# Patient Record
Sex: MALE | Race: WHITE | NOT HISPANIC OR LATINO | Employment: UNEMPLOYED
[De-identification: names, ages, dates, MRNs, and addresses within clinical notes are randomized per-mention and may not be internally consistent; named-entity substitution may affect disease eponyms.]

---

## 2019-11-04 ENCOUNTER — HEALTH MAINTENANCE LETTER (OUTPATIENT)
Age: 39
End: 2019-11-04

## 2020-02-16 ENCOUNTER — HEALTH MAINTENANCE LETTER (OUTPATIENT)
Age: 40
End: 2020-02-16

## 2020-04-23 ENCOUNTER — NURSE TRIAGE (OUTPATIENT)
Dept: NURSING | Facility: CLINIC | Age: 40
End: 2020-04-23

## 2020-04-23 NOTE — TELEPHONE ENCOUNTER
Patient says his [employer] worker did not have any symptoms but his coworker was sick with a fever sore throat.  Patient is concerned he may have it. He says his employer told him he should get tested or get a note so he can go back to work.  FNA advised if his worker was not sick, then it's likely he was not exposed but he can go to [email or web address] for an eval if his employer needs him to do.  Caller verbalizes understanding.      COVID 19 Nurse Triage Plan/Patient Instructions    Please be aware that novel coronavirus (COVID-19) may be circulating in the community. If you develop symptoms such as fever, cough, or SOB or if you have concerns about the presence of another infection including coronavirus (COVID-19), please contact your health care provider or visit [email or web address].     Disposition/Instructions    Patient to have an [facility] Visit with a provider (Preferred option). Follow System Ambulatory Workflow for COVID 19.     To do this follow these instructions:    1. Go to the website [email or web address]  2. Create an account (you will need your insurance information)  3. Start a new visit  4. Choose your diagnosis (e.g. COVID19)  5. Fill out the information about your symptoms  6. A provider will reach out to you by text, phone call or video visit based on your request    Call Back If: Your symptoms worsen before you are able to complete your [facility] Visit with a provider.    Thank you for limiting contact with others, wearing a simple mask to cover your cough, practice good hand hygiene habits and accessing our virtual services where possible to limit the spread of this virus.    For more information about COVID19 and options for caring for yourself at home, please visit the CDC website at https://www.cdc.gov/coronavirus/2019-ncov/about/steps-when-sick.html  For more options for care at [facility], please visit our website at [email or web address]    For more information, please use  the [place] Department of Health COVID-19 Website: https://www.health.[facility].[place].us/diseases/coronavirus/index.html  [place] Department of Health ([facility]) COVID-19 Hotlines (Interpreters available):      Health questions: Phone Number: [phone] or 1-527.718.1897 and Hours: 7 a.m. to 7 p.m.    Schools and  questions: Phone Number: [phone] or [phone] and Hours 7 a.m. to 7 p.m.                      Reason for Disposition    COVID -19, questions about    Additional Information    Negative: SEVERE difficulty breathing (e.g., struggling for each breath, speak in single words, bluish lips)    Negative: Sounds like a life-threatening emergency to the triager    Negative: [1] Adult has symptoms of COVID-19 (fever, cough, or SOB) AND [2] lab test positive    Negative: [1] Adult has symptoms of COVID-19 (fever, cough or SOB) AND [2] major community spread where patient lives AND [3] testing not being done for mild symptoms    Negative: [1] Difficulty breathing (shortness of breath) occurs AND [2] onset > 14 days after COVID-19 EXPOSURE (Close Contact) AND [3] no major community spread    Negative: [1] Dry cough occurs AND [2] onset > 14 days after COVID-19 EXPOSURE AND [3] no major community spread    Negative: [1] Wet cough (i.e., white-yellow, yellow, green, or samaria colored sputum) AND [2] onset > 14 days after COVID-19 EXPOSURE AND [3] no major community spread    Negative: [1] Common cold symptoms AND [2] onset > 14 days after COVID-19 EXPOSURE AND [3] no major community spread    Negative: [1] Difficulty breathing occurs AND [2] within 14 days of COVID-19 EXPOSURE (Close Contact)    Negative: Patient sounds very sick or weak to the triager    Negative: [1] Fever (or feeling feverish) OR cough AND [2] within 14 Days of COVID-19 EXPOSURE (Close Contact)    Negative: [1] Fever (or feeling feverish) OR cough occurs AND [2] within 14 days of travel from another country (international travel)     Negative: [1] Fever (or feeling feverish) OR cough occurs AND [2] within 14 days of travel from a city or area with major community spread    Negative: [1] Fever (or feeling feverish) OR cough occurs AND [2] living in area with major community spread AND [3] testing being done for symptoms    Negative: [1] Mild body aches, chills, diarrhea, headache, runny nose, or sore throat AND [2] within 14 days of COVID-Exposure    Negative: [1] COVID-19 EXPOSURE within last 14 days AND [2] NO cough, fever, or breathing difficulty AND [3] exposed person is a healthcare worker who was NOT using all recommended personal protective equipment (i.e., a respirator-N95 mask, eye protection, gloves, and gown)    Protocols used: CORONAVIRUS (COVID-19) EXPOSURE-A- 3.30.20

## 2020-11-22 ENCOUNTER — HEALTH MAINTENANCE LETTER (OUTPATIENT)
Age: 40
End: 2020-11-22

## 2021-04-04 ENCOUNTER — HEALTH MAINTENANCE LETTER (OUTPATIENT)
Age: 41
End: 2021-04-04

## 2021-05-29 ENCOUNTER — RECORDS - HEALTHEAST (OUTPATIENT)
Dept: ADMINISTRATIVE | Facility: CLINIC | Age: 41
End: 2021-05-29

## 2021-09-18 ENCOUNTER — HEALTH MAINTENANCE LETTER (OUTPATIENT)
Age: 41
End: 2021-09-18

## 2022-04-30 ENCOUNTER — HEALTH MAINTENANCE LETTER (OUTPATIENT)
Age: 42
End: 2022-04-30

## 2022-11-20 ENCOUNTER — HEALTH MAINTENANCE LETTER (OUTPATIENT)
Age: 42
End: 2022-11-20

## 2023-01-01 ENCOUNTER — APPOINTMENT (OUTPATIENT)
Dept: RADIOLOGY | Facility: HOSPITAL | Age: 43
End: 2023-01-01
Attending: EMERGENCY MEDICINE
Payer: COMMERCIAL

## 2023-01-01 ENCOUNTER — HOSPITAL ENCOUNTER (EMERGENCY)
Facility: HOSPITAL | Age: 43
Discharge: HOME | End: 2023-01-01
Attending: EMERGENCY MEDICINE | Admitting: EMERGENCY MEDICINE
Payer: COMMERCIAL

## 2023-01-01 VITALS
HEIGHT: 65 IN | RESPIRATION RATE: 17 BRPM | OXYGEN SATURATION: 97 % | TEMPERATURE: 99.2 F | HEART RATE: 89 BPM | DIASTOLIC BLOOD PRESSURE: 63 MMHG | SYSTOLIC BLOOD PRESSURE: 134 MMHG

## 2023-01-01 DIAGNOSIS — F17.200 SMOKING: ICD-10-CM

## 2023-01-01 DIAGNOSIS — R07.9 CHEST PAIN, UNSPECIFIED TYPE: ICD-10-CM

## 2023-01-01 LAB
ALBUMIN SERPL BCG-MCNC: 4.2 G/DL (ref 3.5–5.2)
ALP SERPL-CCNC: 216 U/L (ref 40–129)
ALT SERPL W P-5'-P-CCNC: 16 U/L (ref 10–50)
ANION GAP SERPL CALCULATED.3IONS-SCNC: 12 MMOL/L (ref 7–15)
AST SERPL W P-5'-P-CCNC: 10 U/L (ref 10–50)
BASOPHILS # BLD AUTO: 0.1 10E3/UL (ref 0–0.2)
BASOPHILS NFR BLD AUTO: 1 %
BILIRUB SERPL-MCNC: 0.6 MG/DL
BUN SERPL-MCNC: 10.4 MG/DL (ref 6–20)
CALCIUM SERPL-MCNC: 9.4 MG/DL (ref 8.6–10)
CHLORIDE SERPL-SCNC: 101 MMOL/L (ref 98–107)
CREAT SERPL-MCNC: 0.75 MG/DL (ref 0.67–1.17)
DEPRECATED HCO3 PLAS-SCNC: 26 MMOL/L (ref 22–29)
EOSINOPHIL # BLD AUTO: 0.3 10E3/UL (ref 0–0.7)
EOSINOPHIL NFR BLD AUTO: 2 %
ERYTHROCYTE [DISTWIDTH] IN BLOOD BY AUTOMATED COUNT: 16 % (ref 10–15)
GFR SERPL CREATININE-BSD FRML MDRD: >90 ML/MIN/1.73M2
GLUCOSE SERPL-MCNC: 109 MG/DL (ref 70–99)
HCT VFR BLD AUTO: 45.9 % (ref 40–53)
HGB BLD-MCNC: 14.2 G/DL (ref 13.3–17.7)
HOLD SPECIMEN: NORMAL
IMM GRANULOCYTES # BLD: 0.1 10E3/UL
IMM GRANULOCYTES NFR BLD: 1 %
LIPASE SERPL-CCNC: 15 U/L (ref 13–60)
LYMPHOCYTES # BLD AUTO: 2.9 10E3/UL (ref 0.8–5.3)
LYMPHOCYTES NFR BLD AUTO: 19 %
MCH RBC QN AUTO: 28.2 PG (ref 26.5–33)
MCHC RBC AUTO-ENTMCNC: 30.9 G/DL (ref 31.5–36.5)
MCV RBC AUTO: 91 FL (ref 78–100)
MONOCYTES # BLD AUTO: 1.3 10E3/UL (ref 0–1.3)
MONOCYTES NFR BLD AUTO: 9 %
NEUTROPHILS # BLD AUTO: 10.6 10E3/UL (ref 1.6–8.3)
NEUTROPHILS NFR BLD AUTO: 68 %
NRBC # BLD AUTO: 0 10E3/UL
NRBC BLD AUTO-RTO: 0 /100
PLATELET # BLD AUTO: 284 10E3/UL (ref 150–450)
POTASSIUM SERPL-SCNC: 4.1 MMOL/L (ref 3.4–5.3)
PROT SERPL-MCNC: 7.4 G/DL (ref 6.4–8.3)
RBC # BLD AUTO: 5.03 10E6/UL (ref 4.4–5.9)
SODIUM SERPL-SCNC: 139 MMOL/L (ref 136–145)
TROPONIN T SERPL HS-MCNC: 19 NG/L
TROPONIN T SERPL HS-MCNC: 20 NG/L
WBC # BLD AUTO: 15.2 10E3/UL (ref 4–11)

## 2023-01-01 PROCEDURE — 93005 ELECTROCARDIOGRAM TRACING: CPT | Performed by: EMERGENCY MEDICINE

## 2023-01-01 PROCEDURE — 99285 EMERGENCY DEPT VISIT HI MDM: CPT | Mod: 25

## 2023-01-01 PROCEDURE — 83690 ASSAY OF LIPASE: CPT | Performed by: EMERGENCY MEDICINE

## 2023-01-01 PROCEDURE — 36415 COLL VENOUS BLD VENIPUNCTURE: CPT | Performed by: EMERGENCY MEDICINE

## 2023-01-01 PROCEDURE — 85025 COMPLETE CBC W/AUTO DIFF WBC: CPT | Performed by: EMERGENCY MEDICINE

## 2023-01-01 PROCEDURE — 71046 X-RAY EXAM CHEST 2 VIEWS: CPT

## 2023-01-01 PROCEDURE — 84484 ASSAY OF TROPONIN QUANT: CPT | Mod: 91 | Performed by: EMERGENCY MEDICINE

## 2023-01-01 PROCEDURE — 80053 COMPREHEN METABOLIC PANEL: CPT | Performed by: EMERGENCY MEDICINE

## 2023-01-01 ASSESSMENT — HEART SCORE
ECG: NORMAL
HISTORY: SLIGHTLY SUSPICIOUS
HEART SCORE: 2
TROPONIN: LESS THAN OR EQUAL TO NORMAL LIMIT
RISK FACTORS: >2 RISK FACTORS OR HX OF ATHEROSCLEROTIC DISEASE
AGE: <45

## 2023-01-01 ASSESSMENT — ACTIVITIES OF DAILY LIVING (ADL)
ADLS_ACUITY_SCORE: 35
ADLS_ACUITY_SCORE: 35

## 2023-01-02 NOTE — ED NOTES
Patient discharged at 2216 to home; cab providing transportation. Patient provided with all discharge paperwork and educational material. All questions answered to patient's satisfaction.

## 2023-01-02 NOTE — DISCHARGE INSTRUCTIONS
Our cardiology team (heart specialists) will call you to help you set up an appointment to talk about your prior NSTEMI 6+ months ago and if they recommend testing of your heart here in the [facility]. Please stop smoking because it increases your risk for a heart attack.

## 2023-01-02 NOTE — ED PROVIDER NOTES
EMERGENCY DEPARTMENT ENCOUNTER      NAME: Warren Jaramillo  AGE: 42 year old male  YOB: 1980  MRN: [id number]  EVALUATION DATE & TIME: 1/1/2023  6:41 PM    PCP: Rick Fernandez    ED PROVIDER: Shanda Soriano M.D.      Chief Complaint   Patient presents with     Chest Pain     7/10         FINAL IMPRESSION:  1. Chest pain, unspecified type    2. Smoking          ED COURSE & MEDICAL DECISION MAKING:    ED Course as of 01/01/23 2136   Sun Jan 01, 2023 1919 Pt with atypical episode ongoing chest discomfort with reasusring EKG without ST abnormalities, HEART score 2 (smoking, obesity, claims prior NSTEMI and unknown family history not on ASA) and given ASA by EMS with sense of palpitations with remote known prior afib without blood thinner use and sense of continued palpitations with HR 90 and regular. Exam reassuring, PERC negative. Patient on monitor, amenable with HEART score 2 to CXR, screening labs and 2nd troponin, smoking cessation counselling >5 min provided by me   2010 Pt with under 6h symptoms with troponin quite low but over 6, at 19, thus will repeat 2h troponin delta testing to exclude ACS. First drawn 1915 thus repeat 2115 ordered for 1h from now. CMP reassuring and lipase WNL with nonspecific elevation of WBC and alk phos without prior alk phos for comparison purposes per review of prior results, LFTs WNL.    2011 CXR with some cardiomegaly and possible developing edema likely relating to comorbidities and lifestyle and habitus but no florid anomaies   2011 Patient updated, amenable to 2nd troponin 2115 2029 Pt reassessed and feels improved, notes he has h/o chest pains that he has attributed to anxiety and previously attributed by others to anxiety, wonders if it was anxiety, amenable to repeat troponin 2115 and DWAYNE Kelley updated, on cardiac monitor 8:30 PM NSR without ST anomalies and HR 81 which is reassuring   2134 Repeat troponin reassuringly similar to first at 20,  which is reassuring, as it went form 19 -> 20 over 2h delta interval which is strongly suggestive of r/o ACS per HS Troponin T protocol. Patient does offer he feels anxiety was likely strong component of his presentation today. He is open to follow up with our rapid access clinic with recent homelessness and atypical reported prior elevated troponin without follow up remotely due to housing issues, now has stable housing in this area. Patient discharged after being provided with extensive anticipatory guidance and given return precautions, importance of PMD follow-up emphasized.        Pertinent Labs & Imaging studies reviewed. (See chart for details)    N95 worn  A face shield was worn also  COVID PPE    Medical Decision Making    History:    Supplemental history from: N/A    External Record(s) reviewed: Documented in HPI, if applicable.    Work Up:    Chart documentation includes differential considered and any EKGs or imaging independently interpreted by provider.    In additional to work up documented, I considered the following work up: See chart documentation, if applicable.    External consultation:    Discussion of management with another provider: See chart documentation, if applicable    Complicating factors:    Care impacted by chronic illness: Diabetes, Hyperlipidemia, Hypertension, Mental Health and Smoking / Nicotine Use    Care affected by social determinants of health: Access to Medical Care, Housing Insecurity, Literacy and Problems Related to Primary Support Group    Disposition considerations: Discharge. No recommendations on prescription strength medication(s). I considered admission, but discharged patient after significant clinical improvement.        At the conclusion of the encounter I discussed the results of all of the tests and the disposition. The questions were answered. The patient or family acknowledged understanding and was agreeable with the care plan.     MEDICATIONS GIVEN IN THE  EMERGENCY:  Medications - No data to display    NEW PRESCRIPTIONS STARTED AT TODAY'S ER VISIT  New Prescriptions    No medications on file          =================================================================    HPI      Warren Jaramillo is a 42 year old male with PMHx of afib-not currently on blood thinners, HTN, DMII, obesity, autism, fetal alcohol syndrome, TBI, who presents to the ED today via EMS with chest pain.    Patient endorses ongoing chest pain which began around 6:15 PM when he was watching TV and drinking beer. He describes the pain as left chest tightness which is non radiating, 7/10. He states that the chest pain has improved. Patient also endorses an ongoing heart fluttering sensation and shortness of breath which has been ongoing all day. Patient was given 4 aspirin by EMS but has not had any nitroglycerine.    Patient endorses tobacco use. He is adopted and is unaware of his familial health history, but states that he had a heart attack 6 months ago and was seen in [place]. He states that his troponin was elevated, but no stents were place. Additionally, patient notes that he has moved several times in the last 6 months and has not been able to establish primary care.    Patient denies cough, fever, abdominal pain, vomiting, diarrhea, and all other relevant symptoms.      REVIEW OF SYSTEMS   All other systems reviewed and are negative except as noted above in HPI.    PAST MEDICAL HISTORY:  History reviewed. No pertinent past medical history.    PAST SURGICAL HISTORY:  History reviewed. No pertinent surgical history.    CURRENT MEDICATIONS:    albuterol 90 MCG/ACT inhaler  cetirizine (ZYRTEC) 10 MG tablet  cholecalciferol (VITAMIN D) 1000 UNIT tablet  CLONAZEPAM PO  co-enzyme Q-10 (COENZYME Q-10) 100 MG CAPS  Cod Liver Oil CAPS  DIPHENHYDRAMINE HCL  docosanol (ABREVA) 10 % CREA  EPINEPHrine (EPIPEN) 0.3 MG/0.3ML injection  IBUPROFEN 800 MG OR TABS  methylphenidate (CONCERTA) 54 MG CR  tablet  MILK OF MAGNESIA OR  naproxen (NAPROSYN) 500 MG tablet  traMADol (ULTRAM) 50 MG tablet  TYLENOL CAPS 500 MG OR        ALLERGIES:  Allergies   Allergen Reactions     Bees Anaphylaxis     Have an Epi pen that carries with     Sulfa Drugs      Headaches and nausea       FAMILY HISTORY:  Family History   Problem Relation Age of Onset     Unknown/Adopted Father      Unknown/Adopted Maternal Grandmother      C.A.D. Maternal Grandfather      Diabetes Maternal Grandfather      Cerebrovascular Disease Maternal Grandfather      Unknown/Adopted Paternal Grandmother      Unknown/Adopted Paternal Grandfather      Unknown/Adopted Brother      Unknown/Adopted Sister        SOCIAL HISTORY:   Social History     Socioeconomic History     Marital status: Single     Spouse name: None     Number of children: None     Years of education: None     Highest education level: None   Tobacco Use     Smoking status: Every Day     Packs/day: 1.00     Types: Cigarettes     Smokeless tobacco: Current     Tobacco comments:     occasional pouch of chewing tobacco   Substance and Sexual Activity     Alcohol use: No     Drug use: No     Sexual activity: Never     Partners: Female   Other Topics Concern      Service No     Blood Transfusions No     Caffeine Concern No     Occupational Exposure No     Hobby Hazards No     Sleep Concern No     Stress Concern Yes     Comment: sometimes     Weight Concern No     Special Diet Yes     Comment: counting carbs     Back Care No     Exercise Yes     Seat Belt Yes     Self-Exams Yes       VITALS:  Patient Vitals for the past 24 hrs:   BP Temp Temp src Pulse Resp SpO2 Height   01/01/23 2100 135/81 -- -- 82 18 96 % --   01/01/23 2045 131/89 -- -- 91 (!) 33 97 % --   01/01/23 2030 127/80 -- -- 86 (!) 32 95 % --   01/01/23 2000 134/81 -- -- 85 22 96 % --   01/01/23 1945 136/80 -- -- 88 16 97 % --   01/01/23 1930 (!) 144/77 -- -- 89 23 97 % --   01/01/23 1900 (!) 141/72 -- -- 89 21 96 % --   01/01/23  "1853 (!) 146/76 99.2  F (37.3  C) Oral 90 20 96 % 1.638 m (5' 4.5\")       PHYSICAL EXAM    GENERAL: Awake, alert.  In no acute distress.   HEENT: Normocephalic, atraumatic.  Pupils equal, round and reactive.  Conjunctiva normal.  EOMI.  NECK: No stridor or apparent deformity.  PULMONARY: Symmetrical breath sounds without distress.  Lungs clear to auscultation bilaterally without wheezes, rhonchi or rales.  CARDIO: Regular rate and rhythm.  No significant murmur, rub or gallop.  Radial pulses strong and symmetrical.  ABDOMINAL: Abdomen soft, non-distended and non-tender to palpation.  No CVAT, no palpable hepatosplenomegaly.  EXTREMITIES: No lower extremity swelling or edema.    NEURO: Alert and oriented to person, place and time.  Cranial nerves grossly intact.  No focal motor deficit.  PSYCH: Normal mood and affect  SKIN: No rashes      LAB:  All pertinent labs reviewed and interpreted.  Results for orders placed or performed during the hospital encounter of 01/01/23   XR Chest 2 Views    Impression    IMPRESSION: No focal consolidation or effusion. Cardiomegaly and mild pulmonary vascular congestion without overt pulmonary edema. No acute osseous findings.   Comprehensive metabolic panel   Result Value Ref Range    Sodium 139 136 - 145 mmol/L    Potassium 4.1 3.4 - 5.3 mmol/L    Chloride 101 98 - 107 mmol/L    Carbon Dioxide (CO2) 26 22 - 29 mmol/L    Anion Gap 12 7 - 15 mmol/L    Urea Nitrogen 10.4 6.0 - 20.0 mg/dL    Creatinine 0.75 0.67 - 1.17 mg/dL    Calcium 9.4 8.6 - 10.0 mg/dL    Glucose 109 (H) 70 - 99 mg/dL    Alkaline Phosphatase 216 (H) 40 - 129 U/L    AST 10 10 - 50 U/L    ALT 16 10 - 50 U/L    Protein Total 7.4 6.4 - 8.3 g/dL    Albumin 4.2 3.5 - 5.2 g/dL    Bilirubin Total 0.6 <=1.2 mg/dL    GFR Estimate >90 >60 mL/min/1.73m2   Result Value Ref Range    Lipase 15 13 - 60 U/L   Result Value Ref Range    Troponin T, High Sensitivity 19 <=22 ng/L   CBC with platelets and differential   Result Value Ref " Range    WBC Count 15.2 (H) 4.0 - 11.0 10e3/uL    RBC Count 5.03 4.40 - 5.90 10e6/uL    Hemoglobin 14.2 13.3 - 17.7 g/dL    Hematocrit 45.9 40.0 - 53.0 %    MCV 91 78 - 100 fL    MCH 28.2 26.5 - 33.0 pg    MCHC 30.9 (L) 31.5 - 36.5 g/dL    RDW 16.0 (H) 10.0 - 15.0 %    Platelet Count 284 150 - 450 10e3/uL    % Neutrophils 68 %    % Lymphocytes 19 %    % Monocytes 9 %    % Eosinophils 2 %    % Basophils 1 %    % Immature Granulocytes 1 %    NRBCs per 100 WBC 0 <1 /100    Absolute Neutrophils 10.6 (H) 1.6 - 8.3 10e3/uL    Absolute Lymphocytes 2.9 0.8 - 5.3 10e3/uL    Absolute Monocytes 1.3 0.0 - 1.3 10e3/uL    Absolute Eosinophils 0.3 0.0 - 0.7 10e3/uL    Absolute Basophils 0.1 0.0 - 0.2 10e3/uL    Absolute Immature Granulocytes 0.1 <=0.4 10e3/uL    Absolute NRBCs 0.0 10e3/uL   Extra Blue Top Tube   Result Value Ref Range    Hold Specimen JIC    Extra Red Top Tube   Result Value Ref Range    Hold Specimen JIC    Extra Green Top (Lithium Heparin) Tube   Result Value Ref Range    Hold Specimen JIC    Extra Purple Top Tube   Result Value Ref Range    Hold Specimen JIC    Result Value Ref Range    Troponin T, High Sensitivity 20 <=22 ng/L       RADIOLOGY:  Reviewed all pertinent imaging. Please see official radiology report.  XR Chest 2 Views   Final Result   IMPRESSION: No focal consolidation or effusion. Cardiomegaly and mild pulmonary vascular congestion without overt pulmonary edema. No acute osseous findings.            EKG:    Reviewed and interpreted as: 1848 NSR without ST anomalies, HR 90, LBBB pattern.    I have independently reviewed and interpreted the EKG(s) documented above.        I, Ross Reddy, am serving as a scribe to document services personally performed by Dr. Shanda Soriano based on my observation and the provider's statements to me. I, Shanda Soriano MD attest that Ross Reddy is acting in a scribe capacity, has observed my performance of the services and has documented them in accordance with my  direction.      Shanda Soriano MD  01/01/23 5769

## 2023-01-02 NOTE — ED TRIAGE NOTES
Patient presents via EMS with substernal chest pain that started about 1 hour ago. Increase pain with deep breath in. Patient does have heart history. EMS administered 324 mg Aspirin.     Triage Assessment     Row Name 01/01/23 3716       Triage Assessment (Adult)    Airway WDL WDL       Respiratory WDL    Respiratory WDL WDL       Skin Circulation/Temperature WDL    Skin Circulation/Temperature WDL WDL       Cardiac WDL    Cardiac WDL chest pain;X;rhythm    Cardiac Rhythm NSR       Chest Pain Assessment    Chest Pain Location midsternal    Character aching    Duration 1 hour    Precipitating Factors other (see comments)  Deep breath in.    Alleviating Factors sitting up    Chest Pain Intervention cardiac monitoring continued       Peripheral/Neurovascular WDL    Peripheral Neurovascular WDL WDL       Cognitive/Neuro/Behavioral WDL    Cognitive/Neuro/Behavioral WDL WDL

## 2023-01-02 NOTE — ED NOTES
Bed: JNED-32  Expected date: 1/1/23  Expected time: 6:35 PM  Means of arrival: Ambulance  Comments:  RUI Baldwin CP

## 2023-01-13 ENCOUNTER — NURSE TRIAGE (OUTPATIENT)
Dept: NURSING | Facility: CLINIC | Age: 43
End: 2023-01-13

## 2023-01-13 NOTE — TELEPHONE ENCOUNTER
"Nurse Triage SBAR    Situation: Filling broke in his tooth    Background: Patient calling.     Assessment: Filling broke and it is very sensitive to hot and cold. He is getting shooting pain into his ear. Upper right side. Just breathing room air hurts. Pain is 10/10. Tried tylenol - has not helped at all. Temp: 95.1 - temporal.     Protocol Recommended Disposition: See Physician within 4 hours    Recommendation: According to the protocol, Patient should be seen within 4 hours. Advised Patient to be seen within 4 hours. Patient was given information on after hour dental offices he could contact. Care advice given. Patient verbalizes understanding and agrees with plan of care. Reviewed concerning symptoms and when to call back.       Amaya Barth RN Nursing Advisor 1/13/2023 6:07 PM     Reason for Disposition    [1] SEVERE pain (e.g., excruciating, unable to do any normal activities) AND [2] not improved 2 hours after pain medicine    Additional Information    Negative: Shock suspected (e.g., cold/pale/clammy skin, too weak to stand, low BP, rapid pulse)    Negative: [1] Similar pain previously AND [2] it was from \"heart attack\"    Negative: [1] Similar pain previously AND [2] it was from \"angina\" AND [3] not relieved by nitroglycerin    Negative: Sounds like a life-threatening emergency to the triager    Negative: Chest pain    Negative: Toothache or mouth pain after tooth extraction    Negative: Canker sore (i.e., aphthous ulcer)    Negative: Tongue is very swollen and tender    Negative: [1] Face is swollen AND [2] fever    Negative: Patient sounds very sick or weak to the triager    Protocols used: TOOTHACHE-A-AH      "

## 2023-01-19 ENCOUNTER — TRANSFERRED RECORDS (OUTPATIENT)
Dept: HEALTH INFORMATION MANAGEMENT | Facility: CLINIC | Age: 43
End: 2023-01-19
Payer: COMMERCIAL

## 2023-01-31 ENCOUNTER — HOSPITAL ENCOUNTER (EMERGENCY)
Facility: HOSPITAL | Age: 43
Discharge: HOME | End: 2023-01-31
Attending: EMERGENCY MEDICINE | Admitting: EMERGENCY MEDICINE
Payer: COMMERCIAL

## 2023-01-31 ENCOUNTER — APPOINTMENT (OUTPATIENT)
Dept: CT IMAGING | Facility: HOSPITAL | Age: 43
End: 2023-01-31
Attending: EMERGENCY MEDICINE
Payer: COMMERCIAL

## 2023-01-31 VITALS
TEMPERATURE: 98.1 F | BODY MASS INDEX: 34.87 KG/M2 | WEIGHT: 206.35 LBS | DIASTOLIC BLOOD PRESSURE: 72 MMHG | SYSTOLIC BLOOD PRESSURE: 136 MMHG | OXYGEN SATURATION: 96 % | RESPIRATION RATE: 20 BRPM | HEART RATE: 97 BPM

## 2023-01-31 DIAGNOSIS — R10.31 RLQ ABDOMINAL PAIN: ICD-10-CM

## 2023-01-31 LAB
ALBUMIN SERPL BCG-MCNC: 4.1 G/DL (ref 3.5–5.2)
ALBUMIN UR-MCNC: 70 MG/DL
ALP SERPL-CCNC: 245 U/L (ref 40–129)
ALT SERPL W P-5'-P-CCNC: 19 U/L (ref 10–50)
ANION GAP SERPL CALCULATED.3IONS-SCNC: 11 MMOL/L (ref 7–15)
APPEARANCE UR: CLEAR
AST SERPL W P-5'-P-CCNC: 15 U/L (ref 10–50)
BASOPHILS # BLD AUTO: 0.1 10E3/UL (ref 0–0.2)
BASOPHILS NFR BLD AUTO: 0 %
BILIRUB SERPL-MCNC: 0.9 MG/DL
BILIRUB UR QL STRIP: NEGATIVE
BUN SERPL-MCNC: 12 MG/DL (ref 6–20)
CALCIUM SERPL-MCNC: 9.7 MG/DL (ref 8.6–10)
CHLORIDE SERPL-SCNC: 97 MMOL/L (ref 98–107)
COLOR UR AUTO: YELLOW
CREAT SERPL-MCNC: 0.64 MG/DL (ref 0.67–1.17)
DEPRECATED HCO3 PLAS-SCNC: 28 MMOL/L (ref 22–29)
EOSINOPHIL # BLD AUTO: 0.3 10E3/UL (ref 0–0.7)
EOSINOPHIL NFR BLD AUTO: 2 %
ERYTHROCYTE [DISTWIDTH] IN BLOOD BY AUTOMATED COUNT: 15.9 % (ref 10–15)
GFR SERPL CREATININE-BSD FRML MDRD: >90 ML/MIN/1.73M2
GLUCOSE SERPL-MCNC: 120 MG/DL (ref 70–99)
GLUCOSE UR STRIP-MCNC: NEGATIVE MG/DL
HCT VFR BLD AUTO: 48.5 % (ref 40–53)
HGB BLD-MCNC: 15 G/DL (ref 13.3–17.7)
HGB UR QL STRIP: NEGATIVE
IMM GRANULOCYTES # BLD: 0.1 10E3/UL
IMM GRANULOCYTES NFR BLD: 1 %
KETONES UR STRIP-MCNC: NEGATIVE MG/DL
LEUKOCYTE ESTERASE UR QL STRIP: NEGATIVE
LIPASE SERPL-CCNC: 16 U/L (ref 13–60)
LYMPHOCYTES # BLD AUTO: 2.4 10E3/UL (ref 0.8–5.3)
LYMPHOCYTES NFR BLD AUTO: 15 %
MCH RBC QN AUTO: 28 PG (ref 26.5–33)
MCHC RBC AUTO-ENTMCNC: 30.9 G/DL (ref 31.5–36.5)
MCV RBC AUTO: 91 FL (ref 78–100)
MONOCYTES # BLD AUTO: 1.4 10E3/UL (ref 0–1.3)
MONOCYTES NFR BLD AUTO: 9 %
MUCOUS THREADS #/AREA URNS LPF: PRESENT /LPF
NEUTROPHILS # BLD AUTO: 11.9 10E3/UL (ref 1.6–8.3)
NEUTROPHILS NFR BLD AUTO: 73 %
NITRATE UR QL: NEGATIVE
NRBC # BLD AUTO: 0 10E3/UL
NRBC BLD AUTO-RTO: 0 /100
PH UR STRIP: 6 [PH] (ref 5–7)
PLATELET # BLD AUTO: 282 10E3/UL (ref 150–450)
POTASSIUM SERPL-SCNC: 4.6 MMOL/L (ref 3.4–5.3)
PROT SERPL-MCNC: 7.9 G/DL (ref 6.4–8.3)
RBC # BLD AUTO: 5.35 10E6/UL (ref 4.4–5.9)
RBC URINE: 1 /HPF
SODIUM SERPL-SCNC: 136 MMOL/L (ref 136–145)
SP GR UR STRIP: 1.02 (ref 1–1.03)
SQUAMOUS EPITHELIAL: <1 /HPF
UROBILINOGEN UR STRIP-MCNC: >12 MG/DL
WBC # BLD AUTO: 16.1 10E3/UL (ref 4–11)
WBC URINE: 2 /HPF

## 2023-01-31 PROCEDURE — 250N000011 HC RX IP 250 OP 636: Performed by: EMERGENCY MEDICINE

## 2023-01-31 PROCEDURE — 258N000003 HC RX IP 258 OP 636: Performed by: EMERGENCY MEDICINE

## 2023-01-31 PROCEDURE — 83690 ASSAY OF LIPASE: CPT | Performed by: EMERGENCY MEDICINE

## 2023-01-31 PROCEDURE — 36415 COLL VENOUS BLD VENIPUNCTURE: CPT | Performed by: EMERGENCY MEDICINE

## 2023-01-31 PROCEDURE — 99285 EMERGENCY DEPT VISIT HI MDM: CPT | Mod: 25

## 2023-01-31 PROCEDURE — 96361 HYDRATE IV INFUSION ADD-ON: CPT

## 2023-01-31 PROCEDURE — 96374 THER/PROPH/DIAG INJ IV PUSH: CPT | Mod: 59

## 2023-01-31 PROCEDURE — 74177 CT ABD & PELVIS W/CONTRAST: CPT | Mod: MG

## 2023-01-31 PROCEDURE — 81001 URINALYSIS AUTO W/SCOPE: CPT | Performed by: EMERGENCY MEDICINE

## 2023-01-31 PROCEDURE — 80053 COMPREHEN METABOLIC PANEL: CPT | Performed by: EMERGENCY MEDICINE

## 2023-01-31 PROCEDURE — 85025 COMPLETE CBC W/AUTO DIFF WBC: CPT | Performed by: EMERGENCY MEDICINE

## 2023-01-31 RX ORDER — KETOROLAC TROMETHAMINE 15 MG/ML
15 INJECTION, SOLUTION INTRAMUSCULAR; INTRAVENOUS ONCE
Status: COMPLETED | OUTPATIENT
Start: 2023-01-31 | End: 2023-01-31

## 2023-01-31 RX ORDER — IOPAMIDOL 755 MG/ML
100 INJECTION, SOLUTION INTRAVASCULAR ONCE
Status: COMPLETED | OUTPATIENT
Start: 2023-01-31 | End: 2023-01-31

## 2023-01-31 RX ORDER — SODIUM CHLORIDE 9 MG/ML
INJECTION, SOLUTION INTRAVENOUS CONTINUOUS
Status: DISCONTINUED | OUTPATIENT
Start: 2023-01-31 | End: 2023-01-31 | Stop reason: HOSPADM

## 2023-01-31 RX ADMIN — SODIUM CHLORIDE 1000 ML: 9 INJECTION, SOLUTION INTRAVENOUS at 16:14

## 2023-01-31 RX ADMIN — IOPAMIDOL 100 ML: 755 INJECTION, SOLUTION INTRAVENOUS at 17:20

## 2023-01-31 RX ADMIN — KETOROLAC TROMETHAMINE 15 MG: 15 INJECTION, SOLUTION INTRAMUSCULAR; INTRAVENOUS at 16:13

## 2023-01-31 ASSESSMENT — ENCOUNTER SYMPTOMS
SHORTNESS OF BREATH: 1
VOMITING: 1
ABDOMINAL PAIN: 1
NAUSEA: 1

## 2023-01-31 ASSESSMENT — ACTIVITIES OF DAILY LIVING (ADL): ADLS_ACUITY_SCORE: 33

## 2023-01-31 NOTE — ED NOTES
ED Provider In Triage Note  [facility]  Encounter Date: Jan 31, 2023    Chief Complaint   Patient presents with     Abdominal Pain       Brief HPI:   Warren Jaramillo is a 42 year old male presenting to the Emergency Department with a chief complaint of right sided abdominal pain radiating into the right flank. Began at 10:30 am last night and worsened today. Tried milk of magnesia without improvement. Had a bowel movement today. Hx of kidney stones. No history of abdominal surgery. No nausea or vomiting. No diarrhea. No chest pain or shortness of breath. No fever.     Brief Physical Exam:  BP (!) 152/83   Pulse 105   Temp 98.1  F (36.7  C) (Oral)   Resp 16   Wt 93.6 kg (206 lb 5.6 oz)   SpO2 96%   BMI 34.87 kg/m    General: Non-toxic appearing  HEENT: Atraumatic  Resp: No respiratory distress  Abdomen: Non-peritoneal, tenderness in the RUQ, RLQ  Neuro: Alert, oriented, answers questions appropriately  Psych: Behavior appropriate    Plan Initiated in Triage:  Orders Placed This Encounter     CT Abdomen Pelvis w Contrast     Comprehensive metabolic panel     Lipase     UA with Microscopic reflex to Culture     CBC with platelets and differential     [place] Draw     FOLLOWED BY Linked Order Group      0.9% sodium chloride BOLUS      sodium chloride 0.9% infusion     ketorolac (TORADOL) injection 15 mg       PIT Dispo:   Return to lobby while awaiting workup and ED bed availability    Tila Cisse MD on 1/31/2023 at 4:18 PM    Patient was evaluated by the Physician in Triage due to a limitation of available rooms in the Emergency Department. A plan of care was discussed based on the information obtained on the initial evaluation and patient was consuled to return back to the Emergency Department lobby after this initial evalutaiton until results were obtained or a room became available in the Emergency Department. Patient was counseled not to leave prior to receiving the  results of their workup.     Tila Cisse MD  [facility] EMERGENCY DEPARTMENT  [facility] [facility] [id number] [phone]     Tila Cisse MD  01/31/23 3314

## 2023-01-31 NOTE — ED TRIAGE NOTES
"Pt c/o right-sided abdominal pain radiating to right flank/right lower back since 2230 last night.  Pt denies nausea, vomiting, and diarrhea.  Pt states his stools have been \"a toothpaste consistency\" lately.  Pt denies taking any pain meds.  Pt still has his gallbladder and appendix.  Pt is afebrile in triage.     Triage Assessment     Row Name 01/31/23 1600       Triage Assessment (Adult)    Airway WDL WDL       Respiratory WDL    Respiratory WDL WDL       Skin Circulation/Temperature WDL    Skin Circulation/Temperature WDL WDL       Cardiac WDL    Cardiac WDL WDL       Peripheral/Neurovascular WDL    Peripheral Neurovascular WDL WDL       Cognitive/Neuro/Behavioral WDL    Cognitive/Neuro/Behavioral WDL WDL              "

## 2023-01-31 NOTE — ED NOTES
Bed: [facility]  Expected date:   Expected time:   Means of arrival: Wheelchair  Comments:

## 2023-01-31 NOTE — ED PROVIDER NOTES
EMERGENCY DEPARTMENT ENCOUNTER     NAME: Warren Jaramillo   AGE: 42 year old male   YOB: 1980   MRN: [id number]   EVALUATION DATE & TIME: 1/31/2023  4:53 PM   PCP: No Ref-Primary, Physician     Chief Complaint   Patient presents with     Abdominal Pain   :    FINAL IMPRESSION       1. RLQ abdominal pain           ED COURSE & MEDICAL DECISION MAKING    4:56 PM Introduced myself to the patient, obtained history of present illness, and performed initial physical exam at this time. PPE: Provider wore gloves, N95 mask and paper mask.   6:16 PM I rechecked and updated the patient and discussed plans of discharge and patient is agreeable      Pertinent Labs & Imaging studies reviewed. (See chart for details)   42 year old male  presents to the Emergency Department for evaluation of diffuse right-sided abdominal pain located in the right upper quadrant, right lower quadrant, right flank.  He denies GI symptoms, urinary symptoms. Initial Vitals Reviewed. Initial exam notable for generally well-appearing male with normal vitals, obese abdomen that appears to have some diffuse tenderness but without rebound or guarding.  Differential is broad and includes Maggie lithiasis or cholecystitis, appendicitis, nephrolithiasis, pyelonephritis, musculoskeletal pain, GERD.  Labs including CBC, CMP, lipase show a leukocytosis of 16, but when I look back at discharge it is pretty typical for him to have similar numbers.  Urinalysis is negative.  I decided to proceed with CT imaging which is negative for any acute pathology.  It appears to show hepatic steatosis and likely progressive to cirrhosis with trace ascites.  This may be what starting to cause the patient's discomfort.  He does not drink alcohol, his LFTs are reasonable, and I suspect that this may be secondary to hepatic steatosis.  I am going to have him follow-up with GI as an outpatient, specifically hep otology, and he is comfortable with this plan and  rule out approach at time of discharge.      Medical Decision Making    History:    Supplemental history from: Documented in chart, if applicable    External Record(s) reviewed: Documented in chart, if applicable. and Outpatient Record: history    Work Up:    Chart documentation includes differential considered and any EKGs or imaging independently interpreted by provider, where specified.    In additional to work up documented, I considered the following work up: Documented in chart, if applicable.    External consultation:    Discussion of management with another provider: Documented in chart, if applicable    Complicating factors:    Care impacted by chronic illness: Mental Health    Care affected by social determinants of health: N/A    Disposition considerations: Discharge. No recommendations on prescription strength medication(s). I considered admission, but ultimately discharged patient with reassuring imaging.           At the conclusion of the encounter I discussed the results of all of the tests and the disposition. The questions were answered. The patient or family acknowledged understanding and was agreeable with the care plan.     0 minutes critical care time, see procedure note below for details if relevant    MEDICATIONS GIVEN IN THE EMERGENCY:   Medications   0.9% sodium chloride BOLUS (1,000 mLs Intravenous New Bag 1/31/23 1614)     Followed by   sodium chloride 0.9% infusion (has no administration in time range)   ketorolac (TORADOL) injection 15 mg (15 mg Intravenous Given 1/31/23 1613)      NEW PRESCRIPTIONS STARTED AT TODAY'S ER VISIT   New Prescriptions    No medications on file     ================================================================   HISTORY OF PRESENT ILLNESS       Patient information was obtained from: Patient   Use of Intrepreter: N/A  Warren Jaramillo is a 42 year old male with history of DM Type 2, ADHD, autism who presents for the evaluation of right-sided abdominal  pain.    The patient endorses right-sided abdominal pain that radiates towards his back and kidney. He also reports nausea and vomiting. Last night around 10:30 PM he was short of breath. He denies any abdominal surgeries.    ================================================================    REVIEW OF SYSTEMS       Review of Systems   Respiratory: Positive for shortness of breath.    Gastrointestinal: Positive for abdominal pain (right-sided radiating towards back), nausea and vomiting.       PAST HISTORY     PAST MEDICAL HISTORY:   No past medical history on file.   PAST SURGICAL HISTORY:   No past surgical history on file.   CURRENT MEDICATIONS:   albuterol 90 MCG/ACT inhaler  cetirizine (ZYRTEC) 10 MG tablet  cholecalciferol (VITAMIN D) 1000 UNIT tablet  CLONAZEPAM PO  co-enzyme Q-10 (COENZYME Q-10) 100 MG CAPS  Cod Liver Oil CAPS  DIPHENHYDRAMINE HCL  docosanol (ABREVA) 10 % CREA  EPINEPHrine (EPIPEN) 0.3 MG/0.3ML injection  IBUPROFEN 800 MG OR TABS  methylphenidate (CONCERTA) 54 MG CR tablet  MILK OF MAGNESIA OR  naproxen (NAPROSYN) 500 MG tablet  traMADol (ULTRAM) 50 MG tablet  TYLENOL CAPS 500 MG OR      ALLERGIES:   Allergies   Allergen Reactions     Bees Anaphylaxis     Have an Epi pen that carries with     Sulfa Drugs      Headaches and nausea      FAMILY HISTORY:   Family History   Problem Relation Age of Onset     Unknown/Adopted Father      Unknown/Adopted Maternal Grandmother      C.A.D. Maternal Grandfather      Diabetes Maternal Grandfather      Cerebrovascular Disease Maternal Grandfather      Unknown/Adopted Paternal Grandmother      Unknown/Adopted Paternal Grandfather      Unknown/Adopted Brother      Unknown/Adopted Sister       SOCIAL HISTORY:   Social History     Socioeconomic History     Marital status:    Tobacco Use     Smoking status: Every Day     Packs/day: 1.00     Types: Cigarettes     Smokeless tobacco: Current     Tobacco comments:     occasional pouch of chewing tobacco    Substance and Sexual Activity     Alcohol use: No     Drug use: No     Sexual activity: Never     Partners: Female   Other Topics Concern      Service No     Blood Transfusions No     Caffeine Concern No     Occupational Exposure No     Hobby Hazards No     Sleep Concern No     Stress Concern Yes     Comment: sometimes     Weight Concern No     Special Diet Yes     Comment: counting carbs     Back Care No     Exercise Yes     Seat Belt Yes     Self-Exams Yes        VITALS  Patient Vitals for the past 24 hrs:   BP Temp Temp src Pulse Resp SpO2 Weight   01/31/23 1559 (!) 152/83 98.1  F (36.7  C) Oral 105 16 96 % 93.6 kg (206 lb 5.6 oz)        ================================================================    PHYSICAL EXAM     VITAL SIGNS: BP (!) 152/83   Pulse 105   Temp 98.1  F (36.7  C) (Oral)   Resp 16   Wt 93.6 kg (206 lb 5.6 oz)   SpO2 96%   BMI 34.87 kg/m     Constitutional:  Awake, no acute distress   HENT:  Atraumatic, oropharynx without exudate or erythema, membranes moist  Lymph:  No adenopathy  Eyes: EOM intact, PERRL, no injection  Neck: Supple  Respiratory:  Clear to auscultation bilaterally, no wheezes or crackles   Cardiovascular:  Regular rate and rhythm, single S1 and S2   GI:  Soft, nondistended, no rebound or guarding Abdomen obese, mild tenderness to RUQ and RLQ,   Musculoskeletal:  Moves all extremities, no lower extremity edema, no deformities    Skin:  Warm, dry  Neurologic:  Alert and oriented x3, no focal deficits noted       ================================================================  LAB       All pertinent labs reviewed and interpreted.   Labs Ordered and Resulted from Time of ED Arrival to Time of ED Departure   COMPREHENSIVE METABOLIC PANEL - Abnormal       Result Value    Sodium 136      Potassium 4.6      Chloride 97 (*)     Carbon Dioxide (CO2) 28      Anion Gap 11      Urea Nitrogen 12.0      Creatinine 0.64 (*)     Calcium 9.7      Glucose 120 (*)     Alkaline  Phosphatase 245 (*)     AST 15      ALT 19      Protein Total 7.9      Albumin 4.1      Bilirubin Total 0.9      GFR Estimate >90     CBC WITH PLATELETS AND DIFFERENTIAL - Abnormal    WBC Count 16.1 (*)     RBC Count 5.35      Hemoglobin 15.0      Hematocrit 48.5      MCV 91      MCH 28.0      MCHC 30.9 (*)     RDW 15.9 (*)     Platelet Count 282      % Neutrophils 73      % Lymphocytes 15      % Monocytes 9      % Eosinophils 2      % Basophils 0      % Immature Granulocytes 1      NRBCs per 100 WBC 0      Absolute Neutrophils 11.9 (*)     Absolute Lymphocytes 2.4      Absolute Monocytes 1.4 (*)     Absolute Eosinophils 0.3      Absolute Basophils 0.1      Absolute Immature Granulocytes 0.1      Absolute NRBCs 0.0     LIPASE - Normal    Lipase 16     ROUTINE UA WITH MICROSCOPIC REFLEX TO CULTURE        ===============================================================  RADIOLOGY       Reviewed all pertinent imaging. Please see official radiology report.   CT Abdomen Pelvis w Contrast   Final Result   IMPRESSION:    1.  Abnormal liver suggestive of steatohepatitis with cirrhosis.   2.  Trace ascites.   3.  Manda hepatis, portacaval, and para-aortic adenopathy. This could be related to liver disease or other etiology.   4.  Enlarging right and left adrenal angiomyolipomas. The right measures 4.6 cm, and is at risk for spontaneous hemorrhage.            ================================================================  EKG         I have independently reviewed and interpreted the EKG(s) documented above.     ================================================================  PROCEDURES         I, Jermaine Branch, am serving as a scribe to document services personally performed by Dr. Pruett based on my observation and the provider's statements to me. I, Elinor Pruett MD attest that Jermaine Branch is acting in a scribe capacity, has observed my performance of the services and has documented them in accordance with my direction.    Elinor Pruett M.D.   Emergency Medicine   [facility] EMERGENCY DEPARTMENT  [facility] [facility] [id number] [phone]  Dept: [phone]      Elinor Pruett MD  01/31/23 9117

## 2023-02-01 ENCOUNTER — OFFICE VISIT (OUTPATIENT)
Dept: CARDIOLOGY | Facility: CLINIC | Age: 43
End: 2023-02-01
Payer: COMMERCIAL

## 2023-02-01 VITALS
DIASTOLIC BLOOD PRESSURE: 54 MMHG | SYSTOLIC BLOOD PRESSURE: 122 MMHG | OXYGEN SATURATION: 97 % | WEIGHT: 294 LBS | BODY MASS INDEX: 49.69 KG/M2 | RESPIRATION RATE: 18 BRPM | HEART RATE: 87 BPM

## 2023-02-01 DIAGNOSIS — R07.9 CHEST PAIN, UNSPECIFIED TYPE: Primary | ICD-10-CM

## 2023-02-01 PROCEDURE — 99204 OFFICE O/P NEW MOD 45 MIN: CPT | Performed by: INTERNAL MEDICINE

## 2023-02-01 RX ORDER — FLUOXETINE 10 MG/1
1 CAPSULE ORAL EVERY MORNING
COMMUNITY
Start: 2022-04-29 | End: 2023-08-02

## 2023-02-01 RX ORDER — OLANZAPINE 5 MG/1
5 TABLET ORAL DAILY
COMMUNITY
End: 2023-08-02 | Stop reason: DRUGHIGH

## 2023-02-01 RX ORDER — HYDROXYZINE PAMOATE 25 MG/1
50 CAPSULE ORAL PRN
COMMUNITY
Start: 2023-01-19 | End: 2023-08-02

## 2023-02-01 RX ORDER — MECLIZINE HYDROCHLORIDE 25 MG/1
25 TABLET ORAL PRN
COMMUNITY
Start: 2022-04-27 | End: 2023-08-02

## 2023-02-01 RX ORDER — ROSUVASTATIN CALCIUM 10 MG/1
10 TABLET, COATED ORAL AT BEDTIME
COMMUNITY
Start: 2022-04-27

## 2023-02-01 RX ORDER — LISINOPRIL 10 MG/1
10 TABLET ORAL DAILY
COMMUNITY
Start: 2022-04-27

## 2023-02-01 RX ORDER — IPRATROPIUM BROMIDE AND ALBUTEROL SULFATE 2.5; .5 MG/3ML; MG/3ML
SOLUTION RESPIRATORY (INHALATION) PRN
COMMUNITY
Start: 2022-04-27 | End: 2023-08-02

## 2023-02-01 RX ORDER — CALCIUM CARBONATE 500(1250)
TABLET,CHEWABLE ORAL
COMMUNITY
End: 2023-08-02

## 2023-02-01 RX ORDER — CYCLOBENZAPRINE HCL 10 MG
TABLET ORAL PRN
COMMUNITY
Start: 2022-04-27 | End: 2023-07-02

## 2023-02-01 RX ORDER — OMEPRAZOLE 40 MG/1
40 CAPSULE, DELAYED RELEASE ORAL DAILY
COMMUNITY
End: 2023-08-02

## 2023-02-01 RX ORDER — GUAIFENESIN 200 MG/10ML
100 LIQUID ORAL PRN
COMMUNITY
Start: 2022-04-27 | End: 2023-08-02

## 2023-02-01 RX ORDER — OXYBUTYNIN CHLORIDE 10 MG/1
10 TABLET, EXTENDED RELEASE ORAL DAILY
COMMUNITY
Start: 2022-04-27 | End: 2024-06-28

## 2023-02-01 RX ORDER — LANOLIN ALCOHOL/MO/W.PET/CERES
3 CREAM (GRAM) TOPICAL AT BEDTIME
COMMUNITY
End: 2024-07-26

## 2023-02-01 RX ORDER — FLUTICASONE FUROATE AND VILANTEROL 200; 25 UG/1; UG/1
1 POWDER RESPIRATORY (INHALATION) DAILY
COMMUNITY
Start: 2022-04-27

## 2023-02-01 NOTE — LETTER
2/1/2023    Physician No Ref-Primary  No address on file    RE: Warren Jaramillo       Dear Colleague,     I had the pleasure of seeing Warren Jaramillo in the [facility].        Thank you, Dr. Soriano, for asking [facility] to evaluate Mr. Warren Jaramillo.      Assessment/Recommendations   Assessment:    Chest pain, chronic, uncertain etiology, some pleuritic features  Incomplete left bundle branch block  Diabetes mellitus  Chronic tobacco use  Autism/group home resident  Morbid obesity    Plan:  I told him that his recent stress test showed no ischemia.  There is a fixed defect which was probably related to his body habitus.  He certainly has multiple cardiovascular risk factors and premature coronary artery disease should be further evaluated.  He cannot exercise on a treadmill.  Because of his body habitus stress echo or nuclear stress test will be fraught by soft tissue attenuation.  Ideally we should do a PET stress but is not available at our institution.  We will repeat CT coronary angiogram.       History of Present Illness    Mr. Warren Jaramillo is a 42 year old male who comes in for initial cardiac evaluation.  He reports has been having left-sided chest pains.  This pains are nonexertional.  He states that pain is quite sharp and he cannot take a breath when it comes on.  There is no associated symptoms.  He has had multiple evaluation of the chest pain before.  Most of the evaluation was done in [facility] where he used to live.  He had normal stress test in 2022 and normal CT coronary angiogram in 2019.  He has had diabetes for about 5 years.  He smokes 2 packs of cigarettes.  He is very inactive.  He has been morbidly obese for a number of years.  Today he sits in the wheelchair.  He states that he gets short of breath when he walks    He recently relocated to the [facility] and he lives in a group home.  His parents live in  [facility].    ECG: Personally reviewed.  Normal sinus rhythm incomplete left bundle branch block    Stress test: September 2022 at [facility]  1.  No stress-induced reversible perfusion defects.   2.  Small focal area of mildly decreased perfusion along the apical lateral wall that is similar in both rest and stress and may reflect normal variant apical thinning versus artifact. Small focal area of infarct not entirely excluded.   3.  Normal  sized left ventricle with a left ventricular ejection fraction of greater than 65%.   4.  No stress-induced wall motion abnormalities.       Echo: August 2022  Normal    CT coronary Angiogram: 2019  Normal coronaries     Physical Examination Review of Systems   /54 (BP Location: Right arm, Patient Position: Sitting, Cuff Size: Adult Large)   Pulse 87   Resp 18   Wt 133.4 kg (294 lb)   SpO2 97%   BMI 49.69 kg/m    Body mass index is 49.69 kg/m .  Wt Readings from Last 3 Encounters:   02/01/23 133.4 kg (294 lb)   01/31/23 93.6 kg (206 lb 5.6 oz)   06/20/12 93.6 kg (206 lb 6.4 oz)     General Appearance:   Alert, cooperative, no distress, appears stated age   Head/ENT: Normocephalic, without obvious abnormality. Membranes moist      EYES:  no scleral icterus, normal conjunctivae   Neck: Supple, symmetrical, trachea midline, no adenopathy, thyroid: not enlarged, symmetric, no carotid bruit or JVD   Chest/Lungs:   Lungs are clear to auscultation, respirations unlabored. No tenderness or deformity    Cardiovascular:   Regular rhythm, S1, S2 normal, no murmur, rub or gallop.   Abdomen:  Soft, non-tender, bowel sounds active all four quadrants,  no masses, no organomegaly   Extremities: no cyanosis or clubbing. No edema   Skin: Skin color, texture, turgor normal, no rashes or lesions.    Psychiatric: Normal affect, calm   Neurologic: Alert and oriented x 3, moving all four extremities.     Enc Vitals  BP: 122/54  Pulse: 87  Resp: 18  SpO2: 97 %  Weight: 133.4 kg (294  lb)                                          Lab Results    Chemistry/lipid CBC Cardiac Enzymes/BNP/TSH/INR   No results for input(s): CHOL, HDL, LDL, TRIG, CHOLHDLRATIO in the last 78924 hours.  No results for input(s): LDL in the last 60933 hours.  Recent Labs   Lab Test 01/31/23  1620      POTASSIUM 4.6   CHLORIDE 97*   CO2 28   *   BUN 12.0   CR 0.64*   GFRESTIMATED >90   WES 9.7     Recent Labs   Lab Test 01/31/23  1620 01/01/23 1915   CR 0.64* 0.75     No results for input(s): A1C in the last 44632 hours.       Recent Labs   Lab Test 01/31/23  1620   WBC 16.1*   HGB 15.0   HCT 48.5   MCV 91        Recent Labs   Lab Test 01/31/23  1620 01/01/23 1915   HGB 15.0 14.2    No results for input(s): TROPONINI in the last 11358 hours.  No results for input(s): BNP, NTBNPI, NTBNP in the last 71498 hours.  No results for input(s): TSH in the last 70257 hours.  No results for input(s): INR in the last 25395 hours.     Medical History  Surgical History Family History Social History   Diabetes mellitus  Autism  No premature CAD, SCD,cardiomyopathy   Social History     Socioeconomic History     Marital status:      Spouse name: Not on file     Number of children: Not on file     Years of education: Not on file     Highest education level: Not on file   Occupational History     Not on file   Tobacco Use     Smoking status: Every Day     Packs/day: 1.00     Types: Cigarettes     Smokeless tobacco: Current     Tobacco comments:     occasional pouch of chewing tobacco   Substance and Sexual Activity     Alcohol use: No     Drug use: No     Sexual activity: Never     Partners: Female   Other Topics Concern      Service No     Blood Transfusions No     Caffeine Concern No     Occupational Exposure No     Hobby Hazards No     Sleep Concern No     Stress Concern Yes     Comment: sometimes     Weight Concern No     Special Diet Yes     Comment: counting carbs     Back Care No     Exercise Yes      Bike Helmet Not Asked     Comment: N/A     Seat Belt Yes     Self-Exams Yes     Parent/sibling w/ CABG, MI or angioplasty before 65F 55M? Not Asked   Social History Narrative     Not on file     Social Determinants of Health     Financial Resource Strain: Not on file   Food Insecurity: Not on file   Transportation Needs: Not on file   Physical Activity: Not on file   Stress: Not on file   Social Connections: Not on file   Intimate Partner Violence: Not on file   Housing Stability: Not on file         Medications  Allergies   Current Outpatient Medications   Medication Sig Dispense Refill     albuterol 90 MCG/ACT inhaler Inhale 2 puffs into the lungs every 4 hours as needed for shortness of breath / dyspnea. 1 Inhaler 3     calcium carbonate 500 mg, elemental, 1250 (500 Ca) MG tablet chewable Take 1,000-2,000 mg by mouth       cyclobenzaprine (FLEXERIL) 10 MG tablet as needed       DIPHENHYDRAMINE HCL 25mg as needed       EPINEPHrine (EPIPEN) 0.3 MG/0.3ML injection Inject 0.3 mLs into the muscle once as needed for anaphylaxis. 1 each 1     FLUoxetine (PROZAC) 10 MG capsule Take 1 capsule by mouth every morning       fluticasone-vilanterol (BREO ELLIPTA) 200-25 MCG/ACT inhaler Inhale 1 puff into the lungs daily       guaiFENesin (ROBITUSSIN) 20 mg/mL liquid Take 100 mg by mouth as needed       hydrOXYzine (VISTARIL) 25 MG capsule Take 50 mg by mouth as needed       IBUPROFEN 800 MG OR TABS TAKE ONE TABLET 3 TIMES DAILY AS NEEDED FOR PAIN 40 0     ipratropium - albuterol 0.5 mg/2.5 mg/3 mL (DUONEB) 0.5-2.5 (3) MG/3ML neb solution as needed       ipratropium-albuterol (COMBIVENT RESPIMAT)  MCG/ACT inhaler Inhale 1 puff into the lungs       lisinopril (ZESTRIL) 10 MG tablet Take 10 mg by mouth daily       meclizine (ANTIVERT) 25 MG tablet Take 25 mg by mouth as needed       melatonin 3 MG tablet Take 3 mg by mouth At Bedtime       metFORMIN (GLUCOPHAGE) 500 MG tablet Take 500 mg by mouth 2 times daily (with meals)        MILK OF MAGNESIA OR AS NEEDED       naproxen (NAPROSYN) 500 MG tablet Take 1 tablet by mouth 2 times daily as needed. with food 180 tablet 2     OLANZapine (ZYPREXA) 5 MG tablet Take 5 mg by mouth daily       omeprazole (PRILOSEC) 40 MG DR capsule Take 40 mg by mouth daily       oxybutynin ER (DITROPAN XL) 10 MG 24 hr tablet Take 10 mg by mouth daily       rosuvastatin (CRESTOR) 10 MG tablet Take 10 mg by mouth daily       TYLENOL CAPS 500 MG OR 1 CAPSULE EVERY 4 HOURS AS NEEDED       cetirizine (ZYRTEC) 10 MG tablet Take 1 tablet by mouth daily. (Patient not taking: Reported on 2/1/2023) 30 tablet 1     cholecalciferol (VITAMIN D) 1000 UNIT tablet Take 1 tablet by mouth daily. (Patient not taking: Reported on 2/1/2023) 100 tablet 3     CLONAZEPAM PO Take 0.5 mg by mouth daily (Patient not taking: Reported on 2/1/2023)       co-enzyme Q-10 (COENZYME Q-10) 100 MG CAPS Take 1 capsule by mouth daily. (Patient not taking: Reported on 2/1/2023) 30 capsule 4     Cod Liver Oil CAPS Take 1 capsule by mouth At Bedtime. (Patient not taking: Reported on 2/1/2023) 110 capsule 3     docosanol (ABREVA) 10 % CREA Apply to affected areas topically 5x a day for up to 10 days (Needs follow-up appointment for this medication) (Patient not taking: Reported on 2/1/2023) 60 g 0     methylphenidate (CONCERTA) 54 MG CR tablet Take 1 tablet by mouth every morning. (Patient not taking: Reported on 2/1/2023) 30 tablet 0     traMADol (ULTRAM) 50 MG tablet Take 1-2 tablets ( mg) by mouth every 6 hours as needed for pain (Patient not taking: Reported on 2/1/2023) 30 tablet 0        Allergies   Allergen Reactions     Apricot Flavor Anaphylaxis     Banana Anaphylaxis     Throat swelling  Throat swelling       Wasp Venom Protein Shortness Of Breath     Other reaction(s): Respiratory Distress  Has an epi pen  Has an epi pen       Bees Anaphylaxis     Have an Epi pen that carries with     Methylphenidate Itching     Other reaction(s):  Nightmares     Prunus      Other reaction(s): *Unknown     Sulfa Drugs      Headaches and nausea     Prunus Persica Rash     Other reaction(s): *Unknown                   Thank you for allowing me to participate in the care of your patient.      Sincerely,     Leonardo Wilkerson MD     [facility]  cc:   Shanda Soriano MD  [address] [place] [address]

## 2023-02-01 NOTE — DISCHARGE INSTRUCTIONS
Fortunately there does not appear to be anything acute like a gallbladder infection, appendicitis, kidney stone, urine infection.  We can see some liver disease, but this is not an acute finding.  I do think you need to see a liver doctor so I recommend you call the number above to schedule this appointment.

## 2023-02-01 NOTE — ED NOTES
Discharge information, follow up care and information was gone over directly with pt verbally and with written information. Its difficult to determine if pt has a bit of a cognitive limitation with this type of information but he was sharing information on the phone. This RN called the cab company provided by [facility], and pt has been sent to wait in the lobby for his cab.

## 2023-02-01 NOTE — PROGRESS NOTES
Thank you, Dr. Soriano, for asking [facility] to evaluate Mr. Warren Jaramillo.      Assessment/Recommendations   Assessment:    Chest pain, chronic, uncertain etiology, some pleuritic features  Incomplete left bundle branch block  Diabetes mellitus  Chronic tobacco use  Autism/group home resident  Morbid obesity    Plan:  I told him that his recent stress test showed no ischemia.  There is a fixed defect which was probably related to his body habitus.  He certainly has multiple cardiovascular risk factors and premature coronary artery disease should be further evaluated.  He cannot exercise on a treadmill.  Because of his body habitus stress echo or nuclear stress test will be fraught by soft tissue attenuation.  Ideally we should do a PET stress but is not available at our institution.  We will repeat CT coronary angiogram.       History of Present Illness    Mr. Warren Jaramillo is a 42 year old male who comes in for initial cardiac evaluation.  He reports has been having left-sided chest pains.  This pains are nonexertional.  He states that pain is quite sharp and he cannot take a breath when it comes on.  There is no associated symptoms.  He has had multiple evaluation of the chest pain before.  Most of the evaluation was done in [facility] where he used to live.  He had normal stress test in 2022 and normal CT coronary angiogram in 2019.  He has had diabetes for about 5 years.  He smokes 2 packs of cigarettes.  He is very inactive.  He has been morbidly obese for a number of years.  Today he sits in the wheelchair.  He states that he gets short of breath when he walks    He recently relocated to the [facility] and he lives in a group home.  His parents live in [facility].    ECG: Personally reviewed.  Normal sinus rhythm incomplete left bundle branch block    Stress test: September 2022 at [facility]  1.  No stress-induced reversible perfusion defects.   2.   Small focal area of mildly decreased perfusion along the apical lateral wall that is similar in both rest and stress and may reflect normal variant apical thinning versus artifact. Small focal area of infarct not entirely excluded.   3.  Normal  sized left ventricle with a left ventricular ejection fraction of greater than 65%.   4.  No stress-induced wall motion abnormalities.       Echo: August 2022  Normal    CT coronary Angiogram: 2019  Normal coronaries     Physical Examination Review of Systems   /54 (BP Location: Right arm, Patient Position: Sitting, Cuff Size: Adult Large)   Pulse 87   Resp 18   Wt 133.4 kg (294 lb)   SpO2 97%   BMI 49.69 kg/m    Body mass index is 49.69 kg/m .  Wt Readings from Last 3 Encounters:   02/01/23 133.4 kg (294 lb)   01/31/23 93.6 kg (206 lb 5.6 oz)   06/20/12 93.6 kg (206 lb 6.4 oz)     General Appearance:   Alert, cooperative, no distress, appears stated age   Head/ENT: Normocephalic, without obvious abnormality. Membranes moist      EYES:  no scleral icterus, normal conjunctivae   Neck: Supple, symmetrical, trachea midline, no adenopathy, thyroid: not enlarged, symmetric, no carotid bruit or JVD   Chest/Lungs:   Lungs are clear to auscultation, respirations unlabored. No tenderness or deformity    Cardiovascular:   Regular rhythm, S1, S2 normal, no murmur, rub or gallop.   Abdomen:  Soft, non-tender, bowel sounds active all four quadrants,  no masses, no organomegaly   Extremities: no cyanosis or clubbing. No edema   Skin: Skin color, texture, turgor normal, no rashes or lesions.    Psychiatric: Normal affect, calm   Neurologic: Alert and oriented x 3, moving all four extremities.     Enc Vitals  BP: 122/54  Pulse: 87  Resp: 18  SpO2: 97 %  Weight: 133.4 kg (294 lb)                                          Lab Results    Chemistry/lipid CBC Cardiac Enzymes/BNP/TSH/INR   No results for input(s): CHOL, HDL, LDL, TRIG, CHOLHDLRATIO in the last 59846 hours.  No results  for input(s): LDL in the last 56368 hours.  Recent Labs   Lab Test 01/31/23  1620      POTASSIUM 4.6   CHLORIDE 97*   CO2 28   *   BUN 12.0   CR 0.64*   GFRESTIMATED >90   WES 9.7     Recent Labs   Lab Test 01/31/23  1620 01/01/23 1915   CR 0.64* 0.75     No results for input(s): A1C in the last 87224 hours.       Recent Labs   Lab Test 01/31/23  1620   WBC 16.1*   HGB 15.0   HCT 48.5   MCV 91        Recent Labs   Lab Test 01/31/23  1620 01/01/23 1915   HGB 15.0 14.2    No results for input(s): TROPONINI in the last 14367 hours.  No results for input(s): BNP, NTBNPI, NTBNP in the last 14000 hours.  No results for input(s): TSH in the last 91999 hours.  No results for input(s): INR in the last 28136 hours.     Medical History  Surgical History Family History Social History   Diabetes mellitus  Autism  No premature CAD, SCD,cardiomyopathy   Social History     Socioeconomic History     Marital status:      Spouse name: Not on file     Number of children: Not on file     Years of education: Not on file     Highest education level: Not on file   Occupational History     Not on file   Tobacco Use     Smoking status: Every Day     Packs/day: 1.00     Types: Cigarettes     Smokeless tobacco: Current     Tobacco comments:     occasional pouch of chewing tobacco   Substance and Sexual Activity     Alcohol use: No     Drug use: No     Sexual activity: Never     Partners: Female   Other Topics Concern      Service No     Blood Transfusions No     Caffeine Concern No     Occupational Exposure No     Hobby Hazards No     Sleep Concern No     Stress Concern Yes     Comment: sometimes     Weight Concern No     Special Diet Yes     Comment: counting carbs     Back Care No     Exercise Yes     Bike Helmet Not Asked     Comment: N/A     Seat Belt Yes     Self-Exams Yes     Parent/sibling w/ CABG, MI or angioplasty before 65F 55M? Not Asked   Social History Narrative     Not on file     Social  Determinants of Health     Financial Resource Strain: Not on file   Food Insecurity: Not on file   Transportation Needs: Not on file   Physical Activity: Not on file   Stress: Not on file   Social Connections: Not on file   Intimate Partner Violence: Not on file   Housing Stability: Not on file         Medications  Allergies   Current Outpatient Medications   Medication Sig Dispense Refill     albuterol 90 MCG/ACT inhaler Inhale 2 puffs into the lungs every 4 hours as needed for shortness of breath / dyspnea. 1 Inhaler 3     calcium carbonate 500 mg, elemental, 1250 (500 Ca) MG tablet chewable Take 1,000-2,000 mg by mouth       cyclobenzaprine (FLEXERIL) 10 MG tablet as needed       DIPHENHYDRAMINE HCL 25mg as needed       EPINEPHrine (EPIPEN) 0.3 MG/0.3ML injection Inject 0.3 mLs into the muscle once as needed for anaphylaxis. 1 each 1     FLUoxetine (PROZAC) 10 MG capsule Take 1 capsule by mouth every morning       fluticasone-vilanterol (BREO ELLIPTA) 200-25 MCG/ACT inhaler Inhale 1 puff into the lungs daily       guaiFENesin (ROBITUSSIN) 20 mg/mL liquid Take 100 mg by mouth as needed       hydrOXYzine (VISTARIL) 25 MG capsule Take 50 mg by mouth as needed       IBUPROFEN 800 MG OR TABS TAKE ONE TABLET 3 TIMES DAILY AS NEEDED FOR PAIN 40 0     ipratropium - albuterol 0.5 mg/2.5 mg/3 mL (DUONEB) 0.5-2.5 (3) MG/3ML neb solution as needed       ipratropium-albuterol (COMBIVENT RESPIMAT)  MCG/ACT inhaler Inhale 1 puff into the lungs       lisinopril (ZESTRIL) 10 MG tablet Take 10 mg by mouth daily       meclizine (ANTIVERT) 25 MG tablet Take 25 mg by mouth as needed       melatonin 3 MG tablet Take 3 mg by mouth At Bedtime       metFORMIN (GLUCOPHAGE) 500 MG tablet Take 500 mg by mouth 2 times daily (with meals)       MILK OF MAGNESIA OR AS NEEDED       naproxen (NAPROSYN) 500 MG tablet Take 1 tablet by mouth 2 times daily as needed. with food 180 tablet 2     OLANZapine (ZYPREXA) 5 MG tablet Take 5 mg by mouth  daily       omeprazole (PRILOSEC) 40 MG DR capsule Take 40 mg by mouth daily       oxybutynin ER (DITROPAN XL) 10 MG 24 hr tablet Take 10 mg by mouth daily       rosuvastatin (CRESTOR) 10 MG tablet Take 10 mg by mouth daily       TYLENOL CAPS 500 MG OR 1 CAPSULE EVERY 4 HOURS AS NEEDED       cetirizine (ZYRTEC) 10 MG tablet Take 1 tablet by mouth daily. (Patient not taking: Reported on 2/1/2023) 30 tablet 1     cholecalciferol (VITAMIN D) 1000 UNIT tablet Take 1 tablet by mouth daily. (Patient not taking: Reported on 2/1/2023) 100 tablet 3     CLONAZEPAM PO Take 0.5 mg by mouth daily (Patient not taking: Reported on 2/1/2023)       co-enzyme Q-10 (COENZYME Q-10) 100 MG CAPS Take 1 capsule by mouth daily. (Patient not taking: Reported on 2/1/2023) 30 capsule 4     Cod Liver Oil CAPS Take 1 capsule by mouth At Bedtime. (Patient not taking: Reported on 2/1/2023) 110 capsule 3     docosanol (ABREVA) 10 % CREA Apply to affected areas topically 5x a day for up to 10 days (Needs follow-up appointment for this medication) (Patient not taking: Reported on 2/1/2023) 60 g 0     methylphenidate (CONCERTA) 54 MG CR tablet Take 1 tablet by mouth every morning. (Patient not taking: Reported on 2/1/2023) 30 tablet 0     traMADol (ULTRAM) 50 MG tablet Take 1-2 tablets ( mg) by mouth every 6 hours as needed for pain (Patient not taking: Reported on 2/1/2023) 30 tablet 0        Allergies   Allergen Reactions     Apricot Flavor Anaphylaxis     Banana Anaphylaxis     Throat swelling  Throat swelling       Wasp Venom Protein Shortness Of Breath     Other reaction(s): Respiratory Distress  Has an epi pen  Has an epi pen       Bees Anaphylaxis     Have an Epi pen that carries with     Methylphenidate Itching     Other reaction(s): Nightmares     Prunus      Other reaction(s): *Unknown     Sulfa Drugs      Headaches and nausea     Prunus Persica Rash     Other reaction(s): *Unknown

## 2023-02-01 NOTE — PATIENT INSTRUCTIONS
Warren Jaramillo,    It was a pleasure to see you today at the [facility].     My recommendations after this visit include:    CT heart    WLorenzo Wilkerson MD, FACC, AYSHA

## 2023-02-04 ENCOUNTER — APPOINTMENT (OUTPATIENT)
Dept: ULTRASOUND IMAGING | Facility: HOSPITAL | Age: 43
End: 2023-02-04
Attending: EMERGENCY MEDICINE
Payer: COMMERCIAL

## 2023-02-04 ENCOUNTER — HOSPITAL ENCOUNTER (EMERGENCY)
Facility: HOSPITAL | Age: 43
Discharge: HOME | End: 2023-02-04
Attending: EMERGENCY MEDICINE | Admitting: EMERGENCY MEDICINE
Payer: COMMERCIAL

## 2023-02-04 VITALS
RESPIRATION RATE: 26 BRPM | SYSTOLIC BLOOD PRESSURE: 139 MMHG | OXYGEN SATURATION: 92 % | HEIGHT: 65 IN | TEMPERATURE: 98.2 F | HEART RATE: 91 BPM | DIASTOLIC BLOOD PRESSURE: 73 MMHG | WEIGHT: 294 LBS | BODY MASS INDEX: 48.98 KG/M2

## 2023-02-04 DIAGNOSIS — K75.81 STEATOHEPATITIS: ICD-10-CM

## 2023-02-04 LAB
ALBUMIN SERPL BCG-MCNC: 4.1 G/DL (ref 3.5–5.2)
ALP SERPL-CCNC: 243 U/L (ref 40–129)
ALT SERPL W P-5'-P-CCNC: 18 U/L (ref 10–50)
ANION GAP SERPL CALCULATED.3IONS-SCNC: 11 MMOL/L (ref 7–15)
APAP SERPL-MCNC: <5 UG/ML (ref 10–30)
AST SERPL W P-5'-P-CCNC: 16 U/L (ref 10–50)
BILIRUB SERPL-MCNC: 0.5 MG/DL
BUN SERPL-MCNC: 11 MG/DL (ref 6–20)
CALCIUM SERPL-MCNC: 10.3 MG/DL (ref 8.6–10)
CHLORIDE SERPL-SCNC: 101 MMOL/L (ref 98–107)
CREAT SERPL-MCNC: 0.67 MG/DL (ref 0.67–1.17)
DEPRECATED HCO3 PLAS-SCNC: 26 MMOL/L (ref 22–29)
ERYTHROCYTE [DISTWIDTH] IN BLOOD BY AUTOMATED COUNT: 15.9 % (ref 10–15)
GFR SERPL CREATININE-BSD FRML MDRD: >90 ML/MIN/1.73M2
GLUCOSE SERPL-MCNC: 139 MG/DL (ref 70–99)
HCT VFR BLD AUTO: 46.9 % (ref 40–53)
HGB BLD-MCNC: 14.8 G/DL (ref 13.3–17.7)
LACTATE SERPL-SCNC: 1.3 MMOL/L (ref 0.7–2)
MCH RBC QN AUTO: 28.4 PG (ref 26.5–33)
MCHC RBC AUTO-ENTMCNC: 31.6 G/DL (ref 31.5–36.5)
MCV RBC AUTO: 90 FL (ref 78–100)
PLATELET # BLD AUTO: 299 10E3/UL (ref 150–450)
POTASSIUM SERPL-SCNC: 3.9 MMOL/L (ref 3.4–5.3)
PROT SERPL-MCNC: 7.8 G/DL (ref 6.4–8.3)
RBC # BLD AUTO: 5.22 10E6/UL (ref 4.4–5.9)
SODIUM SERPL-SCNC: 138 MMOL/L (ref 136–145)
WBC # BLD AUTO: 14.3 10E3/UL (ref 4–11)

## 2023-02-04 PROCEDURE — 76700 US EXAM ABDOM COMPLETE: CPT

## 2023-02-04 PROCEDURE — 83605 ASSAY OF LACTIC ACID: CPT | Performed by: EMERGENCY MEDICINE

## 2023-02-04 PROCEDURE — 36415 COLL VENOUS BLD VENIPUNCTURE: CPT | Performed by: EMERGENCY MEDICINE

## 2023-02-04 PROCEDURE — 250N000011 HC RX IP 250 OP 636: Performed by: EMERGENCY MEDICINE

## 2023-02-04 PROCEDURE — 80143 DRUG ASSAY ACETAMINOPHEN: CPT | Performed by: EMERGENCY MEDICINE

## 2023-02-04 PROCEDURE — 80053 COMPREHEN METABOLIC PANEL: CPT | Performed by: EMERGENCY MEDICINE

## 2023-02-04 PROCEDURE — 85027 COMPLETE CBC AUTOMATED: CPT | Performed by: EMERGENCY MEDICINE

## 2023-02-04 PROCEDURE — 99285 EMERGENCY DEPT VISIT HI MDM: CPT | Mod: 25

## 2023-02-04 PROCEDURE — 96374 THER/PROPH/DIAG INJ IV PUSH: CPT

## 2023-02-04 RX ORDER — TRAMADOL HYDROCHLORIDE 50 MG/1
50 TABLET ORAL EVERY 6 HOURS PRN
Qty: 10 TABLET | Refills: 0 | Status: SHIPPED | OUTPATIENT
Start: 2023-02-04 | End: 2023-02-07

## 2023-02-04 RX ORDER — MORPHINE SULFATE 4 MG/ML
4 INJECTION, SOLUTION INTRAMUSCULAR; INTRAVENOUS ONCE
Status: COMPLETED | OUTPATIENT
Start: 2023-02-04 | End: 2023-02-04

## 2023-02-04 RX ADMIN — MORPHINE SULFATE 4 MG: 4 INJECTION, SOLUTION INTRAMUSCULAR; INTRAVENOUS at 03:50

## 2023-02-04 ASSESSMENT — ACTIVITIES OF DAILY LIVING (ADL)
ADLS_ACUITY_SCORE: 35
ADLS_ACUITY_SCORE: 35

## 2023-02-04 NOTE — ED NOTES
Bed: JNED-18  Expected date: 2/4/23  Expected time: 2:10 AM  Means of arrival: Ambulance  Comments:  Mehran  43 yo M flank pain

## 2023-02-04 NOTE — ED PROVIDER NOTES
"EMERGENCY DEPARTMENT ENCOUNTER      NAME: Warren Jaramillo  AGE: 42 year old male  YOB: 1980  MRN: [id number]  EVALUATION DATE & TIME: 2/4/2023  2:21 AM    PCP: No Ref-Primary, Physician    ED PROVIDER: Javad Hdez M.D.      Chief Complaint   Patient presents with     Flank Pain         FINAL IMPRESSION:  Steatohepatitis      ED COURSE & MEDICAL DECISION MAKING:    Pertinent Labs & Imaging studies reviewed. (See chart for details)  42 year old male presents to the Emergency Department for reevaluation of continued right upper quadrant discomfort.  Symptoms been ongoing for the last 4 days.  Patient was seen for similar symptomatology with extensive laboratory evaluation imaging performed.  Imaging revealed hepatic steatosis with cirrhosis and small amount of ascites.  Incidentally finding of enlarging adrenal adenomas at risk for hemorrhage.  Patient concerned that he may have ruptured one of them now given his continued discomfort.  Reports taking \"Tylenol every few hours\" without improvement.  Patient arrives by ambulance.  On exam is a morbidly obese male in mild distress.  Moderate right upper quadrant tenderness.  No obvious rebound or guarding.  Tenderness noted to light touch.  Patient with continued discomfort after extensive recent evaluation.  Likelihood is continued discomfort with hepatic steatosis.  Will obtain ultrasound to assess for increased abdominal fluid which may suggest hemorrhage.  We will also get ultrasound of the right upper quadrant to assess for biliary disease.  Patient treated symptomatically with intravenous morphine.. Patient appears non toxic with stable vitals signs.       2:25 AM I met with the patient for the initial interview and physical examination. Discussed plan for treatment and workup in the ED.   3:33 AM.  Comprehensive metabolic panel essentially unchanged.  Mild elevation of his alkaline phosphatase 243.  Transaminases normal.  Lactic acid normal " 1.3.  White cell count minimally elevated 14.3.  Hematocrit normal at 46.9.  Tylenol level pending.  4:13 AM I rechecked on the patient and updated them on results.  Ultrasound unremarkable.  Trace ascites.  Liver without change.  Kidneys without obvious abnormality.  Gallbladder with some slight gallbladder wall thickening but no Hernandez sign nor evidence of obstruction.  Patient encouraged regarding weight loss and better food choices given his cirrhotic changes and steatohepatitis.  Will discharge with tramadol for discomfort.  Patient also cautioned about avoiding excessive Tylenol use.    At the conclusion of the encounter I discussed the results of all of the tests and the disposition. The questions were answered and return precautions provided. The patient or family acknowledged understanding and was agreeable with the care plan.       PPE: Provider wore gloves, N95 mask.    Medical Decision Making    History:    Supplemental history from: Documented in chart, if applicable and N/A    External Record(s) reviewed: Documented in chart, if applicable.    Work Up:    Chart documentation includes differential considered and any EKGs or imaging independently interpreted by provider, where specified.    In additional to work up documented, I considered the following work up: Documented in chart, if applicable.    External consultation:    Discussion of management with another provider: NA    Complicating factors:    Care impacted by chronic illness: Hepatic steatosis    Care affected by social determinants of health: none    Disposition considerations: Discharged with tramadol         MEDICATIONS GIVEN IN THE EMERGENCY:  Medications   morphine (PF) injection 4 mg (4 mg Intravenous Given 2/4/23 0350)       NEW PRESCRIPTIONS STARTED AT TODAY'S ER VISIT  Current Discharge Medication List      START taking these medications    Details   !! traMADol (ULTRAM) 50 MG tablet Take 1 tablet (50 mg) by mouth every 6 hours as  needed for severe pain (7-10)  Qty: 10 tablet, Refills: 0       !! - Potential duplicate medications found. Please discuss with provider.             =================================================================    HPI    Patient information was obtained from: patient    Use of Intrepreter: N/A         Warren Jaramillo is a 42 year old male with a pertient medical history of morbid obesity, diabetes mellitus, and fetal alcohol syndrome, who presents to the ED for evaluation of abdominal pain    Per chart review,   Patient has been seen in various EDs nine times since October 2022. He was seen most recently on 1/31/2023 for right lower quadrant abdominal pain. Labs including CBC, CMP, lipase show a leukocytosis of 16 which was similar to prior labs. UA negative. CT abdomen pelvis without acute pathology, but did show hepatic steatosis and likely progressive to cirrhosis with trace ascites. He was given IVF and Toradol in the ED. Discharged in stable condition with plan for GI follow up.     Per patient,   The patient complains of persistent right-sided abdominal pain radiating to his right flank. He notes his pain has gradually worsened since he was last seen in the ED. His pain is worse with eating, deep breaths, and movement. He has been taking tylenol about every 2-4 hours without significant relief. No associated vomiting or changes in bowel/bladder habits. He is concerned about possible ruptured adrenal gland cyst causing his symptoms.      REVIEW OF SYSTEMS   Constitutional:  Denies fever, chills  Respiratory:  Denies productive cough or increased work of breathing  Cardiovascular:  Denies chest pain, palpitations  GI:  Denies nausea, vomiting, or change in bowel or bladder habits. Positive for flank and abdominal pain.   Musculoskeletal:  Denies any new muscle/joint swelling  Skin:  Denies rash   Neurologic:  Denies focal weakness  All systems negative except as marked.     PAST MEDICAL  HISTORY:  History reviewed. No pertinent past medical history.    PAST SURGICAL HISTORY:  History reviewed. No pertinent surgical history.      CURRENT MEDICATIONS:    No current facility-administered medications for this encounter.    Current Outpatient Medications:      albuterol 90 MCG/ACT inhaler, Inhale 2 puffs into the lungs every 4 hours as needed for shortness of breath / dyspnea., Disp: 1 Inhaler, Rfl: 3     calcium carbonate 500 mg, elemental, 1250 (500 Ca) MG tablet chewable, Take 1,000-2,000 mg by mouth, Disp: , Rfl:      cetirizine (ZYRTEC) 10 MG tablet, Take 1 tablet by mouth daily. (Patient not taking: Reported on 2/1/2023), Disp: 30 tablet, Rfl: 1     cholecalciferol (VITAMIN D) 1000 UNIT tablet, Take 1 tablet by mouth daily. (Patient not taking: Reported on 2/1/2023), Disp: 100 tablet, Rfl: 3     CLONAZEPAM PO, Take 0.5 mg by mouth daily (Patient not taking: Reported on 2/1/2023), Disp: , Rfl:      co-enzyme Q-10 (COENZYME Q-10) 100 MG CAPS, Take 1 capsule by mouth daily. (Patient not taking: Reported on 2/1/2023), Disp: 30 capsule, Rfl: 4     Cod Liver Oil CAPS, Take 1 capsule by mouth At Bedtime. (Patient not taking: Reported on 2/1/2023), Disp: 110 capsule, Rfl: 3     cyclobenzaprine (FLEXERIL) 10 MG tablet, as needed, Disp: , Rfl:      DIPHENHYDRAMINE HCL, 25mg as needed, Disp: , Rfl:      docosanol (ABREVA) 10 % CREA, Apply to affected areas topically 5x a day for up to 10 days (Needs follow-up appointment for this medication) (Patient not taking: Reported on 2/1/2023), Disp: 60 g, Rfl: 0     EPINEPHrine (EPIPEN) 0.3 MG/0.3ML injection, Inject 0.3 mLs into the muscle once as needed for anaphylaxis., Disp: 1 each, Rfl: 1     FLUoxetine (PROZAC) 10 MG capsule, Take 1 capsule by mouth every morning, Disp: , Rfl:      fluticasone-vilanterol (BREO ELLIPTA) 200-25 MCG/ACT inhaler, Inhale 1 puff into the lungs daily, Disp: , Rfl:      guaiFENesin (ROBITUSSIN) 20 mg/mL liquid, Take 100 mg by mouth as  needed, Disp: , Rfl:      hydrOXYzine (VISTARIL) 25 MG capsule, Take 50 mg by mouth as needed, Disp: , Rfl:      IBUPROFEN 800 MG OR TABS, TAKE ONE TABLET 3 TIMES DAILY AS NEEDED FOR PAIN, Disp: 40, Rfl: 0     ipratropium - albuterol 0.5 mg/2.5 mg/3 mL (DUONEB) 0.5-2.5 (3) MG/3ML neb solution, as needed, Disp: , Rfl:      ipratropium-albuterol (COMBIVENT RESPIMAT)  MCG/ACT inhaler, Inhale 1 puff into the lungs, Disp: , Rfl:      lisinopril (ZESTRIL) 10 MG tablet, Take 10 mg by mouth daily, Disp: , Rfl:      meclizine (ANTIVERT) 25 MG tablet, Take 25 mg by mouth as needed, Disp: , Rfl:      melatonin 3 MG tablet, Take 3 mg by mouth At Bedtime, Disp: , Rfl:      metFORMIN (GLUCOPHAGE) 500 MG tablet, Take 500 mg by mouth 2 times daily (with meals), Disp: , Rfl:      methylphenidate (CONCERTA) 54 MG CR tablet, Take 1 tablet by mouth every morning. (Patient not taking: Reported on 2/1/2023), Disp: 30 tablet, Rfl: 0     MILK OF MAGNESIA OR, AS NEEDED, Disp: , Rfl:      naproxen (NAPROSYN) 500 MG tablet, Take 1 tablet by mouth 2 times daily as needed. with food, Disp: 180 tablet, Rfl: 2     OLANZapine (ZYPREXA) 5 MG tablet, Take 5 mg by mouth daily, Disp: , Rfl:      omeprazole (PRILOSEC) 40 MG DR capsule, Take 40 mg by mouth daily, Disp: , Rfl:      oxybutynin ER (DITROPAN XL) 10 MG 24 hr tablet, Take 10 mg by mouth daily, Disp: , Rfl:      rosuvastatin (CRESTOR) 10 MG tablet, Take 10 mg by mouth daily, Disp: , Rfl:      traMADol (ULTRAM) 50 MG tablet, Take 1-2 tablets ( mg) by mouth every 6 hours as needed for pain (Patient not taking: Reported on 2/1/2023), Disp: 30 tablet, Rfl: 0     TYLENOL CAPS 500 MG OR, 1 CAPSULE EVERY 4 HOURS AS NEEDED, Disp: , Rfl:     ALLERGIES:  Allergies   Allergen Reactions     Apricot Flavor Anaphylaxis     Banana Anaphylaxis     Throat swelling  Throat swelling       Wasp Venom Protein Shortness Of Breath     Other reaction(s): Respiratory Distress  Has an epi pen  Has an epi  "pen       Bees Anaphylaxis     Have an Epi pen that carries with     Methylphenidate Itching     Other reaction(s): Nightmares     Prunus      Other reaction(s): *Unknown     Sulfa Drugs      Headaches and nausea     Prunus Persica Rash     Other reaction(s): *Unknown       FAMILY HISTORY:  Family History   Problem Relation Age of Onset     Unknown/Adopted Father      Unknown/Adopted Maternal Grandmother      C.A.D. Maternal Grandfather      Diabetes Maternal Grandfather      Cerebrovascular Disease Maternal Grandfather      Unknown/Adopted Paternal Grandmother      Unknown/Adopted Paternal Grandfather      Unknown/Adopted Brother      Unknown/Adopted Sister        SOCIAL HISTORY:   Social History     Socioeconomic History     Marital status:    Tobacco Use     Smoking status: Every Day     Packs/day: 1.00     Types: Cigarettes     Smokeless tobacco: Current     Tobacco comments:     occasional pouch of chewing tobacco   Substance and Sexual Activity     Alcohol use: No     Drug use: No     Sexual activity: Never     Partners: Female   Other Topics Concern      Service No     Blood Transfusions No     Caffeine Concern No     Occupational Exposure No     Hobby Hazards No     Sleep Concern No     Stress Concern Yes     Comment: sometimes     Weight Concern No     Special Diet Yes     Comment: counting carbs     Back Care No     Exercise Yes     Seat Belt Yes     Self-Exams Yes       VITALS:  Patient Vitals for the past 24 hrs:   BP Temp Temp src Pulse Resp SpO2 Height Weight   02/04/23 0245 (!) 143/71 -- -- 91 -- 94 % -- --   02/04/23 0230 (!) 140/69 -- -- 90 -- 96 % -- --   02/04/23 0222 (!) 145/74 98.2  F (36.8  C) Oral 91 26 95 % 1.638 m (5' 4.5\") 133.4 kg (294 lb)        PHYSICAL EXAM    Constitutional:  Awake, alert, obese male in mild distress  HENT:  Normocephalic, Atraumatic. Bilateral external ears normal. Oropharynx moist. Nose normal. Neck- Normal range of motion with no guarding  Eyes:  " PERRL, EOMI with no signs of entrapment, Conjunctiva normal, No discharge.   Respiratory:  Normal breath sounds, No respiratory distress, No wheezing.    Cardiovascular:  Normal heart rate, Normal rhythm, No appreciable rubs or gallops.   GI:  Soft, tenderness to right lower quadrant and right upper quadrant with light touch, No distension, No palpable masses, hypoactive bowel sounds.   Musculoskeletal:  No edema. Good range of motion in all major joints. No tenderness to palpation or major deformities noted.  Integument:  Warm, Dry, No erythema, No rash.   Neurologic:  Alert & oriented, Normal motor function, Normal sensory function, No focal deficits noted.   Psychiatric:  Affect normal, Judgment normal, Mood normal.     LAB:  All pertinent labs reviewed and interpreted.  Results for orders placed or performed during the hospital encounter of 02/04/23   US Abdomen Complete    Impression    IMPRESSION:  1.  Hepatomegaly with cirrhotic liver morphology. Trace ascites.  2.  Relatively decompressed gallbladder with mild wall thickening, possibly due to underlying hepatic dysfunction. No obvious stones. Sonographic Hernandez's sign was negative.       Comprehensive metabolic panel   Result Value Ref Range    Sodium 138 136 - 145 mmol/L    Potassium 3.9 3.4 - 5.3 mmol/L    Chloride 101 98 - 107 mmol/L    Carbon Dioxide (CO2) 26 22 - 29 mmol/L    Anion Gap 11 7 - 15 mmol/L    Urea Nitrogen 11.0 6.0 - 20.0 mg/dL    Creatinine 0.67 0.67 - 1.17 mg/dL    Calcium 10.3 (H) 8.6 - 10.0 mg/dL    Glucose 139 (H) 70 - 99 mg/dL    Alkaline Phosphatase 243 (H) 40 - 129 U/L    AST 16 10 - 50 U/L    ALT 18 10 - 50 U/L    Protein Total 7.8 6.4 - 8.3 g/dL    Albumin 4.1 3.5 - 5.2 g/dL    Bilirubin Total 0.5 <=1.2 mg/dL    GFR Estimate >90 >60 mL/min/1.73m2   Lactic acid whole blood   Result Value Ref Range    Lactic Acid 1.3 0.7 - 2.0 mmol/L   CBC (+ platelets, no diff)   Result Value Ref Range    WBC Count 14.3 (H) 4.0 - 11.0 10e3/uL     RBC Count 5.22 4.40 - 5.90 10e6/uL    Hemoglobin 14.8 13.3 - 17.7 g/dL    Hematocrit 46.9 40.0 - 53.0 %    MCV 90 78 - 100 fL    MCH 28.4 26.5 - 33.0 pg    MCHC 31.6 31.5 - 36.5 g/dL    RDW 15.9 (H) 10.0 - 15.0 %    Platelet Count 299 150 - 450 10e3/uL       RADIOLOGY:  Reviewed all pertinent imaging. Please see official radiology report.  US Abdomen Complete   Final Result   IMPRESSION:   1.  Hepatomegaly with cirrhotic liver morphology. Trace ascites.   2.  Relatively decompressed gallbladder with mild wall thickening, possibly due to underlying hepatic dysfunction. No obvious stones. Sonographic Hernandez's sign was negative.                  I, Kortney Del Angel, am serving as a scribe to document services personally performed by Javad Hdez MD, based on my observation and the provider's statements to me. I, Javad Hdez MD attest that Kortney Del Angel is acting in a scribe capacity, has observed my performance of the services and has documented them in accordance with my direction.    Javad Hdez M.D.  Emergency Medicine  [facility] EMERGENCY DEPARTMENT       Javad Hdez MD  02/04/23 0420

## 2023-02-04 NOTE — ED TRIAGE NOTES
Pt presents to ED via [facility] for evaluation of flank that woke pt up from sleep tonight. Pt was seen for a cyst on his R adrenal gland 12 days ago. Pt reports trouble breathing on exertion. Pt took 500mg Tylenol at 2130 yesterday for pain with no relief. Pain 10/10 currently to R flank. Denies N/V/D. VSS.     Triage Assessment       Row Name 02/04/23 0224       Triage Assessment (Adult)    Airway WDL WDL       Respiratory WDL    Rhythm/Pattern, Respiratory shortness of breath  on exertion    Cough Frequency infrequent    Cough Type nonproductive       Skin Circulation/Temperature WDL    Skin Circulation/Temperature WDL WDL       Cardiac WDL    Cardiac WDL WDL

## 2023-02-16 ENCOUNTER — TRANSFERRED RECORDS (OUTPATIENT)
Dept: HEALTH INFORMATION MANAGEMENT | Facility: CLINIC | Age: 43
End: 2023-02-16

## 2023-03-14 ENCOUNTER — HOSPITAL ENCOUNTER (OUTPATIENT)
Dept: CT IMAGING | Facility: CLINIC | Age: 43
Discharge: HOME | End: 2023-03-14
Attending: INTERNAL MEDICINE | Admitting: INTERNAL MEDICINE
Payer: COMMERCIAL

## 2023-03-14 VITALS — SYSTOLIC BLOOD PRESSURE: 127 MMHG | DIASTOLIC BLOOD PRESSURE: 64 MMHG

## 2023-03-14 DIAGNOSIS — R07.9 CHEST PAIN, UNSPECIFIED TYPE: ICD-10-CM

## 2023-03-14 LAB
BSA FOR ECHO PROCEDURE: 0 M2
CREAT BLD-MCNC: 0.8 MG/DL (ref 0.7–1.3)
GFR SERPL CREATININE-BSD FRML MDRD: >60 ML/MIN/1.73M2

## 2023-03-14 PROCEDURE — 250N000009 HC RX 250: Performed by: INTERNAL MEDICINE

## 2023-03-14 PROCEDURE — 75574 CT ANGIO HRT W/3D IMAGE: CPT | Mod: 26 | Performed by: INTERNAL MEDICINE

## 2023-03-14 PROCEDURE — 250N000013 HC RX MED GY IP 250 OP 250 PS 637: Performed by: INTERNAL MEDICINE

## 2023-03-14 PROCEDURE — 82565 ASSAY OF CREATININE: CPT

## 2023-03-14 PROCEDURE — 250N000011 HC RX IP 250 OP 636: Performed by: INTERNAL MEDICINE

## 2023-03-14 PROCEDURE — 75574 CT ANGIO HRT W/3D IMAGE: CPT

## 2023-03-14 RX ORDER — METOPROLOL TARTRATE 1 MG/ML
5 INJECTION, SOLUTION INTRAVENOUS
Status: DISCONTINUED | OUTPATIENT
Start: 2023-03-14 | End: 2023-03-15 | Stop reason: HOSPADM

## 2023-03-14 RX ORDER — IOPAMIDOL 755 MG/ML
100 INJECTION, SOLUTION INTRAVASCULAR ONCE
Status: COMPLETED | OUTPATIENT
Start: 2023-03-14 | End: 2023-03-14

## 2023-03-14 RX ORDER — NITROGLYCERIN 0.4 MG/1
0.4 TABLET SUBLINGUAL ONCE
Status: COMPLETED | OUTPATIENT
Start: 2023-03-14 | End: 2023-03-14

## 2023-03-14 RX ADMIN — IOPAMIDOL 100 ML: 755 INJECTION, SOLUTION INTRAVENOUS at 11:15

## 2023-03-14 RX ADMIN — METOPROLOL TARTRATE 5 MG: 1 INJECTION, SOLUTION INTRAVENOUS at 11:01

## 2023-03-14 RX ADMIN — NITROGLYCERIN 0.4 MG: 0.4 TABLET SUBLINGUAL at 11:08

## 2023-03-14 RX ADMIN — METOPROLOL TARTRATE 5 MG: 1 INJECTION, SOLUTION INTRAVENOUS at 11:05

## 2023-04-15 ENCOUNTER — HEALTH MAINTENANCE LETTER (OUTPATIENT)
Age: 43
End: 2023-04-15

## 2023-04-17 ENCOUNTER — HOSPITAL ENCOUNTER (EMERGENCY)
Facility: HOSPITAL | Age: 43
Discharge: HOME | End: 2023-04-17
Attending: EMERGENCY MEDICINE | Admitting: EMERGENCY MEDICINE
Payer: COMMERCIAL

## 2023-04-17 VITALS
DIASTOLIC BLOOD PRESSURE: 62 MMHG | WEIGHT: 295.42 LBS | TEMPERATURE: 97.8 F | BODY MASS INDEX: 49.92 KG/M2 | HEART RATE: 93 BPM | SYSTOLIC BLOOD PRESSURE: 133 MMHG | OXYGEN SATURATION: 97 % | RESPIRATION RATE: 28 BRPM

## 2023-04-17 DIAGNOSIS — R11.2 NAUSEA VOMITING AND DIARRHEA: ICD-10-CM

## 2023-04-17 DIAGNOSIS — R19.7 NAUSEA VOMITING AND DIARRHEA: ICD-10-CM

## 2023-04-17 LAB
ALBUMIN SERPL BCG-MCNC: 4 G/DL (ref 3.5–5.2)
ALP SERPL-CCNC: 247 U/L (ref 40–129)
ALT SERPL W P-5'-P-CCNC: 19 U/L (ref 10–50)
ANION GAP SERPL CALCULATED.3IONS-SCNC: 12 MMOL/L (ref 7–15)
AST SERPL W P-5'-P-CCNC: 29 U/L (ref 10–50)
BILIRUB DIRECT SERPL-MCNC: 0.48 MG/DL (ref 0–0.3)
BILIRUB SERPL-MCNC: 1.1 MG/DL
BUN SERPL-MCNC: 12.5 MG/DL (ref 6–20)
CALCIUM SERPL-MCNC: 8.6 MG/DL (ref 8.6–10)
CHLORIDE SERPL-SCNC: 101 MMOL/L (ref 98–107)
CREAT SERPL-MCNC: 0.7 MG/DL (ref 0.67–1.17)
DEPRECATED HCO3 PLAS-SCNC: 24 MMOL/L (ref 22–29)
FLUAV RNA SPEC QL NAA+PROBE: NEGATIVE
FLUBV RNA RESP QL NAA+PROBE: NEGATIVE
GFR SERPL CREATININE-BSD FRML MDRD: >90 ML/MIN/1.73M2
GLUCOSE SERPL-MCNC: 148 MG/DL (ref 70–99)
HOLD SPECIMEN: NORMAL
POTASSIUM SERPL-SCNC: 3.9 MMOL/L (ref 3.4–5.3)
PROT SERPL-MCNC: 6.8 G/DL (ref 6.4–8.3)
RSV RNA SPEC NAA+PROBE: NEGATIVE
SARS-COV-2 RNA RESP QL NAA+PROBE: NEGATIVE
SODIUM SERPL-SCNC: 137 MMOL/L (ref 136–145)

## 2023-04-17 PROCEDURE — 96361 HYDRATE IV INFUSION ADD-ON: CPT

## 2023-04-17 PROCEDURE — 96374 THER/PROPH/DIAG INJ IV PUSH: CPT

## 2023-04-17 PROCEDURE — 80053 COMPREHEN METABOLIC PANEL: CPT

## 2023-04-17 PROCEDURE — 258N000003 HC RX IP 258 OP 636

## 2023-04-17 PROCEDURE — 80048 BASIC METABOLIC PNL TOTAL CA: CPT

## 2023-04-17 PROCEDURE — 87637 SARSCOV2&INF A&B&RSV AMP PRB: CPT

## 2023-04-17 PROCEDURE — C9803 HOPD COVID-19 SPEC COLLECT: HCPCS

## 2023-04-17 PROCEDURE — 82248 BILIRUBIN DIRECT: CPT

## 2023-04-17 PROCEDURE — 250N000011 HC RX IP 250 OP 636

## 2023-04-17 PROCEDURE — 99284 EMERGENCY DEPT VISIT MOD MDM: CPT | Mod: CS,25

## 2023-04-17 PROCEDURE — 36415 COLL VENOUS BLD VENIPUNCTURE: CPT | Performed by: EMERGENCY MEDICINE

## 2023-04-17 RX ORDER — ONDANSETRON 4 MG/1
4 TABLET, ORALLY DISINTEGRATING ORAL EVERY 8 HOURS PRN
Qty: 10 TABLET | Refills: 0 | Status: SHIPPED | OUTPATIENT
Start: 2023-04-17 | End: 2023-04-20

## 2023-04-17 RX ORDER — ONDANSETRON 2 MG/ML
4 INJECTION INTRAMUSCULAR; INTRAVENOUS ONCE
Status: COMPLETED | OUTPATIENT
Start: 2023-04-17 | End: 2023-04-17

## 2023-04-17 RX ADMIN — SODIUM CHLORIDE 1000 ML: 9 INJECTION, SOLUTION INTRAVENOUS at 17:51

## 2023-04-17 RX ADMIN — ONDANSETRON 4 MG: 2 INJECTION INTRAMUSCULAR; INTRAVENOUS at 17:54

## 2023-04-17 ASSESSMENT — ENCOUNTER SYMPTOMS
VOMITING: 1
BACK PAIN: 0
FATIGUE: 1
HEADACHES: 1
NAUSEA: 1
LIGHT-HEADEDNESS: 0
RHINORRHEA: 0
CONFUSION: 0
SORE THROAT: 0
ABDOMINAL PAIN: 0
DIARRHEA: 1
FEVER: 1
DIZZINESS: 0

## 2023-04-17 NOTE — ED PROVIDER NOTES
I, Judith Oliver have reviewed the documentation, personally taken the patient's history, performed an exam and agree with the physical finds, diagnosis and management plan.    HPI:  Warren Jaramillo is a 42 year old male with a pertinent history of constipation, diabetes mellitus type 2, hypertension, atrial fibrillation, and mood disorder who presents to this ED via walk in for evaluation of vomiting and diarrhea.      Patient reports ongoing nausea and vomiting since yesterday morning. States that everything he eats comes back up including his medications, and he's only able to take in 7UP. He had an episode of diarrhea this morning while on his bed. Also reports a headache and fever of 102.1 yesterday, but that has since resolved. Patient feels lethargic and endorses chills. He has not taken his metformin for the past 2 days due to his vomiting.      Patient denies any sore throat, congestion, runny nose, confusion, abdominal pain, urinary symptoms, chest pain, back pain, lightheadedness or dizziness. He has chronic shortness of breath that hasn't changed recently. He's unsure if he had any sick contacts, but reports that he was at the [facility] on 4/13/2023.     Physical Exam: Regular rate, rhythm, no respiratory distress.  Abdomen is obese, but I cannot reproduce any tenderness to palpation    MDM: Viral syndrome, dehydration, electrolyte abnormality, gastroenteritis, acute hepatitis    ED Course/workup: Patient given IV fluids will obtain labs to evaluate further for dehydration or hepatobiliary dysfunction.  Results pending at time of dictation.  Patient already feeling improved at the time of my evaluation.  If labs normal all oral challenge for home      ED Course as of 04/17/23 1844   Mon Apr 17, 2023   1839 I met the patient.       Final Diagnosis:     ICD-10-CM    1. Nausea vomiting and diarrhea  R11.2     R19.7             I personally saw the patient and performed a substantive portion of the visit  including all aspects of the medical decision making.     MD Benito Cash Zabrina N, MD  04/17/23 1891

## 2023-04-17 NOTE — ED PROVIDER NOTES
EMERGENCY DEPARTMENT ENCOUNTER      NAME: Warren Jaramillo  AGE: 42 year old male  YOB: 1980  MRN: [id number]  EVALUATION DATE & TIME: No admission date for patient encounter.    PCP: Aundrea Montoya    ED PROVIDER: Alesha Ceja PA-C      Chief Complaint   Patient presents with     Vomiting     Diarrhea     FINAL IMPRESSION:  1. Nausea vomiting and diarrhea      ED COURSE & MEDICAL DECISION MAKING:    Pertinent Labs & Imaging studies reviewed. (See chart for details)  42 year old male presents to the Emergency Department for evaluation of nausea and vomiting.  Patient has not been able to keep food or water down for 1 day.  Vital signs reviewed and unremarkable.  Afebrile.  On exam abdomen is soft and nontender.  No rash. Moist mucus membranes. Exam is otherwise unremarkable.    Differential diagnosis includes but not limited to COVID, influenza, RSV, electrolyte abnormality, dehydration, GI illness.  Influenza, COVID, RSV negative.  Hepatic and basic unremarkable.  Patient felt much improved after Zofran and fluids. Past his oral challenge. Patient requested to be discharged home.  Was educated on his lab results.  Patient was prescribed Zofran from home.  Patient will follow-up with his primary care doctor to discuss his ED visit.  Patient return to the ED if new symptoms develop or symptoms worsen.  All questions answered.  Patient agrees with plan.    ED COURSE:   5:40 PM I introduced myself to the patient, obtained patient history, performed a physical exam, and discussed plan for ED workup including potential diagnostic laboratory/imaging studies and interventions.   5:55 PM Staffed patient with Dr. Judith Oliver.   7:17 PM Rechecked and updated the patient. We discussed the plan for discharge and the patient is agreeable. Reviewed supportive cares, symptomatic treatment, outpatient follow up, and reasons to return to the Emergency Department. Patient to be discharged  by ED RN.     ED Course as of 04/17/23 1941 Mon Apr 17, 2023 1839 I met the patient.       At the conclusion of the encounter I discussed the results of all of the tests and the disposition. The questions were answered. The patient or family acknowledged understanding and was agreeable with the care plan.     0 minutes of critical care time       Additional Documentation    History:    Supplemental history from: Documented in chart, if applicable    External Record(s) reviewed: Documented in chart, if applicable.    Work Up:    Chart documentation includes differential considered and any EKGs or imaging interpreted by provider.    In additional to work up documented, I considered the following work up: Documented in chart, if applicable.    External consultation:    Discussion of management with another provider: Documented in chart, if applicable    Complicating factors:    Care impacted by chronic illness: N/A    Care affected by social determinants of health: N/A    Disposition considerations: Discharge. I prescribed additional prescription strength medication(s) as charted. See documentation for any additional details.      MEDICATIONS GIVEN IN THE EMERGENCY:  Medications   0.9% sodium chloride BOLUS (1,000 mLs Intravenous $New Bag 4/17/23 1751)   ondansetron (ZOFRAN) injection 4 mg (4 mg Intravenous $Given 4/17/23 1754)       NEW PRESCRIPTIONS STARTED AT TODAY'S ER VISIT  New Prescriptions    ONDANSETRON (ZOFRAN ODT) 4 MG ODT TAB    Take 1 tablet (4 mg) by mouth every 8 hours as needed for nausea       =================================================================    HPI    Patient information was obtained from: Patient     Use of : N/A    Warren Jaramillo is a 42 year old male with a pertinent history of constipation, diabetes mellitus type 2, hypertension, atrial fibrillation, and mood disorder who presents to this ED via walk in for evaluation of vomiting and diarrhea.     Patient  reports ongoing nausea and vomiting since yesterday morning. States that everything he eats comes back up including his medications, and he's only able to take in 7UP. He had an episode of diarrhea this morning while on his bed. Also reports a headache and fever of 102.1 yesterday, but that has since resolved. Patient feels fatigued and endorses chills. He has not taken his metformin for the past 2 days due to his vomiting.     Patient denies any sore throat, congestion, runny nose, confusion, abdominal pain, urinary symptoms, chest pain, back pain, lightheadedness or dizziness. He has chronic shortness of breath that hasn't changed recently. He's unsure if he had any sick contacts, but reports that he was at the [employer] on 4/13/2023.       REVIEW OF SYSTEMS   Review of Systems   Constitutional: Positive for fatigue and fever ( resolved).   HENT: Negative for congestion, rhinorrhea and sore throat.    Cardiovascular: Negative for chest pain.   Gastrointestinal: Positive for diarrhea, nausea and vomiting. Negative for abdominal pain.   Musculoskeletal: Negative for back pain.   Neurological: Positive for headaches ( resolved). Negative for dizziness and light-headedness.   Psychiatric/Behavioral: Negative for confusion.       PAST MEDICAL HISTORY:  History reviewed. No pertinent past medical history.    PAST SURGICAL HISTORY:  History reviewed. No pertinent surgical history.    CURRENT MEDICATIONS:    ondansetron (ZOFRAN ODT) 4 MG ODT tab  albuterol 90 MCG/ACT inhaler  calcium carbonate 500 mg, elemental, 1250 (500 Ca) MG tablet chewable  cetirizine (ZYRTEC) 10 MG tablet  cholecalciferol (VITAMIN D) 1000 UNIT tablet  CLONAZEPAM PO  co-enzyme Q-10 (COENZYME Q-10) 100 MG CAPS  Cod Liver Oil CAPS  cyclobenzaprine (FLEXERIL) 10 MG tablet  DIPHENHYDRAMINE HCL  docosanol (ABREVA) 10 % CREA  EPINEPHrine (EPIPEN) 0.3 MG/0.3ML injection  FLUoxetine (PROZAC) 10 MG capsule  fluticasone-vilanterol (BREO ELLIPTA) 200-25 MCG/ACT  inhaler  guaiFENesin (ROBITUSSIN) 20 mg/mL liquid  hydrOXYzine (VISTARIL) 25 MG capsule  IBUPROFEN 800 MG OR TABS  ipratropium - albuterol 0.5 mg/2.5 mg/3 mL (DUONEB) 0.5-2.5 (3) MG/3ML neb solution  ipratropium-albuterol (COMBIVENT RESPIMAT)  MCG/ACT inhaler  lisinopril (ZESTRIL) 10 MG tablet  meclizine (ANTIVERT) 25 MG tablet  melatonin 3 MG tablet  metFORMIN (GLUCOPHAGE) 500 MG tablet  methylphenidate (CONCERTA) 54 MG CR tablet  MILK OF MAGNESIA OR  naproxen (NAPROSYN) 500 MG tablet  OLANZapine (ZYPREXA) 5 MG tablet  omeprazole (PRILOSEC) 40 MG DR capsule  oxybutynin ER (DITROPAN XL) 10 MG 24 hr tablet  rosuvastatin (CRESTOR) 10 MG tablet  traMADol (ULTRAM) 50 MG tablet  TYLENOL CAPS 500 MG OR        ALLERGIES:  Allergies   Allergen Reactions     Apricot Flavor Anaphylaxis     Banana Anaphylaxis     Throat swelling  Throat swelling       Wasp Venom Protein Shortness Of Breath     Other reaction(s): Respiratory Distress  Has an epi pen  Has an epi pen       Bees Anaphylaxis     Have an Epi pen that carries with     Methylphenidate Itching     Other reaction(s): Nightmares     Prunus      Other reaction(s): *Unknown     Sulfa Drugs      Headaches and nausea     Prunus Persica Rash     Other reaction(s): *Unknown       FAMILY HISTORY:  Family History   Problem Relation Age of Onset     Unknown/Adopted Father      Unknown/Adopted Maternal Grandmother      C.A.D. Maternal Grandfather      Diabetes Maternal Grandfather      Cerebrovascular Disease Maternal Grandfather      Unknown/Adopted Paternal Grandmother      Unknown/Adopted Paternal Grandfather      Unknown/Adopted Brother      Unknown/Adopted Sister        SOCIAL HISTORY:   Social History     Socioeconomic History     Marital status:      Spouse name: None     Number of children: None     Years of education: None     Highest education level: None   Tobacco Use     Smoking status: Every Day     Packs/day: 1.00     Types: Cigarettes     Smokeless  tobacco: Current     Tobacco comments:     occasional pouch of chewing tobacco   Substance and Sexual Activity     Alcohol use: No     Drug use: No     Sexual activity: Never     Partners: Female   Other Topics Concern      Service No     Blood Transfusions No     Caffeine Concern No     Occupational Exposure No     Hobby Hazards No     Sleep Concern No     Stress Concern Yes     Comment: sometimes     Weight Concern No     Special Diet Yes     Comment: counting carbs     Back Care No     Exercise Yes     Seat Belt Yes     Self-Exams Yes       VITALS:  /73   Pulse 88   Temp 97.8  F (36.6  C) (Temporal)   Resp 28   Wt 134 kg (295 lb 6.7 oz)   SpO2 97%   BMI 49.92 kg/m      PHYSICAL EXAM    Physical Exam  Vitals and nursing note reviewed.   Constitutional:       General: He is not in acute distress.     Appearance: Normal appearance. He is not ill-appearing, toxic-appearing or diaphoretic.   HENT:      Head: Atraumatic.      Right Ear: External ear normal.      Left Ear: External ear normal.      Nose: Nose normal.      Mouth/Throat:      Mouth: Mucous membranes are moist.   Eyes:      Conjunctiva/sclera: Conjunctivae normal.      Pupils: Pupils are equal, round, and reactive to light.   Cardiovascular:      Rate and Rhythm: Normal rate and regular rhythm.      Pulses: Normal pulses.      Heart sounds: Normal heart sounds. No murmur heard.     No friction rub. No gallop.   Pulmonary:      Effort: Pulmonary effort is normal.      Breath sounds: Normal breath sounds. No wheezing or rales.   Abdominal:      General: Abdomen is flat. Bowel sounds are normal.      Palpations: Abdomen is soft.      Tenderness: There is no abdominal tenderness. There is no right CVA tenderness, left CVA tenderness, guarding or rebound.   Musculoskeletal:         General: Normal range of motion.      Cervical back: Normal range of motion.   Skin:     General: Skin is dry.      Capillary Refill: Capillary refill takes less  than 2 seconds.      Findings: No rash.   Neurological:      Mental Status: He is alert. Mental status is at baseline.   Psychiatric:         Mood and Affect: Mood normal.         Thought Content: Thought content normal.          LAB:  All pertinent labs reviewed and interpreted.  Labs Ordered and Resulted from Time of ED Arrival to Time of ED Departure   BASIC METABOLIC PANEL - Abnormal       Result Value    Sodium 137      Potassium 3.9      Chloride 101      Carbon Dioxide (CO2) 24      Anion Gap 12      Urea Nitrogen 12.5      Creatinine 0.70      Calcium 8.6      Glucose 148 (*)     GFR Estimate >90     HEPATIC FUNCTION PANEL - Abnormal    Protein Total 6.8      Albumin 4.0      Bilirubin Total 1.1      Alkaline Phosphatase 247 (*)     AST 29      ALT 19      Bilirubin Direct 0.48 (*)    INFLUENZA A/B, RSV, & SARS-COV2 PCR - Normal    Influenza A PCR Negative      Influenza B PCR Negative      RSV PCR Negative      SARS CoV2 PCR Negative        I, Justo Keith, am serving as a scribe to document services personally performed by Alesha Ceja PA-C, based on my observation and the provider's statements to me. I, Alesha Ceja PA-C, attest that Justo Keith is acting in a scribe capacity, has observed my performance of the services and has documented them in accordance with my direction.    Alesha Ceja PA-C  [facility] EMERGENCY DEPARTMENT  [address] [place] [id number]  [phone]     Alesha Ceja PA-C  04/17/23 6450

## 2023-04-17 NOTE — ED TRIAGE NOTES
He has been vomiting for the last 30 hours or so. He last vomited 20 minutes ago. Denies pain at this time. Has chronic SOB he says it is not worse today. Also had diarrhea today.

## 2023-04-18 NOTE — DISCHARGE INSTRUCTIONS
Rest.  Drink lots of fluids.  Take Zofran as needed for your nausea.  Return to the ED if new symptoms develop or symptoms worsen.  Follow-up with your primary care doctor to discuss your ED visit.

## 2023-05-16 ENCOUNTER — TRANSFERRED RECORDS (OUTPATIENT)
Dept: HEALTH INFORMATION MANAGEMENT | Facility: CLINIC | Age: 43
End: 2023-05-16

## 2023-05-16 LAB
ALT SERPL-CCNC: 19 U/L (ref 10–49)
AST SERPL-CCNC: 17 U/L
CREATININE (EXTERNAL): 0.75 MG/DL (ref 0.7–1.3)
GFR ESTIMATED (EXTERNAL): 115.55 ML/MIN/1.73M2
GLUCOSE (EXTERNAL): 111 MG/DL (ref 74–106)
HBA1C MFR BLD: 7.1 % (ref 4.8–5.6)
POTASSIUM (EXTERNAL): 4.4 MMOL/L (ref 3.4–5.1)
TSH SERPL-ACNC: 5.75 UIU/ML (ref 0.55–4.78)

## 2023-06-02 ENCOUNTER — HEALTH MAINTENANCE LETTER (OUTPATIENT)
Age: 43
End: 2023-06-02

## 2023-07-02 ENCOUNTER — APPOINTMENT (OUTPATIENT)
Dept: CT IMAGING | Facility: HOSPITAL | Age: 43
End: 2023-07-02
Attending: EMERGENCY MEDICINE
Payer: MEDICARE

## 2023-07-02 ENCOUNTER — APPOINTMENT (OUTPATIENT)
Dept: RADIOLOGY | Facility: HOSPITAL | Age: 43
End: 2023-07-02
Attending: EMERGENCY MEDICINE
Payer: MEDICARE

## 2023-07-02 ENCOUNTER — HOSPITAL ENCOUNTER (EMERGENCY)
Facility: HOSPITAL | Age: 43
Discharge: HOME | End: 2023-07-02
Attending: EMERGENCY MEDICINE | Admitting: EMERGENCY MEDICINE
Payer: MEDICARE

## 2023-07-02 VITALS
DIASTOLIC BLOOD PRESSURE: 74 MMHG | RESPIRATION RATE: 18 BRPM | HEART RATE: 90 BPM | TEMPERATURE: 98.7 F | OXYGEN SATURATION: 98 % | SYSTOLIC BLOOD PRESSURE: 136 MMHG

## 2023-07-02 DIAGNOSIS — M54.2 CERVICAL PAIN (NECK): ICD-10-CM

## 2023-07-02 PROCEDURE — 72100 X-RAY EXAM L-S SPINE 2/3 VWS: CPT

## 2023-07-02 PROCEDURE — G1010 CDSM STANSON: HCPCS

## 2023-07-02 PROCEDURE — 72072 X-RAY EXAM THORAC SPINE 3VWS: CPT

## 2023-07-02 PROCEDURE — 250N000013 HC RX MED GY IP 250 OP 250 PS 637: Performed by: EMERGENCY MEDICINE

## 2023-07-02 PROCEDURE — 99284 EMERGENCY DEPT VISIT MOD MDM: CPT | Mod: 25

## 2023-07-02 RX ORDER — IBUPROFEN 600 MG/1
600 TABLET, FILM COATED ORAL EVERY 6 HOURS PRN
Qty: 28 TABLET | Refills: 0 | Status: SHIPPED | OUTPATIENT
Start: 2023-07-02 | End: 2023-08-02

## 2023-07-02 RX ORDER — OXYCODONE AND ACETAMINOPHEN 5; 325 MG/1; MG/1
2 TABLET ORAL ONCE
Status: COMPLETED | OUTPATIENT
Start: 2023-07-02 | End: 2023-07-02

## 2023-07-02 RX ORDER — CYCLOBENZAPRINE HCL 10 MG
10 TABLET ORAL 3 TIMES DAILY PRN
Qty: 20 TABLET | Refills: 0 | Status: SHIPPED | OUTPATIENT
Start: 2023-07-02 | End: 2023-07-12

## 2023-07-02 RX ADMIN — OXYCODONE HYDROCHLORIDE AND ACETAMINOPHEN 2 TABLET: 5; 325 TABLET ORAL at 19:28

## 2023-07-02 ASSESSMENT — ENCOUNTER SYMPTOMS
NUMBNESS: 0
NECK PAIN: 1
ABDOMINAL PAIN: 0
FLANK PAIN: 0
BACK PAIN: 1

## 2023-07-03 NOTE — DISCHARGE INSTRUCTIONS
Ice off-and-on to neck or back area if hurting.  Ibuprofen as prescribed.  Flexeril as prescribed.  Do not drive with this.  See your doctor if not better in the next week.

## 2023-07-03 NOTE — ED PROVIDER NOTES
"EMERGENCY DEPARTMENT ENCOUNTER      NAME: Warren Jaramillo  AGE: 42 year old male  YOB: 1980  MRN: [id number]  EVALUATION DATE & TIME: 2023  7:03 PM    PCP: Aundrea Montoya    ED PROVIDER: Fortino Gregorio M.D.      Chief Complaint   Patient presents with     Back Pain         FINAL IMPRESSION:  Acute cervical neck pain and back pain status post sneezing.      ED COURSE & MEDICAL DECISION MAKIN:09 PM I met with the patient to gather history and to perform my initial exam. We discussed plans for the ED course, including diagnostic testing and treatment. PPE worn: cloth mask.  Patient on the bus and sneezed forcefully.  He noted a \"snap, crackle, pop\" in the neck area and since that time notes neck pain, mid back pain, low back pain.  Denies any neurological symptoms.  Arrives per EMS.  8:20 PM I checked on the patient and discussed XR and CT findings.  No acute findings are noted on cervical spine CT or thoracic spine lumbar spine x-rays.  Patient feeling better after medications.  I believe the patient had a transient \"stinger\" after sneezing.  Symptoms resolving.  Patient discharged home on ibuprofen and Flexeril.  Patient in agreement with the plan.      Pertinent Labs & Imaging studies reviewed. (See chart for details)  42 year old male presents to the Emergency Department for evaluation of neck and back pain.    At the conclusion of the encounter I discussed the results of all of the tests and the disposition. The questions were answered. The patient or family acknowledged understanding and was agreeable with the care plan.      Medical Decision Making    History:    Supplemental history from: Documented in chart, if applicable    External Record(s) reviewed: Both inpatient and outpatient computer records were reviewed.    Work Up:    Chart documentation includes differential considered and any EKGs or imaging independently interpreted by provider, where specified.  " "Differential diagnosis includes \"stinger\", cervical, thoracic, lumbar spine injury, pinched nerve, etc.    In additional to work up documented, I considered the following work up: Documented in chart, if applicable.    External consultation:    Discussion of management with another provider: Documented in chart, if applicable    Complicating factors:    Care impacted by chronic illness: Mental Health    Care affected by social determinants of health: Access to medical care    Disposition considerations: Anticipate discharge home on medications.          MEDICATIONS GIVEN IN THE EMERGENCY:  Medications - No data to display    NEW PRESCRIPTIONS STARTED AT TODAY'S ER VISIT  New Prescriptions    No medications on file          =================================================================    HPI    Patient information was obtained from: The patient    Use of : N/A         Warren Jaramillo is a 42 year old male with a pertinent history of ADHD, Fetal alcohol syndrome who presents to this ED via walk-in for evaluation of back pain.    The patient sneezed earlier today and heard a \"snap and crackle\" to his neck with an onset of neck pain shortly afterwards. He notes the neck pain started to gradually extend downwards to his entire back. He denied any numbness to his extremities, flank pain, and abdominal pain.    He does not identify any waxing or waning symptoms otherwise, exacerbating or alleviating features,associated symptoms except as mentioned. He denies any pain related complaints.    REVIEW OF SYSTEMS   Review of Systems   Gastrointestinal: Negative for abdominal pain.   Genitourinary: Negative for flank pain.   Musculoskeletal: Positive for back pain (lower) and neck pain.   Neurological: Negative for numbness.   All other systems reviewed and are negative.       PAST MEDICAL HISTORY:  History reviewed. No pertinent past medical history.    PAST SURGICAL HISTORY:  History reviewed. No pertinent " surgical history.        CURRENT MEDICATIONS:    albuterol 90 MCG/ACT inhaler  calcium carbonate 500 mg, elemental, 1250 (500 Ca) MG tablet chewable  cetirizine (ZYRTEC) 10 MG tablet  cholecalciferol (VITAMIN D) 1000 UNIT tablet  CLONAZEPAM PO  co-enzyme Q-10 (COENZYME Q-10) 100 MG CAPS  Cod Liver Oil CAPS  cyclobenzaprine (FLEXERIL) 10 MG tablet  DIPHENHYDRAMINE HCL  docosanol (ABREVA) 10 % CREA  EPINEPHrine (EPIPEN) 0.3 MG/0.3ML injection  FLUoxetine (PROZAC) 10 MG capsule  fluticasone-vilanterol (BREO ELLIPTA) 200-25 MCG/ACT inhaler  guaiFENesin (ROBITUSSIN) 20 mg/mL liquid  hydrOXYzine (VISTARIL) 25 MG capsule  IBUPROFEN 800 MG OR TABS  ipratropium - albuterol 0.5 mg/2.5 mg/3 mL (DUONEB) 0.5-2.5 (3) MG/3ML neb solution  ipratropium-albuterol (COMBIVENT RESPIMAT)  MCG/ACT inhaler  lisinopril (ZESTRIL) 10 MG tablet  meclizine (ANTIVERT) 25 MG tablet  melatonin 3 MG tablet  metFORMIN (GLUCOPHAGE) 500 MG tablet  methylphenidate (CONCERTA) 54 MG CR tablet  MILK OF MAGNESIA OR  naproxen (NAPROSYN) 500 MG tablet  OLANZapine (ZYPREXA) 5 MG tablet  omeprazole (PRILOSEC) 40 MG DR capsule  oxybutynin ER (DITROPAN XL) 10 MG 24 hr tablet  rosuvastatin (CRESTOR) 10 MG tablet  traMADol (ULTRAM) 50 MG tablet  TYLENOL CAPS 500 MG OR        ALLERGIES:  Allergies   Allergen Reactions     Apricot Flavor Anaphylaxis     Banana Anaphylaxis     Throat swelling  Throat swelling       Wasp Venom Protein Shortness Of Breath     Other reaction(s): Respiratory Distress  Has an epi pen  Has an epi pen       Bees Anaphylaxis     Have an Epi pen that carries with     Methylphenidate Itching     Other reaction(s): Nightmares     Prunus      Other reaction(s): *Unknown     Sulfa Antibiotics      Headaches and nausea     Prunus Persica Rash     Other reaction(s): *Unknown       FAMILY HISTORY:  Family History   Problem Relation Age of Onset     Unknown/Adopted Father      Unknown/Adopted Maternal Grandmother      C.A.D. Maternal  Grandfather      Diabetes Maternal Grandfather      Cerebrovascular Disease Maternal Grandfather      Unknown/Adopted Paternal Grandmother      Unknown/Adopted Paternal Grandfather      Unknown/Adopted Brother      Unknown/Adopted Sister        SOCIAL HISTORY:   Social History     Socioeconomic History     Marital status: Single     Spouse name: None     Number of children: None     Years of education: None     Highest education level: None   Tobacco Use     Smoking status: Every Day     Packs/day: 1.00     Types: Cigarettes     Smokeless tobacco: Current     Tobacco comments:     occasional pouch of chewing tobacco   Substance and Sexual Activity     Alcohol use: No     Drug use: No     Sexual activity: Never     Partners: Female   Other Topics Concern      Service No     Blood Transfusions No     Caffeine Concern No     Occupational Exposure No     Hobby Hazards No     Sleep Concern No     Stress Concern Yes     Comment: sometimes     Weight Concern No     Special Diet Yes     Comment: counting carbs     Back Care No     Exercise Yes     Seat Belt Yes     Self-Exams Yes   Daily smoker.  No drugs or alcohol.    VITALS:  /74   Pulse 90   Temp 98.7  F (37.1  C) (Oral)   Resp 18   SpO2 98%     PHYSICAL EXAM    Vital Signs:  /74   Pulse 90   Temp 98.7  F (37.1  C) (Oral)   Resp 18   SpO2 98%   General:  On entering the room he is in no apparent distress.    Neck:  Neck supple with full range of motion and mild cervical tenderness.  Back:  Back and spine mildly tender in the thoracic and lumbar spine.  No costovertebral angle tenderness.    HEENT:  Oropharynx clear with moist mucous membranes.  HEENT unremarkable.    Pulmonary:  Chest clear to auscultation without rhonchi rales or wheezing.    Cardiovascular:  Cardiac regular rate and rhythm without murmurs rubs or gallops.    Abdomen:  Abdomen soft nontender.  There is no rebound or guarding.    Muskuloskeletal:  he moves all 4 without any  difficulty and has normal neurovascular exams.  Extremities without clubbing, cyanosis, or edema.  Legs and calves are nontender.    Neuro:  he is alert and oriented ×3 and moves all extremities symmetrically.  Strength 5/5 in all 4 extremities.  Sensation tact in all 4 extremities.  Psych:  Normal affect.    Skin:  Unremarkable and warm and dry.       LAB:  All pertinent labs reviewed and interpreted.  Labs Ordered and Resulted from Time of ED Arrival to Time of ED Departure - No data to display    RADIOLOGY:  Reviewed all pertinent imaging. Please see official radiology report.  Thoracic spine XR, 3 views   Final Result      Lumbar spine XR, 2-3 views   Final Result      Cervical spine CT w/o contrast   Final Result   IMPRESSION:   1.  No fracture or posttraumatic subluxation.   2.  No high-grade spinal canal or neural foraminal stenosis.                 EKG:     PROCEDURES:         I, Fernando Park, am serving as a scribe to document services personally performed by Dr. Gregorio based on my observation and the provider's statements to me. I, Fortino Gregorio MD attest that Fernando Park is acting in a scribe capacity, has observed my performance of the services and has documented them in accordance with my direction.    Fortino Gregorio M.D.  Emergency Medicine  [facility] EMERGENCY DEPARTMENT  [facility] [facility] [address] [phone]  Dept: [phone]       Fortino Gregorio MD  07/02/23 2031

## 2023-07-03 NOTE — ED TRIAGE NOTES
"Pt arrived in ED via EMS for evaluation of lower back pain. Pt states that he sneezed on the bus earlier today and heard something \"snap, crackle, pop\" in his back. Pt was able to finish bus ride and walk to parent's [employer]. Pt took mother's tylenol with little relief. Went to parents to try to \"sleep it off\" but pain has gotten worse. Pt ambulatory on scene and in ER. VSS.      Triage Assessment     Row Name 07/02/23 8081       Triage Assessment (Adult)    Airway WDL WDL       Respiratory WDL    Respiratory WDL WDL       Skin Circulation/Temperature WDL    Skin Circulation/Temperature WDL WDL       Cardiac WDL    Cardiac WDL WDL       Peripheral/Neurovascular WDL    Peripheral Neurovascular WDL WDL       Cognitive/Neuro/Behavioral WDL    Cognitive/Neuro/Behavioral WDL WDL              "

## 2023-07-03 NOTE — ED NOTES
Bed: JNED-25  Expected date: 7/2/23  Expected time: 6:48 PM  Means of arrival: Ambulance  Comments:  Mplwd  43 yo M back pain

## 2023-07-03 NOTE — ED NOTES
"Pt also c/o cracked tooth upper left, would like MD \"to look at it\" as it has been causing him pain.  "

## 2023-07-11 DIAGNOSIS — G47.33 OSA (OBSTRUCTIVE SLEEP APNEA): Primary | ICD-10-CM

## 2023-07-20 ENCOUNTER — TRANSFERRED RECORDS (OUTPATIENT)
Dept: HEALTH INFORMATION MANAGEMENT | Facility: CLINIC | Age: 43
End: 2023-07-20

## 2023-07-20 LAB
CREATININE (EXTERNAL): 0.72 MG/DL (ref 0.76–1.27)
GFR ESTIMATED (EXTERNAL): 117 ML/MIN/1.73
GLUCOSE (EXTERNAL): 220 MG/DL (ref 70–99)
POTASSIUM (EXTERNAL): 4.4 MMOL/L (ref 3.5–5.2)

## 2023-07-21 ENCOUNTER — ANESTHESIA EVENT (OUTPATIENT)
Dept: SURGERY | Facility: HOSPITAL | Age: 43
End: 2023-07-21
Payer: MEDICARE

## 2023-07-21 ENCOUNTER — HOSPITAL ENCOUNTER (OUTPATIENT)
Facility: HOSPITAL | Age: 43
Discharge: HOME | End: 2023-07-21
Attending: INTERNAL MEDICINE | Admitting: INTERNAL MEDICINE
Payer: MEDICARE

## 2023-07-21 ENCOUNTER — ANESTHESIA (OUTPATIENT)
Dept: SURGERY | Facility: HOSPITAL | Age: 43
End: 2023-07-21
Payer: MEDICARE

## 2023-07-21 VITALS
OXYGEN SATURATION: 95 % | DIASTOLIC BLOOD PRESSURE: 55 MMHG | TEMPERATURE: 97.4 F | HEART RATE: 82 BPM | SYSTOLIC BLOOD PRESSURE: 126 MMHG | WEIGHT: 287.8 LBS | BODY MASS INDEX: 48.64 KG/M2 | RESPIRATION RATE: 16 BRPM

## 2023-07-21 LAB
GLUCOSE BLDC GLUCOMTR-MCNC: 106 MG/DL (ref 70–99)
UPPER GI ENDOSCOPY: NORMAL

## 2023-07-21 PROCEDURE — 360N000075 HC SURGERY LEVEL 2, PER MIN: Performed by: INTERNAL MEDICINE

## 2023-07-21 PROCEDURE — 258N000003 HC RX IP 258 OP 636: Performed by: ANESTHESIOLOGY

## 2023-07-21 PROCEDURE — 250N000009 HC RX 250: Performed by: NURSE ANESTHETIST, CERTIFIED REGISTERED

## 2023-07-21 PROCEDURE — 272N000001 HC OR GENERAL SUPPLY STERILE: Performed by: INTERNAL MEDICINE

## 2023-07-21 PROCEDURE — 999N000141 HC STATISTIC PRE-PROCEDURE NURSING ASSESSMENT: Performed by: INTERNAL MEDICINE

## 2023-07-21 PROCEDURE — 258N000003 HC RX IP 258 OP 636: Performed by: NURSE ANESTHETIST, CERTIFIED REGISTERED

## 2023-07-21 PROCEDURE — 82962 GLUCOSE BLOOD TEST: CPT

## 2023-07-21 PROCEDURE — 370N000017 HC ANESTHESIA TECHNICAL FEE, PER MIN: Performed by: INTERNAL MEDICINE

## 2023-07-21 PROCEDURE — 250N000011 HC RX IP 250 OP 636: Performed by: NURSE ANESTHETIST, CERTIFIED REGISTERED

## 2023-07-21 PROCEDURE — 710N000012 HC RECOVERY PHASE 2, PER MINUTE: Performed by: INTERNAL MEDICINE

## 2023-07-21 PROCEDURE — 88305 TISSUE EXAM BY PATHOLOGIST: CPT | Mod: TC | Performed by: INTERNAL MEDICINE

## 2023-07-21 RX ORDER — ONDANSETRON 2 MG/ML
4 INJECTION INTRAMUSCULAR; INTRAVENOUS EVERY 30 MIN PRN
Status: DISCONTINUED | OUTPATIENT
Start: 2023-07-21 | End: 2023-07-21 | Stop reason: HOSPADM

## 2023-07-21 RX ORDER — LIDOCAINE 40 MG/G
CREAM TOPICAL
Status: DISCONTINUED | OUTPATIENT
Start: 2023-07-21 | End: 2023-07-21 | Stop reason: HOSPADM

## 2023-07-21 RX ORDER — NALOXONE HYDROCHLORIDE 1 MG/ML
0.4 INJECTION INTRAMUSCULAR; INTRAVENOUS; SUBCUTANEOUS
Status: CANCELLED | OUTPATIENT
Start: 2023-07-21

## 2023-07-21 RX ORDER — LIDOCAINE HYDROCHLORIDE 10 MG/ML
INJECTION, SOLUTION INFILTRATION; PERINEURAL PRN
Status: DISCONTINUED | OUTPATIENT
Start: 2023-07-21 | End: 2023-07-21

## 2023-07-21 RX ORDER — SODIUM CHLORIDE, SODIUM LACTATE, POTASSIUM CHLORIDE, CALCIUM CHLORIDE 600; 310; 30; 20 MG/100ML; MG/100ML; MG/100ML; MG/100ML
INJECTION, SOLUTION INTRAVENOUS CONTINUOUS
Status: DISCONTINUED | OUTPATIENT
Start: 2023-07-21 | End: 2023-07-21 | Stop reason: HOSPADM

## 2023-07-21 RX ORDER — ONDANSETRON 4 MG/1
4 TABLET, ORALLY DISINTEGRATING ORAL EVERY 30 MIN PRN
Status: DISCONTINUED | OUTPATIENT
Start: 2023-07-21 | End: 2023-07-21 | Stop reason: HOSPADM

## 2023-07-21 RX ORDER — ONDANSETRON 2 MG/ML
4 INJECTION INTRAMUSCULAR; INTRAVENOUS EVERY 6 HOURS PRN
Status: CANCELLED | OUTPATIENT
Start: 2023-07-21

## 2023-07-21 RX ORDER — NALOXONE HYDROCHLORIDE 1 MG/ML
0.2 INJECTION INTRAMUSCULAR; INTRAVENOUS; SUBCUTANEOUS
Status: CANCELLED | OUTPATIENT
Start: 2023-07-21

## 2023-07-21 RX ORDER — SODIUM CHLORIDE, SODIUM LACTATE, POTASSIUM CHLORIDE, CALCIUM CHLORIDE 600; 310; 30; 20 MG/100ML; MG/100ML; MG/100ML; MG/100ML
INJECTION, SOLUTION INTRAVENOUS CONTINUOUS PRN
Status: DISCONTINUED | OUTPATIENT
Start: 2023-07-21 | End: 2023-07-21

## 2023-07-21 RX ORDER — ONDANSETRON 4 MG/1
4 TABLET, ORALLY DISINTEGRATING ORAL EVERY 6 HOURS PRN
Status: CANCELLED | OUTPATIENT
Start: 2023-07-21

## 2023-07-21 RX ORDER — PROCHLORPERAZINE MALEATE 10 MG
10 TABLET ORAL EVERY 6 HOURS PRN
Status: CANCELLED | OUTPATIENT
Start: 2023-07-21

## 2023-07-21 RX ORDER — PROPOFOL 10 MG/ML
INJECTION, EMULSION INTRAVENOUS PRN
Status: DISCONTINUED | OUTPATIENT
Start: 2023-07-21 | End: 2023-07-21

## 2023-07-21 RX ORDER — PROPOFOL 10 MG/ML
INJECTION, EMULSION INTRAVENOUS CONTINUOUS PRN
Status: DISCONTINUED | OUTPATIENT
Start: 2023-07-21 | End: 2023-07-21

## 2023-07-21 RX ORDER — OXYCODONE HYDROCHLORIDE 5 MG/1
5 TABLET ORAL
Status: DISCONTINUED | OUTPATIENT
Start: 2023-07-21 | End: 2023-07-21 | Stop reason: HOSPADM

## 2023-07-21 RX ORDER — FLUMAZENIL 0.1 MG/ML
0.2 INJECTION, SOLUTION INTRAVENOUS
Status: CANCELLED | OUTPATIENT
Start: 2023-07-21 | End: 2023-07-22

## 2023-07-21 RX ORDER — OXYCODONE HYDROCHLORIDE 5 MG/1
10 TABLET ORAL
Status: DISCONTINUED | OUTPATIENT
Start: 2023-07-21 | End: 2023-07-21 | Stop reason: HOSPADM

## 2023-07-21 RX ORDER — ONDANSETRON 2 MG/ML
4 INJECTION INTRAMUSCULAR; INTRAVENOUS
Status: DISCONTINUED | OUTPATIENT
Start: 2023-07-21 | End: 2023-07-21 | Stop reason: HOSPADM

## 2023-07-21 RX ADMIN — LIDOCAINE HYDROCHLORIDE 5 ML: 10 INJECTION, SOLUTION INFILTRATION; PERINEURAL at 14:47

## 2023-07-21 RX ADMIN — LIDOCAINE HYDROCHLORIDE 5 ML: 10 INJECTION, SOLUTION INFILTRATION; PERINEURAL at 14:46

## 2023-07-21 RX ADMIN — SODIUM CHLORIDE, POTASSIUM CHLORIDE, SODIUM LACTATE AND CALCIUM CHLORIDE: 600; 310; 30; 20 INJECTION, SOLUTION INTRAVENOUS at 14:43

## 2023-07-21 RX ADMIN — PROPOFOL 150 MCG/KG/MIN: 10 INJECTION, EMULSION INTRAVENOUS at 14:46

## 2023-07-21 RX ADMIN — PROPOFOL 40 MG: 10 INJECTION, EMULSION INTRAVENOUS at 14:47

## 2023-07-21 RX ADMIN — SODIUM CHLORIDE, POTASSIUM CHLORIDE, SODIUM LACTATE AND CALCIUM CHLORIDE: 600; 310; 30; 20 INJECTION, SOLUTION INTRAVENOUS at 14:40

## 2023-07-21 ASSESSMENT — ACTIVITIES OF DAILY LIVING (ADL): ADLS_ACUITY_SCORE: 18

## 2023-07-21 NOTE — ANESTHESIA PREPROCEDURE EVALUATION
Anesthesia Pre-Procedure Evaluation    Patient: Warren Jaramillo   MRN: [id number] : 1980        Procedure : Procedure(s):  ESOPHAGOGASTRODUODENOSCOPY          History reviewed. No pertinent past medical history.   History reviewed. No pertinent surgical history.   Allergies   Allergen Reactions     Apricot Flavor Anaphylaxis     Banana Anaphylaxis     Throat swelling  Throat swelling       Wasp Venom Protein Shortness Of Breath     Other reaction(s): Respiratory Distress  Has an epi pen  Has an epi pen       Bees Anaphylaxis     Have an Epi pen that carries with     Methylphenidate Itching     Other reaction(s): Nightmares     Prunus      Other reaction(s): *Unknown     Sulfa Antibiotics      Headaches and nausea     Prunus Persica Rash     Other reaction(s): *Unknown      Social History     Tobacco Use     Smoking status: Every Day     Packs/day: 1.00     Types: Cigarettes     Smokeless tobacco: Current     Tobacco comments:     occasional pouch of chewing tobacco   Substance Use Topics     Alcohol use: No     Comment: once every 3 months      Wt Readings from Last 1 Encounters:   23 130.5 kg (287 lb 12.8 oz)        Anesthesia Evaluation   Pt has had prior anesthetic.     No history of anesthetic complications       ROS/MED HX  ENT/Pulmonary:       Neurologic:       Cardiovascular:       METS/Exercise Tolerance:     Hematologic:       Musculoskeletal:       GI/Hepatic:     (+) hepatitis liver disease,     Renal/Genitourinary:       Endo:     (+) Obesity,     Psychiatric/Substance Use:       Infectious Disease:       Malignancy:       Other:            Physical Exam    Airway        Mallampati: II    Neck ROM: full     Respiratory Devices and Support         Dental       (+) Minor Abnormalities - some fillings, tiny chips      Cardiovascular   cardiovascular exam normal          Pulmonary   pulmonary exam normal                OUTSIDE LABS:  CBC:   Lab Results   Component Value Date    WBC 14.3  (H) 02/04/2023    WBC 16.1 (H) 01/31/2023    HGB 14.8 02/04/2023    HGB 15.0 01/31/2023    HCT 46.9 02/04/2023    HCT 48.5 01/31/2023     02/04/2023     01/31/2023     BMP:   Lab Results   Component Value Date     04/17/2023     02/04/2023    POTASSIUM 3.9 04/17/2023    POTASSIUM 3.9 02/04/2023    CHLORIDE 101 04/17/2023    CHLORIDE 101 02/04/2023    CO2 24 04/17/2023    CO2 26 02/04/2023    BUN 12.5 04/17/2023    BUN 11.0 02/04/2023    CR 0.70 04/17/2023    CR 0.8 03/14/2023     (H) 04/17/2023     (H) 02/04/2023     COAGS: No results found for: PTT, INR, FIBR  POC: No results found for: BGM, HCG, HCGS  HEPATIC:   Lab Results   Component Value Date    ALBUMIN 4.0 04/17/2023    PROTTOTAL 6.8 04/17/2023    ALT 19 04/17/2023    AST 29 04/17/2023    ALKPHOS 247 (H) 04/17/2023    BILITOTAL 1.1 04/17/2023     OTHER:   Lab Results   Component Value Date    LACT 1.3 02/04/2023    A1C 5.3 06/17/2009    WES 8.6 04/17/2023    LIPASE 16 01/31/2023    TSH 1.35 06/21/2012    SED 2 11/12/2007       Anesthesia Plan    ASA Status:  3      Anesthesia Type: MAC.              Consents    Anesthesia Plan(s) and associated risks, benefits, and realistic alternatives discussed. Questions answered and patient/representative(s) expressed understanding.     - Discussed: Risks, Benefits and Alternatives for the PROCEDURE were discussed     - Discussed with:  Patient      - Extended Intubation/Ventilatory Support Discussed: No.      - Patient is DNR/DNI Status: No    Use of blood products discussed: No .     Postoperative Care            Comments:                Demetria Estrada MD

## 2023-07-21 NOTE — ANESTHESIA POSTPROCEDURE EVALUATION
Patient: Warren Jaramillo    Procedure: Procedure(s):  ESOPHAGOGASTRODUODENOSCOPY WITH GASTRIC AND ESOPHAGEAL BIOPSIES       Anesthesia Type:  MAC    Note:  Disposition: Outpatient   Postop Pain Control: Uneventful            Sign Out: Well controlled pain   PONV: No   Neuro/Psych: Uneventful            Sign Out: Acceptable/Baseline neuro status   Airway/Respiratory: Uneventful            Sign Out: Acceptable/Baseline resp. status   CV/Hemodynamics: Uneventful            Sign Out: Acceptable CV status; No obvious hypovolemia; No obvious fluid overload   Other NRE: NONE   DID A NON-ROUTINE EVENT OCCUR? No           Last vitals:  Vitals Value Taken Time   /55 07/21/23 1530   Temp     Pulse 82 07/21/23 1532   Resp     SpO2 95 % 07/21/23 1532   Vitals shown include unvalidated device data.    Electronically Signed By: Demetria Estrada MD  July 21, 2023  3:33 PM

## 2023-07-21 NOTE — H&P
GASTROENTEROLOGY PRE-PROCEDURAL HISTORY AND PHYSICAL     INDICATION FOR PROCEDURE   Dysphagia, abdominal pain     HISTORY    Reviewed and no change.     PHYSICAL EXAMINATION     Vitals Blood pressure 131/71, pulse 83, temperature 98  F (36.7  C), temperature source Oral, resp. rate 18, weight 130.5 kg (287 lb 12.8 oz), SpO2 97 %.          Physical Exam   General: awake, alert, oriented times three    Cardiovascular: RRR, no edema    Airway: Normal    Chest: lungs are clear to auscultation bilaterally    Abdomen: soft, non-tender        PREVIOUS REACTION TO SEDATION/ANESTHESIA    None     SEDATION PLAN BASED ON ASSESSMENT   Per Anesthesia     ASA CLASSIFICATION   ASA Classification: 3     MALLAMPATI SCORE     Class II      IMPRESSION   Patient deemed adequate candidate for anesthesia.     PLANNED PROCEDURE   EGD    Warren was axo x 4 today. I felt he could give consent himself without guardian.     Filiberto Aragon MD [facility]  I appreciate the opportunity to participate in the care of this patient.   Please feel free to call me with any questions or concerns.

## 2023-07-21 NOTE — ANESTHESIA CARE TRANSFER NOTE
Patient: Warren Jaramillo    Procedure: Procedure(s):  ESOPHAGOGASTRODUODENOSCOPY WITH GASTRIC AND ESOPHAGEAL BIOPSIES       Diagnosis: Cirrhosis (H) [K74.60]  Dysphagia [R13.10]  Diagnosis Additional Information: No value filed.    Anesthesia Type:   MAC     Note:    Oropharynx: oropharynx clear of all foreign objects  Level of Consciousness: awake  Oxygen Supplementation: room air    Independent Airway: airway patency satisfactory and stable  Dentition: dentition unchanged  Vital Signs Stable: post-procedure vital signs reviewed and stable  Report to RN Given: handoff report given  Patient transferred to: Phase II    Handoff Report: Identifed the Patient, Identified the Reponsible Provider, Reviewed the pertinent medical history, Discussed the surgical course, Reviewed Intra-OP anesthesia mangement and issues during anesthesia, Set expectations for post-procedure period and Allowed opportunity for questions and acknowledgement of understanding      Vitals:  Vitals Value Taken Time   BP     Temp     Pulse     Resp     SpO2         Electronically Signed By: ABBIE Sanchez CRNA  July 21, 2023  3:05 PM

## 2023-07-25 LAB
PATH REPORT.COMMENTS IMP SPEC: NORMAL
PATH REPORT.COMMENTS IMP SPEC: NORMAL
PATH REPORT.FINAL DX SPEC: NORMAL
PATH REPORT.GROSS SPEC: NORMAL
PATH REPORT.MICROSCOPIC SPEC OTHER STN: NORMAL
PATH REPORT.RELEVANT HX SPEC: NORMAL
PHOTO IMAGE: NORMAL

## 2023-07-25 PROCEDURE — 88342 IMHCHEM/IMCYTCHM 1ST ANTB: CPT | Mod: 26 | Performed by: PATHOLOGY

## 2023-07-25 PROCEDURE — 88305 TISSUE EXAM BY PATHOLOGIST: CPT | Mod: 26 | Performed by: PATHOLOGY

## 2023-07-26 ENCOUNTER — HOSPITAL ENCOUNTER (OUTPATIENT)
Dept: ULTRASOUND IMAGING | Facility: CLINIC | Age: 43
Discharge: HOME | End: 2023-07-26
Attending: PHYSICIAN ASSISTANT | Admitting: PHYSICIAN ASSISTANT
Payer: MEDICARE

## 2023-07-26 DIAGNOSIS — R10.9 RIGHT SIDED ABDOMINAL PAIN: ICD-10-CM

## 2023-07-26 PROCEDURE — 76705 ECHO EXAM OF ABDOMEN: CPT

## 2023-08-01 ENCOUNTER — APPOINTMENT (OUTPATIENT)
Dept: RADIOLOGY | Facility: HOSPITAL | Age: 43
End: 2023-08-01
Attending: EMERGENCY MEDICINE
Payer: MEDICARE

## 2023-08-01 PROBLEM — I51.7 CARDIOMEGALY: Status: ACTIVE | Noted: 2023-08-01

## 2023-08-01 PROBLEM — R07.9 CHEST PAIN, UNSPECIFIED TYPE: Status: ACTIVE | Noted: 2023-08-01

## 2023-08-01 PROBLEM — R06.02 SHORTNESS OF BREATH: Status: ACTIVE | Noted: 2023-08-01

## 2023-08-01 LAB
ALBUMIN SERPL BCG-MCNC: 4.5 G/DL (ref 3.5–5.2)
ALP SERPL-CCNC: 269 U/L (ref 40–129)
ALT SERPL W P-5'-P-CCNC: 19 U/L (ref 0–70)
ANION GAP SERPL CALCULATED.3IONS-SCNC: 12 MMOL/L (ref 7–15)
AST SERPL W P-5'-P-CCNC: 18 U/L (ref 0–45)
BASOPHILS # BLD AUTO: 0.1 10E3/UL (ref 0–0.2)
BASOPHILS NFR BLD AUTO: 1 %
BILIRUB DIRECT SERPL-MCNC: 0.27 MG/DL (ref 0–0.3)
BILIRUB SERPL-MCNC: 0.6 MG/DL
BUN SERPL-MCNC: 19.7 MG/DL (ref 6–20)
CALCIUM SERPL-MCNC: 10.6 MG/DL (ref 8.6–10)
CHLORIDE SERPL-SCNC: 102 MMOL/L (ref 98–107)
CREAT SERPL-MCNC: 0.74 MG/DL (ref 0.67–1.17)
DEPRECATED HCO3 PLAS-SCNC: 25 MMOL/L (ref 22–29)
EOSINOPHIL # BLD AUTO: 0.4 10E3/UL (ref 0–0.7)
EOSINOPHIL NFR BLD AUTO: 2 %
ERYTHROCYTE [DISTWIDTH] IN BLOOD BY AUTOMATED COUNT: 16.7 % (ref 10–15)
GFR SERPL CREATININE-BSD FRML MDRD: >90 ML/MIN/1.73M2
GLUCOSE SERPL-MCNC: 143 MG/DL (ref 70–99)
HCT VFR BLD AUTO: 44.3 % (ref 40–53)
HGB BLD-MCNC: 14.2 G/DL (ref 13.3–17.7)
HOLD SPECIMEN: NORMAL
IMM GRANULOCYTES # BLD: 0.1 10E3/UL
IMM GRANULOCYTES NFR BLD: 1 %
LIPASE SERPL-CCNC: 19 U/L (ref 13–60)
LYMPHOCYTES # BLD AUTO: 2.7 10E3/UL (ref 0.8–5.3)
LYMPHOCYTES NFR BLD AUTO: 18 %
MAGNESIUM SERPL-MCNC: 1.5 MG/DL (ref 1.7–2.3)
MCH RBC QN AUTO: 28.3 PG (ref 26.5–33)
MCHC RBC AUTO-ENTMCNC: 32.1 G/DL (ref 31.5–36.5)
MCV RBC AUTO: 88 FL (ref 78–100)
MONOCYTES # BLD AUTO: 1.1 10E3/UL (ref 0–1.3)
MONOCYTES NFR BLD AUTO: 8 %
NEUTROPHILS # BLD AUTO: 10.6 10E3/UL (ref 1.6–8.3)
NEUTROPHILS NFR BLD AUTO: 70 %
NRBC # BLD AUTO: 0 10E3/UL
NRBC BLD AUTO-RTO: 0 /100
NT-PROBNP SERPL-MCNC: 562 PG/ML (ref 0–450)
PLATELET # BLD AUTO: 287 10E3/UL (ref 150–450)
POTASSIUM SERPL-SCNC: 4.3 MMOL/L (ref 3.4–5.3)
PROT SERPL-MCNC: 7.9 G/DL (ref 6.4–8.3)
RBC # BLD AUTO: 5.02 10E6/UL (ref 4.4–5.9)
SODIUM SERPL-SCNC: 139 MMOL/L (ref 136–145)
TROPONIN T SERPL HS-MCNC: 25 NG/L
TROPONIN T SERPL HS-MCNC: 26 NG/L
WBC # BLD AUTO: 15 10E3/UL (ref 4–11)

## 2023-08-01 PROCEDURE — 71046 X-RAY EXAM CHEST 2 VIEWS: CPT

## 2023-08-01 PROCEDURE — 36415 COLL VENOUS BLD VENIPUNCTURE: CPT | Performed by: EMERGENCY MEDICINE

## 2023-08-01 PROCEDURE — 85025 COMPLETE CBC W/AUTO DIFF WBC: CPT | Performed by: EMERGENCY MEDICINE

## 2023-08-01 PROCEDURE — 250N000013 HC RX MED GY IP 250 OP 250 PS 637: Performed by: STUDENT IN AN ORGANIZED HEALTH CARE EDUCATION/TRAINING PROGRAM

## 2023-08-01 PROCEDURE — 99285 EMERGENCY DEPT VISIT HI MDM: CPT | Mod: 25

## 2023-08-01 PROCEDURE — 93005 ELECTROCARDIOGRAM TRACING: CPT | Performed by: EMERGENCY MEDICINE

## 2023-08-01 PROCEDURE — 250N000013 HC RX MED GY IP 250 OP 250 PS 637: Performed by: EMERGENCY MEDICINE

## 2023-08-01 PROCEDURE — 84484 ASSAY OF TROPONIN QUANT: CPT | Performed by: EMERGENCY MEDICINE

## 2023-08-01 PROCEDURE — 83036 HEMOGLOBIN GLYCOSYLATED A1C: CPT

## 2023-08-01 PROCEDURE — 80061 LIPID PANEL: CPT

## 2023-08-01 PROCEDURE — 82248 BILIRUBIN DIRECT: CPT | Performed by: EMERGENCY MEDICINE

## 2023-08-01 PROCEDURE — 83735 ASSAY OF MAGNESIUM: CPT | Performed by: EMERGENCY MEDICINE

## 2023-08-01 PROCEDURE — 80053 COMPREHEN METABOLIC PANEL: CPT | Performed by: EMERGENCY MEDICINE

## 2023-08-01 PROCEDURE — 83880 ASSAY OF NATRIURETIC PEPTIDE: CPT | Performed by: EMERGENCY MEDICINE

## 2023-08-01 PROCEDURE — 83690 ASSAY OF LIPASE: CPT | Performed by: EMERGENCY MEDICINE

## 2023-08-01 RX ORDER — ASPIRIN 81 MG/1
324 TABLET, CHEWABLE ORAL ONCE
Status: COMPLETED | OUTPATIENT
Start: 2023-08-01 | End: 2023-08-01

## 2023-08-01 RX ADMIN — ASPIRIN 81 MG CHEWABLE TABLET 324 MG: 81 TABLET CHEWABLE at 18:09

## 2023-08-01 NOTE — ED NOTES
The pt states that he has chest pain left sided and feels like his arms are lethargic. Pt lives at [facility] in [place].

## 2023-08-01 NOTE — ED TRIAGE NOTES
[facility], pt from a Group Home- [facility] [phone]. Pt has multiple complaints; chest pain into left arm.  12 lead RBBB, sats 96% on ra, 95 HR.

## 2023-08-02 ENCOUNTER — APPOINTMENT (OUTPATIENT)
Dept: CARDIOLOGY | Facility: HOSPITAL | Age: 43
End: 2023-08-02
Payer: MEDICARE

## 2023-08-02 ENCOUNTER — HOSPITAL ENCOUNTER (OUTPATIENT)
Facility: HOSPITAL | Age: 43
Setting detail: OBSERVATION
Discharge: HOME | End: 2023-08-03
Attending: EMERGENCY MEDICINE | Admitting: FAMILY MEDICINE
Payer: MEDICARE

## 2023-08-02 ENCOUNTER — APPOINTMENT (OUTPATIENT)
Dept: ULTRASOUND IMAGING | Facility: HOSPITAL | Age: 43
End: 2023-08-02
Payer: MEDICARE

## 2023-08-02 DIAGNOSIS — R07.9 CHEST PAIN, UNSPECIFIED TYPE: ICD-10-CM

## 2023-08-02 DIAGNOSIS — Z72.0 TOBACCO ABUSE: Primary | ICD-10-CM

## 2023-08-02 DIAGNOSIS — I51.7 CARDIOMEGALY: ICD-10-CM

## 2023-08-02 DIAGNOSIS — I50.9 CONGESTIVE HEART FAILURE, UNSPECIFIED HF CHRONICITY, UNSPECIFIED HEART FAILURE TYPE (H): ICD-10-CM

## 2023-08-02 DIAGNOSIS — R06.02 SHORTNESS OF BREATH: ICD-10-CM

## 2023-08-02 LAB
ALBUMIN SERPL BCG-MCNC: 4.3 G/DL (ref 3.5–5.2)
ALP SERPL-CCNC: 269 U/L (ref 40–129)
ALT SERPL W P-5'-P-CCNC: 21 U/L (ref 0–70)
ANION GAP SERPL CALCULATED.3IONS-SCNC: 11 MMOL/L (ref 7–15)
AST SERPL W P-5'-P-CCNC: 17 U/L (ref 0–45)
BILIRUB DIRECT SERPL-MCNC: 0.21 MG/DL (ref 0–0.3)
BILIRUB SERPL-MCNC: 0.5 MG/DL
BUN SERPL-MCNC: 18.2 MG/DL (ref 6–20)
CALCIUM SERPL-MCNC: 10.6 MG/DL (ref 8.6–10)
CHLORIDE SERPL-SCNC: 102 MMOL/L (ref 98–107)
CHOLEST SERPL-MCNC: 116 MG/DL
CREAT SERPL-MCNC: 0.79 MG/DL (ref 0.67–1.17)
DEPRECATED HCO3 PLAS-SCNC: 28 MMOL/L (ref 22–29)
ERYTHROCYTE [DISTWIDTH] IN BLOOD BY AUTOMATED COUNT: 16.7 % (ref 10–15)
GFR SERPL CREATININE-BSD FRML MDRD: >90 ML/MIN/1.73M2
GLUCOSE BLDC GLUCOMTR-MCNC: 101 MG/DL (ref 70–99)
GLUCOSE BLDC GLUCOMTR-MCNC: 114 MG/DL (ref 70–99)
GLUCOSE BLDC GLUCOMTR-MCNC: 120 MG/DL (ref 70–99)
GLUCOSE BLDC GLUCOMTR-MCNC: 155 MG/DL (ref 70–99)
GLUCOSE SERPL-MCNC: 128 MG/DL (ref 70–99)
HBA1C MFR BLD: 7.3 %
HCT VFR BLD AUTO: 44.1 % (ref 40–53)
HDLC SERPL-MCNC: 38 MG/DL
HGB BLD-MCNC: 14.1 G/DL (ref 13.3–17.7)
LDLC SERPL CALC-MCNC: 51 MG/DL
LVEF ECHO: NORMAL
MAGNESIUM SERPL-MCNC: 2.2 MG/DL (ref 1.7–2.3)
MCH RBC QN AUTO: 28.1 PG (ref 26.5–33)
MCHC RBC AUTO-ENTMCNC: 32 G/DL (ref 31.5–36.5)
MCV RBC AUTO: 88 FL (ref 78–100)
NONHDLC SERPL-MCNC: 78 MG/DL
PLATELET # BLD AUTO: 296 10E3/UL (ref 150–450)
POTASSIUM SERPL-SCNC: 4.1 MMOL/L (ref 3.4–5.3)
PROT SERPL-MCNC: 7.8 G/DL (ref 6.4–8.3)
RBC # BLD AUTO: 5.01 10E6/UL (ref 4.4–5.9)
SODIUM SERPL-SCNC: 141 MMOL/L (ref 136–145)
TRIGL SERPL-MCNC: 137 MG/DL
WBC # BLD AUTO: 14 10E3/UL (ref 4–11)

## 2023-08-02 PROCEDURE — 82248 BILIRUBIN DIRECT: CPT

## 2023-08-02 PROCEDURE — 255N000002 HC RX 255 OP 636: Performed by: FAMILY MEDICINE

## 2023-08-02 PROCEDURE — 250N000012 HC RX MED GY IP 250 OP 636 PS 637

## 2023-08-02 PROCEDURE — 96366 THER/PROPH/DIAG IV INF ADDON: CPT

## 2023-08-02 PROCEDURE — 250N000013 HC RX MED GY IP 250 OP 250 PS 637

## 2023-08-02 PROCEDURE — 96375 TX/PRO/DX INJ NEW DRUG ADDON: CPT | Mod: XU

## 2023-08-02 PROCEDURE — 96365 THER/PROPH/DIAG IV INF INIT: CPT | Mod: XU

## 2023-08-02 PROCEDURE — 36415 COLL VENOUS BLD VENIPUNCTURE: CPT | Performed by: FAMILY MEDICINE

## 2023-08-02 PROCEDURE — 80053 COMPREHEN METABOLIC PANEL: CPT

## 2023-08-02 PROCEDURE — 999N000157 HC STATISTIC RCP TIME EA 10 MIN

## 2023-08-02 PROCEDURE — 76705 ECHO EXAM OF ABDOMEN: CPT

## 2023-08-02 PROCEDURE — 250N000011 HC RX IP 250 OP 636: Mod: JZ | Performed by: FAMILY MEDICINE

## 2023-08-02 PROCEDURE — 83735 ASSAY OF MAGNESIUM: CPT | Performed by: FAMILY MEDICINE

## 2023-08-02 PROCEDURE — 250N000011 HC RX IP 250 OP 636: Mod: JZ

## 2023-08-02 PROCEDURE — 36415 COLL VENOUS BLD VENIPUNCTURE: CPT

## 2023-08-02 PROCEDURE — 93306 TTE W/DOPPLER COMPLETE: CPT | Mod: 26 | Performed by: INTERNAL MEDICINE

## 2023-08-02 PROCEDURE — 85027 COMPLETE CBC AUTOMATED: CPT

## 2023-08-02 PROCEDURE — 82962 GLUCOSE BLOOD TEST: CPT

## 2023-08-02 PROCEDURE — 94660 CPAP INITIATION&MGMT: CPT

## 2023-08-02 PROCEDURE — G0378 HOSPITAL OBSERVATION PER HR: HCPCS

## 2023-08-02 PROCEDURE — 99223 1ST HOSP IP/OBS HIGH 75: CPT | Mod: GC

## 2023-08-02 PROCEDURE — 96372 THER/PROPH/DIAG INJ SC/IM: CPT

## 2023-08-02 RX ORDER — NICOTINE 21 MG/24HR
1 PATCH, TRANSDERMAL 24 HOURS TRANSDERMAL DAILY
Status: DISCONTINUED | OUTPATIENT
Start: 2023-08-02 | End: 2023-08-03 | Stop reason: HOSPADM

## 2023-08-02 RX ORDER — ONDANSETRON 4 MG/1
4 TABLET, FILM COATED ORAL EVERY 12 HOURS PRN
COMMUNITY
End: 2024-06-28

## 2023-08-02 RX ORDER — FUROSEMIDE 10 MG/ML
20 INJECTION INTRAMUSCULAR; INTRAVENOUS ONCE
Status: COMPLETED | OUTPATIENT
Start: 2023-08-02 | End: 2023-08-02

## 2023-08-02 RX ORDER — ZINC OXIDE 20 %
OINTMENT (GRAM) TOPICAL PRN
COMMUNITY
End: 2024-07-26

## 2023-08-02 RX ORDER — HYDROCORTISONE 10 MG/G
CREAM TOPICAL 2 TIMES DAILY PRN
COMMUNITY
End: 2024-06-28

## 2023-08-02 RX ORDER — OLANZAPINE 5 MG/1
10 TABLET ORAL AT BEDTIME
Status: DISCONTINUED | OUTPATIENT
Start: 2023-08-02 | End: 2023-08-03 | Stop reason: HOSPADM

## 2023-08-02 RX ORDER — POLYETHYLENE GLYCOL 3350 17 G/17G
1 POWDER, FOR SOLUTION ORAL DAILY PRN
COMMUNITY

## 2023-08-02 RX ORDER — ACETAMINOPHEN 500 MG
500-1000 TABLET ORAL EVERY 6 HOURS PRN
COMMUNITY
End: 2023-11-16

## 2023-08-02 RX ORDER — ALOE VERA
1 GEL (GRAM) TOPICAL
COMMUNITY

## 2023-08-02 RX ORDER — ALBUTEROL SULFATE 0.83 MG/ML
2.5 SOLUTION RESPIRATORY (INHALATION) 3 TIMES DAILY PRN
COMMUNITY

## 2023-08-02 RX ORDER — CLOTRIMAZOLE 1 %
CREAM (GRAM) TOPICAL 2 TIMES DAILY PRN
COMMUNITY

## 2023-08-02 RX ORDER — CYCLOBENZAPRINE HCL 10 MG
10 TABLET ORAL 3 TIMES DAILY PRN
COMMUNITY
End: 2024-07-26

## 2023-08-02 RX ORDER — ESCITALOPRAM OXALATE 10 MG/1
10 TABLET ORAL DAILY
COMMUNITY

## 2023-08-02 RX ORDER — ACETAMINOPHEN 325 MG/1
650 TABLET ORAL EVERY 6 HOURS PRN
Status: DISCONTINUED | OUTPATIENT
Start: 2023-08-02 | End: 2023-08-03 | Stop reason: HOSPADM

## 2023-08-02 RX ORDER — ONDANSETRON 2 MG/ML
4 INJECTION INTRAMUSCULAR; INTRAVENOUS EVERY 6 HOURS PRN
Status: DISCONTINUED | OUTPATIENT
Start: 2023-08-02 | End: 2023-08-03 | Stop reason: HOSPADM

## 2023-08-02 RX ORDER — CALAMINE
LOTION (ML) TOPICAL PRN
COMMUNITY

## 2023-08-02 RX ORDER — MONTELUKAST SODIUM 10 MG/1
10 TABLET ORAL DAILY
COMMUNITY

## 2023-08-02 RX ORDER — IBUPROFEN 100 MG/5ML
400 SUSPENSION, ORAL (FINAL DOSE FORM) ORAL EVERY 4 HOURS PRN
COMMUNITY
End: 2024-06-28

## 2023-08-02 RX ORDER — DEXTROSE MONOHYDRATE 25 G/50ML
25-50 INJECTION, SOLUTION INTRAVENOUS
Status: DISCONTINUED | OUTPATIENT
Start: 2023-08-02 | End: 2023-08-03 | Stop reason: HOSPADM

## 2023-08-02 RX ORDER — OLANZAPINE 10 MG/1
10 TABLET ORAL AT BEDTIME
COMMUNITY

## 2023-08-02 RX ORDER — LORAZEPAM 1 MG/1
0.5 TABLET ORAL DAILY PRN
COMMUNITY

## 2023-08-02 RX ORDER — SPIRONOLACTONE 50 MG/1
25 TABLET, FILM COATED ORAL DAILY
COMMUNITY
End: 2024-06-28

## 2023-08-02 RX ORDER — FUROSEMIDE 20 MG
20 TABLET ORAL DAILY
COMMUNITY
End: 2024-06-28

## 2023-08-02 RX ORDER — LISINOPRIL 5 MG/1
10 TABLET ORAL DAILY
Status: DISCONTINUED | OUTPATIENT
Start: 2023-08-03 | End: 2023-08-03 | Stop reason: HOSPADM

## 2023-08-02 RX ORDER — ONDANSETRON 4 MG/1
4 TABLET, ORALLY DISINTEGRATING ORAL EVERY 6 HOURS PRN
Status: DISCONTINUED | OUTPATIENT
Start: 2023-08-02 | End: 2023-08-03 | Stop reason: HOSPADM

## 2023-08-02 RX ORDER — ACETAMINOPHEN 650 MG/1
650 SUPPOSITORY RECTAL EVERY 6 HOURS PRN
Status: DISCONTINUED | OUTPATIENT
Start: 2023-08-02 | End: 2023-08-03 | Stop reason: HOSPADM

## 2023-08-02 RX ORDER — GINSENG 100 MG
CAPSULE ORAL 3 TIMES DAILY PRN
COMMUNITY

## 2023-08-02 RX ORDER — ENOXAPARIN SODIUM 100 MG/ML
40 INJECTION SUBCUTANEOUS EVERY 12 HOURS
Status: DISCONTINUED | OUTPATIENT
Start: 2023-08-02 | End: 2023-08-03 | Stop reason: HOSPADM

## 2023-08-02 RX ORDER — NICOTINE POLACRILEX 4 MG
15-30 LOZENGE BUCCAL
Status: DISCONTINUED | OUTPATIENT
Start: 2023-08-02 | End: 2023-08-03 | Stop reason: HOSPADM

## 2023-08-02 RX ORDER — SPIRONOLACTONE 25 MG/1
50 TABLET ORAL DAILY
Status: DISCONTINUED | OUTPATIENT
Start: 2023-08-03 | End: 2023-08-03 | Stop reason: HOSPADM

## 2023-08-02 RX ORDER — IBUPROFEN 200 MG
400 TABLET ORAL EVERY 4 HOURS PRN
COMMUNITY
End: 2024-06-28

## 2023-08-02 RX ORDER — MAGNESIUM SULFATE 4 G/50ML
4 INJECTION INTRAVENOUS ONCE
Status: COMPLETED | OUTPATIENT
Start: 2023-08-02 | End: 2023-08-02

## 2023-08-02 RX ADMIN — NICOTINE 1 PATCH: 21 PATCH, EXTENDED RELEASE TRANSDERMAL at 18:59

## 2023-08-02 RX ADMIN — MAGNESIUM SULFATE HEPTAHYDRATE 4 G: 80 INJECTION, SOLUTION INTRAVENOUS at 10:20

## 2023-08-02 RX ADMIN — ENOXAPARIN SODIUM 40 MG: 40 INJECTION SUBCUTANEOUS at 09:10

## 2023-08-02 RX ADMIN — INSULIN ASPART 1 UNITS: 100 INJECTION, SOLUTION INTRAVENOUS; SUBCUTANEOUS at 18:59

## 2023-08-02 RX ADMIN — FUROSEMIDE 20 MG: 10 INJECTION, SOLUTION INTRAMUSCULAR; INTRAVENOUS at 01:47

## 2023-08-02 RX ADMIN — ACETAMINOPHEN 650 MG: 325 TABLET ORAL at 17:56

## 2023-08-02 RX ADMIN — ENOXAPARIN SODIUM 40 MG: 40 INJECTION SUBCUTANEOUS at 19:18

## 2023-08-02 RX ADMIN — OLANZAPINE 10 MG: 5 TABLET, FILM COATED ORAL at 22:10

## 2023-08-02 RX ADMIN — PERFLUTREN 2 ML: 6.52 INJECTION, SUSPENSION INTRAVENOUS at 16:16

## 2023-08-02 ASSESSMENT — ACTIVITIES OF DAILY LIVING (ADL)
ADLS_ACUITY_SCORE: 35
ADLS_ACUITY_SCORE: 39
ADLS_ACUITY_SCORE: 39
ADLS_ACUITY_SCORE: 35
ADLS_ACUITY_SCORE: 39
ADLS_ACUITY_SCORE: 35

## 2023-08-02 NOTE — CONSULTS
Care Management Initial Consult    General Information  Assessment completed with:  ( staff, Paul),    Type of CM/SW Visit: Initial Assessment    Primary Care Provider verified and updated as needed: Yes   Readmission within the last 30 days: no previous admission in last 30 days      Reason for Consult: discharge planning  Advance Care Planning:            Communication Assessment  Patient's communication style: spoken language (English or Bilingual)             Cognitive  Cognitive/Neuro/Behavioral: WDL                      Living Environment:   People in home: facility resident     Current living Arrangements: group home      Able to return to prior arrangements: yes       Family/Social Support:  Care provided by: self ( staff)  Provides care for: no one, unable/limited ability to care for self  Marital Status: Single  Facility resident(s)/Staff          Description of Support System: Supportive, Involved    Support Assessment: Adequate family and caregiver support    Current Resources:   Patient receiving home care services: No     Community Resources: County Worker, County Programs, Group Home  Equipment currently used at home:    Supplies currently used at home: Incontinence Supplies    Employment/Financial:  Employment Status: disabled        Financial Concerns: No concerns identified           Does the patient's insurance plan have a 3 day qualifying hospital stay waiver?  No    Lifestyle & Psychosocial Needs:  Social Determinants of Health     Tobacco Use: High Risk (7/21/2023)    Patient History     Smoking Tobacco Use: Every Day     Smokeless Tobacco Use: Current     Passive Exposure: Not on file   Alcohol Use: Not on file   Financial Resource Strain: Not on file   Food Insecurity: Not on file   Transportation Needs: Not on file   Physical Activity: Not on file   Stress: Not on file   Social Connections: Not on file   Intimate Partner Violence: Not on file   Depression: Not on file   Housing  Stability: Not on file       Functional Status:  Prior to admission patient needed assistance:   Dependent ADLs:: Bathing, Toileting, Grooming  Dependent IADLs:: Cleaning, Cooking, Shopping, Medication Management, Transportation, Money Management       Mental Health Status:  Mental Health Status: No Current Concerns       Chemical Dependency Status:  Chemical Dependency Status: No Current Concerns             Values/Beliefs:  Spiritual, Cultural Beliefs, Buddhism Practices, Values that affect care: no               Additional Information:  CM spoke to patient's  staff, Paul ([phone]) via phone to discuss discharge planning and complete initial assessment.    Patient lives in a Group home. He gets assists with bathing, toileting, grooming, housekeeping, cooking, and transport. He has [employer] mobility for transport if  staff is unable to transport. Patient has [facility] CM, Sandra Barrgaan. His guardian is Jesika Harden.     CM requested guardianship papers.     CM called guardian Jesika via phone to provide LEBRON.     Anticipate discharge back to group home, group home staff to transport.     Important contacts  Guardian - Jesika Harden ([phone])  [facility] - Sandra Barragan ([phone] cell [phone])   staff Paul ([phone])   staff Cherise ([phone])    GABRIELLE EstebanW

## 2023-08-02 NOTE — ED NOTES
Bed: [place] [address]  Expected date:   Expected time:   Means of arrival:   Comments:  [facility] Male in waiting room

## 2023-08-02 NOTE — ED NOTES
Patient was found outside ED entrance in friend's vehicle smoking. Spoke with patient, told him that he is not allowed to be outside smoking when he has been admitted and has IV access. Patient at first apologized and stated that he would come in when he was done smoking. Told patient that he would be discharged if he did not come in. MD notified, Charge nurse notified as well as primary nurse.

## 2023-08-02 NOTE — H&P
[facility]    History and Physical - Hospitalist Service       Date of Admission:  8/2/2023    Assessment & Plan      Warren Jaramillo is a 42 year old male admitted on 8/2/2023. He has a history of FAS, autism, TBI, T2DM, cirrhosis with ascites, mood disorder, AVA, asthma, HTN, paroxysmal Afib, leukocytosis and is admitted for CP and SOB.      CHF, suspected  Chest pain, ACS rule out  H/o paroxysmal Afib, incomplete LBBB  Patient presented via EMS from [facility] where endorses having CP and SOB since 1430 on day of admission. Describes as sub-sternal pressure, non-reproducible, does not change with inspiration or positional change. Radiates down left arm with numbness. Also endorses 3-4 month history of worsening RICKETTS with CP, impacting mobility. Believes his legs have become puffier over past several weeks. Currently smoking 1.5-2 PPD. Claims prior NSTEMI years ago. Vitals normal. Pitting edema noted on exam. Trop 26, repeat 25. BNP only mildly elevated at 562. However, CXR showing cardiomegaly with pulm venous congestion and interstitial infiltrates compatible with CHF. EKG reassuring, in NSR. Per chart, angiogram 3/14/23 unremarkable. Had stress test at [place] 09/2022 negative for stress-induced reversible perfusion defects though may have had small focal area along apical lateral wall. Normal echo in 08/2022. Per cardiology note in February, recommended PET stress given body habitus and deconditioning, which is unavailable here. Given, recent symptoms and findings on imaging and physical exam, reasonable to repeat some workup while on observation here.   - IV lasix 20 mg now   - strict I/Os  - telemetry   - repeat echo ordered  - tobacco cessation    Elevated alkaline phosphatase  H/o cirrhosis with ascites  269 on admission, has been in mid 200s over past seven months at least. Has hepatic steatosis of unknown origin with cirrhosis and ascites requiring paracenteses in past.  "Currently follows with [facility], \"they want me to get a liver biopsy.\"     Leukocytosis  WBC 15 on admission, which appears to be baseline for him. Per chart review, chronic and unexplained etiology. May be related to hepatic disease or chronic idiopathic neutrophilia.     Hypomagnesemia  Mg 1.5 on admission. Replacement protocol ordered.      Chronic conditions  T2DM: A1c 7.3 here. PTA metformin, hold for now. Glucose 143 on admission. Medium sliding scale insulin for now. Would plan to either increase metformin (if room to) +/- SGLT2i.   HTN: PTA lisinopril. Currently normotensive.   HLD: PTA rosuvastatin. Lipids at goal on admission.   Asthma: PTA Breo Ellipta and montelukast with prn albuterol. Satting fine on RA currently, no wheezing.   Incontinence: PTA oxybutynin.   GERD: PTA omeprazole.   Mental health/anxiety: PTA olanzapine and escitalopram, prn hydroxyzine and benzos.   Chronic insomnia: PTA melatonin and trazodone.   AVA: Home BiPAP.    Needs home medications reconciled and ordered!        Diet: Combination Diet Moderate Consistent Carb (60 g CHO per Meal) Diet; Low Saturated Fat Na <2400mg Diet, No Caffeine Diet    DVT Prophylaxis: Enoxaparin (Lovenox) SQ  Khan Catheter: Not present  Fluids: PO  Lines: None     Cardiac Monitoring: ACTIVE order. Indication: Chest pain/ ACS rule out (24 hours)  Code Status: Full Code      Clinically Significant Risk Factors Present on Admission           # Hypercalcemia: Highest Ca = 10.6 mg/dL in last 2 days, will monitor as appropriate  # Hypomagnesemia: Lowest Mg = 1.5 mg/dL in last 2 days, will replace as needed         # Hypertension: Home medication list includes antihypertensive(s)     # DMII: A1C = 7.3 % (Ref range: <5.7 %) within past 6 months    # Severe Obesity: Estimated body mass index is 46.23 kg/m  as calculated from the following:    Height as of this encounter: 1.651 m (5' 5\").    Weight as of this encounter: 126 kg (277 lb 12.8 oz).            Disposition " "Plan      Expected Discharge Date: 08/02/2023                The patient's care was discussed with Dr. Polanco.       Bethel Evangelista MD  Hospitalist Service  [facility]  Securely message with [employer] (more info)  Text page via [employer] Paging/Directory   ______________________________________________________________________    Chief Complaint   Progressive CP and SOB over past several months, acute worsening today.     History is obtained from the patient    History of Present Illness   Warren Jaramillo is a 42 year old male admitted on 8/2/2023. He has a history of FAS, autism, TBI, T2DM, cirrhosis with ascites, mood disorder, AVA, asthma, HTN, paroxysmal Afib, leukocytosis and is admitted for CP and SOB.    Presents from group home via EMS for CP and SOB that started this afternoon around 2:30 pm. Describes as come and go substernal pressure, non-reproducible, does not worsen with inspiration or positional change. Radiates down left arm with numbness. Also feeling lethargic. Affirms orthopnea, dizziness and weakness. No blurry vision, HA or falls. Over past 3-4 months, has had worsening dyspnea on exertion with CP. Can only walk a short distance before \"I'm huffing and puffing.\" Smokes 1.5-2 PPD currently. Occasional marijuana use, otherwise no EtOH or other substance use. Believes he has had previous NSTEMI years ago.       Past Medical History    Past Medical History:   Diagnosis Date    Rojas's disease        Past Surgical History   Past Surgical History:   Procedure Laterality Date    COLONOSCOPY      ESOPHAGOSCOPY, GASTROSCOPY, DUODENOSCOPY (EGD), COMBINED N/A 7/21/2023    Procedure: ESOPHAGOGASTRODUODENOSCOPY WITH GASTRIC AND ESOPHAGEAL BIOPSIES;  Surgeon: Filiberto Aragon MD;  Location: [facility] OR    TOOTH EXTRACTION         Prior to Admission Medications   Prior to Admission Medications   Prescriptions Last Dose Informant Patient Reported? Taking?   CLONAZEPAM PO  Self " Yes No   Sig: Take 0.5 mg by mouth daily   Patient not taking: Reported on 2023   Cod Liver Oil CAPS   No No   Sig: Take 1 capsule by mouth At Bedtime.   Patient not taking: Reported on 2023   DIPHENHYDRAMINE HCL   Yes No   Simg as needed   EPINEPHrine (EPIPEN) 0.3 MG/0.3ML injection   No No   Sig: Inject 0.3 mLs into the muscle once as needed for anaphylaxis.   FLUoxetine (PROZAC) 10 MG capsule   Yes No   Sig: Take 1 capsule by mouth every morning   MILK OF MAGNESIA OR   Yes No   Sig: AS NEEDED   OLANZapine (ZYPREXA) 5 MG tablet   Yes No   Sig: Take 5 mg by mouth daily   TYLENOL CAPS 500 MG OR   Yes No   Si CAPSULE EVERY 4 HOURS AS NEEDED   albuterol 90 MCG/ACT inhaler   No No   Sig: Inhale 2 puffs into the lungs every 4 hours as needed for shortness of breath / dyspnea.   calcium carbonate 500 mg, elemental, 1250 (500 Ca) MG tablet chewable   Yes No   Sig: Take 1,000-2,000 mg by mouth   cetirizine (ZYRTEC) 10 MG tablet   No No   Sig: Take 1 tablet by mouth daily.   Patient not taking: Reported on 2023   cholecalciferol (VITAMIN D) 1000 UNIT tablet   No No   Sig: Take 1 tablet by mouth daily.   Patient not taking: Reported on 2023   co-enzyme Q-10 (COENZYME Q-10) 100 MG CAPS   No No   Sig: Take 1 capsule by mouth daily.   Patient not taking: Reported on 2023   docosanol (ABREVA) 10 % CREA   No No   Sig: Apply to affected areas topically 5x a day for up to 10 days (Needs follow-up appointment for this medication)   Patient not taking: Reported on 2023   fluticasone-vilanterol (BREO ELLIPTA) 200-25 MCG/ACT inhaler   Yes No   Sig: Inhale 1 puff into the lungs daily   guaiFENesin (ROBITUSSIN) 20 mg/mL liquid   Yes No   Sig: Take 100 mg by mouth as needed   hydrOXYzine (VISTARIL) 25 MG capsule   Yes No   Sig: Take 50 mg by mouth as needed   ibuprofen (ADVIL/MOTRIN) 600 MG tablet   No No   Sig: Take 1 tablet (600 mg) by mouth every 6 hours as needed for moderate pain   ipratropium -  albuterol 0.5 mg/2.5 mg/3 mL (DUONEB) 0.5-2.5 (3) MG/3ML neb solution   Yes No   Sig: as needed   ipratropium-albuterol (COMBIVENT RESPIMAT)  MCG/ACT inhaler   Yes No   Sig: Inhale 1 puff into the lungs   lisinopril (ZESTRIL) 10 MG tablet   Yes No   Sig: Take 10 mg by mouth daily   meclizine (ANTIVERT) 25 MG tablet   Yes No   Sig: Take 25 mg by mouth as needed   melatonin 3 MG tablet   Yes No   Sig: Take 3 mg by mouth At Bedtime   metFORMIN (GLUCOPHAGE) 500 MG tablet   Yes No   Sig: Take 500 mg by mouth 2 times daily (with meals)   methylphenidate (CONCERTA) 54 MG CR tablet   No No   Sig: Take 1 tablet by mouth every morning.   Patient not taking: Reported on 2/1/2023   naproxen (NAPROSYN) 500 MG tablet   No No   Sig: Take 1 tablet by mouth 2 times daily as needed. with food   omeprazole (PRILOSEC) 40 MG DR capsule   Yes No   Sig: Take 40 mg by mouth daily   oxybutynin ER (DITROPAN XL) 10 MG 24 hr tablet   Yes No   Sig: Take 10 mg by mouth daily   rosuvastatin (CRESTOR) 10 MG tablet   Yes No   Sig: Take 10 mg by mouth daily   traMADol (ULTRAM) 50 MG tablet   No No   Sig: Take 1-2 tablets ( mg) by mouth every 6 hours as needed for pain   Patient not taking: Reported on 2/1/2023      Facility-Administered Medications: None        Physical Exam   Vital Signs: Temp: 97.8  F (36.6  C) Temp src: Oral BP: 138/63 Pulse: 81   Resp: 15 SpO2: 95 % O2 Device: None (Room air)    Weight: 277 lbs 12.8 oz    General Appearance: sitting in wheelchair, friendly, alert, cooperative, NAD.   HEENT: EOMi. Conjunctiva and sclera normal. MMM. Unable to appreciate JVD (difficult given body habitus).   Respiratory: No increased work of breathing. Diminished breath sounds bilaterally - no wheezing, crackles, rhonchi.   Cardiovascular: RRR, no murmurs appreciated. Strong peripheral pulses, pitting edema in bilateral lower extremities past knee.   GI: Rounded, soft, nontender. No masses, rebound tenderness, guarding.   Skin: Warm,  dry. Stasis dermatitis in bilateral lower extremities. No other rashes, jaundice, bruising.   Musculoskeletal: Moving extremities spontaneously.   Neurologic: CN II-XII intact. No focal deficits.   Psychiatric: A&Ox3. Thought process linear and logical. Mood and affect normal.     Medical Decision Making       Please see A&P for additional details of medical decision making.      Data   ------------------------- PAST 24 HR DATA REVIEWED -----------------------------------------------

## 2023-08-02 NOTE — PROGRESS NOTES
[facility]    Progress Note - Hospitalist Service       Date of Admission:  8/2/2023    Assessment & Plan   Warren Jaramillo is a 42 year old male with a past medical history significant for fetal alcohol syndrome, autism, TBI, T2DM, cirrhosis with ascites of unknown etiology, mood disorder, AVA, asthma, HTN, paroxysmal Afib, and chronic neutrophilic leukocytosis of unknown etiology. He presented to the ED for chest pain and shortness of breath and was admitted on 8/2/2023 for further workup and observation.     CHF, suspected  Chest pain, ACS rule out  H/o paroxysmal Afib, incomplete LBBB  Patient presented via EMS from [facility] where endorses having CP and SOB since 1430 on day of admission. Describes as sub-sternal pressure, non-reproducible, does not change with inspiration or positional change. Radiates down left arm with numbness. Also endorses 3-4 month history of worsening RICKETTS with CP, impacting mobility. Believes his legs have become puffier over past several weeks. Does not appear hypervolemic on exam, though his abdomen is distended, concerning for ascites vs body habitus. Currently smoking 1.5-2 PPD. Claims prior NSTEMI years ago. Vitals normal. Pitting edema noted on admission exam which appear to have improved with diuresis with lasix 20 mg on admission. Trop 26, repeat 25. BNP only mildly elevated at 562. Per chart review  in January 2023. CXR showed cardiomegaly with pulm venous congestion and interstitial infiltrates compatible with CHF. EKG reassuring, in NSR. Per chart, angiogram 3/14/23 unremarkable. Had stress test at [place] 09/2022 negative for stress-induced reversible perfusion defects though may have had small focal area along apical lateral wall. Normal echo in 08/2022. Per cardiology note in February, recommended PET stress given body habitus and deconditioning, which is unavailable here. Given, recent symptoms and findings on imaging and physical  "exam, reasonable to repeat some workup while on observation here.  - Continue monitoring I/Os  - Continue telemetry   - Repeat TTE, pending   - Can consider repeating lasix 20mg IV daily if evidence of HFrEF on echo  - Discuss tobacco cessation w/patient      Elevated alkaline phosphatase  H/o cirrhosis with ascites  269 on admission, has been in mid 200s over past seven months at least. Repeat alk phos is stil 269. Has hepatic steatosis of unknown origin with cirrhosis and ascites requiring paracenteses in past. He does have some brown-orange patches scattered across his body. Patient reports he initially thought this was dirt, but has no improvement with scrubbing. The constellation of mood disorders, skin abnormalities, and hepatic dysfunction, raised some question of Rojas's Disease. Currently follows closely with [facility], states \"they want me to get a liver biopsy to rule out liver cancer.\" No acute indications to work up extensively at this hospitalization. Will defer to [facility].     - Obtain abdominal ultrasound to look for ascites.     Leukocytosis  WBC 15 on admission, which appears to be baseline for him. Per chart review, patient has chronic leukocytosis of unexplained etiology. Possibly related to smoking, hepatic disease, or chronic idiopathic neutrophilia.      Hypomagnesemia  Mg 1.5 on admission.   - Replacement protocol ordered       Chronic conditions  T2DM  A1c 7.3. Glucose 143 on admission.   - Continue to hold metformin  - Medium sliding scale insulin  - As outpatient, Would plan to either increase metformin (if room to) +/- SGLT2i.    HTN  Currently normotensive.  - Continue PTA lisinopril     HLD  Lipids at goal on admission.   - Continue PTA rosuvastatin    Asthma  Satting fine on RA currently, no wheezing.   - Continue PTA Breo Ellipta and montelukast with prn albuterol.     Incontinence  PTA oxybutynin.     GERD  PTA omeprazole.     Mental health/anxiety  PTA olanzapine and escitalopram, prn " "hydroxyzine and benzos.     Chronic insomnia  AVA  - PTA melatonin and trazodone.   - Home BiPAP.    Social Issues  Patient has sociolegal issues including a legal guardian court appointed by [facility] that he has had difficulty maintaining contact with. He provided the information: Jesika Harden at [phone].  - Social work consult     Diet: Combination Diet Moderate Consistent Carb (60 g CHO per Meal) Diet; Low Saturated Fat Na <2400mg Diet, No Caffeine Diet    DVT Prophylaxis: Enoxaparin (Lovenox) SQ  Khan Catheter: Not present  Fluids: None  Lines: None     Cardiac Monitoring: ACTIVE order. Indication: Chest pain/ ACS rule out (24 hours)  Code Status: Full Code      Clinically Significant Risk Factors Present on Admission           # Hypercalcemia: Highest Ca = 10.6 mg/dL in last 2 days, will monitor as appropriate  # Hypomagnesemia: Lowest Mg = 1.5 mg/dL in last 2 days, will replace as needed       # Hypertension: Home medication list includes antihypertensive(s)     # DMII: A1C = 7.3 % (Ref range: <5.7 %) within past 6 months    # Severe Obesity: Estimated body mass index is 46.23 kg/m  as calculated from the following:    Height as of this encounter: 1.651 m (5' 5\").    Weight as of this encounter: 126 kg (277 lb 12.8 oz).            Disposition Plan      Expected Discharge Date: 08/02/2023                The patient's care was discussed with the Attending Physician, Dr. Sabine Myers .    Jamie Bajwa, MS4  [employer]  08/02/23 8:58 AM    I was present with the medical student who participated in the service and in the documentation of this note. I have verified the history and personally performed the physical exam and medical decision making, and have verified the content of the note, which accurately reflects my assessment of the patient and the plan of care.     Chana Harper MD PGY1  [facility] " "  ______________________________________________________________________    Interval History   Patient states that he no longer feels chest pain currently. He does, however, feel that there is a \"tightness\" that he feels when he inhales. He states this is minor compared to his chest pain he had yesterday morning but it is different from baseline. He does state that he feels anxious about being in the hospital and \"not knowing what's gonna happen.\"  He states that he has no abdominal pain. He does feel that his abdomen is tight and distended and feels that it is worse than at baseline.  Patient states that he has a distant history of \"fluid around his heart\" but does not remember requiring any treatment for resolution of this.  Additionally States that he has a legal guardian court appointed by [facility], but has not been able to reach her in almost 2 years and would like to speak with a social work regarding options for getting ahold of this person or being reassigned.       Physical Exam   Vital Signs: Temp: 97.8  F (36.6  C) Temp src: Oral BP: 138/63 Pulse: 77   Resp: 19 SpO2: 94 % O2 Device: None (Room air)    Weight: 277 lbs 12.8 oz    General Appearance: Patient is lying in hospital bed with bipap on resting. He is easily aroused and is able to sit up   Respiratory: Lungs were clear to auscultation bilaterally except for mild bibasilar crackles.  Cardiovascular: Heart sounds are difficult to hear due to body habitus. Normal S1 and S2 faintly audible with normal rate.  GI: Abdomen is tight and distended/tympanitic. Excessive central adiposity. Organs non-palpable given body habitus.  Skin: Patient has orangish/bronze discoloration of his skin present diffusely over his abdomen and face and in patches on his bilateral lower extremities. Evidence of excoriation present over upper abdomen. Feet are dirty          Data     I have personally reviewed the following data over the past 24 hrs:    14.0 (H)  \   " 14.1   / 296     141 102 18.2 /  101 (H)   4.1 28 0.79 \     ALT: 21 AST: 17 AP: 269 (H) TBILI: 0.5   ALB: 4.3 TOT PROTEIN: 7.8 LIPASE: 19     Trop: 25 (H) BNP: 562 (H)     TSH: N/A T4: N/A A1C: 7.3 (H)       Imaging results reviewed over the past 24 hrs:   Recent Results (from the past 24 hour(s))   XR Chest 2 Views    Narrative    EXAM: XR CHEST 2 VIEWS  LOCATION: [facility]  DATE: 8/1/2023    INDICATION: Chest pain.  COMPARISON: 01/19/2023.      Impression    IMPRESSION: Cardiac enlargement with pulmonary venous congestion. Interstitial infiltrates moderately progressed compatible with CHF.

## 2023-08-02 NOTE — ED PROVIDER NOTES
EMERGENCY DEPARTMENT ENCOUNTER      NAME: Warren Jaramillo  AGE: 42 year old male  YOB: 1980  MRN: [id number]  EVALUATION DATE & TIME: No admission date for patient encounter.    PCP: Aundrea Montoya    ED PROVIDER: Jaquelin Monzon M.D.      Chief Complaint   Patient presents with    Chest Pain         FINAL IMPRESSION:  1. Chest pain, unspecified type    2. Shortness of breath    3. Cardiomegaly    4. Congestive heart failure, unspecified HF chronicity, unspecified heart failure type (H)        ED COURSE & MEDICAL DECISION MAKING:    Pertinent Labs & Imaging studies reviewed. (See chart for details)  ED Course as of 08/02/23 0008   Tue Aug 01, 2023   2112 Patient presents with symptoms that sound potentially concerning for cardiac etiology.  When I reviewed his chest x-ray he did have an enlarged heart.  There is some small infiltrate consistent with edema.  I suspect he has no CHF.  I added on a BNP and we will see what that comes back to show.  We will get a repeat troponin as well but I think most likely he needs admission to the hospital for further work-up.  I discussed this with his mom while I was talking to him.  She was in agreement with the plan as well.   2113 I updated the patient's family as well.    2200 BNP came back at 562.  It slightly elevated.  His troponin is stable.  At this point we will work on getting him admitted to the hospital.   2219 I discussed admission with the [facility] resident.  He is in agreement.  We will get the patient admitted to the hospital on a cardiac telemetry observation admission.   2227 I updated the patient and I also discussed with his sister about the findings and plan for admission.  They are in agreement.       Medical Decision Making    History:  Supplemental history from: Sister  External Record(s) reviewed: Reviewed GI procedure note from 7/21/2023/     Work Up:  Emergent/Severe conditions considered and evaluated for: ACS,  heart failure, arrhythmia, pneumothorax  I independently reviewed and interpreted EKG and chest x-ray which showed no pneumothorax.  See full radiology report for all details  In additional to work up documented, I considered the following work up: None  Medications given that require intensive monitoring for toxicity: None    External consultation:  Discussion of management with another provider: [place] [facility] resident for admission.    Complicating factors:  Care impacted by chronic illness: cirrhosis, developmental delay,   Care affected by social determinants of health: None    Disposition considerations: Admission    At the conclusion of the encounter I discussed  the results of all of the tests and the disposition with patient.   All questions were answered.  The patient acknowledged understanding and was involved in the decision making regarding the overall care plan.      MEDICATIONS GIVEN IN THE EMERGENCY:  Medications   aspirin (ASA) chewable tablet 324 mg (324 mg Oral $Given 8/1/23 8904)     =================================================================    hospitals    Triage Note: [facility], pt from a Group [facility] [phone]. Pt has multiple complaints; chest pain into left arm.  12 lead RBBB, sats 96% on ra, 95 HR.     Patient information was obtained from: Patient    Use of : N/A        Warren Jaramillo is a 42 year old male who presents for evaluation of some pain from his left shoulder down to his hand with some numbness and tingling as well.  About 15 minutes after that he developed chest pain in the middle of his chest.  He felt like he could not breathe when he was wearing his CPAP.  Symptoms started around 230 this morning.  He said when he woke up at 830 this morning was feeling little lethargic but then that seemed to improve.  He says the pain has subsided but still notices just a little bit of pressure.  He has a history of a bundle branch block in the past.  He  feels a little bit short of breath.  He feels sweaty but also feels like his skin is cold.  He denies any cough.  He denies any nausea.  He comes from a group home.    PAST MEDICAL HISTORY:  Past Medical History:   Diagnosis Date    Rojas's disease        PAST SURGICAL HISTORY:  Past Surgical History:   Procedure Laterality Date    COLONOSCOPY      ESOPHAGOSCOPY, GASTROSCOPY, DUODENOSCOPY (EGD), COMBINED N/A 7/21/2023    Procedure: ESOPHAGOGASTRODUODENOSCOPY WITH GASTRIC AND ESOPHAGEAL BIOPSIES;  Surgeon: Filiberto Aragon MD;  Location: [facility] OR    TOOTH EXTRACTION         CURRENT MEDICATIONS:    No current facility-administered medications for this encounter.    Current Outpatient Medications:     albuterol 90 MCG/ACT inhaler, Inhale 2 puffs into the lungs every 4 hours as needed for shortness of breath / dyspnea., Disp: 1 Inhaler, Rfl: 3    calcium carbonate 500 mg, elemental, 1250 (500 Ca) MG tablet chewable, Take 1,000-2,000 mg by mouth, Disp: , Rfl:     cetirizine (ZYRTEC) 10 MG tablet, Take 1 tablet by mouth daily. (Patient not taking: Reported on 2/1/2023), Disp: 30 tablet, Rfl: 1    cholecalciferol (VITAMIN D) 1000 UNIT tablet, Take 1 tablet by mouth daily. (Patient not taking: Reported on 2/1/2023), Disp: 100 tablet, Rfl: 3    CLONAZEPAM PO, Take 0.5 mg by mouth daily (Patient not taking: Reported on 2/1/2023), Disp: , Rfl:     co-enzyme Q-10 (COENZYME Q-10) 100 MG CAPS, Take 1 capsule by mouth daily. (Patient not taking: Reported on 2/1/2023), Disp: 30 capsule, Rfl: 4    Cod Liver Oil CAPS, Take 1 capsule by mouth At Bedtime. (Patient not taking: Reported on 2/1/2023), Disp: 110 capsule, Rfl: 3    DIPHENHYDRAMINE HCL, 25mg as needed, Disp: , Rfl:     docosanol (ABREVA) 10 % CREA, Apply to affected areas topically 5x a day for up to 10 days (Needs follow-up appointment for this medication) (Patient not taking: Reported on 2/1/2023), Disp: 60 g, Rfl: 0    EPINEPHrine (EPIPEN) 0.3 MG/0.3ML  injection, Inject 0.3 mLs into the muscle once as needed for anaphylaxis., Disp: 1 each, Rfl: 1    FLUoxetine (PROZAC) 10 MG capsule, Take 1 capsule by mouth every morning, Disp: , Rfl:     fluticasone-vilanterol (BREO ELLIPTA) 200-25 MCG/ACT inhaler, Inhale 1 puff into the lungs daily, Disp: , Rfl:     guaiFENesin (ROBITUSSIN) 20 mg/mL liquid, Take 100 mg by mouth as needed, Disp: , Rfl:     hydrOXYzine (VISTARIL) 25 MG capsule, Take 50 mg by mouth as needed, Disp: , Rfl:     ibuprofen (ADVIL/MOTRIN) 600 MG tablet, Take 1 tablet (600 mg) by mouth every 6 hours as needed for moderate pain, Disp: 28 tablet, Rfl: 0    ipratropium - albuterol 0.5 mg/2.5 mg/3 mL (DUONEB) 0.5-2.5 (3) MG/3ML neb solution, as needed, Disp: , Rfl:     ipratropium-albuterol (COMBIVENT RESPIMAT)  MCG/ACT inhaler, Inhale 1 puff into the lungs, Disp: , Rfl:     lisinopril (ZESTRIL) 10 MG tablet, Take 10 mg by mouth daily, Disp: , Rfl:     meclizine (ANTIVERT) 25 MG tablet, Take 25 mg by mouth as needed, Disp: , Rfl:     melatonin 3 MG tablet, Take 3 mg by mouth At Bedtime, Disp: , Rfl:     metFORMIN (GLUCOPHAGE) 500 MG tablet, Take 500 mg by mouth 2 times daily (with meals), Disp: , Rfl:     methylphenidate (CONCERTA) 54 MG CR tablet, Take 1 tablet by mouth every morning. (Patient not taking: Reported on 2/1/2023), Disp: 30 tablet, Rfl: 0    MILK OF MAGNESIA OR, AS NEEDED, Disp: , Rfl:     naproxen (NAPROSYN) 500 MG tablet, Take 1 tablet by mouth 2 times daily as needed. with food, Disp: 180 tablet, Rfl: 2    OLANZapine (ZYPREXA) 5 MG tablet, Take 5 mg by mouth daily, Disp: , Rfl:     omeprazole (PRILOSEC) 40 MG DR capsule, Take 40 mg by mouth daily, Disp: , Rfl:     oxybutynin ER (DITROPAN XL) 10 MG 24 hr tablet, Take 10 mg by mouth daily, Disp: , Rfl:     rosuvastatin (CRESTOR) 10 MG tablet, Take 10 mg by mouth daily, Disp: , Rfl:     traMADol (ULTRAM) 50 MG tablet, Take 1-2 tablets ( mg) by mouth every 6 hours as needed for pain  "(Patient not taking: Reported on 2/1/2023), Disp: 30 tablet, Rfl: 0    TYLENOL CAPS 500 MG OR, 1 CAPSULE EVERY 4 HOURS AS NEEDED, Disp: , Rfl:     ALLERGIES:  Allergies   Allergen Reactions    Apricot Flavor Anaphylaxis    Banana Anaphylaxis     Throat swelling  Throat swelling      Wasp Venom Protein Shortness Of Breath     Other reaction(s): Respiratory Distress  Has an epi pen  Has an epi pen      Bees Anaphylaxis     Have an Epi pen that carries with    Methylphenidate Itching     Other reaction(s): Nightmares    Prunus      Other reaction(s): *Unknown    Sulfa Antibiotics      Headaches and nausea    Prunus Persica Rash     Other reaction(s): *Unknown       FAMILY HISTORY:  Family History   Problem Relation Age of Onset    Unknown/Adopted Father     Unknown/Adopted Maternal Grandmother     C.A.D. Maternal Grandfather     Diabetes Maternal Grandfather     Cerebrovascular Disease Maternal Grandfather     Unknown/Adopted Paternal Grandmother     Unknown/Adopted Paternal Grandfather     Unknown/Adopted Brother     Unknown/Adopted Sister        SOCIAL HISTORY:   Social History     Socioeconomic History    Marital status: Single   Tobacco Use    Smoking status: Every Day     Packs/day: 1.00     Types: Cigarettes    Smokeless tobacco: Current    Tobacco comments:     occasional pouch of chewing tobacco   Substance and Sexual Activity    Alcohol use: No     Comment: once every 3 months    Drug use: No    Sexual activity: Never     Partners: Female   Other Topics Concern     Service No    Blood Transfusions No    Caffeine Concern No    Occupational Exposure No    Hobby Hazards No    Sleep Concern No    Stress Concern Yes     Comment: sometimes    Weight Concern No    Special Diet Yes     Comment: counting carbs    Back Care No    Exercise Yes    Seat Belt Yes    Self-Exams Yes       PHYSICAL EXAM    VITAL SIGNS: /74   Pulse 87   Temp 97.8  F (36.6  C) (Temporal)   Resp 16   Ht 1.651 m (5' 5\")   Wt 126 " kg (277 lb 12.8 oz)   SpO2 95%   BMI 46.23 kg/m     GENERAL: Awake, alert, answering questions appropriately, appears mildly short of breath  SPEECH:  Easy to understand speech, Normal volume and donte  PULMONARY: No respiratory distress, Lungs clear to auscultation bilaterally  CARDIOVASCULAR: Regular rate and rhythm, Distal pulses present and normal.  ABDOMINAL: Soft, Nondistended, Nontender, No rebound or guarding, No palpable masses  EXTREMITIES: No lower extremity edema.  PSYCH: Normal mood and affect     LAB:  All pertinent labs reviewed and interpreted.  Results for orders placed or performed during the hospital encounter of 08/02/23   XR Chest 2 Views    Impression    IMPRESSION: Cardiac enlargement with pulmonary venous congestion. Interstitial infiltrates moderately progressed compatible with CHF.   Basic metabolic panel   Result Value Ref Range    Sodium 139 136 - 145 mmol/L    Potassium 4.3 3.4 - 5.3 mmol/L    Chloride 102 98 - 107 mmol/L    Carbon Dioxide (CO2) 25 22 - 29 mmol/L    Anion Gap 12 7 - 15 mmol/L    Urea Nitrogen 19.7 6.0 - 20.0 mg/dL    Creatinine 0.74 0.67 - 1.17 mg/dL    Calcium 10.6 (H) 8.6 - 10.0 mg/dL    Glucose 143 (H) 70 - 99 mg/dL    GFR Estimate >90 >60 mL/min/1.73m2   Hepatic function panel   Result Value Ref Range    Protein Total 7.9 6.4 - 8.3 g/dL    Albumin 4.5 3.5 - 5.2 g/dL    Bilirubin Total 0.6 <=1.2 mg/dL    Alkaline Phosphatase 269 (H) 40 - 129 U/L    AST 18 0 - 45 U/L    ALT 19 0 - 70 U/L    Bilirubin Direct 0.27 0.00 - 0.30 mg/dL   Result Value Ref Range    Lipase 19 13 - 60 U/L   Result Value Ref Range    Troponin T, High Sensitivity 26 (H) <=22 ng/L   Result Value Ref Range    Magnesium 1.5 (L) 1.7 - 2.3 mg/dL   CBC with platelets and differential   Result Value Ref Range    WBC Count 15.0 (H) 4.0 - 11.0 10e3/uL    RBC Count 5.02 4.40 - 5.90 10e6/uL    Hemoglobin 14.2 13.3 - 17.7 g/dL    Hematocrit 44.3 40.0 - 53.0 %    MCV 88 78 - 100 fL    MCH 28.3 26.5 - 33.0  pg    MCHC 32.1 31.5 - 36.5 g/dL    RDW 16.7 (H) 10.0 - 15.0 %    Platelet Count 287 150 - 450 10e3/uL    % Neutrophils 70 %    % Lymphocytes 18 %    % Monocytes 8 %    % Eosinophils 2 %    % Basophils 1 %    % Immature Granulocytes 1 %    NRBCs per 100 WBC 0 <1 /100    Absolute Neutrophils 10.6 (H) 1.6 - 8.3 10e3/uL    Absolute Lymphocytes 2.7 0.8 - 5.3 10e3/uL    Absolute Monocytes 1.1 0.0 - 1.3 10e3/uL    Absolute Eosinophils 0.4 0.0 - 0.7 10e3/uL    Absolute Basophils 0.1 0.0 - 0.2 10e3/uL    Absolute Immature Granulocytes 0.1 <=0.4 10e3/uL    Absolute NRBCs 0.0 10e3/uL   Extra Red Top Tube   Result Value Ref Range    Hold Specimen JIC    Extra Green Top (Lithium Heparin) Tube   Result Value Ref Range    Hold Specimen JIC    Extra Purple Top Tube   Result Value Ref Range    Hold Specimen JIC    Troponin T, High Sensitivity (now)   Result Value Ref Range    Troponin T, High Sensitivity 25 (H) <=22 ng/L   Nt probnp inpatient   Result Value Ref Range    N terminal Pro BNP Inpatient 562 (H) 0 - 450 pg/mL       RADIOLOGY:  XR Chest 2 Views   Final Result   IMPRESSION: Cardiac enlargement with pulmonary venous congestion. Interstitial infiltrates moderately progressed compatible with CHF.          EKG:    Date and time: August 1, 2023 at 1747  Rate: 80 bpm  Rhythm: Sinus rhythm  AL interval: 188 ms  QRS interval: 106 ms  QT/QTc: 376/433 ms  ST changes or T wave changes: No acute ST or T wave abnormalities  Change from prior ECG: No significant change from prior  I have independently reviewed and interpreted this EKG.     Jaquelin Monzon M.D.  Emergency Medicine  [facility] EMERGENCY DEPARTMENT  [address] [id number] [phone]  Dept: [phone]     Jaquelin Monzon MD  08/02/23 0008

## 2023-08-02 NOTE — PHARMACY-ADMISSION MEDICATION HISTORY
Pharmacist Admission Medication History    Admission medication history is complete. The information provided in this note is only as accurate as the sources available at the time of the update.    Medication reconciliation/reorder completed by provider prior to medication history? No    Information Source(s): Facility ([facility]/) medication list/MAR via  faxed by Paul [phone]    Pertinent Information: none    Changes made to PTA medication list:  Numerous additions, removals and changes    Medication Affordability:       Allergies reviewed with patient and updates made in EHR: yes    Medication History Completed By: Celia Hendricks RPH 8/2/2023 12:09 PM    Prior to Admission medications    Medication Sig Last Dose Taking? Auth Provider Long Term End Date   acetaminophen (TYLENOL) 32 mg/mL liquid Take 960 mg by mouth every 6 hours as needed for fever or mild pain Unknown at PRN Yes Unknown, Entered By History     acetaminophen (TYLENOL) 500 MG tablet Take 500-1,000 mg by mouth every 6 hours as needed for mild pain Unknown at PRN Yes Unknown, Entered By History     albuterol (PROVENTIL) (2.5 MG/3ML) 0.083% neb solution Take 2.5 mg by nebulization 3 times daily as needed for shortness of breath, wheezing or cough Unknown at PRN Yes Unknown, Entered By History     aloe vera GEL Apply 1 g topically every hour as needed for skin care Per bottle directions Unknown at PRN Yes Unknown, Entered By History     bacitracin 500 UNIT/GM OINT Apply topically 3 times daily as needed for wound care Unknown at PRN Yes Unknown, Entered By History     Calamine external lotion Apply topically as needed for itching Unknown at PRN Yes Unknown, Entered By History     carbamide peroxide (DEBROX) 6.5 % otic solution Place 5 drops into both ears daily as needed for other Unknown at PRN Yes Unknown, Entered By History     clotrimazole (LOTRIMIN) 1 % external cream Apply topically 2 times daily as needed (skin irritation) Unknown at PRN Yes  Unknown, Entered By History     cyclobenzaprine (FLEXERIL) 10 MG tablet Take 10 mg by mouth 3 times daily as needed for muscle spasms Unknown at PRN Yes Unknown, Entered By History No    diclofenac (VOLTAREN) 1 % topical gel Apply 2 g topically daily as needed for moderate pain To joints/back Unknown at PRN Yes Unknown, Entered By History     escitalopram (LEXAPRO) 10 MG tablet Take 10 mg by mouth daily 8/1/2023 at am Yes Unknown, Entered By History Yes    fluticasone-vilanterol (BREO ELLIPTA) 200-25 MCG/ACT inhaler Inhale 1 puff into the lungs daily 8/1/2023 at am Yes Reported, Patient     furosemide (LASIX) 20 MG tablet Take 20 mg by mouth daily 8/1/2023 at am Yes Unknown, Entered By History     hydrocortisone 1 % CREA cream Place rectally 2 times daily as needed for itching Unknown at PRN Yes Unknown, Entered By History     ibuprofen (ADVIL/MOTRIN) 100 MG/5ML suspension Take 400 mg by mouth every 4 hours as needed for fever or moderate pain Unknown at PRN Yes Unknown, Entered By History No    ibuprofen (ADVIL/MOTRIN) 200 MG tablet Take 400 mg by mouth every 4 hours as needed for pain Unknown at PRN Yes Unknown, Entered By History No    lisinopril (ZESTRIL) 10 MG tablet Take 10 mg by mouth daily 8/1/2023 at am Yes Reported, Patient Yes    LORazepam (ATIVAN) 1 MG tablet Take 0.5 mg by mouth daily as needed for anxiety Unknown at PRN Yes Unknown, Entered By History     melatonin 3 MG tablet Take 3 mg by mouth At Bedtime 7/31/2023 at PM Yes Reported, Patient     metFORMIN (GLUCOPHAGE) 1000 MG tablet Take 1,000 mg by mouth 2 times daily (with meals) 8/1/2023 at am Yes Unknown, Entered By History No    montelukast (SINGULAIR) 10 MG tablet Take 10 mg by mouth daily 8/1/2023 at am Yes Unknown, Entered By History Yes    OLANZapine (ZYPREXA) 10 MG tablet Take 10 mg by mouth At Bedtime 7/31/2023 at pm Yes Unknown, Entered By History No    omeprazole (PRILOSEC) 20 MG DR capsule Take 40 mg by mouth daily 8/1/2023 at am Yes  Unknown, Entered By History     ondansetron (ZOFRAN) 4 MG tablet Take 4 mg by mouth every 12 hours as needed for nausea Unknown at PRN Yes Unknown, Entered By History     oxybutynin ER (DITROPAN XL) 10 MG 24 hr tablet Take 10 mg by mouth daily 8/1/2023 at am Yes Reported, Patient     polyethylene glycol (MIRALAX) 17 g packet Take 1 packet by mouth daily as needed for constipation Unknown at PRN Yes Unknown, Entered By History     psyllium (METAMUCIL) 28.3 % packet Take 1 packet by mouth daily 8/1/2023 at am Yes Unknown, Entered By History     sodium phosphate (FLEET ENEMA) 7-19 GM/118ML rectal enema Place 1 enema rectally once as needed for constipation Unknown at PRN Yes Unknown, Entered By History     spironolactone (ALDACTONE) 50 MG tablet Take 50 mg by mouth daily 8/1/2023 at am Yes Unknown, Entered By History Yes    zinc oxide (DESITIN) 20 % external ointment Apply topically as needed for dry skin or irritation To groin/buttocks area Unknown at PRN Yes Unknown, Entered By History     rosuvastatin (CRESTOR) 10 MG tablet Take 10 mg by mouth At Bedtime 7/31/2023 at pm  Reported, Patient Yes

## 2023-08-03 VITALS
HEIGHT: 65 IN | SYSTOLIC BLOOD PRESSURE: 128 MMHG | RESPIRATION RATE: 25 BRPM | DIASTOLIC BLOOD PRESSURE: 65 MMHG | OXYGEN SATURATION: 95 % | TEMPERATURE: 98 F | HEART RATE: 76 BPM | WEIGHT: 277.8 LBS | BODY MASS INDEX: 46.28 KG/M2

## 2023-08-03 LAB
ALBUMIN SERPL BCG-MCNC: 4.2 G/DL (ref 3.5–5.2)
ALP SERPL-CCNC: 256 U/L (ref 40–129)
ALT SERPL W P-5'-P-CCNC: 22 U/L (ref 0–70)
ANION GAP SERPL CALCULATED.3IONS-SCNC: 10 MMOL/L (ref 7–15)
AST SERPL W P-5'-P-CCNC: 21 U/L (ref 0–45)
BILIRUB DIRECT SERPL-MCNC: 0.29 MG/DL (ref 0–0.3)
BILIRUB SERPL-MCNC: 0.7 MG/DL
BUN SERPL-MCNC: 18.2 MG/DL (ref 6–20)
CALCIUM SERPL-MCNC: 9.7 MG/DL (ref 8.6–10)
CHLORIDE SERPL-SCNC: 100 MMOL/L (ref 98–107)
CREAT SERPL-MCNC: 0.76 MG/DL (ref 0.67–1.17)
DEPRECATED HCO3 PLAS-SCNC: 29 MMOL/L (ref 22–29)
ERYTHROCYTE [DISTWIDTH] IN BLOOD BY AUTOMATED COUNT: 16.5 % (ref 10–15)
GFR SERPL CREATININE-BSD FRML MDRD: >90 ML/MIN/1.73M2
GLUCOSE BLDC GLUCOMTR-MCNC: 107 MG/DL (ref 70–99)
GLUCOSE BLDC GLUCOMTR-MCNC: 115 MG/DL (ref 70–99)
GLUCOSE BLDC GLUCOMTR-MCNC: 131 MG/DL (ref 70–99)
GLUCOSE SERPL-MCNC: 122 MG/DL (ref 70–99)
HCT VFR BLD AUTO: 44.2 % (ref 40–53)
HGB BLD-MCNC: 13.9 G/DL (ref 13.3–17.7)
MAGNESIUM SERPL-MCNC: 2 MG/DL (ref 1.7–2.3)
MCH RBC QN AUTO: 28 PG (ref 26.5–33)
MCHC RBC AUTO-ENTMCNC: 31.4 G/DL (ref 31.5–36.5)
MCV RBC AUTO: 89 FL (ref 78–100)
PLATELET # BLD AUTO: 286 10E3/UL (ref 150–450)
POTASSIUM SERPL-SCNC: 4.4 MMOL/L (ref 3.4–5.3)
PROT SERPL-MCNC: 7.5 G/DL (ref 6.4–8.3)
RBC # BLD AUTO: 4.96 10E6/UL (ref 4.4–5.9)
SODIUM SERPL-SCNC: 139 MMOL/L (ref 136–145)
WBC # BLD AUTO: 12.3 10E3/UL (ref 4–11)

## 2023-08-03 PROCEDURE — 36415 COLL VENOUS BLD VENIPUNCTURE: CPT

## 2023-08-03 PROCEDURE — 94660 CPAP INITIATION&MGMT: CPT

## 2023-08-03 PROCEDURE — 82248 BILIRUBIN DIRECT: CPT

## 2023-08-03 PROCEDURE — 82962 GLUCOSE BLOOD TEST: CPT

## 2023-08-03 PROCEDURE — 999N000157 HC STATISTIC RCP TIME EA 10 MIN

## 2023-08-03 PROCEDURE — 250N000013 HC RX MED GY IP 250 OP 250 PS 637

## 2023-08-03 PROCEDURE — 85027 COMPLETE CBC AUTOMATED: CPT

## 2023-08-03 PROCEDURE — G0378 HOSPITAL OBSERVATION PER HR: HCPCS

## 2023-08-03 PROCEDURE — 82310 ASSAY OF CALCIUM: CPT

## 2023-08-03 PROCEDURE — 83735 ASSAY OF MAGNESIUM: CPT

## 2023-08-03 PROCEDURE — 96372 THER/PROPH/DIAG INJ SC/IM: CPT

## 2023-08-03 PROCEDURE — 99238 HOSP IP/OBS DSCHRG MGMT 30/<: CPT | Mod: GC

## 2023-08-03 PROCEDURE — 250N000011 HC RX IP 250 OP 636: Mod: JZ

## 2023-08-03 RX ORDER — NICOTINE 21 MG/24HR
1 PATCH, TRANSDERMAL 24 HOURS TRANSDERMAL DAILY
Qty: 30 PATCH | Refills: 0 | Status: SHIPPED | OUTPATIENT
Start: 2023-08-04 | End: 2023-09-03

## 2023-08-03 RX ADMIN — LISINOPRIL 10 MG: 5 TABLET ORAL at 08:14

## 2023-08-03 RX ADMIN — SPIRONOLACTONE 50 MG: 25 TABLET, FILM COATED ORAL at 08:14

## 2023-08-03 RX ADMIN — ENOXAPARIN SODIUM 40 MG: 40 INJECTION SUBCUTANEOUS at 08:14

## 2023-08-03 RX ADMIN — NICOTINE 1 PATCH: 21 PATCH, EXTENDED RELEASE TRANSDERMAL at 08:15

## 2023-08-03 ASSESSMENT — ACTIVITIES OF DAILY LIVING (ADL)
ADLS_ACUITY_SCORE: 39

## 2023-08-03 NOTE — PLAN OF CARE
Observation goals  PRIOR TO DISCHARGE         Diagnostic tests and consults completed and resulted: MET, TTE and US of the abdomen done.     Returns to baseline functional status: NOT MET    Nurse to notify provider when observation goals have been met and patient is ready for discharge.           Fair

## 2023-08-03 NOTE — DISCHARGE SUMMARY
"[facility]  Hospitalist Discharge Summary      Faculty Supervision of Residents    I have examined this patient on 8/3/2023 and the medical care has been evaluated and discussed with the resident.  The documentation has been reviewed.  I agree with the medical care provided and confirm the findings.       Brenden Polanco MD          Date of Admission:  8/2/2023  Date of Discharge:  8/3/2023  Discharging Provider: Chana Harper MD  Discharge Service: Hospitalist Service    Discharge Diagnoses     Chest pain, ACS rule out  H/o paroxysmal Afib, incomplete LBBB  Elevated alkaline phosphatase  H/o cirrhosis with ascites  Leukocytosis   Hypomagnesemia   T2DM  HTN   HLD   Asthma   Incontinence   GERD   Mental health/anxiety   Chronic insomnia  AVA  Social Issues      Clinically Significant Risk Factors     # DMII: A1C = 7.3 % (Ref range: <5.7 %) within past 6 months  # Severe Obesity: Estimated body mass index is 46.23 kg/m  as calculated from the following:    Height as of this encounter: 1.651 m (5' 5\").    Weight as of this encounter: 126 kg (277 lb 12.8 oz).       Follow-ups Needed After Discharge   - Please schedule a hospital follow-up with your PCP to discuss possibly adding SGLT2 or GLP1 to diabetes regimen.   - Please schedule an appt with your cardiologist to further workup chest pain.     Unresulted Labs Ordered in the Past 30 Days of this Admission       No orders found for last 31 day(s).        These results will be followed up by PCP    Discharge Disposition   Discharged to group home  Condition at discharge: Stable    Hospital Course   Warren Jaramillo is a 42 year old male with a past medical history significant for fetal alcohol syndrome, autism, TBI, T2DM, cirrhosis with ascites of unknown etiology, mood disorder, AVA, asthma, HTN, paroxysmal Afib, and chronic neutrophilic leukocytosis of unknown etiology. He presented to the ED for chest pain and shortness of breath and " "was admitted on 8/2/2023 for further workup and observation.     CHF, suspected  Chest pain, ACS rule out  H/o paroxysmal Afib, incomplete LBBB  Patient presented via EMS from group home w/dyspnea and non-reproducible, sub-sternal pressure that radiates down left arm with numbness.  Pain/SOB awake pt from sleep and spontaneously resolved.  Certainly atypical features.  While here, no recurrence of pain or SOB.  No PND.  CXR showed cardiomegaly and minimally elevated BNP on arrival raising concerns for CHF.  Per chart review and patient, he has seen cardiology before and had extensive workup that was largely unremarkable. Stress test at [place] may have suggested small focal area of perfusion defect along apical lateral wall.  Ok to discharge given atypical pain.  Restart home lasix dose which had been held recently.  Further workup as outpt with cardiology.       Elevated alkaline phosphatase  H/o cirrhosis with ascites  269 on admission, has been in mid 200s over past seven months at least. Trending hepatic panel and stable on discharge. Has hepatic steatosis of unknown origin with cirrhosis and ascites requiring paracenteses in past. Repeat abdominal U/S did not show ascites. The constellation of mood disorders, orange-bronze skin abnormalities, and hepatic dysfunction, raised some question of Rojas's Disease. Currently follows closely with [facility], states \"they want me to get a liver biopsy to rule out liver cancer.\" No acute indications to work up extensively at this hospitalization.   - Will defer to [facility] for further workup  - Continue PTA spiranolactone     Leukocytosis  WBC stable. WBC 15 on admission, which appears to be baseline for him. Per chart review, patient has chronic leukocytosis of unexplained etiology. Possibly related to smoking, hepatic disease, or chronic idiopathic neutrophilia. No si/sx of infection.          Tobacco use disorder  Patient smokes 2ppd. Had difficulty quitting ~8 years ago bc he " had projectile vomiting with patches and lozanges. Discussed tobacco use and how it can contribute to deconditioning, chest pain, and dyspnea. Patient open to trying cessation and wants to try nicotine patches again.   - Nicotine patches            Consultations This Hospital Stay   CARE MANAGEMENT / SOCIAL WORK IP CONSULT    Code Status   Full Code       MD USAMA [facility] EMERGENCY DEPARTMENT  [facility] [facility] [address]  Phone: [phone]  ______________________________________________________________________    Physical Exam   Vital Signs: Temp: 98  F (36.7  C) Temp src: Oral BP: 115/64 Pulse: 74   Resp: 18 SpO2: 95 % O2 Device: BiPAP/CPAP    Weight: 277 lbs 12.8 oz    General Appearance:  Patient is lying in hospital bed with bipap on resting. He is easily aroused and is able to sit up   Respiratory: Lungs were clear to auscultation bilaterally except for mild bibasilar crackles.  Cardiovascular: Heart sounds are difficult to hear due to body habitus. Normal S1 and S2 faintly audible with normal rate.  GI: Abdomen is tight and distended/tympanitic. Excessive central adiposity. Organs non-palpable given body habitus.  Skin: Patient has orangish/bronze discoloration of his skin present diffusely over his abdomen and face and in patches on his bilateral lower extremities. Evidence of excoriation present over upper abdomen. Feet are dirty       Primary Care Physician   Aundrea Hernandez    Discharge Orders      General info for SNF    Length of Stay Estimate: Long Term Care  Condition at Discharge: Stable  Level of care:board and care  Rehabilitation Potential: Good  Admission H&P remains valid and up-to-date: Yes  Recent Chemotherapy: N/A  Use Nursing Home Standing Orders: Yes     Mantoux instructions    Give two-step Mantoux (PPD) Per Facility Policy Yes     Follow Up and recommended labs and tests    Follow up with primary care provider in 1 - 2 weeks.   No follow up labs or test are needed.  Follow up with cardiology to discuss further testing for your heart.     Reason for your hospital stay    You were hospitalized for chest pain and shortness of breath. Imaging of your heart did not show any abnormalities that would suggest heart failure at this time.     Activity - Up ad marija     Diet    Follow this diet upon discharge: Orders Placed This Encounter      Combination Diet Moderate Consistent Carb (60 g CHO per Meal) Diet; Low Saturated Fat Na <2400mg Diet, No Caffeine Diet       Significant Results and Procedures   Most Recent 3 CBC's:  Recent Labs   Lab Test 08/03/23  0737 08/02/23  0648 08/01/23  1802   WBC 12.3* 14.0* 15.0*   HGB 13.9 14.1 14.2   MCV 89 88 88    296 287     Most Recent 3 BMP's:  Recent Labs   Lab Test 08/03/23  0737 08/03/23  0732 08/03/23  0200 08/02/23  0756 08/02/23  0648 08/01/23  1802     --   --   --  141 139   POTASSIUM 4.4  --   --   --  4.1 4.3   CHLORIDE 100  --   --   --  102 102   CO2 29  --   --   --  28 25   BUN 18.2  --   --   --  18.2 19.7   CR 0.76  --   --   --  0.79 0.74   ANIONGAP 10  --   --   --  11 12   WES 9.7  --   --   --  10.6* 10.6*   * 115* 131*   < > 128* 143*    < > = values in this interval not displayed.     Most Recent 2 LFT's:  Recent Labs   Lab Test 08/03/23 0737 08/02/23 0648   AST 21 17   ALT 22 21   ALKPHOS 256* 269*   BILITOTAL 0.7 0.5   ,   Results for orders placed or performed during the hospital encounter of 08/02/23   XR Chest 2 Views    Narrative    EXAM: XR CHEST 2 VIEWS  LOCATION: [facility]  DATE: 8/1/2023    INDICATION: Chest pain.  COMPARISON: 01/19/2023.      Impression    IMPRESSION: Cardiac enlargement with pulmonary venous congestion. Interstitial infiltrates moderately progressed compatible with CHF.   US Abdomen Limited    Narrative    EXAM: US ABDOMEN LIMITED  LOCATION: [facility]  DATE: 8/2/2023    INDICATION: H o  liver cirrhosis, distended abdomen, concerned about ascites. Also has elevated alk phos, concerning for biliary pathology.  COMPARISON: Abdominal ultrasound 2023, 2023  TECHNIQUE: Limited abdominal ultrasound.    FINDINGS:    GALLBLADDER: The gallbladder is nondistended and contains a small amount of sludge. The gallbladder wall is diffusely thickened measuring up to 8 mm. No pericholecystic fluid.    BILE DUCTS: No biliary dilatation. The common duct was not seen due to shadowing from bowel gas, similar to the prior exam.    LIVER: The liver is enlarged measuring up to 23 cm. coarsened echotexture, suggesting underlying fibrosis. Nodular contour. No focal mass. Flow within the portal vein is normal and hepatopedal.    RIGHT KIDNEY: No hydronephrosis.    PANCREAS: The visualized portions are normal.    There is borderline splenomegaly.    No ascites.      Impression    IMPRESSION:  1.  The gallbladder is nondistended, contains a small amount sludge, and is diffusely thick-walled. However, gallbladder wall thickening is most likely attributable to hepatocellular disease as it is grossly unchanged since at least 2023.  2.  Hepatomegaly and hepatic cirrhosis without focal lesion.  3.  No ascites.  4.  Borderline splenomegaly.       Echocardiogram Complete     Value    LVEF  50-55%    Narrative    [id number]  [id number]  [id number]  [id number]^ANKIT^[place] [address]     Name: BOB LO  MRN: [id number]  : 1980  Study Date: 2023 03:59 PM  Age: 42 yrs  Gender: Male  Patient Location: [facility], [place]  Reason For Study: CHF  Ordering Physician: JORGE PHAM  Performed By: ACE     BSA: 2.3 m2  Height: 65 in  Weight: 277 lb  HR: 70  BP: 134/69 mmHg  ______________________________________________________________________________  Procedure  Complete Echo Adult. Definity (NDC #15884-201) given  intravenously.  ______________________________________________________________________________  Interpretation Summary     The visual ejection fraction is 50-55%.  Regional wall motion abnormalities cannot be excluded due to limited  visualization.  Right ventricular function cannot be assessed due to poor image quality.  No obvious valve disease.  Technically difficult, suboptimal study.  ______________________________________________________________________________  Left Ventricle  The left ventricle is normal in size. There is normal left ventricular wall  thickness. The visual ejection fraction is 50-55%. Regional wall motion  abnormalities cannot be excluded due to limited visualization.     Right Ventricle  The right ventricle is not well visualized. Right ventricular function cannot  be assessed due to poor image quality.     Atria  The left atrium is not well visualized. Right atrium not well visualized.     Mitral Valve  The mitral valve is not well visualized. There is no mitral regurgitation  noted.     Tricuspid Valve  The tricuspid valve is not well visualized. No tricuspid regurgitation.     Aortic Valve  The aortic valve is not well visualized. No aortic regurgitation is present.  No aortic stenosis is present.     Pulmonic Valve  The pulmonic valve is not well visualized. There is no pulmonic valvular  regurgitation.     Vessels  The aorta root cannot be assessed. Normal size ascending aorta. The inferior  vena cava cannot be assessed.     Pericardium  There is no pericardial effusion.     ______________________________________________________________________________  MMode/2D Measurements & Calculations  asc Aorta Diam: 2.6 cm  LVOT diam: 2.0 cm  LVOT area: 3.1 cm2     Time Measurements  MM HR: 82.0 BPM     Doppler Measurements & Calculations  MV E max eliza: 111.0 cm/sec  MV A max eliza: 54.4 cm/sec  MV E/A: 2.0  MV dec slope: 952.0 cm/sec2  MV dec time: 0.12 sec  Ao V2 max: 118.0 cm/sec  Ao max PG:  6.0 mmHg  Ao V2 mean: 85.9 cm/sec  Ao mean PG: 3.0 mmHg  Ao V2 VTI: 25.0 cm  RUDI(I,D): 1.9 cm2  RUDI(V,D): 2.1 cm2  LV V1 max P.6 mmHg  LV V1 max: 79.7 cm/sec  LV V1 VTI: 15.0 cm  SV(LVOT): 47.0 ml  SI(LVOT): 20.7 ml/m2  PA acc time: 0.09 sec  AV Nash Ratio (DI): 0.68  RUDI Index (cm2/m2): 0.83  E/E': 11.6  E/E' av.9  Lateral E/e': 11.6  Medial E/e': 18.2     Peak E' Nash: 9.6 cm/sec     ______________________________________________________________________________  Report approved by: Emeka Ocampo 2023 05:03 PM             Discharge Medications   Current Discharge Medication List        CONTINUE these medications which have NOT CHANGED    Details   acetaminophen (TYLENOL) 32 mg/mL liquid Take 960 mg by mouth every 6 hours as needed for fever or mild pain      acetaminophen (TYLENOL) 500 MG tablet Take 500-1,000 mg by mouth every 6 hours as needed for mild pain      albuterol (PROVENTIL) (2.5 MG/3ML) 0.083% neb solution Take 2.5 mg by nebulization 3 times daily as needed for shortness of breath, wheezing or cough      aloe vera GEL Apply 1 g topically every hour as needed for skin care Per bottle directions      bacitracin 500 UNIT/GM OINT Apply topically 3 times daily as needed for wound care      Calamine external lotion Apply topically as needed for itching      carbamide peroxide (DEBROX) 6.5 % otic solution Place 5 drops into both ears daily as needed for other      clotrimazole (LOTRIMIN) 1 % external cream Apply topically 2 times daily as needed (skin irritation)      cyclobenzaprine (FLEXERIL) 10 MG tablet Take 10 mg by mouth 3 times daily as needed for muscle spasms      diclofenac (VOLTAREN) 1 % topical gel Apply 2 g topically daily as needed for moderate pain To joints/back      escitalopram (LEXAPRO) 10 MG tablet Take 10 mg by mouth daily      fluticasone-vilanterol (BREO ELLIPTA) 200-25 MCG/ACT inhaler Inhale 1 puff into the lungs daily      furosemide (LASIX) 20 MG tablet Take 20 mg by  mouth daily      hydrocortisone 1 % CREA cream Place rectally 2 times daily as needed for itching      ibuprofen (ADVIL/MOTRIN) 100 MG/5ML suspension Take 400 mg by mouth every 4 hours as needed for fever or moderate pain      ibuprofen (ADVIL/MOTRIN) 200 MG tablet Take 400 mg by mouth every 4 hours as needed for pain      lisinopril (ZESTRIL) 10 MG tablet Take 10 mg by mouth daily      LORazepam (ATIVAN) 1 MG tablet Take 0.5 mg by mouth daily as needed for anxiety      melatonin 3 MG tablet Take 3 mg by mouth At Bedtime      metFORMIN (GLUCOPHAGE) 1000 MG tablet Take 1,000 mg by mouth 2 times daily (with meals)      montelukast (SINGULAIR) 10 MG tablet Take 10 mg by mouth daily      OLANZapine (ZYPREXA) 10 MG tablet Take 10 mg by mouth At Bedtime      omeprazole (PRILOSEC) 20 MG DR capsule Take 40 mg by mouth daily      ondansetron (ZOFRAN) 4 MG tablet Take 4 mg by mouth every 12 hours as needed for nausea      oxybutynin ER (DITROPAN XL) 10 MG 24 hr tablet Take 10 mg by mouth daily      polyethylene glycol (MIRALAX) 17 g packet Take 1 packet by mouth daily as needed for constipation      psyllium (METAMUCIL) 28.3 % packet Take 1 packet by mouth daily      sodium phosphate (FLEET ENEMA) 7-19 GM/118ML rectal enema Place 1 enema rectally once as needed for constipation      spironolactone (ALDACTONE) 50 MG tablet Take 50 mg by mouth daily      zinc oxide (DESITIN) 20 % external ointment Apply topically as needed for dry skin or irritation To groin/buttocks area      rosuvastatin (CRESTOR) 10 MG tablet Take 10 mg by mouth At Bedtime           Allergies   Allergies   Allergen Reactions    Apricot Flavor Anaphylaxis    Banana Anaphylaxis     Throat swelling  Throat swelling      Wasp Venom Protein Shortness Of Breath     Other reaction(s): Respiratory Distress  Has an epi pen  Has an epi pen      Bees Anaphylaxis     Have an Epi pen that carries with    Methylphenidate Itching     Other reaction(s): Nightmares    Prunus       Other reaction(s): *Unknown    Sulfa Antibiotics      Headaches and nausea    Prunus Persica Rash     Other reaction(s): *Unknown

## 2023-08-03 NOTE — PLAN OF CARE
Problem: Plan of Care - These are the overarching goals to be used throughout the patient stay.    Goal: Absence of Hospital-Acquired Illness or Injury  Intervention: Identify and Manage Fall Risk  Recent Flowsheet Documentation  Taken 8/2/2023 1645 by Anamaria Linares RN  Safety Promotion/Fall Prevention: clutter free environment maintained  Intervention: Prevent Skin Injury  Recent Flowsheet Documentation  Taken 8/2/2023 1645 by Anamaria Linares RN  Body Position: position changed independently  Goal: Optimal Comfort and Wellbeing  Outcome: Progressing  Nicotine patch order obtained, placed this evening. Reiterated to patient he cannot go out to smoke. Tylenol given for HA.

## 2023-08-07 ENCOUNTER — TRANSFERRED RECORDS (OUTPATIENT)
Dept: HEALTH INFORMATION MANAGEMENT | Facility: CLINIC | Age: 43
End: 2023-08-07
Payer: MEDICARE

## 2023-08-11 ENCOUNTER — HOSPITAL ENCOUNTER (EMERGENCY)
Facility: HOSPITAL | Age: 43
Discharge: HOME | End: 2023-08-11
Attending: EMERGENCY MEDICINE | Admitting: EMERGENCY MEDICINE
Payer: MEDICARE

## 2023-08-11 ENCOUNTER — APPOINTMENT (OUTPATIENT)
Dept: RADIOLOGY | Facility: HOSPITAL | Age: 43
End: 2023-08-11
Payer: MEDICARE

## 2023-08-11 VITALS
TEMPERATURE: 98.4 F | BODY MASS INDEX: 47.36 KG/M2 | RESPIRATION RATE: 20 BRPM | WEIGHT: 284.61 LBS | HEART RATE: 80 BPM | OXYGEN SATURATION: 96 % | SYSTOLIC BLOOD PRESSURE: 118 MMHG | DIASTOLIC BLOOD PRESSURE: 65 MMHG

## 2023-08-11 DIAGNOSIS — M25.551 ACUTE RIGHT HIP PAIN: ICD-10-CM

## 2023-08-11 PROCEDURE — 250N000011 HC RX IP 250 OP 636: Mod: JZ

## 2023-08-11 PROCEDURE — 99284 EMERGENCY DEPT VISIT MOD MDM: CPT

## 2023-08-11 PROCEDURE — 73502 X-RAY EXAM HIP UNI 2-3 VIEWS: CPT

## 2023-08-11 PROCEDURE — 96372 THER/PROPH/DIAG INJ SC/IM: CPT

## 2023-08-11 PROCEDURE — 250N000013 HC RX MED GY IP 250 OP 250 PS 637

## 2023-08-11 RX ORDER — OXYCODONE HYDROCHLORIDE 5 MG/1
5 TABLET ORAL ONCE
Status: COMPLETED | OUTPATIENT
Start: 2023-08-11 | End: 2023-08-11

## 2023-08-11 RX ORDER — KETOROLAC TROMETHAMINE 15 MG/ML
15 INJECTION, SOLUTION INTRAMUSCULAR; INTRAVENOUS ONCE
Status: COMPLETED | OUTPATIENT
Start: 2023-08-11 | End: 2023-08-11

## 2023-08-11 RX ORDER — OXYCODONE HYDROCHLORIDE 5 MG/1
5 TABLET ORAL EVERY 6 HOURS PRN
Qty: 6 TABLET | Refills: 0 | Status: SHIPPED | OUTPATIENT
Start: 2023-08-11 | End: 2023-08-14

## 2023-08-11 RX ADMIN — OXYCODONE HYDROCHLORIDE 5 MG: 5 TABLET ORAL at 19:43

## 2023-08-11 RX ADMIN — KETOROLAC TROMETHAMINE 15 MG: 15 INJECTION, SOLUTION INTRAMUSCULAR; INTRAVENOUS at 19:45

## 2023-08-11 ASSESSMENT — ENCOUNTER SYMPTOMS
CHILLS: 0
FREQUENCY: 0
NUMBNESS: 0
SORE THROAT: 0
FEVER: 0
DYSURIA: 0
ROS GI COMMENTS: DENIES BOWEL INCONTINENCE.
WEAKNESS: 1
HEMATURIA: 0
COLOR CHANGE: 0
BACK PAIN: 0
COUGH: 0

## 2023-08-11 ASSESSMENT — ACTIVITIES OF DAILY LIVING (ADL): ADLS_ACUITY_SCORE: 35

## 2023-08-11 NOTE — ED PROVIDER NOTES
EMERGENCY DEPARTMENT ENCOUNTER      NAME: Warren Jaramillo  AGE: 42 year old male  YOB: 1980  MRN: [id number]  EVALUATION DATE & TIME: No admission date for patient encounter.    PCP: Aundrea Montoya    ED PROVIDER: Alisha Paulino PA-C      Chief Complaint   Patient presents with    Hip Pain    Leg Pain         FINAL IMPRESSION:  1. Acute right hip pain          ED COURSE & MEDICAL DECISION MAKIN:18 PM Met with patient for initial interview. Plan for care discussed.  7:23 PM I staffed the patient with my colleague Dr Dhaliwal.   8:25 PM Reevaluated and updated patient. Patient feeling much improved after medication and able to ambulate without assistance or pain in the ED. I discussed the plan for discharge with the patient, and patient is agreeable. We discussed supportive cares at home and reasons for return to the ER including new or worsening symptoms. All questions and concerns addressed. Patient to be discharged by RN.    Pertinent Labs & Imaging studies reviewed. (See chart for details)  42 year old male with a history of DM II (non-insulin dependent), fetal alcohol syndrome, tobacco use presents to the Emergency Department for evaluation of acute onset non-traumatic right hip pain. Upon exam, patient is afebrile, hemodynamically stable, but appears uncomfortable. Tenderness to palpation over greater trochanter and mid-inguinal crease. Neurovascularly intact distally. 5/5 strength. Compartments soft. No fevers/chills or history of septic joint. Differential diagnosis includes but not limited to osteoarthritic flare, fracture, avascular necrosis, bursitis. No midline cervical, thoracic, lumbar, or sacral tenderness to palpation. Emergent MRI is not indicated as no new weakness or evidence of cauda equina syndrome (no saddle anesthesia, bowel/bladder incontinence/retention). No lower extremity swelling or tenderness along deep venous system, no recent red flag for DVTs. No  associated conjunctivitis, dysuria, or STD exposure/concern; therefore low suspicion for reactive arthritis. Based on patient's presenting symptoms, imaging was ordered. XR showed mild degenerative changes, otherwise negative for acute findings.     Patient was treated with Toradol and Oxycodone upon arrival with improvement in symptoms. Patient feeling much improved after medication and able to ambulate without assistance or pain in the ED. No associated fever/chills, ROM intact, and improvement with pain medications; therefore, low suspicion for septic joint. Symptoms and workup most consistent with osteoarthritic flare vs bursitis. Patient was made aware of the above findings. Plan to discharge patient home with prescription oxycodone, strict return precautions, and close follow up with their PCP and/or orthopedics for reevaluation and ongoing management including but not limited to joint injection vs physical therapy, referral placed today. The patient was stable and well appearing upon discharge. The patient was advised to return to the ER if any new or worsening symptoms develop. The patient verbalizes understanding and agrees with the plan.     Medical Decision Making    History:  Supplemental history from: Documented in chart, if applicable  External Record(s) reviewed: Documented in chart, if applicable.    Work Up:  Chart documentation includes differential considered and any EKGs or imaging independently interpreted by provider, where specified.  In additional to work up documented, I considered the following work up: Documented in chart, if applicable.    External consultation:  Discussion of management with another provider: Documented in chart, if applicable    Complicating factors:  Care impacted by chronic illness: Diabetes  Care affected by social determinants of health: N/A    Disposition considerations: Discharge. I prescribed additional prescription strength medication(s) as charted. See  documentation for any additional details.        MEDICATIONS GIVEN IN THE EMERGENCY:  Medications   ketorolac (TORADOL) injection 15 mg (15 mg Intramuscular $Given 8/11/23 1945)   oxyCODONE (ROXICODONE) tablet 5 mg (5 mg Oral $Given 8/11/23 1943)       NEW PRESCRIPTIONS STARTED AT TODAY'S ER VISIT  Discharge Medication List as of 8/11/2023  8:40 PM        START taking these medications    Details   oxyCODONE (ROXICODONE) 5 MG tablet Take 1 tablet (5 mg) by mouth every 6 hours as needed for severe pain, Disp-6 tablet, R-0, Local Print                =================================================================    HPI    Patient information was obtained from: patient    Use of : N/A      Warren Jaramillo is a 42 year old male with a pertinent history of diabetes and tobacco use who presents to this ED via walking for evaluation of leg pain. He noticed pain in his upper right leg near his groin, right upper posterior thigh, and right hip on yesterday after waking. At 0900 today, he began dragging his right foot when walking immediately after waking up. He also continued to have the right leg pain and notes the right leg feels weak. Certain stretches of the right lower leg temporarily improve his pain. Laying down flat on his back also improves his pain. Pain is worse with walking and he notes it feels like his knee is buckling. He took 1000 mg of Tylenol 3 hours ago without much improvement. No associated overlying skin changes, back pain, numbness, tingling, saddle anesthesia, incontinence, URI symptoms, fever, chills, or leg swelling. No recent falls or history of blood clots. Denies any history of similar problems. No recent vaccinations or travel. However, he notes he is usually sedentary. Yesterday, he also had some right testicular numbness for a few minutes that has since resolved. Denies any associated urinary symptoms, penile pain, or any other testicular pain. He takes metformin for his  diabetes, but does not check his blood sugar regularly. He smokes about 2 PPD. No other reported complaints or concerns.     REVIEW OF SYSTEMS   Review of Systems   Constitutional:  Negative for chills and fever.   HENT:  Negative for congestion and sore throat.    Respiratory:  Negative for cough.    Cardiovascular:  Negative for leg swelling.   Gastrointestinal:         Denies bowel incontinence.    Genitourinary:  Positive for testicular pain (right yesterday-now resolved). Negative for dysuria, frequency, hematuria and penile pain.        Denies bladder incontinence   Musculoskeletal:  Negative for back pain.        Positive for right upper leg pain and right hip pain.    Skin:  Negative for color change and rash.   Neurological:  Positive for weakness (right leg). Negative for numbness.   All other systems reviewed and are negative.       PAST MEDICAL HISTORY:  Past Medical History:   Diagnosis Date    Rojas's disease        PAST SURGICAL HISTORY:  Past Surgical History:   Procedure Laterality Date    COLONOSCOPY      ESOPHAGOSCOPY, GASTROSCOPY, DUODENOSCOPY (EGD), COMBINED N/A 7/21/2023    Procedure: ESOPHAGOGASTRODUODENOSCOPY WITH GASTRIC AND ESOPHAGEAL BIOPSIES;  Surgeon: Filiberto Aragon MD;  Location: [facility] [place] OR    TOOTH EXTRACTION             CURRENT MEDICATIONS:    oxyCODONE (ROXICODONE) 5 MG tablet  acetaminophen (TYLENOL) 32 mg/mL liquid  acetaminophen (TYLENOL) 500 MG tablet  albuterol (PROVENTIL) (2.5 MG/3ML) 0.083% neb solution  aloe vera GEL  bacitracin 500 UNIT/GM OINT  Calamine external lotion  carbamide peroxide (DEBROX) 6.5 % otic solution  clotrimazole (LOTRIMIN) 1 % external cream  cyclobenzaprine (FLEXERIL) 10 MG tablet  diclofenac (VOLTAREN) 1 % topical gel  escitalopram (LEXAPRO) 10 MG tablet  fluticasone-vilanterol (BREO ELLIPTA) 200-25 MCG/ACT inhaler  furosemide (LASIX) 20 MG tablet  hydrocortisone 1 % CREA cream  ibuprofen (ADVIL/MOTRIN) 100 MG/5ML suspension  ibuprofen  (ADVIL/MOTRIN) 200 MG tablet  lisinopril (ZESTRIL) 10 MG tablet  LORazepam (ATIVAN) 1 MG tablet  melatonin 3 MG tablet  metFORMIN (GLUCOPHAGE) 1000 MG tablet  montelukast (SINGULAIR) 10 MG tablet  nicotine (NICODERM CQ) 21 MG/24HR 24 hr patch  OLANZapine (ZYPREXA) 10 MG tablet  omeprazole (PRILOSEC) 20 MG DR capsule  ondansetron (ZOFRAN) 4 MG tablet  oxybutynin ER (DITROPAN XL) 10 MG 24 hr tablet  polyethylene glycol (MIRALAX) 17 g packet  psyllium (METAMUCIL) 28.3 % packet  rosuvastatin (CRESTOR) 10 MG tablet  sodium phosphate (FLEET ENEMA) 7-19 GM/118ML rectal enema  spironolactone (ALDACTONE) 50 MG tablet  zinc oxide (DESITIN) 20 % external ointment        ALLERGIES:  Allergies   Allergen Reactions    Apricot Flavor Anaphylaxis    Banana Anaphylaxis     Throat swelling  Throat swelling      Wasp Venom Protein Shortness Of Breath     Other reaction(s): Respiratory Distress  Has an epi pen  Has an epi pen      Bees Anaphylaxis     Have an Epi pen that carries with    Methylphenidate Itching     Other reaction(s): Nightmares    Prunus      Other reaction(s): *Unknown    Sulfa Antibiotics      Headaches and nausea    Prunus Persica Rash     Other reaction(s): *Unknown       FAMILY HISTORY:  Family History   Problem Relation Age of Onset    Unknown/Adopted Father     Unknown/Adopted Maternal Grandmother     C.A.D. Maternal Grandfather     Diabetes Maternal Grandfather     Cerebrovascular Disease Maternal Grandfather     Unknown/Adopted Paternal Grandmother     Unknown/Adopted Paternal Grandfather     Unknown/Adopted Brother     Unknown/Adopted Sister        SOCIAL HISTORY:   Social History     Socioeconomic History    Marital status: Single   Tobacco Use    Smoking status: Every Day     Packs/day: 1.00     Types: Cigarettes    Smokeless tobacco: Current    Tobacco comments:     occasional pouch of chewing tobacco   Substance and Sexual Activity    Alcohol use: No     Comment: once every 3 months    Drug use: No     Sexual activity: Never     Partners: Female   Other Topics Concern     Service No    Blood Transfusions No    Caffeine Concern No    Occupational Exposure No    Hobby Hazards No    Sleep Concern No    Stress Concern Yes     Comment: sometimes    Weight Concern No    Special Diet Yes     Comment: counting carbs    Back Care No    Exercise Yes    Seat Belt Yes    Self-Exams Yes       VITALS:  /65   Pulse 80   Temp 98.4  F (36.9  C) (Temporal)   Resp 20   Wt 129.1 kg (284 lb 9.8 oz)   SpO2 96%   BMI 47.36 kg/m      PHYSICAL EXAM    Constitutional:  Alert, in no acute distress. Cooperative.  EYES: Conjunctivae clear.  HENT:  Atraumatic, normocephalic.  Respiratory:  Respirations even, unlabored, in no acute respiratory distress.  Cardiovascular:  Regular rate, good peripheral perfusion.  GI: Soft, flat, non-distended.  Musculoskeletal: Right lower extremity: Tenderness to palpation over greater trochanter and mid-inguinal crease. No overlying erythema, warmth, purulence, fluctuance, edema, ecchymosis, crepitus, or obvious bony deformity. Hip flexion limited to ~110 degrees secondary to pain, full extension without pain. Neurovascularly intact distally. Cap refill <2 seconds. 2+ posterior tibialis pulse. 5/5 strength. Compartments soft. No tenderness to palpation along deep venous system. Symmetrical calf circumference.  Integument: Warm, Dry.   Neurologic:  Alert & oriented. No focal deficits noted.  Psych: Normal mood and affect.      LAB:  All pertinent labs reviewed and interpreted.  Results for orders placed or performed during the hospital encounter of 08/11/23   XR Pelvis and Hip Right 2 Views    Impression    IMPRESSION: Both hips negative for fracture. No dislocation. Mild degenerative change both hip joints. Pelvis negative for fracture.       RADIOLOGY:  Reviewed all pertinent imaging. Please see official radiology report.  XR Pelvis and Hip Right 2 Views   Final Result   IMPRESSION: Both  hips negative for fracture. No dislocation. Mild degenerative change both hip joints. Pelvis negative for fracture.        I, Kortney Del Angel, am serving as a scribe to document services personally performed by Alisha Paulino PA-C based on my observation and the provider's statements to me. I, Alisha Paulino PA-C, attest that Kortney Del Angel is acting in a scribe capacity, has observed my performance of the services and has documented them in accordance with my direction.    Alisha Paulino PA-C  [facility] EMERGENCY DEPARTMENT  [facility] [facility] [id number]  [phone]        Alisha Paulino PA-C  08/11/23 2545

## 2023-08-11 NOTE — ED TRIAGE NOTES
"He started to have hip pain yesterday. He denies any trauma. He has tried ibuprofen and tylenol but they have not helped. He feels his right leg \"is not working right.\"        "

## 2023-08-12 NOTE — DISCHARGE INSTRUCTIONS
You were seen in the ER for right hip pain. Your XR showed mild age-related changes, but no acute findings as discussed.     Rest, ice, elevate your extremity, and increase activity as tolerates. You may use topical Icy Hot or Lidoderm as needed. You may use ibuprofen for swelling/pain and Tylenol as needed for pain. You may take oxycodone as needed for severe pain - do NOT drive on this medication as it can cause drowsiness. Additionally, please take a stool softener as this medication can cause constipation. This medication can be addicting, please limit your use and discard any remaining tablets.     Tylenol (Acetaminophen) Discharge Instructions:  You may take 2 tablets of regular strength, over-the-counter, Tylenol (acetaminophen) every 4-6 hours as needed for pain.  Take no more than 4000 mg of Tylenol in a 24-hour period.      Avoid taking more than 1 acetaminophen-containing product at a time and be aware that many over-the-counter medications contain a combination of acetaminophen and other products.  If you are taking Tylenol in addition to a combination product please keep track of your daily acetaminophen dose to make sure you do not exceed the recommended 4000 mg.  Taking too much acetaminophen can cause permanent damage to your liver.    Ibuprofen/Naproxen Discharge Instructions:  You may take ibuprofen for pain control.  The maximum dose of (ibuprofen is 3200 mg ) in a 24-hour period.    Take this medication with food to prevent stomach irritation.  With long-term use this medication can irritate the stomach causing pain and lead to development of a stomach ulcer.  If you notice stomach pain or vomiting of coffee-ground colored vomit or blood, please be seen by a healthcare provider.  Attempt to use this medication for the shortest time possible.      Follow-up with your primary care provider and/or orthopedics for reevaluation and possible additional imaging, joint steroid injection, and/or physical  therapy referral.     Return to the emergency department for any new or worsening symptoms including increased pain, redness/warmth/drainage/swelling, fever/chills, new weakness, numbness/tingling, decreased range of motion, cold extremity, or any other concerning symptoms.     Take Care!  - Alisha Paulino PA-C

## 2023-08-12 NOTE — ED PROVIDER NOTES
EMERGENCY DEPARTMENT MIDLEVEL SUPERVISORY NOTE    Date/Time: 8/11/2023 7:23 PM    I am seeing this patient along with Alisha Paulino PA-C.  I have seen and evaluated the patient independently at bedside and agree with the TALIA's history, assessment and plan.  I personally saw the patient and performed a substantive portion of the visit including all aspects of the medical decision making.      BRIEF HPI    Warren Jaramilol is a 42 year old male with a pertinent history of diabetes and tobacco use who presents to this ED via walk-in for evaluation of right upper leg pain near his groin since yesterday. He also has some right hip pain. This morning when waking around 0900, he also started dragging his right foot when walking. He took 1000 mg of Tylenol PTA. Yesterday, he also had some right testicular numbness for a few minutes that has since resolved. Certain leg positions improve his pain. Denies associated back pain, saddle anesthesia, incontinence, leg swelling, skin changes, urinary symptoms, or URI symptoms. No recent vaccinations or travel. No history of blood clots.         BRIEF PHYSICAL EXAM  Vitals: /65   Pulse 80   Temp 98.4  F (36.9  C) (Temporal)   Resp 20   Wt 129.1 kg (284 lb 9.8 oz)   SpO2 96%   BMI 47.36 kg/m    General: Appears in no acute distress, awake, alert, interactive.  HENT: Atraumatic. MMM.   Neck: Full AROM.  Cardiovascular: Regular rate and rhythm.  Respiratory/Chest: Normal work of breathing. Speaking in full sentences.  Musculoskeletal: Normal appearing extremities without obvious deformities or signs of trauma.   Skin: Normal color. No visible rash or diaphoresis.  Neurologic: Alert. Face symmetric, moves all extremities spontaneously. Speech clear.  Ambulatory without assistance.  Psychiatric: Affect appropriate, cooperative.    ED COURSE  7:23 PM I received the patient report from the TALIA, Alisha Paulino PA-C. I agree with their assessment and plan of management, and  I will see the patient.  8:02 PM I met with the patient to introduce myself, gather additional history, perform my initial exam, and discuss the plan.     MEDICAL DECISION MAKING    Pertinent Labs and Imaging reviewed (see chart for details)    Warren Jaramillo is a 42 year old year old who presents for evaluation of nontraumatic right hip pain. Vitals on arrival stable. History and physical exam, as above.     Patient was initially evaluated by PA and had a relatively reassuring physical exam with only mild tenderness over the greater trochanter and inguinal crease.  No midline spine tenderness to suggest sciatica, radiculopathy/nerve impingement.  No neurodeficits or red flag signs/symptoms to suggest central cord process.  No concern exposure to STDs to suggest gonococcal arthritis.  No fever or infectious symptoms to suggest septic arthritis or other inflammatory arthropathy.  An x-ray of the hip and pelvis showed no evidence of acute fracture.  I met with the patient after he received oral analgesics and he reported improvement in symptoms, was able to ambulate with less discomfort.  At this point, etiology of symptoms is unclear but I have low suspicion for occult fracture requiring more advanced imaging.  I do wonder if his discomfort may be some component of osteoarthritis or bursitis.  He was comfortable with plan for discharge and continued conservative management of symptoms with follow-up at [place] [facility] or PCP.    At conclusion of encounter I discussed results of all of tests and recommendation for disposition. All questions were answered. Patient acknowledged understanding and was agreeable with care plan.     Please see midlevel note for additional details. I personally saw the patient and performed a substantive portion of the visit including all aspects of the medical decision making.     FINAL IMPRESSION    Acute right hip pain      I, Kortney Del Angel, am serving as a scribe to document  services personally performed by Tricia Dhaliwal MD, based on my observations and the provider's statements to me.  I, Tricia Dhaliwal MD, attest that Kortney Del Angel is acting in a scribe capacity, has observed my performance of the services and has documented them in accordance with my direction.      Tricia Dhaliwal MD  Emergency Medicine  8/11/2023 7:50 PM       Tricia Dhaliwal MD  08/12/23 0846

## 2023-08-14 ENCOUNTER — PRE VISIT (OUTPATIENT)
Dept: ORTHOPEDICS | Facility: CLINIC | Age: 43
End: 2023-08-14

## 2023-08-14 ENCOUNTER — OFFICE VISIT (OUTPATIENT)
Dept: ORTHOPEDICS | Facility: CLINIC | Age: 43
End: 2023-08-14
Payer: MEDICARE

## 2023-08-14 DIAGNOSIS — M25.551 ACUTE RIGHT HIP PAIN: ICD-10-CM

## 2023-08-14 PROCEDURE — 99204 OFFICE O/P NEW MOD 45 MIN: CPT | Performed by: FAMILY MEDICINE

## 2023-08-14 RX ORDER — DICLOFENAC SODIUM 75 MG/1
75 TABLET, DELAYED RELEASE ORAL 2 TIMES DAILY PRN
Qty: 28 TABLET | Refills: 0 | Status: SHIPPED | OUTPATIENT
Start: 2023-08-14 | End: 2023-11-16

## 2023-08-14 NOTE — PATIENT INSTRUCTIONS
We believe you have had a strain of the groin muscle  Take diclofenac up to 2 times daily as needed for pain.  Do not take consistently for more than 2 weeks  It is okay to use oxycodone for any severe pain that is not alleviated by diclofenac  Follow-up with physical therapy  Follow-up in 6 weeks in clinic.  Okay to cancel if symptoms have resolved

## 2023-08-14 NOTE — TELEPHONE ENCOUNTER
Action 08/14/23  MMB 10:15 AM   Action Taken  XR requested from [facility].    XR resolved in PACS       DIAGNOSIS: Acute right hip pain/Alisha Paulino PA-C/Medicare/Xrays/DS 8/14/23 Ok per Johnny at priority to use this appointment slot    APPOINTMENT DATE: 08/14/23   NOTES STATUS DETAILS   DISCHARGE REPORT from the ER Internal/Care Everywhere 08/11/23 - Alisha Pualino PA-C  - [facility]    [facility]- 08/08/20, 02/09/18:  - Summer Chow, NARINDER   - Zia Cardoso MD    MEDICATION LIST Internal    XRAYS (IMAGES & REPORTS) Internal/PACS XR R Hip+Pelvis:  - Alisha Paulino PA-C [facility]- 02/09/18:  XR R Hip:  - Zia Cardoso MD

## 2023-08-14 NOTE — PROGRESS NOTES
[facility]  SPORTS & ORTHOPEDIC CLINIC VISIT     Aug 14, 2023        ASSESSMENT & PLAN    42-year-old with right groin pain suspected abductor origin etiology, though in the absence of specific trauma it is not clear where this would have originated from    Reviewed imaging and assessment with patient in detail  Given course of diclofenac bid prn  Ok to use C codon for severe breakthrough pain.  Recommend follow-up with physical therapy  May also consider use of cane or crutches to assist with ambulation while still having pain  Follow-up in 6 weeks    Anurag Peña MD  [facility] SPORTS MEDICINE CLIN[facility] [place]    -----  Chief Complaint   Patient presents with    Right Hip - Pain       SUBJECTIVE  Warren Jaramillo is a/an 42 year old male who is seen as an ER referral for evaluation of  right hip pain.     The patient is seen with their group home staff, [facility].  The patient is Right handed    Onset: 3 day(s) ago. Patient describes injury as feeling like his hip is out of the socket. Feels like his leg is weak.   Location of Pain: right anterior hip   Worsened by: lifting the leg, opening the leg,   Better with: Toradol   Treatments tried: toradol   Associated symptoms: no distal numbness or tingling; denies swelling or warmth    Orthopedic/Surgical history: NO  Social History/Occupation: No       REVIEW OF SYSTEMS:  Do you have fever, chills, weight loss? No  Do you have any vision problems? No  Do you have any chest pain or edema? No  Do you have any shortness of breath or wheezing?  No  Do you have stomach problems? No  Do you have any numbness or focal weakness? No  Do you have diabetes? Yes, type 1   Do you have problems with bleeding or clotting? No  Do you have an rashes or other skin lesions? No    OBJECTIVE:  There were no vitals taken for this visit.     Exam:  Patient is alert, in no acute distress, pleasant and conversational.    Gait: Nonantalgic.  Normal heel  toe gait      right Hip:  Supine PROM:  Flexion: Approximately 115 , no tenderness.  External rotation: approximately 60 , no tenderness.  Internal rotation: Approximately 30 , no tenderness  No subjective pain reported with range of motion testing.     Strength Testing:  Hip flexion: 5/5.  With some discomfort  Hip adduction: 5/5.  With some discomfort      Palpation:  negative tenderness to palpation over the greater trochanter.  negative tenderness to palpation over psoas  negative tenderness to palpation over ASIS  negative tenderness to palpation over Iliac crest  Positive tenderness over the proximal abductor and its origin on the pelvis    Special Tests:  negative Isabel's test.   Pain with URBANO's test in groin  negative FADIR's test  negative Scour test  negative Reported pain with resisted hip flexion to opposite shoulder     Neurovascularly intact in bilateral lower extremities      RADIOLOGY:    2 view xrays of right hip performed 8/11/2023 and reviewed independently demonstrating mild DJD.  No acute findings. See EMR for formal radiology report.

## 2023-08-14 NOTE — LETTER
8/14/2023      RE: Warren Jaramillo  [address] [facility] [address]     Dear Colleague,    Thank you for referring your patient, Warren Jaramillo, to the [facility]. Please see a copy of my visit note below.      [facility]  SPORTS & ORTHOPEDIC CLINIC VISIT     Aug 14, 2023        ASSESSMENT & PLAN    42-year-old with right groin pain suspected abductor origin etiology, though in the absence of specific trauma it is not clear where this would have originated from    Reviewed imaging and assessment with patient in detail  Given course of diclofenac bid prn  Ok to use C codon for severe breakthrough pain.  Recommend follow-up with physical therapy  May also consider use of cane or crutches to assist with ambulation while still having pain  Follow-up in 6 weeks    Anurag Peña MD  [facility]    -----  Chief Complaint   Patient presents with    Right Hip - Pain       SUBJECTIVE  Warren Jaramillo is a/an 42 year old male who is seen as an ER referral for evaluation of  right hip pain.     The patient is seen with their group home staff, Andrey.  The patient is Right handed    Onset: 3 day(s) ago. Patient describes injury as feeling like his hip is out of the socket. Feels like his leg is weak.   Location of Pain: right anterior hip   Worsened by: lifting the leg, opening the leg,   Better with: Toradol   Treatments tried: toradol   Associated symptoms: no distal numbness or tingling; denies swelling or warmth    Orthopedic/Surgical history: NO  Social History/Occupation: No       REVIEW OF SYSTEMS:  Do you have fever, chills, weight loss? No  Do you have any vision problems? No  Do you have any chest pain or edema? No  Do you have any shortness of breath or wheezing?  No  Do you have stomach problems? No  Do you have any numbness or focal weakness? No  Do you have diabetes? Yes, type 1    Do you have problems with bleeding or clotting? No  Do you have an rashes or other skin lesions? No    OBJECTIVE:  There were no vitals taken for this visit.     Exam:  Patient is alert, in no acute distress, pleasant and conversational.    Gait: Nonantalgic.  Normal heel toe gait      right Hip:  Supine PROM:  Flexion: Approximately 115 , no tenderness.  External rotation: approximately 60 , no tenderness.  Internal rotation: Approximately 30 , no tenderness  No subjective pain reported with range of motion testing.     Strength Testing:  Hip flexion: 5/5.  With some discomfort  Hip adduction: 5/5.  With some discomfort      Palpation:  negative tenderness to palpation over the greater trochanter.  negative tenderness to palpation over psoas  negative tenderness to palpation over ASIS  negative tenderness to palpation over Iliac crest  Positive tenderness over the proximal abductor and its origin on the pelvis    Special Tests:  negative Isabel's test.   Pain with URBANO's test in groin  negative FADIR's test  negative Scour test  negative Reported pain with resisted hip flexion to opposite shoulder     Neurovascularly intact in bilateral lower extremities      RADIOLOGY:    2 view xrays of right hip performed 8/11/2023 and reviewed independently demonstrating mild DJD.  No acute findings. See EMR for formal radiology report.                Again, thank you for allowing me to participate in the care of your patient.      Sincerely,    Anurag Peña MD

## 2023-08-15 ENCOUNTER — TRANSCRIBE ORDERS (OUTPATIENT)
Dept: OTHER | Age: 43
End: 2023-08-15

## 2023-08-15 DIAGNOSIS — K74.60 HEPATIC CIRRHOSIS, UNSPECIFIED HEPATIC CIRRHOSIS TYPE, UNSPECIFIED WHETHER ASCITES PRESENT (H): ICD-10-CM

## 2023-08-15 DIAGNOSIS — R10.9 RIGHT-SIDED ABDOMINAL PAIN OF UNKNOWN CAUSE: Primary | ICD-10-CM

## 2023-08-15 DIAGNOSIS — R13.10 DYSPHAGIA, UNSPECIFIED TYPE: ICD-10-CM

## 2023-08-15 DIAGNOSIS — K59.09 CHRONIC CONSTIPATION: ICD-10-CM

## 2023-08-18 ENCOUNTER — THERAPY VISIT (OUTPATIENT)
Dept: PHYSICAL THERAPY | Facility: REHABILITATION | Age: 43
End: 2023-08-18
Attending: FAMILY MEDICINE
Payer: MEDICARE

## 2023-08-18 DIAGNOSIS — M25.551 ACUTE RIGHT HIP PAIN: Primary | ICD-10-CM

## 2023-08-18 PROCEDURE — 97161 PT EVAL LOW COMPLEX 20 MIN: CPT | Mod: GP | Performed by: PHYSICAL THERAPIST

## 2023-08-18 PROCEDURE — 97110 THERAPEUTIC EXERCISES: CPT | Mod: GP | Performed by: PHYSICAL THERAPIST

## 2023-08-18 PROCEDURE — 97116 GAIT TRAINING THERAPY: CPT | Mod: GP | Performed by: PHYSICAL THERAPIST

## 2023-08-18 ASSESSMENT — ACTIVITIES OF DAILY LIVING (ADL)
HOS_ADL_SCORE(%): 67.19
LIGHT_TO_MODERATE_WORK: NO DIFFICULTY AT ALL
DEEP SQUATTING: EXTREME DIFFICULTY
WALKING_DOWN_STEEP_HILLS: NO DIFFICULTY AT ALL
WALKING_APPROXIMATELY_10_MINUTES: NO DIFFICULTY AT ALL
SITTING FOR 15 MINUTES: NO DIFFICULTY AT ALL
STEPPING UP AND DOWN CURBS: SLIGHT DIFFICULTY
GETTING INTO AND OUT OF AN AVERAGE CAR: SLIGHT DIFFICULTY
WALKING_INITIALLY: NO DIFFICULTY AT ALL
WALKING_UP_STEEP_HILLS: SLIGHT DIFFICULTY
GETTING_INTO_AND_OUT_OF_A_BATHTUB: EXTREME DIFFICULTY
RECREATIONAL ACTIVITIES: SLIGHT DIFFICULTY
WALKING_15_MINUTES_OR_GREATER: EXTREME DIFFICULTY
GOING DOWN 1 FLIGHT OF STAIRS: SLIGHT DIFFICULTY
GOING UP 1 FLIGHT OF STAIRS: SLIGHT DIFFICULTY
TWISTING/PIVOTING ON INVOLVED LEG: EXTREME DIFFICULTY
STANDING FOR 15 MINUTES: MODERATE DIFFICULTY
HOS_ADL_ITEM_SCORE_TOTAL: 43
HOS_ADL_HIGHEST_POTENTIAL_SCORE: 64
PUTTING ON SOCKS AND SHOES: UNABLE TO DO
ROLLING OVER IN BED: SLIGHT DIFFICULTY

## 2023-08-18 NOTE — PROGRESS NOTES
PHYSICAL THERAPY EVALUATION  Type of Visit: Evaluation    See electronic medical record for Abuse and Falls Screening details.    Subjective       Presenting condition or subjective complaint: Right hip pain in groin muscle  Date of onset: 08/09/23    Relevant medical history:     Dates & types of surgery:      Prior diagnostic imaging/testing results: X-ray     Prior therapy history for the same diagnosis, illness or injury: No      The patient reports that his pain started on 8/9/23, two days before he went to the ED.  Patient was rolling over in bed and it felt like the joint popped out of it's joint.  Then later in the day it felt like the joint was grinding.  It currently feels like his hip joint is out of socket.  He is now getting pain in the outside of the hip.  When sitting on the toilet, his buttock and leg will go numb.  He has history of concrete 300# falling on his R leg - 10 years ago.  He has also torn his R quad muscle after slipping on railroad ties - 7 years ago.  Patient reports he has cancer in the liver and was given 4 months to live.    Living Environment  Social support: With a caregiver/helper   Type of home: House   Stairs to enter the home: Yes 5 Is there a railing: Yes   Ramp: No   Stairs inside the home: Yes 13 Is there a railing: Yes   Help at home: Self Cares (home health aide/personal care attendant, family, etc); Other  Equipment owned: [employer]     Employment: No    Hobbies/Interests:      Patient goals for therapy:      Pain assessment: Pain present     Objective   HIP EVALUATION  PAIN: Pain Level at Rest: 0/10  Pain Level with Use: 10/10  Pain Location: R hip  Pain Quality: Sharp  Pain Frequency: intermittent  Pain is Worst: nighttime  Pain is Exacerbated By: Walking, sitting with legs wide, rolling over in bed  Pain is Relieved By: rest and Oxycodone  Pain Progression: Unchanged  POSTURE: Standing Posture: Rounded shoulders, Forward head, Lordosis increased  GAIT:   Weightbearing  Status: WBAT  Assistive Device(s): None  Gait Deviations: Antalgic  ROM:   (Degrees) Left AROM Left PROM  Right AROM Right PROM   Hip Flexion WNL  Pain at 90 deg    Hip Extension       Hip Abduction       Hip Adduction       Hip Internal Rotation WNL  Pain past neutral    Hip External Rotation WNL  Pain in outer hip    Knee Flexion       Knee Extension       Lumbar Side glide     Lumbar Flexion Mod limited d/t abdomen   Lumbar Extension WNL with pain R side   Lumbar side bending L WNL, R mod limited with pain in R hip   Pain:   End feel:   PELVIC/SI SCREEN:  Pelvic landmarks appear level supine  SPECIAL TESTS:    Left Right   URBANO Positive Negative    FADIR/Labrum/CJ Pain in outer hip Negative    Femoral Nerve     Isabel's     Piriformis     Quadrant Testing     SLR     Slump     Stork with Extension     Rick            PALPATION:  Tenderness lateral hip and in adductor attachment at pelvis    Assessment & Plan   CLINICAL IMPRESSIONS  Medical Diagnosis: M25.551 (ICD-10-CM) - Acute right hip pain    Treatment Diagnosis: Acute hip pain secondary to muscle strain and weakness   Impression/Assessment: Patient is a 42 year old male with complaints of R hip/groin pain that started on 8/9/23 after rolling over in bed.  The following significant findings have been identified: Pain, Decreased ROM/flexibility, Decreased strength, Impaired gait, Impaired muscle performance, and Impaired posture. These impairments interfere with their ability to perform self care tasks, household mobility, and community mobility as compared to previous level of function.     Clinical Decision Making (Complexity):  Clinical Presentation: Stable/Uncomplicated  Clinical Presentation Rationale: based on medical and personal factors listed in PT evaluation  Clinical Decision Making (Complexity): Low complexity    PLAN OF CARE  Treatment Interventions:  Interventions: Manual Therapy, Neuromuscular Re-education, Therapeutic Activity, Therapeutic  Exercise, Self-Care/Home Management    Long Term Goals     PT Goal 1  Goal Identifier: Self-management/HEP  Goal Description: Patient will be independent in self-management of condition and HEP to reach functional goals.  Target Date: 11/10/23  PT Goal 2  Goal Identifier: Sit on toilet  Goal Description: Patient will be able to sit on the toilet with legs out to the side without pain in the hip in order to improve bowel movements.  Target Date: 11/10/23  PT Goal 3  Goal Identifier: Walking  Goal Description: Patient will be able to walk 200 feet without pain in the R hip in order to return to PLOF.  Target Date: 11/10/23  PT Goal 4  Goal Identifier: Sleeping  Goal Description: Patient will be able to sleep throughout the night without waking d/t pain in order to improve QOL.  Target Date: 11/10/23      Frequency of Treatment: 1x/week to every other week  Duration of Treatment: 12 weeks    Recommended Referrals to Other Professionals: None  Education Assessment:   Learner/Method: Patient;Listening;Demonstration    Risks and benefits of evaluation/treatment have been explained.   Patient/Family/caregiver agrees with Plan of Care.     Evaluation Time:     PT Eval, Low Complexity Minutes (45211): 25    Signing Clinician: Cristy Dee, PT, DPT, MHA      [facility]                                                                                   OUTPATIENT PHYSICAL THERAPY      PLAN OF TREATMENT FOR OUTPATIENT REHABILITATION   Patient's Last Name, First Name, Warren Epperson YOB: 1980   Provider's Name   [facility]   Medical Record No.  [id number]     Onset Date: 08/09/23  Start of Care Date: 08/18/23     Medical Diagnosis:  M25.551 (ICD-10-CM) - Acute right hip pain      PT Treatment Diagnosis:  Acute hip pain secondary to muscle strain and weakness Plan of Treatment  Frequency/Duration: 1x/week to every other week/ 12  weeks    Certification date from 08/18/23 to 11/10/23         See note for plan of treatment details and functional goals     Cristy Dee, PT, DPT, MHA                         I CERTIFY THE NEED FOR THESE SERVICES FURNISHED UNDER        THIS PLAN OF TREATMENT AND WHILE UNDER MY CARE     (Physician attestation of this document indicates review and certification of the therapy plan).                Referring Provider:  Anurag Peña      Initial Assessment  See Epic Evaluation- Start of Care Date: 08/18/23

## 2023-08-20 ENCOUNTER — NURSE TRIAGE (OUTPATIENT)
Dept: NURSING | Facility: CLINIC | Age: 43
End: 2023-08-20
Payer: MEDICARE

## 2023-08-20 ENCOUNTER — APPOINTMENT (OUTPATIENT)
Dept: CT IMAGING | Facility: HOSPITAL | Age: 43
End: 2023-08-20
Attending: EMERGENCY MEDICINE
Payer: MEDICARE

## 2023-08-20 ENCOUNTER — HOSPITAL ENCOUNTER (EMERGENCY)
Facility: HOSPITAL | Age: 43
Discharge: HOME | End: 2023-08-20
Attending: EMERGENCY MEDICINE | Admitting: EMERGENCY MEDICINE
Payer: MEDICARE

## 2023-08-20 VITALS
DIASTOLIC BLOOD PRESSURE: 53 MMHG | HEART RATE: 75 BPM | OXYGEN SATURATION: 96 % | TEMPERATURE: 98.2 F | SYSTOLIC BLOOD PRESSURE: 103 MMHG | RESPIRATION RATE: 28 BRPM

## 2023-08-20 DIAGNOSIS — R07.2 RETROSTERNAL CHEST PAIN: ICD-10-CM

## 2023-08-20 LAB
ANION GAP SERPL CALCULATED.3IONS-SCNC: 13 MMOL/L (ref 7–15)
BASOPHILS # BLD AUTO: 0.1 10E3/UL (ref 0–0.2)
BASOPHILS NFR BLD AUTO: 1 %
BUN SERPL-MCNC: 15.5 MG/DL (ref 6–20)
CALCIUM SERPL-MCNC: 9.4 MG/DL (ref 8.6–10)
CHLORIDE SERPL-SCNC: 100 MMOL/L (ref 98–107)
CREAT SERPL-MCNC: 0.75 MG/DL (ref 0.67–1.17)
D DIMER PPP FEU-MCNC: 1.8 UG/ML FEU (ref 0–0.5)
DEPRECATED HCO3 PLAS-SCNC: 23 MMOL/L (ref 22–29)
EOSINOPHIL # BLD AUTO: 0.5 10E3/UL (ref 0–0.7)
EOSINOPHIL NFR BLD AUTO: 3 %
ERYTHROCYTE [DISTWIDTH] IN BLOOD BY AUTOMATED COUNT: 16.5 % (ref 10–15)
GFR SERPL CREATININE-BSD FRML MDRD: >90 ML/MIN/1.73M2
GLUCOSE SERPL-MCNC: 141 MG/DL (ref 70–99)
HCT VFR BLD AUTO: 43.5 % (ref 40–53)
HGB BLD-MCNC: 14.2 G/DL (ref 13.3–17.7)
HOLD SPECIMEN: NORMAL
IMM GRANULOCYTES # BLD: 0.1 10E3/UL
IMM GRANULOCYTES NFR BLD: 1 %
LIPASE SERPL-CCNC: 19 U/L (ref 13–60)
LYMPHOCYTES # BLD AUTO: 3.2 10E3/UL (ref 0.8–5.3)
LYMPHOCYTES NFR BLD AUTO: 20 %
MCH RBC QN AUTO: 28.7 PG (ref 26.5–33)
MCHC RBC AUTO-ENTMCNC: 32.6 G/DL (ref 31.5–36.5)
MCV RBC AUTO: 88 FL (ref 78–100)
MONOCYTES # BLD AUTO: 1.2 10E3/UL (ref 0–1.3)
MONOCYTES NFR BLD AUTO: 8 %
NEUTROPHILS # BLD AUTO: 10.7 10E3/UL (ref 1.6–8.3)
NEUTROPHILS NFR BLD AUTO: 67 %
NRBC # BLD AUTO: 0 10E3/UL
NRBC BLD AUTO-RTO: 0 /100
PLATELET # BLD AUTO: 289 10E3/UL (ref 150–450)
POTASSIUM SERPL-SCNC: 4.3 MMOL/L (ref 3.4–5.3)
RBC # BLD AUTO: 4.95 10E6/UL (ref 4.4–5.9)
SODIUM SERPL-SCNC: 136 MMOL/L (ref 136–145)
TROPONIN T SERPL HS-MCNC: 18 NG/L
TROPONIN T SERPL HS-MCNC: 19 NG/L
WBC # BLD AUTO: 15.7 10E3/UL (ref 4–11)

## 2023-08-20 PROCEDURE — 36415 COLL VENOUS BLD VENIPUNCTURE: CPT | Performed by: STUDENT IN AN ORGANIZED HEALTH CARE EDUCATION/TRAINING PROGRAM

## 2023-08-20 PROCEDURE — G1010 CDSM STANSON: HCPCS

## 2023-08-20 PROCEDURE — 85379 FIBRIN DEGRADATION QUANT: CPT | Performed by: EMERGENCY MEDICINE

## 2023-08-20 PROCEDURE — 93005 ELECTROCARDIOGRAM TRACING: CPT | Performed by: EMERGENCY MEDICINE

## 2023-08-20 PROCEDURE — 250N000011 HC RX IP 250 OP 636: Performed by: EMERGENCY MEDICINE

## 2023-08-20 PROCEDURE — 36415 COLL VENOUS BLD VENIPUNCTURE: CPT | Performed by: EMERGENCY MEDICINE

## 2023-08-20 PROCEDURE — 85014 HEMATOCRIT: CPT | Performed by: EMERGENCY MEDICINE

## 2023-08-20 PROCEDURE — 80048 BASIC METABOLIC PNL TOTAL CA: CPT | Performed by: EMERGENCY MEDICINE

## 2023-08-20 PROCEDURE — 250N000011 HC RX IP 250 OP 636: Mod: JZ | Performed by: EMERGENCY MEDICINE

## 2023-08-20 PROCEDURE — 93005 ELECTROCARDIOGRAM TRACING: CPT | Performed by: STUDENT IN AN ORGANIZED HEALTH CARE EDUCATION/TRAINING PROGRAM

## 2023-08-20 PROCEDURE — 250N000013 HC RX MED GY IP 250 OP 250 PS 637: Performed by: EMERGENCY MEDICINE

## 2023-08-20 PROCEDURE — 83690 ASSAY OF LIPASE: CPT | Performed by: EMERGENCY MEDICINE

## 2023-08-20 PROCEDURE — 96374 THER/PROPH/DIAG INJ IV PUSH: CPT | Mod: 59

## 2023-08-20 PROCEDURE — 84484 ASSAY OF TROPONIN QUANT: CPT | Mod: 91 | Performed by: EMERGENCY MEDICINE

## 2023-08-20 PROCEDURE — 99285 EMERGENCY DEPT VISIT HI MDM: CPT | Mod: 25

## 2023-08-20 RX ORDER — IOPAMIDOL 755 MG/ML
90 INJECTION, SOLUTION INTRAVASCULAR ONCE
Status: COMPLETED | OUTPATIENT
Start: 2023-08-20 | End: 2023-08-20

## 2023-08-20 RX ORDER — ASPIRIN 81 MG/1
324 TABLET, CHEWABLE ORAL ONCE
Status: DISCONTINUED | OUTPATIENT
Start: 2023-08-20 | End: 2023-08-20

## 2023-08-20 RX ORDER — HYDROMORPHONE HYDROCHLORIDE 1 MG/ML
0.5 INJECTION, SOLUTION INTRAMUSCULAR; INTRAVENOUS; SUBCUTANEOUS ONCE
Status: COMPLETED | OUTPATIENT
Start: 2023-08-20 | End: 2023-08-20

## 2023-08-20 RX ORDER — MAGNESIUM HYDROXIDE/ALUMINUM HYDROXICE/SIMETHICONE 120; 1200; 1200 MG/30ML; MG/30ML; MG/30ML
15 SUSPENSION ORAL ONCE
Status: COMPLETED | OUTPATIENT
Start: 2023-08-20 | End: 2023-08-20

## 2023-08-20 RX ADMIN — ALUMINUM HYDROXIDE, MAGNESIUM HYDROXIDE, AND SIMETHICONE 15 ML: 200; 200; 20 SUSPENSION ORAL at 22:20

## 2023-08-20 RX ADMIN — IOPAMIDOL 90 ML: 755 INJECTION, SOLUTION INTRAVENOUS at 22:22

## 2023-08-20 RX ADMIN — HYDROMORPHONE HYDROCHLORIDE 0.5 MG: 1 INJECTION, SOLUTION INTRAMUSCULAR; INTRAVENOUS; SUBCUTANEOUS at 22:21

## 2023-08-20 ASSESSMENT — ACTIVITIES OF DAILY LIVING (ADL)
ADLS_ACUITY_SCORE: 35
ADLS_ACUITY_SCORE: 35

## 2023-08-20 ASSESSMENT — ENCOUNTER SYMPTOMS
ABDOMINAL PAIN: 1
DIARRHEA: 1

## 2023-08-21 LAB
ATRIAL RATE - MUSE: 77 BPM
DIASTOLIC BLOOD PRESSURE - MUSE: NORMAL MMHG
INTERPRETATION ECG - MUSE: NORMAL
P AXIS - MUSE: 82 DEGREES
PR INTERVAL - MUSE: 194 MS
QRS DURATION - MUSE: 104 MS
QT - MUSE: 406 MS
QTC - MUSE: 459 MS
R AXIS - MUSE: 68 DEGREES
SYSTOLIC BLOOD PRESSURE - MUSE: NORMAL MMHG
T AXIS - MUSE: 112 DEGREES
VENTRICULAR RATE- MUSE: 77 BPM

## 2023-08-21 NOTE — TELEPHONE ENCOUNTER
"  Nurse Triage SBAR    Is this a 2nd Level Triage? NO    Situation: Chest pain.    Background: Patient reports having chest pain for a couple of hours. States he has been feeling a stabbing pain over his right \"lung area\" in his \"rib cage\" since yesterday. And he now noticed that the top of his right foot is turning blue. Reports he is diabetic and has Hx of HTN.    Assessment: Rates chest pain 7/10 on the left side of his chest, along with the stabbing pain over his \"right lung area\". Chest pain has lasted for a couple of hours.    Protocol Recommended Disposition:   Call  Now    Recommendation: Recommendation is to call 911 now.  Patient plans to follow advice.         Does the patient meet one of the following criteria for ADS visit consideration? 16+ years old, no PCP (internal or external) but seen at [facility]     TIP  Providers, please consider if this condition is appropriate for management at one of our Acute and Diagnostic Services sites.     If patient is a good candidate, please use dotphrase <dot>triageresponse and select Refer to ADS to document.    Reason for Disposition   [1] Chest pain lasts > 5 minutes AND [2] age > 30 AND [3] one or more cardiac risk factors (e.g., diabetes, high blood pressure, high cholesterol, smoker, or strong family history of heart disease)    Additional Information   Negative: SEVERE difficulty breathing (e.g., struggling for each breath, speaks in single words)   Negative: Difficult to awaken or acting confused (e.g., disoriented, slurred speech)   Negative: Shock suspected (e.g., cold/pale/clammy skin, too weak to stand, low BP, rapid pulse)   Negative: Passed out (i.e., lost consciousness, collapsed and was not responding)   Negative: [1] Chest pain lasts > 5 minutes AND [2] age > 44    Protocols used: Chest Pain-A-AH    "

## 2023-08-21 NOTE — ED PROVIDER NOTES
EMERGENCY DEPARTMENT ENCOUNTER      NAME: Warren Jaramillo  AGE: 42 year old male  YOB: 1980  MRN: [id number]  EVALUATION DATE & TIME: 8/20/2023  8:36 PM    PCP: Aundrea Montoya    ED PROVIDER: Stanislav Louis MD        Chief Complaint   Patient presents with    Chest Pain         FINAL IMPRESSION:  1. Retrosternal chest pain          ED COURSE & MEDICAL DECISION MAKING:    Pertinent Labs & Imaging studies reviewed. (See chart for details)  42 year old male presents to the Emergency Department for evaluation of chest pain.  9:49 PM Introduced myself to the patient, obtained history of present illness, and performed initial physical exam at this time.       Differential includes ACS, pulmonary emboli, pneumonia, aortic dissection, esophageal rupture, pneumothorax, pneumonia, malignancy, pleurisy, shingles, and GERD.  Based on history and examination pulmonary emboli and aortic dissection unlikely.       Plan for D-dimer, troponin, CBC, BMP, lipase    EKG shows no changes from last EKG.  Minimally elevated high-sensitivity troponin.  Given Dilaudid and GI cocktail with improvement in symptoms.    Elevated D-dimer therefore CTA PE ordered.  CTA does not show any evidence of aneurysm or pneumonia or pulmonary emboli    Delta troponin stable.  ACS unlikely    CT abdomen pelvis shows chronic changes.  Plan for discharge home and follow-up rapid access heart clinic.  Patient reports been difficulty getting see cardiology therefore rapid access referral created.  Patient is comfortable to plan for discharge and agrees with plan.             Medical Decision Making    History:  Supplemental history from: Documented in chart, if applicable  External Record(s) reviewed: Documented in chart, if applicable.    Work Up:  Chart documentation includes differential considered and any EKGs or imaging independently interpreted by provider, where specified.  In additional to work up documented, I  "considered the following work up: Documented in chart, if applicable.    External consultation:  Discussion of management with another provider: Documented in chart, if applicable    Complicating factors:  Care impacted by chronic illness: Diabetes, Hypertension, Mental Health, and Other: asthma, fetal alcohol syndrome, chronic insomnia  Care affected by social determinants of health: N/A    Disposition considerations: Discharge. No recommendations on prescription strength medication(s). N/A.          Voice recognition software was used in the creation of this note. Any grammatical or nonsensical errors are due to inherent errors with the software and are not the intention of the writer.         At the conclusion of the encounter I discussed the results of all of the tests and the disposition. The questions were answered. The patient or family acknowledged understanding and was agreeable with the care plan.           MEDICATIONS GIVEN IN THE EMERGENCY:  Medications   HYDROmorphone (PF) (DILAUDID) injection 0.5 mg (0.5 mg Intravenous $Given 8/20/23 2221)   alum & mag hydroxide-simethicone (MAALOX) suspension 15 mL (15 mLs Oral $Given 8/20/23 2220)   iopamidol (ISOVUE-370) solution 90 mL (90 mLs Intravenous $Given 8/20/23 2222)       NEW PRESCRIPTIONS STARTED AT TODAY'S ER VISIT  Discharge Medication List as of 8/20/2023 11:27 PM             =================================================================    HPI    Triage note  \"Malik presents by [facility] from [facility]. Chest pain started when watching youtube, mid sternal/left anterior chest. Reporting LUQ abdominal pain as well. 325mg Aspirin given by EMS. . Hx acid reflux and takes Prilosec.       Triage Assessment       Row Name 08/20/23 2039       Triage Assessment (Adult)    Airway WDL WDL       Respiratory WDL    Respiratory WDL WDL       Skin Circulation/Temperature WDL    Skin Circulation/Temperature WDL WDL       Cardiac WDL    Cardiac WDL " "X;chest pain       Chest Pain Assessment    Chest Pain Location midsternal;anterior chest, left    Chest Pain Radiation abdomen    Character sharp;stabbing    Duration 2 hours    Precipitating Factors at rest  watching youtube                    \"      Patient information was obtained from: the patient    Use of : N/A       Warren Jaramillo is a 42 year old male with a pertinent history of type 2 diabetes, atrial fibrillation, asthma, and PTSD who presents to this ED via EMS for evaluation of chest pain.    Patient presents with centralized 8/10 stabbing chest pain that started 2 hours ago when the patient was watching Youtube videos at his desk. The patient reports a history of left and right bundle branch block. He has no history of heart stents or surgeries. The patient additionally presents with right sided abdominal pain that worsens with breathing. He has had diarrhea for 2 days. The patient takes metformin for his diabetes and reports a 132 as his most recent sugar value. The patient denies urinary abnormalities, fevers, and any other symptoms at this time.      REVIEW OF SYSTEMS   Review of Systems   Cardiovascular:  Positive for chest pain.   Gastrointestinal:  Positive for abdominal pain and diarrhea.        PAST MEDICAL HISTORY:  Past Medical History:   Diagnosis Date    Rojas's disease        PAST SURGICAL HISTORY:  Past Surgical History:   Procedure Laterality Date    COLONOSCOPY      ESOPHAGOSCOPY, GASTROSCOPY, DUODENOSCOPY (EGD), COMBINED N/A 7/21/2023    Procedure: ESOPHAGOGASTRODUODENOSCOPY WITH GASTRIC AND ESOPHAGEAL BIOPSIES;  Surgeon: Filiberto Aragon MD;  Location: [facility] OR    TOOTH EXTRACTION             CURRENT MEDICATIONS:    acetaminophen (TYLENOL) 32 mg/mL liquid  acetaminophen (TYLENOL) 500 MG tablet  albuterol (PROVENTIL) (2.5 MG/3ML) 0.083% neb solution  aloe vera GEL  bacitracin 500 UNIT/GM OINT  Calamine external lotion  carbamide peroxide (DEBROX) 6.5 % otic " solution  clotrimazole (LOTRIMIN) 1 % external cream  cyclobenzaprine (FLEXERIL) 10 MG tablet  diclofenac (VOLTAREN) 1 % topical gel  diclofenac (VOLTAREN) 75 MG EC tablet  escitalopram (LEXAPRO) 10 MG tablet  fluticasone-vilanterol (BREO ELLIPTA) 200-25 MCG/ACT inhaler  furosemide (LASIX) 20 MG tablet  hydrocortisone 1 % CREA cream  ibuprofen (ADVIL/MOTRIN) 100 MG/5ML suspension  ibuprofen (ADVIL/MOTRIN) 200 MG tablet  lisinopril (ZESTRIL) 10 MG tablet  LORazepam (ATIVAN) 1 MG tablet  melatonin 3 MG tablet  metFORMIN (GLUCOPHAGE) 1000 MG tablet  montelukast (SINGULAIR) 10 MG tablet  nicotine (NICODERM CQ) 21 MG/24HR 24 hr patch  OLANZapine (ZYPREXA) 10 MG tablet  omeprazole (PRILOSEC) 20 MG DR capsule  ondansetron (ZOFRAN) 4 MG tablet  oxybutynin ER (DITROPAN XL) 10 MG 24 hr tablet  polyethylene glycol (MIRALAX) 17 g packet  psyllium (METAMUCIL) 28.3 % packet  rosuvastatin (CRESTOR) 10 MG tablet  sodium phosphate (FLEET ENEMA) 7-19 GM/118ML rectal enema  spironolactone (ALDACTONE) 50 MG tablet  zinc oxide (DESITIN) 20 % external ointment        ALLERGIES:  Allergies   Allergen Reactions    Apricot Flavor Anaphylaxis    Banana Anaphylaxis     Throat swelling  Throat swelling      Wasp Venom Protein Shortness Of Breath     Other reaction(s): Respiratory Distress  Has an epi pen  Has an epi pen      Bees Anaphylaxis     Have an Epi pen that carries with    Methylphenidate Itching     Other reaction(s): Nightmares    Prunus      Other reaction(s): *Unknown    Sulfa Antibiotics      Headaches and nausea    Prunus Persica Rash     Other reaction(s): *Unknown       FAMILY HISTORY:  Family History   Problem Relation Age of Onset    Unknown/Adopted Father     Unknown/Adopted Maternal Grandmother     C.A.D. Maternal Grandfather     Diabetes Maternal Grandfather     Cerebrovascular Disease Maternal Grandfather     Unknown/Adopted Paternal Grandmother     Unknown/Adopted Paternal Grandfather     Unknown/Adopted Brother      Unknown/Adopted Sister        SOCIAL HISTORY:   Social History     Socioeconomic History    Marital status: Single   Tobacco Use    Smoking status: Every Day     Packs/day: 1.00     Types: Cigarettes    Smokeless tobacco: Current    Tobacco comments:     occasional pouch of chewing tobacco   Substance and Sexual Activity    Alcohol use: No     Comment: once every 3 months    Drug use: No    Sexual activity: Never     Partners: Female   Other Topics Concern     Service No    Blood Transfusions No    Caffeine Concern No    Occupational Exposure No    Hobby Hazards No    Sleep Concern No    Stress Concern Yes     Comment: sometimes    Weight Concern No    Special Diet Yes     Comment: counting carbs    Back Care No    Exercise Yes    Seat Belt Yes    Self-Exams Yes       VITALS:  /53   Pulse 75   Temp 98.2  F (36.8  C) (Oral)   Resp 28   SpO2 96%     PHYSICAL EXAM      Vitals: /53   Pulse 75   Temp 98.2  F (36.8  C) (Oral)   Resp 28   SpO2 96%   General: Appears in no acute distress, awake, alert, interactive.  Morbidly obese  Eyes: Conjunctivae non-injected. Sclera anicteric.  HENT: Atraumatic.  Neck: Supple.  Respiratory/Chest: Respiration unlabored.  Heart: Rate and rhythm  Abdomen: non distended.  Normal Abdominal tenderness or guarding  Musculoskeletal: Normal extremities. No edema or erythema.  Skin: Normal color. No rash or diaphoresis.  No cyanosis to feet.  Strong peripheral pulses to feet.  Cap refill less than 2 seconds.  His ability 30 feet  Neurologic: Face symmetric, moves all extremities spontaneously. Speech clear.  Psychiatric: Oriented to person, place, and time. Affect appropriate.         LAB:  All pertinent labs reviewed and interpreted.  Results for orders placed or performed during the hospital encounter of 08/20/23   CT Chest Pulmonary Embolism w Contrast    Impression    IMPRESSION:  1.  No pulmonary emboli. No etiology for symptoms.  2.  Prominent hepatomegaly  unchanged.   CT Abdomen Pelvis w Contrast    Impression    IMPRESSION:     1.  Hepatomegaly with heterogeneous fatty infiltration, cirrhotic morphology and trace perihepatic ascites, similar to prior study.    2.  Stable mild periportal and retroperitoneal lymphadenopathy, likely reactive to underlying liver disease.   Basic metabolic panel   Result Value Ref Range    Sodium 136 136 - 145 mmol/L    Potassium 4.3 3.4 - 5.3 mmol/L    Chloride 100 98 - 107 mmol/L    Carbon Dioxide (CO2) 23 22 - 29 mmol/L    Anion Gap 13 7 - 15 mmol/L    Urea Nitrogen 15.5 6.0 - 20.0 mg/dL    Creatinine 0.75 0.67 - 1.17 mg/dL    Calcium 9.4 8.6 - 10.0 mg/dL    Glucose 141 (H) 70 - 99 mg/dL    GFR Estimate >90 >60 mL/min/1.73m2   Result Value Ref Range    Troponin T, High Sensitivity 18 <=22 ng/L   CBC with platelets and differential   Result Value Ref Range    WBC Count 15.7 (H) 4.0 - 11.0 10e3/uL    RBC Count 4.95 4.40 - 5.90 10e6/uL    Hemoglobin 14.2 13.3 - 17.7 g/dL    Hematocrit 43.5 40.0 - 53.0 %    MCV 88 78 - 100 fL    MCH 28.7 26.5 - 33.0 pg    MCHC 32.6 31.5 - 36.5 g/dL    RDW 16.5 (H) 10.0 - 15.0 %    Platelet Count 289 150 - 450 10e3/uL    % Neutrophils 67 %    % Lymphocytes 20 %    % Monocytes 8 %    % Eosinophils 3 %    % Basophils 1 %    % Immature Granulocytes 1 %    NRBCs per 100 WBC 0 <1 /100    Absolute Neutrophils 10.7 (H) 1.6 - 8.3 10e3/uL    Absolute Lymphocytes 3.2 0.8 - 5.3 10e3/uL    Absolute Monocytes 1.2 0.0 - 1.3 10e3/uL    Absolute Eosinophils 0.5 0.0 - 0.7 10e3/uL    Absolute Basophils 0.1 0.0 - 0.2 10e3/uL    Absolute Immature Granulocytes 0.1 <=0.4 10e3/uL    Absolute NRBCs 0.0 10e3/uL   Extra Blue Top Tube   Result Value Ref Range    Hold Specimen JIC    Extra Red Top Tube   Result Value Ref Range    Hold Specimen JIC    Extra Green Top (Lithium Heparin) ON ICE   Result Value Ref Range    Hold Specimen JIC    D dimer quantitative   Result Value Ref Range    D-Dimer Quantitative 1.80 (H) 0.00 - 0.50 ug/mL  FEU   Result Value Ref Range    Troponin T, High Sensitivity 19 <=22 ng/L   Result Value Ref Range    Lipase 19 13 - 60 U/L       RADIOLOGY:  Reviewed all pertinent imaging. Please see official radiology report.  CT Abdomen Pelvis w Contrast   Final Result   IMPRESSION:       1.  Hepatomegaly with heterogeneous fatty infiltration, cirrhotic morphology and trace perihepatic ascites, similar to prior study.      2.  Stable mild periportal and retroperitoneal lymphadenopathy, likely reactive to underlying liver disease.      CT Chest Pulmonary Embolism w Contrast   Final Result   IMPRESSION:   1.  No pulmonary emboli. No etiology for symptoms.   2.  Prominent hepatomegaly unchanged.          EKG:    Performed at: 20:40:53    Impression:   Sinus rhythm  Low voltage QRS  Cannot ule out Anteroseptal infarct (cited on or before 01-Jan-2023)    Rate: 77 bpm  Rhythm: Sinus  Axis: 68  GA Interval: 194 ms  QRS Interval: 104 ms  QTc Interval: 459  Comparison: when compared with ECG of 01-Aug-2023, questionable change in initial forces of Anteroseptal leads.    I have independently reviewed and interpreted the EKG(s) documented above.    PROCEDURES:   None      I, Garry Molina, am serving as a scribe to document services personally performed by Manny Louis MD based on my observation and the provider's statements to me. I, Dr. Manny Louis, attest that Garry Molina is acting in a scribe capacity, has observed my performance of the services and has documented them in accordance with my direction.    Manny Louis MD  Emergency Medicine  [facility] EMERGENCY DEPARTMENT  [address] [place] [id number] [phone]       Manny Louis MD  08/21/23 0019

## 2023-08-21 NOTE — DISCHARGE INSTRUCTIONS
Your EKG and blood tests are reassuring against acute coronary syndrome.  Your CT scan does not show any blood clots or evidence of infection.  I recommend you follow-up with rapid access heart clinic for further heart testing.  They will contact you with the next 2 days help arrange follow-up follow-up with your primary care doctor is well.  Return to the ER for worsening symptoms

## 2023-08-21 NOTE — ED NOTES
Bed: JNED-24  Expected date: 8/20/23  Expected time: 8:31 PM  Means of arrival: Ambulance  Comments:  Mehran 41 yo M CP

## 2023-08-21 NOTE — ED TRIAGE NOTES
Malik presents by [facility] EMS from [facility]. Chest pain started when watching youtube, mid sternal/left anterior chest. Reporting LUQ abdominal pain as well. 325mg Aspirin given by EMS. . Hx acid reflux and takes Prilosec.       Triage Assessment       Row Name 08/20/23 2039       Triage Assessment (Adult)    Airway WDL WDL       Respiratory WDL    Respiratory WDL WDL       Skin Circulation/Temperature WDL    Skin Circulation/Temperature WDL WDL       Cardiac WDL    Cardiac WDL X;chest pain       Chest Pain Assessment    Chest Pain Location midsternal;anterior chest, left    Chest Pain Radiation abdomen    Character sharp;stabbing    Duration 2 hours    Precipitating Factors at rest  watching [employer]

## 2023-08-25 ENCOUNTER — HOSPITAL ENCOUNTER (OUTPATIENT)
Dept: RADIOLOGY | Facility: HOSPITAL | Age: 43
Discharge: HOME | End: 2023-08-25
Attending: PHYSICIAN ASSISTANT
Payer: MEDICARE

## 2023-08-25 ENCOUNTER — HOSPITAL ENCOUNTER (OUTPATIENT)
Dept: SPEECH THERAPY | Facility: HOSPITAL | Age: 43
Discharge: HOME | End: 2023-08-25
Attending: PHYSICIAN ASSISTANT
Payer: MEDICARE

## 2023-08-25 DIAGNOSIS — R13.10 DYSPHAGIA, UNSPECIFIED TYPE: ICD-10-CM

## 2023-08-25 PROCEDURE — 92610 EVALUATE SWALLOWING FUNCTION: CPT | Mod: GN,59

## 2023-08-25 PROCEDURE — 74230 X-RAY XM SWLNG FUNCJ C+: CPT

## 2023-08-25 PROCEDURE — 92611 MOTION FLUOROSCOPY/SWALLOW: CPT | Mod: GN

## 2023-08-25 NOTE — PROGRESS NOTES
SPEECH LANGUAGE PATHOLOGY EVALUATION    See electronic medical record for Abuse and Falls Screening details.    Subjective      Presenting condition or subjective complaint: Dysphagia  Date of onset: 08/16/23    Relevant medical history:  Patient is a 42 year old male who presents with food momentarily pausing in his throat and difficulty breathing and swallowing at night. Patient denied coughing, choking, throat clearing, or change in vocal quality with oral intake. Pt reports no respiratory illnesses or PNAs.  Prior therapy history for the same diagnosis, illness or injury: No      Objective     SWALLOW EVALUTION  Dysphagia history: See above.  Current Diet/Method of Nutritional Intake: thin liquids (level 0), regular diet        CLINICAL SWALLOW EVALUATION  Clinical swallow evaluation completed prior to VFSS via records review, clinical interview and oral motor examination.     Oral Motor Function: generally intact  Dentition: natural dentition, adequate dentition  Secretion management: WFL  Mucosal quality: good  Mandibular function: intact  Oral labial function: WFL  Lingual function: WFL  Velar function: intact      ADDITIONAL EVAL COMPLETED TODAY : yes; rationale: to assess anatomy and physiology of swallowing as well as rule out silent aspiration    VIDEOFLUOROSCOPIC SWALLOW STUDY  Radiologist: Dr. Galeano  Views Taken: left lateral   Physical location of procedure: [facility]  Patient sitting upright on chair/stool     VFSS textures trialed:   VFSS Eval: Thin Liquids  Mode of Presentation: cup, straw, self-fed   Order of Presentation: 1, 2, 3, 4, 5, 12, 13  Preparatory Phase: WF  Oral Phase: premature pharyngeal entry  Bolus Location When Swallow Initiated: pyriforms  Pharyngeal Phase: WFL  Rosenbeck's Penetration Aspiration Scale: 2 - contrast enters airway, remains above the vocal cords, no residue remains (penetration)  Response to Aspiration:  N/A  Strategies and Compensations:  Attempted  smaller bolus size, which was unsuccessful at lessening the depth of penetration  Diagnostic Statement: No aspiration. Consistent moderate transient penetration. No stasis.    VFSS Eval: Purees  Mode of Presentation: spoon, self-fed   Order of Presentation: 6, 7, 8  Preparatory Phase: WFL  Oral Phase: premature pharyngeal entry  Bolus Location When Swallow Initiated: valleculae  Pharyngeal Phase: WFL  Rosenbeck s Penetration Aspiration Scale: 1 - no aspiration, contrast does not enter airway  Response to Aspiration:  N/A  Strategies and Compensations:  N/A  Diagnostic Statement: No aspiration or penetration. No stasis.    VFSS Eval: Solids  Mode of Presentation: self-fed   Order of Presentation: 9, 10, 11  Preparatory Phase: WFL  Oral Phase: premature pharyngeal entry  Bolus Location When Swallow Initiated: valleculae  Pharyngeal Phase:  Pharyngeal constriction paused at the vallecula with bolus pausing, but then continued WNL after    Rosenbeck s Penetration Aspiration Scale: 1 - no aspiration, contrast does not enter airway  Response to Aspiration:  N/A  Strategies and Compensations:  N/A  Diagnostic Statement: No aspiration or penetration. No stasis. 1x bolus paused at vallecula after initial swallow initiation, but then further contraction occurred.     ESOPHAGEAL PHASE OF SWALLOW  no observed or reported concerns related to esophageal function     SWALLOW ASSESSMENT CLINICAL IMPRESSIONS AND RATIONALE  Diet Consistency Recommendations: thin liquids (level 0), regular diet    Recommended Feeding/Eating Techniques: maintain upright sitting position for eating   Medication Administration Recommendations: Per patient preference    Assessment & Plan   CLINICAL IMPRESSIONS   Medical Diagnosis: Right-sided abdominal pain of unknown cause  Chronic constipation  Hepatic cirrhosis, unspecified hepatic cirrhosis type, unspecified whether ascites present (H)  Dysphagia, unspecified type    Treatment Diagnosis: Dysphagia    Impression/Assessment:   Video Swallow Study completed. Patient had no aspiration. Consistent moderate transient penetration with thin liquids. No penetration with puree or regular solids. Oropharyngeal swallow function is WNL. Tongue base retraction is WNL. Pharyngeal constriction, hyolaryngeal elevation and excursion were all WNL. Epiglottic inversion is delayed, but complete. Swallow response is reduced to the vallecula with solids and pyriforms with thin liquids. Mastication is safe and complete. Cricopharyngeal function is WFL. Patient with x1 occurrence of the bolus pausing at the vallecula mid-swallow without full contraction, but then completed with no oral stasis. Patient reports this is the symptom that prompted the VFSS. No functional impact on swallowing observed. Recommend Regular diet and Thin liquids when sitting fully upright for all oral intake.      PLAN OF CARE  Education Assessment:   Learner/Method: Patient;Listening;Pictures/Video;No Barriers to Learning  Education Comments: Patient verbalized understanding for purpose, results, and recommendations of evaluation. Pt asked appropriate questions and all were answered to their satisfaction.    Risks and benefits of evaluation/treatment have been explained.   Patient/Family/caregiver agrees with Plan of Care.     Evaluation Time:    SLP Eval: oral/pharyngeal swallow function, clinical minutes (12324): 15  SLP Eval: VideoFluoroscopic Swallow function Minutes (23637): 10      Signing Clinician: PRANEETH Torres    [facility]                                                                                   OUTPATIENT SPEECH LANGUAGE PATHOLOGY      PLAN OF TREATMENT FOR OUTPATIENT REHABILITATION   Patient's Last Name, First Name, Warren Epperson YOB: 1980   Provider's Name   [facility]   Medical Record No.  [id number]     Onset Date: 08/16/23 Start of  Care Date: 08/25/23     Medical Diagnosis:  Right-sided abdominal pain of unknown cause  Chronic constipation  Hepatic cirrhosis, unspecified hepatic cirrhosis type, unspecified whether ascites present (H)  Dysphagia, unspecified type      SLP Treatment Diagnosis: Dysphagia  Plan of Treatment  Frequency/Duration: Evaluation only  / Evaluation only     Certification date from 08/25/23   To 08/25/23          See note for plan of treatment details and functional goals     Elaine Corbett, SLP                         I CERTIFY THE NEED FOR THESE SERVICES FURNISHED UNDER        THIS PLAN OF TREATMENT AND WHILE UNDER MY CARE .             Physician Signature               Date    X_____________________________________________________                    Referring Provider:  Abbey Ames      Initial Assessment  See Epic Evaluation- 08/25/23

## 2023-08-28 ENCOUNTER — HOSPITAL ENCOUNTER (EMERGENCY)
Facility: HOSPITAL | Age: 43
Discharge: NURSING, REHAB OR LONG-TERM CARE | End: 2023-08-28
Attending: EMERGENCY MEDICINE | Admitting: EMERGENCY MEDICINE
Payer: MEDICARE

## 2023-08-28 VITALS
DIASTOLIC BLOOD PRESSURE: 59 MMHG | OXYGEN SATURATION: 95 % | HEART RATE: 63 BPM | SYSTOLIC BLOOD PRESSURE: 118 MMHG | HEIGHT: 65 IN | TEMPERATURE: 98.5 F | BODY MASS INDEX: 46.15 KG/M2 | WEIGHT: 277 LBS | RESPIRATION RATE: 16 BRPM

## 2023-08-28 DIAGNOSIS — R07.2 SUBSTERNAL CHEST PAIN: ICD-10-CM

## 2023-08-28 LAB
ALBUMIN SERPL BCG-MCNC: 4.3 G/DL (ref 3.5–5.2)
ALP SERPL-CCNC: 257 U/L (ref 40–129)
ALT SERPL W P-5'-P-CCNC: 22 U/L (ref 0–70)
ANION GAP SERPL CALCULATED.3IONS-SCNC: 13 MMOL/L (ref 7–15)
AST SERPL W P-5'-P-CCNC: 23 U/L (ref 0–45)
BASOPHILS # BLD AUTO: 0.1 10E3/UL (ref 0–0.2)
BASOPHILS NFR BLD AUTO: 1 %
BILIRUB DIRECT SERPL-MCNC: 0.27 MG/DL (ref 0–0.3)
BILIRUB SERPL-MCNC: 0.7 MG/DL
BUN SERPL-MCNC: 14.2 MG/DL (ref 6–20)
CALCIUM SERPL-MCNC: 9.7 MG/DL (ref 8.6–10)
CHLORIDE SERPL-SCNC: 99 MMOL/L (ref 98–107)
CREAT SERPL-MCNC: 0.95 MG/DL (ref 0.67–1.17)
DEPRECATED HCO3 PLAS-SCNC: 26 MMOL/L (ref 22–29)
EOSINOPHIL # BLD AUTO: 0.5 10E3/UL (ref 0–0.7)
EOSINOPHIL NFR BLD AUTO: 3 %
ERYTHROCYTE [DISTWIDTH] IN BLOOD BY AUTOMATED COUNT: 16.5 % (ref 10–15)
GFR SERPL CREATININE-BSD FRML MDRD: >90 ML/MIN/1.73M2
GLUCOSE SERPL-MCNC: 130 MG/DL (ref 70–99)
HCT VFR BLD AUTO: 42.2 % (ref 40–53)
HGB BLD-MCNC: 13.7 G/DL (ref 13.3–17.7)
HOLD SPECIMEN: NORMAL
HOLD SPECIMEN: NORMAL
IMM GRANULOCYTES # BLD: 0.1 10E3/UL
IMM GRANULOCYTES NFR BLD: 1 %
LIPASE SERPL-CCNC: 18 U/L (ref 13–60)
LYMPHOCYTES # BLD AUTO: 3.1 10E3/UL (ref 0.8–5.3)
LYMPHOCYTES NFR BLD AUTO: 21 %
MCH RBC QN AUTO: 28.8 PG (ref 26.5–33)
MCHC RBC AUTO-ENTMCNC: 32.5 G/DL (ref 31.5–36.5)
MCV RBC AUTO: 89 FL (ref 78–100)
MONOCYTES # BLD AUTO: 1.1 10E3/UL (ref 0–1.3)
MONOCYTES NFR BLD AUTO: 8 %
NEUTROPHILS # BLD AUTO: 9.6 10E3/UL (ref 1.6–8.3)
NEUTROPHILS NFR BLD AUTO: 66 %
NRBC # BLD AUTO: 0 10E3/UL
NRBC BLD AUTO-RTO: 0 /100
PLATELET # BLD AUTO: 279 10E3/UL (ref 150–450)
POTASSIUM SERPL-SCNC: 4.1 MMOL/L (ref 3.4–5.3)
PROT SERPL-MCNC: 7.6 G/DL (ref 6.4–8.3)
RBC # BLD AUTO: 4.76 10E6/UL (ref 4.4–5.9)
SODIUM SERPL-SCNC: 138 MMOL/L (ref 136–145)
TROPONIN T SERPL HS-MCNC: 19 NG/L
WBC # BLD AUTO: 14.5 10E3/UL (ref 4–11)

## 2023-08-28 PROCEDURE — 36415 COLL VENOUS BLD VENIPUNCTURE: CPT | Performed by: STUDENT IN AN ORGANIZED HEALTH CARE EDUCATION/TRAINING PROGRAM

## 2023-08-28 PROCEDURE — 80053 COMPREHEN METABOLIC PANEL: CPT | Performed by: STUDENT IN AN ORGANIZED HEALTH CARE EDUCATION/TRAINING PROGRAM

## 2023-08-28 PROCEDURE — 84484 ASSAY OF TROPONIN QUANT: CPT | Performed by: EMERGENCY MEDICINE

## 2023-08-28 PROCEDURE — 250N000011 HC RX IP 250 OP 636: Performed by: EMERGENCY MEDICINE

## 2023-08-28 PROCEDURE — 96375 TX/PRO/DX INJ NEW DRUG ADDON: CPT

## 2023-08-28 PROCEDURE — 85025 COMPLETE CBC W/AUTO DIFF WBC: CPT | Performed by: EMERGENCY MEDICINE

## 2023-08-28 PROCEDURE — 93005 ELECTROCARDIOGRAM TRACING: CPT | Performed by: EMERGENCY MEDICINE

## 2023-08-28 PROCEDURE — 84484 ASSAY OF TROPONIN QUANT: CPT | Performed by: STUDENT IN AN ORGANIZED HEALTH CARE EDUCATION/TRAINING PROGRAM

## 2023-08-28 PROCEDURE — 93005 ELECTROCARDIOGRAM TRACING: CPT | Performed by: STUDENT IN AN ORGANIZED HEALTH CARE EDUCATION/TRAINING PROGRAM

## 2023-08-28 PROCEDURE — 83690 ASSAY OF LIPASE: CPT | Performed by: EMERGENCY MEDICINE

## 2023-08-28 PROCEDURE — 85025 COMPLETE CBC W/AUTO DIFF WBC: CPT | Performed by: STUDENT IN AN ORGANIZED HEALTH CARE EDUCATION/TRAINING PROGRAM

## 2023-08-28 PROCEDURE — 99284 EMERGENCY DEPT VISIT MOD MDM: CPT | Mod: 25

## 2023-08-28 PROCEDURE — 96374 THER/PROPH/DIAG INJ IV PUSH: CPT

## 2023-08-28 PROCEDURE — 82248 BILIRUBIN DIRECT: CPT | Performed by: EMERGENCY MEDICINE

## 2023-08-28 PROCEDURE — 80048 BASIC METABOLIC PNL TOTAL CA: CPT | Performed by: EMERGENCY MEDICINE

## 2023-08-28 RX ORDER — KETOROLAC TROMETHAMINE 15 MG/ML
15 INJECTION, SOLUTION INTRAMUSCULAR; INTRAVENOUS ONCE
Status: COMPLETED | OUTPATIENT
Start: 2023-08-28 | End: 2023-08-28

## 2023-08-28 RX ADMIN — KETOROLAC TROMETHAMINE 15 MG: 15 INJECTION INTRAMUSCULAR; INTRAVENOUS at 22:47

## 2023-08-28 RX ADMIN — FAMOTIDINE 20 MG: 10 INJECTION, SOLUTION INTRAVENOUS at 22:45

## 2023-08-28 ASSESSMENT — ENCOUNTER SYMPTOMS
VOMITING: 0
DIZZINESS: 1
NAUSEA: 0
SHORTNESS OF BREATH: 0

## 2023-08-29 NOTE — DISCHARGE INSTRUCTIONS
EKG, cardiac enzymes, and laboratory test all appear reassuring here today in the ED.  Your chest pain is likely related to a musculoskeletal cause.  You can continue using over-the-counter ibuprofen as needed for any further pain.  Follow-up with your primary care provider for reevaluation or return back to ED sooner for any worsening pain or any other new or concerning symptoms.

## 2023-08-29 NOTE — ED PROVIDER NOTES
EMERGENCY DEPARTMENT ENCOUNTER      NAME: Warren Jaramillo  AGE: 42 year old male  YOB: 1980  MRN: [id number]  EVALUATION DATE & TIME: No admission date for patient encounter.    PCP: Aundrea Montoya    ED PROVIDER: Uriel Payan DO      Chief Complaint   Patient presents with    Chest Pain         FINAL IMPRESSION:  1. Substernal chest pain          ED COURSE & MEDICAL DECISION MAKIN-year-old male presented to the ED for evaluation of substernal/epigastric pain that began while at rest earlier today.  The patient denied any other associated symptoms.  Here in the ED the patient was hemodynamic stable upon arrival.  He did not appear to be in any obvious distress or discomfort.  On exam the patient's pain was reproducible to palpation over the lower sternum and epigastric region.  He did not have evidence of an acute abdomen, however.    Following his initial evaluation the patient was given dose of IV Toradol and famotidine.    The patient's EKG revealed normal sinus rhythm without any new or concerning ST or T wave changes.     The patient's white blood cell count was elevated at 14.5.  Reviewing his previous laboratory results the last few weeks, the patient always appears to have an elevated white blood cell count of unknown etiology.  The remainder the CBC was unremarkable.  BMP, hepatic panel, lipase, and troponin were all reassuring.    Chest x-ray was ordered but the patient declined this.    The patient was reevaluated and informed of the reassuring lab and EKG results.  The patient stated that he was feeling better after receiving the IV Toradol and famotidine.  Because his chest pain was reproducible to palpation over the chest wall the patient was informed that the etiology is musculoskeletal.  The patient was instructed to use over-the-counter ibuprofen as needed for any further pain.  The patient was instructed to follow-up with his primary care provider for  reevaluation or to return back to ED sooner for any worsening pain or any other new or concerning symptoms.    Pertinent Labs & Imaging studies reviewed. (See chart for details)  10:24 PM I met with the patient to gather history and to perform my initial exam. We discussed plans for the ED course, including diagnostic testing and treatment.       At the conclusion of the encounter I discussed the results of all of the tests and the disposition. The questions were answered. The patient or family acknowledged understanding and was agreeable with the care plan.     Medical Decision Making    History:  Supplemental history from: Documented in chart, if applicable  External Record(s) reviewed: Documented in chart, if applicable.    Work Up:  Chart documentation includes differential considered and any EKGs or imaging independently interpreted by provider, where specified.  In additional to work up documented, I considered the following work up: Documented in chart, if applicable.    External consultation:  Discussion of management with another provider: Documented in chart, if applicable    Complicating factors:  Care impacted by chronic illness: Diabetes, Hypertension, Mental Health, and Other: chronic insomnia, chronic constipation  Care affected by social determinants of health: N/A    Disposition considerations: Discharge. No recommendations on prescription strength medication(s). See documentation for any additional details.      PPE worn: n95 mask, goggles    MEDICATIONS GIVEN IN THE EMERGENCY:  Medications   ketorolac (TORADOL) injection 15 mg (15 mg Intravenous $Given 8/28/23 2247)   famotidine (PEPCID) injection 20 mg (20 mg Intravenous $Given 8/28/23 2245)       NEW PRESCRIPTIONS STARTED AT TODAY'S ER VISIT  New Prescriptions    No medications on file          =================================================================    HPI    Patient information was obtained from: the patient    Use of : N/A  "        Warren Jaramillo is a 42 year old male with a pertinent history of autism, fetal alcohol syndrome, type 2 diabetes, mood disorder, chest pain, and atrial fibrillation who presents to this ED via EMS for evaluation of chest pain.    Patient presents with centralized sharp chest pain that started at around 1800. He states that he felt dizzy and has been \"foggy.\" His chest pain began when he was sitting at home watching YouTube. He measured his blood pressure to be 144/75 which prompted him to come to the ED.  He denies shortness of breath, nausea, vomiting, and any other symptoms at this time.    REVIEW OF SYSTEMS   Review of Systems   Respiratory:  Negative for shortness of breath.    Cardiovascular:  Positive for chest pain.   Gastrointestinal:  Negative for nausea and vomiting.   Neurological:  Positive for dizziness.   All other systems reviewed and are negative.       PAST MEDICAL HISTORY:  Past Medical History:   Diagnosis Date    Rojas's disease        PAST SURGICAL HISTORY:  Past Surgical History:   Procedure Laterality Date    COLONOSCOPY      ESOPHAGOSCOPY, GASTROSCOPY, DUODENOSCOPY (EGD), COMBINED N/A 7/21/2023    Procedure: ESOPHAGOGASTRODUODENOSCOPY WITH GASTRIC AND ESOPHAGEAL BIOPSIES;  Surgeon: Filiberto Aragon MD;  Location: [facility] OR    TOOTH EXTRACTION             CURRENT MEDICATIONS:    acetaminophen (TYLENOL) 32 mg/mL liquid  acetaminophen (TYLENOL) 500 MG tablet  albuterol (PROVENTIL) (2.5 MG/3ML) 0.083% neb solution  aloe vera GEL  bacitracin 500 UNIT/GM OINT  Calamine external lotion  carbamide peroxide (DEBROX) 6.5 % otic solution  clotrimazole (LOTRIMIN) 1 % external cream  cyclobenzaprine (FLEXERIL) 10 MG tablet  diclofenac (VOLTAREN) 1 % topical gel  diclofenac (VOLTAREN) 75 MG EC tablet  escitalopram (LEXAPRO) 10 MG tablet  fluticasone-vilanterol (BREO ELLIPTA) 200-25 MCG/ACT inhaler  furosemide (LASIX) 20 MG tablet  hydrocortisone 1 % CREA cream  ibuprofen " (ADVIL/MOTRIN) 100 MG/5ML suspension  ibuprofen (ADVIL/MOTRIN) 200 MG tablet  lisinopril (ZESTRIL) 10 MG tablet  LORazepam (ATIVAN) 1 MG tablet  melatonin 3 MG tablet  metFORMIN (GLUCOPHAGE) 1000 MG tablet  montelukast (SINGULAIR) 10 MG tablet  nicotine (NICODERM CQ) 21 MG/24HR 24 hr patch  OLANZapine (ZYPREXA) 10 MG tablet  omeprazole (PRILOSEC) 20 MG DR capsule  ondansetron (ZOFRAN) 4 MG tablet  oxybutynin ER (DITROPAN XL) 10 MG 24 hr tablet  polyethylene glycol (MIRALAX) 17 g packet  psyllium (METAMUCIL) 28.3 % packet  rosuvastatin (CRESTOR) 10 MG tablet  sodium phosphate (FLEET ENEMA) 7-19 GM/118ML rectal enema  spironolactone (ALDACTONE) 50 MG tablet  zinc oxide (DESITIN) 20 % external ointment        ALLERGIES:  Allergies   Allergen Reactions    Apricot Flavor Anaphylaxis    Banana Anaphylaxis     Throat swelling  Throat swelling      Wasp Venom Protein Shortness Of Breath     Other reaction(s): Respiratory Distress  Has an epi pen  Has an epi pen      Bees Anaphylaxis     Have an Epi pen that carries with    Methylphenidate Itching     Other reaction(s): Nightmares    Prunus      Other reaction(s): *Unknown    Sulfa Antibiotics      Headaches and nausea    Prunus Persica Rash     Other reaction(s): *Unknown       FAMILY HISTORY:  Family History   Problem Relation Age of Onset    Unknown/Adopted Father     Unknown/Adopted Maternal Grandmother     C.A.D. Maternal Grandfather     Diabetes Maternal Grandfather     Cerebrovascular Disease Maternal Grandfather     Unknown/Adopted Paternal Grandmother     Unknown/Adopted Paternal Grandfather     Unknown/Adopted Brother     Unknown/Adopted Sister        SOCIAL HISTORY:   Social History     Socioeconomic History    Marital status: Single   Tobacco Use    Smoking status: Every Day     Packs/day: 1.00     Types: Cigarettes    Smokeless tobacco: Current    Tobacco comments:     occasional pouch of chewing tobacco   Substance and Sexual Activity    Alcohol use: No      "Comment: once every 3 months    Drug use: No    Sexual activity: Never     Partners: Female   Other Topics Concern     Service No    Blood Transfusions No    Caffeine Concern No    Occupational Exposure No    Hobby Hazards No    Sleep Concern No    Stress Concern Yes     Comment: sometimes    Weight Concern No    Special Diet Yes     Comment: counting carbs    Back Care No    Exercise Yes    Seat Belt Yes    Self-Exams Yes       VITALS:  /53   Pulse 84   Temp 98.5  F (36.9  C) (Oral)   Resp 22   Ht 1.651 m (5' 5\")   Wt 125.6 kg (277 lb)   SpO2 96%   BMI 46.10 kg/m      PHYSICAL EXAM    General presentation: Alert, Vital signs reviewed. NAD  HENT: ENT inspection is normal. Oropharynx is moist and clear.   Eye: Pupils are equal and reactive to light. EOMI  Neck: The neck is supple, with full ROM, with no evidence of meningismus.  Pulmonary: Currently in no acute respiratory distress. Normal, non labored respirations, the lung sounds are normal with good equal air movement. Clear to auscultation bilaterally.   Circulatory: Regular rate and rhythm. Peripheral pulses are strong and equal. No murmurs, rubs, or gallops.   Abdominal: The abdomen is soft. Nontender. No rigidity, guarding, or rebound. Bowel sounds normal. Tenderness to palpation to lower sternum and epigastric region.  Neurologic: Alert, oriented to person, place, and time. No motor deficit. No sensory deficit. Cranial nerves II through XII are intact.  Musculoskeletal: No extremity tenderness. Full range of motion in all extremities. No extremity edema.   Skin: Skin color is normal. No rash. Warm. Dry to touch.     LAB:  All pertinent labs reviewed and interpreted.  Results for orders placed or performed during the hospital encounter of 08/28/23   Basic metabolic panel   Result Value Ref Range    Sodium 138 136 - 145 mmol/L    Potassium 4.1 3.4 - 5.3 mmol/L    Chloride 99 98 - 107 mmol/L    Carbon Dioxide (CO2) 26 22 - 29 mmol/L    Anion " Gap 13 7 - 15 mmol/L    Urea Nitrogen 14.2 6.0 - 20.0 mg/dL    Creatinine 0.95 0.67 - 1.17 mg/dL    Calcium 9.7 8.6 - 10.0 mg/dL    Glucose 130 (H) 70 - 99 mg/dL    GFR Estimate >90 >60 mL/min/1.73m2   Result Value Ref Range    Troponin T, High Sensitivity 19 <=22 ng/L   Extra Blue Top Tube   Result Value Ref Range    Hold Specimen JIC    Extra Red Top Tube   Result Value Ref Range    Hold Specimen JIC    CBC with platelets and differential   Result Value Ref Range    WBC Count 14.5 (H) 4.0 - 11.0 10e3/uL    RBC Count 4.76 4.40 - 5.90 10e6/uL    Hemoglobin 13.7 13.3 - 17.7 g/dL    Hematocrit 42.2 40.0 - 53.0 %    MCV 89 78 - 100 fL    MCH 28.8 26.5 - 33.0 pg    MCHC 32.5 31.5 - 36.5 g/dL    RDW 16.5 (H) 10.0 - 15.0 %    Platelet Count 279 150 - 450 10e3/uL    % Neutrophils 66 %    % Lymphocytes 21 %    % Monocytes 8 %    % Eosinophils 3 %    % Basophils 1 %    % Immature Granulocytes 1 %    NRBCs per 100 WBC 0 <1 /100    Absolute Neutrophils 9.6 (H) 1.6 - 8.3 10e3/uL    Absolute Lymphocytes 3.1 0.8 - 5.3 10e3/uL    Absolute Monocytes 1.1 0.0 - 1.3 10e3/uL    Absolute Eosinophils 0.5 0.0 - 0.7 10e3/uL    Absolute Basophils 0.1 0.0 - 0.2 10e3/uL    Absolute Immature Granulocytes 0.1 <=0.4 10e3/uL    Absolute NRBCs 0.0 10e3/uL   Hepatic function panel   Result Value Ref Range    Protein Total 7.6 6.4 - 8.3 g/dL    Albumin 4.3 3.5 - 5.2 g/dL    Bilirubin Total 0.7 <=1.2 mg/dL    Alkaline Phosphatase 257 (H) 40 - 129 U/L    AST 23 0 - 45 U/L    ALT 22 0 - 70 U/L    Bilirubin Direct 0.27 0.00 - 0.30 mg/dL   Result Value Ref Range    Lipase 18 13 - 60 U/L       RADIOLOGY:  Reviewed all pertinent imaging. Please see official radiology report.  Chest XR,  PA & LAT    (Results Pending)       EKG:    Normal sinus rhythm.  Rate of 68.  Normal QRS.  Normal QT.  No ST or T wave changes.  Compared to the EKG on 8/20/2023 no significant changes are noted.    I have independently reviewed and interpreted the EKG(s) documented  above.        I, Grary Molina , am serving as a scribe to document services personally performed by Uriel Payan DO based on my observation and the provider's statements to me. I, Uriel Payan, attest that Garry Molina is acting in a scribe capacity, has observed my performance of the services and has documented them in accordance with my direction.    Uriel Payan DO  Emergency Medicine  [facility] EMERGENCY DEPARTMENT  [address] [place] [id number] [phone]       Uriel Payan DO  08/28/23 9808

## 2023-08-29 NOTE — ED TRIAGE NOTES
"Pt arrives with [facility] from his group home for evaluation of chest pain that started around 1800. Pt states that he has felt \"off all day\" really tired. Chest pain is mid sternal and does not radiate, 7/10. Denies any SOB or nausea. EMS gave 1 nitroglycerin with initial improvement and a full ASA. . EKG en route NSR. VSS.      Triage Assessment       Row Name 08/28/23 2018       Triage Assessment (Adult)    Airway WDL WDL       Respiratory WDL    Respiratory WDL WDL       Skin Circulation/Temperature WDL    Skin Circulation/Temperature WDL WDL       Cardiac WDL    Cardiac WDL X;chest pain       Chest Pain Assessment    Chest Pain Location midsternal    Character sharp;stabbing                    "

## 2023-08-30 ENCOUNTER — THERAPY VISIT (OUTPATIENT)
Dept: PHYSICAL THERAPY | Facility: REHABILITATION | Age: 43
End: 2023-08-30
Payer: MEDICARE

## 2023-08-30 DIAGNOSIS — M25.551 ACUTE RIGHT HIP PAIN: Primary | ICD-10-CM

## 2023-08-30 PROCEDURE — 97110 THERAPEUTIC EXERCISES: CPT | Mod: GP | Performed by: PHYSICAL THERAPIST

## 2023-09-01 LAB
ATRIAL RATE - MUSE: 68 BPM
DIASTOLIC BLOOD PRESSURE - MUSE: NORMAL MMHG
INTERPRETATION ECG - MUSE: NORMAL
P AXIS - MUSE: 67 DEGREES
PR INTERVAL - MUSE: 186 MS
QRS DURATION - MUSE: 108 MS
QT - MUSE: 404 MS
QTC - MUSE: 429 MS
R AXIS - MUSE: 91 DEGREES
SYSTOLIC BLOOD PRESSURE - MUSE: NORMAL MMHG
T AXIS - MUSE: 81 DEGREES
VENTRICULAR RATE- MUSE: 68 BPM

## 2023-09-04 ENCOUNTER — HOSPITAL ENCOUNTER (EMERGENCY)
Facility: HOSPITAL | Age: 43
Discharge: HOME | End: 2023-09-05
Attending: EMERGENCY MEDICINE | Admitting: EMERGENCY MEDICINE
Payer: MEDICARE
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
## 2023-09-04 PROCEDURE — 99285 EMERGENCY DEPT VISIT HI MDM: CPT | Mod: 25

## 2023-09-04 RX ORDER — ACETAMINOPHEN 325 MG/1
650 TABLET ORAL ONCE
Status: COMPLETED | OUTPATIENT
Start: 2023-09-04 | End: 2023-09-05

## 2023-09-05 ENCOUNTER — APPOINTMENT (OUTPATIENT)
Dept: RADIOLOGY | Facility: HOSPITAL | Age: 43
End: 2023-09-05
Payer: MEDICARE

## 2023-09-05 ENCOUNTER — APPOINTMENT (OUTPATIENT)
Dept: CT IMAGING | Facility: HOSPITAL | Age: 43
End: 2023-09-05
Payer: MEDICARE

## 2023-09-05 VITALS
BODY MASS INDEX: 46.28 KG/M2 | DIASTOLIC BLOOD PRESSURE: 60 MMHG | RESPIRATION RATE: 16 BRPM | WEIGHT: 277.8 LBS | SYSTOLIC BLOOD PRESSURE: 122 MMHG | HEIGHT: 65 IN | OXYGEN SATURATION: 96 % | TEMPERATURE: 97 F | HEART RATE: 79 BPM

## 2023-09-05 PROCEDURE — G1010 CDSM STANSON: HCPCS

## 2023-09-05 PROCEDURE — 250N000013 HC RX MED GY IP 250 OP 250 PS 637

## 2023-09-05 PROCEDURE — 73030 X-RAY EXAM OF SHOULDER: CPT | Mod: RT

## 2023-09-05 RX ADMIN — ACETAMINOPHEN 650 MG: 325 TABLET ORAL at 00:11

## 2023-09-05 ASSESSMENT — ACTIVITIES OF DAILY LIVING (ADL): ADLS_ACUITY_SCORE: 37

## 2023-09-05 NOTE — ED PROVIDER NOTES
EMERGENCY DEPARTMENT ENCOUNTER      NAME: Warren Jaramillo  AGE: 42 year old male  YOB: 1980  MRN: [id number]  EVALUATION DATE & TIME: No admission date for patient encounter.    PCP: Aundrea Montoya    ED PROVIDER: Alesha Ceja PA-C      Chief Complaint   Patient presents with    Fall     FINAL IMPRESSION:  No diagnosis found.    ED COURSE & MEDICAL DECISION MAKING:    Pertinent Labs & Imaging studies reviewed. (See chart for details)  42 year old male presents to the Emergency Department for evaluation of fall. Earlier tonight while getting into bed patient accidentally slipped on water and fell onto his bed.  Patient reports that he hit his head on his mattress.  He did not hit his head on the floor.  No blood thinners.  No loss of consciousness.  Denies having any dizziness, chest pain, lightheadedness prior to the fall.  Patient complains of head, neck, shoulders and lower back pain.  He was able to walk after the fall.  He has not taken anything for his pain. Vital signs reviewed and unremarkable.  Afebrile.  On exam, scalp is nontender to palpation.  Cervical, thoracic, lumbar, sacral paraspinal muscles and spinous process are nontender to palpation.  All 4 extremities are nontender to palpation.  Normal range of motion, sensation and strength of all 4 extremities.  No overlying erythema, edema, ecchymosis.  No lacerations or abrasions.  No warmth.  Pulses are 2+.  GCS is 15.  Cranial nerves II through XII intact.    CT of the head, CT of the lumbar spine and CT of the cervical spine are pending.  X-ray of bilateral shoulder are pending.  Patient placed in a c-collar.  Patient was given Tylenol for pain.  Patient will be signed out to Dr. Pruett pending imaging and disposition.      ED COURSE:   11:13 PM I introduced myself to the patient, obtained patient history, performed a physical exam, and discussed plan for ED workup including potential diagnostic  laboratory/imaging studies and interventions.   12:14 AM  I signed out to Dr. Pruett.     At the conclusion of the encounter I discussed the results of all of the tests and the disposition. The questions were answered. The patient or family acknowledged understanding and was agreeable with the care plan.     0 minutes of critical care time       Additional Documentation    History:  Supplemental history from: Documented in chart, if applicable  External Record(s) reviewed: Documented in chart, if applicable.    Work Up:  Chart documentation includes differential considered and any EKGs or imaging interpreted by provider.  In additional to work up documented, I considered the following work up: Documented in chart, if applicable.    External consultation:  Discussion of management with another provider: Documented in chart, if applicable    Complicating factors:  Care impacted by chronic illness: N/A  Care affected by social determinants of health: N/A    Disposition considerations:  Signed out      MEDICATIONS GIVEN IN THE EMERGENCY:  Medications   acetaminophen (TYLENOL) tablet 650 mg (650 mg Oral $Given 9/5/23 0011)       NEW PRESCRIPTIONS STARTED AT TODAY'S ER VISIT  New Prescriptions    No medications on file       =================================================================    HPI    Patient information was obtained from: Patient and EMS    Use of : N/A     Warren Jaramillo is a 42 year old male with a pertinent history of autism, type 2 diabetes, dysphagia, HTN who presents to this ED ambulance for evaluation after a fall    The patient is coming from [facility] home. The patient had mechanical fell while trying to get into bed while in the dark. He notes that he was backing into bed when he slipped on a puddle of water. He hit his back against the bed. His neck hit the mattress, shoulder blades hit the box spring and his buttock hit the floor. They hit their head on the mattress, and did not  lose consciousness. The patient endorses pain to their head, neck, shoulders, and back. He was able to walk with assistance after the fall. He has not taken anything for their pain without improvement. He is not anticoagulated but takes aspirin prn. He did not take any aspirin today.     Denies chest pain, dizziness, lightheadedness, and any other complaints at this time.    REVIEW OF SYSTEMS   See HPI    PAST MEDICAL HISTORY:  Past Medical History:   Diagnosis Date    Rojas's disease        PAST SURGICAL HISTORY:  Past Surgical History:   Procedure Laterality Date    COLONOSCOPY      ESOPHAGOSCOPY, GASTROSCOPY, DUODENOSCOPY (EGD), COMBINED N/A 7/21/2023    Procedure: ESOPHAGOGASTRODUODENOSCOPY WITH GASTRIC AND ESOPHAGEAL BIOPSIES;  Surgeon: Filiberto Aragon MD;  Location: [facility] OR    TOOTH EXTRACTION             CURRENT MEDICATIONS:    acetaminophen (TYLENOL) 32 mg/mL liquid  acetaminophen (TYLENOL) 500 MG tablet  albuterol (PROVENTIL) (2.5 MG/3ML) 0.083% neb solution  aloe vera GEL  bacitracin 500 UNIT/GM OINT  Calamine external lotion  carbamide peroxide (DEBROX) 6.5 % otic solution  clotrimazole (LOTRIMIN) 1 % external cream  cyclobenzaprine (FLEXERIL) 10 MG tablet  diclofenac (VOLTAREN) 1 % topical gel  diclofenac (VOLTAREN) 75 MG EC tablet  escitalopram (LEXAPRO) 10 MG tablet  fluticasone-vilanterol (BREO ELLIPTA) 200-25 MCG/ACT inhaler  furosemide (LASIX) 20 MG tablet  hydrocortisone 1 % CREA cream  ibuprofen (ADVIL/MOTRIN) 100 MG/5ML suspension  ibuprofen (ADVIL/MOTRIN) 200 MG tablet  lisinopril (ZESTRIL) 10 MG tablet  LORazepam (ATIVAN) 1 MG tablet  melatonin 3 MG tablet  metFORMIN (GLUCOPHAGE) 1000 MG tablet  montelukast (SINGULAIR) 10 MG tablet  OLANZapine (ZYPREXA) 10 MG tablet  omeprazole (PRILOSEC) 20 MG DR capsule  ondansetron (ZOFRAN) 4 MG tablet  oxybutynin ER (DITROPAN XL) 10 MG 24 hr tablet  polyethylene glycol (MIRALAX) 17 g packet  psyllium (METAMUCIL) 28.3 % packet  rosuvastatin  "(CRESTOR) 10 MG tablet  sodium phosphate (FLEET ENEMA) 7-19 GM/118ML rectal enema  spironolactone (ALDACTONE) 50 MG tablet  zinc oxide (DESITIN) 20 % external ointment        ALLERGIES:  Allergies   Allergen Reactions    Apricot Flavor Anaphylaxis    Banana Anaphylaxis     Throat swelling  Throat swelling      Wasp Venom Protein Shortness Of Breath     Other reaction(s): Respiratory Distress  Has an epi pen  Has an epi pen      Bees Anaphylaxis     Have an Epi pen that carries with    Methylphenidate Itching     Other reaction(s): Nightmares    Prunus      Other reaction(s): *Unknown    Sulfa Antibiotics      Headaches and nausea    Prunus Persica Rash     Other reaction(s): *Unknown       FAMILY HISTORY:  Family History   Problem Relation Age of Onset    Unknown/Adopted Father     Unknown/Adopted Maternal Grandmother     C.A.D. Maternal Grandfather     Diabetes Maternal Grandfather     Cerebrovascular Disease Maternal Grandfather     Unknown/Adopted Paternal Grandmother     Unknown/Adopted Paternal Grandfather     Unknown/Adopted Brother     Unknown/Adopted Sister        SOCIAL HISTORY:   Social History     Socioeconomic History    Marital status: Single   Tobacco Use    Smoking status: Every Day     Packs/day: 1.00     Types: Cigarettes    Smokeless tobacco: Current    Tobacco comments:     occasional pouch of chewing tobacco   Substance and Sexual Activity    Alcohol use: No     Comment: once every 3 months    Drug use: No    Sexual activity: Never     Partners: Female   Other Topics Concern     Service No    Blood Transfusions No    Caffeine Concern No    Occupational Exposure No    Hobby Hazards No    Sleep Concern No    Stress Concern Yes     Comment: sometimes    Weight Concern No    Special Diet Yes     Comment: counting carbs    Back Care No    Exercise Yes    Seat Belt Yes    Self-Exams Yes       VITALS:  /72   Pulse 85   Temp 97  F (36.1  C)   Resp 16   Ht 1.651 m (5' 5\")   Wt 126 kg " (277 lb 12.8 oz)   SpO2 96%   BMI 46.23 kg/m      PHYSICAL EXAM    Physical Exam  Vitals and nursing note reviewed.   Constitutional:       Appearance: Normal appearance.   HENT:      Head: Atraumatic.      Comments: Scalp non-tender     Right Ear: External ear normal.      Left Ear: External ear normal.      Nose: Nose normal.      Mouth/Throat:      Mouth: Mucous membranes are moist.   Eyes:      Conjunctiva/sclera: Conjunctivae normal.      Pupils: Pupils are equal, round, and reactive to light.   Cardiovascular:      Rate and Rhythm: Normal rate and regular rhythm.      Pulses: Normal pulses.      Heart sounds: Normal heart sounds. No murmur heard.     No friction rub. No gallop.   Pulmonary:      Effort: Pulmonary effort is normal.      Breath sounds: Normal breath sounds. No wheezing or rales.   Abdominal:      Tenderness: There is no abdominal tenderness. There is no guarding or rebound.   Musculoskeletal:         General: Normal range of motion.      Cervical back: Normal range of motion.      Right lower leg: No edema.      Left lower leg: No edema.      Comments: Scalp is nontender to palpation.  Cervical, thoracic, lumbar, sacral paraspinal muscles and spinous process are nontender to palpation.  Normal range of motion, sensation and strength of all 4 extremities.  Pulses are 2+ bilaterally.  No overlying erythema, edema, ecchymosis.  No lacerations or abrasions.  No warmth.   Skin:     General: Skin is dry.   Neurological:      Mental Status: He is alert. Mental status is at baseline.      GCS: GCS eye subscore is 4. GCS verbal subscore is 5. GCS motor subscore is 6.      Cranial Nerves: Cranial nerves 2-12 are intact.      Sensory: Sensation is intact.      Motor: Motor function is intact.      Coordination: Coordination is intact.   Psychiatric:         Mood and Affect: Mood normal.         Thought Content: Thought content normal.       RADIOLOGY:  Reviewed all pertinent imaging. Please see official  radiology report.  Head CT w/o contrast    (Results Pending)   Cervical spine CT w/o contrast    (Results Pending)   XR Shoulder Right G/E 3 Views    (Results Pending)   XR Shoulder Left G/E 3 Views    (Results Pending)   CT Lumbar Spine w/o Contrast    (Results Pending)       I, Paulo Howe, am serving as a scribe to document services personally performed by Alesha Ceja PA-C, based on my observation and the provider's statements to me. I, Alesha Ceja PA-C, attest that Paulo Howe is acting in a scribe capacity, has observed my performance of the services and has documented them in accordance with my direction.    Alesha Ceja PA-C  [facility] EMERGENCY DEPARTMENT  [address] [place] [id number]  [phone]     Alesha Ceja PA-C  09/05/23 0049

## 2023-09-05 NOTE — ED TRIAGE NOTES
Pt comes form group home where he had a mechanical fall d/t water on the floor of his bedroom. Pt hit his back on the bed, head went back and then forward - complaining of neck pain and back pain. Pt ambulated to ambulance with EMS without assistance. Denies LOC, not on thinners.       C-collar placed in triage d/t neck pain

## 2023-09-05 NOTE — DISCHARGE INSTRUCTIONS
Fortunately, the work-up looking for any injuries from the fall is negative.  Ice, ibuprofen or Tylenol at the facility are safe for any of Christopher's pain.

## 2023-09-05 NOTE — ED NOTES
Pt discharged from ED to home ambulatory via med cab.  Patient verbalized understanding of discharge instructions and recommended follow up care as noted on discharge instructions.  Written discharge instructions given, denies any further questions. Pt agreeable to discharge plan.

## 2023-09-06 ENCOUNTER — OFFICE VISIT (OUTPATIENT)
Dept: CARDIOLOGY | Facility: CLINIC | Age: 43
End: 2023-09-06
Attending: EMERGENCY MEDICINE
Payer: MEDICARE

## 2023-09-06 VITALS
RESPIRATION RATE: 16 BRPM | DIASTOLIC BLOOD PRESSURE: 60 MMHG | HEIGHT: 65 IN | HEART RATE: 78 BPM | OXYGEN SATURATION: 93 % | SYSTOLIC BLOOD PRESSURE: 128 MMHG | BODY MASS INDEX: 46.98 KG/M2 | WEIGHT: 282 LBS

## 2023-09-06 DIAGNOSIS — R07.2 RETROSTERNAL CHEST PAIN: ICD-10-CM

## 2023-09-06 PROCEDURE — 99214 OFFICE O/P EST MOD 30 MIN: CPT | Performed by: INTERNAL MEDICINE

## 2023-09-06 RX ORDER — TRAZODONE HYDROCHLORIDE 50 MG/1
100 TABLET, FILM COATED ORAL AT BEDTIME
COMMUNITY
Start: 2023-06-02

## 2023-09-06 NOTE — LETTER
"9/6/2023    Aundrea Hernandez MD  Formerly [facility] [address]  [facility] [address]    RE: Warren Jaramillo       Dear Colleague,     I had the pleasure of seeing Warren Jaramillo in the [facility].    HEART CARE ENCOUNTER CONSULTATON NOTE      [facility]  [phone]      Assessment/Recommendations   Assessment:  1.  Chest pain: Atypical chest discomfort with recent unremarkable work-up including a CT coronary angiogram and echocardiogram.  Initial work-up in the ED was unremarkable.  Chest pain described as a chest wall tenderness.  2.  Autism  3.  Hypertension  4.  Diabetes mellitus type 2    Plan:  1.  Smoking cessation counseling  2.  As needed NSAIDs for relief of pain  No further cardiac work-up recommended at this time.       History of Present Illness/Subjective    HPI: Warren Jaramillo is a 43 year old male with history of autism, fetal alcohol syndrome, hypertension, diabetes mellitus type 2, mood disorder who I am seeing today for initial consultation for chest discomfort.  He was in the ED on 8/20/2023 with complaints of chest pain, dizziness and foggy sensation.  He describes his pain as a sharp discomfort that is not necessarily with exertion.  The pain is pleuritic in nature at times.  He also has tenderness over the chest wall at the site where he has the pain.  Palpation reproduces the discomfort.  He underwent a CT coronary angiogram on 3/14/2023 due to recurrent complaints of chest pain and this showed normal coronary anatomy with no evidence of stenosis.  Echocardiogram on 8/2/2023 showed no significant structural heart disease.       Physical Examination  Review of Systems   Vitals: /60 (BP Location: Left arm, Patient Position: Sitting, Cuff Size: Adult Large)   Pulse 78   Resp 16   Ht 1.651 m (5' 5\")   Wt 127.9 kg (282 lb)   SpO2 93%   BMI 46.93 kg/m    BMI= Body mass index is 46.93 kg/m .  Wt Readings " from Last 3 Encounters:   09/17/23 126 kg (277 lb 12.8 oz)   09/06/23 127.9 kg (282 lb)   09/04/23 126 kg (277 lb 12.8 oz)       General Appearance:   no distress, normal body habitus   ENT/Mouth: membranes moist, no oral lesions or bleeding gums.      EYES:  no scleral icterus, normal conjunctivae   Neck: no carotid bruits or thyromegaly   Chest/Lungs:   lungs are clear to auscultation   Cardiovascular:   Regular. Normal first and second heart sounds with no murmur no edema bilaterally    Abdomen:  bowel sounds are present   Extremities: no cyanosis or clubbing   Skin: no xanthelasma, warm.    Neurologic: normal  bilateral, no tremors     Psychiatric: alert and oriented x3, calm        Please refer above for cardiac ROS details.        Medical History  Surgical History Family History Social History   Past Medical History:   Diagnosis Date    Rojas's disease      Past Surgical History:   Procedure Laterality Date    COLONOSCOPY      ESOPHAGOSCOPY, GASTROSCOPY, DUODENOSCOPY (EGD), COMBINED N/A 7/21/2023    Procedure: ESOPHAGOGASTRODUODENOSCOPY WITH GASTRIC AND ESOPHAGEAL BIOPSIES;  Surgeon: Filiberto Aragon MD;  Location: [facility] OR    TOOTH EXTRACTION       Family History   Problem Relation Age of Onset    Unknown/Adopted Father     Unknown/Adopted Maternal Grandmother     C.A.D. Maternal Grandfather     Diabetes Maternal Grandfather     Cerebrovascular Disease Maternal Grandfather     Unknown/Adopted Paternal Grandmother     Unknown/Adopted Paternal Grandfather     Unknown/Adopted Brother     Unknown/Adopted Sister         Social History     Socioeconomic History    Marital status: Single     Spouse name: Not on file    Number of children: Not on file    Years of education: Not on file    Highest education level: Not on file   Occupational History    Not on file   Tobacco Use    Smoking status: Every Day     Packs/day: 1.00     Types: Cigarettes    Smokeless tobacco: Never   Substance and Sexual  Activity    Alcohol use: No     Comment: once every 3 months    Drug use: No    Sexual activity: Never     Partners: Female   Other Topics Concern     Service No    Blood Transfusions No    Caffeine Concern No    Occupational Exposure No    Hobby Hazards No    Sleep Concern No    Stress Concern Yes     Comment: sometimes    Weight Concern No    Special Diet Yes     Comment: counting carbs    Back Care No    Exercise Yes    Bike Helmet Not Asked     Comment: N/A    Seat Belt Yes    Self-Exams Yes    Parent/sibling w/ CABG, MI or angioplasty before 65F 55M? Not Asked   Social History Narrative    Not on file     Social Determinants of Health     Financial Resource Strain: Not on file   Food Insecurity: Not on file   Transportation Needs: Not on file   Physical Activity: Not on file   Stress: Not on file   Social Connections: Not on file   Intimate Partner Violence: Not on file   Housing Stability: Not on file           Medications  Allergies   Current Outpatient Medications   Medication Sig Dispense Refill    acetaminophen (TYLENOL) 500 MG tablet Take 500-1,000 mg by mouth every 6 hours as needed for mild pain      albuterol (PROVENTIL) (2.5 MG/3ML) 0.083% neb solution Take 2.5 mg by nebulization 3 times daily as needed for shortness of breath, wheezing or cough      aloe vera GEL Apply 1 g topically every hour as needed for skin care Per bottle directions      bacitracin 500 UNIT/GM OINT Apply topically 3 times daily as needed for wound care      Calamine external lotion Apply topically as needed for itching      carbamide peroxide (DEBROX) 6.5 % otic solution Place 5 drops into both ears daily as needed for other      clotrimazole (LOTRIMIN) 1 % external cream Apply topically 2 times daily as needed (skin irritation)      cyclobenzaprine (FLEXERIL) 10 MG tablet Take 10 mg by mouth 3 times daily as needed for muscle spasms      diclofenac (VOLTAREN) 1 % topical gel Apply 2 g topically daily as needed for  moderate pain To joints/back      diclofenac (VOLTAREN) 75 MG EC tablet Take 1 tablet (75 mg) by mouth 2 times daily as needed for moderate pain 28 tablet 0    fluticasone-vilanterol (BREO ELLIPTA) 200-25 MCG/ACT inhaler Inhale 1 puff into the lungs daily      furosemide (LASIX) 20 MG tablet Take 20 mg by mouth daily      hydrocortisone 1 % CREA cream Place rectally 2 times daily as needed for itching      ibuprofen (ADVIL/MOTRIN) 200 MG tablet Take 400 mg by mouth every 4 hours as needed for pain      lisinopril (ZESTRIL) 10 MG tablet Take 10 mg by mouth daily      LORazepam (ATIVAN) 1 MG tablet Take 0.5 mg by mouth daily as needed for anxiety      melatonin 3 MG tablet Take 3 mg by mouth At Bedtime      metFORMIN (GLUCOPHAGE) 1000 MG tablet Take 1,000 mg by mouth 2 times daily (with meals)      montelukast (SINGULAIR) 10 MG tablet Take 10 mg by mouth daily      OLANZapine (ZYPREXA) 10 MG tablet Take 10 mg by mouth At Bedtime      omeprazole (PRILOSEC) 20 MG DR capsule Take 40 mg by mouth daily      ondansetron (ZOFRAN) 4 MG tablet Take 4 mg by mouth every 12 hours as needed for nausea      oxybutynin ER (DITROPAN XL) 10 MG 24 hr tablet Take 10 mg by mouth daily      polyethylene glycol (MIRALAX) 17 g packet Take 1 packet by mouth daily as needed for constipation      psyllium (METAMUCIL) 28.3 % packet Take 1 packet by mouth daily      rosuvastatin (CRESTOR) 10 MG tablet Take 10 mg by mouth At Bedtime      sodium phosphate (FLEET ENEMA) 7-19 GM/118ML rectal enema Place 1 enema rectally once as needed for constipation      spironolactone (ALDACTONE) 50 MG tablet Take 50 mg by mouth daily      traZODone (DESYREL) 50 MG tablet Take 50 mg by mouth At Bedtime      zinc oxide (DESITIN) 20 % external ointment Apply topically as needed for dry skin or irritation To groin/buttocks area      acetaminophen (TYLENOL) 32 mg/mL liquid Take 960 mg by mouth every 6 hours as needed for fever or mild pain (Patient not taking:  Reported on 9/6/2023)      acetaminophen (TYLENOL) 500 MG tablet Take 2 tablets (1,000 mg) by mouth 3 times daily 30 tablet 0    amoxicillin-clavulanate (AUGMENTIN) 875-125 MG tablet Take 1 tablet by mouth 2 times daily for 7 days 14 tablet 0    escitalopram (LEXAPRO) 10 MG tablet Take 10 mg by mouth daily (Patient not taking: Reported on 9/6/2023)      ibuprofen (ADVIL/MOTRIN) 100 MG/5ML suspension Take 400 mg by mouth every 4 hours as needed for fever or moderate pain (Patient not taking: Reported on 9/6/2023)      ibuprofen (ADVIL/MOTRIN) 600 MG tablet Take 1 tablet (600 mg) by mouth every 6 hours as needed for moderate pain 30 tablet 0       Allergies   Allergen Reactions    Apricot Flavor Anaphylaxis    Banana Anaphylaxis     Throat swelling  Throat swelling      Wasp Venom Protein Shortness Of Breath     Other reaction(s): Respiratory Distress  Has an epi pen  Has an epi pen      Bees Anaphylaxis     Have an Epi pen that carries with    Methylphenidate Itching     Other reaction(s): Nightmares    Prunus      Other reaction(s): *Unknown    Sulfa Antibiotics      Headaches and nausea    Prunus Persica Rash     Other reaction(s): *Unknown          Lab Results    Chemistry/lipid CBC Cardiac Enzymes/BNP/TSH/INR   Recent Labs   Lab Test 08/01/23 2117   CHOL 116   HDL 38*   LDL 51   TRIG 137     Recent Labs   Lab Test 08/01/23 2117   LDL 51     Recent Labs   Lab Test 08/28/23 2024      POTASSIUM 4.1   CHLORIDE 99   CO2 26   *   BUN 14.2   CR 0.95   GFRESTIMATED >90   WES 9.7     Recent Labs   Lab Test 08/28/23 2024 08/20/23 2043 08/03/23  0737   CR 0.95 0.75 0.76     Recent Labs   Lab Test 08/01/23  1802   A1C 7.3*          Recent Labs   Lab Test 08/28/23 2024   WBC 14.5*   HGB 13.7   HCT 42.2   MCV 89        Recent Labs   Lab Test 08/28/23 2024 08/20/23 2043 08/03/23  0737   HGB 13.7 14.2 13.9    No results for input(s): TROPONINI in the last 40260 hours.  Recent Labs   Lab Test  08/01/23  1802   NTBNPI 562*     No results for input(s): TSH in the last 03318 hours.  No results for input(s): INR in the last 73359 hours.     Jessy Stewart MD            Thank you for allowing me to participate in the care of your patient.      Sincerely,     Jessy Stewart MD     [facility]  cc:   Manny Louis MD  [address] [place] [address]

## 2023-09-06 NOTE — PATIENT INSTRUCTIONS
. Abelfabiola CHILEL Clint,     It was a pleasure to see you in the office today. My recommendations for you include:   1. Try ibuprofen for pain  2. Follow up with primary  3. Quit smoking     Please do not hesitate to call the [facility] clinic with any questions or concerns at [phone].    Sincerely,     Jessy Stewart MD

## 2023-09-17 ENCOUNTER — HOSPITAL ENCOUNTER (EMERGENCY)
Facility: HOSPITAL | Age: 43
Discharge: HOME | End: 2023-09-17
Attending: EMERGENCY MEDICINE | Admitting: EMERGENCY MEDICINE
Payer: MEDICARE

## 2023-09-17 ENCOUNTER — NURSE TRIAGE (OUTPATIENT)
Dept: NURSING | Facility: CLINIC | Age: 43
End: 2023-09-17
Payer: MEDICARE

## 2023-09-17 VITALS
BODY MASS INDEX: 46.28 KG/M2 | OXYGEN SATURATION: 96 % | SYSTOLIC BLOOD PRESSURE: 132 MMHG | RESPIRATION RATE: 17 BRPM | DIASTOLIC BLOOD PRESSURE: 89 MMHG | WEIGHT: 277.8 LBS | HEIGHT: 65 IN | TEMPERATURE: 98.2 F | HEART RATE: 84 BPM

## 2023-09-17 DIAGNOSIS — K08.89 PAIN, DENTAL: ICD-10-CM

## 2023-09-17 PROCEDURE — 99283 EMERGENCY DEPT VISIT LOW MDM: CPT | Mod: 25

## 2023-09-17 PROCEDURE — 250N000009 HC RX 250: Performed by: EMERGENCY MEDICINE

## 2023-09-17 PROCEDURE — 64400 NJX AA&/STRD TRIGEMINAL NRV: CPT

## 2023-09-17 PROCEDURE — 250N000013 HC RX MED GY IP 250 OP 250 PS 637: Performed by: EMERGENCY MEDICINE

## 2023-09-17 RX ORDER — BUPIVACAINE HYDROCHLORIDE AND EPINEPHRINE 5; 5 MG/ML; UG/ML
1.8 INJECTION, SOLUTION PERINEURAL ONCE
Status: COMPLETED | OUTPATIENT
Start: 2023-09-17 | End: 2023-09-17

## 2023-09-17 RX ORDER — ACETAMINOPHEN 500 MG
1000 TABLET ORAL 3 TIMES DAILY
Qty: 30 TABLET | Refills: 0 | Status: SHIPPED | OUTPATIENT
Start: 2023-09-17

## 2023-09-17 RX ORDER — BUPIVACAINE HYDROCHLORIDE AND EPINEPHRINE 5; 5 MG/ML; UG/ML
1.8 INJECTION, SOLUTION PERINEURAL ONCE
Status: DISCONTINUED | OUTPATIENT
Start: 2023-09-17 | End: 2023-09-17 | Stop reason: HOSPADM

## 2023-09-17 RX ORDER — IBUPROFEN 600 MG/1
600 TABLET, FILM COATED ORAL EVERY 6 HOURS PRN
Qty: 30 TABLET | Refills: 0 | Status: SHIPPED | OUTPATIENT
Start: 2023-09-17

## 2023-09-17 RX ADMIN — AMOXICILLIN AND CLAVULANATE POTASSIUM 1 TABLET: 875; 125 TABLET, FILM COATED ORAL at 16:26

## 2023-09-17 RX ADMIN — BUPIVACAINE HYDROCHLORIDE AND EPINEPHRINE BITARTRATE 1.8 ML: 5; .005 INJECTION, SOLUTION SUBCUTANEOUS at 16:28

## 2023-09-17 NOTE — ED TRIAGE NOTES
Arrived via EMS from home due to right upper tooth pain  radiating to jaw and forehead.  Took Tylenol  at 900 AM today with no relief.  Last seen a Dentist 2 mos ago with a hx of cracked tooth on the same tooth.  Ate cereal last  Friday and started this Dental pain

## 2023-09-17 NOTE — ED NOTES
Bed: JNFT11  Expected date: 9/17/23  Expected time: 3:35 PM  Means of arrival: Ambulance  Comments:  41M dental pain

## 2023-09-17 NOTE — DISCHARGE INSTRUCTIONS
I recommend taking 600 mg of ibuprofen 3 times a day.  You can also take 1000 mg of Tylenol 3 times a day.  It is okay to take these at the same time.  After 2 days, please take the medication only as needed.    Please take your next dose of Augmentin tomorrow morning.  Make sure that you reach out to your dentist as well, for repair of that tooth.

## 2023-09-17 NOTE — ED PROVIDER NOTES
EMERGENCY DEPARTMENT ENCOUNTER      NAME: Warren Jaramillo  AGE: 42 year old male  YOB: 1980  MRN: [id number]  EVALUATION DATE & TIME: 9/17/2023  3:44 PM    PCP: Aundrea Montoya    ED PROVIDER: Galileo Moreno M.D.      Chief Complaint   Patient presents with    Dental Pain         FINAL IMPRESSION:  1. Pain, dental          ED COURSE & MEDICAL DECISION MAKING:    Pertinent Labs & Imaging studies reviewed below.  All EKGs below represent my independent interpretation.   ED Course as of 09/17/23 1710   Sun Sep 17, 2023   1617 Patient is a 42-year-old gentleman who presents with 3 days of increasing pain in his right upper canine.  History of dental work on this tooth in the past.  It is worse with heat, cold, and pain that radiates across entire right side of his face.  On exam there is no obvious periapical abscess or fluctuance.  There is some gingival decay around the base of the tooth.  It is somewhat loose and tender, and I suspect there may be a deeper dental abscess.  Plan to anesthetize with Marcaine, start Augmentin.  He will reach out to his dental clinic tomorrow during weekday hours       Additional ED Course Timestamps:  4:07 PM I met with the patient for the initial interview and physical examination. Discussed plan for treatment and workup in the ED. PPE: Provider wore gloves, and paper mask.    4:38 PM Performed dental block. I discussed the plan for discharge with the patient, and patient is agreeable. We discussed supportive cares at home and reasons for return to the ER including new or worsening symptoms - all questions and concerns addressed. Patient to be discharged by RN.     Medical Decision Making    History:  Supplemental history from: Documented in chart, if applicable  External Record(s) reviewed: Documented in chart, if applicable.    Work Up:  Chart documentation includes differential considered and any EKGs or imaging independently interpreted by  provider, where specified.  In additional to work up documented, I considered the following work up: Documented in chart, if applicable.    External consultation:  Discussion of management with another provider: Documented in chart, if applicable    Complicating factors:  Care impacted by chronic illness: N/A  Care affected by social determinants of health: Access to Medical Care    Disposition considerations: Discharge. I prescribed additional prescription strength medication(s) as charted. See documentation for any additional details.      At the conclusion of the encounter I discussed the results of all of the tests and the disposition. The questions were answered. The patient or family acknowledged understanding and was agreeable with the care plan.       MEDICATIONS GIVEN IN THE EMERGENCY:  Medications   BUPivacaine 0.5 % EPINEPHrine 1:200,000 0.5% -1:[id number] dental injection SOLN 1.8 mL (has no administration in time range)   BUPivacaine 0.5 % EPINEPHrine 1:200,000 0.5% -1:704058 dental injection SOLN 1.8 mL (1.8 mLs Intradermal $Given by Other 9/17/23 1625)   amoxicillin-clavulanate (AUGMENTIN) 875-125 MG per tablet 1 tablet (1 tablet Oral $Given 9/17/23 1626)         NEW PRESCRIPTIONS STARTED AT TODAY'S ER VISIT  Discharge Medication List as of 9/17/2023  4:49 PM        START taking these medications    Details   !! acetaminophen (TYLENOL) 500 MG tablet Take 2 tablets (1,000 mg) by mouth 3 times daily, Disp-30 tablet, R-0, E-Prescribe      amoxicillin-clavulanate (AUGMENTIN) 875-125 MG tablet Take 1 tablet by mouth 2 times daily for 7 days, Disp-14 tablet, R-0, E-Prescribe      !! ibuprofen (ADVIL/MOTRIN) 600 MG tablet Take 1 tablet (600 mg) by mouth every 6 hours as needed for moderate pain, Disp-30 tablet, R-0, E-Prescribe       !! - Potential duplicate medications found. Please discuss with provider.             =================================================================    HPI    Patient information  "was obtained from: the patient     Use of : N/A         Warren Jaramillo is a 42 year old male with a pertinent history of tooth extraction and fetal alcohol syndrome who presents to this ED for evaluation of dental pain.     The patient reports the onset of upper right tooth pain two days ago (9/15). This pain now radiates into his right cheek and up to his forehead. He states that he is able to wiggle the tooth. He has had fillings in this tooth before, and will follow up with his dentist tomorrow, but needed something more for pain today. The patient denies allergies to medication, and any other symptoms or complaints at this time.       VITALS:  /89   Pulse 84   Temp 98.2  F (36.8  C) (Oral)   Resp 17   Ht 1.651 m (5' 5\")   Wt 126 kg (277 lb 12.8 oz)   SpO2 96%   BMI 46.23 kg/m      PHYSICAL EXAM    Constitutional: Well developed, well nourished. Uncomfortable appearing.  HENT: Normocephalic, atraumatic, mucous membranes moist, nose normal. Tenderness to palpation of tooth #5. No fluctuant pocket in gingiva. Neck- Supple, gross ROM intact.   Eyes: Pupils mid-range, conjunctiva without injection, no discharge.   Respiratory: no resp distress  Cardiovascular: Normal heart rate, regular rhythm, no murmurs.   Musculoskeletal: Moving all 4 extremities intentionally and without pain. No obvious deformity.  Skin: Warm, dry, no rash.  Neurologic: Alert & oriented x 3, cranial nerves grossly intact.  Psychiatric: Affect normal, cooperative.    Physical Exam  HENT:      Mouth/Throat:             PROCEDURES:     PROCEDURE: Dental Nerve Block   INDICATIONS: Dental pain   PROCEDURE PROVIDER: Dr Sukumar Moreno   SITE: Tooth #5     MEDICATION: 1.8 mL of 0.25% Bupivacaine with epinephrine   NOTE: The needle was inserted superior to tooth #5, and 1.8 mL of Marcaine with epinephrine was injected with near complete resolution of patient's pain   COMPLICATIONS: Patient tolerated procedure well, without " complication          I, Tricia Carney am serving as a scribe to document services personally performed by Dr. Galileo Moreno based on my observation and the provider's statements to me. I, Galileo Moreno MD attest that Tricia Carney is acting in a scribe capacity, has observed my performance of the services and has documented them in accordance with my direction.    Galileo Moreno M.D.  Emergency Medicine  [facility] EMERGENCY DEPARTMENT  [address] [place] [id number]  [phone]  Dept: [phone]      Galileo Moreno MD  09/17/23 1001

## 2023-09-18 NOTE — PROGRESS NOTES
"  HEART CARE ENCOUNTER CONSULTATON NOTE      [facility]  [phone]      Assessment/Recommendations   Assessment:  1.  Chest pain: Atypical chest discomfort with recent unremarkable work-up including a CT coronary angiogram and echocardiogram.  Initial work-up in the ED was unremarkable.  Chest pain described as a chest wall tenderness.  2.  Autism  3.  Hypertension  4.  Diabetes mellitus type 2    Plan:  1.  Smoking cessation counseling  2.  As needed NSAIDs for relief of pain  No further cardiac work-up recommended at this time.       History of Present Illness/Subjective    HPI: Warren Jaramillo is a 43 year old male with history of autism, fetal alcohol syndrome, hypertension, diabetes mellitus type 2, mood disorder who I am seeing today for initial consultation for chest discomfort.  He was in the ED on 8/20/2023 with complaints of chest pain, dizziness and foggy sensation.  He describes his pain as a sharp discomfort that is not necessarily with exertion.  The pain is pleuritic in nature at times.  He also has tenderness over the chest wall at the site where he has the pain.  Palpation reproduces the discomfort.  He underwent a CT coronary angiogram on 3/14/2023 due to recurrent complaints of chest pain and this showed normal coronary anatomy with no evidence of stenosis.  Echocardiogram on 8/2/2023 showed no significant structural heart disease.       Physical Examination  Review of Systems   Vitals: /60 (BP Location: Left arm, Patient Position: Sitting, Cuff Size: Adult Large)   Pulse 78   Resp 16   Ht 1.651 m (5' 5\")   Wt 127.9 kg (282 lb)   SpO2 93%   BMI 46.93 kg/m    BMI= Body mass index is 46.93 kg/m .  Wt Readings from Last 3 Encounters:   09/17/23 126 kg (277 lb 12.8 oz)   09/06/23 127.9 kg (282 lb)   09/04/23 126 kg (277 lb 12.8 oz)       General Appearance:   no distress, normal body habitus   ENT/Mouth: membranes moist, no oral lesions or bleeding gums.    "   EYES:  no scleral icterus, normal conjunctivae   Neck: no carotid bruits or thyromegaly   Chest/Lungs:   lungs are clear to auscultation   Cardiovascular:   Regular. Normal first and second heart sounds with no murmur no edema bilaterally    Abdomen:  bowel sounds are present   Extremities: no cyanosis or clubbing   Skin: no xanthelasma, warm.    Neurologic: normal  bilateral, no tremors     Psychiatric: alert and oriented x3, calm        Please refer above for cardiac ROS details.        Medical History  Surgical History Family History Social History   Past Medical History:   Diagnosis Date    Rojas's disease      Past Surgical History:   Procedure Laterality Date    COLONOSCOPY      ESOPHAGOSCOPY, GASTROSCOPY, DUODENOSCOPY (EGD), COMBINED N/A 7/21/2023    Procedure: ESOPHAGOGASTRODUODENOSCOPY WITH GASTRIC AND ESOPHAGEAL BIOPSIES;  Surgeon: Filiberto Aragon MD;  [place] [facility] [place]    TOOTH EXTRACTION       Family History   Problem Relation Age of Onset    Unknown/Adopted Father     Unknown/Adopted Maternal Grandmother     C.A.D. Maternal Grandfather     Diabetes Maternal Grandfather     Cerebrovascular Disease Maternal Grandfather     Unknown/Adopted Paternal Grandmother     Unknown/Adopted Paternal Grandfather     Unknown/Adopted Brother     Unknown/Adopted Sister         Social History     Socioeconomic History    Marital status: Single     Spouse name: Not on file    Number of children: Not on file    Years of education: Not on file    Highest education level: Not on file   Occupational History    Not on file   Tobacco Use    Smoking status: Every Day     Packs/day: 1.00     Types: Cigarettes    Smokeless tobacco: Never   Substance and Sexual Activity    Alcohol use: No     Comment: once every 3 months    Drug use: No    Sexual activity: Never     Partners: Female   Other Topics Concern     Service No    Blood Transfusions No    Caffeine Concern No    Occupational Exposure No    Hobby  Hazards No    Sleep Concern No    Stress Concern Yes     Comment: sometimes    Weight Concern No    Special Diet Yes     Comment: counting carbs    Back Care No    Exercise Yes    Bike Helmet Not Asked     Comment: N/A    Seat Belt Yes    Self-Exams Yes    Parent/sibling w/ CABG, MI or angioplasty before 65F 55M? Not Asked   Social History Narrative    Not on file     Social Determinants of Health     Financial Resource Strain: Not on file   Food Insecurity: Not on file   Transportation Needs: Not on file   Physical Activity: Not on file   Stress: Not on file   Social Connections: Not on file   Intimate Partner Violence: Not on file   Housing Stability: Not on file           Medications  Allergies   Current Outpatient Medications   Medication Sig Dispense Refill    acetaminophen (TYLENOL) 500 MG tablet Take 500-1,000 mg by mouth every 6 hours as needed for mild pain      albuterol (PROVENTIL) (2.5 MG/3ML) 0.083% neb solution Take 2.5 mg by nebulization 3 times daily as needed for shortness of breath, wheezing or cough      aloe vera GEL Apply 1 g topically every hour as needed for skin care Per bottle directions      bacitracin 500 UNIT/GM OINT Apply topically 3 times daily as needed for wound care      Calamine external lotion Apply topically as needed for itching      carbamide peroxide (DEBROX) 6.5 % otic solution Place 5 drops into both ears daily as needed for other      clotrimazole (LOTRIMIN) 1 % external cream Apply topically 2 times daily as needed (skin irritation)      cyclobenzaprine (FLEXERIL) 10 MG tablet Take 10 mg by mouth 3 times daily as needed for muscle spasms      diclofenac (VOLTAREN) 1 % topical gel Apply 2 g topically daily as needed for moderate pain To joints/back      diclofenac (VOLTAREN) 75 MG EC tablet Take 1 tablet (75 mg) by mouth 2 times daily as needed for moderate pain 28 tablet 0    fluticasone-vilanterol (BREO ELLIPTA) 200-25 MCG/ACT inhaler Inhale 1 puff into the lungs daily       furosemide (LASIX) 20 MG tablet Take 20 mg by mouth daily      hydrocortisone 1 % CREA cream Place rectally 2 times daily as needed for itching      ibuprofen (ADVIL/MOTRIN) 200 MG tablet Take 400 mg by mouth every 4 hours as needed for pain      lisinopril (ZESTRIL) 10 MG tablet Take 10 mg by mouth daily      LORazepam (ATIVAN) 1 MG tablet Take 0.5 mg by mouth daily as needed for anxiety      melatonin 3 MG tablet Take 3 mg by mouth At Bedtime      metFORMIN (GLUCOPHAGE) 1000 MG tablet Take 1,000 mg by mouth 2 times daily (with meals)      montelukast (SINGULAIR) 10 MG tablet Take 10 mg by mouth daily      OLANZapine (ZYPREXA) 10 MG tablet Take 10 mg by mouth At Bedtime      omeprazole (PRILOSEC) 20 MG DR capsule Take 40 mg by mouth daily      ondansetron (ZOFRAN) 4 MG tablet Take 4 mg by mouth every 12 hours as needed for nausea      oxybutynin ER (DITROPAN XL) 10 MG 24 hr tablet Take 10 mg by mouth daily      polyethylene glycol (MIRALAX) 17 g packet Take 1 packet by mouth daily as needed for constipation      psyllium (METAMUCIL) 28.3 % packet Take 1 packet by mouth daily      rosuvastatin (CRESTOR) 10 MG tablet Take 10 mg by mouth At Bedtime      sodium phosphate (FLEET ENEMA) 7-19 GM/118ML rectal enema Place 1 enema rectally once as needed for constipation      spironolactone (ALDACTONE) 50 MG tablet Take 50 mg by mouth daily      traZODone (DESYREL) 50 MG tablet Take 50 mg by mouth At Bedtime      zinc oxide (DESITIN) 20 % external ointment Apply topically as needed for dry skin or irritation To groin/buttocks area      acetaminophen (TYLENOL) 32 mg/mL liquid Take 960 mg by mouth every 6 hours as needed for fever or mild pain (Patient not taking: Reported on 9/6/2023)      acetaminophen (TYLENOL) 500 MG tablet Take 2 tablets (1,000 mg) by mouth 3 times daily 30 tablet 0    amoxicillin-clavulanate (AUGMENTIN) 875-125 MG tablet Take 1 tablet by mouth 2 times daily for 7 days 14 tablet 0    escitalopram  (LEXAPRO) 10 MG tablet Take 10 mg by mouth daily (Patient not taking: Reported on 9/6/2023)      ibuprofen (ADVIL/MOTRIN) 100 MG/5ML suspension Take 400 mg by mouth every 4 hours as needed for fever or moderate pain (Patient not taking: Reported on 9/6/2023)      ibuprofen (ADVIL/MOTRIN) 600 MG tablet Take 1 tablet (600 mg) by mouth every 6 hours as needed for moderate pain 30 tablet 0       Allergies   Allergen Reactions    Apricot Flavor Anaphylaxis    Banana Anaphylaxis     Throat swelling  Throat swelling      Wasp Venom Protein Shortness Of Breath     Other reaction(s): Respiratory Distress  Has an epi pen  Has an epi pen      Bees Anaphylaxis     Have an Epi pen that carries with    Methylphenidate Itching     Other reaction(s): Nightmares    Prunus      Other reaction(s): *Unknown    Sulfa Antibiotics      Headaches and nausea    Prunus Persica Rash     Other reaction(s): *Unknown          Lab Results    Chemistry/lipid CBC Cardiac Enzymes/BNP/TSH/INR   Recent Labs   Lab Test 08/01/23 2117   CHOL 116   HDL 38*   LDL 51   TRIG 137     Recent Labs   Lab Test 08/01/23 2117   LDL 51     Recent Labs   Lab Test 08/28/23 2024      POTASSIUM 4.1   CHLORIDE 99   CO2 26   *   BUN 14.2   CR 0.95   GFRESTIMATED >90   WES 9.7     Recent Labs   Lab Test 08/28/23 2024 08/20/23 2043 08/03/23  0737   CR 0.95 0.75 0.76     Recent Labs   Lab Test 08/01/23  1802   A1C 7.3*          Recent Labs   Lab Test 08/28/23 2024   WBC 14.5*   HGB 13.7   HCT 42.2   MCV 89        Recent Labs   Lab Test 08/28/23 2024 08/20/23 2043 08/03/23  0737   HGB 13.7 14.2 13.9    No results for input(s): TROPONINI in the last 53484 hours.  Recent Labs   Lab Test 08/01/23  1802   NTBNPI 562*     No results for input(s): TSH in the last 59022 hours.  No results for input(s): INR in the last 02493 hours.     Jessy Stewart MD

## 2023-09-18 NOTE — ED NOTES
Addendum : @ 7263  2 doses of Bupivacaine was given to Dr. Moreno but only used 1 dose and the other dose was wasted by Dr. Moreno, ED Provider

## 2023-10-11 ENCOUNTER — NURSE TRIAGE (OUTPATIENT)
Dept: NURSING | Facility: CLINIC | Age: 43
End: 2023-10-11
Payer: MEDICARE

## 2023-10-12 NOTE — TELEPHONE ENCOUNTER
"Patient called and asked \" what is our policy for patients that feel like they are gfoing to commit suicide\"?  Patient states he is contemplating suicide.  He wants to know if he is seen at the hospital if they will [place] [facility]    I explained any of our hospitals can see him and help him.  I stated unsure if [facility] [place] but the ED will find him a safe place.      Patient was talking well, no confusion, not sounding violent.    I asked patient if he is safe and willing to go in and he said yes.      Care advise: advised going to ED to be seen.  Patient states he will go now.    Evangelina Rosales RN   10/11/23 7:06 PM  [employer] Nurse Advisor  Reason for Disposition   Patient sounds very sick or weak to the triager    Additional Information   Negative: Patient attempted to physically hurt or kill someone   Negative: Patient acting violently now (e.g., hurting others or destroying property)   Negative: Patient threatening serious harm to others (e.g., hurt or kill someone)   Negative: Patient threatening to start house or any structure on fire now   Negative: [1] Patient is very confused (disoriented, slurred speech) AND [2] no other adult (e.g., caregiver, friend, family member) available   Negative: [1] Difficult to awaken or acting very confused (disoriented, slurred speech) AND [2] new-onset   Negative: Sounds like a life-threatening emergency to the triager   Negative: Suicide thoughts or threats is main concern   Negative: Very strange or confused behavior   Negative: Family (or caregiver, roommate) does not feel safe at home now   Negative: [1] Patient showing increased anger AND [2] has injured others in the past    Protocols used: Homicidal Threats or [id number]    "

## 2023-11-09 ENCOUNTER — APPOINTMENT (OUTPATIENT)
Dept: RADIOLOGY | Facility: HOSPITAL | Age: 43
End: 2023-11-09
Attending: EMERGENCY MEDICINE
Payer: MEDICARE

## 2023-11-09 ENCOUNTER — HOSPITAL ENCOUNTER (EMERGENCY)
Facility: HOSPITAL | Age: 43
Discharge: HOME | End: 2023-11-09
Attending: EMERGENCY MEDICINE | Admitting: EMERGENCY MEDICINE
Payer: MEDICARE

## 2023-11-09 VITALS
OXYGEN SATURATION: 93 % | SYSTOLIC BLOOD PRESSURE: 139 MMHG | TEMPERATURE: 98.2 F | WEIGHT: 278 LBS | BODY MASS INDEX: 46.32 KG/M2 | RESPIRATION RATE: 24 BRPM | HEIGHT: 65 IN | DIASTOLIC BLOOD PRESSURE: 64 MMHG | HEART RATE: 94 BPM

## 2023-11-09 DIAGNOSIS — J06.9 VIRAL URI: ICD-10-CM

## 2023-11-09 DIAGNOSIS — I50.9 ACUTE ON CHRONIC CONGESTIVE HEART FAILURE, UNSPECIFIED HEART FAILURE TYPE (H): ICD-10-CM

## 2023-11-09 DIAGNOSIS — R05.1 ACUTE COUGH: ICD-10-CM

## 2023-11-09 LAB
ANION GAP SERPL CALCULATED.3IONS-SCNC: 14 MMOL/L (ref 7–15)
BUN SERPL-MCNC: 14.4 MG/DL (ref 6–20)
CALCIUM SERPL-MCNC: 9.6 MG/DL (ref 8.6–10)
CHLORIDE SERPL-SCNC: 99 MMOL/L (ref 98–107)
CREAT SERPL-MCNC: 0.78 MG/DL (ref 0.67–1.17)
DEPRECATED HCO3 PLAS-SCNC: 26 MMOL/L (ref 22–29)
EGFRCR SERPLBLD CKD-EPI 2021: >90 ML/MIN/1.73M2
ERYTHROCYTE [DISTWIDTH] IN BLOOD BY AUTOMATED COUNT: 15.3 % (ref 10–15)
FLUAV RNA SPEC QL NAA+PROBE: NEGATIVE
FLUBV RNA RESP QL NAA+PROBE: NEGATIVE
GLUCOSE SERPL-MCNC: 162 MG/DL (ref 70–99)
HCT VFR BLD AUTO: 44.7 % (ref 40–53)
HGB BLD-MCNC: 14.2 G/DL (ref 13.3–17.7)
HOLD SPECIMEN: NORMAL
MCH RBC QN AUTO: 29.1 PG (ref 26.5–33)
MCHC RBC AUTO-ENTMCNC: 31.8 G/DL (ref 31.5–36.5)
MCV RBC AUTO: 92 FL (ref 78–100)
NT-PROBNP SERPL-MCNC: 641 PG/ML (ref 0–450)
PLATELET # BLD AUTO: 305 10E3/UL (ref 150–450)
POTASSIUM SERPL-SCNC: 3.9 MMOL/L (ref 3.4–5.3)
PROCALCITONIN SERPL IA-MCNC: 0.11 NG/ML
RBC # BLD AUTO: 4.88 10E6/UL (ref 4.4–5.9)
RSV RNA SPEC NAA+PROBE: NEGATIVE
SARS-COV-2 RNA RESP QL NAA+PROBE: NEGATIVE
SODIUM SERPL-SCNC: 139 MMOL/L (ref 135–145)
TROPONIN T SERPL HS-MCNC: 19 NG/L
WBC # BLD AUTO: 16.5 10E3/UL (ref 4–11)

## 2023-11-09 PROCEDURE — 84145 PROCALCITONIN (PCT): CPT | Performed by: EMERGENCY MEDICINE

## 2023-11-09 PROCEDURE — 85014 HEMATOCRIT: CPT | Performed by: EMERGENCY MEDICINE

## 2023-11-09 PROCEDURE — 96374 THER/PROPH/DIAG INJ IV PUSH: CPT

## 2023-11-09 PROCEDURE — 94640 AIRWAY INHALATION TREATMENT: CPT

## 2023-11-09 PROCEDURE — 84484 ASSAY OF TROPONIN QUANT: CPT | Performed by: EMERGENCY MEDICINE

## 2023-11-09 PROCEDURE — 99285 EMERGENCY DEPT VISIT HI MDM: CPT | Mod: 25

## 2023-11-09 PROCEDURE — 71046 X-RAY EXAM CHEST 2 VIEWS: CPT

## 2023-11-09 PROCEDURE — 87637 SARSCOV2&INF A&B&RSV AMP PRB: CPT | Performed by: EMERGENCY MEDICINE

## 2023-11-09 PROCEDURE — 36415 COLL VENOUS BLD VENIPUNCTURE: CPT | Performed by: EMERGENCY MEDICINE

## 2023-11-09 PROCEDURE — 250N000011 HC RX IP 250 OP 636: Mod: JZ | Performed by: EMERGENCY MEDICINE

## 2023-11-09 PROCEDURE — 80048 BASIC METABOLIC PNL TOTAL CA: CPT | Performed by: EMERGENCY MEDICINE

## 2023-11-09 PROCEDURE — 83880 ASSAY OF NATRIURETIC PEPTIDE: CPT | Performed by: EMERGENCY MEDICINE

## 2023-11-09 PROCEDURE — 250N000009 HC RX 250: Performed by: EMERGENCY MEDICINE

## 2023-11-09 PROCEDURE — 93005 ELECTROCARDIOGRAM TRACING: CPT | Performed by: EMERGENCY MEDICINE

## 2023-11-09 RX ORDER — FUROSEMIDE 10 MG/ML
20 INJECTION INTRAMUSCULAR; INTRAVENOUS ONCE
Status: COMPLETED | OUTPATIENT
Start: 2023-11-09 | End: 2023-11-09

## 2023-11-09 RX ORDER — IPRATROPIUM BROMIDE AND ALBUTEROL SULFATE 2.5; .5 MG/3ML; MG/3ML
3 SOLUTION RESPIRATORY (INHALATION) ONCE
Status: COMPLETED | OUTPATIENT
Start: 2023-11-09 | End: 2023-11-09

## 2023-11-09 RX ADMIN — FUROSEMIDE 20 MG: 10 INJECTION, SOLUTION INTRAMUSCULAR; INTRAVENOUS at 06:00

## 2023-11-09 RX ADMIN — IPRATROPIUM BROMIDE AND ALBUTEROL SULFATE 3 ML: .5; 3 SOLUTION RESPIRATORY (INHALATION) at 05:18

## 2023-11-09 ASSESSMENT — ACTIVITIES OF DAILY LIVING (ADL): ADLS_ACUITY_SCORE: 35

## 2023-11-09 NOTE — ED NOTES
Bed: JNED-21  Expected date: 11/9/23  Expected time: 3:53 AM  Means of arrival: Ambulance  Comments:  Mehran  42 yo M cough/SOB

## 2023-11-09 NOTE — ED TRIAGE NOTES
Pt brought in via medics from group home. Pt c/o SOB, productive cough, and sore throat. Pt had covid exposure this past Monday.

## 2023-11-09 NOTE — ED PROVIDER NOTES
EMERGENCY DEPARTMENT ENCOUNTER      NAME: Warren Jaramillo  YOB: 1980  MRN: [id number]    FINAL IMPRESSION  1. Viral URI    2. Acute cough    3. Acute on chronic congestive heart failure, unspecified heart failure type (H)        MEDICAL DECISION MAKING   Pertinent Labs & Imaging studies reviewed. (See chart for details)    Warren Jaramillo is a 43 year old male who presents for evaluation of cough, congestion, chest discomfort, sore throat.  Symptoms began last night.  Patient reports that he was exposed to COVID 2 days ago and is concerned he might have an infection.  He also has a history of CHF and is on diuretics which he has been taking as prescribed.  He also has a history of asthma/COPD and reports that he tried a nebulizer prior to coming in but did not have much improvement in symptoms.    I considered a broad differential including but not limited to ACS/ischemia, unstable angina, CHF, pericarditis, myocarditis, pericardial effusion, PE, viral illness, pneumonia, pleurisy, COPD/asthma exacerbation, GERD, muscular strain/sprain, anxiety, anemia. No hypoxia, pleuritic pain, tachypnea, or other 2/2 to suggest PE.  He did have faint expiratory wheeze and tight lung sounds concerning for COPD/asthma but no obvious crackles to suggest florid pulmonary edema. Orders placed for labs, viral swabs, EKG, and CXR.  We have agreed on a trial of a DuoNeb.    Radiation for EKG showed no evidence of acute ischemic changes.  High sensitivity troponin below age adjusted cutoff. ACS less likely in the setting of ECG without acute ischemic changes and I do not feel delta troponin necessary given timing of symptoms.  BMP reassuring. No evidence of RIVERA, acidosis, or significant electrolyte derangement.  BNP elevated at 641 but would expect higher florid CHF exacerbation.  Procalcitonin minimally elevated at 0.11, unlikely bacterial infection.  CBC with mild leukocytosis and WBC of 16.5, concerning  for infectious/inflammatory process.  COVID and influenza swabs all negative.    I independently reviewed chest x-ray which showed mild pulmonary venous congestion concerning for CHF.  With this, did give a dose of IV Lasix.  I rechecked the patient and reviewed results.  He reported feeling improved after DuoNeb and Lasix.  He has remained hemodynamically stable here and not requiring any oxygen.  I did offer admission but patient declined and states that he would rather go home and follow-up with his primary care provider.  I encouraged him to continue taking all medications as prescribed and to call the clinic in the morning.  He felt comfortable doing so and did not require any prescriptions.    Strict return precautions and follow up recommendations were discussed and all questions were answered. Patient endorsed understanding and was in agreement with plan.    I independently reviewed x-ray (as noted above), formal radiologist read pending.      Medical Decision Making    History:  Supplemental history from: Documented in chart, if applicable  External Record(s) reviewed: Documented in chart, if applicable.    Work Up:  Chart documentation includes differential considered and any EKGs or imaging independently interpreted by provider, where specified.  In additional to work up documented, I considered the following work up: Documented in chart, if applicable.    External consultation:  Discussion of management with another provider: Documented in chart, if applicable    Complicating factors:  Care impacted by chronic illness: N/A  Care affected by social determinants of health: Access to Medical Care    Disposition considerations: Discharge. I discussed a prescription for furosemide, albuterol, but deferred after shared decision making discussion.. I recommended admission, but the patient declined.          ED COURSE  5:01 AM I met with the patient, obtained history, performed an initial exam, and discussed  "options and plan for diagnostics and treatment here in the ED.   6:35 AM I rechecked the patient      MEDICATIONS GIVEN IN THE ED  Medications   ipratropium - albuterol 0.5 mg/2.5 mg/3 mL (DUONEB) neb solution 3 mL (3 mLs Nebulization $Given 11/9/23 0518)   furosemide (LASIX) injection 20 mg (20 mg Intravenous $Given 11/9/23 0600)       NEW PRESCRIPTIONS STARTED AT TODAY'S VISIT  Discharge Medication List as of 11/9/2023  6:24 AM             =================================================================    Chief Complaint   Patient presents with    Cough    Shortness of Breath    Pharyngitis     Recent exposure to Covid         HPI:    Patient information was obtained from: The patient    Use of : N/A     Warren Jaramillo is a 43 year old male who presents for cough, shortness of breath, and pharyngitis.  Patient reports onset of symptoms yesterday evening, describing a cough, sore throat, congestion.  He reports that he heard some wheezing and felt discomfort in his right lower lung and wonders if he might have an infection.  He notes that he was recently exposed to COVID and is concerned that he might have contracted this virus.  He tried using his nebulizer prior to arrival but did not have much improvement in symptoms.  He reports that he has been taking his diuretic and all medications as prescribed.  Patient has not had any associated fever, abdominal pain, nausea, vomiting, leg pain or leg swelling.          RELEVANT HISTORY, MEDICATIONS, & ALLERGIES   Past medical history, surgical history, family history, medications, and allergies reviewed and pertinent noted in HPI.    REVIEW OF SYSTEMS:  A complete review of systems was performed with pertinent positives and negatives noted in the HPI. All other systems negative.     PHYSICAL EXAM:    Vitals: /64   Pulse 94   Temp 98.2  F (36.8  C) (Oral)   Resp 24   Ht 1.651 m (5' 5\")   Wt 126.1 kg (278 lb)   SpO2 93%   BMI 46.26 kg/m   "   General: Alert and interactive, comfortable appearing.  HENT: Atraumatic. Full AROM of neck. Conjunctiva clear.   Cardiovascular: Regular rate and rhythm.   Chest/Pulmonary: Normal work of breathing. Speaking in complete sentences.  Faint expiratory wheeze upper lung fields.  No audible crackles.. No chest wall tenderness or deformities.  Abdomen: Soft, protuberant. Nontender without guarding or rebound.  Extremities: Normal AROM of all major joints.  Skin: Warm and dry. Normal skin color.   Neuro: Speech clear. CNs grossly intact. Moves all extremities spontaneously.   Psych: Normal affect/mood, cooperative, memory appropriate.    LAB  Labs Ordered and Resulted from Time of ED Arrival to Time of ED Departure   PROCALCITONIN - Abnormal       Result Value    Procalcitonin 0.11 (*)    NT PROBNP INPATIENT - Abnormal    N terminal Pro BNP Inpatient 641 (*)    BASIC METABOLIC PANEL - Abnormal    Sodium 139      Potassium 3.9      Chloride 99      Carbon Dioxide (CO2) 26      Anion Gap 14      Urea Nitrogen 14.4      Creatinine 0.78      GFR Estimate >90      Calcium 9.6      Glucose 162 (*)    CBC WITH PLATELETS - Abnormal    WBC Count 16.5 (*)     RBC Count 4.88      Hemoglobin 14.2      Hematocrit 44.7      MCV 92      MCH 29.1      MCHC 31.8      RDW 15.3 (*)     Platelet Count 305     INFLUENZA A/B, RSV, & SARS-COV2 PCR - Normal    Influenza A PCR Negative      Influenza B PCR Negative      RSV PCR Negative      SARS CoV2 PCR Negative     TROPONIN T, HIGH SENSITIVITY - Normal    Troponin T, High Sensitivity 19         RADIOLOGY  XR Chest 2 Views   Final Result   IMPRESSION: Heart is Mildly enlarged there is central pulmonary venous congestion, mild perihilar interstitial edema is noted, correlate for volume overload/CHF          EKG  Performed at: 0517  Impression: Si to nus rhythm.  Rightward axis.  No acute ischemic changes.  No significant change from previous.  Rate: 93  Rhythm: Sinus  QRS Interval: 118  QTc  Interval: 477  Comparison: 8/28/23    All laboratory and imaging results and EKG's were personally reviewed and interpreted by myself prior to disposition decision.         I, Fernando Park, am serving as a scribe to document services personally performed by Dr. Tricia Dhaliwal based on my observation and the provider's statements to me. I, Tricia Dhaliwal MD attest that Fernando Park is acting in a scribe capacity, has observed my performance of the services and has documented them in accordance with my direction.    Tricia Dhaliwal M.D.  Emergency Medicine  [facility] EMERGENCY DEPARTMENT  [facility] [facility] [id number] [phone]  Dept: [phone]     Tricia Dhaliwal MD  11/09/23 0803

## 2023-11-09 NOTE — DISCHARGE INSTRUCTIONS
You were seen in the emergency department for a cough and chest pain.  Your chest x-ray showed that you might have some fluid in your lungs.  It does not look like you have pneumonia and your COVID swab was negative.    Please continue to take all of your medications as prescribed and follow-up closely with your primary doctor to see about possibly increasing your diuretic.    For Pain and/or Fever:  - You can take 600mg of Ibuprofen (Motrin, Advil) by mouth with food every 6-8 hours (no more than 3200mg in 24hrs).    - You may also take 650-1000mg of Acetaminophen (Tylenol) along with the Ibuprofen.  Please do not use more than 3000 mg in a 24 hour period. Tylenol is an effective drug when taken at the prescribed dosages but can cause bodily injury including liver damage if taken too often or at too high of dose.  - You can take one or the other every 3 hours while awake (such that each is taken every 6 hours). For example, if you take Tylenol when you get home then you would take ibuprofen 3 hours later followed by another dose of Tylenol 3 hours after that. Write down the times you are taking both medications to ensure appropriate time in between doses.     Please return to the Emergency Department immediately if you experience chest pain, difficulty breathing, and/or if your symptoms get worse, do not improve, or with any new concerns. Otherwise, please follow up with your primary physician within the next 2-3 days for recheck and ongoing management.    Below is some information that you might find informative and useful.     Thank you for choosing [facility]. It was a pleasure taking care of you today!  -Dr. Tricia Dhaliwal

## 2023-11-09 NOTE — ED NOTES
[facility] called to notify pt is being discharged and will be sent back via taxi ride.  Taxi ride was called for pt, who will be waiting out in the lobby. Registration at the  made aware that pt is waiting for taxi ride.

## 2023-11-12 LAB
ATRIAL RATE - MUSE: 93 BPM
DIASTOLIC BLOOD PRESSURE - MUSE: NORMAL MMHG
INTERPRETATION ECG - MUSE: NORMAL
P AXIS - MUSE: 69 DEGREES
PR INTERVAL - MUSE: 192 MS
QRS DURATION - MUSE: 118 MS
QT - MUSE: 384 MS
QTC - MUSE: 477 MS
R AXIS - MUSE: 102 DEGREES
SYSTOLIC BLOOD PRESSURE - MUSE: NORMAL MMHG
T AXIS - MUSE: -54 DEGREES
VENTRICULAR RATE- MUSE: 93 BPM

## 2023-11-16 ENCOUNTER — OFFICE VISIT (OUTPATIENT)
Dept: CARDIOLOGY | Facility: CLINIC | Age: 43
End: 2023-11-16
Payer: MEDICARE

## 2023-11-16 VITALS
SYSTOLIC BLOOD PRESSURE: 126 MMHG | DIASTOLIC BLOOD PRESSURE: 50 MMHG | BODY MASS INDEX: 47.93 KG/M2 | HEIGHT: 65 IN | HEART RATE: 104 BPM | RESPIRATION RATE: 16 BRPM | WEIGHT: 287.7 LBS

## 2023-11-16 DIAGNOSIS — I50.813 ACUTE ON CHRONIC RIGHT-SIDED CONGESTIVE HEART FAILURE (H): Primary | ICD-10-CM

## 2023-11-16 PROCEDURE — 99214 OFFICE O/P EST MOD 30 MIN: CPT | Performed by: INTERNAL MEDICINE

## 2023-11-16 NOTE — PROGRESS NOTES
"    Cardiology Progress Note     Assessment:  Chronic chest pain with normal coronaries  Morbid obesity  Sleep apnea on CPAP  Elevated BNP likely due to RV strain from sleep apnea obesity etc.,  rule out nonischemic dilated cardiomyopathy  Incomplete left bundle branch block  Diabetes mellitus      Plan:  Echo was essentially nondiagnostic due to poor quality of images.  We will perform cardiac MR to get a better sense about LV and RV function as well as valvular abnormalities.  Should continue current medications    Reevaluation of effectiveness of sleep apnea treatment is recommended    Further recommendation when the cardiac MRI results available      Subjective:   This is 43 year old male who comes in today for follow-up visit.  In the past because of atypical chronic chest pain.  He underwent CT coronary angiogram which showed normal coronaries.  Recently he has been having more problems with shortness of breath.  He ended up in the emergency room and he had mildly elevated BNP.  He was also treated for upper respiratory infection the same time.  He continues to suffer with shortness of breath.  He continues to have atypical nonexertional chest pain.  He  has sleep apnea and he uses CPAP mask at night.  He is scheduled to undergo reevaluation with sleep specialist near future.    Review of Systems:   Negative other than history of present illness    Objective:   /50 (BP Location: Left arm, Patient Position: Sitting, Cuff Size: Adult Large)   Pulse 104   Resp 16   Ht 1.651 m (5' 5\")   Wt 130.5 kg (287 lb 11.2 oz)   BMI 47.88 kg/m    Physical Exam:  GENERAL: no distress  NECK: No JVD  LUNGS: Clear to auscultation.  CARDIAC: regular rhythm, S1 & S2 normal.  No heaves, thrills, gallops or murmurs.  ABDOMEN: flat, negative hepatosplenomegaly, soft and non-tender.  EXTREMITIES: No evidence of cyanosis, clubbing or edema.    Current Outpatient Medications   Medication Sig Dispense Refill    acetaminophen " (TYLENOL) 32 mg/mL liquid Take 960 mg by mouth every 6 hours as needed for fever or mild pain      acetaminophen (TYLENOL) 500 MG tablet Take 2 tablets (1,000 mg) by mouth 3 times daily 30 tablet 0    albuterol (PROVENTIL) (2.5 MG/3ML) 0.083% neb solution Take 2.5 mg by nebulization 3 times daily as needed for shortness of breath, wheezing or cough      aloe vera GEL Apply 1 g topically every hour as needed for skin care Per bottle directions      bacitracin 500 UNIT/GM OINT Apply topically 3 times daily as needed for wound care      BETA BLOCKER NOT PRESCRIBED (INTENTIONAL) Please choose reason not prescribed from choices below.      Calamine external lotion Apply topically as needed for itching      carbamide peroxide (DEBROX) 6.5 % otic solution Place 5 drops into both ears daily as needed for other      clotrimazole (LOTRIMIN) 1 % external cream Apply topically 2 times daily as needed (skin irritation)      cyclobenzaprine (FLEXERIL) 10 MG tablet Take 10 mg by mouth 3 times daily as needed for muscle spasms      dextromethorphan-guaiFENesin (MUCINEX DM)  MG per 12 hr tablet Take 1 tablet by mouth every 12 hours      diclofenac (VOLTAREN) 1 % topical gel Apply 2 g topically daily as needed for moderate pain To joints/back      diclofenac (VOLTAREN) 75 MG EC tablet Take 1 tablet (75 mg) by mouth 2 times daily as needed for moderate pain 28 tablet 0    escitalopram (LEXAPRO) 10 MG tablet Take 10 mg by mouth daily      fluticasone-vilanterol (BREO ELLIPTA) 200-25 MCG/ACT inhaler Inhale 1 puff into the lungs daily      furosemide (LASIX) 20 MG tablet Take 20 mg by mouth daily      hydrocortisone 1 % CREA cream Place rectally 2 times daily as needed for itching      ibuprofen (ADVIL/MOTRIN) 100 MG/5ML suspension Take 400 mg by mouth every 4 hours as needed for fever or moderate pain      ibuprofen (ADVIL/MOTRIN) 200 MG tablet Take 400 mg by mouth every 4 hours as needed for pain      ibuprofen (ADVIL/MOTRIN) 600  MG tablet Take 1 tablet (600 mg) by mouth every 6 hours as needed for moderate pain 30 tablet 0    lisinopril (ZESTRIL) 10 MG tablet Take 10 mg by mouth daily      LORazepam (ATIVAN) 1 MG tablet Take 0.5 mg by mouth daily as needed for anxiety      melatonin 3 MG tablet Take 3 mg by mouth At Bedtime      metFORMIN (GLUCOPHAGE) 1000 MG tablet Take 1,000 mg by mouth 2 times daily (with meals)      montelukast (SINGULAIR) 10 MG tablet Take 10 mg by mouth daily      OLANZapine (ZYPREXA) 10 MG tablet Take 10 mg by mouth At Bedtime      omeprazole (PRILOSEC) 20 MG DR capsule Take 40 mg by mouth daily      ondansetron (ZOFRAN) 4 MG tablet Take 4 mg by mouth every 12 hours as needed for nausea      oxybutynin ER (DITROPAN XL) 10 MG 24 hr tablet Take 10 mg by mouth daily      polyethylene glycol (MIRALAX) 17 g packet Take 1 packet by mouth daily as needed for constipation      psyllium (METAMUCIL) 28.3 % packet Take 1 packet by mouth daily      rosuvastatin (CRESTOR) 10 MG tablet Take 10 mg by mouth At Bedtime      sodium phosphate (FLEET ENEMA) 7-19 GM/118ML rectal enema Place 1 enema rectally once as needed for constipation      spironolactone (ALDACTONE) 50 MG tablet Take 50 mg by mouth daily      traZODone (DESYREL) 50 MG tablet Take 50 mg by mouth At Bedtime      zinc oxide (DESITIN) 20 % external ointment Apply topically as needed for dry skin or irritation To groin/buttocks area         Cardiographics:    ECG: Personally reviewed.  Normal sinus rhythm incomplete left bundle branch block     Stress test: September 2022 at [place]  1.  No stress-induced reversible perfusion defects.   2.  Small focal area of mildly decreased perfusion along the apical lateral wall that is similar in both rest and stress and may reflect normal variant apical thinning versus artifact. Small focal area of infarct not entirely excluded.   3.  Normal  sized left ventricle with a left ventricular ejection fraction of greater than 65%.   4.   "No stress-induced wall motion abnormalities.        Echo: August 2022  Normal    August 2023   The visual ejection fraction is 50-55%.  Regional wall motion abnormalities cannot be excluded due to limited  visualization.  Right ventricular function cannot be assessed due to poor image quality.  No obvious valve disease.  Technically difficult, suboptimal study.    CT coronary Angiogram: 2019  Normal coronaries    March 2023  Normal coronaries     Lab Results    Chemistry/lipid CBC Cardiac Enzymes/BNP/TSH/INR   Recent Labs   Lab Test 08/01/23 2117   CHOL 116   HDL 38*   LDL 51   TRIG 137     Recent Labs   Lab Test 08/01/23 2117   LDL 51     Recent Labs   Lab Test 11/09/23 0415      POTASSIUM 3.9   CHLORIDE 99   CO2 26   *   BUN 14.4   CR 0.78   GFRESTIMATED >90   WES 9.6     Recent Labs   Lab Test 11/09/23 0415 08/28/23 2024 08/20/23 2043   CR 0.78 0.95 0.75     Recent Labs   Lab Test 08/01/23  1802   A1C 7.3*          Recent Labs   Lab Test 11/09/23 0415   WBC 16.5*   HGB 14.2   HCT 44.7   MCV 92        Recent Labs   Lab Test 11/09/23 0415 08/28/23 2024 08/20/23 2043   HGB 14.2 13.7 14.2    No results for input(s): \"TROPONINI\" in the last 13484 hours.  Recent Labs   Lab Test 11/09/23 0415 08/01/23  1802   NTBNPI 641* 562*     No results for input(s): \"TSH\" in the last 77198 hours.  No results for input(s): \"INR\" in the last 71426 hours.                  "

## 2023-11-16 NOTE — LETTER
"11/16/2023    AP BISHOP PA-C  [facility] [address]  [facility] [address]    RE: Warren S Clint       Dear Colleague,     I had the pleasure of seeing Warren CHILEL Clint in the [facility].      Cardiology Progress Note     Assessment:  Chronic chest pain with normal coronaries  Morbid obesity  Sleep apnea on CPAP  Elevated BNP likely due to RV strain from sleep apnea obesity etc.,  rule out nonischemic dilated cardiomyopathy  Incomplete left bundle branch block  Diabetes mellitus      Plan:  Echo was essentially nondiagnostic due to poor quality of images.  We will perform cardiac MR to get a better sense about LV and RV function as well as valvular abnormalities.  Should continue current medications    Reevaluation of effectiveness of sleep apnea treatment is recommended    Further recommendation when the cardiac MRI results available      Subjective:   This is 43 year old male who comes in today for follow-up visit.  In the past because of atypical chronic chest pain.  He underwent CT coronary angiogram which showed normal coronaries.  Recently he has been having more problems with shortness of breath.  He ended up in the emergency room and he had mildly elevated BNP.  He was also treated for upper respiratory infection the same time.  He continues to suffer with shortness of breath.  He continues to have atypical nonexertional chest pain.  He  has sleep apnea and he uses CPAP mask at night.  He is scheduled to undergo reevaluation with sleep specialist near future.    Review of Systems:   Negative other than history of present illness    Objective:   /50 (BP Location: Left arm, Patient Position: Sitting, Cuff Size: Adult Large)   Pulse 104   Resp 16   Ht 1.651 m (5' 5\")   Wt 130.5 kg (287 lb 11.2 oz)   BMI 47.88 kg/m    Physical Exam:  GENERAL: no distress  NECK: No JVD  LUNGS: Clear to auscultation.  CARDIAC: regular rhythm, S1 & S2 normal.  No " heaves, thrills, gallops or murmurs.  ABDOMEN: flat, negative hepatosplenomegaly, soft and non-tender.  EXTREMITIES: No evidence of cyanosis, clubbing or edema.    Current Outpatient Medications   Medication Sig Dispense Refill    acetaminophen (TYLENOL) 32 mg/mL liquid Take 960 mg by mouth every 6 hours as needed for fever or mild pain      acetaminophen (TYLENOL) 500 MG tablet Take 2 tablets (1,000 mg) by mouth 3 times daily 30 tablet 0    albuterol (PROVENTIL) (2.5 MG/3ML) 0.083% neb solution Take 2.5 mg by nebulization 3 times daily as needed for shortness of breath, wheezing or cough      aloe vera GEL Apply 1 g topically every hour as needed for skin care Per bottle directions      bacitracin 500 UNIT/GM OINT Apply topically 3 times daily as needed for wound care      BETA BLOCKER NOT PRESCRIBED (INTENTIONAL) Please choose reason not prescribed from choices below.      Calamine external lotion Apply topically as needed for itching      carbamide peroxide (DEBROX) 6.5 % otic solution Place 5 drops into both ears daily as needed for other      clotrimazole (LOTRIMIN) 1 % external cream Apply topically 2 times daily as needed (skin irritation)      cyclobenzaprine (FLEXERIL) 10 MG tablet Take 10 mg by mouth 3 times daily as needed for muscle spasms      dextromethorphan-guaiFENesin (MUCINEX DM)  MG per 12 hr tablet Take 1 tablet by mouth every 12 hours      diclofenac (VOLTAREN) 1 % topical gel Apply 2 g topically daily as needed for moderate pain To joints/back      diclofenac (VOLTAREN) 75 MG EC tablet Take 1 tablet (75 mg) by mouth 2 times daily as needed for moderate pain 28 tablet 0    escitalopram (LEXAPRO) 10 MG tablet Take 10 mg by mouth daily      fluticasone-vilanterol (BREO ELLIPTA) 200-25 MCG/ACT inhaler Inhale 1 puff into the lungs daily      furosemide (LASIX) 20 MG tablet Take 20 mg by mouth daily      hydrocortisone 1 % CREA cream Place rectally 2 times daily as needed for itching       ibuprofen (ADVIL/MOTRIN) 100 MG/5ML suspension Take 400 mg by mouth every 4 hours as needed for fever or moderate pain      ibuprofen (ADVIL/MOTRIN) 200 MG tablet Take 400 mg by mouth every 4 hours as needed for pain      ibuprofen (ADVIL/MOTRIN) 600 MG tablet Take 1 tablet (600 mg) by mouth every 6 hours as needed for moderate pain 30 tablet 0    lisinopril (ZESTRIL) 10 MG tablet Take 10 mg by mouth daily      LORazepam (ATIVAN) 1 MG tablet Take 0.5 mg by mouth daily as needed for anxiety      melatonin 3 MG tablet Take 3 mg by mouth At Bedtime      metFORMIN (GLUCOPHAGE) 1000 MG tablet Take 1,000 mg by mouth 2 times daily (with meals)      montelukast (SINGULAIR) 10 MG tablet Take 10 mg by mouth daily      OLANZapine (ZYPREXA) 10 MG tablet Take 10 mg by mouth At Bedtime      omeprazole (PRILOSEC) 20 MG DR capsule Take 40 mg by mouth daily      ondansetron (ZOFRAN) 4 MG tablet Take 4 mg by mouth every 12 hours as needed for nausea      oxybutynin ER (DITROPAN XL) 10 MG 24 hr tablet Take 10 mg by mouth daily      polyethylene glycol (MIRALAX) 17 g packet Take 1 packet by mouth daily as needed for constipation      psyllium (METAMUCIL) 28.3 % packet Take 1 packet by mouth daily      rosuvastatin (CRESTOR) 10 MG tablet Take 10 mg by mouth At Bedtime      sodium phosphate (FLEET ENEMA) 7-19 GM/118ML rectal enema Place 1 enema rectally once as needed for constipation      spironolactone (ALDACTONE) 50 MG tablet Take 50 mg by mouth daily      traZODone (DESYREL) 50 MG tablet Take 50 mg by mouth At Bedtime      zinc oxide (DESITIN) 20 % external ointment Apply topically as needed for dry skin or irritation To groin/buttocks area         Cardiographics:    ECG: Personally reviewed.  Normal sinus rhythm incomplete left bundle branch block     Stress test: September 2022 at [place]  1.  No stress-induced reversible perfusion defects.   2.  Small focal area of mildly decreased perfusion along the apical lateral wall that is  "similar in both rest and stress and may reflect normal variant apical thinning versus artifact. Small focal area of infarct not entirely excluded.   3.  Normal  sized left ventricle with a left ventricular ejection fraction of greater than 65%.   4.  No stress-induced wall motion abnormalities.        Echo: August 2022  Normal    August 2023   The visual ejection fraction is 50-55%.  Regional wall motion abnormalities cannot be excluded due to limited  visualization.  Right ventricular function cannot be assessed due to poor image quality.  No obvious valve disease.  Technically difficult, suboptimal study.    CT coronary Angiogram: 2019  Normal coronaries    March 2023  Normal coronaries     Lab Results    Chemistry/lipid CBC Cardiac Enzymes/BNP/TSH/INR   Recent Labs   Lab Test 08/01/23 2117   CHOL 116   HDL 38*   LDL 51   TRIG 137     Recent Labs   Lab Test 08/01/23 2117   LDL 51     Recent Labs   Lab Test 11/09/23 0415      POTASSIUM 3.9   CHLORIDE 99   CO2 26   *   BUN 14.4   CR 0.78   GFRESTIMATED >90   WES 9.6     Recent Labs   Lab Test 11/09/23 0415 08/28/23 2024 08/20/23 2043   CR 0.78 0.95 0.75     Recent Labs   Lab Test 08/01/23  1802   A1C 7.3*          Recent Labs   Lab Test 11/09/23 0415   WBC 16.5*   HGB 14.2   HCT 44.7   MCV 92        Recent Labs   Lab Test 11/09/23 0415 08/28/23 2024 08/20/23 2043   HGB 14.2 13.7 14.2    No results for input(s): \"TROPONINI\" in the last 18553 hours.  Recent Labs   Lab Test 11/09/23 0415 08/01/23  1802   NTBNPI 641* 562*     No results for input(s): \"TSH\" in the last 30370 hours.  No results for input(s): \"INR\" in the last 06580 hours.                      Thank you for allowing me to participate in the care of your patient.      Sincerely,     Leonardo Wilkerson MD     [facility]  cc:   No referring provider defined for this encounter.      "

## 2023-11-16 NOTE — PATIENT INSTRUCTIONS
Warren Jaramillo,    It was a pleasure to see you today at the [facility].     My recommendations after this visit include:    MR to better assess the right and left side of the heart pumping function  Make sure that you follow-up with sleep apnea specialist  Continue current medications    Your cardiac CT coronary angiogram showed no blockages.  Your risk of heart attack is very low    JUANITO Wilkerson MD, FACC, [facility]

## 2023-11-22 ENCOUNTER — OFFICE VISIT (OUTPATIENT)
Dept: SLEEP MEDICINE | Facility: CLINIC | Age: 43
End: 2023-11-22
Payer: MEDICARE

## 2023-11-22 VITALS
OXYGEN SATURATION: 95 % | SYSTOLIC BLOOD PRESSURE: 121 MMHG | DIASTOLIC BLOOD PRESSURE: 49 MMHG | WEIGHT: 285.6 LBS | HEIGHT: 65 IN | BODY MASS INDEX: 47.58 KG/M2 | HEART RATE: 85 BPM

## 2023-11-22 DIAGNOSIS — G47.33 OSA (OBSTRUCTIVE SLEEP APNEA): Primary | ICD-10-CM

## 2023-11-22 PROBLEM — D17.9 ANGIOMYOLIPOMA: Status: ACTIVE | Noted: 2023-11-22

## 2023-11-22 PROBLEM — R79.89 ELEVATED D-DIMER: Status: ACTIVE | Noted: 2019-12-09

## 2023-11-22 PROBLEM — R31.9 HEMATURIA: Status: ACTIVE | Noted: 2023-04-25

## 2023-11-22 PROBLEM — D17.79 MYELOLIPOMA OF ADRENAL GLAND: Status: ACTIVE | Noted: 2019-03-04

## 2023-11-22 PROBLEM — R32 INCONTINENCE: Status: ACTIVE | Noted: 2023-05-17

## 2023-11-22 PROBLEM — M43.28: Status: ACTIVE | Noted: 2019-03-04

## 2023-11-22 PROBLEM — E11.9 DM2 (DIABETES MELLITUS, TYPE 2) (H): Status: ACTIVE | Noted: 2023-02-21

## 2023-11-22 PROBLEM — R30.0 DYSURIA: Status: ACTIVE | Noted: 2023-05-08

## 2023-11-22 PROBLEM — R26.81 GAIT INSTABILITY: Status: ACTIVE | Noted: 2023-06-21

## 2023-11-22 PROBLEM — Z91.81 H/O FALL: Status: ACTIVE | Noted: 2023-02-21

## 2023-11-22 PROBLEM — G47.19 EXCESSIVE DAYTIME SLEEPINESS: Status: ACTIVE | Noted: 2020-07-13

## 2023-11-22 PROBLEM — E11.9 DM2 (DIABETES MELLITUS, TYPE 2) (H): Status: ACTIVE | Noted: 2020-04-28

## 2023-11-22 PROBLEM — I48.0 PAROXYSMAL ATRIAL FIBRILLATION (H): Status: ACTIVE | Noted: 2022-04-27

## 2023-11-22 PROBLEM — R13.10 DYSPHAGIA: Status: ACTIVE | Noted: 2023-11-22

## 2023-11-22 PROBLEM — R18.8 ASCITES: Status: ACTIVE | Noted: 2023-08-07

## 2023-11-22 PROBLEM — I50.9 CONGESTIVE HEART FAILURE (H): Status: ACTIVE | Noted: 2023-11-14

## 2023-11-22 PROBLEM — F51.04 CHRONIC INSOMNIA: Status: ACTIVE | Noted: 2020-07-20

## 2023-11-22 PROBLEM — F39 EPISODIC MOOD DISORDER (H): Status: ACTIVE | Noted: 2023-06-20

## 2023-11-22 PROBLEM — R73.9 HYPERGLYCEMIA: Status: ACTIVE | Noted: 2023-04-19

## 2023-11-22 PROCEDURE — 99204 OFFICE O/P NEW MOD 45 MIN: CPT | Performed by: NURSE PRACTITIONER

## 2023-11-22 RX ORDER — ZOLPIDEM TARTRATE 10 MG/1
TABLET ORAL
Qty: 1 TABLET | Refills: 0 | Status: SHIPPED | OUTPATIENT
Start: 2023-11-22 | End: 2023-12-04

## 2023-11-22 RX ORDER — OXYCODONE HYDROCHLORIDE 5 MG/1
TABLET ORAL
COMMUNITY
Start: 2023-08-15 | End: 2024-06-28

## 2023-11-22 RX ORDER — BLOOD-GLUCOSE METER
EACH MISCELLANEOUS
COMMUNITY
Start: 2023-10-06

## 2023-11-22 RX ORDER — LANCETS
EACH MISCELLANEOUS
COMMUNITY
Start: 2023-10-06

## 2023-11-22 RX ORDER — BLOOD SUGAR DIAGNOSTIC
STRIP MISCELLANEOUS
COMMUNITY
Start: 2023-10-06

## 2023-11-22 ASSESSMENT — SLEEP AND FATIGUE QUESTIONNAIRES
HOW LIKELY ARE YOU TO NOD OFF OR FALL ASLEEP WHILE WATCHING TV: SLIGHT CHANCE OF DOZING
HOW LIKELY ARE YOU TO NOD OFF OR FALL ASLEEP WHILE SITTING QUIETLY AFTER LUNCH WITHOUT ALCOHOL: WOULD NEVER DOZE
HOW LIKELY ARE YOU TO NOD OFF OR FALL ASLEEP IN A CAR, WHILE STOPPED FOR A FEW MINUTES IN TRAFFIC: WOULD NEVER DOZE
HOW LIKELY ARE YOU TO NOD OFF OR FALL ASLEEP WHILE SITTING AND TALKING TO SOMEONE: WOULD NEVER DOZE
HOW LIKELY ARE YOU TO NOD OFF OR FALL ASLEEP WHILE SITTING INACTIVE IN A PUBLIC PLACE: SLIGHT CHANCE OF DOZING
HOW LIKELY ARE YOU TO NOD OFF OR FALL ASLEEP WHEN YOU ARE A PASSENGER IN A CAR FOR AN HOUR WITHOUT A BREAK: SLIGHT CHANCE OF DOZING
HOW LIKELY ARE YOU TO NOD OFF OR FALL ASLEEP WHILE SITTING AND READING: SLIGHT CHANCE OF DOZING
HOW LIKELY ARE YOU TO NOD OFF OR FALL ASLEEP WHILE LYING DOWN TO REST IN THE AFTERNOON WHEN CIRCUMSTANCES PERMIT: HIGH CHANCE OF DOZING

## 2023-11-22 NOTE — PROGRESS NOTES
Obstructive Sleep Apnea - PAP Follow-Up Visit:    Chief Complaint   Patient presents with    Sleep Apnea     Warren has past medical history notable for severe obstructive sleep apnea, hypertension, paroxysmal atrial fibrillation, acute on chronic right-sided congestive heart failure, steatohepatitis, cardiomegaly, diabetes mellitus type 2, PTSD, ADHD, fetal alcohol syndrome, autistic disorder, Charcot-Estrella-Tooth syndrome, obesity.  He also has a past history of being undomiciled.    He comes today in the company of his caregiver.  Both patient and caregiver have pungent odor of marijuana apparent.    Warren Jaramillo comes in today for follow-up of their severe sleep apnea, managed with BPAP.     Do you use a CPAP Machine at home: Yes  Overall, on a scale of 0-10 how would you rate your CPAP (0 poor, 10 great): 0  Is your mask comfortable: Yes  If not, why:    Is you mask leaking: No  If yes, where do you feel it:    How many night per week does the mask leak (0-7):    Do you notice snoring with mask on: No  Do you notice gasping arousals with mask on: No  Are you having significant oral or nasal dryness: Yes  Is the pressure setting comfortable: No  In not, why: not enough pressure  What type of mask do you use: Nasal Pillow  What is your typical bedtime: 1030pm  How long does it take you to go to sleep on PAP therapy: 15minutes  What time do you typically get out of bed for the day: 630am  How many hours on average per night are you using PAP therapy: 9hours  How many hours are you sleeping per night: 7  Do you feel well rested in the morning: No    EPWORTH SLEEPINESS SCALE         11/22/2023     9:31 AM    [place] Sleepiness Scale ( NIGEL Mcarthur  [id number]<br>ESS - [place]/English - Final version - 21 Nov 07 - [facility].)   Sitting and reading Slight chance of dozing   Watching TV Slight chance of dozing   Sitting, inactive in a public place (e.g. a theatre or a meeting) Slight chance of  dozing   As a passenger in a car for an hour without a break Slight chance of dozing   Lying down to rest in the afternoon when circumstances permit High chance of dozing   Sitting and talking to someone Would never doze   Sitting quietly after a lunch without alcohol Would never doze   In a car, while stopped for a few minutes in traffic Would never doze   [place] Score (MC) 7   [place] Score (Sleep) 7       INSOMNIA SEVERITY INDEX (MILA)          11/21/2023     1:19 PM   Insomnia Severity Index (MILA)   Difficulty falling asleep 3   Difficulty staying asleep 3   Problems waking up too early 4   How SATISFIED/DISSATISFIED are you with your CURRENT sleep pattern? 4   How NOTICEABLE to others do you think your sleep problem is in terms of impairing the quality of your life? 4   How WORRIED/DISTRESSED are you about your current sleep problem? 4   To what extent do you consider your sleep problem to INTERFERE with your daily functioning (e.g. daytime fatigue, mood, ability to function at work/daily chores, concentration, memory, mood, etc.) CURRENTLY? 4   MILA Total Score 26       Guidelines for Scoring/Interpretation:  Total score categories:  0-7 = No clinically significant insomnia   8-14 = Subthreshold insomnia   15-21 = Clinical insomnia (moderate severity)  22-28 = Clinical insomnia (severe)  Used via courtesy of www.Cloutth.va.gov with permission from Jean-Paul Cunningham PhD., [facility]      Respironics  Auto-PAP IPAP:   25 cm H2O  EPAP: 16 cm H2O  Pressure support 5 cm H2O  30 day usage data: 10/23/2023-11/21/2023  100% of days with > 4 hours of use.  0/30 days with no use.   Average use 11 hours, 30 minutes per day.   Average leak 6.2 LPM.  Average % of night in large leak 0.1%.    CPAP 90% pressure   90% IPAP 22.3 cm H2O  90% EPAP: 17.3 cm H2O  AHI 1 events per hour.    SLEEP STUDIES   Flowsheet Row Sleep Study Visit [facility] 10/22/[facility]   Date of Order 10/18/18   Date of sleep  study 10/22/18   Type of sleep study Split   1A AHI 56   1B AHI 42   REM AHI 78.6   David Saturation 77   PLMI 7.7   PLMAI 1.7   PAP recommendation Auto BiPAP min E 16   Mask recommendation P10 small   Diagnoses Obstructive Sleep Apnea   [place] Sleepiness Scale 14     Warren and CARLO reviewed his initial sleep study revealing an AHI of 56, and a REM AHI of 78.6 events/hour.  This was placed in context of his medical diagnoses in terms that he could understand.  We also discussed his current compliance downloaded information revealing 100% compliance.  Patient was given accolades for his diligence.  Reviewed symptoms of untreated sleep apnea in the context of his medical diagnoses.    Reviewed by team:  Tobacco  Allergies  Meds  Problems           Reviewed by provider:   Allergies  Meds  Problems             Problem List:  Patient Active Problem List    Diagnosis Date Noted    Angiomyolipoma 11/22/2023     Priority: Medium    Dysphagia 11/22/2023     Priority: Medium    Acute on chronic right-sided congestive heart failure (H) 11/16/2023     Priority: Medium    Congestive heart failure (H) 11/14/2023     Priority: Medium    Acute right hip pain 08/18/2023     Priority: Medium    Ascites 08/07/2023     Priority: Medium    Cardiomegaly 08/01/2023     Priority: Medium    Shortness of breath 08/01/2023     Priority: Medium    Chest pain, unspecified type 08/01/2023     Priority: Medium    Gait instability 06/21/2023     Priority: Medium    Episodic mood disorder (H24) 06/20/2023     Priority: Medium    Incontinence 05/17/2023     Priority: Medium    Dysuria 05/08/2023     Priority: Medium    Hematuria 04/25/2023     Priority: Medium    Hyperglycemia 04/19/2023     Priority: Medium    DM2 (diabetes mellitus, type 2) (H) 02/21/2023     Priority: Medium    H/O fall 02/21/2023     Priority: Medium    Steatohepatitis 02/04/2023     Priority: Medium    Paroxysmal atrial fibrillation (H) 04/27/2022     Priority:  "Medium    Chronic insomnia 07/20/2020     Priority: Medium    Excessive daytime sleepiness 07/13/2020     Priority: Medium    DM2 (diabetes mellitus, type 2) (H) 04/28/2020     Priority: Medium    Elevated d-dimer 12/09/2019     Priority: Medium    Ankylosis, sacroiliac joint 03/04/2019     Priority: Medium    Myelolipoma of adrenal gland 03/04/2019     Priority: Medium     Formatting of this note might be different from the original. Per [facility] lumbar CT scan, 3-2019      AVA treated with BiPAP 01/14/2019     Priority: Medium     Formatting of this note might be different from the original. PSG 10/18 AHI 56 BiPAP min E16 PS 4      HTN (hypertension) 07/30/2012     Priority: Medium    Adjustment disorder with disturbance of conduct 06/01/2012     Priority: Medium    Active autistic disorder 01/23/2012     Priority: Medium    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     Priority: Medium    Seasonal allergic rhinitis 08/22/2011     Priority: Medium    PTSD (post-traumatic stress disorder) 11/16/2010     Priority: Medium    Fetal alcohol syndromes 11/16/2010     Priority: Medium     Problem list name updated by automated process. Provider to review and confirm      CARDIOVASCULAR SCREENING; LDL GOAL LESS THAN 160 10/31/2010     Priority: Medium    \"high flow priapism\"  01/02/2009     Priority: Medium     See scanned urology consult in [facility].  Not sure what urology plans to do about it.  Malik seems easily stimulated, healthy (maybe too much?) sex drive.  Some issues with obsessive thoughts re: women, pictures, images, etc.    Having f/u cystoscopy.        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  Sphincterotomy on 11/3/08 really helped sx.        Rectal bleeding 09/22/2008     Priority: Medium     Longstanding.  Colonoscopy with biopsies normal in [place], also CT scan abd normal in [place], see scanned [facility] surgery consult.  Ct scan abd normal.  Anal spasm with fissure is working " "diagnosis.  Better after sphincterotomy      Elevated WBCs 2008     Priority: Medium     Multiple CBCs in past with mild elevation in WBC, no obvious reason.  Heme consult and basic workup including smear and other labs were normal.  08 CBC with normal WBC.        Other acne 10/17/2007     Priority: Medium    Attention deficit hyperactivity disorder (ADHD) 2007     Priority: Medium     Ean SLorenzo Walsh is psychiatrist.  Looks like he works independently in [place].  [phone]  Problem list name updated by automated process. 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  Staff at [facility] and his care team have asked that diagnoses and other medical details not be discussed in front of the patient.  He apparently will obsess about diagnoses and side effects if mentioned.      Staff asks that we evaluate him, then discuss medical issues and recommendations in [place] [address]     Name: BOB LO  MRN: [id number]  : 1980  Study Date: 2023 03:59 PM  Age: 42 yrs  Gender: Male  Patient Location: [facility]  Reason For Study: CHF  Ordering Physician: JORGE PHAM  Performed By: ACE     BSA: 2.3 m2  Height: 65 in  Weight: 277 lb  HR: 70  BP: 134/69 mmHg  ______________________________________________________________________________  Procedure  Complete Echo Adult. Definity (NDC #44830-470) given intravenously.  ______________________________________________________________________________  Interpretation Summary     The visual ejection fraction is 50-55%.  Regional wall motion abnormalities cannot be excluded due to limited  visualization.  Right ventricular function cannot be assessed due to poor image quality.  No obvious valve disease.  Technically difficult, suboptimal study.    /49   Pulse 85   Ht 1.651 m (5' 5\")   Wt " 129.5 kg (285 lb 9.6 oz)   SpO2 95%   BMI 47.53 kg/m      Impression/Plan:  1.  Severe Obstructive Sleep Apnea With Sleep Associated Hypoxemia   Patient uses his BiPAP diligently and recognizes the benefits that it brings to his health.  There are times when he feels that he does not have enough pressure, but overall he finds it very comfortable.  Given that residual AHI is 1.0, and original AHI at sleep study was 56.0 events/hour level therapy is effective.  Minimal leak mask.  Discussed patient's sleep hygiene practices and identified areas for improvement.   Recommend patient optimize his sleep hygiene practices as well as his sleep schedule to minimize any further sleep disturbances.   Patient does not drive a car.   Weight management to BMI of 30.0 if possible   Discussed with patient the effects of cigarette smoking as well as marijuana smoking on his health as well as sleep apnea      Warren Jaramillo will follow up in about 6 month(s).     50 minutes spent with patient, all of which were spent face-to-face counseling, consulting, coordinating plan of care.      ABBIE Marks CNP    CC:  Tricia Rogers,

## 2023-11-22 NOTE — NURSING NOTE
"Chief Complaint   Patient presents with    Sleep Apnea       Initial /49   Pulse 85   Ht 1.651 m (5' 5\")   Wt 129.5 kg (285 lb 9.6 oz)   SpO2 95%   BMI 47.53 kg/m   Estimated body mass index is 47.53 kg/m  as calculated from the following:    Height as of this encounter: 1.651 m (5' 5\").    Weight as of this encounter: 129.5 kg (285 lb 9.6 oz).    Medication Reconciliation: complete    Neck circumference:     DME:      Olga Moreno MA  "

## 2023-11-22 NOTE — PATIENT INSTRUCTIONS
Drive Safe... Drive Alive     Sleep health profoundly affects your health, mood, and your safety. 33% of the population (one in three of us) is not getting enough sleep and many have a sleep disorder. Not getting enough sleep or having an untreated / undertreated sleep condition may make us sleepy without even knowing it. In fact, our driving could be dramatically impaired due to our sleep health. As your provider, here are some things I would like you to know about driving:     Here are some warning signs for impairment and dangerous drowsy driving:              -Having been awake more than 16 hours               -Looking tired               -Eyelid drooping              -Head nodding (it could be too late at this point)              -Driving for more than 30 minutes     Some things you could do to make the driving safer if you are experiencing some drowsiness:              -Stop driving and rest              -Call for transportation              -Make sure your sleep disorder is adequately treated     Some things that have been shown NOT to work when experiencing drowsiness while driving:              -Turning on the radio              -Opening windows              -Eating any  distracting  /  entertaining  foods (e.g., sunflower seeds, candy, or any other)              -Talking on the phone      Your decision may not only impact your life, but also the life of others. Please, remember to drive safe for yourself and all of us.   Your Body mass index is 47.53 kg/m .  Weight management is a personal decision.  If you are interested in exploring weight loss strategies, the following discussion covers the approaches that may be successful. Body mass index (BMI) is one way to tell whether you are at a healthy weight, overweight, or obese. It measures your weight in relation to your height.  A BMI of 18.5 to 24.9 is in the healthy range. A person with a BMI of 25 to 29.9 is considered overweight, and someone with a BMI  of 30 or greater is considered obese. More than two-thirds of American adults are considered overweight or obese.  Being overweight or obese increases the risk for further weight gain. Excess weight may lead to heart disease and diabetes.  Creating and following plans for healthy eating and physical activity may help you improve your health.  Weight control is part of healthy lifestyle and includes exercise, emotional health, and healthy eating habits. Careful eating habits lifelong are the mainstay of weight control. Though there are significant health benefits from weight loss, long-term weight loss with diet alone may be very difficult to achieve- studies show long-term success with dietary management in less than 10% of people. Attaining a healthy weight may be especially difficult to achieve in those with severe obesity. In some cases, medications, devices and surgical management might be considered.  What can you do?  If you are overweight or obese and are interested in methods for weight loss, you should discuss this with your provider.   Consider reducing daily calorie intake by 500 calories.   Keep a food journal.   Avoiding skipping meals, consider cutting portions instead.    Diet combined with exercise helps maintain muscle while optimizing fat loss. Strength training is particularly important for building and maintaining muscle mass. Exercise helps reduce stress, increase energy, and improves fitness. Increasing exercise without diet control, however, may not burn enough calories to loose weight.     Start walking three days a week 10-20 minutes at a time  Work towards walking thirty minutes five days a week   Eventually, increase the speed of your walking for 1-2 minutes at time    In addition, we recommend that you review healthy lifestyles and methods for weight loss available through the National Institutes of Health patient information sites:  http://win.niddk.nih.gov/publications/index.htm    And  look into health and wellness programs that may be available through your health insurance provider, employer, local community center, or doming[employer] club.          Malik will have a sleep study to titrate better Bipap pressures for him. I have ordered his study for him as well as one Ambien sleeping pill for him to take with him the night of the sleep study. Please do not let  him wear his BiPap for the three nights prior to the study.

## 2023-11-25 ENCOUNTER — HEALTH MAINTENANCE LETTER (OUTPATIENT)
Age: 43
End: 2023-11-25

## 2023-11-27 ENCOUNTER — HOSPITAL ENCOUNTER (EMERGENCY)
Facility: HOSPITAL | Age: 43
Discharge: HOME | End: 2023-11-27
Attending: EMERGENCY MEDICINE | Admitting: EMERGENCY MEDICINE
Payer: MEDICARE

## 2023-11-27 ENCOUNTER — APPOINTMENT (OUTPATIENT)
Dept: CT IMAGING | Facility: HOSPITAL | Age: 43
End: 2023-11-27
Attending: EMERGENCY MEDICINE
Payer: MEDICARE

## 2023-11-27 VITALS
TEMPERATURE: 97.1 F | BODY MASS INDEX: 47.58 KG/M2 | SYSTOLIC BLOOD PRESSURE: 137 MMHG | RESPIRATION RATE: 18 BRPM | OXYGEN SATURATION: 97 % | DIASTOLIC BLOOD PRESSURE: 60 MMHG | HEIGHT: 65 IN | WEIGHT: 285.6 LBS | HEART RATE: 82 BPM

## 2023-11-27 DIAGNOSIS — R07.89 CHEST WALL PAIN: ICD-10-CM

## 2023-11-27 DIAGNOSIS — J40 BRONCHITIS: ICD-10-CM

## 2023-11-27 LAB
ANION GAP SERPL CALCULATED.3IONS-SCNC: 7 MMOL/L (ref 7–15)
BASOPHILS # BLD AUTO: 0.1 10E3/UL (ref 0–0.2)
BASOPHILS NFR BLD AUTO: 1 %
BUN SERPL-MCNC: 12.2 MG/DL (ref 6–20)
CALCIUM SERPL-MCNC: 9.6 MG/DL (ref 8.6–10)
CHLORIDE SERPL-SCNC: 100 MMOL/L (ref 98–107)
CREAT SERPL-MCNC: 0.67 MG/DL (ref 0.67–1.17)
DEPRECATED HCO3 PLAS-SCNC: 30 MMOL/L (ref 22–29)
EGFRCR SERPLBLD CKD-EPI 2021: >90 ML/MIN/1.73M2
EOSINOPHIL # BLD AUTO: 0.3 10E3/UL (ref 0–0.7)
EOSINOPHIL NFR BLD AUTO: 2 %
ERYTHROCYTE [DISTWIDTH] IN BLOOD BY AUTOMATED COUNT: 15 % (ref 10–15)
FLUAV RNA SPEC QL NAA+PROBE: NEGATIVE
FLUBV RNA RESP QL NAA+PROBE: NEGATIVE
GLUCOSE SERPL-MCNC: 111 MG/DL (ref 70–99)
HCT VFR BLD AUTO: 43.4 % (ref 40–53)
HGB BLD-MCNC: 13.5 G/DL (ref 13.3–17.7)
HOLD SPECIMEN: NORMAL
IMM GRANULOCYTES # BLD: 0.1 10E3/UL
IMM GRANULOCYTES NFR BLD: 0 %
LYMPHOCYTES # BLD AUTO: 2.7 10E3/UL (ref 0.8–5.3)
LYMPHOCYTES NFR BLD AUTO: 19 %
MCH RBC QN AUTO: 28.8 PG (ref 26.5–33)
MCHC RBC AUTO-ENTMCNC: 31.1 G/DL (ref 31.5–36.5)
MCV RBC AUTO: 93 FL (ref 78–100)
MONOCYTES # BLD AUTO: 1.2 10E3/UL (ref 0–1.3)
MONOCYTES NFR BLD AUTO: 9 %
NEUTROPHILS # BLD AUTO: 9.5 10E3/UL (ref 1.6–8.3)
NEUTROPHILS NFR BLD AUTO: 69 %
NRBC # BLD AUTO: 0 10E3/UL
NRBC BLD AUTO-RTO: 0 /100
NT-PROBNP SERPL-MCNC: 495 PG/ML (ref 0–450)
PLATELET # BLD AUTO: 321 10E3/UL (ref 150–450)
POTASSIUM SERPL-SCNC: 4.1 MMOL/L (ref 3.4–5.3)
RBC # BLD AUTO: 4.68 10E6/UL (ref 4.4–5.9)
RSV RNA SPEC NAA+PROBE: NEGATIVE
SARS-COV-2 RNA RESP QL NAA+PROBE: NEGATIVE
SODIUM SERPL-SCNC: 137 MMOL/L (ref 135–145)
TROPONIN T SERPL HS-MCNC: 23 NG/L
TROPONIN T SERPL HS-MCNC: 23 NG/L
WBC # BLD AUTO: 13.8 10E3/UL (ref 4–11)

## 2023-11-27 PROCEDURE — 36415 COLL VENOUS BLD VENIPUNCTURE: CPT | Performed by: EMERGENCY MEDICINE

## 2023-11-27 PROCEDURE — 83880 ASSAY OF NATRIURETIC PEPTIDE: CPT | Performed by: EMERGENCY MEDICINE

## 2023-11-27 PROCEDURE — 87637 SARSCOV2&INF A&B&RSV AMP PRB: CPT | Performed by: EMERGENCY MEDICINE

## 2023-11-27 PROCEDURE — 84484 ASSAY OF TROPONIN QUANT: CPT | Mod: 91 | Performed by: EMERGENCY MEDICINE

## 2023-11-27 PROCEDURE — G1010 CDSM STANSON: HCPCS

## 2023-11-27 PROCEDURE — 85025 COMPLETE CBC W/AUTO DIFF WBC: CPT | Performed by: EMERGENCY MEDICINE

## 2023-11-27 PROCEDURE — 250N000012 HC RX MED GY IP 250 OP 636 PS 637: Performed by: EMERGENCY MEDICINE

## 2023-11-27 PROCEDURE — 93005 ELECTROCARDIOGRAM TRACING: CPT | Performed by: EMERGENCY MEDICINE

## 2023-11-27 PROCEDURE — 82310 ASSAY OF CALCIUM: CPT | Performed by: EMERGENCY MEDICINE

## 2023-11-27 PROCEDURE — 250N000011 HC RX IP 250 OP 636: Mod: JZ | Performed by: EMERGENCY MEDICINE

## 2023-11-27 PROCEDURE — 93005 ELECTROCARDIOGRAM TRACING: CPT | Performed by: STUDENT IN AN ORGANIZED HEALTH CARE EDUCATION/TRAINING PROGRAM

## 2023-11-27 PROCEDURE — 99285 EMERGENCY DEPT VISIT HI MDM: CPT | Mod: 25

## 2023-11-27 RX ORDER — PREDNISONE 20 MG/1
TABLET ORAL
Qty: 8 TABLET | Refills: 0 | Status: SHIPPED | OUTPATIENT
Start: 2023-11-27 | End: 2024-07-26

## 2023-11-27 RX ORDER — IOPAMIDOL 755 MG/ML
90 INJECTION, SOLUTION INTRAVASCULAR ONCE
Status: COMPLETED | OUTPATIENT
Start: 2023-11-27 | End: 2023-11-27

## 2023-11-27 RX ORDER — PREDNISONE 20 MG/1
40 TABLET ORAL ONCE
Status: COMPLETED | OUTPATIENT
Start: 2023-11-27 | End: 2023-11-27

## 2023-11-27 RX ADMIN — IOPAMIDOL 90 ML: 755 INJECTION, SOLUTION INTRAVENOUS at 12:59

## 2023-11-27 RX ADMIN — PREDNISONE 40 MG: 20 TABLET ORAL at 12:18

## 2023-11-27 ASSESSMENT — ACTIVITIES OF DAILY LIVING (ADL): ADLS_ACUITY_SCORE: 35

## 2023-11-27 NOTE — ED TRIAGE NOTES
"Patient reports that he has had chest pain for 4 day, pain is constant, worse with deep breath or movements.  Pt is being monitored for \"viral respiratory infection \" for 14 days.  Pt smokes 1ppd.        "

## 2023-12-06 LAB
ATRIAL RATE - MUSE: 86 BPM
DIASTOLIC BLOOD PRESSURE - MUSE: NORMAL MMHG
INTERPRETATION ECG - MUSE: NORMAL
P AXIS - MUSE: 82 DEGREES
PR INTERVAL - MUSE: 190 MS
QRS DURATION - MUSE: 124 MS
QT - MUSE: 384 MS
QTC - MUSE: 459 MS
R AXIS - MUSE: 82 DEGREES
SYSTOLIC BLOOD PRESSURE - MUSE: NORMAL MMHG
T AXIS - MUSE: 241 DEGREES
VENTRICULAR RATE- MUSE: 86 BPM

## 2024-01-18 ENCOUNTER — HOSPITAL ENCOUNTER (EMERGENCY)
Facility: HOSPITAL | Age: 44
Discharge: HOME | End: 2024-01-18
Attending: EMERGENCY MEDICINE | Admitting: EMERGENCY MEDICINE
Payer: MEDICARE

## 2024-01-18 ENCOUNTER — APPOINTMENT (OUTPATIENT)
Dept: CT IMAGING | Facility: HOSPITAL | Age: 44
End: 2024-01-18
Attending: EMERGENCY MEDICINE
Payer: MEDICARE

## 2024-01-18 VITALS
WEIGHT: 286 LBS | SYSTOLIC BLOOD PRESSURE: 146 MMHG | BODY MASS INDEX: 47.65 KG/M2 | OXYGEN SATURATION: 97 % | HEART RATE: 90 BPM | HEIGHT: 65 IN | DIASTOLIC BLOOD PRESSURE: 86 MMHG | RESPIRATION RATE: 12 BRPM | TEMPERATURE: 98.3 F

## 2024-01-18 DIAGNOSIS — R10.13 EPIGASTRIC PAIN: ICD-10-CM

## 2024-01-18 LAB
ALBUMIN SERPL BCG-MCNC: 3.9 G/DL (ref 3.5–5.2)
ALP SERPL-CCNC: 271 U/L (ref 40–150)
ALT SERPL W P-5'-P-CCNC: 22 U/L (ref 0–70)
ANION GAP SERPL CALCULATED.3IONS-SCNC: 10 MMOL/L (ref 7–15)
AST SERPL W P-5'-P-CCNC: ABNORMAL U/L
BASOPHILS # BLD AUTO: 0.1 10E3/UL (ref 0–0.2)
BASOPHILS NFR BLD AUTO: 1 %
BILIRUB SERPL-MCNC: 0.7 MG/DL
BUN SERPL-MCNC: 12.9 MG/DL (ref 6–20)
CALCIUM SERPL-MCNC: 9.2 MG/DL (ref 8.6–10)
CHLORIDE SERPL-SCNC: 103 MMOL/L (ref 98–107)
CREAT SERPL-MCNC: 0.8 MG/DL (ref 0.67–1.17)
DEPRECATED HCO3 PLAS-SCNC: 27 MMOL/L (ref 22–29)
EGFRCR SERPLBLD CKD-EPI 2021: >90 ML/MIN/1.73M2
EOSINOPHIL # BLD AUTO: 0.4 10E3/UL (ref 0–0.7)
EOSINOPHIL NFR BLD AUTO: 3 %
ERYTHROCYTE [DISTWIDTH] IN BLOOD BY AUTOMATED COUNT: 15.6 % (ref 10–15)
GLUCOSE SERPL-MCNC: 139 MG/DL (ref 70–99)
HCT VFR BLD AUTO: 42.7 % (ref 40–53)
HGB BLD-MCNC: 13.2 G/DL (ref 13.3–17.7)
IMM GRANULOCYTES # BLD: 0.1 10E3/UL
IMM GRANULOCYTES NFR BLD: 1 %
LIPASE SERPL-CCNC: 19 U/L (ref 13–60)
LYMPHOCYTES # BLD AUTO: 3 10E3/UL (ref 0.8–5.3)
LYMPHOCYTES NFR BLD AUTO: 20 %
MCH RBC QN AUTO: 28 PG (ref 26.5–33)
MCHC RBC AUTO-ENTMCNC: 30.9 G/DL (ref 31.5–36.5)
MCV RBC AUTO: 91 FL (ref 78–100)
MONOCYTES # BLD AUTO: 1.1 10E3/UL (ref 0–1.3)
MONOCYTES NFR BLD AUTO: 7 %
NEUTROPHILS # BLD AUTO: 10.4 10E3/UL (ref 1.6–8.3)
NEUTROPHILS NFR BLD AUTO: 68 %
NRBC # BLD AUTO: 0 10E3/UL
NRBC BLD AUTO-RTO: 0 /100
PLATELET # BLD AUTO: 281 10E3/UL (ref 150–450)
POTASSIUM SERPL-SCNC: 4.5 MMOL/L (ref 3.4–5.3)
PROT SERPL-MCNC: 7.4 G/DL (ref 6.4–8.3)
RBC # BLD AUTO: 4.71 10E6/UL (ref 4.4–5.9)
SODIUM SERPL-SCNC: 140 MMOL/L (ref 135–145)
TROPONIN T SERPL HS-MCNC: 21 NG/L
WBC # BLD AUTO: 15 10E3/UL (ref 4–11)

## 2024-01-18 PROCEDURE — 84484 ASSAY OF TROPONIN QUANT: CPT | Performed by: EMERGENCY MEDICINE

## 2024-01-18 PROCEDURE — 96374 THER/PROPH/DIAG INJ IV PUSH: CPT | Mod: 59

## 2024-01-18 PROCEDURE — 93005 ELECTROCARDIOGRAM TRACING: CPT | Performed by: EMERGENCY MEDICINE

## 2024-01-18 PROCEDURE — 83690 ASSAY OF LIPASE: CPT | Performed by: EMERGENCY MEDICINE

## 2024-01-18 PROCEDURE — 82247 BILIRUBIN TOTAL: CPT | Performed by: EMERGENCY MEDICINE

## 2024-01-18 PROCEDURE — 74177 CT ABD & PELVIS W/CONTRAST: CPT | Mod: MG

## 2024-01-18 PROCEDURE — 84155 ASSAY OF PROTEIN SERUM: CPT | Performed by: EMERGENCY MEDICINE

## 2024-01-18 PROCEDURE — 250N000011 HC RX IP 250 OP 636: Performed by: EMERGENCY MEDICINE

## 2024-01-18 PROCEDURE — 250N000013 HC RX MED GY IP 250 OP 250 PS 637: Performed by: EMERGENCY MEDICINE

## 2024-01-18 PROCEDURE — 36415 COLL VENOUS BLD VENIPUNCTURE: CPT | Performed by: EMERGENCY MEDICINE

## 2024-01-18 PROCEDURE — 99285 EMERGENCY DEPT VISIT HI MDM: CPT | Mod: 25

## 2024-01-18 PROCEDURE — G1010 CDSM STANSON: HCPCS

## 2024-01-18 PROCEDURE — 85004 AUTOMATED DIFF WBC COUNT: CPT | Performed by: EMERGENCY MEDICINE

## 2024-01-18 RX ORDER — FAMOTIDINE 20 MG/1
20 TABLET, FILM COATED ORAL 2 TIMES DAILY
Qty: 60 TABLET | Refills: 0 | Status: SHIPPED | OUTPATIENT
Start: 2024-01-18 | End: 2024-01-18

## 2024-01-18 RX ORDER — MAGNESIUM HYDROXIDE/ALUMINUM HYDROXICE/SIMETHICONE 120; 1200; 1200 MG/30ML; MG/30ML; MG/30ML
15 SUSPENSION ORAL ONCE
Status: COMPLETED | OUTPATIENT
Start: 2024-01-18 | End: 2024-01-18

## 2024-01-18 RX ORDER — SUCRALFATE ORAL 1 G/10ML
1 SUSPENSION ORAL 4 TIMES DAILY
Qty: 414 ML | Refills: 0 | Status: SHIPPED | OUTPATIENT
Start: 2024-01-18 | End: 2024-01-18

## 2024-01-18 RX ORDER — FAMOTIDINE 20 MG/1
20 TABLET, FILM COATED ORAL 2 TIMES DAILY
Qty: 60 TABLET | Refills: 0 | Status: SHIPPED | OUTPATIENT
Start: 2024-01-18

## 2024-01-18 RX ORDER — SUCRALFATE ORAL 1 G/10ML
1 SUSPENSION ORAL 4 TIMES DAILY
Qty: 414 ML | Refills: 0 | Status: SHIPPED | OUTPATIENT
Start: 2024-01-18

## 2024-01-18 RX ORDER — IOPAMIDOL 755 MG/ML
90 INJECTION, SOLUTION INTRAVASCULAR ONCE
Status: COMPLETED | OUTPATIENT
Start: 2024-01-18 | End: 2024-01-18

## 2024-01-18 RX ADMIN — FAMOTIDINE 20 MG: 10 INJECTION, SOLUTION INTRAVENOUS at 19:14

## 2024-01-18 RX ADMIN — IOPAMIDOL 90 ML: 755 INJECTION, SOLUTION INTRAVENOUS at 20:30

## 2024-01-18 RX ADMIN — ALUMINUM HYDROXIDE, MAGNESIUM HYDROXIDE, AND SIMETHICONE 15 ML: 200; 200; 20 SUSPENSION ORAL at 19:14

## 2024-01-18 ASSESSMENT — ACTIVITIES OF DAILY LIVING (ADL)
ADLS_ACUITY_SCORE: 35
ADLS_ACUITY_SCORE: 35

## 2024-01-19 NOTE — ED PROVIDER NOTES
EMERGENCY DEPARTMENT ENCOUNTER     NAME: Warren Jaramillo   AGE: 43 year old male   YOB: 1980   MRN: [id number]   EVALUATION DATE & TIME: 1/18/2024  6:56 PM   PCP: Tricia Rogers     Chief Complaint   Patient presents with    Abdominal Pain   :    FINAL IMPRESSION       1. Epigastric pain           ED COURSE & MEDICAL DECISION MAKING      6:58 PM I met with the patient, obtained history, performed physical exam, and discussed ED plan.  9:10 PM I rechecked on the patient and updated them on lab results and the plan.    Pertinent Labs & Imaging studies reviewed. (See chart for details)   43 year old male  presents to the Emergency Department for evaluation of epigastric pain. Initial Vitals Reviewed. Initial exam notable for generally well-appearing male with morbid obesity, complaining of some epigastric tenderness.  He did drink alcohol and ate pizza prior to this happening and has a history of GERD on omeprazole.  I suspect that this is probably gastritis and discussed this with him.  Because he does have a history of autism in a group home and is somewhat unreliable we did get a CT scan to rule out intra or abdominal pathology and this is otherwise negative.  Labs are without signs of leukocytosis, pancreatitis.  EKG and troponin are negative and I do not suspect ACS.  I discussed the results with patient and he feels very reassured.  We will discharge back to group home and I will give him prescription for acute management of gastritis with Pepcid and Carafate.           At the conclusion of the encounter I discussed the results of all of the tests and the disposition. The questions were answered. The patient or family acknowledged understanding and was agreeable with the care plan.     0 minutes critical care time, see procedure note below for details if relevant    Medical Decision Making    History:  Supplemental history from: Documented in chart, EMS  External Record(s) reviewed:  Outpatient Record: 1/8/24 [place]  ED    Work Up:  Chart documentation includes differential considered and any EKGs or imaging independently interpreted by provider, where specified.  In additional to work up documented, I considered the following work up: Documented in chart, if applicable.    External consultation:  Discussion of management with another provider: Documented in chart, if applicable    Complicating factors:  Care impacted by chronic illness: Hypertension  Care affected by social determinants of health: Access to care    Disposition considerations: Discharge. I prescribed additional prescription strength medication(s) as charted. I considered admission, but ultimately discharged patient with reassuring workup.        MEDICATIONS GIVEN IN THE EMERGENCY:   Medications   famotidine (PEPCID) injection 20 mg (20 mg Intravenous $Given 1/18/24 1914)   alum & mag hydroxide-simethicone (MAALOX) suspension 15 mL (15 mLs Oral $Given 1/18/24 1914)   iopamidol (ISOVUE-370) solution 90 mL (90 mLs Intravenous $Given 1/18/24 2030)      NEW PRESCRIPTIONS STARTED AT TODAY'S ER VISIT   New Prescriptions    FAMOTIDINE (PEPCID) 20 MG TABLET    Take 1 tablet (20 mg) by mouth 2 times daily    SUCRALFATE (CARAFATE) 1 GM/10ML SUSPENSION    Take 10 mLs (1 g) by mouth 4 times daily     ================================================================   HISTORY OF PRESENT ILLNESS       Patient information was obtained from: patient   Use of Intrepreter: N/A   Warren Jaramillo is a 43 year old male with history of hypertension, PAF, TBI, FAH, and asthma who presents chest pain. An hour ago, the patient had 4 slices of pizza and a shot of vodka when he had sudden onset of sharp sternal pain which radiated outward. He has some shortness of breath with exertion. The patient takes omeprazole for acid reflux. No additional complaints at this time.    ================================================================    Per chart  review,  1/8/24 The patient visited [place] ED for chest pain. ED workup notable for nonischemic EKG, troponin borderline but stable upon repeat. Leukocytosis noted of unclear significance given otherwise negative workup. Findings are overall most consistent with abdominal wall pain.     PAST HISTORY     PAST MEDICAL HISTORY:   Past Medical History:   Diagnosis Date    DM2 (diabetes mellitus, type 2) (H) 4/28/2020    HTN (hypertension) 7/30/2012    Rojas's disease (H28)       PAST SURGICAL HISTORY:   Past Surgical History:   Procedure Laterality Date    COLONOSCOPY      ESOPHAGOSCOPY, GASTROSCOPY, DUODENOSCOPY (EGD), COMBINED N/A 7/21/2023    Procedure: ESOPHAGOGASTRODUODENOSCOPY WITH GASTRIC AND ESOPHAGEAL BIOPSIES;  Surgeon: Filiberto Aragon MD;  Location: [facility] OR    TOOTH EXTRACTION        CURRENT MEDICATIONS:   ACCU-CHEK GUIDE test strip  acetaminophen (TYLENOL) 32 mg/mL liquid  acetaminophen (TYLENOL) 500 MG tablet  albuterol (PROVENTIL) (2.5 MG/3ML) 0.083% neb solution  aloe vera GEL  bacitracin 500 UNIT/GM OINT  BETA BLOCKER NOT PRESCRIBED (INTENTIONAL)  blood glucose monitoring (SOFTCLIX) lancets  Blood Glucose Monitoring Suppl (ACCU-CHEK GUIDE) w/Device KIT  Calamine external lotion  carbamide peroxide (DEBROX) 6.5 % otic solution  clotrimazole (LOTRIMIN) 1 % external cream  cyclobenzaprine (FLEXERIL) 10 MG tablet  diclofenac (VOLTAREN) 1 % topical gel  diclofenac (VOLTAREN) 75 MG EC tablet  escitalopram (LEXAPRO) 10 MG tablet  fluticasone-vilanterol (BREO ELLIPTA) 200-25 MCG/ACT inhaler  furosemide (LASIX) 20 MG tablet  hydrocortisone 1 % CREA cream  ibuprofen (ADVIL/MOTRIN) 100 MG/5ML suspension  ibuprofen (ADVIL/MOTRIN) 200 MG tablet  ibuprofen (ADVIL/MOTRIN) 600 MG tablet  lisinopril (ZESTRIL) 10 MG tablet  LORazepam (ATIVAN) 1 MG tablet  melatonin 3 MG tablet  metFORMIN (GLUCOPHAGE) 1000 MG tablet  montelukast (SINGULAIR) 10 MG tablet  OLANZapine (ZYPREXA) 10 MG tablet  omeprazole (PRILOSEC)  20 MG DR capsule  ondansetron (ZOFRAN) 4 MG tablet  oxybutynin ER (DITROPAN XL) 10 MG 24 hr tablet  oxyCODONE (ROXICODONE) 5 MG tablet  polyethylene glycol (MIRALAX) 17 g packet  predniSONE (DELTASONE) 20 MG tablet  psyllium (METAMUCIL) 28.3 % packet  rosuvastatin (CRESTOR) 10 MG tablet  sodium phosphate (FLEET ENEMA) 7-19 GM/118ML rectal enema  spironolactone (ALDACTONE) 50 MG tablet  traZODone (DESYREL) 50 MG tablet  zinc oxide (DESITIN) 20 % external ointment      ALLERGIES:   Allergies   Allergen Reactions    Apricot Flavor Anaphylaxis    Banana Anaphylaxis     Throat swelling  Throat swelling      Wasp Venom Protein Shortness Of Breath     Other reaction(s): Respiratory Distress  Has an epi pen  Has an epi pen      Bees Anaphylaxis     Have an Epi pen that carries with    Methylphenidate Itching     Other reaction(s): Nightmares    Prunus      Other reaction(s): *Unknown    Sulfa Antibiotics      Headaches and nausea    Prunus Persica Rash     Other reaction(s): *Unknown      FAMILY HISTORY:   Family History   Problem Relation Age of Onset    Unknown/Adopted Father     Unknown/Adopted Maternal Grandmother     C.A.D. Maternal Grandfather     Diabetes Maternal Grandfather     Cerebrovascular Disease Maternal Grandfather     Unknown/Adopted Paternal Grandmother     Unknown/Adopted Paternal Grandfather     Unknown/Adopted Brother     Unknown/Adopted Sister       SOCIAL HISTORY:   Social History     Socioeconomic History    Marital status: Single   Tobacco Use    Smoking status: Every Day     Packs/day: 1     Types: Cigarettes    Smokeless tobacco: Never   Vaping Use    Vaping Use: Never used   Substance and Sexual Activity    Alcohol use: No     Comment: once every 3 months    Drug use: No    Sexual activity: Never     Partners: Female   Other Topics Concern     Service No    Blood Transfusions No    Caffeine Concern No    Occupational Exposure No    Hobby Hazards No    Sleep Concern No    Stress Concern  "Yes     Comment: sometimes    Weight Concern No    Special Diet Yes     Comment: counting carbs    Back Care No    Exercise Yes    Seat Belt Yes    Self-Exams Yes        VITALS  Patient Vitals for the past 24 hrs:   BP Temp Temp src Pulse Resp SpO2 Height Weight   01/18/24 1858 137/69 -- -- 91 16 97 % -- --   01/18/24 1857 -- 98.3  F (36.8  C) Oral -- -- -- 1.651 m (5' 5\") 129.7 kg (286 lb)        ================================================================    PHYSICAL EXAM     VITAL SIGNS: /69   Pulse 91   Temp 98.3  F (36.8  C) (Oral)   Resp 16   Ht 1.651 m (5' 5\")   Wt 129.7 kg (286 lb)   SpO2 97%   BMI 47.59 kg/m     Constitutional:  Awake, no acute distress   HENT:  Atraumatic, oropharynx without exudate or erythema, membranes moist  Lymph:  No adenopathy  Eyes: EOM intact, PERRL, no injection  Neck: Supple  Respiratory:  Clear to auscultation bilaterally, no wheezes or crackles   Cardiovascular:  Regular rate and rhythm, single S1 and S2   GI:  Soft, nontender, nondistended, no rebound or guarding. Abdomen obese. No epigastric tenderness  Musculoskeletal:  Moves all extremities, no lower extremity edema, no deformities    Skin:  Warm, dry  Neurologic:  Alert and oriented x3, no focal deficits noted       ================================================================  LAB       All pertinent labs reviewed and interpreted.   Labs Ordered and Resulted from Time of ED Arrival to Time of ED Departure   COMPREHENSIVE METABOLIC PANEL - Abnormal       Result Value    Sodium 140      Potassium 4.5      Carbon Dioxide (CO2) 27      Anion Gap 10      Urea Nitrogen 12.9      Creatinine 0.80      GFR Estimate >90      Calcium 9.2      Chloride 103      Glucose 139 (*)     Alkaline Phosphatase 271 (*)     AST        ALT 22      Protein Total 7.4      Albumin 3.9      Bilirubin Total 0.7     CBC WITH PLATELETS AND DIFFERENTIAL - Abnormal    WBC Count 15.0 (*)     RBC Count 4.71      Hemoglobin 13.2 (*)     " Hematocrit 42.7      MCV 91      MCH 28.0      MCHC 30.9 (*)     RDW 15.6 (*)     Platelet Count 281      % Neutrophils 68      % Lymphocytes 20      % Monocytes 7      % Eosinophils 3      % Basophils 1      % Immature Granulocytes 1      NRBCs per 100 WBC 0      Absolute Neutrophils 10.4 (*)     Absolute Lymphocytes 3.0      Absolute Monocytes 1.1      Absolute Eosinophils 0.4      Absolute Basophils 0.1      Absolute Immature Granulocytes 0.1      Absolute NRBCs 0.0     LIPASE - Normal    Lipase 19     TROPONIN T, HIGH SENSITIVITY - Normal    Troponin T, High Sensitivity 21          ===============================================================  RADIOLOGY       Reviewed all pertinent imaging. Please see official radiology report.   CT Abdomen Pelvis w Contrast   Final Result   IMPRESSION:    1.  No significant change since 08/20/2023   2.  stable hepatomegaly with heterogeneous fatty infiltration. Mildly nodular contour suggesting underlying chronic liver disease.   3.  Stable abdominal adenopathy, as described above.   4.  Stable mild splenomegaly.            ================================================================  EKG     EKG reviewed interpreted by me shows sinus rhythm with rate of 88, normal axis, QTc 454 with no acute ST or T wave changes since November 27    I have independently reviewed and interpreted the EKG(s) documented above.     ================================================================  PROCEDURES         I, Jamie Barron, am serving as a scribe to document services personally performed by Dr. Pruett based on my observation and the provider's statements to me. I, Elinor Pruett MD attest that Jamie Barron is acting in a scribe capacity, has observed my performance of the services and has documented them in accordance with my direction.   Elinor Pruett M.D.   Emergency Medicine   [facility] EMERGENCY DEPARTMENT  [address]  [facility] [id number]  [phone]  Dept: [phone]      Elinor Pruett MD  01/18/24 2114

## 2024-01-19 NOTE — ED NOTES
Bed: JNED-08  Expected date: 1/18/24  Expected time: 6:44 PM  Means of arrival: Ambulance  Comments:  Hakan [phone]- [phone] from group hoaster, abd pain, hypertensive

## 2024-01-19 NOTE — ED TRIAGE NOTES
The patient comes from a group home ( hx autism & fetal alcohol syndrom). He comes in by ambulance. He reports epigastric pain after a bm that started at 1800. He reports one shot of vodka today. A&Ox4.

## 2024-01-19 NOTE — DISCHARGE INSTRUCTIONS
[facility].  Like we discussed, this is likely a worsening of your acid reflux which probably got worse from drinking alcohol and then acidic pizza sauce.  I am prescribing some additional medicines for you to use at home over the next few days to help with the acute flare.

## 2024-01-22 LAB
ATRIAL RATE - MUSE: 88 BPM
DIASTOLIC BLOOD PRESSURE - MUSE: 77 MMHG
INTERPRETATION ECG - MUSE: NORMAL
P AXIS - MUSE: 72 DEGREES
PR INTERVAL - MUSE: 198 MS
QRS DURATION - MUSE: 112 MS
QT - MUSE: 376 MS
QTC - MUSE: 454 MS
R AXIS - MUSE: 91 DEGREES
SYSTOLIC BLOOD PRESSURE - MUSE: 155 MMHG
T AXIS - MUSE: -86 DEGREES
VENTRICULAR RATE- MUSE: 88 BPM

## 2024-01-30 ENCOUNTER — MEDICAL CORRESPONDENCE (OUTPATIENT)
Dept: HEALTH INFORMATION MANAGEMENT | Facility: CLINIC | Age: 44
End: 2024-01-30
Payer: MEDICARE

## 2024-02-01 ENCOUNTER — TELEPHONE (OUTPATIENT)
Dept: SLEEP MEDICINE | Facility: CLINIC | Age: 44
End: 2024-02-01
Payer: MEDICARE

## 2024-02-01 NOTE — TELEPHONE ENCOUNTER
Signed order faxed to [facility]       Order sent to scanning       Shameka Gibbons MA  [facility]

## 2024-02-15 ENCOUNTER — LAB (OUTPATIENT)
Dept: LAB | Facility: CLINIC | Age: 44
End: 2024-02-15
Payer: MEDICARE

## 2024-02-15 DIAGNOSIS — Z51.81 MEDICATION MONITORING ENCOUNTER: ICD-10-CM

## 2024-02-15 DIAGNOSIS — I10 HTN (HYPERTENSION): ICD-10-CM

## 2024-02-15 DIAGNOSIS — E11.9 DM2 (DIABETES MELLITUS, TYPE 2) (H): ICD-10-CM

## 2024-02-15 DIAGNOSIS — Z79.01 ADMISSION FOR LONG-TERM (CURRENT) USE OF ANTICOAGULANTS: ICD-10-CM

## 2024-02-15 DIAGNOSIS — Z51.81 ADMISSION FOR LONG-TERM (CURRENT) USE OF ANTICOAGULANTS: ICD-10-CM

## 2024-02-15 DIAGNOSIS — Z51.81 ENCOUNTER FOR THERAPEUTIC DRUG MONITORING: Primary | ICD-10-CM

## 2024-02-15 DIAGNOSIS — I50.9 CONGESTIVE HEART FAILURE (H): ICD-10-CM

## 2024-02-15 LAB
ALBUMIN SERPL BCG-MCNC: 4.2 G/DL (ref 3.5–5.2)
ALP SERPL-CCNC: 252 U/L (ref 40–150)
ALT SERPL W P-5'-P-CCNC: 25 U/L (ref 0–70)
ANION GAP SERPL CALCULATED.3IONS-SCNC: 11 MMOL/L (ref 7–15)
AST SERPL W P-5'-P-CCNC: 18 U/L (ref 0–45)
BASOPHILS # BLD AUTO: 0 10E3/UL (ref 0–0.2)
BASOPHILS NFR BLD AUTO: 0 %
BILIRUB SERPL-MCNC: 0.6 MG/DL
BUN SERPL-MCNC: 16.3 MG/DL (ref 6–20)
CALCIUM SERPL-MCNC: 9.5 MG/DL (ref 8.6–10)
CHLORIDE SERPL-SCNC: 103 MMOL/L (ref 98–107)
CHOLEST SERPL-MCNC: 117 MG/DL
CREAT SERPL-MCNC: 0.75 MG/DL (ref 0.67–1.17)
DEPRECATED HCO3 PLAS-SCNC: 24 MMOL/L (ref 22–29)
EGFRCR SERPLBLD CKD-EPI 2021: >90 ML/MIN/1.73M2
EOSINOPHIL # BLD AUTO: 0.4 10E3/UL (ref 0–0.7)
EOSINOPHIL NFR BLD AUTO: 3 %
ERYTHROCYTE [DISTWIDTH] IN BLOOD BY AUTOMATED COUNT: 15.9 % (ref 10–15)
FASTING STATUS PATIENT QL REPORTED: ABNORMAL
GLUCOSE SERPL-MCNC: 125 MG/DL (ref 70–99)
HBA1C MFR BLD: 7.5 % (ref 0–5.6)
HCT VFR BLD AUTO: 43.3 % (ref 40–53)
HDLC SERPL-MCNC: 35 MG/DL
HGB BLD-MCNC: 13.2 G/DL (ref 13.3–17.7)
IMM GRANULOCYTES # BLD: 0 10E3/UL
IMM GRANULOCYTES NFR BLD: 0 %
LDLC SERPL CALC-MCNC: 59 MG/DL
LYMPHOCYTES # BLD AUTO: 2.7 10E3/UL (ref 0.8–5.3)
LYMPHOCYTES NFR BLD AUTO: 22 %
MCH RBC QN AUTO: 27.4 PG (ref 26.5–33)
MCHC RBC AUTO-ENTMCNC: 30.5 G/DL (ref 31.5–36.5)
MCV RBC AUTO: 90 FL (ref 78–100)
MONOCYTES # BLD AUTO: 1 10E3/UL (ref 0–1.3)
MONOCYTES NFR BLD AUTO: 8 %
NEUTROPHILS # BLD AUTO: 8.2 10E3/UL (ref 1.6–8.3)
NEUTROPHILS NFR BLD AUTO: 67 %
NONHDLC SERPL-MCNC: 82 MG/DL
NT-PROBNP SERPL-MCNC: 669 PG/ML (ref 0–450)
PLATELET # BLD AUTO: 254 10E3/UL (ref 150–450)
POTASSIUM SERPL-SCNC: 4.6 MMOL/L (ref 3.4–5.3)
PROT SERPL-MCNC: 7.6 G/DL (ref 6.4–8.3)
RBC # BLD AUTO: 4.81 10E6/UL (ref 4.4–5.9)
SODIUM SERPL-SCNC: 138 MMOL/L (ref 135–145)
TRIGL SERPL-MCNC: 116 MG/DL
TSH SERPL DL<=0.005 MIU/L-ACNC: 3.95 UIU/ML (ref 0.3–4.2)
VIT D+METAB SERPL-MCNC: 21 NG/ML (ref 20–50)
WBC # BLD AUTO: 12.3 10E3/UL (ref 4–11)

## 2024-02-15 PROCEDURE — 36415 COLL VENOUS BLD VENIPUNCTURE: CPT

## 2024-02-15 PROCEDURE — 83880 ASSAY OF NATRIURETIC PEPTIDE: CPT | Mod: GZ

## 2024-02-15 PROCEDURE — 80053 COMPREHEN METABOLIC PANEL: CPT

## 2024-02-15 PROCEDURE — 80061 LIPID PANEL: CPT

## 2024-02-15 PROCEDURE — 84443 ASSAY THYROID STIM HORMONE: CPT | Mod: GZ

## 2024-02-15 PROCEDURE — 82306 VITAMIN D 25 HYDROXY: CPT | Mod: GZ

## 2024-02-15 PROCEDURE — 85025 COMPLETE CBC W/AUTO DIFF WBC: CPT

## 2024-02-15 PROCEDURE — 83036 HEMOGLOBIN GLYCOSYLATED A1C: CPT

## 2024-02-24 ENCOUNTER — HOSPITAL ENCOUNTER (EMERGENCY)
Facility: HOSPITAL | Age: 44
Discharge: HOME | End: 2024-02-24
Attending: EMERGENCY MEDICINE | Admitting: EMERGENCY MEDICINE
Payer: MEDICARE

## 2024-02-24 ENCOUNTER — APPOINTMENT (OUTPATIENT)
Dept: CT IMAGING | Facility: HOSPITAL | Age: 44
End: 2024-02-24
Attending: EMERGENCY MEDICINE
Payer: MEDICARE

## 2024-02-24 VITALS
SYSTOLIC BLOOD PRESSURE: 122 MMHG | OXYGEN SATURATION: 94 % | RESPIRATION RATE: 16 BRPM | HEART RATE: 91 BPM | DIASTOLIC BLOOD PRESSURE: 61 MMHG

## 2024-02-24 DIAGNOSIS — R10.13 EPIGASTRIC PAIN: ICD-10-CM

## 2024-02-24 DIAGNOSIS — R11.10 VOMITING, UNSPECIFIED VOMITING TYPE, UNSPECIFIED WHETHER NAUSEA PRESENT: ICD-10-CM

## 2024-02-24 LAB
ALBUMIN SERPL BCG-MCNC: 4 G/DL (ref 3.5–5.2)
ALP SERPL-CCNC: 263 U/L (ref 40–150)
ALT SERPL W P-5'-P-CCNC: 25 U/L (ref 0–70)
ANION GAP SERPL CALCULATED.3IONS-SCNC: 7 MMOL/L (ref 7–15)
AST SERPL W P-5'-P-CCNC: 29 U/L (ref 0–45)
BASOPHILS # BLD AUTO: 0.1 10E3/UL (ref 0–0.2)
BASOPHILS NFR BLD AUTO: 1 %
BILIRUB SERPL-MCNC: 0.7 MG/DL
BUN SERPL-MCNC: 15.6 MG/DL (ref 6–20)
CALCIUM SERPL-MCNC: 9.4 MG/DL (ref 8.6–10)
CHLORIDE SERPL-SCNC: 101 MMOL/L (ref 98–107)
CREAT SERPL-MCNC: 0.89 MG/DL (ref 0.67–1.17)
DEPRECATED HCO3 PLAS-SCNC: 31 MMOL/L (ref 22–29)
EGFRCR SERPLBLD CKD-EPI 2021: >90 ML/MIN/1.73M2
EOSINOPHIL # BLD AUTO: 0.4 10E3/UL (ref 0–0.7)
EOSINOPHIL NFR BLD AUTO: 3 %
ERYTHROCYTE [DISTWIDTH] IN BLOOD BY AUTOMATED COUNT: 15.9 % (ref 10–15)
GLUCOSE SERPL-MCNC: 188 MG/DL (ref 70–99)
HCT VFR BLD AUTO: 42.7 % (ref 40–53)
HGB BLD-MCNC: 13.4 G/DL (ref 13.3–17.7)
IMM GRANULOCYTES # BLD: 0.1 10E3/UL
IMM GRANULOCYTES NFR BLD: 1 %
LIPASE SERPL-CCNC: 19 U/L (ref 13–60)
LYMPHOCYTES # BLD AUTO: 2.1 10E3/UL (ref 0.8–5.3)
LYMPHOCYTES NFR BLD AUTO: 13 %
MCH RBC QN AUTO: 28.5 PG (ref 26.5–33)
MCHC RBC AUTO-ENTMCNC: 31.4 G/DL (ref 31.5–36.5)
MCV RBC AUTO: 91 FL (ref 78–100)
MONOCYTES # BLD AUTO: 1.3 10E3/UL (ref 0–1.3)
MONOCYTES NFR BLD AUTO: 8 %
NEUTROPHILS # BLD AUTO: 12.3 10E3/UL (ref 1.6–8.3)
NEUTROPHILS NFR BLD AUTO: 74 %
NRBC # BLD AUTO: 0 10E3/UL
NRBC BLD AUTO-RTO: 0 /100
PLATELET # BLD AUTO: 275 10E3/UL (ref 150–450)
POTASSIUM SERPL-SCNC: 5 MMOL/L (ref 3.4–5.3)
PROT SERPL-MCNC: 7.7 G/DL (ref 6.4–8.3)
RBC # BLD AUTO: 4.71 10E6/UL (ref 4.4–5.9)
SODIUM SERPL-SCNC: 139 MMOL/L (ref 135–145)
WBC # BLD AUTO: 16.3 10E3/UL (ref 4–11)

## 2024-02-24 PROCEDURE — 96375 TX/PRO/DX INJ NEW DRUG ADDON: CPT

## 2024-02-24 PROCEDURE — 83690 ASSAY OF LIPASE: CPT | Performed by: EMERGENCY MEDICINE

## 2024-02-24 PROCEDURE — 36415 COLL VENOUS BLD VENIPUNCTURE: CPT | Performed by: EMERGENCY MEDICINE

## 2024-02-24 PROCEDURE — 99285 EMERGENCY DEPT VISIT HI MDM: CPT | Mod: 25

## 2024-02-24 PROCEDURE — 74177 CT ABD & PELVIS W/CONTRAST: CPT | Mod: MG

## 2024-02-24 PROCEDURE — 250N000011 HC RX IP 250 OP 636: Performed by: EMERGENCY MEDICINE

## 2024-02-24 PROCEDURE — 85025 COMPLETE CBC W/AUTO DIFF WBC: CPT | Performed by: EMERGENCY MEDICINE

## 2024-02-24 PROCEDURE — 250N000013 HC RX MED GY IP 250 OP 250 PS 637: Performed by: EMERGENCY MEDICINE

## 2024-02-24 PROCEDURE — 96374 THER/PROPH/DIAG INJ IV PUSH: CPT

## 2024-02-24 PROCEDURE — 80053 COMPREHEN METABOLIC PANEL: CPT | Performed by: EMERGENCY MEDICINE

## 2024-02-24 RX ORDER — SUCRALFATE ORAL 1 G/10ML
1 SUSPENSION ORAL 4 TIMES DAILY PRN
Qty: 414 ML | Refills: 0 | Status: SHIPPED | OUTPATIENT
Start: 2024-02-24 | End: 2024-02-24

## 2024-02-24 RX ORDER — IOPAMIDOL 755 MG/ML
90 INJECTION, SOLUTION INTRAVASCULAR ONCE
Status: COMPLETED | OUTPATIENT
Start: 2024-02-24 | End: 2024-02-24

## 2024-02-24 RX ORDER — FAMOTIDINE 20 MG/1
20 TABLET, FILM COATED ORAL 2 TIMES DAILY
Qty: 60 TABLET | Refills: 0 | Status: SHIPPED | OUTPATIENT
Start: 2024-02-24 | End: 2024-06-28

## 2024-02-24 RX ORDER — SUCRALFATE ORAL 1 G/10ML
1 SUSPENSION ORAL 4 TIMES DAILY PRN
Qty: 414 ML | Refills: 0 | Status: SHIPPED | OUTPATIENT
Start: 2024-02-24 | End: 2024-07-26

## 2024-02-24 RX ORDER — FAMOTIDINE 20 MG/1
20 TABLET, FILM COATED ORAL 2 TIMES DAILY
Qty: 60 TABLET | Refills: 0 | Status: SHIPPED | OUTPATIENT
Start: 2024-02-24 | End: 2024-02-24

## 2024-02-24 RX ORDER — ONDANSETRON 4 MG/1
4-8 TABLET, ORALLY DISINTEGRATING ORAL EVERY 8 HOURS PRN
Qty: 20 TABLET | Refills: 0 | Status: SHIPPED | OUTPATIENT
Start: 2024-02-24

## 2024-02-24 RX ORDER — ONDANSETRON 2 MG/ML
8 INJECTION INTRAMUSCULAR; INTRAVENOUS ONCE
Status: COMPLETED | OUTPATIENT
Start: 2024-02-24 | End: 2024-02-24

## 2024-02-24 RX ORDER — ONDANSETRON 4 MG/1
4-8 TABLET, ORALLY DISINTEGRATING ORAL EVERY 8 HOURS PRN
Qty: 20 TABLET | Refills: 0 | Status: SHIPPED | OUTPATIENT
Start: 2024-02-24 | End: 2024-02-24

## 2024-02-24 RX ORDER — MAGNESIUM HYDROXIDE/ALUMINUM HYDROXICE/SIMETHICONE 120; 1200; 1200 MG/30ML; MG/30ML; MG/30ML
15 SUSPENSION ORAL ONCE
Status: COMPLETED | OUTPATIENT
Start: 2024-02-24 | End: 2024-02-24

## 2024-02-24 RX ADMIN — ONDANSETRON 8 MG: 2 INJECTION INTRAMUSCULAR; INTRAVENOUS at 01:59

## 2024-02-24 RX ADMIN — IOPAMIDOL 90 ML: 755 INJECTION, SOLUTION INTRAVENOUS at 03:28

## 2024-02-24 RX ADMIN — ALUMINUM HYDROXIDE, MAGNESIUM HYDROXIDE, AND DIMETHICONE 15 ML: 200; 20; 200 SUSPENSION ORAL at 01:59

## 2024-02-24 RX ADMIN — FAMOTIDINE 20 MG: 10 INJECTION, SOLUTION INTRAVENOUS at 01:59

## 2024-02-24 ASSESSMENT — ACTIVITIES OF DAILY LIVING (ADL)
ADLS_ACUITY_SCORE: 35

## 2024-02-24 ASSESSMENT — COLUMBIA-SUICIDE SEVERITY RATING SCALE - C-SSRS
6. HAVE YOU EVER DONE ANYTHING, STARTED TO DO ANYTHING, OR PREPARED TO DO ANYTHING TO END YOUR LIFE?: NO
2. HAVE YOU ACTUALLY HAD ANY THOUGHTS OF KILLING YOURSELF IN THE PAST MONTH?: NO
1. IN THE PAST MONTH, HAVE YOU WISHED YOU WERE DEAD OR WISHED YOU COULD GO TO SLEEP AND NOT WAKE UP?: NO

## 2024-02-24 NOTE — ED TRIAGE NOTES
RUQ ABD pain of 9 since 5PM yesterday. He states it feels like a hard mass. He vomited once.  Denies diarrhea. VSS, A&O, ambulates. From group home. No interventions at home.

## 2024-02-24 NOTE — DISCHARGE INSTRUCTIONS
All of the tests in the ER [facility] are very reassuring including lab work, CT scan.  Symptoms are similar to when you were seen here in January and consistent with likely acid reflux.  I have prescribed some additional nausea medicine and it is important for you to keep taking acid reflux medicines and try and watch your diet to avoid spicy, greasy, and fatty foods

## 2024-02-24 NOTE — ED PROVIDER NOTES
EMERGENCY DEPARTMENT ENCOUNTER     NAME: Warren Jaramillo   AGE: 43 year old male   YOB: 1980   MRN: [id number]   EVALUATION DATE & TIME: 2/24/2024  1:34 AM   PCP: Tricia Rogers     Chief Complaint   Patient presents with    Abdominal Pain   :    FINAL IMPRESSION       1. Epigastric pain    2. Vomiting, unspecified vomiting type, unspecified whether nausea present           ED COURSE & MEDICAL DECISION MAKING      Pertinent Labs & Imaging studies reviewed. (See chart for details)   43 year old male  presents to the Emergency Department for evaluation of epigastric and left upper quadrant pain and 1 episode of vomiting.  On chart review, patient was actually seen by myself approximately 1.5 months ago with a similar presentation and has a history of fatty liver disease, GERD. Initial Vitals Reviewed. Initial exam notable for obese abdomen with mild epigastric tenderness but no peritoneal signs.  The difficult part is that he is an unreliable historian as he has a history of FAS, lives in a group home.  Clinically I suspect that this is probably an exacerbation of his GERD with an episode of vomiting when he lied flat to go to sleep but considered something like obstruction, pancreatitis.  Labs are reassuring except for leukocytosis of 16, so even though he did have a CT scan at last visit with me with a similar presentation I decided to get a repeat CT today given the difficulty with him being reliable and the leukocytosis.  Fortunately other than the known fatty liver disease and signs of portal hypertension there are no acute findings.  At this time he was treated with Zofran, Pepcid, and a GI cocktail.  I am going to refill the Carafate and Pepcid and encourage GERD dietary instructions.  Patient will now be discharged back to his group home.        1:38 AM I met the patient and performed my initial interview and exam.   4:04 AM We discussed the plan for discharge and the patient is agreeable.  All questions were addressed.     At the conclusion of the encounter I discussed the results of all of the tests and the disposition. The questions were answered. The patient or family acknowledged understanding and was agreeable with the care plan.     0 minutes critical care time, see procedure note below for details if relevant    Medical Decision Making    History:  Supplemental history from: EMS  External Record(s) reviewed: Documented in chart, ED visit 1/18/2024 for similar    Work Up:  Chart documentation includes differential considered and any EKGs or imaging independently interpreted by provider, where specified.  In additional to work up documented, I considered the following work up: Documented in chart, if applicable.    External consultation:  Discussion of management with another provider: Documented in chart, if applicable    Complicating factors:  Care impacted by chronic illness: Diabetes, Hypertension, and Other: Rojas's disease, autism, CHF,   Care affected by social determinants of health: Access to Medical Care    Disposition considerations: Discharge. I prescribed additional prescription strength medication(s) as charted. I considered admission, but ultimately discharged patient with reassuring workup.        MEDICATIONS GIVEN IN THE EMERGENCY:   Medications   famotidine (PEPCID) injection 20 mg (20 mg Intravenous $Given 2/24/24 0159)   alum & mag hydroxide-simethicone (MAALOX) suspension 15 mL (15 mLs Oral $Given 2/24/24 0159)   ondansetron (ZOFRAN) injection 8 mg (8 mg Intravenous $Given 2/24/24 0159)   iopamidol (ISOVUE-370) solution 90 mL (90 mLs Intravenous $Given 2/24/24 0328)      NEW PRESCRIPTIONS STARTED AT TODAY'S ER VISIT   New Prescriptions    FAMOTIDINE (PEPCID) 20 MG TABLET    Take 1 tablet (20 mg) by mouth 2 times daily for 30 days    ONDANSETRON (ZOFRAN ODT) 4 MG ODT TAB    Take 1-2 tablets (4-8 mg) by mouth every 8 hours as needed for nausea or vomiting    SUCRALFATE  (CARAFATE) 1 GM/10ML SUSPENSION    Take 10 mLs (1 g) by mouth 4 times daily as needed (pain)     ================================================================   HISTORY OF PRESENT ILLNESS       Patient information was obtained from: EMS, patient    Use of Intrepreter: N/A   Warren Jaramillo is a 43 year old male with history of hypertension, diabetes mellitus type 2, CHF, Rojas's disease, who presents via EMS for evaluation of abdominal pain.    Per EMS, the patient comes from a group home. 8.5 hours ago, the patient woke with right upper quadrant abdominal pain and had 1 episode of vomiting.    Per the patient, he has severe left upper quadrant abdominal pain that woke him from his sleep. He also has a bit of right-sided mid/low back pain that is exacerbated with coughing. The pain caused him to vomit. He has had no abdominal surgeries.    ================================================================        PAST HISTORY     PAST MEDICAL HISTORY:   Past Medical History:   Diagnosis Date    DM2 (diabetes mellitus, type 2) (H) 4/28/2020    HTN (hypertension) 7/30/2012    Rojas's disease (H28)       PAST SURGICAL HISTORY:   Past Surgical History:   Procedure Laterality Date    COLONOSCOPY      ESOPHAGOSCOPY, GASTROSCOPY, DUODENOSCOPY (EGD), COMBINED N/A 7/21/2023    Procedure: ESOPHAGOGASTRODUODENOSCOPY WITH GASTRIC AND ESOPHAGEAL BIOPSIES;  Surgeon: Filiberto Aragon MD;  Location: [facility] OR    TOOTH EXTRACTION        CURRENT MEDICATIONS:   famotidine (PEPCID) 20 MG tablet  ondansetron (ZOFRAN ODT) 4 MG ODT tab  sucralfate (CARAFATE) 1 GM/10ML suspension  ACCU-CHEK GUIDE test strip  acetaminophen (TYLENOL) 32 mg/mL liquid  acetaminophen (TYLENOL) 500 MG tablet  albuterol (PROVENTIL) (2.5 MG/3ML) 0.083% neb solution  aloe vera GEL  bacitracin 500 UNIT/GM OINT  BETA BLOCKER NOT PRESCRIBED (INTENTIONAL)  blood glucose monitoring (SOFTCLIX) lancets  Blood Glucose Monitoring Suppl (ACCU-CHEK GUIDE)  w/Device KIT  Calamine external lotion  carbamide peroxide (DEBROX) 6.5 % otic solution  clotrimazole (LOTRIMIN) 1 % external cream  cyclobenzaprine (FLEXERIL) 10 MG tablet  diclofenac (VOLTAREN) 1 % topical gel  diclofenac (VOLTAREN) 75 MG EC tablet  escitalopram (LEXAPRO) 10 MG tablet  famotidine (PEPCID) 20 MG tablet  fluticasone-vilanterol (BREO ELLIPTA) 200-25 MCG/ACT inhaler  furosemide (LASIX) 20 MG tablet  hydrocortisone 1 % CREA cream  ibuprofen (ADVIL/MOTRIN) 100 MG/5ML suspension  ibuprofen (ADVIL/MOTRIN) 200 MG tablet  ibuprofen (ADVIL/MOTRIN) 600 MG tablet  lisinopril (ZESTRIL) 10 MG tablet  LORazepam (ATIVAN) 1 MG tablet  melatonin 3 MG tablet  metFORMIN (GLUCOPHAGE) 1000 MG tablet  montelukast (SINGULAIR) 10 MG tablet  OLANZapine (ZYPREXA) 10 MG tablet  omeprazole (PRILOSEC) 20 MG DR capsule  ondansetron (ZOFRAN) 4 MG tablet  oxybutynin ER (DITROPAN XL) 10 MG 24 hr tablet  oxyCODONE (ROXICODONE) 5 MG tablet  polyethylene glycol (MIRALAX) 17 g packet  predniSONE (DELTASONE) 20 MG tablet  psyllium (METAMUCIL) 28.3 % packet  rosuvastatin (CRESTOR) 10 MG tablet  sodium phosphate (FLEET ENEMA) 7-19 GM/118ML rectal enema  spironolactone (ALDACTONE) 50 MG tablet  sucralfate (CARAFATE) 1 GM/10ML suspension  traZODone (DESYREL) 50 MG tablet  zinc oxide (DESITIN) 20 % external ointment      ALLERGIES:   Allergies   Allergen Reactions    Apricot Flavor Anaphylaxis    Banana Anaphylaxis     Throat swelling  Throat swelling      Wasp Venom Protein Shortness Of Breath     Other reaction(s): Respiratory Distress  Has an epi pen  Has an epi pen      Bees Anaphylaxis     Have an Epi pen that carries with    Methylphenidate Itching     Other reaction(s): Nightmares    Prunus      Other reaction(s): *Unknown    Sulfa Antibiotics      Headaches and nausea    Prunus Persica Rash     Other reaction(s): *Unknown      FAMILY HISTORY:   Family History   Problem Relation Age of Onset    Unknown/Adopted Father      Unknown/Adopted Maternal Grandmother     C.A.D. Maternal Grandfather     Diabetes Maternal Grandfather     Cerebrovascular Disease Maternal Grandfather     Unknown/Adopted Paternal Grandmother     Unknown/Adopted Paternal Grandfather     Unknown/Adopted Brother     Unknown/Adopted Sister       SOCIAL HISTORY:   Social History     Socioeconomic History    Marital status: Single   Tobacco Use    Smoking status: Every Day     Packs/day: 1     Types: Cigarettes    Smokeless tobacco: Never   Vaping Use    Vaping Use: Never used   Substance and Sexual Activity    Alcohol use: No     Comment: once every 3 months    Drug use: No    Sexual activity: Never     Partners: Female   Other Topics Concern     Service No    Blood Transfusions No    Caffeine Concern No    Occupational Exposure No    Hobby Hazards No    Sleep Concern No    Stress Concern Yes     Comment: sometimes    Weight Concern No    Special Diet Yes     Comment: counting carbs    Back Care No    Exercise Yes    Seat Belt Yes    Self-Exams Yes        VITALS  Patient Vitals for the past 24 hrs:   Pulse SpO2   02/24/24 0233 95 94 %   02/24/24 0203 98 96 %        ================================================================    PHYSICAL EXAM     VITAL SIGNS: Pulse 95   SpO2 94%    Constitutional:  Awake, no acute distress   HENT:  Atraumatic, oropharynx without exudate or erythema, membranes moist  Lymph:  No adenopathy  Eyes: EOM intact, PERRL, no injection  Neck: Supple  Respiratory:  Clear to auscultation bilaterally, no wheezes or crackles   Cardiovascular:  Regular rate and rhythm, single S1 and S2   GI:  Soft, nondistended. Abdomen obese. Epigastric tenderness with no rebound or guarding   Musculoskeletal:  Moves all extremities, no lower extremity edema, no deformities    Skin:  Warm, dry  Neurologic:  Alert and oriented x3, no focal deficits noted       ================================================================  LAB       All pertinent labs  reviewed and interpreted.   Labs Ordered and Resulted from Time of ED Arrival to Time of ED Departure   COMPREHENSIVE METABOLIC PANEL - Abnormal       Result Value    Sodium 139      Potassium 5.0      Carbon Dioxide (CO2) 31 (*)     Anion Gap 7      Urea Nitrogen 15.6      Creatinine 0.89      GFR Estimate >90      Calcium 9.4      Chloride 101      Glucose 188 (*)     Alkaline Phosphatase 263 (*)     AST 29      ALT 25      Protein Total 7.7      Albumin 4.0      Bilirubin Total 0.7     CBC WITH PLATELETS AND DIFFERENTIAL - Abnormal    WBC Count 16.3 (*)     RBC Count 4.71      Hemoglobin 13.4      Hematocrit 42.7      MCV 91      MCH 28.5      MCHC 31.4 (*)     RDW 15.9 (*)     Platelet Count 275      % Neutrophils 74      % Lymphocytes 13      % Monocytes 8      % Eosinophils 3      % Basophils 1      % Immature Granulocytes 1      NRBCs per 100 WBC 0      Absolute Neutrophils 12.3 (*)     Absolute Lymphocytes 2.1      Absolute Monocytes 1.3      Absolute Eosinophils 0.4      Absolute Basophils 0.1      Absolute Immature Granulocytes 0.1      Absolute NRBCs 0.0     LIPASE - Normal    Lipase 19          ===============================================================  RADIOLOGY       Reviewed all pertinent imaging. Please see official radiology report.   CT Abdomen Pelvis w Contrast   Final Result   IMPRESSION:    1.  Hepatomegaly, heterogeneous steatosis, and morphologic changes of the liver suggestive of chronic liver disease, findings similar to prior. Trace perihepatic ascites.   2.  Portal hypertension as evidenced by borderline enlarged spleen and multiple upper abdominal collaterals.   3.  Prominent periportal, mesenteric and retroperitoneal lymph nodes not significantly changed.   4.  Normal appendix.            ================================================================  EKG         I have independently reviewed and interpreted the EKG(s) documented above.      ================================================================  PROCEDURES         I, [place], am serving as a scribe to document services personally performed by Dr. Pruett based on my observation and the provider's statements to me. I, Elinor Pruett MD attest that [place] is acting in a scribe capacity, has observed my performance of the services and has documented them in accordance with my direction.   Elinor Pruett M.D.   Emergency Medicine   [facility] EMERGENCY DEPARTMENT  [address] [place] [id number]  [phone]  Dept: [phone]      Elinor Pruett MD  02/24/24 0406

## 2024-02-24 NOTE — ED NOTES
Bed: JNED-21  Expected date: 2/24/24  Expected time: 1:26 AM  Means of arrival: Ambulance  Comments:  Mehran  42 yo M abd pain

## 2024-04-05 ENCOUNTER — LAB (OUTPATIENT)
Dept: LAB | Facility: CLINIC | Age: 44
End: 2024-04-05
Payer: COMMERCIAL

## 2024-04-05 DIAGNOSIS — R10.11 ABDOMINAL PAIN, RIGHT UPPER QUADRANT: ICD-10-CM

## 2024-04-05 DIAGNOSIS — Z13.9 SCREENING FOR UNSPECIFIED CONDITION: Primary | ICD-10-CM

## 2024-04-05 LAB
ERYTHROCYTE [DISTWIDTH] IN BLOOD BY AUTOMATED COUNT: 15.7 % (ref 10–15)
HCT VFR BLD AUTO: 43.9 % (ref 40–53)
HGB BLD-MCNC: 13.5 G/DL (ref 13.3–17.7)
MCH RBC QN AUTO: 27.6 PG (ref 26.5–33)
MCHC RBC AUTO-ENTMCNC: 30.8 G/DL (ref 31.5–36.5)
MCV RBC AUTO: 90 FL (ref 78–100)
PLATELET # BLD AUTO: 300 10E3/UL (ref 150–450)
RBC # BLD AUTO: 4.9 10E6/UL (ref 4.4–5.9)
WBC # BLD AUTO: 12 10E3/UL (ref 4–11)

## 2024-04-05 PROCEDURE — 84443 ASSAY THYROID STIM HORMONE: CPT | Performed by: FAMILY MEDICINE

## 2024-04-05 PROCEDURE — 83690 ASSAY OF LIPASE: CPT | Performed by: FAMILY MEDICINE

## 2024-04-05 PROCEDURE — 80053 COMPREHEN METABOLIC PANEL: CPT | Performed by: FAMILY MEDICINE

## 2024-04-05 PROCEDURE — 85027 COMPLETE CBC AUTOMATED: CPT | Performed by: FAMILY MEDICINE

## 2024-04-05 PROCEDURE — 36415 COLL VENOUS BLD VENIPUNCTURE: CPT

## 2024-04-05 PROCEDURE — 84439 ASSAY OF FREE THYROXINE: CPT | Performed by: FAMILY MEDICINE

## 2024-04-06 LAB
ALBUMIN SERPL BCG-MCNC: 4.2 G/DL (ref 3.5–5.2)
ALP SERPL-CCNC: 267 U/L (ref 40–150)
ALT SERPL W P-5'-P-CCNC: 26 U/L (ref 0–70)
ANION GAP SERPL CALCULATED.3IONS-SCNC: 14 MMOL/L (ref 7–15)
AST SERPL W P-5'-P-CCNC: 18 U/L (ref 0–45)
BILIRUB SERPL-MCNC: 0.6 MG/DL
BUN SERPL-MCNC: 11.6 MG/DL (ref 6–20)
CALCIUM SERPL-MCNC: 9.8 MG/DL (ref 8.6–10)
CHLORIDE SERPL-SCNC: 102 MMOL/L (ref 98–107)
CREAT SERPL-MCNC: 0.83 MG/DL (ref 0.67–1.17)
DEPRECATED HCO3 PLAS-SCNC: 24 MMOL/L (ref 22–29)
EGFRCR SERPLBLD CKD-EPI 2021: >90 ML/MIN/1.73M2
GLUCOSE SERPL-MCNC: 140 MG/DL (ref 70–99)
LIPASE SERPL-CCNC: 19 U/L (ref 13–60)
POTASSIUM SERPL-SCNC: 4.5 MMOL/L (ref 3.4–5.3)
PROT SERPL-MCNC: 7.2 G/DL (ref 6.4–8.3)
SODIUM SERPL-SCNC: 140 MMOL/L (ref 135–145)
T4 FREE SERPL-MCNC: 1.23 NG/DL (ref 0.9–1.7)
TSH SERPL DL<=0.005 MIU/L-ACNC: 4.25 UIU/ML (ref 0.3–4.2)

## 2024-04-08 ENCOUNTER — APPOINTMENT (OUTPATIENT)
Dept: RADIOLOGY | Facility: HOSPITAL | Age: 44
End: 2024-04-08
Attending: STUDENT IN AN ORGANIZED HEALTH CARE EDUCATION/TRAINING PROGRAM
Payer: OTHER MISCELLANEOUS

## 2024-04-08 ENCOUNTER — HOSPITAL ENCOUNTER (EMERGENCY)
Facility: HOSPITAL | Age: 44
Discharge: HOME | End: 2024-04-08
Payer: OTHER MISCELLANEOUS

## 2024-04-08 VITALS
TEMPERATURE: 98.4 F | SYSTOLIC BLOOD PRESSURE: 138 MMHG | DIASTOLIC BLOOD PRESSURE: 78 MMHG | HEART RATE: 90 BPM | BODY MASS INDEX: 47.65 KG/M2 | WEIGHT: 286 LBS | HEIGHT: 65 IN | RESPIRATION RATE: 12 BRPM | OXYGEN SATURATION: 99 %

## 2024-04-08 DIAGNOSIS — M25.521 RIGHT ELBOW PAIN: ICD-10-CM

## 2024-04-08 DIAGNOSIS — M25.561 ACUTE PAIN OF RIGHT KNEE: ICD-10-CM

## 2024-04-08 PROCEDURE — 250N000013 HC RX MED GY IP 250 OP 250 PS 637

## 2024-04-08 PROCEDURE — 99284 EMERGENCY DEPT VISIT MOD MDM: CPT

## 2024-04-08 PROCEDURE — 73562 X-RAY EXAM OF KNEE 3: CPT | Mod: RT

## 2024-04-08 PROCEDURE — 73080 X-RAY EXAM OF ELBOW: CPT | Mod: RT

## 2024-04-08 RX ORDER — IBUPROFEN 600 MG/1
600 TABLET, FILM COATED ORAL ONCE
Status: COMPLETED | OUTPATIENT
Start: 2024-04-08 | End: 2024-04-08

## 2024-04-08 RX ORDER — ACETAMINOPHEN 325 MG/1
975 TABLET ORAL ONCE
Status: COMPLETED | OUTPATIENT
Start: 2024-04-08 | End: 2024-04-08

## 2024-04-08 RX ADMIN — IBUPROFEN 600 MG: 600 TABLET, FILM COATED ORAL at 11:50

## 2024-04-08 RX ADMIN — ACETAMINOPHEN 975 MG: 325 TABLET ORAL at 11:50

## 2024-04-08 ASSESSMENT — ACTIVITIES OF DAILY LIVING (ADL)
ADLS_ACUITY_SCORE: 33

## 2024-04-08 ASSESSMENT — COLUMBIA-SUICIDE SEVERITY RATING SCALE - C-SSRS
1. IN THE PAST MONTH, HAVE YOU WISHED YOU WERE DEAD OR WISHED YOU COULD GO TO SLEEP AND NOT WAKE UP?: NO
2. HAVE YOU ACTUALLY HAD ANY THOUGHTS OF KILLING YOURSELF IN THE PAST MONTH?: NO
6. HAVE YOU EVER DONE ANYTHING, STARTED TO DO ANYTHING, OR PREPARED TO DO ANYTHING TO END YOUR LIFE?: NO

## 2024-04-08 NOTE — Clinical Note
Warren Jaramillo was seen and treated in our emergency department on 4/8/2024.  He may return to work on 04/09/2024.       If you have any questions or concerns, please don't hesitate to call.      Carolina Shankar PA-C

## 2024-04-08 NOTE — DISCHARGE INSTRUCTIONS
You were seen in the emergency department today after a fall.  The x-rays of your knee and elbow were reassuring and did not show any broken bones or bones that were out of alignment.    To help with pain:  - Ice the injury for 20 minutes, 3-5 times per day (or up to every 2 hours as needed) for the first 24-72 hours. You can either use an ice pack or plastic bag filled with ice, cover either one with a damp cloth to prevent the cold from burning your skin.   - You may take 650-1000mg of Acetaminophen (Tylenol).  Please do not use more than 3000 mg in a 24 hour period. Tylenol is an effective drug when taken at the prescribed dosages but can cause bodily injury including liver damage if taken too often or at too high of dose.  - You can take 600mg of Ibuprofen (Motrin, Advil) by mouth with food every 6-8 hours (no more than 3200mg in 24hrs).    - You can take one or the other every 3 hours while awake (such that each is taken every 6 hours). For example, if you take Tylenol when you get home then you would take ibuprofen 3 hours later followed by another Tylenol dose 3 hours after that. Write down the times you are taking both medications to ensure appropriate time in between doses.    Please return to the Emergency Department if you have uncontrolled/worsening pain, difficulty breathing, numbness, inability to keep food/fluids down, or any other new or concerning symptoms. Otherwise please follow up with your primary care clinician for recheck within the next 3-5 days.    Included is some information that you might find informative and useful.    Thank you for choosing [facility]. It was a pleasure taking care of you today!  - Carolina Shankar PA-C

## 2024-04-08 NOTE — ED TRIAGE NOTES
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  He is having pain in both areas rated 9/10. This is a workmans comp case.pt c/o right elbow to hand numbness.

## 2024-04-12 ENCOUNTER — TELEPHONE (OUTPATIENT)
Dept: NURSING | Facility: CLINIC | Age: 44
End: 2024-04-12
Payer: COMMERCIAL

## 2024-04-12 ENCOUNTER — HOSPITAL ENCOUNTER (EMERGENCY)
Facility: HOSPITAL | Age: 44
Discharge: HOME | End: 2024-04-12
Attending: EMERGENCY MEDICINE | Admitting: EMERGENCY MEDICINE
Payer: COMMERCIAL

## 2024-04-12 ENCOUNTER — APPOINTMENT (OUTPATIENT)
Dept: CT IMAGING | Facility: HOSPITAL | Age: 44
End: 2024-04-12
Attending: EMERGENCY MEDICINE
Payer: COMMERCIAL

## 2024-04-12 ENCOUNTER — NURSE TRIAGE (OUTPATIENT)
Dept: NURSING | Facility: CLINIC | Age: 44
End: 2024-04-12
Payer: COMMERCIAL

## 2024-04-12 VITALS
RESPIRATION RATE: 18 BRPM | DIASTOLIC BLOOD PRESSURE: 55 MMHG | HEIGHT: 65 IN | SYSTOLIC BLOOD PRESSURE: 109 MMHG | TEMPERATURE: 98.8 F | BODY MASS INDEX: 47.58 KG/M2 | WEIGHT: 285.6 LBS | HEART RATE: 89 BPM | OXYGEN SATURATION: 97 %

## 2024-04-12 DIAGNOSIS — R07.9 CHEST PAIN, UNSPECIFIED TYPE: ICD-10-CM

## 2024-04-12 DIAGNOSIS — J45.901 ASTHMA WITH ACUTE EXACERBATION, UNSPECIFIED ASTHMA SEVERITY, UNSPECIFIED WHETHER PERSISTENT: ICD-10-CM

## 2024-04-12 DIAGNOSIS — R16.0 ENLARGEMENT OF LIVER: ICD-10-CM

## 2024-04-12 DIAGNOSIS — R79.89 ELEVATED D-DIMER: ICD-10-CM

## 2024-04-12 LAB
ALBUMIN SERPL BCG-MCNC: 3.9 G/DL (ref 3.5–5.2)
ALP SERPL-CCNC: 252 U/L (ref 40–150)
ALT SERPL W P-5'-P-CCNC: 26 U/L (ref 0–70)
ANION GAP SERPL CALCULATED.3IONS-SCNC: 12 MMOL/L (ref 7–15)
AST SERPL W P-5'-P-CCNC: 18 U/L (ref 0–45)
BILIRUB DIRECT SERPL-MCNC: 0.35 MG/DL (ref 0–0.3)
BILIRUB SERPL-MCNC: 0.7 MG/DL
BUN SERPL-MCNC: 10.4 MG/DL (ref 6–20)
CALCIUM SERPL-MCNC: 9.6 MG/DL (ref 8.6–10)
CHLORIDE SERPL-SCNC: 101 MMOL/L (ref 98–107)
CREAT SERPL-MCNC: 0.82 MG/DL (ref 0.67–1.17)
D DIMER PPP FEU-MCNC: 4.98 UG/ML FEU (ref 0–0.5)
DEPRECATED HCO3 PLAS-SCNC: 27 MMOL/L (ref 22–29)
EGFRCR SERPLBLD CKD-EPI 2021: >90 ML/MIN/1.73M2
ERYTHROCYTE [DISTWIDTH] IN BLOOD BY AUTOMATED COUNT: 15.9 % (ref 10–15)
FLUAV RNA SPEC QL NAA+PROBE: NEGATIVE
FLUBV RNA RESP QL NAA+PROBE: NEGATIVE
GLUCOSE SERPL-MCNC: 121 MG/DL (ref 70–99)
HCT VFR BLD AUTO: 43.5 % (ref 40–53)
HGB BLD-MCNC: 13.6 G/DL (ref 13.3–17.7)
HOLD SPECIMEN: NORMAL
LACTATE SERPL-SCNC: 1.6 MMOL/L (ref 0.7–2)
MCH RBC QN AUTO: 28 PG (ref 26.5–33)
MCHC RBC AUTO-ENTMCNC: 31.3 G/DL (ref 31.5–36.5)
MCV RBC AUTO: 90 FL (ref 78–100)
NT-PROBNP SERPL-MCNC: 613 PG/ML (ref 0–450)
PLATELET # BLD AUTO: 302 10E3/UL (ref 150–450)
POTASSIUM SERPL-SCNC: 4.3 MMOL/L (ref 3.4–5.3)
PROT SERPL-MCNC: 7.2 G/DL (ref 6.4–8.3)
RBC # BLD AUTO: 4.86 10E6/UL (ref 4.4–5.9)
RSV RNA SPEC NAA+PROBE: NEGATIVE
SARS-COV-2 RNA RESP QL NAA+PROBE: NEGATIVE
SODIUM SERPL-SCNC: 140 MMOL/L (ref 135–145)
TROPONIN T SERPL HS-MCNC: 19 NG/L
TROPONIN T SERPL HS-MCNC: 23 NG/L
WBC # BLD AUTO: 14.9 10E3/UL (ref 4–11)

## 2024-04-12 PROCEDURE — 250N000009 HC RX 250: Performed by: EMERGENCY MEDICINE

## 2024-04-12 PROCEDURE — 250N000011 HC RX IP 250 OP 636: Performed by: EMERGENCY MEDICINE

## 2024-04-12 PROCEDURE — 87637 SARSCOV2&INF A&B&RSV AMP PRB: CPT | Performed by: EMERGENCY MEDICINE

## 2024-04-12 PROCEDURE — 99285 EMERGENCY DEPT VISIT HI MDM: CPT | Mod: 25

## 2024-04-12 PROCEDURE — 36415 COLL VENOUS BLD VENIPUNCTURE: CPT | Performed by: EMERGENCY MEDICINE

## 2024-04-12 PROCEDURE — 85379 FIBRIN DEGRADATION QUANT: CPT | Performed by: EMERGENCY MEDICINE

## 2024-04-12 PROCEDURE — 83880 ASSAY OF NATRIURETIC PEPTIDE: CPT | Performed by: EMERGENCY MEDICINE

## 2024-04-12 PROCEDURE — 80048 BASIC METABOLIC PNL TOTAL CA: CPT | Performed by: EMERGENCY MEDICINE

## 2024-04-12 PROCEDURE — 71275 CT ANGIOGRAPHY CHEST: CPT

## 2024-04-12 PROCEDURE — 93005 ELECTROCARDIOGRAM TRACING: CPT | Performed by: EMERGENCY MEDICINE

## 2024-04-12 PROCEDURE — 82248 BILIRUBIN DIRECT: CPT | Performed by: EMERGENCY MEDICINE

## 2024-04-12 PROCEDURE — 94640 AIRWAY INHALATION TREATMENT: CPT

## 2024-04-12 PROCEDURE — 83605 ASSAY OF LACTIC ACID: CPT | Performed by: EMERGENCY MEDICINE

## 2024-04-12 PROCEDURE — 250N000012 HC RX MED GY IP 250 OP 636 PS 637: Performed by: EMERGENCY MEDICINE

## 2024-04-12 PROCEDURE — 84484 ASSAY OF TROPONIN QUANT: CPT | Performed by: EMERGENCY MEDICINE

## 2024-04-12 PROCEDURE — 85027 COMPLETE CBC AUTOMATED: CPT | Performed by: EMERGENCY MEDICINE

## 2024-04-12 RX ORDER — IOPAMIDOL 755 MG/ML
90 INJECTION, SOLUTION INTRAVASCULAR ONCE
Status: COMPLETED | OUTPATIENT
Start: 2024-04-12 | End: 2024-04-12

## 2024-04-12 RX ORDER — IPRATROPIUM BROMIDE AND ALBUTEROL SULFATE 2.5; .5 MG/3ML; MG/3ML
3 SOLUTION RESPIRATORY (INHALATION) ONCE
Status: COMPLETED | OUTPATIENT
Start: 2024-04-12 | End: 2024-04-12

## 2024-04-12 RX ORDER — PREDNISONE 20 MG/1
40 TABLET ORAL ONCE
Status: COMPLETED | OUTPATIENT
Start: 2024-04-12 | End: 2024-04-12

## 2024-04-12 RX ORDER — ALBUTEROL SULFATE 90 UG/1
1-2 AEROSOL, METERED RESPIRATORY (INHALATION) EVERY 6 HOURS PRN
Qty: 18 G | Refills: 0 | Status: SHIPPED | OUTPATIENT
Start: 2024-04-12

## 2024-04-12 RX ORDER — NEBULIZER ACCESSORIES
1 KIT MISCELLANEOUS EVERY 6 HOURS PRN
Qty: 1 KIT | Refills: 0 | Status: SHIPPED | OUTPATIENT
Start: 2024-04-12

## 2024-04-12 RX ORDER — PREDNISONE 20 MG/1
TABLET ORAL
Qty: 8 TABLET | Refills: 0 | Status: SHIPPED | OUTPATIENT
Start: 2024-04-12 | End: 2024-07-26

## 2024-04-12 RX ADMIN — IPRATROPIUM BROMIDE AND ALBUTEROL SULFATE 3 ML: .5; 3 SOLUTION RESPIRATORY (INHALATION) at 23:22

## 2024-04-12 RX ADMIN — IOPAMIDOL 90 ML: 755 INJECTION, SOLUTION INTRAVENOUS at 22:24

## 2024-04-12 RX ADMIN — PREDNISONE 40 MG: 20 TABLET ORAL at 23:22

## 2024-04-12 RX ADMIN — IPRATROPIUM BROMIDE AND ALBUTEROL SULFATE 3 ML: .5; 3 SOLUTION RESPIRATORY (INHALATION) at 20:46

## 2024-04-12 ASSESSMENT — ACTIVITIES OF DAILY LIVING (ADL)
ADLS_ACUITY_SCORE: 35

## 2024-04-12 NOTE — TELEPHONE ENCOUNTER
"Patient calling with concerns of chest tightness and having difficulty getting enough air in or out. Symptoms started 3 days ago. Patient is coughing so hard he is seeing stars and is bringing up \"egg shell colored phlegm with chunks in it.\"  Chest tightness is only when he is coughing. Patient unable to take a deep breath without coughing. Cough sounds tight with crackles. Patient stating group home staff unable to find his nebulizer machine. Patient has history of congestive heart failure.   Protocol recommends ED now  Patient care advice given. Patient will have group home staff drive to ED and if symptoms worsen he will call for an ambulance.   Ashley Rivera RN   04/12/24 6:54 PM  [facility] Nurse Advisor          Reason for Disposition   [1] MODERATE difficulty breathing (e.g., speaks in phrases, SOB even at rest, pulse 100-120) AND [2] NEW-onset or WORSE than normal    Additional Information   Negative: SEVERE difficulty breathing (e.g., struggling for each breath, speaks in single words)   Negative: [1] Breathing stopped AND [2] hasn't returned   Negative: Choking on something   Negative: Difficult to awaken or acting confused (e.g., disoriented, slurred speech)   Negative: Bluish (or gray) lips or face now   Negative: Passed out (i.e., lost consciousness, collapsed and was not responding)   Negative: Wheezing started suddenly after medicine, an allergic food or bee sting   Negative: Stridor (harsh sound while breathing in)   Negative: Slow, shallow and weak breathing   Negative: Sounds like a life-threatening emergency to the triager   Negative: Chest pain   Negative: [1] Wheezing (high pitched whistling sound) AND [2] previous asthma attacks or use of asthma medicines   Negative: [1] Difficulty breathing AND [2] only present when coughing   Negative: [1] Difficulty breathing AND [2] only from stuffy or runny nose   Negative: [1] Difficulty breathing AND [2] within 14 days of COVID-19 " Exposure    Protocols used: Breathing Difficulty-A-AH

## 2024-04-12 NOTE — TELEPHONE ENCOUNTER
Patient call dropped during red transfer due to shortness of breath.   Call back to patient. No answer. Left brief message to call back if needing to speak with a nurse at 1/855 [place].   Ashley Rivera RN   04/12/24 6:31 PM  [facility] Nurse Advisor

## 2024-04-13 ENCOUNTER — APPOINTMENT (OUTPATIENT)
Dept: ULTRASOUND IMAGING | Facility: HOSPITAL | Age: 44
End: 2024-04-13
Attending: EMERGENCY MEDICINE
Payer: COMMERCIAL

## 2024-04-13 ENCOUNTER — NURSE TRIAGE (OUTPATIENT)
Dept: NURSING | Facility: CLINIC | Age: 44
End: 2024-04-13
Payer: COMMERCIAL

## 2024-04-13 ENCOUNTER — HOSPITAL ENCOUNTER (EMERGENCY)
Facility: HOSPITAL | Age: 44
Discharge: HOME | End: 2024-04-13
Attending: EMERGENCY MEDICINE | Admitting: EMERGENCY MEDICINE
Payer: COMMERCIAL

## 2024-04-13 VITALS
RESPIRATION RATE: 24 BRPM | TEMPERATURE: 98.6 F | DIASTOLIC BLOOD PRESSURE: 68 MMHG | SYSTOLIC BLOOD PRESSURE: 131 MMHG | HEART RATE: 92 BPM | BODY MASS INDEX: 49.62 KG/M2 | OXYGEN SATURATION: 95 % | WEIGHT: 298.2 LBS

## 2024-04-13 DIAGNOSIS — R73.9 HYPERGLYCEMIA: ICD-10-CM

## 2024-04-13 DIAGNOSIS — J44.1 COPD EXACERBATION (H): ICD-10-CM

## 2024-04-13 DIAGNOSIS — M79.662 PAIN OF LEFT CALF: ICD-10-CM

## 2024-04-13 LAB
ANION GAP SERPL CALCULATED.3IONS-SCNC: 13 MMOL/L (ref 7–15)
ATRIAL RATE - MUSE: 87 BPM
BASOPHILS # BLD AUTO: 0.1 10E3/UL (ref 0–0.2)
BASOPHILS NFR BLD AUTO: 0 %
BUN SERPL-MCNC: 12.6 MG/DL (ref 6–20)
CALCIUM SERPL-MCNC: 9.4 MG/DL (ref 8.6–10)
CHLORIDE SERPL-SCNC: 97 MMOL/L (ref 98–107)
CREAT SERPL-MCNC: 0.8 MG/DL (ref 0.67–1.17)
DEPRECATED HCO3 PLAS-SCNC: 26 MMOL/L (ref 22–29)
DIASTOLIC BLOOD PRESSURE - MUSE: NORMAL MMHG
EGFRCR SERPLBLD CKD-EPI 2021: >90 ML/MIN/1.73M2
EOSINOPHIL # BLD AUTO: 0 10E3/UL (ref 0–0.7)
EOSINOPHIL NFR BLD AUTO: 0 %
ERYTHROCYTE [DISTWIDTH] IN BLOOD BY AUTOMATED COUNT: 15.8 % (ref 10–15)
GLUCOSE BLDC GLUCOMTR-MCNC: 164 MG/DL (ref 70–99)
GLUCOSE SERPL-MCNC: 302 MG/DL (ref 70–99)
HCT VFR BLD AUTO: 42.6 % (ref 40–53)
HGB BLD-MCNC: 13.4 G/DL (ref 13.3–17.7)
HOLD SPECIMEN: NORMAL
HOLD SPECIMEN: NORMAL
IMM GRANULOCYTES # BLD: 0.2 10E3/UL
IMM GRANULOCYTES NFR BLD: 1 %
INTERPRETATION ECG - MUSE: NORMAL
LYMPHOCYTES # BLD AUTO: 1.4 10E3/UL (ref 0.8–5.3)
LYMPHOCYTES NFR BLD AUTO: 10 %
MCH RBC QN AUTO: 28.3 PG (ref 26.5–33)
MCHC RBC AUTO-ENTMCNC: 31.5 G/DL (ref 31.5–36.5)
MCV RBC AUTO: 90 FL (ref 78–100)
MONOCYTES # BLD AUTO: 0.9 10E3/UL (ref 0–1.3)
MONOCYTES NFR BLD AUTO: 6 %
NEUTROPHILS # BLD AUTO: 11.8 10E3/UL (ref 1.6–8.3)
NEUTROPHILS NFR BLD AUTO: 83 %
NRBC # BLD AUTO: 0 10E3/UL
NRBC BLD AUTO-RTO: 0 /100
P AXIS - MUSE: 69 DEGREES
PLATELET # BLD AUTO: 306 10E3/UL (ref 150–450)
POTASSIUM SERPL-SCNC: 4.8 MMOL/L (ref 3.4–5.3)
PR INTERVAL - MUSE: 192 MS
QRS DURATION - MUSE: 110 MS
QT - MUSE: 386 MS
QTC - MUSE: 464 MS
R AXIS - MUSE: 102 DEGREES
RBC # BLD AUTO: 4.74 10E6/UL (ref 4.4–5.9)
SODIUM SERPL-SCNC: 136 MMOL/L (ref 135–145)
SYSTOLIC BLOOD PRESSURE - MUSE: NORMAL MMHG
T AXIS - MUSE: 12 DEGREES
TROPONIN T SERPL HS-MCNC: 16 NG/L
VENTRICULAR RATE- MUSE: 87 BPM
WBC # BLD AUTO: 14.3 10E3/UL (ref 4–11)

## 2024-04-13 PROCEDURE — 82962 GLUCOSE BLOOD TEST: CPT

## 2024-04-13 PROCEDURE — 250N000009 HC RX 250: Performed by: EMERGENCY MEDICINE

## 2024-04-13 PROCEDURE — 99285 EMERGENCY DEPT VISIT HI MDM: CPT | Mod: 25

## 2024-04-13 PROCEDURE — 93005 ELECTROCARDIOGRAM TRACING: CPT | Performed by: EMERGENCY MEDICINE

## 2024-04-13 PROCEDURE — 36415 COLL VENOUS BLD VENIPUNCTURE: CPT | Performed by: EMERGENCY MEDICINE

## 2024-04-13 PROCEDURE — 93971 EXTREMITY STUDY: CPT | Mod: LT

## 2024-04-13 PROCEDURE — 250N000012 HC RX MED GY IP 250 OP 636 PS 637: Performed by: EMERGENCY MEDICINE

## 2024-04-13 PROCEDURE — 80048 BASIC METABOLIC PNL TOTAL CA: CPT | Performed by: EMERGENCY MEDICINE

## 2024-04-13 PROCEDURE — 84484 ASSAY OF TROPONIN QUANT: CPT | Performed by: EMERGENCY MEDICINE

## 2024-04-13 PROCEDURE — 85025 COMPLETE CBC W/AUTO DIFF WBC: CPT | Performed by: STUDENT IN AN ORGANIZED HEALTH CARE EDUCATION/TRAINING PROGRAM

## 2024-04-13 RX ORDER — IPRATROPIUM BROMIDE AND ALBUTEROL SULFATE 2.5; .5 MG/3ML; MG/3ML
3 SOLUTION RESPIRATORY (INHALATION) ONCE
Status: COMPLETED | OUTPATIENT
Start: 2024-04-13 | End: 2024-04-13

## 2024-04-13 RX ADMIN — IPRATROPIUM BROMIDE AND ALBUTEROL SULFATE 3 ML: .5; 3 SOLUTION RESPIRATORY (INHALATION) at 11:44

## 2024-04-13 RX ADMIN — PREDNISONE 50 MG: 20 TABLET ORAL at 11:42

## 2024-04-13 ASSESSMENT — ACTIVITIES OF DAILY LIVING (ADL)
ADLS_ACUITY_SCORE: 35

## 2024-04-13 NOTE — ED TRIAGE NOTES
SOB started yesterday, was seen here and discharged prior to d dimer result. Checked mychart, found it was elevated and called nurse triage line and was referred back to ER. Patient continues to have SOB. Denies CP at this time. Hx asthma.

## 2024-04-13 NOTE — DISCHARGE INSTRUCTIONS
Your CT chest was negative yesterday for pulmonary embolism, the D-dimer is used only to determine if you need a CT chest.  You have a small amount of fluid in the right lung.  This is likely secondary to inflammation from asthma exacerbation. Continue your meds as written by the ER doctor yesterday.

## 2024-04-13 NOTE — TELEPHONE ENCOUNTER
"Pt calls for D-Dimer results and advice on next steps.  Lab order was initiated at [facility] ED 4/12/2024 (yesterday).  Chart notes indicate \"Elevated d-dimer\" under Clinical Impressions, with AVS instruction to schedule clinic follow-up.  Therefore, since d-dimer lab result appears known at time of ED discharge, advised pt the ED Results team will be available in one hour to guide him further ([phone]); advised pt to call back.  Pt was also prescribed various asthma meds for asthma exacerbation which pt has not yet obtained.  Pt does exhibit intermittent asthma exacerbation symptoms.  Reports intention to obtain asthma Rx's within the next couple of hours.  Initial call ended at this point.  ___________    However, upon further chart investigation, d-dimer lab result appears critical.  Therefore, decision is to call back to pt now (instead of waiting for ED Results Team), and determine more immediate plan of action.  Outbound call placed to pt.  Pt states \"The d-dimer result didn't come back until after they discharged me.\"  Due to this circumstance, advised pt to return to ED asap.  Pt agrees to plan.  States he will have a ride available shortly.    Nieves Keller RN  [facility] Nurse Advisor     At 10:08 AM: pt is calling back, per pt he needs his  at [facility] to be given confirmation for pt go to ED this AM. Shelley, manager at [facility],who pt gave consent to speak to is informed per the advise of [facility] nurse this AM: \"pt to return to ED asap.\" Shelley verbalized understanding and agreement with plan of care and no further questions at this time. Per Shelley, pt will go to [facility] ED.    Reason for Disposition   Caller requesting lab results  (Exception: Routine or non-urgent lab result.)    Protocols used: PCP Call - No Triage-A-AH    "

## 2024-04-13 NOTE — ED PROVIDER NOTES
EMERGENCY DEPARTMENT ENCOUNTER      NAME: Warren Jaramillo  AGE: 43 year old male  YOB: 1980  MRN: [id number]  EVALUATION DATE & TIME: 4/13/2024 10:51 AM    PCP: Mary Kelly    ED PROVIDER: Tricia Chao M.D.      Chief Complaint   Patient presents with    Shortness of Breath    Abnormal Labs       FINAL IMPRESSION:  1. COPD exacerbation (H)    2. Pain of left calf    3. Hyperglycemia        ED COURSE & MEDICAL DECISION MAKING:    Chest tightness, shortness of breath.  Returns to the ER for concern for elevated dimer, nurse triage told him to come back to the ER despite negative CT PE yesterday.  In addition, patient has some exacerbation of asthma, has not used home medication for asthma.  Given the elevated dimer, new complaints of left calf pain, I ordered a lower extremity venous ultrasound to rule out DVT.  I ordered a troponin as well as CBC and BMP, I ordered DuoNeb and prednisone for asthma treatment.  I considered ordering repeat imaging of the chest but deferred as patient symptoms are similar to previous, do not have concern for bacterial pneumonia or CHF.  I reviewed previous COVID swab which was negative yesterday.  I reviewed blood work which is significant for white count similar to yesterday, 14.3.  Troponin 16, downtrending from previous value of 19 and 23.  Glucose is 302, higher than previous values of 121-188 over the past few months, I ordered insulin but then canceled administration due to point-of-care glucose being 164.  I reviewed DVT ultrasound which was negative for DVT.  I doubt ACS as patient has had coronary CTA with no coronary artery disease noted in March 2023.  I also noted that patient has a right pleural effusion noted on previous CTA chest both on 4/12/2024 as well as outside study CTA chest 7/2/23 found small R pleural effusions. He has no clinical symptoms of CHF or bacterial pneumonia. I see no indication to start antibiotics at this time.  He was  reevaluated, breathing is improved.  Encouraged to use nebulizer at home, continue prednisone at home.  Discharged back to group home.        5:41 PM I met with the patient to gather history and to perform my initial exam. I discussed the plan for care while in the Emergency Department.     Pertinent Labs & Imaging studies reviewed. (See chart for details).    Medical Decision Making  Obtained supplemental history:Supplemental history obtained?: Documented in chart and Caregiver  Reviewed external records: External records reviewed?: Documented in chart and Other: Previous imaging as documented, previous cardiology note 11/16/2023, previous ED note 4/12/2024  Care impacted by chronic illness:Other: DM, hypertension, asthma, Rojas's disease, sleep apnea, autism, CMT syndrome, fetal alcohol syndrome.  Care significantly affected by social determinants of health:Access to Medical Care and No Transportation for Health Care  Did you consider but not order tests?: In addition to work-up documented, I considered the following work up: Re-imaging of chest, admission for asthma exacerbation, iv vs subcutaneous insulin for hyperglycemia.  Did you interpret images independently?: Independent interpretation of ECG and images noted in documentation, when applicable.  Consultation discussion with other provider:Did you involve another provider (consultant, , pharmacy, etc.)?: No  Discharge. No recommendations on prescription strength medication(s). I considered admission, but ultimately discharged patient after improve wheezing and stable blood work.    At the conclusion of the encounter I discussed the results of all of the tests and the disposition. The questions were answered. The patient or family acknowledged understanding and was agreeable with the care plan.      CRITICAL CARE:  N/A    HPI    Patient information was obtained from: patient, group home aide    Use of : N/A        Warren Jaramillo is a 43  year old male who presents for elevated d dimer and shortness of breath with chest tightness. He has some continued coughing with sputum production intermittently, non bloody.  He denies any fever or other symptoms. He felt improved after ER visit 4/12/24 for same symptoms then worsened again this am. He received a nebulizer and prednisone rx from the ED but has not taken another breathing treatment today. Last night after he got home from the ED he looked on MyChart and he noticed his d dimer was elevated. He called the nurse triage line to ask about this and was told to come to the ED immediately. He denies personal hx of CAD. He has hx of asthma and AVA, compliant with CPAP.  When asked specifically about coagulopathy or history of pulmonary embolism and DVT, patient complained of left calf pain posteriorly for the past 3 days, denies any swelling or redness.  Denies any knowledge of trauma to leg.  Ambulating okay.  Denies history of PE or DVT.    Per Chart Review: hx of DM, HTN, asthma, Rojas's disease. CT coronary angiogram showed normal coronary anatomy with no evidence of stenosis or plaque 3/14/23.  Previous CTA chest from 4/12/24 and 7/2/23 (outside [facility]) showed no PE but found small R pleural effusion. Echocardiogram from 8/2/23 showed EF 50-55%, poor image quality and technically difficult, RV function could not be assessed, no obvious valvular disease.    REVIEW OF SYSTEMS  All other systems negative unless noted in HPI.    PAST MEDICAL HISTORY:  Past Medical History:   Diagnosis Date    DM2 (diabetes mellitus, type 2) (H) 4/28/2020    HTN (hypertension) 7/30/2012    Rojas's disease (H28)        PAST SURGICAL HISTORY:  Past Surgical History:   Procedure Laterality Date    COLONOSCOPY      ESOPHAGOSCOPY, GASTROSCOPY, DUODENOSCOPY (EGD), COMBINED N/A 7/21/2023    Procedure: ESOPHAGOGASTRODUODENOSCOPY WITH GASTRIC AND ESOPHAGEAL BIOPSIES;  Surgeon: Filiberto Aragon MD;  Location:   [facility]    TOOTH EXTRACTION           CURRENT MEDICATIONS:    No current facility-administered medications for this encounter.     Current Outpatient Medications   Medication Sig Dispense Refill    ACCU-CHEK GUIDE test strip USE TO TEST BLOOD SUGAR TWICE DAILY AS NEEDED      acetaminophen (TYLENOL) 32 mg/mL liquid Take 960 mg by mouth every 6 hours as needed for fever or mild pain (Patient not taking: Reported on 11/22/2023)      acetaminophen (TYLENOL) 500 MG tablet Take 2 tablets (1,000 mg) by mouth 3 times daily 30 tablet 0    albuterol (PROAIR HFA/PROVENTIL HFA/VENTOLIN HFA) 108 (90 Base) MCG/ACT inhaler Inhale 1-2 puffs into the lungs every 6 hours as needed for shortness of breath, wheezing or cough 18 g 0    albuterol (PROVENTIL) (2.5 MG/3ML) 0.083% neb solution Take 2.5 mg by nebulization 3 times daily as needed for shortness of breath, wheezing or cough      aloe vera GEL Apply 1 g topically every hour as needed for skin care Per bottle directions      bacitracin 500 UNIT/GM OINT Apply topically 3 times daily as needed for wound care      BETA BLOCKER NOT PRESCRIBED (INTENTIONAL) Please choose reason not prescribed from choices below.      blood glucose monitoring (SOFTCLIX) lancets USE AS DIRECTED TWICE DAILY AS NEEDED      Blood Glucose Monitoring Suppl (ACCU-CHEK GUIDE) w/Device KIT USE AS DIRECTED TWICE DAILY AS NEEDED      Calamine external lotion Apply topically as needed for itching      carbamide peroxide (DEBROX) 6.5 % otic solution Place 5 drops into both ears daily as needed for other      clotrimazole (LOTRIMIN) 1 % external cream Apply topically 2 times daily as needed (skin irritation)      cyclobenzaprine (FLEXERIL) 10 MG tablet Take 10 mg by mouth 3 times daily as needed for muscle spasms      diclofenac (VOLTAREN) 1 % topical gel Apply 2 g topically daily as needed for moderate pain To joints/back      diclofenac (VOLTAREN) 75 MG EC tablet Take 1 tablet (75 mg) by mouth 2 times daily  as needed for moderate pain 28 tablet 0    escitalopram (LEXAPRO) 10 MG tablet Take 10 mg by mouth daily      famotidine (PEPCID) 20 MG tablet Take 1 tablet (20 mg) by mouth 2 times daily 60 tablet 0    famotidine (PEPCID) 20 MG tablet Take 1 tablet (20 mg) by mouth 2 times daily 60 tablet 0    fluticasone-vilanterol (BREO ELLIPTA) 200-25 MCG/ACT inhaler Inhale 1 puff into the lungs daily      furosemide (LASIX) 20 MG tablet Take 20 mg by mouth daily      hydrocortisone 1 % CREA cream Place rectally 2 times daily as needed for itching      ibuprofen (ADVIL/MOTRIN) 100 MG/5ML suspension Take 400 mg by mouth every 4 hours as needed for fever or moderate pain (Patient not taking: Reported on 11/22/2023)      ibuprofen (ADVIL/MOTRIN) 200 MG tablet Take 400 mg by mouth every 4 hours as needed for pain (Patient not taking: Reported on 11/22/2023)      ibuprofen (ADVIL/MOTRIN) 600 MG tablet Take 1 tablet (600 mg) by mouth every 6 hours as needed for moderate pain 30 tablet 0    lisinopril (ZESTRIL) 10 MG tablet Take 10 mg by mouth daily      LORazepam (ATIVAN) 1 MG tablet Take 0.5 mg by mouth daily as needed for anxiety      melatonin 3 MG tablet Take 3 mg by mouth At Bedtime      metFORMIN (GLUCOPHAGE) 1000 MG tablet Take 1,000 mg by mouth 2 times daily (with meals)      montelukast (SINGULAIR) 10 MG tablet Take 10 mg by mouth daily (Patient not taking: Reported on 11/22/2023)      OLANZapine (ZYPREXA) 10 MG tablet Take 10 mg by mouth At Bedtime      omeprazole (PRILOSEC) 20 MG DR capsule Take 40 mg by mouth daily      ondansetron (ZOFRAN ODT) 4 MG ODT tab Take 1-2 tablets (4-8 mg) by mouth every 8 hours as needed for nausea or vomiting 20 tablet 0    ondansetron (ZOFRAN) 4 MG tablet Take 4 mg by mouth every 12 hours as needed for nausea      oxybutynin ER (DITROPAN XL) 10 MG 24 hr tablet Take 10 mg by mouth daily      oxyCODONE (ROXICODONE) 5 MG tablet       polyethylene glycol (MIRALAX) 17 g packet Take 1 packet by  mouth daily as needed for constipation      predniSONE (DELTASONE) 20 MG tablet Take two tablets (= 40mg) each day for 4 (four) days 8 tablet 0    predniSONE (DELTASONE) 20 MG tablet Take two tablets (= 40mg) each day for four days starting tomorrow, 11/28/23. First dose given in the afternoon in the ED on 11/27/23. 8 tablet 0    psyllium (METAMUCIL) 28.3 % packet Take 1 packet by mouth daily      Respiratory Therapy Supplies (NEBULIZER/TUBING/MOUTHPIECE) KIT 1 kit every 6 hours as needed (shortness of breath, wheezing) 1 kit 0    rosuvastatin (CRESTOR) 10 MG tablet Take 10 mg by mouth At Bedtime      sodium phosphate (FLEET ENEMA) 7-19 GM/118ML rectal enema Place 1 enema rectally once as needed for constipation      spironolactone (ALDACTONE) 50 MG tablet Take 25 mg by mouth daily      sucralfate (CARAFATE) 1 GM/10ML suspension Take 10 mLs (1 g) by mouth 4 times daily as needed (pain) 414 mL 0    sucralfate (CARAFATE) 1 GM/10ML suspension Take 10 mLs (1 g) by mouth 4 times daily 414 mL 0    traZODone (DESYREL) 50 MG tablet Take 50 mg by mouth At Bedtime      zinc oxide (DESITIN) 20 % external ointment Apply topically as needed for dry skin or irritation To groin/buttocks area           ALLERGIES:  Allergies   Allergen Reactions    Apricot Flavor Anaphylaxis    Banana Anaphylaxis     Throat swelling  Throat swelling      Wasp Venom Protein Shortness Of Breath     Other reaction(s): Respiratory Distress  Has an epi pen  Has an epi pen      Bees Anaphylaxis     Have an Epi pen that carries with    Methylphenidate Itching     Other reaction(s): Nightmares    Prunus      Other reaction(s): *Unknown    Sulfa Antibiotics      Headaches and nausea    Prunus Persica Rash     Other reaction(s): *Unknown       FAMILY HISTORY:  Family History   Problem Relation Age of Onset    Unknown/Adopted Father     Unknown/Adopted Maternal Grandmother     C.A.D. Maternal Grandfather     Diabetes Maternal Grandfather     Cerebrovascular  Disease Maternal Grandfather     Unknown/Adopted Paternal Grandmother     Unknown/Adopted Paternal Grandfather     Unknown/Adopted Brother     Unknown/Adopted Sister        SOCIAL HISTORY:  Social History     Socioeconomic History    Marital status: Single   Tobacco Use    Smoking status: Every Day     Current packs/day: 1.00     Types: Cigarettes    Smokeless tobacco: Never   Vaping Use    Vaping status: Never Used   Substance and Sexual Activity    Alcohol use: No     Comment: once every 3 months    Drug use: No    Sexual activity: Never     Partners: Female   Other Topics Concern     Service No    Blood Transfusions No    Caffeine Concern No    Occupational Exposure No    Hobby Hazards No    Sleep Concern No    Stress Concern Yes     Comment: sometimes    Weight Concern No    Special Diet Yes     Comment: counting carbs    Back Care No    Exercise Yes    Seat Belt Yes    Self-Exams Yes     Social Determinants of Health     Financial Resource Strain: Medium Risk (4/27/2022)    [facility], [facility]    Financial Resource Strain     Difficulty of Paying Living Expenses: 2     Difficulty of Paying Living Expenses: 1   Food Insecurity: Food Insecurity Present (4/27/2022)    [email or web address]    Food Insecurity     Worried About Running Out of Food in the Last Year: 2   Transportation Needs: Unmet Transportation Needs (4/27/2022)    [email or web address]    Transportation Needs     Lack of Transportation (Medical): 2    Received from [facility], [facility]    Social Connections   Housing Stability: High Risk (4/27/2022)    Received from [facility], [facility] [employer]    Housing Stability     Unable to Pay for Housing in the Last Year: 3       VITALS:  No data found.      PHYSICAL EXAM    VITAL SIGNS: /68   Pulse 92   Temp 98.6  F (37  C) (Oral)   Resp 24   Wt 135.3 kg (298 lb 3.2 oz)   SpO2 95%   BMI 49.62 kg/m    Physical Exam  Vitals and nursing note reviewed.   Constitutional:       General: He is not in acute distress.     Appearance: He is obese. He is not ill-appearing, toxic-appearing or diaphoretic.   HENT:      Mouth/Throat:      Mouth: Mucous membranes are moist.      Pharynx: No oropharyngeal exudate or posterior oropharyngeal erythema.   Eyes:      General: No scleral icterus.        Right eye: No discharge.         Left eye: No discharge.      Pupils: Pupils are equal, round, and reactive to light.   Cardiovascular:      Rate and Rhythm: Normal rate and regular rhythm.   Pulmonary:      Effort: Pulmonary effort is normal. No respiratory distress.      Breath sounds: Wheezing present.      Comments: Wheezing bilaterally and diffusely with mildly diminished breath sounds at the lung bases. No rales. Work of breathing normal without retractions.  Abdominal:      General: There is no distension.      Palpations: Abdomen is soft.      Tenderness: There is no abdominal tenderness.   Musculoskeletal:         General: No swelling or deformity.      Cervical back: Neck supple. No rigidity.      Right lower leg: No edema.      Left lower leg: No edema.      Comments: Left calf exhibits no asymmetry, no cords palpable. No LE edema, warmth or erythema bilaterally.   Skin:     General: Skin is warm and dry.      Capillary Refill: Capillary refill takes less than 2 seconds.      Findings: No bruising or erythema.   Neurological:      General: No focal deficit present.      Mental Status: He is alert and oriented to person, place, and time. Mental status is at baseline.      Comments: Mild developmental delay present.    Psychiatric:          Behavior: Behavior normal.      Comments: Slightly anxious         LABS  Labs Ordered and Resulted from Time of ED Arrival to Time of ED Departure   BASIC METABOLIC PANEL - Abnormal       Result Value    Sodium 136      Potassium 4.8      Chloride 97 (*)     Carbon Dioxide (CO2) 26      Anion Gap 13      Urea Nitrogen 12.6      Creatinine 0.80      GFR Estimate >90      Calcium 9.4      Glucose 302 (*)    CBC WITH PLATELETS AND DIFFERENTIAL - Abnormal    WBC Count 14.3 (*)     RBC Count 4.74      Hemoglobin 13.4      Hematocrit 42.6      MCV 90      MCH 28.3      MCHC 31.5      RDW 15.8 (*)     Platelet Count 306      % Neutrophils 83      % Lymphocytes 10      % Monocytes 6      % Eosinophils 0      % Basophils 0      % Immature Granulocytes 1      NRBCs per 100 WBC 0      Absolute Neutrophils 11.8 (*)     Absolute Lymphocytes 1.4      Absolute Monocytes 0.9      Absolute Eosinophils 0.0      Absolute Basophils 0.1      Absolute Immature Granulocytes 0.2      Absolute NRBCs 0.0     GLUCOSE BY METER - Abnormal    GLUCOSE BY METER POCT 164 (*)    TROPONIN T, HIGH SENSITIVITY - Normal    Troponin T, High Sensitivity 16           RADIOLOGY  US Lower Extremity Venous Duplex Left   Final Result   IMPRESSION:   No deep venous thrombosis in the left lower extremity.         I have independently reviewed the above image. See radiology report for detail.      EKG:    EKG obtained at 1111 and shows sinus rhythm rate 86, Qtc 461, compared to previous EKG on 4/12/24, no Terrance or depression, interventricular conduction delay. Similar to previous EKG. I have independently reviewed and interpreted today's EKG, pending Cardiologist read.       PROCEDURES:  N/A      MEDICATIONS GIVEN IN THE EMERGENCY:  Medications   ipratropium - albuterol 0.5 mg/2.5 mg/3 mL (DUONEB) neb solution 3 mL (3 mLs Nebulization $Given 4/13/24 1148)   predniSONE (DELTASONE) tablet 50 mg (50 mg Oral $Given 4/13/24 1142)       NEW PRESCRIPTIONS STARTED AT  TODAY'S ER VISIT  Discharge Medication List as of 4/13/2024  2:46 PM           Tricia Chao MD  Emergency Medicine  [facility] EMERGENCY DEPARTMENT  [facility] [facility] [id number] [phone]  Dept: [phone]             Tricia Chao MD  04/15/24 1222

## 2024-04-13 NOTE — DISCHARGE INSTRUCTIONS
Please follow-up with your Primary Care Provider within 3-4 days for a recheck; call to arrange appointment.    Return to the ER for worsening symptoms, worsening chest pain, worsening shortness of breath, if you pass out or feel that you might, nausea / vomiting, fever or other concerns.    Complete the full course of steroids, even if you are feeling better.

## 2024-04-13 NOTE — ED NOTES
Patient reports taking his evening medications: olanzapine, trazadone, rosuvastatin, and famotidine

## 2024-04-13 NOTE — ED TRIAGE NOTES
"Patient arrives from [facility] via [facility] for shortness of breath, cough, and chest tightness for 3 days. States \"it feels like someone is pushing on my chest really hard\". Reports he has hx of asthma, using albuterol inhaler with no relief. Reports that he does not have a nebulizer machine. Would like to be tested for covid.      Triage Assessment (Adult)       Row Name 04/12/24 2000          Triage Assessment    Airway WDL WDL        Respiratory WDL    Respiratory WDL X;rhythm/pattern;cough     Rhythm/Pattern, Respiratory shortness of breath;tachypneic     Cough Frequency infrequent        Cognitive/Neuro/Behavioral WDL    Cognitive/Neuro/Behavioral WDL WDL                     "

## 2024-04-13 NOTE — ED PROVIDER NOTES
"  Emergency Department Encounter     Evaluation Date & Time:   2024  7:59 PM    CHIEF COMPLAINT:  Shortness of Breath      Triage Note:Patient arrives from [facility] via [facility] for shortness of breath, cough, and chest tightness for 3 days. States \"it feels like someone is pushing on my chest really hard\". Reports he has hx of asthma, using albuterol inhaler with no relief. Reports that he does not have a nebulizer machine. Would like to be tested for covid.      Triage Assessment (Adult)       Row Name 24          Triage Assessment    Airway WDL WDL        Respiratory WDL    Respiratory WDL X;rhythm/pattern;cough     Rhythm/Pattern, Respiratory shortness of breath;tachypneic     Cough Frequency infrequent        Cognitive/Neuro/Behavioral WDL    Cognitive/Neuro/Behavioral WDL WDL                   Impression and Plan       FINAL IMPRESSION:    ICD-10-CM    1. Asthma with acute exacerbation, unspecified asthma severity, unspecified whether persistent  J45.901       2. Chest pain, unspecified type  R07.9       3. Elevated d-dimer  R79.89       4. Enlargement of liver  R16.0     possible cirrhosis          ED COURSE & MEDICAL DECISION MAKIN:16 PM I met with the patient, obtained history, performed an initial exam, and discussed options and plan for diagnostics and treatment here in the ED.    43 year old male, history of Charcot-Estrella-Tooth syndrome, cardiomyopathy with CHF, PAF (not anticoagulated), DM2, HTN, HLD, asthma, obesity, autism and developmental delay, who presents for evaluation of shortness of breath associated with substernal chest pressure and tightness, lightheadedness and cough productive of tan-colored sputum x 3 days. No fevers.     Symptoms feel similar to his asthma, however are not improving with his MDI.     On exam, he has somewhat diminished, but symmetrical breath sounds, without sobeida distress.  He has scattered inspiratory and expiratory wheezes; no " audible rhonchi or rales. There is 1+ pitting edema to the mid-tibia, BL lower extremities.       Patient placed on cardiac monitor, IV access established and blood sent for labs.    Patient given DuoNeb with improved aeration and significant improvement in symptoms. Patient also given prednisone 40mg po.     EKG performed and demonstrated NSR with anteroseptal Q waves and no ST-T wave elevation consistent with ACS or pericarditis (similar to prior EKG). Troponin elevated above reference cut-off for males (23) with delta troponin down-trending (19); I do not suspect ACS.    PE considered for which a d-dimer was checked and elevated (4.98). CTA chest performed and demonstrated:   1.  No pulmonary embolus.  2.  Small right pleural effusion.  3.  Enlarged liver with slightly nodular contour suspicious for cirrhosis.  4.  Benign bilateral adrenal myelolipomata.    No clinical or radiographic evidence of acute CHF with BNP elevated, but stable (613).     Nothing on imaging to suggest bacterial pneumonia and I do not think antibiotics are indicated.      COVID, influenza and RSV tests negative.    Labs otherwise remarkable for leukocytosis (WBC 14.9) with normal lactate (1.6).  He has no anemia (Hb 13.6), significant electrolyte derangements or renal impairment.  Hepatic panel remarkable for elevated alk phos (252) with normal enzymes.    Patient likely with acute asthma exacerbation.    Patient given a second neb with further improvement. I do not think admission is indicated. Patient discharged to home with follow-up with his PCP. He was given prescriptions for a neb machine with albuterol solution and a prednisone burst.  Return precautions provided.  Patient stable throughout ED course.      At the conclusion of the encounter I discussed the results of all the tests and the disposition. The questions were answered. The patient acknowledged understanding and was agreeable with the care plan.    Medical Decision  Making    History:  Obtained supplemental history:Supplemental history obtained?: No  Reviewed external records: External records reviewed?: Documented in chart    External consultation:  Did you consider but not order tests?: Work up considered but not performed and documented in chart, if applicable  Did you interpret images independently?: Independent interpretation of ECG and images noted in documentation, when applicable.  Consultation discussion with other provider:Did you involve another provider (consultant, , pharmacy, etc.)?: No    Complicating factors:  Care impacted by chronic illness:Chronic Lung Disease, Diabetes, Heart Disease, Hypertension, Mental Health, and Other: AVA  Care significantly affected by social determinants of health:N/A    Disposition considerations: Discharge. I prescribed additional prescription strength medication(s) as charted. I considered admission, but ultimately discharged patient given reassuring evaluation and clinical improvement.    MEDICATIONS GIVEN IN THE EMERGENCY DEPARTMENT:  Medications   ipratropium - albuterol 0.5 mg/2.5 mg/3 mL (DUONEB) neb solution 3 mL (3 mLs Nebulization $Given 4/12/24 2046)   iopamidol (ISOVUE-370) solution 90 mL (90 mLs Intravenous $Given 4/12/24 2224)   ipratropium - albuterol 0.5 mg/2.5 mg/3 mL (DUONEB) neb solution 3 mL (3 mLs Nebulization $Given 4/12/24 2322)   predniSONE (DELTASONE) tablet 40 mg (40 mg Oral $Given 4/12/24 2322)       NEW PRESCRIPTIONS STARTED AT TODAY'S ED VISIT:  Discharge Medication List as of 4/12/2024 11:27 PM        START taking these medications    Details   albuterol (PROAIR HFA/PROVENTIL HFA/VENTOLIN HFA) 108 (90 Base) MCG/ACT inhaler Inhale 1-2 puffs into the lungs every 6 hours as needed for shortness of breath, wheezing or cough, Disp-18 g, R-0, Local PrintPharmacy may dispense brand covered by insurance (Proair, or proventil or ventolin or generic albuterol inhaler)      !! predniSONE (DELTASONE) 20 MG tablet  Take two tablets (= 40mg) each day for 4 (four) days, Disp-8 tablet, R-0, Local Print      Respiratory Therapy Supplies (NEBULIZER/TUBING/MOUTHPIECE) KIT 1 kit every 6 hours as needed (shortness of breath, wheezing), Disp-1 kit, R-0, Local Print       !! - Potential duplicate medications found. Please discuss with provider.          HPI     HPI     Warren Jaramillo is a 43 year old male, history of Charcot-Estrella-Tooth syndrome, cardiomyopathy with CHF, PAF (not anticoagulated), DM2, HTN, HLD, asthma, obesity, autism and developmental delay, who presents to this ED via EMS for evaluation of shortness of breath.    Patient reports onset of shortness of breath 3 days ago, worse with exertion. He reports associated chest pain that has been constant x 3 days. The pain is substernal in location without radiation into his back, arms, neck or jaw.  The pain feels like a pressure sensation as if something is sitting on his chest as well as a tightness.  The pain is not pleuritic or exertional.  He reports associated cough that is productive of tan-colored sputum with no associated URI symptoms or fever.  He also has felt lightheaded.      Symptoms feel similar to his asthma, however he has not had improvement with his MDI.  He has lost his nebulizer machine.    He has otherwise been in his usual state of health and denies abdominal pain, nausea / vomiting, diarrhea or other concerns.      REVIEW OF SYSTEMS:  All other systems reviewed and are negative.      Medical History     Past Medical History:   Diagnosis Date    DM2 (diabetes mellitus, type 2) (H) 4/28/2020    HTN (hypertension) 7/30/2012    Rojas's disease (H28)        Past Surgical History:   Procedure Laterality Date    COLONOSCOPY      ESOPHAGOSCOPY, GASTROSCOPY, DUODENOSCOPY (EGD), COMBINED N/A 7/21/2023    Procedure: ESOPHAGOGASTRODUODENOSCOPY WITH GASTRIC AND ESOPHAGEAL BIOPSIES;  Surgeon: Filiberto Aragon MD;  Location: [facility]    [facility]  EXTRACTION         Family History   Problem Relation Age of Onset    Unknown/Adopted Father     Unknown/Adopted Maternal Grandmother     C.A.D. Maternal Grandfather     Diabetes Maternal Grandfather     Cerebrovascular Disease Maternal Grandfather     Unknown/Adopted Paternal Grandmother     Unknown/Adopted Paternal Grandfather     Unknown/Adopted Brother     Unknown/Adopted Sister        Social History     Tobacco Use    Smoking status: Every Day     Current packs/day: 1.00     Types: Cigarettes    Smokeless tobacco: Never   Vaping Use    Vaping status: Never Used   Substance Use Topics    Alcohol use: No     Comment: once every 3 months    Drug use: No       albuterol (PROAIR HFA/PROVENTIL HFA/VENTOLIN HFA) 108 (90 Base) MCG/ACT inhaler  famotidine (PEPCID) 20 MG tablet  OLANZapine (ZYPREXA) 10 MG tablet  predniSONE (DELTASONE) 20 MG tablet  Respiratory Therapy Supplies (NEBULIZER/TUBING/MOUTHPIECE) KIT  rosuvastatin (CRESTOR) 10 MG tablet  traZODone (DESYREL) 50 MG tablet  ACCU-CHEK GUIDE test strip  acetaminophen (TYLENOL) 32 mg/mL liquid  acetaminophen (TYLENOL) 500 MG tablet  albuterol (PROVENTIL) (2.5 MG/3ML) 0.083% neb solution  aloe vera GEL  bacitracin 500 UNIT/GM OINT  BETA BLOCKER NOT PRESCRIBED (INTENTIONAL)  blood glucose monitoring (SOFTCLIX) lancets  Blood Glucose Monitoring Suppl (ACCU-CHEK GUIDE) w/Device KIT  Calamine external lotion  carbamide peroxide (DEBROX) 6.5 % otic solution  clotrimazole (LOTRIMIN) 1 % external cream  cyclobenzaprine (FLEXERIL) 10 MG tablet  diclofenac (VOLTAREN) 1 % topical gel  diclofenac (VOLTAREN) 75 MG EC tablet  escitalopram (LEXAPRO) 10 MG tablet  famotidine (PEPCID) 20 MG tablet  fluticasone-vilanterol (BREO ELLIPTA) 200-25 MCG/ACT inhaler  furosemide (LASIX) 20 MG tablet  hydrocortisone 1 % CREA cream  ibuprofen (ADVIL/MOTRIN) 100 MG/5ML suspension  ibuprofen (ADVIL/MOTRIN) 200 MG tablet  ibuprofen (ADVIL/MOTRIN) 600 MG tablet  lisinopril (ZESTRIL) 10 MG  "tablet  LORazepam (ATIVAN) 1 MG tablet  melatonin 3 MG tablet  metFORMIN (GLUCOPHAGE) 1000 MG tablet  montelukast (SINGULAIR) 10 MG tablet  omeprazole (PRILOSEC) 20 MG DR capsule  ondansetron (ZOFRAN ODT) 4 MG ODT tab  ondansetron (ZOFRAN) 4 MG tablet  oxybutynin ER (DITROPAN XL) 10 MG 24 hr tablet  oxyCODONE (ROXICODONE) 5 MG tablet  polyethylene glycol (MIRALAX) 17 g packet  predniSONE (DELTASONE) 20 MG tablet  psyllium (METAMUCIL) 28.3 % packet  sodium phosphate (FLEET ENEMA) 7-19 GM/118ML rectal enema  spironolactone (ALDACTONE) 50 MG tablet  sucralfate (CARAFATE) 1 GM/10ML suspension  sucralfate (CARAFATE) 1 GM/10ML suspension  zinc oxide (DESITIN) 20 % external ointment        Physical Exam     First Vitals:  Patient Vitals for the past 24 hrs:   BP Temp Temp src Pulse Resp SpO2 Height Weight   04/12/24 2330 109/55 -- -- 89 -- 97 % -- --   04/12/24 2225 118/59 -- -- 92 18 92 % -- --   04/12/24 2201 134/76 -- -- 92 -- 96 % -- --   04/12/24 2145 123/70 -- -- 89 -- 95 % -- --   04/12/24 2130 125/73 -- -- 90 -- 97 % -- --   04/12/24 2115 122/81 -- -- 91 23 97 % -- --   04/12/24 2105 (!) 144/67 -- -- 90 -- 100 % -- --   04/12/24 2046 (!) 159/61 -- -- 85 -- 97 % -- --   04/12/24 2002 131/69 98.8  F (37.1  C) Oral 83 22 95 % 1.651 m (5' 5\") 129.5 kg (285 lb 9.6 oz)       PHYSICAL EXAM:   Physical Exam    GENERAL: Awake, alert.  In mild acute distress.   HEENT: Normocephalic, atraumatic.   NECK: No stridor.  PULMONARY: Somewhat diminished, but symmetrical breath sounds, without sobeida distress.  He has scattered inspiratory and expiratory wheezes; no audible rhonchi or rales.   CARDIO: Regular rate and rhythm.  No significant murmur, rub or gallop.  Radial pulses strong and symmetrical.  ABDOMINAL: Abdomen soft, non-distended and non-tender to palpation.    EXTREMITIES: There is 1+ pitting edema to the mid-tibia, BL lower extremities.     NEURO: Alert and oriented to person, place and time.  Cranial nerves grossly " intact.  No focal motor deficit.  PSYCH: Normal mood and affect.      Results     LAB:  All pertinent labs reviewed and interpreted  Labs Ordered and Resulted from Time of ED Arrival to Time of ED Departure   CBC WITH PLATELETS - Abnormal       Result Value    WBC Count 14.9 (*)     RBC Count 4.86      Hemoglobin 13.6      Hematocrit 43.5      MCV 90      MCH 28.0      MCHC 31.3 (*)     RDW 15.9 (*)     Platelet Count 302     BASIC METABOLIC PANEL - Abnormal    Sodium 140      Potassium 4.3      Chloride 101      Carbon Dioxide (CO2) 27      Anion Gap 12      Urea Nitrogen 10.4      Creatinine 0.82      GFR Estimate >90      Calcium 9.6      Glucose 121 (*)    TROPONIN T, HIGH SENSITIVITY - Abnormal    Troponin T, High Sensitivity 23 (*)    NT PROBNP INPATIENT - Abnormal    N terminal Pro BNP Inpatient 613 (*)    D DIMER QUANTITATIVE - Abnormal    D-Dimer Quantitative 4.98 (*)    HEPATIC FUNCTION PANEL - Abnormal    Protein Total 7.2      Albumin 3.9      Bilirubin Total 0.7      Alkaline Phosphatase 252 (*)     AST 18      ALT 26      Bilirubin Direct 0.35 (*)    INFLUENZA A/B, RSV, & SARS-COV2 PCR - Normal    Influenza A PCR Negative      Influenza B PCR Negative      RSV PCR Negative      SARS CoV2 PCR Negative     LACTIC ACID WHOLE BLOOD - Normal    Lactic Acid 1.6     TROPONIN T, HIGH SENSITIVITY - Normal    Troponin T, High Sensitivity 19         RADIOLOGY:  CT Chest Pulmonary Embolism w Contrast   Final Result   IMPRESSION:   1.  No pulmonary embolus.   2.  Small right pleural effusion.   3.  Enlarged liver with slightly nodular contour suspicious for cirrhosis.   4.  Benign bilateral adrenal myelolipomata.        EC2024, 20:03; NSR with rate of 87 bpm; PVCs; normal intervals; normal conduction; anteroseptal Q waves with no ST-T wave elevation consistent with ACS or pericarditis; compared to previous EKG dated 2024, PVCs are now present, otherwise no significant changes    EKG independently  reviewed and interpreted by Alejandrina Kelly MD        I, REGINO SIERRA , am serving as a scribe to document services personally performed by Alejandrina Kelly MD based on my observation and the provider's statements to me. I, Alejandrina Kelly MD attest that REGINO SIERRA  is acting in a scribe capacity, has observed my performance of the services and has documented them in accordance with my direction.    Alejandrina Kelly MD  Emergency Medicine  [facility] EMERGENCY DEPARTMENT           Alejandrina Kelly MD  04/13/24 [id number]

## 2024-04-13 NOTE — ED NOTES
Bed: JNED-06  Expected date: 4/12/24  Expected time: 7:46 PM  Means of arrival:   Comments:  Hakan STONE CP

## 2024-04-14 LAB
ATRIAL RATE - MUSE: 86 BPM
DIASTOLIC BLOOD PRESSURE - MUSE: 56 MMHG
INTERPRETATION ECG - MUSE: NORMAL
P AXIS - MUSE: 73 DEGREES
PR INTERVAL - MUSE: 198 MS
QRS DURATION - MUSE: 104 MS
QT - MUSE: 386 MS
QTC - MUSE: 461 MS
R AXIS - MUSE: 87 DEGREES
SYSTOLIC BLOOD PRESSURE - MUSE: 120 MMHG
T AXIS - MUSE: 147 DEGREES
VENTRICULAR RATE- MUSE: 86 BPM

## 2024-04-23 NOTE — ED PROVIDER NOTES
EMERGENCY DEPARTMENT ENCOUNTER      NAME: Warren Jaramillo  AGE: 43 year old male  YOB: 1980  MRN: [id number]  EVALUATION DATE & TIME: 04/08/24 10:18 AM    PCP: Mary Kelly    ED PROVIDER: Carolina Shankar PA-C      CHIEF COMPLAINT:  Fall      FINAL IMPRESSION:  1. Right elbow pain    2. Acute pain of right knee          ED COURSE & MEDICAL DECISION MAKING:  Pertinent Labs & Imaging studies reviewed. (See chart for details)    The patient is a 43 year old-year-old male with a history of type 2 diabetes mellitus, AVA, congestive heart failure, hypertension, atrial fibrillation, SI joint ankylosis presenting to the emergency department for evaluation of fall at work earlier today. He tripped over the edge of a thick standing mat and fell, catching himself, and landing with his right elbow hitting the counter and his right knee on the floor mat at 0730 this morning with resulting right elbow and right knee pain. No head impact or loss of consciousness.     Initial vital signs reviewed and all within normal limits. On exam, the patient is clinically well appearing and is non toxic appearing, ambulating spontaneously and independently with a steady gait in the emergency department in no acute distress. Exam significant for mild right chest wall tenderness, no crepitus or flail chest, as well as tenderness to palpation overlying right olecranon process, medial and lateral epicondyles, right anterior knee tenderness overlying soft tissues, no bony tenderness of right knee, no joint laxity. Distal CMS intact, specifically sensation to light touch intact throughout right elbow, forearm, wrist, hand, and digits. I suspect his initial numbness from right elbow to hand was possibly from him hitting his ulnar nerve during the fall.     I considered a broad differential including fracture, dislocation, soft tissue contusion, muscular strain/sprain, ligamentous injury.  There was no head trauma or  loss of consciousness to suggest ICH, SDH, SAH, or other acute intracranial process. No Hill sign, Racoon eyes, or hemotympanum on exam. There is no spinous process tenderness to suggest fracture or traumatic injury. Patient denies any preceding palpitations, chest pain, dizziness or other symptoms to suggest arrhythmia, ACS, vestibular pathology, anemia, or other secondary cause of the fall and is confident it was mechanical. I see no indication for laboratory studies or ECG. Patient is not on a blood thinner.     X-rays of right elbow and right knee obtained which were negative for fractures, no effusions. Discussed obtaining XR of right ribs given mild tenderness on exam, patient declines which I do feel is reasonable given minimal tenderness with no overlying ecchymosis or evidence of flail chest on exam. Patient was given tylenol and ibuprofen in the emergency department for his pain.    Overall, patient history, exam, and workup here most consistent with right elbow and right knee pain. Discussed plan for discharge to home with close outpatient follow up with his primary care clinician. The patient verbalized understanding and is comfortable with this plan. Symptomatic home cares discussed. Emphasized importance of follow up with primary care clinician. Strict return precautions discussed.     At the conclusion of the encounter I discussed the results of all of the tests and the disposition. The questions were answered. The patient or family acknowledged understanding and was agreeable with the care plan.       ED COURSE:  11:56 AM I met and introduced myself to the patient. I gathered initial history and performed an initial physical exam. We discussed options and plan for diagnostics and treatment here in the ED. I discussed the plan for discharge with the patient, and patient is agreeable. We discussed supportive cares at home and reasons for return to the ER including new or worsening symptoms - all  questions and concerns addressed to the best of my ability. Strict return precautions discussed. Patient to be discharged by RN.      Medical Decision Making  Obtained supplemental history:Supplemental history obtained?: No  Reviewed external records: External records reviewed?: No  Care impacted by chronic illness:Diabetes, Heart Disease, and Hypertension  Care significantly affected by social determinants of health:N/A  Did you consider but not order tests?: Work up considered but not performed and documented in chart, if applicable  Did you interpret images independently?: Independent interpretation of ECG and images noted in documentation, when applicable.  Consultation discussion with other provider:Did you involve another provider (consultant, , pharmacy, etc.)?: No  Discharge. No recommendations on prescription strength medication(s). See documentation for any additional details.      CRITICAL CARE:  Not applicable      MEDICATIONS GIVEN IN THE EMERGENCY:  Medications   acetaminophen (TYLENOL) tablet 975 mg (975 mg Oral $Given 4/8/24 1150)   ibuprofen (ADVIL/MOTRIN) tablet 600 mg (600 mg Oral $Given 4/8/24 1150)       NEW PRESCRIPTIONS STARTED AT TODAY'S ER VISIT  Discharge Medication List as of 4/8/2024 12:23 PM             =================================================================    HPI    Patient information was obtained from: the patient     Use of Intrepreter: N/A         Warren Jaramillo is a 43 year old male with type 2 diabetes mellitus, AVA, congestive heart failure, hypertension, atrial fibrillation, SI joint ankylosis presenting to the emergency department for evaluation of fall at work earlier today.     The patient was walking at work today when he tripped over the edge of a thick standing mat and fell, catching himself, and landing with his right elbow hitting the counter and his right knee on the floor mat at 0730 this morning with resulting right elbow and right knee pain and  right elbow to right hand numbness. No head impact or loss of consciousness. The fall was witnessed. He denies fevers, chest pain, shortness of breath, vision changes, headache, palpitations, dizziness preceding the fall. He is not on blood thinning medication.       PAST MEDICAL HISTORY:  Past Medical History:   Diagnosis Date    DM2 (diabetes mellitus, type 2) (H) 4/28/2020    HTN (hypertension) 7/30/2012    Rojas's disease (H28)        PAST SURGICAL HISTORY:  Past Surgical History:   Procedure Laterality Date    COLONOSCOPY      ESOPHAGOSCOPY, GASTROSCOPY, DUODENOSCOPY (EGD), COMBINED N/A 7/21/2023    Procedure: ESOPHAGOGASTRODUODENOSCOPY WITH GASTRIC AND ESOPHAGEAL BIOPSIES;  Surgeon: Filiberto Aragon MD;  Location: [facility] OR    TOOTH EXTRACTION         CURRENT MEDICATIONS:    Prior to Admission Medications   Prescriptions Last Dose Informant Patient Reported? Taking?   ACCU-CHEK GUIDE test strip   Yes No   Sig: USE TO TEST BLOOD SUGAR TWICE DAILY AS NEEDED   BETA BLOCKER NOT PRESCRIBED (INTENTIONAL)   Yes No   Sig: Please choose reason not prescribed from choices below.   Blood Glucose Monitoring Suppl (ACCU-CHEK GUIDE) w/Device KIT   Yes No   Sig: USE AS DIRECTED TWICE DAILY AS NEEDED   Calamine external lotion   Yes No   Sig: Apply topically as needed for itching   LORazepam (ATIVAN) 1 MG tablet   Yes No   Sig: Take 0.5 mg by mouth daily as needed for anxiety   OLANZapine (ZYPREXA) 10 MG tablet   Yes No   Sig: Take 10 mg by mouth At Bedtime   acetaminophen (TYLENOL) 32 mg/mL liquid   Yes No   Sig: Take 960 mg by mouth every 6 hours as needed for fever or mild pain   Patient not taking: Reported on 11/22/2023   acetaminophen (TYLENOL) 500 MG tablet   No No   Sig: Take 2 tablets (1,000 mg) by mouth 3 times daily   albuterol (PROVENTIL) (2.5 MG/3ML) 0.083% neb solution   Yes No   Sig: Take 2.5 mg by nebulization 3 times daily as needed for shortness of breath, wheezing or cough   aloe vera GEL   Yes No    Sig: Apply 1 g topically every hour as needed for skin care Per bottle directions   bacitracin 500 UNIT/GM OINT   Yes No   Sig: Apply topically 3 times daily as needed for wound care   blood glucose monitoring (SOFTCLIX) lancets   Yes No   Sig: USE AS DIRECTED TWICE DAILY AS NEEDED   carbamide peroxide (DEBROX) 6.5 % otic solution   Yes No   Sig: Place 5 drops into both ears daily as needed for other   clotrimazole (LOTRIMIN) 1 % external cream   Yes No   Sig: Apply topically 2 times daily as needed (skin irritation)   cyclobenzaprine (FLEXERIL) 10 MG tablet   Yes No   Sig: Take 10 mg by mouth 3 times daily as needed for muscle spasms   diclofenac (VOLTAREN) 1 % topical gel   Yes No   Sig: Apply 2 g topically daily as needed for moderate pain To joints/back   diclofenac (VOLTAREN) 75 MG EC tablet   No No   Sig: Take 1 tablet (75 mg) by mouth 2 times daily as needed for moderate pain   escitalopram (LEXAPRO) 10 MG tablet   Yes No   Sig: Take 10 mg by mouth daily   famotidine (PEPCID) 20 MG tablet   No No   Sig: Take 1 tablet (20 mg) by mouth 2 times daily   famotidine (PEPCID) 20 MG tablet   No No   Sig: Take 1 tablet (20 mg) by mouth 2 times daily   fluticasone-vilanterol (BREO ELLIPTA) 200-25 MCG/ACT inhaler   Yes No   Sig: Inhale 1 puff into the lungs daily   furosemide (LASIX) 20 MG tablet   Yes No   Sig: Take 20 mg by mouth daily   hydrocortisone 1 % CREA cream   Yes No   Sig: Place rectally 2 times daily as needed for itching   ibuprofen (ADVIL/MOTRIN) 100 MG/5ML suspension   Yes No   Sig: Take 400 mg by mouth every 4 hours as needed for fever or moderate pain   Patient not taking: Reported on 11/22/2023   ibuprofen (ADVIL/MOTRIN) 200 MG tablet   Yes No   Sig: Take 400 mg by mouth every 4 hours as needed for pain   Patient not taking: Reported on 11/22/2023   ibuprofen (ADVIL/MOTRIN) 600 MG tablet   No No   Sig: Take 1 tablet (600 mg) by mouth every 6 hours as needed for moderate pain   lisinopril (ZESTRIL)  10 MG tablet   Yes No   Sig: Take 10 mg by mouth daily   melatonin 3 MG tablet   Yes No   Sig: Take 3 mg by mouth At Bedtime   metFORMIN (GLUCOPHAGE) 1000 MG tablet   Yes No   Sig: Take 1,000 mg by mouth 2 times daily (with meals)   montelukast (SINGULAIR) 10 MG tablet   Yes No   Sig: Take 10 mg by mouth daily   Patient not taking: Reported on 11/22/2023   omeprazole (PRILOSEC) 20 MG DR capsule   Yes No   Sig: Take 40 mg by mouth daily   ondansetron (ZOFRAN ODT) 4 MG ODT tab   No No   Sig: Take 1-2 tablets (4-8 mg) by mouth every 8 hours as needed for nausea or vomiting   ondansetron (ZOFRAN) 4 MG tablet   Yes No   Sig: Take 4 mg by mouth every 12 hours as needed for nausea   oxyCODONE (ROXICODONE) 5 MG tablet   Yes No   oxybutynin ER (DITROPAN XL) 10 MG 24 hr tablet   Yes No   Sig: Take 10 mg by mouth daily   polyethylene glycol (MIRALAX) 17 g packet   Yes No   Sig: Take 1 packet by mouth daily as needed for constipation   predniSONE (DELTASONE) 20 MG tablet   No No   Sig: Take two tablets (= 40mg) each day for four days starting tomorrow, 11/28/23. First dose given in the afternoon in the ED on 11/27/23.   psyllium (METAMUCIL) 28.3 % packet   Yes No   Sig: Take 1 packet by mouth daily   rosuvastatin (CRESTOR) 10 MG tablet   Yes No   Sig: Take 10 mg by mouth At Bedtime   sodium phosphate (FLEET ENEMA) 7-19 GM/118ML rectal enema   Yes No   Sig: Place 1 enema rectally once as needed for constipation   spironolactone (ALDACTONE) 50 MG tablet   Yes No   Sig: Take 25 mg by mouth daily   sucralfate (CARAFATE) 1 GM/10ML suspension   No No   Sig: Take 10 mLs (1 g) by mouth 4 times daily   sucralfate (CARAFATE) 1 GM/10ML suspension   No No   Sig: Take 10 mLs (1 g) by mouth 4 times daily as needed (pain)   traZODone (DESYREL) 50 MG tablet   Yes No   Sig: Take 50 mg by mouth At Bedtime   zinc oxide (DESITIN) 20 % external ointment   Yes No   Sig: Apply topically as needed for dry skin or irritation To groin/buttocks area     "  Facility-Administered Medications: None       ALLERGIES:  Allergies   Allergen Reactions    Apricot Flavor Anaphylaxis    Banana Anaphylaxis     Throat swelling  Throat swelling      Wasp Venom Protein Shortness Of Breath     Other reaction(s): Respiratory Distress  Has an epi pen  Has an epi pen      Bees Anaphylaxis     Have an Epi pen that carries with    Methylphenidate Itching     Other reaction(s): Nightmares    Prunus      Other reaction(s): *Unknown    Sulfa Antibiotics      Headaches and nausea    Prunus Persica Rash     Other reaction(s): *Unknown       FAMILY HISTORY:  Family History   Problem Relation Age of Onset    Unknown/Adopted Father     Unknown/Adopted Maternal Grandmother     C.A.D. Maternal Grandfather     Diabetes Maternal Grandfather     Cerebrovascular Disease Maternal Grandfather     Unknown/Adopted Paternal Grandmother     Unknown/Adopted Paternal Grandfather     Unknown/Adopted Brother     Unknown/Adopted Sister        SOCIAL HISTORY:  Social History     Tobacco Use    Smoking status: Every Day     Current packs/day: 1.00     Types: Cigarettes    Smokeless tobacco: Never   Vaping Use    Vaping status: Never Used   Substance Use Topics    Alcohol use: No     Comment: once every 3 months    Drug use: No        VITALS:    First Vitals:  No data found.    No data found.      PHYSICAL EXAM  VITAL SIGNS: /78   Pulse 90   Temp 98.4  F (36.9  C) (Tympanic)   Resp 12   Ht 1.651 m (5' 5\")   Wt 129.7 kg (286 lb)   SpO2 99%   BMI 47.59 kg/m     Gen:  Alert, awake, NAD  HENT:  Head atraumatic, normocephalic.  PERRL.  EOMI.  No racoon eyes or sainz sign.  Ears atraumatic with no external bleeding or signs of trauma.  No hemotympanum. No epistaxis.  Clear oropharynx.  Dentition intact.    Respiratory:  Normal respiratory rate.  Lungs CTA.  Chest wall stable to compression.  Mild right chest wall tenderness.  No flail chest or crepitus. Trachea midline.  Cardiovascular:  Regular rate and " rhythm.  Palpable radial and DP pulses bilaterally.  Abdomen:  Soft, nontender, normoactive bowel sounds.    Musculoskeletal:  No midline C-spine, T-spine, L-spine tenderness.  No midline spinal step-offs noted.  FROM in all extremities.  5/5 strength in all extremities.  No gross deformities noted.      Right shoulder: No swelling, deformity, tenderness, bony tenderness or crepitus. Normal range of motion. Normal strength.      Left shoulder: No swelling or deformity.      Right upper arm: No swelling, edema, deformity, tenderness or bony tenderness.      Right elbow: No swelling, deformity or effusion. Normal range of motion. Tenderness present in medial epicondyle, lateral epicondyle and olecranon process. No radial head tenderness.      Right forearm: No swelling, deformity, tenderness or bony tenderness.      Right wrist: No swelling, deformity, tenderness, bony tenderness, snuff box tenderness or crepitus.      Right hand: No swelling, deformity, tenderness or bony tenderness. Normal capillary refill.     Right hip: No bony tenderness along palpation of right hip.      Right knee: Full ROM of knee with no tenderness or laxity with valgus/varus exams. No leg length asymmetry or external rotation. Right knee with no deformities or edema. No medial or lateral joint line tenderness. No popliteal fossa tenderness. Tender to palpation of anterior knee soft tissues, no bony tenderness overlying the patella, fibular head, tibial tuberosity. No laxity of the joint. Sensation to light touch intact.     Right leg/ankle: No tenderness to palpation lateral or medial posterior malleolus, anterior ankle. AROM intact at the ankle. Compartments soft. Sensation to light touch intact.  Posterior tibialis and dorsalis pedis pulses +2 and symmetric bilaterally.   Able to ambulate spontaneously and independently with no assistive devices.   Integument:  No ecchymosis, abrasions, hematomas, lacerations noted.    Neuro:  GCS 15, A &  O x 3, sensation intact to light touch  Psych: Normal mood and affect.         RADIOLOGY/LAB:  Reviewed all pertinent imaging. Please see official radiology report.  All pertinent labs reviewed and interpreted.  Results for orders placed or performed during the hospital encounter of 04/08/24   Elbow XR, G/E 3 views, right    Impression    IMPRESSION: The right elbow is negative for fracture or dislocation. No effusion. There is mild degenerative change at the ulnotrochlear joint   XR Knee Right 3 Views    Impression    IMPRESSION: The right knee is negative for fracture. There is mild degenerative change at the patellofemoral compartment. No evidence for fracture. No effusion.                 Carolina Shankar PA-C  Emergency Medicine  [facility] EMERGENCY DEPARTMENT  [facility] [facility] [address]  [phone]  Dept: [phone]     Carolina Shankar PA-C  04/23/24 [id number]

## 2024-05-07 ENCOUNTER — NURSE TRIAGE (OUTPATIENT)
Dept: NURSING | Facility: CLINIC | Age: 44
End: 2024-05-07
Payer: COMMERCIAL

## 2024-05-08 PROBLEM — R10.11 ABDOMINAL PAIN, RIGHT UPPER QUADRANT: Status: ACTIVE | Noted: 2024-05-08

## 2024-05-08 NOTE — TELEPHONE ENCOUNTER
"Nurse Triage SBAR    Is this a 2nd Level Triage? NO    Situation: High Blood Sugar concerns    Background: Pt was recently admitted to [facility] and was discharged with long acting insulin for blood sugar management. Today Blood sugar has been rising throughout the day and tonight is 360 and on recheck 317    Assessment: Pt denies losing consciousness, but complains of nausea, confusion, dizziness and \"feels like I'm on a roller coaster     Protocol Recommended Disposition:   Call  Now    Recommendation: Recommended Calling 911 now and going to ED, Pt agreeable and will go to [facility]        Does the patient meet one of the following criteria for ADS visit consideration? No      Reason for Disposition   Acting confused (e.g., disoriented, slurred speech)    Additional Information   Negative: Unconscious or difficult to awaken    Protocols used: Diabetes - High Blood Sugar-A-    "

## 2024-05-15 ENCOUNTER — HOSPITAL ENCOUNTER (EMERGENCY)
Facility: HOSPITAL | Age: 44
Discharge: STILL ADMITTED OR ADMINISTRATIVE | End: 2024-05-15
Payer: COMMERCIAL

## 2024-05-16 ENCOUNTER — LAB (OUTPATIENT)
Dept: LAB | Facility: HOSPITAL | Age: 44
End: 2024-05-16
Payer: COMMERCIAL

## 2024-05-16 DIAGNOSIS — Z51.81 ENCOUNTER FOR THERAPEUTIC DRUG MONITORING: Primary | ICD-10-CM

## 2024-05-16 LAB
ALBUMIN SERPL BCG-MCNC: 4.2 G/DL (ref 3.5–5.2)
ALP SERPL-CCNC: 249 U/L (ref 40–150)
ALT SERPL W P-5'-P-CCNC: 22 U/L (ref 0–70)
ANION GAP SERPL CALCULATED.3IONS-SCNC: 11 MMOL/L (ref 7–15)
AST SERPL W P-5'-P-CCNC: 15 U/L (ref 0–45)
BILIRUB SERPL-MCNC: 0.5 MG/DL
BUN SERPL-MCNC: 15.9 MG/DL (ref 6–20)
CALCIUM SERPL-MCNC: 9.5 MG/DL (ref 8.6–10)
CHLORIDE SERPL-SCNC: 100 MMOL/L (ref 98–107)
CREAT SERPL-MCNC: 0.9 MG/DL (ref 0.67–1.17)
DEPRECATED HCO3 PLAS-SCNC: 28 MMOL/L (ref 22–29)
EGFRCR SERPLBLD CKD-EPI 2021: >90 ML/MIN/1.73M2
ERYTHROCYTE [DISTWIDTH] IN BLOOD BY AUTOMATED COUNT: 16.2 % (ref 10–15)
GLUCOSE SERPL-MCNC: 197 MG/DL (ref 70–99)
HCT VFR BLD AUTO: 42.6 % (ref 40–53)
HGB BLD-MCNC: 13.6 G/DL (ref 13.3–17.7)
MCH RBC QN AUTO: 28.6 PG (ref 26.5–33)
MCHC RBC AUTO-ENTMCNC: 31.9 G/DL (ref 31.5–36.5)
MCV RBC AUTO: 90 FL (ref 78–100)
PLATELET # BLD AUTO: 287 10E3/UL (ref 150–450)
POTASSIUM SERPL-SCNC: 4 MMOL/L (ref 3.4–5.3)
PROT SERPL-MCNC: 7.3 G/DL (ref 6.4–8.3)
RBC # BLD AUTO: 4.75 10E6/UL (ref 4.4–5.9)
SODIUM SERPL-SCNC: 139 MMOL/L (ref 135–145)
WBC # BLD AUTO: 15 10E3/UL (ref 4–11)

## 2024-05-16 PROCEDURE — 80053 COMPREHEN METABOLIC PANEL: CPT

## 2024-05-16 PROCEDURE — 36415 COLL VENOUS BLD VENIPUNCTURE: CPT

## 2024-05-16 PROCEDURE — 85027 COMPLETE CBC AUTOMATED: CPT

## 2024-05-29 ENCOUNTER — TRANSFERRED RECORDS (OUTPATIENT)
Dept: HEALTH INFORMATION MANAGEMENT | Facility: CLINIC | Age: 44
End: 2024-05-29
Payer: COMMERCIAL

## 2024-06-09 ENCOUNTER — APPOINTMENT (OUTPATIENT)
Dept: RADIOLOGY | Facility: HOSPITAL | Age: 44
End: 2024-06-09
Attending: FAMILY MEDICINE
Payer: COMMERCIAL

## 2024-06-09 ENCOUNTER — APPOINTMENT (OUTPATIENT)
Dept: ULTRASOUND IMAGING | Facility: HOSPITAL | Age: 44
End: 2024-06-09
Attending: FAMILY MEDICINE
Payer: COMMERCIAL

## 2024-06-09 ENCOUNTER — HOSPITAL ENCOUNTER (EMERGENCY)
Facility: HOSPITAL | Age: 44
Discharge: HOME | End: 2024-06-09
Attending: FAMILY MEDICINE | Admitting: FAMILY MEDICINE
Payer: COMMERCIAL

## 2024-06-09 VITALS
SYSTOLIC BLOOD PRESSURE: 132 MMHG | OXYGEN SATURATION: 94 % | HEIGHT: 66 IN | RESPIRATION RATE: 16 BRPM | WEIGHT: 298 LBS | DIASTOLIC BLOOD PRESSURE: 74 MMHG | TEMPERATURE: 98.2 F | HEART RATE: 72 BPM | BODY MASS INDEX: 47.89 KG/M2

## 2024-06-09 DIAGNOSIS — R07.89 ATYPICAL CHEST PAIN: ICD-10-CM

## 2024-06-09 DIAGNOSIS — I51.7 CARDIOMEGALY: ICD-10-CM

## 2024-06-09 DIAGNOSIS — F43.24 ADJUSTMENT DISORDER WITH DISTURBANCE OF CONDUCT: ICD-10-CM

## 2024-06-09 DIAGNOSIS — I50.813 ACUTE ON CHRONIC RIGHT-SIDED CONGESTIVE HEART FAILURE (H): ICD-10-CM

## 2024-06-09 LAB
ALBUMIN SERPL BCG-MCNC: 4.3 G/DL (ref 3.5–5.2)
ALP SERPL-CCNC: 301 U/L (ref 40–150)
ALT SERPL W P-5'-P-CCNC: 19 U/L (ref 0–70)
ANION GAP SERPL CALCULATED.3IONS-SCNC: 13 MMOL/L (ref 7–15)
AST SERPL W P-5'-P-CCNC: 16 U/L (ref 0–45)
BASOPHILS # BLD AUTO: 0.1 10E3/UL (ref 0–0.2)
BASOPHILS NFR BLD AUTO: 1 %
BILIRUB DIRECT SERPL-MCNC: 0.35 MG/DL (ref 0–0.3)
BILIRUB SERPL-MCNC: 0.7 MG/DL
BUN SERPL-MCNC: 13.7 MG/DL (ref 6–20)
CALCIUM SERPL-MCNC: 9.5 MG/DL (ref 8.6–10)
CHLORIDE SERPL-SCNC: 102 MMOL/L (ref 98–107)
CREAT SERPL-MCNC: 0.91 MG/DL (ref 0.67–1.17)
DEPRECATED HCO3 PLAS-SCNC: 26 MMOL/L (ref 22–29)
EGFRCR SERPLBLD CKD-EPI 2021: >90 ML/MIN/1.73M2
EOSINOPHIL # BLD AUTO: 0.5 10E3/UL (ref 0–0.7)
EOSINOPHIL NFR BLD AUTO: 4 %
ERYTHROCYTE [DISTWIDTH] IN BLOOD BY AUTOMATED COUNT: 16.6 % (ref 10–15)
GLUCOSE SERPL-MCNC: 122 MG/DL (ref 70–99)
HCT VFR BLD AUTO: 41.9 % (ref 40–53)
HGB BLD-MCNC: 13.5 G/DL (ref 13.3–17.7)
HOLD SPECIMEN: NORMAL
HOLD SPECIMEN: NORMAL
IMM GRANULOCYTES # BLD: 0.1 10E3/UL
IMM GRANULOCYTES NFR BLD: 0 %
LIPASE SERPL-CCNC: 21 U/L (ref 13–60)
LYMPHOCYTES # BLD AUTO: 3 10E3/UL (ref 0.8–5.3)
LYMPHOCYTES NFR BLD AUTO: 21 %
MCH RBC QN AUTO: 28.6 PG (ref 26.5–33)
MCHC RBC AUTO-ENTMCNC: 32.2 G/DL (ref 31.5–36.5)
MCV RBC AUTO: 89 FL (ref 78–100)
MONOCYTES # BLD AUTO: 1.1 10E3/UL (ref 0–1.3)
MONOCYTES NFR BLD AUTO: 8 %
NEUTROPHILS # BLD AUTO: 9.6 10E3/UL (ref 1.6–8.3)
NEUTROPHILS NFR BLD AUTO: 66 %
NRBC # BLD AUTO: 0 10E3/UL
NRBC BLD AUTO-RTO: 0 /100
NT-PROBNP SERPL-MCNC: 547 PG/ML (ref 0–450)
PLATELET # BLD AUTO: 321 10E3/UL (ref 150–450)
POTASSIUM SERPL-SCNC: 3.8 MMOL/L (ref 3.4–5.3)
PROT SERPL-MCNC: 7.5 G/DL (ref 6.4–8.3)
RBC # BLD AUTO: 4.72 10E6/UL (ref 4.4–5.9)
SODIUM SERPL-SCNC: 141 MMOL/L (ref 135–145)
TROPONIN T SERPL HS-MCNC: 24 NG/L
TROPONIN T SERPL HS-MCNC: 27 NG/L
WBC # BLD AUTO: 14.3 10E3/UL (ref 4–11)

## 2024-06-09 PROCEDURE — 84484 ASSAY OF TROPONIN QUANT: CPT | Performed by: FAMILY MEDICINE

## 2024-06-09 PROCEDURE — 99285 EMERGENCY DEPT VISIT HI MDM: CPT | Mod: 25

## 2024-06-09 PROCEDURE — 83690 ASSAY OF LIPASE: CPT | Performed by: FAMILY MEDICINE

## 2024-06-09 PROCEDURE — 93005 ELECTROCARDIOGRAM TRACING: CPT | Performed by: FAMILY MEDICINE

## 2024-06-09 PROCEDURE — 36415 COLL VENOUS BLD VENIPUNCTURE: CPT | Performed by: FAMILY MEDICINE

## 2024-06-09 PROCEDURE — 85025 COMPLETE CBC W/AUTO DIFF WBC: CPT | Performed by: FAMILY MEDICINE

## 2024-06-09 PROCEDURE — 93005 ELECTROCARDIOGRAM TRACING: CPT | Performed by: STUDENT IN AN ORGANIZED HEALTH CARE EDUCATION/TRAINING PROGRAM

## 2024-06-09 PROCEDURE — 71046 X-RAY EXAM CHEST 2 VIEWS: CPT

## 2024-06-09 PROCEDURE — 82248 BILIRUBIN DIRECT: CPT | Performed by: FAMILY MEDICINE

## 2024-06-09 PROCEDURE — 250N000013 HC RX MED GY IP 250 OP 250 PS 637: Performed by: FAMILY MEDICINE

## 2024-06-09 PROCEDURE — 83880 ASSAY OF NATRIURETIC PEPTIDE: CPT | Performed by: FAMILY MEDICINE

## 2024-06-09 PROCEDURE — 76705 ECHO EXAM OF ABDOMEN: CPT

## 2024-06-09 PROCEDURE — 80048 BASIC METABOLIC PNL TOTAL CA: CPT | Performed by: FAMILY MEDICINE

## 2024-06-09 RX ORDER — FUROSEMIDE 10 MG/ML
40 INJECTION INTRAMUSCULAR; INTRAVENOUS ONCE
Status: DISCONTINUED | OUTPATIENT
Start: 2024-06-09 | End: 2024-06-09

## 2024-06-09 RX ORDER — FUROSEMIDE 20 MG
20 TABLET ORAL DAILY
Qty: 2 TABLET | Refills: 0 | Status: SHIPPED | OUTPATIENT
Start: 2024-06-09 | End: 2024-07-26

## 2024-06-09 RX ORDER — FUROSEMIDE 20 MG
20 TABLET ORAL ONCE
Status: COMPLETED | OUTPATIENT
Start: 2024-06-09 | End: 2024-06-09

## 2024-06-09 RX ADMIN — FUROSEMIDE 20 MG: 20 TABLET ORAL at 16:39

## 2024-06-09 ASSESSMENT — ACTIVITIES OF DAILY LIVING (ADL)
ADLS_ACUITY_SCORE: 35

## 2024-06-09 NOTE — DISCHARGE INSTRUCTIONS
Follow-up with your primary care provider this week    Talk to your care manager about your concerns with care at your group home    You were given an extra dose of Lasix in the emergency department today.  Increase your Lasix to twice a day for the next 2 days and then back to once a day.

## 2024-06-09 NOTE — ED NOTES
Bed: [facility]-  Expected date:   Expected time:   Means of arrival:   Comments:  Jeffry FORRESTER CP, Group Home pt

## 2024-06-09 NOTE — ED PROVIDER NOTES
"EMERGENCY DEPARTMENT ENCOUNTER      NAME: Warren Jaramillo  AGE: 43 year old male  YOB: 1980  MRN: [id number]  EVALUATION DATE & TIME: 6/9/2024  1:15 PM    PCP: Mary Kelly    ED PROVIDER: Jeremy Dowell M.D.    Chief Complaint   Patient presents with    Chest Pain       FINAL IMPRESSION:  1. Atypical chest pain    2. Acute on chronic right-sided congestive heart failure (H)    3. Cardiomegaly    4. Adjustment disorder with disturbance of conduct        ED COURSE & MEDICAL DECISION MAKING:    Pertinent Labs & Imaging studies independently interpreted by me. (See chart for details)  Reviewed most recent admission from April 29 which was for atypical chest pain, shortness of breath and heart failure, at that time patient was also found to be hyperkalemic and was treated.  Prior stress test from September 2022 demonstrates a small area of decreased perfusion with rest and stress which may represent artifact versus focal thinning.    ED Course as of 06/09/24 1649   Sun Jun 09, 2024   1402 Patient seen and examined, reports substernal chest pain since last night \"like stabbing every time my heartbeats.\"  Has previously been seen for this with negative acute coronary syndrome evaluation.  On exam here, vitally stable, initial EKG is reassuring.  Has epigastric tenderness.  Suspect gastritis, there may be a component of heart failure as well, acute coronary syndrome or myocardial infarction is unlikely.  Labs ordered along with Maalox.  Patient also is requesting to speak to a care manager or  regarding his living situation.   1413 EKG:    Independently reviewed and interpreted by me  Performed at: 1:19 PM  Impression: Normal  Rate: 83  Rhythm: Sinus with first degree block  Axis: Right axis  MA Interval: 212  QRS Interval: 112  QTc Interval: 458  ST Changes: No acute ischemic changes  Comparison: April 2024, no changes     1427 With slightly elevated troponin although " indeterminate range given time of onset of symptoms, chest x-ray is pending.  Repeat troponin will be ordered.  Discussed care with care manager who will see the patient in the department.   1517 Chest x-ray independently interpreted by me negative for acute findings.   1517 Labs independently turbid by me with slightly elevated alkaline phosphatase which has been consistent for the patient, remaining hepatic panel normal and lipase normal.  Upper quadrant ultrasound ordered.   1549 Chest x-ray independently interpreted by me does not demonstrate any acute intrathoracic finding.  BNP is slightly elevated similar to prior.   1610 Repeat troponin is stable, ultrasound is pending and anticipate discharge.   1633 Rechecked, discussed diagnosis and plan.  It is a little unclear what dose of Lasix patient is taking, patient reports 20 mg daily and that is what is in his chart, however prior to discharge indicated increased to 40 mg daily.  I did order Lasix 40 mg IV for the patient as I think his chest pain is related to exacerbation of heart failure, however he had pulled out his IV prior to this.  Lasix IV will be given and have him increase his dose for the next couple of days assuming that right upper quadrant ultrasound does not demonstrate findings for acute cholecystitis or concern for biliary colic.   1636 Right upper quadrant ultrasound independently interpreted by me with mild wall thickening but no pericholecystic fluid wall thickening is likely related to gallbladder contraction         At the conclusion of the encounter I discussed the results of all of the tests and the disposition. The questions were answered. The patient or family acknowledged understanding and was agreeable with the care plan.     Medical Decision Making  Obtained supplemental history:Supplemental history obtained?: Documented in chart  Reviewed external records: External records reviewed?: Documented in chart  Care impacted by chronic  illness:Heart Disease and Mental Health  Care significantly affected by social determinants of health:Access to Affordable Health Care and No Support for Care at Home  Did you consider but not order tests?: Work up considered but not performed and documented in chart, if applicable  Did you interpret images independently?: Independent interpretation of ECG and images noted in documentation, when applicable.  Consultation discussion with other provider:Did you involve another provider (consultant, , pharmacy, etc.)?: No  Discharge. I recommended the patient continue their current prescription strength medication(s): Increase Lasix to twice a day for 2 days. See documentation for any additional details.    MEDICATIONS GIVEN IN THE EMERGENCY:  Medications   furosemide (LASIX) tablet 20 mg (20 mg Oral $Given 6/9/24 1123)       NEW PRESCRIPTIONS STARTED AT TODAY'S ER VISIT  New Prescriptions    FUROSEMIDE (LASIX) 20 MG TABLET    Take 1 tablet (20 mg) by mouth daily       =================================================================    HPI    Patient information was obtained from: patient      Warren Jaramillo is a 43 year old male with a pertinent history of COPD who presents to this ED for evaluation of chest pain.  Patient describes substernal chest pain going on since last night and says it feels like stabbing each time he takes of breath.  Also notes some lower extremity swelling and says that he gets this chest pain when his legs become swollen.  He denies missing any doses of medication.  Had some dry heaves earlier.    Patient also would like to speak to social work about his living situation.  He is frustrated because he says when he gets these chest pains the group home staff will not call an ambulance for him and tries to get him in trouble with his guardian when he does come to the emergency department.      REVIEW OF SYSTEMS   Review of Systems   All other systems reviewed and negative    PAST  MEDICAL HISTORY:  Past Medical History:   Diagnosis Date    DM2 (diabetes mellitus, type 2) (H) 4/28/2020    HTN (hypertension) 7/30/2012    Rojas's disease (H28)        PAST SURGICAL HISTORY:  Past Surgical History:   Procedure Laterality Date    COLONOSCOPY      ESOPHAGOSCOPY, GASTROSCOPY, DUODENOSCOPY (EGD), COMBINED N/A 7/21/2023    Procedure: ESOPHAGOGASTRODUODENOSCOPY WITH GASTRIC AND ESOPHAGEAL BIOPSIES;  Surgeon: Filiberto Aragon MD;  Location: [facility] OR    TOOTH EXTRACTION         CURRENT MEDICATIONS:    No current facility-administered medications for this encounter.     Current Outpatient Medications   Medication Sig Dispense Refill    furosemide (LASIX) 20 MG tablet Take 1 tablet (20 mg) by mouth daily 2 tablet 0    ACCU-CHEK GUIDE test strip USE TO TEST BLOOD SUGAR TWICE DAILY AS NEEDED      acetaminophen (TYLENOL) 32 mg/mL liquid Take 960 mg by mouth every 6 hours as needed for fever or mild pain (Patient not taking: Reported on 11/22/2023)      acetaminophen (TYLENOL) 500 MG tablet Take 2 tablets (1,000 mg) by mouth 3 times daily 30 tablet 0    albuterol (PROAIR HFA/PROVENTIL HFA/VENTOLIN HFA) 108 (90 Base) MCG/ACT inhaler Inhale 1-2 puffs into the lungs every 6 hours as needed for shortness of breath, wheezing or cough 18 g 0    albuterol (PROVENTIL) (2.5 MG/3ML) 0.083% neb solution Take 2.5 mg by nebulization 3 times daily as needed for shortness of breath, wheezing or cough      aloe vera GEL Apply 1 g topically every hour as needed for skin care Per bottle directions      bacitracin 500 UNIT/GM OINT Apply topically 3 times daily as needed for wound care      BETA BLOCKER NOT PRESCRIBED (INTENTIONAL) Please choose reason not prescribed from choices below.      blood glucose monitoring (SOFTCLIX) lancets USE AS DIRECTED TWICE DAILY AS NEEDED      Blood Glucose Monitoring Suppl (ACCU-CHEK GUIDE) w/Device KIT USE AS DIRECTED TWICE DAILY AS NEEDED      Calamine external lotion Apply topically  as needed for itching      carbamide peroxide (DEBROX) 6.5 % otic solution Place 5 drops into both ears daily as needed for other      clotrimazole (LOTRIMIN) 1 % external cream Apply topically 2 times daily as needed (skin irritation)      cyclobenzaprine (FLEXERIL) 10 MG tablet Take 10 mg by mouth 3 times daily as needed for muscle spasms      diclofenac (VOLTAREN) 1 % topical gel Apply 2 g topically daily as needed for moderate pain To joints/back      diclofenac (VOLTAREN) 75 MG EC tablet Take 1 tablet (75 mg) by mouth 2 times daily as needed for moderate pain 28 tablet 0    escitalopram (LEXAPRO) 10 MG tablet Take 10 mg by mouth daily      famotidine (PEPCID) 20 MG tablet Take 1 tablet (20 mg) by mouth 2 times daily 60 tablet 0    famotidine (PEPCID) 20 MG tablet Take 1 tablet (20 mg) by mouth 2 times daily 60 tablet 0    fluticasone-vilanterol (BREO ELLIPTA) 200-25 MCG/ACT inhaler Inhale 1 puff into the lungs daily      furosemide (LASIX) 20 MG tablet Take 20 mg by mouth daily      hydrocortisone 1 % CREA cream Place rectally 2 times daily as needed for itching      ibuprofen (ADVIL/MOTRIN) 100 MG/5ML suspension Take 400 mg by mouth every 4 hours as needed for fever or moderate pain (Patient not taking: Reported on 11/22/2023)      ibuprofen (ADVIL/MOTRIN) 200 MG tablet Take 400 mg by mouth every 4 hours as needed for pain (Patient not taking: Reported on 11/22/2023)      ibuprofen (ADVIL/MOTRIN) 600 MG tablet Take 1 tablet (600 mg) by mouth every 6 hours as needed for moderate pain 30 tablet 0    lisinopril (ZESTRIL) 10 MG tablet Take 10 mg by mouth daily      LORazepam (ATIVAN) 1 MG tablet Take 0.5 mg by mouth daily as needed for anxiety      melatonin 3 MG tablet Take 3 mg by mouth At Bedtime      metFORMIN (GLUCOPHAGE) 1000 MG tablet Take 1,000 mg by mouth 2 times daily (with meals)      montelukast (SINGULAIR) 10 MG tablet Take 10 mg by mouth daily (Patient not taking: Reported on 11/22/2023)       OLANZapine (ZYPREXA) 10 MG tablet Take 10 mg by mouth At Bedtime      omeprazole (PRILOSEC) 20 MG DR capsule Take 40 mg by mouth daily      ondansetron (ZOFRAN ODT) 4 MG ODT tab Take 1-2 tablets (4-8 mg) by mouth every 8 hours as needed for nausea or vomiting 20 tablet 0    ondansetron (ZOFRAN) 4 MG tablet Take 4 mg by mouth every 12 hours as needed for nausea      oxybutynin ER (DITROPAN XL) 10 MG 24 hr tablet Take 10 mg by mouth daily      oxyCODONE (ROXICODONE) 5 MG tablet       polyethylene glycol (MIRALAX) 17 g packet Take 1 packet by mouth daily as needed for constipation      predniSONE (DELTASONE) 20 MG tablet Take two tablets (= 40mg) each day for 4 (four) days 8 tablet 0    predniSONE (DELTASONE) 20 MG tablet Take two tablets (= 40mg) each day for four days starting tomorrow, 11/28/23. First dose given in the afternoon in the ED on 11/27/23. 8 tablet 0    psyllium (METAMUCIL) 28.3 % packet Take 1 packet by mouth daily      Respiratory Therapy Supplies (NEBULIZER/TUBING/MOUTHPIECE) KIT 1 kit every 6 hours as needed (shortness of breath, wheezing) 1 kit 0    rosuvastatin (CRESTOR) 10 MG tablet Take 10 mg by mouth At Bedtime      sodium phosphate (FLEET ENEMA) 7-19 GM/118ML rectal enema Place 1 enema rectally once as needed for constipation      spironolactone (ALDACTONE) 50 MG tablet Take 25 mg by mouth daily      sucralfate (CARAFATE) 1 GM/10ML suspension Take 10 mLs (1 g) by mouth 4 times daily as needed (pain) 414 mL 0    sucralfate (CARAFATE) 1 GM/10ML suspension Take 10 mLs (1 g) by mouth 4 times daily 414 mL 0    traZODone (DESYREL) 50 MG tablet Take 50 mg by mouth At Bedtime      zinc oxide (DESITIN) 20 % external ointment Apply topically as needed for dry skin or irritation To groin/buttocks area         ALLERGIES:  Allergies   Allergen Reactions    Apricot Flavor Anaphylaxis    Banana Anaphylaxis     Throat swelling  Throat swelling      Wasp Venom Protein Shortness Of Breath     Other reaction(s):  Respiratory Distress  Has an epi pen  Has an epi pen      Bees Anaphylaxis     Have an Epi pen that carries with    Methylphenidate Itching     Other reaction(s): Nightmares    Prunus      Other reaction(s): *Unknown    Sulfa Antibiotics      Headaches and nausea    Prunus Persica Rash     Other reaction(s): *Unknown       FAMILY HISTORY:  Family History   Problem Relation Age of Onset    Unknown/Adopted Father     Unknown/Adopted Maternal Grandmother     C.A.D. Maternal Grandfather     Diabetes Maternal Grandfather     Cerebrovascular Disease Maternal Grandfather     Unknown/Adopted Paternal Grandmother     Unknown/Adopted Paternal Grandfather     Unknown/Adopted Brother     Unknown/Adopted Sister        SOCIAL HISTORY:   Social History     Socioeconomic History    Marital status: Single   Tobacco Use    Smoking status: Every Day     Current packs/day: 1.00     Types: Cigarettes    Smokeless tobacco: Never   Vaping Use    Vaping status: Never Used   Substance and Sexual Activity    Alcohol use: No     Comment: once every 3 months    Drug use: No    Sexual activity: Never     Partners: Female   Other Topics Concern     Service No    Blood Transfusions No    Caffeine Concern No    Occupational Exposure No    Hobby Hazards No    Sleep Concern No    Stress Concern Yes     Comment: sometimes    Weight Concern No    Special Diet Yes     Comment: counting carbs    Back Care No    Exercise Yes    Seat Belt Yes    Self-Exams Yes     Social Determinants of Health     Financial Resource Strain: Medium Risk (4/27/2022)    [facility]    Financial Resource Strain     Difficulty of Paying Living Expenses: 2     Difficulty of Paying Living Expenses: 1   Food Insecurity: Food Insecurity Present (4/27/2022)    [facility]    Food Insecurity  "    Worried About Running Out of Food in the Last Year: 2   Transportation Needs: Unmet Transportation Needs (4/27/2022)    Received from [facility], [facility]    Transportation Needs     Lack of Transportation (Medical): 2    Received from [facility], [facility]    Social Connections   Housing Stability: High Risk (4/27/2022)    Received from [facility], [facility]    Housing Stability     Unable to Pay for Housing in the Last Year: 3       VITALS:  BP (!) 128/95   Pulse 71   Temp 98.2  F (36.8  C) (Oral)   Resp 16   Ht 1.676 m (5' 6\")   Wt 135.2 kg (298 lb)   SpO2 95%   BMI 48.10 kg/m      PHYSICAL EXAM:  Physical Exam  Vitals and nursing note reviewed.   Constitutional:       Appearance: Normal appearance.   HENT:      Head: Normocephalic and atraumatic.      Right Ear: External ear normal.      Left Ear: External ear normal.      Nose: Nose normal.      Mouth/Throat:      Mouth: Mucous membranes are moist.   Eyes:      Extraocular Movements: Extraocular movements intact.      Conjunctiva/sclera: Conjunctivae normal.      Pupils: Pupils are equal, round, and reactive to light.   Cardiovascular:      Rate and Rhythm: Normal rate and regular rhythm.   Pulmonary:      Effort: Pulmonary effort is normal.      Breath sounds: Normal breath sounds. No wheezing or rales.   Abdominal:      General: Abdomen is flat. There is no distension.      Palpations: Abdomen is soft.      Tenderness: There is abdominal tenderness (epigastric). There is no guarding.   Musculoskeletal:         General: Normal range of motion.      Cervical back: Normal range of motion and neck supple.      Right lower leg: No edema.      Left lower leg: No edema.   Lymphadenopathy:      Cervical: No cervical adenopathy.   Skin:     General: " Skin is warm and dry.   Neurological:      General: No focal deficit present.      Mental Status: He is alert and oriented to person, place, and time. Mental status is at baseline.      Comments: No gross focal neurologic deficits   Psychiatric:         Mood and Affect: Mood normal.         Behavior: Behavior normal.         Thought Content: Thought content normal.          LAB:  All pertinent labs reviewed and interpreted.  Results for orders placed or performed during the hospital encounter of 06/09/24   Chest XR,  PA & LAT    Impression    IMPRESSION: Cardiac silhouette size is unchanged. Lungs are clear.   US Abdomen Limited    Impression    IMPRESSION:  1.  The gallbladder is contracted without evidence for gallstones or pericholecystic fluid.  2.  Hepatic steatosis.  3.  No evidence for biliary ductal dilatation.       Basic metabolic panel   Result Value Ref Range    Sodium 141 135 - 145 mmol/L    Potassium 3.8 3.4 - 5.3 mmol/L    Chloride 102 98 - 107 mmol/L    Carbon Dioxide (CO2) 26 22 - 29 mmol/L    Anion Gap 13 7 - 15 mmol/L    Urea Nitrogen 13.7 6.0 - 20.0 mg/dL    Creatinine 0.91 0.67 - 1.17 mg/dL    GFR Estimate >90 >60 mL/min/1.73m2    Calcium 9.5 8.6 - 10.0 mg/dL    Glucose 122 (H) 70 - 99 mg/dL   Result Value Ref Range    Troponin T, High Sensitivity 27 (H) <=22 ng/L   CBC with platelets and differential   Result Value Ref Range    WBC Count 14.3 (H) 4.0 - 11.0 10e3/uL    RBC Count 4.72 4.40 - 5.90 10e6/uL    Hemoglobin 13.5 13.3 - 17.7 g/dL    Hematocrit 41.9 40.0 - 53.0 %    MCV 89 78 - 100 fL    MCH 28.6 26.5 - 33.0 pg    MCHC 32.2 31.5 - 36.5 g/dL    RDW 16.6 (H) 10.0 - 15.0 %    Platelet Count 321 150 - 450 10e3/uL    % Neutrophils 66 %    % Lymphocytes 21 %    % Monocytes 8 %    % Eosinophils 4 %    % Basophils 1 %    % Immature Granulocytes 0 %    NRBCs per 100 WBC 0 <1 /100    Absolute Neutrophils 9.6 (H) 1.6 - 8.3 10e3/uL    Absolute Lymphocytes 3.0 0.8 - 5.3 10e3/uL    Absolute Monocytes  1.1 0.0 - 1.3 10e3/uL    Absolute Eosinophils 0.5 0.0 - 0.7 10e3/uL    Absolute Basophils 0.1 0.0 - 0.2 10e3/uL    Absolute Immature Granulocytes 0.1 <=0.4 10e3/uL    Absolute NRBCs 0.0 10e3/uL   Extra Blue Top Tube   Result Value Ref Range    Hold Specimen JIC    Extra Red Top Tube   Result Value Ref Range    Hold Specimen JIC    Nt probnp inpatient (BNP)   Result Value Ref Range    N terminal Pro BNP Inpatient 547 (H) 0 - 450 pg/mL   Hepatic function panel   Result Value Ref Range    Protein Total 7.5 6.4 - 8.3 g/dL    Albumin 4.3 3.5 - 5.2 g/dL    Bilirubin Total 0.7 <=1.2 mg/dL    Alkaline Phosphatase 301 (H) 40 - 150 U/L    AST 16 0 - 45 U/L    ALT 19 0 - 70 U/L    Bilirubin Direct 0.35 (H) 0.00 - 0.30 mg/dL   Result Value Ref Range    Lipase 21 13 - 60 U/L   Result Value Ref Range    Troponin T, High Sensitivity 24 (H) <=22 ng/L       RADIOLOGY:  Reviewed all pertinent imaging. Please see official radiology report.  US Abdomen Limited   Final Result   IMPRESSION:   1.  The gallbladder is contracted without evidence for gallstones or pericholecystic fluid.   2.  Hepatic steatosis.   3.  No evidence for biliary ductal dilatation.            Chest XR,  PA & LAT   Final Result   IMPRESSION: Cardiac silhouette size is unchanged. Lungs are clear.        Jeremy Dowell M.D.  Emergency Medicine  [facility] EMERGENCY DEPARTMENT  [facility] [facility] [id number] [phone]  Dept: [phone]       Jeremy Dowell MD  06/09/24 8679

## 2024-06-09 NOTE — ED TRIAGE NOTES
Patient arrives via EMS from [facility] for complaints of chest pain that has been on going since last night at 2000. Patient states he feels a squeezing sensation in his chest with every heart beat. Patient with a hx of CHF and takes Lasix, denies any missed doses. Patient received 324 ASA and x1 nitroglycerin from EMS     Triage Assessment (Adult)       Row Name 06/09/24 1320          Triage Assessment    Airway WDL WDL        Respiratory WDL    Respiratory WDL X;rhythm/pattern     Rhythm/Pattern, Respiratory shortness of breath        Skin Circulation/Temperature WDL    Skin Circulation/Temperature WDL WDL        Cardiac WDL    Cardiac WDL X;chest pain        Chest Pain Assessment    Chest Pain Location midsternal

## 2024-06-10 LAB
ATRIAL RATE - MUSE: 83 BPM
DIASTOLIC BLOOD PRESSURE - MUSE: NORMAL MMHG
INTERPRETATION ECG - MUSE: NORMAL
P AXIS - MUSE: 72 DEGREES
PR INTERVAL - MUSE: 212 MS
QRS DURATION - MUSE: 112 MS
QT - MUSE: 390 MS
QTC - MUSE: 458 MS
R AXIS - MUSE: 93 DEGREES
SYSTOLIC BLOOD PRESSURE - MUSE: NORMAL MMHG
T AXIS - MUSE: 193 DEGREES
VENTRICULAR RATE- MUSE: 83 BPM

## 2024-06-22 ENCOUNTER — HEALTH MAINTENANCE LETTER (OUTPATIENT)
Age: 44
End: 2024-06-22

## 2024-06-28 ENCOUNTER — OFFICE VISIT (OUTPATIENT)
Dept: CARDIOLOGY | Facility: CLINIC | Age: 44
End: 2024-06-28
Payer: COMMERCIAL

## 2024-06-28 VITALS
SYSTOLIC BLOOD PRESSURE: 130 MMHG | HEART RATE: 79 BPM | BODY MASS INDEX: 45.35 KG/M2 | WEIGHT: 281 LBS | RESPIRATION RATE: 16 BRPM | DIASTOLIC BLOOD PRESSURE: 62 MMHG

## 2024-06-28 DIAGNOSIS — I47.10 SVT (SUPRAVENTRICULAR TACHYCARDIA) (H): ICD-10-CM

## 2024-06-28 DIAGNOSIS — I10 BENIGN ESSENTIAL HYPERTENSION: ICD-10-CM

## 2024-06-28 DIAGNOSIS — I50.813 ACUTE ON CHRONIC RIGHT-SIDED CONGESTIVE HEART FAILURE (H): Primary | ICD-10-CM

## 2024-06-28 DIAGNOSIS — I50.30 HEART FAILURE WITH PRESERVED EJECTION FRACTION, NYHA CLASS I (H): ICD-10-CM

## 2024-06-28 DIAGNOSIS — I44.7 INCOMPLETE LEFT BUNDLE BRANCH BLOCK (LBBB): ICD-10-CM

## 2024-06-28 DIAGNOSIS — I49.3 PVC'S (PREMATURE VENTRICULAR CONTRACTIONS): ICD-10-CM

## 2024-06-28 DIAGNOSIS — R07.9 CHEST PAIN, UNSPECIFIED TYPE: ICD-10-CM

## 2024-06-28 PROCEDURE — 99215 OFFICE O/P EST HI 40 MIN: CPT | Performed by: PHYSICIAN ASSISTANT

## 2024-06-28 PROCEDURE — G2211 COMPLEX E/M VISIT ADD ON: HCPCS | Performed by: PHYSICIAN ASSISTANT

## 2024-06-28 NOTE — LETTER
6/28/2024    Mary Kelly PA-C  [facility] [address] [address]    RE: Warren Jaramillo       Dear Colleague,     I had the pleasure of seeing Warren Jaramillo in the [facility].          HEART CARE FOLLOW UP    Primary Care: Mary Kelly MD  Primary Cardiologist: Dr Wilkerson      Assessment/Recommendations     Heart Failure with preserved ejection fraction, patient has signs and symptoms of HF with EF 50-55%. Symptoms currently NYHA Class III-IV.  NT-ProBNP measured at 669 at it's highest. Risk factors for HFpEF include female sex, hypertension, heavy body mass index (>30 kg/m2), Atrial Fibrillation, pulmonary hypertension, elderly (>60 years).  Patient has had multiple visits to the emergency department for acute on chronic shortness of breath and chest pain in the last 6 months.  He often is noted to have increased vascular congestion and treated with IV diuretics.  Upon discussion today, it is clear he is drinking copious amounts of water.  Appears slightly hypervolemic today.  Diuretics: Continue furosemide at dose 20 mg daily,   Patient trying to limit sodium consumption   Has been monitoring fluid intake- drinking sixteen (16!) 28 oz cups of water daily. This is 448 oz (this is 13 L fluid daily); he does this because people have told him it is healthy to drink a lot of water, he is also feeling thirsty- advised to reduce fluid intake to    Recommend return to Sleep Center to ensure CPAP working correctly, he did have a repeat study this year but has been unable to follow-up with the sleep center regarding this.  He goes to [facility]  If diabetic, goal HgbA1C Target for glycosylated hemoglobin HbA1c is <7-7.5% for individuals with lower comorbidity burden or less severe HF and HbA1c target is <8-8.5% for more severe HF, higher comorbidity burden, and the elderly  If diabetic,  SGLT2i should be a key treatment component - patient reports he  is on Jardiance (empagliflozin) 10 mg daily  Glucagon-like peptide-1 (GLP-1) agonists or GIP antagonist may be considered for obese individuals with diabetes mellitus and HFpEF.  Will discuss with PCP  Cardiac MRI as previously advised by Dr. Wilkerson  Hypertension, blood pressure well controlled on current regimen. Reports medication compliance.   Continue lisinopril 10 mg daily   Continue spironolctone 25 mg daily   Dyslipidemia, Most recent  and LDL 59. Patient is maintained on therapy.  Continue rosuvastatin 10 mg daily   We discussed the role diet, exercise and weight management can play in managing dyslipidemia.   Tobacco Use, has cut from 3PPD to 1.5 PPD.  Encouraged continued action  Alcohol use disorder in remission x 4 months.    Return to care in  2-3 weeks  with me.      History of Present Illness/Subjective    Warren Jaramillo is a 43 year old male with past medical history significant for:  Chronic noncardiac chest pain  Incomplete left bundle branch block  First degree heart block   Morbid obesity  Sleep apnea on CPAP  Diabetes Mellitus, last A1C 7.5  Autism   TBI  Tobacco use   History of alcohol abuse in remission x 4 months     The patient was last seen in in clinic in November 2023 by Dr. Wilkerson.  At that time the patient continued describe noncardiac chest pain that was chronic.  He also having more issues with shortness of breath and had been in the emergency department with mildly elevated BNP, treated for a respiratory infection at the same time.  Despite improvement of the respiratory infection he continues struggle with shortness of breath.  He was using a super PAP at night.  Cardiac MRI was recommended to rule out nonischemic cardiomyopathy.  It appears this was not yet completed in our system.  More recently on June 19 of this year the patient was admitted for noncardiac chest pain, shortness of breath and hyperkalemia.  EKG was reassuring and he was noted to have epigastric  tenderness and there was concern for gastritis.  Troponin was minimally elevated with no clear rise and fall.  Chest x-ray was negative for acute findings.  Is recommended he increase his dose of home diuretics for a few days.    Today the patient tells me he feels fatigued. With walking he gets very short of breath. Even walking short distances brings on dyspnea. He can feel his heart going fast when he feels dyspneic. He feels like he can't get a full breath. He does not feel like his dyspnea ever improves; it worsens with more activity. His chest pain feels like a hard deep pressure and also like there is a knife in his chest. The last time this happened was at night while sleeping. This happens almost everyday. This lasts about an hour, also feels short of breath and slightly dizzy but no diaphoresis, nausea or vomiting. No dizziness outside of the chest pain. No palpitations or racing heart at rest, only with exertion. He does have intermittent leg swelling, mild piting to mid shin. Sleep is fairly good, sleeps with his CPAP, uses this nightly. Despite wearing this he still wakes up feeling short of breath. He sleeps flat in bed with 1.5 pillows.   No blood in stool or urin.     He speaks today about his trauma as a child being in foster care. He is conerned his aortic valve was lacerated and stitched back together.        Data Review Today:     TTE 8/2023:  Interpretation Summary     The visual ejection fraction is 50-55%.  Regional wall motion abnormalities cannot be excluded due to limited  visualization.  Right ventricular function cannot be assessed due to poor image quality.  No obvious valve disease.  Technically difficult, suboptimal study.      I have reviewed and updated the patient's past medical history, allergy list and medication list.          Physical Examination   Vitals: There were no vitals taken for this visit.    BMI= There is no height or weight on file to calculate BMI.    Wt Readings  from Last 3 Encounters:   06/09/24 135.2 kg (298 lb)   04/13/24 135.3 kg (298 lb 3.2 oz)   04/12/24 129.5 kg (285 lb 9.6 oz)       General :   Alert and oriented, in no acute distress.    HEENT:  Normocephalic and atraumatic. .    Neck: No JVP, carotid bruit or obvious thyromegaly.   Lungs:   Respirations unlabored. Clear bilaterally with no rales, rhonchi, or wheezes.     Cardiovascular:   Rhythm is regular. S1 and S2 are normal. No significant murmur is present. Lower extremities demonstrate 2+ edema.        Skin: Skin is warm, dry, and otherwise intact.   Neurologic: Gait not assessed. Mood and affect appropriate.           Medical History  Surgical History Family History Social History   Past Medical History:   Diagnosis Date    DM2 (diabetes mellitus, type 2) (H) 4/28/2020    HTN (hypertension) 7/30/2012    Rojas's disease (H28)     Past Surgical History:   Procedure Laterality Date    COLONOSCOPY      ESOPHAGOSCOPY, GASTROSCOPY, DUODENOSCOPY (EGD), COMBINED N/A 7/21/2023    Procedure: ESOPHAGOGASTRODUODENOSCOPY WITH GASTRIC AND ESOPHAGEAL BIOPSIES;  Surgeon: Filiberto Aragon MD;  Location: [facility] [place] OR    TOOTH EXTRACTION      Family History   Problem Relation Age of Onset    Unknown/Adopted Father     Unknown/Adopted Maternal Grandmother     C.A.D. Maternal Grandfather     Diabetes Maternal Grandfather     Cerebrovascular Disease Maternal Grandfather     Unknown/Adopted Paternal Grandmother     Unknown/Adopted Paternal Grandfather     Unknown/Adopted Brother     Unknown/Adopted Sister     Social History     Socioeconomic History    Marital status: Single     Spouse name: Not on file    Number of children: Not on file    Years of education: Not on file    Highest education level: Not on file   Occupational History    Not on file   Tobacco Use    Smoking status: Every Day     Current packs/day: 1.00     Types: Cigarettes    Smokeless tobacco: Never   Vaping Use    Vaping status: Never Used   Substance and  Sexual Activity    Alcohol use: No     Comment: once every 3 months    Drug use: No    Sexual activity: Never     Partners: Female   Other Topics Concern     Service No    Blood Transfusions No    Caffeine Concern No    Occupational Exposure No    Hobby Hazards No    Sleep Concern No    Stress Concern Yes     Comment: sometimes    Weight Concern No    Special Diet Yes     Comment: counting carbs    Back Care No    Exercise Yes    Bike Helmet Not Asked     Comment: N/A    Seat Belt Yes    Self-Exams Yes    Parent/sibling w/ CABG, MI or angioplasty before 65F 55M? Not Asked   Social History Narrative    Not on file     Social Determinants of Health     Financial Resource Strain: Medium Risk (4/27/2022)    [facility]    Financial Resource Strain     Difficulty of Paying Living Expenses: 2     Difficulty of Paying Living Expenses: 1   Food Insecurity: Food Insecurity Present (4/27/2022)    [facility]    Food Insecurity     Worried About Running Out of Food in the Last Year: 2   Transportation Needs: Unmet Transportation Needs (4/27/2022)    [facility]    Transportation Needs     Lack of Transportation (Medical): 2   Physical Activity: Not on file   Stress: Not on file   Social Connections: Unknown (5/1/2023)    [facility]    Social Connections     Frequency of Communication with Friends and Family: Not on file   Interpersonal Safety: Not on file   Housing Stability: High Risk (4/27/2022)    [facility], [facility] [facility]    Housing Stability     Unable to Pay for Housing  in the Last Year: 3          Medications  Allergies   Scheduled Meds:  Current Outpatient Medications   Medication Sig Dispense Refill    ACCU-CHEK GUIDE test strip USE TO TEST BLOOD SUGAR TWICE DAILY AS NEEDED      acetaminophen (TYLENOL) 32 mg/mL liquid Take 960 mg by mouth every 6 hours as needed for fever or mild pain (Patient not taking: Reported on 11/22/2023)      acetaminophen (TYLENOL) 500 MG tablet Take 2 tablets (1,000 mg) by mouth 3 times daily 30 tablet 0    albuterol (PROAIR HFA/PROVENTIL HFA/VENTOLIN HFA) 108 (90 Base) MCG/ACT inhaler Inhale 1-2 puffs into the lungs every 6 hours as needed for shortness of breath, wheezing or cough 18 g 0    albuterol (PROVENTIL) (2.5 MG/3ML) 0.083% neb solution Take 2.5 mg by nebulization 3 times daily as needed for shortness of breath, wheezing or cough      aloe vera GEL Apply 1 g topically every hour as needed for skin care Per bottle directions      bacitracin 500 UNIT/GM OINT Apply topically 3 times daily as needed for wound care      BETA BLOCKER NOT PRESCRIBED (INTENTIONAL) Please choose reason not prescribed from choices below.      blood glucose monitoring (SOFTCLIX) lancets USE AS DIRECTED TWICE DAILY AS NEEDED      Blood Glucose Monitoring Suppl (ACCU-CHEK GUIDE) w/Device KIT USE AS DIRECTED TWICE DAILY AS NEEDED      Calamine external lotion Apply topically as needed for itching      carbamide peroxide (DEBROX) 6.5 % otic solution Place 5 drops into both ears daily as needed for other      clotrimazole (LOTRIMIN) 1 % external cream Apply topically 2 times daily as needed (skin irritation)      cyclobenzaprine (FLEXERIL) 10 MG tablet Take 10 mg by mouth 3 times daily as needed for muscle spasms      diclofenac (VOLTAREN) 1 % topical gel Apply 2 g topically daily as needed for moderate pain To joints/back      diclofenac (VOLTAREN) 75 MG EC tablet Take 1 tablet (75 mg) by mouth 2 times daily as needed for moderate pain 28 tablet 0    escitalopram (LEXAPRO)  10 MG tablet Take 10 mg by mouth daily      famotidine (PEPCID) 20 MG tablet Take 1 tablet (20 mg) by mouth 2 times daily 60 tablet 0    famotidine (PEPCID) 20 MG tablet Take 1 tablet (20 mg) by mouth 2 times daily 60 tablet 0    fluticasone-vilanterol (BREO ELLIPTA) 200-25 MCG/ACT inhaler Inhale 1 puff into the lungs daily      furosemide (LASIX) 20 MG tablet Take 1 tablet (20 mg) by mouth daily 2 tablet 0    furosemide (LASIX) 20 MG tablet Take 20 mg by mouth daily      hydrocortisone 1 % CREA cream Place rectally 2 times daily as needed for itching      ibuprofen (ADVIL/MOTRIN) 100 MG/5ML suspension Take 400 mg by mouth every 4 hours as needed for fever or moderate pain (Patient not taking: Reported on 11/22/2023)      ibuprofen (ADVIL/MOTRIN) 200 MG tablet Take 400 mg by mouth every 4 hours as needed for pain (Patient not taking: Reported on 11/22/2023)      ibuprofen (ADVIL/MOTRIN) 600 MG tablet Take 1 tablet (600 mg) by mouth every 6 hours as needed for moderate pain 30 tablet 0    lisinopril (ZESTRIL) 10 MG tablet Take 10 mg by mouth daily      LORazepam (ATIVAN) 1 MG tablet Take 0.5 mg by mouth daily as needed for anxiety      melatonin 3 MG tablet Take 3 mg by mouth At Bedtime      metFORMIN (GLUCOPHAGE) 1000 MG tablet Take 1,000 mg by mouth 2 times daily (with meals)      montelukast (SINGULAIR) 10 MG tablet Take 10 mg by mouth daily (Patient not taking: Reported on 11/22/2023)      OLANZapine (ZYPREXA) 10 MG tablet Take 10 mg by mouth At Bedtime      omeprazole (PRILOSEC) 20 MG DR capsule Take 40 mg by mouth daily      ondansetron (ZOFRAN ODT) 4 MG ODT tab Take 1-2 tablets (4-8 mg) by mouth every 8 hours as needed for nausea or vomiting 20 tablet 0    ondansetron (ZOFRAN) 4 MG tablet Take 4 mg by mouth every 12 hours as needed for nausea      oxybutynin ER (DITROPAN XL) 10 MG 24 hr tablet Take 10 mg by mouth daily      oxyCODONE (ROXICODONE) 5 MG tablet       polyethylene glycol (MIRALAX) 17 g packet Take  1 packet by mouth daily as needed for constipation      predniSONE (DELTASONE) 20 MG tablet Take two tablets (= 40mg) each day for 4 (four) days 8 tablet 0    predniSONE (DELTASONE) 20 MG tablet Take two tablets (= 40mg) each day for four days starting tomorrow, 11/28/23. First dose given in the afternoon in the ED on 11/27/23. 8 tablet 0    psyllium (METAMUCIL) 28.3 % packet Take 1 packet by mouth daily      Respiratory Therapy Supplies (NEBULIZER/TUBING/MOUTHPIECE) KIT 1 kit every 6 hours as needed (shortness of breath, wheezing) 1 kit 0    rosuvastatin (CRESTOR) 10 MG tablet Take 10 mg by mouth At Bedtime      sodium phosphate (FLEET ENEMA) 7-19 GM/118ML rectal enema Place 1 enema rectally once as needed for constipation      spironolactone (ALDACTONE) 50 MG tablet Take 25 mg by mouth daily      sucralfate (CARAFATE) 1 GM/10ML suspension Take 10 mLs (1 g) by mouth 4 times daily as needed (pain) 414 mL 0    sucralfate (CARAFATE) 1 GM/10ML suspension Take 10 mLs (1 g) by mouth 4 times daily 414 mL 0    traZODone (DESYREL) 50 MG tablet Take 50 mg by mouth At Bedtime      zinc oxide (DESITIN) 20 % external ointment Apply topically as needed for dry skin or irritation To groin/buttocks area      Allergies   Allergen Reactions    Apricot Flavor Anaphylaxis    Banana Anaphylaxis     Throat swelling  Throat swelling      Wasp Venom Protein Shortness Of Breath     Other reaction(s): Respiratory Distress  Has an epi pen  Has an epi pen      Bees Anaphylaxis     Have an Epi pen that carries with    Methylphenidate Itching     Other reaction(s): Nightmares    Prunus      Other reaction(s): *Unknown    Sulfa Antibiotics      Headaches and nausea    Prunus Persica Rash     Other reaction(s): *Unknown         Lab Results    Chemistry/lipid CBC Cardiac Enzymes/BNP/TSH/INR   Lab Results   Component Value Date    CHOL 117 02/15/2024    HDL 35 (L) 02/15/2024    TRIG 116 02/15/2024    BUN 13.7 06/09/2024     06/09/2024    CO2  26 06/09/2024    Lab Results   Component Value Date    WBC 14.3 (H) 06/09/2024    HGB 13.5 06/09/2024    HCT 41.9 06/09/2024    MCV 89 06/09/2024     06/09/2024    @RESUFAST(BMP,CBC,BNP,TSH,  INR)@      45 minutes spent reviewing prior records (including documentation, laboratory studies, cardiac testing/imaging), history and physical exam, planning, and subsequent documentation.     The longitudinal plan of care for the condition(s) below were addressed during this visit. Due to the added complexity in care, I will continue to support Warren in the subsequent management of this condition(s) and with the ongoing continuity of care of this condition(s): Sleep apnea, obesity, diabetes, heart failure with preserved ejection fraction    This note has been dictated using voice recognition software. Any grammatical, typographical, or context distortions are unintentional and inherent to the software.    Hannah Hui PA-C, RD  General Cardiology       Thank you for allowing me to participate in the care of your patient.      Sincerely,     Hannah Hui PA-C   [facility]  cc: Referred Self,

## 2024-06-28 NOTE — Clinical Note
Mary Newell, my name is Hannah Hui I am one of the heart failure providers in the cardiology clinic.  I met with Malik today, what a nice ronny.  He is on Jardiance 10, I am wondering if you qualify for 25 mg daily given his diabetes?  I think this could be helpful in terms of his issues retaining fluid.  3 discussion today, I learned he is drinking 13 L of water daily.  I have asked him to cut back to 8.  This may be driving most of his readmissions to the emergency department.  He would be a great candidate for GLP-1 with his obesity, diabetes and heart failure.  We do not typically prescribe these out of the cardiac clinic, as a something you could pursue with him?  I also encouraged him to go back to the sleep center.  He had a repeat sleep study earlier this year but has not followed up with the sleep team at [facility].  I will see him back in 2 weeks to see how he does reducing his fluid intake.  Please let me know if you have other thoughts or input.  Hannah Hui PA-C

## 2024-06-28 NOTE — PROGRESS NOTES
HEART CARE FOLLOW UP    Primary Care: Mary Kelly MD  Primary Cardiologist: Dr Wilkerson      Assessment/Recommendations     Heart Failure with preserved ejection fraction, patient has signs and symptoms of HF with EF 50-55%. Symptoms currently NYHA Class III-IV.  NT-ProBNP measured at 669 at it's highest. Risk factors for HFpEF include female sex, hypertension, heavy body mass index (>30 kg/m2), Atrial Fibrillation, pulmonary hypertension, elderly (>60 years).  Patient has had multiple visits to the emergency department for acute on chronic shortness of breath and chest pain in the last 6 months.  He often is noted to have increased vascular congestion and treated with IV diuretics.  Upon discussion today, it is clear he is drinking copious amounts of water.  Appears slightly hypervolemic today.  Diuretics: Continue furosemide at dose 20 mg daily,   Patient trying to limit sodium consumption   Has been monitoring fluid intake- drinking sixteen (16!) 28 oz cups of water daily. This is 448 oz (this is 13 L fluid daily); he does this because people have told him it is healthy to drink a lot of water, he is also feeling thirsty- advised to reduce fluid intake to    Recommend return to Sleep Center to ensure CPAP working correctly, he did have a repeat study this year but has been unable to follow-up with the sleep center regarding this.  He goes to [facility]  If diabetic, goal HgbA1C Target for glycosylated hemoglobin HbA1c is <7-7.5% for individuals with lower comorbidity burden or less severe HF and HbA1c target is <8-8.5% for more severe HF, higher comorbidity burden, and the elderly  If diabetic,  SGLT2i should be a key treatment component - patient reports he is on Jardiance (empagliflozin) 10 mg daily  Glucagon-like peptide-1 (GLP-1) agonists or GIP antagonist may be considered for obese individuals with diabetes mellitus and HFpEF.  Will discuss with PCP  Cardiac MRI as previously advised  by Dr. Wilkerson- due to incomplete LBBB, concern for valvular abnormalities, and poor image quality due to body habitus, MRI is needed rather than surface echo   Hypertension, blood pressure well controlled on current regimen. Reports medication compliance.   Continue lisinopril 10 mg daily   Continue spironolctone 25 mg daily   Dyslipidemia, Most recent  and LDL 59. Patient is maintained on therapy.  Continue rosuvastatin 10 mg daily   We discussed the role diet, exercise and weight management can play in managing dyslipidemia.   Tobacco Use, has cut from 3PPD to 1.5 PPD.  Encouraged continued action  Alcohol use disorder in remission x 4 months.    Return to care in  2-3 weeks  with me.      History of Present Illness/Subjective    Warren Jaramillo is a 43 year old male with past medical history significant for:  Chronic noncardiac chest pain  Incomplete left bundle branch block  First degree heart block   Morbid obesity  Sleep apnea on CPAP  Diabetes Mellitus, last A1C 7.5  Autism   TBI  Tobacco use   History of alcohol abuse in remission x 4 months     The patient was last seen in in clinic in November 2023 by Dr. Wilkerson.  At that time the patient continued describe noncardiac chest pain that was chronic.  He also having more issues with shortness of breath and had been in the emergency department with mildly elevated BNP, treated for a respiratory infection at the same time.  Despite improvement of the respiratory infection he continues struggle with shortness of breath.  He was using a super PAP at night.  Cardiac MRI was recommended to rule out nonischemic cardiomyopathy.  It appears this was not yet completed in our system.  More recently on June 19 of this year the patient was admitted for noncardiac chest pain, shortness of breath and hyperkalemia.  EKG was reassuring and he was noted to have epigastric tenderness and there was concern for gastritis.  Troponin was minimally elevated with no  clear rise and fall.  Chest x-ray was negative for acute findings.  Is recommended he increase his dose of home diuretics for a few days.    Today the patient tells me he feels fatigued. With walking he gets very short of breath. Even walking short distances brings on dyspnea. He can feel his heart going fast when he feels dyspneic. He feels like he can't get a full breath. He does not feel like his dyspnea ever improves; it worsens with more activity. His chest pain feels like a hard deep pressure and also like there is a knife in his chest. The last time this happened was at night while sleeping. This happens almost everyday. This lasts about an hour, also feels short of breath and slightly dizzy but no diaphoresis, nausea or vomiting. No dizziness outside of the chest pain. No palpitations or racing heart at rest, only with exertion. He does have intermittent leg swelling, mild piting to mid shin. Sleep is fairly good, sleeps with his CPAP, uses this nightly. Despite wearing this he still wakes up feeling short of breath. He sleeps flat in bed with 1.5 pillows.   No blood in stool or urin.     He speaks today about his trauma as a child being in foster care. He is conerned his aortic valve was lacerated and stitched back together.        Data Review Today:     TTE 8/2023:  Interpretation Summary     The visual ejection fraction is 50-55%.  Regional wall motion abnormalities cannot be excluded due to limited  visualization.  Right ventricular function cannot be assessed due to poor image quality.  No obvious valve disease.  Technically difficult, suboptimal study.      I have reviewed and updated the patient's past medical history, allergy list and medication list.          Physical Examination   Vitals: There were no vitals taken for this visit.    BMI= There is no height or weight on file to calculate BMI.    Wt Readings from Last 3 Encounters:   06/09/24 135.2 kg (298 lb)   04/13/24 135.3 kg (298 lb 3.2 oz)    04/12/24 129.5 kg (285 lb 9.6 oz)       General :   Alert and oriented, in no acute distress.    HEENT:  Normocephalic and atraumatic. .    Neck: No JVP, carotid bruit or obvious thyromegaly.   Lungs:   Respirations unlabored. Clear bilaterally with no rales, rhonchi, or wheezes.     Cardiovascular:   Rhythm is regular. S1 and S2 are normal. No significant murmur is present. Lower extremities demonstrate 2+ edema.        Skin: Skin is warm, dry, and otherwise intact.   Neurologic: Gait not assessed. Mood and affect appropriate.           Medical History  Surgical History Family History Social History   Past Medical History:   Diagnosis Date    DM2 (diabetes mellitus, type 2) (H) 4/28/2020    HTN (hypertension) 7/30/2012    Rojas's disease (H28)     Past Surgical History:   Procedure Laterality Date    COLONOSCOPY      ESOPHAGOSCOPY, GASTROSCOPY, DUODENOSCOPY (EGD), COMBINED N/A 7/21/2023    Procedure: ESOPHAGOGASTRODUODENOSCOPY WITH GASTRIC AND ESOPHAGEAL BIOPSIES;  Surgeon: Filiberto Aragon MD;  Location: [facility] OR    TOOTH EXTRACTION      Family History   Problem Relation Age of Onset    Unknown/Adopted Father     Unknown/Adopted Maternal Grandmother     C.A.D. Maternal Grandfather     Diabetes Maternal Grandfather     Cerebrovascular Disease Maternal Grandfather     Unknown/Adopted Paternal Grandmother     Unknown/Adopted Paternal Grandfather     Unknown/Adopted Brother     Unknown/Adopted Sister     Social History     Socioeconomic History    Marital status: Single     Spouse name: Not on file    Number of children: Not on file    Years of education: Not on file    Highest education level: Not on file   Occupational History    Not on file   Tobacco Use    Smoking status: Every Day     Current packs/day: 1.00     Types: Cigarettes    Smokeless tobacco: Never   Vaping Use    Vaping status: Never Used   Substance and Sexual Activity    Alcohol use: No     Comment: once every 3 months    Drug use: No     Sexual activity: Never     Partners: Female   Other Topics Concern     Service No    Blood Transfusions No    Caffeine Concern No    Occupational Exposure No    Hobby Hazards No    Sleep Concern No    Stress Concern Yes     Comment: sometimes    Weight Concern No    Special Diet Yes     Comment: counting carbs    Back Care No    Exercise Yes    Bike Helmet Not Asked     Comment: N/A    Seat Belt Yes    Self-Exams Yes    Parent/sibling w/ CABG, MI or angioplasty before 65F 55M? Not Asked   Social History Narrative    Not on file     Social Determinants of Health     Financial Resource Strain: Medium Risk (4/27/2022)    [facility], [facility] [employer] [facility]    Financial Resource Strain     Difficulty of Paying Living Expenses: 2     Difficulty of Paying Living Expenses: 1   Food Insecurity: Food Insecurity Present (4/27/2022)    Received from [facility], [facility] [employer] [facility] [employer]    Food Insecurity     Worried About Running Out of Food in the Last Year: 2   Transportation Needs: Unmet Transportation Needs (4/27/2022)    [facility]    Transportation Needs     Lack of Transportation (Medical): 2   Physical Activity: Not on file   Stress: Not on file   Social Connections: Unknown (5/1/2023)    Received from [facility], [facility] [employer] [facility]    Social Connections     Frequency of Communication with Friends and Family: Not on file   Interpersonal Safety: Not on file   Housing Stability: High Risk (4/27/2022)    [facility] from [facility], [facility] [employer] [facility]    Housing Stability     Unable to Pay for Housing in the Last Year: 3          Medications  Allergies   Scheduled Meds:  Current  Outpatient Medications   Medication Sig Dispense Refill    ACCU-CHEK GUIDE test strip USE TO TEST BLOOD SUGAR TWICE DAILY AS NEEDED      acetaminophen (TYLENOL) 32 mg/mL liquid Take 960 mg by mouth every 6 hours as needed for fever or mild pain (Patient not taking: Reported on 11/22/2023)      acetaminophen (TYLENOL) 500 MG tablet Take 2 tablets (1,000 mg) by mouth 3 times daily 30 tablet 0    albuterol (PROAIR HFA/PROVENTIL HFA/VENTOLIN HFA) 108 (90 Base) MCG/ACT inhaler Inhale 1-2 puffs into the lungs every 6 hours as needed for shortness of breath, wheezing or cough 18 g 0    albuterol (PROVENTIL) (2.5 MG/3ML) 0.083% neb solution Take 2.5 mg by nebulization 3 times daily as needed for shortness of breath, wheezing or cough      aloe vera GEL Apply 1 g topically every hour as needed for skin care Per bottle directions      bacitracin 500 UNIT/GM OINT Apply topically 3 times daily as needed for wound care      BETA BLOCKER NOT PRESCRIBED (INTENTIONAL) Please choose reason not prescribed from choices below.      blood glucose monitoring (SOFTCLIX) lancets USE AS DIRECTED TWICE DAILY AS NEEDED      Blood Glucose Monitoring Suppl (ACCU-CHEK GUIDE) w/Device KIT USE AS DIRECTED TWICE DAILY AS NEEDED      Calamine external lotion Apply topically as needed for itching      carbamide peroxide (DEBROX) 6.5 % otic solution Place 5 drops into both ears daily as needed for other      clotrimazole (LOTRIMIN) 1 % external cream Apply topically 2 times daily as needed (skin irritation)      cyclobenzaprine (FLEXERIL) 10 MG tablet Take 10 mg by mouth 3 times daily as needed for muscle spasms      diclofenac (VOLTAREN) 1 % topical gel Apply 2 g topically daily as needed for moderate pain To joints/back      diclofenac (VOLTAREN) 75 MG EC tablet Take 1 tablet (75 mg) by mouth 2 times daily as needed for moderate pain 28 tablet 0    escitalopram (LEXAPRO) 10 MG tablet Take 10 mg by mouth daily      famotidine (PEPCID) 20 MG tablet  Take 1 tablet (20 mg) by mouth 2 times daily 60 tablet 0    famotidine (PEPCID) 20 MG tablet Take 1 tablet (20 mg) by mouth 2 times daily 60 tablet 0    fluticasone-vilanterol (BREO ELLIPTA) 200-25 MCG/ACT inhaler Inhale 1 puff into the lungs daily      furosemide (LASIX) 20 MG tablet Take 1 tablet (20 mg) by mouth daily 2 tablet 0    furosemide (LASIX) 20 MG tablet Take 20 mg by mouth daily      hydrocortisone 1 % CREA cream Place rectally 2 times daily as needed for itching      ibuprofen (ADVIL/MOTRIN) 100 MG/5ML suspension Take 400 mg by mouth every 4 hours as needed for fever or moderate pain (Patient not taking: Reported on 11/22/2023)      ibuprofen (ADVIL/MOTRIN) 200 MG tablet Take 400 mg by mouth every 4 hours as needed for pain (Patient not taking: Reported on 11/22/2023)      ibuprofen (ADVIL/MOTRIN) 600 MG tablet Take 1 tablet (600 mg) by mouth every 6 hours as needed for moderate pain 30 tablet 0    lisinopril (ZESTRIL) 10 MG tablet Take 10 mg by mouth daily      LORazepam (ATIVAN) 1 MG tablet Take 0.5 mg by mouth daily as needed for anxiety      melatonin 3 MG tablet Take 3 mg by mouth At Bedtime      metFORMIN (GLUCOPHAGE) 1000 MG tablet Take 1,000 mg by mouth 2 times daily (with meals)      montelukast (SINGULAIR) 10 MG tablet Take 10 mg by mouth daily (Patient not taking: Reported on 11/22/2023)      OLANZapine (ZYPREXA) 10 MG tablet Take 10 mg by mouth At Bedtime      omeprazole (PRILOSEC) 20 MG DR capsule Take 40 mg by mouth daily      ondansetron (ZOFRAN ODT) 4 MG ODT tab Take 1-2 tablets (4-8 mg) by mouth every 8 hours as needed for nausea or vomiting 20 tablet 0    ondansetron (ZOFRAN) 4 MG tablet Take 4 mg by mouth every 12 hours as needed for nausea      oxybutynin ER (DITROPAN XL) 10 MG 24 hr tablet Take 10 mg by mouth daily      oxyCODONE (ROXICODONE) 5 MG tablet       polyethylene glycol (MIRALAX) 17 g packet Take 1 packet by mouth daily as needed for constipation      predniSONE  (DELTASONE) 20 MG tablet Take two tablets (= 40mg) each day for 4 (four) days 8 tablet 0    predniSONE (DELTASONE) 20 MG tablet Take two tablets (= 40mg) each day for four days starting tomorrow, 11/28/23. First dose given in the afternoon in the ED on 11/27/23. 8 tablet 0    psyllium (METAMUCIL) 28.3 % packet Take 1 packet by mouth daily      Respiratory Therapy Supplies (NEBULIZER/TUBING/MOUTHPIECE) KIT 1 kit every 6 hours as needed (shortness of breath, wheezing) 1 kit 0    rosuvastatin (CRESTOR) 10 MG tablet Take 10 mg by mouth At Bedtime      sodium phosphate (FLEET ENEMA) 7-19 GM/118ML rectal enema Place 1 enema rectally once as needed for constipation      spironolactone (ALDACTONE) 50 MG tablet Take 25 mg by mouth daily      sucralfate (CARAFATE) 1 GM/10ML suspension Take 10 mLs (1 g) by mouth 4 times daily as needed (pain) 414 mL 0    sucralfate (CARAFATE) 1 GM/10ML suspension Take 10 mLs (1 g) by mouth 4 times daily 414 mL 0    traZODone (DESYREL) 50 MG tablet Take 50 mg by mouth At Bedtime      zinc oxide (DESITIN) 20 % external ointment Apply topically as needed for dry skin or irritation To groin/buttocks area      Allergies   Allergen Reactions    Apricot Flavor Anaphylaxis    Banana Anaphylaxis     Throat swelling  Throat swelling      Wasp Venom Protein Shortness Of Breath     Other reaction(s): Respiratory Distress  Has an epi pen  Has an epi pen      Bees Anaphylaxis     Have an Epi pen that carries with    Methylphenidate Itching     Other reaction(s): Nightmares    Prunus      Other reaction(s): *Unknown    Sulfa Antibiotics      Headaches and nausea    Prunus Persica Rash     Other reaction(s): *Unknown         Lab Results    Chemistry/lipid CBC Cardiac Enzymes/BNP/TSH/INR   Lab Results   Component Value Date    CHOL 117 02/15/2024    HDL 35 (L) 02/15/2024    TRIG 116 02/15/2024    BUN 13.7 06/09/2024     06/09/2024    CO2 26 06/09/2024    Lab Results   Component Value Date    WBC 14.3 (H)  06/09/2024    HGB 13.5 06/09/2024    HCT 41.9 06/09/2024    MCV 89 06/09/2024     06/09/2024    @RESUFAST(BMP,CBC,BNP,TSH,  INR)@      45 minutes spent reviewing prior records (including documentation, laboratory studies, cardiac testing/imaging), history and physical exam, planning, and subsequent documentation.     The longitudinal plan of care for the condition(s) below were addressed during this visit. Due to the added complexity in care, I will continue to support Warren in the subsequent management of this condition(s) and with the ongoing continuity of care of this condition(s): Sleep apnea, obesity, diabetes, heart failure with preserved ejection fraction    This note has been dictated using voice recognition software. Any grammatical, typographical, or context distortions are unintentional and inherent to the software.    Hannah Hui PA-C, RD  General Cardiology

## 2024-06-28 NOTE — PATIENT INSTRUCTIONS
It was great to see you today! Your care team includes myself and Dr. Wilkerson.    Medication changes:  Reduce fluid intake - limit yourself to 8 of your mugs per day  Continue to watch your salt intake closely   Start to weigh yourself daily, write it down and bring this next time  I'll talk to your primary about your Jardiance dose  Re-schedule with Sleep Center to evaluate CPAP, this is at [facility]  OK to have dental work, no cardiac contraindication to dental work    Follow up in 2-3 weeks with me.     If you have questions, concerns, or new concerning symptoms prior to your next appointment, please contact the Cardiology Nursing team at [phone].    For scheduling issues/changes, please call [phone].

## 2024-06-30 ENCOUNTER — APPOINTMENT (OUTPATIENT)
Dept: RADIOLOGY | Facility: HOSPITAL | Age: 44
End: 2024-06-30
Attending: EMERGENCY MEDICINE
Payer: OTHER MISCELLANEOUS

## 2024-06-30 ENCOUNTER — HOSPITAL ENCOUNTER (EMERGENCY)
Facility: HOSPITAL | Age: 44
Discharge: HOME | End: 2024-06-30
Admitting: EMERGENCY MEDICINE
Payer: OTHER MISCELLANEOUS

## 2024-06-30 VITALS
SYSTOLIC BLOOD PRESSURE: 135 MMHG | OXYGEN SATURATION: 94 % | HEART RATE: 92 BPM | RESPIRATION RATE: 20 BRPM | TEMPERATURE: 98.6 F | DIASTOLIC BLOOD PRESSURE: 77 MMHG

## 2024-06-30 DIAGNOSIS — S89.91XA INJURY OF RIGHT LOWER EXTREMITY, INITIAL ENCOUNTER: ICD-10-CM

## 2024-06-30 DIAGNOSIS — S93.409A ANKLE SPRAIN: ICD-10-CM

## 2024-06-30 PROCEDURE — 29515 APPLICATION SHORT LEG SPLINT: CPT | Mod: RT

## 2024-06-30 PROCEDURE — 73610 X-RAY EXAM OF ANKLE: CPT | Mod: RT

## 2024-06-30 PROCEDURE — 250N000013 HC RX MED GY IP 250 OP 250 PS 637: Performed by: EMERGENCY MEDICINE

## 2024-06-30 PROCEDURE — 99284 EMERGENCY DEPT VISIT MOD MDM: CPT | Mod: 25

## 2024-06-30 PROCEDURE — 73590 X-RAY EXAM OF LOWER LEG: CPT | Mod: RT

## 2024-06-30 RX ORDER — ACETAMINOPHEN 325 MG/1
975 TABLET ORAL ONCE
Status: COMPLETED | OUTPATIENT
Start: 2024-06-30 | End: 2024-06-30

## 2024-06-30 RX ORDER — IBUPROFEN 600 MG/1
600 TABLET, FILM COATED ORAL ONCE
Status: COMPLETED | OUTPATIENT
Start: 2024-06-30 | End: 2024-06-30

## 2024-06-30 RX ADMIN — IBUPROFEN 600 MG: 600 TABLET, FILM COATED ORAL at 17:04

## 2024-06-30 RX ADMIN — ACETAMINOPHEN 975 MG: 325 TABLET ORAL at 17:04

## 2024-06-30 ASSESSMENT — ACTIVITIES OF DAILY LIVING (ADL)
ADLS_ACUITY_SCORE: 35
ADLS_ACUITY_SCORE: 35

## 2024-06-30 ASSESSMENT — COLUMBIA-SUICIDE SEVERITY RATING SCALE - C-SSRS
2. HAVE YOU ACTUALLY HAD ANY THOUGHTS OF KILLING YOURSELF IN THE PAST MONTH?: NO
6. HAVE YOU EVER DONE ANYTHING, STARTED TO DO ANYTHING, OR PREPARED TO DO ANYTHING TO END YOUR LIFE?: NO
1. IN THE PAST MONTH, HAVE YOU WISHED YOU WERE DEAD OR WISHED YOU COULD GO TO SLEEP AND NOT WAKE UP?: NO

## 2024-06-30 NOTE — ED PROVIDER NOTES
EMERGENCY DEPARTMENT ENCOUNTER      NAME: Warren Jaramillo  AGE: 43 year old male  YOB: 1980  MRN: [id number]  EVALUATION DATE & TIME: 6/30/2024  3:45 PM    PCP: Mary Kelly    ED PROVIDER: Tricia Bonilla PA-C      Chief Complaint   Patient presents with    Fall         FINAL IMPRESSION:  1. Injury of right lower extremity, initial encounter    2. Ankle sprain        MEDICAL DECISION MAKING:    Pertinent Labs & Imaging studies reviewed. (See chart for details)  43 year old male presents to the Emergency Department for evaluation of right lower leg pain after twisting his ankle at work.  The patient works at [employer] and while unbuckling child from a ride he stepped back, twisted his ankle and felt pain in his lower leg.  He was concerned about possible fracture and presented to the emergency department.  On my evaluation, patient was vitally stable and overall well-appearing.  Examination with 2+ DP pulses. Tenderness to the distal anterior shin, no ecchymosis or swelling. No tenderness to the medial or lateral malleolus with normal range of motion of the right knee, ankle and foot. Distal CMS intact.  Differential diagnoses included fracture, dislocation, sprain/strain, contusion.    Patient did not fall or hit his head and I do not feel head imaging is indicated at this time.  Based on history and exam I did feel x-rays of the tibia/fibula as well as right ankle were indicated.  These were independently interpreted by myself and did not show any obvious fracture.  Formal read with possible avulsion fracture to the medial malleolus however, no tenderness here and low suspicion for this.  There is some widening of the ankle joint concerning for possible ligamentous injury.  Discussed high-grade sprain with patient and need for walking boot, remaining nonweightbearing and close follow-up with orthopedics.  Patient was in agreement understand with the plan of care.  Patient given walker  and told to not bear any weight on the foot/ankle on the right side and call [facility] tomorrow to get an appointment scheduled for follow-up.  We discussed return precautions and all questions were to the best my ability.  He was discharged home in stable condition.    Medical Decision Making  Obtained supplemental history:Supplemental history obtained?: No  Reviewed external records: External records reviewed?: No  Care impacted by chronic illness:Diabetes, Heart Disease, Hypertension, and Smoking / Nicotine Use  Care significantly affected by social determinants of health:N/A  Did you consider but not order tests?: Work up considered but not performed and documented in chart, if applicable  Did you interpret images independently?: Independent interpretation of ECG and images noted in documentation, when applicable.  Consultation discussion with other provider:Did you involve another provider (consultant, MH, pharmacy, etc.)?: No  Discharge. No recommendations on prescription strength medication(s). See documentation for any additional details.     ED COURSE:  4:30 PM I met with the patient, obtained history, performed an initial exam, and discussed options and plan for diagnostics and treatment here in the ED.  5:34 PM Patient discharged after being provided with extensive anticipatory guidance and given return precautions, importance of PCP follow-up emphasized.    At the conclusion of the encounter I discussed the results of all of the tests and the disposition. The questions were answered. The patient or family acknowledged understanding and was agreeable with the care plan.     MEDICATIONS GIVEN IN THE EMERGENCY:  Medications   acetaminophen (TYLENOL) tablet 975 mg (975 mg Oral $Given 6/30/24 1704)   ibuprofen (ADVIL/MOTRIN) tablet 600 mg (600 mg Oral $Given 6/30/24 1704)       NEW PRESCRIPTIONS STARTED AT TODAY'S ER VISIT  New Prescriptions    No medications on file             =================================================================    HPI:    Patient information was obtained from: patient    Use of Interpretor: N/A         Warren Jaramillo is a 43 year old male with a pertinent history of type 2 diabetes, hypertension, acute on chronic right-sided CHF, atrial fibrillation, fetal alcohol syndrome, Rojas's disease, and PTSD who presents to this ED by EMS for evaluation of ankle injury     Earlier today, the patient was at work and finished unbuckling a child from a ride. He thought he was stepping back onto a platform and accidentally stepped back onto his right foot wrong, inverting his ankle. He did not fall or hit his head. Since the incident, he has been experiencing pain to the front of his lower leg/ankle. Patient denies knee pain or any other complaints at this time.      REVIEW OF SYSTEMS:  Negative unless otherwise stated in the above HPI.       PAST MEDICAL HISTORY:  Past Medical History:   Diagnosis Date    DM2 (diabetes mellitus, type 2) (H) 4/28/2020    HTN (hypertension) 7/30/2012    Rojas's disease (H28)        PAST SURGICAL HISTORY:  Past Surgical History:   Procedure Laterality Date    COLONOSCOPY      ESOPHAGOSCOPY, GASTROSCOPY, DUODENOSCOPY (EGD), COMBINED N/A 7/21/2023    Procedure: ESOPHAGOGASTRODUODENOSCOPY WITH GASTRIC AND ESOPHAGEAL BIOPSIES;  Surgeon: Filiberto Aragon MD;  Location: [facility] OR    [facility]             CURRENT MEDICATIONS:    No current facility-administered medications for this encounter.    Current Outpatient Medications:     ACCU-CHEK GUIDE test strip, USE TO TEST BLOOD SUGAR TWICE DAILY AS NEEDED, Disp: , Rfl:     acetaminophen (TYLENOL) 500 MG tablet, Take 2 tablets (1,000 mg) by mouth 3 times daily, Disp: 30 tablet, Rfl: 0    albuterol (PROAIR HFA/PROVENTIL HFA/VENTOLIN HFA) 108 (90 Base) MCG/ACT inhaler, Inhale 1-2 puffs into the lungs every 6 hours as needed for shortness of breath, wheezing or cough, Disp:  18 g, Rfl: 0    albuterol (PROVENTIL) (2.5 MG/3ML) 0.083% neb solution, Take 2.5 mg by nebulization 3 times daily as needed for shortness of breath, wheezing or cough, Disp: , Rfl:     aloe vera GEL, Apply 1 g topically every hour as needed for skin care Per bottle directions, Disp: , Rfl:     bacitracin 500 UNIT/GM OINT, Apply topically 3 times daily as needed for wound care, Disp: , Rfl:     blood glucose monitoring (SOFTCLIX) lancets, USE AS DIRECTED TWICE DAILY AS NEEDED, Disp: , Rfl:     Blood Glucose Monitoring Suppl (ACCU-CHEK GUIDE) w/Device KIT, USE AS DIRECTED TWICE DAILY AS NEEDED, Disp: , Rfl:     Calamine external lotion, Apply topically as needed for itching, Disp: , Rfl:     clotrimazole (LOTRIMIN) 1 % external cream, Apply topically 2 times daily as needed (skin irritation), Disp: , Rfl:     cyclobenzaprine (FLEXERIL) 10 MG tablet, Take 10 mg by mouth 3 times daily as needed for muscle spasms, Disp: , Rfl:     diclofenac (VOLTAREN) 1 % topical gel, Apply 2 g topically daily as needed for moderate pain To joints/back, Disp: , Rfl:     diclofenac (VOLTAREN) 75 MG EC tablet, Take 1 tablet (75 mg) by mouth 2 times daily as needed for moderate pain, Disp: 28 tablet, Rfl: 0    empagliflozin (JARDIANCE) 10 MG TABS tablet, Take 10 mg by mouth daily, Disp: , Rfl:     escitalopram (LEXAPRO) 10 MG tablet, Take 10 mg by mouth daily, Disp: , Rfl:     famotidine (PEPCID) 20 MG tablet, Take 1 tablet (20 mg) by mouth 2 times daily, Disp: 60 tablet, Rfl: 0    fluticasone-vilanterol (BREO ELLIPTA) 200-25 MCG/ACT inhaler, Inhale 1 puff into the lungs daily, Disp: , Rfl:     furosemide (LASIX) 20 MG tablet, Take 1 tablet (20 mg) by mouth daily, Disp: 2 tablet, Rfl: 0    ibuprofen (ADVIL/MOTRIN) 600 MG tablet, Take 1 tablet (600 mg) by mouth every 6 hours as needed for moderate pain, Disp: 30 tablet, Rfl: 0    lisinopril (ZESTRIL) 10 MG tablet, Take 10 mg by mouth daily, Disp: , Rfl:     LORazepam (ATIVAN) 1 MG tablet,  Take 0.5 mg by mouth daily as needed for anxiety, Disp: , Rfl:     melatonin 3 MG tablet, Take 3 mg by mouth At Bedtime, Disp: , Rfl:     metFORMIN (GLUCOPHAGE) 1000 MG tablet, Take 1,000 mg by mouth 2 times daily (with meals), Disp: , Rfl:     montelukast (SINGULAIR) 10 MG tablet, Take 10 mg by mouth daily, Disp: , Rfl:     OLANZapine (ZYPREXA) 10 MG tablet, Take 10 mg by mouth At Bedtime, Disp: , Rfl:     omeprazole (PRILOSEC) 20 MG DR capsule, Take 40 mg by mouth daily, Disp: , Rfl:     ondansetron (ZOFRAN ODT) 4 MG ODT tab, Take 1-2 tablets (4-8 mg) by mouth every 8 hours as needed for nausea or vomiting, Disp: 20 tablet, Rfl: 0    polyethylene glycol (MIRALAX) 17 g packet, Take 1 packet by mouth daily as needed for constipation, Disp: , Rfl:     predniSONE (DELTASONE) 20 MG tablet, Take two tablets (= 40mg) each day for 4 (four) days, Disp: 8 tablet, Rfl: 0    predniSONE (DELTASONE) 20 MG tablet, Take two tablets (= 40mg) each day for four days starting tomorrow, 11/28/23. First dose given in the afternoon in the ED on 11/27/23., Disp: 8 tablet, Rfl: 0    psyllium (METAMUCIL) 28.3 % packet, Take 1 packet by mouth daily, Disp: , Rfl:     Respiratory Therapy Supplies (NEBULIZER/TUBING/MOUTHPIECE) KIT, 1 kit every 6 hours as needed (shortness of breath, wheezing), Disp: 1 kit, Rfl: 0    rosuvastatin (CRESTOR) 10 MG tablet, Take 10 mg by mouth At Bedtime, Disp: , Rfl:     sodium phosphate (FLEET ENEMA) 7-19 GM/118ML rectal enema, Place 1 enema rectally once as needed for constipation, Disp: , Rfl:     sucralfate (CARAFATE) 1 GM/10ML suspension, Take 10 mLs (1 g) by mouth 4 times daily as needed (pain), Disp: 414 mL, Rfl: 0    sucralfate (CARAFATE) 1 GM/10ML suspension, Take 10 mLs (1 g) by mouth 4 times daily, Disp: 414 mL, Rfl: 0    traZODone (DESYREL) 50 MG tablet, Take 100 mg by mouth at bedtime, Disp: , Rfl:     zinc oxide (DESITIN) 20 % external ointment, Apply topically as needed for dry skin or irritation To  groin/buttocks area, Disp: , Rfl:       ALLERGIES:  Allergies   Allergen Reactions    Apricot Flavor Anaphylaxis    Banana Anaphylaxis     Throat swelling  Throat swelling      Wasp Venom Protein Shortness Of Breath     Other reaction(s): Respiratory Distress  Has an epi pen  Has an epi pen      Bees Anaphylaxis     Have an Epi pen that carries with    Methylphenidate Itching     Other reaction(s): Nightmares    Prunus      Other reaction(s): *Unknown    Sulfa Antibiotics      Headaches and nausea    Prunus Persica Rash     Other reaction(s): *Unknown       FAMILY HISTORY:  Family History   Problem Relation Age of Onset    Unknown/Adopted Father     Unknown/Adopted Maternal Grandmother     C.A.D. Maternal Grandfather     Diabetes Maternal Grandfather     Cerebrovascular Disease Maternal Grandfather     Unknown/Adopted Paternal Grandmother     Unknown/Adopted Paternal Grandfather     Unknown/Adopted Brother     Unknown/Adopted Sister        SOCIAL HISTORY:   Social History     Socioeconomic History    Marital status: Single   Tobacco Use    Smoking status: Every Day     Current packs/day: 1.00     Types: Cigarettes    Smokeless tobacco: Never   Vaping Use    Vaping status: Never Used   Substance and Sexual Activity    Alcohol use: No     Comment: once every 3 months    Drug use: No    Sexual activity: Never     Partners: Female   Other Topics Concern     Service No    Blood Transfusions No    Caffeine Concern No    Occupational Exposure No    Hobby Hazards No    Sleep Concern No    Stress Concern Yes     Comment: sometimes    Weight Concern No    Special Diet Yes     Comment: counting carbs    Back Care No    Exercise Yes    Seat Belt Yes    Self-Exams Yes     Social Determinants of Health     Financial Resource Strain: Medium Risk (4/27/2022)    Received from [facility], [facility]    Financial Resource Strain     Difficulty of Paying Living  Expenses: 2     Difficulty of Paying Living Expenses: 1   Food Insecurity: Food Insecurity Present (4/27/2022)    [facility], [facility] [address]    Food Insecurity     Worried About Running Out of Food in the Last Year: 2   Transportation Needs: Unmet Transportation Needs (4/27/2022)    [facility], [facility]    Transportation Needs     Lack of Transportation (Medical): 2    [facility], [facility] [employer] [facility] [address]    Social Connections   Housing Stability: High Risk (4/27/2022)    [facility] from [facility], [facility] [employer] [facility] [address]    Housing Stability     Unable to Pay for Housing in the Last Year: 3       VITALS:  Patient Vitals for the past 24 hrs:   BP Temp Temp src Pulse Resp SpO2   06/30/24 1551 -- 98.6  F (37  C) Oral -- 20 --   06/30/24 1548 135/77 -- -- 92 -- 94 %       PHYSICAL EXAM    Constitutional: Well developed, Well nourished, NAD  HENT: Normocephalic, Atraumatic, Bilateral external ears normal, Oropharynx normal, mucous membranes moist, Nose normal.   Neck: Normal range of motion, No tenderness, Supple, No stridor.  Eyes: Eyes track normally throughout exam, Conjunctiva normal, No discharge.   Respiratory: Speaks full sentences easily. No cough.  Cardiovascular: Heart rate and blood pressure as above.   Musculoskeletal: 2+ DP pulses. Tenderness to the distal anterior shin, no ecchymosis or swelling. No tenderness to the medial or lateral malleolus with normal range of motion of the right knee, ankle and foot. Distal CMS intact.    Integument: Warm, Dry, No erythema, No rash. No petechiae.  Neurologic: Alert & oriented x 3, Normal motor function, Normal sensory function, No focal deficits noted. Normal gait.  Psychiatric: Affect  normal, Judgment normal, Mood normal. Cooperative.    LAB:  All pertinent labs reviewed and interpreted.  No results found for this or any previous visit (from the past 24 hour(s)).      RADIOLOGY:  Reviewed all pertinent imaging. Please see official radiology report.  XR Tibia and Fibula Right 2 Views   Final Result   IMPRESSION:       There is a small ossification acting along the medial aspect of the talus which could be due to small avulsion fracture of indeterminate age versus a small reticular loose body. Correlation with point tenderness over the medial ankle would be helpful.    There is some borderline widening of the medial ankle mortise suggesting ligamentous injury.      No tibia or fibula fracture is identified. There is soft tissue swelling over the medial ankle. A tiny ossification along the dorsal aspect of the talus on the lateral view has a more chronic appearance and is favored to relate to remote injury. There is    chronic appearing soft tissue ossification within the distal Achilles tendon.      Ankle XR, G/E 3 views, right   Final Result   IMPRESSION:       There is a small ossification acting along the medial aspect of the talus which could be due to small avulsion fracture of indeterminate age versus a small reticular loose body. Correlation with point tenderness over the medial ankle would be helpful.    There is some borderline widening of the medial ankle mortise suggesting ligamentous injury.      No tibia or fibula fracture is identified. There is soft tissue swelling over the medial ankle. A tiny ossification along the dorsal aspect of the talus on the lateral view has a more chronic appearance and is favored to relate to remote injury. There is    chronic appearing soft tissue ossification within the distal Achilles tendon.          Lisa MATOS, am serving as a scribe to document services personally performed by Tricia Bonilla PA-C based on my observation and the provider's  statements to me. I, Tricia Bonilla PA-C attest that Lisa Villafuerte is acting in a scribe capacity, has observed my performance of the services and has documented them in accordance with my direction.    Tricia Bonilla PA-C  Emergency Medicine  [facility]  6/30/2024       Tricia Bonilla PA-C  06/30/24 [id number]

## 2024-06-30 NOTE — ED TRIAGE NOTES
Patient arrives via EMS after injuring his R lower leg at work. Patient states he stepped funny on his leg and is now having pain to the R lower leg.      Triage Assessment (Adult)       Row Name 06/30/24 3692          Triage Assessment    Airway WDL WDL        Respiratory WDL    Respiratory WDL WDL        Skin Circulation/Temperature WDL    Skin Circulation/Temperature WDL WDL        Cardiac WDL    Cardiac WDL WDL        Peripheral/Neurovascular WDL    Peripheral Neurovascular WDL WDL        Cognitive/Neuro/Behavioral WDL    Cognitive/Neuro/Behavioral WDL WDL

## 2024-06-30 NOTE — ED NOTES
Bed: [id number]  Expected date: 6/30/24  Expected time: 3:32 PM  Means of arrival: Ambulance  Comments:  SPF1- 43yom fall, right ankle pain, vss, did not hit head, no thinners, no loc,

## 2024-06-30 NOTE — Clinical Note
Warren Jaramillo was seen and treated in our emergency department on 6/30/2024.  He may return to work on 07/01/2024.  Can return if able to accommodate for nonweightbearing on right leg     If you have any questions or concerns, please don't hesitate to call.      Tricia Bonilla PA-C

## 2024-06-30 NOTE — DISCHARGE INSTRUCTIONS
You were seen here after stepping wrong at work and injuring your right leg. Fortunately, x-rays are negative. I do feel you likely have a sprain.  This appears to be a significant ankle sprain and we will place you in a walking boot, give you a walker and please do not walk on the foot until you can follow-up with orthopedics.  Please call [facility] on Monday to get an appointment scheduled for follow-up.     If you reinjure your leg, cannot walk on it, move it feel it or lose blood flow to the leg return to the ED.

## 2024-07-10 ENCOUNTER — TRANSFERRED RECORDS (OUTPATIENT)
Dept: HEALTH INFORMATION MANAGEMENT | Facility: CLINIC | Age: 44
End: 2024-07-10
Payer: COMMERCIAL

## 2024-07-26 ENCOUNTER — OFFICE VISIT (OUTPATIENT)
Dept: CARDIOLOGY | Facility: CLINIC | Age: 44
End: 2024-07-26
Payer: COMMERCIAL

## 2024-07-26 VITALS
HEART RATE: 78 BPM | DIASTOLIC BLOOD PRESSURE: 50 MMHG | WEIGHT: 282 LBS | SYSTOLIC BLOOD PRESSURE: 120 MMHG | RESPIRATION RATE: 20 BRPM | BODY MASS INDEX: 45.52 KG/M2

## 2024-07-26 DIAGNOSIS — I50.813 ACUTE ON CHRONIC RIGHT-SIDED CONGESTIVE HEART FAILURE (H): ICD-10-CM

## 2024-07-26 DIAGNOSIS — R07.9 CHEST PAIN, UNSPECIFIED TYPE: ICD-10-CM

## 2024-07-26 DIAGNOSIS — R00.2 PALPITATIONS: Primary | ICD-10-CM

## 2024-07-26 PROCEDURE — G2211 COMPLEX E/M VISIT ADD ON: HCPCS | Performed by: PHYSICIAN ASSISTANT

## 2024-07-26 PROCEDURE — 93242 EXT ECG>48HR<7D RECORDING: CPT | Performed by: PHYSICIAN ASSISTANT

## 2024-07-26 PROCEDURE — 99214 OFFICE O/P EST MOD 30 MIN: CPT | Performed by: PHYSICIAN ASSISTANT

## 2024-07-26 RX ORDER — FUROSEMIDE 20 MG
40 TABLET ORAL DAILY
Qty: 60 TABLET | Refills: 3 | Status: SHIPPED | OUTPATIENT
Start: 2024-07-26

## 2024-07-26 NOTE — PATIENT INSTRUCTIONS
It was great to see you today! Your Cardiology Team includes myself and Dr. Wilkerson.     Recommendations from today:  Increase furosemide from 20 mg daily to 40 mg daily  Wear heart monitor for 3 days then mail back  Lab work in 2 weeks  Schedule cardiac MRI that was ordered at last visit   Follow up in 4-6 weeks with me.     For scheduling questions, our 24 hours scheduling line is available at [phone].    To reach our nursing team, please call [phone]. Our nursing team is available 8-4:30 Monday-Friday. If you have a medical emergency, please call [phone].

## 2024-07-26 NOTE — LETTER
7/26/2024    Mary Kelly PA-C  [facility] [address] [address]    RE: Warren Jaramillo       Dear Colleague,     I had the pleasure of seeing Warren Jaramillo in the [facility].          HEART CARE FOLLOW UP    Primary Care: Mary Kelly MD  Primary Cardiologist: Dr Wilkerson      Assessment/Recommendations   Heart Failure with preserved ejection fraction, patient has signs and symptoms of HF with EF 50-55%. Symptoms currently NYHA Class III-IV.  NT-ProBNP measured at 669 at it's highest. Risk factors for HFpEF include female sex, hypertension, heavy body mass index (>30 kg/m2), Atrial Fibrillation, pulmonary hypertension, elderly (>60 years).  Patient has had multiple visits to the emergency department for acute on chronic shortness of breath and chest pain in the last 6 months.  He often is noted to have increased vascular congestion and treated with IV diuretics.  Upon discussion at last visit, he was drinking 13 L fluid daily. With reduction, weight has been stable and no further ER visits. Remains hypervolemic, will escalate diuretics as reducing fluids was insufficient.  Diuretics: increase furosemide 20 mg daily to 40 mg daily   BMP in 2 weeks, daily weights  Patient trying to limit sodium consumption   Has been monitoring fluid intake- drinking sixteen (16!) 28 oz cups of water daily. This is 448 oz (this is 13 L fluid daily); he does this because people have told him it is healthy to drink a lot of water, he is also feeling thirsty- advised to reduce fluid intake and with this his weight has been steady and he is still having dyspnea and peripheral edema. Increasing diuretics as above  Recommend return to Sleep Center to ensure CPAP working correctly, he did have a repeat study this year but has been unable to follow-up with the sleep center regarding this.  He goes to [facility]. He has this scheduled in August.   If diabetic, goal  HgbA1C Target for glycosylated hemoglobin HbA1c is <7-7.5% for individuals with lower comorbidity burden or less severe HF and HbA1c target is <8-8.5% for more severe HF, higher comorbidity burden, and the elderly  If diabetic,  SGLT2i should be a key treatment component - patient is on Jardiance (empagliflozin) 10 mg daily  Glucagon-like peptide-1 (GLP-1) agonists or GIP antagonist may be considered for obese individuals with diabetes mellitus and HFpEF.  Will discuss with PCP  Schedule Cardiac MRI as previously advised by Dr. Wilkerson  Palpitations, having th sensation his heart is going very fast with minimal exertion, associated with dyspnea and dizziness. He struggle with dizziness, mostly with exertion but sometimes at rest as well. Symptoms happen every day.   3 day Zio monitor  Consider low-dose beta blocker to help with palpitations  Hypertension, blood pressure well controlled on current regimen. Reports medication compliance.   Continue lisinopril 10 mg daily   Continue spironolctone 25 mg daily   Dyslipidemia, Most recent  and LDL 59. Patient is maintained on therapy.  Continue rosuvastatin 10 mg daily   We discussed the role diet, exercise and weight management can play in managing dyslipidemia.   Reviewed above and plan remains the same today 7/26/2024  Tobacco Use, has cut from 3PPD to 1.5 PPD.  Encouraged continued reduction, he is doing a great job.   Alcohol use disorder in remission x 5 months. Congratulated and offered encouragement.    Return to care in 1 month with me.      History of Present Illness/Subjective    Warren Jaramillo is a 43 year old male with past medical history significant for:  Chronic noncardiac chest pain  Incomplete left bundle branch block  First degree heart block   Morbid obesity  Sleep apnea on CPAP  Diabetes Mellitus, last A1C 7.5  Autism   TBI  Tobacco use   History of alcohol abuse in remission x 4 months     The patient was last seen in in clinic June 28  of this year.  He was feeling fatigue and we get short of breath with even minimal activity.  He would feel his heart rate go fast when he was dyspneic and felt like he could not get a full breath.  His chest pain felt like a deep pressure and also like there was a knife in his chest.  It would happen almost every day and last about an hour with no associated symptoms besides shortness of breath.  He was wearing his CPAP but was still short of breath at night.  We realize he was drinking copious amount of fluids and he set a goal to reduce his fluid intake to only 8 months/day.  Since that visit unfortunately he was seen in the emergency department on June 30 after twisting his ankle at work.  He was seen at [facility] July 5 in acute psychiatric services due to suicidal thoughts.Saw PCP July 10. I do not see any further admissions or ED visits for CP or dyspnea.    Today the patient tells me he did make a big reduction in fluid intake and despite this he continues to have leg swelling and dyspnea. He still feels quite a dyspnea with activity and his heart gets going quite quickly when he tries to be active. He's tried to exercise and gets too dyspneic and feels too many palpitations. He is having a lot of dizziness. He is wearing his CPAP.  He  continues to have significant chest pain but since our last visit had felt reassured it was not hi heart and has stopped going to the ED for this.        Data Review Today:     TTE 8/2023:  Interpretation Summary     The visual ejection fraction is 50-55%.  Regional wall motion abnormalities cannot be excluded due to limited  visualization.  Right ventricular function cannot be assessed due to poor image quality.  No obvious valve disease.  Technically difficult, suboptimal study.    2022 NM Stress [facility]:  IMPRESSION:    1.  No stress-induced reversible perfusion defects.   2.  Small focal area of mildly decreased perfusion along the apical lateral wall that is  similar in both rest and stress and may reflect normal variant apical thinning versus artifact. Small focal area of infarct not entirely excluded.   3.  Normal  sized left ventricle with a left ventricular ejection fraction of greater than 65%.   4.  No stress-induced wall motion abnormalities.       3/2023 CTA Angiogram Coronaries:  Left Main   Normal.      Left Anterior Descending   The vessel and its major branches are widely patent without any detectable stenosis or plaque.      Left Circumflex   The vessel and its major branches are widely patent without any detectable stenosis or plaque.      Right Coronary Artery   The vessel and its major branches are widely patent without any detectable stenosis or plaque.      Intervention     No interventions have been documented.         I have reviewed and updated the patient's past medical history, allergy list and medication list.          Physical Examination   Vitals: /50 (BP Location: Right arm, Patient Position: Sitting, Cuff Size: Adult Large)   Pulse 78   Resp 20   Wt 127.9 kg (282 lb)   BMI 45.52 kg/m      BMI= Body mass index is 45.52 kg/m .    Wt Readings from Last 3 Encounters:   07/26/24 127.9 kg (282 lb)   06/28/24 127.5 kg (281 lb)   06/09/24 135.2 kg (298 lb)       General :   Alert and oriented, in no acute distress.    HEENT:  Normocephalic and atraumatic. .    Neck: No JVP, carotid bruit or obvious thyromegaly.   Lungs:   Respirations unlabored. Breath sounds diminished   Cardiovascular:   Rhythm is regular. S1 and S2 are normal. No significant murmur is present. Lower extremities demonstrate 1+ bilateral edema.        Skin: Skin is warm, dry, and otherwise intact.   Neurologic: Gait not assessed. Mood and affect appropriate.           Medical History  Surgical History Family History Social History   Past Medical History:   Diagnosis Date    DM2 (diabetes mellitus, type 2) (H) 4/28/2020    HTN (hypertension) 7/30/2012    Rojas's disease  (H28)     Past Surgical History:   Procedure Laterality Date    COLONOSCOPY      ESOPHAGOSCOPY, GASTROSCOPY, DUODENOSCOPY (EGD), COMBINED N/A 7/21/2023    Procedure: ESOPHAGOGASTRODUODENOSCOPY WITH GASTRIC AND ESOPHAGEAL BIOPSIES;  Surgeon: Filiberto Aragon MD;  Location: [facility]    TOOTH EXTRACTION      Family History   Problem Relation Age of Onset    Unknown/Adopted Father     Unknown/Adopted Maternal Grandmother     C.A.D. Maternal Grandfather     Diabetes Maternal Grandfather     Cerebrovascular Disease Maternal Grandfather     Unknown/Adopted Paternal Grandmother     Unknown/Adopted Paternal Grandfather     Unknown/Adopted Brother     Unknown/Adopted Sister     Social History     Socioeconomic History    Marital status: Single     Spouse name: Not on file    Number of children: Not on file    Years of education: Not on file    Highest education level: Not on file   Occupational History    Not on file   Tobacco Use    Smoking status: Every Day     Current packs/day: 1.00     Types: Cigarettes    Smokeless tobacco: Never   Vaping Use    Vaping status: Never Used   Substance and Sexual Activity    Alcohol use: No     Comment: once every 3 months    Drug use: No    Sexual activity: Never     Partners: Female   Other Topics Concern     Service No    Blood Transfusions No    Caffeine Concern No    Occupational Exposure No    Hobby Hazards No    Sleep Concern No    Stress Concern Yes     Comment: sometimes    Weight Concern No    Special Diet Yes     Comment: counting carbs    Back Care No    Exercise Yes    Bike Helmet Not Asked     Comment: N/A    Seat Belt Yes    Self-Exams Yes    Parent/sibling w/ CABG, MI or angioplasty before 65F 55M? Not Asked   Social History Narrative    Not on file     Social Determinants of Health     Financial Resource Strain: Medium Risk (4/27/2022)    [email or web address]    Financial  Resource Strain     Difficulty of Paying Living Expenses: 2     Difficulty of Paying Living Expenses: 1   Food Insecurity: Food Insecurity Present (4/27/2022)    Received [facility] [employer] [facility]    Food Insecurity     Worried About Running Out of Food in the Last Year: 2   Transportation Needs: Unmet Transportation Needs (4/27/2022)    Received from [facility] [employer] [facility] [employer] [facility], [facility]    Transportation Needs     Lack of Transportation (Medical): 2   Physical Activity: Not on file   Stress: Not on file   Social Connections: Unknown (5/1/2023)    Received from [facility] [employer] [facility] [employer] [facility], [facility]    Social Connections     Frequency of Communication with Friends and Family: Not on file   Interpersonal Safety: Not on file   Housing Stability: High Risk (4/27/2022)    Received from [facility] [employer] [facility], [facility]    Housing Stability     Unable to Pay for Housing in the Last Year: 3          Medications  Allergies   Scheduled Meds:  Current Outpatient Medications   Medication Sig Dispense Refill    ACCU-CHEK GUIDE test strip USE TO TEST BLOOD SUGAR TWICE DAILY AS NEEDED      acetaminophen (TYLENOL) 500 MG tablet Take 2 tablets (1,000 mg) by mouth 3 times daily 30 tablet 0    albuterol (PROAIR HFA/PROVENTIL HFA/VENTOLIN HFA) 108 (90 Base) MCG/ACT inhaler Inhale 1-2 puffs into the lungs every 6 hours as needed for shortness of breath, wheezing or cough 18 g 0    albuterol (PROVENTIL) (2.5 MG/3ML) 0.083% neb solution Take 2.5 mg by nebulization 3 times daily as needed for shortness of breath, wheezing or cough      aloe vera GEL Apply 1 g topically every hour as needed for skin care Per bottle directions      bacitracin 500 UNIT/GM OINT Apply topically 3 times daily as needed for wound  care      blood glucose monitoring (SOFTCLIX) lancets USE AS DIRECTED TWICE DAILY AS NEEDED      Blood Glucose Monitoring Suppl (ACCU-CHEK GUIDE) w/Device KIT USE AS DIRECTED TWICE DAILY AS NEEDED      Calamine external lotion Apply topically as needed for itching      clotrimazole (LOTRIMIN) 1 % external cream Apply topically 2 times daily as needed (skin irritation)      diclofenac (VOLTAREN) 1 % topical gel Apply 2 g topically daily as needed for moderate pain To joints/back      empagliflozin (JARDIANCE) 10 MG TABS tablet Take 10 mg by mouth daily      escitalopram (LEXAPRO) 10 MG tablet Take 10 mg by mouth daily      famotidine (PEPCID) 20 MG tablet Take 1 tablet (20 mg) by mouth 2 times daily 60 tablet 0    fluticasone-vilanterol (BREO ELLIPTA) 200-25 MCG/ACT inhaler Inhale 1 puff into the lungs daily      furosemide (LASIX) 20 MG tablet Take 2 tablets (40 mg) by mouth daily 60 tablet 3    ibuprofen (ADVIL/MOTRIN) 600 MG tablet Take 1 tablet (600 mg) by mouth every 6 hours as needed for moderate pain 30 tablet 0    lisinopril (ZESTRIL) 10 MG tablet Take 10 mg by mouth daily      LORazepam (ATIVAN) 1 MG tablet Take 0.5 mg by mouth daily as needed for anxiety      metFORMIN (GLUCOPHAGE) 1000 MG tablet Take 1,000 mg by mouth 2 times daily (with meals)      montelukast (SINGULAIR) 10 MG tablet Take 10 mg by mouth daily      OLANZapine (ZYPREXA) 10 MG tablet Take 10 mg by mouth At Bedtime      omeprazole (PRILOSEC) 20 MG DR capsule Take 40 mg by mouth daily      ondansetron (ZOFRAN ODT) 4 MG ODT tab Take 1-2 tablets (4-8 mg) by mouth every 8 hours as needed for nausea or vomiting 20 tablet 0    polyethylene glycol (MIRALAX) 17 g packet Take 1 packet by mouth daily as needed for constipation      Respiratory Therapy Supplies (NEBULIZER/TUBING/MOUTHPIECE) KIT 1 kit every 6 hours as needed (shortness of breath, wheezing) 1 kit 0    rosuvastatin (CRESTOR) 10 MG tablet Take 10 mg by mouth At Bedtime      sodium  phosphate (FLEET ENEMA) 7-19 GM/118ML rectal enema Place 1 enema rectally once as needed for constipation      sucralfate (CARAFATE) 1 GM/10ML suspension Take 10 mLs (1 g) by mouth 4 times daily 414 mL 0    traZODone (DESYREL) 50 MG tablet Take 100 mg by mouth at bedtime      diclofenac (VOLTAREN) 75 MG EC tablet Take 1 tablet (75 mg) by mouth 2 times daily as needed for moderate pain 28 tablet 0    Allergies   Allergen Reactions    Apricot Flavor Anaphylaxis    Banana Anaphylaxis     Throat swelling  Throat swelling      Wasp Venom Protein Shortness Of Breath     Other reaction(s): Respiratory Distress  Has an epi pen  Has an epi pen      Bees Anaphylaxis     Have an Epi pen that carries with    Methylphenidate Itching     Other reaction(s): Nightmares    Prunus      Other reaction(s): *Unknown    Sulfa Antibiotics      Headaches and nausea    Prunus Persica Rash     Other reaction(s): *Unknown         Lab Results    Chemistry/lipid CBC Cardiac Enzymes/BNP/TSH/INR   Lab Results   Component Value Date    CHOL 117 02/15/2024    HDL 35 (L) 02/15/2024    TRIG 116 02/15/2024    BUN 13.7 06/09/2024     06/09/2024    CO2 26 06/09/2024    Lab Results   Component Value Date    WBC 14.3 (H) 06/09/2024    HGB 13.5 06/09/2024    HCT 41.9 06/09/2024    MCV 89 06/09/2024     06/09/2024    @RESUFAST(BMP,CBC,BNP,TSH,  INR)@      36 minutes spent reviewing prior records (including documentation, laboratory studies, cardiac testing/imaging), history and physical exam, planning, and subsequent documentation.     The longitudinal plan of care for the condition(s) below were addressed during this visit. Due to the added complexity in care, I will continue to support Warren in the subsequent management of this condition(s) and with the ongoing continuity of care of this condition(s):  congestive heart failure palpitations     This note has been dictated using voice recognition software. Any grammatical, typographical,  or context distortions are unintentional and inherent to the software.    Hannah Hui PA-C, RD  General Cardiology           Thank you for allowing me to participate in the care of your patient.      Sincerely,     Hannah Hui PA-C     [facility]  cc:   Hannah Hui PA-C  [address] [place] [address]

## 2024-07-26 NOTE — NURSING NOTE
Warren Jaramillo arrived here on 7/26/2024 11:18 AM for 3-7 Days  Zio monitor placement per ordering provider Hannah Hui for the diagnosis Palpitations [R00.2] .  Patient s skin was prepped per protocol. Dr. Wilkerson is the supervising MD.  Zio monitor was placed.  Instructions were reviewed with and given to the patient.  Patient verbalized understanding of wear, troubleshooting and monitor return instructions.

## 2024-07-26 NOTE — PROGRESS NOTES
HEART CARE FOLLOW UP    Primary Care: Mary Kelly MD  Primary Cardiologist: Dr Wilkerson      Assessment/Recommendations   Heart Failure with preserved ejection fraction, patient has signs and symptoms of HF with EF 50-55%. Symptoms currently NYHA Class III-IV.  NT-ProBNP measured at 669 at it's highest. Risk factors for HFpEF include female sex, hypertension, heavy body mass index (>30 kg/m2), Atrial Fibrillation, pulmonary hypertension, elderly (>60 years).  Patient has had multiple visits to the emergency department for acute on chronic shortness of breath and chest pain in the last 6 months.  He often is noted to have increased vascular congestion and treated with IV diuretics.  Upon discussion at last visit, he was drinking 13 L fluid daily. With reduction, weight has been stable and no further ER visits. Remains hypervolemic, will escalate diuretics as reducing fluids was insufficient.  Diuretics: increase furosemide 20 mg daily to 40 mg daily   BMP in 2 weeks, daily weights  Patient trying to limit sodium consumption   Has been monitoring fluid intake- drinking sixteen (16!) 28 oz cups of water daily. This is 448 oz (this is 13 L fluid daily); he does this because people have told him it is healthy to drink a lot of water, he is also feeling thirsty- advised to reduce fluid intake and with this his weight has been steady and he is still having dyspnea and peripheral edema. Increasing diuretics as above  Recommend return to Sleep Center to ensure CPAP working correctly, he did have a repeat study this year but has been unable to follow-up with the sleep center regarding this.  He goes to [facility]. He has this scheduled in August.   If diabetic, goal HgbA1C Target for glycosylated hemoglobin HbA1c is <7-7.5% for individuals with lower comorbidity burden or less severe HF and HbA1c target is <8-8.5% for more severe HF, higher comorbidity burden, and the elderly  If diabetic,  SGLT2i  should be a key treatment component - patient is on Jardiance (empagliflozin) 10 mg daily  Glucagon-like peptide-1 (GLP-1) agonists or GIP antagonist may be considered for obese individuals with diabetes mellitus and HFpEF.  Will discuss with PCP  Schedule Cardiac MRI as previously advised by Dr. Wilkerson  Palpitations, having th sensation his heart is going very fast with minimal exertion, associated with dyspnea and dizziness. He struggle with dizziness, mostly with exertion but sometimes at rest as well. Symptoms happen every day.   3 day Zio monitor  Consider low-dose beta blocker to help with palpitations  Hypertension, blood pressure well controlled on current regimen. Reports medication compliance.   Continue lisinopril 10 mg daily   Continue spironolctone 25 mg daily   Dyslipidemia, Most recent  and LDL 59. Patient is maintained on therapy.  Continue rosuvastatin 10 mg daily   We discussed the role diet, exercise and weight management can play in managing dyslipidemia.   Reviewed above and plan remains the same today 7/26/2024  Tobacco Use, has cut from 3PPD to 1.5 PPD.  Encouraged continued reduction, he is doing a great job.   Alcohol use disorder in remission x 5 months. Congratulated and offered encouragement.    Return to care in 1 month with me.      History of Present Illness/Subjective    Warren Jaramillo is a 43 year old male with past medical history significant for:  Chronic noncardiac chest pain  Incomplete left bundle branch block  First degree heart block   Morbid obesity  Sleep apnea on CPAP  Diabetes Mellitus, last A1C 7.5  Autism   TBI  Tobacco use   History of alcohol abuse in remission x 4 months     The patient was last seen in in clinic June 28 of this year.  He was feeling fatigue and we get short of breath with even minimal activity.  He would feel his heart rate go fast when he was dyspneic and felt like he could not get a full breath.  His chest pain felt like a deep  pressure and also like there was a knife in his chest.  It would happen almost every day and last about an hour with no associated symptoms besides shortness of breath.  He was wearing his CPAP but was still short of breath at night.  We realize he was drinking copious amount of fluids and he set a goal to reduce his fluid intake to only 8 months/day.  Since that visit unfortunately he was seen in the emergency department on June 30 after twisting his ankle at work.  He was seen at [facility] July 5 in acute psychiatric services due to suicidal thoughts.Saw PCP July 10. I do not see any further admissions or ED visits for CP or dyspnea.    Today the patient tells me he did make a big reduction in fluid intake and despite this he continues to have leg swelling and dyspnea. He still feels quite a dyspnea with activity and his heart gets going quite quickly when he tries to be active. He's tried to exercise and gets too dyspneic and feels too many palpitations. He is having a lot of dizziness. He is wearing his CPAP.  He  continues to have significant chest pain but since our last visit had felt reassured it was not hi heart and has stopped going to the ED for this.        Data Review Today:     TTE 8/2023:  Interpretation Summary     The visual ejection fraction is 50-55%.  Regional wall motion abnormalities cannot be excluded due to limited  visualization.  Right ventricular function cannot be assessed due to poor image quality.  No obvious valve disease.  Technically difficult, suboptimal study.    2022 NM Stress [facility]:  IMPRESSION:    1.  No stress-induced reversible perfusion defects.   2.  Small focal area of mildly decreased perfusion along the apical lateral wall that is similar in both rest and stress and may reflect normal variant apical thinning versus artifact. Small focal area of infarct not entirely excluded.   3.  Normal  sized left ventricle with a left ventricular ejection fraction of greater  than 65%.   4.  No stress-induced wall motion abnormalities.       3/2023 CTA Angiogram Coronaries:  Left Main   Normal.      Left Anterior Descending   The vessel and its major branches are widely patent without any detectable stenosis or plaque.      Left Circumflex   The vessel and its major branches are widely patent without any detectable stenosis or plaque.      Right Coronary Artery   The vessel and its major branches are widely patent without any detectable stenosis or plaque.      Intervention     No interventions have been documented.         I have reviewed and updated the patient's past medical history, allergy list and medication list.          Physical Examination   Vitals: /50 (BP Location: Right arm, Patient Position: Sitting, Cuff Size: Adult Large)   Pulse 78   Resp 20   Wt 127.9 kg (282 lb)   BMI 45.52 kg/m      BMI= Body mass index is 45.52 kg/m .    Wt Readings from Last 3 Encounters:   07/26/24 127.9 kg (282 lb)   06/28/24 127.5 kg (281 lb)   06/09/24 135.2 kg (298 lb)       General :   Alert and oriented, in no acute distress.    HEENT:  Normocephalic and atraumatic. .    Neck: No JVP, carotid bruit or obvious thyromegaly.   Lungs:   Respirations unlabored. Breath sounds diminished   Cardiovascular:   Rhythm is regular. S1 and S2 are normal. No significant murmur is present. Lower extremities demonstrate 1+ bilateral edema.        Skin: Skin is warm, dry, and otherwise intact.   Neurologic: Gait not assessed. Mood and affect appropriate.           Medical History  Surgical History Family History Social History   Past Medical History:   Diagnosis Date    DM2 (diabetes mellitus, type 2) (H) 4/28/2020    HTN (hypertension) 7/30/2012    Rojas's disease (H28)     Past Surgical History:   Procedure Laterality Date    COLONOSCOPY      ESOPHAGOSCOPY, GASTROSCOPY, DUODENOSCOPY (EGD), COMBINED N/A 7/21/2023    Procedure: ESOPHAGOGASTRODUODENOSCOPY WITH GASTRIC AND ESOPHAGEAL BIOPSIES;   Surgeon: Filiberto Aragon MD;  Location: [facility] Main OR    TOOTH EXTRACTION      Family History   Problem Relation Age of Onset    Unknown/Adopted Father     Unknown/Adopted Maternal Grandmother     C.A.D. Maternal Grandfather     Diabetes Maternal Grandfather     Cerebrovascular Disease Maternal Grandfather     Unknown/Adopted Paternal Grandmother     Unknown/Adopted Paternal Grandfather     Unknown/Adopted Brother     Unknown/Adopted Sister     Social History     Socioeconomic History    Marital status: Single     Spouse name: Not on file    Number of children: Not on file    Years of education: Not on file    Highest education level: Not on file   Occupational History    Not on file   Tobacco Use    Smoking status: Every Day     Current packs/day: 1.00     Types: Cigarettes    Smokeless tobacco: Never   Vaping Use    Vaping status: Never Used   Substance and Sexual Activity    Alcohol use: No     Comment: once every 3 months    Drug use: No    Sexual activity: Never     Partners: Female   Other Topics Concern     Service No    Blood Transfusions No    Caffeine Concern No    Occupational Exposure No    Hobby Hazards No    Sleep Concern No    Stress Concern Yes     Comment: sometimes    Weight Concern No    Special Diet Yes     Comment: counting carbs    Back Care No    Exercise Yes    Bike Helmet Not Asked     Comment: N/A    Seat Belt Yes    Self-Exams Yes    Parent/sibling w/ CABG, MI or angioplasty before 65F 55M? Not Asked   Social History Narrative    Not on file     Social Determinants of Health     Financial Resource Strain: Medium Risk (4/27/2022)    Received from [facility], [facility]    Financial Resource Strain     Difficulty of Paying Living Expenses: 2     Difficulty of Paying Living Expenses: 1   Food Insecurity: Food Insecurity Present (4/27/2022)    [facility], [place]  [facility]    Food Insecurity     Worried About Running Out of Food in the Last Year: 2   Transportation Needs: Unmet Transportation Needs (4/27/2022)    Received from [facility], [facility]    Transportation Needs     Lack of Transportation (Medical): 2   Physical Activity: Not on file   Stress: Not on file   Social Connections: Unknown (5/1/2023)    Received from [facility], [facility]    Social Connections     Frequency of Communication with Friends and Family: Not on file   Interpersonal Safety: Not on file   Housing Stability: High Risk (4/27/2022)    Received from [facility], [facility]    Housing Stability     Unable to Pay for Housing in the Last Year: 3          Medications  Allergies   Scheduled Meds:  Current Outpatient Medications   Medication Sig Dispense Refill    ACCU-CHEK GUIDE test strip USE TO TEST BLOOD SUGAR TWICE DAILY AS NEEDED      acetaminophen (TYLENOL) 500 MG tablet Take 2 tablets (1,000 mg) by mouth 3 times daily 30 tablet 0    albuterol (PROAIR HFA/PROVENTIL HFA/VENTOLIN HFA) 108 (90 Base) MCG/ACT inhaler Inhale 1-2 puffs into the lungs every 6 hours as needed for shortness of breath, wheezing or cough 18 g 0    albuterol (PROVENTIL) (2.5 MG/3ML) 0.083% neb solution Take 2.5 mg by nebulization 3 times daily as needed for shortness of breath, wheezing or cough      aloe vera GEL Apply 1 g topically every hour as needed for skin care Per bottle directions      bacitracin 500 UNIT/GM OINT Apply topically 3 times daily as needed for wound care      blood glucose monitoring (SOFTCLIX) lancets USE AS DIRECTED TWICE DAILY AS NEEDED      Blood Glucose Monitoring Suppl (ACCU-CHEK GUIDE) w/Device KIT USE AS DIRECTED TWICE DAILY AS NEEDED      Calamine external lotion  Apply topically as needed for itching      clotrimazole (LOTRIMIN) 1 % external cream Apply topically 2 times daily as needed (skin irritation)      diclofenac (VOLTAREN) 1 % topical gel Apply 2 g topically daily as needed for moderate pain To joints/back      empagliflozin (JARDIANCE) 10 MG TABS tablet Take 10 mg by mouth daily      escitalopram (LEXAPRO) 10 MG tablet Take 10 mg by mouth daily      famotidine (PEPCID) 20 MG tablet Take 1 tablet (20 mg) by mouth 2 times daily 60 tablet 0    fluticasone-vilanterol (BREO ELLIPTA) 200-25 MCG/ACT inhaler Inhale 1 puff into the lungs daily      furosemide (LASIX) 20 MG tablet Take 2 tablets (40 mg) by mouth daily 60 tablet 3    ibuprofen (ADVIL/MOTRIN) 600 MG tablet Take 1 tablet (600 mg) by mouth every 6 hours as needed for moderate pain 30 tablet 0    lisinopril (ZESTRIL) 10 MG tablet Take 10 mg by mouth daily      LORazepam (ATIVAN) 1 MG tablet Take 0.5 mg by mouth daily as needed for anxiety      metFORMIN (GLUCOPHAGE) 1000 MG tablet Take 1,000 mg by mouth 2 times daily (with meals)      montelukast (SINGULAIR) 10 MG tablet Take 10 mg by mouth daily      OLANZapine (ZYPREXA) 10 MG tablet Take 10 mg by mouth At Bedtime      omeprazole (PRILOSEC) 20 MG DR capsule Take 40 mg by mouth daily      ondansetron (ZOFRAN ODT) 4 MG ODT tab Take 1-2 tablets (4-8 mg) by mouth every 8 hours as needed for nausea or vomiting 20 tablet 0    polyethylene glycol (MIRALAX) 17 g packet Take 1 packet by mouth daily as needed for constipation      Respiratory Therapy Supplies (NEBULIZER/TUBING/MOUTHPIECE) KIT 1 kit every 6 hours as needed (shortness of breath, wheezing) 1 kit 0    rosuvastatin (CRESTOR) 10 MG tablet Take 10 mg by mouth At Bedtime      sodium phosphate (FLEET ENEMA) 7-19 GM/118ML rectal enema Place 1 enema rectally once as needed for constipation      sucralfate (CARAFATE) 1 GM/10ML suspension Take 10 mLs (1 g) by mouth 4 times daily 414 mL 0    traZODone (DESYREL) 50 MG  tablet Take 100 mg by mouth at bedtime      diclofenac (VOLTAREN) 75 MG EC tablet Take 1 tablet (75 mg) by mouth 2 times daily as needed for moderate pain 28 tablet 0    Allergies   Allergen Reactions    Apricot Flavor Anaphylaxis    Banana Anaphylaxis     Throat swelling  Throat swelling      Wasp Venom Protein Shortness Of Breath     Other reaction(s): Respiratory Distress  Has an epi pen  Has an epi pen      Bees Anaphylaxis     Have an Epi pen that carries with    Methylphenidate Itching     Other reaction(s): Nightmares    Prunus      Other reaction(s): *Unknown    Sulfa Antibiotics      Headaches and nausea    Prunus Persica Rash     Other reaction(s): *Unknown         Lab Results    Chemistry/lipid CBC Cardiac Enzymes/BNP/TSH/INR   Lab Results   Component Value Date    CHOL 117 02/15/2024    HDL 35 (L) 02/15/2024    TRIG 116 02/15/2024    BUN 13.7 06/09/2024     06/09/2024    CO2 26 06/09/2024    Lab Results   Component Value Date    WBC 14.3 (H) 06/09/2024    HGB 13.5 06/09/2024    HCT 41.9 06/09/2024    MCV 89 06/09/2024     06/09/2024    @RESUFAST(BMP,CBC,BNP,TSH,  INR)@      36 minutes spent reviewing prior records (including documentation, laboratory studies, cardiac testing/imaging), history and physical exam, planning, and subsequent documentation.     The longitudinal plan of care for the condition(s) below were addressed during this visit. Due to the added complexity in care, I will continue to support Warren in the subsequent management of this condition(s) and with the ongoing continuity of care of this condition(s):  congestive heart failure palpitations     This note has been dictated using voice recognition software. Any grammatical, typographical, or context distortions are unintentional and inherent to the software.    Hannah Hui PA-C, RD  General Cardiology

## 2024-08-05 ENCOUNTER — APPOINTMENT (OUTPATIENT)
Dept: CT IMAGING | Facility: HOSPITAL | Age: 44
End: 2024-08-05
Attending: STUDENT IN AN ORGANIZED HEALTH CARE EDUCATION/TRAINING PROGRAM
Payer: COMMERCIAL

## 2024-08-05 ENCOUNTER — HOSPITAL ENCOUNTER (EMERGENCY)
Facility: HOSPITAL | Age: 44
Discharge: HOME | End: 2024-08-05
Attending: STUDENT IN AN ORGANIZED HEALTH CARE EDUCATION/TRAINING PROGRAM | Admitting: STUDENT IN AN ORGANIZED HEALTH CARE EDUCATION/TRAINING PROGRAM
Payer: COMMERCIAL

## 2024-08-05 ENCOUNTER — TRANSFERRED RECORDS (OUTPATIENT)
Dept: EMERGENCY MEDICINE | Facility: HOSPITAL | Age: 44
End: 2024-08-05

## 2024-08-05 ENCOUNTER — MEDICAL CORRESPONDENCE (OUTPATIENT)
Dept: HEALTH INFORMATION MANAGEMENT | Facility: CLINIC | Age: 44
End: 2024-08-05

## 2024-08-05 VITALS
OXYGEN SATURATION: 96 % | HEART RATE: 71 BPM | DIASTOLIC BLOOD PRESSURE: 53 MMHG | RESPIRATION RATE: 18 BRPM | SYSTOLIC BLOOD PRESSURE: 107 MMHG | HEIGHT: 65 IN | BODY MASS INDEX: 45.82 KG/M2 | WEIGHT: 275 LBS | TEMPERATURE: 98.7 F

## 2024-08-05 DIAGNOSIS — R07.9 CHEST PAIN, UNSPECIFIED TYPE: ICD-10-CM

## 2024-08-05 LAB
ANION GAP SERPL CALCULATED.3IONS-SCNC: 14 MMOL/L (ref 7–15)
BASOPHILS # BLD AUTO: 0.1 10E3/UL (ref 0–0.2)
BASOPHILS NFR BLD AUTO: 1 %
BUN SERPL-MCNC: 13.7 MG/DL (ref 6–20)
CALCIUM SERPL-MCNC: 9 MG/DL (ref 8.8–10.4)
CHLORIDE SERPL-SCNC: 102 MMOL/L (ref 98–107)
CREAT SERPL-MCNC: 0.83 MG/DL (ref 0.67–1.17)
EGFRCR SERPLBLD CKD-EPI 2021: >90 ML/MIN/1.73M2
EOSINOPHIL # BLD AUTO: 0.5 10E3/UL (ref 0–0.7)
EOSINOPHIL NFR BLD AUTO: 3 %
ERYTHROCYTE [DISTWIDTH] IN BLOOD BY AUTOMATED COUNT: 16.3 % (ref 10–15)
GLUCOSE SERPL-MCNC: 127 MG/DL (ref 70–99)
HCO3 SERPL-SCNC: 24 MMOL/L (ref 22–29)
HCT VFR BLD AUTO: 42.2 % (ref 40–53)
HGB BLD-MCNC: 13.2 G/DL (ref 13.3–17.7)
IMM GRANULOCYTES # BLD: 0.1 10E3/UL
IMM GRANULOCYTES NFR BLD: 1 %
LYMPHOCYTES # BLD AUTO: 2.9 10E3/UL (ref 0.8–5.3)
LYMPHOCYTES NFR BLD AUTO: 19 %
MCH RBC QN AUTO: 27.7 PG (ref 26.5–33)
MCHC RBC AUTO-ENTMCNC: 31.3 G/DL (ref 31.5–36.5)
MCV RBC AUTO: 89 FL (ref 78–100)
MONOCYTES # BLD AUTO: 1.1 10E3/UL (ref 0–1.3)
MONOCYTES NFR BLD AUTO: 7 %
NEUTROPHILS # BLD AUTO: 10.8 10E3/UL (ref 1.6–8.3)
NEUTROPHILS NFR BLD AUTO: 70 %
NRBC # BLD AUTO: 0 10E3/UL
NRBC BLD AUTO-RTO: 0 /100
NT-PROBNP SERPL-MCNC: 595 PG/ML (ref 0–450)
PLATELET # BLD AUTO: 300 10E3/UL (ref 150–450)
POTASSIUM SERPL-SCNC: 3.3 MMOL/L (ref 3.4–5.3)
RBC # BLD AUTO: 4.76 10E6/UL (ref 4.4–5.9)
SODIUM SERPL-SCNC: 140 MMOL/L (ref 135–145)
TROPONIN T SERPL HS-MCNC: 22 NG/L
TROPONIN T SERPL HS-MCNC: 26 NG/L
WBC # BLD AUTO: 15.5 10E3/UL (ref 4–11)

## 2024-08-05 PROCEDURE — 250N000013 HC RX MED GY IP 250 OP 250 PS 637: Performed by: STUDENT IN AN ORGANIZED HEALTH CARE EDUCATION/TRAINING PROGRAM

## 2024-08-05 PROCEDURE — 85025 COMPLETE CBC W/AUTO DIFF WBC: CPT | Performed by: STUDENT IN AN ORGANIZED HEALTH CARE EDUCATION/TRAINING PROGRAM

## 2024-08-05 PROCEDURE — 99285 EMERGENCY DEPT VISIT HI MDM: CPT | Mod: 25

## 2024-08-05 PROCEDURE — 71275 CT ANGIOGRAPHY CHEST: CPT

## 2024-08-05 PROCEDURE — 84484 ASSAY OF TROPONIN QUANT: CPT | Performed by: STUDENT IN AN ORGANIZED HEALTH CARE EDUCATION/TRAINING PROGRAM

## 2024-08-05 PROCEDURE — 250N000011 HC RX IP 250 OP 636: Performed by: STUDENT IN AN ORGANIZED HEALTH CARE EDUCATION/TRAINING PROGRAM

## 2024-08-05 PROCEDURE — 96374 THER/PROPH/DIAG INJ IV PUSH: CPT | Mod: 59

## 2024-08-05 PROCEDURE — 83880 ASSAY OF NATRIURETIC PEPTIDE: CPT | Performed by: STUDENT IN AN ORGANIZED HEALTH CARE EDUCATION/TRAINING PROGRAM

## 2024-08-05 PROCEDURE — 93005 ELECTROCARDIOGRAM TRACING: CPT | Performed by: STUDENT IN AN ORGANIZED HEALTH CARE EDUCATION/TRAINING PROGRAM

## 2024-08-05 PROCEDURE — 36415 COLL VENOUS BLD VENIPUNCTURE: CPT | Performed by: STUDENT IN AN ORGANIZED HEALTH CARE EDUCATION/TRAINING PROGRAM

## 2024-08-05 PROCEDURE — 96375 TX/PRO/DX INJ NEW DRUG ADDON: CPT | Mod: 59

## 2024-08-05 PROCEDURE — 80048 BASIC METABOLIC PNL TOTAL CA: CPT | Performed by: STUDENT IN AN ORGANIZED HEALTH CARE EDUCATION/TRAINING PROGRAM

## 2024-08-05 RX ORDER — MAGNESIUM HYDROXIDE/ALUMINUM HYDROXICE/SIMETHICONE 120; 1200; 1200 MG/30ML; MG/30ML; MG/30ML
15 SUSPENSION ORAL ONCE
Status: COMPLETED | OUTPATIENT
Start: 2024-08-05 | End: 2024-08-05

## 2024-08-05 RX ORDER — IOPAMIDOL 755 MG/ML
100 INJECTION, SOLUTION INTRAVASCULAR ONCE
Status: COMPLETED | OUTPATIENT
Start: 2024-08-05 | End: 2024-08-05

## 2024-08-05 RX ORDER — KETOROLAC TROMETHAMINE 15 MG/ML
15 INJECTION, SOLUTION INTRAMUSCULAR; INTRAVENOUS ONCE
Status: COMPLETED | OUTPATIENT
Start: 2024-08-05 | End: 2024-08-05

## 2024-08-05 RX ADMIN — FAMOTIDINE 20 MG: 10 INJECTION, SOLUTION INTRAVENOUS at 02:10

## 2024-08-05 RX ADMIN — KETOROLAC TROMETHAMINE 15 MG: 15 INJECTION, SOLUTION INTRAMUSCULAR; INTRAVENOUS at 02:10

## 2024-08-05 RX ADMIN — ALUMINUM HYDROXIDE, MAGNESIUM HYDROXIDE, AND SIMETHICONE 15 ML: 200; 200; 20 SUSPENSION ORAL at 02:11

## 2024-08-05 RX ADMIN — IOPAMIDOL 100 ML: 755 INJECTION, SOLUTION INTRAVENOUS at 03:23

## 2024-08-05 ASSESSMENT — COLUMBIA-SUICIDE SEVERITY RATING SCALE - C-SSRS
1. IN THE PAST MONTH, HAVE YOU WISHED YOU WERE DEAD OR WISHED YOU COULD GO TO SLEEP AND NOT WAKE UP?: NO
6. HAVE YOU EVER DONE ANYTHING, STARTED TO DO ANYTHING, OR PREPARED TO DO ANYTHING TO END YOUR LIFE?: NO
2. HAVE YOU ACTUALLY HAD ANY THOUGHTS OF KILLING YOURSELF IN THE PAST MONTH?: NO

## 2024-08-05 ASSESSMENT — ACTIVITIES OF DAILY LIVING (ADL)
ADLS_ACUITY_SCORE: 35

## 2024-08-05 NOTE — ED NOTES
Bed: JNED-25  Expected date: 8/5/24  Expected time: 1:36 AM  Means of arrival: Ambulance  Comments:  43 M ALETHEA Mcclendon

## 2024-08-05 NOTE — DISCHARGE INSTRUCTIONS
Follow-up at your regularly scheduled cardiology appointment.  Continue your current medications as directed.    Please return to the emergency department if your symptoms worsen.

## 2024-08-05 NOTE — ED PROVIDER NOTES
EMERGENCY DEPARTMENT ENCOUNTER      NAME: Warren Jaramillo  AGE: 43 year old male  YOB: 1980  MRN: [id number]  EVALUATION DATE & TIME: No admission date for patient encounter.    PCP: Mary Kelly    ED PROVIDER: Milton Ernandez MD      Chief Complaint   Patient presents with    Chest Pain         FINAL IMPRESSION:  1. Chest pain, unspecified type          ED COURSE & MEDICAL DECISION MAKING:    Pertinent Labs & Imaging studies reviewed. (See chart for details)  43 year old male presents to the Emergency Department for evaluation of chest pain    ED Course as of 08/05/24 0426   Mon Aug 05, 2024   0147 EKG is sinus rhythm at a rate of 84 with first-degree AV block and occasional PVCs and a rightward axis deviation.  No ST segment changes indicative of emergent ischemia.   0147 Chart review of the patient's last cardiology visit 1 week ago reviewed data from his most recent echo in August 2023, with an EF of 50 to 55%, and a stress test at [facility] in 2022 that did not show any inducible ischemia.  At that office visit, they increased his diuretic from 20 mg to 40 mg of Lasix, and restricted his fluid intake, and have plans to follow-up in 1 month.   0151 Patient is down 7 pounds from the cardiology visit listed above.   0157 Patient is a 43-year-old male who presents the emergency department with left-sided chest pain that began 30 minutes prior to arrival, woke him up from sleep, without associated shortness of breath, diaphoresis, nausea/vomiting.  He has reproducible tenderness over his left chest wall, without rash or injury.  He has 1+ pitting edema bilaterally to his knees.  Clear breath sounds.  Differential diagnosis includes gastritis/esophagitis, ACS, PE, CHF exacerbation.   0240 My leukocytosis of 15.5.  No significant anemia.  BMP is at the patient's approximate baseline at 600.  No major electrolyte abnormalities that require emergent intervention, no kidney injury.  Initial  troponin is 22, will repeat at the 2-hour.   0358 CT scan does not show any acute pathology.   0426 Delta troponin is less than 6.  Will discharge at this time with return precautions and instructions to follow-up with his cardiologist at his regularly scheduled appointment.         Medical Decision Making  Obtained supplemental history:Supplemental history obtained?: No  Reviewed external records: External records reviewed?: Documented in chart  Care impacted by chronic illness:Heart Disease, Hypertension, and Mental Health  Care significantly affected by social determinants of health:Access to Medical Care  Did you consider but not order tests?: Work up considered but not performed and documented in chart, if applicable  Did you interpret images independently?: Independent interpretation of ECG and images noted in documentation, when applicable.  Consultation discussion with other provider:Did you involve another provider (consultant, , pharmacy, etc.)?: No  Discharge. No recommendations on prescription strength medication(s). See documentation for any additional details.        At the conclusion of the encounter I discussed the results of all of the tests and the disposition. The questions were answered. The patient or family acknowledged understanding and was agreeable with the care plan.     0 minutes of critical care time     MEDICATIONS GIVEN IN THE EMERGENCY:  Medications   famotidine (PEPCID) injection 20 mg (20 mg Intravenous $Given 8/5/24 0210)   ketorolac (TORADOL) injection 15 mg (15 mg Intravenous $Given 8/5/24 0210)   alum & mag hydroxide-simethicone (MAALOX) suspension 15 mL (15 mLs Oral $Given 8/5/24 0211)   iopamidol (ISOVUE-370) solution 100 mL (100 mLs Intravenous $Given 8/5/24 0323)       NEW PRESCRIPTIONS STARTED AT TODAY'S ER VISIT  New Prescriptions    No medications on file          =================================================================    HPI    Patient information was obtained  from: The patient    Use of : N/A       Warren Jaramillo is a 43 year old male with a pertinent history of HTN, T2DM, CHF, PAF, who presents to this ED via walk-in for evaluation of chest pain.    The patient woke up 30 minutes prior to arrival due to a sudden onset of sharp left-sided chest pain. He called the ambulance and was given Nitroglycerin and reports his pain improved from a 7/10 to a 6/10, but now his pain has returned back to a 7/10. He also reports of an electrical shock-like discomfort to his chest intermittently. He endorses of a dry cough for the past 3 days. He has been compliant with taking his Lasix 40mg daily and has lost 7 pounds since his last ER visit. He is not on any blood thinners. No history of blood clots.     Otherwise, the patient denied having nausea, vomiting, and any other medical complaints at this time.        PAST MEDICAL HISTORY:  Past Medical History:   Diagnosis Date    DM2 (diabetes mellitus, type 2) (H) 4/28/2020    HTN (hypertension) 7/30/2012    Rojas's disease (H28)        PAST SURGICAL HISTORY:  Past Surgical History:   Procedure Laterality Date    COLONOSCOPY      ESOPHAGOSCOPY, GASTROSCOPY, DUODENOSCOPY (EGD), COMBINED N/A 7/21/2023    Procedure: ESOPHAGOGASTRODUODENOSCOPY WITH GASTRIC AND ESOPHAGEAL BIOPSIES;  Surgeon: Filiberto Aragon MD;  Location: [facility] OR    TOOTH EXTRACTION             CURRENT MEDICATIONS:    ACCU-CHEK GUIDE test strip  acetaminophen (TYLENOL) 500 MG tablet  albuterol (PROAIR HFA/PROVENTIL HFA/VENTOLIN HFA) 108 (90 Base) MCG/ACT inhaler  albuterol (PROVENTIL) (2.5 MG/3ML) 0.083% neb solution  aloe vera GEL  bacitracin 500 UNIT/GM OINT  blood glucose monitoring (SOFTCLIX) lancets  Blood Glucose Monitoring Suppl (ACCU-CHEK GUIDE) w/Device KIT  Calamine external lotion  clotrimazole (LOTRIMIN) 1 % external cream  diclofenac (VOLTAREN) 1 % topical gel  diclofenac (VOLTAREN) 75 MG EC tablet  empagliflozin (JARDIANCE) 10 MG  TABS tablet  escitalopram (LEXAPRO) 10 MG tablet  famotidine (PEPCID) 20 MG tablet  fluticasone-vilanterol (BREO ELLIPTA) 200-25 MCG/ACT inhaler  furosemide (LASIX) 20 MG tablet  ibuprofen (ADVIL/MOTRIN) 600 MG tablet  lisinopril (ZESTRIL) 10 MG tablet  LORazepam (ATIVAN) 1 MG tablet  metFORMIN (GLUCOPHAGE) 1000 MG tablet  montelukast (SINGULAIR) 10 MG tablet  OLANZapine (ZYPREXA) 10 MG tablet  omeprazole (PRILOSEC) 20 MG DR capsule  ondansetron (ZOFRAN ODT) 4 MG ODT tab  polyethylene glycol (MIRALAX) 17 g packet  Respiratory Therapy Supplies (NEBULIZER/TUBING/MOUTHPIECE) KIT  rosuvastatin (CRESTOR) 10 MG tablet  sodium phosphate (FLEET ENEMA) 7-19 GM/118ML rectal enema  sucralfate (CARAFATE) 1 GM/10ML suspension  traZODone (DESYREL) 50 MG tablet        ALLERGIES:  Allergies   Allergen Reactions    Apricot Flavor Anaphylaxis    Banana Anaphylaxis     Throat swelling  Throat swelling      Wasp Venom Protein Shortness Of Breath     Other reaction(s): Respiratory Distress  Has an epi pen  Has an epi pen      Bees Anaphylaxis     Have an Epi pen that carries with    Methylphenidate Itching     Other reaction(s): Nightmares    Prunus      Other reaction(s): *Unknown    Sulfa Antibiotics      Headaches and nausea    Prunus Persica Rash     Other reaction(s): *Unknown       FAMILY HISTORY:  Family History   Problem Relation Age of Onset    Unknown/Adopted Father     Unknown/Adopted Maternal Grandmother     C.A.D. Maternal Grandfather     Diabetes Maternal Grandfather     Cerebrovascular Disease Maternal Grandfather     Unknown/Adopted Paternal Grandmother     Unknown/Adopted Paternal Grandfather     Unknown/Adopted Brother     Unknown/Adopted Sister        SOCIAL HISTORY:   Social History     Socioeconomic History    Marital status: Single   Tobacco Use    Smoking status: Every Day     Current packs/day: 1.00     Types: Cigarettes    Smokeless tobacco: Never   Vaping Use    Vaping status: Never Used   Substance and Sexual  "Activity    Alcohol use: No     Comment: once every 3 months    Drug use: No    Sexual activity: Never     Partners: Female   Other Topics Concern     Service No    Blood Transfusions No    Caffeine Concern No    Occupational Exposure No    Hobby Hazards No    Sleep Concern No    Stress Concern Yes     Comment: sometimes    Weight Concern No    Special Diet Yes     Comment: counting carbs    Back Care No    Exercise Yes    Seat Belt Yes    Self-Exams Yes     Social Determinants of Health     Financial Resource Strain: Medium Risk (4/27/2022)    Received from [employer] [facility], [facility] [employer] [facility]    Financial Resource Strain     Difficulty of Paying Living Expenses: 2     Difficulty of Paying Living Expenses: 1   Food Insecurity: Food Insecurity Present (4/27/2022)    Received from [employer] [facility], [facility] [employer] [facility] [employer] [facility]    Food Insecurity     Worried About Running Out of Food in the Last Year: 2   Transportation Needs: Unmet Transportation Needs (4/27/2022)    Received from [employer] [facility], [facility] [employer] [facility]    Transportation Needs     Lack of Transportation (Medical): 2    Received from [employer] [facility], [facility] [employer] [facility]    Social Connections   Housing Stability: High Risk (4/27/2022)    Received from [employer] [facility], [facility] [employer] [facility] [employer] [facility]    Housing Stability     Unable to Pay for Housing in the Last Year: 3       VITALS:  /56   Pulse 72   Temp 98.7  F (37.1  C) (Oral)   Resp 18   Ht 1.651 m (5' 5\")   Wt 124.7 kg (275 lb)   SpO2 95%   BMI 45.76 kg/m      PHYSICAL EXAM    Physical Exam  Vitals and nursing note reviewed.   Constitutional:       General: He is not in acute distress.     Appearance: Normal appearance. He is normal " weight. He is not ill-appearing.   HENT:      Head: Normocephalic and atraumatic.      Nose: Nose normal.      Mouth/Throat:      Mouth: Mucous membranes are moist.      Pharynx: Oropharynx is clear.   Eyes:      Extraocular Movements: Extraocular movements intact.      Conjunctiva/sclera: Conjunctivae normal.      Pupils: Pupils are equal, round, and reactive to light.   Cardiovascular:      Rate and Rhythm: Normal rate and regular rhythm.      Pulses: Normal pulses.      Heart sounds: Normal heart sounds. No murmur heard.  Pulmonary:      Effort: Pulmonary effort is normal. No respiratory distress.      Breath sounds: Normal breath sounds.   Abdominal:      General: Abdomen is flat. There is no distension.      Palpations: Abdomen is soft.      Tenderness: There is no abdominal tenderness.   Musculoskeletal:         General: Normal range of motion.      Cervical back: Normal range of motion.      Right lower leg: Edema present.      Left lower leg: Edema present.   Skin:     General: Skin is warm and dry.      Capillary Refill: Capillary refill takes less than 2 seconds.   Neurological:      General: No focal deficit present.      Mental Status: He is alert and oriented to person, place, and time. Mental status is at baseline.   Psychiatric:         Mood and Affect: Mood normal.         Behavior: Behavior normal.         Thought Content: Thought content normal.         Judgment: Judgment normal.            LAB:  All pertinent labs reviewed and interpreted.  Results for orders placed or performed during the hospital encounter of 08/05/24   CT Chest Pulmonary Embolism w Contrast    Impression    IMPRESSION:  1.  There is no pulmonary embolus, aortic aneurysm or dissection.  2.  Mosaic appearance of the lungs consistent with small airways disease.  3.  Hepatomegaly and nodular contour of the liver suggesting cirrhosis.   Basic metabolic panel   Result Value Ref Range    Sodium 140 135 - 145 mmol/L    Potassium 3.3  (L) 3.4 - 5.3 mmol/L    Chloride 102 98 - 107 mmol/L    Carbon Dioxide (CO2) 24 22 - 29 mmol/L    Anion Gap 14 7 - 15 mmol/L    Urea Nitrogen 13.7 6.0 - 20.0 mg/dL    Creatinine 0.83 0.67 - 1.17 mg/dL    GFR Estimate >90 >60 mL/min/1.73m2    Calcium 9.0 8.8 - 10.4 mg/dL    Glucose 127 (H) 70 - 99 mg/dL   Result Value Ref Range    Troponin T, High Sensitivity 22 <=22 ng/L   Nt probnp inpatient (BNP)   Result Value Ref Range    N terminal Pro BNP Inpatient 595 (H) 0 - 450 pg/mL   CBC with platelets and differential   Result Value Ref Range    WBC Count 15.5 (H) 4.0 - 11.0 10e3/uL    RBC Count 4.76 4.40 - 5.90 10e6/uL    Hemoglobin 13.2 (L) 13.3 - 17.7 g/dL    Hematocrit 42.2 40.0 - 53.0 %    MCV 89 78 - 100 fL    MCH 27.7 26.5 - 33.0 pg    MCHC 31.3 (L) 31.5 - 36.5 g/dL    RDW 16.3 (H) 10.0 - 15.0 %    Platelet Count 300 150 - 450 10e3/uL    % Neutrophils 70 %    % Lymphocytes 19 %    % Monocytes 7 %    % Eosinophils 3 %    % Basophils 1 %    % Immature Granulocytes 1 %    NRBCs per 100 WBC 0 <1 /100    Absolute Neutrophils 10.8 (H) 1.6 - 8.3 10e3/uL    Absolute Lymphocytes 2.9 0.8 - 5.3 10e3/uL    Absolute Monocytes 1.1 0.0 - 1.3 10e3/uL    Absolute Eosinophils 0.5 0.0 - 0.7 10e3/uL    Absolute Basophils 0.1 0.0 - 0.2 10e3/uL    Absolute Immature Granulocytes 0.1 <=0.4 10e3/uL    Absolute NRBCs 0.0 10e3/uL   Troponin T, High Sensitivity (now)   Result Value Ref Range    Troponin T, High Sensitivity 26 (H) <=22 ng/L       RADIOLOGY:  Reviewed all pertinent imaging. Please see official radiology report.  CT Chest Pulmonary Embolism w Contrast   Final Result   IMPRESSION:   1.  There is no pulmonary embolus, aortic aneurysm or dissection.   2.  Mosaic appearance of the lungs consistent with small airways disease.   3.  Hepatomegaly and nodular contour of the liver suggesting cirrhosis.          PROCEDURES:   None      [facility] Documentation:   CMS Diagnoses:               Fernando MATOS am serving as a  scribe to document services personally performed by Milton Ernandez MD  based on my observation and the provider's statements to me. I, Milton Ernandez, attest that Fernando Vivian is acting in a scribe capacity, has observed my performance of the services and has documented them in accordance with my direction.    Milton Ernandez MD  [facility] EMERGENCY DEPARTMENT  [address] [place] [address] [id number] [phone]       Milton Ernandez MD  08/05/24 0426

## 2024-08-05 NOTE — ED TRIAGE NOTES
Patient arrives from home via [facility]. Reports sharp left sided chest pain that woke him from sleep about 30 minutes prior to calling EMS. Patient states his lasix dose was recently changed and states he noticed increased bilateral leg swelling. Hx of CHF. Was given nitroglycerin and ASA prior to arrival.     Triage Assessment (Adult)       Row Name 08/05/24 0146          Triage Assessment    Airway WDL WDL        Skin Circulation/Temperature WDL    Skin Circulation/Temperature WDL WDL        Cardiac WDL    Cardiac WDL X;chest pain        Chest Pain Assessment    Chest Pain Location midsternal     Character sharp        Cognitive/Neuro/Behavioral WDL    Cognitive/Neuro/Behavioral WDL WDL

## 2024-08-05 NOTE — Clinical Note
Warren Jaramillo was seen and treated in our emergency department on 8/5/2024.  He may return to work on 08/06/2024.       If you have any questions or concerns, please don't hesitate to call.      Milton Ernandez MD

## 2024-08-07 PROCEDURE — 93244 EXT ECG>48HR<7D REV&INTERPJ: CPT | Performed by: INTERNAL MEDICINE

## 2024-08-07 NOTE — RESULT ENCOUNTER NOTE
Reviewed ZIO monitor that was ordered to evaluate the sensation that his heart was going fast with minimal exertion.  This showed average heart rate of 79 and good heart rate variability, max heart rate 116 bpm.  There was rare supra ventricular ectopic beats, less than 1% of his beats and 21 nonsustained SVT runs with the fastest interval being 5 beats at a rate of 174 and the longest being 16 seconds.  There were also rare PVCs.    Patient updated via Tube[employer].     Malik Newell,  I reviewed your ZIO monitor which was very reassuring.There were no concerning findings, you do have some extra beats that sometimes, in a row.  These can be bothersome but they are not dangerous.  If you like we could use a low-dose of a medication that might suppress these extra beats and see if that helps your sensation of racing heart.  I would recommend metoprolol succinate 25 mg daily.    Please let me know if you like to try that or if you have any questions!  Hannah Hui PA-C

## 2024-08-12 LAB
ATRIAL RATE - MUSE: 84 BPM
DIASTOLIC BLOOD PRESSURE - MUSE: 57 MMHG
INTERPRETATION ECG - MUSE: NORMAL
P AXIS - MUSE: 69 DEGREES
PR INTERVAL - MUSE: 212 MS
QRS DURATION - MUSE: 112 MS
QT - MUSE: 392 MS
QTC - MUSE: 463 MS
R AXIS - MUSE: 106 DEGREES
SYSTOLIC BLOOD PRESSURE - MUSE: 117 MMHG
T AXIS - MUSE: -79 DEGREES
VENTRICULAR RATE- MUSE: 84 BPM

## 2024-08-16 PROBLEM — I47.10 SVT (SUPRAVENTRICULAR TACHYCARDIA) (H): Status: ACTIVE | Noted: 2024-08-16

## 2024-08-16 PROBLEM — I50.30 HEART FAILURE WITH PRESERVED EJECTION FRACTION, NYHA CLASS I (H): Status: ACTIVE | Noted: 2024-08-16

## 2024-08-16 PROBLEM — I44.7 INCOMPLETE LEFT BUNDLE BRANCH BLOCK (LBBB): Status: ACTIVE | Noted: 2024-08-16

## 2024-08-16 PROBLEM — I49.3 PVC'S (PREMATURE VENTRICULAR CONTRACTIONS): Status: ACTIVE | Noted: 2024-08-16

## 2024-09-02 ENCOUNTER — NURSE TRIAGE (OUTPATIENT)
Dept: NURSING | Facility: CLINIC | Age: 44
End: 2024-09-02
Payer: COMMERCIAL

## 2024-09-02 ENCOUNTER — HOSPITAL ENCOUNTER (EMERGENCY)
Facility: CLINIC | Age: 44
Discharge: HOME | End: 2024-09-02
Attending: EMERGENCY MEDICINE | Admitting: EMERGENCY MEDICINE
Payer: COMMERCIAL

## 2024-09-02 VITALS
RESPIRATION RATE: 16 BRPM | TEMPERATURE: 97.7 F | HEART RATE: 95 BPM | HEIGHT: 65 IN | WEIGHT: 284 LBS | SYSTOLIC BLOOD PRESSURE: 128 MMHG | DIASTOLIC BLOOD PRESSURE: 61 MMHG | BODY MASS INDEX: 47.32 KG/M2 | OXYGEN SATURATION: 98 %

## 2024-09-02 DIAGNOSIS — R45.851 SUICIDAL IDEATION: ICD-10-CM

## 2024-09-02 LAB — GLUCOSE BLDC GLUCOMTR-MCNC: 104 MG/DL (ref 70–99)

## 2024-09-02 PROCEDURE — 99283 EMERGENCY DEPT VISIT LOW MDM: CPT

## 2024-09-02 PROCEDURE — 82962 GLUCOSE BLOOD TEST: CPT

## 2024-09-02 RX ORDER — NICOTINE 21 MG/24HR
1 PATCH, TRANSDERMAL 24 HOURS TRANSDERMAL ONCE
Status: DISCONTINUED | OUTPATIENT
Start: 2024-09-02 | End: 2024-09-03 | Stop reason: HOSPADM

## 2024-09-02 ASSESSMENT — COLUMBIA-SUICIDE SEVERITY RATING SCALE - C-SSRS
5. HAVE YOU STARTED TO WORK OUT OR WORKED OUT THE DETAILS OF HOW TO KILL YOURSELF? DO YOU INTEND TO CARRY OUT THIS PLAN?: NO
1. IN THE PAST MONTH, HAVE YOU WISHED YOU WERE DEAD OR WISHED YOU COULD GO TO SLEEP AND NOT WAKE UP?: YES
2. HAVE YOU ACTUALLY HAD ANY THOUGHTS OF KILLING YOURSELF IN THE PAST MONTH?: YES
6. HAVE YOU EVER DONE ANYTHING, STARTED TO DO ANYTHING, OR PREPARED TO DO ANYTHING TO END YOUR LIFE?: YES
3. HAVE YOU BEEN THINKING ABOUT HOW YOU MIGHT KILL YOURSELF?: YES
4. HAVE YOU HAD THESE THOUGHTS AND HAD SOME INTENTION OF ACTING ON THEM?: NO

## 2024-09-02 ASSESSMENT — ACTIVITIES OF DAILY LIVING (ADL)
ADLS_ACUITY_SCORE: 35

## 2024-09-02 NOTE — TELEPHONE ENCOUNTER
"Nurse Triage SBAR    Is this a 2nd Level Triage? No    Situation/Background: Patient is calling with concern of requesting a mental health evaluation. Patient thinks his olanzapine is causing suicidal ideation. Patient states he is sleeping all day, feel antisocial, feels angry, feels that he wants to \"jump off a bridge.\" Patient states he is calling for assistance on where to go for help.     Assessment:   Patient states he has been sleeping all day, which is not normal for him.   Patient states he does not want to and does not have the intention to harm himself now.     Recommendation: Per disposition, Go to ED now. Advised patient to go to the [facility] ED. Gave patient information about the EMPATH unit available at [facility]. Patient states he does not have transportation, will call 911. Advised patient that ED Provider will assess the patient. Asked patient if he feels safe at this time; patient stated he felt safe and would go to [facility] ED. Patient was advised to call back if worsening symptoms or additional questions or concerns. Patient verbalized understanding.     Protocol Recommended Disposition: Emergency department    Aileen Ronquillo RN on 9/2/2024 at 6:12 PM  [facility]  Reason for Disposition   [1] Depression symptoms (sadness, hopelessness, decreased energy) AND [2] unable to do any normal activities (e.g., self care, school, work; in comparison to baseline).    Additional Information   Negative: Patient attempted suicide   Negative: Patient is threatening suicide now   Negative: Violent behavior, or threatening to physically hurt or kill someone   Negative: [1] Patient is very confused (disoriented, slurred speech) AND [2] no other adult (e.g., friend or family member) available   Negative: [1] Difficult to awaken or acting very confused (disoriented, slurred speech) AND [2] new-onset   Negative: Sounds like a life-threatening emergency to the triager   " Negative: Depression is main symptom and is not threatening suicide   Negative: Threatening to physically hurt or kill someone    Protocols used: Suicide [id number]

## 2024-09-03 ENCOUNTER — TELEPHONE (OUTPATIENT)
Dept: BEHAVIORAL HEALTH | Facility: CLINIC | Age: 44
End: 2024-09-03
Payer: COMMERCIAL

## 2024-09-03 NOTE — ED PROVIDER NOTES
Emergency Department Note      History of Present Illness     Chief Complaint   Psychiatric Evaluation      HPI   Warren Jaramillo is a 43 year old male who presents to the emergency department with concerns of suicidal ideation.  Patient lives in a group home and has a guardian.  Patient notes that they think that the olanzapine is causing increased suicidal ideation.  Patient has been thinking of jumping off of a bridge but has no plans to do so.  Patient notes that he has not taken a shower in a number of days.  He denies any thoughts of harm to others.    Independent Historian   None    Past Medical History     Medical History and Problem List   Past Medical History:   Diagnosis Date    DM2 (diabetes mellitus, type 2) (H) 4/28/2020    HTN (hypertension) 7/30/2012    Rojas's disease (H28)        Medications   ACCU-CHEK GUIDE test strip  acetaminophen (TYLENOL) 500 MG tablet  albuterol (PROAIR HFA/PROVENTIL HFA/VENTOLIN HFA) 108 (90 Base) MCG/ACT inhaler  albuterol (PROVENTIL) (2.5 MG/3ML) 0.083% neb solution  aloe vera GEL  bacitracin 500 UNIT/GM OINT  blood glucose monitoring (SOFTCLIX) lancets  Blood Glucose Monitoring Suppl (ACCU-CHEK GUIDE) w/Device KIT  Calamine external lotion  clotrimazole (LOTRIMIN) 1 % external cream  diclofenac (VOLTAREN) 1 % topical gel  diclofenac (VOLTAREN) 75 MG EC tablet  empagliflozin (JARDIANCE) 10 MG TABS tablet  escitalopram (LEXAPRO) 10 MG tablet  famotidine (PEPCID) 20 MG tablet  fluticasone-vilanterol (BREO ELLIPTA) 200-25 MCG/ACT inhaler  furosemide (LASIX) 20 MG tablet  ibuprofen (ADVIL/MOTRIN) 600 MG tablet  lisinopril (ZESTRIL) 10 MG tablet  LORazepam (ATIVAN) 1 MG tablet  metFORMIN (GLUCOPHAGE) 1000 MG tablet  montelukast (SINGULAIR) 10 MG tablet  OLANZapine (ZYPREXA) 10 MG tablet  omeprazole (PRILOSEC) 20 MG DR capsule  ondansetron (ZOFRAN ODT) 4 MG ODT tab  polyethylene glycol (MIRALAX) 17 g packet  Respiratory Therapy Supplies (NEBULIZER/TUBING/MOUTHPIECE)  "KIT  rosuvastatin (CRESTOR) 10 MG tablet  sodium phosphate (FLEET ENEMA) 7-19 GM/118ML rectal enema  sucralfate (CARAFATE) 1 GM/10ML suspension  traZODone (DESYREL) 50 MG tablet        Surgical History   Past Surgical History:   Procedure Laterality Date    COLONOSCOPY      ESOPHAGOSCOPY, GASTROSCOPY, DUODENOSCOPY (EGD), COMBINED N/A 7/21/2023    Procedure: ESOPHAGOGASTRODUODENOSCOPY WITH GASTRIC AND ESOPHAGEAL BIOPSIES;  Surgeon: Filiberto Aragon MD;  Location: [facility] OR    TOOTH EXTRACTION         Physical Exam     Patient Vitals for the past 24 hrs:   BP Temp Temp src Pulse Resp SpO2 Height Weight   09/02/24 2009 128/61 97.7  F (36.5  C) Temporal 95 16 98 % 1.651 m (5' 5\") 128.8 kg (284 lb)     Physical Exam  Eyes:  The pupils are equal and round    Conjunctivae and sclerae are normal  ENT:    The nose is normal    Pinnae are normal  CV:  Regular rate and rhythm     No edema  Resp:  Lungs are clear    Non-labored    No rales    No wheezing    MS:  Normal muscular tone    No asymmetric leg swelling  Skin:  No rash or acute skin lesions noted  Neuro:   Awake, alert.      Speech is normal and fluent.    Face is symmetric.     Moves all extremities  Psych: Patient endorses thoughts of suicide.  He has had thoughts of jumping off of a bridge, but he has no intention or plan to do so.  He denies any thoughts of harm to others.       Diagnostics     Lab Results   Labs Ordered and Resulted from Time of ED Arrival to Time of ED Departure   GLUCOSE BY METER - Abnormal       Result Value    GLUCOSE BY METER POCT 104 (*)    GLUCOSE MONITOR NURSING POCT     Independent Interpretation   None    ED Course      Medications Administered   Medications   nicotine (NICODERM CQ) 21 MG/24HR 24 hr patch 1 patch (has no administration in time range)       Procedures   Procedures     Discussion of Management   ED Mental Health, DEC    ED Course        Additional Documentation  None    Medical Decision Making / Diagnosis     CMS " Diagnoses: None    MIPS       None    MDM   Warren Jaramillo is a 43 year old male who presents to the emergency department with suicidal ideation.  Patient notes that he thinks symptoms are related to his olanzapine medication.  He denies other concerns.  He was seen by our mental health  here in the emergency department.  Mental health  had contact with the group home as well as the patient's guardian.  Guardian and mental health  both felt patient was appropriate for discharge and recommended against admission as it would be detrimental to his utilization of the hospital.  I discussed this with the patient and he was feeling improved and felt comfortable with plan for discharge.  He requested a check of his blood sugar which was performed and found to be just above 100.  He was also given some wipes and a new pair of depends to use.  He was discharged to home.  He is encouraged return with any new or worrisome symptoms.  .  Disposition   The patient was discharged.     Diagnosis     ICD-10-CM    1. Suicidal ideation  R45.851            Discharge Medications   Discharge Medication List as of 9/2/2024 11:52 PM            MD Kaila Zhang Aaron Joseph, MD  09/03/24 0058

## 2024-09-03 NOTE — ED TRIAGE NOTES
"Patient states takes olanzapine.   Not feeling like self today.  Down, depressed.  Grilled out today &\"I am usually is the life of the party.  Having anger & hearing voices.     Triage Assessment (Adult)       Row Name 09/02/24 2010          Triage Assessment    Airway WDL WDL        Respiratory WDL    Respiratory WDL WDL        Cardiac WDL    Cardiac WDL WDL        Cognitive/Neuro/Behavioral WDL    Cognitive/Neuro/Behavioral WDL WDL                     "

## 2024-09-03 NOTE — ED NOTES
Pt is under Guardianship/Conservatorship, per [facility] court order, [id number] (professional guardian/ Jesika Harden/ [phone]). LMHP collected collateral and informed [facility].

## 2024-09-03 NOTE — ED NOTES
[facility]  ED to EMPATH Checklist:      Goal for EMPATH: Medication management    Current Behavior: Calm and Cooperative    Safety Concerns: None    Legal Hold Status: Voluntary    Medically Cleared by ED provider: Yes    Patient Therapeutically Searched: Not searched - Currently in triage    Belongings: Remain with patient    Independent Ambulation at Baseline: Yes/No: Yes    Participates in Care/Conversation: Yes/No: Yes    Patient Informed about EMPATH: Yes/No: Yes    DEC: Ordered and pending    Patient Ready to be Transferred to EMPATH? Yes/No: Yes

## 2024-09-03 NOTE — PLAN OF CARE
"Warren Jaramillo  September 2, 2024  Plan of Care Hand-off Note     Patient Care Path: discharge    Plan for Care:   Pt appears to be appropriate for outpatient, community, levels of care and does not need to remain in the emegency department or hospitalized. Pt denies hearing voices telling him to \"jump off a bridge\" while in the ED. Pt does not endorse auditory or visual halluncinations at this time. Per chart review, pt has a history of chronic SI complicated by major life changes. Pt lost his job at [employer] this past week and has not recieved his final paycheck. Pt has been working with the same professional guardian (Jesika Fina [phone]) for over 15 years and found out she will be retiring and a new guardian will be assigned. Pt also expresses concerns about group home staff not helping him wipe after using the toliet, pt does indicate that this is something he is able to do on his own but prefers others do it because they do a better job. Pt does not report any prior suicide attempts and says he was last hospitalized for his mental health \"when I could smoke in hospitals still\".  Pt was able to safety plan with [facility] and agrees to return to the hospital if symptoms worsen. Pt's legal guardian has been contacted and is in support of the pt returning to his group home. Pt agrees he will follow up with his established outpatient mental health providers about his medication concerns.    Identified Goals and Safety Issues: Safety Planning    Overview:  Legal Guardian/Jesika Fina  [phone] Group Home/ [phone] [address]LAIR [facility] Emergency Room [address]          Legal Status: Legal Status at Admission: Guardian/ad litum, Conservator    Psychiatry Consult: No       Updated Devante Bright MD   regarding plan of care.           ARBEN Ribeiro        "

## 2024-09-03 NOTE — CONSULTS
"Diagnostic Evaluation Consultation  Crisis Assessment    Patient Name: Warren Jaramillo  Age:  43 year old  Legal Sex: male  Gender Identity: male  Pronouns: He/Him/His  Race: White  Ethnicity: Not  or   Language: English      Patient was assessed: In person   Crisis Assessment Start Date: 09/02/24  Crisis Assessment Start Time: 2210  Crisis Assessment Stop Time: 2240  Patient location: [facility] EMERGENCY DEPT                             TR03    Referral Data and Chief Complaint  Warren Jaramillo presents to the ED by  self, with family/friends (Pt was dropped off by his father.). Patient is presenting to the ED for the following concerns: Suicidal ideation, Worsening psychosocial stress.   Factors that make the mental health crisis life threatening or complex are:  Patient is a 43 year old man under guardianship/conservator w/ a professional guardian. He presents to the emergency department by himself because earlier today around 12 or 1pm, he heard voices telling him to \"jump off a bridge\". (Pt told LMHP his dad picked him up from his group home and dropped him off at the ED, pt indicates his tx team decided he should not call 911 for an ambulance unless he is hurt). In the ED, patient denies hearing any voices and does not endorse auditory or visual hallucinations. Patient has a history of chronic SI especially when dealing w/ major life changes. Patient reports he lost his job at the [employer] last week and hasn't received his last paycheck.  Patient also found out this his professional guardian of 15 years (Jesika Harden) is retiring and he will be assigned to a new guardian and he hasn't been introduced yet.  Patient indicates he would like to stay the night at [facility] and talk to a provider about his Olanzapine, he states he has been taking it since 2016 and recently found out one of the side effects is suicidal ideation. During the assessment, patient remembered " "he has a guardianship court hearing in the morning and would like to attend. Patient was able to safety plan w/LMHP and does not endorse suicidal or homicidal ideation, does not endorse auditory or visual hallucinations.      Informed Consent and Assessment Methods  Explained the crisis assessment process, including applicable information disclosures and limits to confidentiality, assessed understanding of the process, and obtained consent to proceed with the assessment.  Assessment methods included conducting a formal interview with patient, review of medical records, collaboration with medical staff, and obtaining relevant collateral information from family and community providers when available.  : done, other (see comments) (Consent from pt and legal guardian.)     Patient response to interventions: acceptance expressed, verbalizes understanding  Coping skills were attempted to reduce the crisis:  Pt called his dad and asked for a ride to the ED, pt was able to safety plan and has interest in attending a court hearing tomorrow.     History of the Crisis   Pt is 43 year old man with a professional legal guardian per Guardianship/Conservator ship order [id number] (Jesika Harden [phone]).  Pt has a history of Autism Spectrum Disorder and chronic suicidal ideation w/ frequent related ED visits. Per patient's group home staff and guardian, he likes going to the hospital and will call 911 when he is upset and does not get his way. Per guardian, they have tried not to reinforce his behavior of frequently coming to the ED and strongly prefer that he returns home. Pt denies prior suicide attempts and states he has not been hospitalized for his mental health since he could \"smoke cigarettes in the hospital\". Pt denies suicidal and homicidal ideation in the ED. He would like to talk to a provider about his Olanzapine, he states he has been taking it since 2016 and recently found out one of the side effects is " "suicidal ideation.    Brief Psychosocial History  Family:  Single, Children no  Support System:  Facility resident(s)/Staff, Other (specify)  Employment Status:  unemployed  Source of Income:  disability, salary/wages  Financial Environmental Concerns:  other (see comments), unemployed (Pt lost his job at [employer] last week.)  Current Hobbies:  music, other (see comments)  Barriers in Personal Life:  behavioral concerns, cognitive limitations, financial concerns, mental health concerns    Significant Clinical History  Current Anxiety Symptoms:     Current Depression/Trauma:     Current Somatic Symptoms:     Current Psychosis/Thought Disturbance:     Current Eating Symptoms:     Chemical Use History:      Past diagnosis:     Family history:     Past treatment:     Details of most recent treatment:  Pt lives in a group home setting and receives in home supports, per guardian pt also has a therapist and psychiatrist.  Other relevant history:  Pt was evaluated in the ED  for hallucinations and SI on 7/5 and was discharged the same day.       Collateral Information  Is there collateral information: Yes     Collateral information name, relationship, phone number:  Pt has a professional legal guardian/ Jesika Harden/ [phone]. Pt lives in a group home [phone]    What happened today: Pt lives in a group home, his professional guardian Jesika state she has not talked to him since last week. But she is under the impression that this visit is for \"attention seeking\", Jesika says she has worked with pt for over 15 years as his professional guardian and he utilizes hospitals and churches to receive certain supports.     What is different about patient's functioning: Per professional guardian, pt has established outpatient mental health providers. Pt's guardian states she thinks this is a \"behavioral\" visit as the pt hasn't had any other mental health concerns. Jesika has been working w/pt for 15 years, she does not have " any concerns for drugs or alcohol. Pt has made comments about being suicidal in the past, nothing current.     Concern about alcohol/drug use:      What do you think the patient needs:      Has patient made comments about wanting to kill themselves/others: no    If d/c is recommended, can they take part in safety/aftercare planning:  yes    Additional collateral information:  Pt and professional guardian have been working together for 15 years, Jesika doesn't have any concerns for drugs or alcohol, or suicidal ideation. Guardian is requesting pt return to group home to not encourage regular visits to the ED.     Risk Assessment  Ona Suicide Severity Rating Scale Full Clinical Version:  Suicidal Ideation  Q1 Wish to be Dead (Lifetime): Yes  Q2 Non-Specific Active Suicidal Thoughts (Lifetime): No  3. Active Suicidal Ideation with any Methods (Not Plan) Without Intent to Act (Lifetime): No  Q4 Active Suicidal Ideation with Some Intent to Act, Without Specific Plan (Lifetime): No  Q5 Active Suicidal Ideation with Specific Plan and Intent (Lifetime): No  Q6 Suicide Behavior (Lifetime): yes     Suicidal Behavior (Lifetime)  Actual Attempt (Lifetime): No  Has subject engaged in non-suicidal self-injurious behavior? (Lifetime): Yes  Interrupted Attempts (Lifetime): No  Aborted or Self-Interrupted Attempt (Lifetime): Yes  Preparatory Acts or Behavior (Lifetime): No    Ona Suicide Severity Rating Scale Recent:   Suicidal Ideation (Recent)  Q1 Wished to be Dead (Past Month): yes  Q2 Suicidal Thoughts (Past Month): no  Q3 Suicidal Thought Method: no  Q4 Suicidal Intent without Specific Plan: no  Q5 Suicide Intent with Specific Plan: no  If yes to Q6, within past 3 months?: no  Level of Risk per Screen: moderate risk     Suicidal Behavior (Recent)  Actual Attempt (Past 3 Months): No  Has subject engaged in non-suicidal self-injurious behavior? (Past 3 Months): No  Interrupted Attempts (Past 3 Months): No  Aborted or  Self-Interrupted Attempt (Past 3 Months): No  Preparatory Acts or Behavior (Past 3 Months): No    Environmental or Psychosocial Events:    Protective Factors: Protective Factors: lives in a responsibly safe and stable environment, good treatment engagement, sense of importance of health and wellness, able to access care without barriers, supportive ongoing medical and mental health care relationships, help seeking    Does the patient have thoughts of harming others? Is the patient engaging in sexually inappropriate behavior?: no    Is the patient engaging in sexually inappropriate behavior?  no        Mental Status Exam   Affect: Appropriate  Appearance: Appropriate  Attention Span/Concentration: Attentive  Eye Contact: Engaged    Fund of Knowledge: Appropriate   Language /Speech Content: Fluent, Expressive Speech  Language /Speech Volume: Loud, Normal  Language /Speech Rate/Productions: Normal  Recent Memory: Intact  Remote Memory: Intact  Mood: Normal  Orientation to Person: Yes   Orientation to Place: Yes  Orientation to Time of Day: Yes  Orientation to Date: Yes     Situation (Do they understand why they are here?): Yes  Psychomotor Behavior: Normal  Thought Content: Suicidal  Thought Form: Intact, Goal Directed        Medication  Psychotropic medications:   Medication Orders - Psychiatric (From admission, onward)      Start     Dose/Rate Route Frequency Ordered Stop    09/02/24 2115  nicotine (NICODERM CQ) 21 MG/24HR 24 hr patch 1 patch         1 patch  over 24 Hours Transdermal ONCE 09/02/24 2106               Current Care Team  Patient Care Team:  Mary Kelly PA-C as PCP - General (Family Medicine)  Zach Cardoso MD as MD (Cardiovascular Disease)  Ladarius Wilkerson MD as MD (Cardiovascular Disease)  Anurag Peña MD as Assigned Musculoskeletal Provider  Jessy Stewart MD as MD (Cardiovascular Disease)  Betty Bradford APRN CNP as Assigned Pulmonology Provider  Hannah Hui  "BLAS as Assigned Heart and Vascular Provider    Diagnosis  Patient Active Problem List   Diagnosis Code    Attention deficit hyperactivity disorder (ADHD) F90.9    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    Other acne L70.8    Elevated WBCs D72.829    Rectal bleeding K62.5    Anal fissure K60.2    \"high flow priapism\"  N48.30    CARDIOVASCULAR SCREENING; LDL GOAL LESS THAN 160 Z13.6    PTSD (post-traumatic stress disorder) F43.10    Fetal alcohol syndromes Q86.0    Seasonal allergic rhinitis J30.2    Steatohepatitis K75.81    Cardiomegaly I51.7    Shortness of breath R06.02    Chest pain, unspecified type R07.9    Acute right hip pain M25.551    Acute on chronic right-sided congestive heart failure (H) I50.813    Active autistic disorder F84.0    Adjustment disorder with disturbance of conduct F43.24    Angiomyolipoma D17.9    Ankylosis, sacroiliac joint M43.28    Ascites R18.8    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11    PVC's (premature ventricular contractions) I49.3    SVT (supraventricular tachycardia) (H24) I47.10    Heart failure with preserved ejection fraction, NYHA class I (H) I50.30    Incomplete left bundle branch block (LBBB) I44.7    Suicidal ideation R45.851       Primary Problem This Admission   Suicidal ideation R45.851      Active autistic disorder   Patient Active Problem List   Diagnosis Code    Attention " "deficit hyperactivity disorder (ADHD) F90.9    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    Other acne L70.8    Elevated WBCs D72.829    Rectal bleeding K62.5    Anal fissure K60.2    \"high flow priapism\"  N48.30    CARDIOVASCULAR SCREENING; LDL GOAL LESS THAN 160 Z13.6    PTSD (post-traumatic stress disorder) F43.10    Fetal alcohol syndromes Q86.0    Seasonal allergic rhinitis J30.2    Steatohepatitis K75.81    Cardiomegaly I51.7    Shortness of breath R06.02    Chest pain, unspecified type R07.9    Acute right hip pain M25.551    Acute on chronic right-sided congestive heart failure (H) I50.813    Active autistic disorder F84.0    Adjustment disorder with disturbance of conduct F43.24    Angiomyolipoma D17.9    Ankylosis, sacroiliac joint M43.28    Ascites R18.8    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11    PVC's (premature ventricular contractions) I49.3    SVT (supraventricular tachycardia) (H24) I47.10    Heart failure with preserved ejection fraction, NYHA class I (H) I50.30    Incomplete left bundle branch block (LBBB) I44.7    Suicidal ideation R45.851       Clinical Summary and Substantiation of Recommendations   Pt appears to be appropriate for outpatient, community, levels of care and does not need to remain in the emergency department or hospitalized. Pt denies hearing voices telling him to \"jump off a bridge\" while in " "the ED. Pt does not endorse auditory or visual hallucinations at this time. Per chart review, pt has a history of chronic SI complicated by major life changes. Pt lost his job at [employer] this past week and has not received his final paycheck. Pt has been working with the same professional guardian (Jesika Harden [phone]) for over 15 years and found out she will be retiring and a new guardian will be assigned. Pt also expresses concerns about group home staff not helping him wipe after using the toilet, pt does indicate that this is something he is able to do on his own but prefers others do it because they do a better job. Pt does not report any prior suicide attempts and says he was last hospitalized for his mental health \"when I could smoke in hospitals still\".  Pt was able to safety plan with [facility] and agrees to return to the hospital if symptoms worsen. Pt's legal guardian has been contacted and is in support of the pt returning to his group home. Pt agrees he will follow up with his established outpatient mental health providers about his medication concerns.     Patient coping skills attempted to reduce the crisis:  Pt called his dad and asked for a ride to the ED, pt was able to safety plan and has interest in attending a court hearing tomorrow.    Disposition  Recommended disposition: Individual Therapy, Medication Management (Discharge w/ safety plan and follow up with established outpatient mental health medication provider. Pt's legal guardian is also in support of him returning to group home.)        Reviewed case and recommendations with attending provider. Attending Name: Yes, Devante Bright MD       Attending concurs with disposition: yes       Patient and/or validated legal guardian concurs with disposition:   yes       Final disposition:  discharge    Legal status on admission: Guardian/ad litum, Conservator    Assessment Details   Total duration spent with the patient: 30 min   "   CPT code(s) utilized: 26119 - Psychotherapy for Crisis - 60 (30-74*) min    ARBEN Ribeiro, Psychotherapist  [facility] - Triage & Transition Services  Callback: [phone]           9

## 2024-09-03 NOTE — DISCHARGE INSTRUCTIONS
Aftercare Plan    Follow up with established providers and supports as scheduled. Continue taking medications as prescribed. Abstain from drugs and alcohol. Utilize your [facility] mental health crisis team as needed. They are available 24/7. Contact information is listed below.       If I am feeling unsafe or I am in a crisis, I will:   Contact my established care providers   Call the [place] Suicide Prevention Lifeline: [phone]   Go to the nearest emergency room   Call 911     Warning signs that I or other people might notice when a crisis is developing for me: changes to sleep, appetite or mood, increased anger, agitation or irritability, feeling depressed or hopeless, spending more time alone or talking less, increased crying, decreased productivity, seeing or hearing things that aren't there, thoughts of not wanting to live anymore or of actually killing myself, thoughts of hurting others    Things I am able to do on my own to cope or help me feel better: watching a favorite tv show or movie, listening to music I enjoy, going outside and breathing fresh air, going for a walk or exercising, taking a shower or bath, a cold or hot beverage, a healthy snack, drawing/coloring/painting, journaling, singing or dancing, deep breathing     I can try practicing square breathing when I begin to feel anxious - inhale through the nose for the count of 4 and the first line on the square. Exhale through the mouth for the count of 4 for the second line of the square. Repeat to complete the square. Repeat the square as many times as needed.    I can also use my five senses to practice mindfulness and grounding. What are five things I can see, four things I can hear, three things I can feel, two things I can smell, and one thing I can taste.     Things that I am able to do with others to cope or help me feel better: sometimes just talking or spending time with someone else, sharing a meal or having coffee, watching a movie or  "playing a game, going for a walk or exercising    I can also use community resources including mental health hotlines, [facility] crisis teams, or apps.     Things I can use or do for distraction: movies/tv, music, reading, games, drawing/coloring/painting or other art, essential oils, exercise, cleaning/organizing, puzzles, crossword puzzles, word search, Sudoku       I can also download a meditation or relaxation chai, like Calm, Headspace, or Insight Timer (all three offer a free version)    Changes I can make to support my mental health and wellness: Attend scheduled mental health therapy and psychiatric appointments. Take my medications as prescribed. Maintain a daily schedule/routine. Abstain from all mood altering substances, including drugs, alcohol, or medications not currently prescribed to me. Implement a self-care routine.      People in my life that I can ask for help: friends or family, trusted teachers/staff/colleagues, trusted members of my community or place of [place], mental health crisis lines, or 911    Your [facility] has a mental health crisis team you can call 24/7: [facility], [phone]    Other things that are important when I m in crisis: to remember that the feelings I am having right now are temporary, and it won't feel like this forever, and that it is okay and important to ask for help    Crisis Lines  Crisis Text Line  Text 648720  You will be connected with a trained live crisis counselor to provide support.    Por espanol, texto  DEEJAY a 199169 o texto a 442-AYUDAME en WhatsApp    [facility] [place] Hope Line  1.800.SUICIDE [8393433]      Community Resources  Fast Tracker  Linking people to mental health and substance use disorder resources  fasttrackermn.org     [place] Mental Health Warm Line  Peer to peer support  Monday thru Saturday, 12 pm to 10 pm  818.197.0851 or 9.030.510.7089  Text \"Support\" to 53901    National [place] on Mental Illness (HANK)  276.778.6787 or " 1.888.HANK.HELPS      Mental Health Apps  My3  https://myCarrituspp.org/    VirtualHopeBox  https://ieCrowd/apps/virtual-hope-box/      Additional Information  Today you were seen by a licensed mental health professional through Triage and Transition services, Behavioral Healthcare Providers ([facility])  for a crisis assessment in the Emergency Department at [facility].  It is recommended that you follow up with your established providers (psychiatrist, mental health therapist, and/or primary care doctor - as relevant) as soon as possible. Coordinators from [facility] will be calling you in the next 24-48 hours to ensure that you have the resources you need.  You can also contact [facility] coordinators directly at [phone]. You may have been scheduled for or offered an appointment with a mental health provider. [facility] maintains an extensive network of licensed behavioral health providers to connect patients with the services they need.  We do not charge providers a fee to participate in our referral network.  We match patients with providers based on a patient's specific needs, insurance coverage, and location.  Our first effort will be to refer you to a provider within your care system, and will utilize providers outside your care system as needed.        Discharge Instructions  Mental Health Concerns    You were seen today for mental health concerns, such as depression, anxiety, or suicidal thinking. Your provider feels that you do not require hospitalization at this time. However, your symptoms may become worse, and you may need to return to the Emergency Department. Most treatments of depression and suicidal thoughts are a process rather than a single intervention.  Medications and counseling can take several weeks or more to help.    Generally, every Emergency Department visit should have a follow-up clinic visit with either a primary or a specialty clinic/provider. Please follow-up as instructed by your emergency  provider today.    By accepting these discharge instructions:  You promise to not harm yourself or others.  You agree that if you feel you are becoming unable to keep that promise, you will do something to help yourself before you do anything to harm yourself or others.   You agree to keep any safety plan arranged on your visit here today.  You agree to take any medication prescribed or recommended by your provider.  If you are getting worse, you can contact a friend or a family member, contact your counselor or family provider, contact a crisis line, or other options discussed with the provider or therapist today.  At any time, you can call 911 and return to the Emergency Department for more help.  You understand that follow-up is essential to your treatment, and you will make and keep appointments recommended on your visit today.    How to improve your mental health and prevent suicide:  Involve others by letting family, friends, counselors know.  Do not isolate yourself.  Avoid alcohol or drugs. Remove weapons, poisons from your home.  Try to stick to routines for eating, sleeping and getting regular exercise.    Try to get into sunlight. Bright natural light not only treats seasonal affective disorder but also depression.  Increase safe activities that you enjoy.    If you feel worse, contact 1-794-ZUROGRM (1-334.722.4714), or call 911, or your primary provider/counselor for additional assistance.    If you were given a prescription for medicine here today, be sure to read all of the information (including the package insert) that comes with your prescription.  This will include important information about the medicine, its side effects, and any warnings that you need to know about.  The pharmacist who fills the prescription can provide more information and answer questions you may have about the medicine.  If you have questions or concerns that the pharmacist cannot address, please call or return to the  Emergency Department.   Remember that you can always come back to the Emergency Department if you are not able to see your regular provider in the amount of time listed above, if you get any new symptoms, or if there is anything that worries you.

## 2024-09-11 ENCOUNTER — TRANSFERRED RECORDS (OUTPATIENT)
Dept: HEALTH INFORMATION MANAGEMENT | Facility: CLINIC | Age: 44
End: 2024-09-11

## 2024-09-13 ENCOUNTER — APPOINTMENT (OUTPATIENT)
Dept: LAB | Facility: HOSPITAL | Age: 44
End: 2024-09-13
Payer: COMMERCIAL

## 2024-09-13 ENCOUNTER — HOSPITAL ENCOUNTER (OUTPATIENT)
Facility: HOSPITAL | Age: 44
Discharge: HOME | End: 2024-09-13
Attending: STUDENT IN AN ORGANIZED HEALTH CARE EDUCATION/TRAINING PROGRAM
Payer: COMMERCIAL

## 2024-09-13 NOTE — ED NOTES
Pt was suppose to be a lab appt. Only and accidentally registered as an ED pt. He will be dismissed off the ED track board.

## 2024-09-17 ENCOUNTER — NURSE TRIAGE (OUTPATIENT)
Dept: NURSING | Facility: CLINIC | Age: 44
End: 2024-09-17
Payer: COMMERCIAL

## 2024-09-18 NOTE — TELEPHONE ENCOUNTER
Nurse Triage SBAR    Is this a 2nd Level Triage? NO    Situation: Hypoglycemia    Background: Patient has type 2 diabetes, treats with Metformin BID.    Assessment: Patient calling to report blood glucose of 49 at 1700, treated with chicken noodles, blood glucose now 53. He reports that he has been having trouble keeping blood glucose up for a few days. He reports feeling dizzy and disoriented. He denies seizures, current confusion, unconscious, severe weakness, or vomiting.    Protocol Recommended Disposition:   Go to ED Now    Recommendation: According to the protocol, patient should go to ED now.  Care advice given. Patient verbalizes understanding and agrees with plan of care. Plans to call 911.    Marcella Gomez RN  09/17/24 8:57 PM  [facility] Nurse Advisor    Reason for Disposition   [1] Low blood sugar symptoms persist > 30 minutes AND [2] using low blood sugar Care Advice    Additional Information   Negative: Unconscious or difficult to awaken   Negative: Seizure occurs   Negative: Acting confused (e.g., disoriented, slurred speech)   Negative: Very weak (e.g., can't stand)   Negative: Sounds like a life-threatening emergency to the triager   Negative: [1] Vomiting AND [2] signs of dehydration (e.g., very dry mouth, lightheaded, dark urine)    Protocols used: Diabetes - Low Blood Sugar-A-

## 2024-09-22 ENCOUNTER — HOSPITAL ENCOUNTER (EMERGENCY)
Facility: HOSPITAL | Age: 44
Discharge: HOME | End: 2024-09-22
Payer: COMMERCIAL

## 2024-09-22 ENCOUNTER — APPOINTMENT (OUTPATIENT)
Dept: RADIOLOGY | Facility: HOSPITAL | Age: 44
End: 2024-09-22
Payer: COMMERCIAL

## 2024-09-22 VITALS
BODY MASS INDEX: 48.62 KG/M2 | SYSTOLIC BLOOD PRESSURE: 126 MMHG | OXYGEN SATURATION: 95 % | WEIGHT: 292.2 LBS | TEMPERATURE: 98.7 F | DIASTOLIC BLOOD PRESSURE: 63 MMHG | RESPIRATION RATE: 24 BRPM | HEART RATE: 83 BPM

## 2024-09-22 DIAGNOSIS — J06.9 VIRAL UPPER RESPIRATORY ILLNESS: ICD-10-CM

## 2024-09-22 DIAGNOSIS — J45.901 ASTHMA EXACERBATION: ICD-10-CM

## 2024-09-22 LAB
FLUAV RNA SPEC QL NAA+PROBE: NEGATIVE
FLUBV RNA RESP QL NAA+PROBE: NEGATIVE
RSV RNA SPEC NAA+PROBE: NEGATIVE
SARS-COV-2 RNA RESP QL NAA+PROBE: NEGATIVE

## 2024-09-22 PROCEDURE — 71046 X-RAY EXAM CHEST 2 VIEWS: CPT

## 2024-09-22 PROCEDURE — 99284 EMERGENCY DEPT VISIT MOD MDM: CPT | Mod: 25

## 2024-09-22 PROCEDURE — 87637 SARSCOV2&INF A&B&RSV AMP PRB: CPT

## 2024-09-22 PROCEDURE — 250N000013 HC RX MED GY IP 250 OP 250 PS 637

## 2024-09-22 RX ORDER — BENZONATATE 100 MG/1
200 CAPSULE ORAL 3 TIMES DAILY PRN
Status: DISCONTINUED | OUTPATIENT
Start: 2024-09-22 | End: 2024-09-23 | Stop reason: HOSPADM

## 2024-09-22 RX ORDER — BENZONATATE 200 MG/1
200 CAPSULE ORAL 3 TIMES DAILY PRN
Qty: 50 CAPSULE | Refills: 0 | Status: SHIPPED | OUTPATIENT
Start: 2024-09-22

## 2024-09-22 RX ORDER — ACETAMINOPHEN 325 MG/1
650 TABLET ORAL ONCE
Status: COMPLETED | OUTPATIENT
Start: 2024-09-22 | End: 2024-09-22

## 2024-09-22 RX ORDER — PREDNISONE 20 MG/1
TABLET ORAL
Qty: 10 TABLET | Refills: 0 | Status: SHIPPED | OUTPATIENT
Start: 2024-09-22

## 2024-09-22 RX ADMIN — BENZONATATE 200 MG: 100 CAPSULE ORAL at 20:38

## 2024-09-22 RX ADMIN — ACETAMINOPHEN 650 MG: 325 TABLET ORAL at 20:38

## 2024-09-22 ASSESSMENT — ACTIVITIES OF DAILY LIVING (ADL)
ADLS_ACUITY_SCORE: 35
ADLS_ACUITY_SCORE: 35

## 2024-09-23 NOTE — ED TRIAGE NOTES
Pt comes from group home with complaints of coughing and shortness of breath since Wednesday. Denies fevers. Known covid exposure this past week. Patient speaking in full sentences in triage. Alert and oriented.     Triage Assessment (Adult)       Row Name 09/22/24 2007          Triage Assessment    Airway WDL WDL        Respiratory WDL    Respiratory WDL cough;rhythm/pattern     Rhythm/Pattern, Respiratory shortness of breath     Cough Frequency frequent     Cough Type productive;congested        Skin Circulation/Temperature WDL    Skin Circulation/Temperature WDL WDL        Cardiac WDL    Cardiac WDL WDL        Peripheral/Neurovascular WDL    Peripheral Neurovascular WDL WDL        Cognitive/Neuro/Behavioral WDL    Cognitive/Neuro/Behavioral WDL WDL        Boston Coma Scale    Best Eye Response 4-->(E4) spontaneous     Best Motor Response 6-->(M6) obeys commands     Best Verbal Response 5-->(V5) oriented     Boston Coma Scale Score 15

## 2024-09-23 NOTE — DISCHARGE INSTRUCTIONS
You are seen in the emergency department for cough and shortness of breath.  Your x-ray is clear without pneumonia.  Your swab is negative for COVID/flu/RSV.  At this point, I do suspect you have some sort of other viral illness that is causing a cough.  I will give you a cough medicine to take.  you did have some faint wheezing on my exam so I will send you home with some steroids to treat an asthma exacerbation.  Use your inhaler for shortness of breath.  Please return to the emergency department if you develop any new or worsening symptoms.

## 2024-09-23 NOTE — ED PROVIDER NOTES
EMERGENCY DEPARTMENT ENCOUNTER      NAME: Warren Jaramillo  AGE: 44 year old male  YOB: 1980  MRN: [id number]  EVALUATION DATE & TIME: 9/22/2024  8:11 PM    PCP: Mary Kelly    ED PROVIDER: Liliana Alvarez PA-C    FINAL IMPRESSION:   Mild asthma exacerbation   Viral upper respiratory infection    CHIEF COMPLAINT:  Cough   Shortness of breath    MEDICAL DECISION MAKING:  Warren Jaramillo is a 44 year old male with a pertinent history of asthma, AVA (treated with BiPAP), acute on chronic right-sided CHF, TIIDM, and incomplete LBBB, who presents to this ED via walk-in for evaluation of cough and shortness of breath. Vitals reviewed and unremarkable. Afebrile. Oxygen 100% on room air. No tachycardia or tachypnea. On exam, dry cough noted. Lungs with faint crackles/wheezing throughout. Posterior pharynx mildly erythematous. Uvula midline. No tonsillar swelling. LE without swelling or tenderness.     Considering viral infection versus pneumonia versus asthma exacerbation versus bronchitis. PERC negative. Chest pain is only with coughing so ACS highly unlikely and is reproducible which makes me suspect muscle strain from repetitive coughing.  Will order viral panel and chest x-ray and will give benzonatate and Tylenol for symptoms.     Workup revealed negative viral swabs.  Chest x-ray without pneumonia, bronchitis, pneumothorax, etc.  Patient reports the cough is improved after Tessalon and pain is improved after Tylenol.  At this point, I suspect this is likely viral infection although he does have some faint wheezing on exam and with a history of asthma, I do think is reasonable to treat for mild asthma exacerbation with prednisone.  Will also send him home with a prescription for benzonatate.  Recommend he follow-up with his general practitioner.  He will call them tomorrow.  Strict return precautions were discussed and he was discharged in stable condition.    ED COURSE  Pertinent Labs  & Imaging studies reviewed. (See chart for details)  8:16 PM I met with the patient and obtained a history and performed a physical exam  9:40 PM Rechecked and updated patient on lab and imaging results. I discussed plan for discharge. Return precautions were discussed. Discharged by ED RN.         Medical Decision Making  Obtained supplemental history:Supplemental history obtained?: Documented in chart  Reviewed external records: External records reviewed?: No  Care impacted by chronic illness:Smoking / Nicotine Use and Other: Asthma, AVA (treated with BiPAP), acute on chronic right-sided CHF, and TIIDM  Care significantly affected by social determinants of health:Access to Affordable Health Care  Did you consider but not order tests?: Work up considered but not performed and documented in chart, if applicable  Did you interpret images independently?: Independent interpretation of ECG and images noted in documentation, when applicable.  Consultation discussion with other provider:Did you involve another provider (consultant, MH, pharmacy, etc.)?: No  Discharge. I prescribed additional prescription strength medication(s) as charted. See documentation for any additional details.        0 minutes of critical care time     MEDICATIONS GIVEN IN THE EMERGENCY:  Medications   benzonatate (TESSALON) capsule 200 mg (200 mg Oral $Given 9/22/24 2038)   acetaminophen (TYLENOL) tablet 650 mg (650 mg Oral $Given 9/22/24 2038)       NEW PRESCRIPTIONS STARTED AT TODAY'S ER VISIT  Discharge Medication List as of 9/22/2024  9:48 PM        START taking these medications    Details   benzonatate (TESSALON) 200 MG capsule Take 1 capsule (200 mg) by mouth 3 times daily as needed for cough., Disp-50 capsule, R-0, E-Prescribe      predniSONE (DELTASONE) 20 MG tablet Take two tablets (= 40mg) each day for 5 (five) days, Disp-10 tablet, R-0, E-Prescribe           Discharge Medication List as of 9/22/2024  9:48 PM     "      =================================================================    HPI    Patient information was obtained from: Patient  Use of : N/A    Warren Jaramillo is a 44 year old male with a pertinent history of asthma, AVA (treated with BiPAP), acute on chronic right-sided CHF, TIIDM, and incomplete LBBB, who presents to this ED via walk-in for evaluation of cough and shortness of breath.    Patient comes from a group home where he resides at. He reports Thursday morning (9/19), he woke up and noticed he had a dry cough. Progressively since then, this cough became productive and he has been coughing up a lot of phlegm and also been short of breath with these coughing episodes. He feels like he can't breath when he coughs. Also, when he coughs, he has pain immediately go to the center of his chest and both his ribs. Notes he only has pain when he is actively coughing. No runny nose, sore throat or congestion. No leg swelling or pain in his legs bilaterally. No recent travel, surgeries, or hospitalization. No fevers. No nausea, vomiting, or diarrhea. He does smoke cigarettes. He has a history of asthma, which is normally well-controlled and he hardly uses his rescue inhaler. He has been exposed to COVID earlier this week at the group home and he found out afterward that a ronny he has been around tested positive for COVID. He has not tried any medications at home. He reports he is not UTD on his COVID or flu immunizations and would like to receive both vaccines here.    He called his PCP today at 2 PM and spoke to the triage line. He was told he had an ordered \"Mobile MRI service\" that came to his group home to get a chest XR, but one of the staff members at his group home turned them away because there was some miscommunication. He was recommended to come to the ER for evaluation.      PHYSICAL EXAM    /63   Pulse 83   Temp 98.7  F (37.1  C) (Oral)   Resp 24   Wt 132.5 kg (292 lb 3.2 oz)   " SpO2 95%   BMI 48.62 kg/m    Constitutional: NAD, GCS 15  HENT: Normocephalic, Atraumatic, Bilateral external ears normal,  Posterior pharynx mildly erythematous. Uvula midline. No tonsillar swelling. Nose normal. Neck- Normal range of motion  Eyes: , EOMI, Conjunctiva normal, No discharge.   Respiratory: dry cough noted. Lungs with faint crackles/wheezing throughout., No respiratory distress, Speaks full sentences easily  Cardiovascular: Normal heart rate, Regular rhythm, No murmurs, No rubs, No gallops. Chest wall with reproducible tenderness to palpation. No rashes or skin changes.   GI: obesity. Soft, No tenderness, No masses,   Musculoskeletal: 2+ DP pulses. No edema. No cyanosis, No clubbing. Good range of motion in all major joints. No tenderness to palpation of bilateral LE.   Integument: Warm, Dry, No erythema, No rash.    Neurologic: Alert & oriented x 3, Normal motor function, Normal sensory function, No focal deficits noted. Normal gait.    Psychiatric: Affect normal, Judgment normal, anxious. Cooperative.      LAB:  All pertinent labs reviewed and interpreted.  Results for orders placed or performed during the hospital encounter of 09/22/24   XR Chest 2 Views    Impression    IMPRESSION: Heart is normal in size. Lungs are clear.   Symptomatic Influenza A/B, RSV, & SARS-CoV2 PCR (COVID-19) Nasopharyngeal    Specimen: Nasopharyngeal; Swab   Result Value Ref Range    Influenza A PCR Negative Negative    Influenza B PCR Negative Negative    RSV PCR Negative Negative    SARS CoV2 PCR Negative Negative       RADIOLOGY:  Reviewed all pertinent imaging. Please see official radiology report.  XR Chest 2 Views   Final Result   IMPRESSION: Heart is normal in size. Lungs are clear.          EKG:    N/A    PROCEDURES:   N/A          Tressa MATOS, am serving as a scribe to document services personally performed by Liliana Alvarez PA-C based on my observation and the provider's statements to me. Liliana MATOS  BLAS Alvarez, attest that Tressa Urbano is acting in a scribe capacity, has observed my performance of the services and has documented them in accordance with my direction.    Liliana Alvarez PA-C  [facility] EMERGENCY DEPARTMENT  [address] [place] [id number]  [phone]       Liliana Alvarez PA-C  09/22/24 [id number]

## 2024-10-11 ENCOUNTER — HOSPITAL ENCOUNTER (EMERGENCY)
Facility: HOSPITAL | Age: 44
Discharge: HOME | End: 2024-10-11
Attending: EMERGENCY MEDICINE | Admitting: EMERGENCY MEDICINE
Payer: COMMERCIAL

## 2024-10-11 VITALS
HEART RATE: 86 BPM | RESPIRATION RATE: 18 BRPM | TEMPERATURE: 97.3 F | SYSTOLIC BLOOD PRESSURE: 131 MMHG | OXYGEN SATURATION: 97 % | DIASTOLIC BLOOD PRESSURE: 63 MMHG

## 2024-10-11 DIAGNOSIS — G89.18 ACUTE POST-OPERATIVE PAIN: ICD-10-CM

## 2024-10-11 PROCEDURE — 250N000011 HC RX IP 250 OP 636: Performed by: EMERGENCY MEDICINE

## 2024-10-11 PROCEDURE — 250N000013 HC RX MED GY IP 250 OP 250 PS 637: Performed by: EMERGENCY MEDICINE

## 2024-10-11 PROCEDURE — 96372 THER/PROPH/DIAG INJ SC/IM: CPT | Performed by: EMERGENCY MEDICINE

## 2024-10-11 PROCEDURE — 99284 EMERGENCY DEPT VISIT MOD MDM: CPT | Mod: 25

## 2024-10-11 RX ORDER — KETOROLAC TROMETHAMINE 10 MG/1
10 TABLET, FILM COATED ORAL EVERY 6 HOURS PRN
Qty: 20 TABLET | Refills: 0 | Status: SHIPPED | OUTPATIENT
Start: 2024-10-11

## 2024-10-11 RX ORDER — OXYCODONE HYDROCHLORIDE 5 MG/1
5 TABLET ORAL EVERY 6 HOURS PRN
Qty: 12 TABLET | Refills: 0 | Status: SHIPPED | OUTPATIENT
Start: 2024-10-11 | End: 2024-10-14

## 2024-10-11 RX ORDER — CEPHALEXIN 500 MG/1
500 CAPSULE ORAL ONCE
Status: COMPLETED | OUTPATIENT
Start: 2024-10-11 | End: 2024-10-11

## 2024-10-11 RX ORDER — KETOROLAC TROMETHAMINE 30 MG/ML
30 INJECTION, SOLUTION INTRAMUSCULAR; INTRAVENOUS ONCE
Status: COMPLETED | OUTPATIENT
Start: 2024-10-11 | End: 2024-10-11

## 2024-10-11 RX ORDER — CEPHALEXIN 500 MG/1
500 CAPSULE ORAL 3 TIMES DAILY
Qty: 21 CAPSULE | Refills: 0 | Status: SHIPPED | OUTPATIENT
Start: 2024-10-11 | End: 2024-10-18

## 2024-10-11 RX ADMIN — CEPHALEXIN 500 MG: 500 CAPSULE ORAL at 21:24

## 2024-10-11 RX ADMIN — KETOROLAC TROMETHAMINE 30 MG: 30 INJECTION, SOLUTION INTRAMUSCULAR at 21:24

## 2024-10-11 ASSESSMENT — ACTIVITIES OF DAILY LIVING (ADL)
ADLS_ACUITY_SCORE: 35
ADLS_ACUITY_SCORE: 35

## 2024-10-12 NOTE — ED PROVIDER NOTES
Emergency Department Encounter      NAME: Warren Jaramillo  AGE: 44 year old male  YOB: 1980  MRN: [id number]  EVALUATION DATE & TIME: 10/11/2024  7:07 PM    PCP: Mary Kelly    ED PROVIDER: Milton Jaramillo M.D.      Chief Complaint   Patient presents with    Toe Pain         FINAL IMPRESSION:  1. Acute post-operative pain        MEDICAL DECISION MAKIN:00 PM I met with the patient, obtained history, performed an initial exam, and discussed options and plan for diagnostics and treatment here in the ED.   9:17 PM I rechecked the patient and discussed results, discharge, follow up, and reasons to return to the ED. We discussed the plan for discharge and the patient is agreeable. Reviewed supportive cares, symptomatic treatment, outpatient follow up, and reasons to return to the Emergency Department. Patient to be discharged by ED RN.       This patient is a 44-year-old male who presents with toe pain.  He has a history of hypertension and type 2 diabetes.  He lives in a group home.  He was recently seen by podiatry and had both toenails on his great toe was removed.  He says that he has had a good bit of drainage from the site and the great toes are both painful now.  He has not had any recent trauma or fevers.  On exam he had a good bit of exudate, dried blood at the periphery in the area where the great toenailwas.  The skin around the nail margin was tender, slightly erythematous and swollen.  I gave him a dose of p.o. Keflex in the ER and will send him home on a weeks worth of oral Keflex.  Additionally I had the nurse redress his using some Coban was with postop shoes which are open toed and should be more easily used by him.  I did send him home with a few oxycodone and Toradol to use for the pain because he said that the toe pain was making it difficult for him to sleep and he was quite uncomfortable.  Consider x-rays but he had not had a recent trauma and there was no evidence  of a deeper infection or osteomyelitis.    Pertinent Labs & Imaging studies reviewed. (See chart for details)    The importance of close follow up was discussed. We reviewed warning signs and symptoms, and I instructed Mr. Jaramillo to return to the emergency department immediately if he develops any new or worsening symptoms. I provided additional verbal discharge instructions. Mr. Jaramillo expressed understanding and agreement with this plan of care, his questions were answered, and he was discharged in stable condition.     Medical Decision Making     History:  Supplemental history from: Documented in chart, if applicable  External Record(s) reviewed: Documented in chart, if applicable.     Work Up:  Chart documentation includes differential considered and any EKGs or imaging independently interpreted by provider, where specified.  In additional to work up documented, I considered the following work up: Documented in chart, if applicable.     External consultation:  Discussion of management with another provider: Documented in chart, if applicable     Complicating factors:  Care impacted by chronic illness: Diabetes  Care affected by social determinants of health: NA     Disposition considerations: Discharge.      MEDICATIONS GIVEN IN THE EMERGENCY:  Medications   ketorolac (TORADOL) injection 30 mg (30 mg Intramuscular $Given 10/11/24 2124)   cephALEXin (KEFLEX) capsule 500 mg (500 mg Oral $Given 10/11/24 2124)       NEW PRESCRIPTIONS STARTED AT TODAY'S ER VISIT:  Discharge Medication List as of 10/11/2024  9:39 PM        START taking these medications    Details   cephALEXin (KEFLEX) 500 MG capsule Take 1 capsule (500 mg) by mouth 3 times daily for 7 days., Disp-21 capsule, R-0, Local Print      ketorolac (TORADOL) 10 MG tablet Take 1 tablet (10 mg) by mouth every 6 hours as needed for pain., Disp-20 tablet, R-0, Local Print      oxyCODONE (ROXICODONE) 5 MG tablet Take 1 tablet (5 mg) by mouth every 6 hours as  needed for pain., Disp-12 tablet, R-0, Local Print                =================================================================    HPI    Patient information was obtained from: patient    Use of : N/A         Warren Jaramillo is a 44 year old male with a past medical history of SVT, afib, hyperglycemia, hypertension, type 2 DM, CHF, ascites, angiomyolipoma, and active autistic disorder, who presents to the ED to day by ambulance for evaluation of toe pain.    9 days ago (Wednesday) patient had both great toenails removed. Experiences 10/10 intermittent throbbing pain. States he cannot leave his toes exposed, stand up, or to go bed secondary to pain. Reports his toes have been seeping. Took tylenol without improvement. Patient thinks there is still a piece of nail left in his toe because he can feel it when he tries to stand. Both toes sensitive, left side more sensitive than right. Endorsed blister on left great toe.     Patient saw podiatry this morning. Plans to continue to follow up with him.     Per chart review, patient seen at [facility] ED on 9/2/2024 for evaluation of suicidal ideation. Blood sugar just above 100. Discharged home.    REVIEW OF SYSTEMS   Review of Systems     PAST MEDICAL HISTORY:  Past Medical History:   Diagnosis Date    DM2 (diabetes mellitus, type 2) (H) 4/28/2020    HTN (hypertension) 7/30/2012    Rojas's disease (H)        PAST SURGICAL HISTORY:  Past Surgical History:   Procedure Laterality Date    COLONOSCOPY      ESOPHAGOSCOPY, GASTROSCOPY, DUODENOSCOPY (EGD), COMBINED N/A 7/21/2023    Procedure: ESOPHAGOGASTRODUODENOSCOPY WITH GASTRIC AND ESOPHAGEAL BIOPSIES;  Surgeon: Filiberto Aragon MD;  Location: [facility] OR    TOOTH EXTRACTION         CURRENT MEDICATIONS:    No current facility-administered medications for this encounter.    Current Outpatient Medications:     cephALEXin (KEFLEX) 500 MG capsule, Take 1 capsule (500 mg) by mouth 3  times daily for 7 days., Disp: 21 capsule, Rfl: 0    ketorolac (TORADOL) 10 MG tablet, Take 1 tablet (10 mg) by mouth every 6 hours as needed for pain., Disp: 20 tablet, Rfl: 0    oxyCODONE (ROXICODONE) 5 MG tablet, Take 1 tablet (5 mg) by mouth every 6 hours as needed for pain., Disp: 12 tablet, Rfl: 0    ACCU-CHEK GUIDE test strip, USE TO TEST BLOOD SUGAR TWICE DAILY AS NEEDED, Disp: , Rfl:     acetaminophen (TYLENOL) 500 MG tablet, Take 2 tablets (1,000 mg) by mouth 3 times daily, Disp: 30 tablet, Rfl: 0    albuterol (PROAIR HFA/PROVENTIL HFA/VENTOLIN HFA) 108 (90 Base) MCG/ACT inhaler, Inhale 1-2 puffs into the lungs every 6 hours as needed for shortness of breath, wheezing or cough, Disp: 18 g, Rfl: 0    albuterol (PROVENTIL) (2.5 MG/3ML) 0.083% neb solution, Take 2.5 mg by nebulization 3 times daily as needed for shortness of breath, wheezing or cough, Disp: , Rfl:     aloe vera GEL, Apply 1 g topically every hour as needed for skin care Per bottle directions, Disp: , Rfl:     bacitracin 500 UNIT/GM OINT, Apply topically 3 times daily as needed for wound care, Disp: , Rfl:     benzonatate (TESSALON) 200 MG capsule, Take 1 capsule (200 mg) by mouth 3 times daily as needed for cough., Disp: 50 capsule, Rfl: 0    blood glucose monitoring (SOFTCLIX) lancets, USE AS DIRECTED TWICE DAILY AS NEEDED, Disp: , Rfl:     Blood Glucose Monitoring Suppl (ACCU-CHEK GUIDE) w/Device KIT, USE AS DIRECTED TWICE DAILY AS NEEDED, Disp: , Rfl:     Calamine external lotion, Apply topically as needed for itching, Disp: , Rfl:     clotrimazole (LOTRIMIN) 1 % external cream, Apply topically 2 times daily as needed (skin irritation), Disp: , Rfl:     diclofenac (VOLTAREN) 1 % topical gel, Apply 2 g topically daily as needed for moderate pain To joints/back, Disp: , Rfl:     diclofenac (VOLTAREN) 75 MG EC tablet, Take 1 tablet (75 mg) by mouth 2 times daily as needed for moderate pain, Disp: 28 tablet, Rfl: 0    empagliflozin (JARDIANCE)  10 MG TABS tablet, Take 10 mg by mouth daily, Disp: , Rfl:     escitalopram (LEXAPRO) 10 MG tablet, Take 10 mg by mouth daily, Disp: , Rfl:     famotidine (PEPCID) 20 MG tablet, Take 1 tablet (20 mg) by mouth 2 times daily, Disp: 60 tablet, Rfl: 0    fluticasone-vilanterol (BREO ELLIPTA) 200-25 MCG/ACT inhaler, Inhale 1 puff into the lungs daily, Disp: , Rfl:     furosemide (LASIX) 20 MG tablet, Take 2 tablets (40 mg) by mouth daily, Disp: 60 tablet, Rfl: 3    ibuprofen (ADVIL/MOTRIN) 600 MG tablet, Take 1 tablet (600 mg) by mouth every 6 hours as needed for moderate pain, Disp: 30 tablet, Rfl: 0    lisinopril (ZESTRIL) 10 MG tablet, Take 10 mg by mouth daily, Disp: , Rfl:     LORazepam (ATIVAN) 1 MG tablet, Take 0.5 mg by mouth daily as needed for anxiety, Disp: , Rfl:     metFORMIN (GLUCOPHAGE) 1000 MG tablet, Take 1,000 mg by mouth 2 times daily (with meals), Disp: , Rfl:     montelukast (SINGULAIR) 10 MG tablet, Take 10 mg by mouth daily, Disp: , Rfl:     OLANZapine (ZYPREXA) 10 MG tablet, Take 10 mg by mouth At Bedtime, Disp: , Rfl:     omeprazole (PRILOSEC) 20 MG DR capsule, Take 40 mg by mouth daily, Disp: , Rfl:     ondansetron (ZOFRAN ODT) 4 MG ODT tab, Take 1-2 tablets (4-8 mg) by mouth every 8 hours as needed for nausea or vomiting, Disp: 20 tablet, Rfl: 0    polyethylene glycol (MIRALAX) 17 g packet, Take 1 packet by mouth daily as needed for constipation, Disp: , Rfl:     predniSONE (DELTASONE) 20 MG tablet, Take two tablets (= 40mg) each day for 5 (five) days, Disp: 10 tablet, Rfl: 0    Respiratory Therapy Supplies (NEBULIZER/TUBING/MOUTHPIECE) KIT, 1 kit every 6 hours as needed (shortness of breath, wheezing), Disp: 1 kit, Rfl: 0    rosuvastatin (CRESTOR) 10 MG tablet, Take 10 mg by mouth At Bedtime, Disp: , Rfl:     sodium phosphate (FLEET ENEMA) 7-19 GM/118ML rectal enema, Place 1 enema rectally once as needed for constipation, Disp: , Rfl:     sucralfate (CARAFATE) 1 GM/10ML suspension, Take 10 mLs  (1 g) by mouth 4 times daily, Disp: 414 mL, Rfl: 0    traZODone (DESYREL) 50 MG tablet, Take 100 mg by mouth at bedtime, Disp: , Rfl:     ALLERGIES:  Allergies   Allergen Reactions    Apricot Flavor Anaphylaxis    Banana Anaphylaxis     Throat swelling  Throat swelling      Wasp Venom Protein Shortness Of Breath     Other reaction(s): Respiratory Distress  Has an epi pen  Has an epi pen      Bees Anaphylaxis     Have an Epi pen that carries with    Methylphenidate Itching     Other reaction(s): Nightmares    Prunus      Other reaction(s): *Unknown    Sulfa Antibiotics      Headaches and nausea    Prunus Persica Rash     Other reaction(s): *Unknown       FAMILY HISTORY:  Family History   Problem Relation Age of Onset    Unknown/Adopted Father     Unknown/Adopted Maternal Grandmother     C.A.D. Maternal Grandfather     Diabetes Maternal Grandfather     Cerebrovascular Disease Maternal Grandfather     Unknown/Adopted Paternal Grandmother     Unknown/Adopted Paternal Grandfather     Unknown/Adopted Brother     Unknown/Adopted Sister        SOCIAL HISTORY:   Social History     Socioeconomic History    Marital status: Single   Tobacco Use    Smoking status: Every Day     Current packs/day: 1.00     Types: Cigarettes    Smokeless tobacco: Never   Vaping Use    Vaping status: Never Used   Substance and Sexual Activity    Alcohol use: No     Comment: once every 3 months    Drug use: No    Sexual activity: Never     Partners: Female   Other Topics Concern     Service No    Blood Transfusions No    Caffeine Concern No    Occupational Exposure No    Hobby Hazards No    Sleep Concern No    Stress Concern Yes     Comment: sometimes    Weight Concern No    Special Diet Yes     Comment: counting carbs    Back Care No    Exercise Yes    Seat Belt Yes    Self-Exams Yes     Social Determinants of Health     Financial Resource Strain: Medium Risk (4/27/2022)    Received from [facility], [facility]  [facility]    Financial Resource Strain     Difficulty of Paying Living Expenses: 2     Difficulty of Paying Living Expenses: 1   Food Insecurity: Food Insecurity Present (4/27/2022)    [facility]    Food Insecurity     Worried About Running Out of Food in the Last Year: 2   Transportation Needs: Unmet Transportation Needs (4/27/2022)    [facility]    Transportation Needs     Lack of Transportation (Medical): 2    [facility]    Social Connections   Housing Stability: High Risk (4/27/2022)    [facility], [facility]    Housing Stability     Unable to Pay for Housing in the Last Year: 3       PHYSICAL EXAM:    Vitals: /63   Pulse 86   Temp 97.3  F (36.3  C) (Temporal)   Resp 18   SpO2 97%    Constitutional: Well developed, well nourished. Comfortable appearing.   HEAD:Normocephalic, atraumatic,   Musculoskeletal: Both big toe nails absent.  There is thick exudate coating the great toe nail space there is  dried blood. Moving all 4 extremities intentionally and without pain. No obvious deformity. Tender over both great toes.  Back: No CVA tenderness  Skin: Warm, dry, no rash.   Neurologic: Alert & oriented x 3, speech clear, moving all extremities spontaneously   Psychiatric: Affect normal, cooperative.     LAB:  All pertinent labs reviewed and interpreted.    I, Carrie Haque, am serving as a scribe to document services personally performed by Dr. Milton Jaramillo based on my observation and the provider's statements to me. I, Milton Jaramillo M.D. attest that Carrie Haque is acting in a scribe capacity, has observed my  performance of the services and has documented them in accordance with my direction.      Milton Jaramillo M.D.  Emergency Medicine  [facility] EMERGENCY DEPARTMENT  [facility] [facility] [id number] [phone]  Dept: [phone]     Milton Jaramillo MD  10/11/24 3323

## 2024-10-12 NOTE — ED TRIAGE NOTES
Patient had bilateral big toenails removed. Patient had follow up with podiatry today who stated that everything looks good. Patient states he is having 10/10 pain. Patient was ambulatory for EMS.     Triage Assessment (Adult)       Row Name 10/11/24 2367          Triage Assessment    Airway WDL WDL        Respiratory WDL    Respiratory WDL WDL        Skin Circulation/Temperature WDL    Skin Circulation/Temperature WDL WDL        Cardiac WDL    Cardiac WDL WDL        Peripheral/Neurovascular WDL    Peripheral Neurovascular WDL WDL        Cognitive/Neuro/Behavioral WDL    Cognitive/Neuro/Behavioral WDL WDL

## 2024-11-02 ENCOUNTER — APPOINTMENT (OUTPATIENT)
Dept: MRI IMAGING | Facility: HOSPITAL | Age: 44
End: 2024-11-02
Attending: EMERGENCY MEDICINE
Payer: COMMERCIAL

## 2024-11-02 ENCOUNTER — APPOINTMENT (OUTPATIENT)
Dept: RADIOLOGY | Facility: HOSPITAL | Age: 44
End: 2024-11-02
Attending: EMERGENCY MEDICINE
Payer: COMMERCIAL

## 2024-11-02 ENCOUNTER — HOSPITAL ENCOUNTER (EMERGENCY)
Facility: HOSPITAL | Age: 44
Discharge: HOME | End: 2024-11-02
Attending: EMERGENCY MEDICINE | Admitting: EMERGENCY MEDICINE
Payer: COMMERCIAL

## 2024-11-02 VITALS
TEMPERATURE: 98.7 F | OXYGEN SATURATION: 96 % | DIASTOLIC BLOOD PRESSURE: 57 MMHG | HEART RATE: 84 BPM | BODY MASS INDEX: 37.07 KG/M2 | SYSTOLIC BLOOD PRESSURE: 117 MMHG | HEIGHT: 65 IN | WEIGHT: 222.5 LBS | RESPIRATION RATE: 29 BRPM

## 2024-11-02 DIAGNOSIS — G89.11 ACUTE PAIN OF LEFT SHOULDER DUE TO TRAUMA: ICD-10-CM

## 2024-11-02 DIAGNOSIS — R20.2 PARESTHESIA OF LEFT ARM AND LEG: ICD-10-CM

## 2024-11-02 DIAGNOSIS — M25.512 ACUTE PAIN OF LEFT SHOULDER DUE TO TRAUMA: ICD-10-CM

## 2024-11-02 DIAGNOSIS — R06.02 SHORTNESS OF BREATH: ICD-10-CM

## 2024-11-02 LAB
ANION GAP SERPL CALCULATED.3IONS-SCNC: 13 MMOL/L (ref 7–15)
BUN SERPL-MCNC: 15.6 MG/DL (ref 6–20)
CALCIUM SERPL-MCNC: 9.3 MG/DL (ref 8.8–10.4)
CHLORIDE SERPL-SCNC: 101 MMOL/L (ref 98–107)
CREAT SERPL-MCNC: 0.87 MG/DL (ref 0.67–1.17)
EGFRCR SERPLBLD CKD-EPI 2021: >90 ML/MIN/1.73M2
ERYTHROCYTE [DISTWIDTH] IN BLOOD BY AUTOMATED COUNT: 17 % (ref 10–15)
GLUCOSE BLDC GLUCOMTR-MCNC: 106 MG/DL (ref 70–99)
GLUCOSE SERPL-MCNC: 90 MG/DL (ref 70–99)
HCO3 SERPL-SCNC: 25 MMOL/L (ref 22–29)
HCT VFR BLD AUTO: 43.3 % (ref 40–53)
HGB BLD-MCNC: 13.6 G/DL (ref 13.3–17.7)
MCH RBC QN AUTO: 26.4 PG (ref 26.5–33)
MCHC RBC AUTO-ENTMCNC: 31.4 G/DL (ref 31.5–36.5)
MCV RBC AUTO: 84 FL (ref 78–100)
NT-PROBNP SERPL-MCNC: 651 PG/ML (ref 0–450)
PLATELET # BLD AUTO: 333 10E3/UL (ref 150–450)
POTASSIUM SERPL-SCNC: 4 MMOL/L (ref 3.4–5.3)
RBC # BLD AUTO: 5.16 10E6/UL (ref 4.4–5.9)
SODIUM SERPL-SCNC: 139 MMOL/L (ref 135–145)
TROPONIN T SERPL HS-MCNC: 23 NG/L
TROPONIN T SERPL HS-MCNC: 24 NG/L
WBC # BLD AUTO: 14.6 10E3/UL (ref 4–11)

## 2024-11-02 PROCEDURE — 70553 MRI BRAIN STEM W/O & W/DYE: CPT

## 2024-11-02 PROCEDURE — 80048 BASIC METABOLIC PNL TOTAL CA: CPT | Performed by: EMERGENCY MEDICINE

## 2024-11-02 PROCEDURE — 93005 ELECTROCARDIOGRAM TRACING: CPT | Performed by: EMERGENCY MEDICINE

## 2024-11-02 PROCEDURE — 84484 ASSAY OF TROPONIN QUANT: CPT | Performed by: EMERGENCY MEDICINE

## 2024-11-02 PROCEDURE — 73030 X-RAY EXAM OF SHOULDER: CPT | Mod: LT

## 2024-11-02 PROCEDURE — 99285 EMERGENCY DEPT VISIT HI MDM: CPT | Mod: 25

## 2024-11-02 PROCEDURE — 71046 X-RAY EXAM CHEST 2 VIEWS: CPT

## 2024-11-02 PROCEDURE — 82962 GLUCOSE BLOOD TEST: CPT

## 2024-11-02 PROCEDURE — 85018 HEMOGLOBIN: CPT | Performed by: EMERGENCY MEDICINE

## 2024-11-02 PROCEDURE — 36415 COLL VENOUS BLD VENIPUNCTURE: CPT | Performed by: EMERGENCY MEDICINE

## 2024-11-02 PROCEDURE — 255N000002 HC RX 255 OP 636: Performed by: EMERGENCY MEDICINE

## 2024-11-02 PROCEDURE — 250N000013 HC RX MED GY IP 250 OP 250 PS 637: Performed by: EMERGENCY MEDICINE

## 2024-11-02 PROCEDURE — A9585 GADOBUTROL INJECTION: HCPCS | Performed by: EMERGENCY MEDICINE

## 2024-11-02 PROCEDURE — 83880 ASSAY OF NATRIURETIC PEPTIDE: CPT | Performed by: EMERGENCY MEDICINE

## 2024-11-02 RX ORDER — GADOBUTROL 604.72 MG/ML
10 INJECTION INTRAVENOUS ONCE
Status: COMPLETED | OUTPATIENT
Start: 2024-11-02 | End: 2024-11-02

## 2024-11-02 RX ORDER — ACETAMINOPHEN 325 MG/1
650 TABLET ORAL ONCE
Status: COMPLETED | OUTPATIENT
Start: 2024-11-02 | End: 2024-11-02

## 2024-11-02 RX ADMIN — GADOBUTROL 10 ML: 604.72 INJECTION INTRAVENOUS at 15:45

## 2024-11-02 RX ADMIN — ACETAMINOPHEN 650 MG: 325 TABLET ORAL at 14:54

## 2024-11-02 ASSESSMENT — ACTIVITIES OF DAILY LIVING (ADL)
ADLS_ACUITY_SCORE: 0

## 2024-11-02 NOTE — ED TRIAGE NOTES
Pt brought in via ambulance for evaluation of left sided shoulder and neck pain. This morning Pt accidentally hit half opened door. Pt C/o pain to left jaw and shoulder with tingling and weakness. Denies hitting head. Reported sob. Note on blood thinner

## 2024-11-02 NOTE — DISCHARGE INSTRUCTIONS
Please follow-up with your Primary Care Provider within one week; call to arrange appointment.      Return to the ER for worsening symptoms, sudden onset or severe headache, focal neurologic deficit (for example, facial droop or right arm weakness), worsening shortness of breath, if you have chest pain, if you pass out or feel that you might, persistent nausea / vomiting, worsening shoulder pain, fever or other concerns.

## 2024-11-02 NOTE — ED PROVIDER NOTES
Emergency Department Encounter     Evaluation Date & Time:   2024  1:56 PM    CHIEF COMPLAINT:  Shoulder Pain      Triage Note:Pt brought in via ambulance for evaluation of left sided shoulder and neck pain. This morning Pt accidentally hit half opened door. Pt C/o pain to left jaw and shoulder with tingling and weakness. Denies hitting head. Reported sob. Note on blood thinner      Impression and Plan       FINAL IMPRESSION:    ICD-10-CM    1. Acute pain of left shoulder due to trauma  M25.512     G89.11       2. Paresthesia of left arm and leg  R20.2       3. Shortness of breath  R06.02             ED COURSE & MEDICAL DECISION MAKIN:58 PM I met with the patient, obtained history, performed an initial exam, and discussed options and plan for diagnostics and treatment here in the ED.  5:57 PM I spoke with Dr. Lewis, Stroke Neurology.   6:30 PM Plan to discharge patient.      44 year old male, history of cardiomyopathy with CHF (EF 50-55%), PAF (not anticoagulated), incomplete LBBB, PVCs, DM2, Charcot Estrella Tooth syndrome, fetal alcohol syndrome and ADHD, who presents for evaluation of traumatic left shoulder pain after hitting his left shoulder on the edge of a door ~12 hours ago.    After hitting his shoulder, he reports paresthesias from the shoulder radiating down the left arm to his hand and was told by first responders today that his left arm also was weak compared to the right. He reports similar paresthesias to the left thigh that started ~8:30 this morning.     He did not hit his head and denies headache.     He also reports shortness of breath with no associated chest pain.     On exam, he has decreased sensation to light touch of the LUE and LLE compared to the RUE and RLE with neuro exam otherwise normal.    Stroke considered for which MRI brain with and without contrast was performed and demonstrated:  1.  Negative for acute intracranial abnormality.  2.  Mild cerebral volume loss.  3.  Complete  opacification of the left maxillary sinus.    I spoke with the Stroke Neurologist who agreed that symptoms did not seem congruent with TIA (no facial involvement and isolated area of LLE). With his Charcot Estrella Tooth syndrome, he is at increased propensity to develop paresthesias - if they continue, patient could follow-up with Neurology in Clinic.     With shortness of breath, cardiac etiology considered. EKG performed and demonstrated sinus rhythm with first degree AV block and no ischemic changes. Troponin elevated above reference cut-off for males (23 - similar to baseline) with delta troponin not significantly changed (24); I do not suspect ACS.     CXR performed and was normal.     BNP mildly elevated (651) and consistent with his baseline. No clinical, radiographic evidence of acute CHF.    X-rays of the left shoulder performed and negative for fracture and dislocation with mild hypertrophic change at the AC joint. The left clavicle is negative.     Labs otherwise remarkable for leukocytosis (WBC 14.6) with no anemia (Hb 13.6). No electrolyte derangements or renal impairment.    Evaluation is reassuring and I do not think admission is indicated.  Patient is comfortable with discharge to home.  He was advised to follow-up with his PCP within the next week for a recheck.  Return precautions provided.  Patient stable throughout ED course.      At the conclusion of the encounter I discussed the results of all the tests and the disposition. The questions were answered. The patient acknowledged understanding and was agreeable with the care plan.      Medical Decision Making    History:  Obtained supplemental history:Supplemental history obtained?: Documented in chart  Reviewed external records: External records reviewed?: Documented in chart    External consultation:  Did you consider but not order tests?: Work up considered but not performed and documented in chart, if applicable  Did you interpret images  independently?: Independent interpretation of ECG and images noted in documentation, when applicable.  Consultation discussion with other provider:Did you involve another provider (consultant, MH, pharmacy, etc.)?: I discussed the care with another health care provider, see documentation for details.    Complicating factors:  Care impacted by chronic illness:Documented in Chart  Care significantly affected by social determinants of health:N/A    Disposition considerations: Discharge. No recommendations on prescription strength medication(s). I considered admission, but ultimately discharged patient given reassuring evaluation and shared decision making conversation.        MEDICATIONS GIVEN IN THE EMERGENCY DEPARTMENT:  Medications   acetaminophen (TYLENOL) tablet 650 mg (650 mg Oral $Given 11/2/24 9278)   gadobutrol (GADAVIST) injection 10 mL (10 mLs Intravenous $Given 11/2/24 3131)       NEW PRESCRIPTIONS STARTED AT TODAY'S ED VISIT:  New Prescriptions    No medications on file       HPI     The history is provided by the patient. No  was used.        Warren Jaramillo is a 44 year old male, history of cardiomyopathy with CHF (EF 50-55%), PAF (not anticoagulated), incomplete LBBB, PVCs, DM2, Charcot Estrella Tooth syndrome, fetal alcohol syndrome and ADHD, who presents to this ED by EMS for evaluation of left shoulder pain.    Patient reports that he hit his left shoulder on the edge of a door ~2am (~12 hours ago) and thereafter started to have pain from the left jaw radiating down the left neck to his left shoulder associated with paresthesias to the left arm radiating all the way down to the fingertips. He reports similar paresthesias to his left thigh that started ~8:30-9am (5.5-6 hours ago). He reports that he was told that his left side was weak compared to the right by first responders. He also reports pain with movement and rotation of his left shoulder.    He did not hit his head and  denies headache.     He otherwise reports shortness of breath; no associated chest pain. Has slight cough without other URI symptoms or fevers.    Has had diarrhea x 3 days; no hematochezia or melena. Denies abdominal pain, nausea / vomiting.     REVIEW OF SYSTEMS:  All other systems reviewed and are negative.      Medical History     Past Medical History:   Diagnosis Date    DM2 (diabetes mellitus, type 2) (H) 4/28/2020    HTN (hypertension) 7/30/2012    Rojas's disease (H)        Past Surgical History:   Procedure Laterality Date    COLONOSCOPY      ESOPHAGOSCOPY, GASTROSCOPY, DUODENOSCOPY (EGD), COMBINED N/A 7/21/2023    Procedure: ESOPHAGOGASTRODUODENOSCOPY WITH GASTRIC AND ESOPHAGEAL BIOPSIES;  Surgeon: Filiberto Aragon MD;  Location: [facility] OR    TOOTH EXTRACTION         Family History   Problem Relation Age of Onset    Unknown/Adopted Father     Unknown/Adopted Maternal Grandmother     C.A.D. Maternal Grandfather     Diabetes Maternal Grandfather     Cerebrovascular Disease Maternal Grandfather     Unknown/Adopted Paternal Grandmother     Unknown/Adopted Paternal Grandfather     Unknown/Adopted Brother     Unknown/Adopted Sister        Social History     Tobacco Use    Smoking status: Every Day     Current packs/day: 1.00     Types: Cigarettes    Smokeless tobacco: Never   Vaping Use    Vaping status: Never Used   Substance Use Topics    Alcohol use: No     Comment: once every 3 months    Drug use: No       ACCU-CHEK GUIDE test strip  acetaminophen (TYLENOL) 500 MG tablet  albuterol (PROAIR HFA/PROVENTIL HFA/VENTOLIN HFA) 108 (90 Base) MCG/ACT inhaler  albuterol (PROVENTIL) (2.5 MG/3ML) 0.083% neb solution  aloe vera GEL  bacitracin 500 UNIT/GM OINT  benzonatate (TESSALON) 200 MG capsule  blood glucose monitoring (SOFTCLIX) lancets  Blood Glucose Monitoring Suppl (ACCU-CHEK GUIDE) w/Device KIT  Calamine external lotion  clotrimazole (LOTRIMIN) 1 % external cream  diclofenac (VOLTAREN) 1 % topical  "gel  diclofenac (VOLTAREN) 75 MG EC tablet  empagliflozin (JARDIANCE) 10 MG TABS tablet  escitalopram (LEXAPRO) 10 MG tablet  famotidine (PEPCID) 20 MG tablet  fluticasone-vilanterol (BREO ELLIPTA) 200-25 MCG/ACT inhaler  furosemide (LASIX) 20 MG tablet  ibuprofen (ADVIL/MOTRIN) 600 MG tablet  ketorolac (TORADOL) 10 MG tablet  lisinopril (ZESTRIL) 10 MG tablet  LORazepam (ATIVAN) 1 MG tablet  metFORMIN (GLUCOPHAGE) 1000 MG tablet  montelukast (SINGULAIR) 10 MG tablet  OLANZapine (ZYPREXA) 10 MG tablet  omeprazole (PRILOSEC) 20 MG DR capsule  ondansetron (ZOFRAN ODT) 4 MG ODT tab  polyethylene glycol (MIRALAX) 17 g packet  predniSONE (DELTASONE) 20 MG tablet  Respiratory Therapy Supplies (NEBULIZER/TUBING/MOUTHPIECE) KIT  rosuvastatin (CRESTOR) 10 MG tablet  sodium phosphate (FLEET ENEMA) 7-19 GM/118ML rectal enema  sucralfate (CARAFATE) 1 GM/10ML suspension  traZODone (DESYREL) 50 MG tablet        Physical Exam     First Vitals:  Patient Vitals for the past 24 hrs:   BP Temp Temp src Pulse Resp SpO2 Height Weight   11/02/24 1800 121/55 -- -- 87 -- 95 % -- --   11/02/24 1730 124/59 -- -- 85 -- 95 % -- --   11/02/24 1700 123/82 -- -- 86 -- 95 % -- --   11/02/24 1624 120/72 -- -- 97 -- 95 % -- --   11/02/24 1615 113/75 -- -- 86 -- 95 % -- --   11/02/24 1458 -- -- -- 86 29 93 % -- --   11/02/24 1456 120/63 -- -- -- -- -- -- --   11/02/24 1455 -- 98.5  F (36.9  C) Oral 85 21 -- -- --   11/02/24 1454 -- -- -- 85 17 -- -- --   11/02/24 1409 -- -- -- -- -- -- 1.651 m (5' 5\") 100.9 kg (222 lb 8 oz)   11/02/24 1403 122/66 -- -- -- -- -- -- --       PHYSICAL EXAM:   Physical Exam    GENERAL: Awake, alert.  In no acute distress.   HEENT: Normocephalic, atraumatic. Pupils equal, round and reactive. Conjunctiva normal. EOMI without nystagmus. Tongue is midline.   NECK: No stridor.  PULMONARY: Symmetrical breath sounds without distress.  Lungs clear to auscultation bilaterally without wheezes, rhonchi or rales.  CARDIO: Regular " rate and rhythm.  No significant murmur, rub or gallop.  Radial pulses strong and symmetrical.  ABDOMINAL: Abdomen soft, non-distended and non-tender to palpation.    EXTREMITIES: No lower extremity swelling or edema.      NEURO: Alert and oriented to person, place and time.  Cranial nerves III-XII intact.  Strength 5/5 BL upper (biceps flexion, triceps extension, hand  and median / ulnar / radial nerve distributions) and lower extremities (hip flexion, knee flexion / extension, plantarflexion / dorsiflexion ankles and great toes). Patient reports decreased sensation to light touch of the left upper and lower extremities compared to the right.   PSYCH: Normal mood and affect.      Results     LAB:  All pertinent labs reviewed and interpreted  Labs Ordered and Resulted from Time of ED Arrival to Time of ED Departure   CBC WITH PLATELETS - Abnormal       Result Value    WBC Count 14.6 (*)     RBC Count 5.16      Hemoglobin 13.6      Hematocrit 43.3      MCV 84      MCH 26.4 (*)     MCHC 31.4 (*)     RDW 17.0 (*)     Platelet Count 333     TROPONIN T, HIGH SENSITIVITY - Abnormal    Troponin T, High Sensitivity 23 (*)    NT PROBNP INPATIENT - Abnormal    N terminal Pro BNP Inpatient 651 (*)    TROPONIN T, HIGH SENSITIVITY - Abnormal    Troponin T, High Sensitivity 24 (*)    GLUCOSE BY METER - Abnormal    GLUCOSE BY METER POCT 106 (*)    BASIC METABOLIC PANEL - Normal    Sodium 139      Potassium 4.0      Chloride 101      Carbon Dioxide (CO2) 25      Anion Gap 13      Urea Nitrogen 15.6      Creatinine 0.87      GFR Estimate >90      Calcium 9.3      Glucose 90     GLUCOSE MONITOR NURSING POCT       RADIOLOGY:  MR Brain w/o & w Contrast   Final Result   IMPRESSION:   1.  Negative for acute intracranial abnormality.   2.  Mild cerebral volume loss.   3.  Complete opacification of the left maxillary sinus.      Chest XR,  PA & LAT   Final Result   IMPRESSION: Negative chest.      XR Shoulder Left G/E 3 Views   Final  Result   IMPRESSION: The left glenohumeral and acromioclavicular joints are negative for fracture or dislocation. There is mild hypertrophic change at the AC joint. The left clavicle is negative.          EC2024, 14:22; sinus rhythm with rate of 86 bpm; first degree AV block; no ST-T wave changes consistent with ACS or pericarditis; compared to previous EKG dated 2024, PVCs are no longer present    EKG independently reviewed and interpreted by Alejandrina Kelly MD          I, Tressa Urbano, am serving as a scribe to document services personally performed by Alejandrina Kelly MD based on my observation and the provider's statements to me. I, Alejandrina Kelly MD attest that Tressa Urbano is acting in a scribe capacity, has observed my performance of the services and has documented them in accordance with my direction.    Alejandrina Kelly MD  Emergency Medicine  [facility] EMERGENCY DEPARTMENT           Alejandrina Kelly MD  [phone]

## 2024-11-02 NOTE — ED NOTES
Bed: JNED-27  Expected date: 11/2/24  Expected time: 1:48 PM  Means of arrival: Ambulance  Comments:  [place]  44M  Shoulder Pain

## 2024-11-06 ENCOUNTER — HOSPITAL ENCOUNTER (EMERGENCY)
Facility: HOSPITAL | Age: 44
Discharge: HOME | End: 2024-11-06
Attending: STUDENT IN AN ORGANIZED HEALTH CARE EDUCATION/TRAINING PROGRAM | Admitting: STUDENT IN AN ORGANIZED HEALTH CARE EDUCATION/TRAINING PROGRAM
Payer: COMMERCIAL

## 2024-11-06 ENCOUNTER — APPOINTMENT (OUTPATIENT)
Dept: RADIOLOGY | Facility: HOSPITAL | Age: 44
End: 2024-11-06
Attending: STUDENT IN AN ORGANIZED HEALTH CARE EDUCATION/TRAINING PROGRAM
Payer: COMMERCIAL

## 2024-11-06 VITALS
BODY MASS INDEX: 47.07 KG/M2 | SYSTOLIC BLOOD PRESSURE: 131 MMHG | DIASTOLIC BLOOD PRESSURE: 75 MMHG | RESPIRATION RATE: 18 BRPM | HEART RATE: 82 BPM | OXYGEN SATURATION: 95 % | WEIGHT: 282.5 LBS | HEIGHT: 65 IN | TEMPERATURE: 98.1 F

## 2024-11-06 DIAGNOSIS — S42.402A LEFT ELBOW FRACTURE, CLOSED, INITIAL ENCOUNTER: ICD-10-CM

## 2024-11-06 PROCEDURE — 99284 EMERGENCY DEPT VISIT MOD MDM: CPT | Mod: 25

## 2024-11-06 PROCEDURE — 96372 THER/PROPH/DIAG INJ SC/IM: CPT | Performed by: STUDENT IN AN ORGANIZED HEALTH CARE EDUCATION/TRAINING PROGRAM

## 2024-11-06 PROCEDURE — 24670 CLTX ULNAR FX PROX W/O MNPJ: CPT | Mod: LT

## 2024-11-06 PROCEDURE — 250N000013 HC RX MED GY IP 250 OP 250 PS 637: Performed by: STUDENT IN AN ORGANIZED HEALTH CARE EDUCATION/TRAINING PROGRAM

## 2024-11-06 PROCEDURE — 73080 X-RAY EXAM OF ELBOW: CPT | Mod: LT

## 2024-11-06 PROCEDURE — 250N000011 HC RX IP 250 OP 636: Performed by: STUDENT IN AN ORGANIZED HEALTH CARE EDUCATION/TRAINING PROGRAM

## 2024-11-06 RX ORDER — KETOROLAC TROMETHAMINE 15 MG/ML
15 INJECTION, SOLUTION INTRAMUSCULAR; INTRAVENOUS ONCE
Status: COMPLETED | OUTPATIENT
Start: 2024-11-06 | End: 2024-11-06

## 2024-11-06 RX ORDER — ACETAMINOPHEN 325 MG/1
975 TABLET ORAL ONCE
Status: COMPLETED | OUTPATIENT
Start: 2024-11-06 | End: 2024-11-06

## 2024-11-06 RX ADMIN — KETOROLAC TROMETHAMINE 15 MG: 15 INJECTION, SOLUTION INTRAMUSCULAR; INTRAVENOUS at 01:09

## 2024-11-06 RX ADMIN — ACETAMINOPHEN 975 MG: 325 TABLET ORAL at 01:09

## 2024-11-06 ASSESSMENT — ACTIVITIES OF DAILY LIVING (ADL)
ADLS_ACUITY_SCORE: 0
ADLS_ACUITY_SCORE: 0

## 2024-11-06 NOTE — ED NOTES
Bed: JNFT11  Expected date: 11/6/24  Expected time: 12:16 AM  Means of arrival: Ambulance  Comments:  [facility] - 44M hit arm on door 3 days ago - is having pain

## 2024-11-06 NOTE — ED PROVIDER NOTES
EMERGENCY DEPARTMENT ENCOUNTER      NAME: Warren Jaramillo  AGE: 44 year old male  YOB: 1980  MRN: [id number]  EVALUATION DATE & TIME: 11/6/2024 12:26 AM    PCP: Mary Kelly    ED PROVIDER: Milton Ernandez MD      Chief Complaint   Patient presents with    Arm Pain         FINAL IMPRESSION:  1. Left elbow fracture, closed, initial encounter          ED COURSE & MEDICAL DECISION MAKING:    Pertinent Labs & Imaging studies reviewed. (See chart for details)  44 year old male presents to the Emergency Department for evaluation of L arm pain    12:59 AM I met with the patient, obtained history, performed an initial exam, and discussed options and plan for diagnostics and treatment here in the ED.    ED Course as of 11/06/24 0217   Wed Nov 06, 2024   0103 Patient is a 44-year-old male who comes from his group home with pain at his left proximal elbow at the inner arm, which radiates up his arm and shoulder to his neck.  This all began after he ran into a doorway over the weekend, and at that time he was evaluated in the emergency department and had negative plain films of the chest, shoulder, clavicle in addition to MRI of the brain to rule out ischemic pathology.  No imaging performed of the left arm other than shoulder.  He last took Tylenol almost 12 hours ago.  Fortunately on exam he has no focal neurologic deficits, he has normal pulses, no soft tissue swelling or deformity.  Plan for plain film of the elbow to rule out osseous abnormalities.  If this is negative, then I suspect continued soft tissue pain from his injury versus cervical radiculopathy.  Will treat with Tylenol, Toradol.   0142 Left elbow plain film shows a mildly displaced proximal olecranon fracture.  Will splint in a posterior arm splint and have him follow-up with [facility].  Splinting procedure described below.       Medical Decision Making  Obtained supplemental history:Supplemental history obtained?:  No  Reviewed external records: External records reviewed?: Documented in chart  Care impacted by chronic illness:Documented in Chart  Care significantly affected by social determinants of health:N/A  Did you consider but not order tests?: Work up considered but not performed and documented in chart, if applicable  Did you interpret images independently?: Independent interpretation of ECG and images noted in documentation, when applicable.  Consultation discussion with other provider:Did you involve another provider (consultant, , pharmacy, etc.)?: No  Discharge. No recommendations on prescription strength medication(s). See documentation for any additional details.  Not Applicable        At the conclusion of the encounter I discussed the results of all of the tests and the disposition. The questions were answered. The patient or family acknowledged understanding and was agreeable with the care plan.     0 minutes of critical care time     MEDICATIONS GIVEN IN THE EMERGENCY:  Medications   acetaminophen (TYLENOL) tablet 975 mg (975 mg Oral $Given 11/6/24 0109)   ketorolac (TORADOL) injection 15 mg (15 mg Intramuscular $Given 11/6/24 0109)       NEW PRESCRIPTIONS STARTED AT TODAY'S ER VISIT  New Prescriptions    No medications on file          =================================================================    HPI    Patient information was obtained from: patient     Use of : N/A       Warren Jaramillo is a 44 year old male with a pertinent history of Wilsons's disease, cardiomegaly, steatohepatitis, CHF, diabetes, HTN, and TBI who presents to this ED by EMS for evaluation of arm pain.    Patient stated that on Saturday (3 days ago). He ran into a door. He noted that he was seen the day of the incident and had an MRI, and xrays, which were all negative. Today, he described that his left arm has swelled up the size of a grapefruit. He noted that he has pain radiating from his neck down to his arm.  He mentioned that he has pain with ambulation, and cannot move his arm passed shoulder level without assistance from his other hand. Patient reported that he has been taking tylenol which has not been helping. His last dose was 2:00 pm yesterday.     Per chart review, [facility] ED, 11/02/2024: patient presented with left sided shoulder and neck pain. Patient was given stroke workup which was negative. His lab work and imaging, including xray and an MRI of the brain, were negative. Patient was discharged home.       PAST MEDICAL HISTORY:  Past Medical History:   Diagnosis Date    DM2 (diabetes mellitus, type 2) (H) 4/28/2020    HTN (hypertension) 7/30/2012    Rojas's disease (H)        PAST SURGICAL HISTORY:  Past Surgical History:   Procedure Laterality Date    COLONOSCOPY      ESOPHAGOSCOPY, GASTROSCOPY, DUODENOSCOPY (EGD), COMBINED N/A 7/21/2023    Procedure: ESOPHAGOGASTRODUODENOSCOPY WITH GASTRIC AND ESOPHAGEAL BIOPSIES;  Surgeon: Filiberto Aragon MD;  Location: [facility] OR    TOOTH EXTRACTION             CURRENT MEDICATIONS:    ACCU-CHEK GUIDE test strip  acetaminophen (TYLENOL) 500 MG tablet  albuterol (PROAIR HFA/PROVENTIL HFA/VENTOLIN HFA) 108 (90 Base) MCG/ACT inhaler  albuterol (PROVENTIL) (2.5 MG/3ML) 0.083% neb solution  aloe vera GEL  bacitracin 500 UNIT/GM OINT  benzonatate (TESSALON) 200 MG capsule  blood glucose monitoring (SOFTCLIX) lancets  Blood Glucose Monitoring Suppl (ACCU-CHEK GUIDE) w/Device KIT  Calamine external lotion  clotrimazole (LOTRIMIN) 1 % external cream  diclofenac (VOLTAREN) 1 % topical gel  diclofenac (VOLTAREN) 75 MG EC tablet  empagliflozin (JARDIANCE) 10 MG TABS tablet  escitalopram (LEXAPRO) 10 MG tablet  famotidine (PEPCID) 20 MG tablet  fluticasone-vilanterol (BREO ELLIPTA) 200-25 MCG/ACT inhaler  furosemide (LASIX) 20 MG tablet  ibuprofen (ADVIL/MOTRIN) 600 MG tablet  ketorolac (TORADOL) 10 MG tablet  lisinopril (ZESTRIL) 10 MG tablet  LORazepam (ATIVAN) 1 MG  tablet  metFORMIN (GLUCOPHAGE) 1000 MG tablet  montelukast (SINGULAIR) 10 MG tablet  OLANZapine (ZYPREXA) 10 MG tablet  omeprazole (PRILOSEC) 20 MG DR capsule  ondansetron (ZOFRAN ODT) 4 MG ODT tab  polyethylene glycol (MIRALAX) 17 g packet  predniSONE (DELTASONE) 20 MG tablet  Respiratory Therapy Supplies (NEBULIZER/TUBING/MOUTHPIECE) KIT  rosuvastatin (CRESTOR) 10 MG tablet  sodium phosphate (FLEET ENEMA) 7-19 GM/118ML rectal enema  sucralfate (CARAFATE) 1 GM/10ML suspension  traZODone (DESYREL) 50 MG tablet        ALLERGIES:  Allergies   Allergen Reactions    Apricot Flavor Anaphylaxis    Banana Anaphylaxis     Throat swelling  Throat swelling      Wasp Venom Protein Shortness Of Breath     Other reaction(s): Respiratory Distress  Has an epi pen  Has an epi pen      Bees Anaphylaxis     Have an Epi pen that carries with    Methylphenidate Itching     Other reaction(s): Nightmares    Prunus      Other reaction(s): *Unknown    Sulfa Antibiotics      Headaches and nausea    Prunus Persica Rash     Other reaction(s): *Unknown       FAMILY HISTORY:  Family History   Problem Relation Age of Onset    Unknown/Adopted Father     Unknown/Adopted Maternal Grandmother     C.A.D. Maternal Grandfather     Diabetes Maternal Grandfather     Cerebrovascular Disease Maternal Grandfather     Unknown/Adopted Paternal Grandmother     Unknown/Adopted Paternal Grandfather     Unknown/Adopted Brother     Unknown/Adopted Sister        SOCIAL HISTORY:   Social History     Socioeconomic History    Marital status: Single   Tobacco Use    Smoking status: Every Day     Current packs/day: 1.00     Types: Cigarettes    Smokeless tobacco: Never   Vaping Use    Vaping status: Never Used   Substance and Sexual Activity    Alcohol use: No     Comment: once every 3 months    Drug use: No    Sexual activity: Never     Partners: Female   Other Topics Concern     Service No    Blood Transfusions No    Caffeine Concern No    Occupational  "Exposure No    Hobby Hazards No    Sleep Concern No    Stress Concern Yes     Comment: sometimes    Weight Concern No    Special Diet Yes     Comment: counting carbs    Back Care No    Exercise Yes    Seat Belt Yes    Self-Exams Yes     Social Drivers of Health     Financial Resource Strain: Medium Risk (4/27/2022)    [facility], [facility]    Financial Resource Strain     Difficulty of Paying Living Expenses: 2     Difficulty of Paying Living Expenses: 1   Food Insecurity: Food Insecurity Present (4/27/2022)    Received from [facility] [employer] [facility], [facility] [employer]    Food Insecurity     Worried About Running Out of Food in the Last Year: 2   Transportation Needs: Unmet Transportation Needs (4/27/2022)    Received from [facility], [facility]    Transportation Needs     Lack of Transportation (Medical): 2    Received from [facility], [facility] [employer] [facility] [employer] [facility]    Social Connections   Housing Stability: High Risk (4/27/2022)    Received from [facility] [employer] [facility] [employer] [facility], [facility]    Housing Stability     Unable to Pay for Housing in the Last Year: 3       VITALS:  /75   Pulse 89   Temp 98.1  F (36.7  C) (Oral)   Resp 18   Ht 1.651 m (5' 5\")   Wt 128.1 kg (282 lb 8 oz)   SpO2 96%   BMI 47.01 kg/m      PHYSICAL EXAM    Physical Exam  Vitals and nursing note reviewed.   Constitutional:       General: He is not in acute distress.     Appearance: Normal appearance. He is normal weight.   HENT:      Head: Normocephalic and atraumatic.      Nose: Nose normal.      Mouth/Throat:      Pharynx: Oropharynx is clear.   Eyes:      Conjunctiva/sclera: Conjunctivae normal.   Cardiovascular:      Rate and Rhythm: Normal " rate.      Pulses: Normal pulses.   Pulmonary:      Effort: Pulmonary effort is normal.   Abdominal:      General: Abdomen is flat.   Musculoskeletal:         General: Tenderness (L elbow) present. No swelling or deformity. Normal range of motion.      Cervical back: Normal range of motion and neck supple. No rigidity or tenderness.      Right lower leg: No edema.      Left lower leg: No edema.   Skin:     General: Skin is warm and dry.      Capillary Refill: Capillary refill takes less than 2 seconds.   Neurological:      General: No focal deficit present.      Mental Status: He is alert and oriented to person, place, and time. Mental status is at baseline.   Psychiatric:         Mood and Affect: Mood normal.         Behavior: Behavior normal.         Thought Content: Thought content normal.         Judgment: Judgment normal.            LAB:  All pertinent labs reviewed and interpreted.  Results for orders placed or performed during the hospital encounter of 11/06/24   Elbow  XR, G/E 3 views, left    Impression    IMPRESSION: Mildly displaced fracture of the proximal olecranon process. No joint effusion.       RADIOLOGY:  Reviewed all pertinent imaging. Please see official radiology report.  Elbow  XR, G/E 3 views, left   Final Result   IMPRESSION: Mildly displaced fracture of the proximal olecranon process. No joint effusion.          PROCEDURES:     PROCEDURE: Splint Placement   INDICATIONS: left olecranon fracture   PROCEDURE PROVIDER: Dr Milton Ernandez   NOTE:  A Long arm splint made of Orth[employer] was applied to the Left upper extremity by the above provider. As noted in the physical exam, distal CMS was intact prior to placement. The splint was checked and the fit was adjusted to ensure proper positioning after placement. Sensation and circulation, as well as motor function, are unchanged after splint placement and the patient is more comfortable with the splint in place.           [facility] [employer]  Documentation:   CMS Diagnoses:              I, Miguel Angel Mane, am serving as a scribe to document services personally performed by Milton Ernandez MD based on my observation and the provider's statements to me. I, Milton Ernandez MD, attest that Miguel Angel Mane is acting in a scribe capacity, has observed my performance of the services and has documented them in accordance with my direction.    Milton Ernandez MD  [facility] EMERGENCY DEPARTMENT  [address] [place] [id number]  [phone]       Milotn Ernandez MD  11/06/24 0217

## 2024-11-06 NOTE — DISCHARGE INSTRUCTIONS
Continue to treat your symptoms using 1000 mg Tylenol every 6 hours, 600 mg ibuprofen every 6 hours, rest, ice, elevation.    Follow-up with [facility] by calling their number listed above.    Please return to the ER if your symptoms worsen.

## 2024-11-06 NOTE — ED TRIAGE NOTES
"Pt arrives via [facility] from his group home stating ongoing left arm pain. Ran into door on Saturday and was seen here and discharged that day. Pain has worsened and is concerned about some swelling in his upper arm/elbow region. Report this pain is in his left neck and radiates down to the elbow. Points to shoulder and reports it is tender. Some positional numbness with this as well. \"Feels like a rubber band being pulled inside\" shooting throbbing pain. 10/10 pt reports.    Last meds for this: 1400 1,000mg tylenol        "

## 2024-11-09 ENCOUNTER — HOSPITAL ENCOUNTER (EMERGENCY)
Facility: HOSPITAL | Age: 44
Discharge: HOME | End: 2024-11-09
Attending: EMERGENCY MEDICINE | Admitting: EMERGENCY MEDICINE
Payer: COMMERCIAL

## 2024-11-09 ENCOUNTER — APPOINTMENT (OUTPATIENT)
Dept: RADIOLOGY | Facility: HOSPITAL | Age: 44
End: 2024-11-09
Payer: COMMERCIAL

## 2024-11-09 VITALS
SYSTOLIC BLOOD PRESSURE: 139 MMHG | OXYGEN SATURATION: 98 % | HEIGHT: 65 IN | WEIGHT: 282 LBS | TEMPERATURE: 97.5 F | DIASTOLIC BLOOD PRESSURE: 83 MMHG | HEART RATE: 78 BPM | BODY MASS INDEX: 46.98 KG/M2 | RESPIRATION RATE: 18 BRPM

## 2024-11-09 DIAGNOSIS — M79.602 PAIN OF LEFT UPPER EXTREMITY: ICD-10-CM

## 2024-11-09 LAB
ATRIAL RATE - MUSE: 86 BPM
DIASTOLIC BLOOD PRESSURE - MUSE: 66 MMHG
INTERPRETATION ECG - MUSE: NORMAL
P AXIS - MUSE: 67 DEGREES
PR INTERVAL - MUSE: 208 MS
QRS DURATION - MUSE: 104 MS
QT - MUSE: 378 MS
QTC - MUSE: 452 MS
R AXIS - MUSE: 99 DEGREES
SYSTOLIC BLOOD PRESSURE - MUSE: 122 MMHG
T AXIS - MUSE: 252 DEGREES
VENTRICULAR RATE- MUSE: 86 BPM

## 2024-11-09 PROCEDURE — 73090 X-RAY EXAM OF FOREARM: CPT | Mod: LT

## 2024-11-09 PROCEDURE — 99283 EMERGENCY DEPT VISIT LOW MDM: CPT

## 2024-11-09 PROCEDURE — 250N000013 HC RX MED GY IP 250 OP 250 PS 637

## 2024-11-09 PROCEDURE — 73110 X-RAY EXAM OF WRIST: CPT | Mod: LT

## 2024-11-09 RX ORDER — IBUPROFEN 600 MG/1
600 TABLET, FILM COATED ORAL ONCE
Status: COMPLETED | OUTPATIENT
Start: 2024-11-09 | End: 2024-11-09

## 2024-11-09 RX ADMIN — IBUPROFEN 600 MG: 600 TABLET, FILM COATED ORAL at 18:38

## 2024-11-09 ASSESSMENT — ACTIVITIES OF DAILY LIVING (ADL)
ADLS_ACUITY_SCORE: 0

## 2024-11-09 ASSESSMENT — COLUMBIA-SUICIDE SEVERITY RATING SCALE - C-SSRS
2. HAVE YOU ACTUALLY HAD ANY THOUGHTS OF KILLING YOURSELF IN THE PAST MONTH?: NO
1. IN THE PAST MONTH, HAVE YOU WISHED YOU WERE DEAD OR WISHED YOU COULD GO TO SLEEP AND NOT WAKE UP?: NO
6. HAVE YOU EVER DONE ANYTHING, STARTED TO DO ANYTHING, OR PREPARED TO DO ANYTHING TO END YOUR LIFE?: NO

## 2024-11-09 NOTE — ED TRIAGE NOTES
Patient comes in by EMS from a group home.  Last Thursday he had a fall--came in a few days later and found to have a  fractured left elbow. Was air casted--saw [facility] yesterday--they repeated xrays and took off the cast.  Patient states that he is having more pain in his elbow and is upset that ortho did not cast it. No re injury or trauma.

## 2024-11-10 NOTE — DISCHARGE INSTRUCTIONS
You could look into seeing a chiropractor for some left shoulder discomfort since it sounds like that is covered by your insurance.  He want somebody that does soft tissue work and you need to let them know that you have an injury to your left elbow so they leave that alone.  Other things to consider would be physical therapy.

## 2024-11-10 NOTE — ED PROVIDER NOTES
EMERGENCY DEPARTMENT ENCOUNTER      NAME: Warren Jaramillo  AGE: 44 year old male  YOB: 1980  MRN: [id number]  EVALUATION DATE & TIME: 11/9/2024  5:39 PM    PCP: Mary Kelly    ED PROVIDER: Carolina Shankar PA-C      CHIEF COMPLAINT:  Elbow Pain      FINAL IMPRESSION:  1. Pain of left upper extremity          ED COURSE & MEDICAL DECISION MAKING:  Pertinent Labs & Imaging studies reviewed. (See chart for details)    The patient is a pleasant 44 year old-year-old male with a history of type 2 diabetes mellitus, gait instability, SI joint ankylosis presenting to the emergency department for evaluation of left elbow pain.  The patient fell into a door frame 10 days ago for which she was seen in the emergency department for evaluation afterwards and found to have a olecranon fracture.  He followed up with [facility] yesterday and they removed his Aircast and gave him a brace sleeve to wear.  He presents today with left arm pain and is concerned he may need a new cast.  Also reports that at times when he is in certain positions when he leans down he sometimes will get numbness in the arm.  He took Tylenol at 4 PM today and does not recall when he last took ibuprofen.  No interval trauma or injury.     Initial vital signs reviewed and significant for elevated blood pressure, likely secondary to symptoms.  Repeat blood pressure improved within normal limits.  On exam, patient is generally well-appearing and nontoxic-appearing in no acute distress without signs of gross deformity or serious injury.  No reproducible tenderness to palpation overlying upper trapezius, palpable muscle hypertrophy left upper trapezius region. Left elbow with sleeve with elbow pad in place.  No rotational deformity of fingers in a resting position or when making a fist.  There is mild bony tenderness with palpation of distal forearm. No tenderness to palpation in anatomic snuffbox.  Compartments are soft.   Strength 5/5 with , flexion and extension at the wrist, flexion/extension at elbow and shoulder.  Able to abduct fingers, extend wrist and give ok sign against resistance. No hot, painful, or swollen joint.  Sensation grossly intact to light touch.  Distal extremity is warm with pulses intact.  No overlying skin changes.    Presentation most consistent with left upper arm pain.  As patient has tenderness at his distal forearm and this has not been imaged as of yet, did obtain x-rays of the radius, the wrist which redemonstrate ossific fragment along posterior aspect of olecranon unchanged compared to prior 11/6, no new acute appearing fracture, normal elbow and wrist alignment, no elbow effusion.  Suspect that positional numbness is likely secondary to muscle spasms in his left upper trapezius as he shares that when he ran into the door frame he felt all of his muscles contract at that time.      I considered, but doubt based on history and evaluation today, septic arthritis, compartment syndrome, septic bursitis, gout, pseudogout, elbow dislocation, biceps tendon rupture/dislocation, olecranon bursitis, cellulitis.    Patient placed in sling to help with comfort during the day, he reports that arm feels much better when using the sling. Patient is otherwise well appearing and without evidence of neurovascular injury or compartment syndrome, plan to discharge home with symptomatic care including sling, rest, ice, elevation and OTC analgesics. Patient was provided with clear, written instructions of potential danger signs and where and when to return for emergency care or re-evaluation. [facility] contact information provided.    At the conclusion of the encounter I discussed the results of all of the tests and the disposition. The questions were answered. The patient or family acknowledged understanding and was agreeable with the care plan.       ED COURSE:  6:28 PM I met and introduced myself to the  patient. I gathered initial history and performed an initial physical exam. We discussed options and plan for diagnostics and treatment here in the ED.  8:55 PM I have staffed the patient with Dr. Monzon, ED physician, who has evaluated the patient and agrees with all aspects of today's care.   9:24 PM I discussed the plan for discharge with the patient, and patient is agreeable. We discussed supportive cares at home and reasons for return to the ER including new or worsening symptoms - all questions and concerns addressed to the best of my ability. Strict return precautions discussed. Patient to be discharged by RN.        MEDICATIONS GIVEN IN THE EMERGENCY:  Medications   ibuprofen (ADVIL/MOTRIN) tablet 600 mg (600 mg Oral $Given 11/9/24 1296)       NEW PRESCRIPTIONS STARTED AT TODAY'S ER VISIT  Discharge Medication List as of 11/9/2024  9:23 PM             =================================================================    HPI    Patient information was obtained from: Patient     Use of Intrepreter: N/A        Warren Jaramillo is a 44 year old male with pertinent medical history of T2DM who presents to the emergency department for evaluation of left arm pain.    Per chart review, the patient was seen in the [facility] ED on 11/6/2024 for left arm pain that began after he ran into a doorway over the weekend (on 11/2/2024). At that time, he was evaluated in the ED and had negative plain films of the chest, shoulder, clavicle in addition to MRI of the brain to rule out ischemic pathology. No imaging was performed of the left arm other than shoulder. On this 11/6/2024 visit, left elbow XR showed mildly displaced fracture of the proximal olecranon process, no joint effusion. Patient was splinted in a posterior arm splint and was instructed to follow-up with [facility].     Per the patient, he followed up with [facility] yesterday (11/8/2024). They took off the splint that was placed in the ED on  11/6/2024 and was given an aircast. Patient reports improved pain when the splint was on, but now that the splint is off, the aircast has not been improving his pain. His left arm pain is a 7 out of 10 when straight but is relieved when he bends the arm. No new trauma or injuries to the left arm since he was first seen in the ED for this pain (11/2/2024). He also notes feeling pins and needles down the left arm when he bends down to pick something up with this arm.    The patient has been taking tylenol every 4-6 hours and ibuprofen at home without any pain relief. Last tylenol was at 4 PM today (~3 hours ago). He is unsure when his last dose of ibuprofen was. Patient has not used lidocaine patches for this pain. He ultimately decided to come into the ED today for evaluation because he states was unable to hold a glass of milk with his left hand.     Oft note, he is right hand dominant but uses his left arm daily to push himself out of bed.     He has no other concerns at this time.    PAST MEDICAL HISTORY:  Past Medical History:   Diagnosis Date    DM2 (diabetes mellitus, type 2) (H) 4/28/2020    HTN (hypertension) 7/30/2012    Rojas's disease (H)        PAST SURGICAL HISTORY:  Past Surgical History:   Procedure Laterality Date    COLONOSCOPY      ESOPHAGOSCOPY, GASTROSCOPY, DUODENOSCOPY (EGD), COMBINED N/A 7/21/2023    Procedure: ESOPHAGOGASTRODUODENOSCOPY WITH GASTRIC AND ESOPHAGEAL BIOPSIES;  Surgeon: Filiberto Aragon MD;  Location: [facility] OR    TOOTH EXTRACTION         CURRENT MEDICATIONS:    Prior to Admission Medications   Prescriptions Last Dose Informant Patient Reported? Taking?   ACCU-CHEK GUIDE test strip   Yes No   Sig: USE TO TEST BLOOD SUGAR TWICE DAILY AS NEEDED   Blood Glucose Monitoring Suppl (ACCU-CHEK GUIDE) w/Device KIT   Yes No   Sig: USE AS DIRECTED TWICE DAILY AS NEEDED   Calamine external lotion   Yes No   Sig: Apply topically as needed for itching   LORazepam (ATIVAN) 1 MG tablet    Yes No   Sig: Take 0.5 mg by mouth daily as needed for anxiety   OLANZapine (ZYPREXA) 10 MG tablet   Yes No   Sig: Take 10 mg by mouth At Bedtime   Respiratory Therapy Supplies (NEBULIZER/TUBING/MOUTHPIECE) KIT   No No   Si kit every 6 hours as needed (shortness of breath, wheezing)   acetaminophen (TYLENOL) 500 MG tablet   No No   Sig: Take 2 tablets (1,000 mg) by mouth 3 times daily   albuterol (PROAIR HFA/PROVENTIL HFA/VENTOLIN HFA) 108 (90 Base) MCG/ACT inhaler   No No   Sig: Inhale 1-2 puffs into the lungs every 6 hours as needed for shortness of breath, wheezing or cough   albuterol (PROVENTIL) (2.5 MG/3ML) 0.083% neb solution   Yes No   Sig: Take 2.5 mg by nebulization 3 times daily as needed for shortness of breath, wheezing or cough   aloe vera GEL   Yes No   Sig: Apply 1 g topically every hour as needed for skin care Per bottle directions   bacitracin 500 UNIT/GM OINT   Yes No   Sig: Apply topically 3 times daily as needed for wound care   benzonatate (TESSALON) 200 MG capsule   No No   Sig: Take 1 capsule (200 mg) by mouth 3 times daily as needed for cough.   blood glucose monitoring (SOFTCLIX) lancets   Yes No   Sig: USE AS DIRECTED TWICE DAILY AS NEEDED   clotrimazole (LOTRIMIN) 1 % external cream   Yes No   Sig: Apply topically 2 times daily as needed (skin irritation)   diclofenac (VOLTAREN) 1 % topical gel   Yes No   Sig: Apply 2 g topically daily as needed for moderate pain To joints/back   diclofenac (VOLTAREN) 75 MG EC tablet   No No   Sig: Take 1 tablet (75 mg) by mouth 2 times daily as needed for moderate pain   empagliflozin (JARDIANCE) 10 MG TABS tablet   Yes No   Sig: Take 10 mg by mouth daily   escitalopram (LEXAPRO) 10 MG tablet   Yes No   Sig: Take 10 mg by mouth daily   famotidine (PEPCID) 20 MG tablet   No No   Sig: Take 1 tablet (20 mg) by mouth 2 times daily   fluticasone-vilanterol (BREO ELLIPTA) 200-25 MCG/ACT inhaler   Yes No   Sig: Inhale 1 puff into the lungs daily    furosemide (LASIX) 20 MG tablet   No No   Sig: Take 2 tablets (40 mg) by mouth daily   ibuprofen (ADVIL/MOTRIN) 600 MG tablet   No No   Sig: Take 1 tablet (600 mg) by mouth every 6 hours as needed for moderate pain   ketorolac (TORADOL) 10 MG tablet   No No   Sig: Take 1 tablet (10 mg) by mouth every 6 hours as needed for pain.   lisinopril (ZESTRIL) 10 MG tablet   Yes No   Sig: Take 10 mg by mouth daily   metFORMIN (GLUCOPHAGE) 1000 MG tablet   Yes No   Sig: Take 1,000 mg by mouth 2 times daily (with meals)   montelukast (SINGULAIR) 10 MG tablet   Yes No   Sig: Take 10 mg by mouth daily   omeprazole (PRILOSEC) 20 MG DR capsule   Yes No   Sig: Take 40 mg by mouth daily   ondansetron (ZOFRAN ODT) 4 MG ODT tab   No No   Sig: Take 1-2 tablets (4-8 mg) by mouth every 8 hours as needed for nausea or vomiting   polyethylene glycol (MIRALAX) 17 g packet   Yes No   Sig: Take 1 packet by mouth daily as needed for constipation   predniSONE (DELTASONE) 20 MG tablet   No No   Sig: Take two tablets (= 40mg) each day for 5 (five) days   rosuvastatin (CRESTOR) 10 MG tablet   Yes No   Sig: Take 10 mg by mouth At Bedtime   sodium phosphate (FLEET ENEMA) 7-19 GM/118ML rectal enema   Yes No   Sig: Place 1 enema rectally once as needed for constipation   sucralfate (CARAFATE) 1 GM/10ML suspension   No No   Sig: Take 10 mLs (1 g) by mouth 4 times daily   traZODone (DESYREL) 50 MG tablet   Yes No   Sig: Take 100 mg by mouth at bedtime      Facility-Administered Medications: None       ALLERGIES:  Allergies   Allergen Reactions    Apricot Flavor Anaphylaxis    Banana Anaphylaxis     Throat swelling  Throat swelling      Wasp Venom Protein Shortness Of Breath     Other reaction(s): Respiratory Distress  Has an epi pen  Has an epi pen      Bees Anaphylaxis     Have an Epi pen that carries with    Methylphenidate Itching     Other reaction(s): Nightmares    Prunus      Other reaction(s): *Unknown    Sulfa Antibiotics      Headaches and  "nausea    Prunus Persica Rash     Other reaction(s): *Unknown       FAMILY HISTORY:  Family History   Problem Relation Age of Onset    Unknown/Adopted Father     Unknown/Adopted Maternal Grandmother     C.A.D. Maternal Grandfather     Diabetes Maternal Grandfather     Cerebrovascular Disease Maternal Grandfather     Unknown/Adopted Paternal Grandmother     Unknown/Adopted Paternal Grandfather     Unknown/Adopted Brother     Unknown/Adopted Sister        SOCIAL HISTORY:  Social History     Tobacco Use    Smoking status: Every Day     Current packs/day: 1.00     Types: Cigarettes    Smokeless tobacco: Never   Vaping Use    Vaping status: Never Used   Substance Use Topics    Alcohol use: No     Comment: once every 3 months    Drug use: No        VITALS:    First Vitals:  No data found.      No data found.        PHYSICAL EXAM  VITAL SIGNS: /83   Pulse 78   Temp 97.5  F (36.4  C) (Oral)   Resp 18   Ht 1.651 m (5' 5\")   Wt 127.9 kg (282 lb)   SpO2 98%   BMI 46.93 kg/m     GENERAL: Awake, alert, answering questions appropriately, in no acute distress.  HEENT: Sclera antiicteric.   SPEECH:  Easy to understand speech, Normal volume and donte. Normal phonation.  PULMONARY: No respiratory distress, Breathing comfortably on room air. Lungs clear to auscultation bilaterally.  CARDIOVASCULAR: Regular rate and rhythm, radial pulses present, symmetric, and normal.  ABDOMINAL: Nondistended  BACK: No midline vertebral tenderness, crepitus, step-offs, deformities  EXTREMITIES: Extremities are warm and well perfused. No lower extremity edema.  Left upper extremity:  No reproducible tenderness to palpation overlying upper trapezius, palpable muscle hypertrophy left upper trapezius region.  Left elbow with sleeve with elbow pad in place.  No rotational deformity of fingers in a resting position or when making a fist.  There is mild bony tenderness with palpation of distal forearm. No tenderness to palpation in anatomic " snuffbox.  Compartments are soft.  Strength 5/5 with , flexion and extension at the wrist, flexion/extension at elbow and shoulder.  Able to abduct fingers, extend wrist and give ok sign against resistance. No hot, painful, or swollen joint.  Sensation grossly intact to light touch.  Distal extremity is warm with pulses intact.  No overlying skin changes. Biceps tendon intact.    NEUROLOGIC: GCS 15. Alert, oriented. CN III-XII grossly intact. Moving all extremities spontaneously.   SKIN: Exposed areas of skin warm, dry, no rashes.  PSYCH: Normal mood and affect.           RADIOLOGY/LAB:  Reviewed all pertinent imaging. Please see official radiology report.  All pertinent labs reviewed and interpreted.  Results for orders placed or performed during the hospital encounter of 11/09/24   Radius/Ulna XR,  PA &LAT, left    Impression    IMPRESSION: Ossific fragment along the posterior aspect of olecranon is again seen and could represent an age-indeterminate fracture fragment. Appearance is unchanged compared to 11/06/2024. No new acute appearing fracture. Normal elbow and wrist   alignment. No elbow effusion.   XR Wrist Left G/E 3 Views    Impression    IMPRESSION: Ossific fragment along the posterior aspect of olecranon is again seen and could represent an age-indeterminate fracture fragment. Appearance is unchanged compared to 11/06/2024. No new acute appearing fracture. Normal elbow and wrist   alignment. No elbow effusion.         EKG:  None      PROCEDURES:  None      Medical Decision Making  Obtained supplemental history:Supplemental history obtained?: No  Reviewed external records: External records reviewed?: Outpatient Record: [facility] ED visit on 11/6/2024  Care impacted by chronic illness:Diabetes  Care significantly affected by social determinants of health:N/A  Did you consider but not order tests?: Work up considered but not performed and documented in chart, if applicable  Did you interpret images  independently?: Independent interpretation of ECG and images noted in documentation, when applicable.  Consultation discussion with other provider:Did you involve another provider (consultant, , pharmacy, etc.)?: I discussed the care with another health care provider, see documentation for details.  Discharge. No recommendations on prescription strength medication(s). See documentation for any additional details.    Not Applicable    CRITICAL CARE:  Not applicable      I, Cyndie Salazar, am serving as a scribe to document services personally performed by Carolina Shankar PA-C, based on my observation and the provider's statements to me. I, Carolina Shankar PA-C attest that Cyndie Salazar is acting in a scribe capacity, has observed my performance of the services and has documented them in accordance with my direction.         Carolina Shankar PA-C  Emergency Medicine  [facility] EMERGENCY DEPARTMENT  [address] [place] [id number]  [phone]  Dept: [phone]     Carolina Shankar PA-C  11/10/24 1843

## 2024-11-10 NOTE — ED PROVIDER NOTES
Emergency Department Staff Note  I had a face to face encounter with this patient seen by the Advanced Practice Provider (TALIA).  I personally made/approved the management plan and take responsibility for the patient management. I personally saw the patient and performed a substantive portion of the visit including all aspects of the medical decision making.      ED Course as of 11/09/24 2116   Sat Nov 09, 2024 2106 Patient is a pleasant 44-year-old male who comes in today for evaluation of some left shoulder pain left arm pain and concerned that he needs a new cast.  He had an injury recently and was in a splint and then they took him out of the splint and just gave him a pad for his elbow.  He has  a sling but has not been using it regularly.  Sometimes gets numbness in certain positions when he leans down and I suspect that he has some muscle spasm in the left trapezius and shoulder muscles that is causing some nerve impingement.  I did talk to him about physical therapy or massage.  He brought up that he has chiropractic coverage.  I told him if he found a good soft tissue person for the shoulder that might be helpful for him.  I told him to let them know that he has an elbow injury so they do not mess with it but that they may be able to help with some of the compensating muscles that can cause some pain and discomfort.  He was in agreement with the plan.  I think you are can get him discharged home.  We talked about using a sling during the day as needed for comfort and he will do that.     Medical Decision Making    History:  Supplemental history from: None  External Record(s) reviewed: I reviewed emergency department records from 11/6/2024.  Patient had a mildly displaced proximal olecranon fracture.    Work Up:  Emergent/Severe conditions considered and evaluated for: Forearm fracture  I independently reviewed and interpreted forearm x-ray which was negative for fracture.  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

## 2024-11-10 NOTE — ED NOTES
Pt checked BG with dexacom on left upper arm.  BG 69.  Pt requested some orange juice and something to eat.  Brought pt turkey sandwich and juice.

## 2024-11-13 ENCOUNTER — HOSPITAL ENCOUNTER (OUTPATIENT)
Dept: ULTRASOUND IMAGING | Facility: HOSPITAL | Age: 44
Discharge: HOME | End: 2024-11-13
Attending: INTERNAL MEDICINE | Admitting: INTERNAL MEDICINE
Payer: COMMERCIAL

## 2024-11-13 DIAGNOSIS — K76.0 NAFLD (NONALCOHOLIC FATTY LIVER DISEASE): ICD-10-CM

## 2024-11-13 DIAGNOSIS — K59.09 CHRONIC CONSTIPATION: ICD-10-CM

## 2024-11-13 DIAGNOSIS — K74.60 HEPATIC CIRRHOSIS, UNSPECIFIED HEPATIC CIRRHOSIS TYPE, UNSPECIFIED WHETHER ASCITES PRESENT (H): ICD-10-CM

## 2024-11-13 PROCEDURE — 76705 ECHO EXAM OF ABDOMEN: CPT

## 2024-11-19 ENCOUNTER — APPOINTMENT (OUTPATIENT)
Dept: CT IMAGING | Facility: HOSPITAL | Age: 44
End: 2024-11-19
Attending: EMERGENCY MEDICINE
Payer: COMMERCIAL

## 2024-11-19 VITALS
TEMPERATURE: 98 F | HEART RATE: 86 BPM | OXYGEN SATURATION: 96 % | RESPIRATION RATE: 20 BRPM | SYSTOLIC BLOOD PRESSURE: 130 MMHG | DIASTOLIC BLOOD PRESSURE: 60 MMHG

## 2024-11-19 PROCEDURE — 250N000013 HC RX MED GY IP 250 OP 250 PS 637: Performed by: EMERGENCY MEDICINE

## 2024-11-19 PROCEDURE — 250N000011 HC RX IP 250 OP 636: Performed by: EMERGENCY MEDICINE

## 2024-11-19 PROCEDURE — 99284 EMERGENCY DEPT VISIT MOD MDM: CPT | Mod: 25

## 2024-11-19 PROCEDURE — 70450 CT HEAD/BRAIN W/O DYE: CPT

## 2024-11-19 RX ORDER — ONDANSETRON 4 MG/1
8 TABLET, ORALLY DISINTEGRATING ORAL ONCE
Status: COMPLETED | OUTPATIENT
Start: 2024-11-19 | End: 2024-11-19

## 2024-11-19 RX ORDER — ACETAMINOPHEN 325 MG/1
975 TABLET ORAL ONCE
Status: COMPLETED | OUTPATIENT
Start: 2024-11-19 | End: 2024-11-19

## 2024-11-19 RX ADMIN — ONDANSETRON 8 MG: 4 TABLET, ORALLY DISINTEGRATING ORAL at 22:52

## 2024-11-19 RX ADMIN — ACETAMINOPHEN 975 MG: 325 TABLET ORAL at 22:52

## 2024-11-20 ENCOUNTER — HOSPITAL ENCOUNTER (EMERGENCY)
Facility: HOSPITAL | Age: 44
Discharge: HOME | End: 2024-11-20
Attending: EMERGENCY MEDICINE | Admitting: EMERGENCY MEDICINE
Payer: COMMERCIAL

## 2024-11-20 DIAGNOSIS — S09.90XA CLOSED HEAD INJURY, INITIAL ENCOUNTER: ICD-10-CM

## 2024-11-20 NOTE — ED TRIAGE NOTES
Patient reports a slip and fall on bathroom floor states he did not have any loc and he is not on blood thinners.

## 2024-11-20 NOTE — ED PROVIDER NOTES
"EMERGENCY DEPARTMENT ENCOUNTER      NAME: Warren Jaramillo  AGE: 44 year old male  YOB: 1980  MRN: [id number]  EVALUATION DATE & TIME: No admission date for patient encounter.    PCP: Mary Kelly    ED PROVIDER: Judith Oliver MD    Chief Complaint   Patient presents with    Fall         FINAL IMPRESSION:  1. Closed head injury, initial encounter          ED COURSE & MEDICAL DECISION MAKING:    Pertinent Labs & Imaging studies reviewed. (See chart for details)  44 year old male with history of DM, HTN, Rojas's disease who presents to the Emergency Department for evaluation of headache, nausea after he had a mechanical fall from standing hitting his head on the ledge of the shower.  Patient is here because he is concerned about a \"dent\" in the back of his skull.  On examination he has what appears to be a lipomatous area on the occiput that he is feeling a divot next to.  Patient however states that this is never been there before.  My concern for skull fracture is low however with this finding that is technically abnormal on his head exam will obtain neuroimaging to rule out skull fracture or ICH.  Patient given dose of Tylenol, Zofran.  CT head unremarkable.  Reassured counseled discharge home.      ED Course as of 11/19/24 2352   Tue Nov 19, 2024 2220 I met with the patient, obtained history, performed an initial exam, and discussed options and plan for diagnostics and treatment here in the ED.    2308 Head CT w/o contrast  CT head independently interpreted by myself without visualized ICH   2347 We discussed the plan for discharge and the patient is agreeable. Reviewed supportive cares, symptomatic treatment, outpatient follow up, and reasons to return to the Emergency Department. All questions and concerns were addressed. Patient to be discharged by ED RN.         Medical Decision Making  Obtained supplemental history:Supplemental history obtained?: No  Reviewed external records: " "Outside ED visit 7/5/2024  Care impacted by chronic illness:Diabetes  Did you consider but not order tests?: Work up considered but not performed and documented in chart, if applicable  Did you interpret images independently?: Independent interpretation of ECG and images noted in documentation, when applicable.  Consultation discussion with other provider:Did you involve another provider (consultant, MH, pharmacy, etc.)?: No  Discharge. No recommendations on prescription strength medication(s). See documentation for any additional details.    MIPS: Not Applicable      At the conclusion of the encounter I discussed the results of all of the tests and the disposition. The questions were answered. The patient or family acknowledged understanding and was agreeable with the care plan.      MEDICATIONS GIVEN IN THE EMERGENCY:  Medications   ondansetron (ZOFRAN ODT) ODT tab 8 mg (8 mg Oral $Given 11/19/24 2252)   acetaminophen (TYLENOL) tablet 975 mg (975 mg Oral $Given 11/19/24 2252)       NEW PRESCRIPTIONS STARTED AT TODAY'S ER VISIT  New Prescriptions    No medications on file          =================================================================    HPI    Patient information was obtained from: patient     Use of Intrepreter: N/A        Warren Jaramillo is a 44 year old male with pertinent medical history of fetal alcohol syndrome, T2DM, hypertension, and LBBB who presents after a fall.     At 2:30 PM this afternoon the patient slipped, fell, and hit the back of his head on the edge of his shower. No loss of consciousness or blood thinners. Since then, he has had lightheadedness, nausea, and photophobia. Right now he feels like there is a \"dent\" where he hit the back of his head.     He denies vomiting or any other complaints at this time.       PAST MEDICAL HISTORY:  Past Medical History:   Diagnosis Date    DM2 (diabetes mellitus, type 2) (H) 4/28/2020    HTN (hypertension) 7/30/2012    Rojas's disease (H)  "       PAST SURGICAL HISTORY:  Past Surgical History:   Procedure Laterality Date    COLONOSCOPY      ESOPHAGOSCOPY, GASTROSCOPY, DUODENOSCOPY (EGD), COMBINED N/A 7/21/2023    Procedure: ESOPHAGOGASTRODUODENOSCOPY WITH GASTRIC AND ESOPHAGEAL BIOPSIES;  Surgeon: Filiberto Aragon MD;  Location: [facility] [place] OR    TOOTH EXTRACTION         CURRENT MEDICATIONS:    Cannot display prior to admission medications because the patient has not been admitted in this contact.       ALLERGIES:  Allergies   Allergen Reactions    Apricot Flavor Anaphylaxis    Banana Anaphylaxis     Throat swelling  Throat swelling      Wasp Venom Protein Shortness Of Breath     Other reaction(s): Respiratory Distress  Has an epi pen  Has an epi pen      Bees Anaphylaxis     Have an Epi pen that carries with    Methylphenidate Itching     Other reaction(s): Nightmares    Prunus      Other reaction(s): *Unknown    Sulfa Antibiotics      Headaches and nausea    Prunus Persica Rash     Other reaction(s): *Unknown       FAMILY HISTORY:  Family History   Problem Relation Age of Onset    Unknown/Adopted Father     Unknown/Adopted Maternal Grandmother     C.A.D. Maternal Grandfather     Diabetes Maternal Grandfather     Cerebrovascular Disease Maternal Grandfather     Unknown/Adopted Paternal Grandmother     Unknown/Adopted Paternal Grandfather     Unknown/Adopted Brother     Unknown/Adopted Sister        SOCIAL HISTORY:  Social History     Tobacco Use    Smoking status: Every Day     Current packs/day: 1.00     Types: Cigarettes    Smokeless tobacco: Never   Vaping Use    Vaping status: Never Used   Substance Use Topics    Alcohol use: No     Comment: once every 3 months    Drug use: No        VITALS:  Patient Vitals for the past 24 hrs:   BP Temp Temp src Pulse Resp SpO2   11/19/24 2134 130/60 98  F (36.7  C) Oral 86 20 96 %       PHYSICAL EXAM    General Appearance: Well-appearing, well-nourished, no acute distress   Head:  Normocephalic. Has area of  swelling transverse across occiput that feels like lipomatous tissue, the patient says it was not there before.   Eyes:  PERRL, conjunctiva/corneas clear, EOM's intact  ENT:  membranes are moist without pallor  Neck:  Supple, no midline tenderness palpation  Cardio:  Regular rate and rhythm  Pulm:  No respiratory distress  Abdomen:  Soft, morbidly obese  Skin:  Skin warm, dry, no rashes  Neuro:  Alert and oriented ×3, answers questions and follows commands appropriately, moving all extremities normally.  GCS 15.     RADIOLOGY/LABS:  Reviewed all pertinent imaging. Please see official radiology report. All pertinent labs reviewed and interpreted.    Results for orders placed or performed during the hospital encounter of 11/19/24   Head CT w/o contrast    Impression    IMPRESSION:  1.  No acute intracranial process.       The creation of this record is based on the scribe s observations of the work being performed by Judith Oliver MD and the provider s statements to them. It was created on her behalf by Mark Sheppard, a trained medical scribe. This document has been checked and approved by the attending provider.    Judith Oliver MD  Emergency Medicine  [facility] EMERGENCY DEPARTMENT  [facility] [facility] [address]  [phone]  Dept: [phone]     Judith Oliver MD  11/19/24 7192

## 2024-12-01 ENCOUNTER — APPOINTMENT (OUTPATIENT)
Dept: CT IMAGING | Facility: HOSPITAL | Age: 44
End: 2024-12-01
Attending: STUDENT IN AN ORGANIZED HEALTH CARE EDUCATION/TRAINING PROGRAM
Payer: COMMERCIAL

## 2024-12-01 ENCOUNTER — HOSPITAL ENCOUNTER (OUTPATIENT)
Facility: HOSPITAL | Age: 44
Setting detail: OBSERVATION
Discharge: NURSING, REHAB OR LONG-TERM CARE | End: 2024-12-03
Attending: STUDENT IN AN ORGANIZED HEALTH CARE EDUCATION/TRAINING PROGRAM | Admitting: RADIOLOGY
Payer: COMMERCIAL

## 2024-12-01 DIAGNOSIS — E11.9 DM2 (DIABETES MELLITUS, TYPE 2) (H): Primary | ICD-10-CM

## 2024-12-01 DIAGNOSIS — I82.A12 DVT OF AXILLARY VEIN, ACUTE LEFT (H): ICD-10-CM

## 2024-12-01 DIAGNOSIS — K65.2 SPONTANEOUS BACTERIAL PERITONITIS (H): ICD-10-CM

## 2024-12-01 DIAGNOSIS — R07.9 CHEST PAIN, UNSPECIFIED TYPE: ICD-10-CM

## 2024-12-01 LAB
ALBUMIN SERPL BCG-MCNC: 4.2 G/DL (ref 3.5–5.2)
ALP SERPL-CCNC: 345 U/L (ref 40–150)
ALT SERPL W P-5'-P-CCNC: 23 U/L (ref 0–70)
ANION GAP SERPL CALCULATED.3IONS-SCNC: 13 MMOL/L (ref 7–15)
AST SERPL W P-5'-P-CCNC: 24 U/L (ref 0–45)
BASOPHILS # BLD AUTO: 0.1 10E3/UL (ref 0–0.2)
BASOPHILS NFR BLD AUTO: 1 %
BILIRUB SERPL-MCNC: 0.5 MG/DL
BUN SERPL-MCNC: 20.3 MG/DL (ref 6–20)
CALCIUM SERPL-MCNC: 9.4 MG/DL (ref 8.8–10.4)
CHLORIDE SERPL-SCNC: 101 MMOL/L (ref 98–107)
CREAT SERPL-MCNC: 0.99 MG/DL (ref 0.67–1.17)
EGFRCR SERPLBLD CKD-EPI 2021: >90 ML/MIN/1.73M2
EOSINOPHIL # BLD AUTO: 0.4 10E3/UL (ref 0–0.7)
EOSINOPHIL NFR BLD AUTO: 4 %
ERYTHROCYTE [DISTWIDTH] IN BLOOD BY AUTOMATED COUNT: 17.9 % (ref 10–15)
FLUAV RNA SPEC QL NAA+PROBE: NEGATIVE
FLUBV RNA RESP QL NAA+PROBE: NEGATIVE
GLUCOSE SERPL-MCNC: 97 MG/DL (ref 70–99)
HCO3 SERPL-SCNC: 25 MMOL/L (ref 22–29)
HCT VFR BLD AUTO: 40.9 % (ref 40–53)
HGB BLD-MCNC: 12.6 G/DL (ref 13.3–17.7)
HOLD SPECIMEN: NORMAL
HOLD SPECIMEN: NORMAL
IMM GRANULOCYTES # BLD: 0.1 10E3/UL
IMM GRANULOCYTES NFR BLD: 1 %
LYMPHOCYTES # BLD AUTO: 2.5 10E3/UL (ref 0.8–5.3)
LYMPHOCYTES NFR BLD AUTO: 20 %
MAGNESIUM SERPL-MCNC: 1.9 MG/DL (ref 1.7–2.3)
MCH RBC QN AUTO: 26.5 PG (ref 26.5–33)
MCHC RBC AUTO-ENTMCNC: 30.8 G/DL (ref 31.5–36.5)
MCV RBC AUTO: 86 FL (ref 78–100)
MONOCYTES # BLD AUTO: 1.1 10E3/UL (ref 0–1.3)
MONOCYTES NFR BLD AUTO: 8 %
NEUTROPHILS # BLD AUTO: 8.5 10E3/UL (ref 1.6–8.3)
NEUTROPHILS NFR BLD AUTO: 68 %
NRBC # BLD AUTO: 0 10E3/UL
NRBC BLD AUTO-RTO: 0 /100
NT-PROBNP SERPL-MCNC: 912 PG/ML (ref 0–450)
PLATELET # BLD AUTO: 353 10E3/UL (ref 150–450)
POTASSIUM SERPL-SCNC: 4.3 MMOL/L (ref 3.4–5.3)
PROT SERPL-MCNC: 7.5 G/DL (ref 6.4–8.3)
RBC # BLD AUTO: 4.76 10E6/UL (ref 4.4–5.9)
RSV RNA SPEC NAA+PROBE: NEGATIVE
SARS-COV-2 RNA RESP QL NAA+PROBE: NEGATIVE
SODIUM SERPL-SCNC: 139 MMOL/L (ref 135–145)
TROPONIN T SERPL HS-MCNC: 26 NG/L
WBC # BLD AUTO: 12.6 10E3/UL (ref 4–11)

## 2024-12-01 PROCEDURE — 86140 C-REACTIVE PROTEIN: CPT | Performed by: HOSPITALIST

## 2024-12-01 PROCEDURE — 96374 THER/PROPH/DIAG INJ IV PUSH: CPT

## 2024-12-01 PROCEDURE — 250N000009 HC RX 250: Performed by: STUDENT IN AN ORGANIZED HEALTH CARE EDUCATION/TRAINING PROGRAM

## 2024-12-01 PROCEDURE — 83735 ASSAY OF MAGNESIUM: CPT | Performed by: STUDENT IN AN ORGANIZED HEALTH CARE EDUCATION/TRAINING PROGRAM

## 2024-12-01 PROCEDURE — 87637 SARSCOV2&INF A&B&RSV AMP PRB: CPT | Performed by: STUDENT IN AN ORGANIZED HEALTH CARE EDUCATION/TRAINING PROGRAM

## 2024-12-01 PROCEDURE — 84145 PROCALCITONIN (PCT): CPT | Performed by: HOSPITALIST

## 2024-12-01 PROCEDURE — 84439 ASSAY OF FREE THYROXINE: CPT | Performed by: HOSPITALIST

## 2024-12-01 PROCEDURE — 80053 COMPREHEN METABOLIC PANEL: CPT | Performed by: STUDENT IN AN ORGANIZED HEALTH CARE EDUCATION/TRAINING PROGRAM

## 2024-12-01 PROCEDURE — 85004 AUTOMATED DIFF WBC COUNT: CPT | Performed by: STUDENT IN AN ORGANIZED HEALTH CARE EDUCATION/TRAINING PROGRAM

## 2024-12-01 PROCEDURE — 250N000011 HC RX IP 250 OP 636: Performed by: STUDENT IN AN ORGANIZED HEALTH CARE EDUCATION/TRAINING PROGRAM

## 2024-12-01 PROCEDURE — 84443 ASSAY THYROID STIM HORMONE: CPT | Performed by: HOSPITALIST

## 2024-12-01 PROCEDURE — 71275 CT ANGIOGRAPHY CHEST: CPT

## 2024-12-01 PROCEDURE — 83735 ASSAY OF MAGNESIUM: CPT | Performed by: HOSPITALIST

## 2024-12-01 PROCEDURE — 85048 AUTOMATED LEUKOCYTE COUNT: CPT | Performed by: STUDENT IN AN ORGANIZED HEALTH CARE EDUCATION/TRAINING PROGRAM

## 2024-12-01 PROCEDURE — 99285 EMERGENCY DEPT VISIT HI MDM: CPT | Mod: 25

## 2024-12-01 PROCEDURE — 36415 COLL VENOUS BLD VENIPUNCTURE: CPT | Performed by: STUDENT IN AN ORGANIZED HEALTH CARE EDUCATION/TRAINING PROGRAM

## 2024-12-01 PROCEDURE — 83880 ASSAY OF NATRIURETIC PEPTIDE: CPT | Performed by: STUDENT IN AN ORGANIZED HEALTH CARE EDUCATION/TRAINING PROGRAM

## 2024-12-01 PROCEDURE — 84484 ASSAY OF TROPONIN QUANT: CPT | Performed by: STUDENT IN AN ORGANIZED HEALTH CARE EDUCATION/TRAINING PROGRAM

## 2024-12-01 PROCEDURE — 96375 TX/PRO/DX INJ NEW DRUG ADDON: CPT

## 2024-12-01 PROCEDURE — 93005 ELECTROCARDIOGRAM TRACING: CPT

## 2024-12-01 RX ORDER — GUAIFENESIN AND DEXTROMETHORPHAN HYDROBROMIDE 600; 30 MG/1; MG/1
1 TABLET, EXTENDED RELEASE ORAL 2 TIMES DAILY PRN
COMMUNITY
Start: 2024-09-22

## 2024-12-01 RX ORDER — HYDROCORTISONE 10 MG/G
CREAM TOPICAL 2 TIMES DAILY PRN
COMMUNITY
End: 2024-12-01

## 2024-12-01 RX ORDER — EPINEPHRINE 0.3 MG/.3ML
0.3 INJECTION SUBCUTANEOUS PRN
COMMUNITY

## 2024-12-01 RX ORDER — OMEPRAZOLE 40 MG/1
40 CAPSULE, DELAYED RELEASE ORAL EVERY MORNING
COMMUNITY
Start: 2024-08-19

## 2024-12-01 RX ORDER — FUROSEMIDE 40 MG/1
40 TABLET ORAL EVERY MORNING
COMMUNITY
Start: 2024-11-18

## 2024-12-01 RX ORDER — OXYCODONE HYDROCHLORIDE 5 MG/1
5 TABLET ORAL EVERY 6 HOURS PRN
COMMUNITY
Start: 2024-10-14 | End: 2024-12-11

## 2024-12-01 RX ORDER — LANOLIN, PETROLATUM 15.5; 53.4 G/100G; G/100G
OINTMENT TOPICAL DAILY PRN
COMMUNITY

## 2024-12-01 RX ORDER — IBUPROFEN 200 MG
400 TABLET ORAL EVERY 4 HOURS PRN
COMMUNITY

## 2024-12-01 RX ORDER — IOPAMIDOL 755 MG/ML
90 INJECTION, SOLUTION INTRAVASCULAR ONCE
Status: COMPLETED | OUTPATIENT
Start: 2024-12-01 | End: 2024-12-01

## 2024-12-01 RX ORDER — LOPERAMIDE HYDROCHLORIDE 2 MG/1
4 CAPSULE ORAL 4 TIMES DAILY PRN
COMMUNITY

## 2024-12-01 RX ORDER — IPRATROPIUM BROMIDE AND ALBUTEROL SULFATE 2.5; .5 MG/3ML; MG/3ML
3 SOLUTION RESPIRATORY (INHALATION) ONCE
Status: COMPLETED | OUTPATIENT
Start: 2024-12-01 | End: 2024-12-01

## 2024-12-01 RX ORDER — UREA 40 %
CREAM (GRAM) TOPICAL DAILY PRN
COMMUNITY
Start: 2024-02-05

## 2024-12-01 RX ORDER — TRAZODONE HYDROCHLORIDE 100 MG/1
100 TABLET ORAL AT BEDTIME
COMMUNITY
Start: 2024-11-18

## 2024-12-01 RX ORDER — FLUTICASONE FUROATE, UMECLIDINIUM BROMIDE AND VILANTEROL TRIFENATATE 100; 62.5; 25 UG/1; UG/1; UG/1
1 POWDER RESPIRATORY (INHALATION) DAILY
COMMUNITY
Start: 2024-10-30

## 2024-12-01 RX ORDER — BENZOCAINE/MENTHOL 6 MG-10 MG
LOZENGE MUCOUS MEMBRANE DAILY PRN
COMMUNITY

## 2024-12-01 RX ORDER — HYDROCORTISONE 25 MG/G
CREAM TOPICAL DAILY PRN
COMMUNITY
End: 2024-12-01

## 2024-12-01 RX ORDER — SUCRALFATE ORAL 1 G/10ML
1 SUSPENSION ORAL 4 TIMES DAILY PRN
COMMUNITY

## 2024-12-01 RX ORDER — ACETAMINOPHEN 500 MG
1000 TABLET ORAL EVERY 6 HOURS PRN
COMMUNITY

## 2024-12-01 RX ORDER — FLUOXETINE 20 MG/1
20 TABLET, FILM COATED ORAL EVERY MORNING
COMMUNITY
Start: 2024-11-18

## 2024-12-01 RX ORDER — LORATADINE 10 MG/1
10 TABLET ORAL DAILY PRN
COMMUNITY

## 2024-12-01 RX ORDER — POLYETHYLENE GLYCOL 3350 17 G/17G
17 POWDER, FOR SOLUTION ORAL DAILY PRN
COMMUNITY
Start: 2024-05-08

## 2024-12-01 RX ADMIN — IOPAMIDOL 90 ML: 755 INJECTION, SOLUTION INTRAVENOUS at 23:24

## 2024-12-01 RX ADMIN — IPRATROPIUM BROMIDE AND ALBUTEROL SULFATE 3 ML: .5; 3 SOLUTION RESPIRATORY (INHALATION) at 21:12

## 2024-12-01 ASSESSMENT — ACTIVITIES OF DAILY LIVING (ADL)
ADLS_ACUITY_SCORE: 45

## 2024-12-02 ENCOUNTER — APPOINTMENT (OUTPATIENT)
Dept: ULTRASOUND IMAGING | Facility: HOSPITAL | Age: 44
End: 2024-12-02
Attending: STUDENT IN AN ORGANIZED HEALTH CARE EDUCATION/TRAINING PROGRAM
Payer: COMMERCIAL

## 2024-12-02 ENCOUNTER — APPOINTMENT (OUTPATIENT)
Dept: ULTRASOUND IMAGING | Facility: HOSPITAL | Age: 44
End: 2024-12-02
Attending: HOSPITALIST
Payer: COMMERCIAL

## 2024-12-02 PROBLEM — R18.8 CIRRHOSIS OF LIVER WITH ASCITES, UNSPECIFIED HEPATIC CIRRHOSIS TYPE (H): Status: ACTIVE | Noted: 2024-12-02

## 2024-12-02 PROBLEM — R07.9 CHEST PAIN: Status: ACTIVE | Noted: 2019-12-09

## 2024-12-02 PROBLEM — K74.60 CIRRHOSIS OF LIVER (H): Status: ACTIVE | Noted: 2024-12-02

## 2024-12-02 PROBLEM — E04.1 THYROID NODULE: Status: ACTIVE | Noted: 2019-07-31

## 2024-12-02 PROBLEM — E66.01 MORBID OBESITY (H): Status: ACTIVE | Noted: 2024-12-02

## 2024-12-02 PROBLEM — I82.A12 DVT OF AXILLARY VEIN, ACUTE LEFT (H): Status: ACTIVE | Noted: 2024-12-02

## 2024-12-02 PROBLEM — J44.1 COPD EXACERBATION (H): Status: ACTIVE | Noted: 2024-12-02

## 2024-12-02 LAB
% LINING CELLS, BODY FLUID: 4 %
ABSOLUTE NEUTROPHILS, BODY FLUID: 294.5 /UL
ALBUMIN BODY FLUID SOURCE: NORMAL
ALBUMIN FLD-MCNC: 3.3 G/DL
ANION GAP SERPL CALCULATED.3IONS-SCNC: 12 MMOL/L (ref 7–15)
APPEARANCE FLD: CLEAR
AT III ACT/NOR PPP CHRO: 91 % (ref 85–135)
B2 GLYCOPROT1 IGG SERPL IA-ACNC: 1.2 U/ML
BUN SERPL-MCNC: 19.3 MG/DL (ref 6–20)
CALCIUM SERPL-MCNC: 9.2 MG/DL (ref 8.8–10.4)
CARDIOLIPIN IGG SER IA-ACNC: 2.7 GPL-U/ML
CARDIOLIPIN IGG SER IA-ACNC: NEGATIVE
CARDIOLIPIN IGM SER IA-ACNC: 3.9 MPL-U/ML
CARDIOLIPIN IGM SER IA-ACNC: NEGATIVE
CELL COUNT BODY FLUID SOURCE: NORMAL
CHLORIDE SERPL-SCNC: 102 MMOL/L (ref 98–107)
CHOLEST SERPL-MCNC: 115 MG/DL
COLOR FLD: YELLOW
CREAT SERPL-MCNC: 0.92 MG/DL (ref 0.67–1.17)
CRP SERPL-MCNC: 21.6 MG/L
DRVVT CONFIRM NORMALIZED RATIO: 1.09
DRVVT SCREEN MIX RATIO: 1.07
DRVVT SCREEN RATIO: 1.23
EGFRCR SERPLBLD CKD-EPI 2021: >90 ML/MIN/1.73M2
ERYTHROCYTE [DISTWIDTH] IN BLOOD BY AUTOMATED COUNT: 17.9 % (ref 10–15)
FACTOR 2 INTERPRETATION: NORMAL
FACTOR V INTERPRETATION: NORMAL
FASTING STATUS PATIENT QL REPORTED: YES
GLUCOSE BLDC GLUCOMTR-MCNC: 129 MG/DL (ref 70–99)
GLUCOSE BLDC GLUCOMTR-MCNC: 140 MG/DL (ref 70–99)
GLUCOSE BLDC GLUCOMTR-MCNC: 146 MG/DL (ref 70–99)
GLUCOSE BLDC GLUCOMTR-MCNC: 154 MG/DL (ref 70–99)
GLUCOSE BLDC GLUCOMTR-MCNC: 186 MG/DL (ref 70–99)
GLUCOSE SERPL-MCNC: 149 MG/DL (ref 70–99)
HCO3 SERPL-SCNC: 24 MMOL/L (ref 22–29)
HCT VFR BLD AUTO: 41.1 % (ref 40–53)
HDLC SERPL-MCNC: 39 MG/DL
HGB BLD-MCNC: 12.6 G/DL (ref 13.3–17.7)
INR PPP: 1.34 (ref 0.85–1.15)
INR PPP: 1.37 (ref 0.85–1.15)
LA PPP-IMP: NEGATIVE
LAB DIRECTOR COMMENTS: NORMAL
LAB DIRECTOR DISCLAIMER: NORMAL
LAB DIRECTOR INTERPRETATION: NORMAL
LAB DIRECTOR METHODOLOGY: NORMAL
LAB DIRECTOR RESULTS: NORMAL
LDLC SERPL CALC-MCNC: 47 MG/DL
LUPUS INTERPRETATION: ABNORMAL
LYMPHOCYTES NFR FLD MANUAL: 26 %
MAGNESIUM SERPL-MCNC: 1.8 MG/DL (ref 1.7–2.3)
MCH RBC QN AUTO: 26.2 PG (ref 26.5–33)
MCHC RBC AUTO-ENTMCNC: 30.7 G/DL (ref 31.5–36.5)
MCV RBC AUTO: 85 FL (ref 78–100)
MONOS+MACROS NFR FLD MANUAL: 51 %
NEUTS BAND NFR FLD MANUAL: 19 %
NONHDLC SERPL-MCNC: 76 MG/DL
PLATELET # BLD AUTO: 355 10E3/UL (ref 150–450)
POTASSIUM SERPL-SCNC: 4 MMOL/L (ref 3.4–5.3)
PROCALCITONIN SERPL IA-MCNC: 0.11 NG/ML
PROT C ACT/NOR PPP CHRO: 100 % (ref 70–170)
PROT FLD-MCNC: 5.3 G/DL
PROT S FREE AG ACT/NOR PPP IA: 139 % (ref 70–148)
PROTEIN BODY FLUID SOURCE: NORMAL
PTT RATIO: 1.23
RBC # BLD AUTO: 4.81 10E6/UL (ref 4.4–5.9)
SODIUM SERPL-SCNC: 138 MMOL/L (ref 135–145)
SPECIMEN TYPE: NORMAL
T4 FREE SERPL-MCNC: 1.13 NG/DL (ref 0.9–1.7)
THROMBIN TIME: 16.4 SECONDS (ref 13–19)
TRIGL SERPL-MCNC: 143 MG/DL
TROPONIN T SERPL HS-MCNC: 22 NG/L
TSH SERPL DL<=0.005 MIU/L-ACNC: 6.62 UIU/ML (ref 0.3–4.2)
WBC # BLD AUTO: 12.7 10E3/UL (ref 4–11)
WBC # FLD AUTO: 1550 /UL

## 2024-12-02 PROCEDURE — 999N000156 HC STATISTIC RCP CONSULT EA 30 MIN

## 2024-12-02 PROCEDURE — 89051 BODY FLUID CELL COUNT: CPT | Performed by: HOSPITALIST

## 2024-12-02 PROCEDURE — 250N000011 HC RX IP 250 OP 636: Performed by: HOSPITALIST

## 2024-12-02 PROCEDURE — 86146 BETA-2 GLYCOPROTEIN ANTIBODY: CPT | Performed by: HOSPITALIST

## 2024-12-02 PROCEDURE — 85014 HEMATOCRIT: CPT | Performed by: HOSPITALIST

## 2024-12-02 PROCEDURE — 96375 TX/PRO/DX INJ NEW DRUG ADDON: CPT

## 2024-12-02 PROCEDURE — 250N000011 HC RX IP 250 OP 636: Mod: JZ | Performed by: INTERNAL MEDICINE

## 2024-12-02 PROCEDURE — 82962 GLUCOSE BLOOD TEST: CPT

## 2024-12-02 PROCEDURE — 85610 PROTHROMBIN TIME: CPT | Performed by: HOSPITALIST

## 2024-12-02 PROCEDURE — P9047 ALBUMIN (HUMAN), 25%, 50ML: HCPCS | Mod: JZ | Performed by: INTERNAL MEDICINE

## 2024-12-02 PROCEDURE — 36415 COLL VENOUS BLD VENIPUNCTURE: CPT | Performed by: HOSPITALIST

## 2024-12-02 PROCEDURE — 250N000009 HC RX 250: Performed by: HOSPITALIST

## 2024-12-02 PROCEDURE — 85306 CLOT INHIBIT PROT S FREE: CPT | Performed by: HOSPITALIST

## 2024-12-02 PROCEDURE — 84157 ASSAY OF PROTEIN OTHER: CPT | Performed by: HOSPITALIST

## 2024-12-02 PROCEDURE — 82042 OTHER SOURCE ALBUMIN QUAN EA: CPT | Performed by: HOSPITALIST

## 2024-12-02 PROCEDURE — 96372 THER/PROPH/DIAG INJ SC/IM: CPT | Performed by: HOSPITALIST

## 2024-12-02 PROCEDURE — 49083 ABD PARACENTESIS W/IMAGING: CPT

## 2024-12-02 PROCEDURE — 250N000012 HC RX MED GY IP 250 OP 636 PS 637: Performed by: HOSPITALIST

## 2024-12-02 PROCEDURE — 94640 AIRWAY INHALATION TREATMENT: CPT

## 2024-12-02 PROCEDURE — 99214 OFFICE O/P EST MOD 30 MIN: CPT | Performed by: STUDENT IN AN ORGANIZED HEALTH CARE EDUCATION/TRAINING PROGRAM

## 2024-12-02 PROCEDURE — 81241 F5 GENE: CPT | Performed by: HOSPITALIST

## 2024-12-02 PROCEDURE — 86147 CARDIOLIPIN ANTIBODY EA IG: CPT | Performed by: HOSPITALIST

## 2024-12-02 PROCEDURE — 80061 LIPID PANEL: CPT | Performed by: HOSPITALIST

## 2024-12-02 PROCEDURE — 85390 FIBRINOLYSINS SCREEN I&R: CPT | Mod: 26 | Performed by: PATHOLOGY

## 2024-12-02 PROCEDURE — 99207 PR NO BILLABLE SERVICE THIS VISIT: CPT | Performed by: INTERNAL MEDICINE

## 2024-12-02 PROCEDURE — 250N000013 HC RX MED GY IP 250 OP 250 PS 637: Performed by: INTERNAL MEDICINE

## 2024-12-02 PROCEDURE — 99223 1ST HOSP IP/OBS HIGH 75: CPT | Performed by: HOSPITALIST

## 2024-12-02 PROCEDURE — 85300 ANTITHROMBIN III ACTIVITY: CPT | Performed by: HOSPITALIST

## 2024-12-02 PROCEDURE — 81240 F2 GENE: CPT | Performed by: HOSPITALIST

## 2024-12-02 PROCEDURE — G0378 HOSPITAL OBSERVATION PER HR: HCPCS

## 2024-12-02 PROCEDURE — 93971 EXTREMITY STUDY: CPT | Mod: LT

## 2024-12-02 PROCEDURE — 93005 ELECTROCARDIOGRAM TRACING: CPT

## 2024-12-02 PROCEDURE — 94660 CPAP INITIATION&MGMT: CPT

## 2024-12-02 PROCEDURE — 272N000710 US PARACENTESIS WITHOUT ALBUMIN

## 2024-12-02 PROCEDURE — G0452 MOLECULAR PATHOLOGY INTERPR: HCPCS | Mod: 26 | Performed by: PATHOLOGY

## 2024-12-02 PROCEDURE — 82565 ASSAY OF CREATININE: CPT | Performed by: HOSPITALIST

## 2024-12-02 PROCEDURE — 93010 ELECTROCARDIOGRAM REPORT: CPT | Mod: RTG | Performed by: STUDENT IN AN ORGANIZED HEALTH CARE EDUCATION/TRAINING PROGRAM

## 2024-12-02 PROCEDURE — 80048 BASIC METABOLIC PNL TOTAL CA: CPT | Performed by: HOSPITALIST

## 2024-12-02 PROCEDURE — 87070 CULTURE OTHR SPECIMN AEROBIC: CPT | Performed by: HOSPITALIST

## 2024-12-02 PROCEDURE — 999N000157 HC STATISTIC RCP TIME EA 10 MIN

## 2024-12-02 PROCEDURE — 85730 THROMBOPLASTIN TIME PARTIAL: CPT | Performed by: HOSPITALIST

## 2024-12-02 PROCEDURE — 81241 F5 GENE: CPT | Performed by: INTERNAL MEDICINE

## 2024-12-02 PROCEDURE — 82306 VITAMIN D 25 HYDROXY: CPT

## 2024-12-02 PROCEDURE — 96374 THER/PROPH/DIAG INJ IV PUSH: CPT

## 2024-12-02 PROCEDURE — 250N000013 HC RX MED GY IP 250 OP 250 PS 637: Performed by: HOSPITALIST

## 2024-12-02 PROCEDURE — 96375 TX/PRO/DX INJ NEW DRUG ADDON: CPT | Mod: 59

## 2024-12-02 PROCEDURE — 85303 CLOT INHIBIT PROT C ACTIVITY: CPT | Performed by: HOSPITALIST

## 2024-12-02 RX ORDER — ACETAMINOPHEN 325 MG/1
650 TABLET ORAL EVERY 4 HOURS PRN
Status: DISCONTINUED | OUTPATIENT
Start: 2024-12-02 | End: 2024-12-03

## 2024-12-02 RX ORDER — HYDROMORPHONE HCL IN WATER/PF 6 MG/30 ML
0.2 PATIENT CONTROLLED ANALGESIA SYRINGE INTRAVENOUS
Status: DISCONTINUED | OUTPATIENT
Start: 2024-12-02 | End: 2024-12-03 | Stop reason: HOSPADM

## 2024-12-02 RX ORDER — HYDROMORPHONE HCL IN WATER/PF 6 MG/30 ML
0.4 PATIENT CONTROLLED ANALGESIA SYRINGE INTRAVENOUS
Status: DISCONTINUED | OUTPATIENT
Start: 2024-12-02 | End: 2024-12-03 | Stop reason: HOSPADM

## 2024-12-02 RX ORDER — ALBUTEROL SULFATE 90 UG/1
1-2 INHALANT RESPIRATORY (INHALATION) EVERY 6 HOURS PRN
Status: DISCONTINUED | OUTPATIENT
Start: 2024-12-02 | End: 2024-12-03 | Stop reason: HOSPADM

## 2024-12-02 RX ORDER — AMOXICILLIN 250 MG
2 CAPSULE ORAL 2 TIMES DAILY PRN
Status: DISCONTINUED | OUTPATIENT
Start: 2024-12-02 | End: 2024-12-03 | Stop reason: HOSPADM

## 2024-12-02 RX ORDER — DEXTROSE MONOHYDRATE 25 G/50ML
25-50 INJECTION, SOLUTION INTRAVENOUS
Status: DISCONTINUED | OUTPATIENT
Start: 2024-12-02 | End: 2024-12-03 | Stop reason: HOSPADM

## 2024-12-02 RX ORDER — NICOTINE 21 MG/24HR
1 PATCH, TRANSDERMAL 24 HOURS TRANSDERMAL DAILY
Status: DISCONTINUED | OUTPATIENT
Start: 2024-12-02 | End: 2024-12-03 | Stop reason: HOSPADM

## 2024-12-02 RX ORDER — FLUTICASONE FUROATE AND VILANTEROL 100; 25 UG/1; UG/1
1 POWDER RESPIRATORY (INHALATION) DAILY
Status: DISCONTINUED | OUTPATIENT
Start: 2024-12-02 | End: 2024-12-03 | Stop reason: HOSPADM

## 2024-12-02 RX ORDER — SUCRALFATE ORAL 1 G/10ML
1 SUSPENSION ORAL 4 TIMES DAILY PRN
Status: DISCONTINUED | OUTPATIENT
Start: 2024-12-02 | End: 2024-12-03 | Stop reason: HOSPADM

## 2024-12-02 RX ORDER — CEFTRIAXONE 1 G/1
1 INJECTION, POWDER, FOR SOLUTION INTRAMUSCULAR; INTRAVENOUS EVERY 24 HOURS
Status: DISCONTINUED | OUTPATIENT
Start: 2024-12-02 | End: 2024-12-02

## 2024-12-02 RX ORDER — ENOXAPARIN SODIUM 100 MG/ML
40 INJECTION SUBCUTANEOUS EVERY 12 HOURS
Status: DISCONTINUED | OUTPATIENT
Start: 2024-12-02 | End: 2024-12-02

## 2024-12-02 RX ORDER — LISINOPRIL 5 MG/1
10 TABLET ORAL EVERY MORNING
Status: DISCONTINUED | OUTPATIENT
Start: 2024-12-02 | End: 2024-12-03 | Stop reason: HOSPADM

## 2024-12-02 RX ORDER — IPRATROPIUM BROMIDE AND ALBUTEROL SULFATE 2.5; .5 MG/3ML; MG/3ML
3 SOLUTION RESPIRATORY (INHALATION) EVERY 4 HOURS PRN
Status: DISCONTINUED | OUTPATIENT
Start: 2024-12-02 | End: 2024-12-03 | Stop reason: HOSPADM

## 2024-12-02 RX ORDER — ACETAMINOPHEN 500 MG
1000 TABLET ORAL EVERY 6 HOURS PRN
Status: DISCONTINUED | OUTPATIENT
Start: 2024-12-02 | End: 2024-12-03 | Stop reason: HOSPADM

## 2024-12-02 RX ORDER — PROCHLORPERAZINE MALEATE 10 MG
10 TABLET ORAL EVERY 6 HOURS PRN
Status: DISCONTINUED | OUTPATIENT
Start: 2024-12-02 | End: 2024-12-03 | Stop reason: HOSPADM

## 2024-12-02 RX ORDER — SIMETHICONE 80 MG
80 TABLET,CHEWABLE ORAL EVERY 6 HOURS PRN
Status: DISCONTINUED | OUTPATIENT
Start: 2024-12-02 | End: 2024-12-03 | Stop reason: HOSPADM

## 2024-12-02 RX ORDER — OLANZAPINE 5 MG/1
10 TABLET ORAL AT BEDTIME
Status: DISCONTINUED | OUTPATIENT
Start: 2024-12-02 | End: 2024-12-03 | Stop reason: HOSPADM

## 2024-12-02 RX ORDER — PANTOPRAZOLE SODIUM 40 MG/1
40 TABLET, DELAYED RELEASE ORAL
Status: DISCONTINUED | OUTPATIENT
Start: 2024-12-02 | End: 2024-12-03 | Stop reason: HOSPADM

## 2024-12-02 RX ORDER — NALOXONE HYDROCHLORIDE 0.4 MG/ML
0.2 INJECTION, SOLUTION INTRAMUSCULAR; INTRAVENOUS; SUBCUTANEOUS
Status: DISCONTINUED | OUTPATIENT
Start: 2024-12-02 | End: 2024-12-03 | Stop reason: HOSPADM

## 2024-12-02 RX ORDER — GUAIFENESIN AND DEXTROMETHORPHAN HYDROBROMIDE 600; 30 MG/1; MG/1
1 TABLET, EXTENDED RELEASE ORAL 2 TIMES DAILY PRN
Status: DISCONTINUED | OUTPATIENT
Start: 2024-12-02 | End: 2024-12-03 | Stop reason: HOSPADM

## 2024-12-02 RX ORDER — NALOXONE HYDROCHLORIDE 0.4 MG/ML
0.4 INJECTION, SOLUTION INTRAMUSCULAR; INTRAVENOUS; SUBCUTANEOUS
Status: DISCONTINUED | OUTPATIENT
Start: 2024-12-02 | End: 2024-12-03 | Stop reason: HOSPADM

## 2024-12-02 RX ORDER — NICOTINE 21 MG/24HR
1 PATCH, TRANSDERMAL 24 HOURS TRANSDERMAL DAILY
Status: DISCONTINUED | OUTPATIENT
Start: 2025-01-13 | End: 2024-12-03 | Stop reason: HOSPADM

## 2024-12-02 RX ORDER — FUROSEMIDE 10 MG/ML
40 INJECTION INTRAMUSCULAR; INTRAVENOUS ONCE
Status: COMPLETED | OUTPATIENT
Start: 2024-12-02 | End: 2024-12-02

## 2024-12-02 RX ORDER — ONDANSETRON 2 MG/ML
4 INJECTION INTRAMUSCULAR; INTRAVENOUS EVERY 6 HOURS PRN
Status: DISCONTINUED | OUTPATIENT
Start: 2024-12-02 | End: 2024-12-03 | Stop reason: HOSPADM

## 2024-12-02 RX ORDER — OXYCODONE HYDROCHLORIDE 5 MG/1
5 TABLET ORAL EVERY 6 HOURS PRN
Status: DISCONTINUED | OUTPATIENT
Start: 2024-12-02 | End: 2024-12-03 | Stop reason: HOSPADM

## 2024-12-02 RX ORDER — ALBUMIN (HUMAN) 12.5 G/50ML
25 SOLUTION INTRAVENOUS ONCE
Status: COMPLETED | OUTPATIENT
Start: 2024-12-02 | End: 2024-12-02

## 2024-12-02 RX ORDER — FUROSEMIDE 20 MG/1
40 TABLET ORAL EVERY MORNING
Status: DISCONTINUED | OUTPATIENT
Start: 2024-12-02 | End: 2024-12-03 | Stop reason: HOSPADM

## 2024-12-02 RX ORDER — METHYLPREDNISOLONE SODIUM SUCCINATE 40 MG/ML
40 INJECTION INTRAMUSCULAR; INTRAVENOUS EVERY 24 HOURS
Status: DISCONTINUED | OUTPATIENT
Start: 2024-12-02 | End: 2024-12-02

## 2024-12-02 RX ORDER — ASPIRIN 81 MG/1
81 TABLET, CHEWABLE ORAL DAILY
Status: DISCONTINUED | OUTPATIENT
Start: 2024-12-02 | End: 2024-12-02

## 2024-12-02 RX ORDER — IPRATROPIUM BROMIDE AND ALBUTEROL SULFATE 2.5; .5 MG/3ML; MG/3ML
3 SOLUTION RESPIRATORY (INHALATION) 4 TIMES DAILY PRN
Status: DISCONTINUED | OUTPATIENT
Start: 2024-12-02 | End: 2024-12-03 | Stop reason: HOSPADM

## 2024-12-02 RX ORDER — ONDANSETRON 4 MG/1
4 TABLET, ORALLY DISINTEGRATING ORAL EVERY 6 HOURS PRN
Status: DISCONTINUED | OUTPATIENT
Start: 2024-12-02 | End: 2024-12-03 | Stop reason: HOSPADM

## 2024-12-02 RX ORDER — ROSUVASTATIN CALCIUM 10 MG/1
10 TABLET, COATED ORAL AT BEDTIME
Status: DISCONTINUED | OUTPATIENT
Start: 2024-12-02 | End: 2024-12-03 | Stop reason: HOSPADM

## 2024-12-02 RX ORDER — NICOTINE POLACRILEX 4 MG
15-30 LOZENGE BUCCAL
Status: DISCONTINUED | OUTPATIENT
Start: 2024-12-02 | End: 2024-12-03 | Stop reason: HOSPADM

## 2024-12-02 RX ORDER — ACETAMINOPHEN 650 MG/1
650 SUPPOSITORY RECTAL EVERY 4 HOURS PRN
Status: DISCONTINUED | OUTPATIENT
Start: 2024-12-02 | End: 2024-12-03

## 2024-12-02 RX ORDER — AMOXICILLIN 250 MG
1 CAPSULE ORAL 2 TIMES DAILY PRN
Status: DISCONTINUED | OUTPATIENT
Start: 2024-12-02 | End: 2024-12-03 | Stop reason: HOSPADM

## 2024-12-02 RX ORDER — IPRATROPIUM BROMIDE AND ALBUTEROL SULFATE 2.5; .5 MG/3ML; MG/3ML
3 SOLUTION RESPIRATORY (INHALATION)
Status: DISCONTINUED | OUTPATIENT
Start: 2024-12-02 | End: 2024-12-02

## 2024-12-02 RX ORDER — FAMOTIDINE 20 MG/1
20 TABLET, FILM COATED ORAL 2 TIMES DAILY
Status: DISCONTINUED | OUTPATIENT
Start: 2024-12-02 | End: 2024-12-03 | Stop reason: HOSPADM

## 2024-12-02 RX ORDER — TRAZODONE HYDROCHLORIDE 50 MG/1
100 TABLET, FILM COATED ORAL AT BEDTIME
Status: DISCONTINUED | OUTPATIENT
Start: 2024-12-02 | End: 2024-12-03 | Stop reason: HOSPADM

## 2024-12-02 RX ORDER — MONTELUKAST SODIUM 10 MG/1
10 TABLET ORAL EVERY MORNING
Status: DISCONTINUED | OUTPATIENT
Start: 2024-12-02 | End: 2024-12-03 | Stop reason: HOSPADM

## 2024-12-02 RX ADMIN — INSULIN ASPART 1 UNITS: 100 INJECTION, SOLUTION INTRAVENOUS; SUBCUTANEOUS at 13:46

## 2024-12-02 RX ADMIN — ENOXAPARIN SODIUM 40 MG: 40 INJECTION SUBCUTANEOUS at 00:53

## 2024-12-02 RX ADMIN — ACETAMINOPHEN 1000 MG: 500 TABLET ORAL at 23:05

## 2024-12-02 RX ADMIN — APIXABAN 10 MG: 5 TABLET, FILM COATED ORAL at 21:44

## 2024-12-02 RX ADMIN — CEFTRIAXONE SODIUM 1 G: 1 INJECTION, POWDER, FOR SOLUTION INTRAMUSCULAR; INTRAVENOUS at 00:55

## 2024-12-02 RX ADMIN — UMECLIDINIUM 1 PUFF: 62.5 AEROSOL, POWDER ORAL at 18:43

## 2024-12-02 RX ADMIN — NICOTINE 1 PATCH: 21 PATCH, EXTENDED RELEASE TRANSDERMAL at 08:51

## 2024-12-02 RX ADMIN — METFORMIN HYDROCHLORIDE 1000 MG: 500 TABLET, FILM COATED ORAL at 17:31

## 2024-12-02 RX ADMIN — IPRATROPIUM BROMIDE AND ALBUTEROL SULFATE 3 ML: .5; 3 SOLUTION RESPIRATORY (INHALATION) at 01:36

## 2024-12-02 RX ADMIN — PANTOPRAZOLE SODIUM 40 MG: 40 TABLET, DELAYED RELEASE ORAL at 17:32

## 2024-12-02 RX ADMIN — METHYLPREDNISOLONE SODIUM SUCCINATE 40 MG: 40 INJECTION, POWDER, FOR SOLUTION INTRAMUSCULAR; INTRAVENOUS at 01:40

## 2024-12-02 RX ADMIN — LISINOPRIL 10 MG: 5 TABLET ORAL at 08:56

## 2024-12-02 RX ADMIN — FUROSEMIDE 40 MG: 10 INJECTION, SOLUTION INTRAMUSCULAR; INTRAVENOUS at 00:53

## 2024-12-02 RX ADMIN — INSULIN ASPART 1 UNITS: 100 INJECTION, SOLUTION INTRAVENOUS; SUBCUTANEOUS at 17:30

## 2024-12-02 RX ADMIN — IPRATROPIUM BROMIDE AND ALBUTEROL SULFATE 3 ML: .5; 3 SOLUTION RESPIRATORY (INHALATION) at 12:35

## 2024-12-02 RX ADMIN — APIXABAN 10 MG: 5 TABLET, FILM COATED ORAL at 05:22

## 2024-12-02 RX ADMIN — PANTOPRAZOLE SODIUM 40 MG: 40 TABLET, DELAYED RELEASE ORAL at 08:52

## 2024-12-02 RX ADMIN — ROSUVASTATIN 10 MG: 10 TABLET, FILM COATED ORAL at 21:43

## 2024-12-02 RX ADMIN — ALBUMIN HUMAN 25 G: 0.25 SOLUTION INTRAVENOUS at 14:09

## 2024-12-02 RX ADMIN — INSULIN ASPART 1 UNITS: 100 INJECTION, SOLUTION INTRAVENOUS; SUBCUTANEOUS at 08:51

## 2024-12-02 RX ADMIN — FAMOTIDINE 20 MG: 20 TABLET ORAL at 08:51

## 2024-12-02 RX ADMIN — FUROSEMIDE 40 MG: 20 TABLET ORAL at 08:52

## 2024-12-02 RX ADMIN — TRAZODONE HYDROCHLORIDE 100 MG: 50 TABLET ORAL at 21:43

## 2024-12-02 RX ADMIN — FLUTICASONE FUROATE AND VILANTEROL TRIFENATATE 1 PUFF: 100; 25 POWDER RESPIRATORY (INHALATION) at 18:42

## 2024-12-02 RX ADMIN — FAMOTIDINE 20 MG: 20 TABLET ORAL at 21:43

## 2024-12-02 RX ADMIN — IPRATROPIUM BROMIDE AND ALBUTEROL SULFATE 3 ML: .5; 3 SOLUTION RESPIRATORY (INHALATION) at 08:45

## 2024-12-02 RX ADMIN — SIMETHICONE 80 MG: 80 TABLET, CHEWABLE ORAL at 17:31

## 2024-12-02 RX ADMIN — FLUOXETINE HYDROCHLORIDE 20 MG: 20 CAPSULE ORAL at 08:52

## 2024-12-02 RX ADMIN — OLANZAPINE 10 MG: 5 TABLET, FILM COATED ORAL at 21:44

## 2024-12-02 RX ADMIN — MONTELUKAST 10 MG: 10 TABLET, FILM COATED ORAL at 17:32

## 2024-12-02 ASSESSMENT — ACTIVITIES OF DAILY LIVING (ADL)
ADLS_ACUITY_SCORE: 41
ADLS_ACUITY_SCORE: 34
ADLS_ACUITY_SCORE: 34
ADLS_ACUITY_SCORE: 35
ADLS_ACUITY_SCORE: 35
ADLS_ACUITY_SCORE: 45
ADLS_ACUITY_SCORE: 35
ADLS_ACUITY_SCORE: 34
ADLS_ACUITY_SCORE: 34
ADLS_ACUITY_SCORE: 35
ADLS_ACUITY_SCORE: 45
ADLS_ACUITY_SCORE: 35
ADLS_ACUITY_SCORE: 41
ADLS_ACUITY_SCORE: 35
ADLS_ACUITY_SCORE: 34
ADLS_ACUITY_SCORE: 45
ADLS_ACUITY_SCORE: 35

## 2024-12-02 NOTE — H&P
[facility]    History and Physical - Hospitalist Service       Date of Admission:  12/1/2024    Assessment & Plan      Warren Jaramillo is a 44 year old male admitted on 12/1/2024.     Principal Problem:    Shortness of breath  Active Problems:    Tobacco use    Elevated WBCs    PTSD (post-traumatic stress disorder)    Chest pain, unspecified type    DM2 (diabetes mellitus, type 2) (H)    Benign essential hypertension    AVA treated with BiPAP    Cirrhosis of liver with ascites, unspecified hepatic cirrhosis type (H)    Thyroid nodule    ADHD (attention deficit hyperactivity disorder)    Chest pain    Morbid obesity (H)    COPD exacerbation (H)    DVT of axillary vein, acute left (H)    Patient is a pleasant 44-year-old male with history of obesity and cirrhosis follows with hepatologist per report with significant sleep apnea on home BiPAP presented with shortness of breath, and fatigue that started yesterday. Patient reports productive cough with white colored sputum, has chest pain with cough only. he has history of smoking 3 packs a day history now down to 1 pack but ongoing smoking presented with chest pain and shortness of breath.  Patient also has a mood disorder at baseline and is on antidepressants.   - chest pain is primary precordial, but appears atypical overall,  First troponin elevated second negative does have significant risk factors-and will need further evaluation and treatment along with treatment of his shortness of breath which appears primarily COPD flare with significant wheezing noted although not hypoxic.  CTA rules out PE does have small airway disease.  Also there was concern of left axillary vein predominant will rule out DVT.  There is also component of symptomatic ascites leading to this shortness of breath and will request paracentesis for some relief he is on chronic Lasix, today his BNP is elevated without any significant evidence of pulmonary edema but  does have significant satiety ascites, so likely that is also contributory with mild fluid overload, will give extra dose of Lasix and request paracentesis and may consider adding Aldactone.  -Plan is to observe entirely give Lasix now steroid now and Rocephin empirically with plan for paracentesis and cardiology evaluation in the morning for any stress testing.  Hemodynamically stable.  Also got notification that-Positive DVT of left axillary jugular vein   -will start patient on therapeutic anticoagulation with Eliquis      Multifactorial SOB with asthma/RAD/ possible COPD Exacerbation with mild fluid overload concern for any cardiac ischemia-no significant history of assessment COPD but have significant asthma and appears to be in early COPD stage with active smoking, has evidence of unresolved shortness of breath, cough and wheezing on examination.  He should have a PFT as well to further evaluate this on outpatient basis on chronic BiPAP at home for sleep apnea  -Admit and provide the IV steroids, nebulization's or inhalers and associated antibiotic Rocephin as prescribed to cover empirically - gram positive, gram negative that are probably associated with the flare up, significantly due to smoking  --supplemental oxygen for sats 88-92% for COPD protocol  - Monitor daily progress clinically  - Smoking cessation advised  -No baseline Home oxygen dependence    Positive DVT of left axillary jugular vein-possibly spontaneous  -Preliminary report suggest positive pending confirmation, will start patient on therapeutic anticoagulation with Eliquis  -Will also check hypercoag panel being idiopathic      Chest pain, unspecified type  - ruled out acute coronary syndrome    monitor on telemetry.   Would not give aspirin being on DOAC, will also consult cardiology today for any further stress echocardiogram inpatient versus outpatient  - CT of the chest rules out any active other etiology including less likely to be a  thromboembolic disease or any aortic disease.  -GERD is also in differential and will provide Protonix - but has significant cardiac risk factors as well.        PTSD (post-traumatic stress disorder) depression mood problems  -Continue with home Zyprexa and Prozac  Hx of fall- fall from 1.5 week ago, was previously seen for this. Imaging was negative for fractures.      DM2 (diabetes mellitus, type 2) (H)  -Start insulin sliding scale coverage along with diet, carb control if condition allows  -Recent A1c was controlled at 6.8      Benign essential hypertension  -Given Lasix continue home lisinopril    Morbid obesity with AVA treated with BiPAP  -Continue home CPAP BiPAP      Cirrhosis of liver with ascites, unspecified hepatic cirrhosis type (H)  -Does follow-up with hepatology,  Already on Lasix and consider Aldactone      Thyroid nodule  -Per history will check thyroid studies      Chronic Medical problems-Hold outside hospital medication pending a confirmed pharmacy history and further reconciliation.  Initial orders were placed.  Request consultation to cardiology-appreciated assistance and recommendations. .  Anticipated length of stay of 1-2 midnights of hospitalization depending on progression, planning,findings,further testing or treatment needs. Services are medically necessary for evaluation and treatment of the acute & on chronic superimposed conditions with the treatment plan as outlined above.  Current problem list also has been updated.       Observation Goals: -diagnostic tests and consults completed and resulted, -vital signs normal or at patient baseline, Nurse to notify provider when observation goals have been met and patient is ready for discharge.  Diet: Regular Diet Adult    DVT Prophylaxis: DOAC  Khan Catheter: Not present  Lines: None     Cardiac Monitoring: ACTIVE order. Indication: Chest pain/ ACS rule out (24 hours)  Code Status: Full Code      Clinically Significant Risk Factors Present  "on Admission                   # Hypertension: Noted on problem list  # Acute heart failure with preserved ejection fraction: heart failure noted on problem list, last echo with EF >50%, and receiving IV diuretics         # DMII: A1C = 6.8 % (Ref range: <5.7 %) within past 6 months    # Severe Obesity: Estimated body mass index is 48.38 kg/m  as calculated from the following:    Height as of this encounter: 1.651 m (5' 5\").    Weight as of this encounter: 131.9 kg (290 lb 11.2 oz).         # Financial/Environmental Concerns:           Disposition Plan     Medically Ready for Discharge: Anticipated Tomorrow           Esteban Arteaga MD  Hospitalist Service  [facility]  Securely message with itembase (more info)  Text page via [facility] Paging/Directory     ______________________________________________________________________    Chief Complaint   SOB    History is obtained from the patient, electronic health record, and emergency department physician    History of Present Illness   Warren Jaramillo is a 44 year old male with a pertinent history of hypertension, diabetes, liver disease, COPD, and currently smoking, who presents to this ED via ambulance for evaluation of shortness of breath and cough.     Per EMS, the patient comes from home with complaint of shortness of breath and fatigue that started yesterday. Patient states he has a productive cough with white colored sputum along with chest pain with cough only. Patient also wants to address a fall he had 1.5 week ago and was previously seen for this.     Patient comes in to the ED with chest pain for 24 hours. Patient states this chest pain is worse with ambulation. Reports it feels better when he rests. States he gets goosebumps when he walks. He denies any nausea or vomiting. No abdominal pain, dysuria, or bowel movement changes. Patient indicates he has been dealing with worsening leg swelling.    Past Medical History    Past Medical " History:   Diagnosis Date    COPD exacerbation (H) 12/2/2024    DM2 (diabetes mellitus, type 2) (H) 4/28/2020    HTN (hypertension) 7/30/2012    Thyroid nodule 7/31/2019    Rojas's disease (H)        Past Surgical History   Past Surgical History:   Procedure Laterality Date    COLONOSCOPY      ESOPHAGOSCOPY, GASTROSCOPY, DUODENOSCOPY (EGD), COMBINED N/A 7/21/2023    Procedure: ESOPHAGOGASTRODUODENOSCOPY WITH GASTRIC AND ESOPHAGEAL BIOPSIES;  Surgeon: Filiberto Aragon MD;  Location: [facility] OR    TOOTH EXTRACTION         Prior to Admission Medications   Prior to Admission Medications   Prescriptions Last Dose Informant Patient Reported? Taking?   Benzocaine (SOLARCAINE ALOE VERA EX)   Yes Yes   Sig: Externally apply topically daily as needed.   Calamine external lotion   Yes Yes   Sig: Apply topically as needed for itching   EPINEPHrine (ANY BX GENERIC EQUIV) 0.3 MG/0.3ML injection 2-pack   Yes Yes   Sig: Inject 0.3 mg into the muscle as needed for anaphylaxis. May repeat one time in 5-15 minutes if response to initial dose is inadequate.   FLUoxetine 20 MG tablet 12/1/2024 Morning  Yes Yes   Sig: Take 20 mg by mouth every morning.   GAVILAX 17 GM/SCOOP powder   Yes Yes   Sig: Take 17 g by mouth daily as needed.   Hydrocortisone (PREPARATION H EX)   Yes Yes   Sig: Externally apply topically daily as needed.   OLANZapine (ZYPREXA) 10 MG tablet 11/30/2024 Bedtime  Yes Yes   Sig: Take 10 mg by mouth At Bedtime   Pseudoephedrine-DM-GG (PSEUDOEPHEDRINE-DM-GUAIFENESIN OR)   Yes Yes   Sig: Take 10 mLs by mouth every 6 hours as needed.   TRELEGY ELLIPTA 100-62.5-25 MCG/ACT oral inhaler   Yes Yes   Sig: Inhale 1 puff into the lungs daily.   Urea 40 % CREA   Yes Yes   Sig: Apply topically daily as needed.   Vitamins A & D (VITAMIN A & D) OINT   Yes Yes   Sig: Externally apply topically daily as needed.   acetaminophen (TYLENOL) 500 MG tablet   Yes Yes   Sig: Take 1,000 mg by mouth every 6 hours as needed for mild pain.    albuterol (PROAIR HFA/PROVENTIL HFA/VENTOLIN HFA) 108 (90 Base) MCG/ACT inhaler   No Yes   Sig: Inhale 1-2 puffs into the lungs every 6 hours as needed for shortness of breath, wheezing or cough   albuterol (PROVENTIL) (2.5 MG/3ML) 0.083% neb solution   Yes Yes   Sig: Take 2.5 mg by nebulization 3 times daily as needed for shortness of breath, wheezing or cough   aloe vera GEL   Yes Yes   Sig: Apply 1 g topically every hour as needed for skin care Per bottle directions   bacitracin 500 UNIT/GM OINT   Yes Yes   Sig: Apply topically 3 times daily as needed for wound care   benzonatate (TESSALON) 200 MG capsule   No Yes   Sig: Take 1 capsule (200 mg) by mouth 3 times daily as needed for cough.   carbamide peroxide (DEBROX) 6.5 % otic solution   Yes Yes   Sig: Place 5 drops into both ears daily as needed for other.   clotrimazole (LOTRIMIN) 1 % external cream   Yes Yes   Sig: Apply topically 2 times daily as needed (skin irritation)   dextromethorphan-guaiFENesin (MUCINEX DM)  MG 12 hr tablet   Yes Yes   Sig: Take 1 tablet by mouth 2 times daily as needed.   diclofenac (VOLTAREN) 1 % topical gel   Yes Yes   Sig: Apply 2 g topically daily as needed for moderate pain To joints/back   empagliflozin (JARDIANCE) 10 MG TABS tablet 12/1/2024 Morning  Yes Yes   Sig: Take 10 mg by mouth every morning.   famotidine (PEPCID) 20 MG tablet 12/1/2024 Morning  No Yes   Sig: Take 1 tablet (20 mg) by mouth 2 times daily   furosemide (LASIX) 40 MG tablet 12/1/2024 Morning  Yes Yes   Sig: Take 40 mg by mouth every morning.   hydrocortisone (CORTAID) 1 % external cream   Yes Yes   Sig: Apply topically daily as needed.   ibuprofen (ADVIL/MOTRIN) 200 MG tablet   Yes Yes   Sig: Take 400 mg by mouth every 4 hours as needed for pain.   ketorolac (TORADOL) 10 MG tablet   No Yes   Sig: Take 1 tablet (10 mg) by mouth every 6 hours as needed for pain.   lisinopril (ZESTRIL) 10 MG tablet 12/1/2024 Morning  Yes Yes   Sig: Take 10 mg by mouth  every morning.   loperamide (IMODIUM) 2 MG capsule   Yes Yes   Sig: Take 4 mg by mouth 4 times daily as needed for diarrhea.   loratadine (CLARITIN) 10 MG tablet   Yes Yes   Sig: Take 10 mg by mouth daily as needed for allergies.   magnesium hydroxide (MILK OF MAGNESIA) 400 MG/5ML suspension   Yes Yes   Sig: Take 30 mLs by mouth daily as needed.   metFORMIN (GLUCOPHAGE) 1000 MG tablet 12/1/2024 Morning  Yes Yes   Sig: Take 1,000 mg by mouth 2 times daily (with meals)   montelukast (SINGULAIR) 10 MG tablet 12/1/2024 Morning  Yes Yes   Sig: Take 10 mg by mouth every morning.   omeprazole (PRILOSEC) 40 MG DR capsule 12/1/2024 Morning  Yes Yes   Sig: Take 40 mg by mouth every morning.   oxyCODONE (ROXICODONE) 5 MG tablet   Yes Yes   Sig: Take 5 mg by mouth every 6 hours as needed for pain.   pramoxine-calamine (AVEENO) 1-8 % LOTN   Yes Yes   Sig: Apply topically daily as needed for itching.   rosuvastatin (CRESTOR) 10 MG tablet 11/30/2024 Evening  Yes Yes   Sig: Take 10 mg by mouth At Bedtime   sucralfate (CARAFATE) 1 GM/10ML suspension   Yes Yes   Sig: Take 1 g by mouth 4 times daily as needed.   traZODone (DESYREL) 100 MG tablet   Yes Yes   Sig: Take 100 mg by mouth at bedtime.   witch hazel-glycerin (TUCKS) pad   Yes Yes   Sig: Apply topically as needed for hemorrhoids.      Facility-Administered Medications: None        Review of Systems    The 5 point Review of Systems is negative other than noted in the HPI or here.   O abdominal pain/ no urinary complaints or fever    Social History   I have reviewed this patient's social history and updated it with pertinent information if needed.  Social History     Tobacco Use    Smoking status: Every Day     Current packs/day: 1.00     Types: Cigarettes    Smokeless tobacco: Never   Vaping Use    Vaping status: Never Used   Substance Use Topics    Alcohol use: No     Comment: once every 3 months    Drug use: No         Family History   I have reviewed this patient's family  history and updated it with pertinent information if needed.  Family History   Problem Relation Age of Onset    Unknown/Adopted Father     Unknown/Adopted Maternal Grandmother     C.A.D. Maternal Grandfather     Diabetes Maternal Grandfather     Cerebrovascular Disease Maternal Grandfather     Unknown/Adopted Paternal Grandmother     Unknown/Adopted Paternal Grandfather     Unknown/Adopted Brother     Unknown/Adopted Sister          Allergies   Allergies   Allergen Reactions    Apricot Flavor Anaphylaxis    Banana Anaphylaxis     Throat swelling  Throat swelling      Wasp Venom Protein Shortness Of Breath     Other reaction(s): Respiratory Distress  Has an epi pen  Has an epi pen      Bees Anaphylaxis     Have an Epi pen that carries with    Methylphenidate Itching     Other reaction(s): Nightmares    Prunus      Other reaction(s): *Unknown    Sulfa Antibiotics      Headaches and nausea    Prunus Persica Rash     Other reaction(s): *Unknown        Physical Exam   Vital Signs: Temp: 98.6  F (37  C) Temp src: Oral BP: 135/63 Pulse: 89   Resp: 20 SpO2: 94 % O2 Device: None (Room air)    Weight: 290 lbs 11.2 oz    Alert awake, hypertensive and saturating well with distant respiratory rate  Vision Baseline  Neck supple  Oral mucosa moist  bilateral air entry diminished with rhonchi and wheezing diffusely  S1-S2 normal  Abdomen is soft significant distended with chronic ascites without any guarding G-tube rebound  Extremities are fully mobile and without any visible swelling noted  No skin cyanosis  Neurologically- no new Gross deficits from baseline-Moving all 4 extremities  Psych-mood okay and appropriate to circumstances      Medical Decision Making       75 MINUTES SPENT BY ME on the date of service doing chart review, history, exam, documentation & further activities per the note.      Data     I have personally reviewed the following data over the past 24 hrs:    12.6 (H)  \   12.6 (L)   / 353     139 101 20.3 (H) /   140 (H)   4.3 25 0.99 \     ALT: 23 AST: 24 AP: 345 (H) TBILI: 0.5   ALB: 4.2 TOT PROTEIN: 7.5 LIPASE: N/A     Trop: 22 BNP: 912 (H)     TSH: 6.62 (H) T4: 1.13 A1C: N/A     Procal: 0.11 CRP: 21.60 (H) Lactic Acid: N/A         Imaging results reviewed over the past 24 hrs:   Recent Results (from the past 24 hours)   CT Chest Pulmonary Embolism w Contrast    Narrative    EXAM: CT CHEST PULMONARY EMBOLISM W CONTRAST  LOCATION: [facility]  DATE: 12/1/2024    INDICATION: Dyspnea on exertion  COMPARISON: 8/5/2024  TECHNIQUE: CT chest pulmonary angiogram during arterial phase injection of IV contrast. Multiplanar reformats and MIP reconstructions were performed. Dose reduction techniques were used.   CONTRAST: 90ml isovue 370    FINDINGS:  ANGIOGRAM CHEST: Pulmonary arteries are normal caliber and negative for pulmonary emboli. Thoracic aorta is negative for dissection. No CT evidence of right heart strain.    LUNGS AND PLEURA: Bilateral mosaic attenuation, likely secondary to air trapping due to small airways disease. No pleural effusions.    MEDIASTINUM/AXILLAE: There is fat stranding along the left axillary and subclavian veins. No thoracic aortic aneurysm. No lymphadenopathy.    CORONARY ARTERY CALCIFICATION: [None    UPPER ABDOMEN: Ascites. Hepatomegaly. Nodular liver contour. Bilateral adrenal myelolipomas.    MUSCULOSKELETAL: Mild subcutaneous edema in the ventral chest wall.      Impression    IMPRESSION:  1.  No pulmonary embolism.    2.  There is fat stranding along the left axillary and subclavian veins, which can be seen in the setting of DVT. Recommend further evaluation with venous Doppler ultrasound.    3.  There is again mosaic lung attenuation consistent with small airways disease.    4.  The liver is again enlarged and nodular, suggestive of cirrhosis.    5.  Ascites.

## 2024-12-02 NOTE — PLAN OF CARE
Problem: Adult Inpatient Plan of Care  Goal: Plan of Care Review  Description: The Plan of Care Review/Shift note should be completed every shift.  The Outcome Evaluation is a brief statement about your assessment that the patient is improving, declining, or no change.  This information will be displayed automatically on your shift  note.  Outcome: Progressing  Flowsheets (Taken 12/2/2024 8780)  Outcome Evaluation: return to group home at [facility].  Plan of Care Reviewed With: patient  Overall Patient Progress: improving

## 2024-12-02 NOTE — ED TRIAGE NOTES
Patient arrives from home via EMS. Reports shortness of breath and fatigue that started yesterday. Patient reports productive cough with white colored sputum, has chest pain with cough only. Patient would also like to discuss a fall from 1.5 week ago, was previously seen for this.      Triage Assessment (Adult)       Row Name 12/01/24 2002          Triage Assessment    Airway WDL WDL        Respiratory WDL    Respiratory WDL X;rhythm/pattern     Rhythm/Pattern, Respiratory shortness of breath;tachypneic

## 2024-12-02 NOTE — CONSULTS
Care Management Initial Consult    General Information  Assessment completed with: Patient,    Type of CM/SW Visit: Initial Assessment    Primary Care Provider verified and updated as needed: Yes   Readmission within the last 30 days:        Reason for Consult: discharge planning  Advance Care Planning: Advance Care Planning Reviewed: present on chart          Communication Assessment  Patient's communication style: spoken language (non-English)    Hearing Difficulty or Deaf: no   Wear Glasses or Blind: yes    Cognitive  Cognitive/Neuro/Behavioral:                        Living Environment:   People in home: other (see comments) (group home)     Current living Arrangements: group home      Able to return to prior arrangements: yes  Living Arrangement Comments: [facility]    Family/Social Support:  Care provided by:    Provides care for: other (see comments) (staff)  Marital Status: Single  Support system:            Description of Support System: Supportive    Support Assessment: Adequate family and caregiver support    Current Resources:   Patient receiving home care services: No        Community Resources: County Worker, County Programs, Group Home  Equipment currently used at home: none  Supplies currently used at home: Incontinence Supplies    Employment/Financial:  Employment Status: disabled        Financial Concerns:             Does the patient's insurance plan have a 3 day qualifying hospital stay waiver?  No    Lifestyle & Psychosocial Needs:  Social Drivers of Health     Food Insecurity: Low Risk  (12/2/2024)    Food Insecurity     Within the past 12 months, did you worry that your food would run out before you got money to buy more?: No     Within the past 12 months, did the food you bought just not last and you didn t have money to get more?: No   Depression: Not at risk (8/14/2023)    PHQ-2     PHQ-2 Score: 0   Housing Stability: High Risk (12/2/2024)    Housing Stability     Do you have housing? : No     Are  you worried about losing your housing?: No   Tobacco Use: High Risk (11/19/2024)    Patient History     Smoking Tobacco Use: Every Day     Smokeless Tobacco Use: Never     Passive Exposure: Not on file   Financial Resource Strain: Low Risk  (12/2/2024)    Financial Resource Strain     Within the past 12 months, have you or your family members you live with been unable to get utilities (heat, electricity) when it was really needed?: No   Alcohol Use: Not At Risk (9/22/2022)    [facility]    AUDIT-C     Frequency of Alcohol Consumption: Never     Average Number of Drinks: Not on file     Frequency of Binge Drinking: Not on file   Transportation Needs: Low Risk  (12/2/2024)    Transportation Needs     Within the past 12 months, has lack of transportation kept you from medical appointments, getting your medicines, non-medical meetings or appointments, work, or from getting things that you need?: No   Physical Activity: Not on file   Interpersonal Safety: Not on file   Stress: Not on file   Social Connections: Unknown (5/1/2023)    [facility]    Social Connections     Frequency of Communication with Friends and Family: Not on file   Health Literacy: Not on file       Functional Status:  Prior to admission patient needed assistance:   Dependent ADLs:: Bathing, Toileting, Grooming  Dependent IADLs:: Cleaning, Cooking, Shopping, Medication Management, Transportation, Money Management  Assesssment of Functional Status: Not at baseline with mobility    Mental Health Status:  Mental Health Status: No Current Concerns       Chemical Dependency Status:  Chemical Dependency Status: No Current Concerns             Values/Beliefs:  Spiritual, Cultural Beliefs, Protestant Practices, Values that affect care: yes       Cultural/Protestant Practices Patient Routinely Participates  In: rydery       Discussed  Partnership in Safe Discharge Planning  document with patient/family: Yes: pt    Additional Information:  SW met with pt own decision maker) to introduce role of CM, complete  initial assessment, and to discuss needs at time of d/c. Pt comes from home; lives in [facility] ( [place] [phone]  [place] [phone]) and noted to be independent at baseline with ADL's but receives assistance with most IADLs (meds, bathing, transportation etc). Pt feels that there will be no needs from CM at discharge, and will follow with PCP if needs arise post discharge.    CM to continue to follow through hospitalization for recommendations and update  staff. Needs cab to transport at discharge.  12:44 PM    Brenna Kjellberg, BSW LSW  12/2/2024

## 2024-12-02 NOTE — PROGRESS NOTES
[facility]    Medicine Progress Note - Hospitalist Service    Date of Admission:  12/1/2024    Assessment & Plan      Warren Jaramillo is a 44 year old male with history of recent left arm trauma, alcoholic liver cirrhosis, hypertension, diabetes, COPD, and autism residing in group home, who presented on 12/1/2024 with SOB for one day. He was found to hae large amount of ascites and left upper extremity DVT. He had paracentesis and 5.3L of ascites fluid was removed. After the procedure, his SOB improved. He is also started on Eliquis for DVT. Will observe overnight before returning to the group home tomorrow.     Alcoholic liver cirrhosis with ascites:  CT scan of the chest, abdomen and pelvis revealed no PE, but large amount of ascites.    Had 5.3L of ascites removed today. Post procedure, his SOB resolved. Patient reports that this is his first time of having paracentesis.  - Plan to give 25 g of albumin  - Monitor vitals   - Continue PTA lasix  - Hepatology referral    Left upper extremity DVT:  US reveals occlusive DVT of the left internal jugular, subclavian, axillary veins, near occlusive superficial thrombophlebitis of the left basilic vein in the upper arm.  2 weeks ago, he had mildly displaced fracture of the proximal olecranon process. He wore a sling for the trauma.  Not clear whether the DVT is related to his recent trauma.  CT chest showed no PE.  - Started Eliquis on admission. Continue.   - Follow up coagulation workup  - Hematology referral for outpatient follow up    COPD: No signs of exacerbation.   - Was initiated steroid on admission. Will discontinue and monitor.   - Continue PTA inhalers    Elevated troponin:   Slightly elevated chronically at the level of 25-27. Currently at baseline. Cardiology is consulted. Likely demand ischemia related to ascites. Cardiac MRI outpatient.    Diabetes mellitus, type 2:   Controlled with recent HbA1C 6.8. Continue PTA Jardiance  "and metformin. Novolog sliding scale.      Chronic stable conditions: No change of home medication.  Post-traumatic stress disorder, depression: Continue with home Zyprexa and Prozac  Benign essential hypertension: Continue home lisinopril  HFpEF: Appear euvolemic. Continue PTA lasix and Jardiance.   AVA: Continue home CPAP   Thyroid nodule: slightly elevated TSH to 6.62 with free T4. Outpatient follow up.        Observation Goals: -diagnostic tests and consults completed and resulted, -vital signs normal or at patient baseline, Nurse to notify provider when observation goals have been met and patient is ready for discharge.  Diet: Regular Diet Adult    DVT Prophylaxis: DOAC  Khan Catheter: Not present  Lines: None     Cardiac Monitoring: ACTIVE order. Indication: Chest pain/ ACS rule out (24 hours)  Code Status: Full Code      Clinically Significant Risk Factors Present on Admission                # Coagulation Defect: INR = 1.37 (Ref range: 0.85 - 1.15) and/or PTT = N/A, will monitor for bleeding    # Hypertension: Noted on problem list  # Acute heart failure with preserved ejection fraction: heart failure noted on problem list, last echo with EF >50%, and receiving IV diuretics         # DMII: A1C = 6.8 % (Ref range: <5.7 %) within past 6 months    # Severe Obesity: Estimated body mass index is 48.38 kg/m  as calculated from the following:    Height as of this encounter: 1.651 m (5' 5\").    Weight as of this encounter: 131.9 kg (290 lb 11.2 oz).       # Financial/Environmental Concerns:           Social Drivers of Health    Housing Stability: High Risk (12/2/2024)    Housing Stability     Do you have housing? : No     Are you worried about losing your housing?: No   Tobacco Use: High Risk (11/19/2024)    Patient History     Smoking Tobacco Use: Every Day     Smokeless Tobacco Use: Never    Received from [employer], [employer]    Social " Connections          Disposition Plan     Medically Ready for Discharge: Anticipated Tomorrow             Dago Bernstein MD  Hospitalist Service  [facility]  Securely message with [employer] (more info)  Text page via [employer] Paging/Directory   ______________________________________________________________________    Interval History   Patient reports that his SOB resolved after paracentesis.    Physical Exam   Vital Signs: Temp: 97.6  F (36.4  C) Temp src: Oral BP: 124/60 Pulse: 89   Resp: 20 SpO2: 97 % O2 Device: None (Room air)    Weight: 290 lbs 11.2 oz    General appearance: not in acute distress  HEENT: PERRL, EOMI  Lungs: Clear breath sounds in bilateral lung fields  Cardiovascular: Regular rate and rhythm, normal S1-S2  Abdomen: Soft, non tender, slight distension  Musculoskeletal: No joint swelling  Skin: No rash and no edema  Neurology: AAO ×3.  Cranial nerves II - XII normal.  Normal muscle strength in all four extremities.     Medical Decision Making       48 MINUTES SPENT BY ME on the date of service doing chart review, history, exam, documentation & further activities per the note.      Data     I have personally reviewed the following data over the past 24 hrs:    12.7 (H)  \   12.6 (L)   / 355     138 102 19.3 /  154 (H)   4.0 24 0.92 \     ALT: 23 AST: 24 AP: 345 (H) TBILI: 0.5   ALB: 4.2 TOT PROTEIN: 7.5 LIPASE: N/A     Trop: 22 BNP: 912 (H)     TSH: 6.62 (H) T4: 1.13 A1C: N/A     Procal: 0.11 CRP: 21.60 (H) Lactic Acid: N/A       INR:  1.37 (H) PTT:  N/A   D-dimer:  N/A Fibrinogen:  N/A       Imaging results reviewed over the past 24 hrs:   Recent Results (from the past 24 hours)   CT Chest Pulmonary Embolism w Contrast    Narrative    EXAM: CT CHEST PULMONARY EMBOLISM W CONTRAST  LOCATION: [facility]  DATE: 12/1/2024    INDICATION: Dyspnea on exertion  COMPARISON: 8/5/2024  TECHNIQUE: CT chest pulmonary angiogram during arterial phase injection of IV  contrast. Multiplanar reformats and MIP reconstructions were performed. Dose reduction techniques were used.   CONTRAST: 90ml isovue 370    FINDINGS:  ANGIOGRAM CHEST: Pulmonary arteries are normal caliber and negative for pulmonary emboli. Thoracic aorta is negative for dissection. No CT evidence of right heart strain.    LUNGS AND PLEURA: Bilateral mosaic attenuation, likely secondary to air trapping due to small airways disease. No pleural effusions.    MEDIASTINUM/AXILLAE: There is fat stranding along the left axillary and subclavian veins. No thoracic aortic aneurysm. No lymphadenopathy.    CORONARY ARTERY CALCIFICATION: [None    UPPER ABDOMEN: Ascites. Hepatomegaly. Nodular liver contour. Bilateral adrenal myelolipomas.    MUSCULOSKELETAL: Mild subcutaneous edema in the ventral chest wall.      Impression    IMPRESSION:  1.  No pulmonary embolism.    2.  There is fat stranding along the left axillary and subclavian veins, which can be seen in the setting of DVT. Recommend further evaluation with venous Doppler ultrasound.    3.  There is again mosaic lung attenuation consistent with small airways disease.    4.  The liver is again enlarged and nodular, suggestive of cirrhosis.    5.  Ascites.   US Upper Extremity Venous Duplex Left    Narrative    EXAM: US UPPER EXTREMITY VENOUS DUPLEX LEFT  LOCATION: [facility]  DATE: 12/2/2024    INDICATION: abnormal findings on CTPE  COMPARISON: CTA chest 12/1/2024  TECHNIQUE: Venous Duplex ultrasound of the left upper extremity with (when possible) and without compression, augmentation, and duplex. Color flow and spectral Doppler with waveform analysis performed.    FINDINGS: Ultrasound includes evaluation of the internal jugular vein, innominate vein, subclavian vein, axillary vein, and brachial vein. The superficial cephalic and basilic veins were also evaluated where seen.     LEFT: Positive for occlusive deep venous thrombosis of the left  internal jugular, subclavian, axillary veins. Positive for near occlusive superficial thrombophlebitis of the left basilic vein in the upper arm.      Impression    IMPRESSION:   1.  Positive for occlusive deep venous thrombosis of the left internal jugular, subclavian, axillary veins.   2.  Positive for near occlusive superficial thrombophlebitis of the left basilic vein in the upper arm.    I discussed the findings with Dr. Richardson of the ED at 4:15 AM on 12/2/2024   US Paracentesis without Albumin    Narrative    EXAM:  1. PARACENTESIS  2. ULTRASOUND GUIDANCE  LOCATION: [facility]  DATE: 12/2/2024    INDICATION: Ascites.    PROCEDURE: Informed consent obtained. Time out performed. The abdomen was prepped and draped in a sterile fashion. 10 mL of 1% lidocaine was infused into local soft tissues. A 5 Macedonian catheter system was introduced into the abdominal ascites under   ultrasound guidance.    5.3 liters of yellow-brown fluid were removed and sent to lab if requested.    Patient tolerated procedure well.    Ultrasound imaging was obtained and placed in the patient's permanent medical record.      Impression    IMPRESSION:  1.  Status post ultrasound-guided paracentesis.    Reference CPT Code: 61337

## 2024-12-02 NOTE — ED PROVIDER NOTES
Emergency Department Encounter         FINAL IMPRESSION:  Chest pain          ED COURSE AND MEDICAL DECISION MAKING       ED Course as of 12/01/24 2329   Sun Dec 01, 2024   2106 Morbidly obese 44-year-old extensive past medical history including hypertension, diabetes, liver disease, COPD, currently smoking, here chest pain for 24 hours.  Patient reports the chest pain is worse with ambulation.  States it feels better when he rests.  States he gets goosebumps when he walks.  No nausea or vomiting.  No abdominal pain.  No dysuria or bowel movement changes.  Patient endorses worsening leg swelling.  On arrival here he still states he has chest discomfort.  States he short of breath with minimal exertion.  His lungs are clear.  Plan for CT PE, cardiopulmonary valuation and admission.   -Discussed case with hospitalist.  Plan for admission.  CT PE showing nonspecific left upper extremity findings.  Duplex ordered.  Troponin negative pending.  -Received a call around 4:15 AM about patient's left upper extremity ultrasound suggestive of clot.  I called Dr. Arteaga the hospitalist and updated him.  He stated that he will be placing anticoagulation orders                       Medical Decision Making  Obtained supplemental history:Supplemental history obtained?: Documented in chart and EMS  Reviewed external records: External records reviewed?: Documented in chart  Care impacted by chronic illness:Smoking / Nicotine Use and Other: HTN, diabetes, liver disease, and COPD  Did you consider but not order tests?: Work up considered but not performed and documented in chart, if applicable  Did you interpret images independently?: Independent interpretation of ECG and images noted in documentation, when applicable.  Consultation discussion with other provider:Did you involve another provider (consultant, MH, pharmacy, etc.)?: I discussed the care with another health care provider, see documentation for details.  Admit.    MIPS: CT  Pulmonary Angiogram:Patient is moderate to high risk for PE.                      MEDICATIONS GIVEN IN THE EMERGENCY DEPARTMENT:  Medications   ipratropium - albuterol 0.5 mg/2.5 mg/3 mL (DUONEB) neb solution 3 mL (3 mLs Nebulization $Given 12/1/24 2112)   iopamidol (ISOVUE-370) solution 90 mL (90 mLs Intravenous $Given 12/1/24 2324)       NEW PRESCRIPTIONS STARTED AT TODAY'S ED VISIT:  New Prescriptions    No medications on file       HPI     Patient information obtained from: EMS, patient    Use of : N/A    Warren Jaramillo is a 44 year old male with a pertinent history of hypertension, diabetes, liver disease, COPD, and currently smoking, who presents to this ED via ambulance for evaluation of shortness of breath and cough.    Per EMS, the patient comes from home with complaint of shortness of breath and fatigue that started yesterday. Patient states he has a productive cough with white colored sputum along with chest pain with cough only. Patient also wants to address a fall he had 1.5 week ago and was previously seen for this.    Patient comes in to the ED with chest pain for 24 hours. Patient states this chest pain is worse with ambulation. Reports it feels better when he rests. States he gets goosebumps when he walks. He denies any nausea or vomiting. No abdominal pain, dysuria, or bowel movement changes. Patient indicates he has been dealing with worsening leg swelling.      MEDICAL HISTORY     Past Medical History:   Diagnosis Date    DM2 (diabetes mellitus, type 2) (H) 4/28/2020    HTN (hypertension) 7/30/2012    Rojas's disease (H)        Past Surgical History:   Procedure Laterality Date    COLONOSCOPY      ESOPHAGOSCOPY, GASTROSCOPY, DUODENOSCOPY (EGD), COMBINED N/A 7/21/2023    Procedure: ESOPHAGOGASTRODUODENOSCOPY WITH GASTRIC AND ESOPHAGEAL BIOPSIES;  Surgeon: Filiberto Aragon MD;  Location: [facility]    [facility]         Social History     Tobacco Use    Smoking  status: Every Day     Current packs/day: 1.00     Types: Cigarettes    Smokeless tobacco: Never   Vaping Use    Vaping status: Never Used   Substance Use Topics    Alcohol use: No     Comment: once every 3 months    Drug use: No       acetaminophen (TYLENOL) 500 MG tablet  albuterol (PROAIR HFA/PROVENTIL HFA/VENTOLIN HFA) 108 (90 Base) MCG/ACT inhaler  albuterol (PROVENTIL) (2.5 MG/3ML) 0.083% neb solution  aloe vera GEL  bacitracin 500 UNIT/GM OINT  Benzocaine (SOLARCAINE ALOE VERA EX)  benzonatate (TESSALON) 200 MG capsule  Calamine external lotion  carbamide peroxide (DEBROX) 6.5 % otic solution  clotrimazole (LOTRIMIN) 1 % external cream  dextromethorphan-guaiFENesin (MUCINEX DM)  MG 12 hr tablet  diclofenac (VOLTAREN) 1 % topical gel  empagliflozin (JARDIANCE) 10 MG TABS tablet  EPINEPHrine (ANY BX GENERIC EQUIV) 0.3 MG/0.3ML injection 2-pack  famotidine (PEPCID) 20 MG tablet  FLUoxetine 20 MG tablet  furosemide (LASIX) 40 MG tablet  GAVILAX 17 GM/SCOOP powder  hydrocortisone (CORTAID) 1 % external cream  Hydrocortisone (PREPARATION H EX)  ibuprofen (ADVIL/MOTRIN) 200 MG tablet  ketorolac (TORADOL) 10 MG tablet  lisinopril (ZESTRIL) 10 MG tablet  loperamide (IMODIUM) 2 MG capsule  loratadine (CLARITIN) 10 MG tablet  magnesium hydroxide (MILK OF MAGNESIA) 400 MG/5ML suspension  metFORMIN (GLUCOPHAGE) 1000 MG tablet  montelukast (SINGULAIR) 10 MG tablet  OLANZapine (ZYPREXA) 10 MG tablet  omeprazole (PRILOSEC) 40 MG DR capsule  oxyCODONE (ROXICODONE) 5 MG tablet  pramoxine-calamine (AVEENO) 1-8 % LOTN  Pseudoephedrine-DM-GG (PSEUDOEPHEDRINE-DM-GUAIFENESIN OR)  rosuvastatin (CRESTOR) 10 MG tablet  sucralfate (CARAFATE) 1 GM/10ML suspension  traZODone (DESYREL) 100 MG tablet  TRELEGY ELLIPTA 100-62.5-25 MCG/ACT oral inhaler  Urea 40 % CREA  Vitamins A & D (VITAMIN A & D) OINT  witch hazel-glycerin (TUCKS) pad            PHYSICAL EXAM     /68   Pulse 82   Temp 98.2  F (36.8  C) (Oral)   Resp 20   Ht  "1.651 m (5' 5\")   Wt 128.1 kg (282 lb 8 oz)   SpO2 95%   BMI 47.01 kg/m        PHYSICAL EXAM:     General: Patient appears well, nontoxic, comfortable  HEENT: Moist mucous membranes,  No head trauma.    Cardiovascular: Normal rate, normal rhythm, no extremity edema.  No appreciable murmur.  Respiratory: No signs of respiratory distress, lungs are clear to auscultation bilaterally with no wheezes rhonchi or rales.  Abdominal: Soft, nontender, nondistended, no palpable masses, no guarding, no rebound  Musculoskeletal: Full range of motion of joints, no deformities appreciated.  Neurological: Alert and oriented, grossly neurologically intact.  Psychological: Normal affect and mood.  Integument: No rashes appreciated          RESULTS       Labs Ordered and Resulted from Time of ED Arrival to Time of ED Departure   COMPREHENSIVE METABOLIC PANEL - Abnormal       Result Value    Sodium 139      Potassium 4.3      Carbon Dioxide (CO2) 25      Anion Gap 13      Urea Nitrogen 20.3 (*)     Creatinine 0.99      GFR Estimate >90      Calcium 9.4      Chloride 101      Glucose 97      Alkaline Phosphatase 345 (*)     AST 24      ALT 23      Protein Total 7.5      Albumin 4.2      Bilirubin Total 0.5     TROPONIN T, HIGH SENSITIVITY - Abnormal    Troponin T, High Sensitivity 26 (*)    NT PROBNP INPATIENT - Abnormal    N terminal Pro BNP Inpatient 912 (*)    CBC WITH PLATELETS AND DIFFERENTIAL - Abnormal    WBC Count 12.6 (*)     RBC Count 4.76      Hemoglobin 12.6 (*)     Hematocrit 40.9      MCV 86      MCH 26.5      MCHC 30.8 (*)     RDW 17.9 (*)     Platelet Count 353      % Neutrophils 68      % Lymphocytes 20      % Monocytes 8      % Eosinophils 4      % Basophils 1      % Immature Granulocytes 1      NRBCs per 100 WBC 0      Absolute Neutrophils 8.5 (*)     Absolute Lymphocytes 2.5      Absolute Monocytes 1.1      Absolute Eosinophils 0.4      Absolute Basophils 0.1      Absolute Immature Granulocytes 0.1      Absolute " NRBCs 0.0     INFLUENZA A/B, RSV AND SARS-COV2 PCR - Normal    Influenza A PCR Negative      Influenza B PCR Negative      RSV PCR Negative      SARS CoV2 PCR Negative     MAGNESIUM - Normal    Magnesium 1.9     TROPONIN T, HIGH SENSITIVITY       CT Chest Pulmonary Embolism w Contrast    (Results Pending)         PROCEDURES:  Procedures:  Procedures       I, Tressa Urbano am serving as a scribe to document services personally performed by Anurag Richardson DO, based on my observations and the provider's statements to me.  I, Anurag Richardson DO, attest that Tressa Urbano is acting in a scribe capacity, has observed my performance of the services and has documented them in accordance with my direction.    Anurag Richardson DO  Emergency Medicine  [facility] EMERGENCY DEPARTMENT       Anurag Richardson DO  12/02/24 0425

## 2024-12-02 NOTE — PLAN OF CARE
Problem: VTE (Venous Thromboembolism)  Goal: Tissue Perfusion  Outcome: Progressing  Goal: Right Ventricular Function  Outcome: Progressing   Goal Outcome Evaluation:  Pt  Ultrasound evaluation of the internal jugular vein, innominate vein, subclavian vein, axillary vein, and brachial vein. Left arm, internal jugular and axillary vein positive for a DVT. Eliquis 10 mg given.

## 2024-12-02 NOTE — PLAN OF CARE
PRIMARY DIAGNOSIS: Shortness of Breath  OUTPATIENT/OBSERVATION GOALS TO BE MET BEFORE DISCHARGE:  1. Stable vital signs Yes  2. Tolerating diet:Yes  3. Pain controlled with oral pain medications:  Yes  4. Positive bowel sounds:  Yes  5. Voiding without difficulty:  Yes  6. Able to ambulate:  Yes  7. Provider specific discharge goals met:  Yes    Discharge Planner Nurse   Safe discharge environment identified: Yes  Barriers to discharge: Yes       Entered by: Trinity Ann RN 12/02/2024 3:57 AM  Goal Outcome Evaluation:  Pt alert & oriented, RA though using a cpap in bed, vitals stable, denies pain. Wheezing noted in the anterior chest. Pt stomach is obese, rounded and hand on touch. Pt feet have a pink rash and brown discoloration, no edema. Pt is indep in the room and on a regular diet.

## 2024-12-02 NOTE — CONSULTS
12/2 test claim for Eliquis- medication is covered 100% no cost for the patient. Thank you for allowing me to help with your patient  Yanique Echols [facility]  Pharmacy Discharge Liaison   [facility]

## 2024-12-02 NOTE — ED NOTES
Following triage, patient could be heard saying he was going outside for a cigarette. Encouraged patient not to but patient seen walking out.

## 2024-12-02 NOTE — MEDICATION SCRIBE - ADMISSION MEDICATION HISTORY
Medication Scribe Admission Medication History    Admission medication history is complete. The information provided in this note is only as accurate as the sources available at the time of the update.    Information Source(s): Facility ([facility]/) medication list/MAR via phone    Pertinent Information: patient's medications are being managed by [facility] [place] @ [phone] . Called and received oral medication report.     Patient reported to be no longer taking: Zofran, Prednisone, Fleet Enema, Lexapro, Voltaren tabs, Breo, Ativan,     Changes made to PTA medication list:  Added: Benzocaine, Debrox, Mucinex, Epipen, Hydrocortisone cream, Preparation H, Imodium, Claritin, MOM, Oxycodone, Aveeno, Pseudoephedrine, Trelegy, Urea, A & D  Deleted: Zofran, Prednisone, Fleet Enema, Lexapro, Voltaren tabs, Breo, Ativan,   Changed: Tylenol to Q6H PRN, Carafate to PRN, Motrin to 400 mg, Omeprazole to 40 mg, Trazodone to 100 mg, Lasix to 40 mg     Allergies reviewed with patient and updates made in EHR: yes    Medication History Completed By: Jeremy Romo 12/1/2024 11:11 PM    PTA Med List   Medication Sig Last Dose/Taking    acetaminophen (TYLENOL) 500 MG tablet Take 1,000 mg by mouth every 6 hours as needed for mild pain. Taking As Needed    albuterol (PROAIR HFA/PROVENTIL HFA/VENTOLIN HFA) 108 (90 Base) MCG/ACT inhaler Inhale 1-2 puffs into the lungs every 6 hours as needed for shortness of breath, wheezing or cough Taking As Needed    albuterol (PROVENTIL) (2.5 MG/3ML) 0.083% neb solution Take 2.5 mg by nebulization 3 times daily as needed for shortness of breath, wheezing or cough Taking As Needed    aloe vera GEL Apply 1 g topically every hour as needed for skin care Per bottle directions Taking As Needed    bacitracin 500 UNIT/GM OINT Apply topically 3 times daily as needed for wound care Taking As Needed    Benzocaine (SOLARCAINE ALOE VERA EX) Externally apply topically daily as needed. Taking As Needed     Patient Education   Patient Education      Follow up if no better. Establish care with PCP. Continue current medication. If worse go to ER. Tonsillitis in Children: Care Instructions  Your Care Instructions    Tonsillitis is an infection of the tonsils that is caused by bacteria or a virus. The tonsils are in the back of the throat and are part of the immune system. Tonsillitis typically lasts from a few days up to a couple of weeks. Tonsillitis caused by a virus usually goes away on its own. Tonsillitis caused by the bacteria that causes strep throat is treated with antibiotics. You and your child's doctor may consider surgery to remove the tonsils if your child has complications from tonsillitis or repeat infections. This surgery is called tonsillectomy. Follow-up care is a key part of your child's treatment and safety. Be sure to make and go to all appointments, and call your doctor if your child is having problems. It's also a good idea to know your child's test results and keep a list of the medicines your child takes. How can you care for your child at home? · If the doctor prescribed antibiotics for your child, give them as directed. Do not stop using them just because your child feels better. Your child needs to take the full course of antibiotics. · Give your child acetaminophen (Tylenol) or ibuprofen (Advil, Motrin) for pain. Be safe with medicines. Read and follow all instructions on the label. Do not give aspirin to anyone younger than 20. It has been linked to Reye syndrome, a serious illness. · Do not give your child two or more pain medicines at the same time unless the doctor told you to. Many pain medicines have acetaminophen, which is Tylenol. Too much acetaminophen (Tylenol) can be harmful. · If your child is age 6 or older, have him or her gargle with warm salt water. This helps reduce swelling and relieve discomfort.  Have your child gargle once an hour with 1 teaspoon of salt mixed in 8 fluid ounces of warm water. · Have your child drink plenty of fluids. Fluids may help soothe an irritated throat. Your child can drink warm or cool liquids (whichever feels better). These include tea, soup, and juice. When should you call for help? Call your doctor now or seek immediate medical care if:  · Your child has new or worse symptoms of infection, such as:  ¨ Increased pain, swelling, warmth, or redness. ¨ Red streaks leading from the area. ¨ Pus draining from the area. ¨ A fever. · Your child has new pain, or pain that gets worse. · Your child has new or worse trouble swallowing. · Your child seems to be getting sicker. Watch closely for changes in your child's health, and be sure to contact your doctor if:  · Your child does not get better as expected. Where can you learn more? Go to https://[employer]. org and sign in to your [employer] account. Enter G577 in the [employer] box to learn more about \"Tonsillitis in Children: Care Instructions. \"     If you do not have an account, please click on the \"Sign Up Now\" link. Current as of: July 29, 2016  Content Version: 11.3  © 4366-6300 [email or web address]. Care instructions adapted under license by [facility] ([facility]). If you have questions about a medical condition or this instruction, always ask your healthcare professional. [place] [address] any warranty or liability for your use of this information. Upper Respiratory Infection (Cold) in Children: Care Instructions  Your Care Instructions    An upper respiratory infection, also called a URI, is an infection of the nose, sinuses, or throat. URIs are spread by coughs, sneezes, and direct contact. The common cold is the most frequent kind of URI. The flu and sinus infections are other kinds of URIs. Almost all URIs are caused by viruses, so antibiotics won't cure them. But you can do things at home to help your child get better.  With most benzonatate (TESSALON) 200 MG capsule Take 1 capsule (200 mg) by mouth 3 times daily as needed for cough. Taking As Needed    Calamine external lotion Apply topically as needed for itching Taking As Needed    carbamide peroxide (DEBROX) 6.5 % otic solution Place 5 drops into both ears daily as needed for other. Taking As Needed    clotrimazole (LOTRIMIN) 1 % external cream Apply topically 2 times daily as needed (skin irritation) Taking As Needed    dextromethorphan-guaiFENesin (MUCINEX DM)  MG 12 hr tablet Take 1 tablet by mouth 2 times daily as needed. Taking As Needed    diclofenac (VOLTAREN) 1 % topical gel Apply 2 g topically daily as needed for moderate pain To joints/back Taking As Needed    empagliflozin (JARDIANCE) 10 MG TABS tablet Take 10 mg by mouth every morning. 12/1/2024 Morning    EPINEPHrine (ANY BX GENERIC EQUIV) 0.3 MG/0.3ML injection 2-pack Inject 0.3 mg into the muscle as needed for anaphylaxis. May repeat one time in 5-15 minutes if response to initial dose is inadequate. Taking As Needed    famotidine (PEPCID) 20 MG tablet Take 1 tablet (20 mg) by mouth 2 times daily 12/1/2024 Morning    FLUoxetine 20 MG tablet Take 20 mg by mouth every morning. 12/1/2024 Morning    furosemide (LASIX) 40 MG tablet Take 40 mg by mouth every morning. 12/1/2024 Morning    GAVILAX 17 GM/SCOOP powder Take 17 g by mouth daily as needed. Taking As Needed    hydrocortisone (CORTAID) 1 % external cream Apply topically daily as needed. Taking As Needed    Hydrocortisone (PREPARATION H EX) Externally apply topically daily as needed. Taking As Needed    ibuprofen (ADVIL/MOTRIN) 200 MG tablet Take 400 mg by mouth every 4 hours as needed for pain. Taking As Needed    ketorolac (TORADOL) 10 MG tablet Take 1 tablet (10 mg) by mouth every 6 hours as needed for pain. Taking As Needed    lisinopril (ZESTRIL) 10 MG tablet Take 10 mg by mouth every morning. 12/1/2024 Morning    loperamide (IMODIUM) 2 MG capsule Take 4 mg by  URIs, your child should feel better in 4 to 10 days. The doctor has checked your child carefully, but problems can develop later. If you notice any problems or new symptoms, get medical treatment right away. Follow-up care is a key part of your child's treatment and safety. Be sure to make and go to all appointments, and call your doctor if your child is having problems. It's also a good idea to know your child's test results and keep a list of the medicines your child takes. How can you care for your child at home? · Give your child acetaminophen (Tylenol) or ibuprofen (Advil, Motrin) for fever, pain, or fussiness. Read and follow all instructions on the label. Do not give aspirin to anyone younger than 20. It has been linked to Reye syndrome, a serious illness. Do not give ibuprofen to a child who is younger than 6 months. · Be careful with cough and cold medicines. Don't give them to children younger than 6, because they don't work for children that age and can even be harmful. For children 6 and older, always follow all the instructions carefully. Make sure you know how much medicine to give and how long to use it. And use the dosing device if one is included. · Be careful when giving your child over-the-counter cold or flu medicines and Tylenol at the same time. Many of these medicines have acetaminophen, which is Tylenol. Read the labels to make sure that you are not giving your child more than the recommended dose. Too much acetaminophen (Tylenol) can be harmful. · Make sure your child rests. Keep your child at home if he or she has a fever. · If your child has problems breathing because of a stuffy nose, squirt a few saline (saltwater) nasal drops in one nostril. Then have your child blow his or her nose. Repeat for the other nostril. Do not do this more than 5 or 6 times a day. · Place a humidifier by your child's bed or close to your child. This may make it easier for your child to breathe.  Follow mouth 4 times daily as needed for diarrhea. Taking As Needed    loratadine (CLARITIN) 10 MG tablet Take 10 mg by mouth daily as needed for allergies. Taking As Needed    magnesium hydroxide (MILK OF MAGNESIA) 400 MG/5ML suspension Take 30 mLs by mouth daily as needed. Taking As Needed    metFORMIN (GLUCOPHAGE) 1000 MG tablet Take 1,000 mg by mouth 2 times daily (with meals) 12/1/2024 Morning    montelukast (SINGULAIR) 10 MG tablet Take 10 mg by mouth every morning. 12/1/2024 Morning    OLANZapine (ZYPREXA) 10 MG tablet Take 10 mg by mouth At Bedtime 11/30/2024 Bedtime    omeprazole (PRILOSEC) 40 MG DR capsule Take 40 mg by mouth every morning. 12/1/2024 Morning    oxyCODONE (ROXICODONE) 5 MG tablet Take 5 mg by mouth every 6 hours as needed for pain. Taking As Needed    pramoxine-calamine (AVEENO) 1-8 % LOTN Apply topically daily as needed for itching. Taking As Needed    Pseudoephedrine-DM-GG (PSEUDOEPHEDRINE-DM-GUAIFENESIN OR) Take 10 mLs by mouth every 6 hours as needed. Taking As Needed    rosuvastatin (CRESTOR) 10 MG tablet Take 10 mg by mouth At Bedtime 11/30/2024 Evening    sucralfate (CARAFATE) 1 GM/10ML suspension Take 1 g by mouth 4 times daily as needed. Taking As Needed    traZODone (DESYREL) 100 MG tablet Take 100 mg by mouth at bedtime. Taking    TRELEGY ELLIPTA 100-62.5-25 MCG/ACT oral inhaler Inhale 1 puff into the lungs daily. Taking    Urea 40 % CREA Apply topically daily as needed. Taking As Needed    Vitamins A & D (VITAMIN A & D) OINT Externally apply topically daily as needed. Taking As Needed    witch hazel-glycerin (TUCKS) pad Apply topically as needed for hemorrhoids. Taking As Needed

## 2024-12-02 NOTE — TREATMENT PLAN
RCAT Treatment Plan    Patient Score: 10    Patient Acuity: 4    Clinical Indication for Therapy: SOB, coughing in the setting of possible asthma/COPD exac    Therapy Ordered: DuoNeb QID    Assessment Summary: Patient admitted with multifactorial SOB with asthma/RAD/ possible COPD Exacerbation with mild fluid overload. Evidence of exp wheezing and coughing. Current everyday smoker. Admitting provider ordered scheduled nebs. Writer agrees patient might benefit from nebs for at least 1 day.     Neb Plan: DuoNeb QID  Bronchial Hygiene: Not indicated at this time; exhibits strong, dry, non-productive cough.    Lung Expansion: Not indicated; CT evidence of air trapping     AVA: Set-up on hosp CPAP unit, Auto 5-20 w/ RA    Sin Page, RRT  12/2/2024

## 2024-12-02 NOTE — PLAN OF CARE
.Patient admitted to room 13 at approximately 02:45 via wheel chair from emergency room.  Reason for Admission:   Report received from:   Patient was accompanied by None  Discharge transportation provided by:  Patient ambulated/transferred:  independently. self.  Patient is alert and orientated x 4.  Outpatient Observation education provided to: (patient, family, friend)  MDRO Education done if applicable (MRSA, VRE, etc)  Safety risks were identified during admission:  none.   Yellow risk/fall band applied:  Yes  Home meds sent home: Yes  Home meds sent to pharmacy:No IF YES add 1/2 sheet laminated page reminder to chart/clipboard   Detailed Belongings: Ron, 1 pair of glasses, pants, shirt only

## 2024-12-02 NOTE — CONSULTS
[facility]   [address], [place] [address]   [email or web address]   OFFICE: [phone]     CARDIOLOGY INPATIENT CONSULT NOTE     Impression and Plan     Assessment/Plan:  Mr. Warren Jaramillo is a 44 year old male with cirrhosis, tobacco use, obesity, HTN, DM2, AVA, COPD, who presented to the hospital with dyspnea.     Elevated troponin   - Mildly elevated troponin with a plateau trend not consistent with ACS.  Likely supply/demand mismatch related to ascites, possible COPD.   - Reviewed previous CCTA with normal coronary anatomy, no evidence of CAD from 3/2023   - ECG with no new ischemic changes.  No further cardiac testing is recommended.  He has poor quality echo images due to body habitus.  An MRI was ordered as an outpt which I encouraged him to complete.    Dyspnea   - Resolved post paracentesis   - Doubt cardiac contribution    HTN   - Well controlled   - Continue current management    Will sign off    Primary Cardiologist: Dr. Wilkerson    History of Present Illness      Mr. Warren Jaramillo is a 44 year old male with cirrhosis, tobacco use, obesity, HTN, DM2, AVA, COPD, who presented to the hospital with dyspnea.  He underwent paracentesis today with 5L off and feels back to baseline in terms of his breathing.  He reported chest pain similar to previous which has been extensively worked up in the past.  This has resolved.           Other than noted above, Mr. Jaramillo denies any chest pain/pressure/tightness, shortness of breath at rest or with exertion, light headedness/dizziness, pre-syncope, syncope, lower extremity swelling, palpitations, paroxysmal nocturnal dyspnea (PND), or orthopnea.          Review of Systems:  Further review of systems is otherwise negative/noncontributory (based on review of medical record (admission H&P) and 13 point review of systems reviewed. Pertinent positives noted).    Cardiac Diagnostics     ECG:  Personally reviewed and interpreted: 11/2/2024  NSR, R axis low voltage, poor R wave    Telemetry (personally reviewed): NSR    Most recent:  Echocardiogram (results reviewed): 8/2/2023  Interpretation Summary     The visual ejection fraction is 50-55%.  Regional wall motion abnormalities cannot be excluded due to limited  visualization.  Right ventricular function cannot be assessed due to poor image quality.  No obvious valve disease.  Technically difficult, suboptimal study.    Cardiac CT (results reviewed): 3/14/2023  Normal coronary anatomy with no evidence of stenosis or plaque.       Medical History  Surgical History Family History Social History   Past Medical History:   Diagnosis Date    COPD exacerbation (H) 12/2/2024    DM2 (diabetes mellitus, type 2) (H) 4/28/2020    HTN (hypertension) 7/30/2012    Thyroid nodule 7/31/2019    Rojas's disease (H)      Past Surgical History:   Procedure Laterality Date    COLONOSCOPY      ESOPHAGOSCOPY, GASTROSCOPY, DUODENOSCOPY (EGD), COMBINED N/A 7/21/2023    Procedure: ESOPHAGOGASTRODUODENOSCOPY WITH GASTRIC AND ESOPHAGEAL BIOPSIES;  Surgeon: Filiberto Aragon MD;  [place] [facility] [place]    TOOTH EXTRACTION       Family History   Problem Relation Age of Onset    Unknown/Adopted Father     Unknown/Adopted Maternal Grandmother     C.A.D. Maternal Grandfather     Diabetes Maternal Grandfather     Cerebrovascular Disease Maternal Grandfather     Unknown/Adopted Paternal Grandmother     Unknown/Adopted Paternal Grandfather     Unknown/Adopted Brother     Unknown/Adopted Sister            Social History     Socioeconomic History    Marital status: Single     Spouse name: Not on file    Number of children: Not on file    Years of education: Not on file    Highest education level: Not on file   Occupational History    Not on file   Tobacco Use    Smoking status: Every Day     Current packs/day: 1.00     Types: Cigarettes    Smokeless tobacco: Never   Vaping Use    Vaping  status: Never Used   Substance and Sexual Activity    Alcohol use: No     Comment: once every 3 months    Drug use: No    Sexual activity: Never     Partners: Female   Other Topics Concern     Service No    Blood Transfusions No    Caffeine Concern No    Occupational Exposure No    Hobby Hazards No    Sleep Concern No    Stress Concern Yes     Comment: sometimes    Weight Concern No    Special Diet Yes     Comment: counting carbs    Back Care No    Exercise Yes    Bike Helmet Not Asked     Comment: N/A    Seat Belt Yes    Self-Exams Yes    Parent/sibling w/ CABG, MI or angioplasty before 65F 55M? Not Asked   Social History Narrative    Not on file     Social Drivers of Health     Financial Resource Strain: Low Risk  (12/2/2024)    Financial Resource Strain     Within the past 12 months, have you or your family members you live with been unable to get utilities (heat, electricity) when it was really needed?: No   Food Insecurity: Low Risk  (12/2/2024)    Food Insecurity     Within the past 12 months, did you worry that your food would run out before you got money to buy more?: No     Within the past 12 months, did the food you bought just not last and you didn t have money to get more?: No   Transportation Needs: Low Risk  (12/2/2024)    Transportation Needs     Within the past 12 months, has lack of transportation kept you from medical appointments, getting your medicines, non-medical meetings or appointments, work, or from getting things that you need?: No   Physical Activity: Not on file   Stress: Not on file   Social Connections: Unknown (5/1/2023)    Received from [facility]    Social Connections     Frequency of Communication with Friends and Family: Not on file   Interpersonal Safety: Not on file   Housing Stability: High Risk (12/2/2024)    Housing Stability     Do you have housing? : No     Are you worried about losing  "your housing?: No             Physical Examination   VITALS: BP (!) 148/80 (BP Location: Right arm)   Pulse 89   Temp 97.6  F (36.4  C) (Oral)   Resp 20   Ht 1.651 m (5' 5\")   Wt 131.9 kg (290 lb 11.2 oz)   SpO2 95%   BMI 48.38 kg/m    BMI: Body mass index is 48.38 kg/m .  Wt Readings from Last 3 Encounters:   12/02/24 131.9 kg (290 lb 11.2 oz)   11/09/24 127.9 kg (282 lb)   11/06/24 128.1 kg (282 lb 8 oz)       Intake/Output Summary (Last 24 hours) at 12/2/2024 1057  Last data filed at 12/2/2024 0900  Gross per 24 hour   Intake 254 ml   Output --   Net 254 ml       General: pleasant male. No acute distress.   HENT: external ears normal. Nares patent. Mucous membranes moist.  Neck: No JVD  Lungs: clear to auscultation  COR:  regular rate and rhythm, No murmurs, rubs, or gallops  Abd: nondistended, BS present  Extrem: No edema          Lab Results Reviewed    Chemistry/lipid CBC Cardiac Enzymes/BNP/TSH/INR   Recent Labs   Lab Test 12/02/24  0631   CHOL 115   HDL 39*   LDL 47   TRIG 143     Recent Labs   Lab Test 12/02/24  0631 02/15/24  0918 08/01/23  2117   LDL 47 59 51     Recent Labs   Lab Test 12/02/24  0806 12/02/24  0508   NA  --  138   POTASSIUM  --  4.0   CHLORIDE  --  102   CO2  --  24   * 149*   BUN  --  19.3   CR  --  0.92   GFRESTIMATED  --  >90   WES  --  9.2     Recent Labs   Lab Test 12/02/24  0508 12/01/24  2140 11/02/24  1448   CR 0.92 0.99 0.87     Recent Labs   Lab Test 09/13/24  1100 02/15/24  0918 08/01/23  1802   A1C 6.8* 7.5* 7.3*          Recent Labs   Lab Test 12/02/24  0631   WBC 12.7*   HGB 12.6*   HCT 41.1   MCV 85        Recent Labs   Lab Test 12/02/24  0631 12/01/24 2140 11/02/24  1448   HGB 12.6* 12.6* 13.6    No results for input(s): \"TROPONINI\" in the last 54075 hours.  Recent Labs   Lab Test 12/01/24 2140 11/02/24  1448 08/05/24  0157 02/15/24  0918 11/27/23  1135   NTBNPI 912* 651* 595*   < >  --    NTBNP  --   --   --   --  495*    < > = values in this " interval not displayed.     Recent Labs   Lab Test 12/01/24  2350   TSH 6.62*     Recent Labs   Lab Test 12/02/24  0631   INR 1.37*           Current Inpatient Scheduled Medications   Scheduled Meds:  Current Facility-Administered Medications   Medication Dose Route Frequency Provider Last Rate Last Admin    apixaban ANTICOAGULANT (ELIQUIS) tablet 10 mg  10 mg Oral BID Esteban Arteaga MD        Followed by    [START ON 12/9/2024] apixaban ANTICOAGULANT (ELIQUIS) tablet 5 mg  5 mg Oral BID Esteban Arteaga MD        cefTRIAXone (ROCEPHIN) 1 g vial to attach to  mL bag for ADULTS or NS 50 mL bag for PEDS  1 g Intravenous Q24H Esteban Arteaga MD   1 g at 12/02/24 0055    famotidine (PEPCID) tablet 20 mg  20 mg Oral BID Esteban Arteaga MD   20 mg at 12/02/24 0851    FLUoxetine (PROzac) capsule 20 mg  20 mg Oral Esteban Pantoja MD   20 mg at 12/02/24 0852    furosemide (LASIX) tablet 40 mg  40 mg Oral Esteban Pantoja MD   40 mg at 12/02/24 0852    insulin aspart (NovoLOG) injection (RAPID ACTING)  1-7 Units Subcutaneous TID AC Esteban Arteaga MD   1 Units at 12/02/24 0851    insulin aspart (NovoLOG) injection (RAPID ACTING)  1-5 Units Subcutaneous At Bedtime Esteban Arteaga MD        ipratropium - albuterol 0.5 mg/2.5 mg/3 mL (DUONEB) neb solution 3 mL  3 mL Nebulization 4x daily Esteban Arteaga MD   3 mL at 12/02/24 0845    lisinopril (ZESTRIL) tablet 10 mg  10 mg Oral Esteban Pantoja MD   10 mg at 12/02/24 0856    methylPREDNISolone sodium succinate (SOLU-MEDROL) injection 40 mg  40 mg Intravenous Q24H Esteban Arteaga MD   40 mg at 12/02/24 0140    nicotine (NICODERM CQ) 21 MG/24HR 24 hr patch 1 patch  1 patch Transdermal Daily Esteban Arteaga MD   1 patch at 12/02/24 0851    Followed by    [START ON 1/13/2025] nicotine (NICODERM CQ) 14 MG/24HR 24 hr patch 1 patch  1 patch Transdermal Daily Esteban Arteaga MD        Followed by    [START ON 2/10/2025] nicotine (NICODERM CQ) 7 MG/24HR 24 hr patch 1  patch  1 patch Transdermal Daily Esteban Arteaga MD        OLANZapine (zyPREXA) tablet 10 mg  10 mg Oral At Bedtime Esteban Arteaga MD        pantoprazole (PROTONIX) EC tablet 40 mg  40 mg Oral BID AC Esteban Arteaga MD   40 mg at 12/02/24 0852    rosuvastatin (CRESTOR) tablet 10 mg  10 mg Oral At Bedtime Esteban Arteaga MD        traZODone (DESYREL) tablet 100 mg  100 mg Oral At Bedtime Esteban Arteaga MD         Continuous Infusions:  Current Facility-Administered Medications   Medication Dose Route Frequency Provider Last Rate Last Admin       No current outpatient medications on file.          Medications Prior to Admission   Prior to Admission medications    Medication Sig Start Date End Date Taking? Authorizing Provider   acetaminophen (TYLENOL) 500 MG tablet Take 1,000 mg by mouth every 6 hours as needed for mild pain.   Yes Reported, Patient   albuterol (PROAIR HFA/PROVENTIL HFA/VENTOLIN HFA) 108 (90 Base) MCG/ACT inhaler Inhale 1-2 puffs into the lungs every 6 hours as needed for shortness of breath, wheezing or cough 4/12/24  Yes Alejandrina Kelly MD   albuterol (PROVENTIL) (2.5 MG/3ML) 0.083% neb solution Take 2.5 mg by nebulization 3 times daily as needed for shortness of breath, wheezing or cough   Yes Unknown, Entered By History   aloe vera GEL Apply 1 g topically every hour as needed for skin care Per bottle directions   Yes Unknown, Entered By History   bacitracin 500 UNIT/GM OINT Apply topically 3 times daily as needed for wound care   Yes Unknown, Entered By History   Benzocaine (SOLARCAINE ALOE VERA EX) Externally apply topically daily as needed.   Yes Reported, Patient   benzonatate (TESSALON) 200 MG capsule Take 1 capsule (200 mg) by mouth 3 times daily as needed for cough. 9/22/24  Yes Liliana Alvarez PA-C   Calamine external lotion Apply topically as needed for itching   Yes Unknown, Entered By History   carbamide peroxide (DEBROX) 6.5 % otic solution Place 5 drops into both ears daily  as needed for other.   Yes Reported, Patient   clotrimazole (LOTRIMIN) 1 % external cream Apply topically 2 times daily as needed (skin irritation)   Yes Unknown, Entered By History   dextromethorphan-guaiFENesin (MUCINEX DM)  MG 12 hr tablet Take 1 tablet by mouth 2 times daily as needed. 9/22/24  Yes Reported, Patient   diclofenac (VOLTAREN) 1 % topical gel Apply 2 g topically daily as needed for moderate pain To joints/back   Yes Unknown, Entered By History   empagliflozin (JARDIANCE) 10 MG TABS tablet Take 10 mg by mouth every morning.   Yes Reported, Patient   EPINEPHrine (ANY BX GENERIC EQUIV) 0.3 MG/0.3ML injection 2-pack Inject 0.3 mg into the muscle as needed for anaphylaxis. May repeat one time in 5-15 minutes if response to initial dose is inadequate.   Yes Reported, Patient   famotidine (PEPCID) 20 MG tablet Take 1 tablet (20 mg) by mouth 2 times daily 1/18/24  Yes Elinor Pruett MD   FLUoxetine 20 MG tablet Take 20 mg by mouth every morning. 11/18/24  Yes Reported, Patient   furosemide (LASIX) 40 MG tablet Take 40 mg by mouth every morning. 11/18/24  Yes Reported, Patient   GAVILAX 17 GM/SCOOP powder Take 17 g by mouth daily as needed. 5/8/24  Yes Reported, Patient   hydrocortisone (CORTAID) 1 % external cream Apply topically daily as needed.   Yes Reported, Patient   Hydrocortisone (PREPARATION H EX) Externally apply topically daily as needed.   Yes Reported, Patient   ibuprofen (ADVIL/MOTRIN) 200 MG tablet Take 400 mg by mouth every 4 hours as needed for pain.   Yes Reported, Patient   ketorolac (TORADOL) 10 MG tablet Take 1 tablet (10 mg) by mouth every 6 hours as needed for pain. 10/11/24  Yes Milton Jaramillo MD   lisinopril (ZESTRIL) 10 MG tablet Take 10 mg by mouth every morning. 4/27/22  Yes Reported, Patient   loperamide (IMODIUM) 2 MG capsule Take 4 mg by mouth 4 times daily as needed for diarrhea.   Yes Reported, Patient   loratadine (CLARITIN) 10 MG tablet Take 10 mg by mouth  daily as needed for allergies.   Yes Reported, Patient   magnesium hydroxide (MILK OF MAGNESIA) 400 MG/5ML suspension Take 30 mLs by mouth daily as needed.   Yes Reported, Patient   metFORMIN (GLUCOPHAGE) 1000 MG tablet Take 1,000 mg by mouth 2 times daily (with meals)   Yes Unknown, Entered By History   montelukast (SINGULAIR) 10 MG tablet Take 10 mg by mouth every morning.   Yes Unknown, Entered By History   OLANZapine (ZYPREXA) 10 MG tablet Take 10 mg by mouth At Bedtime   Yes Unknown, Entered By History   omeprazole (PRILOSEC) 40 MG DR capsule Take 40 mg by mouth every morning. 8/19/24  Yes Reported, Patient   oxyCODONE (ROXICODONE) 5 MG tablet Take 5 mg by mouth every 6 hours as needed for pain. 10/14/24  Yes Reported, Patient   pramoxine-calamine (AVEENO) 1-8 % LOTN Apply topically daily as needed for itching.   Yes Reported, Patient   Pseudoephedrine-DM-GG (PSEUDOEPHEDRINE-DM-GUAIFENESIN OR) Take 10 mLs by mouth every 6 hours as needed.   Yes Reported, Patient   rosuvastatin (CRESTOR) 10 MG tablet Take 10 mg by mouth At Bedtime 4/27/22  Yes Reported, Patient   sucralfate (CARAFATE) 1 GM/10ML suspension Take 1 g by mouth 4 times daily as needed.   Yes Reported, Patient   traZODone (DESYREL) 100 MG tablet Take 100 mg by mouth at bedtime. 11/18/24  Yes Reported, Patient   TRELEGY ELLIPTA 100-62.5-25 MCG/ACT oral inhaler Inhale 1 puff into the lungs daily. 10/30/24  Yes Reported, Patient   Urea 40 % CREA Apply topically daily as needed. 2/5/24  Yes Reported, Patient   Vitamins A & D (VITAMIN A & D) OINT Externally apply topically daily as needed.   Yes Reported, Patient   witch hazel-glycerin (TUCKS) pad Apply topically as needed for hemorrhoids.   Yes Reported, Patient          Brenden Bland DO [facility]  Non-invasive Cardiologist  [facility]      Clinically Significant Risk Factors Present on Admission                # Coagulation Defect: INR = 1.37 (Ref range: 0.85 - 1.15) and/or PTT = N/A, will  "monitor for bleeding    # Hypertension: Noted on problem list  # Acute heart failure with preserved ejection fraction: heart failure noted on problem list, last echo with EF >50%, and receiving IV diuretics         # DMII: A1C = 6.8 % (Ref range: <5.7 %) within past 6 months    # Severe Obesity: Estimated body mass index is 48.38 kg/m  as calculated from the following:    Height as of this encounter: 1.651 m (5' 5\").    Weight as of this encounter: 131.9 kg (290 lb 11.2 oz).       # Financial/Environmental Concerns:              " Peng Advancement Flap Text: The defect edges were debeveled with a #15 scalpel blade.  Given the location of the defect, shape of the defect and the proximity to free margins a Peng advancement flap was deemed most appropriate.  Using a sterile surgical marker, an appropriate advancement flap was drawn incorporating the defect and placing the expected incisions within the relaxed skin tension lines where possible. The area thus outlined was incised deep to adipose tissue with a #15 scalpel blade.  The skin margins were undermined to an appropriate distance in all directions utilizing iris scissors.

## 2024-12-02 NOTE — ED NOTES
"[facility] ED Handoff Report    ED Chief Complaint: Shortness of Breath; Cough    ED Diagnosis:  (E11.9) DM2 (diabetes mellitus, type 2) (H)  (primary encounter diagnosis)  Comment:   Plan: Med Therapy Management Referral            (R07.9) Chest pain, unspecified type  Comment:   Plan:        PMH:    Past Medical History:   Diagnosis Date    COPD exacerbation (H) 12/2/2024    DM2 (diabetes mellitus, type 2) (H) 4/28/2020    HTN (hypertension) 7/30/2012    Thyroid nodule 7/31/2019    Rojas's disease (H)         Code Status:  Full Code     Falls Risk: No Band: Not applicable    Current Living Situation/Residence: lives in a group home     Elimination Status: Continent: Yes     Activity Level: Independent    Patients Preferred Language:  English     Needed: No    Vital Signs:  BP (!) 147/110   Pulse 91   Temp 98.2  F (36.8  C) (Oral)   Resp 12   Ht 1.651 m (5' 5\")   Wt 128.1 kg (282 lb 8 oz)   SpO2 96%   BMI 47.01 kg/m       Cardiac Rhythm: Sinus rhythm  with first degree AV block; bundle branch block present.     Pain Score: 0/10    Is the Patient Confused:  No    Last Food or Drink: 12/1/24 at 1600.    Focused Assessment:  Patient is alert and oriented x4.  Patient arrived to ED with chief complaint of shortness of breath and cough.  Patient given nebs and solu-medrol in ED.  Patient reports shortness of breath increased with exertion.  Abdomen is rounded, distended and hard to palpation.    Tests Performed: Done: Labs and Imaging    Treatments Provided:  See MAR.    Family Dynamics/Concerns: No    Family Updated On Visitor Policy: Yes    Plan of Care Communicated to Family: Yes    Who Was Updated about Plan of Care: Patient updated family via telephone.     Belongings Checklist Done and Signed by Patient: No    Medications sent with patient:     Covid: symptomatic, negative    Additional Information: Wearing Cpap at this time.  Will need U/S of left arm still.    Amy Carroll RN " 12/2/2024 1:24 AM

## 2024-12-03 VITALS
OXYGEN SATURATION: 95 % | WEIGHT: 290.7 LBS | HEART RATE: 81 BPM | TEMPERATURE: 97.5 F | DIASTOLIC BLOOD PRESSURE: 67 MMHG | BODY MASS INDEX: 48.43 KG/M2 | HEIGHT: 65 IN | RESPIRATION RATE: 18 BRPM | SYSTOLIC BLOOD PRESSURE: 120 MMHG

## 2024-12-03 LAB
ATRIAL RATE - MUSE: 93 BPM
DIASTOLIC BLOOD PRESSURE - MUSE: NORMAL MMHG
GLUCOSE BLDC GLUCOMTR-MCNC: 106 MG/DL (ref 70–99)
GLUCOSE BLDC GLUCOMTR-MCNC: 152 MG/DL (ref 70–99)
INTERPRETATION ECG - MUSE: NORMAL
P AXIS - MUSE: 63 DEGREES
PR INTERVAL - MUSE: 214 MS
QRS DURATION - MUSE: 110 MS
QT - MUSE: 380 MS
QTC - MUSE: 472 MS
R AXIS - MUSE: 94 DEGREES
SYSTOLIC BLOOD PRESSURE - MUSE: NORMAL MMHG
T AXIS - MUSE: 250 DEGREES
VENTRICULAR RATE- MUSE: 93 BPM
VIT D+METAB SERPL-MCNC: 21 NG/ML (ref 20–50)

## 2024-12-03 PROCEDURE — 94660 CPAP INITIATION&MGMT: CPT

## 2024-12-03 PROCEDURE — 99239 HOSP IP/OBS DSCHRG MGMT >30: CPT | Mod: FS | Performed by: FAMILY MEDICINE

## 2024-12-03 PROCEDURE — 250N000013 HC RX MED GY IP 250 OP 250 PS 637: Performed by: INTERNAL MEDICINE

## 2024-12-03 PROCEDURE — G0378 HOSPITAL OBSERVATION PER HR: HCPCS

## 2024-12-03 PROCEDURE — 250N000013 HC RX MED GY IP 250 OP 250 PS 637: Performed by: HOSPITALIST

## 2024-12-03 PROCEDURE — 82962 GLUCOSE BLOOD TEST: CPT

## 2024-12-03 PROCEDURE — 99207 PR APP CREDIT; MD BILLING SHARED VISIT: CPT | Mod: FS

## 2024-12-03 RX ORDER — CIPROFLOXACIN 500 MG/1
500 TABLET, FILM COATED ORAL 2 TIMES DAILY
Qty: 10 TABLET | Refills: 0 | Status: SHIPPED | OUTPATIENT
Start: 2024-12-03 | End: 2024-12-08

## 2024-12-03 RX ADMIN — UMECLIDINIUM 1 PUFF: 62.5 AEROSOL, POWDER ORAL at 08:16

## 2024-12-03 RX ADMIN — EMPAGLIFLOZIN 10 MG: 10 TABLET, FILM COATED ORAL at 08:14

## 2024-12-03 RX ADMIN — METFORMIN HYDROCHLORIDE 1000 MG: 500 TABLET, FILM COATED ORAL at 08:13

## 2024-12-03 RX ADMIN — FUROSEMIDE 40 MG: 20 TABLET ORAL at 08:14

## 2024-12-03 RX ADMIN — LISINOPRIL 10 MG: 5 TABLET ORAL at 08:13

## 2024-12-03 RX ADMIN — NICOTINE 1 PATCH: 21 PATCH, EXTENDED RELEASE TRANSDERMAL at 08:17

## 2024-12-03 RX ADMIN — APIXABAN 10 MG: 5 TABLET, FILM COATED ORAL at 08:14

## 2024-12-03 RX ADMIN — FAMOTIDINE 20 MG: 20 TABLET ORAL at 08:14

## 2024-12-03 RX ADMIN — MONTELUKAST 10 MG: 10 TABLET, FILM COATED ORAL at 08:13

## 2024-12-03 RX ADMIN — FLUOXETINE HYDROCHLORIDE 20 MG: 20 CAPSULE ORAL at 08:14

## 2024-12-03 RX ADMIN — PANTOPRAZOLE SODIUM 40 MG: 40 TABLET, DELAYED RELEASE ORAL at 07:22

## 2024-12-03 RX ADMIN — FLUTICASONE FUROATE AND VILANTEROL TRIFENATATE 1 PUFF: 100; 25 POWDER RESPIRATORY (INHALATION) at 08:16

## 2024-12-03 ASSESSMENT — ACTIVITIES OF DAILY LIVING (ADL)
ADLS_ACUITY_SCORE: 41

## 2024-12-03 NOTE — PROGRESS NOTES
Care Management Discharge Note    Discharge Date: 12/03/2024       Discharge Disposition: Group Home    Discharge Services: None    Discharge DME:  n/a    Discharge Transportation:  (cab)    Private pay costs discussed: Not applicable    Education Provided on the Discharge Plan: Yes  Persons Notified of Discharge Plans: yes  Patient/Family in Agreement with the Plan: yes    Handoff Referral Completed: No, handoff not indicated or clinically appropriate    Additional Information:  No needs anticipated from CM at discharge per pt; independent at baseline. SW informed   (Matilde [phone]) who is agreeable and requested that pt is sent by cab; pt agreeable and ambulatory. Orders to be faxed [phone] to home RN (Melissa). Pt to follow up with PCP post discharge if needs arise.  9:43 AM    Brenna Kjellberg, BSW LSW  12/3/2024

## 2024-12-03 NOTE — PLAN OF CARE
"PRIMARY DIAGNOSIS: \"GENERIC\" NURSING  OUTPATIENT/OBSERVATION GOALS TO BE MET BEFORE DISCHARGE:  ADLs back to baseline: Yes    Activity and level of assistance: Ambulating independently.    Pain status: Pain free.    Return to near baseline physical activity: Yes     Discharge Planner Nurse   Safe discharge environment identified: Yes  Barriers to discharge: Yes       Entered by: Shirlene Navarrete RN 12/02/2024 10:37 PM     Please review provider order for any additional goals.   Nurse to notify provider when observation goals have been met and patient is ready for discharge.Goal Outcome Evaluation:       Pt AxO4, VSS, RA. Pt reported gas pain, given simethicone with effect. Pt ambulating frequently to bathroom and on unit without difficulty. Pt using home CiPap when in bed. Tele was NSR. Pt BG were 154 and 129.                  "

## 2024-12-03 NOTE — PLAN OF CARE
"PRIMARY DIAGNOSIS: \"GENERIC\" NURSING  OUTPATIENT/OBSERVATION GOALS TO BE MET BEFORE DISCHARGE:  ADLs back to baseline: Yes    Activity and level of assistance: Ambulating independently.    Pain status: Pain free.    Return to near baseline physical activity: Yes     Discharge Planner Nurse   Safe discharge environment identified: Yes  Barriers to discharge: Yes       Entered by: Vale Shane RN 12/03/2024 4:54 AM     Please review provider order for any additional goals.   Nurse to notify provider when observation goals have been met and patient is ready for discharge.Goal Outcome Evaluation:                        "

## 2024-12-03 NOTE — PLAN OF CARE
Patient discharged to [facility] at [facility].  Accompanied by self and staff.  Discharge instructions were reviewed with patient, opportunity offered to ask questions.    Prescriptions N/A.  Access discontinued: Yes  Care plan and education discontinued: Yes  Home meds retrieved from pharmacy: No  Belongings were sent home with patient/family:  clothing; cellphone; glasses; glucose monitor.

## 2024-12-03 NOTE — PLAN OF CARE
Problem: Adult Inpatient Plan of Care  Goal: Absence of Hospital-Acquired Illness or Injury  Outcome: Progressing  Intervention: Identify and Manage Fall Risk  Recent Flowsheet Documentation  Taken 12/3/2024 0059 by Vale Shane RN  Safety Promotion/Fall Prevention: clutter free environment maintained  Intervention: Prevent Skin Injury  Recent Flowsheet Documentation  Taken 12/3/2024 0059 by Vale Shane RN  Body Position: position changed independently  Intervention: Prevent and Manage VTE (Venous Thromboembolism) Risk  Recent Flowsheet Documentation  Taken 12/3/2024 0059 by Vale Shane RN  VTE Prevention/Management: (pt ambulating regularly) other (see comments)  Goal: Optimal Comfort and Wellbeing  Outcome: Progressing  Intervention: Monitor Pain and Promote Comfort  Recent Flowsheet Documentation  Taken 12/2/2024 2352 by Vale Shane RN  Pain Management Interventions: medication (see MAR)     Problem: VTE (Venous Thromboembolism)  Goal: Tissue Perfusion  Outcome: Progressing  Intervention: Optimize Tissue Perfusion  Recent Flowsheet Documentation  Taken 12/3/2024 0059 by Vale Shane RN  VTE Prevention/Management: (pt ambulating regularly) other (see comments)  Goal: Right Ventricular Function  Outcome: Progressing     Problem: Diabetes  Goal: Optimal Coping  Outcome: Progressing  Goal: Optimal Functional Ability  Outcome: Progressing  Intervention: Optimize Functional Ability  Recent Flowsheet Documentation  Taken 12/3/2024 0059 by Vale Shane RN  Activity Management: up in chair  Activity Assistance Provided: independent  Goal: Blood Glucose Level Within Target Range  Outcome: Progressing  Goal: Minimize Risk of Hypoglycemia  Outcome: Progressing     Problem: Chest Pain  Goal: Resolution of Chest Pain Symptoms  Outcome: Progressing     Problem: Gas Exchange Impaired  Goal: Optimal Gas Exchange  Outcome: Progressing     Problem: Adult Inpatient Plan of  Care  Goal: Optimal Comfort and Wellbeing  Outcome: Progressing  Intervention: Monitor Pain and Promote Comfort  Recent Flowsheet Documentation  Taken 12/2/2024 2352 by Vale Shane RN  Pain Management Interventions: medication (see MAR)  Assumed care at 2300  Admitted: SOB, had paracentesis, removed 5.3 L ascites fluid.  Vitals: VSS, afebrile  Neuro: A/O x 4    Cardiac: NSR/w BBB         Respiratory:  O2 saturation > 92% on RA, wears CPAP for sleep   GI/:  Adequate UO via ambulates to bathroom LBM 12/2 per pt. + BS,152, 129,   Diet/appetite: Reg diet  Activity: Independent   Pain:  Addressed with PRN medication, pt denies pain  Skin: No adjustment needed  LDA's: R-PIV, SL   Plan:  Cards signed off, SW noted pt will returned to group home

## 2024-12-03 NOTE — DISCHARGE SUMMARY
"[facility]  Hospitalist Discharge Summary      Date of Admission:  12/1/2024  Date of Discharge:  12/3/2024  Discharging Provider: Cynthia Lees NP  Discharge Service: Hospitalist Service    Discharge Diagnoses   Ascites 2/2 alcoholic cirrhosis  DVT left arm  Spontaneous bacterial peritonitis    Clinically Significant Risk Factors     # DMII: A1C = 6.8 % (Ref range: <5.7 %) within past 6 months  # Severe Obesity: Estimated body mass index is 48.38 kg/m  as calculated from the following:    Height as of this encounter: 1.651 m (5' 5\").    Weight as of this encounter: 131.9 kg (290 lb 11.2 oz).       Follow-ups Needed After Discharge   Follow-up Appointments       Hospital Follow-up with Existing Primary Care Provider (PCP)      Please see details below         Schedule Primary Care visit within: 7 Days   Recommended labs and Imaging (to be ordered by Primary Care Provider): CBC. Follow up WBC               Unresulted Labs Ordered in the Past 30 Days of this Admission       Date and Time Order Name Status Description    12/2/2024  1:29 AM Ascites Fluid Aerobic Bacterial Culture Routine With Gram Stain Preliminary         These results will be followed up by PCP    Discharge Disposition   Discharged to home  Condition at discharge: Stable    Hospital Course   Warren Jaramillo is a 44 year old male with history of recent left arm trauma, alcoholic liver cirrhosis, hypertension, diabetes, COPD, and autism residing in group home, who presented on 12/1/2024 with SOB for one day. He was found to hae large amount of ascites and left upper extremity DVT. He had paracentesis and 5.3L of ascites fluid was removed. After the procedure, his SOB improved. He is also started on Eliquis for DVT. Will observe overnight before returning to the group home tomorrow.      Alcoholic liver cirrhosis with ascites:  -CT chest, abdomen and pelvis-large amount of ascites.    -Had 5.3L of ascites removed " today. Post procedure, his SOB resolved. Patient reports that this is his first time of having paracentesis.  -Received 25 g of albumin  -Monitor vitals   -Continue PTA lasix  -Hepatology referral    Spontaneous bacterial peritonitis  -Body fluid absolute neutrophils-294.5  -Start course of ciprofloxacin  -Follow-up outpatient     Left upper extremity DVT:  -Upper extremity US-occlusive DVT of the left internal jugular, subclavian, axillary veins, near occlusive superficial thrombophlebitis of the left basilic vein in the upper arm.  -2 weeks ago, he had mildly displaced fracture of the proximal olecranon process. He wore a sling for the trauma.  Not clear whether the DVT is related to his recent trauma.  -CT chest showed no PE.  -Started Eliquis on admission. Continue at discharge  -Follow up coagulation workup  -Hematology referral for outpatient follow up     COPD  -Was initiated steroid on admission. Discontinued   -Continue PTA inhalers     Elevated troponin  -Slightly elevated chronically at the level of 25-27. Currently at baseline.   -Cardiology is consulted. Likely demand ischemia related to ascites. -Cardiac MRI outpatient.     Diabetes mellitus, type 2:   -Controlled with recent HbA1C 6.8.   -Continue PTA Jardiance and metformin.   -Novolog sliding scale.       Consultations This Hospital Stay   SMOKING CESSATION PROGRAM IP CONSULT  CARDIOLOGY IP CONSULT  CARDIOLOGY IP CONSULT  CARE MANAGEMENT / SOCIAL WORK IP CONSULT  PHARMACY LIAISON FOR MEDICATION COVERAGE CONSULT    Code Status   Full Code    Time Spent on this Encounter   ICynthia NP, personally saw the patient today and spent greater than 30 minutes discharging this patient.       Cynthia Lees NP  [facility]TEND[facility] RECOV[facility] AND SHORT STAY  [address] [place] [id number]  Phone: [phone]  Fax: [phone]  ______________________________________________________________________    Physical  Exam   Vital Signs: Temp: 97.5  F (36.4  C) Temp src: Oral BP: 120/67 Pulse: 71   Resp: 18 SpO2: 95 % O2 Device: None (Room air)    Weight: 290 lbs 11.2 oz  Constitutional: awake, alert, cooperative, no apparent distress, and appears stated age  Respiratory: No increased work of breathing, good air exchange, clear to auscultation bilaterally, no crackles or wheezing  Cardiovascular: Normal apical impulse, regular rate and rhythm, normal S1 and S2, no S3 or S4, and no murmur noted  GI: No scars, normal bowel sounds, soft, non-distended, non-tender, no masses palpated, no hepatosplenomegally       Primary Care Physician   Mary Kelly    Discharge Orders      Med Therapy Management Referral      Reason for your hospital stay    Ascites due to alcoholic cirrhosis  DVT left arm  Spontaneous bacterial peritonitis     Activity    Your activity upon discharge: activity as tolerated     Diet    Follow this diet upon discharge: Current Diet:Orders Placed This Encounter      Regular Diet Adult     Hospital Follow-up with Existing Primary Care Provider (PCP)    Please see details below            Significant Results and Procedures   Results for orders placed or performed during the hospital encounter of 12/01/24   CT Chest Pulmonary Embolism w Contrast    Narrative    EXAM: CT CHEST PULMONARY EMBOLISM W CONTRAST  LOCATION: [facility]  DATE: 12/1/2024    INDICATION: Dyspnea on exertion  COMPARISON: 8/5/2024  TECHNIQUE: CT chest pulmonary angiogram during arterial phase injection of IV contrast. Multiplanar reformats and MIP reconstructions were performed. Dose reduction techniques were used.   CONTRAST: 90ml isovue 370    FINDINGS:  ANGIOGRAM CHEST: Pulmonary arteries are normal caliber and negative for pulmonary emboli. Thoracic aorta is negative for dissection. No CT evidence of right heart strain.    LUNGS AND PLEURA: Bilateral mosaic attenuation, likely secondary to air trapping due to small  airways disease. No pleural effusions.    MEDIASTINUM/AXILLAE: There is fat stranding along the left axillary and subclavian veins. No thoracic aortic aneurysm. No lymphadenopathy.    CORONARY ARTERY CALCIFICATION: [None    UPPER ABDOMEN: Ascites. Hepatomegaly. Nodular liver contour. Bilateral adrenal myelolipomas.    MUSCULOSKELETAL: Mild subcutaneous edema in the ventral chest wall.      Impression    IMPRESSION:  1.  No pulmonary embolism.    2.  There is fat stranding along the left axillary and subclavian veins, which can be seen in the setting of DVT. Recommend further evaluation with venous Doppler ultrasound.    3.  There is again mosaic lung attenuation consistent with small airways disease.    4.  The liver is again enlarged and nodular, suggestive of cirrhosis.    5.  Ascites.   US Upper Extremity Venous Duplex Left    Narrative    EXAM: US UPPER EXTREMITY VENOUS DUPLEX LEFT  LOCATION: [facility]  DATE: 12/2/2024    INDICATION: abnormal findings on CTPE  COMPARISON: CTA chest 12/1/2024  TECHNIQUE: Venous Duplex ultrasound of the left upper extremity with (when possible) and without compression, augmentation, and duplex. Color flow and spectral Doppler with waveform analysis performed.    FINDINGS: Ultrasound includes evaluation of the internal jugular vein, innominate vein, subclavian vein, axillary vein, and brachial vein. The superficial cephalic and basilic veins were also evaluated where seen.     LEFT: Positive for occlusive deep venous thrombosis of the left internal jugular, subclavian, axillary veins. Positive for near occlusive superficial thrombophlebitis of the left basilic vein in the upper arm.      Impression    IMPRESSION:   1.  Positive for occlusive deep venous thrombosis of the left internal jugular, subclavian, axillary veins.   2.  Positive for near occlusive superficial thrombophlebitis of the left basilic vein in the upper arm.    I discussed the findings  with Dr. Richardson of the ED at 4:15 AM on 12/2/2024   US Paracentesis without Albumin    Narrative    EXAM:  1. PARACENTESIS  2. ULTRASOUND GUIDANCE  LOCATION: [facility]  DATE: 12/2/2024    INDICATION: Ascites.    PROCEDURE: Informed consent obtained. Time out performed. The abdomen was prepped and draped in a sterile fashion. 10 mL of 1% lidocaine was infused into local soft tissues. A 5 French catheter system was introduced into the abdominal ascites under   ultrasound guidance.    5.3 liters of yellow-brown fluid were removed and sent to lab if requested.    Patient tolerated procedure well.    Ultrasound imaging was obtained and placed in the patient's permanent medical record.      Impression    IMPRESSION:  1.  Status post ultrasound-guided paracentesis.    Reference CPT Code: 08886       Discharge Medications   Current Discharge Medication List        START taking these medications    Details   apixaban ANTICOAGULANT (ELIQUIS) 5 MG tablet Take 2 tablets (10 mg) by mouth 2 times daily for 6 days, THEN 1 tablet (5 mg) 2 times daily.  Qty: 84 tablet, Refills: 0    Associated Diagnoses: DVT of axillary vein, acute left (H)      ciprofloxacin (CIPRO) 500 MG tablet Take 1 tablet (500 mg) by mouth 2 times daily for 5 days.  Qty: 10 tablet, Refills: 0    Associated Diagnoses: Spontaneous bacterial peritonitis (H)           CONTINUE these medications which have NOT CHANGED    Details   acetaminophen (TYLENOL) 500 MG tablet Take 1,000 mg by mouth every 6 hours as needed for mild pain.      albuterol (PROAIR HFA/PROVENTIL HFA/VENTOLIN HFA) 108 (90 Base) MCG/ACT inhaler Inhale 1-2 puffs into the lungs every 6 hours as needed for shortness of breath, wheezing or cough  Qty: 18 g, Refills: 0    Comments: Pharmacy may dispense brand covered by insurance (Proair, or proventil or ventolin or generic albuterol inhaler)      albuterol (PROVENTIL) (2.5 MG/3ML) 0.083% neb solution Take 2.5 mg by nebulization  3 times daily as needed for shortness of breath, wheezing or cough      aloe vera GEL Apply 1 g topically every hour as needed for skin care Per bottle directions      bacitracin 500 UNIT/GM OINT Apply topically 3 times daily as needed for wound care      Benzocaine (SOLARCAINE ALOE VERA EX) Externally apply topically daily as needed.      benzonatate (TESSALON) 200 MG capsule Take 1 capsule (200 mg) by mouth 3 times daily as needed for cough.  Qty: 50 capsule, Refills: 0      Calamine external lotion Apply topically as needed for itching      carbamide peroxide (DEBROX) 6.5 % otic solution Place 5 drops into both ears daily as needed for other.      clotrimazole (LOTRIMIN) 1 % external cream Apply topically 2 times daily as needed (skin irritation)      dextromethorphan-guaiFENesin (MUCINEX DM)  MG 12 hr tablet Take 1 tablet by mouth 2 times daily as needed.      diclofenac (VOLTAREN) 1 % topical gel Apply 2 g topically daily as needed for moderate pain To joints/back      empagliflozin (JARDIANCE) 10 MG TABS tablet Take 10 mg by mouth every morning.      EPINEPHrine (ANY BX GENERIC EQUIV) 0.3 MG/0.3ML injection 2-pack Inject 0.3 mg into the muscle as needed for anaphylaxis. May repeat one time in 5-15 minutes if response to initial dose is inadequate.      famotidine (PEPCID) 20 MG tablet Take 1 tablet (20 mg) by mouth 2 times daily  Qty: 60 tablet, Refills: 0      FLUoxetine 20 MG tablet Take 20 mg by mouth every morning.      furosemide (LASIX) 40 MG tablet Take 40 mg by mouth every morning.      GAVILAX 17 GM/SCOOP powder Take 17 g by mouth daily as needed.      hydrocortisone (CORTAID) 1 % external cream Apply topically daily as needed.      Hydrocortisone (PREPARATION H EX) Externally apply topically daily as needed.      ibuprofen (ADVIL/MOTRIN) 200 MG tablet Take 400 mg by mouth every 4 hours as needed for pain.      ketorolac (TORADOL) 10 MG tablet Take 1 tablet (10 mg) by mouth every 6 hours as  needed for pain.  Qty: 20 tablet, Refills: 0      lisinopril (ZESTRIL) 10 MG tablet Take 10 mg by mouth every morning.      loperamide (IMODIUM) 2 MG capsule Take 4 mg by mouth 4 times daily as needed for diarrhea.      loratadine (CLARITIN) 10 MG tablet Take 10 mg by mouth daily as needed for allergies.      magnesium hydroxide (MILK OF MAGNESIA) 400 MG/5ML suspension Take 30 mLs by mouth daily as needed.      metFORMIN (GLUCOPHAGE) 1000 MG tablet Take 1,000 mg by mouth 2 times daily (with meals)      montelukast (SINGULAIR) 10 MG tablet Take 10 mg by mouth every morning.      OLANZapine (ZYPREXA) 10 MG tablet Take 10 mg by mouth At Bedtime      omeprazole (PRILOSEC) 40 MG DR capsule Take 40 mg by mouth every morning.      oxyCODONE (ROXICODONE) 5 MG tablet Take 5 mg by mouth every 6 hours as needed for pain.      pramoxine-calamine (AVEENO) 1-8 % LOTN Apply topically daily as needed for itching.      Pseudoephedrine-DM-GG (PSEUDOEPHEDRINE-DM-GUAIFENESIN OR) Take 10 mLs by mouth every 6 hours as needed.      rosuvastatin (CRESTOR) 10 MG tablet Take 10 mg by mouth At Bedtime      sucralfate (CARAFATE) 1 GM/10ML suspension Take 1 g by mouth 4 times daily as needed.      traZODone (DESYREL) 100 MG tablet Take 100 mg by mouth at bedtime.      TRELEGY ELLIPTA 100-62.5-25 MCG/ACT oral inhaler Inhale 1 puff into the lungs daily.      Urea 40 % CREA Apply topically daily as needed.      Vitamins A & D (VITAMIN A & D) OINT Externally apply topically daily as needed.      witch hazel-glycerin (TUCKS) pad Apply topically as needed for hemorrhoids.           Allergies   Allergies   Allergen Reactions    Apricot Flavor Anaphylaxis    Banana Anaphylaxis     Throat swelling  Throat swelling      Wasp Venom Protein Shortness Of Breath     Other reaction(s): Respiratory Distress  Has an epi pen  Has an epi pen      Bees Anaphylaxis     Have an Epi pen that carries with    Methylphenidate Itching     Other reaction(s): Nightmares     Prunus      Other reaction(s): *Unknown    Sulfa Antibiotics      Headaches and nausea    Prunus Persica Rash     Other reaction(s): *Unknown

## 2024-12-03 NOTE — PLAN OF CARE
Leida Lizama NP notified 12/3/824 at 0954:    Pt asking to get Vit D lab checked before discharge.

## 2024-12-05 LAB
BACTERIA FLD CULT: NORMAL
GRAM STAIN RESULT: NORMAL
GRAM STAIN RESULT: NORMAL

## 2024-12-06 ENCOUNTER — HOSPITAL ENCOUNTER (OUTPATIENT)
Dept: MRI IMAGING | Facility: HOSPITAL | Age: 44
Discharge: HOME | End: 2024-12-06
Attending: PHYSICIAN ASSISTANT
Payer: COMMERCIAL

## 2024-12-06 DIAGNOSIS — R07.9 CHEST PAIN, UNSPECIFIED TYPE: ICD-10-CM

## 2024-12-06 DIAGNOSIS — I50.813 ACUTE ON CHRONIC RIGHT-SIDED CONGESTIVE HEART FAILURE (H): ICD-10-CM

## 2024-12-06 PROCEDURE — A9585 GADOBUTROL INJECTION: HCPCS | Performed by: PHYSICIAN ASSISTANT

## 2024-12-06 PROCEDURE — 75561 CARDIAC MRI FOR MORPH W/DYE: CPT | Mod: 26 | Performed by: INTERNAL MEDICINE

## 2024-12-06 PROCEDURE — 75561 CARDIAC MRI FOR MORPH W/DYE: CPT

## 2024-12-06 PROCEDURE — 255N000002 HC RX 255 OP 636: Performed by: PHYSICIAN ASSISTANT

## 2024-12-06 PROCEDURE — 999N000122 MR MYOCARDIUM  OVERREAD

## 2024-12-06 RX ORDER — GADOBUTROL 604.72 MG/ML
23 INJECTION INTRAVENOUS ONCE
Status: COMPLETED | OUTPATIENT
Start: 2024-12-06 | End: 2024-12-06

## 2024-12-06 RX ADMIN — GADOBUTROL 23 ML: 604.72 INJECTION INTRAVENOUS at 10:28

## 2024-12-07 LAB
BACTERIA FLD CULT: NO GROWTH
GRAM STAIN RESULT: NORMAL
GRAM STAIN RESULT: NORMAL

## 2024-12-10 ENCOUNTER — HOSPITAL ENCOUNTER (EMERGENCY)
Facility: HOSPITAL | Age: 44
Discharge: NURSING, REHAB OR LONG-TERM CARE | End: 2024-12-10
Attending: EMERGENCY MEDICINE | Admitting: EMERGENCY MEDICINE
Payer: COMMERCIAL

## 2024-12-10 ENCOUNTER — APPOINTMENT (OUTPATIENT)
Dept: ULTRASOUND IMAGING | Facility: HOSPITAL | Age: 44
End: 2024-12-10
Attending: EMERGENCY MEDICINE
Payer: COMMERCIAL

## 2024-12-10 VITALS
OXYGEN SATURATION: 94 % | HEART RATE: 78 BPM | SYSTOLIC BLOOD PRESSURE: 120 MMHG | RESPIRATION RATE: 18 BRPM | DIASTOLIC BLOOD PRESSURE: 56 MMHG | TEMPERATURE: 97.5 F

## 2024-12-10 DIAGNOSIS — K70.31 ALCOHOLIC CIRRHOSIS OF LIVER WITH ASCITES (H): ICD-10-CM

## 2024-12-10 DIAGNOSIS — K14.8 TONGUE LESION: ICD-10-CM

## 2024-12-10 LAB
ALBUMIN SERPL BCG-MCNC: 4.1 G/DL (ref 3.5–5.2)
ALP SERPL-CCNC: 292 U/L (ref 40–150)
ALT SERPL W P-5'-P-CCNC: 24 U/L (ref 0–70)
ANION GAP SERPL CALCULATED.3IONS-SCNC: 11 MMOL/L (ref 7–15)
AST SERPL W P-5'-P-CCNC: 13 U/L (ref 0–45)
BILIRUB DIRECT SERPL-MCNC: 0.33 MG/DL (ref 0–0.3)
BILIRUB SERPL-MCNC: 0.6 MG/DL
BUN SERPL-MCNC: 15.3 MG/DL (ref 6–20)
CALCIUM SERPL-MCNC: 9 MG/DL (ref 8.8–10.4)
CHLORIDE SERPL-SCNC: 104 MMOL/L (ref 98–107)
CREAT SERPL-MCNC: 1.05 MG/DL (ref 0.67–1.17)
EGFRCR SERPLBLD CKD-EPI 2021: 90 ML/MIN/1.73M2
ERYTHROCYTE [DISTWIDTH] IN BLOOD BY AUTOMATED COUNT: 18 % (ref 10–15)
GLUCOSE SERPL-MCNC: 110 MG/DL (ref 70–99)
HCO3 SERPL-SCNC: 25 MMOL/L (ref 22–29)
HCT VFR BLD AUTO: 42.1 % (ref 40–53)
HGB BLD-MCNC: 13.2 G/DL (ref 13.3–17.7)
HOLD SPECIMEN: NORMAL
LIPASE SERPL-CCNC: 17 U/L (ref 13–60)
MCH RBC QN AUTO: 26.6 PG (ref 26.5–33)
MCHC RBC AUTO-ENTMCNC: 31.4 G/DL (ref 31.5–36.5)
MCV RBC AUTO: 85 FL (ref 78–100)
NT-PROBNP SERPL-MCNC: 668 PG/ML (ref 0–450)
PLATELET # BLD AUTO: 365 10E3/UL (ref 150–450)
POTASSIUM SERPL-SCNC: 4.4 MMOL/L (ref 3.4–5.3)
PROT SERPL-MCNC: 7 G/DL (ref 6.4–8.3)
RBC # BLD AUTO: 4.96 10E6/UL (ref 4.4–5.9)
SODIUM SERPL-SCNC: 140 MMOL/L (ref 135–145)
WBC # BLD AUTO: 14.1 10E3/UL (ref 4–11)

## 2024-12-10 PROCEDURE — 82247 BILIRUBIN TOTAL: CPT

## 2024-12-10 PROCEDURE — 84155 ASSAY OF PROTEIN SERUM: CPT

## 2024-12-10 PROCEDURE — 36415 COLL VENOUS BLD VENIPUNCTURE: CPT

## 2024-12-10 PROCEDURE — 83690 ASSAY OF LIPASE: CPT

## 2024-12-10 PROCEDURE — 99284 EMERGENCY DEPT VISIT MOD MDM: CPT | Mod: 25

## 2024-12-10 PROCEDURE — 83880 ASSAY OF NATRIURETIC PEPTIDE: CPT

## 2024-12-10 PROCEDURE — 82565 ASSAY OF CREATININE: CPT

## 2024-12-10 PROCEDURE — 84460 ALANINE AMINO (ALT) (SGPT): CPT

## 2024-12-10 PROCEDURE — 82435 ASSAY OF BLOOD CHLORIDE: CPT

## 2024-12-10 PROCEDURE — 80048 BASIC METABOLIC PNL TOTAL CA: CPT

## 2024-12-10 PROCEDURE — 85014 HEMATOCRIT: CPT

## 2024-12-10 PROCEDURE — 272N000710 US PARACENTESIS WITH ALBUMIN

## 2024-12-10 PROCEDURE — 85048 AUTOMATED LEUKOCYTE COUNT: CPT

## 2024-12-10 PROCEDURE — 49083 ABD PARACENTESIS W/IMAGING: CPT

## 2024-12-10 NOTE — DISCHARGE INSTRUCTIONS
As we discussed today, you need to close follow-up with your GI doctor by the end of this week for reassessment.Please call them tomorrow for an appointment this week .    I did place a referral for your tongue lesion to ENT as well for reassessment.     Return to the ER for any recurrence of fluid, difficulty breathing, or other emergency concerns.

## 2024-12-10 NOTE — ED PROVIDER NOTES
"Emergency Department Midlevel Supervisory Note     I had a face to face encounter with this patient seen by the Advanced Practice Provider (TALIA). I personally made/approved the management plan and take responsibility for the patient management. I personally saw patient and performed a substantive portion of the visit including all aspects of the medical decision making.     ED Course:  2:21 PM Ariana Norton PA-C staffed patient with me. I agree with their assessment and plan of management, and I will see the patient.  2:25 PM I met with the patient to introduce myself, gather additional history, perform my initial exam, and discuss the plan.     Brief HPI:     Warren Jaramillo is a 44 year old male who presents for evaluation of  bump on tongue.   Patient states that yesterday he thought he had developed a nosebleed however discovered that it was actually bleeding from a bump on his tongue.  This is the first time he noticed the bump on the tip of his tongue.  He states that the \"pool of blood\" was as large as the base of the chair in the room on his front porch.  Patient is on Eliquis.  Today, it is not bleeding however does bleed when he squeezes it.  It is painful.  It is possible the patient burned his tongue on pot pie last week however notes no other injuries, lacerations.    I, Aj Perez, am serving as a scribe to document services personally performed by Dr. Shanda Soriano, based on my observations and the provider's statements to me. I, Dr. Shanda Soriano, attest that Aj Perez is acting in a scribe capacity, has observed my performance of the services and has documented them in accordance with my direction.    Brief Physical Exam: /69   Pulse 82   Temp 97.5  F (36.4  C) (Oral)   Resp 20   SpO2 96%   Constitutional:  Alert, in no acute distress  EYES: Conjunctivae clear  HENT:  Atraumatic  Respiratory:  Respirations even, unlabored, in no acute respiratory distress  Cardiovascular:  " Regular rate and rhythm, good peripheral perfusion  GI: Soft, non-distended, non-tender  Musculoskeletal:  Moves all 4 extremities equally, grossly symmetrical strength  Integument: Warm & dry. No appreciable rash, erythema.  Neurologic:  Alert & oriented, speech clear and fluent, no focal deficits noted  Psych: Normal mood and affect       MDM:  Pt with no abdominal pain, no fever, here with lesion to tongue that bled but now stopped bleeding, no bleedign present, ameanble to ENT f/u for reassessment, also with recurrent ascites, nearly 6L drianed last week and s/p abx, no fever or pain to suggest SBP  and no GIB, pending recurrent paracentesis for ascites, LFTs, Cbc, chemistry, lipase.     Shanda Soriano MD  [facility] EMERGENCY DEPARTMENT  [address] [place] [id number]  [phone]       Shanda Soriano MD  12/10/24 [id number]

## 2024-12-10 NOTE — ED PROVIDER NOTES
Emergency Department Encounter   NAME: Warren Jaramillo ; AGE: 44 year old male ; YOB: 1980 ; MRN: [id number] ; PCP: Mary Kelly   ED PROVIDER: Ariana Norton PA-C    Evaluation Date & Time:   No admission date for patient encounter.    CHIEF COMPLAINT:  Tongue Pain and Bleeding Concern      Impression and Plan   MDM: Warren Jaramilol is a 44 year old male who presents to the ED for evaluation of tongue lesion and concern for ascites recurrence.    Patient does appear well on initial exam and is eating chips.  Vitals within normal limits.  Physical exam reveals lesion of tongue see physical exam.  Differential for tongue lesion includes canker sore, HSV, other viral cause such as hand-foot-and-mouth/herpangina, burn.  There are no other lesions in the mouth to suggest something like hand-foot-and-mouth or herpangina.  It is on the inside of the mouth with no visible lesions on the outside of patient's mouth so low suspicion for something like a HSV outbreak.  Patient is a chronic cigarette smoker.  Most likely had a burn either due to the pot pie he ate earlier this week for a cigarette burn.  Will plan to continue to monitor for healing.  Did consider treatment with antibiotics however no pus from the wound.  No fevers.  Low suspicion for any overlying infection at this time.    On physical exam patient's abdomen is largely distended and rigid.  Is not tender to palpation.  He was just discharged 6 days ago with 5 L of fluid drained off just prior to discharge.  He states this fluid has been reaccumulating since being home.  He did have SBP during his admission was discharged with ciprofloxacin.  He states he did take that and just finished it on Sunday (2 days ago).  He has had no fevers since discharge.  No no abdominal tenderness.  No difficulty breathing or shortness of breath.  Differential includes recurrence of SBP, ascites, bowel obstruction.    Patient has no fevers and did  finish his ciprofloxacin as prescribed.  For these reasons low suspicion for SBP.  Low suspicion for other acute abdominal process as patient is very nontender on exam.  Did consider further imaging of the belly however with these reasons as stated to defer imaging at this time.  Labs ordered and remarkable for leukocytosis of 14.1 which has been overall stable in the last month.  BNP is downtrending at 668.  Alk phos is elevated at 292 but is downtrending.  No other lab work abnormalities.    With patient's recurrent ascites did order paracentesis which is done.  5.2 L were removed.  No diagnostic lab work done at this time as just had paracentesis done.    Patient has alcoholic liver cirrhosis but reports being sober for 9 months.    He remains stable throughout ED course. Patient follows with LU[facility] PHILLIPS and just had an appointment following hospital visit last week. I told him to kristofer tomorrow morning to be seen again this week as he has had such close recurrence of ascites. Also placed ENT referral for follow up on tongue lesion.     Patient given return precautions including difficulty breathing or other emergency concerns.     Medical Decision Making  Obtained supplemental history:Supplemental history obtained?: No  Reviewed external records: External records reviewed?: Documented in chart and Other: recent hospital visit  Care impacted by chronic illness:Documented in Chart  Did you consider but not order tests?: Work up considered but not performed and documented in chart, if applicable  Did you interpret images independently?: Independent interpretation of ECG and images noted in documentation, when applicable.  Consultation discussion with other provider:Did you involve another provider (consultant, , pharmacy, etc.)?: I discussed the care with another health care provider, see documentation for details.  Discharge. No recommendations on prescription strength medication(s). See documentation for any additional  "details.    MIPS: Not Applicable      Critical Care: 0    ED COURSE:  1:49 PM I met and introduced myself to the patient. I gathered initial history and performed my physical exam. We discussed plan for initial workup.  I have staffed the patient with Dr. Soriano, ED MD, who has evaluated the patient and agrees with all aspects of today's care.  I rechecked the patient and discussed results, discharge, follow up, and reasons to return to the ED.     At the conclusion of the encounter I discussed the results of all the tests and the disposition. The questions were answered. The patient or family acknowledged understanding and was agreeable with the care plan.    FINAL IMPRESSION:    ICD-10-CM    1. Tongue lesion  K14.8 Adult ENT  Referral      2. 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   Patient states that yesterday he thought he had developed a nosebleed however discovered that it was actually bleeding from a bump on his tongue.  This is the first time he noticed the bump on the tip of his tongue.  He states that the \"pool of blood\" was as large as the base of the chair in the room on his front porch.  Patient is on Eliquis.  Today, it is not bleeding however does bleed when he squeezes it.  It is painful.  It is possible the patient burned his tongue on pot pie last week however notes no other injuries, lacerations.    Patient was recently admitted here for alcoholic liver cirrhosis with " ascites and had 5.3 L of fluid removed, spontaneous bacterial peritonitis and treated with ciprofloxacin, left upper extremity DVT and started on Eliquis, COPD exacerbation, elevated troponin.     Medical History     Past Medical History:   Diagnosis Date    COPD exacerbation (H) 12/2/2024    DM2 (diabetes mellitus, type 2) (H) 4/28/2020    HTN (hypertension) 7/30/2012    Thyroid nodule 7/31/2019    Rojas's disease (H)        Past Surgical History:   Procedure Laterality Date    COLONOSCOPY      ESOPHAGOSCOPY, GASTROSCOPY, DUODENOSCOPY (EGD), COMBINED N/A 7/21/2023    Procedure: ESOPHAGOGASTRODUODENOSCOPY WITH GASTRIC AND ESOPHAGEAL BIOPSIES;  Surgeon: Filiberto Aragon MD;  [place] [facility] [place] EXTRA[place]         Family History   Problem Relation Age of Onset    Unknown/Adopted Father     Unknown/Adopted Maternal Grandmother     C.A.D. Maternal Grandfather     Diabetes Maternal Grandfather     Cerebrovascular Disease Maternal Grandfather     Unknown/Adopted Paternal Grandmother     Unknown/Adopted Paternal Grandfather     Unknown/Adopted Brother     Unknown/Adopted Sister        Social History     Tobacco Use    Smoking status: Every Day     Current packs/day: 1.00     Types: Cigarettes    Smokeless tobacco: Never   Vaping Use    Vaping status: Never Used   Substance Use Topics    Alcohol use: No     Comment: once every 3 months    Drug use: No       acetaminophen (TYLENOL) 500 MG tablet  albuterol (PROAIR HFA/PROVENTIL HFA/VENTOLIN HFA) 108 (90 Base) MCG/ACT inhaler  albuterol (PROVENTIL) (2.5 MG/3ML) 0.083% neb solution  aloe vera GEL  apixaban ANTICOAGULANT (ELIQUIS) 5 MG tablet  bacitracin 500 UNIT/GM OINT  Benzocaine (SOLARCAINE ALOE VERA EX)  benzonatate (TESSALON) 200 MG capsule  Calamine external lotion  carbamide peroxide (DEBROX) 6.5 % otic solution  clotrimazole (LOTRIMIN) 1 % external cream  dextromethorphan-guaiFENesin (MUCINEX DM)  MG 12 hr tablet  diclofenac (VOLTAREN) 1 %  topical gel  empagliflozin (JARDIANCE) 10 MG TABS tablet  EPINEPHrine (ANY BX GENERIC EQUIV) 0.3 MG/0.3ML injection 2-pack  famotidine (PEPCID) 20 MG tablet  FLUoxetine 20 MG tablet  furosemide (LASIX) 40 MG tablet  GAVILAX 17 GM/SCOOP powder  hydrocortisone (CORTAID) 1 % external cream  Hydrocortisone (PREPARATION H EX)  ibuprofen (ADVIL/MOTRIN) 200 MG tablet  ketorolac (TORADOL) 10 MG tablet  lisinopril (ZESTRIL) 10 MG tablet  loperamide (IMODIUM) 2 MG capsule  loratadine (CLARITIN) 10 MG tablet  magnesium hydroxide (MILK OF MAGNESIA) 400 MG/5ML suspension  metFORMIN (GLUCOPHAGE) 1000 MG tablet  montelukast (SINGULAIR) 10 MG tablet  OLANZapine (ZYPREXA) 10 MG tablet  omeprazole (PRILOSEC) 40 MG DR capsule  oxyCODONE (ROXICODONE) 5 MG tablet  pramoxine-calamine (AVEENO) 1-8 % LOTN  Pseudoephedrine-DM-GG (PSEUDOEPHEDRINE-DM-GUAIFENESIN OR)  rosuvastatin (CRESTOR) 10 MG tablet  sucralfate (CARAFATE) 1 GM/10ML suspension  traZODone (DESYREL) 100 MG tablet  TRELEGY ELLIPTA 100-62.5-25 MCG/ACT oral inhaler  Urea 40 % CREA  Vitamins A & D (VITAMIN A & D) OINT  witch hazel-glycerin (TUCKS) pad          Physical Exam     First Vitals:  Patient Vitals for the past 24 hrs:   BP Temp Temp src Pulse Resp SpO2   12/10/24 1716 120/56 -- -- 78 18 94 %   12/10/24 1340 125/69 97.5  F (36.4  C) Oral 82 20 96 %         PHYSICAL EXAM:   Physical Exam    Constitutional: alert,  no acute distress,  not ill-appearing  Head: normocephalic, atraumatic  Eyes: EOM intact   Mouth: small lesion to tongue tip with no active bleeding, does bleed when patient squeezes the lesion         Neck: ROM normal  Cardio: regular rate, regular rate, no murmurs   Pulmonary: effort normal, lung sounds normal, no wheezing, crackles, or rales    Abdominal: largely distended, rigid, nontender, no guarding   MSK: no obvious swelling or deformity  Skin: no visible rashes or erythema   Neuro: no gross focal deficit, acting per baseline   Psychiatric: mood and  behavior within normal limit    Results     LAB:  All pertinent labs reviewed and interpreted  Labs Ordered and Resulted from Time of ED Arrival to Time of ED Departure   CBC WITH PLATELETS - Abnormal       Result Value    WBC Count 14.1 (*)     RBC Count 4.96      Hemoglobin 13.2 (*)     Hematocrit 42.1      MCV 85      MCH 26.6      MCHC 31.4 (*)     RDW 18.0 (*)     Platelet Count 365     BASIC METABOLIC PANEL - Abnormal    Sodium 140      Potassium 4.4      Chloride 104      Carbon Dioxide (CO2) 25      Anion Gap 11      Urea Nitrogen 15.3      Creatinine 1.05      GFR Estimate 90      Calcium 9.0      Glucose 110 (*)    NT PROBNP INPATIENT - Abnormal    N terminal Pro BNP Inpatient 668 (*)    HEPATIC FUNCTION PANEL - Abnormal    Protein Total 7.0      Albumin 4.1      Bilirubin Total 0.6      Alkaline Phosphatase 292 (*)     AST 13      ALT 24      Bilirubin Direct 0.33 (*)    LIPASE - Normal    Lipase 17          RADIOLOGY:  US Paracentesis with Albumin   Final Result   IMPRESSION:   1.  Status post ultrasound-guided paracentesis.      Reference CPT Code: 64020          PROCEDURES:  US paracentesis done by MARLEN Norton PA-C   Emergency Medicine   [facility] EMERGENCY DEPARTMENT       Ariana Norton PA-C  12/10/24 2024

## 2024-12-10 NOTE — ED TRIAGE NOTES
Patient comes in with his [facility] staff. He states he has had a painful bump on the tip of his tongue for 3 days. Yesterday he noted the bump to be bleeding and patient and staff report they were concerned with the loss of blood yesterday. Today there is no bleeding unless patient squeezes the bump, which he did during triage and the bleeding was minimal.  He is on eliquis.     Triage Assessment (Adult)       Row Name 12/10/24 1344          Triage Assessment    Airway WDL WDL        Respiratory WDL    Respiratory WDL WDL        Cardiac WDL    Cardiac WDL WDL

## 2024-12-11 ENCOUNTER — APPOINTMENT (OUTPATIENT)
Dept: CT IMAGING | Facility: HOSPITAL | Age: 44
End: 2024-12-11
Attending: EMERGENCY MEDICINE
Payer: COMMERCIAL

## 2024-12-11 ENCOUNTER — HOSPITAL ENCOUNTER (EMERGENCY)
Facility: HOSPITAL | Age: 44
Discharge: HOME | End: 2024-12-11
Attending: EMERGENCY MEDICINE
Payer: COMMERCIAL

## 2024-12-11 VITALS
OXYGEN SATURATION: 95 % | HEART RATE: 76 BPM | HEIGHT: 65 IN | BODY MASS INDEX: 45.15 KG/M2 | WEIGHT: 271 LBS | RESPIRATION RATE: 18 BRPM | SYSTOLIC BLOOD PRESSURE: 123 MMHG | TEMPERATURE: 97.8 F | DIASTOLIC BLOOD PRESSURE: 62 MMHG

## 2024-12-11 DIAGNOSIS — R18.8 OTHER ASCITES: ICD-10-CM

## 2024-12-11 DIAGNOSIS — K76.9 LIVER DISEASE: ICD-10-CM

## 2024-12-11 LAB
ALBUMIN SERPL BCG-MCNC: 3.9 G/DL (ref 3.5–5.2)
ALP SERPL-CCNC: 312 U/L (ref 40–150)
ALT SERPL W P-5'-P-CCNC: 26 U/L (ref 0–70)
ANION GAP SERPL CALCULATED.3IONS-SCNC: 10 MMOL/L (ref 7–15)
APTT PPP: 35 SECONDS (ref 22–38)
AST SERPL W P-5'-P-CCNC: 20 U/L (ref 0–45)
BASOPHILS # BLD AUTO: 0.1 10E3/UL (ref 0–0.2)
BASOPHILS NFR BLD AUTO: 1 %
BILIRUB SERPL-MCNC: 0.5 MG/DL
BUN SERPL-MCNC: 13.1 MG/DL (ref 6–20)
CALCIUM SERPL-MCNC: 9.2 MG/DL (ref 8.8–10.4)
CHLORIDE SERPL-SCNC: 104 MMOL/L (ref 98–107)
CREAT SERPL-MCNC: 1.05 MG/DL (ref 0.67–1.17)
EGFRCR SERPLBLD CKD-EPI 2021: 90 ML/MIN/1.73M2
EOSINOPHIL # BLD AUTO: 0.5 10E3/UL (ref 0–0.7)
EOSINOPHIL NFR BLD AUTO: 3 %
ERYTHROCYTE [DISTWIDTH] IN BLOOD BY AUTOMATED COUNT: 18.3 % (ref 10–15)
GLUCOSE SERPL-MCNC: 101 MG/DL (ref 70–99)
HCO3 SERPL-SCNC: 27 MMOL/L (ref 22–29)
HCT VFR BLD AUTO: 44.4 % (ref 40–53)
HGB BLD-MCNC: 13.7 G/DL (ref 13.3–17.7)
IMM GRANULOCYTES # BLD: 0.1 10E3/UL
IMM GRANULOCYTES NFR BLD: 1 %
INR PPP: 1.57 (ref 0.85–1.15)
LIPASE SERPL-CCNC: 16 U/L (ref 13–60)
LYMPHOCYTES # BLD AUTO: 2.4 10E3/UL (ref 0.8–5.3)
LYMPHOCYTES NFR BLD AUTO: 17 %
MCH RBC QN AUTO: 26.2 PG (ref 26.5–33)
MCHC RBC AUTO-ENTMCNC: 30.9 G/DL (ref 31.5–36.5)
MCV RBC AUTO: 85 FL (ref 78–100)
MONOCYTES # BLD AUTO: 1.2 10E3/UL (ref 0–1.3)
MONOCYTES NFR BLD AUTO: 8 %
NEUTROPHILS # BLD AUTO: 9.8 10E3/UL (ref 1.6–8.3)
NEUTROPHILS NFR BLD AUTO: 70 %
NRBC # BLD AUTO: 0 10E3/UL
NRBC BLD AUTO-RTO: 0 /100
PLATELET # BLD AUTO: 368 10E3/UL (ref 150–450)
POTASSIUM SERPL-SCNC: 5.1 MMOL/L (ref 3.4–5.3)
PROT SERPL-MCNC: 6.9 G/DL (ref 6.4–8.3)
RBC # BLD AUTO: 5.23 10E6/UL (ref 4.4–5.9)
SODIUM SERPL-SCNC: 141 MMOL/L (ref 135–145)
WBC # BLD AUTO: 14 10E3/UL (ref 4–11)

## 2024-12-11 PROCEDURE — 250N000013 HC RX MED GY IP 250 OP 250 PS 637: Performed by: EMERGENCY MEDICINE

## 2024-12-11 PROCEDURE — 74174 CTA ABD&PLVS W/CONTRAST: CPT

## 2024-12-11 PROCEDURE — 83690 ASSAY OF LIPASE: CPT | Performed by: EMERGENCY MEDICINE

## 2024-12-11 PROCEDURE — 99285 EMERGENCY DEPT VISIT HI MDM: CPT | Mod: 25

## 2024-12-11 PROCEDURE — 250N000011 HC RX IP 250 OP 636: Performed by: EMERGENCY MEDICINE

## 2024-12-11 PROCEDURE — 36415 COLL VENOUS BLD VENIPUNCTURE: CPT | Performed by: EMERGENCY MEDICINE

## 2024-12-11 PROCEDURE — 86618 LYME DISEASE ANTIBODY: CPT | Performed by: EMERGENCY MEDICINE

## 2024-12-11 PROCEDURE — 85610 PROTHROMBIN TIME: CPT | Performed by: EMERGENCY MEDICINE

## 2024-12-11 PROCEDURE — 85730 THROMBOPLASTIN TIME PARTIAL: CPT | Performed by: EMERGENCY MEDICINE

## 2024-12-11 PROCEDURE — 85004 AUTOMATED DIFF WBC COUNT: CPT | Performed by: EMERGENCY MEDICINE

## 2024-12-11 PROCEDURE — 80053 COMPREHEN METABOLIC PANEL: CPT | Performed by: EMERGENCY MEDICINE

## 2024-12-11 PROCEDURE — 85041 AUTOMATED RBC COUNT: CPT | Performed by: EMERGENCY MEDICINE

## 2024-12-11 RX ORDER — OXYCODONE HYDROCHLORIDE 5 MG/1
5 TABLET ORAL EVERY 6 HOURS PRN
Qty: 12 TABLET | Refills: 0 | Status: SHIPPED | OUTPATIENT
Start: 2024-12-11 | End: 2024-12-14

## 2024-12-11 RX ORDER — OXYCODONE AND ACETAMINOPHEN 5; 325 MG/1; MG/1
2 TABLET ORAL ONCE
Status: COMPLETED | OUTPATIENT
Start: 2024-12-11 | End: 2024-12-11

## 2024-12-11 RX ORDER — IOPAMIDOL 755 MG/ML
90 INJECTION, SOLUTION INTRAVASCULAR ONCE
Status: COMPLETED | OUTPATIENT
Start: 2024-12-11 | End: 2024-12-11

## 2024-12-11 RX ADMIN — OXYCODONE HYDROCHLORIDE AND ACETAMINOPHEN 2 TABLET: 5; 325 TABLET ORAL at 16:13

## 2024-12-11 RX ADMIN — METFORMIN HYDROCHLORIDE 1000 MG: 500 TABLET, FILM COATED ORAL at 18:37

## 2024-12-11 RX ADMIN — IOPAMIDOL 90 ML: 755 INJECTION, SOLUTION INTRAVENOUS at 18:39

## 2024-12-11 ASSESSMENT — COLUMBIA-SUICIDE SEVERITY RATING SCALE - C-SSRS
6. HAVE YOU EVER DONE ANYTHING, STARTED TO DO ANYTHING, OR PREPARED TO DO ANYTHING TO END YOUR LIFE?: NO
1. IN THE PAST MONTH, HAVE YOU WISHED YOU WERE DEAD OR WISHED YOU COULD GO TO SLEEP AND NOT WAKE UP?: NO
2. HAVE YOU ACTUALLY HAD ANY THOUGHTS OF KILLING YOURSELF IN THE PAST MONTH?: NO

## 2024-12-11 ASSESSMENT — ENCOUNTER SYMPTOMS
ABDOMINAL DISTENTION: 1
BACK PAIN: 0
FEVER: 0
COUGH: 0
ABDOMINAL PAIN: 1

## 2024-12-11 NOTE — ED PROVIDER NOTES
EMERGENCY DEPARTMENT ENCOUNTER      NAME: Warren Jaramillo  AGE: 44 year old male  YOB: 1980  MRN: [id number]  EVALUATION DATE & TIME: No admission date for patient encounter.    PCP: Mary Kelly    ED PROVIDER: Fortino Gregorio M.D.      Chief Complaint   Patient presents with    Abdominal Pain         FINAL IMPRESSION:    1.  Acute abdominal pain.  2.  Recurrent ascites.    ED COURSE & MEDICAL DECISION MAKING:    3:22 PM I met with the patient to gather history and to perform my initial exam. We discussed plans for the ED course, including diagnostic testing and treatment. PPE worn: cloth mask..  Patient with recurrent ascites secondary to liver problems.  Patient is not a drinker.  He is not certain why he has these difficulties.  Patient underwent paracentesis on December 2 when they removed approximately 6 L and again yesterday when they removed 5-1/2 L.  He feels that his stomach is somewhat reaccumulated with distention and notes some pain in the left lower quadrant area at the site of his previous paracentesis.  7:49 PM I updated patient.  Small to moderate ascites.  Nodular liver.  White count mildly bit elevated at 14.  Chemistries negative.  Mild elevation of alk phosphatase and INR.  Patient will be discharged home.  He will have him follow-up with his family doctor clinic in the next week or 2.  Patient aware that a Lyme titer still pending.    Pertinent Labs & Imaging studies reviewed. (See chart for details)  44 year old male presents to the Emergency Department for evaluation of abdominal pain.    At the conclusion of the encounter I discussed the results of all of the tests and the disposition. The questions were answered. The patient or family acknowledged understanding and was agreeable with the care plan.              Medical Decision Making  Obtained supplemental history:Supplemental history obtained?: No  Reviewed external records: Both inpatient and outpatient computer  records were reviewed.  Care impacted by chronic illness: COPD, diabetes, hypertension, recurrent ascites and liver disease.  Did you consider but not order tests?: Work up considered but not performed and documented in chart, if applicable  Did you interpret images independently?: Independent interpretation of ECG and images noted in documentation, when applicable.  Consultation discussion with other provider: If admitted, will discuss with the admitting service.    Tentative discharge to home after evaluation.      MIPS: Not Applicable        MEDICATIONS GIVEN IN THE EMERGENCY:  Medications   oxyCODONE-acetaminophen (PERCOCET) 5-325 MG per tablet 2 tablet (2 tablets Oral $Given 12/11/24 1613)   metFORMIN (GLUCOPHAGE) tablet 1,000 mg (1,000 mg Oral $Given 12/11/24 1837)   iopamidol (ISOVUE-370) solution 90 mL (90 mLs Intravenous $Given 12/11/24 1839)       NEW PRESCRIPTIONS STARTED AT TODAY'S ER VISIT  New Prescriptions    No medications on file          =================================================================    HPI    Patient information was obtained from: Patient.     Use of : N/A         Warren Jaramillo is a 44 year old male with a pertinent history of tobacco abuse, CHF, type II diabetes, hypertension, cirrhosis of liver with ascites, COPD, DVT, and multiple mental health diagnoses who presents to this ED by EMS for evaluation of abdominal pain.    Per chart review:  12/10/2024 Patient was seen at [facility] ED for evaluation of a tongue lesion and concern for ascites recurrence. 5L of fluid removed 6 days prior. Had a recent admission where he was on ciprofloxacin for SBP, finished the course on Sunday (12/8). Patient has alcoholic liver cirrhosis with ascites. Paracentesis done in ER where 5.2L of fluid removed. Patient recently had a DVT and started on Eliquis. Patient discharged with ENT referral for tongue lesion. No new medications.    Patient reports  severe non-radiating left lower quadrant abdominal pain at the site of where he had paracentesis done yesterday. He rates the pain a 6/10. Patient has liver cirrhosis with ascites where he had a reported 6L of fluid removed on 12/2 and 5.2L of fluid removed yesterday from his abdomen via paracentesis. He went in yesterday to the ER because he had a 3 pound increase and was told to come in. Patient took ibuprofen (last dose 12:00 AM) for pain control without significant relief. Patient is on Eliquis.     Patient also mentions having elevated WBC and decreased hemoglobin lab values for the past couple weeks. He notes that he has had chronic elevated WBC since his childhood. Patient also has concern for a tick bite that has happened in the past and is requesting a Lyme's disease test at this time.    He does not identify any waxing or waning symptoms otherwise, exacerbating or alleviating features, associated symptoms except as mentioned. He denies any other pain related complaints.    REVIEW OF SYSTEMS   Review of Systems   Constitutional:  Negative for fever.   Respiratory:  Negative for cough.    Gastrointestinal:  Positive for abdominal distention and abdominal pain.   Musculoskeletal:  Negative for back pain.        PAST MEDICAL HISTORY:  Past Medical History:   Diagnosis Date    COPD exacerbation (H) 12/2/2024    DM2 (diabetes mellitus, type 2) (H) 4/28/2020    HTN (hypertension) 7/30/2012    Thyroid nodule 7/31/2019    Rojas's disease (H)        PAST SURGICAL HISTORY:  Past Surgical History:   Procedure Laterality Date    COLONOSCOPY      ESOPHAGOSCOPY, GASTROSCOPY, DUODENOSCOPY (EGD), COMBINED N/A 7/21/2023    Procedure: ESOPHAGOGASTRODUODENOSCOPY WITH GASTRIC AND ESOPHAGEAL BIOPSIES;  Surgeon: Filiberto Aragon MD;  [place] [facility] [place] EXTRACTION             CURRENT MEDICATIONS:    acetaminophen (TYLENOL) 500 MG tablet  albuterol (PROAIR HFA/PROVENTIL HFA/VENTOLIN HFA) 108 (90 Base) MCG/ACT  inhaler  albuterol (PROVENTIL) (2.5 MG/3ML) 0.083% neb solution  aloe vera GEL  apixaban ANTICOAGULANT (ELIQUIS) 5 MG tablet  bacitracin 500 UNIT/GM OINT  Benzocaine (SOLARCAINE ALOE VERA EX)  benzonatate (TESSALON) 200 MG capsule  Calamine external lotion  carbamide peroxide (DEBROX) 6.5 % otic solution  clotrimazole (LOTRIMIN) 1 % external cream  dextromethorphan-guaiFENesin (MUCINEX DM)  MG 12 hr tablet  diclofenac (VOLTAREN) 1 % topical gel  empagliflozin (JARDIANCE) 10 MG TABS tablet  EPINEPHrine (ANY BX GENERIC EQUIV) 0.3 MG/0.3ML injection 2-pack  famotidine (PEPCID) 20 MG tablet  FLUoxetine 20 MG tablet  furosemide (LASIX) 40 MG tablet  GAVILAX 17 GM/SCOOP powder  hydrocortisone (CORTAID) 1 % external cream  Hydrocortisone (PREPARATION H EX)  ibuprofen (ADVIL/MOTRIN) 200 MG tablet  ketorolac (TORADOL) 10 MG tablet  lisinopril (ZESTRIL) 10 MG tablet  loperamide (IMODIUM) 2 MG capsule  loratadine (CLARITIN) 10 MG tablet  magnesium hydroxide (MILK OF MAGNESIA) 400 MG/5ML suspension  metFORMIN (GLUCOPHAGE) 1000 MG tablet  montelukast (SINGULAIR) 10 MG tablet  OLANZapine (ZYPREXA) 10 MG tablet  omeprazole (PRILOSEC) 40 MG DR capsule  oxyCODONE (ROXICODONE) 5 MG tablet  pramoxine-calamine (AVEENO) 1-8 % LOTN  Pseudoephedrine-DM-GG (PSEUDOEPHEDRINE-DM-GUAIFENESIN OR)  rosuvastatin (CRESTOR) 10 MG tablet  sucralfate (CARAFATE) 1 GM/10ML suspension  traZODone (DESYREL) 100 MG tablet  TRELEGY ELLIPTA 100-62.5-25 MCG/ACT oral inhaler  Urea 40 % CREA  Vitamins A & D (VITAMIN A & D) OINT  witch hazel-glycerin (TUCKS) pad        ALLERGIES:  Allergies   Allergen Reactions    Apricot Flavor Anaphylaxis    Banana Anaphylaxis     Throat swelling  Throat swelling      Wasp Venom Protein Shortness Of Breath     Other reaction(s): Respiratory Distress  Has an epi pen  Has an epi pen      Bees Anaphylaxis     Have an Epi pen that carries with    Methylphenidate Itching     Other reaction(s): Nightmares    Prunus      Other  reaction(s): *Unknown    Sulfa Antibiotics      Headaches and nausea    Prunus Persica Rash     Other reaction(s): *Unknown       FAMILY HISTORY:  Family History   Problem Relation Age of Onset    Unknown/Adopted Father     Unknown/Adopted Maternal Grandmother     C.A.D. Maternal Grandfather     Diabetes Maternal Grandfather     Cerebrovascular Disease Maternal Grandfather     Unknown/Adopted Paternal Grandmother     Unknown/Adopted Paternal Grandfather     Unknown/Adopted Brother     Unknown/Adopted Sister        SOCIAL HISTORY:   Social History     Socioeconomic History    Marital status: Single   Tobacco Use    Smoking status: Every Day     Current packs/day: 1.00     Types: Cigarettes    Smokeless tobacco: Never   Vaping Use    Vaping status: Never Used   Substance and Sexual Activity    Alcohol use: No     Comment: once every 3 months    Drug use: No    Sexual activity: Never     Partners: Female   Other Topics Concern     Service No    Blood Transfusions No    Caffeine Concern No    Occupational Exposure No    Hobby Hazards No    Sleep Concern No    Stress Concern Yes     Comment: sometimes    Weight Concern No    Special Diet Yes     Comment: counting carbs    Back Care No    Exercise Yes    Seat Belt Yes    Self-Exams Yes     Social Drivers of Health     Financial Resource Strain: Low Risk  (12/2/2024)    Financial Resource Strain     Within the past 12 months, have you or your family members you live with been unable to get utilities (heat, electricity) when it was really needed?: No   Food Insecurity: Low Risk  (12/2/2024)    Food Insecurity     Within the past 12 months, did you worry that your food would run out before you got money to buy more?: No     Within the past 12 months, did the food you bought just not last and you didn t have money to get more?: No   Transportation Needs: Low Risk  (12/2/2024)    Transportation Needs     Within the past 12 months, has lack of transportation kept you  "from medical appointments, getting your medicines, non-medical meetings or appointments, work, or from getting things that you need?: No    Received from [facility]    Social Connections   Housing Stability: High Risk (12/2/2024)    Housing Stability     Do you have housing? : No     Are you worried about losing your housing?: No     Smokes daily.  No drugs or alcohol.    VITALS:  /62   Pulse 88   Temp 97.8  F (36.6  C) (Oral)   Resp 18   Ht 1.651 m (5' 5\")   Wt 122.9 kg (271 lb)   SpO2 95%   BMI 45.10 kg/m      PHYSICAL EXAM    Vital Signs:  /62   Pulse 88   Temp 97.8  F (36.6  C) (Oral)   Resp 18   Ht 1.651 m (5' 5\")   Wt 122.9 kg (271 lb)   SpO2 95%   BMI 45.10 kg/m    General:  On entering the room he is in no apparent distress.    Neck:  Neck supple with full range of motion and nontender.    Back:  Back and spine are nontender.  No costovertebral angle tenderness.    HEENT:  Oropharynx clear with moist mucous membranes.  HEENT unremarkable.    Pulmonary:  Chest clear to auscultation without rhonchi rales or wheezing.    Cardiovascular:  Cardiac regular rate and rhythm without murmurs rubs or gallops.    Abdomen:  Abdomen soft nontender everywhere, except some tenderness in the left lower quadrant area.  There is no rebound or guarding.  Abdomen firm and distended.  Probable ascites.  Muskuloskeletal:  He moves all 4 without any difficulty and has normal neurovascular exams.  Extremities without clubbing, cyanosis, or edema.  Legs and calves are nontender.    Neuro:  He is alert and oriented ×3 and moves all extremities symmetrically.    Psych:  Normal affect.    Skin:  Unremarkable and warm and dry.       LAB:  All pertinent labs reviewed and interpreted.  Labs Ordered and Resulted from Time of ED Arrival to Time of ED Departure   COMPREHENSIVE METABOLIC PANEL - Abnormal       Result Value    Sodium 141   "    Potassium 5.1      Carbon Dioxide (CO2) 27      Anion Gap 10      Urea Nitrogen 13.1      Creatinine 1.05      GFR Estimate 90      Calcium 9.2      Chloride 104      Glucose 101 (*)     Alkaline Phosphatase 312 (*)     AST 20      ALT 26      Protein Total 6.9      Albumin 3.9      Bilirubin Total 0.5     INR - Abnormal    INR 1.57 (*)    CBC WITH PLATELETS AND DIFFERENTIAL - Abnormal    WBC Count 14.0 (*)     RBC Count 5.23      Hemoglobin 13.7      Hematocrit 44.4      MCV 85      MCH 26.2 (*)     MCHC 30.9 (*)     RDW 18.3 (*)     Platelet Count 368      % Neutrophils 70      % Lymphocytes 17      % Monocytes 8      % Eosinophils 3      % Basophils 1      % Immature Granulocytes 1      NRBCs per 100 WBC 0      Absolute Neutrophils 9.8 (*)     Absolute Lymphocytes 2.4      Absolute Monocytes 1.2      Absolute Eosinophils 0.5      Absolute Basophils 0.1      Absolute Immature Granulocytes 0.1      Absolute NRBCs 0.0     LIPASE - Normal    Lipase 16     PARTIAL THROMBOPLASTIN TIME - Normal    aPTT 35     LYME DISEASE TOTAL ANTIBODIES WITH REFLEX TO CONFIRMATION       RADIOLOGY:  Reviewed all pertinent imaging. Please see official radiology report.  CTA Abdomen Pelvis with Contrast   Final Result   IMPRESSION:   1.  No evidence of active extravasation.   2.  Small/moderate ascites.   3.  Hepatomegaly with nodular liver contour.   4.  Bilateral adrenal myolipomas.                 EKG:        PROCEDURES:         I, Neelima Vallejo, am serving as a scribe to document services personally performed by Dr. Gregorio based on my observation and the provider's statements to me. I, Fortino Gregorio MD attest that Neelima Vallejo is acting in a scribe capacity, has observed my performance of the services and has documented them in accordance with my direction.    Fortino Gregorio M.D.  Emergency Medicine  [facility] EMERGENCY DEPARTMENT  [address]  [id number]  [phone]  Dept: [phone]       Fortino Gregorio MD  12/11/24 1958

## 2024-12-11 NOTE — ED TRIAGE NOTES
Patient presents here for evaluation of increased abdominal pain following a paracentesis that was done yesterday in this ED. 5 liters of fluid was removed. He also notes that his abdomen was much smaller afterward, but has gotten larger over the past 24 hours.      Triage Assessment (Adult)       Row Name 12/11/24 1307          Triage Assessment    Airway WDL WDL        Respiratory WDL    Respiratory WDL WDL        Skin Circulation/Temperature WDL    Skin Circulation/Temperature WDL WDL        Cardiac WDL    Cardiac WDL WDL        Peripheral/Neurovascular WDL    Peripheral Neurovascular WDL WDL        Cognitive/Neuro/Behavioral WDL    Cognitive/Neuro/Behavioral WDL WDL

## 2024-12-12 LAB — B BURGDOR IGG+IGM SER QL: 0.05

## 2024-12-12 NOTE — DISCHARGE INSTRUCTIONS
Follow-up with your family doctor clinic within the next week.  With your underlying liver disease and recurrent ascites, repeat paracenteses may be required in the future.  Your doctor and clinic can schedule these direct with radiology saving you an ER visit, ER wait, and ER expenses.  Ibuprofen can be taken every 6 hours.  1 Oxycodone every 6 hours instead if needed for stronger pain.  Do not drive with this.

## 2024-12-23 NOTE — PROGRESS NOTES
HEART CARE ENCOUNTER NOTE      [facility]  [phone]    Primary Care: Mary Kelly  Primary Cardiologist: Dr. Wilkerson    Assessment/Recommendations   Assessment:  Chronic heart failure with preserved ejection fraction, LVEF 50 to 55%  NYHA class II  cMRI 12/6/24 without evidence of infiltrative process or prior MI.  There was mildly reduced RV systolic dysfunction, likely related to cirrhosis   Home Diuretics: Lasix 40mg  Home medications: Jardiance 10mg, lisinopil 10mg, ?Aldactone 100 mg, Cr 1.05  On exam, appears euvolemic  Chest pain   Reports constant pain for the last 3 months.  Feels like a stabbing pain.  Coronary CTA without coronary disease  Recent cardiac MRI without evidence of pericarditis  Unlikely to be cardiac  SVT  ZIO showed 21 runs SVT, longest ~16 seconds  Hypertension  Well-controlled on current medications  Hyperlipidemia  LDL well-controlled at 47, on rosuvastatin 10 mg  Tobacco use  Still smokes 1 pack/day  Alcohol use disorder in remission   Has been sober since January 2023    Other pertinent medical hx reviewed:  Alcoholic cirrhosis   Type 2 diabetes  AVA  COPD  autism      Plan:  Continue follow-up with GI regarding cirrhosis and ascites.  Has scheduled thoracentesis weekly at this time.  Patient reports taking 100 mg of spironolactone currently.  Recommended he confirm this when he goes home today because it is not on his medication list.  Plan to start spironolactone if not already on it given cirrhosis and HFpEF  Encourage smoking cessation    Follow up with myself in 3 months.      History of Present Illness/Subjective     Warren Jaramillo is a 44 year old male with PMHx of incomplete left bundle branch block, first-degree AV block, morbid obesity, sleep apnea on CPAP, diabetes type 2, autism, TBI, tobacco use, alcohol use, HFpEF presenting for follow-up.      He was last seen by Hannah Hui on 7/26/2024.  At that time, he was still endorsing leg  swelling and dyspnea despite reducing his fluid intake.  He is also endorsing palpitations at that visit.  He was recommended to increase his Lasix to 40 mg daily, limit his sodium intake, reduce his fluid intake (was drinking 13 L of fluid).  He was also recommended to schedule his cardiac MRI that was previously ordered by Dr. Wilkerson.  For 3-day monitor for palpitations.  ZIO showed no concerning arrhythmias.    Unfortunately, Malik was hospitalized in December for dyspnea and underwent paracentesis with 5 L of fluid removed.  It was not felt there was a cardiac contribution and his ascites was due to alcoholic liver cirrhosis  Returned to ED on 12/10 with recurrent ascites and had 5.8L fluid removed.    He is now planned for weekly thoracentesis with plans for follow-up and reassessment.  He reports his weight has been stable and he does not feel like he is accumulating any further fluid, but does have some residual fluid in the abdomen.  Underwent cardiac MRI that showed no infiltrative disease and no inflammatory process.  RV function was decreased consistent with cirrhotic cardiomyopathy.    He also endorses a 3-month history of chest pain described as a stabbing sharp pain that is constantly there and nothing improves it.  He feels he gets worse with exertion.  Does not feel any worsening with positional changes, inspiration or coughing.    He denies fatigue, lightheadedness, shortness of breath, orthopnea, PND, palpitations, and lower extremity edema.     Cardiac Testing     ECG, 12-24: Sinus rhythm with first-degree AV block  ECG, 11-24: Sinus rhythm    Echo:    TTE, 8/2/2023  Interpretation Summary     The visual ejection fraction is 50-55%.  Regional wall motion abnormalities cannot be excluded due to limited  visualization.  Right ventricular function cannot be assessed due to poor image quality.  No obvious valve disease.  Technically difficult, suboptimal study.    Coronary CTA, 3/14/2023  Normal  "coronary anatomy with no evidence of stenosis or plaque.     Cardiac MRI, 12/6/2024:  1.  Normal left ventricular size and wall thickness.  D-septal pattern indicates right ventricular pressure  and fluid overload.  The calculated left ventricular ejection fraction is 68%.  2.  No previous myocardial infarction, scar or other infiltrative process.  3.  Borderline right ventricular enlargement with mildly reduced right ventricular systolic function.  The  calculated right ventricular ejection fraction is 42%.   4.  Mild left atrial enlargement.  Moderate right atrial enlargement.  Dilated IVC 2.5 cm indicating  elevated right atrial pressure.  5.  No obvious valvular disease.  6.  No pericardial effusion.  7.  Normal size of thoracic aorta.    Zio, 8/7/2024:  Zio monitoring from 7/26/2024 to 7/29/2024 (duration 2d 21h).  Predominant underlying rhythm was sinus rhythm, 49 to 116bpm, average 79bpm. . Intermittent Bundle Branch Block was present.   No sustained tachyarrhythmias.  No atrial fibrillation.  There were no pauses of greater than 3 seconds.  Rare supraventricular ectopic beats (<1%). 21 non sustained SVT runs occurred, the run with the fastest interval lasting 5 beats with a max rate of 174 bpm, the longest lasting 15.9 secs with an avg rate of 114 bpm.   Rare premature ventricular contractions (<1%).  Symptom triggers correlated with normal sinus rhythm.    Labs:  LDL 47  Potassium 5.1  Creatinine 1.05  Hemoglobin 13.7  Platelets 368  Hemoglobin A1c 6.8      Physical Examination    Vitals: /52 (BP Location: Left arm, Patient Position: Sitting, Cuff Size: Adult Large)   Pulse 76   Resp 18   Ht 1.651 m (5' 5\")   Wt 121.3 kg (267 lb 6.4 oz)   SpO2 98%   BMI 44.50 kg/m    BMI= Body mass index is 44.5 kg/m .    General: Well appearing, in no acute distress. Resting comfortably in exam chair  Skin: No clubbing, no cyanosis.  Eyes: Extra ocular movements intact  Neck: No jugular venous distention, no " carotid bruits, carotids have a normal upstroke  Lungs: Clear to auscultation bilaterally, no wheezing or rhonchi.  Heart: Regular rhythm, PMI not displaced, S1, S2 normal, no S3, no S4, no heaves, no rub and no murmur.  Abdomen: Distended, soft, nontender, bowel sounds normal  Extremities: No peripheral edema . Grade 2/4 distal pulses bilaterally.  Neuro: Oriented to person, place and time, alert, cooperative, gait coordinated.    BP Readings from Last 3 Encounters:   12/11/24 123/62   12/10/24 120/56   12/03/24 120/67       Pulse Readings from Last 3 Encounters:   12/11/24 76   12/10/24 78   12/03/24 81       Wt Readings from Last 3 Encounters:   12/24/24 121.3 kg (267 lb 6.4 oz)   12/11/24 122.9 kg (271 lb)   12/02/24 131.9 kg (290 lb 11.2 oz)         Review of Systems      Please refer above for cardiac ROS details.       History     MEDICAL HISTORY:  Past Medical History:   Diagnosis Date    COPD exacerbation (H) 12/2/2024    DM2 (diabetes mellitus, type 2) (H) 4/28/2020    HTN (hypertension) 7/30/2012    Thyroid nodule 7/31/2019    Rojas's disease (H)      SURGICAL HISTORY  Past Surgical History:   Procedure Laterality Date    COLONOSCOPY      ESOPHAGOSCOPY, GASTROSCOPY, DUODENOSCOPY (EGD), COMBINED N/A 7/21/2023    Procedure: ESOPHAGOGASTRODUODENOSCOPY WITH GASTRIC AND ESOPHAGEAL BIOPSIES;  Surgeon: Filiberto Aragon MD;  Location: [facility] OR    TOOTH EXTRACTION        FAMILY HISTORY:  Family History   Problem Relation Age of Onset    Unknown/Adopted Father     Unknown/Adopted Maternal Grandmother     C.A.D. Maternal Grandfather     Diabetes Maternal Grandfather     Cerebrovascular Disease Maternal Grandfather     Unknown/Adopted Paternal Grandmother     Unknown/Adopted Paternal Grandfather     Unknown/Adopted Brother     Unknown/Adopted Sister     FAMILY HISTORY:  Family History   Problem Relation Age of Onset    Unknown/Adopted Father     Unknown/Adopted Maternal Grandmother     C.A.D. Maternal  Grandfather     Diabetes Maternal Grandfather     Cerebrovascular Disease Maternal Grandfather     Unknown/Adopted Paternal Grandmother     Unknown/Adopted Paternal Grandfather     Unknown/Adopted Brother     Unknown/Adopted Sister       SOCIAL HISTORY:  Social History     Socioeconomic History    Marital status: Single     Spouse name: Not on file    Number of children: Not on file    Years of education: Not on file    Highest education level: Not on file   Occupational History    Not on file   Tobacco Use    Smoking status: Every Day     Current packs/day: 1.00     Types: Cigarettes    Smokeless tobacco: Never   Vaping Use    Vaping status: Never Used   Substance and Sexual Activity    Alcohol use: No     Comment: once every 3 months    Drug use: No    Sexual activity: Never     Partners: Female   Other Topics Concern     Service No    Blood Transfusions No    Caffeine Concern No    Occupational Exposure No    Hobby Hazards No    Sleep Concern No    Stress Concern Yes     Comment: sometimes    Weight Concern No    Special Diet Yes     Comment: counting carbs    Back Care No    Exercise Yes    Bike Helmet Not Asked     Comment: N/A    Seat Belt Yes    Self-Exams Yes    Parent/sibling w/ CABG, MI or angioplasty before 65F 55M? Not Asked   Social History Narrative    Not on file     Social Drivers of Health     Financial Resource Strain: Low Risk  (12/2/2024)    Financial Resource Strain     Within the past 12 months, have you or your family members you live with been unable to get utilities (heat, electricity) when it was really needed?: No   Food Insecurity: Low Risk  (12/2/2024)    Food Insecurity     Within the past 12 months, did you worry that your food would run out before you got money to buy more?: No     Within the past 12 months, did the food you bought just not last and you didn t have money to get more?: No   Transportation Needs: Low Risk  (12/2/2024)    Transportation Needs     Within the past  12 months, has lack of transportation kept you from medical appointments, getting your medicines, non-medical meetings or appointments, work, or from getting things that you need?: No   Physical Activity: Not on file   Stress: Not on file   Social Connections: Unknown (5/1/2023)    Received from [facility], [facility] [place]    Social Connections     Frequency of Communication with Friends and Family: Not on file   Interpersonal Safety: Not on file   Housing Stability: High Risk (12/2/2024)    Housing Stability     Do you have housing? : No     Are you worried about losing your housing?: No    ALLERGIES:  Allergies   Allergen Reactions    Apricot Flavoring Agent (Non-Screening) Anaphylaxis    Banana Anaphylaxis     Throat swelling  Throat swelling      Wasp Venom Protein Shortness Of Breath     Other reaction(s): Respiratory Distress  Has an epi pen  Has an epi pen      Bees Anaphylaxis     Have an Epi pen that carries with    Methylphenidate Itching     Other reaction(s): Nightmares    Prunus      Other reaction(s): *Unknown    Sulfa Antibiotics      Headaches and nausea    Prunus Persica Rash     Other reaction(s): *Unknown            Medications:     Current Outpatient Medications   Medication Sig Dispense Refill    acetaminophen (TYLENOL) 500 MG tablet Take 1,000 mg by mouth every 6 hours as needed for mild pain.      albuterol (PROAIR HFA/PROVENTIL HFA/VENTOLIN HFA) 108 (90 Base) MCG/ACT inhaler Inhale 1-2 puffs into the lungs every 6 hours as needed for shortness of breath, wheezing or cough 18 g 0    albuterol (PROVENTIL) (2.5 MG/3ML) 0.083% neb solution Take 2.5 mg by nebulization 3 times daily as needed for shortness of breath, wheezing or cough      aloe vera GEL Apply 1 g topically every hour as needed for skin care Per bottle directions      apixaban ANTICOAGULANT (ELIQUIS) 5 MG tablet Take 2 tablets (10 mg) by mouth 2 times daily for 7 days,  THEN 1 tablet (5 mg) 2 times daily. 88 tablet 0    bacitracin 500 UNIT/GM OINT Apply topically 3 times daily as needed for wound care      Benzocaine (SOLARCAINE ALOE VERA EX) Externally apply topically daily as needed.      benzonatate (TESSALON) 200 MG capsule Take 1 capsule (200 mg) by mouth 3 times daily as needed for cough. 50 capsule 0    Calamine external lotion Apply topically as needed for itching      carbamide peroxide (DEBROX) 6.5 % otic solution Place 5 drops into both ears daily as needed for other.      clotrimazole (LOTRIMIN) 1 % external cream Apply topically 2 times daily as needed (skin irritation)      dextromethorphan-guaiFENesin (MUCINEX DM)  MG 12 hr tablet Take 1 tablet by mouth 2 times daily as needed.      diclofenac (VOLTAREN) 1 % topical gel Apply 2 g topically daily as needed for moderate pain To joints/back      empagliflozin (JARDIANCE) 10 MG TABS tablet Take 10 mg by mouth every morning.      EPINEPHrine (ANY BX GENERIC EQUIV) 0.3 MG/0.3ML injection 2-pack Inject 0.3 mg into the muscle as needed for anaphylaxis. May repeat one time in 5-15 minutes if response to initial dose is inadequate.      famotidine (PEPCID) 20 MG tablet Take 1 tablet (20 mg) by mouth 2 times daily 60 tablet 0    FLUoxetine 20 MG tablet Take 20 mg by mouth every morning.      furosemide (LASIX) 40 MG tablet Take 40 mg by mouth every morning.      GAVILAX 17 GM/SCOOP powder Take 17 g by mouth daily as needed.      hydrocortisone (CORTAID) 1 % external cream Apply topically daily as needed.      Hydrocortisone (PREPARATION H EX) Externally apply topically daily as needed.      ibuprofen (ADVIL/MOTRIN) 200 MG tablet Take 400 mg by mouth every 4 hours as needed for pain.      ketorolac (TORADOL) 10 MG tablet Take 1 tablet (10 mg) by mouth every 6 hours as needed for pain. 20 tablet 0    lisinopril (ZESTRIL) 10 MG tablet Take 10 mg by mouth every morning.      loperamide (IMODIUM) 2 MG capsule Take 4 mg by  mouth 4 times daily as needed for diarrhea.      loratadine (CLARITIN) 10 MG tablet Take 10 mg by mouth daily as needed for allergies.      magnesium hydroxide (MILK OF MAGNESIA) 400 MG/5ML suspension Take 30 mLs by mouth daily as needed.      metFORMIN (GLUCOPHAGE) 1000 MG tablet Take 1,000 mg by mouth 2 times daily (with meals)      montelukast (SINGULAIR) 10 MG tablet Take 10 mg by mouth every morning.      OLANZapine (ZYPREXA) 10 MG tablet Take 10 mg by mouth At Bedtime      omeprazole (PRILOSEC) 40 MG DR capsule Take 40 mg by mouth every morning.      pramoxine-calamine (AVEENO) 1-8 % LOTN Apply topically daily as needed for itching.      Pseudoephedrine-DM-GG (PSEUDOEPHEDRINE-DM-GUAIFENESIN OR) Take 10 mLs by mouth every 6 hours as needed.      rosuvastatin (CRESTOR) 10 MG tablet Take 10 mg by mouth At Bedtime      sucralfate (CARAFATE) 1 GM/10ML suspension Take 1 g by mouth 4 times daily as needed.      traZODone (DESYREL) 100 MG tablet Take 100 mg by mouth at bedtime.      TRELEGY ELLIPTA 100-62.5-25 MCG/ACT oral inhaler Inhale 1 puff into the lungs daily.      Urea 40 % CREA Apply topically daily as needed.      Vitamins A & D (VITAMIN A & D) OINT Externally apply topically daily as needed.      witch hazel-glycerin (TUCKS) pad Apply topically as needed for hemorrhoids.             Fortino High PA-C  December 24 , 2024    This note was partially generated using Dragon voice recognition system, and there may be some incorrect words,  spellings, and punctuation that were not noted in checking the note before saving.

## 2024-12-24 ENCOUNTER — OFFICE VISIT (OUTPATIENT)
Dept: CARDIOLOGY | Facility: CLINIC | Age: 44
End: 2024-12-24
Payer: COMMERCIAL

## 2024-12-24 VITALS
HEART RATE: 76 BPM | SYSTOLIC BLOOD PRESSURE: 100 MMHG | RESPIRATION RATE: 18 BRPM | BODY MASS INDEX: 44.55 KG/M2 | OXYGEN SATURATION: 98 % | HEIGHT: 65 IN | WEIGHT: 267.4 LBS | DIASTOLIC BLOOD PRESSURE: 52 MMHG

## 2024-12-24 DIAGNOSIS — I47.10 SVT (SUPRAVENTRICULAR TACHYCARDIA) (H): ICD-10-CM

## 2024-12-24 DIAGNOSIS — I10 PRIMARY HYPERTENSION: ICD-10-CM

## 2024-12-24 DIAGNOSIS — K70.30 ALCOHOLIC CIRRHOSIS, UNSPECIFIED WHETHER ASCITES PRESENT (H): ICD-10-CM

## 2024-12-24 DIAGNOSIS — Z72.0 TOBACCO ABUSE: ICD-10-CM

## 2024-12-24 DIAGNOSIS — F10.11 H/O ETOH ABUSE: ICD-10-CM

## 2024-12-24 DIAGNOSIS — R07.9 CHEST PAIN, UNSPECIFIED TYPE: ICD-10-CM

## 2024-12-24 DIAGNOSIS — E78.2 MIXED HYPERLIPIDEMIA: ICD-10-CM

## 2024-12-24 DIAGNOSIS — I50.813 ACUTE ON CHRONIC RIGHT-SIDED CONGESTIVE HEART FAILURE (H): ICD-10-CM

## 2024-12-24 DIAGNOSIS — I50.32 CHRONIC HEART FAILURE WITH PRESERVED EJECTION FRACTION (HFPEF) (H): Primary | ICD-10-CM

## 2024-12-24 NOTE — LETTER
12/24/2024    Mary Kelly PA-C  [facility] [address] [address]    RE: Abelfabiola Castillos       Dear Colleague,     I had the pleasure of seeing Warren Jaramillo in the [facility].  HEART CARE ENCOUNTER NOTE      [facility]  [phone]    Primary Care: Mary Kelly  Primary Cardiologist: Dr. Wilkerson    Assessment/Recommendations   Assessment:  Chronic heart failure with preserved ejection fraction, LVEF 50 to 55%  NYHA class II  cMRI 12/6/24 without evidence of infiltrative process or prior MI.  There was mildly reduced RV systolic dysfunction, likely related to cirrhosis   Home Diuretics: Lasix 40mg  Home medications: Jardiance 10mg, lisinopil 10mg, ?Aldactone 100 mg, Cr 1.05  On exam, appears euvolemic  Chest pain   Reports constant pain for the last 3 months.  Feels like a stabbing pain.  Coronary CTA without coronary disease  Recent cardiac MRI without evidence of pericarditis  Unlikely to be cardiac  SVT  ZIO showed 21 runs SVT, longest ~16 seconds  Hypertension  Well-controlled on current medications  Hyperlipidemia  LDL well-controlled at 47, on rosuvastatin 10 mg  Tobacco use  Still smokes 1 pack/day  Alcohol use disorder in remission   Has been sober since January 2023    Other pertinent medical hx reviewed:  Alcoholic cirrhosis   Type 2 diabetes  AVA  COPD  autism      Plan:  Continue follow-up with GI regarding cirrhosis and ascites.  Has scheduled thoracentesis weekly at this time.  Patient reports taking 100 mg of spironolactone currently.  Recommended he confirm this when he goes home today because it is not on his medication list.  Plan to start spironolactone if not already on it given cirrhosis and HFpEF  Encourage smoking cessation    Follow up with myself in 3 months.      History of Present Illness/Subjective     Warren Jaramillo is a 44 year old male with PMHx of incomplete left bundle branch block,  first-degree AV block, morbid obesity, sleep apnea on CPAP, diabetes type 2, autism, TBI, tobacco use, alcohol use, HFpEF presenting for follow-up.      He was last seen by Hannah Hui on 7/26/2024.  At that time, he was still endorsing leg swelling and dyspnea despite reducing his fluid intake.  He is also endorsing palpitations at that visit.  He was recommended to increase his Lasix to 40 mg daily, limit his sodium intake, reduce his fluid intake (was drinking 13 L of fluid).  He was also recommended to schedule his cardiac MRI that was previously ordered by Dr. Wilkerson.  For 3-day monitor for palpitations.  ZIO showed no concerning arrhythmias.    Unfortunately, Malik was hospitalized in December for dyspnea and underwent paracentesis with 5 L of fluid removed.  It was not felt there was a cardiac contribution and his ascites was due to alcoholic liver cirrhosis  Returned to ED on 12/10 with recurrent ascites and had 5.8L fluid removed.    He is now planned for weekly thoracentesis with plans for follow-up and reassessment.  He reports his weight has been stable and he does not feel like he is accumulating any further fluid, but does have some residual fluid in the abdomen.  Underwent cardiac MRI that showed no infiltrative disease and no inflammatory process.  RV function was decreased consistent with cirrhotic cardiomyopathy.    He also endorses a 3-month history of chest pain described as a stabbing sharp pain that is constantly there and nothing improves it.  He feels he gets worse with exertion.  Does not feel any worsening with positional changes, inspiration or coughing.    He denies fatigue, lightheadedness, shortness of breath, orthopnea, PND, palpitations, and lower extremity edema.     Cardiac Testing     ECG, 12-24: Sinus rhythm with first-degree AV block  ECG, 11-24: Sinus rhythm    Echo:    TTE, 8/2/2023  Interpretation Summary     The visual ejection fraction is 50-55%.  Regional wall motion  "abnormalities cannot be excluded due to limited  visualization.  Right ventricular function cannot be assessed due to poor image quality.  No obvious valve disease.  Technically difficult, suboptimal study.    Coronary CTA, 3/14/2023  Normal coronary anatomy with no evidence of stenosis or plaque.     Cardiac MRI, 12/6/2024:  1.  Normal left ventricular size and wall thickness.  D-septal pattern indicates right ventricular pressure  and fluid overload.  The calculated left ventricular ejection fraction is 68%.  2.  No previous myocardial infarction, scar or other infiltrative process.  3.  Borderline right ventricular enlargement with mildly reduced right ventricular systolic function.  The  calculated right ventricular ejection fraction is 42%.   4.  Mild left atrial enlargement.  Moderate right atrial enlargement.  Dilated IVC 2.5 cm indicating  elevated right atrial pressure.  5.  No obvious valvular disease.  6.  No pericardial effusion.  7.  Normal size of thoracic aorta.    Zio, 8/7/2024:  Zio monitoring from 7/26/2024 to 7/29/2024 (duration 2d 21h).  Predominant underlying rhythm was sinus rhythm, 49 to 116bpm, average 79bpm. . Intermittent Bundle Branch Block was present.   No sustained tachyarrhythmias.  No atrial fibrillation.  There were no pauses of greater than 3 seconds.  Rare supraventricular ectopic beats (<1%). 21 non sustained SVT runs occurred, the run with the fastest interval lasting 5 beats with a max rate of 174 bpm, the longest lasting 15.9 secs with an avg rate of 114 bpm.   Rare premature ventricular contractions (<1%).  Symptom triggers correlated with normal sinus rhythm.    Labs:  LDL 47  Potassium 5.1  Creatinine 1.05  Hemoglobin 13.7  Platelets 368  Hemoglobin A1c 6.8      Physical Examination    Vitals: /52 (BP Location: Left arm, Patient Position: Sitting, Cuff Size: Adult Large)   Pulse 76   Resp 18   Ht 1.651 m (5' 5\")   Wt 121.3 kg (267 lb 6.4 oz)   SpO2 98%   BMI " 44.50 kg/m    BMI= Body mass index is 44.5 kg/m .    General: Well appearing, in no acute distress. Resting comfortably in exam chair  Skin: No clubbing, no cyanosis.  Eyes: Extra ocular movements intact  Neck: No jugular venous distention, no carotid bruits, carotids have a normal upstroke  Lungs: Clear to auscultation bilaterally, no wheezing or rhonchi.  Heart: Regular rhythm, PMI not displaced, S1, S2 normal, no S3, no S4, no heaves, no rub and no murmur.  Abdomen: Distended, soft, nontender, bowel sounds normal  Extremities: No peripheral edema . Grade 2/4 distal pulses bilaterally.  Neuro: Oriented to person, place and time, alert, cooperative, gait coordinated.    BP Readings from Last 3 Encounters:   12/11/24 123/62   12/10/24 120/56   12/03/24 120/67       Pulse Readings from Last 3 Encounters:   12/11/24 76   12/10/24 78   12/03/24 81       Wt Readings from Last 3 Encounters:   12/24/24 121.3 kg (267 lb 6.4 oz)   12/11/24 122.9 kg (271 lb)   12/02/24 131.9 kg (290 lb 11.2 oz)         Review of Systems      Please refer above for cardiac ROS details.       History     MEDICAL HISTORY:  Past Medical History:   Diagnosis Date     COPD exacerbation (H) 12/2/2024     DM2 (diabetes mellitus, type 2) (H) 4/28/2020     HTN (hypertension) 7/30/2012     Thyroid nodule 7/31/2019     Rojas's disease (H)      SURGICAL HISTORY  Past Surgical History:   Procedure Laterality Date     COLONOSCOPY       ESOPHAGOSCOPY, GASTROSCOPY, DUODENOSCOPY (EGD), COMBINED N/A 7/21/2023    Procedure: ESOPHAGOGASTRODUODENOSCOPY WITH GASTRIC AND ESOPHAGEAL BIOPSIES;  Surgeon: Filiberto Aragon MD;  Location: [facility] [place] OR     TOOTH EXTRACTION        FAMILY HISTORY:  Family History   Problem Relation Age of Onset     Unknown/Adopted Father      Unknown/Adopted Maternal Grandmother      C.A.D. Maternal Grandfather      Diabetes Maternal Grandfather      Cerebrovascular Disease Maternal Grandfather      Unknown/Adopted Paternal  Grandmother      Unknown/Adopted Paternal Grandfather      Unknown/Adopted Brother      Unknown/Adopted Sister     FAMILY HISTORY:  Family History   Problem Relation Age of Onset     Unknown/Adopted Father      Unknown/Adopted Maternal Grandmother      C.A.D. Maternal Grandfather      Diabetes Maternal Grandfather      Cerebrovascular Disease Maternal Grandfather      Unknown/Adopted Paternal Grandmother      Unknown/Adopted Paternal Grandfather      Unknown/Adopted Brother      Unknown/Adopted Sister       SOCIAL HISTORY:  Social History     Socioeconomic History     Marital status: Single     Spouse name: Not on file     Number of children: Not on file     Years of education: Not on file     Highest education level: Not on file   Occupational History     Not on file   Tobacco Use     Smoking status: Every Day     Current packs/day: 1.00     Types: Cigarettes     Smokeless tobacco: Never   Vaping Use     Vaping status: Never Used   Substance and Sexual Activity     Alcohol use: No     Comment: once every 3 months     Drug use: No     Sexual activity: Never     Partners: Female   Other Topics Concern      Service No     Blood Transfusions No     Caffeine Concern No     Occupational Exposure No     Hobby Hazards No     Sleep Concern No     Stress Concern Yes     Comment: sometimes     Weight Concern No     Special Diet Yes     Comment: counting carbs     Back Care No     Exercise Yes     Bike Helmet Not Asked     Comment: N/A     Seat Belt Yes     Self-Exams Yes     Parent/sibling w/ CABG, MI or angioplasty before 65F 55M? Not Asked   Social History Narrative     Not on file     Social Drivers of Health     Financial Resource Strain: Low Risk  (12/2/2024)    Financial Resource Strain      Within the past 12 months, have you or your family members you live with been unable to get utilities (heat, electricity) when it was really needed?: No   Food Insecurity: Low Risk  (12/2/2024)    Food Insecurity      Within  the past 12 months, did you worry that your food would run out before you got money to buy more?: No      Within the past 12 months, did the food you bought just not last and you didn t have money to get more?: No   Transportation Needs: Low Risk  (12/2/2024)    Transportation Needs      Within the past 12 months, has lack of transportation kept you from medical appointments, getting your medicines, non-medical meetings or appointments, work, or from getting things that you need?: No   Physical Activity: Not on file   Stress: Not on file   Social Connections: Unknown (5/1/2023)    Received from [facility], [facility]    Social Connections      Frequency of Communication with Friends and Family: Not on file   Interpersonal Safety: Not on file   Housing Stability: High Risk (12/2/2024)    Housing Stability      Do you have housing? : No      Are you worried about losing your housing?: No    ALLERGIES:  Allergies   Allergen Reactions     Apricot Flavoring Agent (Non-Screening) Anaphylaxis     Banana Anaphylaxis     Throat swelling  Throat swelling       Wasp Venom Protein Shortness Of Breath     Other reaction(s): Respiratory Distress  Has an epi pen  Has an epi pen       Bees Anaphylaxis     Have an Epi pen that carries with     Methylphenidate Itching     Other reaction(s): Nightmares     Prunus      Other reaction(s): *Unknown     Sulfa Antibiotics      Headaches and nausea     Prunus Persica Rash     Other reaction(s): *Unknown            Medications:     Current Outpatient Medications   Medication Sig Dispense Refill     acetaminophen (TYLENOL) 500 MG tablet Take 1,000 mg by mouth every 6 hours as needed for mild pain.       albuterol (PROAIR HFA/PROVENTIL HFA/VENTOLIN HFA) 108 (90 Base) MCG/ACT inhaler Inhale 1-2 puffs into the lungs every 6 hours as needed for shortness of breath, wheezing or cough 18 g 0     albuterol (PROVENTIL) (2.5 MG/3ML)  0.083% neb solution Take 2.5 mg by nebulization 3 times daily as needed for shortness of breath, wheezing or cough       aloe vera GEL Apply 1 g topically every hour as needed for skin care Per bottle directions       apixaban ANTICOAGULANT (ELIQUIS) 5 MG tablet Take 2 tablets (10 mg) by mouth 2 times daily for 7 days, THEN 1 tablet (5 mg) 2 times daily. 88 tablet 0     bacitracin 500 UNIT/GM OINT Apply topically 3 times daily as needed for wound care       Benzocaine (SOLARCAINE ALOE VERA EX) Externally apply topically daily as needed.       benzonatate (TESSALON) 200 MG capsule Take 1 capsule (200 mg) by mouth 3 times daily as needed for cough. 50 capsule 0     Calamine external lotion Apply topically as needed for itching       carbamide peroxide (DEBROX) 6.5 % otic solution Place 5 drops into both ears daily as needed for other.       clotrimazole (LOTRIMIN) 1 % external cream Apply topically 2 times daily as needed (skin irritation)       dextromethorphan-guaiFENesin (MUCINEX DM)  MG 12 hr tablet Take 1 tablet by mouth 2 times daily as needed.       diclofenac (VOLTAREN) 1 % topical gel Apply 2 g topically daily as needed for moderate pain To joints/back       empagliflozin (JARDIANCE) 10 MG TABS tablet Take 10 mg by mouth every morning.       EPINEPHrine (ANY BX GENERIC EQUIV) 0.3 MG/0.3ML injection 2-pack Inject 0.3 mg into the muscle as needed for anaphylaxis. May repeat one time in 5-15 minutes if response to initial dose is inadequate.       famotidine (PEPCID) 20 MG tablet Take 1 tablet (20 mg) by mouth 2 times daily 60 tablet 0     FLUoxetine 20 MG tablet Take 20 mg by mouth every morning.       furosemide (LASIX) 40 MG tablet Take 40 mg by mouth every morning.       GAVILAX 17 GM/SCOOP powder Take 17 g by mouth daily as needed.       hydrocortisone (CORTAID) 1 % external cream Apply topically daily as needed.       Hydrocortisone (PREPARATION H EX) Externally apply topically daily as needed.        ibuprofen (ADVIL/MOTRIN) 200 MG tablet Take 400 mg by mouth every 4 hours as needed for pain.       ketorolac (TORADOL) 10 MG tablet Take 1 tablet (10 mg) by mouth every 6 hours as needed for pain. 20 tablet 0     lisinopril (ZESTRIL) 10 MG tablet Take 10 mg by mouth every morning.       loperamide (IMODIUM) 2 MG capsule Take 4 mg by mouth 4 times daily as needed for diarrhea.       loratadine (CLARITIN) 10 MG tablet Take 10 mg by mouth daily as needed for allergies.       magnesium hydroxide (MILK OF MAGNESIA) 400 MG/5ML suspension Take 30 mLs by mouth daily as needed.       metFORMIN (GLUCOPHAGE) 1000 MG tablet Take 1,000 mg by mouth 2 times daily (with meals)       montelukast (SINGULAIR) 10 MG tablet Take 10 mg by mouth every morning.       OLANZapine (ZYPREXA) 10 MG tablet Take 10 mg by mouth At Bedtime       omeprazole (PRILOSEC) 40 MG DR capsule Take 40 mg by mouth every morning.       pramoxine-calamine (AVEENO) 1-8 % LOTN Apply topically daily as needed for itching.       Pseudoephedrine-DM-GG (PSEUDOEPHEDRINE-DM-GUAIFENESIN OR) Take 10 mLs by mouth every 6 hours as needed.       rosuvastatin (CRESTOR) 10 MG tablet Take 10 mg by mouth At Bedtime       sucralfate (CARAFATE) 1 GM/10ML suspension Take 1 g by mouth 4 times daily as needed.       traZODone (DESYREL) 100 MG tablet Take 100 mg by mouth at bedtime.       TRELEGY ELLIPTA 100-62.5-25 MCG/ACT oral inhaler Inhale 1 puff into the lungs daily.       Urea 40 % CREA Apply topically daily as needed.       Vitamins A & D (VITAMIN A & D) OINT Externally apply topically daily as needed.       witch hazel-glycerin (TUCKS) pad Apply topically as needed for hemorrhoids.             Fortino High PA-C  December 24 , 2024    This note was partially generated using Dragon voice recognition system, and there may be some incorrect words,  spellings, and punctuation that were not noted in checking the note before saving.      Thank you for allowing me to participate  in the care of your patient.      Sincerely,     Fortino High PA-C     [facility]  cc:   Fortino High PA-C  [address] [facility] [facility], [address] [address]

## 2024-12-24 NOTE — PATIENT INSTRUCTIONS
It was great to see you today! Your care team includes myself and Dr. Wilkerson.    Recommendations:  No changes to your medications were made today. Please ensure you are taking the spironolactone.    Monitor for signs or symptoms of fluid retention such as increased leg or abdominal swelling, worsening shortness of breath, difficulty breathing or rapid weight gain (>3 pounds in 1 day or >5 pounds in 1 week)   Notify the office if you are having worsening shortness of breath or chest pain.      Follow up in 3 months.      If you have questions, concerns, or new concerning symptoms prior to your next appointment, please contact the Cardiology Nursing team at [phone].    For scheduling issues/changes, please call [phone].

## 2024-12-29 ENCOUNTER — APPOINTMENT (OUTPATIENT)
Dept: CT IMAGING | Facility: HOSPITAL | Age: 44
End: 2024-12-29
Attending: EMERGENCY MEDICINE
Payer: COMMERCIAL

## 2024-12-29 ENCOUNTER — HOSPITAL ENCOUNTER (EMERGENCY)
Facility: HOSPITAL | Age: 44
Discharge: HOME | End: 2024-12-29
Attending: EMERGENCY MEDICINE
Payer: COMMERCIAL

## 2024-12-29 ENCOUNTER — ANCILLARY PROCEDURE (OUTPATIENT)
Dept: ULTRASOUND IMAGING | Facility: HOSPITAL | Age: 44
End: 2024-12-29
Attending: EMERGENCY MEDICINE
Payer: COMMERCIAL

## 2024-12-29 VITALS
HEIGHT: 65 IN | OXYGEN SATURATION: 97 % | WEIGHT: 270 LBS | TEMPERATURE: 97.7 F | SYSTOLIC BLOOD PRESSURE: 112 MMHG | DIASTOLIC BLOOD PRESSURE: 51 MMHG | RESPIRATION RATE: 16 BRPM | HEART RATE: 77 BPM | BODY MASS INDEX: 44.98 KG/M2

## 2024-12-29 DIAGNOSIS — R18.8 CIRRHOSIS OF LIVER WITH ASCITES, UNSPECIFIED HEPATIC CIRRHOSIS TYPE (H): ICD-10-CM

## 2024-12-29 DIAGNOSIS — K74.60 CIRRHOSIS OF LIVER WITH ASCITES, UNSPECIFIED HEPATIC CIRRHOSIS TYPE (H): ICD-10-CM

## 2024-12-29 DIAGNOSIS — R10.32 LEFT LOWER QUADRANT ABDOMINAL PAIN: ICD-10-CM

## 2024-12-29 LAB
ALBUMIN SERPL BCG-MCNC: 4.3 G/DL (ref 3.5–5.2)
ALP SERPL-CCNC: 280 U/L (ref 40–150)
ALT SERPL W P-5'-P-CCNC: 19 U/L (ref 0–70)
ANION GAP SERPL CALCULATED.3IONS-SCNC: 17 MMOL/L (ref 7–15)
AST SERPL W P-5'-P-CCNC: 27 U/L (ref 0–45)
BILIRUB SERPL-MCNC: 0.4 MG/DL
BUN SERPL-MCNC: 34.8 MG/DL (ref 6–20)
CALCIUM SERPL-MCNC: 9.7 MG/DL (ref 8.8–10.4)
CHLORIDE SERPL-SCNC: 103 MMOL/L (ref 98–107)
CREAT SERPL-MCNC: 1.23 MG/DL (ref 0.67–1.17)
EGFRCR SERPLBLD CKD-EPI 2021: 74 ML/MIN/1.73M2
ERYTHROCYTE [DISTWIDTH] IN BLOOD BY AUTOMATED COUNT: 17.5 % (ref 10–15)
GLUCOSE BLDC GLUCOMTR-MCNC: 94 MG/DL (ref 70–99)
GLUCOSE SERPL-MCNC: 84 MG/DL (ref 70–99)
HCO3 SERPL-SCNC: 18 MMOL/L (ref 22–29)
HCT VFR BLD AUTO: 41.4 % (ref 40–53)
HGB BLD-MCNC: 13.3 G/DL (ref 13.3–17.7)
LIPASE SERPL-CCNC: 26 U/L (ref 13–60)
MAGNESIUM SERPL-MCNC: 1.8 MG/DL (ref 1.7–2.3)
MCH RBC QN AUTO: 27 PG (ref 26.5–33)
MCHC RBC AUTO-ENTMCNC: 32.1 G/DL (ref 31.5–36.5)
MCV RBC AUTO: 84 FL (ref 78–100)
PLATELET # BLD AUTO: 319 10E3/UL (ref 150–450)
POTASSIUM SERPL-SCNC: 5.3 MMOL/L (ref 3.4–5.3)
PROCALCITONIN SERPL IA-MCNC: 0.11 NG/ML
PROT SERPL-MCNC: 7.5 G/DL (ref 6.4–8.3)
RBC # BLD AUTO: 4.92 10E6/UL (ref 4.4–5.9)
SODIUM SERPL-SCNC: 138 MMOL/L (ref 135–145)
WBC # BLD AUTO: 14.8 10E3/UL (ref 4–11)

## 2024-12-29 PROCEDURE — 83690 ASSAY OF LIPASE: CPT | Performed by: EMERGENCY MEDICINE

## 2024-12-29 PROCEDURE — 258N000003 HC RX IP 258 OP 636: Performed by: EMERGENCY MEDICINE

## 2024-12-29 PROCEDURE — 76705 ECHO EXAM OF ABDOMEN: CPT

## 2024-12-29 PROCEDURE — 96360 HYDRATION IV INFUSION INIT: CPT | Mod: 59

## 2024-12-29 PROCEDURE — 250N000011 HC RX IP 250 OP 636: Performed by: EMERGENCY MEDICINE

## 2024-12-29 PROCEDURE — 83735 ASSAY OF MAGNESIUM: CPT | Performed by: EMERGENCY MEDICINE

## 2024-12-29 PROCEDURE — 99285 EMERGENCY DEPT VISIT HI MDM: CPT | Mod: 25

## 2024-12-29 PROCEDURE — 85014 HEMATOCRIT: CPT | Performed by: EMERGENCY MEDICINE

## 2024-12-29 PROCEDURE — 36415 COLL VENOUS BLD VENIPUNCTURE: CPT | Performed by: EMERGENCY MEDICINE

## 2024-12-29 PROCEDURE — 82962 GLUCOSE BLOOD TEST: CPT

## 2024-12-29 PROCEDURE — 84145 PROCALCITONIN (PCT): CPT | Performed by: EMERGENCY MEDICINE

## 2024-12-29 PROCEDURE — 74177 CT ABD & PELVIS W/CONTRAST: CPT

## 2024-12-29 PROCEDURE — 82947 ASSAY GLUCOSE BLOOD QUANT: CPT | Performed by: EMERGENCY MEDICINE

## 2024-12-29 PROCEDURE — 82435 ASSAY OF BLOOD CHLORIDE: CPT | Performed by: EMERGENCY MEDICINE

## 2024-12-29 RX ORDER — IOPAMIDOL 755 MG/ML
100 INJECTION, SOLUTION INTRAVASCULAR ONCE
Status: COMPLETED | OUTPATIENT
Start: 2024-12-29 | End: 2024-12-29

## 2024-12-29 RX ORDER — DEXTROSE MONOHYDRATE 25 G/50ML
50 INJECTION, SOLUTION INTRAVENOUS ONCE
Status: COMPLETED | OUTPATIENT
Start: 2024-12-29 | End: 2024-12-29

## 2024-12-29 RX ADMIN — SODIUM CHLORIDE 1000 ML: 9 INJECTION, SOLUTION INTRAVENOUS at 22:48

## 2024-12-29 RX ADMIN — IOPAMIDOL 100 ML: 755 INJECTION, SOLUTION INTRAVENOUS at 22:07

## 2024-12-29 ASSESSMENT — ACTIVITIES OF DAILY LIVING (ADL)
ADLS_ACUITY_SCORE: 52
ADLS_ACUITY_SCORE: 52

## 2024-12-30 NOTE — ED PROVIDER NOTES
EMERGENCY DEPARTMENT ENCOUNTER      NAME: Warren Jaramillo  AGE: 44 year old male  YOB: 1980  MRN: [id number]  EVALUATION DATE & TIME: No admission date for patient encounter.    PCP: Mary Kelly    ED PROVIDER: Javad Hdez M.D.      Chief Complaint   Patient presents with    Abdominal Pain         FINAL IMPRESSION:  Abdominal pain  Ascites    ED COURSE & MEDICAL DECISION MAKING:    Pertinent Labs & Imaging studies reviewed. (See chart for details)  44 year old male presents to the Emergency Department for evaluation of abdominal pain.  Symptoms started few hours prior to arrival.  Patient reported this indicates for paracentesis.  Patient has recurrent paracentesis due to hepatic issues and recurrent ascites.  Most recent paracentesis was in early December per review of records.  ED overcrowding laboratory evaluation exam performed in the triage area.  Laboratory evaluation remarkable for mild leukocytosis white cell count of 14.8.  Patient appears non toxic with stable vitals signs. Overall exam is benign.Bicarb slightly reduced at 18 with BUN of 34.8 and creatinine 1.23.  This is new compared to most recent study of 2 weeks ago.  Alkaline phosphatase also elevated at 280 which is chronic.  Will proceed with intravenous fluid bolus given mild acidosis and acute kidney injury.  CT exam of the abdomen will also be obtained given his tenderness      8:40 PM I met with the patient for the initial interview and physical examination. Discussed plan for treatment and workup in the ED. Performed POCUS.   8:45 PM.  Given alterations in patient's laboratory evaluation fluid bolus initiated and CT imaging  10:19 PM.  CT imaging reviewed.  Moderate ascites noted.  No obvious obstructive process nor inflammatory process.  Awaiting definitive report  10:53 PM.  CT not yet read.  Patient comfortably eating a sandwich and drinking orange juice.  11:15 PM.  CT confirms findings.  Evidence of cirrhotic  liver.  No acute findings.  Will continue outpatient management  At the conclusion of the encounter I discussed the results of all of the tests and the disposition. The questions were answered and return precautions provided. The patient or family acknowledged understanding and was agreeable with the care plan.       PPE: Provider wore N95 mask  MEDICATIONS GIVEN IN THE EMERGENCY:  Medications   sodium chloride 0.9% BOLUS 1,000 mL (has no administration in time range)   dextrose 50 % injection 50 mL (50 mLs Intravenous Not Given 12/29/24 2233)   iopamidol (ISOVUE-370) solution 100 mL (100 mLs Intravenous $Given 12/29/24 2207)       NEW PRESCRIPTIONS STARTED AT TODAY'S ER VISIT  New Prescriptions    No medications on file          =================================================================    HPI    Patient information was obtained from: Patient    Use of Intrepreter: N/A         Warren Jaraimllo is a 44 year old male with a pertient medical history of Rojas's disease, steatohepatitis, cirrhosis of liver with ascites, chronic constipation, T2DM, CHF, HFpEF, cardiomegaly, HTN, hyperkalemia, hyperglycemia, DVT, vitamin D deficiency, morbid obesity, autistic disorder, adjustment disorder, chronic insomnia, who presents to the ED for evaluation of abdominal pain.  Patient reports an achiness in the left lower quadrant.  Symptoms started couple hours prior to arrival    Per Chart Review, patient had an US Paracentesis with Albumin performed at [facility] on 12/10/24.   Indication: Ascites.   Procedure: Informed consent obtained. Time out performed. The abdomen was prepped and draped in a sterile fashion. 10 mL of 1% lidocaine was infused into local soft tissues. A 5 Ukrainian catheter system was introduced into the abdominal ascites under ultrasound guidance. 5.2 liters of clear fluid were removed and sent to lab if requested. Patient tolerated procedure well. Ultrasound imaging was  obtained and placed in the patient's permanent medical record.  Impression was as follows: Status post ultrasound-guided paracentesis.        REVIEW OF SYSTEMS   Constitutional:  Denies fever, chills  Respiratory:  Denies productive cough or increased work of breathing  Cardiovascular:  Denies chest pain, palpitations  GI:  Denies abdominal pain, nausea, vomiting, or change in bowel or bladder habits   Musculoskeletal:  Denies any new muscle/joint swelling  Skin:  Denies rash   Neurologic:  Denies focal weakness  All systems negative except as marked.     PAST MEDICAL HISTORY:  Past Medical History:   Diagnosis Date    COPD exacerbation (H) 12/2/2024    DM2 (diabetes mellitus, type 2) (H) 4/28/2020    HTN (hypertension) 7/30/2012    Thyroid nodule 7/31/2019    Rojas's disease (H)        PAST SURGICAL HISTORY:  Past Surgical History:   Procedure Laterality Date    COLONOSCOPY      ESOPHAGOSCOPY, GASTROSCOPY, DUODENOSCOPY (EGD), COMBINED N/A 7/21/2023    Procedure: ESOPHAGOGASTRODUODENOSCOPY WITH GASTRIC AND ESOPHAGEAL BIOPSIES;  Surgeon: Filiberto Aragon MD;  Location: [facility] OR    TOOTH EXTRACTION           CURRENT MEDICATIONS:      Current Facility-Administered Medications:     sodium chloride 0.9% BOLUS 1,000 mL, 1,000 mL, Intravenous, Once, Javad Hdez MD    Current Outpatient Medications:     albuterol (PROAIR HFA/PROVENTIL HFA/VENTOLIN HFA) 108 (90 Base) MCG/ACT inhaler, Inhale 1-2 puffs into the lungs every 6 hours as needed for shortness of breath, wheezing or cough, Disp: 18 g, Rfl: 0    albuterol (PROVENTIL) (2.5 MG/3ML) 0.083% neb solution, Take 2.5 mg by nebulization 3 times daily as needed for shortness of breath, wheezing or cough, Disp: , Rfl:     aloe vera GEL, Apply 1 g topically every hour as needed for skin care Per bottle directions, Disp: , Rfl:     apixaban ANTICOAGULANT (ELIQUIS) 5 MG tablet, Take 2 tablets (10 mg) by mouth 2 times daily for 7 days, THEN 1 tablet (5 mg) 2 times  daily., Disp: 88 tablet, Rfl: 0    bacitracin 500 UNIT/GM OINT, Apply topically 3 times daily as needed for wound care, Disp: , Rfl:     Benzocaine (SOLARCAINE ALOE VERA EX), Externally apply topically daily as needed. (Patient not taking: Reported on 12/24/2024), Disp: , Rfl:     benzonatate (TESSALON) 200 MG capsule, Take 1 capsule (200 mg) by mouth 3 times daily as needed for cough., Disp: 50 capsule, Rfl: 0    Calamine external lotion, Apply topically as needed for itching, Disp: , Rfl:     carbamide peroxide (DEBROX) 6.5 % otic solution, Place 5 drops into both ears daily as needed for other., Disp: , Rfl:     clotrimazole (LOTRIMIN) 1 % external cream, Apply topically 2 times daily as needed (skin irritation), Disp: , Rfl:     dextromethorphan-guaiFENesin (MUCINEX DM)  MG 12 hr tablet, Take 1 tablet by mouth 2 times daily as needed., Disp: , Rfl:     diclofenac (VOLTAREN) 1 % topical gel, Apply 2 g topically daily as needed for moderate pain To joints/back, Disp: , Rfl:     empagliflozin (JARDIANCE) 10 MG TABS tablet, Take 10 mg by mouth every morning., Disp: , Rfl:     EPINEPHrine (ANY BX GENERIC EQUIV) 0.3 MG/0.3ML injection 2-pack, Inject 0.3 mg into the muscle as needed for anaphylaxis. May repeat one time in 5-15 minutes if response to initial dose is inadequate., Disp: , Rfl:     famotidine (PEPCID) 20 MG tablet, Take 1 tablet (20 mg) by mouth 2 times daily, Disp: 60 tablet, Rfl: 0    FLUoxetine 20 MG tablet, Take 20 mg by mouth every morning., Disp: , Rfl:     furosemide (LASIX) 40 MG tablet, Take 40 mg by mouth every morning., Disp: , Rfl:     GAVILAX 17 GM/SCOOP powder, Take 17 g by mouth daily as needed., Disp: , Rfl:     hydrocortisone (CORTAID) 1 % external cream, Apply topically daily as needed., Disp: , Rfl:     Hydrocortisone (PREPARATION H EX), Externally apply topically daily as needed., Disp: , Rfl:     ibuprofen (ADVIL/MOTRIN) 200 MG tablet, Take 400 mg by mouth every 4 hours as needed  for pain., Disp: , Rfl:     ketorolac (TORADOL) 10 MG tablet, Take 1 tablet (10 mg) by mouth every 6 hours as needed for pain., Disp: 20 tablet, Rfl: 0    lisinopril (ZESTRIL) 10 MG tablet, Take 10 mg by mouth every morning., Disp: , Rfl:     loperamide (IMODIUM) 2 MG capsule, Take 4 mg by mouth 4 times daily as needed for diarrhea., Disp: , Rfl:     loratadine (CLARITIN) 10 MG tablet, Take 10 mg by mouth daily as needed for allergies., Disp: , Rfl:     magnesium hydroxide (MILK OF MAGNESIA) 400 MG/5ML suspension, Take 30 mLs by mouth daily as needed., Disp: , Rfl:     metFORMIN (GLUCOPHAGE) 1000 MG tablet, Take 1,000 mg by mouth 2 times daily (with meals), Disp: , Rfl:     montelukast (SINGULAIR) 10 MG tablet, Take 10 mg by mouth every morning., Disp: , Rfl:     OLANZapine (ZYPREXA) 10 MG tablet, Take 10 mg by mouth At Bedtime, Disp: , Rfl:     omeprazole (PRILOSEC) 40 MG DR capsule, Take 40 mg by mouth every morning., Disp: , Rfl:     pramoxine-calamine (AVEENO) 1-8 % LOTN, Apply topically daily as needed for itching., Disp: , Rfl:     Pseudoephedrine-DM-GG (PSEUDOEPHEDRINE-DM-GUAIFENESIN OR), Take 10 mLs by mouth every 6 hours as needed., Disp: , Rfl:     rosuvastatin (CRESTOR) 10 MG tablet, Take 10 mg by mouth At Bedtime, Disp: , Rfl:     sucralfate (CARAFATE) 1 GM/10ML suspension, Take 1 g by mouth 4 times daily as needed., Disp: , Rfl:     traZODone (DESYREL) 100 MG tablet, Take 100 mg by mouth at bedtime., Disp: , Rfl:     TRELEGY ELLIPTA 100-62.5-25 MCG/ACT oral inhaler, Inhale 1 puff into the lungs daily., Disp: , Rfl:     Urea 40 % CREA, Apply topically daily as needed., Disp: , Rfl:     Vitamins A & D (VITAMIN A & D) OINT, Externally apply topically daily as needed., Disp: , Rfl:     witch hazel-glycerin (TUCKS) pad, Apply topically as needed for hemorrhoids. (Patient not taking: Reported on 12/24/2024), Disp: , Rfl:     ALLERGIES:  Allergies   Allergen Reactions    Apricot Flavoring Agent (Non-Screening)  Anaphylaxis    Banana Anaphylaxis     Throat swelling  Throat swelling      Wasp Venom Protein Shortness Of Breath     Other reaction(s): Respiratory Distress  Has an epi pen  Has an epi pen      Bees Anaphylaxis     Have an Epi pen that carries with    Methylphenidate Itching     Other reaction(s): Nightmares    Prunus      Other reaction(s): *Unknown    Sulfa Antibiotics      Headaches and nausea    Prunus Persica Rash     Other reaction(s): *Unknown       FAMILY HISTORY:  Family History   Problem Relation Age of Onset    Unknown/Adopted Father     Unknown/Adopted Maternal Grandmother     C.A.D. Maternal Grandfather     Diabetes Maternal Grandfather     Cerebrovascular Disease Maternal Grandfather     Unknown/Adopted Paternal Grandmother     Unknown/Adopted Paternal Grandfather     Unknown/Adopted Brother     Unknown/Adopted Sister        SOCIAL HISTORY:   Social History     Socioeconomic History    Marital status: Single   Tobacco Use    Smoking status: Every Day     Current packs/day: 1.00     Types: Cigarettes    Smokeless tobacco: Never   Vaping Use    Vaping status: Never Used   Substance and Sexual Activity    Alcohol use: No     Comment: once every 3 months    Drug use: No    Sexual activity: Never     Partners: Female   Other Topics Concern     Service No    Blood Transfusions No    Caffeine Concern No    Occupational Exposure No    Hobby Hazards No    Sleep Concern No    Stress Concern Yes     Comment: sometimes    Weight Concern No    Special Diet Yes     Comment: counting carbs    Back Care No    Exercise Yes    Seat Belt Yes    Self-Exams Yes     Social Drivers of Health     Financial Resource Strain: Low Risk  (12/2/2024)    Financial Resource Strain     Within the past 12 months, have you or your family members you live with been unable to get utilities (heat, electricity) when it was really needed?: No   Food Insecurity: Low Risk  (12/2/2024)    Food Insecurity     Within the past 12 months,  "did you worry that your food would run out before you got money to buy more?: No     Within the past 12 months, did the food you bought just not last and you didn t have money to get more?: No   Transportation Needs: Low Risk  (12/2/2024)    Transportation Needs     Within the past 12 months, has lack of transportation kept you from medical appointments, getting your medicines, non-medical meetings or appointments, work, or from getting things that you need?: No    Received from [facility], [facility] & [facility]    Social Connections   Housing Stability: High Risk (12/2/2024)    Housing Stability     Do you have housing? : No     Are you worried about losing your housing?: No       VITALS:  Patient Vitals for the past 24 hrs:   BP Temp Temp src Pulse Resp SpO2 Height Weight   12/29/24 1800 126/80 97.6  F (36.4  C) Oral 74 16 95 % 1.651 m (5' 5\") 122.5 kg (270 lb)        PHYSICAL EXAM    Constitutional:  Awake, alert, in no apparent distress  HENT:  Normocephalic, Atraumatic. Bilateral external ears normal. Oropharynx moist. Nose normal. Neck- Normal range of motion with no guarding, No midline cervical tenderness, Supple, No stridor.   Eyes:  PERRL, EOMI with no signs of entrapment, Conjunctiva normal, No discharge.   Respiratory:  Normal breath sounds, No respiratory distress, No wheezing.    Cardiovascular:  Normal heart rate, Normal rhythm, No appreciable rubs or gallops.   GI:  Soft, No tenderness, No distension, No palpable masses  Musculoskeletal:  Intact distal pulses, No edema. Good range of motion in all major joints. No tenderness to palpation or major deformities noted.  Integument:  Warm, Dry, No erythema, No rash.   Neurologic:  Alert & oriented, Normal motor function, Normal sensory function, No focal deficits noted.   Psychiatric:  Affect normal, Judgment normal, Mood normal.     LAB:  All pertinent labs reviewed and interpreted.  Results for " orders placed or performed during the hospital encounter of 12/29/24   Comprehensive metabolic panel   Result Value Ref Range    Sodium 138 135 - 145 mmol/L    Potassium 5.3 3.4 - 5.3 mmol/L    Carbon Dioxide (CO2) 18 (L) 22 - 29 mmol/L    Anion Gap 17 (H) 7 - 15 mmol/L    Urea Nitrogen 34.8 (H) 6.0 - 20.0 mg/dL    Creatinine 1.23 (H) 0.67 - 1.17 mg/dL    GFR Estimate 74 >60 mL/min/1.73m2    Calcium 9.7 8.8 - 10.4 mg/dL    Chloride 103 98 - 107 mmol/L    Glucose 84 70 - 99 mg/dL    Alkaline Phosphatase 280 (H) 40 - 150 U/L    AST 27 0 - 45 U/L    ALT 19 0 - 70 U/L    Protein Total 7.5 6.4 - 8.3 g/dL    Albumin 4.3 3.5 - 5.2 g/dL    Bilirubin Total 0.4 <=1.2 mg/dL   Result Value Ref Range    Procalcitonin 0.11 <0.50 ng/mL   Result Value Ref Range    Magnesium 1.8 1.7 - 2.3 mg/dL   Result Value Ref Range    Lipase 26 13 - 60 U/L   CBC (+ platelets, no diff)   Result Value Ref Range    WBC Count 14.8 (H) 4.0 - 11.0 10e3/uL    RBC Count 4.92 4.40 - 5.90 10e6/uL    Hemoglobin 13.3 13.3 - 17.7 g/dL    Hematocrit 41.4 40.0 - 53.0 %    MCV 84 78 - 100 fL    MCH 27.0 26.5 - 33.0 pg    MCHC 32.1 31.5 - 36.5 g/dL    RDW 17.5 (H) 10.0 - 15.0 %    Platelet Count 319 150 - 450 10e3/uL   Glucose by meter   Result Value Ref Range    GLUCOSE BY METER POCT 94 70 - 99 mg/dL       RADIOLOGY:  Reviewed all pertinent imaging. Please see official radiology report.  CT Abdomen Pelvis w Contrast    Result Date: 12/29/2024  EXAM: CT ABDOMEN PELVIS W CONTRAST LOCATION: [facility] DATE: 12/29/2024 INDICATION: Abdominal pain left lower quadrant tender COMPARISON: CTA abdomen and pelvis 12/11/2024 TECHNIQUE: CT scan of the abdomen and pelvis was performed following injection of IV contrast. Multiplanar reformats were obtained. Dose reduction techniques were used. CONTRAST: 100ml isovue 370 FINDINGS: LOWER CHEST: Normal. HEPATOBILIARY: Hepatomegaly measuring 22 cm. Diffusely heterogeneous parenchyma and irregular contour  consistent with cirrhosis. Moderate amount of ascites adjacent to the liver is unchanged. PANCREAS: Normal. SPLEEN: Moderate ascites left upper quadrant, unchanged ADRENAL GLANDS: 4.4 x 2.6 cm fat-containing lesion right adrenal gland and 3.8 x 2.2 cm fat-containing lesion left adrenal gland compatible with myelolipomas, unchanged. KIDNEYS/BLADDER: Normal. BOWEL: Scattered colonic diverticula, without evidence for diverticulitis or bowel obstruction. Normal appendix. LYMPH NODES: Numerous slightly enlarged lymph nodes in the retroperitoneum are unchanged. VASCULATURE: Normal. PELVIC ORGANS: Small to moderate amount of pelvic ascites, unchanged. MUSCULOSKELETAL: Hypertrophic changes thoracic spine.     IMPRESSION: 1.  Hepatomegaly with nodular contour compatible with cirrhosis. 2.  Moderate volume of ascites abdomen and pelvis, minimally changed. 3.  Numerous slightly enlarged retroperitoneal lymph nodes are unchanged. 4.  Bilateral myelolipoma, unchanged.      PROCEDURES:   PROCEDURE: Emergency Department Limited Bedside Screening Ultrasound   ANATOMICAL WINDOW: Left lower quadrant   INDICATIONS: Abdominal pain with history of ascites   PROCEDURE PROVIDER:   Dr. Javad Hdez    FINDINGS: Moderate ascites   IMAGES SAVED AND STORED FOR ARCHIVE AND REVIEW: Yes         I, Zia Cadet, am serving as a scribe to document services personally performed by Javad Hdez MD, based on my observation and the provider's statements to me. I, Javad Hdez MD attest that Zia Cadet is acting in a scribe capacity, has observed my performance of the services and has documented them in accordance with my direction.    Javad Hdez M.D.  Emergency Medicine  [facility] EMERGENCY DEPARTMENT       Javad Hdez MD  12/29/24 3616

## 2024-12-30 NOTE — ED TRIAGE NOTES
The patient c/o LLQ ab pain, hx paracentesis every seven days, pt states today he is having a lot of pain.      Triage Assessment (Adult)       Row Name 12/29/24 6438          Triage Assessment    Airway WDL WDL        Respiratory WDL    Respiratory WDL WDL        Skin Circulation/Temperature WDL    Skin Circulation/Temperature WDL WDL        Cardiac WDL    Cardiac WDL WDL        Peripheral/Neurovascular WDL    Peripheral Neurovascular WDL WDL        Cognitive/Neuro/Behavioral WDL    Cognitive/Neuro/Behavioral WDL WDL        Jose Coma Scale    Best Eye Response 4-->(E4) spontaneous     Best Motor Response 6-->(M6) obeys commands     Best Verbal Response 5-->(V5) oriented     [place] Coma Scale Score 15

## 2025-01-02 ENCOUNTER — APPOINTMENT (OUTPATIENT)
Dept: ULTRASOUND IMAGING | Facility: HOSPITAL | Age: 45
End: 2025-01-02
Attending: EMERGENCY MEDICINE
Payer: COMMERCIAL

## 2025-01-02 ENCOUNTER — HOSPITAL ENCOUNTER (INPATIENT)
Facility: HOSPITAL | Age: 45
End: 2025-01-02
Attending: EMERGENCY MEDICINE
Payer: COMMERCIAL

## 2025-01-02 ENCOUNTER — APPOINTMENT (OUTPATIENT)
Dept: CT IMAGING | Facility: HOSPITAL | Age: 45
End: 2025-01-02
Attending: EMERGENCY MEDICINE
Payer: COMMERCIAL

## 2025-01-02 VITALS
WEIGHT: 277 LBS | SYSTOLIC BLOOD PRESSURE: 108 MMHG | HEART RATE: 84 BPM | OXYGEN SATURATION: 96 % | TEMPERATURE: 97.6 F | RESPIRATION RATE: 18 BRPM | BODY MASS INDEX: 46.1 KG/M2 | DIASTOLIC BLOOD PRESSURE: 53 MMHG

## 2025-01-02 DIAGNOSIS — K65.2 SBP (SPONTANEOUS BACTERIAL PERITONITIS) (H): ICD-10-CM

## 2025-01-02 DIAGNOSIS — K76.7 HEPATORENAL SYNDROME (H): Primary | ICD-10-CM

## 2025-01-02 DIAGNOSIS — R14.0 ABDOMINAL BLOATING: ICD-10-CM

## 2025-01-02 DIAGNOSIS — R10.32 LLQ ABDOMINAL PAIN: ICD-10-CM

## 2025-01-02 LAB
% LINING CELLS, BODY FLUID: 6 %
ABSOLUTE NEUTROPHILS, BODY FLUID: 464.6 /UL
ALBUMIN BODY FLUID SOURCE: NORMAL
ALBUMIN FLD-MCNC: 3.2 G/DL
ALBUMIN SERPL BCG-MCNC: 4.4 G/DL (ref 3.5–5.2)
ALBUMIN UR-MCNC: NEGATIVE MG/DL
ALP SERPL-CCNC: 303 U/L (ref 40–150)
ALT SERPL W P-5'-P-CCNC: 19 U/L (ref 0–70)
ANION GAP SERPL CALCULATED.3IONS-SCNC: 13 MMOL/L (ref 7–15)
APPEARANCE FLD: ABNORMAL
APPEARANCE UR: CLEAR
AST SERPL W P-5'-P-CCNC: 18 U/L (ref 0–45)
BACTERIA #/AREA URNS HPF: ABNORMAL /HPF
BASOPHILS # BLD AUTO: 0.1 10E3/UL (ref 0–0.2)
BASOPHILS NFR BLD AUTO: 1 %
BILIRUB DIRECT SERPL-MCNC: 0.25 MG/DL (ref 0–0.3)
BILIRUB SERPL-MCNC: 0.5 MG/DL
BILIRUB UR QL STRIP: NEGATIVE
BUN SERPL-MCNC: 38 MG/DL (ref 6–20)
CALCIUM SERPL-MCNC: 10.5 MG/DL (ref 8.8–10.4)
CELL COUNT BODY FLUID SOURCE: ABNORMAL
CHLORIDE SERPL-SCNC: 107 MMOL/L (ref 98–107)
COLOR FLD: ABNORMAL
COLOR UR AUTO: ABNORMAL
CREAT SERPL-MCNC: 1.2 MG/DL (ref 0.67–1.17)
CRP SERPL-MCNC: 17.7 MG/L
EGFRCR SERPLBLD CKD-EPI 2021: 76 ML/MIN/1.73M2
EOSINOPHIL # BLD AUTO: 0.6 10E3/UL (ref 0–0.7)
EOSINOPHIL NFR BLD AUTO: 4 %
ERYTHROCYTE [DISTWIDTH] IN BLOOD BY AUTOMATED COUNT: 17.8 % (ref 10–15)
EST. AVERAGE GLUCOSE BLD GHB EST-MCNC: 131 MG/DL
GLUCOSE BLDC GLUCOMTR-MCNC: 131 MG/DL (ref 70–99)
GLUCOSE SERPL-MCNC: 85 MG/DL (ref 70–99)
GLUCOSE UR STRIP-MCNC: >1000 MG/DL
GRAM STAIN RESULT: NORMAL
GRAM STAIN RESULT: NORMAL
HBA1C MFR BLD: 6.2 %
HCO3 SERPL-SCNC: 19 MMOL/L (ref 22–29)
HCT VFR BLD AUTO: 40.8 % (ref 40–53)
HGB BLD-MCNC: 12.9 G/DL (ref 13.3–17.7)
HGB UR QL STRIP: NEGATIVE
HOLD SPECIMEN: NORMAL
HOLD SPECIMEN: NORMAL
IMM GRANULOCYTES # BLD: 0.1 10E3/UL
IMM GRANULOCYTES NFR BLD: 0 %
INR PPP: 1.54 (ref 0.85–1.15)
KETONES UR STRIP-MCNC: NEGATIVE MG/DL
LEUKOCYTE ESTERASE UR QL STRIP: NEGATIVE
LIPASE SERPL-CCNC: 19 U/L (ref 13–60)
LYMPHOCYTES # BLD AUTO: 2.4 10E3/UL (ref 0.8–5.3)
LYMPHOCYTES NFR BLD AUTO: 19 %
LYMPHOCYTES NFR FLD MANUAL: 53 %
MAGNESIUM SERPL-MCNC: 1.9 MG/DL (ref 1.7–2.3)
MCH RBC QN AUTO: 26.9 PG (ref 26.5–33)
MCHC RBC AUTO-ENTMCNC: 31.6 G/DL (ref 31.5–36.5)
MCV RBC AUTO: 85 FL (ref 78–100)
MONOCYTES # BLD AUTO: 1.2 10E3/UL (ref 0–1.3)
MONOCYTES NFR BLD AUTO: 9 %
MONOS+MACROS NFR FLD MANUAL: 17 %
NEUTROPHILS # BLD AUTO: 8.5 10E3/UL (ref 1.6–8.3)
NEUTROPHILS NFR BLD AUTO: 67 %
NEUTS BAND NFR FLD MANUAL: 24 %
NITRATE UR QL: NEGATIVE
NRBC # BLD AUTO: 0 10E3/UL
NRBC BLD AUTO-RTO: 0 /100
PH UR STRIP: 5 [PH] (ref 5–7)
PHOSPHATE SERPL-MCNC: 4.7 MG/DL (ref 2.5–4.5)
PLATELET # BLD AUTO: 332 10E3/UL (ref 150–450)
POTASSIUM SERPL-SCNC: 5.1 MMOL/L (ref 3.4–5.3)
PROT FLD-MCNC: 4.9 G/DL
PROT SERPL-MCNC: 7.6 G/DL (ref 6.4–8.3)
PROTEIN BODY FLUID SOURCE: NORMAL
RBC # BLD AUTO: 4.79 10E6/UL (ref 4.4–5.9)
RBC URINE: <1 /HPF
SODIUM SERPL-SCNC: 139 MMOL/L (ref 135–145)
SP GR UR STRIP: 1.02 (ref 1–1.03)
SQUAMOUS EPITHELIAL: <1 /HPF
UROBILINOGEN UR STRIP-MCNC: <2 MG/DL
WBC # BLD AUTO: 12.8 10E3/UL (ref 4–11)
WBC # FLD AUTO: 1936 /UL
WBC URINE: 1 /HPF

## 2025-01-02 PROCEDURE — 82042 OTHER SOURCE ALBUMIN QUAN EA: CPT | Performed by: EMERGENCY MEDICINE

## 2025-01-02 PROCEDURE — P9047 ALBUMIN (HUMAN), 25%, 50ML: HCPCS | Mod: JZ

## 2025-01-02 PROCEDURE — 89051 BODY FLUID CELL COUNT: CPT | Performed by: EMERGENCY MEDICINE

## 2025-01-02 PROCEDURE — 82390 ASSAY OF CERULOPLASMIN: CPT

## 2025-01-02 PROCEDURE — 250N000011 HC RX IP 250 OP 636

## 2025-01-02 PROCEDURE — 0W9G3ZZ DRAINAGE OF PERITONEAL CAVITY, PERCUTANEOUS APPROACH: ICD-10-PCS | Performed by: RADIOLOGY

## 2025-01-02 PROCEDURE — 83735 ASSAY OF MAGNESIUM: CPT | Performed by: FAMILY MEDICINE

## 2025-01-02 PROCEDURE — 120N000001 HC R&B MED SURG/OB

## 2025-01-02 PROCEDURE — 49083 ABD PARACENTESIS W/IMAGING: CPT

## 2025-01-02 PROCEDURE — 83690 ASSAY OF LIPASE: CPT | Performed by: EMERGENCY MEDICINE

## 2025-01-02 PROCEDURE — 272N000710 US PARACENTESIS WITH ALBUMIN

## 2025-01-02 PROCEDURE — 82248 BILIRUBIN DIRECT: CPT | Performed by: EMERGENCY MEDICINE

## 2025-01-02 PROCEDURE — 36415 COLL VENOUS BLD VENIPUNCTURE: CPT

## 2025-01-02 PROCEDURE — 83036 HEMOGLOBIN GLYCOSYLATED A1C: CPT

## 2025-01-02 PROCEDURE — 85025 COMPLETE CBC W/AUTO DIFF WBC: CPT | Performed by: EMERGENCY MEDICINE

## 2025-01-02 PROCEDURE — 84157 ASSAY OF PROTEIN OTHER: CPT | Performed by: EMERGENCY MEDICINE

## 2025-01-02 PROCEDURE — 99285 EMERGENCY DEPT VISIT HI MDM: CPT | Mod: 25

## 2025-01-02 PROCEDURE — 36415 COLL VENOUS BLD VENIPUNCTURE: CPT | Performed by: EMERGENCY MEDICINE

## 2025-01-02 PROCEDURE — 84100 ASSAY OF PHOSPHORUS: CPT | Performed by: FAMILY MEDICINE

## 2025-01-02 PROCEDURE — 250N000011 HC RX IP 250 OP 636: Performed by: EMERGENCY MEDICINE

## 2025-01-02 PROCEDURE — 250N000013 HC RX MED GY IP 250 OP 250 PS 637

## 2025-01-02 PROCEDURE — 87070 CULTURE OTHR SPECIMN AEROBIC: CPT | Performed by: EMERGENCY MEDICINE

## 2025-01-02 PROCEDURE — 272N000042 US PARACENTESIS WITH ALBUMIN

## 2025-01-02 PROCEDURE — 81001 URINALYSIS AUTO W/SCOPE: CPT

## 2025-01-02 PROCEDURE — 82962 GLUCOSE BLOOD TEST: CPT

## 2025-01-02 PROCEDURE — 36415 COLL VENOUS BLD VENIPUNCTURE: CPT | Performed by: FAMILY MEDICINE

## 2025-01-02 PROCEDURE — 85610 PROTHROMBIN TIME: CPT

## 2025-01-02 PROCEDURE — 80053 COMPREHEN METABOLIC PANEL: CPT | Performed by: EMERGENCY MEDICINE

## 2025-01-02 PROCEDURE — 87205 SMEAR GRAM STAIN: CPT | Performed by: EMERGENCY MEDICINE

## 2025-01-02 PROCEDURE — 74177 CT ABD & PELVIS W/CONTRAST: CPT

## 2025-01-02 PROCEDURE — 89050 BODY FLUID CELL COUNT: CPT | Performed by: EMERGENCY MEDICINE

## 2025-01-02 PROCEDURE — 86140 C-REACTIVE PROTEIN: CPT | Performed by: EMERGENCY MEDICINE

## 2025-01-02 RX ORDER — ROSUVASTATIN CALCIUM 10 MG/1
10 TABLET, COATED ORAL AT BEDTIME
Status: DISCONTINUED | OUTPATIENT
Start: 2025-01-02 | End: 2025-01-08 | Stop reason: HOSPADM

## 2025-01-02 RX ORDER — LORATADINE 10 MG/1
10 TABLET ORAL DAILY PRN
Status: DISCONTINUED | OUTPATIENT
Start: 2025-01-02 | End: 2025-01-08 | Stop reason: HOSPADM

## 2025-01-02 RX ORDER — PROCHLORPERAZINE MALEATE 10 MG
10 TABLET ORAL EVERY 6 HOURS PRN
Status: DISCONTINUED | OUTPATIENT
Start: 2025-01-02 | End: 2025-01-08 | Stop reason: HOSPADM

## 2025-01-02 RX ORDER — SPIRONOLACTONE 100 MG/1
100 TABLET, FILM COATED ORAL DAILY
Status: DISCONTINUED | OUTPATIENT
Start: 2025-01-02 | End: 2025-01-08 | Stop reason: HOSPADM

## 2025-01-02 RX ORDER — ACETAMINOPHEN 650 MG/1
650 SUPPOSITORY RECTAL EVERY 4 HOURS PRN
Status: DISCONTINUED | OUTPATIENT
Start: 2025-01-02 | End: 2025-01-08 | Stop reason: HOSPADM

## 2025-01-02 RX ORDER — AMOXICILLIN 250 MG
1 CAPSULE ORAL 2 TIMES DAILY PRN
Status: DISCONTINUED | OUTPATIENT
Start: 2025-01-02 | End: 2025-01-08 | Stop reason: HOSPADM

## 2025-01-02 RX ORDER — CEFTRIAXONE 2 G/1
2 INJECTION, POWDER, FOR SOLUTION INTRAMUSCULAR; INTRAVENOUS EVERY 24 HOURS
Status: DISCONTINUED | OUTPATIENT
Start: 2025-01-03 | End: 2025-01-06

## 2025-01-02 RX ORDER — ONDANSETRON 2 MG/ML
4 INJECTION INTRAMUSCULAR; INTRAVENOUS EVERY 6 HOURS PRN
Status: DISCONTINUED | OUTPATIENT
Start: 2025-01-02 | End: 2025-01-08 | Stop reason: HOSPADM

## 2025-01-02 RX ORDER — LISINOPRIL 5 MG/1
10 TABLET ORAL EVERY MORNING
Status: DISCONTINUED | OUTPATIENT
Start: 2025-01-03 | End: 2025-01-08 | Stop reason: HOSPADM

## 2025-01-02 RX ORDER — DEXTROSE MONOHYDRATE 25 G/50ML
25-50 INJECTION, SOLUTION INTRAVENOUS
Status: DISCONTINUED | OUTPATIENT
Start: 2025-01-02 | End: 2025-01-08 | Stop reason: HOSPADM

## 2025-01-02 RX ORDER — TRAZODONE HYDROCHLORIDE 50 MG/1
100 TABLET, FILM COATED ORAL AT BEDTIME
Status: DISCONTINUED | OUTPATIENT
Start: 2025-01-02 | End: 2025-01-08 | Stop reason: HOSPADM

## 2025-01-02 RX ORDER — IOPAMIDOL 755 MG/ML
90 INJECTION, SOLUTION INTRAVASCULAR ONCE
Status: COMPLETED | OUTPATIENT
Start: 2025-01-02 | End: 2025-01-02

## 2025-01-02 RX ORDER — ONDANSETRON 4 MG/1
4 TABLET, ORALLY DISINTEGRATING ORAL EVERY 6 HOURS PRN
Status: DISCONTINUED | OUTPATIENT
Start: 2025-01-02 | End: 2025-01-08 | Stop reason: HOSPADM

## 2025-01-02 RX ORDER — LOPERAMIDE HYDROCHLORIDE 2 MG/1
4 CAPSULE ORAL 4 TIMES DAILY PRN
Status: DISCONTINUED | OUTPATIENT
Start: 2025-01-02 | End: 2025-01-08 | Stop reason: HOSPADM

## 2025-01-02 RX ORDER — ALBUTEROL SULFATE 0.83 MG/ML
2.5 SOLUTION RESPIRATORY (INHALATION) 3 TIMES DAILY PRN
Status: DISCONTINUED | OUTPATIENT
Start: 2025-01-02 | End: 2025-01-08 | Stop reason: HOSPADM

## 2025-01-02 RX ORDER — PANTOPRAZOLE SODIUM 40 MG/1
40 TABLET, DELAYED RELEASE ORAL
Status: DISCONTINUED | OUTPATIENT
Start: 2025-01-03 | End: 2025-01-08 | Stop reason: HOSPADM

## 2025-01-02 RX ORDER — AMOXICILLIN 250 MG
2 CAPSULE ORAL 2 TIMES DAILY PRN
Status: DISCONTINUED | OUTPATIENT
Start: 2025-01-02 | End: 2025-01-08 | Stop reason: HOSPADM

## 2025-01-02 RX ORDER — MONTELUKAST SODIUM 10 MG/1
10 TABLET ORAL EVERY MORNING
Status: DISCONTINUED | OUTPATIENT
Start: 2025-01-03 | End: 2025-01-08 | Stop reason: HOSPADM

## 2025-01-02 RX ORDER — SUCRALFATE ORAL 1 G/10ML
1 SUSPENSION ORAL 4 TIMES DAILY PRN
Status: DISCONTINUED | OUTPATIENT
Start: 2025-01-02 | End: 2025-01-08 | Stop reason: HOSPADM

## 2025-01-02 RX ORDER — ACETAMINOPHEN 325 MG/1
650 TABLET ORAL EVERY 4 HOURS PRN
Status: DISCONTINUED | OUTPATIENT
Start: 2025-01-02 | End: 2025-01-08 | Stop reason: HOSPADM

## 2025-01-02 RX ORDER — OLANZAPINE 2.5 MG/1
10 TABLET, FILM COATED ORAL AT BEDTIME
Status: DISCONTINUED | OUTPATIENT
Start: 2025-01-02 | End: 2025-01-08 | Stop reason: HOSPADM

## 2025-01-02 RX ORDER — FAMOTIDINE 20 MG/1
20 TABLET, FILM COATED ORAL 2 TIMES DAILY
Status: DISCONTINUED | OUTPATIENT
Start: 2025-01-02 | End: 2025-01-05

## 2025-01-02 RX ORDER — FUROSEMIDE 20 MG/1
40 TABLET ORAL EVERY MORNING
Status: DISCONTINUED | OUTPATIENT
Start: 2025-01-03 | End: 2025-01-08 | Stop reason: HOSPADM

## 2025-01-02 RX ORDER — SPIRONOLACTONE 100 MG/1
100 TABLET, FILM COATED ORAL DAILY
Status: ON HOLD | COMMUNITY
Start: 2025-01-02 | End: 2025-01-08

## 2025-01-02 RX ORDER — CALCIUM CARBONATE 500 MG/1
1000 TABLET, CHEWABLE ORAL 4 TIMES DAILY PRN
Status: DISCONTINUED | OUTPATIENT
Start: 2025-01-02 | End: 2025-01-08 | Stop reason: HOSPADM

## 2025-01-02 RX ORDER — LIDOCAINE 40 MG/G
CREAM TOPICAL
Status: DISCONTINUED | OUTPATIENT
Start: 2025-01-02 | End: 2025-01-08 | Stop reason: HOSPADM

## 2025-01-02 RX ORDER — ENOXAPARIN SODIUM 100 MG/ML
40 INJECTION SUBCUTANEOUS EVERY 12 HOURS
Status: DISCONTINUED | OUTPATIENT
Start: 2025-01-02 | End: 2025-01-02 | Stop reason: ALTCHOICE

## 2025-01-02 RX ORDER — ALBUMIN (HUMAN) 12.5 G/50ML
25-50 SOLUTION INTRAVENOUS ONCE
Status: COMPLETED | OUTPATIENT
Start: 2025-01-02 | End: 2025-01-03

## 2025-01-02 RX ORDER — FLUTICASONE FUROATE AND VILANTEROL 100; 25 UG/1; UG/1
1 POWDER RESPIRATORY (INHALATION) DAILY
Status: DISCONTINUED | OUTPATIENT
Start: 2025-01-03 | End: 2025-01-08 | Stop reason: HOSPADM

## 2025-01-02 RX ORDER — NICOTINE 21 MG/24HR
1 PATCH, TRANSDERMAL 24 HOURS TRANSDERMAL DAILY
Status: DISCONTINUED | OUTPATIENT
Start: 2025-01-02 | End: 2025-01-08 | Stop reason: HOSPADM

## 2025-01-02 RX ORDER — NICOTINE POLACRILEX 4 MG
15-30 LOZENGE BUCCAL
Status: DISCONTINUED | OUTPATIENT
Start: 2025-01-02 | End: 2025-01-08 | Stop reason: HOSPADM

## 2025-01-02 RX ORDER — CEFTAZIDIME 2 G/1
2 INJECTION, POWDER, FOR SOLUTION INTRAVENOUS ONCE
Status: COMPLETED | OUTPATIENT
Start: 2025-01-02 | End: 2025-01-03

## 2025-01-02 RX ADMIN — IOPAMIDOL 90 ML: 755 INJECTION, SOLUTION INTRAVENOUS at 12:50

## 2025-01-02 RX ADMIN — APIXABAN 5 MG: 5 TABLET, FILM COATED ORAL at 19:42

## 2025-01-02 RX ADMIN — OLANZAPINE 10 MG: 5 TABLET, FILM COATED ORAL at 21:00

## 2025-01-02 RX ADMIN — NICOTINE 1 PATCH: 14 PATCH, EXTENDED RELEASE TRANSDERMAL at 21:01

## 2025-01-02 RX ADMIN — ALBUMIN HUMAN 25 G: 0.25 SOLUTION INTRAVENOUS at 19:57

## 2025-01-02 RX ADMIN — TRAZODONE HYDROCHLORIDE 100 MG: 50 TABLET ORAL at 21:00

## 2025-01-02 RX ADMIN — CEFTAZIDIME 2 G: 2 INJECTION, POWDER, FOR SOLUTION INTRAVENOUS at 20:44

## 2025-01-02 RX ADMIN — FAMOTIDINE 20 MG: 20 TABLET ORAL at 19:42

## 2025-01-02 RX ADMIN — ROSUVASTATIN 10 MG: 10 TABLET, FILM COATED ORAL at 21:31

## 2025-01-02 ASSESSMENT — ENCOUNTER SYMPTOMS
COUGH: 0
DIARRHEA: 0
SHORTNESS OF BREATH: 0
VOMITING: 0
ABDOMINAL PAIN: 1
FEVER: 0
NAUSEA: 0

## 2025-01-02 ASSESSMENT — ACTIVITIES OF DAILY LIVING (ADL)
ADLS_ACUITY_SCORE: 57
ADLS_ACUITY_SCORE: 52

## 2025-01-02 NOTE — MEDICATION SCRIBE - ADMISSION MEDICATION HISTORY
Medication Scribe Admission Medication History    Admission medication history is complete. The information provided in this note is only as accurate as the sources available at the time of the update.    Information Source(s): Patient and Caregiver via in-person and phone    Pertinent Information: patient's medications are being managed by [facility] @ [phone] . Called and received oral medication report.     Changes made to PTA medication list:  Added: Spironolactone   Deleted: None  Changed: None    Allergies reviewed with patient and updates made in EHR: yes    Medication History Completed By: Jeremy Romo 1/2/2025 4:49 PM    PTA Med List   Medication Sig Note Last Dose/Taking    albuterol (PROAIR HFA/PROVENTIL HFA/VENTOLIN HFA) 108 (90 Base) MCG/ACT inhaler Inhale 1-2 puffs into the lungs every 6 hours as needed for shortness of breath, wheezing or cough  Taking As Needed    albuterol (PROVENTIL) (2.5 MG/3ML) 0.083% neb solution Take 2.5 mg by nebulization 3 times daily as needed for shortness of breath, wheezing or cough  Taking As Needed    aloe vera GEL Apply 1 g topically every hour as needed for skin care Per bottle directions  Taking As Needed    apixaban ANTICOAGULANT (ELIQUIS) 5 MG tablet Take 5 mg by mouth 2 times daily.  1/2/2025 Morning    bacitracin 500 UNIT/GM OINT Apply topically 3 times daily as needed for wound care  Taking As Needed    Benzocaine (SOLARCAINE ALOE VERA EX) Externally apply topically daily as needed.  Taking As Needed    benzonatate (TESSALON) 200 MG capsule Take 1 capsule (200 mg) by mouth 3 times daily as needed for cough.  Taking As Needed    Calamine external lotion Apply topically as needed for itching  Taking As Needed    carbamide peroxide (DEBROX) 6.5 % otic solution Place 5 drops into both ears daily as needed for other.  Taking As Needed    clotrimazole (LOTRIMIN) 1 % external cream Apply topically 2 times daily as needed (skin irritation)  Taking As Needed     dextromethorphan-guaiFENesin (MUCINEX DM)  MG 12 hr tablet Take 1 tablet by mouth 2 times daily as needed.  Taking As Needed    diclofenac (VOLTAREN) 1 % topical gel Apply 2 g topically daily as needed for moderate pain To joints/back  Taking As Needed    empagliflozin (JARDIANCE) 10 MG TABS tablet Take 10 mg by mouth every morning.  1/2/2025 Morning    EPINEPHrine (ANY BX GENERIC EQUIV) 0.3 MG/0.3ML injection 2-pack Inject 0.3 mg into the muscle as needed for anaphylaxis. May repeat one time in 5-15 minutes if response to initial dose is inadequate.  Taking As Needed    famotidine (PEPCID) 20 MG tablet Take 1 tablet (20 mg) by mouth 2 times daily  1/2/2025 Morning    FLUoxetine 20 MG tablet Take 20 mg by mouth every morning.  1/2/2025 Morning    furosemide (LASIX) 40 MG tablet Take 40 mg by mouth every morning.  1/2/2025 Morning    GAVILAX 17 GM/SCOOP powder Take 17 g by mouth daily as needed.  Taking As Needed    hydrocortisone (CORTAID) 1 % external cream Apply topically daily as needed.  Taking As Needed    Hydrocortisone (PREPARATION H EX) Externally apply topically daily as needed.  Taking As Needed    ibuprofen (ADVIL/MOTRIN) 200 MG tablet Take 400 mg by mouth every 4 hours as needed for pain.  Taking As Needed    ketorolac (TORADOL) 10 MG tablet Take 1 tablet (10 mg) by mouth every 6 hours as needed for pain.  Taking As Needed    lisinopril (ZESTRIL) 10 MG tablet Take 10 mg by mouth every morning.  1/2/2025 Morning    loperamide (IMODIUM) 2 MG capsule Take 4 mg by mouth 4 times daily as needed for diarrhea.  Taking As Needed    loratadine (CLARITIN) 10 MG tablet Take 10 mg by mouth daily as needed for allergies.  Taking As Needed    magnesium hydroxide (MILK OF MAGNESIA) 400 MG/5ML suspension Take 30 mLs by mouth daily as needed.  Taking As Needed    metFORMIN (GLUCOPHAGE) 1000 MG tablet Take 1,000 mg by mouth 2 times daily (with meals)  1/2/2025 Morning    montelukast (SINGULAIR) 10 MG tablet Take 10  mg by mouth every morning.  1/2/2025 Morning    OLANZapine (ZYPREXA) 10 MG tablet Take 10 mg by mouth At Bedtime  1/1/2025 Bedtime    omeprazole (PRILOSEC) 40 MG DR capsule Take 40 mg by mouth every morning.  1/2/2025 Morning    pramoxine-calamine (AVEENO) 1-8 % LOTN Apply topically daily as needed for itching.  Taking As Needed    Pseudoephedrine-DM-GG (PSEUDOEPHEDRINE-DM-GUAIFENESIN OR) Take 10 mLs by mouth every 6 hours as needed.  Taking As Needed    rosuvastatin (CRESTOR) 10 MG tablet Take 10 mg by mouth At Bedtime  1/1/2025 Evening    spironolactone (ALDACTONE) 100 MG tablet Take 100 mg by mouth daily. 1/2/2025: Has not started Taking    sucralfate (CARAFATE) 1 GM/10ML suspension Take 1 g by mouth 4 times daily as needed.  Taking As Needed    traZODone (DESYREL) 100 MG tablet Take 100 mg by mouth at bedtime.  1/1/2025 Bedtime    TRELEGY ELLIPTA 100-62.5-25 MCG/ACT oral inhaler Inhale 1 puff into the lungs daily.  1/2/2025 Morning    Urea 40 % CREA Apply topically daily as needed.  Taking As Needed    Vitamins A & D (VITAMIN A & D) OINT Externally apply topically daily as needed.  Taking As Needed    witch hazel-glycerin (TUCKS) pad Apply topically as needed for hemorrhoids.  Taking As Needed

## 2025-01-02 NOTE — ED NOTES
EMERGENCY DEPARTMENT SIGN OUT NOTE        ED COURSE AND MEDICAL DECISION MAKING  Patient was signed out to me    He has a history of cirrhosis and ascites.  He was evaluated for abdominal pain today.  Paracentesis performed which shows greater than thousand nucleated cells consistent with SBP    Broad-spectrum antibiotics ordered    He will be admitted to the residency service      FINAL IMPRESSION    1. LLQ abdominal pain    2. Abdominal bloating    3. SBP (spontaneous bacterial peritonitis) (H)        ED MEDS  Medications   albumin human 25 % injection 25-50 g (has no administration in time range)   cefTAZidime (FORTAZ) 2 g vial to attach to  ml bag for ADULTS or NS 50 ml bag for PEDS (has no administration in time range)   iopamidol (ISOVUE-370) solution 90 mL (90 mLs Intravenous $Given 1/2/25 1250)       LAB  Labs Ordered and Resulted from Time of ED Arrival to Time of ED Departure   BASIC METABOLIC PANEL - Abnormal       Result Value    Sodium 139      Potassium 5.1      Chloride 107      Carbon Dioxide (CO2) 19 (*)     Anion Gap 13      Urea Nitrogen 38.0 (*)     Creatinine 1.20 (*)     GFR Estimate 76      Calcium 10.5 (*)     Glucose 85     HEPATIC FUNCTION PANEL - Abnormal    Protein Total 7.6      Albumin 4.4      Bilirubin Total 0.5      Alkaline Phosphatase 303 (*)     AST 18      ALT 19      Bilirubin Direct 0.25     CBC WITH PLATELETS AND DIFFERENTIAL - Abnormal    WBC Count 12.8 (*)     RBC Count 4.79      Hemoglobin 12.9 (*)     Hematocrit 40.8      MCV 85      MCH 26.9      MCHC 31.6      RDW 17.8 (*)     Platelet Count 332      % Neutrophils 67      % Lymphocytes 19      % Monocytes 9      % Eosinophils 4      % Basophils 1      % Immature Granulocytes 0      NRBCs per 100 WBC 0      Absolute Neutrophils 8.5 (*)     Absolute Lymphocytes 2.4      Absolute Monocytes 1.2      Absolute Eosinophils 0.6      Absolute Basophils 0.1      Absolute Immature Granulocytes 0.1      Absolute NRBCs 0.0      CRP INFLAMMATION - Abnormal    CRP Inflammation 17.70 (*)    CELL COUNT BODY FLUID - Abnormal    Color Brown (*)     Clarity Cloudy (*)     Cell Count Fluid Source Peritoneum      Total Nucleated Cells 1,936     LIPASE - Normal    Lipase 19     ROUTINE UA WITH MICROSCOPIC REFLEX TO CULTURE   ALBUMIN FLUID   PROTEIN FLUID   GLUCOSE MONITOR NURSING POCT   DIFERENTIAL BODY FLUID   AEROBIC BACTERIAL CULTURE ROUTINE   CELL COUNT WITH DIFFERENTIAL FLUID       RADIOLOGY    US Paracentesis with Albumin   Final Result   IMPRESSION:   1.  Status post ultrasound-guided paracentesis.      Reference CPT Code: 36178      CT Abdomen Pelvis w Contrast   Final Result   IMPRESSION:       1.  Mild to moderate ascites. No free air. No abscess.      2.  Cirrhotic liver. Mild splenomegaly.      3.  No other acute findings to explain symptoms.             DISCHARGE MEDS  New Prescriptions    No medications on file       Jeremy Lei MD  [facility] EMERGENCY DEPARTMENT  [facility] [facility] [id number] [phone]         Jeremy Lei MD  01/02/25 4413

## 2025-01-02 NOTE — ED TRIAGE NOTES
Patient arrives by private car from his PCP for evaluation of abdominal bloating and LLQ pain since Sunday.  Patient states he has gained 5lbs since yesterday.  States he needs paracentesis.

## 2025-01-02 NOTE — ED PROVIDER NOTES
EMERGENCY DEPARTMENT ENCOUNTER      NAME: Warren Jaramillo  AGE: 44 year old male  YOB: 1980  MRN: [id number]  EVALUATION DATE & TIME: No admission date for patient encounter.    PCP: Mary Kelly    ED PROVIDER: Evangelina Medina M.D.        Chief Complaint   Patient presents with    Bloated         FINAL IMPRESSION:    1. LLQ abdominal pain    2. Abdominal bloating            MEDICAL DECISION MAKING:    Warren Jaramillo is a 44 year old male with history of ADHD, PTSD, fetal alcohol syndrome, cardiomegaly, CHF, autistic disorder with, diabetes, AVA, SVT, PVCs, DVT, liver cirrhosis with ascites, who presents to the ER with complaints of left lower quadrant abdominal pain and abdominal distention.  CT imaging from few days ago does show moderate amount of ascites.  CT scan today continues to show that moderate amount of ascites but otherwise unremarkable CT scan.  Laboratories nonspecific.  WBC elevated though slightly down from what it has been earlier in the month.  CRP is stable.  Patient signed out at change of shift awaiting paracentesis lab results though hopeful that everything will look well and that he can be discharged home.      Pt signed out at change of shift to Jeremy Lei.      ED COURSE:  10:20 AM  I met with the patient to gather history and perform my exam. ED course and treatment discussed. This patient was taken to a private exam setting while in the waiting room during ED overcrowding. Patient gave verbal permission to be seen.     2:22 PM  Basic laboratories overall look pretty good.  Creatinine has bumped up slightly.  CT overall reassuring but does show some reaccumulation of the ascites.  He is over getting a paracentesis now for both therapeutic and diagnostic.  Overall the low suspicion for SBP.  WBC elevated though actually trending down over the month.  Will await belly labs from paracentesis and if these are otherwise unremarkable and I do feel he would  be appropriate for discharge home.     Latest Reference Range & Units 12/01/24 21:40 12/02/24 06:31 12/10/24 16:01 12/11/24 16:07 12/29/24 18:47 01/02/25 12:26   WBC 4.0 - 11.0 10e3/uL 12.6 (H) 12.7 (H) 14.1 (H) 14.0 (H) 14.8 (H) 12.8 (H)       3:23 PM  His CRP is basically stable or even slightly downtrending.  Overall my clinical specimen is lower that this represents true SBP I am hopeful that the laboratories look good and that he will build to be discharged home.  He was most hopeful for paracentesis which has been ordered for fluid removal.  CT scan otherwise unremarkable.  Rest of his laboratories nonspecific.   Latest Reference Range & Units 12/01/24 23:50 01/02/25 12:26   CRP Inflammation <5.00 mg/L 21.60 (H) 17.70 (H)       Pt signed out at change of shift to Jeremy Lei awaiting paracentesis results.  Overall hopeful that he will go home.  He is otherwise stable.  Does not appear toxic or septic.    I do not think that this represents ACS, PE, ruptured AAA, aortic dissection, bowel obstruction, bowel ischemia, cholecystitis, pancreatitis, appendicitis, diverticulitis, kidney stone, pyelonephritis, incarcerated or strangulated hernia, testicular torsion, viscus perforation, perforated GI ulcer, or other such etiologies at this time.      CONSULTANTS:  none        MEDICATIONS GIVEN IN THE EMERGENCY:  Medications   albumin human 25 % injection 25-50 g (has no administration in time range)   iopamidol (ISOVUE-370) solution 90 mL (90 mLs Intravenous $Given 1/2/25 1259)           NEW PRESCRIPTIONS STARTED AT TODAY'S ER VISIT     Medication List      There are no discharge medications for this visit.             CONDITION:  stabale        DISPOSITION:  pending         =================================================================  =================================================================  TRIAGE ASSESSMENT:  Patient arrives by private car from his PCP for evaluation of abdominal bloating and LLQ pain  since Sunday.  Patient states he has gained 5lbs since yesterday.  States he needs paracentesis.       ED Triage Vitals [01/02/25 1014]   Encounter Vitals Group      /49      Systolic BP Percentile       Diastolic BP Percentile       Pulse 81      Resp 22      Temp (!) 96.5  F (35.8  C)      Temp src Tympanic      SpO2 96 %      Weight 125.6 kg (277 lb)     ================================================================  ================================================================    HPI    Patient information was obtained from: patient    Use of Intrepreter: N/A      Abelfabiola Jaramillo is a 44 year old male with history of ADHD, PTSD, fetal alcohol syndrome, cardiomegaly, CHF, autistic disorder with, diabetes, AVA, SVT, PVCs, DVT, liver cirrhosis with ascites, who presents to the ER with complaints of left lower quadrant abdominal pain and abdominal distention.    Patient states that he had a paracentesis about a week ago or so and was told to watch his weight carefully.  He believes that he is up 10 pounds just since yesterday or the day before.  He reports abdominal distention and bloating as well as some mild left lower quadrant pain.          CHART REVIEW:  EXAM: CT ABDOMEN PELVIS W CONTRAST  LOCATION: [facility]  DATE: 12/29/2024     INDICATION: Abdominal pain left lower quadrant tender  COMPARISON: CTA abdomen and pelvis 12/11/2024  TECHNIQUE: CT scan of the abdomen and pelvis was performed following injection of IV contrast. Multiplanar reformats were obtained. Dose reduction techniques were used.  CONTRAST: 100ml isovue 370     FINDINGS:                                                                   IMPRESSION:   1.  Hepatomegaly with nodular contour compatible with cirrhosis.  2.  Moderate volume of ascites abdomen and pelvis, minimally changed.  3.  Numerous slightly enlarged retroperitoneal lymph nodes are unchanged.  4.  Bilateral myelolipoma,  unchanged.    Chart review suggests that he had a paracentesis done on December 2 at which time they removed 6 L of fluid and then again on December 10 they removed 5-1/2 L.    REVIEW OF SYSTEMS  Review of Systems   Constitutional:  Negative for fever.   Respiratory:  Negative for cough and shortness of breath.    Cardiovascular:  Negative for chest pain.   Gastrointestinal:  Positive for abdominal pain. Negative for diarrhea, nausea and vomiting.   Genitourinary:  Negative for enuresis.   All other systems reviewed and are negative.      PAST MEDICAL HISTORY:  Past Medical History:   Diagnosis Date    COPD exacerbation (H) 12/2/2024    DM2 (diabetes mellitus, type 2) (H) 4/28/2020    HTN (hypertension) 7/30/2012    Thyroid nodule 7/31/2019    Rojas's disease (H)          PAST SURGICAL HISTORY:  Past Surgical History:   Procedure Laterality Date    COLONOSCOPY      ESOPHAGOSCOPY, GASTROSCOPY, DUODENOSCOPY (EGD), COMBINED N/A 7/21/2023    Procedure: ESOPHAGOGASTRODUODENOSCOPY WITH GASTRIC AND ESOPHAGEAL BIOPSIES;  Surgeon: Filiberto Aragon MD;  Location: [facility] [place] OR    TOOTH EXTRACTION           CURRENT MEDICATIONS:    Prior to Admission medications    Medication Sig Start Date End Date Taking? Authorizing Provider   albuterol (PROAIR HFA/PROVENTIL HFA/VENTOLIN HFA) 108 (90 Base) MCG/ACT inhaler Inhale 1-2 puffs into the lungs every 6 hours as needed for shortness of breath, wheezing or cough 4/12/24   Alejandrina Kelly MD   albuterol (PROVENTIL) (2.5 MG/3ML) 0.083% neb solution Take 2.5 mg by nebulization 3 times daily as needed for shortness of breath, wheezing or cough    Unknown, Entered By History   aloe vera GEL Apply 1 g topically every hour as needed for skin care Per bottle directions    Unknown, Entered By History   apixaban ANTICOAGULANT (ELIQUIS) 5 MG tablet Take 2 tablets (10 mg) by mouth 2 times daily for 7 days, THEN 1 tablet (5 mg) 2 times daily. 12/3/24 1/9/25  Herlinda Fragoso MD    bacitracin 500 UNIT/GM OINT Apply topically 3 times daily as needed for wound care    Unknown, Entered By History   Benzocaine (SOLARCAINE ALOE VERA EX) Externally apply topically daily as needed.  Patient not taking: Reported on 12/24/2024    Reported, Patient   benzonatate (TESSALON) 200 MG capsule Take 1 capsule (200 mg) by mouth 3 times daily as needed for cough. 9/22/24   Liliana Alvarez PA-C   Calamine external lotion Apply topically as needed for itching    Unknown, Entered By History   carbamide peroxide (DEBROX) 6.5 % otic solution Place 5 drops into both ears daily as needed for other.    Reported, Patient   clotrimazole (LOTRIMIN) 1 % external cream Apply topically 2 times daily as needed (skin irritation)    Unknown, Entered By History   dextromethorphan-guaiFENesin (MUCINEX DM)  MG 12 hr tablet Take 1 tablet by mouth 2 times daily as needed. 9/22/24   Reported, Patient   diclofenac (VOLTAREN) 1 % topical gel Apply 2 g topically daily as needed for moderate pain To joints/back    Unknown, Entered By History   empagliflozin (JARDIANCE) 10 MG TABS tablet Take 10 mg by mouth every morning.    Reported, Patient   EPINEPHrine (ANY BX GENERIC EQUIV) 0.3 MG/0.3ML injection 2-pack Inject 0.3 mg into the muscle as needed for anaphylaxis. May repeat one time in 5-15 minutes if response to initial dose is inadequate.    Reported, Patient   famotidine (PEPCID) 20 MG tablet Take 1 tablet (20 mg) by mouth 2 times daily 1/18/24   Elinor Pruett MD   FLUoxetine 20 MG tablet Take 20 mg by mouth every morning. 11/18/24   Reported, Patient   furosemide (LASIX) 40 MG tablet Take 40 mg by mouth every morning. 11/18/24   Reported, Patient   GAVILAX 17 GM/SCOOP powder Take 17 g by mouth daily as needed. 5/8/24   Reported, Patient   hydrocortisone (CORTAID) 1 % external cream Apply topically daily as needed.    Reported, Patient   Hydrocortisone (PREPARATION H EX) Externally apply topically daily as needed.     Reported, Patient   ibuprofen (ADVIL/MOTRIN) 200 MG tablet Take 400 mg by mouth every 4 hours as needed for pain.    Reported, Patient   ketorolac (TORADOL) 10 MG tablet Take 1 tablet (10 mg) by mouth every 6 hours as needed for pain. 10/11/24   Milton Jaramillo MD   lisinopril (ZESTRIL) 10 MG tablet Take 10 mg by mouth every morning. 4/27/22   Reported, Patient   loperamide (IMODIUM) 2 MG capsule Take 4 mg by mouth 4 times daily as needed for diarrhea.    Reported, Patient   loratadine (CLARITIN) 10 MG tablet Take 10 mg by mouth daily as needed for allergies.    Reported, Patient   magnesium hydroxide (MILK OF MAGNESIA) 400 MG/5ML suspension Take 30 mLs by mouth daily as needed.    Reported, Patient   metFORMIN (GLUCOPHAGE) 1000 MG tablet Take 1,000 mg by mouth 2 times daily (with meals)    Unknown, Entered By History   montelukast (SINGULAIR) 10 MG tablet Take 10 mg by mouth every morning.    Unknown, Entered By History   OLANZapine (ZYPREXA) 10 MG tablet Take 10 mg by mouth At Bedtime    Unknown, Entered By History   omeprazole (PRILOSEC) 40 MG DR capsule Take 40 mg by mouth every morning. 8/19/24   Reported, Patient   pramoxine-calamine (AVEENO) 1-8 % LOTN Apply topically daily as needed for itching.    Reported, Patient   Pseudoephedrine-DM-GG (PSEUDOEPHEDRINE-DM-GUAIFENESIN OR) Take 10 mLs by mouth every 6 hours as needed.    Reported, Patient   rosuvastatin (CRESTOR) 10 MG tablet Take 10 mg by mouth At Bedtime 4/27/22   Reported, Patient   sucralfate (CARAFATE) 1 GM/10ML suspension Take 1 g by mouth 4 times daily as needed.    Reported, Patient   traZODone (DESYREL) 100 MG tablet Take 100 mg by mouth at bedtime. 11/18/24   Reported, Patient   TRELEGY ELLIPTA 100-62.5-25 MCG/ACT oral inhaler Inhale 1 puff into the lungs daily. 10/30/24   Reported, Patient   Urea 40 % CREA Apply topically daily as needed. 2/5/24   Reported, Patient   Vitamins A & D (VITAMIN A & D) OINT Externally apply topically daily as  needed.    Reported, Patient   witch hazel-glycerin (TUCKS) pad Apply topically as needed for hemorrhoids.  Patient not taking: Reported on 12/24/2024    Reported, Patient         ALLERGIES:  Allergies   Allergen Reactions    Apricot Flavoring Agent (Non-Screening) Anaphylaxis    Banana Anaphylaxis     Throat swelling  Throat swelling      Wasp Venom Protein Shortness Of Breath     Other reaction(s): Respiratory Distress  Has an epi pen  Has an epi pen      Bees Anaphylaxis     Have an Epi pen that carries with    Methylphenidate Itching     Other reaction(s): Nightmares    Prunus      Other reaction(s): *Unknown    Sulfa Antibiotics      Headaches and nausea    Prunus Persica Rash     Other reaction(s): *Unknown         FAMILY HISTORY:  Family History   Problem Relation Age of Onset    Unknown/Adopted Father     Unknown/Adopted Maternal Grandmother     C.A.D. Maternal Grandfather     Diabetes Maternal Grandfather     Cerebrovascular Disease Maternal Grandfather     Unknown/Adopted Paternal Grandmother     Unknown/Adopted Paternal Grandfather     Unknown/Adopted Brother     Unknown/Adopted Sister          SOCIAL HISTORY:  Social History     Socioeconomic History    Marital status: Single   Tobacco Use    Smoking status: Every Day     Current packs/day: 1.00     Types: Cigarettes    Smokeless tobacco: Never   Vaping Use    Vaping status: Never Used   Substance and Sexual Activity    Alcohol use: No     Comment: once every 3 months    Drug use: No    Sexual activity: Never     Partners: Female   Other Topics Concern     Service No    Blood Transfusions No    Caffeine Concern No    Occupational Exposure No    Hobby Hazards No    Sleep Concern No    Stress Concern Yes     Comment: sometimes    Weight Concern No    Special Diet Yes     Comment: counting carbs    Back Care No    Exercise Yes    Seat Belt Yes    Self-Exams Yes     Social Drivers of Health     Financial Resource Strain: Low Risk  (12/2/2024)     Financial Resource Strain     Within the past 12 months, have you or your family members you live with been unable to get utilities (heat, electricity) when it was really needed?: No   Food Insecurity: Low Risk  (12/2/2024)    Food Insecurity     Within the past 12 months, did you worry that your food would run out before you got money to buy more?: No     Within the past 12 months, did the food you bought just not last and you didn t have money to get more?: No   Transportation Needs: Low Risk  (12/2/2024)    Transportation Needs     Within the past 12 months, has lack of transportation kept you from medical appointments, getting your medicines, non-medical meetings or appointments, work, or from getting things that you need?: No    [facility] from [facility]    Social Connections   Housing Stability: High Risk (12/2/2024)    Housing Stability     Do you have housing? : No     Are you worried about losing your housing?: No         VITALS:  Patient Vitals for the past 24 hrs:   BP Temp Temp src Pulse Resp SpO2 Weight   01/02/25 1200 121/57 -- -- 77 -- 96 % --   01/02/25 1014 100/49 (!) 96.5  F (35.8  C) Tympanic 81 22 96 % 125.6 kg (277 lb)       Wt Readings from Last 3 Encounters:   01/02/25 125.6 kg (277 lb)   12/29/24 122.5 kg (270 lb)   12/24/24 121.3 kg (267 lb 6.4 oz)       Estimated Creatinine Clearance: 96.8 mL/min (A) (based on SCr of 1.2 mg/dL (H)).    PHYSICAL EXAM    Constitutional:  Well developed, Well nourished, NAD  HENT:  Normocephalic, Atraumatic, Bilateral external ears normal, Nose normal. Neck- Supple, No stridor.   Eyes:  PERRL, EOMI, Conjunctiva normal, No discharge.  Respiratory:  Normal breath sounds, No respiratory distress, No wheezing, Speaks full sentences easily. No cough.   Cardiovascular:  Normal heart rate, Regular rhythm, No murmurs, No rubs, No gallops.   GI:  +obesity.  Bowel sounds normal, Soft, +LLQ  tenderness, No masses, No flank tenderness. No rebound or guarding. +bloating  : deferred  Musculoskeletal: No cyanosis, No clubbing. Good range of motion in all major joints. No major deformities noted.   Integument:  Warm, Dry, No erythema, No rash.  No petechiae.   Neurologic:  Alert & oriented x 3, Normal gait.   Psychiatric:  Affect normal, Cooperative      LAB:  All pertinent labs reviewed and interpreted.  Recent Results (from the past 24 hours)   Basic metabolic panel    Collection Time: 01/02/25 12:26 PM   Result Value Ref Range    Sodium 139 135 - 145 mmol/L    Potassium 5.1 3.4 - 5.3 mmol/L    Chloride 107 98 - 107 mmol/L    Carbon Dioxide (CO2) 19 (L) 22 - 29 mmol/L    Anion Gap 13 7 - 15 mmol/L    Urea Nitrogen 38.0 (H) 6.0 - 20.0 mg/dL    Creatinine 1.20 (H) 0.67 - 1.17 mg/dL    GFR Estimate 76 >60 mL/min/1.73m2    Calcium 10.5 (H) 8.8 - 10.4 mg/dL    Glucose 85 70 - 99 mg/dL   Hepatic function panel    Collection Time: 01/02/25 12:26 PM   Result Value Ref Range    Protein Total 7.6 6.4 - 8.3 g/dL    Albumin 4.4 3.5 - 5.2 g/dL    Bilirubin Total 0.5 <=1.2 mg/dL    Alkaline Phosphatase 303 (H) 40 - 150 U/L    AST 18 0 - 45 U/L    ALT 19 0 - 70 U/L    Bilirubin Direct 0.25 0.00 - 0.30 mg/dL   Lipase    Collection Time: 01/02/25 12:26 PM   Result Value Ref Range    Lipase 19 13 - 60 U/L   Extra Blue Top Tube    Collection Time: 01/02/25 12:26 PM   Result Value Ref Range    Hold Specimen JIC    Extra Red Top Tube    Collection Time: 01/02/25 12:26 PM   Result Value Ref Range    Hold Specimen JIC    CBC with platelets and differential    Collection Time: 01/02/25 12:26 PM   Result Value Ref Range    WBC Count 12.8 (H) 4.0 - 11.0 10e3/uL    RBC Count 4.79 4.40 - 5.90 10e6/uL    Hemoglobin 12.9 (L) 13.3 - 17.7 g/dL    Hematocrit 40.8 40.0 - 53.0 %    MCV 85 78 - 100 fL    MCH 26.9 26.5 - 33.0 pg    MCHC 31.6 31.5 - 36.5 g/dL    RDW 17.8 (H) 10.0 - 15.0 %    Platelet Count 332 150 - 450 10e3/uL    %  "Neutrophils 67 %    % Lymphocytes 19 %    % Monocytes 9 %    % Eosinophils 4 %    % Basophils 1 %    % Immature Granulocytes 0 %    NRBCs per 100 WBC 0 <1 /100    Absolute Neutrophils 8.5 (H) 1.6 - 8.3 10e3/uL    Absolute Lymphocytes 2.4 0.8 - 5.3 10e3/uL    Absolute Monocytes 1.2 0.0 - 1.3 10e3/uL    Absolute Eosinophils 0.6 0.0 - 0.7 10e3/uL    Absolute Basophils 0.1 0.0 - 0.2 10e3/uL    Absolute Immature Granulocytes 0.1 <=0.4 10e3/uL    Absolute NRBCs 0.0 10e3/uL   CRP inflammation    Collection Time: 01/02/25 12:26 PM   Result Value Ref Range    CRP Inflammation 17.70 (H) <5.00 mg/L       No results found for: \"ABORH\"        RADIOLOGY:  Reviewed all pertinent imaging. Please see official radiology report.    CT Abdomen Pelvis w Contrast   Final Result   IMPRESSION:       1.  Mild to moderate ascites. No free air. No abscess.      2.  Cirrhotic liver. Mild splenomegaly.      3.  No other acute findings to explain symptoms.         US Paracentesis with Albumin    (Results Pending)         EKG:    none      PROCEDURES:  Paracentesis by Radiology    Medical Decision Making  Obtained supplemental history:Supplemental history obtained?: No  Reviewed external records: External records reviewed?: Documented in chart and Prior Labs: see Ed course  Care impacted by chronic illness:Documented in Chart  Did you consider but not order tests?: Work up considered but not performed and documented in chart, if applicable  Did you interpret images independently?: Independent interpretation of ECG and images noted in documentation, when applicable.  Consultation discussion with other provider:Did you involve another provider (consultant, , pharmacy, etc.)?: No  Admission considered. Patient was signed out to the oncoming physician, disposition pending.    MIPS: Not Applicable      Evangelina Medina M.D. [facility]  Emergency Medicine and Medical Toxicology  Formerly [facility] " EMERGENCY DEPARTMENT  [address] [place] [id number]  [phone]  Dept: [phone]           Evangelina Medina MD  01/02/25 1686

## 2025-01-02 NOTE — H&P
[facility]    History and Physical - Hospitalist Service       Date of Admission:  (Not on file)    Assessment & Plan      Warren Jaramillo is a 44 year old male with a history of T2DM, HTN, HFpEF, cardiomegaly, DVT, morbid obesity, autism, adjustment disorder, insomnia, and cirrhosis with ascites who is admitted for suspected SBP in the setting of ascites.     Spontaneous bacterial peritonitis   Cirrhosis, unspecified type vs alcoholic cirrhosis   Ascites   Warren is a 44 y.o. male who presents to the ED with concern of increased abdominal distention and lower abdominal pain. The patient was recently seen in the ED for a similar issue on 12/29 and was discharged with instructions to follow up outpatient and return if he had >5lbs weight gain. This morning the patient had increased distention and 10 lbs weight gain so he returned for re-evaluation. CT abdomen pelvis in the ED showing cirrhosis of the liver with mild-moderate ascites. S/p paracentesis in the ED with ascites fluid studies showing a total nucleated cell count of 1936 with 464 neutrophils. Vitally stable on admission. UA notable only for glucose, on Jardiance. WBC of 12.8. ALT, AST, Tbili, and albumin WNL. CRP elevated at 17.7. Lipase normal. Overall picture concerning for SBP in the setting of known liver cirrhosis of unspecified type with recurrent ascites. The patient is s/p one dose of ceftazidime and one dose of 25% albumin in the ED. 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 The patient reports history of alcohol use for several years although unclear on frequency and amount. Also reports previously being tested for hepatitis without positive results. Unclear etiology of cirrhosis with generally normal appearing LFTs. Will consult GI.   - GI consult placed, appreciate recommendations   - Start ceftriaxone  2g bogdan  - Chart review showing possible history of Rojas's disease    - Will add ceruloplasmin     - May also consider 24 hour urine copper   - INR ordered     Type 2 diabetes mellitus  The patient has a history of non-insulin dependent T2DM. Most recent A1c three months ago was 6.8. Currently on metformin and jardiance at home. Noted to have glucosuria which is explained by Jardiance.   -Repeat A1c   -Hold metformin   -Continue home Jardiance   -mSSI     HFpEF  History of HFpEF with EF of 50-55% on most recent Echo on 8/2/2023. No significant lower extremity edema or SOB on admission. Follows closely with cardiology. No signs of volume overload at this time. Will continue PTA meds.   - PTA spironolactone and lasix       HLD  HTN  Pressures well controlled 120s/50s on admission.   - PTA rosuvastatin   - PTA lisinopril     COPD   Asthma  - prn albuterol nebs for wheezing/SOB   - PTA Trelegy Eliipta and singulair     History of DVT   - Continue PTA Eliquis for DVT ppx     GERD  - Continue PTA omeprazole, pepcid, and carafate     Chronic constipation  The patient has a history of chronic constipation but currently has diarrhea. Will place prn stool softeners but hold for loose stool.     Mood and insomnia   - Continue PTA Zyprexa   - PTA fluoxetine  - PTA trazodone at bedtime           Diet: Moderate Consistent Carb (60 g CHO per Meal) Diet  DVT Prophylaxis: DOAC  Khan Catheter: Not present  Fluids: No mIVF   Lines: None     Cardiac Monitoring: None  Code Status: Full Code      Clinically Significant Risk Factors Present on Admission                # Drug Induced Coagulation Defect: home medication list includes an anticoagulant medication    # Hypertension: Noted on problem list  # Chronic heart failure with preserved ejection fraction: heart failure noted on problem list and last echo with EF >50%         # DMII: A1C = N/A within past 6 months    # Severe Obesity: Estimated body mass index is 46.1 kg/m  as  "calculated from the following:    Height as of 12/29/24: 1.651 m (5' 5\").    Weight as of this encounter: 125.6 kg (277 lb).         # Financial/Environmental Concerns:           Disposition Plan      Expected Discharge Date: 01/04/2025                The patient's care will be formally staffed at morning report.      Sabine Taylor DO  Hospitalist Service  [facility]  Securely message with [employer] (more info)  Text page via [employer]mobile Paging/Directory   ______________________________________________________________________    Chief Complaint   Abdominal pain    History is obtained from the patient    History of Present Illness   Warren Jaramillo is a 44 year old male who presents with abdominal pain and distention. The patient reports that he has had abdominal fullness and lower abdominal pain since 12/29/2024.  He presented to the emergency department, was told to monitor pain and follow-up with his primary care physician..  Patient states that he was given a plan to return to the ED if he had greater than 5 pounds weight gain.  He reports that he had 10 pound weight gain since Sunday and so he returned to the ED for evaluation today.     The patient reports that he has not had any significant nausea, vomiting, chest pain, or SOB. However he does have some exertional dyspnea when his ascites becomes more prominent and he has worsening distention.     With regards to his history of cirrhosis and ascites, the patient reports that he has been worked up for hepatitis without any evidence of disease. He endorses a history of some alcohol use but has not had a drink since February of 2024. Unable to discern exactly how much and how frequently the patient was consuming alcohol, but notes that he did go through a period of drinking 12-24 cans of beer daily for \"about a year in 2014\". Otherwise reports intermittent consumption of vodka, unspecified amount/duration.       Past Medical History  "   Past Medical History:   Diagnosis Date    COPD exacerbation (H) 12/2/2024    DM2 (diabetes mellitus, type 2) (H) 4/28/2020    HTN (hypertension) 7/30/2012    Thyroid nodule 7/31/2019    Rojas's disease (H)        Past Surgical History   Past Surgical History:   Procedure Laterality Date    COLONOSCOPY      ESOPHAGOSCOPY, GASTROSCOPY, DUODENOSCOPY (EGD), COMBINED N/A 7/21/2023    Procedure: ESOPHAGOGASTRODUODENOSCOPY WITH GASTRIC AND ESOPHAGEAL BIOPSIES;  Surgeon: Filiberto Aragon MD;  Location: [facility] OR    TOOTH EXTRACTION         Prior to Admission Medications   Prior to Admission Medications   Prescriptions Last Dose Informant Patient Reported? Taking?   Benzocaine (SOLARCAINE ALOE VERA EX)   Yes Yes   Sig: Externally apply topically daily as needed.   Calamine external lotion   Yes Yes   Sig: Apply topically as needed for itching   EPINEPHrine (ANY BX GENERIC EQUIV) 0.3 MG/0.3ML injection 2-pack   Yes Yes   Sig: Inject 0.3 mg into the muscle as needed for anaphylaxis. May repeat one time in 5-15 minutes if response to initial dose is inadequate.   FLUoxetine 20 MG tablet 1/2/2025 Morning  Yes Yes   Sig: Take 20 mg by mouth every morning.   GAVILAX 17 GM/SCOOP powder   Yes Yes   Sig: Take 17 g by mouth daily as needed.   Hydrocortisone (PREPARATION H EX)   Yes Yes   Sig: Externally apply topically daily as needed.   OLANZapine (ZYPREXA) 10 MG tablet 1/1/2025 Bedtime  Yes Yes   Sig: Take 10 mg by mouth At Bedtime   Pseudoephedrine-DM-GG (PSEUDOEPHEDRINE-DM-GUAIFENESIN OR)   Yes Yes   Sig: Take 10 mLs by mouth every 6 hours as needed.   TRELEGY ELLIPTA 100-62.5-25 MCG/ACT oral inhaler 1/2/2025 Morning  Yes Yes   Sig: Inhale 1 puff into the lungs daily.   Urea 40 % CREA   Yes Yes   Sig: Apply topically daily as needed.   Vitamins A & D (VITAMIN A & D) OINT   Yes Yes   Sig: Externally apply topically daily as needed.   albuterol (PROAIR HFA/PROVENTIL HFA/VENTOLIN HFA) 108 (90 Base) MCG/ACT inhaler   No Yes    Sig: Inhale 1-2 puffs into the lungs every 6 hours as needed for shortness of breath, wheezing or cough   albuterol (PROVENTIL) (2.5 MG/3ML) 0.083% neb solution   Yes Yes   Sig: Take 2.5 mg by nebulization 3 times daily as needed for shortness of breath, wheezing or cough   aloe vera GEL   Yes Yes   Sig: Apply 1 g topically every hour as needed for skin care Per bottle directions   apixaban ANTICOAGULANT (ELIQUIS) 5 MG tablet 1/2/2025 Morning  Yes Yes   Sig: Take 5 mg by mouth 2 times daily.   bacitracin 500 UNIT/GM OINT   Yes Yes   Sig: Apply topically 3 times daily as needed for wound care   benzonatate (TESSALON) 200 MG capsule   No Yes   Sig: Take 1 capsule (200 mg) by mouth 3 times daily as needed for cough.   carbamide peroxide (DEBROX) 6.5 % otic solution   Yes Yes   Sig: Place 5 drops into both ears daily as needed for other.   clotrimazole (LOTRIMIN) 1 % external cream   Yes Yes   Sig: Apply topically 2 times daily as needed (skin irritation)   dextromethorphan-guaiFENesin (MUCINEX DM)  MG 12 hr tablet   Yes Yes   Sig: Take 1 tablet by mouth 2 times daily as needed.   diclofenac (VOLTAREN) 1 % topical gel   Yes Yes   Sig: Apply 2 g topically daily as needed for moderate pain To joints/back   empagliflozin (JARDIANCE) 10 MG TABS tablet 1/2/2025 Morning  Yes Yes   Sig: Take 10 mg by mouth every morning.   famotidine (PEPCID) 20 MG tablet 1/2/2025 Morning  No Yes   Sig: Take 1 tablet (20 mg) by mouth 2 times daily   furosemide (LASIX) 40 MG tablet 1/2/2025 Morning  Yes Yes   Sig: Take 40 mg by mouth every morning.   hydrocortisone (CORTAID) 1 % external cream   Yes Yes   Sig: Apply topically daily as needed.   ibuprofen (ADVIL/MOTRIN) 200 MG tablet   Yes Yes   Sig: Take 400 mg by mouth every 4 hours as needed for pain.   ketorolac (TORADOL) 10 MG tablet   No Yes   Sig: Take 1 tablet (10 mg) by mouth every 6 hours as needed for pain.   lisinopril (ZESTRIL) 10 MG tablet 1/2/2025 Morning  Yes Yes   Sig:  Take 10 mg by mouth every morning.   loperamide (IMODIUM) 2 MG capsule   Yes Yes   Sig: Take 4 mg by mouth 4 times daily as needed for diarrhea.   loratadine (CLARITIN) 10 MG tablet   Yes Yes   Sig: Take 10 mg by mouth daily as needed for allergies.   magnesium hydroxide (MILK OF MAGNESIA) 400 MG/5ML suspension   Yes Yes   Sig: Take 30 mLs by mouth daily as needed.   metFORMIN (GLUCOPHAGE) 1000 MG tablet 1/2/2025 Morning  Yes Yes   Sig: Take 1,000 mg by mouth 2 times daily (with meals)   montelukast (SINGULAIR) 10 MG tablet 1/2/2025 Morning  Yes Yes   Sig: Take 10 mg by mouth every morning.   omeprazole (PRILOSEC) 40 MG DR capsule 1/2/2025 Morning  Yes Yes   Sig: Take 40 mg by mouth every morning.   pramoxine-calamine (AVEENO) 1-8 % LOTN   Yes Yes   Sig: Apply topically daily as needed for itching.   rosuvastatin (CRESTOR) 10 MG tablet 1/1/2025 Evening  Yes Yes   Sig: Take 10 mg by mouth At Bedtime   spironolactone (ALDACTONE) 100 MG tablet   Yes Yes   Sig: Take 100 mg by mouth daily.   sucralfate (CARAFATE) 1 GM/10ML suspension   Yes Yes   Sig: Take 1 g by mouth 4 times daily as needed.   traZODone (DESYREL) 100 MG tablet 1/1/2025 Bedtime  Yes Yes   Sig: Take 100 mg by mouth at bedtime.   witch hazel-glycerin (TUCKS) pad   Yes Yes   Sig: Apply topically as needed for hemorrhoids.      Facility-Administered Medications: None           Physical Exam   Vital Signs: Temp: 97.9  F (36.6  C) Temp src: Oral BP: 122/58 Pulse: 84   Resp: 18 SpO2: 98 % O2 Device: None (Room air)    Weight: 277 lbs 0 oz    Physical Exam  Constitutional:       General: He is not in acute distress.     Appearance: Normal appearance. He is obese. He is not ill-appearing or toxic-appearing.   HENT:      Head: Normocephalic and atraumatic.      Nose: Nose normal. No congestion.      Mouth/Throat:      Mouth: Mucous membranes are moist.      Pharynx: Oropharynx is clear.   Cardiovascular:      Rate and Rhythm: Normal rate and regular rhythm.       Pulses: Normal pulses.      Heart sounds: Normal heart sounds.   Pulmonary:      Effort: Pulmonary effort is normal.      Breath sounds: Normal breath sounds.   Abdominal:      General: Bowel sounds are normal. There is distension.      Tenderness: There is abdominal tenderness (Mild RUQ tenderness).   Musculoskeletal:      Right lower leg: No edema.      Left lower leg: No edema.   Skin:     General: Skin is warm and dry.      Coloration: Skin is not jaundiced.   Neurological:      General: No focal deficit present.      Mental Status: He is alert.   Psychiatric:         Mood and Affect: Mood normal.       Medical Decision Making           Data     I have personally reviewed the following data over the past 24 hrs:    12.8 (H)  \   12.9 (L)   / 332     139 107 38.0 (H) /  131 (H)   5.1 19 (L) 1.20 (H) \     ALT: 19 AST: 18 AP: 303 (H) TBILI: 0.5   ALB: 4.4 TOT PROTEIN: 7.6 LIPASE: 19     Procal: N/A CRP: 17.70 (H) Lactic Acid: N/A         Imaging results reviewed over the past 24 hrs:   Recent Results (from the past 24 hours)   CT Abdomen Pelvis w Contrast    Narrative    EXAM: CT ABDOMEN PELVIS W CONTRAST  LOCATION: [facility]  DATE: 1/2/2025    INDICATION: LLQ pain, 10lb weight gain.  COMPARISON: 12.29.2024.  TECHNIQUE: CT scan of the abdomen and pelvis was performed following injection of IV contrast. Multiplanar reformats were obtained. Dose reduction techniques were used.  CONTRAST: IsoVue 370 90mL    FINDINGS:   LOWER CHEST: Small distal paraesophageal varices. No pleural effusion.    HEPATOBILIARY: Cirrhotic liver with heterogeneous enhancement, previously noted.    PANCREAS: Normal.    SPLEEN: Mildly enlarged.    ADRENAL GLANDS: Stable bilateral adrenal myelolipomas.    KIDNEYS/BLADDER: Normal.    BOWEL: No obstruction. No diverticulitis. Normal appendix.    LYMPH NODES: Several stable borderline sized abdominal and retroperitoneal lymph nodes.    VASCULATURE: Nonaneurysmal  aorta.    PELVIC ORGANS: Mild to moderate ascites.    MUSCULOSKELETAL: Nothing acute.      Impression    IMPRESSION:     1.  Mild to moderate ascites. No free air. No abscess.    2.  Cirrhotic liver. Mild splenomegaly.    3.  No other acute findings to explain symptoms.     US Paracentesis with Albumin    Narrative    EXAM:  1. PARACENTESIS  2. ULTRASOUND GUIDANCE  LOCATION: [facility]  DATE: 1/2/2025    INDICATION: Ascites.    PROCEDURE: Informed consent obtained. Time out performed. The abdomen was prepped and draped in a sterile fashion. 10 mL of 1% lidocaine was infused into local soft tissues. A 5 Vietnamese catheter system was introduced into the abdominal ascites under   ultrasound guidance.    5.0 liters of clear fluid were removed and sent to lab if requested.    Patient tolerated procedure well.    Ultrasound imaging was obtained and placed in the patient's permanent medical record.      Impression    IMPRESSION:  1.  Status post ultrasound-guided paracentesis.    Reference CPT Code: 90862

## 2025-01-03 PROBLEM — R18.8 CIRRHOSIS OF LIVER WITH ASCITES, UNSPECIFIED HEPATIC CIRRHOSIS TYPE (H): Status: ACTIVE | Noted: 2024-12-02

## 2025-01-03 PROBLEM — G47.33 OSA TREATED WITH BIPAP: Chronic | Status: ACTIVE | Noted: 2019-01-14

## 2025-01-03 PROBLEM — H90.3 ASNHL (ASYMMETRICAL SENSORINEURAL HEARING LOSS): Status: ACTIVE | Noted: 2024-08-06

## 2025-01-03 PROBLEM — K74.60 CIRRHOSIS OF LIVER WITH ASCITES, UNSPECIFIED HEPATIC CIRRHOSIS TYPE (H): Status: ACTIVE | Noted: 2024-12-02

## 2025-01-03 PROBLEM — E11.9 DM2 (DIABETES MELLITUS, TYPE 2) (H): Status: ACTIVE | Noted: 2023-02-21

## 2025-01-03 LAB
ANION GAP SERPL CALCULATED.3IONS-SCNC: 14 MMOL/L (ref 7–15)
BUN SERPL-MCNC: 40.9 MG/DL (ref 6–20)
CALCIUM SERPL-MCNC: 9.8 MG/DL (ref 8.8–10.4)
CHLORIDE SERPL-SCNC: 104 MMOL/L (ref 98–107)
CREAT SERPL-MCNC: 1.38 MG/DL (ref 0.67–1.17)
EGFRCR SERPLBLD CKD-EPI 2021: 65 ML/MIN/1.73M2
ERYTHROCYTE [DISTWIDTH] IN BLOOD BY AUTOMATED COUNT: 17.8 % (ref 10–15)
GLUCOSE BLDC GLUCOMTR-MCNC: 100 MG/DL (ref 70–99)
GLUCOSE BLDC GLUCOMTR-MCNC: 110 MG/DL (ref 70–99)
GLUCOSE BLDC GLUCOMTR-MCNC: 112 MG/DL (ref 70–99)
GLUCOSE BLDC GLUCOMTR-MCNC: 90 MG/DL (ref 70–99)
GLUCOSE SERPL-MCNC: 145 MG/DL (ref 70–99)
HCO3 SERPL-SCNC: 18 MMOL/L (ref 22–29)
HCT VFR BLD AUTO: 39.4 % (ref 40–53)
HGB BLD-MCNC: 12.6 G/DL (ref 13.3–17.7)
HOLD SPECIMEN: NORMAL
MAGNESIUM SERPL-MCNC: 1.8 MG/DL (ref 1.7–2.3)
MCH RBC QN AUTO: 27 PG (ref 26.5–33)
MCHC RBC AUTO-ENTMCNC: 32 G/DL (ref 31.5–36.5)
MCV RBC AUTO: 84 FL (ref 78–100)
PHOSPHATE SERPL-MCNC: 5.6 MG/DL (ref 2.5–4.5)
PLATELET # BLD AUTO: 303 10E3/UL (ref 150–450)
POTASSIUM SERPL-SCNC: 5.3 MMOL/L (ref 3.4–5.3)
RBC # BLD AUTO: 4.67 10E6/UL (ref 4.4–5.9)
SODIUM SERPL-SCNC: 136 MMOL/L (ref 135–145)
WBC # BLD AUTO: 12.8 10E3/UL (ref 4–11)

## 2025-01-03 PROCEDURE — P9047 ALBUMIN (HUMAN), 25%, 50ML: HCPCS | Mod: JZ | Performed by: INTERNAL MEDICINE

## 2025-01-03 PROCEDURE — 84100 ASSAY OF PHOSPHORUS: CPT | Performed by: FAMILY MEDICINE

## 2025-01-03 PROCEDURE — 120N000001 HC R&B MED SURG/OB

## 2025-01-03 PROCEDURE — 999N000157 HC STATISTIC RCP TIME EA 10 MIN

## 2025-01-03 PROCEDURE — 250N000013 HC RX MED GY IP 250 OP 250 PS 637

## 2025-01-03 PROCEDURE — 82962 GLUCOSE BLOOD TEST: CPT

## 2025-01-03 PROCEDURE — 83735 ASSAY OF MAGNESIUM: CPT | Performed by: FAMILY MEDICINE

## 2025-01-03 PROCEDURE — 80048 BASIC METABOLIC PNL TOTAL CA: CPT

## 2025-01-03 PROCEDURE — 5A09357 ASSISTANCE WITH RESPIRATORY VENTILATION, LESS THAN 24 CONSECUTIVE HOURS, CONTINUOUS POSITIVE AIRWAY PRESSURE: ICD-10-PCS | Performed by: OTOLARYNGOLOGY

## 2025-01-03 PROCEDURE — 250N000011 HC RX IP 250 OP 636: Mod: JZ | Performed by: INTERNAL MEDICINE

## 2025-01-03 PROCEDURE — 94660 CPAP INITIATION&MGMT: CPT

## 2025-01-03 PROCEDURE — 99232 SBSQ HOSP IP/OBS MODERATE 35: CPT | Mod: GC

## 2025-01-03 PROCEDURE — 88305 TISSUE EXAM BY PATHOLOGIST: CPT | Mod: TC

## 2025-01-03 PROCEDURE — 36415 COLL VENOUS BLD VENIPUNCTURE: CPT

## 2025-01-03 PROCEDURE — 85027 COMPLETE CBC AUTOMATED: CPT

## 2025-01-03 PROCEDURE — 250N000011 HC RX IP 250 OP 636

## 2025-01-03 RX ORDER — ALBUMIN (HUMAN) 12.5 G/50ML
75 SOLUTION INTRAVENOUS ONCE
Status: COMPLETED | OUTPATIENT
Start: 2025-01-03 | End: 2025-01-03

## 2025-01-03 RX ADMIN — FAMOTIDINE 20 MG: 20 TABLET ORAL at 08:34

## 2025-01-03 RX ADMIN — EMPAGLIFLOZIN 10 MG: 10 TABLET, FILM COATED ORAL at 08:35

## 2025-01-03 RX ADMIN — OLANZAPINE 10 MG: 5 TABLET, FILM COATED ORAL at 21:56

## 2025-01-03 RX ADMIN — APIXABAN 5 MG: 5 TABLET, FILM COATED ORAL at 08:34

## 2025-01-03 RX ADMIN — SPIRONOLACTONE 100 MG: 100 TABLET ORAL at 08:34

## 2025-01-03 RX ADMIN — ALBUMIN HUMAN 75 G: 0.25 SOLUTION INTRAVENOUS at 10:18

## 2025-01-03 RX ADMIN — UMECLIDINIUM 1 PUFF: 62.5 AEROSOL, POWDER ORAL at 08:36

## 2025-01-03 RX ADMIN — TRAZODONE HYDROCHLORIDE 100 MG: 50 TABLET ORAL at 21:56

## 2025-01-03 RX ADMIN — FAMOTIDINE 20 MG: 20 TABLET ORAL at 20:18

## 2025-01-03 RX ADMIN — FLUTICASONE FUROATE AND VILANTEROL TRIFENATATE 1 PUFF: 100; 25 POWDER RESPIRATORY (INHALATION) at 08:36

## 2025-01-03 RX ADMIN — ACETAMINOPHEN 650 MG: 325 TABLET ORAL at 16:26

## 2025-01-03 RX ADMIN — PANTOPRAZOLE SODIUM 40 MG: 40 TABLET, DELAYED RELEASE ORAL at 16:26

## 2025-01-03 RX ADMIN — DICLOFENAC SODIUM 2 G: 10 GEL TOPICAL at 17:08

## 2025-01-03 RX ADMIN — NICOTINE 1 PATCH: 14 PATCH, EXTENDED RELEASE TRANSDERMAL at 10:16

## 2025-01-03 RX ADMIN — FUROSEMIDE 40 MG: 20 TABLET ORAL at 08:34

## 2025-01-03 RX ADMIN — ROSUVASTATIN 10 MG: 10 TABLET, FILM COATED ORAL at 21:56

## 2025-01-03 RX ADMIN — CEFTRIAXONE SODIUM 2 G: 2 INJECTION, POWDER, FOR SOLUTION INTRAMUSCULAR; INTRAVENOUS at 03:26

## 2025-01-03 RX ADMIN — LOPERAMIDE HYDROCHLORIDE 4 MG: 2 CAPSULE ORAL at 16:26

## 2025-01-03 RX ADMIN — APIXABAN 5 MG: 5 TABLET, FILM COATED ORAL at 20:18

## 2025-01-03 RX ADMIN — PANTOPRAZOLE SODIUM 40 MG: 40 TABLET, DELAYED RELEASE ORAL at 08:35

## 2025-01-03 RX ADMIN — MONTELUKAST 10 MG: 10 TABLET, FILM COATED ORAL at 08:35

## 2025-01-03 RX ADMIN — FLUOXETINE HYDROCHLORIDE 20 MG: 20 CAPSULE ORAL at 08:36

## 2025-01-03 ASSESSMENT — ACTIVITIES OF DAILY LIVING (ADL)
ADLS_ACUITY_SCORE: 57
ADLS_ACUITY_SCORE: 62
ADLS_ACUITY_SCORE: 57
ADLS_ACUITY_SCORE: 38
ADLS_ACUITY_SCORE: 57
ADLS_ACUITY_SCORE: 38
ADLS_ACUITY_SCORE: 57
ADLS_ACUITY_SCORE: 61
ADLS_ACUITY_SCORE: 57
ADLS_ACUITY_SCORE: 61
ADLS_ACUITY_SCORE: 57
ADLS_ACUITY_SCORE: 57
DEPENDENT_IADLS:: CLEANING;COOKING;LAUNDRY;SHOPPING;MEDICATION MANAGEMENT;MONEY MANAGEMENT;TRANSPORTATION
ADLS_ACUITY_SCORE: 57
ADLS_ACUITY_SCORE: 38
ADLS_ACUITY_SCORE: 58
ADLS_ACUITY_SCORE: 57

## 2025-01-03 NOTE — PROGRESS NOTES
[facility]    Progress Note - Hospitalist Service       Date of Admission:  1/2/2025    Assessment & Plan   Warren Jaramillo is a 44 year old male with a history of T2DM, HTN, HFpEF, cardiomegaly, DVT, morbid obesity, autism, adjustment disorder, insomnia, and cirrhosis with ascites who is admitted for SBP in the setting of ascites. On IV antibiotics and receiving albumin.     Spontaneous bacterial peritonitis   Cirrhosis, unspecified type vs alcoholic cirrhosis   Ascites   Presents to ED with concern of increased abdominal distention and lower abdominal pain. Recently seen in the ED for a similar issue on 12/29. Noted to have increased distention and 10 lbs weight gain since so returned for re-evaluation. CT abdomen/pelvis showed cirrhosis of the liver with mild-moderate ascites. S/p paracentesis in the ED with ascites fluid and SAAG c/w portal hypertension though also high protein indicating likely HF etiology. Vitally stable. UA notable only for glucose, on Jardiance. WBC of 12.8. ALT, AST, Tbili, and albumin WNL. CRP elevated at 17.7. Lipase normal. Concern for SBP in the setting of known liver cirrhosis of likely alcoholic origin per GI. Received 1 dose of ceftazidime and 1 dose of 25% albumin in the ED.   - GI consulted, appreciate recommendations    - albumin ~100g per bolus    - continue IV ceftriaxone   - will need SBP prophylaxis on discharge (cipro 500 mg daily indefinitely)   - will consider repeat paracentesis prior to discharge    Tongue lesion  Presented with lesion on the tip of his tongue (see image below). Overnight noted some bleeding and was given an oral suction. This morning suction canister noted to have 500 ml of blood tinged fluid. On my exam lesion was not actively bleeding. Differential includes granuloma, pyogenic granuloma, fibroma, or neoplasm with history of tobacco use. Discussed with Dr. Ivan/ENT.   - will monitor  - avoid oral suction to avoid  "mechanical trauma and bleeding  - plan for outpatient follow up with possible excisional biopsy at that time     Type 2 diabetes mellitus  History of non-insulin dependent T2DM. A1c 6.8 in 9/2024. A1c 6.2 on admission. Currently on metformin and Jardiance at home. Noted to have glucosuria which is explained by Jardiance. Blood sugars have been well controlled during admission.   - Hold PTA metformin   - Continue home Jardiance   - mSSI      HFpEF  History of HFpEF with EF of 50-55% on most recent Echo on 8/2/2023. No significant lower extremity edema or SOB on admission. Follows closely with cardiology. No signs of volume overload at this time. Will continue PTA meds.   - PTA spironolactone and lasix      HLD  HTN  Pressures well controlled 120s/50s on admission.   - PTA rosuvastatin   - PTA lisinopril      COPD   Asthma  - prn albuterol nebs for wheezing/SOB   - PTA Trelegy Eliipta and singulair      History of DVT   - Continue PTA Eliquis for DVT ppx      GERD  - Continue PTA omeprazole, pepcid, and carafate      Chronic constipation  The patient has a history of chronic constipation but currently has diarrhea. Will place prn stool softeners but hold for loose stool.      Mood and insomnia   - PTA Zyprexa   - PTA fluoxetine  - PTA trazodone at bedtime          Diet: 2 Gram Sodium Diet    DVT Prophylaxis: DOAC  Khan Catheter: Not present  Fluids: PO  Lines: None     Cardiac Monitoring: None  Code Status: Full Code      Clinically Significant Risk Factors Present on Admission                # Drug Induced Coagulation Defect: home medication list includes an anticoagulant medication    # Hypertension: Noted on problem list  # Chronic heart failure with preserved ejection fraction: heart failure noted on problem list and last echo with EF >50%          # Severe Obesity: Estimated body mass index is 46.1 kg/m  as calculated from the following:    Height as of 12/29/24: 1.651 m (5' 5\").    Weight as of this encounter: " 125.6 kg (277 lb).         # Financial/Environmental Concerns: none         Social Drivers of Health   Housing Stability: High Risk (12/2/2024)    Housing Stability     Do you have housing? : No     Are you worried about losing your housing?: No   Tobacco Use: High Risk (12/24/2024)    Patient History     Smoking Tobacco Use: Every Day     Smokeless Tobacco Use: Never    [facility] [id number]  [facility]    Social Connections        Disposition Plan     Medically Ready for Discharge: Anticipated in 2-4 Days     The patient's care was discussed with the Attending Physician, Dr. Avendano and Patient's Family.    Denise Frazier MD  Hospitalist Service  [facility]  Securely message with [employer] (more info)  Text page via [employer] Paging/Directory   ______________________________________________________________________    Interval History   Vitally stable overnight. No acute events.     Was given a suction for some tip of the tongue bleeding. Canister noted to have 500 ml out this morning. Has had some left lower abdomen tenderness and loose stools for a few days.     Physical Exam   Vital Signs: Temp: 97.7  F (36.5  C) Temp src: Oral BP: 96/57 Pulse: 68   Resp: 22 SpO2: 97 % O2 Device: None (Room air)    Weight: 277 lbs 0 oz    Constitutional: awake, alert, cooperative, no apparent distress  HEENT: erythematous lesion on tip of tongue (see image below), no active bleeding  Respiratory: Clear to auscultation bilaterally, no crackles or wheezing  Cardiovascular: Regular rate and rhythm, normal S1 and S2, no murmur noted  GI: distended, tenderness to left lower abdomen, bowel sounds present  Skin: small erythematous lesions on hands and feet, see images below  Musculoskeletal: Full range of motion noted.  Neurologic: Awake, alert, oriented to name, place and time.  Cranial nerves II-XII are grossly intact.                Medical  Decision Making   Please see A&P for additional details of medical decision making.      Data   ------------------------- PAST 24 HR DATA REVIEWED -----------------------------------------------    I have personally reviewed the following data over the past 24 hrs:    12.8 (H)  \   12.6 (L)   / 303     136 104 40.9 (H) /  110 (H)   5.3 18 (L) 1.38 (H) \     TSH: N/A T4: N/A A1C: N/A     INR:  1.54 (H) PTT:  N/A   D-dimer:  N/A Fibrinogen:  N/A       Imaging results reviewed over the past 24 hrs:   No results found for this or any previous visit (from the past 24 hours).

## 2025-01-03 NOTE — PLAN OF CARE
Goal Outcome Evaluation:      Plan of Care Reviewed With: patient          Outcome Evaluation: Plan return to group home when medically ready for discharge.

## 2025-01-03 NOTE — PLAN OF CARE
"  Problem: Adult Inpatient Plan of Care  Goal: Plan of Care Review  Description: The Plan of Care Review/Shift note should be completed every shift.  The Outcome Evaluation is a brief statement about your assessment that the patient is improving, declining, or no change.  This information will be displayed automatically on your shift  note.  Outcome: Progressing  Goal: Patient-Specific Goal (Individualized)  Description: You can add care plan individualizations to a care plan. Examples of Individualization might be:  \"Parent requests to be called daily at 9am for status\", \"I have a hard time hearing out of my right ear\", or \"Do not touch me to wake me up as it startles  me\".  Outcome: Progressing  Goal: Optimal Comfort and Wellbeing  Outcome: Progressing  Goal: Readiness for Transition of Care  Outcome: Progressing     Goal: Blood Glucose Levels Within Targeted Range  Outcome: Progressing  Intervention: Monitor and Manage Glycemia  Recent Flowsheet Documentation  Taken 1/3/2025 0430 by Rosario Meade RN  Medication Review/Management:   medications reviewed   high-risk medications identified  Taken 1/3/2025 0045 by Rosario Meade RN  Medication Review/Management:   medications reviewed   high-risk medications identified  Goal: Blood Pressure in Desired Range  Outcome: Progressing  Intervention: Maintain Blood Pressure Management  Recent Flowsheet Documentation  Taken 1/3/2025 0430 by Rosario Meade RN  Medication Review/Management:   medications reviewed   high-risk medications identified  Taken 1/3/2025 0045 by Rosario Meade RN  Medication Review/Management:   medications reviewed   high-risk medications identified   Problem: Infection  Goal: Absence of Infection Signs and Symptoms  Outcome: Progressing   Goal Outcome Evaluation:  Pt is alert & oriented, VSS, afebrile. BIPAP/CPAP worn at night, Sats > 95%. Pt has a sore at the tip of tongue. It gets reopened every now & then, continuous bleeding " noted for about 5 minutes. Blood suctioned and rinsed off, with ice placed in towel to help stop bleeding, effective.

## 2025-01-03 NOTE — CONSULTS
"Care Management Initial Consult    General Information  Assessment completed with: Patient,    Type of CM/SW Visit: Initial Assessment    Primary Care Provider verified and updated as needed: Yes   Readmission within the last 30 days: previous discharge plan unsuccessful   Return Category: Exacerbation of disease  Reason for Consult: discharge planning  Advance Care Planning: Advance Care Planning Reviewed: no concerns identified          Communication Assessment  Patient's communication style: spoken language (English or Bilingual)             Cognitive  Cognitive/Neuro/Behavioral: WDL  Level of Consciousness: alert  Arousal Level: opens eyes spontaneously  Orientation: oriented x 4  Mood/Behavior: calm, cooperative  Best Language: 0 - No aphasia  Speech: clear, spontaneous, logical    Living Environment:   People in home: facility resident (\"3 other group home residents\")     Current living Arrangements: group home      Able to return to prior arrangements: yes       Family/Social Support:  Care provided by: self, other (see comments) (senior living staff)  Provides care for: no one, unable/limited ability to care for self  Marital Status: Single  Support system: Guardian, Facility resident(s)/Staff          Description of Support System: Supportive    Support Assessment: Adequate family and caregiver support    Current Resources:   Patient receiving home care services: No        Community Resources: County Worker, County Programs, Group Home,   Equipment currently used at home:  (CPAP)  Supplies currently used at home: Incontinence Supplies    Employment/Financial:  Employment Status: disabled        Financial Concerns: none   Referral to Financial Worker: No       Does the patient's insurance plan have a 3 day qualifying hospital stay waiver?  No    Lifestyle & Psychosocial Needs:  Social Drivers of Health     Food Insecurity: Low Risk  (12/2/2024)    Food Insecurity     Within the past 12 months, did " you worry that your food would run out before you got money to buy more?: No     Within the past 12 months, did the food you bought just not last and you didn t have money to get more?: No   Depression: Not at risk (8/14/2023)    PHQ-2     PHQ-2 Score: 0   Housing Stability: High Risk (12/2/2024)    Housing Stability     Do you have housing? : No     Are you worried about losing your housing?: No   Tobacco Use: High Risk (12/24/2024)    Patient History     Smoking Tobacco Use: Every Day     Smokeless Tobacco Use: Never     Passive Exposure: Not on file   Financial Resource Strain: Low Risk  (12/2/2024)    Financial Resource Strain     Within the past 12 months, have you or your family members you live with been unable to get utilities (heat, electricity) when it was really needed?: No   Alcohol Use: Not At Risk (9/22/2022)    [facility]    AUDIT-C     Frequency of Alcohol Consumption: Never     Average Number of Drinks: Not on file     Frequency of Binge Drinking: Not on file   Transportation Needs: Low Risk  (12/2/2024)    Transportation Needs     Within the past 12 months, has lack of transportation kept you from medical appointments, getting your medicines, non-medical meetings or appointments, work, or from getting things that you need?: No   Physical Activity: Not on file   Interpersonal Safety: Not on file   Stress: Not on file   Social Connections: Unknown (5/1/2023)    [facility]    Social Connections     Frequency of Communication with Friends and Family: Not on file   Health Literacy: Not on file       Functional Status:  Prior to admission patient needed assistance:   Dependent ADLs:: Bathing, Toileting  Dependent IADLs:: Cleaning, Cooking, Laundry, Shopping, Medication Management, Money Management, Transportation       Mental Health Status:  Mental  Health Status: Past Concern  Mental Health Management: Medication    Chemical Dependency Status:  Chemical Dependency Status: Past Concern             Values/Beliefs:  Spiritual, Cultural Beliefs, Yazidism Practices, Values that affect care: no               Discussed  Partnership in Safe Discharge Planning  document with patient/family: No    Additional Information:  CM consult received for discharge plannign.  Met with patient at bedside to introduce CM role and perform initial assessment.  Demographics verified and updated as needed.  Patient was hospitalized 12/1-12/3 and had ED visits 12/10, 12/11, and 12/29.  Warren lives in a group home with 3 other adult residents.  [facility] assists with IADLs and bathing at times.  Patient reports he has a county SW through [facility], a financial worker through [facility], and a guardian through [facility].  Anticipates returning to [facility] when medically ready for discharge.  Patient concerned about outstanding medical bill.  Says [facility] is only billing his medicare plan, but he also has [employer] [place].  Assisted patient to place call to [facility] billing to update insurance information for previous hospital stay.  Also informed registration that patient should have [employer] insurance added to his chart.  Will need transportation arranged at discharge.    Contacts:  [facility]; ph [phone]  [facility]; ph [phone]  [facility]; ph [phone]  Guardian Jesika Harden; ph [phone]        Next Steps: CM to follow for medical progression of care, discharge recommendations, and final discharge plan.      Dusty Wolfe CRNI

## 2025-01-03 NOTE — PROGRESS NOTES
I spoke with Dr. Frazier on the phone regarding this patient.  The patient is a 44-year-old male with tobacco history (current 1 ppd smoker) presenting with abdominal pain in the setting of cirrhosis.  The patient has a history of morbid obesity and heart failure.  He also has a history of obstructive sleep apnea treated with BiPAP therapy.    He has had a roughly 1 month history of a lesion at the tip of the tongue.  Overnight, there was bloody drainage from the lesion that was collected with an oral suction.  His hemoglobin has been relatively stable since admission.  His platelet count is in normal range.  His INR is elevated at 1.54.  He has a history of DVT and is currently anticoagulated with Eliquis.  He is being worked up/treated for spontaneous bacterial peritonitis.    I reviewed an image of an anterior tongue lesion likely measuring 8 to 10 mm.  The lesion is exophytic and at the tip of the tongue.  This is possibly a granuloma, pyogenic granuloma, or fibroma/inflammatory reaction.  It is also quite possible the lesion could be neoplastic.    Currently the lesion is not actively bleeding.  It is quite possible this lesion may need biopsy.  Unfortunately, the patient likely has disturbance of his coagulation pathway due to his cirrhosis and he is on a current anticoagulant.  If we were to consider biopsy, he likely would need to come off the Eliquis for a period of time.    Since the lesion is not actively bleeding, I would not recommend any urgent intervention.  It sounds like the lesion might have been irritated by the oral suction so we will have him forego use of oral suction at this time.    We will attempt to arrange follow-up to examine the lesion and discussed possible excisional biopsy as an outpatient. If there are questions, please page me directly at [phone].    Johann Ivan MD  Department of Otolaryngology-Head and Neck Surgery  [facility]

## 2025-01-03 NOTE — CONSULTS
[facility] DIG[facility] CONSULTATION    Warren Jaramillo   [address]  [facility] [facility] [address]  44 year old male  Admission Date/Time: 1/2/2025  7:43 PM    Primary Care Provider:  Mary Kelly    Requesting Physician: Alanis Avendano MD      CHIEF COMPLAINT:   SBP    REASON FOR THE CONSULT: I was asked to see Warren Jaramillo in consultation at the request of Dr. Avendano for evaluation of ascites and SBP    HPI:     Warren is a 44-year-old man with diabetes, hypertension, heart failure with a EF of 42%, cardiomegaly, DVT on anticoagulation, morbid obesity, autism, AVA, alcoholic cirrhosis decompensated with ascites who presents to [facility] with complaint of abdominal pain    Abdominal pain worsening since this past weekend  Denies any fever, denies any bleeding, says he has been following a low-sodium diet and faithfully taking his diuretic regimen of spironolactone 100 mg daily and furosemide 40 mg daily  In the emergency department he underwent paracentesis with 5 L of fluid  Fluid analysis showed elevated neutrophil count, elevated albumin and total protein as well  Findings concerning for SBP, and he was given a dose of IV albumin and antibiotics and admitted to the hospital    Interestingly it looks like he was diagnosed with SBP in early December 2024 and treated for only 5 days.  I do not see that he was given a prophylactic antibiotic to take on a daily basis thereafter        REVIEW OF SYSTEMS: 13 point review of systems is negative except that noted above.    PAST MEDICAL HISTORY:   Past Medical History:   Diagnosis Date    COPD exacerbation (H) 12/2/2024    DM2 (diabetes mellitus, type 2) (H) 4/28/2020    HTN (hypertension) 7/30/2012    Thyroid nodule 7/31/2019    Rojas's disease (H)        PAST SURGICAL HISTORY:   Past Surgical History:   Procedure Laterality Date    COLONOSCOPY      ESOPHAGOSCOPY, GASTROSCOPY, DUODENOSCOPY (EGD), COMBINED N/A 7/21/2023    Procedure:  ESOPHAGOGASTRODUODENOSCOPY WITH GASTRIC AND ESOPHAGEAL BIOPSIES;  Surgeon: Filiberto Aragon MD;  Location: [facility] OR    TOOTH EXTRACTION         FAMILY HISTORY:   Family History   Problem Relation Age of Onset    Unknown/Adopted Father     Unknown/Adopted Maternal Grandmother     C.A.D. Maternal Grandfather     Diabetes Maternal Grandfather     Cerebrovascular Disease Maternal Grandfather     Unknown/Adopted Paternal Grandmother     Unknown/Adopted Paternal Grandfather     Unknown/Adopted Brother     Unknown/Adopted Sister        SOCIAL HISTORY:   Social History     Tobacco Use    Smoking status: Every Day     Current packs/day: 1.00     Types: Cigarettes    Smokeless tobacco: Never   Substance Use Topics    Alcohol use: No     Comment: once every 3 months        MEDS:  (Not in a hospital admission)      ALLERGIES/SENSITIVITIES: Apricot flavoring agent (non-screening), Banana, Wasp venom protein, Bees, Methylphenidate, Prunus, Sulfa antibiotics, and Prunus persica    MEDICATIONS:  Current Outpatient Medications   Medication Sig Dispense Refill    albuterol (PROAIR HFA/PROVENTIL HFA/VENTOLIN HFA) 108 (90 Base) MCG/ACT inhaler Inhale 1-2 puffs into the lungs every 6 hours as needed for shortness of breath, wheezing or cough 18 g 0    albuterol (PROVENTIL) (2.5 MG/3ML) 0.083% neb solution Take 2.5 mg by nebulization 3 times daily as needed for shortness of breath, wheezing or cough      aloe vera GEL Apply 1 g topically every hour as needed for skin care Per bottle directions      apixaban ANTICOAGULANT (ELIQUIS) 5 MG tablet Take 5 mg by mouth 2 times daily.      bacitracin 500 UNIT/GM OINT Apply topically 3 times daily as needed for wound care      Benzocaine (SOLARCAINE ALOE VERA EX) Externally apply topically daily as needed.      benzonatate (TESSALON) 200 MG capsule Take 1 capsule (200 mg) by mouth 3 times daily as needed for cough. 50 capsule 0    Calamine external lotion Apply topically as needed for  itching      carbamide peroxide (DEBROX) 6.5 % otic solution Place 5 drops into both ears daily as needed for other.      clotrimazole (LOTRIMIN) 1 % external cream Apply topically 2 times daily as needed (skin irritation)      dextromethorphan-guaiFENesin (MUCINEX DM)  MG 12 hr tablet Take 1 tablet by mouth 2 times daily as needed.      diclofenac (VOLTAREN) 1 % topical gel Apply 2 g topically daily as needed for moderate pain To joints/back      empagliflozin (JARDIANCE) 10 MG TABS tablet Take 10 mg by mouth every morning.      EPINEPHrine (ANY BX GENERIC EQUIV) 0.3 MG/0.3ML injection 2-pack Inject 0.3 mg into the muscle as needed for anaphylaxis. May repeat one time in 5-15 minutes if response to initial dose is inadequate.      famotidine (PEPCID) 20 MG tablet Take 1 tablet (20 mg) by mouth 2 times daily 60 tablet 0    FLUoxetine 20 MG tablet Take 20 mg by mouth every morning.      furosemide (LASIX) 40 MG tablet Take 40 mg by mouth every morning.      GAVILAX 17 GM/SCOOP powder Take 17 g by mouth daily as needed.      hydrocortisone (CORTAID) 1 % external cream Apply topically daily as needed.      Hydrocortisone (PREPARATION H EX) Externally apply topically daily as needed.      ibuprofen (ADVIL/MOTRIN) 200 MG tablet Take 400 mg by mouth every 4 hours as needed for pain.      ketorolac (TORADOL) 10 MG tablet Take 1 tablet (10 mg) by mouth every 6 hours as needed for pain. 20 tablet 0    lisinopril (ZESTRIL) 10 MG tablet Take 10 mg by mouth every morning.      loperamide (IMODIUM) 2 MG capsule Take 4 mg by mouth 4 times daily as needed for diarrhea.      loratadine (CLARITIN) 10 MG tablet Take 10 mg by mouth daily as needed for allergies.      magnesium hydroxide (MILK OF MAGNESIA) 400 MG/5ML suspension Take 30 mLs by mouth daily as needed.      metFORMIN (GLUCOPHAGE) 1000 MG tablet Take 1,000 mg by mouth 2 times daily (with meals)      montelukast (SINGULAIR) 10 MG tablet Take 10 mg by mouth every  morning.      OLANZapine (ZYPREXA) 10 MG tablet Take 10 mg by mouth At Bedtime      omeprazole (PRILOSEC) 40 MG DR capsule Take 40 mg by mouth every morning.      pramoxine-calamine (AVEENO) 1-8 % LOTN Apply topically daily as needed for itching.      Pseudoephedrine-DM-GG (PSEUDOEPHEDRINE-DM-GUAIFENESIN OR) Take 10 mLs by mouth every 6 hours as needed.      rosuvastatin (CRESTOR) 10 MG tablet Take 10 mg by mouth At Bedtime      spironolactone (ALDACTONE) 100 MG tablet Take 100 mg by mouth daily.      sucralfate (CARAFATE) 1 GM/10ML suspension Take 1 g by mouth 4 times daily as needed.      traZODone (DESYREL) 100 MG tablet Take 100 mg by mouth at bedtime.      TRELEGY ELLIPTA 100-62.5-25 MCG/ACT oral inhaler Inhale 1 puff into the lungs daily.      Urea 40 % CREA Apply topically daily as needed.      Vitamins A & D (VITAMIN A & D) OINT Externally apply topically daily as needed.      witch hazel-glycerin (TUCKS) pad Apply topically as needed for hemorrhoids.         PHYSICAL EXAM:  Temp:  [96.5  F (35.8  C)-97.9  F (36.6  C)] 97.6  F (36.4  C)  Pulse:  [77-84] 84  Resp:  [18-22] 18  BP: (100-122)/(49-58) 108/53  SpO2:  [96 %-98 %] 96 %  Body mass index is 46.1 kg/m .  GEN: Well developed, well nourished 44 year old in no acute distress.  HEENT: sclera anicteric, moist mucous membranes.   LYMPH: No cervical lymphadenopathy  PULM: Nonlabored breathing. Breath sounds equal.   CARDIO: Regular rate  GI: Distended, ascites noted  EXT: warm, no lower extremity edema  NEURO: Alert. No focal defects.  No asterixis  PSYCH: Mental status appropriate, mood and affect normal.    SKIN: No rashes  MSK: No joint abnormalities    LABORATORY DATA:  CBC RESULTS:   Recent Labs   Lab Test 01/02/25  1226   WBC 12.8*   RBC 4.79   HGB 12.9*   HCT 40.8   MCV 85   MCH 26.9   MCHC 31.6   RDW 17.8*           CMP Results:   Recent Labs   Lab Test 01/02/25 2016 01/02/25  1226   NA  --  139   POTASSIUM  --  5.1   CHLORIDE  --  107   CO2   --  19*   ANIONGAP  --  13   * 85   BUN  --  38.0*   CR  --  1.20*   BILITOTAL  --  0.5   ALKPHOS  --  303*   ALT  --  19   AST  --  18        INR Results:   Recent Labs   Lab Test 01/02/25  2151   INR 1.54*           Latest Reference Range & Units 01/02/25 14:42   Cell Count Fluid Source  Peritoneum   Total Nucleated Cells /uL 1,936   Absolute Neutrophils, Body Fluid /uL 464.6 (HH)   % Neutrophils Fluid % 24   % Lymphocytes Fluid % 53   % Mono/Macro Fluid % 17   % Lining Cells % 6   Color Fluid Colorless, Yellow  Brown !   Appearance Fluid Clear  Cloudy !   Albumin Fluid Source  Peritoneum   Albumin Fluid g/dL 3.2   Protein Fluid Source  Peritoneum   Protein Total Fluid g/dL 4.9         RELEVANT IMAGING:      Narrative & Impression   EXAM: CT ABDOMEN PELVIS W CONTRAST  LOCATION: [facility]  DATE: 1/2/2025     INDICATION: LLQ pain, 10lb weight gain.  COMPARISON: 12.29.2024.  TECHNIQUE: CT scan of the abdomen and pelvis was performed following injection of IV contrast. Multiplanar reformats were obtained. Dose reduction techniques were used.  CONTRAST: IsoVue 370 90mL     FINDINGS:   LOWER CHEST: Small distal paraesophageal varices. No pleural effusion.     HEPATOBILIARY: Cirrhotic liver with heterogeneous enhancement, previously noted.     PANCREAS: Normal.     SPLEEN: Mildly enlarged.     ADRENAL GLANDS: Stable bilateral adrenal myelolipomas.     KIDNEYS/BLADDER: Normal.     BOWEL: No obstruction. No diverticulitis. Normal appendix.     LYMPH NODES: Several stable borderline sized abdominal and retroperitoneal lymph nodes.     VASCULATURE: Nonaneurysmal aorta.     PELVIC ORGANS: Mild to moderate ascites.     MUSCULOSKELETAL: Nothing acute.                                                                      IMPRESSION:      1.  Mild to moderate ascites. No free air. No abscess.     2.  Cirrhotic liver. Mild splenomegaly.     3.  No other acute findings to explain symptoms.        ASSESSMENT:     Warren is a 44-year-old man with diabetes, hypertension, heart failure with a EF of 42%, cardiomegaly, DVT on anticoagulation, morbid obesity, autism, AVA, alcoholic cirrhosis decompensated with ascites who presents to [facility] with complaint of abdominal pain found to have SBP on paracentesis.    SAAG on fluid c/w portal hypertensive etiology of ascites, though high protein count speaks to their being an element of heart failure/congestive etiology.    One of the greatest risks of SBP (aside from sepsis) is renal failure.   Creatinine mildly elevated.  Favor admission to treat SBP with albumin bolus today (received) and Sunday along with IV antibiotics for several days.  Can ultimately transition to oral agent, but moving forward will need SBP prophylaxis.    PLAN:    -Albumin to be given Sunday (am going to give an additional 75 g today). Dose should be ~100 g per bolus in SBP  -Continue IV antibiotics  -Will need SBP prophylaxis (cipro 500 mgs every day indefinitely)  -OK to continue spironolactone 100 mgs daily and furosemide 40 mgs daily for now. If renal failure worsens then will have to stop.  -Will consider repeat paracentesis prior to discharge. May not be available Sunday due to staffing.  -OK to continue anticoagulation             Filiberto Aragon MD  Thank you for the opportunity to participate in the care of this patient.   Please feel free to call me with any questions or concerns.  Phone number [phone].        60 minutes spent with direct patient care, clinical decision making, education at the bedside, and care team coordination.    CC: [facility] HospitalRobin Aliyah

## 2025-01-03 NOTE — PLAN OF CARE
Problem: Adult Inpatient Plan of Care  Goal: Absence of Hospital-Acquired Illness or Injury  Intervention: Prevent Skin Injury  Recent Flowsheet Documentation  Taken 1/3/2025 0737 by Barry Liu RN  Body Position: position changed independently  Intervention: Prevent and Manage VTE (Venous Thromboembolism) Risk  Recent Flowsheet Documentation  Taken 1/3/2025 0800 by Barry Liu RN  VTE Prevention/Management: SCDs off (sequential compression devices)  Intervention: Prevent Infection  Recent Flowsheet Documentation  Taken 1/3/2025 0800 by Barry Liu RN  Infection Prevention:   environmental surveillance performed   rest/sleep promoted   single patient room provided  Goal: Optimal Comfort and Wellbeing  Outcome: Adequate for Care Transition     Problem: Skin Injury Risk Increased  Goal: Skin Health and Integrity  Intervention: Plan: Nurse Driven Intervention: Moisture Management  Recent Flowsheet Documentation  Taken 1/3/2025 0800 by Barry Liu RN  Moisture Interventions:   Encourage regular toileting   No brief in bed  Intervention: Plan: Nurse Driven Intervention: Friction and Shear  Recent Flowsheet Documentation  Taken 1/3/2025 0800 by Barry Liu RN  Friction/Shear Interventions: HOB 30 degrees or less  Intervention: Optimize Skin Protection  Recent Flowsheet Documentation  Taken 1/3/2025 0737 by Barry Liu RN  Head of Bed (HOB) Positioning: HOB at 20-30 degrees     Problem: Comorbidity Management  Goal: Blood Glucose Levels Within Targeted Range  Outcome: Adequate for Care Transition  Goal: Blood Pressure in Desired Range  Outcome: Progressing   Goal Outcome Evaluation:   Patient is A/O x4. VSS. Patient BG <100 so no insulin given this shift. Patient began day by asking to go outside, to which nursing staff informed he would not be permitted to do so, but he did accept a scheduled nicotine patch. Patient's tongue had a bleed overnight and patient was given yonker suction for  use. At 11:00 this morning it was noted that his wall suction contained about 500 mls of bloody fluid. Nursing staff stopped suction and gave patient gauze to hold on tongue to stop bleed. Resident Hospitalist notified and she evaluated.

## 2025-01-04 PROBLEM — I51.7 CARDIOMEGALY: Status: ACTIVE | Noted: 2023-08-01

## 2025-01-04 PROBLEM — K75.81 STEATOHEPATITIS: Status: ACTIVE | Noted: 2023-02-04

## 2025-01-04 PROBLEM — K14.8 TONGUE LESION: Status: ACTIVE | Noted: 2025-01-04

## 2025-01-04 PROBLEM — D17.9 ANGIOMYOLIPOMA: Status: ACTIVE | Noted: 2023-11-22

## 2025-01-04 LAB
ANION GAP SERPL CALCULATED.3IONS-SCNC: 13 MMOL/L (ref 7–15)
BUN SERPL-MCNC: 43.1 MG/DL (ref 6–20)
CALCIUM SERPL-MCNC: 9.6 MG/DL (ref 8.8–10.4)
CHLORIDE SERPL-SCNC: 104 MMOL/L (ref 98–107)
CREAT SERPL-MCNC: 1.46 MG/DL (ref 0.67–1.17)
EGFRCR SERPLBLD CKD-EPI 2021: 60 ML/MIN/1.73M2
ERYTHROCYTE [DISTWIDTH] IN BLOOD BY AUTOMATED COUNT: 17.5 % (ref 10–15)
GGT SERPL-CCNC: 90 U/L (ref 8–61)
GLUCOSE BLDC GLUCOMTR-MCNC: 109 MG/DL (ref 70–99)
GLUCOSE BLDC GLUCOMTR-MCNC: 116 MG/DL (ref 70–99)
GLUCOSE BLDC GLUCOMTR-MCNC: 123 MG/DL (ref 70–99)
GLUCOSE BLDC GLUCOMTR-MCNC: 126 MG/DL (ref 70–99)
GLUCOSE SERPL-MCNC: 94 MG/DL (ref 70–99)
HCO3 SERPL-SCNC: 20 MMOL/L (ref 22–29)
HCT VFR BLD AUTO: 39.8 % (ref 40–53)
HGB BLD-MCNC: 12.6 G/DL (ref 13.3–17.7)
MAGNESIUM SERPL-MCNC: 2 MG/DL (ref 1.7–2.3)
MCH RBC QN AUTO: 26.8 PG (ref 26.5–33)
MCHC RBC AUTO-ENTMCNC: 31.7 G/DL (ref 31.5–36.5)
MCV RBC AUTO: 85 FL (ref 78–100)
PHOSPHATE SERPL-MCNC: 6 MG/DL (ref 2.5–4.5)
PLATELET # BLD AUTO: 306 10E3/UL (ref 150–450)
POTASSIUM SERPL-SCNC: 5.3 MMOL/L (ref 3.4–5.3)
RBC # BLD AUTO: 4.7 10E6/UL (ref 4.4–5.9)
SODIUM SERPL-SCNC: 137 MMOL/L (ref 135–145)
WBC # BLD AUTO: 11.5 10E3/UL (ref 4–11)

## 2025-01-04 PROCEDURE — 250N000013 HC RX MED GY IP 250 OP 250 PS 637

## 2025-01-04 PROCEDURE — 999N000157 HC STATISTIC RCP TIME EA 10 MIN

## 2025-01-04 PROCEDURE — 84100 ASSAY OF PHOSPHORUS: CPT | Performed by: STUDENT IN AN ORGANIZED HEALTH CARE EDUCATION/TRAINING PROGRAM

## 2025-01-04 PROCEDURE — 85014 HEMATOCRIT: CPT

## 2025-01-04 PROCEDURE — 80051 ELECTROLYTE PANEL: CPT

## 2025-01-04 PROCEDURE — 250N000011 HC RX IP 250 OP 636: Mod: JZ | Performed by: INTERNAL MEDICINE

## 2025-01-04 PROCEDURE — 99232 SBSQ HOSP IP/OBS MODERATE 35: CPT | Mod: GC

## 2025-01-04 PROCEDURE — 120N000001 HC R&B MED SURG/OB

## 2025-01-04 PROCEDURE — 82977 ASSAY OF GGT: CPT | Performed by: FAMILY MEDICINE

## 2025-01-04 PROCEDURE — 83735 ASSAY OF MAGNESIUM: CPT | Performed by: STUDENT IN AN ORGANIZED HEALTH CARE EDUCATION/TRAINING PROGRAM

## 2025-01-04 PROCEDURE — 94660 CPAP INITIATION&MGMT: CPT

## 2025-01-04 PROCEDURE — 250N000011 HC RX IP 250 OP 636

## 2025-01-04 PROCEDURE — 80048 BASIC METABOLIC PNL TOTAL CA: CPT

## 2025-01-04 PROCEDURE — P9047 ALBUMIN (HUMAN), 25%, 50ML: HCPCS | Mod: JZ | Performed by: INTERNAL MEDICINE

## 2025-01-04 PROCEDURE — 36415 COLL VENOUS BLD VENIPUNCTURE: CPT

## 2025-01-04 RX ORDER — ALBUMIN (HUMAN) 12.5 G/50ML
100 SOLUTION INTRAVENOUS ONCE
Status: COMPLETED | OUTPATIENT
Start: 2025-01-04 | End: 2025-01-04

## 2025-01-04 RX ADMIN — OLANZAPINE 10 MG: 5 TABLET, FILM COATED ORAL at 20:10

## 2025-01-04 RX ADMIN — ALBUMIN HUMAN 100 G: 0.25 SOLUTION INTRAVENOUS at 12:38

## 2025-01-04 RX ADMIN — PANTOPRAZOLE SODIUM 40 MG: 40 TABLET, DELAYED RELEASE ORAL at 08:51

## 2025-01-04 RX ADMIN — APIXABAN 5 MG: 5 TABLET, FILM COATED ORAL at 08:54

## 2025-01-04 RX ADMIN — ACETAMINOPHEN 650 MG: 325 TABLET ORAL at 06:20

## 2025-01-04 RX ADMIN — DICLOFENAC SODIUM 2 G: 10 GEL TOPICAL at 06:38

## 2025-01-04 RX ADMIN — CEFTRIAXONE SODIUM 2 G: 2 INJECTION, POWDER, FOR SOLUTION INTRAMUSCULAR; INTRAVENOUS at 03:58

## 2025-01-04 RX ADMIN — UMECLIDINIUM 1 PUFF: 62.5 AEROSOL, POWDER ORAL at 08:57

## 2025-01-04 RX ADMIN — APIXABAN 5 MG: 5 TABLET, FILM COATED ORAL at 20:03

## 2025-01-04 RX ADMIN — NICOTINE 1 PATCH: 14 PATCH, EXTENDED RELEASE TRANSDERMAL at 09:00

## 2025-01-04 RX ADMIN — LISINOPRIL 10 MG: 5 TABLET ORAL at 08:53

## 2025-01-04 RX ADMIN — PANTOPRAZOLE SODIUM 40 MG: 40 TABLET, DELAYED RELEASE ORAL at 17:19

## 2025-01-04 RX ADMIN — FLUTICASONE FUROATE AND VILANTEROL TRIFENATATE 1 PUFF: 100; 25 POWDER RESPIRATORY (INHALATION) at 08:58

## 2025-01-04 RX ADMIN — FAMOTIDINE 20 MG: 20 TABLET ORAL at 08:51

## 2025-01-04 RX ADMIN — FLUOXETINE HYDROCHLORIDE 20 MG: 20 CAPSULE ORAL at 08:54

## 2025-01-04 RX ADMIN — EMPAGLIFLOZIN 10 MG: 10 TABLET, FILM COATED ORAL at 08:52

## 2025-01-04 RX ADMIN — FAMOTIDINE 20 MG: 20 TABLET ORAL at 20:03

## 2025-01-04 RX ADMIN — MONTELUKAST 10 MG: 10 TABLET, FILM COATED ORAL at 08:53

## 2025-01-04 RX ADMIN — TRAZODONE HYDROCHLORIDE 100 MG: 50 TABLET ORAL at 20:10

## 2025-01-04 RX ADMIN — SPIRONOLACTONE 100 MG: 100 TABLET ORAL at 08:54

## 2025-01-04 RX ADMIN — ROSUVASTATIN 10 MG: 10 TABLET, FILM COATED ORAL at 20:09

## 2025-01-04 RX ADMIN — FUROSEMIDE 40 MG: 20 TABLET ORAL at 08:52

## 2025-01-04 ASSESSMENT — ACTIVITIES OF DAILY LIVING (ADL)
ADLS_ACUITY_SCORE: 38
ADLS_ACUITY_SCORE: 44
ADLS_ACUITY_SCORE: 38
ADLS_ACUITY_SCORE: 44
ADLS_ACUITY_SCORE: 38
ADLS_ACUITY_SCORE: 44
ADLS_ACUITY_SCORE: 44
ADLS_ACUITY_SCORE: 38
ADLS_ACUITY_SCORE: 38

## 2025-01-04 NOTE — PROGRESS NOTES
[facility]    Progress Note - Hospitalist Service       Date of Admission:  1/2/2025    Assessment & Plan   Warren Jaramillo is a 44 year old male with a history of T2DM, HTN, HFpEF, cardiomegaly, DVT, morbid obesity, autism, adjustment disorder, insomnia, and cirrhosis with ascites who is admitted for SBP in the setting of ascites. On IV antibiotics and receiving albumin.     Updates 1/4/25:  - creatinine slightly elevated today, will continue to monitor closely with lasix dosing    Spontaneous bacterial peritonitis   Cirrhosis, unspecified type vs alcoholic cirrhosis   Ascites   Presents to ED with concern of increased abdominal distention and lower abdominal pain. Recently seen in the ED for a similar issue on 12/29. Noted to have increased distention and 10 lbs weight gain since so returned for re-evaluation. CT abdomen/pelvis showed cirrhosis of the liver with mild-moderate ascites. S/p paracentesis in the ED with ascites fluid and SAAG c/w portal hypertension though also high protein indicating likely HF etiology. Vitally stable. UA notable only for glucose, on Jardiance. WBC of 12.8. ALT, AST, Tbili, and albumin WNL. CRP elevated at 17.7. Lipase normal. Concern for SBP in the setting of known liver cirrhosis of likely alcoholic origin per GI.   - GI consulted, appreciate recommendations    - albumin ~100g per bolus today, repeat tomorrow    - continue Lasix, spironolactone; watching renal function   - continue IV ceftriaxone, switch to oral tomorrow   - will need SBP prophylaxis on discharge (cipro 500 mg daily indefinitely)   - will consider repeat paracentesis prior to discharge    Tongue lesion  Presented with lesion on the tip of his tongue. Overnight noted some bleeding and was given an oral suction with ~ 500mL of blood tinged fluid. Bleeding has since stopped. Differential includes granuloma, pyogenic granuloma, fibroma, or neoplasm with history of tobacco use.  Discussed with Dr. Ivan/ENT, who recommended outpatient biopsy. Will likely need to be off Eliquis prior to biopsy.      Type 2 diabetes mellitus  History of non-insulin dependent T2DM. A1c 6.8 in 9/2024. A1c 6.2 on admission. Currently on metformin and Jardiance at home. Noted to have glucosuria which is explained by Jardiance. Blood sugars have been well controlled during admission.   - Hold PTA metformin   - Continue home Jardiance   - mSSI      HFpEF  History of HFpEF with EF of 50-55% on most recent Echo on 8/2/2023. No significant lower extremity edema or SOB on admission. Follows closely with cardiology. Likely contributing in part to portal hypertension. Will continue PTA meds.   - PTA spironolactone and lasix      HLD  HTN  Pressures well controlled 120s/50s on admission.   - PTA rosuvastatin   - PTA lisinopril      COPD   Asthma  - prn albuterol nebs for wheezing/SOB   - PTA Trelegy Eliipta and singulair      History of DVT   - Continue PTA Eliquis for DVT ppx      GERD  - Continue PTA omeprazole, pepcid, and carafate      Chronic constipation  The patient has a history of chronic constipation but currently has diarrhea. Will place prn stool softeners but hold for loose stool.      Mood and insomnia   - PTA Zyprexa   - PTA fluoxetine  - PTA trazodone at bedtime          Diet: 2 Gram Sodium Diet    DVT Prophylaxis: DOAC  Khan Catheter: Not present  Fluids: PO  Lines: None     Cardiac Monitoring: None  Code Status: Full Code      Clinically Significant Risk Factors                # Coagulation Defect: INR = 1.54 (Ref range: 0.85 - 1.15) and/or PTT = 35 Seconds (Ref range: 22 - 38 Seconds), will monitor for bleeding   # Acute Kidney Injury, unspecified: based on a >150% or 0.3 mg/dL increase in last creatinine compared to past 90 day average, will monitor renal function  # Hypertension: Noted on problem list  # Chronic heart failure with preserved ejection fraction: heart failure noted on problem list and  "last echo with EF >50%           # Severe Obesity: Estimated body mass index is 43.83 kg/m  as calculated from the following:    Height as of 12/29/24: 1.651 m (5' 5\").    Weight as of this encounter: 119.5 kg (263 lb 6.4 oz)., PRESENT ON ADMISSION       # Financial/Environmental Concerns: none         Social Drivers of Health   Housing Stability: High Risk (12/2/2024)    Housing Stability     Do you have housing? : No     Are you worried about losing your housing?: No   Tobacco Use: High Risk (12/24/2024)    Patient History     Smoking Tobacco Use: Every Day     Smokeless Tobacco Use: Never    Received from [employer] [employer]    Social Connections        Disposition Plan     Medically Ready for Discharge: Anticipated in 2-4 Days     The patient's care was discussed with the Attending Physician, Dr. Avendano and Patient's Family.    Sanket Miranda MD  Hospitalist Service  [facility]  Securely message with [employer] (more info)  Text page via [email or web address] Paging/Directory   ______________________________________________________________________    Interval History   Vitally stable overnight. No acute events. Creatinine slightly elevated today.     Physical Exam   Vital Signs: Temp: 98.1  F (36.7  C) Temp src: Oral BP: 128/74 Pulse: 66   Resp: 18 SpO2: 97 % O2 Device: None (Room air)    Weight: 263 lbs 6.4 oz    Constitutional: awake, alert, cooperative, no apparent distress  HEENT: erythematous lesion on tip of tongue, no active bleeding  Respiratory: Clear to auscultation bilaterally, no crackles or wheezing  Cardiovascular: Regular rate and rhythm, normal S1 and S2, no murmur noted  GI: distended, tenderness to left lower abdomen, bowel sounds present  Skin: small erythematous lesions on hands and feet, see images below  Musculoskeletal: Full range of motion noted.  Neurologic: Awake, alert, oriented to name, place and time.  " Cranial nerves II-XII are grossly intact.    Medical Decision Making   Please see A&P for additional details of medical decision making.      Data   ------------------------- PAST 24 HR DATA REVIEWED -----------------------------------------------    I have personally reviewed the following data over the past 24 hrs:    11.5 (H)  \   12.6 (L)   / 306     137 104 43.1 (H) /  116 (H)   5.3 20 (L) 1.46 (H) \

## 2025-01-04 NOTE — PLAN OF CARE
Problem: Infection  Goal: Absence of Infection Signs and Symptoms  Outcome: Progressing     Problem: Skin Injury Risk Increased  Goal: Skin Health and Integrity  Outcome: Progressing    Patient alert and oriented. VSS. C/o pain lower back-PRN tylenol and voltaren given. Using CPAP at bedtime. K+Mag+phos protocols. Nicotine patch in place. Patient expresses no further concerns at this time. Call light within reach.    Nancy Mckeon RN  [phone]

## 2025-01-04 NOTE — PLAN OF CARE
Problem: Infection  Goal: Absence of Infection Signs and Symptoms  Outcome: Progressing     Problem: Fluid Volume Excess  Goal: Fluid Balance  Outcome: Progressing   Goal Outcome Evaluation:         A/O. Denies pain today. VSS.   IV albumin given today.   K, Mg and Ph protocols, to be rechecked tomorrow.   Ambulating independently in the hallway.

## 2025-01-05 LAB
ANION GAP SERPL CALCULATED.3IONS-SCNC: 14 MMOL/L (ref 7–15)
ANION GAP SERPL CALCULATED.3IONS-SCNC: 17 MMOL/L (ref 7–15)
BUN SERPL-MCNC: 52.2 MG/DL (ref 6–20)
BUN SERPL-MCNC: 57.8 MG/DL (ref 6–20)
CALCIUM SERPL-MCNC: 9.4 MG/DL (ref 8.8–10.4)
CALCIUM SERPL-MCNC: 9.4 MG/DL (ref 8.8–10.4)
CHLORIDE SERPL-SCNC: 101 MMOL/L (ref 98–107)
CHLORIDE SERPL-SCNC: 101 MMOL/L (ref 98–107)
CREAT SERPL-MCNC: 2.35 MG/DL (ref 0.67–1.17)
CREAT SERPL-MCNC: 2.79 MG/DL (ref 0.67–1.17)
EGFRCR SERPLBLD CKD-EPI 2021: 28 ML/MIN/1.73M2
EGFRCR SERPLBLD CKD-EPI 2021: 34 ML/MIN/1.73M2
ERYTHROCYTE [DISTWIDTH] IN BLOOD BY AUTOMATED COUNT: 17.5 % (ref 10–15)
GLUCOSE BLDC GLUCOMTR-MCNC: 119 MG/DL (ref 70–99)
GLUCOSE BLDC GLUCOMTR-MCNC: 121 MG/DL (ref 70–99)
GLUCOSE BLDC GLUCOMTR-MCNC: 125 MG/DL (ref 70–99)
GLUCOSE BLDC GLUCOMTR-MCNC: 83 MG/DL (ref 70–99)
GLUCOSE SERPL-MCNC: 87 MG/DL (ref 70–99)
GLUCOSE SERPL-MCNC: 97 MG/DL (ref 70–99)
HCO3 SERPL-SCNC: 16 MMOL/L (ref 22–29)
HCO3 SERPL-SCNC: 18 MMOL/L (ref 22–29)
HCT VFR BLD AUTO: 37.7 % (ref 40–53)
HGB BLD-MCNC: 12.1 G/DL (ref 13.3–17.7)
MAGNESIUM SERPL-MCNC: 2.1 MG/DL (ref 1.7–2.3)
MCH RBC QN AUTO: 27.1 PG (ref 26.5–33)
MCHC RBC AUTO-ENTMCNC: 32.1 G/DL (ref 31.5–36.5)
MCV RBC AUTO: 84 FL (ref 78–100)
PHOSPHATE SERPL-MCNC: 6.7 MG/DL (ref 2.5–4.5)
PLATELET # BLD AUTO: 311 10E3/UL (ref 150–450)
POTASSIUM SERPL-SCNC: 5.7 MMOL/L (ref 3.4–5.3)
POTASSIUM SERPL-SCNC: 5.7 MMOL/L (ref 3.4–5.3)
RBC # BLD AUTO: 4.47 10E6/UL (ref 4.4–5.9)
SODIUM SERPL-SCNC: 133 MMOL/L (ref 135–145)
SODIUM SERPL-SCNC: 134 MMOL/L (ref 135–145)
WBC # BLD AUTO: 14.8 10E3/UL (ref 4–11)

## 2025-01-05 PROCEDURE — 250N000013 HC RX MED GY IP 250 OP 250 PS 637: Performed by: FAMILY MEDICINE

## 2025-01-05 PROCEDURE — 999N000157 HC STATISTIC RCP TIME EA 10 MIN

## 2025-01-05 PROCEDURE — 85027 COMPLETE CBC AUTOMATED: CPT

## 2025-01-05 PROCEDURE — 250N000011 HC RX IP 250 OP 636: Mod: JZ | Performed by: PHYSICIAN ASSISTANT

## 2025-01-05 PROCEDURE — 250N000013 HC RX MED GY IP 250 OP 250 PS 637

## 2025-01-05 PROCEDURE — 36415 COLL VENOUS BLD VENIPUNCTURE: CPT

## 2025-01-05 PROCEDURE — 83735 ASSAY OF MAGNESIUM: CPT | Performed by: FAMILY MEDICINE

## 2025-01-05 PROCEDURE — 99233 SBSQ HOSP IP/OBS HIGH 50: CPT | Mod: GC

## 2025-01-05 PROCEDURE — 84100 ASSAY OF PHOSPHORUS: CPT | Performed by: FAMILY MEDICINE

## 2025-01-05 PROCEDURE — 80048 BASIC METABOLIC PNL TOTAL CA: CPT

## 2025-01-05 PROCEDURE — 120N000001 HC R&B MED SURG/OB

## 2025-01-05 PROCEDURE — 250N000011 HC RX IP 250 OP 636

## 2025-01-05 PROCEDURE — P9047 ALBUMIN (HUMAN), 25%, 50ML: HCPCS | Mod: JZ | Performed by: PHYSICIAN ASSISTANT

## 2025-01-05 RX ORDER — ALBUMIN (HUMAN) 12.5 G/50ML
100 SOLUTION INTRAVENOUS ONCE
Status: COMPLETED | OUTPATIENT
Start: 2025-01-05 | End: 2025-01-05

## 2025-01-05 RX ORDER — FAMOTIDINE 20 MG/1
20 TABLET, FILM COATED ORAL DAILY
Status: DISCONTINUED | OUTPATIENT
Start: 2025-01-05 | End: 2025-01-08 | Stop reason: HOSPADM

## 2025-01-05 RX ADMIN — ACETAMINOPHEN 650 MG: 325 TABLET ORAL at 09:28

## 2025-01-05 RX ADMIN — MONTELUKAST 10 MG: 10 TABLET, FILM COATED ORAL at 09:10

## 2025-01-05 RX ADMIN — FAMOTIDINE 20 MG: 20 TABLET, FILM COATED ORAL at 09:10

## 2025-01-05 RX ADMIN — APIXABAN 5 MG: 5 TABLET, FILM COATED ORAL at 09:10

## 2025-01-05 RX ADMIN — PANTOPRAZOLE SODIUM 40 MG: 40 TABLET, DELAYED RELEASE ORAL at 09:11

## 2025-01-05 RX ADMIN — ROSUVASTATIN 10 MG: 10 TABLET, FILM COATED ORAL at 21:02

## 2025-01-05 RX ADMIN — FUROSEMIDE 40 MG: 20 TABLET ORAL at 09:11

## 2025-01-05 RX ADMIN — ACETAMINOPHEN 650 MG: 325 TABLET ORAL at 21:08

## 2025-01-05 RX ADMIN — CEFTRIAXONE SODIUM 2 G: 2 INJECTION, POWDER, FOR SOLUTION INTRAMUSCULAR; INTRAVENOUS at 03:36

## 2025-01-05 RX ADMIN — OLANZAPINE 10 MG: 5 TABLET, FILM COATED ORAL at 21:02

## 2025-01-05 RX ADMIN — ACETAMINOPHEN 650 MG: 325 TABLET ORAL at 15:38

## 2025-01-05 RX ADMIN — NICOTINE 1 PATCH: 14 PATCH, EXTENDED RELEASE TRANSDERMAL at 09:14

## 2025-01-05 RX ADMIN — PANTOPRAZOLE SODIUM 40 MG: 40 TABLET, DELAYED RELEASE ORAL at 15:38

## 2025-01-05 RX ADMIN — APIXABAN 5 MG: 5 TABLET, FILM COATED ORAL at 21:02

## 2025-01-05 RX ADMIN — ALBUMIN HUMAN 100 G: 0.25 SOLUTION INTRAVENOUS at 13:00

## 2025-01-05 RX ADMIN — TRAZODONE HYDROCHLORIDE 100 MG: 50 TABLET ORAL at 21:02

## 2025-01-05 RX ADMIN — FLUTICASONE FUROATE AND VILANTEROL TRIFENATATE 1 PUFF: 100; 25 POWDER RESPIRATORY (INHALATION) at 09:13

## 2025-01-05 RX ADMIN — FLUOXETINE HYDROCHLORIDE 20 MG: 20 CAPSULE ORAL at 09:10

## 2025-01-05 RX ADMIN — UMECLIDINIUM 1 PUFF: 62.5 AEROSOL, POWDER ORAL at 09:13

## 2025-01-05 ASSESSMENT — ACTIVITIES OF DAILY LIVING (ADL)
ADLS_ACUITY_SCORE: 44
ADLS_ACUITY_SCORE: 44
ADLS_ACUITY_SCORE: 43
ADLS_ACUITY_SCORE: 43
ADLS_ACUITY_SCORE: 44
ADLS_ACUITY_SCORE: 43
ADLS_ACUITY_SCORE: 43
ADLS_ACUITY_SCORE: 44
ADLS_ACUITY_SCORE: 43
ADLS_ACUITY_SCORE: 43
ADLS_ACUITY_SCORE: 44
ADLS_ACUITY_SCORE: 44
ADLS_ACUITY_SCORE: 43
ADLS_ACUITY_SCORE: 40
ADLS_ACUITY_SCORE: 43
ADLS_ACUITY_SCORE: 43
ADLS_ACUITY_SCORE: 44
ADLS_ACUITY_SCORE: 43
ADLS_ACUITY_SCORE: 43
ADLS_ACUITY_SCORE: 40
ADLS_ACUITY_SCORE: 43

## 2025-01-05 NOTE — PLAN OF CARE
Problem: Fluid Volume Excess  Goal: Fluid Balance  Outcome: Progressing     Problem: Adult Inpatient Plan of Care  Goal: Optimal Comfort and Wellbeing  Intervention: Monitor Pain and Promote Comfort  Recent Flowsheet Documentation  Taken 1/5/2025 0928 by Cathy Flores, RN  Pain Management Interventions: medication (see MAR)   Goal Outcome Evaluation:         A/o. C/o back pain, prn tylenol given with good relief per patient report.     C/o increased shortness of breath today. Abdomen more firm upon palpation, rounded and distended. MNJACQUELINE LEZAMA notified.   IV albumin given per orders.     Tongue lesion bleeding today again after patient having lunch. Pressure applied and bleeding stopped. Dr. Miranda notified.     Had one large BM this morning. Tolerating regular low Na diet.     Plan for paracentesis TOMORROW.

## 2025-01-05 NOTE — PROGRESS NOTES
[facility]    Progress Note - Hospitalist Service       Date of Admission:  1/2/2025    Assessment & Plan   Warren Jaramillo is a 44 year old male with a history of T2DM, HTN, HFpEF, cardiomegaly, DVT, morbid obesity, autism, adjustment disorder, insomnia, and cirrhosis with ascites who is admitted for SBP in the setting of ascites. On IV antibiotics and receiving albumin.     Updates 1/5:  - creatinine significantly elevated today to 2.35 with potassium of 5.7, will hold spironolactone, Lasix, lisinopril, and Jardiance.  - recheck BMP tonight   - WBC increased today but overall continues to feel better everyday  - Hopeful for repeat paracentesis if okay per GI    Spontaneous bacterial peritonitis   Cirrhosis with ascites, unspecified type vs alcoholic cirrhosis   RIVERA, worsening   Presents to ED with concern of increased abdominal distention and lower abdominal pain. Recently seen in the ED for a similar issue on 12/29. Noted to have increased distention and 10 lbs weight gain since so returned for re-evaluation. CT abdomen/pelvis showed cirrhosis of the liver with mild-moderate ascites. S/p paracentesis in the ED with ascites fluid and SAAG c/w portal hypertension though also high protein indicating likely HF etiology. Vitally stable. UA notable only for glucose, on Jardiance. WBC of 12.8. ALT, AST, Tbili, and albumin WNL. CRP elevated at 17.7. Lipase normal. Concern for SBP in the setting of known liver cirrhosis of likely alcoholic origin per GI. Kidney function continues to worsen but overall feels well.   - GI consulted, appreciate recommendations    - albumin ~100g per bolus today, repeat tomorrow    - holding Lasix, spironolactone, Jardiance, lisinopril    - continue IV ceftriaxone, switch to oral tomorrow   - will need SBP prophylaxis on discharge (cipro 500 mg daily indefinitely)   - will consider repeat paracentesis prior to discharge    Tongue lesion  Presented with  lesion on the tip of his tongue. Overnight noted some bleeding and was given an oral suction with ~ 500mL of blood tinged fluid. Bleeding has since stopped. Differential includes granuloma, pyogenic granuloma, fibroma, or neoplasm with history of tobacco use. Discussed with Dr. Ivan/ENT, who recommended outpatient biopsy. Will likely need to be off Eliquis prior to biopsy.      Type 2 diabetes mellitus  History of non-insulin dependent T2DM. A1c 6.8 in 9/2024. A1c 6.2 on admission. Currently on metformin and Jardiance at home. Noted to have glucosuria which is explained by Jardiance. Blood sugars have been well controlled during admission.   - Hold PTA metformin   - Continue home Jardiance   - mSSI      HFpEF  History of HFpEF with EF of 50-55% on most recent Echo on 8/2/2023. No significant lower extremity edema or SOB on admission. Follows closely with cardiology. Likely contributing in part to portal hypertension.  - hold PTA spironolactone, Lasix for now     HLD  HTN  Pressures well controlled 120s/50s on admission.   - PTA rosuvastatin   - PTA lisinopril      COPD   Asthma  - prn albuterol nebs for wheezing/SOB   - PTA Trelegy Eliipta and singulair      History of DVT   - Continue PTA Eliquis for DVT ppx      GERD  - Continue PTA omeprazole, pepcid, and carafate      Chronic constipation  The patient has a history of chronic constipation but currently has diarrhea. Will place prn stool softeners but hold for loose stool.      Mood and insomnia   - PTA Zyprexa   - PTA fluoxetine  - PTA trazodone at bedtime          Diet: 2 Gram Sodium Diet    DVT Prophylaxis: DOAC  Khan Catheter: Not present  Fluids: PO  Lines: None     Cardiac Monitoring: None  Code Status: Full Code      Clinically Significant Risk Factors        # Hyperkalemia: Highest K = 5.7 mmol/L in last 2 days, will monitor as appropriate  # Hyponatremia: Lowest Na = 133 mmol/L in last 2 days, will monitor as appropriate          # Acute Kidney  "Injury, unspecified: based on a >150% or 0.3 mg/dL increase in last creatinine compared to past 90 day average, will monitor renal function  # Hypertension: Noted on problem list  # Chronic heart failure with preserved ejection fraction: heart failure noted on problem list and last echo with EF >50%           # Severe Obesity: Estimated body mass index is 44.43 kg/m  as calculated from the following:    Height as of 12/29/24: 1.651 m (5' 5\").    Weight as of this encounter: 121.1 kg (267 lb)., PRESENT ON ADMISSION       # Financial/Environmental Concerns: none         Social Drivers of Health   Housing Stability: High Risk (12/2/2024)    Housing Stability     Do you have housing? : No     Are you worried about losing your housing?: No   Tobacco Use: High Risk (12/24/2024)    Patient History     Smoking Tobacco Use: Every Day     Smokeless Tobacco Use: Never    [facility] [employer]    Social Connections        Disposition Plan     Medically Ready for Discharge: Anticipated in 2-4 Days     The patient's care was discussed with the Attending Physician, Dr. Avendano and Patient's Family.    Sanket Miranda MD  Hospitalist Service  [facility]  Securely message with [employer] (more info)  Text page via [employer] Paging/Directory   ______________________________________________________________________    Interval History   Vitally stable overnight. No acute events. Creatinine worse today however patient states he feels much better today. Tongue lesion bleeding again after eating lunch.     Physical Exam   Vital Signs: Temp: 98.1  F (36.7  C) Temp src: Oral BP: 108/56 Pulse: 71   Resp: 18 SpO2: 95 % O2 Device: None (Room air)    Weight: 267 lbs 0 oz    Constitutional: awake, alert, cooperative, no apparent distress  HEENT: erythematous lesion on tip of tongue, no active bleeding  Respiratory: Clear to auscultation " bilaterally, no crackles or wheezing  Cardiovascular: Regular rate and rhythm, normal S1 and S2, no murmur noted  GI: distended, tenderness to left lower abdomen, bowel sounds present  Skin: small erythematous lesions on hands and feet, see images below  Musculoskeletal: Full range of motion noted.  Neurologic: Awake, alert, oriented to name, place and time.  Cranial nerves II-XII are grossly intact.    Medical Decision Making   Please see A&P for additional details of medical decision making.      Data   ------------------------- PAST 24 HR DATA REVIEWED -----------------------------------------------    I have personally reviewed the following data over the past 24 hrs:    14.8 (H)  \   12.1 (L)   / 311     133 (L) 101 52.2 (H) /  83   5.7 (H) 18 (L) 2.35 (H) \

## 2025-01-05 NOTE — PLAN OF CARE
Problem: Infection  Goal: Absence of Infection Signs and Symptoms  Outcome: Progressing     Problem: Comorbidity Management  Goal: Maintenance of Behavioral Health Symptom Control  Outcome: Progressing     Patient alert and oriented. VSS. Denies pain. Using CPAP at bedtime. K+Mag+phos protocols. Nicotine patch in place. Patient expresses no further concerns at this time. Call light within reach.     Nancy Mckeon RN  [phone]

## 2025-01-05 NOTE — PROGRESS NOTES
[facility] DIGESTIVE HEALTH PROGRESS NOTE      Subjective:               Reviewed increase in creatinine.   Discussed IV alb today  Abd full.  He feels some tightness/pain when taking a deep breath.    Objective:                Vital signs in last 24 hrs;  Temp:  [97.5  F (36.4  C)-98.1  F (36.7  C)] 98.1  F (36.7  C)  Pulse:  [60-71] 71  Resp:  [17-18] 18  BP: ()/(43-58) 108/56  SpO2:  [95 %-97 %] 95 %    Physical Exam:   General: Comfortable appearing. Awake.   Cardiovascular: Regular rate. No edema  Chest: Non-labored breathing. Symmetric chest rise.   Abdomen: Distended, BS positive, firm, non-tender.  Neurologic: Alert, no focal defects.     Current Labs:  CBC RESULTS:   Recent Labs   Lab Test 01/04/25  0603   WBC 11.5*   RBC 4.70   HGB 12.6*   HCT 39.8*   MCV 85   MCH 26.8   MCHC 31.7   RDW 17.5*           CMP Results:   Recent Labs   Lab Test 01/04/25  0727 01/04/25  0603 01/02/25 2016 01/02/25  1226   NA  --  137   < > 139   POTASSIUM  --  5.3   < > 5.1   CHLORIDE  --  104   < > 107   CO2  --  20*   < > 19*   ANIONGAP  --  13   < > 13   * 94   < > 85   BUN  --  43.1*   < > 38.0*   CR  --  1.46*   < > 1.20*   BILITOTAL  --   --   --  0.5   ALKPHOS  --   --   --  303*   ALT  --   --   --  19   AST  --   --   --  18    < > = values in this interval not displayed.        INR Results:   Recent Labs   Lab Test 01/02/25  2151   INR 1.54*          RELEVANT IMAGING:       Narrative & Impression   EXAM: CT ABDOMEN PELVIS W CONTRAST  LOCATION: [facility]  DATE: 1/2/2025     INDICATION: LLQ pain, 10lb weight gain.  COMPARISON: 12.29.2024.  TECHNIQUE: CT scan of the abdomen and pelvis was performed following injection of IV contrast. Multiplanar reformats were obtained. Dose reduction techniques were used.  CONTRAST: IsoVue 370 90mL     FINDINGS:   LOWER CHEST: Small distal paraesophageal varices. No pleural effusion.     HEPATOBILIARY: Cirrhotic liver with heterogeneous  enhancement, previously noted.     PANCREAS: Normal.     SPLEEN: Mildly enlarged.     ADRENAL GLANDS: Stable bilateral adrenal myelolipomas.     KIDNEYS/BLADDER: Normal.     BOWEL: No obstruction. No diverticulitis. Normal appendix.     LYMPH NODES: Several stable borderline sized abdominal and retroperitoneal lymph nodes.     VASCULATURE: Nonaneurysmal aorta.     PELVIC ORGANS: Mild to moderate ascites.     MUSCULOSKELETAL: Nothing acute.                                                                      IMPRESSION:      1.  Mild to moderate ascites. No free air. No abscess.     2.  Cirrhotic liver. Mild splenomegaly.     3.  No other acute findings to explain symptoms.         ASSESSMENT:      Warren is a 44-year-old man with diabetes, hypertension, heart failure with a EF of 42%, cardiomegaly, DVT on anticoagulation, morbid obesity, autism, AVA, alcoholic cirrhosis decompensated with ascites who presents to [facility] with complaint of abdominal pain found to have SBP on paracentesis.     SAAG on fluid c/w portal hypertensive etiology of ascites, though high protein count speaks to their being an element of heart failure/congestive etiology.     One of the greatest risks of SBP (aside from sepsis) is renal failure.   Creatinine worse today.   Treating SBP with albumin and IV abx   Needs SBP prophylaxis going forward.    RIVERA noted     PLAN:  --Giving dose of albumin today for RIVERA. (100g bolus in SBP)  --Hold diuretics  -Paracentesis - limit to 4-5L   -Avoid nephrotoxins Avoid nephrotoxins.  --Continue IV antibiotics - on Rocephin  -Will need SBP prophylaxis (cipro 500 mgs every day indefinitely)  -OK to continue anticoagulation  - Nephro to see.if kidneys the same/worse tomorrow.     Discussed with Dr Aragon and Dr KYLIE Miranda.        Bruno Echols PA-C  Thank you for the opportunity to participate in the care of this patient.   Please feel free to call me with any questions or concerns.  Phone number  [phone].        53 minutes spent with direct patient care, clinical decision making, education at the bedside, and care team coordination.    ADDENDUM  Informed by radiology that paracentesis will be tomorrow.     Thank you  Bruno Echols PA-C  [facility]

## 2025-01-05 NOTE — PROGRESS NOTES
[facility]    Progress Note - Hospitalist Service       Date of Admission:  1/2/2025    Assessment & Plan   Warren Jaramillo is a 44 year old male with a history of T2DM, HTN, HFpEF, cardiomegaly, DVT, morbid obesity, autism, adjustment disorder, insomnia, and cirrhosis with ascites who is admitted for SBP in the setting of ascites. On IV antibiotics and receiving albumin.     Updates 1/5:  - creatinine significantly elevated today to 2.35 with potassium of 5.7, will hold spironolactone, lisinopril, and Jardiance. Continuing lasix cautiously today.   - WBC increased today but overall continues to feel better everyday  - Hopeful for repeat paracentesis if okay per GI    Spontaneous bacterial peritonitis   Cirrhosis with ascites, unspecified type vs alcoholic cirrhosis   RIVERA, worsening   Presents to ED with concern of increased abdominal distention and lower abdominal pain. Recently seen in the ED for a similar issue on 12/29. Noted to have increased distention and 10 lbs weight gain since so returned for re-evaluation. CT abdomen/pelvis showed cirrhosis of the liver with mild-moderate ascites. S/p paracentesis in the ED with ascites fluid and SAAG c/w portal hypertension though also high protein indicating likely HF etiology. Vitally stable. UA notable only for glucose, on Jardiance. WBC of 12.8. ALT, AST, Tbili, and albumin WNL. CRP elevated at 17.7. Lipase normal. Concern for SBP in the setting of known liver cirrhosis of likely alcoholic origin per GI. Kidney function continues to worsen but overall feels well.   - GI consulted, appreciate recommendations    - albumin ~100g per bolus today, repeat tomorrow    - continue Lasix, holding spironolactone, Jardiance, lisinopril    - continue IV ceftriaxone, switch to oral tomorrow   - will need SBP prophylaxis on discharge (cipro 500 mg daily indefinitely)   - will consider repeat paracentesis prior to discharge    Tongue  lesion  Presented with lesion on the tip of his tongue. Overnight noted some bleeding and was given an oral suction with ~ 500mL of blood tinged fluid. Bleeding has since stopped. Differential includes granuloma, pyogenic granuloma, fibroma, or neoplasm with history of tobacco use. Discussed with Dr. Ivan/ENT, who recommended outpatient biopsy. Will likely need to be off Eliquis prior to biopsy.      Type 2 diabetes mellitus  History of non-insulin dependent T2DM. A1c 6.8 in 9/2024. A1c 6.2 on admission. Currently on metformin and Jardiance at home. Noted to have glucosuria which is explained by Jardiance. Blood sugars have been well controlled during admission.   - Hold PTA metformin   - Continue home Jardiance   - mSSI      HFpEF  History of HFpEF with EF of 50-55% on most recent Echo on 8/2/2023. No significant lower extremity edema or SOB on admission. Follows closely with cardiology. Likely contributing in part to portal hypertension. Will continue PTA meds.   - PTA spironolactone (on hold for now) and lasix      HLD  HTN  Pressures well controlled 120s/50s on admission.   - PTA rosuvastatin   - PTA lisinopril      COPD   Asthma  - prn albuterol nebs for wheezing/SOB   - PTA Trelegy Eliipta and singulair      History of DVT   - Continue PTA Eliquis for DVT ppx      GERD  - Continue PTA omeprazole, pepcid, and carafate      Chronic constipation  The patient has a history of chronic constipation but currently has diarrhea. Will place prn stool softeners but hold for loose stool.      Mood and insomnia   - PTA Zyprexa   - PTA fluoxetine  - PTA trazodone at bedtime          Diet: 2 Gram Sodium Diet    DVT Prophylaxis: DOAC  Khan Catheter: Not present  Fluids: PO  Lines: None     Cardiac Monitoring: None  Code Status: Full Code      Clinically Significant Risk Factors        # Hyperkalemia: Highest K = 5.7 mmol/L in last 2 days, will monitor as appropriate  # Hyponatremia: Lowest Na = 133 mmol/L in last 2 days,  "will monitor as appropriate          # Acute Kidney Injury, unspecified: based on a >150% or 0.3 mg/dL increase in last creatinine compared to past 90 day average, will monitor renal function  # Hypertension: Noted on problem list  # Chronic heart failure with preserved ejection fraction: heart failure noted on problem list and last echo with EF >50%           # Severe Obesity: Estimated body mass index is 44.43 kg/m  as calculated from the following:    Height as of 12/29/24: 1.651 m (5' 5\").    Weight as of this encounter: 121.1 kg (267 lb)., PRESENT ON ADMISSION       # Financial/Environmental Concerns: none         Social Drivers of Health   Housing Stability: High Risk (12/2/2024)    Housing Stability     Do you have housing? : No     Are you worried about losing your housing?: No   Tobacco Use: High Risk (12/24/2024)    Patient History     Smoking Tobacco Use: Every Day     Smokeless Tobacco Use: Never    Received from [employer], [employer]    Social Connections        Disposition Plan     Medically Ready for Discharge: Anticipated in 2-4 Days     The patient's care was discussed with the Attending Physician, Dr. Avendano and Patient's Family.    Sanket Miranda MD  Hospitalist Service  [facility]  Securely message with [employer] (more info)  Text page via [employer] Paging/Directory   ______________________________________________________________________    Interval History   Vitally stable overnight. No acute events. Creatinine worse today however patient states he feels much better today. Tongue lesion bleeding again after eating lunch.     Physical Exam   Vital Signs: Temp: 98.1  F (36.7  C) Temp src: Oral BP: 108/56 Pulse: 71   Resp: 18 SpO2: 95 % O2 Device: None (Room air)    Weight: 267 lbs 0 oz    Constitutional: awake, alert, cooperative, no apparent distress  HEENT: erythematous lesion on tip of tongue, no active " bleeding  Respiratory: Clear to auscultation bilaterally, no crackles or wheezing  Cardiovascular: Regular rate and rhythm, normal S1 and S2, no murmur noted  GI: distended, tenderness to left lower abdomen, bowel sounds present  Skin: small erythematous lesions on hands and feet, see images below  Musculoskeletal: Full range of motion noted.  Neurologic: Awake, alert, oriented to name, place and time.  Cranial nerves II-XII are grossly intact.    Medical Decision Making   Please see A&P for additional details of medical decision making.      Data   ------------------------- PAST 24 HR DATA REVIEWED -----------------------------------------------    I have personally reviewed the following data over the past 24 hrs:    14.8 (H)  \   12.1 (L)   / 311     133 (L) 101 52.2 (H) /  83   5.7 (H) 18 (L) 2.35 (H) \

## 2025-01-06 ENCOUNTER — APPOINTMENT (OUTPATIENT)
Dept: ULTRASOUND IMAGING | Facility: HOSPITAL | Age: 45
End: 2025-01-06
Attending: PHYSICIAN ASSISTANT
Payer: COMMERCIAL

## 2025-01-06 ENCOUNTER — DOCUMENTATION ONLY (OUTPATIENT)
Dept: OTHER | Facility: CLINIC | Age: 45
End: 2025-01-06
Payer: COMMERCIAL

## 2025-01-06 PROBLEM — N17.9 ACUTE KIDNEY FAILURE, UNSPECIFIED: Status: ACTIVE | Noted: 2025-01-06

## 2025-01-06 LAB
% LINING CELLS, BODY FLUID: 2 %
ABSOLUTE NEUTROPHILS, BODY FLUID: 332.6 /UL
ALBUMIN BODY FLUID SOURCE: NORMAL
ALBUMIN FLD-MCNC: 4 G/DL
ALBUMIN MFR UR ELPH: 10.7 MG/DL
ALBUMIN UR-MCNC: NEGATIVE MG/DL
ANION GAP SERPL CALCULATED.3IONS-SCNC: 15 MMOL/L (ref 7–15)
APPEARANCE FLD: ABNORMAL
APPEARANCE UR: CLEAR
BILIRUB UR QL STRIP: NEGATIVE
BUN SERPL-MCNC: 65.7 MG/DL (ref 6–20)
CALCIUM SERPL-MCNC: 9 MG/DL (ref 8.8–10.4)
CELL COUNT BODY FLUID SOURCE: ABNORMAL
CERULOPLASMIN SERPL-MCNC: 49 MG/DL (ref 20–60)
CHLORIDE SERPL-SCNC: 102 MMOL/L (ref 98–107)
COLOR FLD: YELLOW
COLOR UR AUTO: ABNORMAL
CREAT SERPL-MCNC: 3.32 MG/DL (ref 0.67–1.17)
CREAT UR-MCNC: 73.2 MG/DL
EGFRCR SERPLBLD CKD-EPI 2021: 23 ML/MIN/1.73M2
ERYTHROCYTE [DISTWIDTH] IN BLOOD BY AUTOMATED COUNT: 17.3 % (ref 10–15)
GLUCOSE BLDC GLUCOMTR-MCNC: 100 MG/DL (ref 70–99)
GLUCOSE BLDC GLUCOMTR-MCNC: 101 MG/DL (ref 70–99)
GLUCOSE BLDC GLUCOMTR-MCNC: 102 MG/DL (ref 70–99)
GLUCOSE BLDC GLUCOMTR-MCNC: 129 MG/DL (ref 70–99)
GLUCOSE BLDC GLUCOMTR-MCNC: 99 MG/DL (ref 70–99)
GLUCOSE SERPL-MCNC: 101 MG/DL (ref 70–99)
GLUCOSE UR STRIP-MCNC: 70 MG/DL
HCO3 SERPL-SCNC: 17 MMOL/L (ref 22–29)
HCT VFR BLD AUTO: 37.8 % (ref 40–53)
HGB BLD-MCNC: 12.1 G/DL (ref 13.3–17.7)
HGB UR QL STRIP: NEGATIVE
KETONES UR STRIP-MCNC: NEGATIVE MG/DL
LEUKOCYTE ESTERASE UR QL STRIP: NEGATIVE
LYMPHOCYTES NFR FLD MANUAL: 38 %
MAGNESIUM SERPL-MCNC: 2.2 MG/DL (ref 1.7–2.3)
MCH RBC QN AUTO: 27 PG (ref 26.5–33)
MCHC RBC AUTO-ENTMCNC: 32 G/DL (ref 31.5–36.5)
MCV RBC AUTO: 84 FL (ref 78–100)
MONOS+MACROS NFR FLD MANUAL: 46 %
NEUTS BAND NFR FLD MANUAL: 14 %
NITRATE UR QL: NEGATIVE
PH UR STRIP: 5 [PH] (ref 5–7)
PHOSPHATE SERPL-MCNC: 7 MG/DL (ref 2.5–4.5)
PLATELET # BLD AUTO: 299 10E3/UL (ref 150–450)
POTASSIUM SERPL-SCNC: 5.7 MMOL/L (ref 3.4–5.3)
PROT/CREAT 24H UR: 0.15 MG/MG CR (ref 0–0.2)
RBC # BLD AUTO: 4.48 10E6/UL (ref 4.4–5.9)
RBC URINE: <1 /HPF
SODIUM SERPL-SCNC: 134 MMOL/L (ref 135–145)
SODIUM UR-SCNC: 24 MMOL/L
SP GR UR STRIP: 1.01 (ref 1–1.03)
SQUAMOUS EPITHELIAL: 3 /HPF
UROBILINOGEN UR STRIP-MCNC: <2 MG/DL
WBC # BLD AUTO: 13.3 10E3/UL (ref 4–11)
WBC # FLD AUTO: 2376 /UL
WBC URINE: 1 /HPF

## 2025-01-06 PROCEDURE — 36415 COLL VENOUS BLD VENIPUNCTURE: CPT

## 2025-01-06 PROCEDURE — 272N000710 US PARACENTESIS WITHOUT ALBUMIN

## 2025-01-06 PROCEDURE — P9047 ALBUMIN (HUMAN), 25%, 50ML: HCPCS | Mod: JZ | Performed by: INTERNAL MEDICINE

## 2025-01-06 PROCEDURE — 999N000157 HC STATISTIC RCP TIME EA 10 MIN

## 2025-01-06 PROCEDURE — 85041 AUTOMATED RBC COUNT: CPT

## 2025-01-06 PROCEDURE — 250N000009 HC RX 250: Performed by: PHYSICIAN ASSISTANT

## 2025-01-06 PROCEDURE — 83735 ASSAY OF MAGNESIUM: CPT | Performed by: FAMILY MEDICINE

## 2025-01-06 PROCEDURE — P9047 ALBUMIN (HUMAN), 25%, 50ML: HCPCS | Mod: JZ | Performed by: PHYSICIAN ASSISTANT

## 2025-01-06 PROCEDURE — 89051 BODY FLUID CELL COUNT: CPT | Performed by: PHYSICIAN ASSISTANT

## 2025-01-06 PROCEDURE — 84156 ASSAY OF PROTEIN URINE: CPT | Performed by: PHYSICIAN ASSISTANT

## 2025-01-06 PROCEDURE — 81001 URINALYSIS AUTO W/SCOPE: CPT | Performed by: PHYSICIAN ASSISTANT

## 2025-01-06 PROCEDURE — 89050 BODY FLUID CELL COUNT: CPT | Performed by: PHYSICIAN ASSISTANT

## 2025-01-06 PROCEDURE — 82042 OTHER SOURCE ALBUMIN QUAN EA: CPT | Performed by: PHYSICIAN ASSISTANT

## 2025-01-06 PROCEDURE — 84132 ASSAY OF SERUM POTASSIUM: CPT

## 2025-01-06 PROCEDURE — 250N000011 HC RX IP 250 OP 636: Mod: JZ | Performed by: INTERNAL MEDICINE

## 2025-01-06 PROCEDURE — 80048 BASIC METABOLIC PNL TOTAL CA: CPT

## 2025-01-06 PROCEDURE — 250N000013 HC RX MED GY IP 250 OP 250 PS 637: Performed by: FAMILY MEDICINE

## 2025-01-06 PROCEDURE — 85014 HEMATOCRIT: CPT

## 2025-01-06 PROCEDURE — 0W9G3ZZ DRAINAGE OF PERITONEAL CAVITY, PERCUTANEOUS APPROACH: ICD-10-PCS | Performed by: RADIOLOGY

## 2025-01-06 PROCEDURE — 99222 1ST HOSP IP/OBS MODERATE 55: CPT | Performed by: PHYSICIAN ASSISTANT

## 2025-01-06 PROCEDURE — 84100 ASSAY OF PHOSPHORUS: CPT | Performed by: FAMILY MEDICINE

## 2025-01-06 PROCEDURE — 250N000013 HC RX MED GY IP 250 OP 250 PS 637: Performed by: PHYSICIAN ASSISTANT

## 2025-01-06 PROCEDURE — 250N000011 HC RX IP 250 OP 636: Mod: JZ | Performed by: PHYSICIAN ASSISTANT

## 2025-01-06 PROCEDURE — 84300 ASSAY OF URINE SODIUM: CPT | Performed by: PHYSICIAN ASSISTANT

## 2025-01-06 PROCEDURE — 250N000013 HC RX MED GY IP 250 OP 250 PS 637

## 2025-01-06 PROCEDURE — 120N000001 HC R&B MED SURG/OB

## 2025-01-06 PROCEDURE — 99232 SBSQ HOSP IP/OBS MODERATE 35: CPT | Mod: GC

## 2025-01-06 PROCEDURE — 250N000011 HC RX IP 250 OP 636

## 2025-01-06 PROCEDURE — 87070 CULTURE OTHR SPECIMN AEROBIC: CPT | Performed by: PHYSICIAN ASSISTANT

## 2025-01-06 PROCEDURE — 49083 ABD PARACENTESIS W/IMAGING: CPT

## 2025-01-06 RX ORDER — ALBUMIN (HUMAN) 12.5 G/50ML
50 SOLUTION INTRAVENOUS EVERY 8 HOURS
Status: DISCONTINUED | OUTPATIENT
Start: 2025-01-06 | End: 2025-01-06

## 2025-01-06 RX ORDER — CIPROFLOXACIN 500 MG/1
500 TABLET, FILM COATED ORAL
Status: DISCONTINUED | OUTPATIENT
Start: 2025-01-07 | End: 2025-01-08 | Stop reason: HOSPADM

## 2025-01-06 RX ORDER — ALBUMIN (HUMAN) 12.5 G/50ML
50 SOLUTION INTRAVENOUS ONCE
Status: DISCONTINUED | OUTPATIENT
Start: 2025-01-06 | End: 2025-01-06

## 2025-01-06 RX ORDER — ALBUMIN (HUMAN) 12.5 G/50ML
75 SOLUTION INTRAVENOUS ONCE
Status: DISCONTINUED | OUTPATIENT
Start: 2025-01-06 | End: 2025-01-06

## 2025-01-06 RX ORDER — MIDODRINE HYDROCHLORIDE 5 MG/1
5 TABLET ORAL
Status: DISCONTINUED | OUTPATIENT
Start: 2025-01-06 | End: 2025-01-08 | Stop reason: HOSPADM

## 2025-01-06 RX ORDER — ALBUMIN (HUMAN) 12.5 G/50ML
125 SOLUTION INTRAVENOUS ONCE
Status: DISCONTINUED | OUTPATIENT
Start: 2025-01-07 | End: 2025-01-06

## 2025-01-06 RX ORDER — ALBUMIN (HUMAN) 12.5 G/50ML
50 SOLUTION INTRAVENOUS EVERY 8 HOURS
Status: COMPLETED | OUTPATIENT
Start: 2025-01-06 | End: 2025-01-07

## 2025-01-06 RX ADMIN — PANTOPRAZOLE SODIUM 40 MG: 40 TABLET, DELAYED RELEASE ORAL at 17:55

## 2025-01-06 RX ADMIN — NICOTINE 1 PATCH: 14 PATCH, EXTENDED RELEASE TRANSDERMAL at 08:27

## 2025-01-06 RX ADMIN — MONTELUKAST 10 MG: 10 TABLET, FILM COATED ORAL at 08:26

## 2025-01-06 RX ADMIN — FLUOXETINE HYDROCHLORIDE 20 MG: 20 CAPSULE ORAL at 08:26

## 2025-01-06 RX ADMIN — SODIUM BICARBONATE 50 MEQ: 84 INJECTION INTRAVENOUS at 13:06

## 2025-01-06 RX ADMIN — APIXABAN 5 MG: 5 TABLET, FILM COATED ORAL at 08:25

## 2025-01-06 RX ADMIN — CEFTRIAXONE SODIUM 2 G: 2 INJECTION, POWDER, FOR SOLUTION INTRAMUSCULAR; INTRAVENOUS at 03:41

## 2025-01-06 RX ADMIN — ALBUMIN HUMAN 50 G: 0.25 SOLUTION INTRAVENOUS at 12:58

## 2025-01-06 RX ADMIN — OLANZAPINE 10 MG: 5 TABLET, FILM COATED ORAL at 21:27

## 2025-01-06 RX ADMIN — ACETAMINOPHEN 650 MG: 325 TABLET ORAL at 21:27

## 2025-01-06 RX ADMIN — MIDODRINE HYDROCHLORIDE 5 MG: 5 TABLET ORAL at 17:55

## 2025-01-06 RX ADMIN — PANTOPRAZOLE SODIUM 40 MG: 40 TABLET, DELAYED RELEASE ORAL at 08:25

## 2025-01-06 RX ADMIN — FLUTICASONE FUROATE AND VILANTEROL TRIFENATATE 1 PUFF: 100; 25 POWDER RESPIRATORY (INHALATION) at 08:26

## 2025-01-06 RX ADMIN — FAMOTIDINE 20 MG: 20 TABLET, FILM COATED ORAL at 08:26

## 2025-01-06 RX ADMIN — UMECLIDINIUM 1 PUFF: 62.5 AEROSOL, POWDER ORAL at 08:26

## 2025-01-06 RX ADMIN — SODIUM ZIRCONIUM CYCLOSILICATE 10 G: 10 POWDER, FOR SUSPENSION ORAL at 13:06

## 2025-01-06 RX ADMIN — TRAZODONE HYDROCHLORIDE 100 MG: 50 TABLET ORAL at 21:27

## 2025-01-06 RX ADMIN — ROSUVASTATIN 10 MG: 10 TABLET, FILM COATED ORAL at 21:27

## 2025-01-06 RX ADMIN — ACETAMINOPHEN 650 MG: 325 TABLET ORAL at 11:00

## 2025-01-06 RX ADMIN — ALBUMIN (HUMAN) 50 G: 0.25 INJECTION, SOLUTION INTRAVENOUS at 22:01

## 2025-01-06 RX ADMIN — APIXABAN 5 MG: 5 TABLET, FILM COATED ORAL at 21:28

## 2025-01-06 ASSESSMENT — ACTIVITIES OF DAILY LIVING (ADL)
ADLS_ACUITY_SCORE: 40
ADLS_ACUITY_SCORE: 36
ADLS_ACUITY_SCORE: 40
ADLS_ACUITY_SCORE: 36
ADLS_ACUITY_SCORE: 40

## 2025-01-06 NOTE — CONSULTS
RENAL CONSULT NOTE    REQUESTING PHYSICIAN: Dr Venegas     REASON FOR CONSULT: ARF    ASSESSMENT/PLAN:  ARF: Baseline creatinine 0.8-1.0.  Creatinine was noted to be at baseline on 12/11/2024, subsequently creatinine above baseline on 12/29/2024 with creatinine 1.23, creatinine has been serially rising this admission now creatinine 3.32 today.  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  [place] UA historically without hematuria or pyuria.  Prior paraprotein evaluation negative for monoclonal protein.  Recs:  This is a 44-year-old gentleman with worsening renal function throughout this admission in the setting of decompensated liver disease, multiple contrast loads with CT scans, large-volume paracentesis x 2, SBP, NSAIDs (diclofenac), ACE inhibitor, diuretics  Will order additional albumin s/p paracentesis today, antibiotics per GI/primary team for SBP  Continue to hold lisinopril, diclofenac and diuretics.  As needed midodrine ordered for SBP <110 mmHg  Will check a UA and quantify proteinuria, check urine sodium to see if consistent with HRS type physiology  Reviewed CT scan x 2 this admission negative for obstructive physiology no need to update renal imaging.  Will follow intake and output, daily weights, daily metabolic panel.  Hoping to see creatinine plateauing in numbers turning around with the usual management as above  No indication for dialysis at present, thanks for the consult, we will follow along    Metabolic acidosis: In the setting of ARF.  Will give 1 amp sodium bicarb today.    Hyperkalemia: Potassium 5.7 in the setting of ARF, acidosis.  Getting some bicarb as above.  Lokelma 10 g x 1 today.    Hyponatremia: Mild.  Likely in the setting of ADH excess with cirrhotic physiology, ARF.     Alcoholic cirrhosis with ascites: Management per other team members    Type 2 diabetes: Holding metformin  with ARF, holding Jardiance with ARF.  Insulin per primary team.    HTN: PTA lisinopril on hold.  Starting midodrine with hold parameters now that blood pressures are running soft in the setting of decompensated liver disease.    COPD, asthma: History of tobacco use.  Management per primary team.    HFpEF: PTA diuretics on hold at present in the setting of ARF.            HPI:   Mr. Jaramillo is a 44-year-old male past medical history of DM2, HTN, HFpEF, morbid obesity, autism, alcoholic cirrhosis with recurrent a ascites who was admitted with ascites s/p paracentesis x 2 and found to have SBP, on IV antibiotics.  Creatinine has been serially serially rising which prompted a nephrology evaluation  He tells me that he is feeling a bit more puffy, particularly in the calves, he has noted some edema  He feels that he is urinating with the same frequency, but the volume of his urination has decreased significantly  Denies any dysuria or gross hematuria  Notes no shortness of breath, but does note some abdominal tightness when he takes a deep breath  Does not use NSAIDs  He is very concerned that his creatinine continues to rise, we did discuss the likely etiology and usual management for this type of acute kidney injury  He also asked that I call and update his mother since she was not able to answer the phone when I attempted to call her during her visit        REVIEW OF SYSTEMS:  Complete 12 point review of systems was negative other than those noted in the HPI      Past Medical History:   Diagnosis Date    COPD exacerbation (H) 12/2/2024    DM2 (diabetes mellitus, type 2) (H) 4/28/2020    HTN (hypertension) 7/30/2012    Thyroid nodule 7/31/2019    Rojas's disease (H)        Current Facility-Administered Medications   Medication Dose Route Frequency Provider Last Rate Last Admin    acetaminophen (TYLENOL) tablet 650 mg  650 mg Oral Q4H PRN Sabine Taylor DO   650 mg at 01/06/25 1100    Or    acetaminophen (TYLENOL)  Suppository 650 mg  650 mg Rectal Q4H PRN Sabine Taylor, DO        albumin human 25 % injection 50 g  50 g Intravenous Q8H Sumit Pope PA-C   Held at 01/06/25 1258    albuterol (PROVENTIL) neb solution 2.5 mg  2.5 mg Nebulization TID PRN Sabine Taylor, DO        apixaban ANTICOAGULANT (ELIQUIS) tablet 5 mg  5 mg Oral BID Sabine Taylor, DO   5 mg at 01/06/25 0825    calcium carbonate (TUMS) chewable tablet 1,000 mg  1,000 mg Oral 4x Daily PRN Sabine Taylor, DO        cefTRIAXone (ROCEPHIN) 2 g vial to attach to  ml bag for ADULTS or NS 50 ml bag for PEDS  2 g Intravenous Q24H Sabine Taylor, DO 0 mL/hr at 01/03/25 1023 2 g at 01/06/25 0341    glucose gel 15-30 g  15-30 g Oral Q15 Min PRN Sabine Taylor, DO        Or    dextrose 50 % injection 25-50 mL  25-50 mL Intravenous Q15 Min PRN Sabine Taylor DO        Or    glucagon injection 1 mg  1 mg Subcutaneous Q15 Min PRN Sabine Taylor, DO        diclofenac (VOLTAREN) 1 % topical gel 2 g  2 g Topical Daily PRN Sabine Taylor, DO   2 g at 01/04/25 0638    [Held by provider] empagliflozin (JARDIANCE) tablet 10 mg  10 mg Oral QAM Sabine Taylor, DO   10 mg at 01/04/25 0852    famotidine (PEPCID) tablet 20 mg  20 mg Oral Daily Alanis Avendano MD   20 mg at 01/06/25 0826    FLUoxetine (PROzac) capsule 20 mg  20 mg Oral QAM Sabine Taylor, DO   20 mg at 01/06/25 0826    fluticasone-vilanterol (BREO ELLIPTA) 100-25 MCG/ACT inhaler 1 puff  1 puff Inhalation Daily Sabine Taylor DO   1 puff at 01/06/25 0826    And    umeclidinium (INCRUSE ELLIPTA) 62.5 MCG/ACT inhaler 1 puff  1 puff Inhalation Daily Sabine Taylor DO   1 puff at 01/06/25 0826    [Held by provider] furosemide (LASIX) tablet 40 mg  40 mg Oral QAM Sabine Taylor DO   40 mg at 01/05/25 0911    insulin aspart (NovoLOG) injection (RAPID ACTING)  1-7 Units Subcutaneous TID AC Sabine Taylor DO        insulin aspart (NovoLOG) injection (RAPID ACTING)  1-5 Units  Subcutaneous At Bedtime Sabine Taylor DO        lidocaine (LMX4) cream   Topical Q1H PRN Sabine Taylor DO        lidocaine 1 % 0.1-1 mL  0.1-1 mL Other Q1H PRN Sabine Taylor DO        [Held by provider] lisinopril (ZESTRIL) tablet 10 mg  10 mg Oral Denise Barber MD   10 mg at 01/04/25 0853    loperamide (IMODIUM) capsule 4 mg  4 mg Oral 4x Daily PRN Sabine Taylor DO   4 mg at 01/03/25 1626    loratadine (CLARITIN) tablet 10 mg  10 mg Oral Daily PRN Sabine Taylor DO        melatonin tablet 5 mg  5 mg Oral At Bedtime PRN Sabine Taylor DO        montelukast (SINGULAIR) tablet 10 mg  10 mg Oral QAM Sabine Taylor DO   10 mg at 01/06/25 0826    nicotine (NICODERM CQ) 14 MG/24HR 24 hr patch 1 patch  1 patch Transdermal Daily Sabine Taylor DO   1 patch at 01/06/25 0827    OLANZapine (zyPREXA) tablet 10 mg  10 mg Oral At Bedtime Sabine Taylor DO   10 mg at 01/05/25 2102    ondansetron (ZOFRAN ODT) ODT tab 4 mg  4 mg Oral Q6H PRN Sabine Taylor DO        Or    ondansetron (ZOFRAN) injection 4 mg  4 mg Intravenous Q6H PRN Sabine Taylor DO        pantoprazole (PROTONIX) EC tablet 40 mg  40 mg Oral BID AC Sabine Taylor DO   40 mg at 01/06/25 0825    prochlorperazine (COMPAZINE) injection 10 mg  10 mg Intravenous Q6H PRN Sabine Taylor DO        Or    prochlorperazine (COMPAZINE) tablet 10 mg  10 mg Oral Q6H PRN Sabine Taylor DO        rosuvastatin (CRESTOR) tablet 10 mg  10 mg Oral At Bedtime Sabine Taylor DO   10 mg at 01/05/25 2102    senna-docusate (SENOKOT-S/PERICOLACE) 8.6-50 MG per tablet 1 tablet  1 tablet Oral BID PRN Sabine Taylor DO        Or    senna-docusate (SENOKOT-S/PERICOLACE) 8.6-50 MG per tablet 2 tablet  2 tablet Oral BID PRN Sabine Taylor DO        sodium bicarbonate 8.4 % injection 50 mEq  50 mEq Intravenous Once Sumit Pope PA-C        sodium chloride (PF) 0.9% PF flush 3 mL  3 mL Intracatheter Q8H Sabine Taylor DO   3 mL at  01/06/25 0340    sodium chloride (PF) 0.9% PF flush 3 mL  3 mL Intracatheter q1 min prn Sabine Taylor, DO   3 mL at 01/06/25 0830    sodium zirconium cyclosilicate (LOKELMA) packet 10 g  10 g Oral Once Sumit Pope PA-C        [Held by provider] spironolactone (ALDACTONE) tablet 100 mg  100 mg Oral Daily Sabine Taylor, DO   100 mg at 01/04/25 0854    sucralfate (CARAFATE) suspension 1 g  1 g Oral 4x Daily PRN Sabine Taylor, DO        traZODone (DESYREL) tablet 100 mg  100 mg Oral At Bedtime Sabine Taylor,    100 mg at 01/05/25 2102       No current outpatient medications on file.      ALLERGIES/SENSITIVITIES:  Allergies   Allergen Reactions    Apricot Flavoring Agent (Non-Screening) Anaphylaxis    Banana Anaphylaxis     Throat swelling  Throat swelling      Wasp Venom Protein Shortness Of Breath     Other reaction(s): Respiratory Distress  Has an epi pen  Has an epi pen      Bees Anaphylaxis     Have an Epi pen that carries with    Methylphenidate Itching     Other reaction(s): Nightmares    Prunus      Other reaction(s): *Unknown    Sulfa Antibiotics      Headaches and nausea    Prunus Persica Rash     Other reaction(s): *Unknown     Social History     Tobacco Use    Smoking status: Every Day     Current packs/day: 1.00     Types: Cigarettes    Smokeless tobacco: Never   Vaping Use    Vaping status: Never Used   Substance Use Topics    Alcohol use: No     Comment: once every 3 months    Drug use: No     I have reviewed this patient's family history and updated it with pertinent information if needed.  Family History   Problem Relation Age of Onset    Unknown/Adopted Father     Unknown/Adopted Maternal Grandmother     C.A.D. Maternal Grandfather     Diabetes Maternal Grandfather     Cerebrovascular Disease Maternal Grandfather     Unknown/Adopted Paternal Grandmother     Unknown/Adopted Paternal Grandfather     Unknown/Adopted Brother     Unknown/Adopted Sister          PHYSICAL EXAM:  Physical  Exam   Temp: 97.4  F (36.3  C) Temp src: Oral BP: 110/55 Pulse: 65   Resp: 18 SpO2: 97 % O2 Device: None (Room air)    Vitals:    01/03/25 1809 01/04/25 1100 01/05/25 0942   Weight: 119.8 kg (264 lb 3.2 oz) 119.5 kg (263 lb 6.4 oz) 121.1 kg (267 lb)     Vital Signs with Ranges  Temp:  [97.4  F (36.3  C)-98.3  F (36.8  C)] 97.4  F (36.3  C)  Pulse:  [65-70] 65  Resp:  [16-18] 18  BP: ()/(55-71) 110/55  SpO2:  [96 %-97 %] 97 %  I/O last 3 completed shifts:  In: 840 [P.O.:840]  Out: 1500 [Urine:1500]    Patient Vitals for the past 72 hrs:   Weight   01/05/25 0942 121.1 kg (267 lb)   01/04/25 1100 119.5 kg (263 lb 6.4 oz)   01/03/25 1809 119.8 kg (264 lb 3.2 oz)       General: Alert, NAD  HENT: Supple, Non-tender, no obvious JVD  Cardiovascular: RRR, no rub, gallop, or murmur.  Extremities: + Edema bilateral ankles  Respiratory: CTAB, non-labored  Gastrointestinal: Abdomen is soft, distended with ascites  Musculoskeletal: Grossly normal   Integumentary: Warm, dry, no rash  Neurologic: Non focal   Psychiatric: Cooperative  Laboratory:     Recent Labs   Lab 01/06/25  0625 01/05/25  0635 01/04/25  0603 01/03/25  0848 01/02/25  1226   WBC 13.3* 14.8* 11.5* 12.8* 12.8*   RBC 4.48 4.47 4.70 4.67 4.79   HGB 12.1* 12.1* 12.6* 12.6* 12.9*   HCT 37.8* 37.7* 39.8* 39.4* 40.8    311 306 303 332       Basic Metabolic Panel:  Recent Labs   Lab 01/06/25  1105 01/06/25  0725 01/06/25  0625 01/06/25  0349 01/05/25  2044 01/05/25  1733 01/05/25  0747 01/05/25  0635 01/04/25  0727 01/04/25  0603 01/03/25  1117 01/03/25  0848 01/02/25 2016 01/02/25  1226   NA  --   --  134*  --   --  134*  --  133*  --  137  --  136  --  139   POTASSIUM  --   --  5.7*  --   --  5.7*  --  5.7*  --  5.3  --  5.3  --  5.1   CHLORIDE  --   --  102  --   --  101  --  101  --  104  --  104  --  107   CO2  --   --  17*  --   --  16*  --  18*  --  20*  --  18*  --  19*   BUN  --   --  65.7*  --   --  57.8*  --  52.2*  --  43.1*  --  40.9*  --  38.0*    CR  --   --  3.32*  --   --  2.79*  --  2.35*  --  1.46*  --  1.38*  --  1.20*   * 102* 101* 100* 125* 97   < > 87   < > 94   < > 145*   < > 85   WES  --   --  9.0  --   --  9.4  --  9.4  --  9.6  --  9.8  --  10.5*    < > = values in this interval not displayed.       INR  Recent Labs   Lab 01/02/25  2151   INR 1.54*       Recent Labs   Lab Test 01/06/25  0625 01/05/25  1733   POTASSIUM 5.7* 5.7*   CHLORIDE 102 101   BUN 65.7* 57.8*      Recent Labs   Lab Test 01/02/25 2016 01/02/25  1226 12/29/24  1847 02/04/23  0239 01/31/23  1706   ALBUMIN  --  4.4 4.3   < >  --    BILITOTAL  --  0.5 0.4   < >  --    ALT  --  19 19   < >  --    AST  --  18 27   < >  --    PROTEIN Negative  --   --   --  70*    < > = values in this interval not displayed.       Personally reviewed today's laboratory studies    This note was dictated using voice recognition       Thank you for involving us in the care of this patient. We will continue to follow along with you.      Sumit Pope PA-C  [facility]  [phone]

## 2025-01-06 NOTE — PLAN OF CARE
Goal Outcome Evaluation:    Pt did complain of flank pain. PRN Tylenol given with relief. No nausea noted. Abdomen continues to be distended and taut. Plan is for paracentesis tomorrow at 1000. GI following. Afebrile. IV Rocephin. Diabetic. HS sugar was 125. No coverage needed. K/Mag/Phosp protocols. Rechecks tomorrow. Pt is alert and orientated. Up independently with transfers and ambulation. Will continue to monitor.

## 2025-01-06 NOTE — PLAN OF CARE
Goal Outcome Evaluation:      Plan of Care Reviewed With: patient    Overall Patient Progress: improvingOverall Patient Progress: improving    Outcome Evaluation: Patient will see improvement with breathing after paracentesis today.    Nephrology consulted today, several knew orders to treat RIVERA. Patient with achy legs, improvement with tylenol and elevating legs. 4.4L fluid removed today during paracentesis. Blood sugars controlled well. CPAP with naps. Up walking in hallways frequently.

## 2025-01-06 NOTE — PROGRESS NOTES
GASTROENTEROLOGY PROGRESS NOTE     SUBJECTIVE   Patient had a 5 L paracentesis this morning.  He has not received albumin yet.  Nephrology consult placed and is pending.  Denies abdominal pain.  Is walking in the halls.  No lower extremity edema.  He is nearing his 1 year [place] of sobriety.     OBJECTIVE     Vitals Blood pressure 110/55, pulse 65, temperature 97.4  F (36.3  C), temperature source Oral, resp. rate 18, weight 121.1 kg (267 lb), SpO2 97%.      Physical exam:    Alert and oriented, no acute distress  Abdomen protuberant, distended with ascites but nontender and not tense  No lower extremity edema    LABORATORY    ELECTROLYTE PANEL   Recent Labs   Lab 25  1105 25  0725 25  0625 25  2044 25  1733 25  0747 25  0635   NA  --   --  134*  --  134*  --  133*   POTASSIUM  --   --  5.7*  --  5.7*  --  5.7*   CHLORIDE  --   --  102  --  101  --  101   CO2  --   --  17*  --  16*  --  18*   * 102* 101*   < > 97   < > 87   CR  --   --  3.32*  --  2.79*  --  2.35*   BUN  --   --  65.7*  --  57.8*  --  52.2*    < > = values in this interval not displayed.      HEMATOLOGY PANEL   Recent Labs   Lab 25  0625 25  0635 25  0603 25  0848 25  2151   HGB 12.1* 12.1* 12.6*   < >  --    MCV 84 84 85   < >  --    WBC 13.3* 14.8* 11.5*   < >  --     311 306   < >  --    INR  --   --   --   --  1.54*    < > = values in this interval not displayed.      LIVER AND PANCREAS PANEL   Recent Labs   Lab 25  1226   AST 18   ALT 19   ALKPHOS 303*   BILITOTAL 0.5   LIPASE 19     IMAGING STUDIES        I have reviewed the current diagnostic and laboratory tests.              IMPRESSION   44-year-old man with cirrhosis attributed to alcohol misuse, DM 2, asthma, hypertension, HFpEF, DVT on Eliquis who presents with the followin.  SBP.  Has received albumin on day 1 and 3.  Had a smaller tap today given his acute kidney injury, and neutrophils  are improved. SAAG greater than 1.1 but high-protein of 4.9 in ascites raises concern for a congestive component to ascites.  MRI today does indicate right ventricular pressure and fluid overload with dilated right atrium and no valvular disease.  2.  Acute kidney injury: Nonoliguric with bland UA.  With portal hypertension and SBP, need to consider HRS.  He was on diuretics prior to presentation and there is mention of both Toradol and ibuprofen as outpatient medications.  No hydronephrosis on CT.     RECOMMENDATIONS   1.  Nephrology consult pending.  2.  I gave another 50 of albumin today.  3.  Continue to hold diuretics.  4.  Defer to primary service on management of cardiac component to his clinical picture.  5.  We will continue ceftriaxone with plan for 5-day course.  Then he will need daily fluoroquinolone prophylaxis.  6.  2 g sodium diet.  7.  Continued alcohol abstinence as he is doing.  8.  Will continue to follow.     Total time spent: 30 minutes.        Ashley Parker MD  Thank you for the opportunity to participate in the care of this patient.   Please feel free to call me with any questions or concerns.  Phone number [phone].

## 2025-01-06 NOTE — PROGRESS NOTES
[facility]    Progress Note - Hospitalist Service       Date of Admission:  1/2/2025    Assessment & Plan   Warren Jaramillo is a 44 year old male with a history of T2DM, HTN, HFpEF, cardiomegaly, DVT, morbid obesity, autism, adjustment disorder, insomnia, and cirrhosis with ascites who is admitted for SBP in the setting of ascites.     Updates 1/6/25  - Rising Cr so Nephrology consulted  - paracentesis completed with 4.4L off   - completed ceftriaxone course  - will start cipro for SBP prophylaxis on 1/7    Spontaneous bacterial peritonitis   Cirrhosis, unspecified type vs alcoholic cirrhosis   Ascites   Presented to ED on 1/2 with concern of increased abdominal distention and lower abdominal pain. CT abdomen/pelvis showed cirrhosis of the liver with mild-moderate ascites. S/p paracentesis on 1/2 with ascitic fluid and SAAG c/w portal hypertension though also high protein indicating likely HF etiology. Vitally stable. UA notable only for glucose, on Jardiance. WBC of 12.8. ALT, AST, Tbili, and albumin WNL. CRP elevated at 17.7. Concern for SBP in the setting of known liver cirrhosis of likely alcoholic origin per GI. Completed 5 day course of ceftriaxone. Last MELD score of 14-15 on 1/4.   - [facility] consulted, appreciate recommendations    - albumin 50g bolus today   - continue to hold Lasix, spironolactone   - completed 5 day IV ceftriaxone course (last dose today)  - SBP prophylaxis with cipro 500 mg daily indefinitely starting 1/7   - continued alcohol abstinence   - paracentesis today   - significant neutrophils present   - ascitic fluid culture in process  - INR, hepatic panel in AM    Acute renal failure  Hyponatremia, mild  Hyperkalemia, stable  Baseline Cr of 0.8-1. Cr rising since admit and now up to 3.32 despite holding diuretics. No symptoms of obstruction and no signs of obstruction on CT abd/pelvis on admission. Most likely hepatorenal secondary to decompensated liver  disease. K initially normal on admission. Elevated to 5.7 upon holding diuretics and with acute renal failure/acidosis  - Nephrology consulted, appreciate recs   - UA    - urine Na   - agree with holding lisinopril, diclofenac, diuretics   - midodrine for SBP <110   - 1 amp bicarb today   - 10 g Lokelma today   - daily weights   - daily BMP    Tongue lesion  Presented with lesion on the tip of his tongue. Overnight noted some bleeding and was given an oral suction with ~ 500mL of blood tinged fluid. Bleeding has since stopped. Differential includes granuloma, pyogenic granuloma, fibroma, or neoplasm with history of tobacco use. Discussed with Dr. Ivan/ENT, who recommended outpatient biopsy. Will likely need to be off Eliquis prior to biopsy.      Type 2 diabetes mellitus  History of non-insulin dependent T2DM. A1c 6.8 in 9/2024. A1c 6.2 on admission. Currently on metformin and Jardiance at home. Noted to have glucosuria which is explained by Jardiance. Blood sugars have been well controlled during admission.   - Hold PTA metformin and Jardiance in setting of RIVERA  - mSSI   - will monitor for hypoglycemia with starting ciprofloxacin     HFpEF  History of HFpEF with EF of 50-55% on most recent Echo on 8/2/2023. No significant lower extremity edema or SOB on admission. Follows closely with Cardiology. Likely contributing in part to portal hypertension.  - holding PTA spironolactone and lasix in setting of RIVERA     HLD  HTN  Pressures well controlled 120s/50s on admission. Have trended a little softer.   - midodrine per nephrology  - holding PTA lisinopril   - PTA rosuvastatin     COPD   Asthma  - prn albuterol nebs for wheezing/SOB   - PTA Trelegy Eliipta and Singulair      History of DVT   - Continue PTA Eliquis for DVT ppx      GERD  - Continue PTA omeprazole, pepcid, and carafate      Chronic constipation  History of chronic constipation but presented with loose stools.   -  Miralax and Senna prn     Mood and  "insomnia   - PTA Zyprexa   - PTA fluoxetine  - PTA trazodone at bedtime          Diet: 2 Gram Sodium Diet    DVT Prophylaxis: DOAC  Khan Catheter: Not present  Fluids: PO  Lines: None     Cardiac Monitoring: None  Code Status: Full Code      Clinically Significant Risk Factors        # Hyperkalemia: Highest K = 5.7 mmol/L in last 2 days, will monitor as appropriate  # Hyponatremia: Lowest Na = 133 mmol/L in last 2 days, will monitor as appropriate          # Acute Kidney Injury, unspecified: based on a >150% or 0.3 mg/dL increase in last creatinine compared to past 90 day average, will monitor renal function  # Hypertension: Noted on problem list  # Chronic heart failure with preserved ejection fraction: heart failure noted on problem list and last echo with EF >50%           # Severe Obesity: Estimated body mass index is 44.43 kg/m  as calculated from the following:    Height as of 12/29/24: 1.651 m (5' 5\").    Weight as of this encounter: 121.1 kg (267 lb)., PRESENT ON ADMISSION       # Financial/Environmental Concerns: none         Social Drivers of Health   Housing Stability: High Risk (12/2/2024)    Housing Stability     Do you have housing? : No     Are you worried about losing your housing?: No   Tobacco Use: High Risk (12/24/2024)    Patient History     Smoking Tobacco Use: Every Day     Smokeless Tobacco Use: Never    [facility]    Social Connections        Disposition Plan     Medically Ready for Discharge: Anticipated in 2-4 Days     The patient's care was discussed with the Attending Physician, Dr. Venegas and Patient's Family.    Denise Frazier MD  Hospitalist Service  [facility]  Securely message with myContactCard (more info)  Text page via [facility] Paging/Directory   ______________________________________________________________________    Interval History   Vitally stable. No acute events " overnight.     Notes breathing has been more shallow today. Also noticed more swelling behind his knees. Has not been urinating as much volume as typical. Also says drinking less water. Plans for another paracentesis today. No other new concerns or questions.     Spoke with his mother on the phone. She is wondering if milk thistle will help his liver.     Physical Exam   Vital Signs: Temp: 98  F (36.7  C) Temp src: Oral BP: 136/71 Pulse: 67   Resp: 16 SpO2: 97 % O2 Device: None (Room air)    Weight: 267 lbs 0 oz    Constitutional: sleeping with CPAP on, awakes easily to touch and voice  HEENT: erythematous lesion on tip of tongue with scab in place, no active bleeding  Respiratory: Clear to auscultation bilaterally, no crackles or wheezing  Cardiovascular: Regular rate and rhythm, normal S1 and S2, no murmur noted  GI: distended, nontender to palpation, bowel sounds present, + ascites  Musculoskeletal: Full range of motion noted. No LE edema.   Neurologic: Awake, alert, oriented to name, place and time.  Cranial nerves II-XII are grossly intact.    Medical Decision Making   Please see A&P for additional details of medical decision making.      Data   ------------------------- PAST 24 HR DATA REVIEWED -----------------------------------------------    I have personally reviewed the following data over the past 24 hrs:    13.3 (H)  \   12.1 (L)   / 299     134 (L) 102 65.7 (H) /  102 (H)   5.7 (H) 17 (L) 3.32 (H) \         MELD 3.0: 14 at 1/4/2025  6:03 AM  MELD-Na: 15 at 1/4/2025  6:03 AM  Calculated from:  Serum Creatinine: 1.46 mg/dL at 1/4/2025  6:03 AM  Serum Sodium: 137 mmol/L at 1/4/2025  6:03 AM  Total Bilirubin: 0.5 mg/dL (Using min of 1 mg/dL) at 1/2/2025 12:26 PM  Serum Albumin: 4.4 g/dL (Using max of 3.5 g/dL) at 1/2/2025 12:26 PM  INR(ratio): 1.54 at 1/2/2025  9:51 PM  Age at listing (hypothetical): 44 years  Sex: Male at 1/4/2025  6:03 AM

## 2025-01-06 NOTE — PLAN OF CARE
Problem: Comorbidity Management  Goal: Maintenance of Asthma Control  Outcome: Progressing  Intervention: Maintain Asthma Symptom Control  Recent Flowsheet Documentation  Taken 1/6/2025 0031 by Benjy Patten RN  Medication Review/Management: medications reviewed     Problem: Comorbidity Management  Goal: Blood Glucose Levels Within Targeted Range  Intervention: Monitor and Manage Glycemia  Recent Flowsheet Documentation  Taken 1/6/2025 0031 by Benjy Patten RN  Medication Review/Management: medications reviewed   Goal Outcome Evaluation:       Patient alert and oriented, denies pain, SOB with activities reported. Scheduled ceftriaxone given per order, slept between cares with Cpap. Scheduled for paracentesis at 1000 this morning. Blood Glucose at 0200 was 100.  Vital signs:  Temp: 98.3  F (36.8  C) Temp src: Oral BP: 98/58 Pulse: 70   Resp: 18 SpO2: 96 % O2 Device: None (Room air)     and stable

## 2025-01-07 LAB
ALBUMIN SERPL BCG-MCNC: 5.1 G/DL (ref 3.5–5.2)
ALP SERPL-CCNC: 237 U/L (ref 40–150)
ALT SERPL W P-5'-P-CCNC: 22 U/L (ref 0–70)
ANION GAP SERPL CALCULATED.3IONS-SCNC: 14 MMOL/L (ref 7–15)
AST SERPL W P-5'-P-CCNC: 14 U/L (ref 0–45)
BACTERIA FLD CULT: NO GROWTH
BILIRUB DIRECT SERPL-MCNC: 0.3 MG/DL (ref 0–0.3)
BILIRUB SERPL-MCNC: 0.6 MG/DL
BUN SERPL-MCNC: 70.9 MG/DL (ref 6–20)
CALCIUM SERPL-MCNC: 9.3 MG/DL (ref 8.8–10.4)
CHLORIDE SERPL-SCNC: 102 MMOL/L (ref 98–107)
CREAT SERPL-MCNC: 2.52 MG/DL (ref 0.67–1.17)
EGFRCR SERPLBLD CKD-EPI 2021: 31 ML/MIN/1.73M2
ERYTHROCYTE [DISTWIDTH] IN BLOOD BY AUTOMATED COUNT: 17.2 % (ref 10–15)
GLUCOSE BLDC GLUCOMTR-MCNC: 100 MG/DL (ref 70–99)
GLUCOSE BLDC GLUCOMTR-MCNC: 131 MG/DL (ref 70–99)
GLUCOSE BLDC GLUCOMTR-MCNC: 135 MG/DL (ref 70–99)
GLUCOSE BLDC GLUCOMTR-MCNC: 183 MG/DL (ref 70–99)
GLUCOSE BLDC GLUCOMTR-MCNC: 97 MG/DL (ref 70–99)
GLUCOSE SERPL-MCNC: 99 MG/DL (ref 70–99)
GRAM STAIN RESULT: NORMAL
GRAM STAIN RESULT: NORMAL
HCO3 SERPL-SCNC: 18 MMOL/L (ref 22–29)
HCT VFR BLD AUTO: 36 % (ref 40–53)
HGB BLD-MCNC: 11.8 G/DL (ref 13.3–17.7)
INR PPP: 1.74 (ref 0.85–1.15)
MAGNESIUM SERPL-MCNC: 2.4 MG/DL (ref 1.7–2.3)
MCH RBC QN AUTO: 27.1 PG (ref 26.5–33)
MCHC RBC AUTO-ENTMCNC: 32.8 G/DL (ref 31.5–36.5)
MCV RBC AUTO: 83 FL (ref 78–100)
PATH REPORT.COMMENTS IMP SPEC: NORMAL
PATH REPORT.COMMENTS IMP SPEC: NORMAL
PATH REPORT.FINAL DX SPEC: NORMAL
PATH REPORT.GROSS SPEC: NORMAL
PATH REPORT.MICROSCOPIC SPEC OTHER STN: NORMAL
PATH REPORT.RELEVANT HX SPEC: NORMAL
PHOSPHATE SERPL-MCNC: 6.4 MG/DL (ref 2.5–4.5)
PHOTO IMAGE: NORMAL
PLATELET # BLD AUTO: 287 10E3/UL (ref 150–450)
POTASSIUM SERPL-SCNC: 5.5 MMOL/L (ref 3.4–5.3)
PROT SERPL-MCNC: 7.3 G/DL (ref 6.4–8.3)
RBC # BLD AUTO: 4.36 10E6/UL (ref 4.4–5.9)
SODIUM SERPL-SCNC: 134 MMOL/L (ref 135–145)
WBC # BLD AUTO: 12 10E3/UL (ref 4–11)

## 2025-01-07 PROCEDURE — P9047 ALBUMIN (HUMAN), 25%, 50ML: HCPCS | Mod: JZ | Performed by: PHYSICIAN ASSISTANT

## 2025-01-07 PROCEDURE — 80048 BASIC METABOLIC PNL TOTAL CA: CPT

## 2025-01-07 PROCEDURE — 94660 CPAP INITIATION&MGMT: CPT

## 2025-01-07 PROCEDURE — 88305 TISSUE EXAM BY PATHOLOGIST: CPT | Mod: 26 | Performed by: PATHOLOGY

## 2025-01-07 PROCEDURE — 85014 HEMATOCRIT: CPT

## 2025-01-07 PROCEDURE — 84075 ASSAY ALKALINE PHOSPHATASE: CPT

## 2025-01-07 PROCEDURE — 83735 ASSAY OF MAGNESIUM: CPT | Performed by: FAMILY MEDICINE

## 2025-01-07 PROCEDURE — 99232 SBSQ HOSP IP/OBS MODERATE 35: CPT | Mod: GC

## 2025-01-07 PROCEDURE — 250N000013 HC RX MED GY IP 250 OP 250 PS 637: Performed by: PHYSICIAN ASSISTANT

## 2025-01-07 PROCEDURE — 250N000009 HC RX 250: Performed by: PHYSICIAN ASSISTANT

## 2025-01-07 PROCEDURE — 36415 COLL VENOUS BLD VENIPUNCTURE: CPT

## 2025-01-07 PROCEDURE — 85610 PROTHROMBIN TIME: CPT

## 2025-01-07 PROCEDURE — 84155 ASSAY OF PROTEIN SERUM: CPT

## 2025-01-07 PROCEDURE — 99232 SBSQ HOSP IP/OBS MODERATE 35: CPT | Performed by: PHYSICIAN ASSISTANT

## 2025-01-07 PROCEDURE — 999N000157 HC STATISTIC RCP TIME EA 10 MIN

## 2025-01-07 PROCEDURE — 250N000013 HC RX MED GY IP 250 OP 250 PS 637

## 2025-01-07 PROCEDURE — 120N000001 HC R&B MED SURG/OB

## 2025-01-07 PROCEDURE — 250N000011 HC RX IP 250 OP 636: Mod: JZ | Performed by: PHYSICIAN ASSISTANT

## 2025-01-07 PROCEDURE — 250N000013 HC RX MED GY IP 250 OP 250 PS 637: Performed by: FAMILY MEDICINE

## 2025-01-07 PROCEDURE — 82435 ASSAY OF BLOOD CHLORIDE: CPT

## 2025-01-07 PROCEDURE — 84100 ASSAY OF PHOSPHORUS: CPT | Performed by: FAMILY MEDICINE

## 2025-01-07 RX ADMIN — APIXABAN 5 MG: 5 TABLET, FILM COATED ORAL at 20:59

## 2025-01-07 RX ADMIN — SODIUM ZIRCONIUM CYCLOSILICATE 10 G: 10 POWDER, FOR SUSPENSION ORAL at 11:23

## 2025-01-07 RX ADMIN — PANTOPRAZOLE SODIUM 40 MG: 40 TABLET, DELAYED RELEASE ORAL at 06:27

## 2025-01-07 RX ADMIN — MONTELUKAST 10 MG: 10 TABLET, FILM COATED ORAL at 08:27

## 2025-01-07 RX ADMIN — ALBUMIN (HUMAN) 50 G: 0.25 INJECTION, SOLUTION INTRAVENOUS at 04:40

## 2025-01-07 RX ADMIN — NICOTINE 1 PATCH: 14 PATCH, EXTENDED RELEASE TRANSDERMAL at 08:25

## 2025-01-07 RX ADMIN — ALBUMIN (HUMAN) 50 G: 0.25 INJECTION, SOLUTION INTRAVENOUS at 20:03

## 2025-01-07 RX ADMIN — ALBUMIN (HUMAN) 50 G: 0.25 INJECTION, SOLUTION INTRAVENOUS at 11:24

## 2025-01-07 RX ADMIN — PANTOPRAZOLE SODIUM 40 MG: 40 TABLET, DELAYED RELEASE ORAL at 16:45

## 2025-01-07 RX ADMIN — MIDODRINE HYDROCHLORIDE 5 MG: 5 TABLET ORAL at 16:45

## 2025-01-07 RX ADMIN — ROSUVASTATIN 10 MG: 10 TABLET, FILM COATED ORAL at 21:00

## 2025-01-07 RX ADMIN — UMECLIDINIUM 1 PUFF: 62.5 AEROSOL, POWDER ORAL at 08:26

## 2025-01-07 RX ADMIN — APIXABAN 5 MG: 5 TABLET, FILM COATED ORAL at 08:27

## 2025-01-07 RX ADMIN — FLUTICASONE FUROATE AND VILANTEROL TRIFENATATE 1 PUFF: 100; 25 POWDER RESPIRATORY (INHALATION) at 08:27

## 2025-01-07 RX ADMIN — MIDODRINE HYDROCHLORIDE 5 MG: 5 TABLET ORAL at 08:28

## 2025-01-07 RX ADMIN — SODIUM BICARBONATE 50 MEQ: 84 INJECTION INTRAVENOUS at 13:46

## 2025-01-07 RX ADMIN — FLUOXETINE HYDROCHLORIDE 20 MG: 20 CAPSULE ORAL at 08:27

## 2025-01-07 RX ADMIN — FAMOTIDINE 20 MG: 20 TABLET, FILM COATED ORAL at 08:27

## 2025-01-07 RX ADMIN — CIPROFLOXACIN 500 MG: 500 TABLET ORAL at 06:27

## 2025-01-07 RX ADMIN — OLANZAPINE 10 MG: 5 TABLET, FILM COATED ORAL at 21:00

## 2025-01-07 RX ADMIN — TRAZODONE HYDROCHLORIDE 100 MG: 50 TABLET ORAL at 21:00

## 2025-01-07 RX ADMIN — ACETAMINOPHEN 650 MG: 325 TABLET ORAL at 21:02

## 2025-01-07 RX ADMIN — MIDODRINE HYDROCHLORIDE 5 MG: 5 TABLET ORAL at 11:22

## 2025-01-07 ASSESSMENT — ACTIVITIES OF DAILY LIVING (ADL)
ADLS_ACUITY_SCORE: 36

## 2025-01-07 NOTE — PROGRESS NOTES
RENAL PROGRESS NOTE    ASSESSMENT & PLAN:   Mr. Jaramillo is a 44-year-old male past medical history of DM2, HTN, HFpEF, morbid obesity, autism, alcoholic cirrhosis with recurrent ascites who was admitted with ascites s/p paracentesis x 2 and found to have SBP, on IV antibiotics.  Creatinine has been serially serially rising through 1/6/24 of this admission which prompted renal eval     ARF: Baseline creatinine 0.8-1.0.  Creatinine was noted to be at baseline on 12/11/2024, subsequently creatinine above baseline on 12/29/2024 with creatinine 1.23, creatinine had been serially rising throughout this admission, creatinine appears to have peaked at 3.32 on 1/6/2025.    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  Howard UA historically without hematuria or pyuria.  Prior paraprotein evaluation negative for monoclonal protein.  Recs:  This is a 44-year-old gentleman with worsening renal function throughout this admission in the setting of decompensated liver disease, multiple contrast loads with CT scans, large-volume paracentesis x 2, SBP, NSAIDs (diclofenac), intermittent ACE inhibitor/diuretic  Getting a course of albumin for HRS management and also post paracentesis 1/6/2025  Antibiotics per GI/primary team for SBP  Continue to hold lisinopril, no NSAIDs including topical diclofenac for now.  Good UOP off diuretics over the past 24 hours  As needed midodrine ordered for SBP <110 mmHg  UA was bland without hematuria or pyuria, no significant quantifiable proteinuria on spot check, urine sodium 24.  Reviewed CT scan x 2 this admission negative for obstructive physiology no need to update renal imaging.  Will follow intake and output, daily weights, daily metabolic panel.   I anticipate his creatinine will continue to improve with supportive cares as we are.  If renal function is improving further tomorrow, I would  have no objection to discharge from my standpoint with close outpatient follow-up.     Metabolic acidosis: In the setting of ARF.  Will give 1 amp sodium bicarb today.     Hyperkalemia: Potassium 5.5 in the setting of ARF, acidosis.  Getting some bicarb as above.  Lokelma 10 g x 1 today.  Anticipate further improvements with resolving ARF episode.     Hyponatremia: Mild.  Likely in the setting of ADH excess with cirrhotic physiology, ARF.  Will trend with current management as above.     Alcoholic cirrhosis with ascites: Management per other team members     Type 2 diabetes: Holding metformin with ARF, holding Jardiance with ARF.  Insulin per primary team.     HTN: PTA lisinopril on hold.  Starting midodrine with hold parameters now that blood pressures are running soft in the setting of decompensated liver disease.     COPD, asthma: History of tobacco use.  Management per primary team.     HFpEF: PTA diuretics on hold, making adequate urine off diuretics, continue to hold until further resolution of current ARF episode.    SUBJECTIVE:    Very pleasant  Seen at bedside, IV access team bedside trying to establish new IV  Making adequate urine  Creatinine improving  Denies shortness of breath  Denies chest pain  Discussed plans and recommendations as above  He tells me that he is feeling much better and for that reason he is hoping to discharge soon  Tells me he has not job interview tomorrow morning at 11  Updated adopted mother Marielena       OBJECTIVE:  Physical Exam   Temp: 98.7  F (37.1  C) Temp src: Oral BP: 111/54 Pulse: 78   Resp: 18 SpO2: 93 % O2 Device: None (Room air)    Vitals:    01/04/25 1100 01/05/25 0942 01/07/25 0707   Weight: 119.5 kg (263 lb 6.4 oz) 121.1 kg (267 lb) 119.2 kg (262 lb 11.2 oz)     Vital Signs with Ranges  Temp:  [97.4  F (36.3  C)-98.7  F (37.1  C)] 98.7  F (37.1  C)  Pulse:  [74-78] 78  Resp:  [18-20] 18  BP: ()/(54-87) 111/54  SpO2:  [93 %-97 %] 93 %  I/O last 3 completed  shifts:  In: 1469 [P.O.:1219; I.V.:50]  Out: 2100 [Urine:2100]    Patient Vitals for the past 72 hrs:   Weight   01/07/25 0707 119.2 kg (262 lb 11.2 oz)   01/05/25 0942 121.1 kg (267 lb)     Intake/Output Summary (Last 24 hours) at 1/7/2025 1308  Last data filed at 1/7/2025 0900  Gross per 24 hour   Intake 1229 ml   Output 2100 ml   Net -871 ml       PHYSICAL EXAM:  General: Alert, NAD  HENT: Supple, Non-tender, no obvious JVD  Cardiovascular: RRR, no rub, gallop, or murmur.  Extremities: + Edema bilateral ankles  Respiratory: CTAB, non-labored  Gastrointestinal: Abdomen is soft, distended with ascites  Musculoskeletal: Grossly normal   Integumentary: Warm, dry, no rash  Neurologic: Non focal   Psychiatric: Cooperative    LABORATORY STUDIES:     Recent Labs   Lab 01/07/25  0619 01/06/25  0625 01/05/25  0635 01/04/25  0603 01/03/25  0848 01/02/25  1226   WBC 12.0* 13.3* 14.8* 11.5* 12.8* 12.8*   RBC 4.36* 4.48 4.47 4.70 4.67 4.79   HGB 11.8* 12.1* 12.1* 12.6* 12.6* 12.9*   HCT 36.0* 37.8* 37.7* 39.8* 39.4* 40.8    299 311 306 303 332       Basic Metabolic Panel:  Recent Labs   Lab 01/07/25  1120 01/07/25  0626 01/07/25  0619 01/07/25  0200 01/06/25  2117 01/06/25  1655 01/06/25  0725 01/06/25  0625 01/05/25  2044 01/05/25  1733 01/05/25  0747 01/05/25  0635 01/04/25  0727 01/04/25  0603 01/03/25  1117 01/03/25  0848   NA  --   --  134*  --   --   --   --  134*  --  134*  --  133*  --  137  --  136   POTASSIUM  --   --  5.5*  --   --   --   --  5.7*  --  5.7*  --  5.7*  --  5.3  --  5.3   CHLORIDE  --   --  102  --   --   --   --  102  --  101  --  101  --  104  --  104   CO2  --   --  18*  --   --   --   --  17*  --  16*  --  18*  --  20*  --  18*   BUN  --   --  70.9*  --   --   --   --  65.7*  --  57.8*  --  52.2*  --  43.1*  --  40.9*   CR  --   --  2.52*  --   --   --   --  3.32*  --  2.79*  --  2.35*  --  1.46*  --  1.38*   * 100* 99 97 129* 99   < > 101*   < > 97   < > 87   < > 94   < > 145*   WES   --   --  9.3  --   --   --   --  9.0  --  9.4  --  9.4  --  9.6  --  9.8    < > = values in this interval not displayed.       INR  Recent Labs   Lab 01/07/25 0619 01/02/25 2151   INR 1.74* 1.54*        Recent Labs   Lab Test 01/07/25  0619 01/06/25  0625 01/03/25  0848 01/02/25  2151   INR 1.74*  --   --  1.54*   WBC 12.0* 13.3*   < >  --    HGB 11.8* 12.1*   < >  --     299   < >  --     < > = values in this interval not displayed.       Personally reviewed current labs    This note was dictated using voice recognition     Sumit Pope PA-C  [facility]  [phone]

## 2025-01-07 NOTE — PROGRESS NOTES
GASTROENTEROLOGY PROGRESS NOTE     SUBJECTIVE   Patient denies pain aside from ongoing bilateral calf discomfort.  Asks if he can go home yet.     OBJECTIVE     Vitals Blood pressure 111/54, pulse 78, temperature 98.7  F (37.1  C), temperature source Oral, resp. rate 18, weight 119.2 kg (262 lb 11.2 oz), SpO2 93%.      Physical exam:    Alert and oriented, no acute distress  Abdomen obese, distended with ascites but nontender throughout.  No lower extremity edema    LABORATORY    ELECTROLYTE PANEL   Recent Labs   Lab 25  0626 25  0619 25  0200 25  0725 25  0625 25  2044 25  1733   NA  --  134*  --   --  134*  --  134*   POTASSIUM  --  5.5*  --   --  5.7*  --  5.7*   CHLORIDE  --  102  --   --  102  --  101   CO2  --  18*  --   --  17*  --  16*   * 99 97   < > 101*   < > 97   CR  --  2.52*  --   --  3.32*  --  2.79*   BUN  --  70.9*  --   --  65.7*  --  57.8*    < > = values in this interval not displayed.      HEMATOLOGY PANEL   Recent Labs   Lab 25  0625  0625 25  0635 25  0848 25  2151   HGB 11.8* 12.1* 12.1*   < >  --    MCV 83 84 84   < >  --    WBC 12.0* 13.3* 14.8*   < >  --     299 311   < >  --    INR 1.74*  --   --   --  1.54*    < > = values in this interval not displayed.      LIVER AND PANCREAS PANEL   Recent Labs   Lab 25  0625  1226   AST 14 18   ALT 22 19   ALKPHOS 237* 303*   BILITOTAL 0.6 0.5   LIPASE  --  19     IMAGING STUDIES        I have reviewed the current diagnostic and laboratory tests.              IMPRESSION   44-year-old man with cirrhosis attributed to alcohol misuse, DM 2, asthma, hypertension, HFpEF, DVT on Eliquis who presents with the followin.  SBP.  Has received albumin on day 1 and 3. Neutrophils are improved from paracentesis yesterday. SAAG greater than 1.1 but high-protein of 4.9 in ascites raises concern for a congestive component to ascites.  MRI yesterday did  indicate right ventricular pressure and fluid overload with dilated right atrium and no valvular disease.  2.  Acute kidney injury: imroved.  Nonoliguric with bland UA.  Likely multifactorial with SBP, NSAID use, diuretics but urine sodium is high suggesting against HRS.  No hydronephrosis on CT.     RECOMMENDATIONS   1.  Appreciate nephrology input.  Per their recommendation.  2.  Diuretics are currently held.  3.  2 g sodium restricted diet.  4.  Continued alcohol abstinence.  5.  He has transitioned to oral Cipro therapy for prophylaxis.  This will be taken for the long-term.  6.  Continue to trend creatinine.  7.  Will continue to follow.     Total time spent: 20 minutes.        Ashley Parker MD  Thank you for the opportunity to participate in the care of this patient.   Please feel free to call me with any questions or concerns.  Phone number [phone].

## 2025-01-07 NOTE — PLAN OF CARE
Goal Outcome Evaluation:       Problem: Adult Inpatient Plan of Care  Goal: Readiness for Transition of Care  Outcome: Progressing       Explained cares and encouraged pt to walk. Pt walked 2x outside of room and ate 100% of meal. Denies pain and use call light appropriately to call for help.

## 2025-01-07 NOTE — PLAN OF CARE
Problem: Comorbidity Management  Goal: Maintenance of Asthma Control  Outcome: Progressing  Intervention: Maintain Asthma Symptom Control  Recent Flowsheet Documentation  Taken 1/7/2025 0900 by Jaylen Salazar, RN  Medication Review/Management: medications reviewed   Goal Outcome Evaluation:    Pt is doing well.  Been walking in the hallway independently.  Ate hospital food and ordered Door dash.  Blood glucose checked for 100 and 131.  Reported BM x 1 and voided couple of times.

## 2025-01-07 NOTE — PROGRESS NOTES
[facility]    Progress Note - Hospitalist Service       Date of Admission:  1/2/2025    Assessment & Plan   Warren Jaramillo is a 44 year old male with a history of T2DM, HTN, HFpEF, cardiomegaly, DVT, morbid obesity, autism, adjustment disorder, insomnia, and cirrhosis with ascites who is admitted for SBP in the setting of ascites. Course complicated by worsening renal function concerning with hepatorenal syndrome.     Updates 1/7/25  - cipro started for SBP prophylaxis    Spontaneous bacterial peritonitis   Cirrhosis, unspecified type vs alcoholic cirrhosis   Ascites   Presented to ED on 1/2 with concern of increased abdominal distention and lower abdominal pain. CT abdomen/pelvis showed cirrhosis of the liver with mild-moderate ascites. S/p paracentesis on 1/2 with ascitic fluid and SAAG c/w portal hypertension though also high protein indicating likely HF etiology. Vitally stable. UA notable only for glucose, on Jardiance. WBC of 12.8. ALT, AST, Tbili, and albumin WNL. CRP elevated at 17.7. Concern for SBP in the setting of known liver cirrhosis of likely alcoholic origin per GI. Completed 5 day course of ceftriaxone on 1/6. MELD score 23-24 today.   - [facility] consulted, appreciate recommendations    - continue to hold Lasix, spironolactone  - SBP prophylaxis with cipro 500 mg daily indefinitely   - continued alcohol abstinence     Acute renal failure  Hyponatremia, mild  Hyperkalemia, stable  Baseline Cr of 0.8-1. Cr rising since admit and now up to 3.32 despite holding diuretics. No symptoms of obstruction and no signs of obstruction on CT abd/pelvis on admission. Most likely hepatorenal secondary to decompensated liver disease. K initially normal on admission. Elevated to 5.7 upon holding diuretics and with acute renal failure/acidosis. Improved today and suspected to improve more with supportive cares per Nephrology.   - Nephrology consulted, appreciate recs   - agree with  holding lisinopril, diclofenac, diuretics   - midodrine prn for SBP <110   - if renal function is improving, may discharge 1/8 with close outpatient follow up   - daily weights   - daily BMP    Tongue lesion  Presented with lesion on the tip of his tongue. Overnight noted some bleeding and was given an oral suction with ~ 500mL of blood tinged fluid. Bleeding has since stopped. Differential includes granuloma, pyogenic granuloma, fibroma, or neoplasm with history of tobacco use. Discussed with Dr. Ivan/ENT, who recommended outpatient biopsy. Will likely need to be off Eliquis prior to biopsy.      Type 2 diabetes mellitus  History of non-insulin dependent T2DM. A1c 6.8 in 9/2024. A1c 6.2 on admission. Currently on metformin and Jardiance at home. Noted to have glucosuria which is explained by Jardiance. Blood sugars have been well controlled during admission.   - Hold PTA metformin and Jardiance in setting of RIVERA  - mSSI   - will monitor for hypoglycemia with starting ciprofloxacin     HFpEF  History of HFpEF with EF of 50-55% on most recent Echo on 8/2/2023. No significant lower extremity edema or SOB on admission. Follows closely with Cardiology. Likely contributing in part to portal hypertension.  - holding PTA spironolactone and lasix in setting of RIVERA     HLD  HTN  Pressures well controlled 120s/50s on admission. Have trended a little softer.   - midodrine prn per nephrology  - holding PTA lisinopril   - PTA rosuvastatin     COPD   Asthma  - prn albuterol nebs for wheezing/SOB   - PTA Trelegy Eliipta and Singulair      History of DVT   - Continue PTA Eliquis for DVT ppx      GERD  - Continue PTA omeprazole, pepcid, and carafate      Chronic constipation  History of chronic constipation but presented with loose stools.   -  Miralax and Senna prn     Mood and insomnia   - PTA Zyprexa   - PTA fluoxetine  - PTA trazodone at bedtime          Diet: 2 Gram Sodium Diet    DVT Prophylaxis: DOAC  Khan Catheter: Not  "present  Fluids: PO  Lines: None     Cardiac Monitoring: None  Code Status: Full Code      Clinically Significant Risk Factors        # Hyperkalemia: Highest K = 5.7 mmol/L in last 2 days, will monitor as appropriate  # Hyponatremia: Lowest Na = 134 mmol/L in last 2 days, will monitor as appropriate        # Coagulation Defect: INR = 1.74 (Ref range: 0.85 - 1.15) and/or PTT = 35 Seconds (Ref range: 22 - 38 Seconds), will monitor for bleeding   # Acute Kidney Injury, unspecified: based on a >150% or 0.3 mg/dL increase in last creatinine compared to past 90 day average, will monitor renal function  # Hypertension: Noted on problem list  # Chronic heart failure with preserved ejection fraction: heart failure noted on problem list and last echo with EF >50%           # Severe Obesity: Estimated body mass index is 43.72 kg/m  as calculated from the following:    Height as of 12/29/24: 1.651 m (5' 5\").    Weight as of this encounter: 119.2 kg (262 lb 11.2 oz).        # Financial/Environmental Concerns: none         Social Drivers of Health   Housing Stability: High Risk (12/2/2024)    Housing Stability     Do you have housing? : No     Are you worried about losing your housing?: No   Tobacco Use: High Risk (12/24/2024)    Patient History     Smoking Tobacco Use: Every Day     Smokeless Tobacco Use: Never   Interpersonal Safety: High Risk (1/7/2025)    Interpersonal Safety     Do you feel physically and emotionally safe where you currently live?: No     Within the past 12 months, have you been hit, slapped, kicked or otherwise physically hurt by someone?: No     Within the past 12 months, have you been humiliated or emotionally abused in other ways by your partner or ex-partner?: No    Received from [facility] [facility] & [facility], [employer] & [facility]    Social Connections        Disposition Plan     Medically Ready for Discharge: Anticipated in 2-4 Days     The patient's care was " discussed with the Attending Physician, Dr. Polanco and Patient's Family.    Denise Frazier MD  Hospitalist Service  [facility]  Securely message with [employer] (more info)  Text page via [employer] Paging/Directory   ______________________________________________________________________    Interval History   Vitally stable. No acute events overnight.     Sleeping with CPAP on this morning. Feels a little tired. Group home coordinator planning to visit him today. Otherwise feeling well. No other new concerns or questions.     Physical Exam   Vital Signs: Temp: 98.7  F (37.1  C) Temp src: Oral BP: 111/54 Pulse: 78   Resp: 18 SpO2: 93 % O2 Device: None (Room air)    Weight: 262 lbs 11.2 oz    Constitutional: sleeping with CPAP on, awakes easily to touch and voice  Respiratory: Clear to auscultation bilaterally, no crackles or wheezing  Cardiovascular: Regular rate and rhythm, normal S1 and S2, no murmur noted  GI: distended, nontender to palpation, bowel sounds present  Musculoskeletal: Full range of motion noted. No LE edema.   Neurologic: Awake, alert, oriented to name, place and time.  Cranial nerves II-XII are grossly intact.    Medical Decision Making   Please see A&P for additional details of medical decision making.      Data   ------------------------- PAST 24 HR DATA REVIEWED -----------------------------------------------    I have personally reviewed the following data over the past 24 hrs:    12.0 (H)  \   11.8 (L)   / 287     134 (L) 102 70.9 (H) /  100 (H)   5.5 (H) 18 (L) 2.52 (H) \     ALT: 22 AST: 14 AP: 237 (H) TBILI: 0.6   ALB: 5.1 TOT PROTEIN: 7.3 LIPASE: N/A     INR:  1.74 (H) PTT:  N/A   D-dimer:  N/A Fibrinogen:  N/A         MELD 3.0: 24 at 1/7/2025  6:19 AM  MELD-Na: 23 at 1/7/2025  6:19 AM  Calculated from:  Serum Creatinine: 2.52 mg/dL at 1/7/2025  6:19 AM  Serum Sodium: 134 mmol/L at 1/7/2025  6:19 AM  Total Bilirubin: 0.6 mg/dL (Using min of 1 mg/dL) at 1/7/2025  6:19  AM  Serum Albumin: 5.1 g/dL (Using max of 3.5 g/dL) at 1/7/2025  6:19 AM  INR(ratio): 1.74 at 1/7/2025  6:19 AM  Age at listing (hypothetical): 44 years  Sex: Male at 1/7/2025  6:19 AM

## 2025-01-07 NOTE — PLAN OF CARE
Goal Outcome Evaluation:      Plan of Care Reviewed With: patient    Overall Patient Progress: improving    Outcome Evaluation: Patient slept overnight, vitally stable.         NURSING PROGRESS NOTE       Shift Summary: Patient vitals stable on room air, CPAP on overnight. Alert and oriented, able to make needs known, call light in reach.       Pertinent Shift Updates:  Endorsing bilateral calf pain 5/10, declines need for prn intervention. Received scheduled 50g albumin overnight. Morning , patient ordering [employer].         Plan:  Continue plan of care.       Elio Rodriguez RN 1/6/25 - 1/7/2025  [phone]     For detailed vital signs and assessments, please see documentation flowsheets. For detailed medication administrations, please see MAR.

## 2025-01-08 VITALS
TEMPERATURE: 97.5 F | BODY MASS INDEX: 43.72 KG/M2 | SYSTOLIC BLOOD PRESSURE: 113 MMHG | RESPIRATION RATE: 20 BRPM | DIASTOLIC BLOOD PRESSURE: 54 MMHG | OXYGEN SATURATION: 97 % | WEIGHT: 262.7 LBS | HEART RATE: 64 BPM

## 2025-01-08 LAB
ANION GAP SERPL CALCULATED.3IONS-SCNC: 13 MMOL/L (ref 7–15)
BUN SERPL-MCNC: 65.4 MG/DL (ref 6–20)
CALCIUM SERPL-MCNC: 9.4 MG/DL (ref 8.8–10.4)
CHLORIDE SERPL-SCNC: 105 MMOL/L (ref 98–107)
CREAT SERPL-MCNC: 1.64 MG/DL (ref 0.67–1.17)
EGFRCR SERPLBLD CKD-EPI 2021: 53 ML/MIN/1.73M2
ERYTHROCYTE [DISTWIDTH] IN BLOOD BY AUTOMATED COUNT: 17.4 % (ref 10–15)
GLUCOSE BLDC GLUCOMTR-MCNC: 112 MG/DL (ref 70–99)
GLUCOSE BLDC GLUCOMTR-MCNC: 115 MG/DL (ref 70–99)
GLUCOSE BLDC GLUCOMTR-MCNC: 127 MG/DL (ref 70–99)
GLUCOSE SERPL-MCNC: 102 MG/DL (ref 70–99)
HCO3 SERPL-SCNC: 19 MMOL/L (ref 22–29)
HCT VFR BLD AUTO: 38.6 % (ref 40–53)
HGB BLD-MCNC: 12.3 G/DL (ref 13.3–17.7)
MAGNESIUM SERPL-MCNC: 2.6 MG/DL (ref 1.7–2.3)
MCH RBC QN AUTO: 26.7 PG (ref 26.5–33)
MCHC RBC AUTO-ENTMCNC: 31.9 G/DL (ref 31.5–36.5)
MCV RBC AUTO: 84 FL (ref 78–100)
PHOSPHATE SERPL-MCNC: 4.1 MG/DL (ref 2.5–4.5)
PLATELET # BLD AUTO: 304 10E3/UL (ref 150–450)
POTASSIUM SERPL-SCNC: 4.9 MMOL/L (ref 3.4–5.3)
RBC # BLD AUTO: 4.61 10E6/UL (ref 4.4–5.9)
SODIUM SERPL-SCNC: 137 MMOL/L (ref 135–145)
WBC # BLD AUTO: 11.7 10E3/UL (ref 4–11)

## 2025-01-08 PROCEDURE — 999N000157 HC STATISTIC RCP TIME EA 10 MIN

## 2025-01-08 PROCEDURE — 94660 CPAP INITIATION&MGMT: CPT

## 2025-01-08 PROCEDURE — 250N000013 HC RX MED GY IP 250 OP 250 PS 637

## 2025-01-08 PROCEDURE — 83735 ASSAY OF MAGNESIUM: CPT | Performed by: FAMILY MEDICINE

## 2025-01-08 PROCEDURE — 82435 ASSAY OF BLOOD CHLORIDE: CPT

## 2025-01-08 PROCEDURE — 99232 SBSQ HOSP IP/OBS MODERATE 35: CPT | Performed by: PHYSICIAN ASSISTANT

## 2025-01-08 PROCEDURE — 250N000013 HC RX MED GY IP 250 OP 250 PS 637: Performed by: PHYSICIAN ASSISTANT

## 2025-01-08 PROCEDURE — 85027 COMPLETE CBC AUTOMATED: CPT

## 2025-01-08 PROCEDURE — 36415 COLL VENOUS BLD VENIPUNCTURE: CPT

## 2025-01-08 PROCEDURE — 84100 ASSAY OF PHOSPHORUS: CPT | Performed by: FAMILY MEDICINE

## 2025-01-08 PROCEDURE — 250N000013 HC RX MED GY IP 250 OP 250 PS 637: Performed by: FAMILY MEDICINE

## 2025-01-08 PROCEDURE — 99238 HOSP IP/OBS DSCHRG MGMT 30/<: CPT | Mod: GC

## 2025-01-08 PROCEDURE — 80048 BASIC METABOLIC PNL TOTAL CA: CPT

## 2025-01-08 RX ORDER — MIDODRINE HYDROCHLORIDE 5 MG/1
5 TABLET ORAL
Qty: 30 TABLET | Refills: 0 | Status: SHIPPED | OUTPATIENT
Start: 2025-01-08

## 2025-01-08 RX ORDER — CIPROFLOXACIN 500 MG/1
500 TABLET, FILM COATED ORAL EVERY 24 HOURS
Qty: 30 TABLET | Refills: 0 | Status: SHIPPED | OUTPATIENT
Start: 2025-01-09

## 2025-01-08 RX ADMIN — UMECLIDINIUM 1 PUFF: 62.5 AEROSOL, POWDER ORAL at 09:21

## 2025-01-08 RX ADMIN — FLUTICASONE FUROATE AND VILANTEROL TRIFENATATE 1 PUFF: 100; 25 POWDER RESPIRATORY (INHALATION) at 09:21

## 2025-01-08 RX ADMIN — FLUOXETINE HYDROCHLORIDE 20 MG: 20 CAPSULE ORAL at 09:17

## 2025-01-08 RX ADMIN — PANTOPRAZOLE SODIUM 40 MG: 40 TABLET, DELAYED RELEASE ORAL at 06:59

## 2025-01-08 RX ADMIN — MIDODRINE HYDROCHLORIDE 5 MG: 5 TABLET ORAL at 11:48

## 2025-01-08 RX ADMIN — NICOTINE 1 PATCH: 14 PATCH, EXTENDED RELEASE TRANSDERMAL at 09:18

## 2025-01-08 RX ADMIN — MIDODRINE HYDROCHLORIDE 5 MG: 5 TABLET ORAL at 09:17

## 2025-01-08 RX ADMIN — FAMOTIDINE 20 MG: 20 TABLET, FILM COATED ORAL at 09:17

## 2025-01-08 RX ADMIN — MONTELUKAST 10 MG: 10 TABLET, FILM COATED ORAL at 09:17

## 2025-01-08 RX ADMIN — APIXABAN 5 MG: 5 TABLET, FILM COATED ORAL at 09:17

## 2025-01-08 RX ADMIN — CIPROFLOXACIN 500 MG: 500 TABLET ORAL at 06:07

## 2025-01-08 ASSESSMENT — ACTIVITIES OF DAILY LIVING (ADL)
ADLS_ACUITY_SCORE: 36

## 2025-01-08 NOTE — PROGRESS NOTES
RENAL PROGRESS NOTE    ASSESSMENT & PLAN:   Mr. Jaramillo is a 44-year-old male past medical history of DM2, HTN, HFpEF, morbid obesity, autism, alcoholic cirrhosis with recurrent ascites who was admitted with ascites s/p paracentesis x 2 and found to have SBP, on IV antibiotics.  Creatinine has been serially serially rising through 1/6/24 of this admission which prompted renal eval     ARF: Baseline creatinine 0.8-1.0.  Creatinine was noted to be at baseline on 12/11/2024, subsequently creatinine above baseline on 12/29/2024 with creatinine 1.23, creatinine had been serially rising throughout this admission, creatinine appears to have peaked at 3.32 on 1/6/2025.    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  [place] UA historically without hematuria or pyuria.  Prior paraprotein evaluation negative for monoclonal protein.  Recs:  This is a 44-year-old gentleman who was experiencing worsening renal function this admission in the setting of decompensated liver disease, multiple contrast loads with CT scans, large-volume paracentesis x 2, SBP, NSAIDs (diclofenac), intermittent ACE inhibitor/diuretic  We gave him a course of IV albumin, started midodrine with hold parameters to support his blood pressure, NSAIDs and ACE inhibitor placed on hold, and we have seen his creatinine serially improving.  He is making adequate volumes of urine off diuretics, certainly may be experiencing some post ATN diuresis with fairly high volumes of urine in the setting of briskly improving renal function.  I did discuss this case with the hospitalist physician and recommended that we hold diuretics at discharge, he does have follow-up scheduled in our clinic on 1/14/2025 at 9:30 AM with Renee BURNETTE NP and we can decide on what dose we will resume his diuretics at during this appointment.  Certainly will benefit from diuretics long-term.   Orders for follow-up entered into the discharge navigator.  No objection to resuming metformin or Jardiance from my standpoint as discussed with hospitalist physician  UA was bland without hematuria or pyuria, no significant quantifiable proteinuria on spot check, urine sodium 24.  Reviewed CT scan x 2 this admission negative for obstructive physiology no need to update renal imaging.     Metabolic acidosis: In the setting of ARF.  S/p some exogenous bicarb with improving acidosis, anticipate further improvements with resolving ARF episode.  Hold off on further supplemental base at this time.     Hyperkalemia: Hyperkalemia in the setting of ARF/acidosis.  Improving s/p bicarb, Lokelma, and resolving ARF.  No additional intervention needed today.     Hyponatremia: Mild.  Likely in the setting of ADH excess with cirrhotic physiology, ARF.  Improving today     Alcoholic cirrhosis with ascites: Management per other team members     Type 2 diabetes: No objection to resumption of Jardiance or metformin from my standpoint.     HTN: PTA lisinopril on hold.  Starting midodrine with hold parameters now that blood pressures are running soft in the setting of decompensated liver disease.  Lisinopril should not be resumed at this point.     COPD, asthma: History of tobacco use.  Management per primary team.     HFpEF: PTA diuretics on hold, making adequate urine off diuretics, continue to hold until further resolution of current ARF episode.    SUBJECTIVE:    Very pleasant  Anxious for discharge  We did discuss that his renal function is improving  He is making adequate volumes of urine off diuretics  He denies shortness of breath or chest pain  He is amendable to follow-up in the kidney clinic as an outpatient  All questions answered  Updated adopted mother aMrielena       OBJECTIVE:  Physical Exam   Temp: 97.5  F (36.4  C) Temp src: Oral BP: 113/54 Pulse: 64   Resp: 20 SpO2: 97 % O2 Device: None (Room air)    Vitals:    01/04/25  1100 01/05/25 0942 01/07/25 0707   Weight: 119.5 kg (263 lb 6.4 oz) 121.1 kg (267 lb) 119.2 kg (262 lb 11.2 oz)     Vital Signs with Ranges  Temp:  [97.5  F (36.4  C)-98.2  F (36.8  C)] 97.5  F (36.4  C)  Pulse:  [64-71] 64  Resp:  [18-20] 20  BP: (113-120)/(54-58) 113/54  SpO2:  [95 %-97 %] 97 %  I/O last 3 completed shifts:  In: 360 [P.O.:360]  Out: 1550 [Urine:1550]    Patient Vitals for the past 72 hrs:   Weight   01/07/25 0707 119.2 kg (262 lb 11.2 oz)     Intake/Output Summary (Last 24 hours) at 1/7/2025 1308  Last data filed at 1/7/2025 0900  Gross per 24 hour   Intake 1229 ml   Output 2100 ml   Net -871 ml       PHYSICAL EXAM:  General: Alert, NAD  HENT: Supple, Non-tender, no obvious JVD  Cardiovascular: RRR, no rub, gallop, or murmur.  Extremities: + Edema bilateral ankles, improving  Respiratory: CTAB, non-labored  Gastrointestinal: Abdomen is soft, distended with ascites  Musculoskeletal: Grossly normal   Integumentary: Warm, dry, no rash  Neurologic: Non focal   Psychiatric: Cooperative    LABORATORY STUDIES:     Recent Labs   Lab 01/08/25  0603 01/07/25  0619 01/06/25  0625 01/05/25  0635 01/04/25  0603 01/03/25  0848   WBC 11.7* 12.0* 13.3* 14.8* 11.5* 12.8*   RBC 4.61 4.36* 4.48 4.47 4.70 4.67   HGB 12.3* 11.8* 12.1* 12.1* 12.6* 12.6*   HCT 38.6* 36.0* 37.8* 37.7* 39.8* 39.4*    287 299 311 306 303       Basic Metabolic Panel:  Recent Labs   Lab 01/08/25  1105 01/08/25  0609 01/08/25  0603 01/08/25  0211 01/07/25  2100 01/07/25  1634 01/07/25  0626 01/07/25  0619 01/06/25  0725 01/06/25  0625 01/05/25  2044 01/05/25  1733 01/05/25  0747 01/05/25  0635 01/04/25  0727 01/04/25  0603   NA  --   --  137  --   --   --   --  134*  --  134*  --  134*  --  133*  --  137   POTASSIUM  --   --  4.9  --   --   --   --  5.5*  --  5.7*  --  5.7*  --  5.7*  --  5.3   CHLORIDE  --   --  105  --   --   --   --  102  --  102  --  101  --  101  --  104   CO2  --   --  19*  --   --   --   --  18*  --  17*  --   16*  --  18*  --  20*   BUN  --   --  65.4*  --   --   --   --  70.9*  --  65.7*  --  57.8*  --  52.2*  --  43.1*   CR  --   --  1.64*  --   --   --   --  2.52*  --  3.32*  --  2.79*  --  2.35*  --  1.46*   * 115* 102* 112* 135* 183*   < > 99   < > 101*   < > 97   < > 87   < > 94   WES  --   --  9.4  --   --   --   --  9.3  --  9.0  --  9.4  --  9.4  --  9.6    < > = values in this interval not displayed.       INR  Recent Labs   Lab 01/07/25  0619 01/02/25  2151   INR 1.74* 1.54*        Recent Labs   Lab Test 01/08/25  0603 01/07/25  0619 01/03/25  0848 01/02/25  2151   INR  --  1.74*  --  1.54*   WBC 11.7* 12.0*   < >  --    HGB 12.3* 11.8*   < >  --     287   < >  --     < > = values in this interval not displayed.       Personally reviewed current labs    This note was dictated using voice recognition     Sumit Pope PA-C  [facility]  [phone]

## 2025-01-08 NOTE — PROGRESS NOTES
GASTROENTEROLOGY PROGRESS NOTE     SUBJECTIVE   No new complaints today.  Wants to go home.  Calves feel better.     OBJECTIVE     Vitals Blood pressure 113/54, pulse 64, temperature 97.5  F (36.4  C), temperature source Oral, resp. rate 20, weight 119.2 kg (262 lb 11.2 oz), SpO2 97%.      Physical exam:    Alert and oriented, no acute distress  Abdomen obese, distended with ascites but nontender.  No lower extremity edema    LABORATORY    ELECTROLYTE PANEL   Recent Labs   Lab 25  1105 25  0609 25  0603 25  0626 25  0619 25  0725 25   NA  --   --  137  --  134*  --  134*   POTASSIUM  --   --  4.9  --  5.5*  --  5.7*   CHLORIDE  --   --  105  --  102  --  102   CO2  --   --  19*  --  18*  --  17*   * 115* 102*   < > 99   < > 101*   CR  --   --  1.64*  --  2.52*  --  3.32*   BUN  --   --  65.4*  --  70.9*  --  65.7*    < > = values in this interval not displayed.      HEMATOLOGY PANEL   Recent Labs   Lab 25  0603 25  0619 25  0625 25  0848 25  2151   HGB 12.3* 11.8* 12.1*   < >  --    MCV 84 83 84   < >  --    WBC 11.7* 12.0* 13.3*   < >  --     287 299   < >  --    INR  --  1.74*  --   --  1.54*    < > = values in this interval not displayed.      LIVER AND PANCREAS PANEL   Recent Labs   Lab 25  0625  1226   AST 14 18   ALT 22 19   ALKPHOS 237* 303*   BILITOTAL 0.6 0.5   LIPASE  --  19     IMAGING STUDIES        I have reviewed the current diagnostic and laboratory tests.              IMPRESSION   44-year-old man with cirrhosis attributed to alcohol misuse, DM 2, asthma, hypertension, HFpEF, DVT on Eliquis who presents with the followin.  SBP.  He has completed therapy.  SAAG greater than 1.1 but high-protein of 4.9 in ascites raises concern for a congestive component to ascites.  MRI indicated right ventricular pressure and fluid overload with dilated right atrium and no valvular disease.  2.  Acute  kidney injury: imroved.  Nonoliguric with bland UA.  Likely multifactorial with SBP, NSAID use, diuretics but urine sodium is high suggesting against HRS.  No hydronephrosis on CT.     RECOMMENDATIONS   1.  Patient is planning for discharge today.  2.  Continue to hold diuretics per nephrology.  Appreciate their input.  3.  Continued alcohol abstinence.  4.  2 g sodium restricted diet.  5.  We will arrange follow-up as an outpatient at [facility] in 4 to 6 weeks.  We will also have our clinical nurse specialist contact him next week to assess his ascites.  6.  I will sign off.  Please call with questions.     Total time spent: 18 minutes.        Ashley Parker MD  Thank you for the opportunity to participate in the care of this patient.   Please feel free to call me with any questions or concerns.  Phone number [phone].

## 2025-01-08 NOTE — DISCHARGE SUMMARY
"[facility]  Discharge Summary - Medicine & Pediatrics       Date of Admission:  1/2/2025  Date of Discharge:  1/8/2025  Discharging Provider: Dr. Frazier / Dr. 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    Weight as of this encounter: 119.2 kg (262 lb 11.2 oz).       Follow-ups Needed After Discharge   Follow-up Appointments       Follow Up      Follow up with Nephrology, within 1 week for hospital follow-up.    Follow up with [facility] in 4-6 weeks.      Follow up with ENT to discuss tongue lesion with possible biopsy      Hospital Follow-up with Existing Primary Care Provider (PCP)      Please see details below   Schedule Primary Care visit within: 7 Days     PCP to follow up diabetes care. BMP recommended.        Unresulted Labs Ordered in the Past 30 Days of this Admission       Date and Time Order Name Status Description    1/5/2025  6:00 PM Ascites Fluid Aerobic Bacterial Culture Routine With Gram Stain Preliminary         These results will be followed up by PCP/GI    Discharge Disposition   Discharged to home  Condition at discharge: Stable    Hospital Course   Warren Jaramillo was admitted on 1/2/2025 for SBP in the setting of ascites.  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 Vitally stable. CT abdomen/pelvis showed cirrhosis of the liver with mild-moderate ascites. S/p paracentesis " on 1/2 with ascitic fluid with neutrophilia and SAAG c/w portal hypertension though also high protein indicating likely HF etiology. Completed 5 day course of ceftriaxone for SBP. Repeat paracentesis on 1/6. Started on SBP prophylaxis with ciprofloxacin 500 mg daily indefinitely. MELD score 23-24. Diuretics held due to renal function. Encouraged continued alcohol abstinence.   - 2g Na diet  - clinical nurse specialist to contact next week to assess ascites  - [facility] follow up in 4-6 weeks     Acute renal failure  Hyponatremia, mild  Hyperkalemia, stable  Baseline Cr of 0.8-1. Cr rising during admission to 3.32. Nephrology consulted. Most likely hepatorenal secondary to decompensated liver disease though also had multiple contrast loads and large volume paracenteses. Given albumin, bicarb and Lokelma. Held ACE/diuretics and added midodrine TID to help with softer BP. Discharged with Cr of 1.64.   K initially normal on admission. Elevated to 5.7 upon holding diuretics and with acute renal failure/acidosis, later resolved.   - follow up with [facility] clinic on 1/14/25 at 9:30am with Renee BURNETTE NP     Tongue lesion  Tip of tongue lesion that had ~500 mL output of blood tinged fluid when given oral suction. Likely component of mechanical trauma. Bleeding has since stopped. Differential includes granuloma, pyogenic granuloma, fibroma, or neoplasm with history of tobacco use. Discussed with Dr. Ivan/ENT - recommended outpatient biopsy. Will likely need to be off Eliquis prior to biopsy.      Type 2 diabetes mellitus  A1c 6.2 on admission. Resumed PTA metformin on discharge though held Jardiance. Did not have significant hypoglycemia with starting ciprofloxacin though did consider. Could restart Jardiance if needed.     HFpEF  History of HFpEF with EF of 50-55% on most recent Echo on 8/2/2023. No significant lower extremity edema or SOB on admission. Follows closely with Cardiology. Likely contributing  in part to portal hypertension.  - holding PTA spironolactone and lasix in setting of RIVERA    Consultations This Hospital Stay   CARE MANAGEMENT / SOCIAL WORK IP CONSULT  GASTROENTEROLOGY IP CONSULT  NEPHROLOGY IP CONSULT  CARE MANAGEMENT / SOCIAL WORK IP CONSULT    Code Status   Full Code     The patient was discussed with Dr. Collin Frazier MD  [facility]  [place] [id number]  [address] [facility] [address]  Phone: [phone]  Fax: [phone]  ______________________________________________________________________    Physical Exam   Vital Signs: Temp: 97.5  F (36.4  C) Temp src: Oral BP: 113/54 Pulse: 64   Resp: 20 SpO2: 97 % O2 Device: None (Room air)    Weight: 262 lbs 11.2 oz    Constitutional: sleeping with BiPAP on arrival, awakes easily to touch and voice  ENT: tip of tongue with nonbleeding lesion  Respiratory: Clear to auscultation bilaterally, no crackles or wheezing  Cardiovascular: Regular rate and rhythm, normal S1 and S2, no murmur noted  GI: distended, no tenderness to palpation, normal bowel sounds  Skin: normal skin color, texture, turgor  Musculoskeletal: Full range of motion noted.  Motor strength is 5 out of 5 all extremities bilaterally. No LE edema  Neurologic: Awake, alert, oriented to name, place and time.  Cranial nerves II-XII are grossly intact.      Primary Care Physician   Mary Kelly    Discharge Orders      Follow Up (TCM)    Kidney follow up  [facility]   [address], [place], [place], [address]  Phone: [phone]  Apt with Renee BURNETTE NP  1/14/25 at 9:30 AM  Please arrive 20 minutes early for paperwork     Reason for your hospital stay    You were hospitalized for abdominal pain and increased fluid build up in your abdomen. The fluid was drained with a paracentesis and showed an infection. You were treated with 5 days of IV antibiotics. You will also need to be on a daily antibiotic for  the rest of your life to prevent infections in the future. Your kidney function also worsened likely related to your liver. The kidney doctors saw you and have held some of your medications. They will see you in clinic to discuss when to resume them.     Activity    Your activity upon discharge: activity as tolerated     BiPAP - Continue Home BiPAP    Continue Home BiPAP per home equipment settings.     Follow Up    Follow up with Nephrology, within 1 week for hospital follow-up.    Follow up with [facility] in 4-6 weeks.     Diet    Follow this diet upon discharge: Current Diet:Orders Placed This Encounter      2 Gram Sodium Diet     Hospital Follow-up with Existing Primary Care Provider (PCP)    Please see details below          Significant Results and Procedures   Most Recent 3 CBC's:  Recent Labs   Lab Test 01/08/25  0603 01/07/25  0619 01/06/25  0625   WBC 11.7* 12.0* 13.3*   HGB 12.3* 11.8* 12.1*   MCV 84 83 84    287 299     Most Recent 3 BMP's:  Recent Labs   Lab Test 01/08/25  1105 01/08/25  0609 01/08/25  0603 01/07/25  0626 01/07/25  0619 01/06/25  0725 01/06/25  0625   NA  --   --  137  --  134*  --  134*   POTASSIUM  --   --  4.9  --  5.5*  --  5.7*   CHLORIDE  --   --  105  --  102  --  102   CO2  --   --  19*  --  18*  --  17*   BUN  --   --  65.4*  --  70.9*  --  65.7*   CR  --   --  1.64*  --  2.52*  --  3.32*   ANIONGAP  --   --  13  --  14  --  15   WES  --   --  9.4  --  9.3  --  9.0   * 115* 102*   < > 99   < > 101*    < > = values in this interval not displayed.     Most Recent 2 LFT's:  Recent Labs   Lab Test 01/07/25 0619 01/02/25  1226   AST 14 18   ALT 22 19   ALKPHOS 237* 303*   BILITOTAL 0.6 0.5     Most Recent 3 INR's:  Recent Labs   Lab Test 01/07/25  0619 01/02/25  2151 12/11/24  1607   INR 1.74* 1.54* 1.57*     Most Recent Hemoglobin A1c:  Recent Labs   Lab Test 01/02/25  1226   A1C 6.2*     Most Recent Urinalysis:  Recent Labs   Lab Test 01/06/25  1304   COLOR Light  Yellow   APPEARANCE Clear   URINEGLC 70*   URINEBILI Negative   URINEKETONE Negative   SG 1.010   UBLD Negative   URINEPH 5.0   PROTEIN Negative   NITRITE Negative   LEUKEST Negative   RBCU <1   WBCU 1   ,   Results for orders placed or performed during the hospital encounter of 01/02/25   CT Abdomen Pelvis w Contrast    Narrative    EXAM: CT ABDOMEN PELVIS W CONTRAST  LOCATION: [facility]  DATE: 1/2/2025    INDICATION: LLQ pain, 10lb weight gain.  COMPARISON: 12.29.2024.  TECHNIQUE: CT scan of the abdomen and pelvis was performed following injection of IV contrast. Multiplanar reformats were obtained. Dose reduction techniques were used.  CONTRAST: IsoVue 370 90mL    FINDINGS:   LOWER CHEST: Small distal paraesophageal varices. No pleural effusion.    HEPATOBILIARY: Cirrhotic liver with heterogeneous enhancement, previously noted.    PANCREAS: Normal.    SPLEEN: Mildly enlarged.    ADRENAL GLANDS: Stable bilateral adrenal myelolipomas.    KIDNEYS/BLADDER: Normal.    BOWEL: No obstruction. No diverticulitis. Normal appendix.    LYMPH NODES: Several stable borderline sized abdominal and retroperitoneal lymph nodes.    VASCULATURE: Nonaneurysmal aorta.    PELVIC ORGANS: Mild to moderate ascites.    MUSCULOSKELETAL: Nothing acute.      Impression    IMPRESSION:     1.  Mild to moderate ascites. No free air. No abscess.    2.  Cirrhotic liver. Mild splenomegaly.    3.  No other acute findings to explain symptoms.     US Paracentesis with Albumin    Narrative    EXAM:  1. PARACENTESIS  2. ULTRASOUND GUIDANCE  LOCATION: [facility]  DATE: 1/2/2025    INDICATION: Ascites.    PROCEDURE: Informed consent obtained. Time out performed. The abdomen was prepped and draped in a sterile fashion. 10 mL of 1% lidocaine was infused into local soft tissues. A 5 Wallisian catheter system was introduced into the abdominal ascites under   ultrasound guidance.    5.0 liters of clear fluid were  removed and sent to lab if requested.    Patient tolerated procedure well.    Ultrasound imaging was obtained and placed in the patient's permanent medical record.      Impression    IMPRESSION:  1.  Status post ultrasound-guided paracentesis.    Reference CPT Code: 31278   US Paracentesis without Albumin    Narrative    EXAM:  1. PARACENTESIS  2. ULTRASOUND GUIDANCE  LOCATION: [facility]  DATE: 1/6/2025    INDICATION: Ascites.    PROCEDURE: Informed consent obtained. Time out performed. The abdomen was prepped and draped in a sterile fashion. 10 mL of 1% lidocaine was infused into local soft tissues. A 5 French catheter system was introduced into the abdominal ascites under   ultrasound guidance.    4.4 liters of clear fluid were removed and sent to lab if requested.    Patient tolerated procedure well.    Ultrasound imaging was obtained and placed in the patient's permanent medical record.      Impression    IMPRESSION:  1.  Status post ultrasound-guided paracentesis.    Reference CPT Code: 17847     Discharge Medications   Current Discharge Medication List        START taking these medications    Details   ciprofloxacin (CIPRO) 500 MG tablet Take 1 tablet (500 mg) by mouth every 24 hours.  Qty: 30 tablet, Refills: 0    Associated Diagnoses: SBP (spontaneous bacterial peritonitis) (H)      midodrine (PROAMATINE) 5 MG tablet Take 1 tablet (5 mg) by mouth 3 times daily (with meals).  Qty: 30 tablet, Refills: 0    Associated Diagnoses: Hepatorenal syndrome (H)           CONTINUE these medications which have NOT CHANGED    Details   albuterol (PROAIR HFA/PROVENTIL HFA/VENTOLIN HFA) 108 (90 Base) MCG/ACT inhaler Inhale 1-2 puffs into the lungs every 6 hours as needed for shortness of breath, wheezing or cough  Qty: 18 g, Refills: 0    Comments: Pharmacy may dispense brand covered by insurance (Proair, or proventil or ventolin or generic albuterol inhaler)      albuterol (PROVENTIL) (2.5 MG/3ML)  0.083% neb solution Take 2.5 mg by nebulization 3 times daily as needed for shortness of breath, wheezing or cough      aloe vera GEL Apply 1 g topically every hour as needed for skin care Per bottle directions      apixaban ANTICOAGULANT (ELIQUIS) 5 MG tablet Take 5 mg by mouth 2 times daily.      bacitracin 500 UNIT/GM OINT Apply topically 3 times daily as needed for wound care      Benzocaine (SOLARCAINE ALOE VERA EX) Externally apply topically daily as needed.      benzonatate (TESSALON) 200 MG capsule Take 1 capsule (200 mg) by mouth 3 times daily as needed for cough.  Qty: 50 capsule, Refills: 0      Calamine external lotion Apply topically as needed for itching      carbamide peroxide (DEBROX) 6.5 % otic solution Place 5 drops into both ears daily as needed for other.      clotrimazole (LOTRIMIN) 1 % external cream Apply topically 2 times daily as needed (skin irritation)      dextromethorphan-guaiFENesin (MUCINEX DM)  MG 12 hr tablet Take 1 tablet by mouth 2 times daily as needed.      diclofenac (VOLTAREN) 1 % topical gel Apply 2 g topically daily as needed for moderate pain To joints/back      EPINEPHrine (ANY BX GENERIC EQUIV) 0.3 MG/0.3ML injection 2-pack Inject 0.3 mg into the muscle as needed for anaphylaxis. May repeat one time in 5-15 minutes if response to initial dose is inadequate.      famotidine (PEPCID) 20 MG tablet Take 1 tablet (20 mg) by mouth 2 times daily  Qty: 60 tablet, Refills: 0      FLUoxetine 20 MG tablet Take 20 mg by mouth every morning.      GAVILAX 17 GM/SCOOP powder Take 17 g by mouth daily as needed.      hydrocortisone (CORTAID) 1 % external cream Apply topically daily as needed.      Hydrocortisone (PREPARATION H EX) Externally apply topically daily as needed.      loperamide (IMODIUM) 2 MG capsule Take 4 mg by mouth 4 times daily as needed for diarrhea.      loratadine (CLARITIN) 10 MG tablet Take 10 mg by mouth daily as needed for allergies.      magnesium hydroxide  (MILK OF MAGNESIA) 400 MG/5ML suspension Take 30 mLs by mouth daily as needed.      metFORMIN (GLUCOPHAGE) 1000 MG tablet Take 1,000 mg by mouth 2 times daily (with meals)      montelukast (SINGULAIR) 10 MG tablet Take 10 mg by mouth every morning.      OLANZapine (ZYPREXA) 10 MG tablet Take 10 mg by mouth At Bedtime      omeprazole (PRILOSEC) 40 MG DR capsule Take 40 mg by mouth every morning.      pramoxine-calamine (AVEENO) 1-8 % LOTN Apply topically daily as needed for itching.      rosuvastatin (CRESTOR) 10 MG tablet Take 10 mg by mouth At Bedtime      sucralfate (CARAFATE) 1 GM/10ML suspension Take 1 g by mouth 4 times daily as needed.      traZODone (DESYREL) 100 MG tablet Take 100 mg by mouth at bedtime.      TRELEGY ELLIPTA 100-62.5-25 MCG/ACT oral inhaler Inhale 1 puff into the lungs daily.      Urea 40 % CREA Apply topically daily as needed.      Vitamins A & D (VITAMIN A & D) OINT Externally apply topically daily as needed.      witch hazel-glycerin (TUCKS) pad Apply topically as needed for hemorrhoids.           STOP taking these medications       empagliflozin (JARDIANCE) 10 MG TABS tablet Comments:   Reason for Stopping:         furosemide (LASIX) 40 MG tablet Comments:   Reason for Stopping:         ibuprofen (ADVIL/MOTRIN) 200 MG tablet Comments:   Reason for Stopping:         ketorolac (TORADOL) 10 MG tablet Comments:   Reason for Stopping:         lisinopril (ZESTRIL) 10 MG tablet Comments:   Reason for Stopping:         Pseudoephedrine-DM-GG (PSEUDOEPHEDRINE-DM-GUAIFENESIN OR) Comments:   Reason for Stopping:         spironolactone (ALDACTONE) 100 MG tablet Comments:   Reason for Stopping:             Allergies   Allergies   Allergen Reactions    Apricot Flavoring Agent (Non-Screening) Anaphylaxis    Banana Anaphylaxis     Throat swelling  Throat swelling      Wasp Venom Protein Shortness Of Breath     Other reaction(s): Respiratory Distress  Has an epi pen  Has an epi pen      Bees Anaphylaxis      Have an Epi pen that carries with    Methylphenidate Itching     Other reaction(s): Nightmares    Prunus      Other reaction(s): *Unknown    Sulfa Antibiotics      Headaches and nausea    Prunus Persica Rash     Other reaction(s): *Unknown

## 2025-01-08 NOTE — PLAN OF CARE
Problem: Adult Inpatient Plan of Care  Goal: Readiness for Transition of Care  Outcome: Progressing     Problem: Comorbidity Management  Goal: Maintenance of Behavioral Health Symptom Control  Outcome: Progressing  Intervention: Maintain Behavioral Health Symptom Control  Recent Flowsheet Documentation  Taken 1/7/2025 1700 by Diya Jo, RN  Medication Review/Management: medications reviewed   Goal Outcome Evaluation:       Pt anxious to discharge, walking in halls, making multiple phone calls throughout shift.   Group home coordinator here and updated.  Possible discharge tomorrow with abx.    Diya Jo, RN

## 2025-01-08 NOTE — PLAN OF CARE
Problem: Comorbidity Management  Goal: Blood Glucose Levels Within Targeted Range  Intervention: Monitor and Manage Glycemia  Recent Flowsheet Documentation  Taken 1/7/2025 6063 by Iban Polanco RN  Medication Review/Management: medications reviewed     Problem: Liver Failure  Goal: Effective Oxygenation and Ventilation  Outcome: Progressing     Problem: Infection  Goal: Absence of Infection Signs and Symptoms  Outcome: Progressing     Goal: Fluid and Electrolyte Balance  Outcome: Progressing  Problem: Liver Failure   Goal Outcome Evaluation:    Patient slept using BiPAP, woke up at 4 am complaining that the BiPAP dries his nares and has nasal congestion. Has dyspnea on exertion, refused prn nebs. RT saw pt and addressed his concerns. 2 am . On K, Mg and Phos protocols. On po cipro. Afebrile. Denies pain.

## 2025-01-09 ENCOUNTER — PATIENT OUTREACH (OUTPATIENT)
Dept: CARE COORDINATION | Facility: CLINIC | Age: 45
End: 2025-01-09
Payer: COMMERCIAL

## 2025-01-09 DIAGNOSIS — Z09 HOSPITAL DISCHARGE FOLLOW-UP: ICD-10-CM

## 2025-01-09 LAB
BACTERIA FLD CULT: NORMAL
GRAM STAIN RESULT: NORMAL
GRAM STAIN RESULT: NORMAL

## 2025-01-09 NOTE — PROGRESS NOTES
Connected Care Resource Center: [facility] [place]    Background: Transitional Care Management program identified per system criteria and reviewed by [facility] [place] team for possible outreach.    Assessment: Upon chart review, [facility] Team member will not proceed with patient outreach related to this episode of Transitional Care Management program due to reason below:    Patient has discharged to a Memory Care, Long-term Care, Assisted Living or Group Home where patient is receiving on-site support with their daily cares, including support with hospital follow up plan.    Plan: Transitional Care Management episode addressed appropriately per reason noted above.      Lissy Freeman  Community Health Worker  [facility]  [phone]      *Connected Care Resource Team does NOT follow patient ongoing. Referrals are identified based on internal discharge reports and the outreach is to ensure patient has an understanding of their discharge instructions.

## 2025-01-10 ENCOUNTER — APPOINTMENT (OUTPATIENT)
Dept: ULTRASOUND IMAGING | Facility: HOSPITAL | Age: 45
End: 2025-01-10
Attending: EMERGENCY MEDICINE
Payer: COMMERCIAL

## 2025-01-10 ENCOUNTER — HOSPITAL ENCOUNTER (EMERGENCY)
Facility: HOSPITAL | Age: 45
Discharge: ANOTHER HOSPITAL | End: 2025-01-11
Attending: EMERGENCY MEDICINE | Admitting: EMERGENCY MEDICINE
Payer: COMMERCIAL

## 2025-01-10 ENCOUNTER — TELEPHONE (OUTPATIENT)
Dept: OTOLARYNGOLOGY | Facility: CLINIC | Age: 45
End: 2025-01-10

## 2025-01-10 ENCOUNTER — APPOINTMENT (OUTPATIENT)
Dept: CT IMAGING | Facility: HOSPITAL | Age: 45
End: 2025-01-10
Attending: EMERGENCY MEDICINE
Payer: COMMERCIAL

## 2025-01-10 VITALS
OXYGEN SATURATION: 98 % | WEIGHT: 276 LBS | DIASTOLIC BLOOD PRESSURE: 68 MMHG | BODY MASS INDEX: 45.98 KG/M2 | HEART RATE: 73 BPM | HEIGHT: 65 IN | TEMPERATURE: 97.9 F | SYSTOLIC BLOOD PRESSURE: 141 MMHG | RESPIRATION RATE: 18 BRPM

## 2025-01-10 DIAGNOSIS — K65.2 SBP (SPONTANEOUS BACTERIAL PERITONITIS) (H): ICD-10-CM

## 2025-01-10 DIAGNOSIS — K70.31 ALCOHOLIC CIRRHOSIS OF LIVER WITH ASCITES (H): ICD-10-CM

## 2025-01-10 LAB
% LINING CELLS, BODY FLUID: 2 %
ABSOLUTE NEUTROPHILS, BODY FLUID: 424.8 /UL
ALBUMIN BODY FLUID SOURCE: NORMAL
ALBUMIN FLD-MCNC: 4 G/DL
ALBUMIN SERPL BCG-MCNC: 4.9 G/DL (ref 3.5–5.2)
ALBUMIN UR-MCNC: 20 MG/DL
ALP SERPL-CCNC: 321 U/L (ref 40–150)
ALT SERPL W P-5'-P-CCNC: 42 U/L (ref 0–70)
ANION GAP SERPL CALCULATED.3IONS-SCNC: 12 MMOL/L (ref 7–15)
APPEARANCE FLD: ABNORMAL
APPEARANCE UR: CLEAR
AST SERPL W P-5'-P-CCNC: 29 U/L (ref 0–45)
BASOPHILS # BLD AUTO: 0.1 10E3/UL (ref 0–0.2)
BASOPHILS NFR BLD AUTO: 1 %
BILIRUB DIRECT SERPL-MCNC: <0.2 MG/DL (ref 0–0.3)
BILIRUB SERPL-MCNC: 0.5 MG/DL
BILIRUB UR QL STRIP: NEGATIVE
BUN SERPL-MCNC: 43.1 MG/DL (ref 6–20)
CALCIUM SERPL-MCNC: 9.8 MG/DL (ref 8.8–10.4)
CELL COUNT BODY FLUID SOURCE: ABNORMAL
CHLORIDE SERPL-SCNC: 108 MMOL/L (ref 98–107)
COLOR FLD: YELLOW
COLOR UR AUTO: YELLOW
CREAT SERPL-MCNC: 0.98 MG/DL (ref 0.67–1.17)
CRP SERPL-MCNC: 11 MG/L
EGFRCR SERPLBLD CKD-EPI 2021: >90 ML/MIN/1.73M2
EOSINOPHIL # BLD AUTO: 0.5 10E3/UL (ref 0–0.7)
EOSINOPHIL NFR BLD AUTO: 4 %
EOSINOPHIL NFR FLD MANUAL: 1 %
ERYTHROCYTE [DISTWIDTH] IN BLOOD BY AUTOMATED COUNT: 17.5 % (ref 10–15)
ETHANOL SERPL-MCNC: <0.01 G/DL
GLUCOSE BLDC GLUCOMTR-MCNC: 118 MG/DL (ref 70–99)
GLUCOSE SERPL-MCNC: 88 MG/DL (ref 70–99)
GLUCOSE UR STRIP-MCNC: NEGATIVE MG/DL
HCO3 SERPL-SCNC: 19 MMOL/L (ref 22–29)
HCT VFR BLD AUTO: 37.9 % (ref 40–53)
HGB BLD-MCNC: 12.1 G/DL (ref 13.3–17.7)
HGB UR QL STRIP: NEGATIVE
IMM GRANULOCYTES # BLD: 0.1 10E3/UL
IMM GRANULOCYTES NFR BLD: 1 %
INR PPP: 1.44 (ref 0.85–1.15)
KETONES UR STRIP-MCNC: NEGATIVE MG/DL
LEUKOCYTE ESTERASE UR QL STRIP: NEGATIVE
LIPASE SERPL-CCNC: 18 U/L (ref 13–60)
LYMPHOCYTES # BLD AUTO: 2.2 10E3/UL (ref 0.8–5.3)
LYMPHOCYTES NFR BLD AUTO: 16 %
LYMPHOCYTES NFR FLD MANUAL: 60 %
MAGNESIUM SERPL-MCNC: 2.2 MG/DL (ref 1.7–2.3)
MCH RBC QN AUTO: 27.3 PG (ref 26.5–33)
MCHC RBC AUTO-ENTMCNC: 31.9 G/DL (ref 31.5–36.5)
MCV RBC AUTO: 85 FL (ref 78–100)
MONOCYTES # BLD AUTO: 1.6 10E3/UL (ref 0–1.3)
MONOCYTES NFR BLD AUTO: 11 %
MONOS+MACROS NFR FLD MANUAL: 19 %
NEUTROPHILS # BLD AUTO: 9.6 10E3/UL (ref 1.6–8.3)
NEUTROPHILS NFR BLD AUTO: 68 %
NEUTS BAND NFR FLD MANUAL: 18 %
NITRATE UR QL: NEGATIVE
NRBC # BLD AUTO: 0 10E3/UL
NRBC BLD AUTO-RTO: 0 /100
PH UR STRIP: 5.5 [PH] (ref 5–7)
PLAT MORPH BLD: NORMAL
PLATELET # BLD AUTO: 326 10E3/UL (ref 150–450)
POTASSIUM SERPL-SCNC: 5.4 MMOL/L (ref 3.4–5.3)
PROCALCITONIN SERPL IA-MCNC: 0.13 NG/ML
PROT FLD-MCNC: 5.2 G/DL
PROT SERPL-MCNC: 7.5 G/DL (ref 6.4–8.3)
PROTEIN BODY FLUID SOURCE: NORMAL
RBC # BLD AUTO: 4.44 10E6/UL (ref 4.4–5.9)
RBC MORPH BLD: NORMAL
RBC URINE: 0 /HPF
SODIUM SERPL-SCNC: 139 MMOL/L (ref 135–145)
SP GR UR STRIP: 1.02 (ref 1–1.03)
SQUAMOUS EPITHELIAL: <1 /HPF
T4 FREE SERPL-MCNC: 0.86 NG/DL (ref 0.9–1.7)
TSH SERPL DL<=0.005 MIU/L-ACNC: 10.31 UIU/ML (ref 0.3–4.2)
UROBILINOGEN UR STRIP-MCNC: <2 MG/DL
WBC # BLD AUTO: 14 10E3/UL (ref 4–11)
WBC # FLD AUTO: 2360 /UL
WBC URINE: 2 /HPF

## 2025-01-10 PROCEDURE — 84157 ASSAY OF PROTEIN OTHER: CPT | Performed by: EMERGENCY MEDICINE

## 2025-01-10 PROCEDURE — 84145 PROCALCITONIN (PCT): CPT | Performed by: EMERGENCY MEDICINE

## 2025-01-10 PROCEDURE — 87040 BLOOD CULTURE FOR BACTERIA: CPT | Performed by: EMERGENCY MEDICINE

## 2025-01-10 PROCEDURE — 82565 ASSAY OF CREATININE: CPT | Performed by: EMERGENCY MEDICINE

## 2025-01-10 PROCEDURE — 36415 COLL VENOUS BLD VENIPUNCTURE: CPT | Performed by: EMERGENCY MEDICINE

## 2025-01-10 PROCEDURE — 81001 URINALYSIS AUTO W/SCOPE: CPT | Performed by: EMERGENCY MEDICINE

## 2025-01-10 PROCEDURE — 82077 ASSAY SPEC XCP UR&BREATH IA: CPT | Performed by: EMERGENCY MEDICINE

## 2025-01-10 PROCEDURE — 87070 CULTURE OTHR SPECIMN AEROBIC: CPT | Performed by: EMERGENCY MEDICINE

## 2025-01-10 PROCEDURE — 250N000011 HC RX IP 250 OP 636: Mod: JZ | Performed by: EMERGENCY MEDICINE

## 2025-01-10 PROCEDURE — 82042 OTHER SOURCE ALBUMIN QUAN EA: CPT | Performed by: EMERGENCY MEDICINE

## 2025-01-10 PROCEDURE — 96366 THER/PROPH/DIAG IV INF ADDON: CPT | Mod: 59

## 2025-01-10 PROCEDURE — 87205 SMEAR GRAM STAIN: CPT | Performed by: EMERGENCY MEDICINE

## 2025-01-10 PROCEDURE — 99285 EMERGENCY DEPT VISIT HI MDM: CPT | Mod: 25

## 2025-01-10 PROCEDURE — 250N000011 HC RX IP 250 OP 636: Performed by: EMERGENCY MEDICINE

## 2025-01-10 PROCEDURE — 96367 TX/PROPH/DG ADDL SEQ IV INF: CPT

## 2025-01-10 PROCEDURE — 85025 COMPLETE CBC W/AUTO DIFF WBC: CPT | Performed by: EMERGENCY MEDICINE

## 2025-01-10 PROCEDURE — 89050 BODY FLUID CELL COUNT: CPT | Performed by: EMERGENCY MEDICINE

## 2025-01-10 PROCEDURE — 84155 ASSAY OF PROTEIN SERUM: CPT | Performed by: EMERGENCY MEDICINE

## 2025-01-10 PROCEDURE — 85610 PROTHROMBIN TIME: CPT | Performed by: EMERGENCY MEDICINE

## 2025-01-10 PROCEDURE — 74177 CT ABD & PELVIS W/CONTRAST: CPT

## 2025-01-10 PROCEDURE — 96365 THER/PROPH/DIAG IV INF INIT: CPT | Mod: 59

## 2025-01-10 PROCEDURE — 84439 ASSAY OF FREE THYROXINE: CPT | Performed by: EMERGENCY MEDICINE

## 2025-01-10 PROCEDURE — 83735 ASSAY OF MAGNESIUM: CPT | Performed by: EMERGENCY MEDICINE

## 2025-01-10 PROCEDURE — 84443 ASSAY THYROID STIM HORMONE: CPT | Performed by: EMERGENCY MEDICINE

## 2025-01-10 PROCEDURE — 86140 C-REACTIVE PROTEIN: CPT | Performed by: EMERGENCY MEDICINE

## 2025-01-10 PROCEDURE — 83690 ASSAY OF LIPASE: CPT | Performed by: EMERGENCY MEDICINE

## 2025-01-10 PROCEDURE — 272N000710 US PARACENTESIS WITH ALBUMIN

## 2025-01-10 PROCEDURE — P9047 ALBUMIN (HUMAN), 25%, 50ML: HCPCS | Mod: JZ | Performed by: EMERGENCY MEDICINE

## 2025-01-10 PROCEDURE — 82962 GLUCOSE BLOOD TEST: CPT

## 2025-01-10 RX ORDER — ONDANSETRON 4 MG/1
4 TABLET, ORALLY DISINTEGRATING ORAL EVERY 8 HOURS PRN
Qty: 20 TABLET | Refills: 0 | Status: SHIPPED | OUTPATIENT
Start: 2025-01-10

## 2025-01-10 RX ORDER — PIPERACILLIN SODIUM, TAZOBACTAM SODIUM 3; .375 G/15ML; G/15ML
3.38 INJECTION, POWDER, LYOPHILIZED, FOR SOLUTION INTRAVENOUS ONCE
Status: COMPLETED | OUTPATIENT
Start: 2025-01-10 | End: 2025-01-10

## 2025-01-10 RX ORDER — IOPAMIDOL 755 MG/ML
90 INJECTION, SOLUTION INTRAVASCULAR ONCE
Status: COMPLETED | OUTPATIENT
Start: 2025-01-10 | End: 2025-01-10

## 2025-01-10 RX ORDER — OXYCODONE HYDROCHLORIDE 5 MG/1
5 TABLET ORAL ONCE
Status: COMPLETED | OUTPATIENT
Start: 2025-01-10 | End: 2025-01-10

## 2025-01-10 RX ORDER — ALBUMIN (HUMAN) 12.5 G/50ML
25-50 SOLUTION INTRAVENOUS ONCE
Status: DISCONTINUED | OUTPATIENT
Start: 2025-01-10 | End: 2025-01-10

## 2025-01-10 RX ORDER — ALBUMIN (HUMAN) 12.5 G/50ML
25 SOLUTION INTRAVENOUS ONCE
Status: COMPLETED | OUTPATIENT
Start: 2025-01-10 | End: 2025-01-10

## 2025-01-10 RX ORDER — DICYCLOMINE HCL 20 MG
20 TABLET ORAL 4 TIMES DAILY PRN
Qty: 20 TABLET | Refills: 0 | Status: SHIPPED | OUTPATIENT
Start: 2025-01-10

## 2025-01-10 RX ADMIN — PIPERACILLIN AND TAZOBACTAM 3.38 G: 3; .375 INJECTION, POWDER, FOR SOLUTION INTRAVENOUS at 19:46

## 2025-01-10 RX ADMIN — ALBUMIN HUMAN 25 G: 0.25 SOLUTION INTRAVENOUS at 17:45

## 2025-01-10 RX ADMIN — IOPAMIDOL 90 ML: 755 INJECTION, SOLUTION INTRAVENOUS at 14:01

## 2025-01-10 ASSESSMENT — ACTIVITIES OF DAILY LIVING (ADL)
ADLS_ACUITY_SCORE: 56

## 2025-01-10 NOTE — DISCHARGE INSTRUCTIONS
As needed for pain control at home, take:  - over-the-counter ibuprofen 400mg by mouth every 8 hours (max dose 2400mg in 24 hours)  - prescribed Bentyl for abdominal pain    Use the prescribed Zofran for any nausea or vomiting.    Continue all of your previously-prescribed medications.    Follow up with your Primary Care provider in 2 days for a recheck.    Return to the Emergency Department for any difficulty breathing, persistent vomiting, new or worsening symptoms, or any other concerns.

## 2025-01-10 NOTE — ED PROVIDER NOTES
EMERGENCY DEPARTMENT ENCOUNTER      NAME: Warern Jaramillo  AGE: 44 year old male  YOB: 1980  MRN: [id number]  EVALUATION DATE & TIME: No admission date for patient encounter.    PCP: Mary Kelly    ED PROVIDER: Reji Santos M.D.      Chief Complaint   Patient presents with    Abdominal Pain         IMPRESSION  1. Alcoholic cirrhosis of liver with ascites (H)        PLAN  - follow up ascites neutrophil count; admit if concerning for SBP, otherwise discharge    ED COURSE & MEDICAL DECISION MAKING    ED Course as of 01/10/25 1809   Fri Selvin 10, 2025       1743 I called lab given delay on ascites fluid differential; they estimate 1 more hour until differential is done. Cannot rule out SBP until this is done.       44yoM with history of developmental delay, Charcot Estrella Tooth disease, fetal alcohol syndrome, DVT (takes Eliquis), alcoholic cirrhosis (recent admission with SBP s/p IV antibiotics, now on prophylactic cipro, discharged to group home 2 days ago) presenting with his group home staff for evaluation of abdominal pain & distention. Denies any fevers or vomiting. States he has been taking his meds as prescribed.    Vitals unremarkable on presentation. Exam with diffuse dull distention, mild LLQ tenderness with no peritoneal signs, clear mentation, steady unaccompanied gait. Labs with WBC 14, CRP improved to 11, procal within normal limits at 0.13---low clinical concern for SBP. CT with diffuse ascites but no other explanatory pathology. US paracentesis obtained and 5L removed; given albumin for this. Awaiting ascites fluid studies at this time; patient asymptomatic on recheck with no abdominal pain or tenderness.    6:04 PM - Patient signed out to Dr. Muir at routine shift change. Plan at this time is follow up ascites neutrophil count; dispo per this.    --------------------------------------------------------------------------------    --------------------------------------------------------------------------------       This patient involved a high degree of complexity in medical decision making, as significant risks were present and assessed. Recent encounters & results in medical record reviewed by me.    All workup (i.e. any EKG/labs/imaging as per charting below) reviewed and independently interpreted by me. See respective sections for details.        See additional MDM below if interested.      MEDICATIONS GIVEN IN THE EMERGENCY DEPARTMENT  Medications   iopamidol (ISOVUE-370) solution 90 mL (90 mLs Intravenous $Given 1/10/25 1401)   oxyCODONE (ROXICODONE) tablet 5 mg (5 mg Oral Not Given 1/10/25 6234)   albumin human 25 % injection 25 g (25 g Intravenous $New Bag 1/10/25 2198)               =================================================================      HPI  Use of : N/A      Warren Jaramillo is a 44 year old male with a pertinent history of HTN, DM2, COPD, Rojas's disease, cirrhosis of liver with ascites and SBP on chronic prophylaxis with ciprofloxacin, HFpEF, and TBI who presents to this ED by private car with group home staff for evaluation of abdominal pain and distension.    Per chart review, the patient was admitted to [facility] from 1/2/25 to 1/8/25 for SBP. Initially presented to the ED with increased abdominal pain and distension, CT showed cirrhosis of the liver with mild-moderate ascites. Paracentesis on 1/2 with ascitic fluid with neutrophilia and SAAG c/w portal hypertension though also high protein indicating likely HF etiology. Completed 5 day course of ceftriaxone for SBP. Repeat paracentesis on 1/6. Started on SBP prophylaxis with ciprofloxacin 500 mg indefinitely. Creatinine bishop to 3.32 during admission, nephrology consulted, likely hepatorenal secondary to decompensated liver disease. Tongue lesion with 500 ml output of blood tinged fluid when suctioned, ENT consulted and recommended  "outpatient biopsy. Resumed Metformin at discharge, held Jardiance. Held Lasix, spironolactone, and lisinopril at discharge.    The patient is returning to the ED today after he had 5 lbs of weight gain overnight. Additionally he reports feeling distended and has LLQ abdominal pain. He feels as if he has built up fluid on his abdomen again, and that the fluid is -pressing on his diaphragm causing some mild shortness of breath. He typically undergoes paracentesis every 7 days.     --------------- MEDICAL HISTORY ---------------  PAST MEDICAL HISTORY:  Reviewed independently by me.  Past Medical History:   Diagnosis Date    COPD exacerbation (H) 12/2/2024    DM2 (diabetes mellitus, type 2) (H) 4/28/2020    HTN (hypertension) 7/30/2012    Thyroid nodule 7/31/2019    Rojas's disease (H)      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    Tobacco use    Elevated WBCs    Rectal bleeding    Anal fissure    \"high flow priapism\"     CARDIOVASCULAR SCREENING; LDL GOAL LESS THAN 160    PTSD (post-traumatic stress disorder)    Fetal alcohol syndromes    Seasonal allergic rhinitis    Steatohepatitis    Cardiomegaly    Shortness of breath    Chest pain, unspecified type    Acute right hip pain    Acute on chronic right-sided congestive heart failure (H)    Active autistic disorder    Adjustment disorder with disturbance of conduct    Angiomyolipoma    Ankylosis, sacroiliac joint    Ascites    Charcot-Estrella-Tooth syndrome    Chronic insomnia    Congestive heart failure (H)    DM2 (diabetes mellitus, type 2) (H)    DM2 (diabetes mellitus, type 2) (H)    Dysphagia    Dysuria    Elevated d-dimer    Episodic mood disorder    Excessive daytime sleepiness    Gait instability    H/O fall    Hematuria    Benign essential hypertension    Hyperglycemia    Incontinence    Myelolipoma of adrenal gland    AVA treated with BiPAP    Abdominal pain, right upper " quadrant    PVC's (premature ventricular contractions)    SVT (supraventricular tachycardia)    Heart failure with preserved ejection fraction, NYHA class I (H)    Incomplete left bundle branch block (LBBB)    Suicidal ideation    Cirrhosis of liver with ascites, unspecified hepatic cirrhosis type (H)    Thyroid nodule    ADHD (attention deficit hyperactivity disorder)    Chest pain    Morbid obesity (H)    COPD exacerbation (H)    DVT of axillary vein, acute left (H)    Abdominal bloating    LLQ abdominal pain    SBP (spontaneous bacterial peritonitis) (H)    ASNHL (asymmetrical sensorineural hearing loss)    Traumatic brain injury (H)    Tongue lesion    Acute kidney failure, unspecified       PAST SURGICAL HISTORY:  Reviewed independently by me.  Past Surgical History:   Procedure Laterality Date    COLONOSCOPY      ESOPHAGOSCOPY, GASTROSCOPY, DUODENOSCOPY (EGD), COMBINED N/A 7/21/2023    Procedure: ESOPHAGOGASTRODUODENOSCOPY WITH GASTRIC AND ESOPHAGEAL BIOPSIES;  Surgeon: Filiberto Aragon MD;  Location: [facility] OR    TOOTH [facility]         CURRENT MEDICATIONS:    Reviewed independently by me.  No current facility-administered medications for this encounter.    Current Outpatient Medications:     dicyclomine (BENTYL) 20 MG tablet, Take 1 tablet (20 mg) by mouth 4 times daily as needed (abdominal pain)., Disp: 20 tablet, Rfl: 0    ondansetron (ZOFRAN ODT) 4 MG ODT tab, Take 1 tablet (4 mg) by mouth every 8 hours as needed for nausea., Disp: 20 tablet, Rfl: 0    albuterol (PROAIR HFA/PROVENTIL HFA/VENTOLIN HFA) 108 (90 Base) MCG/ACT inhaler, Inhale 1-2 puffs into the lungs every 6 hours as needed for shortness of breath, wheezing or cough, Disp: 18 g, Rfl: 0    albuterol (PROVENTIL) (2.5 MG/3ML) 0.083% neb solution, Take 2.5 mg by nebulization 3 times daily as needed for shortness of breath, wheezing or cough, Disp: , Rfl:     aloe vera GEL, Apply 1 g topically every hour as needed for skin care Per bottle  directions, Disp: , Rfl:     apixaban ANTICOAGULANT (ELIQUIS) 5 MG tablet, Take 5 mg by mouth 2 times daily., Disp: , Rfl:     bacitracin 500 UNIT/GM OINT, Apply topically 3 times daily as needed for wound care, Disp: , Rfl:     Benzocaine (SOLARCAINE ALOE VERA EX), Externally apply topically daily as needed., Disp: , Rfl:     benzonatate (TESSALON) 200 MG capsule, Take 1 capsule (200 mg) by mouth 3 times daily as needed for cough., Disp: 50 capsule, Rfl: 0    Calamine external lotion, Apply topically as needed for itching, Disp: , Rfl:     carbamide peroxide (DEBROX) 6.5 % otic solution, Place 5 drops into both ears daily as needed for other., Disp: , Rfl:     ciprofloxacin (CIPRO) 500 MG tablet, Take 1 tablet (500 mg) by mouth every 24 hours., Disp: 30 tablet, Rfl: 0    clotrimazole (LOTRIMIN) 1 % external cream, Apply topically 2 times daily as needed (skin irritation), Disp: , Rfl:     dextromethorphan-guaiFENesin (MUCINEX DM)  MG 12 hr tablet, Take 1 tablet by mouth 2 times daily as needed., Disp: , Rfl:     diclofenac (VOLTAREN) 1 % topical gel, Apply 2 g topically daily as needed for moderate pain To joints/back, Disp: , Rfl:     EPINEPHrine (ANY BX GENERIC EQUIV) 0.3 MG/0.3ML injection 2-pack, Inject 0.3 mg into the muscle as needed for anaphylaxis. May repeat one time in 5-15 minutes if response to initial dose is inadequate., Disp: , Rfl:     famotidine (PEPCID) 20 MG tablet, Take 1 tablet (20 mg) by mouth 2 times daily, Disp: 60 tablet, Rfl: 0    FLUoxetine 20 MG tablet, Take 20 mg by mouth every morning., Disp: , Rfl:     GAVILAX 17 GM/SCOOP powder, Take 17 g by mouth daily as needed., Disp: , Rfl:     hydrocortisone (CORTAID) 1 % external cream, Apply topically daily as needed., Disp: , Rfl:     Hydrocortisone (PREPARATION H EX), Externally apply topically daily as needed., Disp: , Rfl:     loperamide (IMODIUM) 2 MG capsule, Take 4 mg by mouth 4 times daily as needed for diarrhea., Disp: , Rfl:      loratadine (CLARITIN) 10 MG tablet, Take 10 mg by mouth daily as needed for allergies., Disp: , Rfl:     magnesium hydroxide (MILK OF MAGNESIA) 400 MG/5ML suspension, Take 30 mLs by mouth daily as needed., Disp: , Rfl:     metFORMIN (GLUCOPHAGE) 1000 MG tablet, Take 1,000 mg by mouth 2 times daily (with meals), Disp: , Rfl:     midodrine (PROAMATINE) 5 MG tablet, Take 1 tablet (5 mg) by mouth 3 times daily (with meals)., Disp: 30 tablet, Rfl: 0    montelukast (SINGULAIR) 10 MG tablet, Take 10 mg by mouth every morning., Disp: , Rfl:     OLANZapine (ZYPREXA) 10 MG tablet, Take 10 mg by mouth At Bedtime, Disp: , Rfl:     omeprazole (PRILOSEC) 40 MG DR capsule, Take 40 mg by mouth every morning., Disp: , Rfl:     pramoxine-calamine (AVEENO) 1-8 % LOTN, Apply topically daily as needed for itching., Disp: , Rfl:     rosuvastatin (CRESTOR) 10 MG tablet, Take 10 mg by mouth At Bedtime, Disp: , Rfl:     sucralfate (CARAFATE) 1 GM/10ML suspension, Take 1 g by mouth 4 times daily as needed., Disp: , Rfl:     traZODone (DESYREL) 100 MG tablet, Take 100 mg by mouth at bedtime., Disp: , Rfl:     TRELEGY ELLIPTA 100-62.5-25 MCG/ACT oral inhaler, Inhale 1 puff into the lungs daily., Disp: , Rfl:     Urea 40 % CREA, Apply topically daily as needed., Disp: , Rfl:     Vitamins A & D (VITAMIN A & D) OINT, Externally apply topically daily as needed., Disp: , Rfl:     witch hazel-glycerin (TUCKS) pad, Apply topically as needed for hemorrhoids., Disp: , Rfl:     ALLERGIES:  Reviewed independently by me.  Allergies   Allergen Reactions    Apricot Flavoring Agent (Non-Screening) Anaphylaxis    Banana Anaphylaxis     Throat swelling  Throat swelling      Wasp Venom Protein Shortness Of Breath     Other reaction(s): Respiratory Distress  Has an epi pen  Has an epi pen      Bees Anaphylaxis     Have an Epi pen that carries with    Methylphenidate Itching     Other reaction(s): Nightmares    Prunus      Other reaction(s): *Unknown    Sulfa  Antibiotics      Headaches and nausea    Prunus Persica Rash     Other reaction(s): *Unknown       FAMILY HISTORY:  Reviewed independently by me.  Family History   Problem Relation Age of Onset    Unknown/Adopted Father     Unknown/Adopted Maternal Grandmother     C.A.D. Maternal Grandfather     Diabetes Maternal Grandfather     Cerebrovascular Disease Maternal Grandfather     Unknown/Adopted Paternal Grandmother     Unknown/Adopted Paternal Grandfather     Unknown/Adopted Brother     Unknown/Adopted Sister          SOCIAL HISTORY:   Reviewed independently by me.  Social History     Socioeconomic History    Marital status: Single   Tobacco Use    Smoking status: Every Day     Current packs/day: 1.00     Types: Cigarettes    Smokeless tobacco: Never   Vaping Use    Vaping status: Never Used   Substance and Sexual Activity    Alcohol use: No     Comment: once every 3 months    Drug use: No    Sexual activity: Never     Partners: Female   Other Topics Concern     Service No    Blood Transfusions No    Caffeine Concern No    Occupational Exposure No    Hobby Hazards No    Sleep Concern No    Stress Concern Yes     Comment: sometimes    Weight Concern No    Special Diet Yes     Comment: counting carbs    Back Care No    Exercise Yes    Seat Belt Yes    Self-Exams Yes     Social Drivers of Health     Financial Resource Strain: Low Risk  (1/7/2025)    Financial Resource Strain     Within the past 12 months, have you or your family members you live with been unable to get utilities (heat, electricity) when it was really needed?: No   Food Insecurity: Low Risk  (1/7/2025)    Food Insecurity     Within the past 12 months, did you worry that your food would run out before you got money to buy more?: No     Within the past 12 months, did the food you bought just not last and you didn t have money to get more?: No   Transportation Needs: Low Risk  (1/7/2025)    Transportation Needs     Within the past 12 months, has lack  "of transportation kept you from medical appointments, getting your medicines, non-medical meetings or appointments, work, or from getting things that you need?: No    Received from [facility]    Social Connections   Interpersonal Safety: High Risk (1/7/2025)    Interpersonal Safety     Do you feel physically and emotionally safe where you currently live?: No     Within the past 12 months, have you been hit, slapped, kicked or otherwise physically hurt by someone?: No     Within the past 12 months, have you been humiliated or emotionally abused in other ways by your partner or ex-partner?: No   Housing Stability: Low Risk  (1/7/2025)    Housing Stability     Do you have housing? : Yes     Are you worried about losing your housing?: No   Recent Concern: Housing Stability - High Risk (12/2/2024)    Housing Stability     Do you have housing? : No     Are you worried about losing your housing?: No       --------------- PHYSICAL EXAM ---------------  Nursing notes and vitals independently reviewed by me.  VITALS:  Vitals:    01/10/25 1133   BP: 114/58   Pulse: 89   Resp: 18   Temp: 97.9  F (36.6  C)   TempSrc: Oral   SpO2: 97%   Weight: 125.2 kg (276 lb)   Height: 1.651 m (5' 5\")       PHYSICAL EXAM:    General:  alert, interactive, no distress  Eyes:  conjunctivae clear, conjugate gaze  HENT:  atraumatic, nose with no rhinorrhea, oropharynx clear  Neck:  no meningismus  Cardiovascular:  HR 80s during exam, regular rhythm, no murmurs, brisk cap refill  Chest:  no chest wall tenderness  Pulmonary:  no stridor, normal phonation, normal work of breathing, clear lungs bilaterally  Abdomen:  obese, moderate diffuse dull distension, mild LLQ tenderness, no rebound or guarding  :  no CVA tenderness  Back:  no midline spinal tenderness  Musculoskeletal:  no pretibial edema, no calf tenderness. Gross ROM intact to joints of extremities with no " obvious deformities.  Skin:  warm, dry, no rash  Neuro:  awake, alert, answers questions appropriately, follows commands, moves all limbs, steady unaccompanied gait  Psych:  calm, normal affect      --------------- RESULTS ---------------  LAB:  Reviewed and independently interpreted by me.  Results for orders placed or performed during the hospital encounter of 01/10/25   CT Abdomen Pelvis w Contrast    Impression    IMPRESSION:   1.  Cirrhosis with sequelae of portal venous hypertension including mild splenomegaly and mild to moderate ascites which is slightly improved since the prior CT.   US Paracentesis with Albumin    Impression    IMPRESSION:  1.  Status post ultrasound-guided paracentesis.    Reference CPT Code: 43425   UA with Microscopic reflex to Culture    Specimen: Urine, Midstream   Result Value Ref Range    Color Urine Yellow Colorless, Straw, Light Yellow, Yellow    Appearance Urine Clear Clear    Glucose Urine Negative Negative mg/dL    Bilirubin Urine Negative Negative    Ketones Urine Negative Negative mg/dL    Specific Gravity Urine 1.023 1.001 - 1.030    Blood Urine Negative Negative    pH Urine 5.5 5.0 - 7.0    Protein Albumin Urine 20 (A) Negative mg/dL    Urobilinogen Urine <2.0 <2.0 mg/dL    Nitrite Urine Negative Negative    Leukocyte Esterase Urine Negative Negative    RBC Urine 0 <=2 /HPF    WBC Urine 2 <=5 /HPF    Squamous Epithelials Urine <1 <=1 /HPF   Basic metabolic panel   Result Value Ref Range    Sodium 139 135 - 145 mmol/L    Potassium 5.4 (H) 3.4 - 5.3 mmol/L    Chloride 108 (H) 98 - 107 mmol/L    Carbon Dioxide (CO2) 19 (L) 22 - 29 mmol/L    Anion Gap 12 7 - 15 mmol/L    Urea Nitrogen 43.1 (H) 6.0 - 20.0 mg/dL    Creatinine 0.98 0.67 - 1.17 mg/dL    GFR Estimate >90 >60 mL/min/1.73m2    Calcium 9.8 8.8 - 10.4 mg/dL    Glucose 88 70 - 99 mg/dL   Hepatic function panel   Result Value Ref Range    Protein Total 7.5 6.4 - 8.3 g/dL    Albumin 4.9 3.5 - 5.2 g/dL    Bilirubin Total  0.5 <=1.2 mg/dL    Alkaline Phosphatase 321 (H) 40 - 150 U/L    AST 29 0 - 45 U/L    ALT 42 0 - 70 U/L    Bilirubin Direct <0.20 0.00 - 0.30 mg/dL   Result Value Ref Range    Lipase 18 13 - 60 U/L   Result Value Ref Range    Procalcitonin 0.13 <0.50 ng/mL   Result Value Ref Range    Magnesium 2.2 1.7 - 2.3 mg/dL   TSH with free T4 reflex   Result Value Ref Range    TSH 10.31 (H) 0.30 - 4.20 uIU/mL   Result Value Ref Range    CRP Inflammation 11.00 (H) <5.00 mg/L   Result Value Ref Range    INR 1.44 (H) 0.85 - 1.15   Ethyl Alcohol Level   Result Value Ref Range    Alcohol ethyl <0.01 <=0.01 g/dL   CBC with platelets and differential   Result Value Ref Range    WBC Count 14.0 (H) 4.0 - 11.0 10e3/uL    RBC Count 4.44 4.40 - 5.90 10e6/uL    Hemoglobin 12.1 (L) 13.3 - 17.7 g/dL    Hematocrit 37.9 (L) 40.0 - 53.0 %    MCV 85 78 - 100 fL    MCH 27.3 26.5 - 33.0 pg    MCHC 31.9 31.5 - 36.5 g/dL    RDW 17.5 (H) 10.0 - 15.0 %    Platelet Count 326 150 - 450 10e3/uL    % Neutrophils 68 %    % Lymphocytes 16 %    % Monocytes 11 %    % Eosinophils 4 %    % Basophils 1 %    % Immature Granulocytes 1 %    NRBCs per 100 WBC 0 <1 /100    Absolute Neutrophils 9.6 (H) 1.6 - 8.3 10e3/uL    Absolute Lymphocytes 2.2 0.8 - 5.3 10e3/uL    Absolute Monocytes 1.6 (H) 0.0 - 1.3 10e3/uL    Absolute Eosinophils 0.5 0.0 - 0.7 10e3/uL    Absolute Basophils 0.1 0.0 - 0.2 10e3/uL    Absolute Immature Granulocytes 0.1 <=0.4 10e3/uL    Absolute NRBCs 0.0 10e3/uL   RBC and Platelet Morphology   Result Value Ref Range    RBC Morphology Confirmed RBC Indices     Platelet Assessment  Automated Count Confirmed. Platelet morphology is normal.     Automated Count Confirmed. Platelet morphology is normal.   Result Value Ref Range    Free T4 0.86 (L) 0.90 - 1.70 ng/dL   Cell Count Body Fluid   Result Value Ref Range    Color Yellow Colorless, Yellow    Clarity Cloudy (A) Clear    Cell Count Fluid Source Abdomen     Total Nucleated Cells 2,360 /uL   Ascites  Fluid Aerobic Bacterial Culture Routine With Gram Stain    Specimen: Peritoneum; Ascites Fluid   Result Value Ref Range    Gram Stain Result No organisms seen     Gram Stain Result           RADIOLOGY:  Reviewed and independently interpreted by me. Please see official radiology report.  Recent Results (from the past 24 hours)   CT Abdomen Pelvis w Contrast    Narrative    EXAM: CT ABDOMEN PELVIS W CONTRAST  LOCATION: [facility]  DATE: 1/10/2025    INDICATION: abdominal pain, recent SBP  COMPARISON: CT 1/2/2025, 12/29/2024  TECHNIQUE: CT scan of the abdomen and pelvis was performed following injection of IV contrast. Multiplanar reformats were obtained. Dose reduction techniques were used.  CONTRAST: IsoVue 370 90mL    FINDINGS:   LOWER CHEST: Normal.    HEPATOBILIARY: Enlarged heterogeneous cirrhotic liver with a nodular surface contour and relative hypertrophy of the lateral left lobe. Partially contracted gallbladder. No biliary ductal dilatation.    PANCREAS: Mild peripancreatic edema likely related to the ascites.    SPLEEN: Mildly enlarged, measuring 13.9 cm craniocaudal dimension.    ADRENAL GLANDS: Stable bilateral myelolipomas, measuring 5.0 cm on the right and 3.2 cm on the left.    KIDNEYS/BLADDER: Normal.    BOWEL: No bowel obstruction or inflammatory process. Normal appendix. Small to moderate volume ascites which is slightly improved since the prior CT. Diffuse mesenteric and body wall edema. No free air.    LYMPH NODES: Stable mild upper abdominal, mesenteric and retroperitoneal lymphadenopathy.    VASCULATURE: Mild bilateral iliac atherosclerosis.    PELVIC ORGANS: Normal.    MUSCULOSKELETAL: Mild spinal degenerative changes.      Impression    IMPRESSION:   1.  Cirrhosis with sequelae of portal venous hypertension including mild splenomegaly and mild to moderate ascites which is slightly improved since the prior CT.   US Paracentesis with Albumin    Narrative    EXAM:  1.  PARACENTESIS  2. ULTRASOUND GUIDANCE  LOCATION: [facility]  DATE: 1/10/2025    INDICATION: Ascites.    PROCEDURE: Informed consent obtained. Time out performed. The abdomen was prepped and draped in a sterile fashion. 10 mL of 1% lidocaine was infused into local soft tissues. A 5 Ukrainian catheter system was introduced into the abdominal ascites under   ultrasound guidance.    5 liters of clear bushra fluid were removed and sent to lab if requested.    Patient tolerated procedure well.    Ultrasound imaging was obtained and placed in the patient's permanent medical record.      Impression    IMPRESSION:  1.  Status post ultrasound-guided paracentesis.    Reference CPT Code: 80551                     --------------- ADDITIONAL MDM ---------------  MIPS:  Not Applicable    History:  - I considered systemic symptoms of the presenting illness.  - Supplemental history from:       -- patient  - External Record(s) reviewed:       -- Inpatient/outpatient record (admission 1/2/25/), prior labs (blood 1/2/25), prior imaging (US paracentesis 1/2/25)       -- see above ED course & MDM for further details    Workup:  - Chart documentation above includes differential considered and my independent interpretation any EKGs, labs tests, and/or imaging  - emergent/severe conditions considered and evaluated for: see above differential & MDM  - medications given that require intensive monitoring for toxicity: IV albumin  - In additional to work up documented, I considered the following work up:       -- CTA chest/abdomen/pelvis       -- see above ED course & MDM for further details    External Consultation:  - Discussion of management with another provider:       -- ED pharmacist re: meds       -- see above charting for additional    Complicating Factors:  - Care impacted by chronic illness:       -- see above MDM, past medical history, & problem list    Disposition Considerations:  - Admission considered. Patient  was signed out to the oncoming physician, disposition pending.         I, Collin Man, am serving as a scribe to document services personally performed by Dr. Reji Santos based on my observation and the provider's statements to me. I, Reji Santos MD attest that Collin Conway is acting in a scribe capacity, has observed my performance of the services and has documented them in accordance with my direction.      Reji Santos MD  01/10/25  Emergency Medicine  [facility] EMERGENCY DEPARTMENT  [address] [place] [id number]  [phone]  Dept: [phone]     Reji Santos MD  01/10/25 1790

## 2025-01-10 NOTE — ED TRIAGE NOTES
Pt presents to triage ambulatory from home with group home staff. Pt was here with peritonitis and discharged yesterday. Pt reports pain is back and he has gained 5 lbs in one day. Pt last had tylenol around 0200 today.

## 2025-01-10 NOTE — TELEPHONE ENCOUNTER
----- Message from Johann Ivan sent at 1/3/2025  1:57 PM CST -----  Regarding: Follow up tongue lesion  This ronny needs an outpatient follow up visit for his tongue lesion (photo in chart). He is a bit of a mess medically. We might need to take him to the OR for biopsy. He is on Eliquis and has cirrhosis, so he will likely need the Eliquis stopped prior to biopsy.

## 2025-01-11 ENCOUNTER — HOSPITAL ENCOUNTER (INPATIENT)
Facility: CLINIC | Age: 45
LOS: 5 days | Discharge: HOME | End: 2025-01-16
Attending: HOSPITALIST | Admitting: HOSPITALIST
Payer: COMMERCIAL

## 2025-01-11 DIAGNOSIS — Z72.0 TOBACCO USE: ICD-10-CM

## 2025-01-11 DIAGNOSIS — K74.60 CIRRHOSIS OF LIVER WITH ASCITES, UNSPECIFIED HEPATIC CIRRHOSIS TYPE (H): ICD-10-CM

## 2025-01-11 DIAGNOSIS — D68.9 MEDICATION INDUCED COAGULOPATHY: Primary | ICD-10-CM

## 2025-01-11 DIAGNOSIS — T50.905A MEDICATION INDUCED COAGULOPATHY: Primary | ICD-10-CM

## 2025-01-11 DIAGNOSIS — E03.8 OTHER SPECIFIED HYPOTHYROIDISM: ICD-10-CM

## 2025-01-11 DIAGNOSIS — I50.812 CHRONIC RIGHT-SIDED CONGESTIVE HEART FAILURE (H): ICD-10-CM

## 2025-01-11 DIAGNOSIS — R18.8 CIRRHOSIS OF LIVER WITH ASCITES, UNSPECIFIED HEPATIC CIRRHOSIS TYPE (H): ICD-10-CM

## 2025-01-11 LAB
BACTERIA FLD CULT: NO GROWTH
GRAM STAIN RESULT: NORMAL
GRAM STAIN RESULT: NORMAL
HOLD SPECIMEN: NORMAL

## 2025-01-11 PROCEDURE — 250N000011 HC RX IP 250 OP 636: Performed by: HOSPITALIST

## 2025-01-11 PROCEDURE — 120N000001 HC R&B MED SURG/OB

## 2025-01-11 PROCEDURE — 250N000013 HC RX MED GY IP 250 OP 250 PS 637: Performed by: HOSPITALIST

## 2025-01-11 PROCEDURE — 99207 PR NO BILLABLE SERVICE THIS VISIT: CPT | Performed by: INTERNAL MEDICINE

## 2025-01-11 PROCEDURE — 999N000157 HC STATISTIC RCP TIME EA 10 MIN

## 2025-01-11 PROCEDURE — 99223 1ST HOSP IP/OBS HIGH 75: CPT | Performed by: HOSPITALIST

## 2025-01-11 PROCEDURE — 250N000013 HC RX MED GY IP 250 OP 250 PS 637: Performed by: INTERNAL MEDICINE

## 2025-01-11 PROCEDURE — 94660 CPAP INITIATION&MGMT: CPT

## 2025-01-11 PROCEDURE — A7035 POS AIRWAY PRESS HEADGEAR: HCPCS

## 2025-01-11 RX ORDER — NICOTINE 21 MG/24HR
1 PATCH, TRANSDERMAL 24 HOURS TRANSDERMAL DAILY
Status: DISCONTINUED | OUTPATIENT
Start: 2025-01-11 | End: 2025-01-11

## 2025-01-11 RX ORDER — CALCIUM CARBONATE 500 MG/1
1000 TABLET, CHEWABLE ORAL 4 TIMES DAILY PRN
Status: DISCONTINUED | OUTPATIENT
Start: 2025-01-11 | End: 2025-01-16 | Stop reason: HOSPADM

## 2025-01-11 RX ORDER — CEFTRIAXONE 2 G/1
2 INJECTION, POWDER, FOR SOLUTION INTRAMUSCULAR; INTRAVENOUS EVERY 24 HOURS
Status: DISCONTINUED | OUTPATIENT
Start: 2025-01-11 | End: 2025-01-16 | Stop reason: HOSPADM

## 2025-01-11 RX ORDER — FLUTICASONE FUROATE AND VILANTEROL 100; 25 UG/1; UG/1
1 POWDER RESPIRATORY (INHALATION) DAILY
Status: DISCONTINUED | OUTPATIENT
Start: 2025-01-11 | End: 2025-01-16 | Stop reason: HOSPADM

## 2025-01-11 RX ORDER — CLOTRIMAZOLE 1 %
CREAM (GRAM) TOPICAL 2 TIMES DAILY PRN
Status: DISCONTINUED | OUTPATIENT
Start: 2025-01-11 | End: 2025-01-16 | Stop reason: HOSPADM

## 2025-01-11 RX ORDER — FAMOTIDINE 20 MG/1
20 TABLET, FILM COATED ORAL 2 TIMES DAILY
Status: DISCONTINUED | OUTPATIENT
Start: 2025-01-11 | End: 2025-01-16 | Stop reason: HOSPADM

## 2025-01-11 RX ORDER — LOPERAMIDE HYDROCHLORIDE 2 MG/1
4 CAPSULE ORAL 4 TIMES DAILY PRN
Status: DISCONTINUED | OUTPATIENT
Start: 2025-01-11 | End: 2025-01-16 | Stop reason: HOSPADM

## 2025-01-11 RX ORDER — AMOXICILLIN 250 MG
1 CAPSULE ORAL 2 TIMES DAILY PRN
Status: DISCONTINUED | OUTPATIENT
Start: 2025-01-11 | End: 2025-01-16 | Stop reason: HOSPADM

## 2025-01-11 RX ORDER — ALBUTEROL SULFATE 0.83 MG/ML
2.5 SOLUTION RESPIRATORY (INHALATION) 3 TIMES DAILY PRN
Status: DISCONTINUED | OUTPATIENT
Start: 2025-01-11 | End: 2025-01-16 | Stop reason: HOSPADM

## 2025-01-11 RX ORDER — GINSENG 100 MG
CAPSULE ORAL 3 TIMES DAILY PRN
Status: DISCONTINUED | OUTPATIENT
Start: 2025-01-11 | End: 2025-01-16 | Stop reason: HOSPADM

## 2025-01-11 RX ORDER — MONTELUKAST SODIUM 10 MG/1
10 TABLET ORAL EVERY MORNING
Status: DISCONTINUED | OUTPATIENT
Start: 2025-01-11 | End: 2025-01-16 | Stop reason: HOSPADM

## 2025-01-11 RX ORDER — ENOXAPARIN SODIUM 100 MG/ML
40 INJECTION SUBCUTANEOUS EVERY 12 HOURS
Status: DISCONTINUED | OUTPATIENT
Start: 2025-01-11 | End: 2025-01-11

## 2025-01-11 RX ORDER — TRAZODONE HYDROCHLORIDE 50 MG/1
100 TABLET, FILM COATED ORAL AT BEDTIME
Status: DISCONTINUED | OUTPATIENT
Start: 2025-01-11 | End: 2025-01-16 | Stop reason: HOSPADM

## 2025-01-11 RX ORDER — OLANZAPINE 2.5 MG/1
10 TABLET, FILM COATED ORAL AT BEDTIME
Status: DISCONTINUED | OUTPATIENT
Start: 2025-01-11 | End: 2025-01-16 | Stop reason: HOSPADM

## 2025-01-11 RX ORDER — AMOXICILLIN 250 MG
2 CAPSULE ORAL 2 TIMES DAILY PRN
Status: DISCONTINUED | OUTPATIENT
Start: 2025-01-11 | End: 2025-01-16 | Stop reason: HOSPADM

## 2025-01-11 RX ORDER — FUROSEMIDE 20 MG/1
20 TABLET ORAL DAILY
Status: DISCONTINUED | OUTPATIENT
Start: 2025-01-11 | End: 2025-01-13

## 2025-01-11 RX ORDER — MIDODRINE HYDROCHLORIDE 2.5 MG/1
5 TABLET ORAL
Status: DISCONTINUED | OUTPATIENT
Start: 2025-01-11 | End: 2025-01-16 | Stop reason: HOSPADM

## 2025-01-11 RX ORDER — PANTOPRAZOLE SODIUM 40 MG/1
40 TABLET, DELAYED RELEASE ORAL 2 TIMES DAILY
Status: DISCONTINUED | OUTPATIENT
Start: 2025-01-11 | End: 2025-01-16 | Stop reason: HOSPADM

## 2025-01-11 RX ORDER — BENZOCAINE/MENTHOL 6 MG-10 MG
LOZENGE MUCOUS MEMBRANE DAILY PRN
Status: DISCONTINUED | OUTPATIENT
Start: 2025-01-11 | End: 2025-01-16 | Stop reason: HOSPADM

## 2025-01-11 RX ORDER — DICYCLOMINE HYDROCHLORIDE 10 MG/1
20 CAPSULE ORAL 4 TIMES DAILY PRN
Status: DISCONTINUED | OUTPATIENT
Start: 2025-01-11 | End: 2025-01-16 | Stop reason: HOSPADM

## 2025-01-11 RX ORDER — ALBUTEROL SULFATE 90 UG/1
1-2 INHALANT RESPIRATORY (INHALATION) EVERY 6 HOURS PRN
Status: DISCONTINUED | OUTPATIENT
Start: 2025-01-11 | End: 2025-01-16 | Stop reason: HOSPADM

## 2025-01-11 RX ORDER — NICOTINE 21 MG/24HR
1 PATCH, TRANSDERMAL 24 HOURS TRANSDERMAL DAILY
Status: DISCONTINUED | OUTPATIENT
Start: 2025-01-11 | End: 2025-01-16 | Stop reason: HOSPADM

## 2025-01-11 RX ORDER — EPINEPHRINE 0.3 MG/.3ML
0.3 INJECTION SUBCUTANEOUS DAILY PRN
Status: DISCONTINUED | OUTPATIENT
Start: 2025-01-11 | End: 2025-01-11

## 2025-01-11 RX ORDER — LIDOCAINE 40 MG/G
CREAM TOPICAL
Status: DISCONTINUED | OUTPATIENT
Start: 2025-01-11 | End: 2025-01-16 | Stop reason: HOSPADM

## 2025-01-11 RX ORDER — SUCRALFATE ORAL 1 G/10ML
1 SUSPENSION ORAL 4 TIMES DAILY PRN
Status: DISCONTINUED | OUTPATIENT
Start: 2025-01-11 | End: 2025-01-16 | Stop reason: HOSPADM

## 2025-01-11 RX ADMIN — ENOXAPARIN SODIUM 40 MG: 40 INJECTION SUBCUTANEOUS at 08:58

## 2025-01-11 RX ADMIN — OLANZAPINE 10 MG: 2.5 TABLET, FILM COATED ORAL at 20:35

## 2025-01-11 RX ADMIN — FLUOXETINE HYDROCHLORIDE 20 MG: 20 CAPSULE ORAL at 14:01

## 2025-01-11 RX ADMIN — LEVOTHYROXINE SODIUM 6.25 MCG: 25 TABLET ORAL at 18:05

## 2025-01-11 RX ADMIN — UMECLIDINIUM 1 PUFF: 62.5 AEROSOL, POWDER ORAL at 09:00

## 2025-01-11 RX ADMIN — TRAZODONE HYDROCHLORIDE 100 MG: 50 TABLET ORAL at 20:36

## 2025-01-11 RX ADMIN — FUROSEMIDE 20 MG: 20 TABLET ORAL at 17:25

## 2025-01-11 RX ADMIN — PANTOPRAZOLE SODIUM 40 MG: 40 TABLET, DELAYED RELEASE ORAL at 20:36

## 2025-01-11 RX ADMIN — MONTELUKAST 10 MG: 10 TABLET, FILM COATED ORAL at 14:01

## 2025-01-11 RX ADMIN — MIDODRINE HYDROCHLORIDE 5 MG: 2.5 TABLET ORAL at 17:08

## 2025-01-11 RX ADMIN — CLOTRIMAZOLE: 10 CREAM TOPICAL at 17:26

## 2025-01-11 RX ADMIN — Medication 1 PATCH: at 06:50

## 2025-01-11 RX ADMIN — METFORMIN HYDROCHLORIDE 1000 MG: 500 TABLET ORAL at 17:07

## 2025-01-11 RX ADMIN — FAMOTIDINE 20 MG: 20 TABLET, FILM COATED ORAL at 20:35

## 2025-01-11 RX ADMIN — FLUTICASONE FUROATE AND VILANTEROL TRIFENATATE 1 PUFF: 100; 25 POWDER RESPIRATORY (INHALATION) at 09:00

## 2025-01-11 RX ADMIN — APIXABAN 5 MG: 5 TABLET, FILM COATED ORAL at 20:36

## 2025-01-11 RX ADMIN — MIDODRINE HYDROCHLORIDE 5 MG: 2.5 TABLET ORAL at 14:01

## 2025-01-11 RX ADMIN — CEFTRIAXONE SODIUM 2 G: 2 INJECTION, POWDER, FOR SOLUTION INTRAMUSCULAR; INTRAVENOUS at 06:52

## 2025-01-11 ASSESSMENT — ACTIVITIES OF DAILY LIVING (ADL)
ADLS_ACUITY_SCORE: 48
ADLS_ACUITY_SCORE: 47
ADLS_ACUITY_SCORE: 48
ADLS_ACUITY_SCORE: 56
ADLS_ACUITY_SCORE: 48
ADLS_ACUITY_SCORE: 48
ADLS_ACUITY_SCORE: 51
ADLS_ACUITY_SCORE: 48
ADLS_ACUITY_SCORE: 47
ADLS_ACUITY_SCORE: 51
ADLS_ACUITY_SCORE: 48
ADLS_ACUITY_SCORE: 48
ADLS_ACUITY_SCORE: 51
ADLS_ACUITY_SCORE: 51
ADLS_ACUITY_SCORE: 48
ADLS_ACUITY_SCORE: 47
ADLS_ACUITY_SCORE: 56

## 2025-01-11 NOTE — MEDICATION SCRIBE - ADMISSION MEDICATION HISTORY
Admission medication history completed at [facility]. Please see Medication Juanpablo Jeremy Walsh Admission Medication History note from 01/10/2025.   PTA Med List   Medication Sig Last Dose/Taking    albuterol (PROAIR HFA/PROVENTIL HFA/VENTOLIN HFA) 108 (90 Base) MCG/ACT inhaler Inhale 1-2 puffs into the lungs every 6 hours as needed for shortness of breath, wheezing or cough Taking As Needed    albuterol (PROVENTIL) (2.5 MG/3ML) 0.083% neb solution Take 2.5 mg by nebulization 3 times daily as needed for shortness of breath, wheezing or cough Taking As Needed    aloe vera GEL Apply 1 g topically every hour as needed for skin care Per bottle directions Taking As Needed    apixaban ANTICOAGULANT (ELIQUIS) 5 MG tablet Take 5 mg by mouth 2 times daily. 1/10/2025 Morning    bacitracin 500 UNIT/GM OINT Apply topically 3 times daily as needed for wound care Taking As Needed    Benzocaine (SOLARCAINE ALOE VERA EX) Externally apply topically daily as needed. Taking As Needed    benzonatate (TESSALON) 200 MG capsule Take 1 capsule (200 mg) by mouth 3 times daily as needed for cough. Taking As Needed    Calamine external lotion Apply topically as needed for itching Taking As Needed    carbamide peroxide (DEBROX) 6.5 % otic solution Place 5 drops into both ears daily as needed for other. Taking As Needed    ciprofloxacin (CIPRO) 500 MG tablet Take 1 tablet (500 mg) by mouth every 24 hours. 1/10/2025 Morning    clotrimazole (LOTRIMIN) 1 % external cream Apply topically 2 times daily as needed (skin irritation) Taking As Needed    dextromethorphan-guaiFENesin (MUCINEX DM)  MG 12 hr tablet Take 1 tablet by mouth 2 times daily as needed. Taking As Needed    diclofenac (VOLTAREN) 1 % topical gel Apply 2 g topically daily as needed for moderate pain To joints/back Taking As Needed    dicyclomine (BENTYL) 20 MG tablet Take 1 tablet (20 mg) by mouth 4 times daily as needed (abdominal pain). Taking As  Needed    EPINEPHrine (ANY BX GENERIC EQUIV) 0.3 MG/0.3ML injection 2-pack Inject 0.3 mg into the muscle as needed for anaphylaxis. May repeat one time in 5-15 minutes if response to initial dose is inadequate. Taking As Needed    famotidine (PEPCID) 20 MG tablet Take 1 tablet (20 mg) by mouth 2 times daily 1/10/2025 Morning    FLUoxetine 20 MG tablet Take 20 mg by mouth every morning. 1/10/2025 Morning    GAVILAX 17 GM/SCOOP powder Take 17 g by mouth daily as needed. Taking As Needed    hydrocortisone (CORTAID) 1 % external cream Apply topically daily as needed. Taking As Needed    Hydrocortisone (PREPARATION H EX) Externally apply topically daily as needed. Taking As Needed    loperamide (IMODIUM) 2 MG capsule Take 4 mg by mouth 4 times daily as needed for diarrhea. Taking As Needed    loratadine (CLARITIN) 10 MG tablet Take 10 mg by mouth daily as needed for allergies. Taking As Needed    magnesium hydroxide (MILK OF MAGNESIA) 400 MG/5ML suspension Take 30 mLs by mouth daily as needed. Taking As Needed    metFORMIN (GLUCOPHAGE) 1000 MG tablet Take 1,000 mg by mouth 2 times daily (with meals) 1/10/2025 Morning    midodrine (PROAMATINE) 5 MG tablet Take 1 tablet (5 mg) by mouth 3 times daily (with meals). 1/10/2025 Morning    montelukast (SINGULAIR) 10 MG tablet Take 10 mg by mouth every morning. 1/10/2025 Morning    OLANZapine (ZYPREXA) 10 MG tablet Take 10 mg by mouth At Bedtime 1/9/2025 Bedtime    omeprazole (PRILOSEC) 40 MG DR capsule Take 40 mg by mouth every morning. 1/10/2025 Morning    ondansetron (ZOFRAN ODT) 4 MG ODT tab Take 1 tablet (4 mg) by mouth every 8 hours as needed for nausea. Taking As Needed    pramoxine-calamine (AVEENO) 1-8 % LOTN Apply topically daily as needed for itching. Taking As Needed    rosuvastatin (CRESTOR) 10 MG tablet Take 10 mg by mouth At Bedtime 1/9/2025 Bedtime    sucralfate (CARAFATE) 1 GM/10ML suspension Take 1 g by mouth 4 times daily as needed. Taking As Needed     traZODone (DESYREL) 100 MG tablet Take 100 mg by mouth at bedtime. 1/9/2025 Bedtime    TRELEGY ELLIPTA 100-62.5-25 MCG/ACT oral inhaler Inhale 1 puff into the lungs daily. 1/10/2025 Morning    Urea 40 % CREA Apply topically daily as needed. Taking As Needed    Vitamins A & D (VITAMIN A & D) OINT Externally apply topically daily as needed. Taking As Needed    witch hazel-glycerin (TUCKS) pad Apply topically as needed for hemorrhoids. Taking As Needed

## 2025-01-11 NOTE — LETTER
Transition Communication Hand-off for Care Transitions to Next Level of Care Provider    Name: Warren Jaramillo  : 1980  MRN #: [id number]  Primary Care Provider: [facility]     Primary Clinic: [address] [address]     Reason for Hospitalization:  Alcoholic cirrhosis of liver with ascites, SBP  SBP (spontaneous bacterial peritonitis) (H)  Admit Date/Time: 2025  2:54 AM  Discharge Date: 25  Payor Source: Payor: [facility] [facility] / Plan: [employer]ICARE ADVANTAGE / Product Type: HMO /            Reason for Communication Hand-off Referral: Fragility    Discharge Plan: Return to Group home       Concern for non-adherence with plan of care:   Y/N N  Discharge Needs Assessment:  Needs      Flowsheet Row Most Recent Value   Equipment Currently Used at Home none              Follow-up plan:    Future Appointments   Date Time Provider Department Center   3/21/2025 10:50 AM Fortino High PA-C [facility]       Any outstanding tests or procedures:        Referrals       Future Labs/Procedures    Med Therapy Management Referral     Process Instructions:        This referral will be filtered to a centralized scheduling office at [facility] Therapy Management and the patient will receive a call to schedule an appointment at a [place] location most convenient for them.    Comments:    The [facility] Medication Therapy Management department will contact you to schedule an appointment.  You may also schedule the appointment by calling [phone] or toll-free at [phone].    This service is designed to help you get the most from your medications.  A specially trained Pharmacist will work closely with you and your providers to solve any questions, concerns, issues or problems related to your medications.    Please bring all of your prescription and non-prescription medications (such as  vitamins, over-the-counter medications, and herbals) or a detailed medication list to your appointment.    If you have a glucose meter or other home monitoring information, please also bring this to your appointment (i.e. blood glucose log, blood pressure log, pain log, etc.).                  Key Recommendations:      Ying Dia RN    AVS/Discharge Summary is the source of truth; this is a helpful guide for improved communication of patient story

## 2025-01-11 NOTE — PLAN OF CARE
Goal Outcome Evaluation:      Plan of Care Reviewed With: patient    Overall Patient Progress: improvingOverall Patient Progress: improving    Outcome Evaluation: Weight was down 2 pounds from admission when checked per patient request. Pain in abdomen decreased with heat pack. Up ad marija. Voiding. Tolerating 2 gram sodium diet. VSS Napping between cares.

## 2025-01-11 NOTE — PROGRESS NOTES
S-(situation): Patient registered to Observation. Patient arrived to room 272 via ambulance from [facility]    B-(background): Admitted for IV antibiotics    A-(assessment): Pt is alert and oriented x4,VSS on RA. Denies pain. Independently ambulated the halls. Discoloration on BLE and rash on BL feet and BU Hands.    R-(recommendations): Orders and observation goals reviewed with patient    Nursing Observation criteria listed below was met:    Skin issues/needs documented:Yes  Isolation needs addressed and Signage up: NA  Fall Prevention: Education given and documented: NA  Education Assessment documented:Yes  Admission Education Documented: Yes  New medication patient education completed and documented (Possible Side Effects of Common Medications handout): Yes  OBS video/handout Reviewed & Documented: Yes  Allergies Reviewed: Yes  Medication Reconciliation Complete: Yes  Home medications if not able to send immediately home with family stored here: NA  Reminder note placed in discharge instructions of home meds: NA  Patient has discharge needs (If yes, please explain): Yes  Patient discharge preferences addressed and charted on white board:  Yes  Provider notified that patient has arrived to the unit: Yes

## 2025-01-11 NOTE — H&P
"formerly [facility]    History and Physical - Hospitalist Service       Date of Admission:  1/11/2025    Assessment & Plan      Warren Jaramillo is a 44 year old male admitted on 1/11/2025. He comes in with abd pain    ABDOMINAL PAIN  Minimal and was just treated with antibiotic for SBP with ceftriaxone for 5 days and then d/c'ed on cipro.  Still with neutrophils on ascites sample (but has not changed since sample in Dec)  Unclear if still needs treatment.  Consult GI  His concern was abd girth not pain.  CIRROHIS DUE TO ALCOHOL.    LEARNING DISABILITY  Lives in group home.    SMOKER  1ppd and will use nicotine gum.    RASH ON FOOT  Defer to day team to review.    TSH  Elevated to 10 and unclear if real or due to acute illness.  Recheck in AM and reflex to Ft4  TSH was elevated to 6.6 in Dec.          Diet: Combination Diet Regular Diet Adult; 2 gm NA Diet  DVT Prophylaxis: Enoxaparin (Lovenox) SQ  Khan Catheter: Not present  Lines: None     Cardiac Monitoring: None  Code Status: Full Code and parents are his POA.    Clinically Significant Risk Factors Present on Admission        # Hyperkalemia: Highest K = 5.4 mmol/L in last 2 days, will monitor as appropriate   # Hyperchloremia: Highest Cl = 108 mmol/L in last 2 days, will monitor as appropriate             # Drug Induced Coagulation Defect: home medication list includes an anticoagulant medication    # Hypertension: Noted on problem list  # Chronic heart failure with preserved ejection fraction: heart failure noted on problem list and last echo with EF >50%          # Severe Obesity: Estimated body mass index is 44.35 kg/m  as calculated from the following:    Height as of this encounter: 1.651 m (5' 5\").    Weight as of this encounter: 120.9 kg (266 lb 8 oz).         # Financial/Environmental Concerns:           Disposition Plan     Medically Ready for Discharge:            Nieves Hastings MD  Hospitalist Service  [facility]  Securely message with [employer] (more info)  Text page via [facility] Paging/Directory     ______________________________________________________________________    Chief Complaint   Abd swelling    History is obtained from the patient    History of Present Illness     Warren Jaramillo is a 44 year old male who initially presented with LLQ abdominal pain and concern for abdominal distention. Notes he had gained 5 lbs of weight overnight. States he feels as if he's built up fluid on his abdomen again, and that the fluid is pressing on his diaphragm causing some mild shortness of breath. Patient reports he typically undergoes paracentesis every 7 days.  His diuretics were stopped due to renal disease earlier this week.  Some abd pain and paracentesis showed Neutrophil count is quite elevated, therefore plan is for admission for IV antibiotics due to concern for the potential for SBP.  Due to lack of bed space at [facility], patient has agreed to transfer to [facility] for further management.  Pt had just had treatment for SBP from 1/2 to 1/8.       Past Medical History    Past Medical History:   Diagnosis Date    COPD exacerbation (H) 12/2/2024    DM2 (diabetes mellitus, type 2) (H) 4/28/2020    HTN (hypertension) 7/30/2012    Thyroid nodule 7/31/2019    Rojas's disease (H)        Past Surgical History   Past Surgical History:   Procedure Laterality Date    COLONOSCOPY      ESOPHAGOSCOPY, GASTROSCOPY, DUODENOSCOPY (EGD), COMBINED N/A 7/21/2023    Procedure: ESOPHAGOGASTRODUODENOSCOPY WITH GASTRIC AND ESOPHAGEAL BIOPSIES;  Surgeon: Filiberto Aragon MD;  Location: [facility] OR    TOOTH EXTRACTION         Prior to Admission Medications   Prior to Admission Medications   Prescriptions Last Dose Informant Patient Reported? Taking?   Benzocaine (SOLARCAINE ALOE VERA EX)   Yes No   Sig: Externally apply topically daily as needed.   Calamine external lotion   Yes No   Sig: Apply  topically as needed for itching   EPINEPHrine (ANY BX GENERIC EQUIV) 0.3 MG/0.3ML injection 2-pack   Yes No   Sig: Inject 0.3 mg into the muscle as needed for anaphylaxis. May repeat one time in 5-15 minutes if response to initial dose is inadequate.   FLUoxetine 20 MG tablet   Yes No   Sig: Take 20 mg by mouth every morning.   GAVILAX 17 GM/SCOOP powder   Yes No   Sig: Take 17 g by mouth daily as needed.   Hydrocortisone (PREPARATION H EX)   Yes No   Sig: Externally apply topically daily as needed.   OLANZapine (ZYPREXA) 10 MG tablet   Yes No   Sig: Take 10 mg by mouth At Bedtime   TRELEGY ELLIPTA 100-62.5-25 MCG/ACT oral inhaler   Yes No   Sig: Inhale 1 puff into the lungs daily.   Urea 40 % CREA   Yes No   Sig: Apply topically daily as needed.   Vitamins A & D (VITAMIN A & D) OINT   Yes No   Sig: Externally apply topically daily as needed.   albuterol (PROAIR HFA/PROVENTIL HFA/VENTOLIN HFA) 108 (90 Base) MCG/ACT inhaler   No No   Sig: Inhale 1-2 puffs into the lungs every 6 hours as needed for shortness of breath, wheezing or cough   albuterol (PROVENTIL) (2.5 MG/3ML) 0.083% neb solution   Yes No   Sig: Take 2.5 mg by nebulization 3 times daily as needed for shortness of breath, wheezing or cough   aloe vera GEL   Yes No   Sig: Apply 1 g topically every hour as needed for skin care Per bottle directions   apixaban ANTICOAGULANT (ELIQUIS) 5 MG tablet   Yes No   Sig: Take 5 mg by mouth 2 times daily.   bacitracin 500 UNIT/GM OINT   Yes No   Sig: Apply topically 3 times daily as needed for wound care   benzonatate (TESSALON) 200 MG capsule   No No   Sig: Take 1 capsule (200 mg) by mouth 3 times daily as needed for cough.   carbamide peroxide (DEBROX) 6.5 % otic solution   Yes No   Sig: Place 5 drops into both ears daily as needed for other.   ciprofloxacin (CIPRO) 500 MG tablet   No No   Sig: Take 1 tablet (500 mg) by mouth every 24 hours.   clotrimazole (LOTRIMIN) 1 % external cream   Yes No   Sig: Apply topically  2 times daily as needed (skin irritation)   dextromethorphan-guaiFENesin (MUCINEX DM)  MG 12 hr tablet   Yes No   Sig: Take 1 tablet by mouth 2 times daily as needed.   diclofenac (VOLTAREN) 1 % topical gel   Yes No   Sig: Apply 2 g topically daily as needed for moderate pain To joints/back   dicyclomine (BENTYL) 20 MG tablet   No No   Sig: Take 1 tablet (20 mg) by mouth 4 times daily as needed (abdominal pain).   famotidine (PEPCID) 20 MG tablet   No No   Sig: Take 1 tablet (20 mg) by mouth 2 times daily   hydrocortisone (CORTAID) 1 % external cream   Yes No   Sig: Apply topically daily as needed.   loperamide (IMODIUM) 2 MG capsule   Yes No   Sig: Take 4 mg by mouth 4 times daily as needed for diarrhea.   loratadine (CLARITIN) 10 MG tablet   Yes No   Sig: Take 10 mg by mouth daily as needed for allergies.   magnesium hydroxide (MILK OF MAGNESIA) 400 MG/5ML suspension   Yes No   Sig: Take 30 mLs by mouth daily as needed.   metFORMIN (GLUCOPHAGE) 1000 MG tablet   Yes No   Sig: Take 1,000 mg by mouth 2 times daily (with meals)   midodrine (PROAMATINE) 5 MG tablet   No No   Sig: Take 1 tablet (5 mg) by mouth 3 times daily (with meals).   montelukast (SINGULAIR) 10 MG tablet   Yes No   Sig: Take 10 mg by mouth every morning.   omeprazole (PRILOSEC) 40 MG DR capsule   Yes No   Sig: Take 40 mg by mouth every morning.   ondansetron (ZOFRAN ODT) 4 MG ODT tab   No No   Sig: Take 1 tablet (4 mg) by mouth every 8 hours as needed for nausea.   pramoxine-calamine (AVEENO) 1-8 % LOTN   Yes No   Sig: Apply topically daily as needed for itching.   rosuvastatin (CRESTOR) 10 MG tablet   Yes No   Sig: Take 10 mg by mouth At Bedtime   sucralfate (CARAFATE) 1 GM/10ML suspension   Yes No   Sig: Take 1 g by mouth 4 times daily as needed.   traZODone (DESYREL) 100 MG tablet   Yes No   Sig: Take 100 mg by mouth at bedtime.   witch hazel-glycerin (TUCKS) pad   Yes No   Sig: Apply topically as needed for hemorrhoids.       Facility-Administered Medications: None        Review of Systems    The 10 point Review of Systems is negative other than noted in the HPI or here.     Social History   I have reviewed this patient's social history and updated it with pertinent information if needed.  Social History     Tobacco Use    Smoking status: Every Day     Current packs/day: 1.00     Types: Cigarettes    Smokeless tobacco: Never   Vaping Use    Vaping status: Never Used   Substance Use Topics    Alcohol use: No     Comment: once every 3 months    Drug use: No         Family History   I have reviewed this patient's family history and updated it with pertinent information if needed.  Family History   Problem Relation Age of Onset    Unknown/Adopted Father     Unknown/Adopted Maternal Grandmother     C.A.D. Maternal Grandfather     Diabetes Maternal Grandfather     Cerebrovascular Disease Maternal Grandfather     Unknown/Adopted Paternal Grandmother     Unknown/Adopted Paternal Grandfather     Unknown/Adopted Brother     Unknown/Adopted Sister          Allergies   Allergies   Allergen Reactions    Apricot Flavoring Agent (Non-Screening) Anaphylaxis    Banana Anaphylaxis     Throat swelling  Throat swelling      Wasp Venom Protein Shortness Of Breath     Other reaction(s): Respiratory Distress  Has an epi pen  Has an epi pen      Bees Anaphylaxis     Have an Epi pen that carries with    Methylphenidate Itching     Other reaction(s): Nightmares    Prunus      Other reaction(s): *Unknown    Sulfa Antibiotics      Headaches and nausea    Prunus Persica Rash     Other reaction(s): *Unknown        Physical Exam   Vital Signs: Temp: 97.8  F (36.6  C) Temp src: Oral BP: 120/59 Pulse: 68     SpO2: 97 % O2 Device: None (Room air)    Weight: 266 lbs 8 oz    General Appearance: Wdwn man in nad, alert and oreinted, stable with learning disability  Respiratory: lcta no dyspnea  Cardiovascular: rrr, good perfusion  GI: abd distended but no pain except in  LLQ and no rebound       Medical Decision Making             Data   ------------------------- PAST 24 HR DATA REVIEWED -----------------------------------------------    I have personally reviewed the following data over the past 24 hrs:    14.0 (H)  \   12.1 (L)   / 326     139 108 (H) 43.1 (H) /  118 (H)   5.4 (H) 19 (L) 0.98 \     ALT: 42 AST: 29 AP: 321 (H) TBILI: 0.5   ALB: 4.9 TOT PROTEIN: 7.5 LIPASE: 18     TSH: 10.31 (H) T4: 0.86 (L) A1C: N/A     Procal: 0.13 CRP: 11.00 (H) Lactic Acid: N/A       INR:  1.44 (H) PTT:  N/A   D-dimer:  N/A Fibrinogen:  N/A       Imaging results reviewed over the past 24 hrs:   Recent Results (from the past 24 hours)   CT Abdomen Pelvis w Contrast    Narrative    EXAM: CT ABDOMEN PELVIS W CONTRAST  LOCATION: [facility]  DATE: 1/10/2025    INDICATION: abdominal pain, recent SBP  COMPARISON: CT 1/2/2025, 12/29/2024  TECHNIQUE: CT scan of the abdomen and pelvis was performed following injection of IV contrast. Multiplanar reformats were obtained. Dose reduction techniques were used.  CONTRAST: IsoVue 370 90mL    FINDINGS:   LOWER CHEST: Normal.    HEPATOBILIARY: Enlarged heterogeneous cirrhotic liver with a nodular surface contour and relative hypertrophy of the lateral left lobe. Partially contracted gallbladder. No biliary ductal dilatation.    PANCREAS: Mild peripancreatic edema likely related to the ascites.    SPLEEN: Mildly enlarged, measuring 13.9 cm craniocaudal dimension.    ADRENAL GLANDS: Stable bilateral myelolipomas, measuring 5.0 cm on the right and 3.2 cm on the left.    KIDNEYS/BLADDER: Normal.    BOWEL: No bowel obstruction or inflammatory process. Normal appendix. Small to moderate volume ascites which is slightly improved since the prior CT. Diffuse mesenteric and body wall edema. No free air.    LYMPH NODES: Stable mild upper abdominal, mesenteric and retroperitoneal lymphadenopathy.    VASCULATURE: Mild bilateral iliac  atherosclerosis.    PELVIC ORGANS: Normal.    MUSCULOSKELETAL: Mild spinal degenerative changes.      Impression    IMPRESSION:   1.  Cirrhosis with sequelae of portal venous hypertension including mild splenomegaly and mild to moderate ascites which is slightly improved since the prior CT.   US Paracentesis with Albumin    Narrative    EXAM:  1. PARACENTESIS  2. ULTRASOUND GUIDANCE  LOCATION: [facility]  DATE: 1/10/2025    INDICATION: Ascites.    PROCEDURE: Informed consent obtained. Time out performed. The abdomen was prepped and draped in a sterile fashion. 10 mL of 1% lidocaine was infused into local soft tissues. A 5 American catheter system was introduced into the abdominal ascites under   ultrasound guidance.    5 liters of clear bushra fluid were removed and sent to lab if requested.    Patient tolerated procedure well.    Ultrasound imaging was obtained and placed in the patient's permanent medical record.      Impression    IMPRESSION:  1.  Status post ultrasound-guided paracentesis.    Reference CPT Code: 64096     As the provider for the telehealth service, I attest that I introduced myself to the patient and provided my credentials. Based on review of the patient s chart and/or a discussion with members of the patient s treatment team, I determined that telemedicine via a real-time, two-way, interactive audio and video platform is an appropriate means of providing this service. The patient and I mutually agreed that this visit is appropriate for telemedicine as well.  The RN, or tech on duty in the ER, assisted me with the patient.  The patient was located at [facility] and I am located in [place], [place].  The video portion of the visit was approximately 20 mins.

## 2025-01-11 NOTE — PROGRESS NOTES
[facility]    After midnight - Hospitalist Service    Assessment and Plan    Active Problems:    Tobacco use    Fetal alcohol syndromes    Active autistic disorder    Charcot-Estrella-Tooth syndrome    SBP (spontaneous bacterial peritonitis) (H)    Other specified hypothyroidism      Patient admitted after midnight, this is follow up.    Warren Jaramillo is a 44 year old old male with h/o      Spontaneous bacterial peritonitis   Cirrhosis likely related to alcohol use with ascites  - Paracentesis on admission shows elevated PMNS again and higher since last admission  - curbsided ID and recommends at least 5 days IV ceftriaxone  - US paracentesis and 5 liters removed   - was treated at [facility] similar presentation received 4 days and sent out on cipro  - culture NGTD from previous stay nor from this  - follow cultures  - will tag ID on Monday to see  - during previous admission he had bump in renal function and concern for HRS but did not support and his lasix and spironolactone were held at discharge  - resume lasix 20mg daily for now  - recheck labs in am  - likely will need addition of spironolactone reassess in am    - 2g Na diet  - during last discharge [facility] follow up in 4-6 weeks     LEARNING DISABILITY  Lives in group home.     Tobacco us  1ppd   - patient requests nicotine patch      RASH ON FOOT  - use topicals chronic      TSH  - elevated TSH and low Free T4  - start Levothyroxine     VTE prophylaxis:  DOAC  DIET: Orders Placed This Encounter      Combination Diet Regular Diet Adult; 2 gm NA Diet    Drains/Lines: none  Weight bearing status: WBAT    Code Status:Full Code  Clinically Significant Risk Factors Present on Admission        # Hyperkalemia: Highest K = 5.4 mmol/L in last 2 days, will monitor as appropriate   # Hyperchloremia: Highest Cl = 108 mmol/L in last 2 days, will monitor as appropriate             # Drug Induced Coagulation Defect: home medication list includes an  "anticoagulant medication    # Hypertension: Noted on problem list  # Chronic heart failure with preserved ejection fraction: heart failure noted on problem list and last echo with EF >50%          # Severe Obesity: Estimated body mass index is 44.35 kg/m  as calculated from the following:    Height as of this encounter: 1.651 m (5' 5\").    Weight as of this encounter: 120.9 kg (266 lb 8 oz).         # Financial/Environmental Concerns:           Medically Ready for Discharge: Anticipated in 2-4 Days      Disposition/Barriers to discharge:      Expected Discharge Date: 01/15/2025    Discharge Delays: IV Medication - consider oral or Home Infusion  Lab Result Pending (enter specific test & time in comments)  Specialist Consult (enter specialist & decision needed in comments)    Discharge Comments: needs 5 days of IV abx per ID or pendign culture, was sent homeon Sunny returned    Subjective:  No complaints other than mild LLQ abd pain    B/P: 122/57, T: 97.9, P: 80, R: 18     PERTINENT LABS  Imaging results reviewed over the past 24 hrs:   No results found for this or any previous visit (from the past 24 hours).    Recent Labs   Lab 01/10/25  2323 01/10/25  1211 01/08/25  1105 01/08/25  0609 01/08/25  0603 01/07/25  0626 01/07/25  0619   WBC  --  14.0*  --   --  11.7*  --  12.0*   HGB  --  12.1*  --   --  12.3*  --  11.8*   MCV  --  85  --   --  84  --  83   PLT  --  326  --   --  304  --  287   INR  --  1.44*  --   --   --   --  1.74*   NA  --  139  --   --  137  --  134*   POTASSIUM  --  5.4*  --   --  4.9  --  5.5*   CHLORIDE  --  108*  --   --  105  --  102   CO2  --  19*  --   --  19*  --  18*   BUN  --  43.1*  --   --  65.4*  --  70.9*   CR  --  0.98  --   --  1.64*  --  2.52*   ANIONGAP  --  12  --   --  13  --  14   WES  --  9.8  --   --  9.4  --  9.3   * 88 127*   < > 102*   < > 99   ALBUMIN  --  4.9  --   --   --   --  5.1   PROTTOTAL  --  7.5  --   --   --   --  7.3   BILITOTAL  --  0.5  --   --   " --   --  0.6   ALKPHOS  --  321*  --   --   --   --  237*   ALT  --  42  --   --   --   --  22   AST  --  29  --   --   --   --  14   LIPASE  --  18  --   --   --   --   --     < > = values in this interval not displayed.       Cara Decker MD  [facility]

## 2025-01-11 NOTE — ED NOTES
Called [facility] @ [phone].  They are not able to take transfer report at this time.  Report they will call back when staff available.

## 2025-01-11 NOTE — PLAN OF CARE
Goal Outcome Evaluation:      Plan of Care Reviewed With: patient    Overall Patient Progress: no changeOverall Patient Progress: no change    Outcome Evaluation: pt is alert and oriented x4, VSS on RA. Denies pain. Independently ambulated the halls. On IV ceftriaxone

## 2025-01-11 NOTE — ED NOTES
EMERGENCY DEPARTMENT SIGN OUT NOTE        ED COURSE AND MEDICAL DECISION MAKING  Patient was signed out to me by Dr Anand Santos at 6:02 PM.    In brief, Warren Jaramillo is a 44 year old male who initially presented with LLQ abdominal pain and concern for abdominal distention. Notes he had gained 5 lbs of weight overnight. States he feels as if he's built up fluid on his abdomen again, and that the fluid is pressing on his diaphragm causing some mild shortness of breath. Patient reports he typically undergoes paracentesis every 7 days.    At time of sign out, disposition was pending lab results and final disposition.  Neutrophil count is quite elevated, therefore plan is for admission for IV antibiotics due to concern for the potential for SBP.  Due to lack of bed space at [facility], patient has agreed to transfer to [facility] for further management.      6:33 PM Lab called with critical lab results. Patient's differential body fluid absolute neutrophils results is elevated at 424.8.  6:37 PM Rechecked the patient and discussed plan for admission for SBP. Patient is ok with going to [facility] for admission.  7:31 PM Rechecked the patient. Patient agrees to go to [facility] for admission.  7:59 PM Spoke with Dr. Hastings, Hospitalist from [facility], regarding plan for admission. Patient is accepted for admission.    FINAL IMPRESSION    1. Alcoholic cirrhosis of liver with ascites (H)    2. SBP (spontaneous bacterial peritonitis) (H)        ED MEDS  Medications   piperacillin-tazobactam (ZOSYN) 3.375 g vial to attach to  mL bag (3.375 g Intravenous $New Bag 1/10/25 1946)   iopamidol (ISOVUE-370) solution 90 mL (90 mLs Intravenous $Given 1/10/25 1401)   oxyCODONE (ROXICODONE) tablet 5 mg (5 mg Oral Not Given 1/10/25 1733)   albumin human 25 % injection 25 g (0 g Intravenous Stopped  1/10/25 1942)       LAB  Labs Ordered and Resulted from Time of ED Arrival to Time of ED Departure   ROUTINE UA WITH MICROSCOPIC REFLEX TO CULTURE - Abnormal       Result Value    Color Urine Yellow      Appearance Urine Clear      Glucose Urine Negative      Bilirubin Urine Negative      Ketones Urine Negative      Specific Gravity Urine 1.023      Blood Urine Negative      pH Urine 5.5      Protein Albumin Urine 20 (*)     Urobilinogen Urine <2.0      Nitrite Urine Negative      Leukocyte Esterase Urine Negative      RBC Urine 0      WBC Urine 2      Squamous Epithelials Urine <1     BASIC METABOLIC PANEL - Abnormal    Sodium 139      Potassium 5.4 (*)     Chloride 108 (*)     Carbon Dioxide (CO2) 19 (*)     Anion Gap 12      Urea Nitrogen 43.1 (*)     Creatinine 0.98      GFR Estimate >90      Calcium 9.8      Glucose 88     HEPATIC FUNCTION PANEL - Abnormal    Protein Total 7.5      Albumin 4.9      Bilirubin Total 0.5      Alkaline Phosphatase 321 (*)     AST 29      ALT 42      Bilirubin Direct <0.20     TSH WITH FREE T4 REFLEX - Abnormal    TSH 10.31 (*)    CRP INFLAMMATION - Abnormal    CRP Inflammation 11.00 (*)    INR - Abnormal    INR 1.44 (*)    CBC WITH PLATELETS AND DIFFERENTIAL - Abnormal    WBC Count 14.0 (*)     RBC Count 4.44      Hemoglobin 12.1 (*)     Hematocrit 37.9 (*)     MCV 85      MCH 27.3      MCHC 31.9      RDW 17.5 (*)     Platelet Count 326      % Neutrophils 68      % Lymphocytes 16      % Monocytes 11      % Eosinophils 4      % Basophils 1      % Immature Granulocytes 1      NRBCs per 100 WBC 0      Absolute Neutrophils 9.6 (*)     Absolute Lymphocytes 2.2      Absolute Monocytes 1.6 (*)     Absolute Eosinophils 0.5      Absolute Basophils 0.1      Absolute Immature Granulocytes 0.1      Absolute NRBCs 0.0     T4 FREE - Abnormal    Free T4 0.86 (*)    CELL COUNT BODY FLUID - Abnormal    Color Yellow      Clarity Cloudy (*)     Cell Count Fluid Source Abdomen      Total Nucleated  Cells 2,360     DIFERENTIAL BODY FLUID - Abnormal    % Neutrophils 18      % Lymphocytes 60      % Monocyte/Macrophages 19      % Eosinophils 1      % Lining Cells 2      Absolute Neutrophils, Body Fluid 424.8 (*)    LIPASE - Normal    Lipase 18     PROCALCITONIN - Normal    Procalcitonin 0.13     MAGNESIUM - Normal    Magnesium 2.2     ETHYL ALCOHOL LEVEL - Normal    Alcohol ethyl <0.01     RBC AND PLATELET MORPHOLOGY    RBC Morphology Confirmed RBC Indices      Platelet Assessment        Value: Automated Count Confirmed. Platelet morphology is normal.   ALBUMIN FLUID   PROTEIN FLUID   AEROBIC BACTERIAL CULTURE ROUTINE    Gram Stain Result No organisms seen      Gram Stain Result       BLOOD CULTURE   BLOOD CULTURE   CELL COUNT WITH DIFFERENTIAL FLUID       EKG  N/A    RADIOLOGY    US Paracentesis with Albumin   Final Result   IMPRESSION:   1.  Status post ultrasound-guided paracentesis.      Reference CPT Code: 43549      CT Abdomen Pelvis w Contrast   Final Result   IMPRESSION:    1.  Cirrhosis with sequelae of portal venous hypertension including mild splenomegaly and mild to moderate ascites which is slightly improved since the prior CT.          DISCHARGE MEDS  New Prescriptions    DICYCLOMINE (BENTYL) 20 MG TABLET    Take 1 tablet (20 mg) by mouth 4 times daily as needed (abdominal pain).    ONDANSETRON (ZOFRAN ODT) 4 MG ODT TAB    Take 1 tablet (4 mg) by mouth every 8 hours as needed for nausea.       I, Tressa Urbano, am serving as a scribe to document services personally performed by Ean Muir DO, based on my observations and the provider's statements to me. I, Ean Muir DO, attest that Tressa Urbano is acting in a scribe capacity, has observed my performance of the services and has documented them in accordance with my direction.    Ean Muir DO  [facility] EMERGENCY DEPARTMENT  [facility] [facility] [address] [phone]         Ean Muir,  DO  01/10/25 2100

## 2025-01-11 NOTE — MEDICATION SCRIBE - ADMISSION MEDICATION HISTORY
Medication Scribe Admission Medication History    Admission medication history is complete. The information provided in this note is only as accurate as the sources available at the time of the update.    Information Source(s): Patient and Hospital records via in-person    Pertinent Information: patient's medications are being managed by [facility] @ [phone] . Was unable to connect with staff.     Patient was discharged on 1/8/25. Patient did recall that medication changes on page 1 of AVS. Reports that changes were made. Patient reports taking morning medications. Used patient's oral report for PTA list.     Changes made to PTA medication list:  Added: None  Deleted: None  Changed: None    Allergies reviewed with patient and updates made in EHR: yes    Medication History Completed By: Jeremy Romo 1/10/2025 7:30 PM    PTA Med List   Medication Sig Last Dose/Taking    albuterol (PROAIR HFA/PROVENTIL HFA/VENTOLIN HFA) 108 (90 Base) MCG/ACT inhaler Inhale 1-2 puffs into the lungs every 6 hours as needed for shortness of breath, wheezing or cough Taking As Needed    albuterol (PROVENTIL) (2.5 MG/3ML) 0.083% neb solution Take 2.5 mg by nebulization 3 times daily as needed for shortness of breath, wheezing or cough Taking As Needed    aloe vera GEL Apply 1 g topically every hour as needed for skin care Per bottle directions Taking As Needed    apixaban ANTICOAGULANT (ELIQUIS) 5 MG tablet Take 5 mg by mouth 2 times daily. 1/10/2025 Morning    bacitracin 500 UNIT/GM OINT Apply topically 3 times daily as needed for wound care Taking As Needed    Benzocaine (SOLARCAINE ALOE VERA EX) Externally apply topically daily as needed. Taking As Needed    benzonatate (TESSALON) 200 MG capsule Take 1 capsule (200 mg) by mouth 3 times daily as needed for cough. Taking As Needed    Calamine external lotion Apply topically as needed for itching Taking As Needed    carbamide peroxide (DEBROX) 6.5 % otic solution Place 5 drops  into both ears daily as needed for other. Taking As Needed    ciprofloxacin (CIPRO) 500 MG tablet Take 1 tablet (500 mg) by mouth every 24 hours. 1/10/2025 Morning    clotrimazole (LOTRIMIN) 1 % external cream Apply topically 2 times daily as needed (skin irritation) Taking As Needed    dextromethorphan-guaiFENesin (MUCINEX DM)  MG 12 hr tablet Take 1 tablet by mouth 2 times daily as needed. Taking As Needed    diclofenac (VOLTAREN) 1 % topical gel Apply 2 g topically daily as needed for moderate pain To joints/back Taking As Needed    dicyclomine (BENTYL) 20 MG tablet Take 1 tablet (20 mg) by mouth 4 times daily as needed (abdominal pain). Taking As Needed    EPINEPHrine (ANY BX GENERIC EQUIV) 0.3 MG/0.3ML injection 2-pack Inject 0.3 mg into the muscle as needed for anaphylaxis. May repeat one time in 5-15 minutes if response to initial dose is inadequate. Taking As Needed    famotidine (PEPCID) 20 MG tablet Take 1 tablet (20 mg) by mouth 2 times daily 1/10/2025 Morning    FLUoxetine 20 MG tablet Take 20 mg by mouth every morning. 1/10/2025 Morning    GAVILAX 17 GM/SCOOP powder Take 17 g by mouth daily as needed. Taking As Needed    hydrocortisone (CORTAID) 1 % external cream Apply topically daily as needed. Taking As Needed    Hydrocortisone (PREPARATION H EX) Externally apply topically daily as needed. Taking As Needed    loperamide (IMODIUM) 2 MG capsule Take 4 mg by mouth 4 times daily as needed for diarrhea. Taking As Needed    loratadine (CLARITIN) 10 MG tablet Take 10 mg by mouth daily as needed for allergies. Taking As Needed    magnesium hydroxide (MILK OF MAGNESIA) 400 MG/5ML suspension Take 30 mLs by mouth daily as needed. Taking As Needed    metFORMIN (GLUCOPHAGE) 1000 MG tablet Take 1,000 mg by mouth 2 times daily (with meals) 1/10/2025 Morning    midodrine (PROAMATINE) 5 MG tablet Take 1 tablet (5 mg) by mouth 3 times daily (with meals). 1/10/2025 Morning    montelukast (SINGULAIR) 10 MG tablet  Take 10 mg by mouth every morning. 1/10/2025 Morning    OLANZapine (ZYPREXA) 10 MG tablet Take 10 mg by mouth At Bedtime 1/9/2025 Bedtime    omeprazole (PRILOSEC) 40 MG DR capsule Take 40 mg by mouth every morning. 1/10/2025 Morning    ondansetron (ZOFRAN ODT) 4 MG ODT tab Take 1 tablet (4 mg) by mouth every 8 hours as needed for nausea. Taking As Needed    pramoxine-calamine (AVEENO) 1-8 % LOTN Apply topically daily as needed for itching. Taking As Needed    rosuvastatin (CRESTOR) 10 MG tablet Take 10 mg by mouth At Bedtime 1/9/2025 Bedtime    sucralfate (CARAFATE) 1 GM/10ML suspension Take 1 g by mouth 4 times daily as needed. Taking As Needed    traZODone (DESYREL) 100 MG tablet Take 100 mg by mouth at bedtime. 1/9/2025 Bedtime    TRELEGY ELLIPTA 100-62.5-25 MCG/ACT oral inhaler Inhale 1 puff into the lungs daily. 1/10/2025 Morning    Urea 40 % CREA Apply topically daily as needed. Taking As Needed    Vitamins A & D (VITAMIN A & D) OINT Externally apply topically daily as needed. Taking As Needed    witch hazel-glycerin (TUCKS) pad Apply topically as needed for hemorrhoids. Taking As Needed

## 2025-01-12 PROBLEM — D68.9 MEDICATION INDUCED COAGULOPATHY: Status: ACTIVE | Noted: 2025-01-12

## 2025-01-12 PROBLEM — T50.905A MEDICATION INDUCED COAGULOPATHY: Status: ACTIVE | Noted: 2025-01-12

## 2025-01-12 LAB
ALBUMIN SERPL BCG-MCNC: 4.5 G/DL (ref 3.5–5.2)
ALP SERPL-CCNC: 294 U/L (ref 40–150)
ALT SERPL W P-5'-P-CCNC: 31 U/L (ref 0–70)
ANION GAP SERPL CALCULATED.3IONS-SCNC: 12 MMOL/L (ref 7–15)
AST SERPL W P-5'-P-CCNC: 20 U/L (ref 0–45)
BILIRUB DIRECT SERPL-MCNC: <0.2 MG/DL (ref 0–0.3)
BILIRUB SERPL-MCNC: 0.3 MG/DL
BUN SERPL-MCNC: 30.8 MG/DL (ref 6–20)
CALCIUM SERPL-MCNC: 9.6 MG/DL (ref 8.8–10.4)
CHLORIDE SERPL-SCNC: 106 MMOL/L (ref 98–107)
CREAT SERPL-MCNC: 0.86 MG/DL (ref 0.67–1.17)
EGFRCR SERPLBLD CKD-EPI 2021: >90 ML/MIN/1.73M2
ERYTHROCYTE [DISTWIDTH] IN BLOOD BY AUTOMATED COUNT: 18.1 % (ref 10–15)
GLUCOSE SERPL-MCNC: 94 MG/DL (ref 70–99)
HCO3 SERPL-SCNC: 19 MMOL/L (ref 22–29)
HCT VFR BLD AUTO: 39.2 % (ref 40–53)
HGB BLD-MCNC: 12.5 G/DL (ref 13.3–17.7)
INR PPP: 1.45 (ref 0.85–1.15)
MCH RBC QN AUTO: 27.2 PG (ref 26.5–33)
MCHC RBC AUTO-ENTMCNC: 31.9 G/DL (ref 31.5–36.5)
MCV RBC AUTO: 85 FL (ref 78–100)
PLATELET # BLD AUTO: 319 10E3/UL (ref 150–450)
POTASSIUM SERPL-SCNC: 4.9 MMOL/L (ref 3.4–5.3)
PROT SERPL-MCNC: 6.8 G/DL (ref 6.4–8.3)
RBC # BLD AUTO: 4.6 10E6/UL (ref 4.4–5.9)
SODIUM SERPL-SCNC: 137 MMOL/L (ref 135–145)
WBC # BLD AUTO: 13 10E3/UL (ref 4–11)

## 2025-01-12 PROCEDURE — G0008 ADMIN INFLUENZA VIRUS VAC: HCPCS | Performed by: INTERNAL MEDICINE

## 2025-01-12 PROCEDURE — 82040 ASSAY OF SERUM ALBUMIN: CPT | Performed by: INTERNAL MEDICINE

## 2025-01-12 PROCEDURE — 94660 CPAP INITIATION&MGMT: CPT

## 2025-01-12 PROCEDURE — 85018 HEMOGLOBIN: CPT | Performed by: HOSPITALIST

## 2025-01-12 PROCEDURE — 85610 PROTHROMBIN TIME: CPT | Performed by: INTERNAL MEDICINE

## 2025-01-12 PROCEDURE — 120N000001 HC R&B MED SURG/OB

## 2025-01-12 PROCEDURE — 90656 IIV3 VACC NO PRSV 0.5 ML IM: CPT | Performed by: INTERNAL MEDICINE

## 2025-01-12 PROCEDURE — 250N000011 HC RX IP 250 OP 636: Performed by: HOSPITALIST

## 2025-01-12 PROCEDURE — 80048 BASIC METABOLIC PNL TOTAL CA: CPT | Performed by: HOSPITALIST

## 2025-01-12 PROCEDURE — 99233 SBSQ HOSP IP/OBS HIGH 50: CPT | Performed by: INTERNAL MEDICINE

## 2025-01-12 PROCEDURE — 82310 ASSAY OF CALCIUM: CPT | Performed by: HOSPITALIST

## 2025-01-12 PROCEDURE — 36415 COLL VENOUS BLD VENIPUNCTURE: CPT | Performed by: HOSPITALIST

## 2025-01-12 PROCEDURE — 250N000013 HC RX MED GY IP 250 OP 250 PS 637: Performed by: INTERNAL MEDICINE

## 2025-01-12 PROCEDURE — 82248 BILIRUBIN DIRECT: CPT | Performed by: INTERNAL MEDICINE

## 2025-01-12 PROCEDURE — 5A09357 ASSISTANCE WITH RESPIRATORY VENTILATION, LESS THAN 24 CONSECUTIVE HOURS, CONTINUOUS POSITIVE AIRWAY PRESSURE: ICD-10-PCS | Performed by: INTERNAL MEDICINE

## 2025-01-12 PROCEDURE — 250N000013 HC RX MED GY IP 250 OP 250 PS 637: Performed by: HOSPITALIST

## 2025-01-12 PROCEDURE — 80076 HEPATIC FUNCTION PANEL: CPT | Performed by: INTERNAL MEDICINE

## 2025-01-12 PROCEDURE — 250N000011 HC RX IP 250 OP 636: Performed by: INTERNAL MEDICINE

## 2025-01-12 PROCEDURE — 999N000157 HC STATISTIC RCP TIME EA 10 MIN

## 2025-01-12 RX ORDER — ALBUMIN (HUMAN) 12.5 G/50ML
100 SOLUTION INTRAVENOUS ONCE
Status: COMPLETED | OUTPATIENT
Start: 2025-01-12 | End: 2025-01-12

## 2025-01-12 RX ORDER — ACETAMINOPHEN 325 MG/1
650 TABLET ORAL EVERY 4 HOURS PRN
Status: DISCONTINUED | OUTPATIENT
Start: 2025-01-12 | End: 2025-01-16 | Stop reason: HOSPADM

## 2025-01-12 RX ORDER — ALBUMIN (HUMAN) 12.5 G/50ML
50 SOLUTION INTRAVENOUS ONCE
Status: DISCONTINUED | OUTPATIENT
Start: 2025-01-12 | End: 2025-01-13

## 2025-01-12 RX ORDER — SPIRONOLACTONE 25 MG/1
25 TABLET ORAL DAILY
Status: DISCONTINUED | OUTPATIENT
Start: 2025-01-12 | End: 2025-01-13

## 2025-01-12 RX ADMIN — METFORMIN HYDROCHLORIDE 1000 MG: 500 TABLET ORAL at 17:21

## 2025-01-12 RX ADMIN — SPIRONOLACTONE 25 MG: 25 TABLET ORAL at 08:27

## 2025-01-12 RX ADMIN — FUROSEMIDE 20 MG: 20 TABLET ORAL at 08:28

## 2025-01-12 RX ADMIN — MIDODRINE HYDROCHLORIDE 5 MG: 2.5 TABLET ORAL at 08:28

## 2025-01-12 RX ADMIN — MIDODRINE HYDROCHLORIDE 5 MG: 2.5 TABLET ORAL at 11:49

## 2025-01-12 RX ADMIN — OLANZAPINE 10 MG: 2.5 TABLET, FILM COATED ORAL at 20:21

## 2025-01-12 RX ADMIN — MONTELUKAST 10 MG: 10 TABLET, FILM COATED ORAL at 08:27

## 2025-01-12 RX ADMIN — METFORMIN HYDROCHLORIDE 1000 MG: 500 TABLET ORAL at 08:28

## 2025-01-12 RX ADMIN — FLUTICASONE FUROATE AND VILANTEROL TRIFENATATE 1 PUFF: 100; 25 POWDER RESPIRATORY (INHALATION) at 08:31

## 2025-01-12 RX ADMIN — APIXABAN 5 MG: 5 TABLET, FILM COATED ORAL at 08:27

## 2025-01-12 RX ADMIN — PANTOPRAZOLE SODIUM 40 MG: 40 TABLET, DELAYED RELEASE ORAL at 20:21

## 2025-01-12 RX ADMIN — PANTOPRAZOLE SODIUM 40 MG: 40 TABLET, DELAYED RELEASE ORAL at 08:27

## 2025-01-12 RX ADMIN — MIDODRINE HYDROCHLORIDE 5 MG: 2.5 TABLET ORAL at 16:47

## 2025-01-12 RX ADMIN — APIXABAN 5 MG: 5 TABLET, FILM COATED ORAL at 20:21

## 2025-01-12 RX ADMIN — FAMOTIDINE 20 MG: 20 TABLET, FILM COATED ORAL at 20:21

## 2025-01-12 RX ADMIN — INFLUENZA A VIRUS A/VICTORIA/4897/2022 IVR-238 (H1N1) ANTIGEN (FORMALDEHYDE INACTIVATED), INFLUENZA A VIRUS A/CALIFORNIA/122/2022 SAN-022 (H3N2) ANTIGEN (FORMALDEHYDE INACTIVATED), AND INFLUENZA B VIRUS B/MICHIGAN/01/2021 ANTIGEN (FORMALDEHYDE INACTIVATED) 0.5 ML: 15; 15; 15 INJECTION, SUSPENSION INTRAMUSCULAR at 09:14

## 2025-01-12 RX ADMIN — CEFTRIAXONE SODIUM 2 G: 2 INJECTION, POWDER, FOR SOLUTION INTRAMUSCULAR; INTRAVENOUS at 06:10

## 2025-01-12 RX ADMIN — TRAZODONE HYDROCHLORIDE 100 MG: 50 TABLET ORAL at 20:21

## 2025-01-12 RX ADMIN — LEVOTHYROXINE SODIUM 6.25 MCG: 25 TABLET ORAL at 06:10

## 2025-01-12 RX ADMIN — Medication 1 PATCH: at 08:29

## 2025-01-12 RX ADMIN — UMECLIDINIUM 1 PUFF: 62.5 AEROSOL, POWDER ORAL at 08:31

## 2025-01-12 RX ADMIN — FLUOXETINE HYDROCHLORIDE 20 MG: 20 CAPSULE ORAL at 08:28

## 2025-01-12 RX ADMIN — FAMOTIDINE 20 MG: 20 TABLET, FILM COATED ORAL at 08:28

## 2025-01-12 ASSESSMENT — ACTIVITIES OF DAILY LIVING (ADL)
ADLS_ACUITY_SCORE: 47
ADLS_ACUITY_SCORE: 48
DEPENDENT_IADLS:: CLEANING;COOKING;LAUNDRY;SHOPPING;MEDICATION MANAGEMENT;MONEY MANAGEMENT;TRANSPORTATION
ADLS_ACUITY_SCORE: 51
ADLS_ACUITY_SCORE: 51
ADLS_ACUITY_SCORE: 47
ADLS_ACUITY_SCORE: 51
ADLS_ACUITY_SCORE: 51
ADLS_ACUITY_SCORE: 47
ADLS_ACUITY_SCORE: 51
ADLS_ACUITY_SCORE: 47
ADLS_ACUITY_SCORE: 51
ADLS_ACUITY_SCORE: 47

## 2025-01-12 NOTE — CONSULTS
Care Management Initial Consult    General Information  Assessment completed with: Patient, Legal Guardian Jesika   Type of CM/SW Visit: Initial Assessment    Primary Care Provider verified and updated as needed: Yes   Readmission within the last 30 days: unable to assess      Reason for Consult: discharge planning  Advance Care Planning: Advance Care Planning Reviewed: present on chart        Communication Assessment  Patient's communication style: spoken language (English or Bilingual)    Hearing Difficulty or Deaf: no   Wear Glasses or Blind: yes    Cognitive  Cognitive/Neuro/Behavioral: WDL                      Living Environment:   People in home: facility resident     Current living Arrangements: group home      Able to return to prior arrangements: yes     Family/Social Support:  Care provided by: other (see comments), self (facility staff)  Provides care for: no one, unable/limited ability to care for self  Marital Status: Single  Support system: Guardian, Facility resident(s)/Staff, Parent(s)          Description of Support System: Supportive, Involved    Support Assessment: Adequate family and caregiver support, Adequate social supports    Current Resources:   Patient receiving home care services: No     Community Resources: County Worker, [facility], Group Home  Equipment currently used at home: none  Supplies currently used at home: Incontinence Supplies    Employment/Financial:  Employment Status: disabled        Financial Concerns: none      Does the patient's insurance plan have a 3 day qualifying hospital stay waiver?  No    Lifestyle & Psychosocial Needs:  Social Drivers of Health     Food Insecurity: Low Risk  (1/11/2025)    Food Insecurity     Within the past 12 months, did you worry that your food would run out before you got money to buy more?: No     Within the past 12 months, did the food you bought just not last and you didn t have money to get more?: No   Depression: Not at risk  (8/14/2023)    PHQ-2     PHQ-2 Score: 0   Housing Stability: Low Risk  (1/11/2025)    Housing Stability     Do you have housing? : Yes     Are you worried about losing your housing?: No   Recent Concern: Housing Stability - High Risk (12/2/2024)    Housing Stability     Do you have housing? : No     Are you worried about losing your housing?: No   Tobacco Use: High Risk (1/10/2025)    Patient History     Smoking Tobacco Use: Every Day     Smokeless Tobacco Use: Never     Passive Exposure: Not on file   Financial Resource Strain: Low Risk  (1/11/2025)    Financial Resource Strain     Within the past 12 months, have you or your family members you live with been unable to get utilities (heat, electricity) when it was really needed?: No   Alcohol Use: Not At Risk (9/22/2022)    Received from [facility], [facility]    AUDIT-C     Frequency of Alcohol Consumption: Never     Average Number of Drinks: Not on file     Frequency of Binge Drinking: Not on file   Transportation Needs: Low Risk  (1/11/2025)    Transportation Needs     Within the past 12 months, has lack of transportation kept you from medical appointments, getting your medicines, non-medical meetings or appointments, work, or from getting things that you need?: No   Physical Activity: Not on file   Interpersonal Safety: High Risk (1/11/2025)    Interpersonal Safety     Do you feel physically and emotionally safe where you currently live?: No     Within the past 12 months, have you been hit, slapped, kicked or otherwise physically hurt by someone?: No     Within the past 12 months, have you been humiliated or emotionally abused in other ways by your partner or ex-partner?: No   Stress: Not on file   Social Connections: Unknown (5/1/2023)    Received from [facility], [facility]    Social Connections     Frequency of Communication with Friends and Family: Not  on file   Health Literacy: Not on file       Functional Status:  Prior to admission patient needed assistance:   Dependent ADLs:: Bathing, Toileting  Dependent IADLs:: Cleaning, Cooking, Laundry, Shopping, Medication Management, Money Management, Transportation     Mental Health Status:  Mental Health Status: No Current Concerns       Chemical Dependency Status:  Chemical Dependency Status: No Current Concerns           Values/Beliefs:  Spiritual, Cultural Beliefs, Temple Practices, Values that affect care: no             Discussed  Partnership in Safe Discharge Planning  document with patient/family: No      Additional Information:  Care Management received referral to assist with discharge planning. Care Management met with patient at bedside and also spoke with patient's legal guardian Jesika via phone.     Patient resides at [facility] [place]. Patient normally goes to [facility] for hospital services, but no beds were available so he was transferred to [facility].     At baseline, patient receives assistance from  staff with bathing and toileting (wiping after BM). He is independent with other ADLs. He requires assistance from  staff with all IADLs. He informed  that he has legal guardian Jesika Harden P: [phone] and rep payee for finances.     CM called Jesika Harden and confirmed that she is legal guardian. She emailed  legal guardianship paperwork and CM emailed to [employer] and updated facesheet. Jesika advises that patient can inform staff of some information but he tends to not be accurate on all the details. Jesika advises not to share any information with patient's ex significant other Amy.     Plan is for patient to return to  after hospitalization - provider anticipates 5 days of IV abx while hospitalized so patient will remain hospitalized for several more days.     Per patient,  staff usually give rides back to [facility], but he is uncertain  whether they will come to [place] to get him.   JEFFREY spoke with Ginna -   P: [phone]. She states that they can sometimes come pick patients up from hospitalization at [place] if they have adequate staff on, but they are unable to pick patient up in [place]. Ginna asks that we assist with setting up transportation back home when he is ready for discharge.     Patient and Ginna both note that patient has Medicare C policy and also [employer] [place] policy, but UCARE [place] has lapsed as of 11/30/24 and renewal has not been processed yet. Per Ginna, they have had issues with several of their residents and the county being delayed on processing MA renewals - they continue to work with patient's guardian and [facility]  to work on MA renewal.       Plan: Return to [facility]  Transport: Agency transport. Unable to use [employer] Ride as MA renewal has not been processed. Cab Voucher?      Abbey Campos [facility]  Inpatient Care Coordinator   [facility] [phone]  [facility] [phone]

## 2025-01-12 NOTE — PLAN OF CARE
Goal Outcome Evaluation:      Plan of Care Reviewed With: patient    Overall Patient Progress: no changeOverall Patient Progress: no change    Outcome Evaluation: Up ad marija. Denies pain. Started back on some of his home meds. Tolerating 2 gram sodium diet. Denies pain. VSS

## 2025-01-12 NOTE — PLAN OF CARE
Goal Outcome Evaluation:      Plan of Care Reviewed With: patient    Overall Patient Progress: improvingOverall Patient Progress: improving    Outcome Evaluation: Denied pain all shift. Up ad marija and ambulated in halls. Ate 100% of meals. VSS Weight is down two pounds from yesterday. Napped with Cpap a few times.

## 2025-01-12 NOTE — PLAN OF CARE
Goal Outcome Evaluation:      Plan of Care Reviewed With: patient    Overall Patient Progress: improvingOverall Patient Progress: improving    Outcome Evaluation: 4 hour shift note: Vss. Pt using CPAP machine with naps and at bedtime. Denies pain. Up independently, ambulates in halls. Voiding well.

## 2025-01-12 NOTE — PROGRESS NOTES
[facility]    PROGRESS NOTE - Hospitalist Service    Assessment and Plan    Principal Problem:    SBP (spontaneous bacterial peritonitis) (H)  Active Problems:    Tobacco use    Fetal alcohol syndromes    Active autistic disorder    Charcot-Estrella-Tooth syndrome    Other specified hypothyroidism    Medication induced coagulopathy    Warren Jaramillo is a 44 year old male with cirrhosis attributed to alcohol misuse, DM 2, asthma, hypertension, HFpEF, DVT on Eliquis. Recent admission and just discharged from [place] with same diagnosis      Spontaneous bacterial peritonitis   Cirrhosis likely related to alcohol use with ascites  - Paracentesis on admission shows elevated PMNS 424, elevated again and higher since last admission.  - SAAG >1.1 supporting portal hypertension, similar on previous admission and per GI note thought CHF may be contributing as well   - curbsided ID and recommends at least 5 days IV ceftriaxone will put formal ID consult to see on Monday   - US paracentesis and 5 liters removed previously  - He was given IV albumin but only 25gm on #1, should have been 100gms.  Today is #3 and will give 100gm IV albumin  - repeat US ordered, but unlikely wont be until Tuesday and need to ensure PMNs decreasing. US and labs ordered may need to re-date these  - was treated at [facility] similar presentation received 4 days and sent out on cipro was home only 3 days   - culture NGTD from previous stay nor from this  - follow cultures  - will tag ID on Monday to see, orders placed   - during previous admission he had bump in renal function and concern for HRS but did not support and his lasix and spironolactone were held at discharge  - resume lasix 20mg daily on 1/11 and renal function stable  - resumed Spironolactone at 25mg daily today 1/12  - recheck labs in am   - 2g Na diet  - during last discharge [facility] follow up in 4-6 weeks  - Echo does show RVSP 42%     Learning Disability   -  "Lives in group home this is where he will return   -  legal guardian Jesika Harden P: [phone] and rep payee for finances. CM called Jesika Harden and confirmed that she is legal guardian. She emailed CM legal guardianship paperwork and CM emailed to [employer] and updated facesheet. Jesika advises that patient can inform staff of some information but he tends to not be accurate on all the details. Jesika advises not to share any information with patient's ex significant other Amy.     Tobacco abuse smokes 1ppd   - patient requests nicotine patch   - 21mcg     Rash   - use topicals chronic      Hypothyroidism   - elevated TSH and low Free T4  - started Levothyroxine on 1/11/2025      Clinically Significant Risk Factors                  # Coagulation Defect: INR = 1.45 (Ref range: 0.85 - 1.15) and/or PTT = 35 Seconds (Ref range: 22 - 38 Seconds), will monitor for bleeding    # Hypertension: Noted on problem list  # Chronic heart failure with preserved ejection fraction: heart failure noted on problem list and last echo with EF >50%           # Severe Obesity: Estimated body mass index is 43.95 kg/m  as calculated from the following:    Height as of this encounter: 1.651 m (5' 5\").    Weight as of this encounter: 119.8 kg (264 lb 1.6 oz)., PRESENT ON ADMISSION       # Financial/Environmental Concerns: none         COVID-19 PCR Results          4/17/2023    17:54 11/9/2023    04:15 11/27/2023    11:40 4/12/2024    20:31 9/22/2024    20:40 12/1/2024    21:06   COVID-19 PCR Results   SARS CoV2 PCR Negative  Negative  Negative  Negative  Negative  Negative      COVID-19 Antibody Results, Testing for Immunity           No data to display               Code Status: Full Code  VTE prophylaxis:  DOAC  DIET: Orders Placed This Encounter      Combination Diet Regular Diet Adult; 2 gm NA Diet    Drains/Lines: Patient has No line.   Khan Catheter: Not present    Weight bearing status: WBAt     Expected Discharge Date: " 01/15/2025    Discharge Delays: IV Medication - consider oral or Home Infusion  Lab Result Pending (enter specific test & time in comments)  Specialist Consult (enter specialist & decision needed in comments)  Destination: group home  Discharge Comments: needs 5 days of IV abx per ID or pending culture, was sent home[facility]rea returned    Medically Ready for Discharge: Anticipated in 2-4 Days    Living arrangements - the patient Return to [facility] .    Subjective:  Patient feels abd is increasing in size otherwise no complaints    PHYSICAL EXAM  Temp:  [97.2  F (36.2  C)-98.2  F (36.8  C)] 97.6  F (36.4  C)  Pulse:  [59-76] 61  Resp:  [16-20] 18  BP: (117-123)/(51-69) 117/57  FiO2 (%):  [21 %] 21 %  SpO2:  [95 %-98 %] 97 %  Wt Readings from Last 1 Encounters:   01/12/25 119.8 kg (264 lb 1.6 oz)     Intake/Output Summary (Last 24 hours) at 1/12/2025 0800  Last data filed at 1/12/2025 0500  Gross per 24 hour   Intake 1520 ml   Output 2650 ml   Net -1130 ml      Body mass index is 43.95 kg/m .    Physical Exam  Vitals and nursing note reviewed.   Constitutional:       General: He is not in acute distress.     Appearance: He is obese. He is not ill-appearing.   Cardiovascular:      Rate and Rhythm: Normal rate and regular rhythm.      Pulses: Normal pulses.   Pulmonary:      Effort: Pulmonary effort is normal. No respiratory distress.      Breath sounds: Normal breath sounds. No rales.   Abdominal:      General: Bowel sounds are normal.      Palpations: Abdomen is soft.      Comments: Difficult to assess if increased distention, NT   Musculoskeletal:         General: Normal range of motion.   Skin:     General: Skin is warm and dry.      Capillary Refill: Capillary refill takes less than 2 seconds.   Neurological:      Mental Status: He is alert and oriented to person, place, and time. Mental status is at baseline.       PERTINENT LABS/IMAGING:  No results found for this visit on 01/11/25.    Imaging  results reviewed over the past 24 hrs:   No results found for this or any previous visit (from the past 24 hours).  Most Recent 3 CBC's:  Recent Labs   Lab Test 01/12/25  0518 01/10/25  1211 01/08/25  0603   WBC 13.0* 14.0* 11.7*   HGB 12.5* 12.1* 12.3*   MCV 85 85 84    326 304     Most Recent 3 BMP's:  Recent Labs   Lab Test 01/12/25  0518 01/10/25  2323 01/10/25  1211 01/08/25  0609 01/08/25  0603     --  139  --  137   POTASSIUM 4.9  --  5.4*  --  4.9   CHLORIDE 106  --  108*  --  105   CO2 19*  --  19*  --  19*   BUN 30.8*  --  43.1*  --  65.4*   CR 0.86  --  0.98  --  1.64*   ANIONGAP 12  --  12  --  13   WES 9.6  --  9.8  --  9.4   GLC 94 118* 88   < > 102*    < > = values in this interval not displayed.     Most Recent 2 LFT's:  Recent Labs   Lab Test 01/12/25  0518 01/10/25  1211   AST 20 29   ALT 31 42   ALKPHOS 294* 321*   BILITOTAL 0.3 0.5     Most Recent 3 INR's:  Recent Labs   Lab Test 01/12/25  0518 01/10/25  1211 01/07/25  0619   INR 1.45* 1.44* 1.74*     Most Recent 6 Bacteria Isolates From Any Culture (See EPIC Reports for Culture Details):No lab results found.  Most Recent TSH and T4:  Recent Labs   Lab Test 01/10/25  1211   TSH 10.31*   T4 0.86*     Most Recent 6 glucoses:  Recent Labs   Lab Test 01/12/25  0518 01/10/25  2323 01/10/25  1211 01/08/25  1105 01/08/25  0609 01/08/25  0603   GLC 94 118* 88 127* 115* 102*     Most Recent Urinalysis:  Recent Labs   Lab Test 01/10/25  1143   COLOR Yellow   APPEARANCE Clear   URINEGLC Negative   URINEBILI Negative   URINEKETONE Negative   SG 1.023   UBLD Negative   URINEPH 5.5   PROTEIN 20*   NITRITE Negative   LEUKEST Negative   RBCU 0   WBCU 2     Most Recent ESR & CRP:  Recent Labs   Lab Test 01/10/25  1211   CRPI 11.00*         50 MINUTES SPENT BY ME on the date of service doing chart review, history, exam, documentation, discussion with nursing staff and specialist, & further activities per the note.  Cara Decker MD  [facility]  [facility] Medicine Service

## 2025-01-12 NOTE — PLAN OF CARE
Goal Outcome Evaluation:      Plan of Care Reviewed With: patient          Outcome Evaluation: Return to [facility] [place]

## 2025-01-12 NOTE — PLAN OF CARE
Goal Outcome Evaluation:      Plan of Care Reviewed With: patient    Overall Patient Progress: improvingOverall Patient Progress: improving    Outcome Evaluation: A&O. VSS on rm air. Ambulating independently. Denies pain overnight. Using CPAP machine at bedtime. Receiving antibiotics per MAR. IV saline locked.

## 2025-01-13 ENCOUNTER — DOCUMENTATION ONLY (OUTPATIENT)
Dept: OTHER | Facility: CLINIC | Age: 45
End: 2025-01-13
Payer: COMMERCIAL

## 2025-01-13 PROBLEM — I82.A12 DVT OF AXILLARY VEIN, ACUTE LEFT (H): Status: ACTIVE | Noted: 2024-12-02

## 2025-01-13 PROBLEM — E66.01 MORBID OBESITY (H): Status: ACTIVE | Noted: 2024-12-02

## 2025-01-13 PROBLEM — I50.812 CHRONIC RIGHT-SIDED CONGESTIVE HEART FAILURE (H): Status: ACTIVE | Noted: 2023-11-16

## 2025-01-13 PROBLEM — I50.813 ACUTE ON CHRONIC RIGHT-SIDED CONGESTIVE HEART FAILURE (H): Status: ACTIVE | Noted: 2023-11-16

## 2025-01-13 LAB
ALBUMIN SERPL BCG-MCNC: 4.1 G/DL (ref 3.5–5.2)
ALP SERPL-CCNC: 282 U/L (ref 40–150)
ALT SERPL W P-5'-P-CCNC: 29 U/L (ref 0–70)
ANION GAP SERPL CALCULATED.3IONS-SCNC: 11 MMOL/L (ref 7–15)
AST SERPL W P-5'-P-CCNC: 19 U/L (ref 0–45)
BILIRUB SERPL-MCNC: 0.5 MG/DL
BUN SERPL-MCNC: 30.9 MG/DL (ref 6–20)
CALCIUM SERPL-MCNC: 9.1 MG/DL (ref 8.8–10.4)
CHLORIDE SERPL-SCNC: 105 MMOL/L (ref 98–107)
CREAT SERPL-MCNC: 1.07 MG/DL (ref 0.67–1.17)
CRP SERPL-MCNC: 6.68 MG/L
EGFRCR SERPLBLD CKD-EPI 2021: 88 ML/MIN/1.73M2
ERYTHROCYTE [DISTWIDTH] IN BLOOD BY AUTOMATED COUNT: 17.6 % (ref 10–15)
GLUCOSE SERPL-MCNC: 86 MG/DL (ref 70–99)
HCO3 SERPL-SCNC: 19 MMOL/L (ref 22–29)
HCT VFR BLD AUTO: 38.1 % (ref 40–53)
HGB BLD-MCNC: 12.2 G/DL (ref 13.3–17.7)
INR PPP: 1.45 (ref 0.85–1.15)
MCH RBC QN AUTO: 27.2 PG (ref 26.5–33)
MCHC RBC AUTO-ENTMCNC: 32 G/DL (ref 31.5–36.5)
MCV RBC AUTO: 85 FL (ref 78–100)
PLATELET # BLD AUTO: 310 10E3/UL (ref 150–450)
POTASSIUM SERPL-SCNC: 5 MMOL/L (ref 3.4–5.3)
PROT SERPL-MCNC: 6.3 G/DL (ref 6.4–8.3)
RBC # BLD AUTO: 4.48 10E6/UL (ref 4.4–5.9)
SODIUM SERPL-SCNC: 135 MMOL/L (ref 135–145)
WBC # BLD AUTO: 13.2 10E3/UL (ref 4–11)

## 2025-01-13 PROCEDURE — 250N000013 HC RX MED GY IP 250 OP 250 PS 637: Performed by: INTERNAL MEDICINE

## 2025-01-13 PROCEDURE — 80053 COMPREHEN METABOLIC PANEL: CPT | Performed by: INTERNAL MEDICINE

## 2025-01-13 PROCEDURE — 36415 COLL VENOUS BLD VENIPUNCTURE: CPT | Performed by: INTERNAL MEDICINE

## 2025-01-13 PROCEDURE — 94660 CPAP INITIATION&MGMT: CPT

## 2025-01-13 PROCEDURE — 999N000157 HC STATISTIC RCP TIME EA 10 MIN

## 2025-01-13 PROCEDURE — 85610 PROTHROMBIN TIME: CPT | Performed by: INTERNAL MEDICINE

## 2025-01-13 PROCEDURE — 120N000001 HC R&B MED SURG/OB

## 2025-01-13 PROCEDURE — 250N000013 HC RX MED GY IP 250 OP 250 PS 637: Performed by: HOSPITALIST

## 2025-01-13 PROCEDURE — 86140 C-REACTIVE PROTEIN: CPT | Performed by: INTERNAL MEDICINE

## 2025-01-13 PROCEDURE — 85014 HEMATOCRIT: CPT | Performed by: INTERNAL MEDICINE

## 2025-01-13 PROCEDURE — 99233 SBSQ HOSP IP/OBS HIGH 50: CPT | Performed by: PEDIATRICS

## 2025-01-13 PROCEDURE — 250N000011 HC RX IP 250 OP 636: Performed by: HOSPITALIST

## 2025-01-13 RX ORDER — FUROSEMIDE 40 MG/1
40 TABLET ORAL DAILY
Status: DISCONTINUED | OUTPATIENT
Start: 2025-01-14 | End: 2025-01-16 | Stop reason: HOSPADM

## 2025-01-13 RX ORDER — SPIRONOLACTONE 25 MG/1
100 TABLET ORAL DAILY
Status: DISCONTINUED | OUTPATIENT
Start: 2025-01-14 | End: 2025-01-16 | Stop reason: HOSPADM

## 2025-01-13 RX ADMIN — FUROSEMIDE 20 MG: 20 TABLET ORAL at 08:35

## 2025-01-13 RX ADMIN — CEFTRIAXONE SODIUM 2 G: 2 INJECTION, POWDER, FOR SOLUTION INTRAMUSCULAR; INTRAVENOUS at 06:22

## 2025-01-13 RX ADMIN — MIDODRINE HYDROCHLORIDE 5 MG: 2.5 TABLET ORAL at 11:47

## 2025-01-13 RX ADMIN — FLUTICASONE FUROATE AND VILANTEROL TRIFENATATE 1 PUFF: 100; 25 POWDER RESPIRATORY (INHALATION) at 08:36

## 2025-01-13 RX ADMIN — SPIRONOLACTONE 25 MG: 25 TABLET ORAL at 08:35

## 2025-01-13 RX ADMIN — Medication 1 PATCH: at 08:37

## 2025-01-13 RX ADMIN — APIXABAN 5 MG: 5 TABLET, FILM COATED ORAL at 08:34

## 2025-01-13 RX ADMIN — CLOTRIMAZOLE: 10 CREAM TOPICAL at 08:47

## 2025-01-13 RX ADMIN — METFORMIN HYDROCHLORIDE 1000 MG: 500 TABLET ORAL at 08:35

## 2025-01-13 RX ADMIN — UMECLIDINIUM 1 PUFF: 62.5 AEROSOL, POWDER ORAL at 08:36

## 2025-01-13 RX ADMIN — MIDODRINE HYDROCHLORIDE 5 MG: 2.5 TABLET ORAL at 17:09

## 2025-01-13 RX ADMIN — PANTOPRAZOLE SODIUM 40 MG: 40 TABLET, DELAYED RELEASE ORAL at 08:34

## 2025-01-13 RX ADMIN — TRAZODONE HYDROCHLORIDE 100 MG: 50 TABLET ORAL at 21:06

## 2025-01-13 RX ADMIN — LEVOTHYROXINE SODIUM 6.25 MCG: 25 TABLET ORAL at 06:22

## 2025-01-13 RX ADMIN — OLANZAPINE 10 MG: 2.5 TABLET, FILM COATED ORAL at 21:06

## 2025-01-13 RX ADMIN — MONTELUKAST 10 MG: 10 TABLET, FILM COATED ORAL at 08:33

## 2025-01-13 RX ADMIN — FLUOXETINE HYDROCHLORIDE 20 MG: 20 CAPSULE ORAL at 08:35

## 2025-01-13 RX ADMIN — APIXABAN 5 MG: 5 TABLET, FILM COATED ORAL at 21:07

## 2025-01-13 RX ADMIN — MIDODRINE HYDROCHLORIDE 5 MG: 2.5 TABLET ORAL at 08:35

## 2025-01-13 RX ADMIN — METFORMIN HYDROCHLORIDE 1000 MG: 500 TABLET ORAL at 17:09

## 2025-01-13 RX ADMIN — PANTOPRAZOLE SODIUM 40 MG: 40 TABLET, DELAYED RELEASE ORAL at 21:12

## 2025-01-13 RX ADMIN — FAMOTIDINE 20 MG: 20 TABLET, FILM COATED ORAL at 08:34

## 2025-01-13 RX ADMIN — FAMOTIDINE 20 MG: 20 TABLET, FILM COATED ORAL at 21:06

## 2025-01-13 ASSESSMENT — ACTIVITIES OF DAILY LIVING (ADL)
ADLS_ACUITY_SCORE: 47
ADLS_ACUITY_SCORE: 52
ADLS_ACUITY_SCORE: 52
ADLS_ACUITY_SCORE: 47
ADLS_ACUITY_SCORE: 52
ADLS_ACUITY_SCORE: 52
ADLS_ACUITY_SCORE: 47
ADLS_ACUITY_SCORE: 52
ADLS_ACUITY_SCORE: 47
ADLS_ACUITY_SCORE: 47
ADLS_ACUITY_SCORE: 52
ADLS_ACUITY_SCORE: 47
ADLS_ACUITY_SCORE: 52
ADLS_ACUITY_SCORE: 52

## 2025-01-13 NOTE — PLAN OF CARE
Goal Outcome Evaluation:      Plan of Care Reviewed With: patient    Overall Patient Progress: no changeOverall Patient Progress: no change    Outcome Evaluation: Voided 550ml bushra urine and ate 100% of supper. Asking for diet soda and chips. Was reminded that he is on a low sodium diet. Napped this afternoon and spent time on phone. Has denied pain. VSS

## 2025-01-13 NOTE — PLAN OF CARE
Goal Outcome Evaluation:      Plan of Care Reviewed With: patient    Overall Patient Progress: improvingOverall Patient Progress: improving    Outcome Evaluation: Walked in halls and ate 100% of supper. Voiding. Had visitor. VSS. No pain.

## 2025-01-13 NOTE — PROGRESS NOTES
[facility]    Medicine Progress Note - Hospitalist Service    Date of Admission:  1/11/2025    Assessment & Plan      Warren Jaramillo is a 44 year old male with cirrhosis previously attributed to alcohol, DM 2, asthma, hypertension, HFpEF, treated with and DVT Eliquis who had recently been hospitalized for SBP discharged on 1/8/25 on oral ciprofloxacin to complete treatment course who presented with worsening abdominal pain and distention and was admitted due to concern for persistent versus recurrent SBP.     Persistent versus recurrent spontaneous bacterial peritonitis   Cirrhosis with ascites attributed to alcohol use   Followed by [facility] for cirrhosis previously attributed to alcohol.  Was hospitalized January 2 to January 8 for SBP treated with antibiotics including ciprofloxacin at hospital discharge and cultures obtained during that hospitalization were negative for specific organism.  Clinical presentation and results of paracentesis on this admission were concerning for SBP.  He was started on ceftriaxone empirically and was also treated with paracentesis and IV albumin administration.  His previous chronic diuretics had been held at recent hospital discharge due to concern for possible hepatorenal syndrome, but diuretics were restarted upon this admission.  He is improving clinically.  -Continue empiric ceftriaxone, today is day 3 of at least 5-day treatment course  -Anticipate virtual ID consultation during this hospital stay (inpatient ID consult onsite not available at this hospital)  -repeat US paracentesis ordered for tomorrow to include recheck of cell count   -continue lasix and Spironolactone and will titrate dosing depending upon clinical response and test results  -Ordered recheck labs in am  -Anticipate [facility] follow up in 4-6 weeks as previously recommended    RV failure  No previous diagnosis of chronic heart disease and echo August 2023 demonstrated normal  LV function at which time RV could not be visualized.  Recent cardiac MRI December 2024 demonstrated borderline RV enlargement with mildly reduced RV systolic function and RV EF 42% suspicious for RV failure.  There is concern that RV failure could also be contributing to ascites and volume overload.  -Continuing diuretics    History of DVT  Medication induced coagulopathy  Diagnosed with DVT of left IJ, subclavian, and axillary veins in early December 2024 at which time therapeutic anticoagulation was started.  He continues to take Eliquis which causes coagulopathy.  -Continue Eliquis     Autism with learning Disability   Known autism with chronic learning disability and he apparently does not have capacity for independent medical decision making.  He chronically lives in group home and has legal guardian Jesika Harden P: [phone] and rep payee for finances and his legal guardian was notified of this hospital admission.  -Anticipate patient will return to group home when medically stable   -Recommend consulting with his legal guardian for decision making as indicated   -His legal guardian Jesika reportedly advised not to share any information with patient's ex significant other Amy    AVA on CPAP  Chronic AVA treated with CPAP  -Continue CPAP with sleep     Tobacco abuse   Continues to smoke cigarettes about 1 ppd.  Expressing desire and intent to quit smoking.  -Continue nicotine patch  -Smoking cessation encouraged     Hypothyroidism   In ER January 10 patient was found to have mildly elevated TSH (10) and low Free T4 (0.86) and was started on levothyroxine treatment for hypothyroidism on 1/11/2025  -Continue levothyroxine  -Recommend recheck thyroid function in 4 to 6 weeks    Probable morbid obesity  Appears morbidly obese with BMI 44 although BMI may be overestimated by the presence of ascites  -Recommended lifestyle changes to promote weight loss          Diet: Combination Diet Regular Diet Adult; 2 gm NA  "Diet    DVT Prophylaxis: DOAC  Hkan Catheter: Not present  Lines: None     Cardiac Monitoring: None  Code Status: Full Code      Clinically Significant Risk Factors                # Coagulation Defect: INR = 1.45 (Ref range: 0.85 - 1.15) and/or PTT = 35 Seconds (Ref range: 22 - 38 Seconds), will monitor for bleeding    # Hypertension: Noted on problem list  # Chronic heart failure with preserved ejection fraction: heart failure noted on problem list and last echo with EF >50%           # Severe Obesity: Estimated body mass index is 44.33 kg/m  as calculated from the following:    Height as of this encounter: 1.651 m (5' 5\").    Weight as of this encounter: 120.8 kg (266 lb 6.4 oz)., PRESENT ON ADMISSION     # Financial/Environmental Concerns: none         Social Drivers of Health    Housing Stability: Low Risk  (1/11/2025)    Housing Stability     Do you have housing? : Yes     Are you worried about losing your housing?: No   Recent Concern: Housing Stability - High Risk (12/2/2024)    Housing Stability     Do you have housing? : No     Are you worried about losing your housing?: No   Tobacco Use: High Risk (1/10/2025)    Patient History     Smoking Tobacco Use: Every Day     Smokeless Tobacco Use: Never   Interpersonal Safety: High Risk (1/11/2025)    Interpersonal Safety     Do you feel physically and emotionally safe where you currently live?: No     Within the past 12 months, have you been hit, slapped, kicked or otherwise physically hurt by someone?: No     Within the past 12 months, have you been humiliated or emotionally abused in other ways by your partner or ex-partner?: No    [facility], [facility] & [facility]    Social Connections          Disposition Plan     Medically Ready for Discharge: Anticipated in 2-4 Days             Ean Villanueva MD  Hospitalist Service  [facility]  Securely message with " Saúl (more info)  Text page via [facility] Paging/Directory   ______________________________________________________________________    Interval History   Overall he is feeling better.  He no longer has any pain in his left lower abdomen or in his abdomen in general.  He has been afebrile and hemodynamically stable.  Oxygenation has been normal.  He says he had some voiding difficulty late yesterday despite urged to do so and underwent straight catheterization after which he was subsequently able to void spontaneously.  He has been able to void spontaneously today so far.  He is tolerating oral intake and advancing activity.  He has multiple questions today and is asking this provider to discuss his case with his biological mother Jeana who also has multiple questions.    Physical Exam   Vital Signs: Temp: 97.6  F (36.4  C) Temp src: Oral BP: 131/57 Pulse: 70   Resp: 18 SpO2: 99 % O2 Device: None (Room air)    Patient Vitals for the past 24 hrs:   BP Temp Temp src Pulse Resp SpO2 Weight   01/13/25 1141 131/57 97.6  F (36.4  C) Oral 70 18 99 % --   01/13/25 0803 108/49 97.8  F (36.6  C) Axillary 58 18 97 % --   01/13/25 0532 -- -- -- -- -- -- 120.8 kg (266 lb 6.4 oz)   01/13/25 0425 120/53 98.3  F (36.8  C) Oral 61 20 97 % --   01/13/25 0047 -- -- -- -- 18 96 % --   01/12/25 2250 123/50 97.1  F (36.2  C) Temporal 67 20 97 % --   01/12/25 2155 -- -- -- 61 20 99 % --   01/12/25 1927 112/56 98.1  F (36.7  C) Oral 65 18 97 % --   01/12/25 1536 117/47 97.7  F (36.5  C) Oral 65 18 96 % --     Weight: 266 lbs 6.4 oz Body mass index is 44.33 kg/m .  Vitals:    01/11/25 0300 01/12/25 0400 01/13/25 0532   Weight: 120.9 kg (266 lb 8 oz) 119.8 kg (264 lb 1.6 oz) 120.8 kg (266 lb 6.4 oz)       Intake/Output Summary (Last 24 hours) at 1/13/2025 1329  Last data filed at 1/13/2025 0851  Gross per 24 hour   Intake 960 ml   Output 950 ml   Net 10 ml       General Appearance: Morbidly obese man without signs of acute distress resting in  bed  Respiratory: Normal respiratory effort, clear lungs  Cardiovascular: Regular rate and rhythm  GI: Normal bowel sounds, morbidly obese abdomen, soft, nontender  Skin: Normal color, no jaundice  Other: Alert and maintains wakefulness and attention, no involuntary movements    Medical Decision Making       53 MINUTES SPENT BY ME on the date of service doing chart review, history, exam, documentation & further activities per the note.  This included time spent with the patient and his biological mother Jeana who was on the telephone answering their questions and addressing their concerns today       Data     I have personally reviewed the following data over the past 24 hrs:    13.2 (H)  \   12.2 (L)   / 310     135 105 30.9 (H) /  86   5.0 19 (L) 1.07 \     ALT: 29 AST: 19 AP: 282 (H) TBILI: 0.5   ALB: 4.1 TOT PROTEIN: 6.3 (L) LIPASE: N/A     Procal: N/A CRP: 6.68 (H) Lactic Acid: N/A       INR:  1.45 (H) PTT:  N/A   D-dimer:  N/A Fibrinogen:  N/A       Ascites fluid culture from January 11 is negative so far and Gram stain had been negative for organisms blood culture is negative so far    Ascites fluid on January 11 demonstrated nucleated cell count of 2360 with 18% neutrophils      Recent Labs   Lab 01/13/25  0520 01/12/25  0518 01/10/25  2323 01/10/25  1211   WBC 13.2* 13.0*  --  14.0*   HGB 12.2* 12.5*  --  12.1*   MCV 85 85  --  85    319  --  326   INR 1.45* 1.45*  --  1.44*    137  --  139   POTASSIUM 5.0 4.9  --  5.4*   CHLORIDE 105 106  --  108*   CO2 19* 19*  --  19*   BUN 30.9* 30.8*  --  43.1*   CR 1.07 0.86  --  0.98   ANIONGAP 11 12  --  12   WES 9.1 9.6  --  9.8   GLC 86 94 118* 88   ALBUMIN 4.1 4.5  --  4.9   PROTTOTAL 6.3* 6.8  --  7.5   BILITOTAL 0.5 0.3  --  0.5   ALKPHOS 282* 294*  --  321*   ALT 29 31  --  42   AST 19 20  --  29   LIPASE  --   --   --  18

## 2025-01-13 NOTE — PROVIDER NOTIFICATION
Messaged Dr. Kilgore regarding pt concerns of low UOP and urge to void. Pt had been voiding in large amounts of 500-600mL average and last two amounts were 150mL and 100mL and pt still feeling urge to void. Pt has a very large abdomen with ascites and bladder scan was difficult due to pt round distended abdomen. Bladder scan= 269mL. New orders to intermittent straight cath every 6 hours prn.   Pt had also reported having R kidney pain and asking for tylenol. New order for Tylenol prn.

## 2025-01-13 NOTE — PLAN OF CARE
Goal Outcome Evaluation:      Plan of Care Reviewed With: patient    Overall Patient Progress: no changeOverall Patient Progress: no change    Outcome Evaluation: Voiding dark bushra urine. Has denied pain. Up ad marija. Ate 100% of meals. Taking shower now. VSS Nicotine patch on.

## 2025-01-13 NOTE — PLAN OF CARE
Goal Outcome Evaluation:      Plan of Care Reviewed With: patient    Overall Patient Progress: improvingOverall Patient Progress: improving    Outcome Evaluation: See provider note from last evening regarding UOP. Pt was only straight cathed for 50mL and felt instant relief, no further urge to void. Bladder scanned again and was 546mL, though difficult to get accurate baldder scan due to very large obese and ascites abdomen. Pt has since had a 500mL void this morning. No further reports of pain to R kidney. Vss. Pt has slept between cares, used CPAP/BIPAP on RA per usual home routine.

## 2025-01-14 ENCOUNTER — APPOINTMENT (OUTPATIENT)
Dept: ULTRASOUND IMAGING | Facility: CLINIC | Age: 45
DRG: 372 | End: 2025-01-14
Attending: INTERNAL MEDICINE
Payer: COMMERCIAL

## 2025-01-14 PROBLEM — E87.20 NORMAL ANION GAP METABOLIC ACIDOSIS: Status: ACTIVE | Noted: 2025-01-14

## 2025-01-14 PROBLEM — E11.9 DM2 (DIABETES MELLITUS, TYPE 2) (H): Status: ACTIVE | Noted: 2020-04-28

## 2025-01-14 LAB
ALBUMIN BODY FLUID SOURCE: NORMAL
ALBUMIN FLD-MCNC: 3.5 G/DL
ALBUMIN SERPL BCG-MCNC: 4.2 G/DL (ref 3.5–5.2)
ALBUMIN UR-MCNC: NEGATIVE MG/DL
ALP SERPL-CCNC: 303 U/L (ref 40–150)
ALT SERPL W P-5'-P-CCNC: 28 U/L (ref 0–70)
AMYLASE BODY FLUID SOURCE: NORMAL
AMYLASE FLD-CCNC: 22 U/L
ANION GAP SERPL CALCULATED.3IONS-SCNC: 14 MMOL/L (ref 7–15)
ANION GAP SERPL CALCULATED.3IONS-SCNC: 14 MMOL/L (ref 7–15)
APPEARANCE UR: CLEAR
AST SERPL W P-5'-P-CCNC: 20 U/L (ref 0–45)
BASE EXCESS BLDV CALC-SCNC: -3.6 MMOL/L (ref -3–3)
BASE EXCESS BLDV CALC-SCNC: -3.9 MMOL/L (ref -3–3)
BILIRUB SERPL-MCNC: 0.4 MG/DL
BILIRUB UR QL STRIP: NEGATIVE
BUN SERPL-MCNC: 33 MG/DL (ref 6–20)
CALCIUM SERPL-MCNC: 9.3 MG/DL (ref 8.8–10.4)
CHLORIDE SERPL-SCNC: 105 MMOL/L (ref 98–107)
CHLORIDE SERPL-SCNC: 105 MMOL/L (ref 98–107)
COLOR UR AUTO: ABNORMAL
CREAT SERPL-MCNC: 1.22 MG/DL (ref 0.67–1.17)
EGFRCR SERPLBLD CKD-EPI 2021: 75 ML/MIN/1.73M2
GLUCOSE BODY FLUID SOURCE: NORMAL
GLUCOSE FLD-MCNC: 106 MG/DL
GLUCOSE SERPL-MCNC: 90 MG/DL (ref 70–99)
GLUCOSE UR STRIP-MCNC: NEGATIVE MG/DL
HCO3 BLDV-SCNC: 22 MMOL/L (ref 21–28)
HCO3 BLDV-SCNC: 23 MMOL/L (ref 21–28)
HCO3 SERPL-SCNC: 18 MMOL/L (ref 22–29)
HCO3 SERPL-SCNC: 20 MMOL/L (ref 22–29)
HGB UR QL STRIP: NEGATIVE
KETONES UR STRIP-MCNC: NEGATIVE MG/DL
LD BODY BODY FLUID SOURCE: NORMAL
LDH FLD L TO P-CCNC: 67 U/L
LEUKOCYTE ESTERASE UR QL STRIP: NEGATIVE
MUCOUS THREADS #/AREA URNS LPF: PRESENT /LPF
NITRATE UR QL: NEGATIVE
O2/TOTAL GAS SETTING VFR VENT: 21 %
O2/TOTAL GAS SETTING VFR VENT: 21 %
OXYHGB MFR BLDV: 89 % (ref 70–75)
OXYHGB MFR BLDV: 90 % (ref 70–75)
PCO2 BLDV: 43 MM HG (ref 40–50)
PCO2 BLDV: 44 MM HG (ref 40–50)
PH BLDV: 7.32 [PH] (ref 7.32–7.43)
PH BLDV: 7.32 [PH] (ref 7.32–7.43)
PH UR STRIP: 5 [PH] (ref 5–7)
PLATELET # BLD AUTO: 315 10E3/UL (ref 150–450)
PO2 BLDV: 65 MM HG (ref 25–47)
PO2 BLDV: 67 MM HG (ref 25–47)
POTASSIUM SERPL-SCNC: 4.6 MMOL/L (ref 3.4–5.3)
POTASSIUM SERPL-SCNC: 4.8 MMOL/L (ref 3.4–5.3)
PROT FLD-MCNC: 4.8 G/DL
PROT SERPL-MCNC: 6.6 G/DL (ref 6.4–8.3)
PROTEIN BODY FLUID SOURCE: NORMAL
RBC URINE: 0 /HPF
SAO2 % BLDV: 90 % (ref 70–75)
SAO2 % BLDV: 90.7 % (ref 70–75)
SODIUM SERPL-SCNC: 137 MMOL/L (ref 135–145)
SODIUM SERPL-SCNC: 139 MMOL/L (ref 135–145)
SP GR UR STRIP: 1.01 (ref 1–1.03)
SQUAMOUS EPITHELIAL: <1 /HPF
UROBILINOGEN UR STRIP-MCNC: NORMAL MG/DL
WBC # BLD AUTO: 12.9 10E3/UL (ref 4–11)
WBC URINE: 0 /HPF

## 2025-01-14 PROCEDURE — 250N000011 HC RX IP 250 OP 636: Performed by: HOSPITALIST

## 2025-01-14 PROCEDURE — 0W9G3ZZ DRAINAGE OF PERITONEAL CAVITY, PERCUTANEOUS APPROACH: ICD-10-PCS | Performed by: RADIOLOGY

## 2025-01-14 PROCEDURE — 81001 URINALYSIS AUTO W/SCOPE: CPT | Performed by: PEDIATRICS

## 2025-01-14 PROCEDURE — 82150 ASSAY OF AMYLASE: CPT | Performed by: INTERNAL MEDICINE

## 2025-01-14 PROCEDURE — 82805 BLOOD GASES W/O2 SATURATION: CPT | Performed by: PEDIATRICS

## 2025-01-14 PROCEDURE — 89051 BODY FLUID CELL COUNT: CPT | Performed by: INTERNAL MEDICINE

## 2025-01-14 PROCEDURE — 36415 COLL VENOUS BLD VENIPUNCTURE: CPT | Performed by: PEDIATRICS

## 2025-01-14 PROCEDURE — 82945 GLUCOSE OTHER FLUID: CPT | Performed by: INTERNAL MEDICINE

## 2025-01-14 PROCEDURE — 89050 BODY FLUID CELL COUNT: CPT | Performed by: INTERNAL MEDICINE

## 2025-01-14 PROCEDURE — 82435 ASSAY OF BLOOD CHLORIDE: CPT | Performed by: PEDIATRICS

## 2025-01-14 PROCEDURE — 120N000001 HC R&B MED SURG/OB

## 2025-01-14 PROCEDURE — 250N000009 HC RX 250: Performed by: INTERNAL MEDICINE

## 2025-01-14 PROCEDURE — 49083 ABD PARACENTESIS W/IMAGING: CPT

## 2025-01-14 PROCEDURE — 85048 AUTOMATED LEUKOCYTE COUNT: CPT | Performed by: PEDIATRICS

## 2025-01-14 PROCEDURE — 250N000013 HC RX MED GY IP 250 OP 250 PS 637: Performed by: INTERNAL MEDICINE

## 2025-01-14 PROCEDURE — 82042 OTHER SOURCE ALBUMIN QUAN EA: CPT | Performed by: INTERNAL MEDICINE

## 2025-01-14 PROCEDURE — 250N000013 HC RX MED GY IP 250 OP 250 PS 637: Performed by: PEDIATRICS

## 2025-01-14 PROCEDURE — 84157 ASSAY OF PROTEIN OTHER: CPT | Performed by: INTERNAL MEDICINE

## 2025-01-14 PROCEDURE — 87070 CULTURE OTHR SPECIMN AEROBIC: CPT | Performed by: INTERNAL MEDICINE

## 2025-01-14 PROCEDURE — 82040 ASSAY OF SERUM ALBUMIN: CPT | Performed by: PEDIATRICS

## 2025-01-14 PROCEDURE — 80053 COMPREHEN METABOLIC PANEL: CPT | Performed by: PEDIATRICS

## 2025-01-14 PROCEDURE — 94660 CPAP INITIATION&MGMT: CPT

## 2025-01-14 PROCEDURE — 99232 SBSQ HOSP IP/OBS MODERATE 35: CPT | Performed by: PEDIATRICS

## 2025-01-14 PROCEDURE — 83615 LACTATE (LD) (LDH) ENZYME: CPT | Performed by: INTERNAL MEDICINE

## 2025-01-14 PROCEDURE — 999N000157 HC STATISTIC RCP TIME EA 10 MIN

## 2025-01-14 PROCEDURE — 250N000013 HC RX MED GY IP 250 OP 250 PS 637: Performed by: HOSPITALIST

## 2025-01-14 PROCEDURE — 85049 AUTOMATED PLATELET COUNT: CPT | Performed by: PEDIATRICS

## 2025-01-14 RX ORDER — LIDOCAINE HYDROCHLORIDE 10 MG/ML
5 INJECTION, SOLUTION EPIDURAL; INFILTRATION; INTRACAUDAL; PERINEURAL ONCE
Status: COMPLETED | OUTPATIENT
Start: 2025-01-14 | End: 2025-01-14

## 2025-01-14 RX ADMIN — APIXABAN 5 MG: 5 TABLET, FILM COATED ORAL at 08:13

## 2025-01-14 RX ADMIN — LIDOCAINE HYDROCHLORIDE 7 ML: 10 INJECTION, SOLUTION EPIDURAL; INFILTRATION; INTRACAUDAL; PERINEURAL at 11:47

## 2025-01-14 RX ADMIN — MONTELUKAST 10 MG: 10 TABLET, FILM COATED ORAL at 08:13

## 2025-01-14 RX ADMIN — FAMOTIDINE 20 MG: 20 TABLET, FILM COATED ORAL at 20:07

## 2025-01-14 RX ADMIN — METFORMIN HYDROCHLORIDE 1000 MG: 500 TABLET ORAL at 18:24

## 2025-01-14 RX ADMIN — CEFTRIAXONE SODIUM 2 G: 2 INJECTION, POWDER, FOR SOLUTION INTRAMUSCULAR; INTRAVENOUS at 06:39

## 2025-01-14 RX ADMIN — MIDODRINE HYDROCHLORIDE 5 MG: 2.5 TABLET ORAL at 13:01

## 2025-01-14 RX ADMIN — Medication 1 PATCH: at 08:18

## 2025-01-14 RX ADMIN — OLANZAPINE 10 MG: 2.5 TABLET, FILM COATED ORAL at 20:06

## 2025-01-14 RX ADMIN — PANTOPRAZOLE SODIUM 40 MG: 40 TABLET, DELAYED RELEASE ORAL at 20:06

## 2025-01-14 RX ADMIN — FUROSEMIDE 40 MG: 40 TABLET ORAL at 08:55

## 2025-01-14 RX ADMIN — MIDODRINE HYDROCHLORIDE 5 MG: 2.5 TABLET ORAL at 18:25

## 2025-01-14 RX ADMIN — TRAZODONE HYDROCHLORIDE 100 MG: 50 TABLET ORAL at 20:06

## 2025-01-14 RX ADMIN — SPIRONOLACTONE 100 MG: 25 TABLET ORAL at 08:55

## 2025-01-14 RX ADMIN — METFORMIN HYDROCHLORIDE 1000 MG: 500 TABLET ORAL at 08:13

## 2025-01-14 RX ADMIN — FAMOTIDINE 20 MG: 20 TABLET, FILM COATED ORAL at 08:14

## 2025-01-14 RX ADMIN — MIDODRINE HYDROCHLORIDE 5 MG: 2.5 TABLET ORAL at 08:15

## 2025-01-14 RX ADMIN — LEVOTHYROXINE SODIUM 6.25 MCG: 25 TABLET ORAL at 06:39

## 2025-01-14 RX ADMIN — UMECLIDINIUM 1 PUFF: 62.5 AEROSOL, POWDER ORAL at 08:08

## 2025-01-14 RX ADMIN — FLUTICASONE FUROATE AND VILANTEROL TRIFENATATE 1 PUFF: 100; 25 POWDER RESPIRATORY (INHALATION) at 08:08

## 2025-01-14 RX ADMIN — FLUOXETINE HYDROCHLORIDE 20 MG: 20 CAPSULE ORAL at 08:13

## 2025-01-14 RX ADMIN — APIXABAN 5 MG: 5 TABLET, FILM COATED ORAL at 20:07

## 2025-01-14 RX ADMIN — PANTOPRAZOLE SODIUM 40 MG: 40 TABLET, DELAYED RELEASE ORAL at 08:13

## 2025-01-14 ASSESSMENT — ACTIVITIES OF DAILY LIVING (ADL)
ADLS_ACUITY_SCORE: 52
ADLS_ACUITY_SCORE: 47
ADLS_ACUITY_SCORE: 52
ADLS_ACUITY_SCORE: 47
ADLS_ACUITY_SCORE: 52
ADLS_ACUITY_SCORE: 47
ADLS_ACUITY_SCORE: 52
ADLS_ACUITY_SCORE: 47

## 2025-01-14 NOTE — PLAN OF CARE
Goal Outcome Evaluation:      Plan of Care Reviewed With: patient    Overall Patient Progress: no changeOverall Patient Progress: no change    Outcome Evaluation: Vss. Pt voiding in large amounts. Abdomen rounded, distended, plan for paracentesis today. IV Rocephin given as scheduled.

## 2025-01-14 NOTE — PROGRESS NOTES
"4 hr shift note:    Pt A&O x4. Ambulating independently in room. Voiding well, 800 ml out. IVSL. LSC on RA. Melvin patch on left shoulder. Abdomen distended and taut, denies pain at this time. /66 (BP Location: Left arm)   Pulse 67   Temp 97.8  F (36.6  C) (Axillary)   Resp 18   Ht 1.651 m (5' 5\")   Wt 120.8 kg (266 lb 6.4 oz)   SpO2 97%   BMI 44.33 kg/m  .     "

## 2025-01-14 NOTE — PROGRESS NOTES
Care Management Follow Up    Length of Stay (days): 3    Expected Discharge Date: 01/15/2025     Concerns to be Addressed: discharge planning     Patient plan of care discussed at interdisciplinary rounds: Yes    Anticipated Discharge Disposition: Group Home  Anticipated Discharge Services: None  Anticipated Discharge DME: None    Patient/family educated on Medicare website which has current facility and service quality ratings: no  Education Provided on the Discharge Plan: Yes  Patient/Family in Agreement with the Plan: yes    Referrals Placed by CM/SW:    Private pay costs discussed: Not applicable    Discussed  Partnership in Safe Discharge Planning  document with patient/family: No     Handoff Completed: Yes, non-[facility] PCP: External handoff communication completed    Additional Information:  Per MD at IDT rounds pt is NOT medically stable for discharge today.    Discharge plan remains unchanged at this time, patient currently independent in his room per nursing notes.    CM to remain involved for discharge planning and coordination of transportation back to group [facility] upon discharge. Pt unable to use [place] policy and  will not provide transport. Due to pts mobility status, pt may need cab voucher. [employer] is a transport agency with a van but requires 24 hour notice. [employer]  likely better option for transport v.s standard cab.     Plan: Return to [facility]    Transport: Agency transport, likely Cab Voucher. [employer] best option if able.    JUANITA Toribio  Care Transitions Registered Nurse

## 2025-01-14 NOTE — PROGRESS NOTES
[facility]    Medicine Progress Note - Hospitalist Service    Date of Admission:  1/11/2025    Assessment & Plan   Warren Jaramillo is a 44 year old male with cirrhosis previously attributed to alcohol, DM 2, asthma, hypertension, HFpEF, treated with and DVT Eliquis who had recently been hospitalized for SBP discharged on 1/8/25 on oral ciprofloxacin to complete treatment course who presented with worsening abdominal pain and distention and was admitted due to concern for persistent versus recurrent SBP.     Persistent versus recurrent spontaneous bacterial peritonitis   Cirrhosis with ascites attributed to alcohol use   Followed by [facility] for cirrhosis previously attributed to alcohol.  Was hospitalized January 2 to January 8 for SBP treated with antibiotics including ciprofloxacin at hospital discharge and cultures obtained during that hospitalization were negative for specific organism.  Clinical presentation and results of paracentesis on this admission were concerning for SBP.  Abdomen CT in ER did not demonstrate any other acute abnormalities to suggest other cause for bacterial peritonitis.  He was started on ceftriaxone empirically and was also treated with paracentesis and IV albumin administration.  His previous chronic diuretics had been held at recent hospital discharge due to concern for possible hepatorenal syndrome, but diuretics were restarted upon this admission.  He is improving clinically.  Repeat paracentesis on 1/14 demonstrates slight improvement in total nucleated cell count of ascites fluid, but neutrophil count has slightly increased.  During this hospitalization, patient provides history that he had been drinking up to 16 bottles of fluids every day each measuring 28 ounces.  -Continue empiric ceftriaxone, today is day 4, anticipate at least another 48 hours of parenteral antibiotics and repeat diagnostic paracentesis on 1/16 to recheck cell  count  -continue lasix and spironolactone and increased doses today to 40 mg Lasix and 100 mg spironolactone  -Start 2 L total fluid restriction every day, reviewed with the patient and his mother today  -Ordered recheck labs in am  -Anticipate [facility] follow up in 4-6 weeks as previously recommended    RV failure  No previous diagnosis of chronic heart disease and echo August 2023 demonstrated normal LV function at which time RV could not be visualized.  Recent cardiac MRI December 2024 demonstrated borderline RV enlargement with mildly reduced RV systolic function and RV EF 42% suspicious for RV failure.  There is concern that RV failure could also be contributing to ascites and volume overload.  -Continuing diuretics and adjusting doses as indicated  -Starting 2 L fluid restriction    Possible normal anion gap metabolic acidosis  He was admitted with low serum carbon dioxide suspicious for metabolic acidosis.  Anion gap was normal.  He continues to have low serum carbon dioxide with normal anion gap.  VBG results are reassuring.  -Ordered monitoring of electrolytes and blood gases    History of DVT  Medication induced coagulopathy  Diagnosed with DVT of left IJ, subclavian, and axillary veins in early December 2024 at which time therapeutic anticoagulation was started.  He continues to take Eliquis which causes coagulopathy.  -Continue Eliquis    Dysuria  Urinary hesitancy  During this hospitalization, patient has had intermittent dysuria over persists.  He also had an episode of urinary hesitancy on 1/12 that required straight catheterization of the bladder.  UA on January 10 had been normal aside from small proteinuria.  -Ordered UA and if abnormal suspicious for infection will add UC    Type 2 diabetes  Carries diagnosis of type 2 diabetes mellitus managed with lifestyle changes.  Most recent hemoglobin A1c 6.2 earlier this month.  Patient and family reporting that he tends to run low blood sugars if he does not  have any carbohydrates in his diet.  Blood sugars on his chemistry panels during hospitalization have been normal.  -Not routinely monitoring blood sugars during hospitalization  -Ordered moderate carbohydrate diet     Autism with learning Disability   Known autism with chronic learning disability and he apparently does not have capacity for independent medical decision making.  He chronically lives in group home and has legal guardian Jesika Harden P: [phone] and rep payee for finances and his legal guardian was notified of this hospital admission.  -Anticipate patient will return to group home when medically stable   -Recommend consulting with his legal guardian for decision making as indicated   -His legal guardian Jesika reportedly advised not to share any information with patient's ex significant other Amy    AVA on CPAP  Chronic AVA treated with CPAP  -Continue CPAP with sleep     Tobacco abuse   Continues to smoke cigarettes about 1 ppd.  Expressing desire and intent to quit smoking.  -Continue nicotine patch  -Smoking cessation encouraged    Tongue lesion  He has known lesion on the anterior aspect of his tongue for which he had plan to follow-up with ENT to discuss possible biopsy.  Tongue lesion has been stable during this hospital stay.  -Anticipate outpatient follow-up with ENT     Hypothyroidism   In ER January 10 patient was found to have mildly elevated TSH (10) and low Free T4 (0.86) and was started on levothyroxine treatment for hypothyroidism on 1/11/2025  -Continue levothyroxine  -Recommend recheck thyroid function in 4 to 6 weeks    Probable morbid obesity  Appears morbidly obese with BMI 44 although BMI may be overestimated by the presence of ascites  -Recommended lifestyle changes to promote weight loss          Diet: Combination Diet Regular Diet Adult; 2 gm NA Diet  Fluid restriction 2000 ML FLUID    DVT Prophylaxis: DOAC  Khan Catheter: Not present  Lines: None     Cardiac Monitoring:  "None  Code Status: Full Code      Clinically Significant Risk Factors                # Coagulation Defect: INR = 1.45 (Ref range: 0.85 - 1.15) and/or PTT = 35 Seconds (Ref range: 22 - 38 Seconds), will monitor for bleeding    # Hypertension: Noted on problem list  # Chronic heart failure with preserved ejection fraction: heart failure noted on problem list and last echo with EF >50%           # Severe Obesity: Estimated body mass index is 44.55 kg/m  as calculated from the following:    Height as of this encounter: 1.651 m (5' 5\").    Weight as of this encounter: 121.4 kg (267 lb 11.2 oz)., PRESENT ON ADMISSION     # Financial/Environmental Concerns: none         Social Drivers of Health    Housing Stability: Low Risk  (1/11/2025)    Housing Stability     Do you have housing? : Yes     Are you worried about losing your housing?: No   Recent Concern: Housing Stability - High Risk (12/2/2024)    Housing Stability     Do you have housing? : No     Are you worried about losing your housing?: No   Tobacco Use: High Risk (1/10/2025)    Patient History     Smoking Tobacco Use: Every Day     Smokeless Tobacco Use: Never   Interpersonal Safety: High Risk (1/11/2025)    Interpersonal Safety     Do you feel physically and emotionally safe where you currently live?: No     Within the past 12 months, have you been hit, slapped, kicked or otherwise physically hurt by someone?: No     Within the past 12 months, have you been humiliated or emotionally abused in other ways by your partner or ex-partner?: No    Received from [facility], [facility]    Social Connections          Disposition Plan     Medically Ready for Discharge: Anticipated in 2-4 Days             Ean Villanueva MD  Hospitalist Service  [facility]  Securely message with [employer] (more info)  Text page via [facility] Paging/Directory "   ______________________________________________________________________    Interval History   There were no significant overnight events.  Overall, he says he feels better today.  He denies abdominal pain.  However, he is having some intermittent dysuria.  He said he had an episode 2 days ago when he was unable to void spontaneously despite urge to do so and underwent bladder catheterization at that time.  Since then, he has been able to void spontaneously.  He has been afebrile and hemodynamically stable.  Oxygenation has been normal.  He is tolerating oral intake and advancing activity.    Additional history is obtained from the patient.  He says that he had been told by other persons (he thinks his sister) that he should drink more than 3 times his weight in ounces of fluid every day.  Last fall, he was drinking up to 16 servings a day each measuring 28 ounces.  He does not recall any previous advised to restrict his fluid intake.  His mother says that when he has restricted carbohydrate intake, he has had low blood sugar readings.    Physical Exam   Vital Signs: Temp: 97.8  F (36.6  C) Temp src: Oral BP: 111/49 Pulse: 66   Resp: 18 SpO2: 97 % O2 Device: None (Room air)    Patient Vitals for the past 24 hrs:   BP Temp Temp src Pulse Resp SpO2 Weight   01/14/25 1256 111/49 -- -- 66 18 97 % --   01/14/25 1118 116/48 97.8  F (36.6  C) Oral 65 18 97 % --   01/14/25 0809 111/53 -- -- -- -- 98 % --   01/14/25 0629 114/61 97.6  F (36.4  C) Oral 62 18 98 % 121.4 kg (267 lb 11.2 oz)   01/14/25 0453 -- -- -- -- 18 97 % --   01/14/25 0318 110/58 97.5  F (36.4  C) Axillary 62 20 96 % --   01/13/25 2319 119/55 97.4  F (36.3  C) Oral 61 20 97 % --   01/13/25 2303 -- -- -- -- 16 96 % --   01/13/25 1949 114/66 97.8  F (36.6  C) Axillary 67 18 97 % --   01/13/25 1429 111/57 97.8  F (36.6  C) Oral 63 19 96 % --     Weight: 267 lbs 11.2 oz  Vitals:    01/11/25 0300 01/12/25 0400 01/13/25 0532 01/14/25 0629   Weight: 120.9 kg (266  lb 8 oz) 119.8 kg (264 lb 1.6 oz) 120.8 kg (266 lb 6.4 oz) 121.4 kg (267 lb 11.2 oz)       Intake/Output Summary (Last 24 hours) at 1/14/2025 1348  Last data filed at 1/14/2025 1300  Gross per 24 hour   Intake 2560 ml   Output 7950 ml   Net -5390 ml       General Appearance: Obese man without signs of acute distress resting in bed  Respiratory:   Cardiovascular:   GI: Obese abdomen, no tense distention, soft, nontender  Musculoskeletal: Minimal edema in the lower legs    Medical Decision Making             Data     I have personally reviewed the following data over the past 24 hrs:    12.9 (H)  \   N/A   / 315     137 105 33.0 (H) /  90   4.8 18 (L) 1.22 (H) \     ALT: 28 AST: 20 AP: 303 (H) TBILI: 0.4   ALB: 4.2 TOT PROTEIN: 6.6 LIPASE: N/A       Blood cultures are negative so far  Ascites fluid culture remains negative to date    Ascites fluid obtained today demonstrates total nucleated cell count 1899, decreased compared with 2360 on January 10.  However, neutrophil percentage has increased to 25% from 18% previously and total neutrophil count has increased to 475 compared with 425 previously.    Venous Blood Gas  Recent Labs   Lab 01/14/25  1410 01/14/25  0543   PHV 7.32 7.32   PCO2V 44 43   PO2V 65* 67*   HCO3V 23 22   ZO -3.6* -3.9*   O2PER 21 21       Imaging results reviewed over the past 24 hrs:   Recent Results (from the past 24 hours)   US Paracentesis without Albumin    Narrative    EXAM:  1. PARACENTESIS  2. ULTRASOUND GUIDANCE  LOCATION: [facility]  DATE: 1/14/2025    INDICATION: Ascites.    PROCEDURE: Informed consent obtained. Time out performed. The abdomen was prepped and draped in a sterile fashion. Less than 10 mL of 1% lidocaine was infused into local soft tissues. A 5 [place] catheter system was introduced into the abdominal ascites   under ultrasound guidance.    4.9 liters of clear fluid were removed and sent to lab if requested.    Patient tolerated procedure  well.    Ultrasound imaging was obtained and placed in the patient's permanent medical record.      Impression    IMPRESSION:  1.  Status post ultrasound-guided paracentesis.    Reference CPT Code: 01438     Recent Labs   Lab 01/14/25  0543 01/13/25  0520 01/12/25  0518 01/10/25  2323 01/10/25  1211   WBC 12.9* 13.2* 13.0*  --  14.0*   HGB  --  12.2* 12.5*  --  12.1*   MCV  --  85 85  --  85    310 319  --  326   INR  --  1.45* 1.45*  --  1.44*    135 137  --  139   POTASSIUM 4.8 5.0 4.9  --  5.4*   CHLORIDE 105 105 106  --  108*   CO2 18* 19* 19*  --  19*   BUN 33.0* 30.9* 30.8*  --  43.1*   CR 1.22* 1.07 0.86  --  0.98   ANIONGAP 14 11 12  --  12   WES 9.3 9.1 9.6  --  9.8   GLC 90 86 94   < > 88   ALBUMIN 4.2 4.1 4.5  --  4.9   PROTTOTAL 6.6 6.3* 6.8  --  7.5   BILITOTAL 0.4 0.5 0.3  --  0.5   ALKPHOS 303* 282* 294*  --  321*   ALT 28 29 31  --  42   AST 20 19 20  --  29   LIPASE  --   --   --   --  18    < > = values in this interval not displayed.

## 2025-01-14 NOTE — CONSULTS
"SPIRITUAL HEALTH SERVICES Consult Note    Medical Surgical Unit at [facility]    REFERRAL SOURCE/REASON FOR VISIT - Saw patient per length of stay.    SUMMARY - I visited Warren, who has been hospitalized for 3 days now.  He was open to my introduction and to emotional and spiritual support.  He spoke, sadly, about being \"far away from home\" so that no one can visit.  Warren reported that his medications \"have been wreaking havoc on him the last few days, and that his doctors have not determined the type of infection he has.  He asked me to pray for this process, which I agreed to do.  I offered empathy and calm to Warren, along with a blessing.  In return, he offered me a blessing.         Plan - I will continue to follow patient and be available for any ongoing support needs until his discharge.     Steve Garcia, Ph.D, [facility]  Spiritual Health Services  [address] [place], [place] [address] [phone]  [email or web address]    Assessment -     Patient/Family Understanding of Illness and Goals of Care - Patient seems to understand his illness and goals.    Strengths, Coping, and Resources - family, , emilia, emilia community    Meaning, Beliefs, and Spirituality - Patient is Buddhism, and a member of [employer].  He reported that this [employer], along with 2 others, have started prayer chains to pray for him.                                                               "

## 2025-01-15 LAB
ALBUMIN SERPL BCG-MCNC: 4 G/DL (ref 3.5–5.2)
ALP SERPL-CCNC: 311 U/L (ref 40–150)
ALT SERPL W P-5'-P-CCNC: 26 U/L (ref 0–70)
ANION GAP SERPL CALCULATED.3IONS-SCNC: 14 MMOL/L (ref 7–15)
AST SERPL W P-5'-P-CCNC: 19 U/L (ref 0–45)
BACTERIA BLD CULT: NO GROWTH
BACTERIA BLD CULT: NO GROWTH
BACTERIA FLD CULT: NO GROWTH
BASE EXCESS BLDV CALC-SCNC: -3.3 MMOL/L (ref -3–3)
BILIRUB SERPL-MCNC: 0.2 MG/DL
BUN SERPL-MCNC: 32.4 MG/DL (ref 6–20)
CALCIUM SERPL-MCNC: 9.1 MG/DL (ref 8.8–10.4)
CHLORIDE SERPL-SCNC: 106 MMOL/L (ref 98–107)
CREAT SERPL-MCNC: 1.1 MG/DL (ref 0.67–1.17)
EGFRCR SERPLBLD CKD-EPI 2021: 85 ML/MIN/1.73M2
GLUCOSE SERPL-MCNC: 96 MG/DL (ref 70–99)
GRAM STAIN RESULT: NORMAL
GRAM STAIN RESULT: NORMAL
HCO3 BLDV-SCNC: 23 MMOL/L (ref 21–28)
HCO3 SERPL-SCNC: 18 MMOL/L (ref 22–29)
O2/TOTAL GAS SETTING VFR VENT: 21 %
OXYHGB MFR BLDV: 93 % (ref 70–75)
PCO2 BLDV: 43 MM HG (ref 40–50)
PH BLDV: 7.33 [PH] (ref 7.32–7.43)
PO2 BLDV: 79 MM HG (ref 25–47)
POTASSIUM SERPL-SCNC: 4.4 MMOL/L (ref 3.4–5.3)
PROT SERPL-MCNC: 6.2 G/DL (ref 6.4–8.3)
SAO2 % BLDV: 93.2 % (ref 70–75)
SODIUM SERPL-SCNC: 138 MMOL/L (ref 135–145)
WBC # BLD AUTO: 13.5 10E3/UL (ref 4–11)

## 2025-01-15 PROCEDURE — 36415 COLL VENOUS BLD VENIPUNCTURE: CPT | Performed by: PEDIATRICS

## 2025-01-15 PROCEDURE — 250N000013 HC RX MED GY IP 250 OP 250 PS 637: Performed by: HOSPITALIST

## 2025-01-15 PROCEDURE — 94660 CPAP INITIATION&MGMT: CPT

## 2025-01-15 PROCEDURE — 82805 BLOOD GASES W/O2 SATURATION: CPT | Performed by: PEDIATRICS

## 2025-01-15 PROCEDURE — 82310 ASSAY OF CALCIUM: CPT | Performed by: PEDIATRICS

## 2025-01-15 PROCEDURE — 84155 ASSAY OF PROTEIN SERUM: CPT | Performed by: PEDIATRICS

## 2025-01-15 PROCEDURE — 99232 SBSQ HOSP IP/OBS MODERATE 35: CPT | Performed by: PEDIATRICS

## 2025-01-15 PROCEDURE — 999N000157 HC STATISTIC RCP TIME EA 10 MIN

## 2025-01-15 PROCEDURE — 250N000013 HC RX MED GY IP 250 OP 250 PS 637: Performed by: INTERNAL MEDICINE

## 2025-01-15 PROCEDURE — 120N000001 HC R&B MED SURG/OB

## 2025-01-15 PROCEDURE — 85048 AUTOMATED LEUKOCYTE COUNT: CPT | Performed by: PEDIATRICS

## 2025-01-15 PROCEDURE — 250N000013 HC RX MED GY IP 250 OP 250 PS 637: Performed by: PEDIATRICS

## 2025-01-15 PROCEDURE — 250N000011 HC RX IP 250 OP 636: Performed by: HOSPITALIST

## 2025-01-15 PROCEDURE — 99222 1ST HOSP IP/OBS MODERATE 55: CPT | Performed by: INTERNAL MEDICINE

## 2025-01-15 RX ADMIN — MIDODRINE HYDROCHLORIDE 5 MG: 2.5 TABLET ORAL at 08:14

## 2025-01-15 RX ADMIN — FAMOTIDINE 20 MG: 20 TABLET, FILM COATED ORAL at 08:15

## 2025-01-15 RX ADMIN — CEFTRIAXONE SODIUM 2 G: 2 INJECTION, POWDER, FOR SOLUTION INTRAMUSCULAR; INTRAVENOUS at 06:35

## 2025-01-15 RX ADMIN — METFORMIN HYDROCHLORIDE 1000 MG: 500 TABLET ORAL at 17:34

## 2025-01-15 RX ADMIN — MONTELUKAST 10 MG: 10 TABLET, FILM COATED ORAL at 08:14

## 2025-01-15 RX ADMIN — TRAZODONE HYDROCHLORIDE 100 MG: 50 TABLET ORAL at 20:38

## 2025-01-15 RX ADMIN — FAMOTIDINE 20 MG: 20 TABLET, FILM COATED ORAL at 20:38

## 2025-01-15 RX ADMIN — Medication 1 PATCH: at 08:15

## 2025-01-15 RX ADMIN — UMECLIDINIUM 1 PUFF: 62.5 AEROSOL, POWDER ORAL at 08:13

## 2025-01-15 RX ADMIN — MIDODRINE HYDROCHLORIDE 5 MG: 2.5 TABLET ORAL at 17:34

## 2025-01-15 RX ADMIN — APIXABAN 5 MG: 5 TABLET, FILM COATED ORAL at 20:38

## 2025-01-15 RX ADMIN — PANTOPRAZOLE SODIUM 40 MG: 40 TABLET, DELAYED RELEASE ORAL at 20:38

## 2025-01-15 RX ADMIN — METFORMIN HYDROCHLORIDE 1000 MG: 500 TABLET ORAL at 08:14

## 2025-01-15 RX ADMIN — SPIRONOLACTONE 100 MG: 25 TABLET ORAL at 08:15

## 2025-01-15 RX ADMIN — FLUTICASONE FUROATE AND VILANTEROL TRIFENATATE 1 PUFF: 100; 25 POWDER RESPIRATORY (INHALATION) at 08:13

## 2025-01-15 RX ADMIN — MIDODRINE HYDROCHLORIDE 5 MG: 2.5 TABLET ORAL at 12:09

## 2025-01-15 RX ADMIN — FLUOXETINE HYDROCHLORIDE 20 MG: 20 CAPSULE ORAL at 08:14

## 2025-01-15 RX ADMIN — PANTOPRAZOLE SODIUM 40 MG: 40 TABLET, DELAYED RELEASE ORAL at 08:15

## 2025-01-15 RX ADMIN — OLANZAPINE 10 MG: 2.5 TABLET, FILM COATED ORAL at 20:38

## 2025-01-15 RX ADMIN — FUROSEMIDE 40 MG: 40 TABLET ORAL at 08:14

## 2025-01-15 RX ADMIN — APIXABAN 5 MG: 5 TABLET, FILM COATED ORAL at 08:14

## 2025-01-15 RX ADMIN — LEVOTHYROXINE SODIUM 6.25 MCG: 25 TABLET ORAL at 06:35

## 2025-01-15 ASSESSMENT — ACTIVITIES OF DAILY LIVING (ADL)
ADLS_ACUITY_SCORE: 47

## 2025-01-15 NOTE — CONSULTS
Telemedicine Visit - General ID Service: Consult Note      Patient:  Warren Jaramillo, Date of birth 1980, Medical record number [id number]  Date of Visit:  January 15, 2025         Assessment and Recommendations:   ID Problem List:    Alcoholic cirrhosis- had admission 1/2-1/8 with culture negative but elevated neutrophils and treated with 5 days of ceftriaxone, readmitted 1/11 and again treated with 5 days of ceftriaxone  DM2  HFpEF  HTN  DVT on Eliquis    Recommendations:    -5 days of ceftriaxone this admission should be sufficient  -I would send on ciprofloxacin 500 mg daily indefinitely  for prophylaxis  -does not need repeat paracentesis to document resolution of SBP    Discussion:    Patient with alcoholic cirrhosis and hx of SBP. He was just here and treated with ceftriaxone. He was readmitted and re-treated. I am not sure if this is treatment failure or just needed better management of the ascites. The ceftriaxone is the appropriate management and he should be on ciprofloxacin 500 mg daily.     We will sign off. Please call with questions.     Tricia Lazcano MD  Infectious Diseases Staff    I spent at least 60 minutes on the day of this encounter on chart review, patient interview/exam, and note preparation. This visit was performed remotely as a telemedicine encounter using the [email or web address] platform.        History of Present Illness:     Patient is a 44 year old man with a hx of cirrhosis and recent admission for SBP from 1/2-1/8. He had a paracentesis which was culture negative but high neutrophil count of 464. It was also noted that there was a heart failure component to his ascites. He got 5 days of ceftriaxone.     He was discharged on ciprofloxacin 500 mg daily for prophylaxis and took one dose. However his fluid returned and so he went back to the ED.     He says he was having a lot of ascites. He reports having a lot of ascites. He was having pain in the LLQ of his  "abd.    He was again given 5 days of ceftriaxone as well as repeat paracenteses.      He reports the pain has gone away with paracenteses. Never fevers or chills. He reports his belly currently feels \"light\" and is soft. He feels well and is hoping he can go home.    He apparently lives in [place] and went to [facility] but was sent to [facility] as it was the only bed to be found.     He apparently has an intolerance to sulfa antibiotics with headache and nausea.          Review of Systems:     General: No change in weight, sleep or appetite.  Normal energy.  No fever or chills, no excessive fatigue or daytime drowsiness  Eyes: Negative for vision changes or eye problemsar   ENT: No problems with ears, nose or throat.  No difficulty swallowing.  Resp: No coughing, wheezing or shortness of breath, denies apnea  CV: No chest pains or palpitations  GI: No nausea, vomiting,  heartburn, abdominal pain, diarrhea, constipation or change in bowel habits  : No urinary frequency or dysuria, bladder or kidney problems  Musculoskeletal: No significant muscle or joint pains  Neurologic: No headaches, numbness, tingling, weakness, problems with balance or coordination  Skin: Denies rashes or lesions         Current Antimicrobials     Current: ceftriaxone    Prior: ciprofloxacin 500 mg daily       Past Medical History:     Past Medical History:   Diagnosis Date    COPD exacerbation (H) 12/2/2024    DM2 (diabetes mellitus, type 2) (H) 4/28/2020    HTN (hypertension) 7/30/2012    Thyroid nodule 7/31/2019    Rojas's disease (H)      Past Surgical History:   Procedure Laterality Date    COLONOSCOPY      ESOPHAGOSCOPY, GASTROSCOPY, DUODENOSCOPY (EGD), COMBINED N/A 7/21/2023    Procedure: ESOPHAGOGASTRODUODENOSCOPY WITH GASTRIC AND ESOPHAGEAL BIOPSIES;  Surgeon: Filiberto Aragon MD;  Location: [facility] OR    TOOTH EXTRACTION            Family History:     Family History   Problem Relation Age of Onset    Unknown/Adopted Father  "    Unknown/Adopted Maternal Grandmother     C.A.D. Maternal Grandfather     Diabetes Maternal Grandfather     Cerebrovascular Disease Maternal Grandfather     Unknown/Adopted Paternal Grandmother     Unknown/Adopted Paternal Grandfather     Unknown/Adopted Brother     Unknown/Adopted Sister           Social History:     Social History     Socioeconomic History    Marital status: Single     Spouse name: Not on file    Number of children: Not on file    Years of education: Not on file    Highest education level: Not on file   Occupational History    Not on file   Tobacco Use    Smoking status: Every Day     Current packs/day: 1.00     Types: Cigarettes    Smokeless tobacco: Never   Vaping Use    Vaping status: Never Used   Substance and Sexual Activity    Alcohol use: No     Comment: once every 3 months    Drug use: No    Sexual activity: Never     Partners: Female   Other Topics Concern     Service No    Blood Transfusions No    Caffeine Concern No    Occupational Exposure No    Hobby Hazards No    Sleep Concern No    Stress Concern Yes     Comment: sometimes    Weight Concern No    Special Diet Yes     Comment: counting carbs    Back Care No    Exercise Yes    Bike Helmet Not Asked     Comment: N/A    Seat Belt Yes    Self-Exams Yes    Parent/sibling w/ CABG, MI or angioplasty before 65F 55M? Not Asked   Social History Narrative    Not on file     Social Drivers of Health     Financial Resource Strain: Low Risk  (1/11/2025)    Financial Resource Strain     Within the past 12 months, have you or your family members you live with been unable to get utilities (heat, electricity) when it was really needed?: No   Food Insecurity: Low Risk  (1/11/2025)    Food Insecurity     Within the past 12 months, did you worry that your food would run out before you got money to buy more?: No     Within the past 12 months, did the food you bought just not last and you didn t have money to get more?: No   Transportation Needs:  Low Risk  (1/11/2025)    Transportation Needs     Within the past 12 months, has lack of transportation kept you from medical appointments, getting your medicines, non-medical meetings or appointments, work, or from getting things that you need?: No   Physical Activity: Not on file   Stress: Not on file   Social Connections: Unknown (5/1/2023)    [facility]    Social Connections     Frequency of Communication with Friends and Family: Not on file   Interpersonal Safety: High Risk (1/11/2025)    Interpersonal Safety     Do you feel physically and emotionally safe where you currently live?: No     Within the past 12 months, have you been hit, slapped, kicked or otherwise physically hurt by someone?: No     Within the past 12 months, have you been humiliated or emotionally abused in other ways by your partner or ex-partner?: No   Housing Stability: Low Risk  (1/11/2025)    Housing Stability     Do you have housing? : Yes     Are you worried about losing your housing?: No   Recent Concern: Housing Stability - High Risk (12/2/2024)    Housing Stability     Do you have housing? : No     Are you worried about losing your housing?: No            Physical Exam:   Physical exam performed via the [employer] telemedicine cart with bedside nursing assistance.    Ranges for vital signs:  Temp:  [97.4  F (36.3  C)-98.2  F (36.8  C)] 97.7  F (36.5  C)  Pulse:  [57-76] 63  Resp:  [18-22] 20  BP: (106-122)/(45-60) 119/58  SpO2:  [96 %-98 %] 97 %    Intake/Output Summary (Last 24 hours) at 1/15/2025 0845  Last data filed at 1/15/2025 0819  Gross per 24 hour   Intake 1520 ml   Output 8500 ml   Net -6980 ml     Exam:  GENERAL:  well-developed, well-nourished, sitting in bed in no acute distress.   ENT:  Head is normocephalic, atraumatic. Oropharynx is moist without exudates or ulcers.  EYES:  Eyes have anicteric sclerae.    NECK:  Supple.  LUNGS:   "Clear to auscultation.  CARDIOVASCULAR:  Regular rate and rhythm with no murmurs, gallops or rubs.  ABDOMEN:  Normal bowel sounds, soft, nontender.  EXT: Extremities warm and without edema.  SKIN:  No acute rashes.  Line is in place without any surrounding erythema.  NEUROLOGIC:  Grossly nonfocal.         Laboratory Data:   Reviewed.  Pertinent for:    Laboratory Data:   No results found for: \"ACD4\"    Microbiology:  Culture   Date Value Ref Range Status   01/10/2025 No growth after 4 days  Preliminary   01/10/2025 No Growth  Final   01/10/2025 No growth after 4 days  Preliminary   01/06/2025 No Growth  Final   01/02/2025 No Growth  Final   12/02/2024 No Growth  Final       Last check of C difficile  C Diff Toxin B PCR   Date Value Ref Range Status   04/25/2011   Final    Negative: Clostridium difficile target DNA sequences NOT detected, presumed   negative for Clostridium difficile toxin B or the number of bacteria present   may be below the limit of detection for the test.   FDA approved assay performed using [employer] GeneXpert real-time PCR.   A negative result does not exclude actual disease due to Clostridium difficile   and may be due to improper collection, handling and storage of the specimen or   the number of organisms in the specimen is below the detection limit of the   assay.         Metabolic Studies       Recent Labs   Lab Test 01/15/25  0515 01/14/25  1410 01/14/25  0543 01/13/25  0520 01/12/25  0518 01/10/25  2323 01/10/25  1211 01/08/25  0609 01/08/25  0603 01/07/25  0626 01/07/25  0619 01/02/25 2016 01/02/25  1226 04/13/24  1105 04/12/24  2245 03/14/23  1103 02/04/23  0239    139 137 135 137  --  139  --  137  --  134*   < > 139   < >  --    < > 138   POTASSIUM 4.4 4.6 4.8 5.0 4.9  --  5.4*  --  4.9  --  5.5*   < > 5.1   < >  --    < > 3.9   CHLORIDE 106 105 105 105 106  --  108*  --  105  --  102   < > 107   < >  --    < > 101   CO2 18* 20* 18* 19* 19*  --  19*  --  19*  --  18*   < > 19*   " < >  --    < > 26   ANIONGAP 14 14 14 11 12  --  12  --  13  --  14   < > 13   < >  --    < > 11   BUN 32.4*  --  33.0* 30.9* 30.8*  --  43.1*  --  65.4*  --  70.9*   < > 38.0*   < >  --    < > 11.0   CR 1.10  --  1.22* 1.07 0.86  --  0.98  --  1.64*  --  2.52*   < > 1.20*   < >  --    < > 0.67   GFRESTIMATED 85  --  75 88 >90  --  >90  --  53*  --  31*   < > 76   < >  --    < > >90   GLC 96  --  90 86 94 118* 88   < > 102*   < > 99   < > 85   < >  --    < > 139*   A1C  --   --   --   --   --   --   --   --   --   --   --   --  6.2*   < >  --    < >  --    WES 9.1  --  9.3 9.1 9.6  --  9.8  --  9.4  --  9.3   < > 10.5*   < >  --    < > 10.3*   PHOS  --   --   --   --   --   --   --   --  4.1  --  6.4*   < >  --   --   --   --   --    MAG  --   --   --   --   --   --  2.2  --  2.6*  --  2.4*   < >  --    < >  --    < >  --    LACT  --   --   --   --   --   --   --   --   --   --   --   --   --   --  1.6  --  1.3    < > = values in this interval not displayed.        Hepatic Studies    Recent Labs   Lab Test 01/15/25  0515 01/14/25  0543 01/13/25  0520 01/12/25  0518 01/10/25  1211 01/07/25  0619 01/04/25  0603   BILITOTAL 0.2 0.4 0.5 0.3 0.5 0.6  --    ALKPHOS 311* 303* 282* 294* 321* 237*  --    ALBUMIN 4.0 4.2 4.1 4.5 4.9 5.1  --    AST 19 20 19 20 29 14  --    ALT 26 28 29 31 42 22  --    GGT  --   --   --   --   --   --  90*       Pancreatitis testing    Recent Labs   Lab Test 01/10/25  1211 01/02/25  1226 12/29/24  1847 12/11/24  1607 12/10/24  1601 12/02/24  0631 06/09/24  1333 04/05/24  1404 02/24/24  0154 02/15/24  0918 08/20/23  2043 08/01/23  2117   LIPASE 18 19 26 16 17  --  21 19 19  --    < >  --    TRIG  --   --   --   --   --  143  --   --   --  116  --  137    < > = values in this interval not displayed.       Hematology Studies      Recent Labs   Lab Test 01/15/25  0515 01/14/25  0543 01/13/25  0520 01/12/25  0518 01/10/25  1211 01/08/25  0603 01/07/25  0619 01/06/25  0625   WBC 13.5* 12.9* 13.2*  "13.0* 14.0* 11.7* 12.0* 13.3*   HGB  --   --  12.2* 12.5* 12.1* 12.3* 11.8* 12.1*   HCT  --   --  38.1* 39.2* 37.9* 38.6* 36.0* 37.8*   PLT  --  315 310 319 326 304 287 299       Arterial Blood Gas Testing    Recent Labs   Lab Test 01/15/25  0515 01/14/25  1410 01/14/25  0543   O2PER 21 21 21        Urine Studies     Recent Labs   Lab Test 01/14/25  1546 01/10/25  1143 01/06/25  1304 01/02/25  2016 01/31/23  1706   URINEPH 5.0 5.5 5.0 5.0 6.0   NITRITE Negative Negative Negative Negative Negative   LEUKEST Negative Negative Negative Negative Negative   WBCU 0 2 1 1 2       Vancomycin Levels     No lab results found.    Invalid input(s): \"VANCO\"    Tobramycin levels     No lab results found.    Gentamicin levels    No lab results found.    Tacrolimus levels    Invalid input(s): \"TACROLIMUS\", \"TAC\", \"TACR\"      Latest Ref Rng & Units 1/15/2025     5:15 AM 1/14/2025     5:43 AM 1/13/2025     5:20 AM 1/12/2025     5:18 AM 1/10/2025    12:11 PM   Transplant Immunosuppression Labs   Creat 0.67 - 1.17 mg/dL 1.10  1.22  1.07  0.86  0.98    Urea Nitrogen 6.0 - 20.0 mg/dL 32.4  33.0  30.9  30.8  43.1    WBC 4.0 - 11.0 10e3/uL 13.5  12.9  13.2  13.0  14.0    Neutrophil %     68        Cyclosporine levels    Invalid input(s): \"CYCLOSPORINE\", \"CYC\"    Mycophenolate levels    Invalid input(s): \"MYPA\", \"MYP\"    Sirolimus levels    Invalid input(s): \"SIROLIMUS\", \"SIR\", \"RAPA\"    CSF testing    No lab results found.         Imaging:   US Paracentesis without Albumin    Result Date: 1/14/2025  EXAM: 1. PARACENTESIS 2. ULTRASOUND GUIDANCE LOCATION: [facility] DATE: 1/14/2025 INDICATION: Ascites. PROCEDURE: Informed consent obtained. Time out performed. The abdomen was prepped and draped in a sterile fashion. Less than 10 mL of 1% lidocaine was infused into local soft tissues. A 5 Bulgarian catheter system was introduced into the abdominal ascites under ultrasound guidance. 4.9 liters of clear fluid were " removed and sent to lab if requested. Patient tolerated procedure well. Ultrasound imaging was obtained and placed in the patient's permanent medical record.     IMPRESSION: 1.  Status post ultrasound-guided paracentesis. Reference CPT Code: 37289    US Paracentesis with Albumin    Result Date: 1/10/2025  EXAM: 1. PARACENTESIS 2. ULTRASOUND GUIDANCE LOCATION: [facility] DATE: 1/10/2025 INDICATION: Ascites. PROCEDURE: Informed consent obtained. Time out performed. The abdomen was prepped and draped in a sterile fashion. 10 mL of 1% lidocaine was infused into local soft tissues. A 5 Telugu catheter system was introduced into the abdominal ascites under ultrasound guidance. 5 liters of clear bushra fluid were removed and sent to lab if requested. Patient tolerated procedure well. Ultrasound imaging was obtained and placed in the patient's permanent medical record.     IMPRESSION: 1.  Status post ultrasound-guided paracentesis. Reference CPT Code: 41998    CT Abdomen Pelvis w Contrast    Result Date: 1/10/2025  EXAM: CT ABDOMEN PELVIS W CONTRAST LOCATION: [facility] DATE: 1/10/2025 INDICATION: abdominal pain, recent SBP COMPARISON: CT 1/2/2025, 12/29/2024 TECHNIQUE: CT scan of the abdomen and pelvis was performed following injection of IV contrast. Multiplanar reformats were obtained. Dose reduction techniques were used. CONTRAST: IsoVue 370 90mL FINDINGS: LOWER CHEST: Normal. HEPATOBILIARY: Enlarged heterogeneous cirrhotic liver with a nodular surface contour and relative hypertrophy of the lateral left lobe. Partially contracted gallbladder. No biliary ductal dilatation. PANCREAS: Mild peripancreatic edema likely related to the ascites. SPLEEN: Mildly enlarged, measuring 13.9 cm craniocaudal dimension. ADRENAL GLANDS: Stable bilateral myelolipomas, measuring 5.0 cm on the right and 3.2 cm on the left. KIDNEYS/BLADDER: Normal. BOWEL: No bowel obstruction or inflammatory  process. Normal appendix. Small to moderate volume ascites which is slightly improved since the prior CT. Diffuse mesenteric and body wall edema. No free air. LYMPH NODES: Stable mild upper abdominal, mesenteric and retroperitoneal lymphadenopathy. VASCULATURE: Mild bilateral iliac atherosclerosis. PELVIC ORGANS: Normal. MUSCULOSKELETAL: Mild spinal degenerative changes.     IMPRESSION: 1.  Cirrhosis with sequelae of portal venous hypertension including mild splenomegaly and mild to moderate ascites which is slightly improved since the prior CT.         Video-Visit Details    Type of service:  Video Telemedicine Visit   Video Start Time: 8:35 am  Video End Time: 8:55 am  Originating Location (pt. Location): [facility]  Distant Location (provider location):  Offsite  Platform used for Video Visit: Well

## 2025-01-15 NOTE — PROGRESS NOTES
[facility]    Medicine Progress Note - Hospitalist Service    Date of Admission:  1/11/2025    Assessment & Plan   Warren Jaramillo is a 44 year old male with cirrhosis previously attributed to alcohol, DM 2, asthma, hypertension, HFpEF, treated with and DVT Eliquis who had recently been hospitalized for SBP discharged on 1/8/25 on oral ciprofloxacin to complete treatment course who presented with worsening abdominal pain and distention and was admitted due to concern for persistent versus recurrent SBP.     Persistent versus recurrent spontaneous bacterial peritonitis   Cirrhosis with ascites attributed to alcohol use   Followed by [facility] for cirrhosis previously attributed to alcohol.  Was hospitalized January 2 to January 8 for SBP treated with antibiotics including ciprofloxacin at hospital discharge and cultures obtained during that hospitalization were negative for specific organism.  Clinical presentation and results of paracentesis on this admission were concerning for SBP.  Abdomen CT in ER did not demonstrate any other acute abnormalities to suggest other cause for bacterial peritonitis.  He was started on ceftriaxone empirically and was also treated with paracentesis and IV albumin administration.  His previous chronic diuretics had been held at recent hospital discharge due to concern for possible hepatorenal syndrome, but diuretics were restarted upon this admission.  He is improving clinically.  Repeat paracentesis on 1/14 demonstrates slight improvement in total nucleated cell count of ascites fluid, but neutrophil count has slightly increased.  During this hospitalization, patient provides history that he had been drinking up to 16 bottles of fluids every day each measuring 28 ounces.  -Continue empiric ceftriaxone, today is day 6  -ID consulted  -continue 40 mg Lasix and 100 mg spironolactone daily  -continue 2 L total fluid restriction every day  -Ordered recheck  labs in am  -Anticipate [facility] follow up in 4-6 weeks as previously recommended    RV failure  No previous diagnosis of chronic heart disease and echo August 2023 demonstrated normal LV function at which time RV could not be visualized.  Recent cardiac MRI December 2024 demonstrated borderline RV enlargement with mildly reduced RV systolic function and RV EF 42% suspicious for RV failure.  There is concern that RV failure could also be contributing to ascites and volume overload.  -Continuing diuretics  -continue 2 L fluid restriction    Possible normal anion gap metabolic acidosis  He was admitted with low serum carbon dioxide suspicious for metabolic acidosis.  Anion gap was normal.  He continues to have low serum carbon dioxide with normal anion gap.  VBG results are reassuring.  -Ordered monitoring of electrolytes and blood gases    History of DVT  Medication induced coagulopathy  Diagnosed with DVT of left IJ, subclavian, and axillary veins in early December 2024 at which time therapeutic anticoagulation was started.  He continues to take Eliquis which causes coagulopathy.  -Continue Eliquis    Dysuria  Urinary hesitancy  During this hospitalization, patient has had intermittent dysuria and now reports pain radiating up into his right flank associated with voiding but not otherwise.  He had an episode of urinary hesitancy on 1/12 that required straight catheterization of the bladder.  UA on January 10 and 1/14 were normal aside from small proteinuria.  Abdomen CT done 1/10 did not demonstrate any abnormalities of the kidneys or bladder.  -continue clinical monitoring  -If urinary symptoms continue, recommend outpatient urology follow-up    Type 2 diabetes  Carries diagnosis of type 2 diabetes mellitus managed with lifestyle changes.  Most recent hemoglobin A1c 6.2 earlier this month.  Patient and family reporting that he tends to run low blood sugars if he does not have any carbohydrates in his diet.  Blood  sugars on his chemistry panels during hospitalization have been normal.  -Not routinely monitoring blood sugars during hospitalization  -continue moderate carbohydrate diet including at discharge     Autism with learning Disability   Known autism with chronic learning disability and he apparently does not have capacity for independent medical decision making.  He chronically lives in group home and has legal guardian Jesika Harden P: [phone] and rep payee for finances and his legal guardian was notified of this hospital admission.  -Anticipate patient will return to group home when medically stable   -Recommend consulting with his legal guardian for decision making as indicated   -His legal guardian Jesika reportedly advised not to share any information with patient's ex significant other Amy    AVA on CPAP  Chronic AVA treated with CPAP  -Continue CPAP with sleep     Tobacco abuse   Continues to smoke cigarettes about 1 ppd.  Expressing desire and intent to quit smoking.  -Continue nicotine patch  -Smoking cessation encouraged    Tongue lesion  He has known lesion on the anterior aspect of his tongue for which he had plan to follow-up with ENT to discuss possible biopsy.  Tongue lesion has been stable during this hospital stay.  -Anticipate outpatient follow-up with ENT     Hypothyroidism   In ER January 10 patient was found to have mildly elevated TSH (10) and low Free T4 (0.86) and was started on levothyroxine treatment for hypothyroidism on 1/11/2025  -Continue levothyroxine  -Recommend recheck thyroid function in 4 to 6 weeks    Probable morbid obesity  Appears morbidly obese with BMI 44 although BMI may be overestimated by the presence of ascites  -Recommended lifestyle changes to promote weight loss          Diet: Fluid restriction 2000 ML FLUID  Combination Diet Moderate Consistent Carb (60 g CHO per Meal) Diet; 2 gm NA Diet    DVT Prophylaxis: DOAC  Khan Catheter: Not present  Lines: None     Cardiac  "Monitoring: None  Code Status: Full Code      Clinically Significant Risk Factors                # Coagulation Defect: INR = 1.45 (Ref range: 0.85 - 1.15) and/or PTT = 35 Seconds (Ref range: 22 - 38 Seconds), will monitor for bleeding    # Hypertension: Noted on problem list  # Chronic heart failure with preserved ejection fraction: heart failure noted on problem list and last echo with EF >50%           # Severe Obesity: Estimated body mass index is 42.17 kg/m  as calculated from the following:    Height as of this encounter: 1.651 m (5' 5\").    Weight as of this encounter: 114.9 kg (253 lb 6.4 oz).      # Financial/Environmental Concerns: none         Social Drivers of Health    Housing Stability: Low Risk  (1/11/2025)    Housing Stability     Do you have housing? : Yes     Are you worried about losing your housing?: No   Recent Concern: Housing Stability - High Risk (12/2/2024)    Housing Stability     Do you have housing? : No     Are you worried about losing your housing?: No   Tobacco Use: High Risk (1/10/2025)    Patient History     Smoking Tobacco Use: Every Day     Smokeless Tobacco Use: Never   Interpersonal Safety: High Risk (1/11/2025)    Interpersonal Safety     Do you feel physically and emotionally safe where you currently live?: No     Within the past 12 months, have you been hit, slapped, kicked or otherwise physically hurt by someone?: No     Within the past 12 months, have you been humiliated or emotionally abused in other ways by your partner or ex-partner?: No    Received from [facility], [facility]    Social Connections          Disposition Plan     Medically Ready for Discharge: Anticipated Tomorrow             Ean Villanueva MD  Hospitalist Service  [facility]  Securely message with [employer] (more info)  Text page via [employer] Paging/Directory "   ______________________________________________________________________    Interval History   There were no significant overnight events.  He remains afebrile and hemodynamically stable.  Oxygenation continues to be normal.  He is voiding well with good urine output.  He has not had dysuria but is having intermittent pain radiating to his right flank when he voids.  He does not otherwise have any right flank pain.  Today he reports he has had a toothache in a left upper molar that has been intermittent for some time and he wonders whether tooth infection could contribute to his recurring infection.    Physical Exam   Vital Signs: Temp: 98  F (36.7  C) Temp src: Oral BP: 114/55 Pulse: 69   Resp: 21 SpO2: 96 % O2 Device: None (Room air)    Patient Vitals for the past 24 hrs:   BP Temp Temp src Pulse Resp SpO2 Weight   01/15/25 1452 114/55 98  F (36.7  C) Oral 69 21 96 % --   01/15/25 1212 107/44 -- -- 62 -- -- --   01/15/25 0714 119/58 -- Oral 63 20 97 % --   01/15/25 0643 -- -- -- -- -- -- 114.9 kg (253 lb 6.4 oz)   01/15/25 0430 122/60 97.7  F (36.5  C) Axillary 57 20 98 % --   01/14/25 2212 120/45 97.4  F (36.3  C) Oral 64 22 98 % --   01/14/25 1921 116/59 98.2  F (36.8  C) Oral 70 18 97 % --   01/14/25 1509 106/46 97.9  F (36.6  C) Oral 76 18 96 % --     Weight: 253 lbs 6.4 oz  Vitals:    01/11/25 0300 01/12/25 0400 01/13/25 0532 01/14/25 0629   Weight: 120.9 kg (266 lb 8 oz) 119.8 kg (264 lb 1.6 oz) 120.8 kg (266 lb 6.4 oz) 121.4 kg (267 lb 11.2 oz)    01/15/25 0643   Weight: 114.9 kg (253 lb 6.4 oz)       Intake/Output Summary (Last 24 hours) at 1/15/2025 1455  Last data filed at 1/15/2025 1213  Gross per 24 hour   Intake 1300 ml   Output 2600 ml   Net -1300 ml       General Appearance: Obese man without acute distress sitting at the edge of his bed  Respiratory: Normal respiratory effort  GI: Obese but nondistended abdomen, soft      Medical Decision Making             Data     I have personally reviewed the  following data over the past 24 hrs:    13.5 (H)  \   N/A   / N/A     138 106 32.4 (H) /  96   4.4 18 (L) 1.10 \     ALT: 26 AST: 19 AP: 311 (H) TBILI: 0.2   ALB: 4.0 TOT PROTEIN: 6.2 (L) LIPASE: N/A       Blood cultures and ascites fluid culture from January 10 remain negative  Ascites fluid Gram stain from January 14 was negative for both organisms and WBCs and that repeat ascites fluid culture from January 14 is pending    Venous Blood Gas  Recent Labs   Lab 01/15/25  0515 01/14/25  1410 01/14/25  0543   PHV 7.33 7.32 7.32   PCO2V 43 44 43   PO2V 79* 65* 67*   HCO3V 23 23 22   ZO -3.3* -3.6* -3.9*   O2PER 21 21 21     Color Urine (no units)   Date Value   01/14/2025 Light Yellow   03/14/2011 Yellow     Appearance Urine (no units)   Date Value   01/14/2025 Clear   03/14/2011 Clear     Glucose Urine (mg/dL)   Date Value   01/14/2025 Negative   03/14/2011 Negative     Bilirubin Urine (no units)   Date Value   01/14/2025 Negative   03/14/2011 Negative     Ketones Urine (mg/dL)   Date Value   01/14/2025 Negative   03/14/2011 Negative     Specific Gravity Urine (no units)   Date Value   01/14/2025 1.009   03/14/2011 1.018     pH Urine   Date Value   01/14/2025 5.0   03/14/2011 6.0 pH     Protein Albumin Urine (mg/dL)   Date Value   01/14/2025 Negative   03/14/2011 10 (A)     Urobilinogen Urine (EU/dL)   Date Value   01/02/2009 1.0     Nitrite Urine (no units)   Date Value   01/14/2025 Negative   03/14/2011 Negative     Leukocyte Esterase Urine (no units)   Date Value   01/14/2025 Negative   03/14/2011 Negative       Recent Labs   Lab 01/15/25  0515 01/14/25  1410 01/14/25  0543 01/13/25  0520 01/12/25  0518 01/10/25  2323 01/10/25  1211   WBC 13.5*  --  12.9* 13.2* 13.0*  --  14.0*   HGB  --   --   --  12.2* 12.5*  --  12.1*   MCV  --   --   --  85 85  --  85   PLT  --   --  315 310 319  --  326   INR  --   --   --  1.45* 1.45*  --  1.44*    139 137 135 137  --  139   POTASSIUM 4.4 4.6 4.8 5.0 4.9  --  5.4*    CHLORIDE 106 105 105 105 106  --  108*   CO2 18* 20* 18* 19* 19*  --  19*   BUN 32.4*  --  33.0* 30.9* 30.8*  --  43.1*   CR 1.10  --  1.22* 1.07 0.86  --  0.98   ANIONGAP 14 14 14 11 12  --  12   WES 9.1  --  9.3 9.1 9.6  --  9.8   GLC 96  --  90 86 94   < > 88   ALBUMIN 4.0  --  4.2 4.1 4.5  --  4.9   PROTTOTAL 6.2*  --  6.6 6.3* 6.8  --  7.5   BILITOTAL 0.2  --  0.4 0.5 0.3  --  0.5   ALKPHOS 311*  --  303* 282* 294*  --  321*   ALT 26  --  28 29 31  --  42   AST 19  --  20 19 20  --  29   LIPASE  --   --   --   --   --   --  18    < > = values in this interval not displayed.

## 2025-01-15 NOTE — PROGRESS NOTES
"Care Management Follow Up    Length of Stay (days): 4    Expected Discharge Date: 01/16/2025     Concerns to be Addressed: discharge planning       Patient plan of care discussed at interdisciplinary rounds: Yes    Anticipated Discharge Disposition: Group Home     Anticipated Discharge Services: None    Anticipated Discharge DME: None    Patient/family educated on Medicare website which has current facility and service quality ratings: no    Education Provided on the Discharge Plan: Yes    Patient/Family in Agreement with the Plan: yes    Referrals Placed by CM/SW:  None     Private pay costs discussed: Not applicable    Discussed  Partnership in Safe Discharge Planning  document with patient/family: No     Handoff Completed: No     Additional Information:  Per physician, patient could be ready for discharge in the next day or two. Earliest discharge would be tomorrow.      Writer has called and spoke with group home coordinator, Ginna.  Discussed possible discharge tomorrow. Ginna stated that any new medications need to be sent to [facility] in [place].   Group home fax # for orders is #[phone].     Writer called and spoke with patient's guardian, Jesika Harden #[phone].  Discussed possible discharge or tomorrow.  Discussed transportation.  Jesika stated that she will be sending an email to Ginna from the group home and to patient's [facility] , Sandra Barragan, regarding the need for the group home to come to [place] to transport patient on day of discharge.  CM to talk with Ginna regarding transport on day of discharge.  If it is determined that the group home can absolutely not transport patient, then agency transport will be needed with voucher.      Writer called [facility] registration.  They checked the [employer] website and patient's Medical Assistance is still listed as \"INACTIVE\".  Guardian, Jesika, is aware of this, so aware patient has no insurance coverage to pay for transportation.   "     Next Steps: Care Management will continue to follow and assist with discharge planning.      1549- Per physician this afternoon, patient will be ready for discharge tomorrow morning.  Writer has called and left a message with patient's guardian, Jesika.      Writer then called and spoke with the group home coordinator, Ginna.  Discussed discharge at 10:00 a.m. or after.  Ginna stated she will work on seeing if she can get a staff member lined up to transport patient tomorrow.  CM to call Ginna tomorrow morning to discuss if she found a staff person for transport.      Writer has discussed with physician that any new medications need to be sent to [facility] in [place].      YOEL Carrillo  [facility]   [phone]

## 2025-01-15 NOTE — PROGRESS NOTES
Patient with hx of AVA presently using hospital BiPAP due to his is not able to be brought to the hospital.  Patient setup and presently asleep on ResMed BiPAP pressure settings of 21/16 cmH2O with room air.

## 2025-01-15 NOTE — PLAN OF CARE
Goal Outcome Evaluation:      Plan of Care Reviewed With: patient    Overall Patient Progress: improvingOverall Patient Progress: improving    Continues to be VSS on RA. Denies pain. CPAP on at night. Rocephin dose given. Maintained on fluid restriction. Weight down by 14 lbs from yesterday.

## 2025-01-15 NOTE — PLAN OF CARE
Goal Outcome Evaluation:      Plan of Care Reviewed With: patient    Overall Patient Progress: no changeOverall Patient Progress: no change    Outcome Evaluation: VSS. A/Ox4. right abd area bruised, EUGENIA from s/p paracentesis this afternoon. pt up independently.

## 2025-01-15 NOTE — CONSULTS
"CLINICAL NUTRITION SERVICES - ASSESSMENT NOTE    RECOMMENDATIONS FOR MDs/PROVIDERS TO ORDER:  None at this time    Malnutrition Status:    Unable to assess    Registered Dietitian Interventions:  Emailed nutrition handouts and free-text recs to pt's Legal Guardian (see full note)  Schedule HS snack for pt (Greek yogurt, Fresh Fruit Cup) via [employer])    Future/Additional Recommendations:  A1c declining in s/o cirrhosis: utility of [facility] diet worth reviewing in the future     REASON FOR ASSESSMENT  Reason for assessment: inpatient consult, fluid restriction etc    SUBJECTIVE INFORMATION  Able to reach pt's legal guardian via phone at 13:01. She accepted offer for emailed handouts on nutrition recommendations for the pt, however commented that it is challenging to get pt to adhere to such recs. She said will communicate the recs/handouts to group home staff.    NUTRITION HISTORY  Past Medical History:   Diagnosis Date    COPD exacerbation (H) 12/2/2024    DM2 (diabetes mellitus, type 2) (H) 4/28/2020    HTN (hypertension) 7/30/2012    Thyroid nodule 7/31/2019    Rojas's disease (H)       CURRENT NUTRITION ORDERS  Diet: 60gCHO, 2gNa, 2000mL    CURRENT INTAKE/TOLERANCE  last 5 meals all recorded 100%    LABS  Nutrition-relevant labs: Reviewed    MEDICATIONS  Nutrition-relevant medications: Reviewed  Note A1c declining in s/o cirrhosis:  08/01/23 18:02 Hemoglobin A1C: 7.3 (H)  02/15/24 09:18 Hemoglobin A1C: 7.5 (H)  09/13/24 11:00 Hemoglobin A1C: 6.8 (H)  01/02/25 12:26 Hemoglobin A1C: 6.2 (H)    ANTHROPOMETRICS  Height: 165.1 cm (5' 5\")  Most Recent Weight: 114.9 kg (253 lb 6.4 oz)  IBW: 61.5 kg  % IBW: 187% (but with ascites contributing)  BMI (kg/m ): Obesity Class III BMI > 40 (? If Obesity Class 2 when euvolemic)    Weight History:   Wt Readings from Last 5 Encounters:   01/15/25 114.9 kg (253 lb 6.4 oz)   01/10/25 125.2 kg (276 lb)   01/07/25 119.2 kg (262 lb 11.2 oz)   12/29/24 122.5 kg " "(270 lb)   12/24/24 121.3 kg (267 lb 6.4 oz)     Dosing Weight: 74.8 kg, based on adjusted wt    ASSESSED NUTRITION NEEDS  Estimated Energy Needs: 2351 kcals/day ([place] [address] [employer])  Justification: Maintenance  Estimated Protein Needs: 112 grams protein/day ( kg*1.5 )  Justification: Increased needs and Maintenance  Estimated Fluid Needs: 2000 mL/day  Justification: On a fluid restriction    SYSTEM FINDINGS    Skin/wounds: scattered scabs  GI symptoms: distended /ascites    MALNUTRITION  % Intake: No decreased intake noted  % Weight Loss: Weight loss does not meet criteria   Subcutaneous Fat Loss: Unable to assess  Muscle Loss: Unable to assess  Fluid Accumulation/Edema: 1+ bilateral legs  Malnutrition Diagnosis: Unable to determine due to remote RD coverage  Malnutrition Present on Admission: Unable to assess    NUTRITION DIAGNOSIS  Increased nutrient needs (protein) related to cirrhosis AEB dx cirrhosis.    INTERVENTIONS  Nutrition education content. Emailed guardian the following handouts and free-text recs as covered in this note:    PDF Handouts Emailed  Cirrhosis Nutrition Therapy (2021) via Nutrition Care Manual  Fluid-Restricted Nutrition Therapy (2023) via Nutrition Care Manual  Low-Sodium Nutrition Therapy (2022) via Nutrition Care Manual    RD Free-text Recs in Email Body  \"  There are blanks on some of the handouts so we can individualize recommendations. Here are the values:    \"Cirrhosis Nutrition Therapy (2021)\"    Calories/day: approximately 2350   Protein /day: approximately 112 grams    \"Fluid-Restricted Nutrition Therapy (2023)\"   2 Liters per day, equivalent to;   67 fluid ounces per day, equivalent to;   8 and 1/3 cups per day    Importance of sodium restriction: Reducing sodium intake can be difficult, but it is very powerful since lowering sodium intake also can help reduce how thirsty we feel, so it helps with adhering to the fluid restriction also.    Recommendations for daily meal pattern: If " "appetite is ever poor, may be best to have small/frequent meals, or bit smaller meals with snacks in-between. ALWAYS encourage a snack within a couple of hours before bedtime which contains carbohydrate and protein. See attached handout \"Cirrhosis Nutrition Therapy (2021)\" for some food recommendations. Nutrition shakes can also be a convenient choice: prefer a product with at least 10 grams of protein and 200-350 calories.  \"    Goals  Guardian share with group home staff     Monitoring/Evaluation  Progress toward goals will be monitored and evaluated per policy.   "

## 2025-01-16 VITALS
HEART RATE: 58 BPM | RESPIRATION RATE: 20 BRPM | BODY MASS INDEX: 42.17 KG/M2 | OXYGEN SATURATION: 95 % | TEMPERATURE: 97.7 F | WEIGHT: 253.1 LBS | SYSTOLIC BLOOD PRESSURE: 106 MMHG | DIASTOLIC BLOOD PRESSURE: 55 MMHG | HEIGHT: 65 IN

## 2025-01-16 LAB
% LINING CELLS, BODY FLUID: 5 %
ABSOLUTE NEUTROPHILS, BODY FLUID: 474.8 /UL
ALBUMIN SERPL BCG-MCNC: 4.1 G/DL (ref 3.5–5.2)
ALP SERPL-CCNC: 310 U/L (ref 40–150)
ALT SERPL W P-5'-P-CCNC: 26 U/L (ref 0–70)
ANION GAP SERPL CALCULATED.3IONS-SCNC: 14 MMOL/L (ref 7–15)
APPEARANCE FLD: CLEAR
AST SERPL W P-5'-P-CCNC: 19 U/L (ref 0–45)
BACTERIA FLD CULT: NORMAL
BASE EXCESS BLDV CALC-SCNC: -2.1 MMOL/L (ref -3–3)
BILIRUB SERPL-MCNC: 0.3 MG/DL
BUN SERPL-MCNC: 33.4 MG/DL (ref 6–20)
CALCIUM SERPL-MCNC: 9.2 MG/DL (ref 8.8–10.4)
CELL COUNT BODY FLUID SOURCE: NORMAL
CHLORIDE SERPL-SCNC: 107 MMOL/L (ref 98–107)
COLOR FLD: YELLOW
CREAT SERPL-MCNC: 1.07 MG/DL (ref 0.67–1.17)
EGFRCR SERPLBLD CKD-EPI 2021: 88 ML/MIN/1.73M2
GLUCOSE SERPL-MCNC: 87 MG/DL (ref 70–99)
GRAM STAIN RESULT: NORMAL
GRAM STAIN RESULT: NORMAL
HCO3 BLDV-SCNC: 24 MMOL/L (ref 21–28)
HCO3 SERPL-SCNC: 19 MMOL/L (ref 22–29)
LYMPHOCYTES NFR FLD MANUAL: 38 %
MONOS+MACROS NFR FLD MANUAL: 32 %
NEUTS BAND NFR FLD MANUAL: 25 %
O2/TOTAL GAS SETTING VFR VENT: 21 %
OXYHGB MFR BLDV: 92 % (ref 70–75)
PATH REV: ABNORMAL
PCO2 BLDV: 44 MM HG (ref 40–50)
PH BLDV: 7.34 [PH] (ref 7.32–7.43)
PO2 BLDV: 72 MM HG (ref 25–47)
POTASSIUM SERPL-SCNC: 4.4 MMOL/L (ref 3.4–5.3)
PROT SERPL-MCNC: 6.4 G/DL (ref 6.4–8.3)
SAO2 % BLDV: 92.3 % (ref 70–75)
SODIUM SERPL-SCNC: 140 MMOL/L (ref 135–145)
WBC # BLD AUTO: 13.2 10E3/UL (ref 4–11)
WBC # FLD AUTO: 1899 /UL

## 2025-01-16 PROCEDURE — 99239 HOSP IP/OBS DSCHRG MGMT >30: CPT | Performed by: PEDIATRICS

## 2025-01-16 PROCEDURE — 82435 ASSAY OF BLOOD CHLORIDE: CPT | Performed by: PEDIATRICS

## 2025-01-16 PROCEDURE — 85048 AUTOMATED LEUKOCYTE COUNT: CPT | Performed by: PEDIATRICS

## 2025-01-16 PROCEDURE — 36415 COLL VENOUS BLD VENIPUNCTURE: CPT | Performed by: PEDIATRICS

## 2025-01-16 PROCEDURE — 250N000013 HC RX MED GY IP 250 OP 250 PS 637: Performed by: INTERNAL MEDICINE

## 2025-01-16 PROCEDURE — 82947 ASSAY GLUCOSE BLOOD QUANT: CPT | Performed by: PEDIATRICS

## 2025-01-16 PROCEDURE — 250N000011 HC RX IP 250 OP 636: Performed by: HOSPITALIST

## 2025-01-16 PROCEDURE — 250N000013 HC RX MED GY IP 250 OP 250 PS 637: Performed by: PEDIATRICS

## 2025-01-16 PROCEDURE — 82805 BLOOD GASES W/O2 SATURATION: CPT | Performed by: PEDIATRICS

## 2025-01-16 RX ORDER — LEVOTHYROXINE SODIUM 25 UG/1
6.25 TABLET ORAL
Qty: 8 TABLET | Refills: 0 | Status: SHIPPED | OUTPATIENT
Start: 2025-01-17

## 2025-01-16 RX ORDER — NICOTINE 21 MG/24HR
1 PATCH, TRANSDERMAL 24 HOURS TRANSDERMAL DAILY
Qty: 28 PATCH | Refills: 0 | Status: SHIPPED | OUTPATIENT
Start: 2025-01-16

## 2025-01-16 RX ORDER — SUCRALFATE ORAL 1 G/10ML
1 SUSPENSION ORAL 4 TIMES DAILY PRN
COMMUNITY
Start: 2025-01-16

## 2025-01-16 RX ORDER — SPIRONOLACTONE 100 MG/1
100 TABLET, FILM COATED ORAL DAILY
Qty: 30 TABLET | Refills: 0 | Status: SHIPPED | OUTPATIENT
Start: 2025-01-16

## 2025-01-16 RX ORDER — FUROSEMIDE 40 MG/1
40 TABLET ORAL DAILY
Qty: 30 TABLET | Refills: 0 | Status: SHIPPED | OUTPATIENT
Start: 2025-01-16

## 2025-01-16 RX ADMIN — FLUOXETINE HYDROCHLORIDE 20 MG: 20 CAPSULE ORAL at 09:13

## 2025-01-16 RX ADMIN — FLUTICASONE FUROATE AND VILANTEROL TRIFENATATE 1 PUFF: 100; 25 POWDER RESPIRATORY (INHALATION) at 09:14

## 2025-01-16 RX ADMIN — LEVOTHYROXINE SODIUM 6.25 MCG: 25 TABLET ORAL at 06:05

## 2025-01-16 RX ADMIN — UMECLIDINIUM 1 PUFF: 62.5 AEROSOL, POWDER ORAL at 09:14

## 2025-01-16 RX ADMIN — SPIRONOLACTONE 100 MG: 25 TABLET ORAL at 09:13

## 2025-01-16 RX ADMIN — FAMOTIDINE 20 MG: 20 TABLET, FILM COATED ORAL at 09:13

## 2025-01-16 RX ADMIN — MONTELUKAST 10 MG: 10 TABLET, FILM COATED ORAL at 09:13

## 2025-01-16 RX ADMIN — METFORMIN HYDROCHLORIDE 1000 MG: 500 TABLET ORAL at 09:13

## 2025-01-16 RX ADMIN — CEFTRIAXONE SODIUM 2 G: 2 INJECTION, POWDER, FOR SOLUTION INTRAMUSCULAR; INTRAVENOUS at 06:07

## 2025-01-16 RX ADMIN — MIDODRINE HYDROCHLORIDE 5 MG: 2.5 TABLET ORAL at 09:13

## 2025-01-16 RX ADMIN — APIXABAN 5 MG: 5 TABLET, FILM COATED ORAL at 09:13

## 2025-01-16 RX ADMIN — FUROSEMIDE 40 MG: 40 TABLET ORAL at 09:13

## 2025-01-16 RX ADMIN — PANTOPRAZOLE SODIUM 40 MG: 40 TABLET, DELAYED RELEASE ORAL at 09:13

## 2025-01-16 ASSESSMENT — ACTIVITIES OF DAILY LIVING (ADL)
ADLS_ACUITY_SCORE: 47
ADLS_ACUITY_SCORE: 47
ADLS_ACUITY_SCORE: 50
ADLS_ACUITY_SCORE: 47

## 2025-01-16 NOTE — DISCHARGE SUMMARY
"[facility]  Hospitalist Discharge Summary      Date of Admission:  1/11/2025  Date of Discharge:  1/16/2025  Discharging Provider: Ean Villanueva MD  Discharge Service: Hospitalist Service    Discharge Diagnoses   Principal Problem:    SBP (spontaneous bacterial peritonitis) (H)  Active Problems:    Cirrhosis of liver with ascites, unspecified hepatic cirrhosis type (H)    Chronic right-sided congestive heart failure (H)    Active autistic disorder    DM2 (diabetes mellitus, type 2) (H)    DVT of axillary vein, acute left (H)    Medication induced coagulopathy    Normal anion gap metabolic acidosis    Tobacco use    AVA treated with BiPAP    Morbid obesity (H)    Other specified hypothyroidism      Clinically Significant Risk Factors     # Severe Obesity: Estimated body mass index is 42.12 kg/m  as calculated from the following:    Height as of this encounter: 1.651 m (5' 5\").    Weight as of this encounter: 114.8 kg (253 lb 1.6 oz).       Follow-ups Needed After Discharge   Follow-up Appointments       Follow-up and recommended labs and tests       Follow up with primary care provider, [facility], within 7 days to evaluate medication change and for hospital follow- up.  The following labs/tests are recommended: BMP and abdominal US (and ?paracentesis) within 1 week, recheck TSH and free T4 in 4-6 weeks.    Follow up with liver specialist at [facility] within 1 month  for hospital follow- up regarding cirrhosis, ascites and recurrent SBP                Unresulted Labs Ordered in the Past 30 Days of this Admission       Date and Time Order Name Status Description    1/12/2025  2:26 PM Ascites Fluid Aerobic Bacterial Culture Routine With Gram Stain Preliminary         These results will be followed up by hospitalist and/or PCP    Discharge Disposition   Discharged to group home  Condition at discharge: Stable    Hospital Course   Warren Jaramillo is a 44 year " old male with cirrhosis previously attributed to alcohol, DM 2, asthma, hypertension, HFpEF, treated with and DVT Eliquis who had recently been hospitalized for SBP discharged on 1/8/25 on oral ciprofloxacin to complete treatment course who presented with worsening abdominal pain and distention and was admitted due to concern for persistent versus recurrent SBP.     Persistent versus recurrent spontaneous bacterial peritonitis   Cirrhosis with ascites attributed to alcohol use   Followed by [facility] for cirrhosis previously attributed to alcohol.  Was hospitalized January 2 to January 8 for SBP treated with antibiotics including ciprofloxacin at hospital discharge and cultures obtained during that hospitalization were negative for specific organism.  Clinical presentation and results of paracentesis on this admission were concerning for SBP.  Abdomen CT in ER did not demonstrate any other acute abnormalities to suggest other cause for bacterial peritonitis.  He was started on ceftriaxone empirically and was also treated with paracentesis and IV albumin administration.  Infectious disease was consulted and recommended completing treatment course of parenteral antibiotics followed by transition to daily ciprofloxacin prophylaxis.  His previous chronic diuretics had been held at recent hospital discharge due to concern for possible hepatorenal syndrome.  Due to recurring and/or persistent ascites from cirrhosis and possibly from right heart failure, he was re-started on diuretic therapy with a combination of furosemide and spironolactone.  He had good clinical response to diuretics and maintained normal renal function.  He improved clinically.  Repeat paracentesis on 1/14 demonstrated slight improvement in total nucleated cell count of ascites fluid, but neutrophil count slightly increased.  Ascites fluid also changed from grossly cloudy to clear after starting treatment.  During this hospitalization, patient provided  history that he had been drinking up to 16 bottles of fluids every day each measuring 28 ounces.   He was advised to follow 2 g sodium diet and to restrict total fluid intake to no more than 2 L/day.  Close outpatient follow-up with PCP to recheck labs and ascites was recommended with titration of treatment including diuretics as indicated.  Follow-up with his liver specialist at [facility] within 1 month was recommended.  Diagnoses and treatment recommendations were reviewed with his legal guardian Jesika by telephone on the day of discharge.    RV failure  No previous diagnosis of chronic heart disease and echo August 2023 demonstrated normal LV function at which time RV could not be visualized.  However, recent cardiac MRI December 2024 demonstrated borderline RV enlargement with mildly reduced RV systolic function and RV EF 42% suspicious for RV failure.  There was concern that RV failure was contributing to recurring or persistent ascites and volume overload which was another indication to resume diuretic therapy as well as sodium and fluid restriction.    Possible normal anion gap metabolic acidosis  He was admitted with low serum carbon dioxide suspicious for metabolic acidosis.  Anion gap was normal.  He continued to have low serum carbon dioxide with normal anion gap.  VBG results were reassuring.    History of DVT  Medication induced coagulopathy  Diagnosed with DVT of left IJ, subclavian, and axillary veins in early December 2024 at which time therapeutic anticoagulation was started with Eliquis which was continued.  Eliquis which causes coagulopathy that increases risk for bleeding, but bleeding was not suspected during hospitalization     Dysuria  Urinary hesitancy  During this hospitalization, patient had intermittent dysuria and pain radiating up into his right flank associated with voiding but not otherwise.  He had an episode of urinary hesitancy on 1/12 that required straight catheterization of the  bladder.  UA on January 10 and 1/14 were normal aside from small proteinuria.  Abdomen CT done 1/10 did not demonstrate any abnormalities of the kidneys or bladder.    Type 2 diabetes  Carried diagnosis of type 2 diabetes mellitus managed with lifestyle changes and metformin.  Most recent hemoglobin A1c 6.2 earlier this month.  Patient and family reporting that he tends to run low blood sugars if he does not have any carbohydrates in his diet.  Blood sugars during hospitalization were normal while continuing metformin and adhering to moderate carbohydrate diet.  He was advised to resume use of his continuous glucose monitor after discharge and continue to follow a moderate carbohydrate diet.  Dietary education was provided during hospitalization.     Autism with learning Disability   Known autism with chronic learning disability and he apparently does not have capacity for independent medical decision making.  He chronically lives in group home and has legal guardian Jesika Harden who was notified of this hospital admission and updated about diagnosis and treatment recommendations at the time of discharge.  Case management was consulted and assisted with arranging for patient's return to group home at discharge.    AVA on CPAP  Chronic AVA treated with CPAP was continued during hospitalization.     Tobacco abuse   Continues to smoke cigarettes about 1 ppd.  Patient expressed desire and intent to quit smoking and was treated with nicotine patch during hospitalization with good effect, so nicotine patch was prescribed at discharge.    Tongue lesion  He has known lesion on the anterior aspect of his tongue for which he had plan to follow-up with ENT to discuss possible biopsy.  Tongue lesion has been stable during this hospital stay.  Outpatient follow-up with ENT as previously planned was anticipated.     Hypothyroidism   In ER on January 10 patient was found to have mildly elevated TSH (10) and low Free T4 (0.86) and  was started on levothyroxine treatment for possible hypothyroidism on 1/11/2025.  At discharge she was advised to continue low-dose levothyroxine and have recheck of TSH and free T4 in 4 to 6 weeks.    Probable morbid obesity  Appeared morbidly obese with BMI 44 although BMI was overestimated by the presence of ascites.  Morbid obesity probably contributed to health problems including diabetes and potentially cirrhosis.  Recommended lifestyle changes to promote weight loss    Consultations This Hospital Stay   GASTROENTEROLOGY IP CONSULT  CARE MANAGEMENT / SOCIAL WORK IP CONSULT  INFECTIOUS DISEASES IP CONSULT  NUTRITION SERVICES ADULT IP CONSULT  NUTRITION SERVICES ADULT IP CONSULT    Code Status   Full Code    Time Spent on this Encounter   I, Ean Villanueva MD, personally saw the patient today and spent greater than 30 minutes discharging this patient.       Ean Villanueva MD  [address] SURGICAL  [address] [place] [address]  Phone: [phone]  ______________________________________________________________________    Physical Exam   Vital Signs: Temp: 97.7  F (36.5  C) Temp src: Oral BP: 106/55 Pulse: 58   Resp: 20 SpO2: 95 % O2 Device: None (Room air)    Weight: 253 lbs 1.6 oz  General Appearance: Obese man without signs of acute distress resting in bed  Respiratory: Normal respiratory effort  GI: Obese abdomen without obvious distention        Primary Care Physician   [facility]    Discharge Orders      Med Therapy Management Referral      Reason for your hospital stay    Hospitalized due to infected ascites fluid inside abdomen (spontaneous bacterial peritonitis) and improved     Follow-up and recommended labs and tests     Follow up with primary care provider, [facility], within 7 days to evaluate medication change and for hospital follow- up.  The following labs/tests are recommended: BMP and abdominal US (and  ?paracentesis) within 1 week, recheck TSH and free T4 in 4-6 weeks.    Follow up with liver specialist at [facility] within 1 month  for hospital follow- up regarding cirrhosis, ascites and recurrent SBP     Activity    Your activity upon discharge: activity as tolerated     Monitor and record    Blood glucose: continue using your Iván freestyle CGM according to your usual home routine.    Check Weight every day. Notify PCP if weight increases more than 2 pounds in 1 day or 5 pounds in 1 week     CPAP - Continue Home CPAP    Continue Home CPAP per home equipment settings.     Diet    Follow this diet upon discharge: Current Diet:Orders Placed This Encounter      Fluid restriction 2000 ML FLUID      Combination Diet Moderate Consistent Carb (60 g CHO per Meal) Diet; 2 gm NA Diet       Significant Results and Procedures   Most Recent 3 CBC's:  Recent Labs   Lab Test 01/16/25  0528 01/15/25  0515 01/14/25  0543 01/13/25  0520 01/12/25  0518 01/10/25  1211   WBC 13.2* 13.5* 12.9* 13.2* 13.0* 14.0*   HGB  --   --   --  12.2* 12.5* 12.1*   MCV  --   --   --  85 85 85   PLT  --   --  315 310 319 326     Most Recent 3 BMP's:  Recent Labs   Lab Test 01/16/25  0528 01/15/25  0515 01/14/25  1410 01/14/25  0543    138 139 137   POTASSIUM 4.4 4.4 4.6 4.8   CHLORIDE 107 106 105 105   CO2 19* 18* 20* 18*   BUN 33.4* 32.4*  --  33.0*   CR 1.07 1.10  --  1.22*   ANIONGAP 14 14 14 14   WES 9.2 9.1  --  9.3   GLC 87 96  --  90     Most Recent 2 LFT's:  Recent Labs   Lab Test 01/16/25  0528 01/15/25  0515   AST 19 19   ALT 26 26   ALKPHOS 310* 311*   BILITOTAL 0.3 0.2     7-Day Micro Results       Collected Updated Procedure Result Status      01/14/2025 1151 01/14/2025 1220 Cell count with differential fluid [[id number]]    (Abnormal)   Ascites Fluid from Paracentesis    In process Component Value   No component results            01/14/2025 1151 01/14/2025 1614 Albumin fluid [[id number]]    Ascites Fluid from Paracentesis     Final result Component Value Units   Albumin Fluid Source Abdomen    Albumin fluid 3.5 g/dL            01/14/2025 1151 01/14/2025 1614 Protein fluid [[id number]]    Ascites Fluid from Paracentesis    Final result Component Value Units   Protein Fluid Source Abdomen    Protein Total Fluid 4.8 g/dL            01/14/2025 1151 01/15/2025 0943 Ascites Fluid Aerobic Bacterial Culture Routine With Gram Stain [[id number]]   Ascites Fluid from Peritoneum    Preliminary result Component Value   Culture No growth, less than 1 day  [P]    Gram Stain Result No organisms seen  [P]     No white blood cells seen  [P]     Quantification of host cells and microbiological organisms was done on a cytocentrifuged preparation               01/14/2025 1151 01/14/2025 1631 Lactate dehydrogenase fluid [[id number]]    Ascites Fluid from Abdomen    Final result Component Value Units   LD Fluid Source Abdomen    Lactate dehydrogenase fluid 67 U/L            01/14/2025 1151 01/14/2025 1615 Glucose fluid [[id number]]    Peritoneal Fluid from Abdomen    Final result Component Value Units   Glucose Fluid Source Abdomen    Glucose fluid 106 mg/dL            01/14/2025 1151 01/14/2025 1615 Amylase  fluid [[id number]]    Peritoneal Fluid from Abdomen    Final result Component Value Units   Amylase Fluid Source Abdomen    Amylase fluid 22.0 U/L            01/14/2025 1151 01/14/2025 1304 Cell Count Body Fluid [[id number]]    Ascites Fluid from Paracentesis    Preliminary result Component Value Units   Color Yellow  [P]     Clarity Clear  [P]     Cell Count Fluid Source Abdomen  [P]     Total Nucleated Cells 1,899  [P]  /uL            01/14/2025 1151 01/14/2025 1303 Differential Body Fluid [[id number]]    (Abnormal)   Ascites Fluid from Paracentesis    Preliminary result Component Value Units   % Neutrophils 25  [P]  %   % Lymphocytes 38  [P]  %   % Monocyte/Macrophages 32  [P]  %   % Lining Cells 5  [P]  %   Absolute Neutrophils, Body  Fluid 474.8  [P]  /uL            01/10/2025 1745 01/15/2025 2202 Blood Culture Line, venous [[id number]]   Blood from Line, venous    Final result Component Value   Culture No Growth               01/10/2025 1552 01/10/2025 1836 Cell count with differential fluid [[id number]]    (Abnormal)   Ascites Fluid from Paracentesis    Final result Component Value   No component results            01/10/2025 1552 01/10/2025 2217 Albumin fluid [[id number]]    Ascites Fluid from Paracentesis    Final result Component Value Units   Albumin Fluid Source Abdomen    Albumin fluid 4.0 g/dL            01/10/2025 1552 01/10/2025 2217 Protein fluid [[id number]]    Ascites Fluid from Paracentesis    Final result Component Value Units   Protein Fluid Source Abdomen    Protein Total Fluid 5.2 g/dL            01/10/2025 1552 01/15/2025 0744 Ascites Fluid Aerobic Bacterial Culture Routine With Gram Stain [[id number]]    Ascites Fluid from Peritoneum    Final result Component Value   Culture No Growth   Gram Stain Result No organisms seen    4+ WBC seen    Predominantly mononuclear cells               01/10/2025 1552 01/10/2025 1835 Cell Count Body Fluid [[id number]]    (Abnormal)   Ascites Fluid from Paracentesis    Final result Component Value Units   Color Yellow    Clarity Cloudy    Cell Count Fluid Source Abdomen    Total Nucleated Cells 2,360 /uL            01/10/2025 1552 01/10/2025 1836 Differential Body Fluid [[id number]]    (Abnormal)   Ascites Fluid from Paracentesis    Final result Component Value Units   % Neutrophils 18 %   % Lymphocytes 60 %   % Monocyte/Macrophages 19 %   % Eosinophils 1 %   % Lining Cells 2 %   Absolute Neutrophils, Body Fluid 424.8 /uL            01/10/2025 1217 01/15/2025 1431 Blood Culture Peripheral Blood [[id number]]   Peripheral Blood    Final result Component Value   Culture No Growth                     Most Recent Hemoglobin A1c:  Recent Labs   Lab Test 01/02/25  1226   A1C 6.2*      Most Recent 6 glucoses:  Recent Labs   Lab Test 01/16/25  0528 01/15/25  0515 01/14/25  0543 01/13/25  0520 01/12/25  0518 01/10/25  2323   GLC 87 96 90 86 94 118*     Most Recent Urinalysis:  Recent Labs   Lab Test 01/14/25  1546   COLOR Light Yellow   APPEARANCE Clear   URINEGLC Negative   URINEBILI Negative   URINEKETONE Negative   SG 1.009   UBLD Negative   URINEPH 5.0   PROTEIN Negative   NITRITE Negative   LEUKEST Negative   RBCU 0   WBCU 0     Most Recent Venous Blood Gas  Recent Labs   Lab 01/16/25  0528 01/15/25  0515 01/14/25  1410 01/14/25  0543   PHV 7.34 7.33 7.32 7.32   PCO2V 44 43 44 43   PO2V 72* 79* 65* 67*   HCO3V 24 23 23 22   ZO -2.1 -3.3* -3.6* -3.9*   O2PER 21 21 21 21     Most Recent CRP:  Recent Labs   Lab Test 01/13/25  0520   CRPI 6.68*     Results for orders placed or performed during the hospital encounter of 01/11/25   US Paracentesis without Albumin    Narrative    EXAM:  1. PARACENTESIS  2. ULTRASOUND GUIDANCE  LOCATION: [facility]  DATE: 1/14/2025    INDICATION: Ascites.    PROCEDURE: Informed consent obtained. Time out performed. The abdomen was prepped and draped in a sterile fashion. Less than 10 mL of 1% lidocaine was infused into local soft tissues. A 5 Amharic catheter system was introduced into the abdominal ascites   under ultrasound guidance.    4.9 liters of clear fluid were removed and sent to lab if requested.    Patient tolerated procedure well.    Ultrasound imaging was obtained and placed in the patient's permanent medical record.      Impression    IMPRESSION:  1.  Status post ultrasound-guided paracentesis.    Reference CPT Code: 86655       Discharge Medications   Current Discharge Medication List        START taking these medications    Details   furosemide (LASIX) 40 MG tablet Take 1 tablet (40 mg) by mouth daily.  Qty: 30 tablet, Refills: 0    Associated Diagnoses: Cirrhosis of liver with ascites, unspecified hepatic cirrhosis  type (H); Chronic right-sided congestive heart failure (H)      levothyroxine (SYNTHROID/LEVOTHROID) 25 MCG tablet Take 0.25 tablets (6.25 mcg) by mouth every morning (before breakfast).  Qty: 8 tablet, Refills: 0    Associated Diagnoses: Other specified hypothyroidism      nicotine (NICODERM CQ) 21 MG/24HR 24 hr patch Place 1 patch over 24 hours onto the skin daily.  Qty: 28 patch, Refills: 0    Associated Diagnoses: Tobacco use      spironolactone (ALDACTONE) 100 MG tablet Take 1 tablet (100 mg) by mouth daily.  Qty: 30 tablet, Refills: 0    Associated Diagnoses: Cirrhosis of liver with ascites, unspecified hepatic cirrhosis type (H); Chronic right-sided congestive heart failure (H)           CONTINUE these medications which have CHANGED    Details   sucralfate (CARAFATE) 1 GM/10ML suspension Take 10 mLs (1 g) by mouth 4 times daily as needed for nausea (heartburn).           CONTINUE these medications which have NOT CHANGED    Details   albuterol (PROAIR HFA/PROVENTIL HFA/VENTOLIN HFA) 108 (90 Base) MCG/ACT inhaler Inhale 1-2 puffs into the lungs every 6 hours as needed for shortness of breath, wheezing or cough  Qty: 18 g, Refills: 0    Comments: Pharmacy may dispense brand covered by insurance (Proair, or proventil or ventolin or generic albuterol inhaler)      albuterol (PROVENTIL) (2.5 MG/3ML) 0.083% neb solution Take 2.5 mg by nebulization 3 times daily as needed for shortness of breath, wheezing or cough      aloe vera GEL Apply 1 g topically every hour as needed for skin care Per bottle directions      apixaban ANTICOAGULANT (ELIQUIS) 5 MG tablet Take 5 mg by mouth 2 times daily.      bacitracin 500 UNIT/GM OINT Apply topically 3 times daily as needed for wound care      Benzocaine (SOLARCAINE ALOE VERA EX) Externally apply topically daily as needed.      benzonatate (TESSALON) 200 MG capsule Take 1 capsule (200 mg) by mouth 3 times daily as needed for cough.  Qty: 50 capsule, Refills: 0      Calamine  external lotion Apply topically as needed for itching      carbamide peroxide (DEBROX) 6.5 % otic solution Place 5 drops into both ears daily as needed for other.      ciprofloxacin (CIPRO) 500 MG tablet Take 1 tablet (500 mg) by mouth every 24 hours.  Qty: 30 tablet, Refills: 0    Associated Diagnoses: SBP (spontaneous bacterial peritonitis) (H)      clotrimazole (LOTRIMIN) 1 % external cream Apply topically 2 times daily as needed (skin irritation)      dextromethorphan-guaiFENesin (MUCINEX DM)  MG 12 hr tablet Take 1 tablet by mouth 2 times daily as needed.      diclofenac (VOLTAREN) 1 % topical gel Apply 2 g topically daily as needed for moderate pain To joints/back      dicyclomine (BENTYL) 20 MG tablet Take 1 tablet (20 mg) by mouth 4 times daily as needed (abdominal pain).  Qty: 20 tablet, Refills: 0      EPINEPHrine (ANY BX GENERIC EQUIV) 0.3 MG/0.3ML injection 2-pack Inject 0.3 mg into the muscle as needed for anaphylaxis. May repeat one time in 5-15 minutes if response to initial dose is inadequate.      famotidine (PEPCID) 20 MG tablet Take 1 tablet (20 mg) by mouth 2 times daily  Qty: 60 tablet, Refills: 0      FLUoxetine 20 MG tablet Take 20 mg by mouth every morning.      GAVILAX 17 GM/SCOOP powder Take 17 g by mouth daily as needed.      hydrocortisone (CORTAID) 1 % external cream Apply topically daily as needed.      Hydrocortisone (PREPARATION H EX) Externally apply topically daily as needed.      loperamide (IMODIUM) 2 MG capsule Take 4 mg by mouth 4 times daily as needed for diarrhea.      loratadine (CLARITIN) 10 MG tablet Take 10 mg by mouth daily as needed for allergies.      magnesium hydroxide (MILK OF MAGNESIA) 400 MG/5ML suspension Take 30 mLs by mouth daily as needed.      metFORMIN (GLUCOPHAGE) 1000 MG tablet Take 1,000 mg by mouth 2 times daily (with meals)      midodrine (PROAMATINE) 5 MG tablet Take 1 tablet (5 mg) by mouth 3 times daily (with meals).  Qty: 30 tablet, Refills: 0     Associated Diagnoses: Hepatorenal syndrome (H)      montelukast (SINGULAIR) 10 MG tablet Take 10 mg by mouth every morning.      OLANZapine (ZYPREXA) 10 MG tablet Take 10 mg by mouth At Bedtime      omeprazole (PRILOSEC) 40 MG DR capsule Take 40 mg by mouth every morning.      ondansetron (ZOFRAN ODT) 4 MG ODT tab Take 1 tablet (4 mg) by mouth every 8 hours as needed for nausea.  Qty: 20 tablet, Refills: 0      pramoxine-calamine (AVEENO) 1-8 % LOTN Apply topically daily as needed for itching.      rosuvastatin (CRESTOR) 10 MG tablet Take 10 mg by mouth At Bedtime      traZODone (DESYREL) 100 MG tablet Take 100 mg by mouth at bedtime.      TRELEGY ELLIPTA 100-62.5-25 MCG/ACT oral inhaler Inhale 1 puff into the lungs daily.      Urea 40 % CREA Apply topically daily as needed.      Vitamins A & D (VITAMIN A & D) OINT Externally apply topically daily as needed.      witch hazel-glycerin (TUCKS) pad Apply topically as needed for hemorrhoids.           Allergies   Allergies   Allergen Reactions    Apricot Flavoring Agent (Non-Screening) Anaphylaxis    Banana Anaphylaxis     Throat swelling  Throat swelling      Wasp Venom Protein Shortness Of Breath     Other reaction(s): Respiratory Distress  Has an epi pen  Has an epi pen      Bees Anaphylaxis     Have an Epi pen that carries with    Methylphenidate Itching     Other reaction(s): Nightmares    Prunus      Other reaction(s): *Unknown    Sulfa Antibiotics      Headaches and nausea    Prunus Persica Rash     Other reaction(s): *Unknown

## 2025-01-16 NOTE — PLAN OF CARE
Goal Outcome Evaluation:      Plan of Care Reviewed With: patient    Overall Patient Progress: improvingOverall Patient Progress: improving    Outcome Evaluation: A&O. VSS on rm air. Denies pain overnight. Ambulating independently. Abdomen round, distended. Tolerating 2000ml fluid restriction. Voiding adequately overnight.

## 2025-01-16 NOTE — PLAN OF CARE
"4 hour shift note:  Pt A&Ox4; VSS on RA. New PIV placed. Pt up independently. Abd is rounded. Denies pain.    /61 (BP Location: Left arm)   Pulse 69   Temp 97.3  F (36.3  C) (Oral)   Resp 18   Ht 1.651 m (5' 5\")   Wt 114.9 kg (253 lb 6.4 oz)   SpO2 96%   BMI 42.17 kg/m                            "

## 2025-01-16 NOTE — PROGRESS NOTES
S-(situation): Patient discharged back to group home via hired transport with .     B-(background): Patient presented to ED due to ABD discomfort and distension. Admitted due to concern for ascites.     A-(assessment): VSS on RA. Denies pain at this time. IND in room. Goals met and patient is cleared for discharge.     R-(recommendations): Discharge instructions reviewed with patient. Belongings gathered and sent home with patient.        Discharge Nursing Criteria:   Patient Education given and documented: (STROKE, CHF, Diabetes):  { Oral blood thinner medication education provided to patient.     Care Plan and Patient education resolved: Yes    New Medications- pt has been educated about purpose and side effects: Yes    Vaccines  Influenza status verified at discharge:  Not Applicable    Intentionally Retained Items (examples: Drains, Wound packing, ureteral stents, ham, PICC line or IV)  Patient was sent home with nothing left in place.   Follow up appointment made for removal: No    MISC  Prescriptions if needed, hard copies sent with patient  NA  Home medications returned to patient: NA. New medications sent to pharmacy of [facility] in [place]. Pharmacy will delivery medications to group home.   Medication Bin checked and emptied on discharge Yes  Patient reports post-discharge pain management plan is effective: Yes

## 2025-01-16 NOTE — PROGRESS NOTES
Care Management Discharge Note    Discharge Date: 01/16/2025       Discharge Disposition: Group Home    Discharge Services: None    Discharge DME: None    Discharge Transportation: other (see comments), agency (group home staff)    Private pay costs discussed: Not applicable    Does the patient's insurance plan have a 3 day qualifying hospital stay waiver?  No    PAS Confirmation Code:    Patient/family educated on Medicare website which has current facility and service quality ratings: no    Education Provided on the Discharge Plan: Yes  Persons Notified of Discharge Plans: patient, [facility]  manager, Legal Guardian marcomaría  Patient/Family in Agreement with the Plan: yes    Handoff Referral Completed: Yes, non-[facility] PCP: External handoff communication completed    Additional Information:  Per MD at IDT rounds pt is medically stable for discharge today. Discharge plan remains unchanged, pt will discharge back to his group home.    JAI called and spoke with [facility] manager, per Matilde staff will come around 11-12 today to provide transport. CM informed Matilde that all meds have been sent to [facility] pharmacy and [facility] will fax discharge orders to the  fax at #[phone].     Jai called and updated pts Legal Guardian Jesika regarding discharge plan who is in agreement with discharge plan and happy  is able to transport.    Plan: Return to     Transport:  staff between 11 and 12 today    JUANITA Toribio  Care Transitions Registered Nurse  Tele: [phone]

## 2025-01-16 NOTE — PLAN OF CARE
Goal Outcome Evaluation:      Plan of Care Reviewed With: patient    Overall Patient Progress: improvingOverall Patient Progress: improving    Outcome Evaluation: VSS. c/o'ing right side pain radiating to lower back after urinating, intermittent warm pack placed with relief. pt up independently. abd round, distended, no new concerns at this time. tolerating 2000ml Fluid restriction.

## 2025-01-17 ENCOUNTER — HOSPITAL ENCOUNTER (EMERGENCY)
Facility: HOSPITAL | Age: 45
Discharge: HOME | End: 2025-01-17
Attending: EMERGENCY MEDICINE | Admitting: EMERGENCY MEDICINE
Payer: COMMERCIAL

## 2025-01-17 VITALS
RESPIRATION RATE: 16 BRPM | HEART RATE: 68 BPM | BODY MASS INDEX: 42.49 KG/M2 | SYSTOLIC BLOOD PRESSURE: 129 MMHG | DIASTOLIC BLOOD PRESSURE: 70 MMHG | TEMPERATURE: 98 F | WEIGHT: 255 LBS | HEIGHT: 65 IN | OXYGEN SATURATION: 97 %

## 2025-01-17 DIAGNOSIS — R63.5 WEIGHT GAIN: ICD-10-CM

## 2025-01-17 PROCEDURE — 99282 EMERGENCY DEPT VISIT SF MDM: CPT

## 2025-01-17 ASSESSMENT — ENCOUNTER SYMPTOMS
FEVER: 0
ABDOMINAL PAIN: 0
VOMITING: 0
NAUSEA: 0
CHILLS: 0

## 2025-01-17 NOTE — ED PROVIDER NOTES
EMERGENCY DEPARTMENT ENCOUNTER      NAME: Warren Jaramillo  AGE: 44 year old male  YOB: 1980  MRN: [id number]  EVALUATION DATE & TIME: No admission date for patient encounter.    PCP: Luzmaria Goldman Archbald    ED PROVIDER: Uriel Payan DO      Chief Complaint   Patient presents with    Weight Gain         FINAL IMPRESSION:  1. Weight gain          ED COURSE & MEDICAL DECISION MAKIN-year-old male who was discharged yesterday after being treated for spontaneous bacterial peritonitis presented today for evaluation after he had noticed some weight gain.  The patient states that the discharge instructions told him to get evaluated if he gains more than 2 pounds in 1 day.  The patient states that his weight was 249 last night.  254 this morning at the scale in the facility where he lives.  Despite this the patient denied any symptoms.  He specifically denies any worsening abdominal pain, abdominal firmness, shortness of breath, nausea or vomiting, or fevers or chills.  The patient states that he is taking his prophylactic Cipro as well as the 2 diuretics that were prescribed to him at discharge.  Here in the ED the patient was hemodynamically stable.  The patient did not appear to be in any obvious distress or discomfort.  His physical exam was unremarkable.  There was no abdominal distention, fluid wave, or abdominal tenderness to palpation noted.    The patient's weight today in the ED is 255.  The patient's weight at the time of discharge yesterday was 253.  For comparison, the patient's weight was 276 when he arrived to the ED on 1/10/2011.    Following his initial evaluation the patient and the staff member were educated and reassured regarding ascites.  Because he is not having any symptoms back pain no further workup or evaluation is needed at this time.  The patient was instructed to continue wearing soft at the same time of day on the same scale.  If he notices a weight  gain with associated symptoms such as abdominal pain or distention, shortness of breath, fevers, chills, or any other concerning symptoms he was instructed to return back to ED for reevaluation.  Patient was otherwise instructed to take the prophylactic Cipro as well as the 2 diuretics that were prescribed from yesterday at the time of discharge.  The patient and the staff member indicated agreement and understanding of the discharge instructions.    Pertinent Labs & Imaging studies reviewed. (See chart for details)  10;11 AM I met with the patient to gather history and to perform my initial exam. We discussed plans for the ED course, including diagnostic testing and treatment. We discussed plan for discharge and all questions were answered.      At the conclusion of the encounter I discussed the results of all of the tests and the disposition. The questions were answered. The patient or family acknowledged understanding and was agreeable with the care plan.     Medical Decision Making    History:  Supplemental history from: Documented in chart  External Record(s) reviewed: Documented in chart    Work Up:  Chart documentation includes differential considered and any EKGs or imaging independently interpreted by provider, where specified.  In additional to work up documented, I considered the following work up: Documented in chart, if applicable.    External consultation:  Discussion of management with another provider: Documented in chart, if applicable    Complicating factors:  Care impacted by chronic illness: Chronic Lung Disease, Diabetes, Heart Disease, Hypertension, Smoking / Nicotine Use, and Other: Rojas's disease, Charcot-Estrella-Tooth syndrome  Care affected by social determinants of health: N/A    Disposition considerations: Discharge. I recommended the patient continue their current prescription strength medication(s): Cipro, lasix, spironolactone. See documentation for any additional details.    Not  "Applicable        PPE worn: n95 mask, goggles    MEDICATIONS GIVEN IN THE EMERGENCY:  Medications - No data to display    NEW PRESCRIPTIONS STARTED AT TODAY'S ER VISIT  Discharge Medication List as of 1/17/2025 10:25 AM             =================================================================    HPI    Patient information was obtained from: patient    Use of : N/A       Warren Jaramillo is a 44 year old male with a pertinent history of congestive heart failure, diabetes mellitus type 2, alcoholic cirrhosis, anticoagulation, learning disability, Rojas's disease, Charcot-Estrella-Tooth syndrome, who presents to this ED via walk-in for evaluation of weight gain.    The patient has a history of ascites, and was recently admitted for management. He was discharged yesterday and was told upon discharge to present again if he has a \"1 pound weight gain in 1 day, or a 5 pound weight gain in 1 week.\" Last night, he weighed 249 lbs, and today he weighed 253, thus prompting presentation to the ER.    The patient denies abdominal pain, nausea, vomiting, chills, and fever.    Per Chart Review:  Admission to [facility] on 11-16 Jan, 2025, for bacterial peritonitis and cirrhosis with ascites. Treated with paracentesis and IV albumin. Started on parenteral antibiotics and transitioned to ciprofloxacin prophylaxis. Re-started on furosemide and spironolactone. Discharge with advice to follow low sodium diet and restrict fluid intake, as patient noted \"that he had been drinking up to 16 bottles of fluids every day each measuring 28 ounces.\"    REVIEW OF SYSTEMS   Review of Systems   Constitutional:  Negative for chills and fever.        Positive for overnight weight gain of 4 lbs.   Gastrointestinal:  Negative for abdominal pain, nausea and vomiting.        PAST MEDICAL HISTORY:  Past Medical History:   Diagnosis Date    COPD exacerbation (H) 12/2/2024    DM2 (diabetes mellitus, type 2) (H) " 4/28/2020    HTN (hypertension) 7/30/2012    Thyroid nodule 7/31/2019    Rojas's disease (H)        PAST SURGICAL HISTORY:  Past Surgical History:   Procedure Laterality Date    COLONOSCOPY      ESOPHAGOSCOPY, GASTROSCOPY, DUODENOSCOPY (EGD), COMBINED N/A 7/21/2023    Procedure: ESOPHAGOGASTRODUODENOSCOPY WITH GASTRIC AND ESOPHAGEAL BIOPSIES;  Surgeon: Filiberto Aragon MD;  [facility]    TOOTH EXTRACTION             CURRENT MEDICATIONS:    albuterol (PROAIR HFA/PROVENTIL HFA/VENTOLIN HFA) 108 (90 Base) MCG/ACT inhaler  albuterol (PROVENTIL) (2.5 MG/3ML) 0.083% neb solution  aloe vera GEL  apixaban ANTICOAGULANT (ELIQUIS) 5 MG tablet  bacitracin 500 UNIT/GM OINT  Benzocaine (SOLARCAINE ALOE VERA EX)  benzonatate (TESSALON) 200 MG capsule  Calamine external lotion  carbamide peroxide (DEBROX) 6.5 % otic solution  ciprofloxacin (CIPRO) 500 MG tablet  clotrimazole (LOTRIMIN) 1 % external cream  dextromethorphan-guaiFENesin (MUCINEX DM)  MG 12 hr tablet  diclofenac (VOLTAREN) 1 % topical gel  dicyclomine (BENTYL) 20 MG tablet  EPINEPHrine (ANY BX GENERIC EQUIV) 0.3 MG/0.3ML injection 2-pack  famotidine (PEPCID) 20 MG tablet  FLUoxetine 20 MG tablet  furosemide (LASIX) 40 MG tablet  GAVILAX 17 GM/SCOOP powder  hydrocortisone (CORTAID) 1 % external cream  Hydrocortisone (PREPARATION H EX)  levothyroxine (SYNTHROID/LEVOTHROID) 25 MCG tablet  loperamide (IMODIUM) 2 MG capsule  loratadine (CLARITIN) 10 MG tablet  magnesium hydroxide (MILK OF MAGNESIA) 400 MG/5ML suspension  metFORMIN (GLUCOPHAGE) 1000 MG tablet  midodrine (PROAMATINE) 5 MG tablet  montelukast (SINGULAIR) 10 MG tablet  nicotine (NICODERM CQ) 21 MG/24HR 24 hr patch  OLANZapine (ZYPREXA) 10 MG tablet  omeprazole (PRILOSEC) 40 MG DR capsule  ondansetron (ZOFRAN ODT) 4 MG ODT tab  pramoxine-calamine (AVEENO) 1-8 % LOTN  rosuvastatin (CRESTOR) 10 MG tablet  spironolactone (ALDACTONE) 100 MG tablet  sucralfate (CARAFATE) 1 GM/10ML  suspension  traZODone (DESYREL) 100 MG tablet  TRELEGY ELLIPTA 100-62.5-25 MCG/ACT oral inhaler  Urea 40 % CREA  Vitamins A & D (VITAMIN A & D) OINT  witch hazel-glycerin (TUCKS) pad        ALLERGIES:  Allergies   Allergen Reactions    Apricot Flavoring Agent (Non-Screening) Anaphylaxis    Banana Anaphylaxis     Throat swelling  Throat swelling      Wasp Venom Protein Shortness Of Breath     Other reaction(s): Respiratory Distress  Has an epi pen  Has an epi pen      Bees Anaphylaxis     Have an Epi pen that carries with    Methylphenidate Itching     Other reaction(s): Nightmares    Prunus      Other reaction(s): *Unknown    Sulfa Antibiotics      Headaches and nausea    Prunus Persica Rash     Other reaction(s): *Unknown       FAMILY HISTORY:  Family History   Problem Relation Age of Onset    Unknown/Adopted Father     Unknown/Adopted Maternal Grandmother     C.A.D. Maternal Grandfather     Diabetes Maternal Grandfather     Cerebrovascular Disease Maternal Grandfather     Unknown/Adopted Paternal Grandmother     Unknown/Adopted Paternal Grandfather     Unknown/Adopted Brother     Unknown/Adopted Sister        SOCIAL HISTORY:   Social History     Socioeconomic History    Marital status: Single     Spouse name: None    Number of children: None    Years of education: None    Highest education level: None   Tobacco Use    Smoking status: Every Day     Current packs/day: 1.00     Types: Cigarettes    Smokeless tobacco: Never   Vaping Use    Vaping status: Never Used   Substance and Sexual Activity    Alcohol use: No     Comment: once every 3 months    Drug use: No    Sexual activity: Never     Partners: Female   Other Topics Concern     Service No    Blood Transfusions No    Caffeine Concern No    Occupational Exposure No    Hobby Hazards No    Sleep Concern No    Stress Concern Yes     Comment: sometimes    Weight Concern No    Special Diet Yes     Comment: counting carbs    Back Care No    Exercise Yes     "Seat Belt Yes    Self-Exams Yes     Social Drivers of Health     Financial Resource Strain: Low Risk  (1/11/2025)    Financial Resource Strain     Within the past 12 months, have you or your family members you live with been unable to get utilities (heat, electricity) when it was really needed?: No   Food Insecurity: Low Risk  (1/11/2025)    Food Insecurity     Within the past 12 months, did you worry that your food would run out before you got money to buy more?: No     Within the past 12 months, did the food you bought just not last and you didn t have money to get more?: No   Transportation Needs: Low Risk  (1/11/2025)    Transportation Needs     Within the past 12 months, has lack of transportation kept you from medical appointments, getting your medicines, non-medical meetings or appointments, work, or from getting things that you need?: No    Received from [facility], [facility]    Social Connections   Interpersonal Safety: High Risk (1/11/2025)    Interpersonal Safety     Do you feel physically and emotionally safe where you currently live?: No     Within the past 12 months, have you been hit, slapped, kicked or otherwise physically hurt by someone?: No     Within the past 12 months, have you been humiliated or emotionally abused in other ways by your partner or ex-partner?: No   Housing Stability: Low Risk  (1/11/2025)    Housing Stability     Do you have housing? : Yes     Are you worried about losing your housing?: No   Recent Concern: Housing Stability - High Risk (12/2/2024)    Housing Stability     Do you have housing? : No     Are you worried about losing your housing?: No       VITALS:  /70   Pulse 68   Temp 98  F (36.7  C) (Oral)   Resp 16   Ht 1.651 m (5' 5\")   Wt 115.7 kg (255 lb)   SpO2 97%   BMI 42.43 kg/m      PHYSICAL EXAM    General presentation: Alert, Vital signs reviewed. NAD  HENT: ENT inspection is normal. " Oropharynx is moist and clear.   Eye: Pupils are equal and reactive to light. EOMI  Neck: The neck is supple, with full ROM, with no evidence of meningismus.  Pulmonary: Currently in no acute respiratory distress. Normal, non labored respirations, the lung sounds are normal with good equal air movement. Clear to auscultation bilaterally.   Circulatory: Regular rate and rhythm. Peripheral pulses are strong and equal. No murmurs, rubs, or gallops.   Abdominal: The abdomen is soft. Nontender. No rigidity, guarding, or rebound. Bowel sounds normal.  No fluid wave noted.  Neurologic: Alert, oriented to person, place, and time. No motor deficit. No sensory deficit. Cranial nerves II through XII are intact.  Musculoskeletal: No extremity tenderness. Full range of motion in all extremities. No extremity edema.   Skin: Skin color is normal. No rash. Warm. Dry to touch.      LAB:  All pertinent labs reviewed and interpreted.       RADIOLOGY:  Reviewed all pertinent imaging. Please see official radiology report.  No orders to display           North MATOS , am serving as a scribe to document services personally performed by Uriel Payan DO based on my observation and the provider's statements to me. I, Uriel Payan, attest that [place] is acting in a scribe capacity, has observed my performance of the services and has documented them in accordance with my direction.    Uriel Payan DO  Emergency Medicine  [facility] EMERGENCY DEPARTMENT  [address] [place] [id number]  [phone]       Uriel Payan DO  01/17/25 1127

## 2025-01-17 NOTE — ED TRIAGE NOTES
Patient presents here for evaluation of weight gain. He was discharged from this hospital yesterday, treated for ascites. He did undergo paracentesis two days ago.  He was discharged yesterday. Weight upon discharge 249lb. He noticed that his weight was 254lb this morning. No shortness of breath, abdominal pain or sense of tightness.      Triage Assessment (Adult)       Row Name 01/17/25 0912          Triage Assessment    Airway WDL WDL        Respiratory WDL    Respiratory WDL WDL        Skin Circulation/Temperature WDL    Skin Circulation/Temperature WDL WDL        Cardiac WDL    Cardiac WDL WDL        Peripheral/Neurovascular WDL    Peripheral Neurovascular WDL WDL        Cognitive/Neuro/Behavioral WDL    Cognitive/Neuro/Behavioral WDL WDL

## 2025-01-17 NOTE — DISCHARGE INSTRUCTIONS
Continue to check your weight using the same scale at the same time a day.  If you find that you are gaining weight and you are have associated symptoms such as worsening abdominal distention or pain, shortness of breath, fevers, or chills you should return back to ED for reevaluation.      Continue taking the prophylactic antibiotic and diuretics as directed.  Follow-up with your next scheduled follow-up appointment.

## 2025-01-19 LAB
BACTERIA FLD CULT: NO GROWTH
GRAM STAIN RESULT: NORMAL
GRAM STAIN RESULT: NORMAL

## 2025-01-20 ENCOUNTER — TELEPHONE (OUTPATIENT)
Dept: PHARMACY | Facility: OTHER | Age: 45
End: 2025-01-20
Payer: COMMERCIAL

## 2025-01-20 NOTE — TELEPHONE ENCOUNTER
MTM referral from: Transitions of Care (recent hospital discharge, TCU discharge, or ED visit)    MTM referral outreach attempt #2 on January 20, 2025 at 10:33 AM      Outcome: Left Message    Use [facility] med adv  for the carrier/Plan on the flowsheet      [employer] Message Sent    Liz Reynoso CMA  MTM

## 2025-01-23 NOTE — TELEPHONE ENCOUNTER
Spoke to Jesika and scheduled pt an appointment next week with Dr. Ivan.   Margo Lennon RN on 1/23/2025 at 10:26 AM

## 2025-01-24 ENCOUNTER — HOSPITAL ENCOUNTER (EMERGENCY)
Facility: HOSPITAL | Age: 45
Discharge: HOME | End: 2025-01-25
Attending: STUDENT IN AN ORGANIZED HEALTH CARE EDUCATION/TRAINING PROGRAM | Admitting: STUDENT IN AN ORGANIZED HEALTH CARE EDUCATION/TRAINING PROGRAM
Payer: COMMERCIAL

## 2025-01-24 VITALS
RESPIRATION RATE: 14 BRPM | HEART RATE: 82 BPM | SYSTOLIC BLOOD PRESSURE: 122 MMHG | HEIGHT: 65 IN | DIASTOLIC BLOOD PRESSURE: 59 MMHG | WEIGHT: 258 LBS | BODY MASS INDEX: 42.99 KG/M2 | TEMPERATURE: 98.7 F | OXYGEN SATURATION: 97 %

## 2025-01-24 DIAGNOSIS — M54.41 ACUTE RIGHT-SIDED LOW BACK PAIN WITH RIGHT-SIDED SCIATICA: ICD-10-CM

## 2025-01-24 PROCEDURE — 99283 EMERGENCY DEPT VISIT LOW MDM: CPT

## 2025-01-25 ENCOUNTER — NURSE TRIAGE (OUTPATIENT)
Dept: NURSING | Facility: CLINIC | Age: 45
End: 2025-01-25
Payer: COMMERCIAL

## 2025-01-25 ENCOUNTER — HOSPITAL ENCOUNTER (EMERGENCY)
Facility: HOSPITAL | Age: 45
Discharge: HOME | End: 2025-01-25
Attending: EMERGENCY MEDICINE | Admitting: EMERGENCY MEDICINE
Payer: COMMERCIAL

## 2025-01-25 ENCOUNTER — APPOINTMENT (OUTPATIENT)
Dept: RADIOLOGY | Facility: HOSPITAL | Age: 45
End: 2025-01-25
Payer: COMMERCIAL

## 2025-01-25 VITALS
HEIGHT: 65 IN | WEIGHT: 258 LBS | SYSTOLIC BLOOD PRESSURE: 132 MMHG | HEART RATE: 77 BPM | RESPIRATION RATE: 18 BRPM | OXYGEN SATURATION: 94 % | DIASTOLIC BLOOD PRESSURE: 87 MMHG | TEMPERATURE: 97.7 F | BODY MASS INDEX: 42.99 KG/M2

## 2025-01-25 DIAGNOSIS — S39.012A STRAIN OF LUMBAR PARASPINOUS MUSCLE, INITIAL ENCOUNTER: ICD-10-CM

## 2025-01-25 PROCEDURE — 250N000011 HC RX IP 250 OP 636

## 2025-01-25 PROCEDURE — 72100 X-RAY EXAM L-S SPINE 2/3 VWS: CPT

## 2025-01-25 PROCEDURE — 250N000013 HC RX MED GY IP 250 OP 250 PS 637

## 2025-01-25 PROCEDURE — 99284 EMERGENCY DEPT VISIT MOD MDM: CPT | Mod: 25 | Performed by: EMERGENCY MEDICINE

## 2025-01-25 PROCEDURE — 96372 THER/PROPH/DIAG INJ SC/IM: CPT

## 2025-01-25 RX ORDER — LIDOCAINE 4 G/G
1 PATCH TOPICAL ONCE
Status: DISCONTINUED | OUTPATIENT
Start: 2025-01-25 | End: 2025-01-26 | Stop reason: HOSPADM

## 2025-01-25 RX ORDER — ACETAMINOPHEN 500 MG
500 TABLET ORAL EVERY 6 HOURS PRN
Qty: 10 TABLET | Refills: 0 | Status: SHIPPED | OUTPATIENT
Start: 2025-01-25 | End: 2025-01-28

## 2025-01-25 RX ORDER — METHOCARBAMOL 500 MG/1
500 TABLET, FILM COATED ORAL 3 TIMES DAILY PRN
Qty: 21 TABLET | Refills: 0 | Status: SHIPPED | OUTPATIENT
Start: 2025-01-25 | End: 2025-02-01

## 2025-01-25 RX ORDER — METHOCARBAMOL 500 MG/1
500 TABLET, FILM COATED ORAL ONCE
Status: COMPLETED | OUTPATIENT
Start: 2025-01-25 | End: 2025-01-25

## 2025-01-25 RX ORDER — KETOROLAC TROMETHAMINE 15 MG/ML
15 INJECTION, SOLUTION INTRAMUSCULAR; INTRAVENOUS ONCE
Status: COMPLETED | OUTPATIENT
Start: 2025-01-25 | End: 2025-01-25

## 2025-01-25 RX ADMIN — LIDOCAINE 1 PATCH: 4 PATCH TOPICAL at 22:31

## 2025-01-25 RX ADMIN — METHOCARBAMOL 500 MG: 500 TABLET ORAL at 21:58

## 2025-01-25 RX ADMIN — KETOROLAC TROMETHAMINE 15 MG: 15 INJECTION, SOLUTION INTRAMUSCULAR; INTRAVENOUS at 21:58

## 2025-01-25 ASSESSMENT — ACTIVITIES OF DAILY LIVING (ADL): ADLS_ACUITY_SCORE: 57

## 2025-01-25 NOTE — ED NOTES
Pt not in WR or wheelchair. Pt found walking without difficulty in parking lot smoking. Pt advised room ready. Pt ambulated to room

## 2025-01-25 NOTE — DISCHARGE INSTRUCTIONS
Take Tylenol as needed for pain.  Use ice packs on your right side over the next few days.    We placed a spine center referral.  They should be calling on Monday for follow-up.  Return for any bowel or bladder incontinence, leg weakness, numbness in your genital region, or worsening pain.   Alert and oriented to person, place and time

## 2025-01-25 NOTE — ED TRIAGE NOTES
Right lower back pain starting tonight while bending over to get out of bed. States pain is into right leg with some numbness. Reports no previous injuries     Triage Assessment (Adult)       Row Name 01/24/25 4682          Triage Assessment    Airway WDL WDL        Respiratory WDL    Respiratory WDL WDL        Skin Circulation/Temperature WDL    Skin Circulation/Temperature WDL WDL        Cardiac WDL    Cardiac WDL WDL        Peripheral/Neurovascular WDL    Peripheral Neurovascular WDL WDL        Cognitive/Neuro/Behavioral WDL    Cognitive/Neuro/Behavioral WDL WDL

## 2025-01-25 NOTE — ED PROVIDER NOTES
Emergency Department Encounter         FINAL IMPRESSION:  Back pain      ED COURSE AND MEDICAL DECISION MAKING     12:01 AM I introduced myself to the patient, obtained patient history, performed a physical exam, and discussed plan for ED workup including potential diagnostic laboratory/imaging studies and interventions.    ED Course as of 01/25/25 0037   Sat Jan 25, 2025   0006 Patient is a morbidly obese 44-year-old history of cirrhosis, heart failure, hypertension, hyperlipidemia, DVT on Eliquis, here with sudden onset of right lower lumbosacral back pain after trying to get up out of bed.  Patient reports sudden stabbing of right sided pain with intermittent paresthesias down the right leg.  No bowel or bladder incontinence.  No red flag symptoms.  No chest pain.  No abdominal pain.  No leg weakness.  No chest pain or shortness of breath.  On arrival he looks well.  Is ambulatory.  Was outside multiple times having cigarettes.  Has no weakness on examination.  Normal pulses.  No swelling.  No midline neck, thoracic or lumbosacral back pain.  No rashes on skin examination.  No abdominal pain.  Will treat him conservatively with Tylenol at home, ice and exercise/stretching.  Spine center referral placed for next week.                Medical Decision Making  Obtained supplemental history:Supplemental history obtained?: No  Reviewed external records: External records reviewed?: Documented in chart  Care impacted by chronic illness:Documented in Chart  Did you consider but not order tests?: Work up considered but not performed and documented in chart, if applicable  Did you interpret images independently?: Independent interpretation of ECG and images noted in documentation, when applicable.  Consultation discussion with other provider:Did you involve another provider (consultant, MH, pharmacy, etc.)?: No  Discharge. No recommendations on prescription strength medication(s). See documentation for any additional  details.    MIPS: Not Applicable    At the conclusion of the encounter I discussed the results of all the tests and the disposition. The questions were answered. The patient or family acknowledged understanding and was agreeable with the care plan.        MEDICATIONS GIVEN IN THE EMERGENCY DEPARTMENT:  Medications - No data to display    NEW PRESCRIPTIONS STARTED AT TODAY'S ED VISIT:  Discharge Medication List as of 1/25/2025 12:07 AM          HPI     Patient information obtained from: Patient    Use of : N/A     Warren Jaramillo is a 44 year old male with a pertinent history of DM2, COPD, HTN, HFpEF, obesity, and tobacco use who presents to this ED by ambulance for evaluation of back pain.    The patient reports he was lying in bed this evening. He went to get up out of bed using his left elbow to push himself up. When he pushed using his left elbow he had sudden onset of sharp right lower back pain. He reports he has to hunch over when standing due to the pain. He has been able to ambulate. With the right lower back pain he has had some tingling in his right lower leg. He denies any other injuries. He denies any bowel or bladder incontinence.      MEDICAL HISTORY     Past Medical History:   Diagnosis Date    COPD exacerbation (H) 12/2/2024    DM2 (diabetes mellitus, type 2) (H) 4/28/2020    HTN (hypertension) 7/30/2012    Thyroid nodule 7/31/2019    Rojas's disease (H)        Past Surgical History:   Procedure Laterality Date    COLONOSCOPY      ESOPHAGOSCOPY, GASTROSCOPY, DUODENOSCOPY (EGD), COMBINED N/A 7/21/2023    Procedure: ESOPHAGOGASTRODUODENOSCOPY WITH GASTRIC AND ESOPHAGEAL BIOPSIES;  Surgeon: Filiberto Aragon MD;  Location[facility] OR    TOOTH EXTRACTION         Social History     Tobacco Use    Smoking status: Every Day     Current packs/day: 1.00     Types: Cigarettes    Smokeless tobacco: Never   Vaping Use    Vaping status: Never Used   Substance Use Topics    Alcohol use: No  "    Comment: once every 3 months    Drug use: No       acetaminophen (TYLENOL) 500 MG tablet  albuterol (PROAIR HFA/PROVENTIL HFA/VENTOLIN HFA) 108 (90 Base) MCG/ACT inhaler  albuterol (PROVENTIL) (2.5 MG/3ML) 0.083% neb solution  aloe vera GEL  apixaban ANTICOAGULANT (ELIQUIS) 5 MG tablet  bacitracin 500 UNIT/GM OINT  Benzocaine (SOLARCAINE ALOE VERA EX)  benzonatate (TESSALON) 200 MG capsule  Calamine external lotion  carbamide peroxide (DEBROX) 6.5 % otic solution  ciprofloxacin (CIPRO) 500 MG tablet  clotrimazole (LOTRIMIN) 1 % external cream  dextromethorphan-guaiFENesin (MUCINEX DM)  MG 12 hr tablet  diclofenac (VOLTAREN) 1 % topical gel  dicyclomine (BENTYL) 20 MG tablet  EPINEPHrine (ANY BX GENERIC EQUIV) 0.3 MG/0.3ML injection 2-pack  famotidine (PEPCID) 20 MG tablet  FLUoxetine 20 MG tablet  furosemide (LASIX) 40 MG tablet  GAVILAX 17 GM/SCOOP powder  hydrocortisone (CORTAID) 1 % external cream  Hydrocortisone (PREPARATION H EX)  levothyroxine (SYNTHROID/LEVOTHROID) 25 MCG tablet  loperamide (IMODIUM) 2 MG capsule  loratadine (CLARITIN) 10 MG tablet  magnesium hydroxide (MILK OF MAGNESIA) 400 MG/5ML suspension  metFORMIN (GLUCOPHAGE) 1000 MG tablet  midodrine (PROAMATINE) 5 MG tablet  montelukast (SINGULAIR) 10 MG tablet  nicotine (NICODERM CQ) 21 MG/24HR 24 hr patch  OLANZapine (ZYPREXA) 10 MG tablet  omeprazole (PRILOSEC) 40 MG DR capsule  ondansetron (ZOFRAN ODT) 4 MG ODT tab  pramoxine-calamine (AVEENO) 1-8 % LOTN  rosuvastatin (CRESTOR) 10 MG tablet  spironolactone (ALDACTONE) 100 MG tablet  sucralfate (CARAFATE) 1 GM/10ML suspension  traZODone (DESYREL) 100 MG tablet  TRELEGY ELLIPTA 100-62.5-25 MCG/ACT oral inhaler  Urea 40 % CREA  Vitamins A & D (VITAMIN A & D) OINT  witch hazel-glycerin (TUCKS) pad            PHYSICAL EXAM     /59   Pulse 82   Temp 98.7  F (37.1  C)   Resp 14   Ht 1.651 m (5' 5\")   Wt 117 kg (258 lb)   SpO2 97%   BMI 42.93 kg/m        PHYSICAL EXAM:     General: " Patient appears well, nontoxic, comfortable  HEENT: Moist mucous membranes,  No head trauma.    Cardiovascular: Normal rate, normal rhythm, no extremity edema.  No appreciable murmur. Normal pulses.  Respiratory: No signs of respiratory distress, lungs are clear to auscultation bilaterally with no wheezes rhonchi or rales.  Abdominal: Soft, nontender, nondistended, no palpable masses, no guarding, no rebound  Musculoskeletal: Full range of motion of joints, no deformities appreciated.  No midline neck, thoracic or lumbosacral back pain. Pain to palp of R paraspinal area, no skin changes  Neurological: Alert and oriented, grossly neurologically intact, no weakness.  Psychological: Normal affect and mood.  Integument: No rashes appreciated      RESULTS       Labs Ordered and Resulted from Time of ED Arrival to Time of ED Departure - No data to display    No orders to display         PROCEDURES:  Procedures:  Procedures       ICollin am serving as a scribe to document services personally performed by Anurag Richardson DO, based on my observations and the provider's statements to me.  IAnurag DO, attest that Collin Conway is acting in a scribe capacity, has observed my performance of the services and has documented them in accordance with my direction.    Anurag Richardson DO  Emergency Medicine  [facility] EMERGENCY DEPARTMENT     Anurag Richardson DO  01/25/25 0354

## 2025-01-26 ENCOUNTER — HOSPITAL ENCOUNTER (EMERGENCY)
Facility: HOSPITAL | Age: 45
Discharge: HOME | End: 2025-01-26
Attending: EMERGENCY MEDICINE | Admitting: EMERGENCY MEDICINE
Payer: COMMERCIAL

## 2025-01-26 ENCOUNTER — APPOINTMENT (OUTPATIENT)
Dept: MRI IMAGING | Facility: HOSPITAL | Age: 45
End: 2025-01-26
Attending: EMERGENCY MEDICINE
Payer: COMMERCIAL

## 2025-01-26 VITALS
RESPIRATION RATE: 20 BRPM | OXYGEN SATURATION: 97 % | SYSTOLIC BLOOD PRESSURE: 124 MMHG | HEIGHT: 65 IN | BODY MASS INDEX: 44.42 KG/M2 | DIASTOLIC BLOOD PRESSURE: 60 MMHG | HEART RATE: 87 BPM | TEMPERATURE: 97.4 F | WEIGHT: 266.6 LBS

## 2025-01-26 DIAGNOSIS — M54.16 LUMBAR BACK PAIN WITH RADICULOPATHY AFFECTING RIGHT LOWER EXTREMITY: ICD-10-CM

## 2025-01-26 PROCEDURE — 72148 MRI LUMBAR SPINE W/O DYE: CPT

## 2025-01-26 PROCEDURE — 99284 EMERGENCY DEPT VISIT MOD MDM: CPT | Mod: 25

## 2025-01-26 RX ORDER — GABAPENTIN 300 MG/1
300 CAPSULE ORAL 3 TIMES DAILY
Qty: 60 CAPSULE | Refills: 0 | Status: SHIPPED | OUTPATIENT
Start: 2025-01-26 | End: 2025-02-15

## 2025-01-26 RX ORDER — NAPROXEN 500 MG/1
500 TABLET ORAL 2 TIMES DAILY WITH MEALS
Qty: 20 TABLET | Refills: 0 | Status: SHIPPED | OUTPATIENT
Start: 2025-01-26 | End: 2025-02-05

## 2025-01-26 ASSESSMENT — ACTIVITIES OF DAILY LIVING (ADL)
ADLS_ACUITY_SCORE: 57
ADLS_ACUITY_SCORE: 57

## 2025-01-26 NOTE — TELEPHONE ENCOUNTER
"Nurse Triage SBAR    Is this a 2nd Level Triage? NO    Situation: Severe Lower Back Pain    Background: Warren reports \"excruciating\" pain to his Lower Back near his tailbone.    - Pain rated 10/10 - \"crying due to pain\"  - Hurts when breathing in or out  - Unable to stand up - hunched over due to pain  - Radiating to Rt flank  - Hurts when having BM    He was seen in ED yesterday for the pain & reports that the pain is worse today.    No known recent injury.    Assessment: as noted above    Protocol Recommended Disposition:   Go to ED Now (Or PCP Triage) - non-[facility] PCP  - [facility] ED  - Pt lives in a group home & will notify staff    Does the patient meet one of the following criteria for ADS visit consideration? No    Edith Viramontes RN  [facility] Nurse [facility]visors      Reason for Disposition   Patient sounds very sick or weak to the triager    Protocols used: Recent Medical Visit for Injury Follow-up Call-A-AH    "

## 2025-01-26 NOTE — ED PROVIDER NOTES
Emergency Department Encounter   NAME: Warren Jaramillo ; AGE: 44 year old male ; YOB: 1980 ; MRN: [id number] ; PCP: Luzmaria Goldman Burket   ED PROVIDER: Sharmila Jarvis PA-C    Evaluation Date & Time:   No admission date for patient encounter.    CHIEF COMPLAINT:  Back Pain (Back pain x 2 days, noticed pain after odd movement, was here last night)      Impression and Plan   MDM: Warren Jaramillo is a 44 year old male who presents to the ED for evaluation of lower back pain.  I believe the patient's back pain to be muscular in nature with some possible involvement of his degenerative disc disease possibly causing his paresthesias down his right leg.  I am not highly suspicious of a compression fracture in a ronny his age with this mechanism of injury, but I think it is reasonable to do an x-ray to rule it out due to his concerns.  My main focus of this appointment if x-ray is negative take his to control patient's pain until he can be seen in spine clinic. Referral was already placed last night for spine clinic in a week.  Patient is in agreement with this plan.  I gave him Robaxin and Toradol in the waiting room for his pain.  I discussed this plan with pharmacy as well to ensure that these medications were safe with his cirrhosis.  No fractures seen on x-ray, patient is okay to discharge with prescription for low-dose Robaxin for 7 days until he can follow-up with spine.  He also requested a lidocaine patch behind to his back before he leaves the emergency room.    He had some concerns about his x-ray and his vertebrae looking too close to each other.  I explained that this is most likely due to his known degenerative disc disease, and may be causing disc compression which can cause nerve pain and numbness down bilateral legs.  I explained that today this is not an emergency and he can follow-up with his spine doctor for further management and evaluation, but if he has any  saddle anesthesia or sudden loss of bowel or bladder function to return to the emergency department for emergent evaluation.      ED COURSE:  9:22 PM I met and introduced myself to the patient. I gathered initial history and performed my physical exam. We discussed plan for initial workup.       9:26 PM I have staffed the patient with Dr. Monzon ED MD, who has evaluated the patient and agrees with all aspects of today's care.   10:22 PM I rechecked the patient and discussed results, discharge, follow up, and reasons to return to the ED.     At the conclusion of the encounter I discussed the results of all the tests and the disposition. The questions were answered. The patient or family acknowledged understanding and was agreeable with the care plan.    Medical Decision Making  Obtained supplemental history:Supplemental history obtained?: No  Reviewed external records: External records reviewed?: No  Care impacted by chronic illness:Anticoagulated State and Smoking / Nicotine Use  Did you consider but not order tests?: Work up considered but not performed and documented in chart, if applicable  Did you interpret images independently?: Independent interpretation of ECG and images noted in documentation, when applicable.  Consultation discussion with other provider:Did you involve another provider (consultant, MH, pharmacy, etc.)?: I discussed the care with another health care provider, see documentation for details.  Discharge. I prescribed additional prescription strength medication(s) as charted. See documentation for any additional details.    MIPS: Not Applicable    FINAL IMPRESSION:    ICD-10-CM    1. Strain of lumbar paraspinous muscle, initial encounter  S39.012A             MEDICATIONS GIVEN IN THE EMERGENCY DEPARTMENT:  Medications   Lidocaine (LIDOCARE) 4 % Patch 1 patch (has no administration in time range)   ketorolac (TORADOL) injection 15 mg (15 mg Intramuscular $Given 1/25/25 9689)   methocarbamol  (ROBAXIN) tablet 500 mg (500 mg Oral $Given 1/25/25 9858)         NEW PRESCRIPTIONS STARTED AT TODAY'S ED VISIT:  New Prescriptions    METHOCARBAMOL (ROBAXIN) 500 MG TABLET    Take 1 tablet (500 mg) by mouth 3 times daily as needed for muscle spasms.         HPI   Use of Intrepreter: N/A     Warren Jaramillo is a 44 year old male with a pertinent history of Rojas's disease, cirrhosis, heart failure and degenerative disc disease at L5-S1 who presents to the ED for evaluation of lower back pain.  Patient states he was laying in bed yesterday when he twisted and felt a jolt of pain on his right lumbar spine area.  He now finds it painful to stand fully straight and walk.  He also has pain and numbness intermittently radiating down his right leg.  He was seen last night for this and told to take Tylenol for the pain with supportive care and follow-up with the spine clinic.  He states this has not helped his pain and he would like further imaging done to rule out any fracture.  He denies any bowel or bladder incontinence, saddle anesthesia, or any other red flag symptoms.  No chest pain or shortness of breath, no abdominal pain, no significant leg weakness.      REVIEW OF SYSTEMS:  Pertinent positive and negative symptoms per HPI.       Medical History     Past Medical History:   Diagnosis Date    COPD exacerbation (H) 12/2/2024    DM2 (diabetes mellitus, type 2) (H) 4/28/2020    HTN (hypertension) 7/30/2012    Thyroid nodule 7/31/2019    Roajs's disease (H)        Past Surgical History:   Procedure Laterality Date    COLONOSCOPY      ESOPHAGOSCOPY, GASTROSCOPY, DUODENOSCOPY (EGD), COMBINED N/A 7/21/2023    Procedure: ESOPHAGOGASTRODUODENOSCOPY WITH GASTRIC AND ESOPHAGEAL BIOPSIES;  Surgeon: Filiberto Aragon MD;  Location: [facility] OR    TOOTH EXTRACTION         Family History   Problem Relation Age of Onset    Unknown/Adopted Father     Unknown/Adopted Maternal Grandmother     C.A.D. Maternal Grandfather      Diabetes Maternal Grandfather     Cerebrovascular Disease Maternal Grandfather     Unknown/Adopted Paternal Grandmother     Unknown/Adopted Paternal Grandfather     Unknown/Adopted Brother     Unknown/Adopted Sister        Social History     Tobacco Use    Smoking status: Every Day     Current packs/day: 1.00     Types: Cigarettes    Smokeless tobacco: Never   Vaping Use    Vaping status: Never Used   Substance Use Topics    Alcohol use: No     Comment: once every 3 months    Drug use: No       acetaminophen (TYLENOL) 500 MG tablet  albuterol (PROAIR HFA/PROVENTIL HFA/VENTOLIN HFA) 108 (90 Base) MCG/ACT inhaler  albuterol (PROVENTIL) (2.5 MG/3ML) 0.083% neb solution  aloe vera GEL  apixaban ANTICOAGULANT (ELIQUIS) 5 MG tablet  bacitracin 500 UNIT/GM OINT  Benzocaine (SOLARCAINE ALOE VERA EX)  benzonatate (TESSALON) 200 MG capsule  Calamine external lotion  carbamide peroxide (DEBROX) 6.5 % otic solution  ciprofloxacin (CIPRO) 500 MG tablet  clotrimazole (LOTRIMIN) 1 % external cream  dextromethorphan-guaiFENesin (MUCINEX DM)  MG 12 hr tablet  diclofenac (VOLTAREN) 1 % topical gel  dicyclomine (BENTYL) 20 MG tablet  EPINEPHrine (ANY BX GENERIC EQUIV) 0.3 MG/0.3ML injection 2-pack  famotidine (PEPCID) 20 MG tablet  FLUoxetine 20 MG tablet  furosemide (LASIX) 40 MG tablet  GAVILAX 17 GM/SCOOP powder  hydrocortisone (CORTAID) 1 % external cream  Hydrocortisone (PREPARATION H EX)  levothyroxine (SYNTHROID/LEVOTHROID) 25 MCG tablet  loperamide (IMODIUM) 2 MG capsule  loratadine (CLARITIN) 10 MG tablet  magnesium hydroxide (MILK OF MAGNESIA) 400 MG/5ML suspension  metFORMIN (GLUCOPHAGE) 1000 MG tablet  methocarbamol (ROBAXIN) 500 MG tablet  midodrine (PROAMATINE) 5 MG tablet  montelukast (SINGULAIR) 10 MG tablet  nicotine (NICODERM CQ) 21 MG/24HR 24 hr patch  OLANZapine (ZYPREXA) 10 MG tablet  omeprazole (PRILOSEC) 40 MG DR capsule  ondansetron (ZOFRAN ODT) 4 MG ODT tab  pramoxine-calamine (AVEENO) 1-8 %  "LOTN  rosuvastatin (CRESTOR) 10 MG tablet  spironolactone (ALDACTONE) 100 MG tablet  sucralfate (CARAFATE) 1 GM/10ML suspension  traZODone (DESYREL) 100 MG tablet  TRELEGY ELLIPTA 100-62.5-25 MCG/ACT oral inhaler  Urea 40 % CREA  Vitamins A & D (VITAMIN A & D) OINT  witch hazel-glycerin (TUCKS) pad          Physical Exam     First Vitals:  Patient Vitals for the past 24 hrs:   BP Temp Temp src Pulse Resp SpO2 Height Weight   01/25/25 1940 125/58 97.7  F (36.5  C) Oral 90 18 97 % 1.651 m (5' 5\") 117 kg (258 lb)         PHYSICAL EXAM:   Physical Exam  Vitals reviewed.   Constitutional:       General: He is not in acute distress.     Appearance: Normal appearance. He is not toxic-appearing.   HENT:      Head: Atraumatic.   Eyes:      General: No scleral icterus.     Conjunctiva/sclera: Conjunctivae normal.      Pupils: Pupils are equal, round, and reactive to light.   Cardiovascular:      Rate and Rhythm: Normal rate.      Heart sounds: Normal heart sounds.   Pulmonary:      Effort: Pulmonary effort is normal. No respiratory distress.      Breath sounds: Normal breath sounds.   Abdominal:      Palpations: Abdomen is soft.      Tenderness: There is no abdominal tenderness.   Musculoskeletal:         General: Tenderness present. No deformity. Normal range of motion.      Cervical back: Neck supple.   Skin:     General: Skin is warm.   Neurological:      General: No focal deficit present.      Mental Status: He is alert and oriented to person, place, and time.      Cranial Nerves: No cranial nerve deficit.      Sensory: No sensory deficit.      Motor: No weakness.   Psychiatric:         Mood and Affect: Mood normal.             Results     LAB:  All pertinent labs reviewed and interpreted  Labs Ordered and Resulted from Time of ED Arrival to Time of ED Departure - No data to display    RADIOLOGY:  XR Lumbar Spine 2/3 Views   Final Result   IMPRESSION: 6 nonrib-bearing lumbar-type vertebrae. No fracture. Normal vertebral " heights and alignment. Mild degenerative disc disease from T12 to L3 and from L4 to S1. The L3-L4 disc space is preserved. Mild degenerative arthritis articular facets from    L5 to S1. Normal extraspinal structures.            PROCEDURES:  none          Sharmila Jarvis PA-C   Emergency Medicine   [facility] EMERGENCY DEPARTMENT        Sharmila Jarvis PA-C  01/25/25 0520     Anemia

## 2025-01-26 NOTE — ED TRIAGE NOTES
Back pain x 2 days, noticed pain after odd movement, was here last night     Triage Assessment (Adult)       Row Name 01/25/25 1941          Triage Assessment    Airway WDL WDL        Respiratory WDL    Respiratory WDL WDL        Skin Circulation/Temperature WDL    Skin Circulation/Temperature WDL WDL        Cardiac WDL    Cardiac WDL WDL        Peripheral/Neurovascular WDL    Peripheral Neurovascular WDL WDL        Cognitive/Neuro/Behavioral WDL    Cognitive/Neuro/Behavioral WDL WDL

## 2025-01-26 NOTE — ED TRIAGE NOTES
Patient presents with Group Home Staff with c/o ongoing lower back pain, started yesterday.  Pt was seen in this ED yesterday, dx with lumbar strain.  Pt states that today he has numbness in groin, denies loss of bowel/bladder control.  Pt took Robaxin at 1400, with minimal change in pain

## 2025-01-26 NOTE — ED PROVIDER NOTES
Emergency Department Staff Note  I had a face to face encounter with this patient seen by the Advanced Practice Provider (TALIA).  I personally made/approved the management plan and take responsibility for the patient management. I personally saw the patient and performed a substantive portion of the visit including all aspects of the medical decision making.      ED Course as of 01/25/25 2424   Sat Jan 25, 2025   5405 Patient is a 44-year-old male comes in today for evaluation of some right-sided back pain.  He said he was initially rolling over the bed to get up and felt something pull in his right back and then when he tried to stand up he could not straighten out.  He has tenderness along the paraspinal muscles of the thoracic spine and a little bit into the lumbar area.  This seems consistent with muscle spasm.  He has been taking Tylenol and ibuprofen without good relief of his symptoms.  I think we can add on some different modalities.  Patient really wanted an x-ray and I think that is fine but I do not have a high suspicion that we will going to see a fracture.  There are occasions where compression fracture can cause these types of symptoms but he is a pretty young ronny so I am not highly suspicious of a compression fracture.  I told him we would check just to make sure.  If x-ray looks okay then we can continue with supportive management.  Patient is in agreement with the plan.     Medical Decision Making    History:  Supplemental history from: None  External Record(s) reviewed: I reviewed the patient's telephone nurse triage today.  Patient had rated his pain a 10 out of 10 and was crying due to the pain and it was hurting when he was breathing and unable to stand up.  I recommended that he come to the emergency department    Work Up:  Emergent/Severe conditions considered and evaluated for: Lumbar spine fracture  I independently reviewed and interpreted CT lumbar spine which showed no compression. See Full  radiology report for all details.  In additional to work up documented, I considered the following work up: None  Medications given that require intensive monitoring for toxicity: None    External consultation:  Discussion of management with another provider: None    Complicating factors:  Care impacted by chronic illness: Rojas's disease, diabetes, hypertension, COPD exacerbation    Disposition considerations: Discharge  Prescriptions considered/prescribed: [facility] EMERGENCY DEPARTMENT  MD Na Og, Jaquelin Castro MD  01/25/25 5312

## 2025-01-26 NOTE — DISCHARGE INSTRUCTIONS
Your emergency department evaluation today is reassuring.  You have been referred to a spine clinic and should have an appointment in the next week.  Please follow-up with them for the management of your degenerative disc disease.  Like we discussed, your acute pain is likely due to a muscle strain, and I see no evidence of fracture on your x-ray.  There is evidence of degenerative disc disease on your x-ray that can eventually cause pain or numbness down the legs if there is compression of the spinal nerves.  If this pain turns into numbness or tingling in your groin area or sudden loss of bowel or bladder function, please return to the ED or call 911, as this may be an emergency.  Please take your Robaxin as prescribed for help with your pain, and you can also take Tylenol or ibuprofen for pain as needed.

## 2025-01-26 NOTE — ED PROVIDER NOTES
EMERGENCY DEPARTMENT ENCOUnter      NAME: Warren Jaramillo  AGE: 44 year old male  YOB: 1980  MRN: [id number]  EVALUATION DATE & TIME: 1/26/2025  4:35 PM    [facility]    ED PROVIDER: Fortino Cabrera DO      Chief Complaint   Patient presents with    Back Pain         FINAL IMPRESSION:  1. Lumbar back pain with radiculopathy affecting right lower extremity          ED COURSE & MEDICAL DECISION MAKING:    The patient presented to the emergency department today with complaints of low back pain.  He was seen twice in the ER recently for the same symptoms.  He had an x-ray during his last visit which did not show any significant abnormalities.  He has been complaining of pain down the right leg but today also complains of some numbness in his groin.  No significant neurodeficits on exam.  An MRI was obtained today showing some neuroforaminal stenosis but no signs of any serious acute process.  He was already referred to the spine clinic for outpatient follow-up and I have encouraged him to keep this follow-up.  I will prescribe some gabapentin and naproxen.  At this time I do not see any need for further testing or admission.    Medical Decision Making  Obtained supplemental history:Supplemental history obtained?: No  Reviewed external records: External records reviewed?: Inpatient Record: 1/25/2025, [facility] Emergency Department  Care impacted by chronic illness:Chronic Lung Disease, Diabetes, Heart Disease, Hypertension, Mental Health, and Smoking / Nicotine Use  Did you consider but not order tests?: Work up considered but not performed and documented in chart, if applicable  Did you interpret images independently?: Independent interpretation of ECG and images noted in documentation, when applicable.  Consultation discussion with other provider:Did you involve another provider (consultant, , pharmacy, etc.)?: No  Discharge. I prescribed  additional prescription strength medication(s) as charted. See documentation for any additional details.    MIPS: Not Applicable      4:25 PM I met with the patient for the initial interview and physical examination in the waiting room. Discussed plan for treatment and workup in the ED. He was subsequently roomed in hallway bed B.      At the conclusion of the encounter I discussed the results of all of the tests and the disposition. The questions were answered. The patient or family acknowledged understanding and was agreeable with the care plan.       NEW PRESCRIPTIONS STARTED AT TODAY'S ER VISIT  Discharge Medication List as of 1/26/2025  6:20 PM        START taking these medications    Details   gabapentin (NEURONTIN) 300 MG capsule Take 1 capsule (300 mg) by mouth 3 times daily for 20 days., Disp-60 capsule, R-0, E-Prescribe      naproxen (NAPROSYN) 500 MG tablet Take 1 tablet (500 mg) by mouth 2 times daily (with meals) for 10 days., Disp-20 tablet, R-0, E-Prescribe                =================================================================    HPI        Warren Jaramillo is a 44 year old male with a pertinent history of Rojas's disease, cirrhosis, heart failure, degenerative disc disease at L5-S1, type 2 diabetes who presents to this ED by walk-in for evaluation of back pain.    Per chart review: Patient was seen in this emergency department ([facility] Emergency Department) yesterday (1/25/2025) for evaluation of right-sided back pain. Noted at that time that the patient had been seen the night before as well (1/24/2025) with a referral placed for spine clinic in one week's time. Patient was given Robaxin and Toradol for his pain. Patient was requesting an x-ray due to concern for fracture, this was ultimately negative. Patient was discharged with prescription for low-dose Robaxin for 7 days until he is able to follow-up with the spine clinic.       Patient reports an  "onset of \"extreme\" lower back pain to the point that he has been unable to get up from a sitting position on 1/24 (2 days ago) when he attempted to push himself up off his bed, feeling a \"sharp twinge\" in his right flank. This pain then persisted through the day along with a sensation of numbness to his right leg. Since then, the pain has been progressively worsening with newly-onset numbness to his groin/testicles as well. Patient notes that \"when I do slow movements, it takes my breath away\" and that today since 1100, his pain has radiated into his neck with a sensation of stiffness. He reports that the methocarbamol prescribed to him during his emergency department visit yesterday has not offered him any relief of his pain.     Patient mentions that within the last month, he has had 71 lbs of fluid removed from his abdomen via paracentesis; his last paracentesis was on 1/23 (3 days ago). He states that he would like to be evaluated regarding this problem as well while in the emergency department today.     Patient denies any other symptoms at this time.       PAST MEDICAL HISTORY:  Past Medical History:   Diagnosis Date    COPD exacerbation (H) 12/2/2024    DM2 (diabetes mellitus, type 2) (H) 4/28/2020    HTN (hypertension) 7/30/2012    Thyroid nodule 7/31/2019    Rojas's disease (H)        PAST SURGICAL HISTORY:  Past Surgical History:   Procedure Laterality Date    COLONOSCOPY      ESOPHAGOSCOPY, GASTROSCOPY, DUODENOSCOPY (EGD), COMBINED N/A 7/21/2023    Procedure: ESOPHAGOGASTRODUODENOSCOPY WITH GASTRIC AND ESOPHAGEAL BIOPSIES;  Surgeon: Filiberto Aragon MD;  [place] [facility] [place]TRACTION             CURRENT MEDICATIONS:    gabapentin (NEURONTIN) 300 MG capsule  naproxen (NAPROSYN) 500 MG tablet  acetaminophen (TYLENOL) 500 MG tablet  albuterol (PROAIR HFA/PROVENTIL HFA/VENTOLIN HFA) 108 (90 Base) MCG/ACT inhaler  albuterol (PROVENTIL) (2.5 MG/3ML) 0.083% neb solution  aloe vera " GEL  apixaban ANTICOAGULANT (ELIQUIS) 5 MG tablet  bacitracin 500 UNIT/GM OINT  Benzocaine (SOLARCAINE ALOE VERA EX)  benzonatate (TESSALON) 200 MG capsule  Calamine external lotion  carbamide peroxide (DEBROX) 6.5 % otic solution  ciprofloxacin (CIPRO) 500 MG tablet  clotrimazole (LOTRIMIN) 1 % external cream  dextromethorphan-guaiFENesin (MUCINEX DM)  MG 12 hr tablet  diclofenac (VOLTAREN) 1 % topical gel  dicyclomine (BENTYL) 20 MG tablet  EPINEPHrine (ANY BX GENERIC EQUIV) 0.3 MG/0.3ML injection 2-pack  famotidine (PEPCID) 20 MG tablet  FLUoxetine 20 MG tablet  furosemide (LASIX) 40 MG tablet  GAVILAX 17 GM/SCOOP powder  hydrocortisone (CORTAID) 1 % external cream  Hydrocortisone (PREPARATION H EX)  levothyroxine (SYNTHROID/LEVOTHROID) 25 MCG tablet  loperamide (IMODIUM) 2 MG capsule  loratadine (CLARITIN) 10 MG tablet  magnesium hydroxide (MILK OF MAGNESIA) 400 MG/5ML suspension  metFORMIN (GLUCOPHAGE) 1000 MG tablet  methocarbamol (ROBAXIN) 500 MG tablet  midodrine (PROAMATINE) 5 MG tablet  montelukast (SINGULAIR) 10 MG tablet  nicotine (NICODERM CQ) 21 MG/24HR 24 hr patch  OLANZapine (ZYPREXA) 10 MG tablet  omeprazole (PRILOSEC) 40 MG DR capsule  ondansetron (ZOFRAN ODT) 4 MG ODT tab  pramoxine-calamine (AVEENO) 1-8 % LOTN  rosuvastatin (CRESTOR) 10 MG tablet  spironolactone (ALDACTONE) 100 MG tablet  sucralfate (CARAFATE) 1 GM/10ML suspension  traZODone (DESYREL) 100 MG tablet  TRELEGY ELLIPTA 100-62.5-25 MCG/ACT oral inhaler  Urea 40 % CREA  Vitamins A & D (VITAMIN A & D) OINT  witch hazel-glycerin (TUCKS) pad        ALLERGIES:  Allergies   Allergen Reactions    Apricot Flavoring Agent (Non-Screening) Anaphylaxis    Banana Anaphylaxis     Throat swelling  Throat swelling      Wasp Venom Protein Shortness Of Breath     Other reaction(s): Respiratory Distress  Has an epi pen  Has an epi pen      Bees Anaphylaxis     Have an Epi pen that carries with    Methylphenidate Itching     Other reaction(s):  Nightmares    Prunus      Other reaction(s): *Unknown    Sulfa Antibiotics      Headaches and nausea    Prunus Persica Rash     Other reaction(s): *Unknown       FAMILY HISTORY:  Family History   Problem Relation Age of Onset    Unknown/Adopted Father     Unknown/Adopted Maternal Grandmother     C.A.D. Maternal Grandfather     Diabetes Maternal Grandfather     Cerebrovascular Disease Maternal Grandfather     Unknown/Adopted Paternal Grandmother     Unknown/Adopted Paternal Grandfather     Unknown/Adopted Brother     Unknown/Adopted Sister        SOCIAL HISTORY:   Social History     Socioeconomic History    Marital status: Single   Tobacco Use    Smoking status: Every Day     Current packs/day: 1.00     Types: Cigarettes    Smokeless tobacco: Never   Vaping Use    Vaping status: Never Used   Substance and Sexual Activity    Alcohol use: No     Comment: once every 3 months    Drug use: No    Sexual activity: Never     Partners: Female   Other Topics Concern     Service No    Blood Transfusions No    Caffeine Concern No    Occupational Exposure No    Hobby Hazards No    Sleep Concern No    Stress Concern Yes     Comment: sometimes    Weight Concern No    Special Diet Yes     Comment: counting carbs    Back Care No    Exercise Yes    Seat Belt Yes    Self-Exams Yes     Social Drivers of Health     Financial Resource Strain: Low Risk  (1/11/2025)    Financial Resource Strain     Within the past 12 months, have you or your family members you live with been unable to get utilities (heat, electricity) when it was really needed?: No   Food Insecurity: Low Risk  (1/11/2025)    Food Insecurity     Within the past 12 months, did you worry that your food would run out before you got money to buy more?: No     Within the past 12 months, did the food you bought just not last and you didn t have money to get more?: No   Transportation Needs: Low Risk  (1/11/2025)    Transportation Needs     Within the past 12 months, has  "lack of transportation kept you from medical appointments, getting your medicines, non-medical meetings or appointments, work, or from getting things that you need?: No    Received from [facility], [facility]    Social Connections   Interpersonal Safety: High Risk (1/11/2025)    Interpersonal Safety     Do you feel physically and emotionally safe where you currently live?: No     Within the past 12 months, have you been hit, slapped, kicked or otherwise physically hurt by someone?: No     Within the past 12 months, have you been humiliated or emotionally abused in other ways by your partner or ex-partner?: No   Housing Stability: Low Risk  (1/11/2025)    Housing Stability     Do you have housing? : Yes     Are you worried about losing your housing?: No   Recent Concern: Housing Stability - High Risk (12/2/2024)    Housing Stability     Do you have housing? : No     Are you worried about losing your housing?: No       VITALS:  Patient Vitals for the past 24 hrs:   BP Temp Temp src Pulse Resp SpO2 Height Weight   01/26/25 1700 124/60 -- -- -- -- -- -- --   01/26/25 1528 129/61 97.4  F (36.3  C) Temporal 87 20 97 % 1.651 m (5' 5\") 120.9 kg (266 lb 9.6 oz)       PHYSICAL EXAM    Constitutional:  Well developed, Well nourished, overweight.   HENT:  Normocephalic, Atraumatic, Oropharynx moist, Nose normal.   Eyes:  EOMI, Conjunctiva normal, No discharge.   Respiratory:  Normal breath sounds, No respiratory distress, No wheezing, No chest tenderness.   Cardiovascular:  Normal heart rate, Normal rhythm, No murmurs  GI:  Soft, No tenderness, No guarding, No CVA tenderness.   Musculoskeletal:  No tenderness to palpation or major deformities noted.   Extremities: No lower extremity edema.   Neurologic:  Alert & oriented x 3, No focal deficits noted. Subjective paresthesias to right lower extremity.   Psychiatric:  Affect normal, Judgment normal, Mood normal. "        RADIOLOGY:  I have independently reviewed and interpreted the above imaging, pending the final radiology read.  Lumbar spine MRI w/o contrast   Final Result   IMPRESSION:   1.  Multilevel degenerative changes of the lumbar spine, as above.   2.  At L4-L5, there is no significant central spinal canal stenosis, however there is moderate to severe left and moderate right neural foraminal stenosis. There is contact of the exiting left L4 nerve root by the facet and disc osteophyte within the    neural foramen at this level. Correlation for a left L4 radiculopathy is recommended.   3.  At L3-L4, there is mild left and mild to moderate right neural foraminal stenosis. Question contact of the bilateral exiting L3 nerve roots by the disc within the neural foramina at this level. Correlation for bilateral L3 radiculopathies is    recommended.   4.  At L5-S1, there is minimal left and mild to moderate right neural foraminal stenosis.              I, Nguyễn Green, am serving as a scribe to document services personally performed by Dr. Cabrera based on my observation and the provider's statements to me. I, Fortino Cabrera, DO attest that Nguyễn Green is acting in a scribe capacity, has observed my performance of the services and has documented them in accordance with my direction.    Fortino Cabrera DO  Emergency Medicine  [facility] EMERGENCY DEPARTMENT  [facility] [facility] [id number] [phone]  Dept: [phone]      Fortino Cabrera DO  01/26/25 9890

## 2025-01-27 ENCOUNTER — APPOINTMENT (OUTPATIENT)
Dept: CT IMAGING | Facility: CLINIC | Age: 45
End: 2025-01-27
Payer: COMMERCIAL

## 2025-01-27 ENCOUNTER — HOSPITAL ENCOUNTER (EMERGENCY)
Facility: CLINIC | Age: 45
Discharge: HOME | End: 2025-01-27
Attending: EMERGENCY MEDICINE | Admitting: EMERGENCY MEDICINE
Payer: COMMERCIAL

## 2025-01-27 ENCOUNTER — APPOINTMENT (OUTPATIENT)
Dept: MRI IMAGING | Facility: CLINIC | Age: 45
End: 2025-01-27
Payer: COMMERCIAL

## 2025-01-27 VITALS
SYSTOLIC BLOOD PRESSURE: 105 MMHG | OXYGEN SATURATION: 98 % | TEMPERATURE: 97.6 F | HEART RATE: 78 BPM | WEIGHT: 272.49 LBS | RESPIRATION RATE: 16 BRPM | BODY MASS INDEX: 45.34 KG/M2 | DIASTOLIC BLOOD PRESSURE: 66 MMHG

## 2025-01-27 DIAGNOSIS — R42 DIZZINESS: ICD-10-CM

## 2025-01-27 DIAGNOSIS — R53.1 WEAKNESS: ICD-10-CM

## 2025-01-27 LAB
ALBUMIN SERPL BCG-MCNC: 3.9 G/DL (ref 3.5–5.2)
ALP SERPL-CCNC: 292 U/L (ref 40–150)
ALT SERPL W P-5'-P-CCNC: 29 U/L (ref 0–70)
ANION GAP SERPL CALCULATED.3IONS-SCNC: 14 MMOL/L (ref 7–15)
APTT PPP: 34 SECONDS (ref 22–38)
AST SERPL W P-5'-P-CCNC: 25 U/L (ref 0–45)
ATRIAL RATE - MUSE: 75 BPM
BASOPHILS # BLD AUTO: 0.1 10E3/UL (ref 0–0.2)
BASOPHILS NFR BLD AUTO: 1 %
BILIRUB SERPL-MCNC: 0.3 MG/DL
BUN SERPL-MCNC: 22.5 MG/DL (ref 6–20)
CALCIUM SERPL-MCNC: 9.1 MG/DL (ref 8.8–10.4)
CHLORIDE SERPL-SCNC: 104 MMOL/L (ref 98–107)
CREAT BLD-MCNC: 1.4 MG/DL (ref 0.7–1.3)
CREAT SERPL-MCNC: 1.24 MG/DL (ref 0.67–1.17)
DIASTOLIC BLOOD PRESSURE - MUSE: NORMAL MMHG
EGFRCR SERPLBLD CKD-EPI 2021: 74 ML/MIN/1.73M2
EGFRCR SERPLBLD CKD-EPI 2021: >60 ML/MIN/1.73M2
EOSINOPHIL # BLD AUTO: 0.6 10E3/UL (ref 0–0.7)
EOSINOPHIL NFR BLD AUTO: 4 %
ERYTHROCYTE [DISTWIDTH] IN BLOOD BY AUTOMATED COUNT: 17.1 % (ref 10–15)
GLUCOSE BLDC GLUCOMTR-MCNC: 93 MG/DL (ref 70–99)
GLUCOSE SERPL-MCNC: 94 MG/DL (ref 70–99)
HCO3 SERPL-SCNC: 22 MMOL/L (ref 22–29)
HCT VFR BLD AUTO: 41.2 % (ref 40–53)
HGB BLD-MCNC: 12.8 G/DL (ref 13.3–17.7)
HOLD SPECIMEN: NORMAL
IMM GRANULOCYTES # BLD: 0.1 10E3/UL
IMM GRANULOCYTES NFR BLD: 1 %
INR BLD: 1.3
INR PPP: 1.55 (ref 0.85–1.15)
INTERPRETATION ECG - MUSE: NORMAL
LYMPHOCYTES # BLD AUTO: 2.7 10E3/UL (ref 0.8–5.3)
LYMPHOCYTES NFR BLD AUTO: 18 %
MCH RBC QN AUTO: 27.2 PG (ref 26.5–33)
MCHC RBC AUTO-ENTMCNC: 31.1 G/DL (ref 31.5–36.5)
MCV RBC AUTO: 88 FL (ref 78–100)
MONOCYTES # BLD AUTO: 1.3 10E3/UL (ref 0–1.3)
MONOCYTES NFR BLD AUTO: 9 %
NEUTROPHILS # BLD AUTO: 9.8 10E3/UL (ref 1.6–8.3)
NEUTROPHILS NFR BLD AUTO: 67 %
NRBC # BLD AUTO: 0 10E3/UL
NRBC BLD AUTO-RTO: 0 /100
P AXIS - MUSE: 70 DEGREES
PLATELET # BLD AUTO: 310 10E3/UL (ref 150–450)
POTASSIUM SERPL-SCNC: 4.9 MMOL/L (ref 3.4–5.3)
PR INTERVAL - MUSE: 206 MS
PROT SERPL-MCNC: 6.7 G/DL (ref 6.4–8.3)
QRS DURATION - MUSE: 98 MS
QT - MUSE: 398 MS
QTC - MUSE: 444 MS
R AXIS - MUSE: 86 DEGREES
RBC # BLD AUTO: 4.71 10E6/UL (ref 4.4–5.9)
SODIUM SERPL-SCNC: 140 MMOL/L (ref 135–145)
SYSTOLIC BLOOD PRESSURE - MUSE: NORMAL MMHG
T AXIS - MUSE: 96 DEGREES
T4 FREE SERPL-MCNC: 1.16 NG/DL (ref 0.9–1.7)
TSH SERPL DL<=0.005 MIU/L-ACNC: 7.5 UIU/ML (ref 0.3–4.2)
VENTRICULAR RATE- MUSE: 75 BPM
WBC # BLD AUTO: 14.6 10E3/UL (ref 4–11)

## 2025-01-27 PROCEDURE — 82565 ASSAY OF CREATININE: CPT

## 2025-01-27 PROCEDURE — 93005 ELECTROCARDIOGRAM TRACING: CPT | Performed by: EMERGENCY MEDICINE

## 2025-01-27 PROCEDURE — 99291 CRITICAL CARE FIRST HOUR: CPT | Performed by: EMERGENCY MEDICINE

## 2025-01-27 PROCEDURE — 2894A CT HEAD W/O CONTRAST: CPT | Mod: 26 | Performed by: RADIOLOGY

## 2025-01-27 PROCEDURE — 70450 CT HEAD/BRAIN W/O DYE: CPT | Mod: 26 | Performed by: RADIOLOGY

## 2025-01-27 PROCEDURE — 99291 CRITICAL CARE FIRST HOUR: CPT | Mod: GC | Performed by: STUDENT IN AN ORGANIZED HEALTH CARE EDUCATION/TRAINING PROGRAM

## 2025-01-27 PROCEDURE — 82962 GLUCOSE BLOOD TEST: CPT

## 2025-01-27 PROCEDURE — 70496 CT ANGIOGRAPHY HEAD: CPT | Mod: 26 | Performed by: RADIOLOGY

## 2025-01-27 PROCEDURE — 70498 CT ANGIOGRAPHY NECK: CPT

## 2025-01-27 PROCEDURE — 84443 ASSAY THYROID STIM HORMONE: CPT | Performed by: EMERGENCY MEDICINE

## 2025-01-27 PROCEDURE — 70450 CT HEAD/BRAIN W/O DYE: CPT

## 2025-01-27 PROCEDURE — 85730 THROMBOPLASTIN TIME PARTIAL: CPT | Performed by: EMERGENCY MEDICINE

## 2025-01-27 PROCEDURE — 85610 PROTHROMBIN TIME: CPT | Mod: GZ | Performed by: EMERGENCY MEDICINE

## 2025-01-27 PROCEDURE — 70553 MRI BRAIN STEM W/O & W/DYE: CPT | Mod: 26 | Performed by: RADIOLOGY

## 2025-01-27 PROCEDURE — 99285 EMERGENCY DEPT VISIT HI MDM: CPT | Mod: 25 | Performed by: EMERGENCY MEDICINE

## 2025-01-27 PROCEDURE — 255N000002 HC RX 255 OP 636

## 2025-01-27 PROCEDURE — 85025 COMPLETE CBC W/AUTO DIFF WBC: CPT | Performed by: EMERGENCY MEDICINE

## 2025-01-27 PROCEDURE — 70498 CT ANGIOGRAPHY NECK: CPT | Mod: 26 | Performed by: RADIOLOGY

## 2025-01-27 PROCEDURE — 70553 MRI BRAIN STEM W/O & W/DYE: CPT

## 2025-01-27 PROCEDURE — 93010 ELECTROCARDIOGRAM REPORT: CPT | Performed by: EMERGENCY MEDICINE

## 2025-01-27 PROCEDURE — 82565 ASSAY OF CREATININE: CPT | Performed by: EMERGENCY MEDICINE

## 2025-01-27 PROCEDURE — 84439 ASSAY OF FREE THYROXINE: CPT | Performed by: EMERGENCY MEDICINE

## 2025-01-27 PROCEDURE — 250N000011 HC RX IP 250 OP 636

## 2025-01-27 PROCEDURE — A9585 GADOBUTROL INJECTION: HCPCS

## 2025-01-27 PROCEDURE — 36415 COLL VENOUS BLD VENIPUNCTURE: CPT | Performed by: EMERGENCY MEDICINE

## 2025-01-27 PROCEDURE — 85610 PROTHROMBIN TIME: CPT

## 2025-01-27 RX ORDER — IOPAMIDOL 755 MG/ML
67 INJECTION, SOLUTION INTRAVASCULAR ONCE
Status: COMPLETED | OUTPATIENT
Start: 2025-01-27 | End: 2025-01-27

## 2025-01-27 RX ORDER — GADOBUTROL 604.72 MG/ML
10 INJECTION INTRAVENOUS ONCE
Status: COMPLETED | OUTPATIENT
Start: 2025-01-27 | End: 2025-01-27

## 2025-01-27 RX ADMIN — GADOBUTROL 10 ML: 604.72 INJECTION INTRAVENOUS at 17:02

## 2025-01-27 RX ADMIN — IOPAMIDOL 67 ML: 755 INJECTION, SOLUTION INTRAVENOUS at 13:32

## 2025-01-27 ASSESSMENT — ACTIVITIES OF DAILY LIVING (ADL)
ADLS_ACUITY_SCORE: 57
ADLS_ACUITY_SCORE: 57
ADLS_ACUITY_SCORE: 56
ADLS_ACUITY_SCORE: 56
ADLS_ACUITY_SCORE: 57
ADLS_ACUITY_SCORE: 57

## 2025-01-27 NOTE — ED TRIAGE NOTES
"ED with complaints of dizziness since about one hour ago with difficulty walkng.  Reports starting Robaxin and Gabapentin yesterday after being seen in a different ED for back pain.      Triage Assessment (Adult)       Row Name 01/27/25 1256          Triage Assessment    Airway WDL WDL        Respiratory WDL    Respiratory WDL WDL        Skin Circulation/Temperature WDL    Skin Circulation/Temperature WDL WDL        Cardiac WDL    Cardiac WDL X  \"dizziness\"        Peripheral/Neurovascular WDL    Peripheral Neurovascular WDL WDL        Cognitive/Neuro/Behavioral WDL    Cognitive/Neuro/Behavioral WDL WDL                     "

## 2025-01-27 NOTE — ED NOTES
Francine burris called for the patidee dee and RN spoke [facility]   [employer] # [id number]

## 2025-01-27 NOTE — PROGRESS NOTES
EMS report. PT states he was hypoglycemic and dizzy. 106 blood sugar by EMS. Comes from .    Hx autism, ADHD, DM2.

## 2025-01-27 NOTE — CONSULTS
[facility]     Stroke Code Note          History of Present Illness     Chief Complaint: Dizziness      Warren Jaramillo is a 44 year old male w/ PMH including recent DVT on eliquis (last dose this morning), T2DM, smoking, HTN, and cirrhosis with recurrent SBP who presented for evaluation of dizziness and double vision.  The patient was at his day program this morning when he stepped outside for a cigarette.  As he was finishing his cigarette, he felt he had rapid onset of dizziness and general blurry vision.  He potentially was falling to his left side, however he is not sure.  He was able to ambulate back inside but still felt off.  He felt better when sitting down.  On arrival to the ED, he noted blurry vision with lateral eye movements to the ED provider, which prompted a stroke code.    On my arrival, the patient endorsed continued blurry vision when looking both ways. No blurry vision with primary gaze.    Extraocular eye movements were not intact and gaze was conjugate.  He has baseline right leg numbness attributed to spinal stenosis but this has not changed recently.  Otherwise, NIHSS was 0.  No ataxia noted and gait was normal.  CT head showed no acute abnormality.  CT angiogram did not show acute abnormality.  Due to Eliquis dose this morning and lack of disabling deficit, TNK was not offered.  Mechanical thrombectomy not indicated due to absence of LVO.    On further history, the patient states that his dizziness is worse when standing up and improved when lying back flat.  We checked orthostatic vitals and they were normal, however the patient noted feeling lightheaded when he stood up.  In regards to the double vision, he states that he experiences blurry vision with looking to the periphery both left and right.  He does not see 2 fingers, but rather the finger blurs.  This improves after taking off his glasses.  He has no double vision on  primary gaze, and his gaze is conjugate.  He tells us that he has had recent diarrheal illness in the past 48 hours and states it has been watery.  Also has been recently worked up for lumbar stenosis and was started on Robaxin and gabapentin.  He just started taking gabapentin last night and took both gabapentin and Robaxin this morning.         Past Medical History     Stroke risk factors: T2DM, HTN, Smoking    Preadmission antithrombotic regimen: Eliquis (for DVT)    Modified [place] Score (Pre-morbid)  0-No deficits                   Assessment and Plan       1. Dehydration  2. Diarrheal Illness  3. Medication side effect  Warren Jaramillo is a 44 year old male w/ PMH including recent DVT on eliquis (last dose this morning), T2DM, smoking, HTN, and cirrhosis with recurrent SBP who presented for evaluation of dizziness and double vision.  While the patient does have cardiovascular risk factors, his presentation seems more consistent with either a recent diarrheal illness over the past 48 hours and/or medication side effect from recently starting gabapentin and Robaxin.  The patient's dizziness seems to worsen when standing up and improves when lying down.  While orthostatic vitals were not positive, he may be dehydrated in the setting of recent diarrheal illness.  Similarly, dizziness may be caused by medication side effect.  His double vision improves after taking his glasses off, and on clarification, is actually general blurry vision which he experiences when looking in the periphery of his vision bilaterally.  There is no disconjugate gaze or extraocular eye movement deficit.  Due to the minor symptoms on exam (NIHSS of 0) and plausible alternative explanation, we would not recommend further neurologic workup at this time.  Recommend continued workup for metabolic etiology of the patient's symptoms per ED provider.    Addendum: Paged by the ED staff about concern of fluctuating R arm weakness. On their  serial exam, the patient was able to maintain his arm antigravity without drift, but there was a concern about arm extension/flexion weakness. I went to assess the patient and my exam was unchanged from prior, the patient was able to hold arm antigravity without drift and flex and extend against gravity. Potential subtle weakness of tricep extension although wonder if it is volitional as patient is able to briskly extend arm against gravity with passive movement. Reasonable to obtain MRI for further evaluation.     Intravenous Thrombolysis  Not given due to:   - minor/isolated/quickly resolving symptoms  - DOAC dose within 48 hours or INR > 1.7     Endovascular Treatment  Not initiated due to absence of proximal vessel occlusion     Plan:  -Reasonable to obtain MRI Brain w/ and w/o contrast  -Repeat orthostatic vitals  -Consider toxic metabolic workup  -Continue DOAC for DVT  -Stroke Neurology will follow the results of the imaging     ___________________________________________________________________    The patient was seen and discussed with Stroke Staff, Dr. Ramos.    Joe Philippe MD  Neurology Resident  ___________________________________________________________________        Imaging/Labs   (personally reviewed )    CT HEAD W/O CONTRAST 1/27/2025 1:29 PM     History:  STROKE CODE  ICD-10:     Comparison: Head CT 11/19/2024. Brain MRI 11/2/2024.                                                                   Impression:   1. No acute intracranial hemorrhage.  2. ASPECT Score: 10/10.  CTA HEAD NECK W CONTRAST 1/27/2025 1:32 PM       CT angiogram of the neck   CT angiogram of the base of the brain with contrast  Reconstruction on the 3D workstation                                                                   Impression:    1. Head CTA demonstrates no aneurysm or stenosis of the major  intracranial arteries.   2. Neck CTA demonstrates no stenosis of the major cervical arteries.         Physical  Examination     BP: 103/60   Pulse: 78   Resp: 16   Temp: 97.8  F (36.6  C)   Temp src: Oral   SpO2: 97 %   O2 Device: None (Room air)   Weight: 123.6 kg (272 lb 7.8 oz)    Wt Readings from Last 2 Encounters:   01/27/25 123.6 kg (272 lb 7.8 oz)   01/26/25 120.9 kg (266 lb 9.6 oz)       Neurologic  Mental Status:  alert, oriented x 3, follows commands, speech clear and fluent, naming and repetition normal  Cranial Nerves:  visual fields intact, PERRL, EOMI with normal smooth pursuit, reports double vision when looking to the periphery bilaterally, however this improves after taking off his glasses and there his gaze remains conjugate, facial sensation intact and symmetric, facial movements symmetric, hearing not formally tested but intact to conversation, no dysarthria, shoulder shrug strong bilaterally, tongue protrusion midline  Motor:  able to move all limbs spontaneously, strength 5/5 throughout upper and lower extremities, no pronator drift  Reflexes:  no clonus  Sensory:  light touch sensation intact and symmetric throughout upper and lower extremities, no extinction on double simultaneous stimulation   Coordination:  normal finger-to-nose and heel-to-shin bilaterally without dysmetria  Station/Gait:  normal width, turn, arm swing        Stroke Scales       NIHSS  1a. Level of Consciousness 0-->Alert, keenly responsive   1b. LOC Questions 0-->Answers both questions correctly   1c. LOC Commands 0-->Performs both tasks correctly   2.   Best Gaze 0-->Normal   3.   Visual 0-->No visual loss   4.   Facial Palsy 0-->Normal symmetrical movements   5a. Motor Arm, Left 0-->No drift, limb holds 90 (or 45) degrees for full 10 secs   5b. Motor Arm, Right 0-->No drift, limb holds 90 (or 45) degrees for full 10 secs   6a. Motor Leg, Left 0-->No drift, leg holds 30 degree position for full 5 secs   6b. Motor Leg, right 0-->No drift, leg holds 30 degree position for full 5 secs   7.   Limb Ataxia 0-->Absent   8.   Sensory  "0-->Normal, no sensory loss   9.   Best Language 0-->No aphasia, normal   10. Dysarthria 0-->Normal   11. Extinction and Inattention  0-->No abnormality   Total 0 (01/27/25 1343)            Labs     CBC  Lab Results   Component Value Date    HGB 12.8 (L) 01/27/2025    HCT 41.2 01/27/2025    WBC 14.6 (H) 01/27/2025     01/27/2025       BMP  Lab Results   Component Value Date     01/27/2025    POTASSIUM 4.9 01/27/2025    CHLORIDE 104 01/27/2025    CO2 22 01/27/2025    BUN 22.5 (H) 01/27/2025    CR 1.4 (H) 01/27/2025    GLC 94 01/27/2025    WES 9.1 01/27/2025       INR  INR   Date Value Ref Range Status   01/27/2025 1.55 (H) 0.85 - 1.15 Final   01/13/2025 1.45 (H) 0.85 - 1.15 Final     INR POCT   Date Value Ref Range Status   01/27/2025 1.3 0.9 - 1.3 Final       Data   Stroke Code Data  (for stroke code without tele)  Stroke code activated 01/27/25  1304   First stroke provider response 01/27/25  1310   Last known normal 01/27/25  1200   Time of discovery (or onset of symptoms) 01/27/25  1200   Head CT read by Stroke Neuro Provider 01/27/25  1325   Was stroke code de-escalated? Yes  01/27/25  1403        Clinically Significant Risk Factors Present on Admission                # Drug Induced Coagulation Defect: home medication list includes an anticoagulant medication    # Hypertension: Noted on problem list  # Chronic heart failure with preserved ejection fraction: heart failure noted on problem list and last echo with EF >50%          # Severe Obesity: Estimated body mass index is 45.34 kg/m  as calculated from the following:    Height as of 1/26/25: 1.651 m (5' 5\").    Weight as of this encounter: 123.6 kg (272 lb 7.8 oz).       # Financial/Environmental Concerns:             Time Spent on this Encounter   Billing:    "

## 2025-01-27 NOTE — PHARMACY
Pharmacy Stroke Code Response    Pharmacist responded as part of the Stroke Code Team activation to patient care area ED3. The Stroke Team determined that the patient was not a candidate for IV thrombolytic therapy and the pharmacy team was dismissed at 1400.    Mary Gonzalez, Barb, BCEMP, BCCCP, BCPS

## 2025-01-27 NOTE — ED PROVIDER NOTES
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    Patient was outside smoking about an hour ago when he all of a sudden felt dizzy and had difficulty ambulating. While looking at wall, he feels he has \"bug eyes\" which he describes as dilated eyes and feeling \"high\". He thinks this is related to starting Robaxin and Gabapentin yesterday after he was evaluated in the ED for back pain. Says he feels weak on the right side. No numbness or tingling. Endorses episode of bowel incontinence last night, which is new for him. Explains that he thought he was going to pass gas and instead had BM.     Patient smokes cigarettes, stating he is down from 3 ppd to 1.5 ppd.     He has a history of Rojas's disease with significant ascites. Endorses abdominal distention, but not currently having abdominal pain. He has had 71 lbs of fluid removed from his abdomen via paracentesis; his last paracentesis was on 1/23 (3 days ago).     Of note, he was evaluated in ED yesterday for back pain. MRI was obtained showing some neuroforaminal stenosis but no signs of any serious acute process.     Past Medical History  Past Medical History:   Diagnosis Date    COPD exacerbation (H) 12/2/2024    DM2 (diabetes mellitus, type 2) (H) 4/28/2020    HTN (hypertension) 7/30/2012    Thyroid nodule 7/31/2019    Rojas's disease (H)      Past Surgical History:   Procedure Laterality Date    COLONOSCOPY      ESOPHAGOSCOPY, GASTROSCOPY, DUODENOSCOPY (EGD), COMBINED N/A 7/21/2023    Procedure: ESOPHAGOGASTRODUODENOSCOPY WITH GASTRIC AND ESOPHAGEAL BIOPSIES;  Surgeon: Filiberto Aragon MD;  Location: [facility] OR    TOOTH EXTRACTION       acetaminophen (TYLENOL) 500 " MG tablet  albuterol (PROAIR HFA/PROVENTIL HFA/VENTOLIN HFA) 108 (90 Base) MCG/ACT inhaler  albuterol (PROVENTIL) (2.5 MG/3ML) 0.083% neb solution  aloe vera GEL  apixaban ANTICOAGULANT (ELIQUIS) 5 MG tablet  bacitracin 500 UNIT/GM OINT  Benzocaine (SOLARCAINE ALOE VERA EX)  benzonatate (TESSALON) 200 MG capsule  Calamine external lotion  carbamide peroxide (DEBROX) 6.5 % otic solution  ciprofloxacin (CIPRO) 500 MG tablet  clotrimazole (LOTRIMIN) 1 % external cream  dextromethorphan-guaiFENesin (MUCINEX DM)  MG 12 hr tablet  diclofenac (VOLTAREN) 1 % topical gel  dicyclomine (BENTYL) 20 MG tablet  EPINEPHrine (ANY BX GENERIC EQUIV) 0.3 MG/0.3ML injection 2-pack  famotidine (PEPCID) 20 MG tablet  FLUoxetine 20 MG tablet  furosemide (LASIX) 40 MG tablet  gabapentin (NEURONTIN) 300 MG capsule  GAVILAX 17 GM/SCOOP powder  hydrocortisone (CORTAID) 1 % external cream  Hydrocortisone (PREPARATION H EX)  levothyroxine (SYNTHROID/LEVOTHROID) 25 MCG tablet  loperamide (IMODIUM) 2 MG capsule  loratadine (CLARITIN) 10 MG tablet  magnesium hydroxide (MILK OF MAGNESIA) 400 MG/5ML suspension  metFORMIN (GLUCOPHAGE) 1000 MG tablet  methocarbamol (ROBAXIN) 500 MG tablet  midodrine (PROAMATINE) 5 MG tablet  montelukast (SINGULAIR) 10 MG tablet  naproxen (NAPROSYN) 500 MG tablet  nicotine (NICODERM CQ) 21 MG/24HR 24 hr patch  OLANZapine (ZYPREXA) 10 MG tablet  omeprazole (PRILOSEC) 40 MG DR capsule  ondansetron (ZOFRAN ODT) 4 MG ODT tab  pramoxine-calamine (AVEENO) 1-8 % LOTN  rosuvastatin (CRESTOR) 10 MG tablet  spironolactone (ALDACTONE) 100 MG tablet  sucralfate (CARAFATE) 1 GM/10ML suspension  traZODone (DESYREL) 100 MG tablet  TRELEGY ELLIPTA 100-62.5-25 MCG/ACT oral inhaler  Urea 40 % CREA  Vitamins A & D (VITAMIN A & D) OINT  witch hazel-glycerin (TUCKS) pad      Allergies   Allergen Reactions    Apricot Flavoring Agent (Non-Screening) Anaphylaxis    Banana Anaphylaxis     Throat swelling  Throat swelling      Wasp Venom  Protein Shortness Of Breath     Other reaction(s): Respiratory Distress  Has an epi pen  Has an epi pen      Bees Anaphylaxis     Have an Epi pen that carries with    Methylphenidate Itching     Other reaction(s): Nightmares    Prunus      Other reaction(s): *Unknown    Sulfa Antibiotics      Headaches and nausea    Prunus Persica Rash     Other reaction(s): *Unknown     Family History  Family History   Problem Relation Age of Onset    Unknown/Adopted Father     Unknown/Adopted Maternal Grandmother     C.A.D. Maternal Grandfather     Diabetes Maternal Grandfather     Cerebrovascular Disease Maternal Grandfather     Unknown/Adopted Paternal Grandmother     Unknown/Adopted Paternal Grandfather     Unknown/Adopted Brother     Unknown/Adopted Sister      Social History   Social History     Tobacco Use    Smoking status: Every Day     Current packs/day: 1.00     Types: Cigarettes    Smokeless tobacco: Never   Vaping Use    Vaping status: Never Used   Substance Use Topics    Alcohol use: No     Comment: once every 3 months    Drug use: No      Past medical history, past surgical history, medications, allergies, family history, and social history were reviewed with the patient. No additional pertinent items.   A medically appropriate review of systems was performed with pertinent positives and negatives noted in the HPI, and all other systems negative.    Physical Exam   BP: 107/53  Pulse: 85  Temp: 97.8  F (36.6  C)  Resp: 16  Weight: 123.6 kg (272 lb 7.8 oz)  SpO2: 97 %  Physical Exam  Constitutional:       General: He is not in acute distress.     Appearance: He is toxic-appearing.   HENT:      Head: Normocephalic and atraumatic.   Eyes:      Conjunctiva/sclera: Conjunctivae normal.   Abdominal:      General: There is distension.      Tenderness: There is no abdominal tenderness.   Skin:     General: Skin is warm and dry.   Neurological:      Mental Status: He is alert and oriented to person, place, and time.       Cranial Nerves: No cranial nerve deficit.      Sensory: No sensory deficit.      Motor: Weakness present.      Comments: Strength 3/5 R UE, 5/5 L UE, 3/5 R LE, 5/5 L LE. No pronator drift.  On re-examination, strength 4/5 R UE.   Psychiatric:         Mood and Affect: Mood normal.         ED Course, Procedures, & Data      Procedures            EKG Interpretation:      Interpreted by Lisy Sarah  Time reviewed: 1431  Symptoms at time of EKG: Dizziness  Rhythm: normal sinus   Rate: normal  Axis: normal  Ectopy: none  Conduction: normal  ST Segments/ T Waves: No ST-T wave changes  Q Waves: none  Comparison to prior: Unchanged from 12/01/2024    Clinical Impression: Normal  EKG       Results for orders placed or performed during the hospital encounter of 01/27/25   [place] Draw     Status: None ()    Narrative    The following orders were created for panel order [place] Draw.  Procedure                               Abnormality         Status                     ---------                               -----------         ------                     Extra Blue Top Tube[[id number]]                                                         Extra Green Top (Lithium...[[id number]]                                                 Extra Purple Top Tube[[id number]]                                                         Please view results for these tests on the individual orders.   [place] Draw *Canceled*     Status: None ()    Narrative    The following orders were created for panel order [place] Draw.  Procedure                               Abnormality         Status                     ---------                               -----------         ------                       Please view results for these tests on the individual orders.   [place] Draw     Status: None ()    Narrative    The following orders were created for panel order [place] Draw.  Procedure                               Abnormality         Status                      ---------                               -----------         ------                     Extra Red Top Tube[[id number]]                                                            Please view results for these tests on the individual orders.     Medications - No data to display  Labs Ordered and Resulted from Time of ED Arrival to Time of ED Departure - No data to display  No orders to display          Critical care was not performed.     Medical Decision Making  The patient's presentation was of {[facility] Problem:742973}.    The patient's evaluation involved:  {[facility] Data:436256}    The patient's management necessitated {[facility] Management:555900}.    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    At time of presentation, patient endorsed diplopia and was unable to track fingers. Given his reported acute onset dizziness with ataxia and diplopia,called a tier 1 stroke code. Head CT w/o contrast shows no acute intracranial hemorrhage. With an ASPECT Score: 10/10. ECG normal compared to 12/2024. Labs unremarkable, consistent with his baseline On repeat examination, patient had increased R upper extremity weakness and was re-evaluated by Neurology. Intermittent weakness raised concern for TIA and prompted brain MRI. Plan to keep patient in observation with serial Neurologic examinations. Other etiologies to explain symptoms include medication side effects or dehydration. Orthostatic testing negative. ***    I have reviewed the nursing notes. I have reviewed the findings, diagnosis, plan and need for follow up with the patient.    New Prescriptions    No medications on file       Final diagnoses:   None     Lisy Sarah, MS4  ***  [facility] EMERGENCY DEPARTMENT  1/27/2025   CONTRAST  1/27/2025 4:44 PM     HISTORY:  Waxing and waning right UE weakness. Concern for TIA.     COMPARISON:  CTA head neck 1/27/2025, MR brain 1/2/2024    TECHNIQUE: Sagittal T1-weighted, coronal T2-weighted, axial T2 FLAIR,  axial susceptibility weighted, and axial diffusion-weighted with ADC  map images of the brain were obtained without intravenous contrast.  Coronal DWI was obtained. After the administration of intravenous  contrast axial and coronal fat-suppressed T1-weighted weighted images  were obtained    CONTRAST: 10cc of Gadavist injected..    FINDINGS:    There is no mass effect, midline shift, or intracranial hemorrhage.  The ventricles are proportionate to the cerebral sulci. Diffusion and  susceptibility weighted images are negative for acute/focal  abnormality. Major intracranial vascular structures are within normal  limits.    No suspicious abnormality of the skull marrow signal. There is fluid  present throughout the left maxillary sinus.. Fluid is present in some  of the right mastoid air cells. The left mastoid air cells are clear..  No focal abnormality of the pituitary gland, sella, skull base and  upper cervical spinal structures on sagittal images. The orbits are  normal.      Impression    IMPRESSION:  1. No acute intracranial pathology.  2. No focal lesion or structural abnormality to explain the patient's  symptoms.  3. Findings of chronic left maxillary sinusitis and right mastoiditis    I have personally reviewed the examination and initial interpretation  and I agree with the findings.    EMILY CHAPARRO MD         SYSTEM ID:  [id number]   [place] Draw     Status: None    Narrative    The following orders were created for panel order [place] Draw.  Procedure                               Abnormality         Status                     ---------                               -----------         ------                     Extra Blue Top Tube[[id number]]                              Final  result               Extra Green Top (Lithium...[[id number]]                      Final result               Extra Purple Top Tube[[id number]]                            Final result                 Please view results for these tests on the individual orders.   Extra Blue Top Tube     Status: None   Result Value Ref Range    Hold Specimen JIC    Extra Green Top (Lithium Heparin) Tube     Status: None   Result Value Ref Range    Hold Specimen JIC    Extra Purple Top Tube     Status: None   Result Value Ref Range    Hold Specimen JIC    [place] Draw *Canceled*     Status: None ()    Narrative    The following orders were created for panel order [place] Draw.  Procedure                               Abnormality         Status                     ---------                               -----------         ------                       Please view results for these tests on the individual orders.   [place] Draw     Status: None    Narrative    The following orders were created for panel order [place] Draw.  Procedure                               Abnormality         Status                     ---------                               -----------         ------                     Extra Red Top Tube[[id number]]                               Final result                 Please view results for these tests on the individual orders.   Extra Red Top Tube     Status: None   Result Value Ref Range    Hold Specimen JIC    iStat INR, POCT     Status: Normal   Result Value Ref Range    INR POCT 1.3 0.9 - 1.3    Narrative    Effective 11/13/2024, the abnormal flagging for this assay has changed to alert for values below or above the reference range for the patient population. Previous tests were flagged below or above the therapeutic level, so there may be differences in the flagging of prior results with similar values performed with this method.   Creatinine POCT     Status: Abnormal   Result Value Ref Range    Creatinine POCT 1.4 (H)  0.7 - 1.3 mg/dL    GFR, ESTIMATED POCT >60 >60 mL/min/1.73m2   Comprehensive metabolic panel     Status: Abnormal   Result Value Ref Range    Sodium 140 135 - 145 mmol/L    Potassium 4.9 3.4 - 5.3 mmol/L    Carbon Dioxide (CO2) 22 22 - 29 mmol/L    Anion Gap 14 7 - 15 mmol/L    Urea Nitrogen 22.5 (H) 6.0 - 20.0 mg/dL    Creatinine 1.24 (H) 0.67 - 1.17 mg/dL    GFR Estimate 74 >60 mL/min/1.73m2    Calcium 9.1 8.8 - 10.4 mg/dL    Chloride 104 98 - 107 mmol/L    Glucose 94 70 - 99 mg/dL    Alkaline Phosphatase 292 (H) 40 - 150 U/L    AST 25 0 - 45 U/L    ALT 29 0 - 70 U/L    Protein Total 6.7 6.4 - 8.3 g/dL    Albumin 3.9 3.5 - 5.2 g/dL    Bilirubin Total 0.3 <=1.2 mg/dL   Partial thromboplastin time     Status: Normal   Result Value Ref Range    aPTT 34 22 - 38 Seconds   INR     Status: Abnormal   Result Value Ref Range    INR 1.55 (H) 0.85 - 1.15   TSH with free T4 reflex     Status: Abnormal   Result Value Ref Range    TSH 7.50 (H) 0.30 - 4.20 uIU/mL   CBC with platelets and differential     Status: Abnormal   Result Value Ref Range    WBC Count 14.6 (H) 4.0 - 11.0 10e3/uL    RBC Count 4.71 4.40 - 5.90 10e6/uL    Hemoglobin 12.8 (L) 13.3 - 17.7 g/dL    Hematocrit 41.2 40.0 - 53.0 %    MCV 88 78 - 100 fL    MCH 27.2 26.5 - 33.0 pg    MCHC 31.1 (L) 31.5 - 36.5 g/dL    RDW 17.1 (H) 10.0 - 15.0 %    Platelet Count 310 150 - 450 10e3/uL    % Neutrophils 67 %    % Lymphocytes 18 %    % Monocytes 9 %    % Eosinophils 4 %    % Basophils 1 %    % Immature Granulocytes 1 %    NRBCs per 100 WBC 0 <1 /100    Absolute Neutrophils 9.8 (H) 1.6 - 8.3 10e3/uL    Absolute Lymphocytes 2.7 0.8 - 5.3 10e3/uL    Absolute Monocytes 1.3 0.0 - 1.3 10e3/uL    Absolute Eosinophils 0.6 0.0 - 0.7 10e3/uL    Absolute Basophils 0.1 0.0 - 0.2 10e3/uL    Absolute Immature Granulocytes 0.1 <=0.4 10e3/uL    Absolute NRBCs 0.0 10e3/uL   T4 free     Status: Normal   Result Value Ref Range    Free T4 1.16 0.90 - 1.70 ng/dL   Glucose by meter     Status:  Normal   Result Value Ref Range    GLUCOSE BY METER POCT 93 70 - 99 mg/dL   EKG 12-lead, tracing only     Status: None   Result Value Ref Range    Systolic Blood Pressure  mmHg    Diastolic Blood Pressure  mmHg    Ventricular Rate 75 BPM    Atrial Rate 75 BPM    AL Interval 206 ms    QRS Duration 98 ms     ms    QTc 444 ms    P Axis 70 degrees    R AXIS 86 degrees    T Axis 96 degrees    Interpretation ECG       Sinus rhythm  Anteroseptal infarct , age undetermined  Abnormal ECG  Unconfirmed report - interpretation of this ECG is computer generated - see medical record for final interpretation    Confirmed by - EMERGENCY ROOM, PHYSICIAN ([id number]),  POLLY MENDOZA ([id number]) on 1/27/2025 2:36:44 PM     CBC with platelets differential     Status: Abnormal    Narrative    The following orders were created for panel order CBC with platelets differential.  Procedure                               Abnormality         Status                     ---------                               -----------         ------                     CBC with platelets and d...[[id number]]  Abnormal            Final result                 Please view results for these tests on the individual orders.     Medications   iopamidol (ISOVUE-370) solution 67 mL (67 mLs Intravenous $Given 1/27/25 1332)   sodium chloride (PF) 0.9% PF flush 90 mL (90 mLs Intravenous $Given 1/27/25 1332)   gadobutrol (GADAVIST) injection 10 mL (10 mLs Intravenous $Given 1/27/25 1702)     Labs Ordered and Resulted from Time of ED Arrival to Time of ED Departure   ISTAT CREATININE POCT - Abnormal       Result Value    Creatinine POCT 1.4 (*)     GFR, ESTIMATED POCT >60     COMPREHENSIVE METABOLIC PANEL - Abnormal    Sodium 140      Potassium 4.9      Carbon Dioxide (CO2) 22      Anion Gap 14      Urea Nitrogen 22.5 (*)     Creatinine 1.24 (*)     GFR Estimate 74      Calcium 9.1      Chloride 104      Glucose 94      Alkaline Phosphatase 292 (*)     AST 25      ALT 29       Protein Total 6.7      Albumin 3.9      Bilirubin Total 0.3     INR - Abnormal    INR 1.55 (*)    TSH WITH FREE T4 REFLEX - Abnormal    TSH 7.50 (*)    CBC WITH PLATELETS AND DIFFERENTIAL - Abnormal    WBC Count 14.6 (*)     RBC Count 4.71      Hemoglobin 12.8 (*)     Hematocrit 41.2      MCV 88      MCH 27.2      MCHC 31.1 (*)     RDW 17.1 (*)     Platelet Count 310      % Neutrophils 67      % Lymphocytes 18      % Monocytes 9      % Eosinophils 4      % Basophils 1      % Immature Granulocytes 1      NRBCs per 100 WBC 0      Absolute Neutrophils 9.8 (*)     Absolute Lymphocytes 2.7      Absolute Monocytes 1.3      Absolute Eosinophils 0.6      Absolute Basophils 0.1      Absolute Immature Granulocytes 0.1      Absolute NRBCs 0.0     ISTAT INR POCT - Normal    INR POCT 1.3     PARTIAL THROMBOPLASTIN TIME - Normal    aPTT 34     T4 FREE - Normal    Free T4 1.16     GLUCOSE BY METER - Normal    GLUCOSE BY METER POCT 93       MR Brain w/o & w Contrast   Final Result   IMPRESSION:   1. No acute intracranial pathology.   2. No focal lesion or structural abnormality to explain the patient's   symptoms.   3. Findings of chronic left maxillary sinusitis and right mastoiditis      I have personally reviewed the examination and initial interpretation   and I agree with the findings.      EMILY CHAPARRO MD            SYSTEM ID:  [id number]      CTA Head Neck with Contrast   Final Result   Impression:     1. Head CTA demonstrates no aneurysm or stenosis of the major   intracranial arteries.    2. Neck CTA demonstrates no stenosis of the major cervical arteries.      I have personally reviewed the examination and initial interpretation   and I agree with the findings.      MONICA DILL MD            SYSTEM ID:  [id number]      CT Head w/o Contrast   Final Result   Impression:    1. No acute intracranial hemorrhage.   2. ASPECT Score: 10/10.      [Stroke Code Result: No acute stroke]      [facility] Stroke Code Communication  Guidelines:   [place] ED: [phone] (EB ED)   [place] Inpatient: [phone] (Resident Pager)    or Saúl 'Stroke First Call Resident [[place]]' or [employer] [phone]   [facility] ED: [phone] ([facility] ED)   [facility] Inpatient: Page [phone]      I have personally reviewed the examination and initial interpretation   and I agree with the findings.      MONICA DILL MD            SYSTEM ID:  [id number]             Critical care was not performed.     Medical Decision Making  The patient's presentation was of high complexity (an acute health issue posing potential threat to life or bodily function).    The patient's evaluation involved:  ordering and/or review of 3+ test(s) in this encounter (see separate area of note for details)    The patient's management necessitated high risk (a decision regarding hospitalization).    Critical Care Addendum  My initial assessment, based on my review of nursing observations, review of vital signs, focused history, and physical exam, established a high suspicion that Warren Jaramillo has ischemic or hemorrhagic stroke, which requires immediate intervention, and therefore he is critically ill.     After the initial assessment, the care team initiated multiple lab tests and consulted with neurology  to provide stabilization care. Due to the critical nature of this patient, I reassessed nursing observations, vital signs, physical exam, interpretation of lab and radiologic studies, and neurologic status multiple times prior to his disposition.     Time also spent performing documentation, discussion with family to obtain medical information for decision making, reviewing test results, discussion with consultants, and coordination of care.     Critical care time (excluding teaching time and procedures): 47 minutes.         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    At time of presentation, patient endorsed diplopia and was unable to track fingers. Given his reported acute onset dizziness with ataxia and diplopia,called a tier 1 stroke code. Head CT w/o contrast shows no acute intracranial hemorrhage. With an ASPECT Score: 10/10. ECG normal compared to 12/2024. Labs unremarkable, consistent with his baseline On repeat examination, patient had increased R upper extremity weakness and was re-evaluated by Neurology. Intermittent weakness raised concern for TIA and prompted brain MRI. Plan to keep patient in observation with serial Neurologic examinations. Other etiologies to explain symptoms include medication side effects or dehydration. Orthostatic testing negative.     --    ED Attending Physician Attestation    I HUNG BENOIT MD, cared for this patient with the Medical Student. I performed, or re-performed, the physical exam and medical decision-making. I have verified the accuracy of all the medical student findings and documentation above, and have edited as necessary.    Summary of HPI, PE, ED Course   Patient is a 44 year old male evaluated in the emergency department for weakness and dizziness. Exam and ED course notable for a negative stroke workup. After the completion of care in the emergency department, the patient was discharged.      HUNG BENOIT MD  Emergency Medicine       I have reviewed the nursing notes. I have reviewed the findings, diagnosis, plan and need for follow up with the patient.    Discharge Medication List as of 1/27/2025  6:48 PM          Final diagnoses:   Weakness   Dizziness     Lisy Sarah, MS4    [facility] EMERGENCY DEPARTMENT  1/27/2025     Hung Benoit MD  02/09/25 2270

## 2025-01-27 NOTE — DISCHARGE INSTRUCTIONS
Fortunately your MRI today does not show any serious abnormalities.  Take the prescribed medication as directed and follow-up closely with the spine clinic.  Return to the ER for any worsening symptoms or other concerns.

## 2025-01-28 NOTE — PLAN OF CARE
/66 (BP Location: Right arm)   Pulse 78   Temp 97.6  F (36.4  C) (Oral)   Resp 16   Wt 123.6 kg (272 lb 7.8 oz)   SpO2 98%   BMI 45.34 kg/m      Patient discharged home (group home) at 1914. Denied pain. Discharge instruction hard copy provided. Pulled out PIV. Vs stable and within normal limit. No question or concerns.

## 2025-01-28 NOTE — DISCHARGE INSTRUCTIONS
Follow up with your primary care provider in the next week. Return to the emergency department if your symptoms worsen.

## 2025-01-31 ENCOUNTER — HOSPITAL ENCOUNTER (EMERGENCY)
Facility: HOSPITAL | Age: 45
Discharge: HOME | End: 2025-01-31
Payer: COMMERCIAL

## 2025-01-31 VITALS
SYSTOLIC BLOOD PRESSURE: 123 MMHG | TEMPERATURE: 97 F | OXYGEN SATURATION: 97 % | RESPIRATION RATE: 20 BRPM | HEIGHT: 65 IN | DIASTOLIC BLOOD PRESSURE: 58 MMHG | WEIGHT: 284.83 LBS | HEART RATE: 70 BPM | BODY MASS INDEX: 47.46 KG/M2

## 2025-01-31 DIAGNOSIS — R18.8 OTHER ASCITES: ICD-10-CM

## 2025-01-31 LAB
ALBUMIN SERPL BCG-MCNC: 3.9 G/DL (ref 3.5–5.2)
ALP SERPL-CCNC: 244 U/L (ref 40–150)
ALT SERPL W P-5'-P-CCNC: 22 U/L (ref 0–70)
ANION GAP SERPL CALCULATED.3IONS-SCNC: 10 MMOL/L (ref 7–15)
AST SERPL W P-5'-P-CCNC: 18 U/L (ref 0–45)
BILIRUB DIRECT SERPL-MCNC: <0.2 MG/DL (ref 0–0.3)
BILIRUB SERPL-MCNC: 0.4 MG/DL
BUN SERPL-MCNC: 21.8 MG/DL (ref 6–20)
CALCIUM SERPL-MCNC: 9 MG/DL (ref 8.8–10.4)
CHLORIDE SERPL-SCNC: 109 MMOL/L (ref 98–107)
CREAT SERPL-MCNC: 1.46 MG/DL (ref 0.67–1.17)
EGFRCR SERPLBLD CKD-EPI 2021: 60 ML/MIN/1.73M2
ERYTHROCYTE [DISTWIDTH] IN BLOOD BY AUTOMATED COUNT: 18.1 % (ref 10–15)
GLUCOSE SERPL-MCNC: 140 MG/DL (ref 70–99)
HCO3 SERPL-SCNC: 24 MMOL/L (ref 22–29)
HCT VFR BLD AUTO: 38.3 % (ref 40–53)
HGB BLD-MCNC: 12.2 G/DL (ref 13.3–17.7)
MCH RBC QN AUTO: 27.9 PG (ref 26.5–33)
MCHC RBC AUTO-ENTMCNC: 31.9 G/DL (ref 31.5–36.5)
MCV RBC AUTO: 87 FL (ref 78–100)
PLATELET # BLD AUTO: 277 10E3/UL (ref 150–450)
POTASSIUM SERPL-SCNC: 4.5 MMOL/L (ref 3.4–5.3)
PROT SERPL-MCNC: 6.5 G/DL (ref 6.4–8.3)
RBC # BLD AUTO: 4.38 10E6/UL (ref 4.4–5.9)
SODIUM SERPL-SCNC: 143 MMOL/L (ref 135–145)
WBC # BLD AUTO: 14.6 10E3/UL (ref 4–11)

## 2025-01-31 PROCEDURE — 99283 EMERGENCY DEPT VISIT LOW MDM: CPT

## 2025-01-31 PROCEDURE — 85027 COMPLETE CBC AUTOMATED: CPT

## 2025-01-31 PROCEDURE — 82248 BILIRUBIN DIRECT: CPT

## 2025-01-31 PROCEDURE — 80053 COMPREHEN METABOLIC PANEL: CPT

## 2025-01-31 PROCEDURE — 36415 COLL VENOUS BLD VENIPUNCTURE: CPT | Performed by: STUDENT IN AN ORGANIZED HEALTH CARE EDUCATION/TRAINING PROGRAM

## 2025-01-31 PROCEDURE — 85027 COMPLETE CBC AUTOMATED: CPT | Performed by: STUDENT IN AN ORGANIZED HEALTH CARE EDUCATION/TRAINING PROGRAM

## 2025-01-31 PROCEDURE — 85041 AUTOMATED RBC COUNT: CPT | Performed by: STUDENT IN AN ORGANIZED HEALTH CARE EDUCATION/TRAINING PROGRAM

## 2025-01-31 PROCEDURE — 80053 COMPREHEN METABOLIC PANEL: CPT | Performed by: STUDENT IN AN ORGANIZED HEALTH CARE EDUCATION/TRAINING PROGRAM

## 2025-01-31 PROCEDURE — 82248 BILIRUBIN DIRECT: CPT | Performed by: STUDENT IN AN ORGANIZED HEALTH CARE EDUCATION/TRAINING PROGRAM

## 2025-01-31 ASSESSMENT — ACTIVITIES OF DAILY LIVING (ADL)
ADLS_ACUITY_SCORE: 56
ADLS_ACUITY_SCORE: 56

## 2025-01-31 NOTE — ED TRIAGE NOTES
Pt abdomen was last tapped Jan 23 for 5L of fluid.  Pt has had a 20 lb weight gain since then.     Triage Assessment (Adult)       Row Name 01/31/25 3964          Triage Assessment    Airway WDL WDL        Respiratory WDL    Respiratory WDL X;rhythm/pattern     Rhythm/Pattern, Respiratory shortness of breath        Skin Circulation/Temperature WDL    Skin Circulation/Temperature WDL WDL        Cardiac WDL    Cardiac WDL WDL        Peripheral/Neurovascular WDL    Peripheral Neurovascular WDL WDL        Cognitive/Neuro/Behavioral WDL    Cognitive/Neuro/Behavioral WDL WDL

## 2025-02-01 ENCOUNTER — HOSPITAL ENCOUNTER (EMERGENCY)
Facility: HOSPITAL | Age: 45
Discharge: HOME | End: 2025-02-01
Attending: EMERGENCY MEDICINE | Admitting: EMERGENCY MEDICINE
Payer: COMMERCIAL

## 2025-02-01 VITALS
DIASTOLIC BLOOD PRESSURE: 79 MMHG | TEMPERATURE: 98.1 F | WEIGHT: 287.9 LBS | BODY MASS INDEX: 47.97 KG/M2 | SYSTOLIC BLOOD PRESSURE: 138 MMHG | HEIGHT: 65 IN | HEART RATE: 84 BPM | RESPIRATION RATE: 24 BRPM | OXYGEN SATURATION: 96 %

## 2025-02-01 DIAGNOSIS — R18.8 OTHER ASCITES: ICD-10-CM

## 2025-02-01 PROCEDURE — 99282 EMERGENCY DEPT VISIT SF MDM: CPT

## 2025-02-01 NOTE — DISCHARGE INSTRUCTIONS
You were seen here for Abdominal swelling.  You had a appointment to have a paracentesis done at 3 PM.  You did not show up to this appointment.  Please call the number for scheduling and you can be seen tomorrow to have a drain.  If you develop fevers, severe abdominal pain or shortness of breath represent to the emergency department.

## 2025-02-01 NOTE — ED PROVIDER NOTES
EMERGENCY DEPARTMENT ENCOUNTER      NAME: Warren Jaramillo  AGE: 44 year old male  YOB: 1980  MRN: [id number]  EVALUATION DATE & TIME: No admission date for patient encounter.    PCP: Luzmaria Goldman Cowlic    ED PROVIDER: Jamie Vicente MD      Chief Complaint   Patient presents with    Ascites         FINAL IMPRESSION:  1. Other ascites          ED COURSE & MEDICAL DECISION MAKING:    Pertinent Labs & Imaging studies reviewed. (See chart for details)  44 year old male presents to the Emergency Department for evaluation of abdominal distension and weight gain.  Differential diagnosis considered cirrhosis, SBP, ascites, heart failure.     Triage Note: Pt abdomen was last tapped Jan 23 for 5L of fluid.  Pt has had a 20 lb weight gain since then.        ED Course as of 02/03/25 1410   Fri Jan 31, 2025 1923 Well appearing nontoxic, no fevers.  Did not respiratory distress.  Was a distended abdomen with fluid wave no chronic cirrhosis.  He had a tap on January 23 and removed 5 L.  He has a 20 pound weight gain since that time.  Ultrasound did show ascites however he is nontender on my exam.  I have low suspicion for SBP.  He has a standing order for a paracentesis over the call him so he presented here for a therapeutic paracentesis.  I do not believe this is an emergent procedure.  Will consult IR to try to close the loop for patient to follow-up with a therapeutic paracentesis.  Unfortunately given the critical boarding crisis I do not know when we would have availability to perform a large-volume paracentesis.  I discussed with him about this and he prefers to be discharged home and to follow-up as an outpatient.   1938 No show today at 3pm.  Per radiology.  He had a therapeutic appointment at 3 PM.  He did not make this appointment.  I discussed with him and he said he did not have that appointment listed.  I do not believe he has an emergent need for a therapeutic paracentesis  tonight.  Notably is not in respiratory distress and I have low suspicion for SBP.  I have given him the number to call for follow-up tomorrow to be scheduled.  I talked to the ultrasound tech who said that should be able to have an appointment for tomorrow.  Patient is agreeable with this plan.       Not Applicable    I discussed the plan for discharge with the patient, and patient is agreeable. We discussed supportive cares at home and reasons for return to the ER including new or worsening symptoms - all questions and concerns addressed to the best of my ability. Strict return precautions discussed. Patient to be discharged by RN.    At the conclusion of the encounter I discussed the results of all of the tests and the disposition. The questions were answered. The patient or family acknowledged understanding and was agreeable with the care plan.     MEDICATIONS GIVEN IN THE EMERGENCY:  Medications - No data to display    NEW PRESCRIPTIONS STARTED AT TODAY'S ER VISIT  Discharge Medication List as of 1/31/2025  7:41 PM        Discharge Medication List as of 1/31/2025  7:41 PM          =================================================================    HPI    Patient information was obtained from: Patient    Use of : N/A      Warren Jaramillo is a 44 year old male with a pertinent history of Rojas's disease, HTN, DM2, COPD, ascites, CHF, who presents to this ED for evaluation of abdominal distension and weight gain.    The patient reports he undergoes regularly scheduled paracentesis for ascites. His last paracentesis was on 1/23/25. He typically gets a call from IR every 7 days to schedule his next appointment, but he did not get a call from the yesterday. He states since his last paracentesis he has had 20 lbs of weight gain. He endorses some mild lower abdominal pain and some shortness of breath. He states he has been complaint with his diuretics. He has not had any fever, nausea, vomiting, or  "chills.     PHYSICAL EXAM    /58   Pulse 70   Temp 97  F (36.1  C) (Temporal)   Resp 20   Ht 1.651 m (5' 5\")   Wt 129.2 kg (284 lb 13.4 oz)   SpO2 97%   BMI 47.40 kg/m    Constitutional: Well-appearing, Nontoxic, Comfortable appearing.  Head: Normocephalic, atraumatic, mucous membranes moist, nose normal.   Neck: Supple, gross ROM intact.   Eyes: Pupils mid-range, sclera white.  Respiratory: Clear to auscultation bilaterally, no respiratory distress, no wheezing, speaks full sentences easily.  Cardiovascular: Normal heart rate, regular rhythm, no murmurs. No lower extremity edema.   GI: Distended with fluid wave, no tenderness to deep palpation in all quadrants.  Musculoskeletal: Moving all 4 extremities intentionally and without pain. No obvious deformity.  Skin: Warm, dry, no rash.  Neurologic: Alert & oriented x 3, speech clear, moving all extremities spontaneously   Psychiatric: Affect normal, cooperative.     LAB:  All pertinent labs reviewed and interpreted.  Results for orders placed or performed during the hospital encounter of 01/31/25   CBC (+ platelets, no diff)   Result Value Ref Range    WBC Count 14.6 (H) 4.0 - 11.0 10e3/uL    RBC Count 4.38 (L) 4.40 - 5.90 10e6/uL    Hemoglobin 12.2 (L) 13.3 - 17.7 g/dL    Hematocrit 38.3 (L) 40.0 - 53.0 %    MCV 87 78 - 100 fL    MCH 27.9 26.5 - 33.0 pg    MCHC 31.9 31.5 - 36.5 g/dL    RDW 18.1 (H) 10.0 - 15.0 %    Platelet Count 277 150 - 450 10e3/uL   Basic metabolic panel   Result Value Ref Range    Sodium 143 135 - 145 mmol/L    Potassium 4.5 3.4 - 5.3 mmol/L    Chloride 109 (H) 98 - 107 mmol/L    Carbon Dioxide (CO2) 24 22 - 29 mmol/L    Anion Gap 10 7 - 15 mmol/L    Urea Nitrogen 21.8 (H) 6.0 - 20.0 mg/dL    Creatinine 1.46 (H) 0.67 - 1.17 mg/dL    GFR Estimate 60 (L) >60 mL/min/1.73m2    Calcium 9.0 8.8 - 10.4 mg/dL    Glucose 140 (H) 70 - 99 mg/dL   Hepatic function panel   Result Value Ref Range    Protein Total 6.5 6.4 - 8.3 g/dL    Albumin 3.9 " 3.5 - 5.2 g/dL    Bilirubin Total 0.4 <=1.2 mg/dL    Alkaline Phosphatase 244 (H) 40 - 150 U/L    AST 18 0 - 45 U/L    ALT 22 0 - 70 U/L    Bilirubin Direct <0.20 0.00 - 0.30 mg/dL       RADIOLOGY:  Reviewed all pertinent imaging. Please see official radiology report.  No orders to display           Jamie Vicente MD  [facility] EMERGENCY DEPARTMENT  [facility] [facility] [id number] [phone]   =================================================================    BILLING:  Data  Category 1  Non-ED record review, if applicable. External record reviewed: N/A     Clinical information was obtained from an independent historian. History was obtained from: Patient     The following testing was considered but ultimately not selected after discussion with patient/family: N/A     Category 2  My independent interpretation of EKG, rhythm strip, radiology study: N/A     Category 3  Discussion of management with other physician/healthcare provider/other source:  Spoke to the ultrasound tech who stated the patient did not show up for his therapeutic paracentesis today.        Risk  Prescription medication was considered, but ultimately not given after discussion with patient/family: N/A     Chronic conditions affecting care:  Cirrhosis and ascites     Care significantly affected by Social Determinants of Health: N/A     Consideration of Admission/Observation: na      I, Collin Conway, am serving as a scribe to document services personally performed by Jamie Vicente MD based on my observation and the provider's statements to me. I, Jamie Vicente MD, attest that Collin Conway is acting in a scribe capacity, has observed my performance of the services and has documented them in accordance with my direction.     Jamie Vicetne MD  02/03/25 4652

## 2025-02-02 ENCOUNTER — HOSPITAL ENCOUNTER (EMERGENCY)
Facility: HOSPITAL | Age: 45
Discharge: HOME | End: 2025-02-02
Admitting: PHYSICIAN ASSISTANT
Payer: COMMERCIAL

## 2025-02-02 ENCOUNTER — APPOINTMENT (OUTPATIENT)
Dept: ULTRASOUND IMAGING | Facility: HOSPITAL | Age: 45
End: 2025-02-02
Attending: EMERGENCY MEDICINE
Payer: COMMERCIAL

## 2025-02-02 ENCOUNTER — NURSE TRIAGE (OUTPATIENT)
Dept: NURSING | Facility: CLINIC | Age: 45
End: 2025-02-02
Payer: COMMERCIAL

## 2025-02-02 ENCOUNTER — HOSPITAL ENCOUNTER (EMERGENCY)
Facility: HOSPITAL | Age: 45
Discharge: NURSING, REHAB OR LONG-TERM CARE | End: 2025-02-02
Attending: EMERGENCY MEDICINE | Admitting: EMERGENCY MEDICINE
Payer: COMMERCIAL

## 2025-02-02 VITALS
RESPIRATION RATE: 12 BRPM | OXYGEN SATURATION: 95 % | BODY MASS INDEX: 48.12 KG/M2 | HEART RATE: 75 BPM | SYSTOLIC BLOOD PRESSURE: 125 MMHG | HEIGHT: 65 IN | WEIGHT: 288.8 LBS | TEMPERATURE: 97.4 F | DIASTOLIC BLOOD PRESSURE: 61 MMHG

## 2025-02-02 VITALS
WEIGHT: 290.6 LBS | SYSTOLIC BLOOD PRESSURE: 144 MMHG | BODY MASS INDEX: 48.42 KG/M2 | DIASTOLIC BLOOD PRESSURE: 60 MMHG | TEMPERATURE: 98.1 F | HEIGHT: 65 IN | OXYGEN SATURATION: 94 % | RESPIRATION RATE: 18 BRPM | HEART RATE: 88 BPM

## 2025-02-02 DIAGNOSIS — Z98.890 S/P ABDOMINAL PARACENTESIS: ICD-10-CM

## 2025-02-02 DIAGNOSIS — R18.8 OTHER ASCITES: ICD-10-CM

## 2025-02-02 DIAGNOSIS — K74.60 CIRRHOSIS OF LIVER WITHOUT ASCITES, UNSPECIFIED HEPATIC CIRRHOSIS TYPE (H): Primary | ICD-10-CM

## 2025-02-02 PROCEDURE — 99284 EMERGENCY DEPT VISIT MOD MDM: CPT | Mod: 25

## 2025-02-02 PROCEDURE — 99282 EMERGENCY DEPT VISIT SF MDM: CPT

## 2025-02-02 PROCEDURE — 272N000710 US PARACENTESIS WITHOUT ALBUMIN

## 2025-02-02 ASSESSMENT — COLUMBIA-SUICIDE SEVERITY RATING SCALE - C-SSRS

## 2025-02-02 ASSESSMENT — ACTIVITIES OF DAILY LIVING (ADL)
ADLS_ACUITY_SCORE: 56

## 2025-02-02 NOTE — ED PROVIDER NOTES
EMERGENCY DEPARTMENT ENCOUNTER      NAME: Warren Jaramillo  AGE: 44 year old male  YOB: 1980  MRN: [id number]  EVALUATION DATE & TIME: 2/2/2025  7:56 AM    [facility]    ED PROVIDER: Shanda Soriano M.D.      Chief Complaint   Patient presents with    here for a paracentesis         FINAL IMPRESSION:  1. Cirrhosis of liver without ascites, unspecified hepatic cirrhosis type (H)    2. Other ascites          ED COURSE & MEDICAL DECISION MAKING:    ED Course as of 02/02/25 0951   Sun Feb 02, 2025   0815 Pt with again continuation of abdominal distention without fevers or pain to suggest SBP and notes he can't wait for outpatient paracentesis overall, does not actually have scheduled paracentesis for tomorrow per him and when I reviewed paracentesis schedule no clearly scheduled upcoming procedures apparent. US paracentesis ordered and I called US tech who notes they can have him on the schedule for this morning, charge RN updated   0950 Pt back from paracentesis, fluid removed, patient feels well, eager for discharge. Patient discharged after being provided with extensive anticipatory guidance and given return precautions, importance of PMD follow-up emphasized.        Pertinent Labs & Imaging studies reviewed. (See chart for details)    Medical Decision Making  Obtained supplemental history:Supplemental history obtained?: No  Reviewed external records: External records reviewed?: Documented in chart  Care impacted by chronic illness:Documented in Chart  Did you consider but not order tests?: Work up considered but not performed and documented in chart, if applicable  Did you interpret images independently?: Independent interpretation of ECG and images noted in documentation, when applicable.  Consultation discussion with other provider:Did you involve another provider (consultant, , pharmacy, etc.)?: No  Discharge. No recommendations on prescription strength  "medication(s). See documentation for any additional details.    MIPS: Not Applicable      At the conclusion of the encounter I discussed the results of all of the tests and the disposition. The questions were answered. The patient or family acknowledged understanding and was agreeable with the care plan.     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    Per my chart review, patient was seen yesterday 2/1/2025 for paracentesis. He typically goes to outpatient scheduled paracentesis, missed an appointment 2 days ago, came to the ED on a weekend evening after unable to reschedule, no abdominal pain, discharged to follow up with regularly scheduled paracentesis as an outpatient Monday given no acute pathology apparent on HPI, ROS and to return during daytime hours for paracentesis if he feels he cannot wait.     He reports he came back to the ER this morning because the overall level of abdominal distention is uncomfortable, he didn't know he had an appointment Friday and that it was missed until \"after the fact\". He doesn't have an appointment to his knowledge for tomorrow (Monday) paracentesis, \"normally radiology calls me\" when appointments are upcoming, but he feels as though his abdomen is distended to a degree that is fully unmanageable for him and limits his ability to ambulate comfortably and without difficulty breathing deeply because of how much his abdomen is distended atop his obesity.      No new abdominal pain or fevers, no nausea/vomiting, no blood in stool or black " tarry stool, he does not feel confused, no jaundice, no alcohol abuse.     REVIEW OF SYSTEMS   All other systems reviewed and are negative except as noted above in HPI.    PAST MEDICAL HISTORY:  Past Medical History:   Diagnosis Date    COPD exacerbation (H) 12/2/2024    DM2 (diabetes mellitus, type 2) (H) 4/28/2020    HTN (hypertension) 7/30/2012    Thyroid nodule 7/31/2019    Rojas's disease (H)        PAST SURGICAL HISTORY:  Past Surgical History:   Procedure Laterality Date    COLONOSCOPY      ESOPHAGOSCOPY, GASTROSCOPY, DUODENOSCOPY (EGD), COMBINED N/A 7/21/2023    Procedure: ESOPHAGOGASTRODUODENOSCOPY WITH GASTRIC AND ESOPHAGEAL BIOPSIES;  Surgeon: Filiberto Aragon MD;  [place] [facility] [place]    TOOTH EXTRACTION         CURRENT MEDICATIONS:    albuterol (PROAIR HFA/PROVENTIL HFA/VENTOLIN HFA) 108 (90 Base) MCG/ACT inhaler  albuterol (PROVENTIL) (2.5 MG/3ML) 0.083% neb solution  aloe vera GEL  apixaban ANTICOAGULANT (ELIQUIS) 5 MG tablet  bacitracin 500 UNIT/GM OINT  Benzocaine (SOLARCAINE ALOE VERA EX)  benzonatate (TESSALON) 200 MG capsule  Calamine external lotion  carbamide peroxide (DEBROX) 6.5 % otic solution  ciprofloxacin (CIPRO) 500 MG tablet  clotrimazole (LOTRIMIN) 1 % external cream  dextromethorphan-guaiFENesin (MUCINEX DM)  MG 12 hr tablet  diclofenac (VOLTAREN) 1 % topical gel  dicyclomine (BENTYL) 20 MG tablet  EPINEPHrine (ANY BX GENERIC EQUIV) 0.3 MG/0.3ML injection 2-pack  famotidine (PEPCID) 20 MG tablet  FLUoxetine 20 MG tablet  furosemide (LASIX) 40 MG tablet  gabapentin (NEURONTIN) 300 MG capsule  GAVILAX 17 GM/SCOOP powder  hydrocortisone (CORTAID) 1 % external cream  Hydrocortisone (PREPARATION H EX)  levothyroxine (SYNTHROID/LEVOTHROID) 25 MCG tablet  loperamide (IMODIUM) 2 MG capsule  loratadine (CLARITIN) 10 MG tablet  magnesium hydroxide (MILK OF MAGNESIA) 400 MG/5ML suspension  metFORMIN (GLUCOPHAGE) 1000 MG tablet  midodrine (PROAMATINE) 5 MG tablet  montelukast  (SINGULAIR) 10 MG tablet  naproxen (NAPROSYN) 500 MG tablet  nicotine (NICODERM CQ) 21 MG/24HR 24 hr patch  OLANZapine (ZYPREXA) 10 MG tablet  omeprazole (PRILOSEC) 40 MG DR capsule  ondansetron (ZOFRAN ODT) 4 MG ODT tab  pramoxine-calamine (AVEENO) 1-8 % LOTN  rosuvastatin (CRESTOR) 10 MG tablet  spironolactone (ALDACTONE) 100 MG tablet  sucralfate (CARAFATE) 1 GM/10ML suspension  traZODone (DESYREL) 100 MG tablet  TRELEGY ELLIPTA 100-62.5-25 MCG/ACT oral inhaler  Urea 40 % CREA  Vitamins A & D (VITAMIN A & D) OINT  witch hazel-glycerin (TUCKS) pad        ALLERGIES:  Allergies   Allergen Reactions    Apricot Flavoring Agent (Non-Screening) Anaphylaxis    Banana Anaphylaxis     Throat swelling  Throat swelling      Wasp Venom Protein Shortness Of Breath     Other reaction(s): Respiratory Distress  Has an epi pen  Has an epi pen      Bees Anaphylaxis     Have an Epi pen that carries with    Methylphenidate Itching     Other reaction(s): Nightmares    Prunus      Other reaction(s): *Unknown    Sulfa Antibiotics      Headaches and nausea       FAMILY HISTORY:  Family History   Problem Relation Age of Onset    Unknown/Adopted Father     Unknown/Adopted Maternal Grandmother     C.A.D. Maternal Grandfather     Diabetes Maternal Grandfather     Cerebrovascular Disease Maternal Grandfather     Unknown/Adopted Paternal Grandmother     Unknown/Adopted Paternal Grandfather     Unknown/Adopted Brother     Unknown/Adopted Sister        SOCIAL HISTORY:   Social History     Socioeconomic History    Marital status: Single   Tobacco Use    Smoking status: Every Day     Current packs/day: 1.00     Types: Cigarettes    Smokeless tobacco: Never   Vaping Use    Vaping status: Never Used   Substance and Sexual Activity    Alcohol use: No     Comment: once every 3 months    Drug use: No    Sexual activity: Never     Partners: Female   Other Topics Concern     Service No    Blood Transfusions No    Caffeine Concern No     Occupational Exposure No    Hobby Hazards No    Sleep Concern No    Stress Concern Yes     Comment: sometimes    Weight Concern No    Special Diet Yes     Comment: counting carbs    Back Care No    Exercise Yes    Seat Belt Yes    Self-Exams Yes     Social Drivers of Health     Financial Resource Strain: Low Risk  (1/11/2025)    Financial Resource Strain     Within the past 12 months, have you or your family members you live with been unable to get utilities (heat, electricity) when it was really needed?: No   Food Insecurity: Low Risk  (1/11/2025)    Food Insecurity     Within the past 12 months, did you worry that your food would run out before you got money to buy more?: No     Within the past 12 months, did the food you bought just not last and you didn t have money to get more?: No   Transportation Needs: Low Risk  (1/11/2025)    Transportation Needs     Within the past 12 months, has lack of transportation kept you from medical appointments, getting your medicines, non-medical meetings or appointments, work, or from getting things that you need?: No    Received from [facility], [facility]    Social Connections   Interpersonal Safety: High Risk (1/11/2025)    Interpersonal Safety     Do you feel physically and emotionally safe where you currently live?: No     Within the past 12 months, have you been hit, slapped, kicked or otherwise physically hurt by someone?: No     Within the past 12 months, have you been humiliated or emotionally abused in other ways by your partner or ex-partner?: No   Housing Stability: Low Risk  (1/11/2025)    Housing Stability     Do you have housing? : Yes     Are you worried about losing your housing?: No   Recent Concern: Housing Stability - High Risk (12/2/2024)    Housing Stability     Do you have housing? : No     Are you worried about losing your housing?: No       VITALS:  Patient Vitals for the past 24 hrs:    "BP Temp Temp src Pulse Resp SpO2 Height Weight   02/02/25 0753 132/58 97.4  F (36.3  C) Tympanic 101 12 96 % 1.651 m (5' 5\") 131 kg (288 lb 12.8 oz)       PHYSICAL EXAM    GENERAL: Awake, alert.  In no acute distress.   HEENT: Normocephalic, atraumatic.  Pupils equal, round and reactive.  Conjunctiva normal.  EOMI.  NECK: No stridor or apparent deformity.  PULMONARY: Symmetrical breath sounds without distress.  Lungs clear to auscultation bilaterally without wheezes, rhonchi or rales.  CARDIO: Regular rate and rhythm.  No significant murmur, rub or gallop.  Radial pulses strong and symmetrical.  ABDOMINAL: Abdomen soft but very distended and non-tender to palpation.  No CVAT, no palpable hepatosplenomegaly.  EXTREMITIES: No lower extremity swelling or edema.    NEURO: Alert and oriented to person, place and time.  Cranial nerves grossly intact.  No focal motor deficit.  PSYCH: Normal mood and affect  SKIN: No rashes         Shanda Soriano MD  02/02/25 0951    "

## 2025-02-02 NOTE — CONFIDENTIAL NOTE
"Nurse Triage SBAR    Is this a 2nd Level Triage? YES, LICENSED PRACTITIONER REVIEW IS REQUIRED    Situation: Call from patient    Came in this 8-9 AM and had a paracenteses on and a band-aid was used to cover the puncture. Patient reports he has been leaking like \"crazy\"    BP: 117/62;     No pain; continuous leakage; about 8 oz of fluid output per patient's estimation    Background: ascitis    Assessment:  needs evaluation for large amount of leakage    Protocol Recommended Disposition:   Caller was informed of care advice. Patient should be evaluated per RN protocol: go to ED. Caller verbalized understanding.      Connor Bentley RN, BSN  Triage Nurse Advisor    "

## 2025-02-02 NOTE — ED TRIAGE NOTES
Amb to triage.  Pt very SOB upon arrival to ED.  After sitting for couple of minutes able to speak full sentences.  Pt states has  been gaining wt and due to have paracentesis.  Exertional SOB.  Abd distended.       Triage Assessment (Adult)       Row Name 02/01/25 1568          Triage Assessment    Airway WDL WDL        Respiratory WDL    Respiratory WDL X;all     Rhythm/Pattern, Respiratory shortness of breath;tachypneic;dyspnea upon exertion        Skin Circulation/Temperature WDL    Skin Circulation/Temperature WDL WDL        Cardiac WDL    Cardiac WDL WDL        Peripheral/Neurovascular WDL    Peripheral Neurovascular WDL WDL        Cognitive/Neuro/Behavioral WDL    Cognitive/Neuro/Behavioral WDL WDL        Onia Coma Scale    Best Eye Response 4-->(E4) spontaneous     Best Motor Response 6-->(M6) obeys commands     Best Verbal Response 5-->(V5) oriented     Onia Coma Scale Score 15                      Maintain cardiac health Do NOT STOP Asprin or Plavix unless instructed by Cardiologist ONLY  any change in condition seek medical help   continue  healthy lifestyle including diet exercise medications and follow up  Restricted use with no heavy lifting of affected arm for 48 hours.  No submerging the arm in water for 48 hours.  You may start showering today.  Call your doctor for any bleeding, swelling, loss of sensation in the hand or fingers, or fingers turning blue.  If heavy bleeding or large lumps form, hold pressure at the spot and come to the Emergency Room.

## 2025-02-02 NOTE — ED PROVIDER NOTES
Emergency Department Encounter     Evaluation Date & Time:   No admission date for patient encounter.    CHIEF COMPLAINT:  Abdominal Pain and Shortness of Breath      Triage Note:Amb to triage.  Pt very SOB upon arrival to ED.  After sitting for couple of minutes able to speak full sentences.  Pt states has  been gaining wt and due to have paracentesis.  Exertional SOB.  Abd distended.          ED COURSE & MEDICAL DECISION MAKING:     Pt with chronic ascites, morbidly obese, here for paracentesis.  Pt has regular paracentesis performed as outpatient, missed appointment on Friday, was seen in ED that evening and told to call number today to get it done.  He states no one answered.  Pt worried about worsening distension of abdomen.  He's afebrile, vitally well with entirely benign abdomen.  No indication for emergent therapeutic paracentesis in ED. No need for labs or diagnostic paracentesis.  I discussed with him getting his regular paracentesis as outpatient Monday - again given number to call.  If he feels he cannot wait, he should return to the ED during day time hours in the morning when we have better resources here.  Pt advised on keeping a calendar of when he's due for paracentesis and confirming appointments ahead of time so he doesn't miss in the future.  He understands this and is agreeable.      ED Course as of 02/01/25 2031   Sat Feb 01, 2025   1936 I met with the patient, obtained history, performed an initial exam, and discussed options and plan for diagnostics and treatment here in the ED. Discussed plan for discharged to home.         Medical Decision Making    History:  Supplemental history from: Documented in chart  External Record(s) reviewed: Inpatient Record: ED visit to [facility] ED for concern for ascites, abdominal distention and weight gain on 1/31/2025    Work Up:  Chart documentation includes differential considered and any EKGs or imaging independently interpreted by provider, where  "specified.  In additional to work up documented, I considered the following work up: Documented in chart, if applicable.    External consultation:  Discussion of management with another provider: Documented in chart, if applicable    Complicating factors:  Care impacted by chronic illness: Documented in Chart  Care affected by social determinants of health: Access to Affordable Health Care    Disposition considerations: Discharge. No recommendations on prescription strength medication(s). See documentation for any additional details.    Not Applicable     At the conclusion of the encounter I discussed the results of all the tests and the disposition. The questions were answered. The patient or family acknowledged understanding and was agreeable with the care plan.      MEDICATIONS GIVEN IN THE EMERGENCY DEPARTMENT:  Medications - No data to display    NEW PRESCRIPTIONS STARTED AT TODAY'S ED VISIT:  Discharge Medication List as of 2/1/2025  7:50 PM          HPI   HPI     Warren Jaramillo is a 44 year old male with a pertinent history of HTN, DM2, COPD, ascites, CHF and Rojas's disease, who presents to this ED via walk-in for evaluation of abdominal distension and ascites.    Per chart review, the patient presented to [facility] ED for concern for ascites, abdominal distention and weight gain on 1/31/2025. It was noted \"the patient reports he undergoes regularly scheduled paracentesis for ascites. His last paracentesis was on 1/23/25. He typically gets a call from IR every 7 days to schedule his next appointment, but he did not get a call from the yesterday. He states since his last paracentesis he has had 20 lbs of weight gain. He endorses some mild lower abdominal pain and some shortness of breath. He states he has been complaint with his diuretics. He has not had any fever, nausea, vomiting, or chills.\" It was reported there was low suspicion for SBP. It was noted he had a scheduled therapeutic appointment " "at 3 PM on 1/31/25, but the patient stated he didn't make this appointment. Provider attempted to reach out to IR, but there was a \"no show at 3 PM\" on 1/31/25. Patient was agreeable with plan to be discharged to home and to follow-up as an outpatient.    Patient presented to the ED yesterday after he missed his appointment to undergo a paracentesis for ascites that he states that he was not made aware he had this appointment scheduled. States he usually receives a call from IR to schedule his next paracentesis appointment because he undergoes a paracentesis every 7 days, however, he indicates that he never received a call from IR. He also states that he didn't see a scheduled appointment on his MyChart, which is typically where he looks to see if he has an appointment.    He comes in today because he feels like he continues to gain weight and retain fluid. States he noticed increased fluid build up in his lower abdomen when he stands up. Reports some increased dyspnea when walking due to weight gain.  No focal abdominal pain, fevers, vomiting or bowel/bladder changes with no recent bloody/black stools.    He attempted to call the number he was given yesterday when he presented to the ED in order to make an appointment today at 7 AM this morning (2/1/25), but he was told that they were only open from Mondays to Fridays, from 8 AM to 4:30 PM, so he wasn't able to schedule an appointment. This prompted him to come in to the ED and notes he would like to see if he could receive a paracentesis in the ED today.    REVIEW OF SYSTEMS:  See HPI      Medical History     Past Medical History:   Diagnosis Date    COPD exacerbation (H) 12/2/2024    DM2 (diabetes mellitus, type 2) (H) 4/28/2020    HTN (hypertension) 7/30/2012    Thyroid nodule 7/31/2019    Rojas's disease (H)        Past Surgical History:   Procedure Laterality Date    COLONOSCOPY      ESOPHAGOSCOPY, GASTROSCOPY, DUODENOSCOPY (EGD), COMBINED N/A 7/21/2023    " Procedure: ESOPHAGOGASTRODUODENOSCOPY WITH GASTRIC AND ESOPHAGEAL BIOPSIES;  Surgeon: Filiberto Aragon MD;  Location: [facility] [place] OR    TOOTH EXTRACTION         Family History   Problem Relation Age of Onset    Unknown/Adopted Father     Unknown/Adopted Maternal Grandmother     C.A.D. Maternal Grandfather     Diabetes Maternal Grandfather     Cerebrovascular Disease Maternal Grandfather     Unknown/Adopted Paternal Grandmother     Unknown/Adopted Paternal Grandfather     Unknown/Adopted Brother     Unknown/Adopted Sister        Social History     Tobacco Use    Smoking status: Every Day     Current packs/day: 1.00     Types: Cigarettes    Smokeless tobacco: Never   Vaping Use    Vaping status: Never Used   Substance Use Topics    Alcohol use: No     Comment: once every 3 months    Drug use: No       albuterol (PROAIR HFA/PROVENTIL HFA/VENTOLIN HFA) 108 (90 Base) MCG/ACT inhaler  albuterol (PROVENTIL) (2.5 MG/3ML) 0.083% neb solution  aloe vera GEL  apixaban ANTICOAGULANT (ELIQUIS) 5 MG tablet  bacitracin 500 UNIT/GM OINT  Benzocaine (SOLARCAINE ALOE VERA EX)  benzonatate (TESSALON) 200 MG capsule  Calamine external lotion  carbamide peroxide (DEBROX) 6.5 % otic solution  ciprofloxacin (CIPRO) 500 MG tablet  clotrimazole (LOTRIMIN) 1 % external cream  dextromethorphan-guaiFENesin (MUCINEX DM)  MG 12 hr tablet  diclofenac (VOLTAREN) 1 % topical gel  dicyclomine (BENTYL) 20 MG tablet  EPINEPHrine (ANY BX GENERIC EQUIV) 0.3 MG/0.3ML injection 2-pack  famotidine (PEPCID) 20 MG tablet  FLUoxetine 20 MG tablet  furosemide (LASIX) 40 MG tablet  gabapentin (NEURONTIN) 300 MG capsule  GAVILAX 17 GM/SCOOP powder  hydrocortisone (CORTAID) 1 % external cream  Hydrocortisone (PREPARATION H EX)  levothyroxine (SYNTHROID/LEVOTHROID) 25 MCG tablet  loperamide (IMODIUM) 2 MG capsule  loratadine (CLARITIN) 10 MG tablet  magnesium hydroxide (MILK OF MAGNESIA) 400 MG/5ML suspension  metFORMIN (GLUCOPHAGE) 1000 MG  "tablet  methocarbamol (ROBAXIN) 500 MG tablet  midodrine (PROAMATINE) 5 MG tablet  montelukast (SINGULAIR) 10 MG tablet  naproxen (NAPROSYN) 500 MG tablet  nicotine (NICODERM CQ) 21 MG/24HR 24 hr patch  OLANZapine (ZYPREXA) 10 MG tablet  omeprazole (PRILOSEC) 40 MG DR capsule  ondansetron (ZOFRAN ODT) 4 MG ODT tab  pramoxine-calamine (AVEENO) 1-8 % LOTN  rosuvastatin (CRESTOR) 10 MG tablet  spironolactone (ALDACTONE) 100 MG tablet  sucralfate (CARAFATE) 1 GM/10ML suspension  traZODone (DESYREL) 100 MG tablet  TRELEGY ELLIPTA 100-62.5-25 MCG/ACT oral inhaler  Urea 40 % CREA  Vitamins A & D (VITAMIN A & D) OINT  witch hazel-glycerin (TUCKS) pad        Physical Exam     Vitals:  /79   Pulse 84   Temp 98.1  F (36.7  C) (Oral)   Resp 24   Ht 1.651 m (5' 5\")   Wt 130.6 kg (287 lb 14.4 oz)   SpO2 96%   BMI 47.91 kg/m      PHYSICAL EXAM:   Physical Exam  Vitals and nursing note reviewed.   Constitutional:       General: He is not in acute distress.     Appearance: Normal appearance.   HENT:      Head: Normocephalic and atraumatic.      Nose: Nose normal.      Mouth/Throat:      Mouth: Mucous membranes are moist.   Eyes:      Extraocular Movements: Extraocular movements intact.   Neck:      Vascular: No JVD.   Cardiovascular:      Rate and Rhythm: Normal rate and regular rhythm.      Pulses: Normal pulses.           Radial pulses are 2+ on the right side and 2+ on the left side.        Dorsalis pedis pulses are 2+ on the right side and 2+ on the left side.   Pulmonary:      Effort: Pulmonary effort is normal.   Abdominal:      General: There is distension.      Tenderness: There is no abdominal tenderness.      Comments: Abdomen obese. Non-tender.   Skin:     Findings: No rash.   Neurological:      General: No focal deficit present.      Mental Status: He is alert. Mental status is at baseline.      Comments: Fluent speech   Psychiatric:         Mood and Affect: Mood normal.         Behavior: Behavior normal. "           Results     LAB:  All pertinent labs reviewed and interpreted  Labs Ordered and Resulted from Time of ED Arrival to Time of ED Departure - No data to display    RADIOLOGY:  No orders to display                      FINAL IMPRESSION:    ICD-10-CM    1. Other ascites  R18.8           0 minutes of critical care time      I, Tressa Urbano, am serving as a scribe to document services personally performed by Dr. Fortino Chery, based on my observations and the provider's statements to me. I, Fortino Chery, DO attest that Tressa Urbano is acting in a scribe capacity, has observed my performance of the services and has documented them in accordance with my direction.      Fortino Chery DO  Emergency Medicine  [facility] EMERGENCY DEPARTMENT  2/1/2025  7:42 PM         Fortino Chery MD  02/01/25 2031

## 2025-02-02 NOTE — ED NOTES
"Patient stating he \"feels much better and is ready for discharge\" requesting cab home as only 1 group home staff member is working and unable to leave residents.   "

## 2025-02-02 NOTE — ED TRIAGE NOTES
"Pt arrives to triage ambulatory from home.    Pt story: Pt had paracentesis earlier today. Returns this evening with a leaking paracentesis access site. Bandage applied in triage.    BP (!) 147/57   Pulse 90   Temp 98.1  F (36.7  C) (Oral)   Resp 18   Ht 1.651 m (5' 5\")   Wt 131.8 kg (290 lb 9.6 oz)   SpO2 95%   BMI 48.36 kg/m      Pt oriented to department, standard department flow, and updated on immediate plan of care. Questions answered.         "

## 2025-02-02 NOTE — ED TRIAGE NOTES
Pt was here yesterday. He was sent home and told to come back today so he could have a paracentesis. Ultrasound did not have pt on the schedule so pt is will come through the ER.

## 2025-02-02 NOTE — DISCHARGE INSTRUCTIONS
Call interventional radiology Monday morning to get paracentesis appointment that day.  Otherwise, if you cannot wait, return to the ER in the morning for better services available to get this done.  Return for fevers or other new concerns.

## 2025-02-03 ENCOUNTER — NURSE TRIAGE (OUTPATIENT)
Dept: NURSING | Facility: CLINIC | Age: 45
End: 2025-02-03

## 2025-02-03 ENCOUNTER — HOSPITAL ENCOUNTER (EMERGENCY)
Facility: HOSPITAL | Age: 45
Discharge: HOME | End: 2025-02-03
Attending: EMERGENCY MEDICINE | Admitting: EMERGENCY MEDICINE
Payer: COMMERCIAL

## 2025-02-03 VITALS
SYSTOLIC BLOOD PRESSURE: 117 MMHG | RESPIRATION RATE: 20 BRPM | DIASTOLIC BLOOD PRESSURE: 56 MMHG | OXYGEN SATURATION: 96 % | HEART RATE: 80 BPM | TEMPERATURE: 97.6 F | HEIGHT: 65 IN | WEIGHT: 287 LBS | BODY MASS INDEX: 47.82 KG/M2

## 2025-02-03 DIAGNOSIS — K74.60 CIRRHOSIS OF LIVER WITH ASCITES, UNSPECIFIED HEPATIC CIRRHOSIS TYPE (H): ICD-10-CM

## 2025-02-03 DIAGNOSIS — R18.8 CIRRHOSIS OF LIVER WITH ASCITES, UNSPECIFIED HEPATIC CIRRHOSIS TYPE (H): ICD-10-CM

## 2025-02-03 DIAGNOSIS — R60.9 PITTING EDEMA: ICD-10-CM

## 2025-02-03 LAB
ALBUMIN SERPL BCG-MCNC: 3.8 G/DL (ref 3.5–5.2)
ALP SERPL-CCNC: 235 U/L (ref 40–150)
ALT SERPL W P-5'-P-CCNC: 18 U/L (ref 0–70)
ANION GAP SERPL CALCULATED.3IONS-SCNC: 13 MMOL/L (ref 7–15)
AST SERPL W P-5'-P-CCNC: 16 U/L (ref 0–45)
BASOPHILS # BLD AUTO: 0.1 10E3/UL (ref 0–0.2)
BASOPHILS NFR BLD AUTO: 1 %
BILIRUB DIRECT SERPL-MCNC: <0.2 MG/DL (ref 0–0.3)
BILIRUB SERPL-MCNC: 0.3 MG/DL
BUN SERPL-MCNC: 24.5 MG/DL (ref 6–20)
CALCIUM SERPL-MCNC: 9.8 MG/DL (ref 8.8–10.4)
CHLORIDE SERPL-SCNC: 105 MMOL/L (ref 98–107)
CREAT SERPL-MCNC: 1.13 MG/DL (ref 0.67–1.17)
EGFRCR SERPLBLD CKD-EPI 2021: 82 ML/MIN/1.73M2
EOSINOPHIL # BLD AUTO: 0.5 10E3/UL (ref 0–0.7)
EOSINOPHIL NFR BLD AUTO: 3 %
ERYTHROCYTE [DISTWIDTH] IN BLOOD BY AUTOMATED COUNT: 18 % (ref 10–15)
GLUCOSE SERPL-MCNC: 108 MG/DL (ref 70–99)
HCO3 SERPL-SCNC: 23 MMOL/L (ref 22–29)
HCT VFR BLD AUTO: 40.8 % (ref 40–53)
HGB BLD-MCNC: 12.8 G/DL (ref 13.3–17.7)
IMM GRANULOCYTES # BLD: 0.1 10E3/UL
IMM GRANULOCYTES NFR BLD: 1 %
LYMPHOCYTES # BLD AUTO: 3 10E3/UL (ref 0.8–5.3)
LYMPHOCYTES NFR BLD AUTO: 20 %
MCH RBC QN AUTO: 27.4 PG (ref 26.5–33)
MCHC RBC AUTO-ENTMCNC: 31.4 G/DL (ref 31.5–36.5)
MCV RBC AUTO: 87 FL (ref 78–100)
MONOCYTES # BLD AUTO: 1.4 10E3/UL (ref 0–1.3)
MONOCYTES NFR BLD AUTO: 9 %
NEUTROPHILS # BLD AUTO: 9.8 10E3/UL (ref 1.6–8.3)
NEUTROPHILS NFR BLD AUTO: 66 %
NRBC # BLD AUTO: 0 10E3/UL
NRBC BLD AUTO-RTO: 0 /100
NT-PROBNP SERPL-MCNC: 924 PG/ML (ref 0–450)
PLATELET # BLD AUTO: 321 10E3/UL (ref 150–450)
POTASSIUM SERPL-SCNC: 4.6 MMOL/L (ref 3.4–5.3)
PROT SERPL-MCNC: 6.5 G/DL (ref 6.4–8.3)
RBC # BLD AUTO: 4.68 10E6/UL (ref 4.4–5.9)
SODIUM SERPL-SCNC: 141 MMOL/L (ref 135–145)
WBC # BLD AUTO: 14.9 10E3/UL (ref 4–11)

## 2025-02-03 PROCEDURE — 82565 ASSAY OF CREATININE: CPT

## 2025-02-03 PROCEDURE — 83880 ASSAY OF NATRIURETIC PEPTIDE: CPT

## 2025-02-03 PROCEDURE — 36415 COLL VENOUS BLD VENIPUNCTURE: CPT

## 2025-02-03 PROCEDURE — 85041 AUTOMATED RBC COUNT: CPT

## 2025-02-03 PROCEDURE — 99283 EMERGENCY DEPT VISIT LOW MDM: CPT

## 2025-02-03 PROCEDURE — 85004 AUTOMATED DIFF WBC COUNT: CPT

## 2025-02-03 PROCEDURE — 80048 BASIC METABOLIC PNL TOTAL CA: CPT

## 2025-02-03 PROCEDURE — 84155 ASSAY OF PROTEIN SERUM: CPT

## 2025-02-03 ASSESSMENT — ACTIVITIES OF DAILY LIVING (ADL): ADLS_ACUITY_SCORE: 56

## 2025-02-03 NOTE — ED PROVIDER NOTES
Emergency Department Encounter   NAME: Warren Jaramillo  AGE: 44 year old male   YOB: 1980 ;   MRN: [id number] ;    ED PROVIDER: Mary Patiño PA-C    PCP: [facility]    Evaluation Date & Time:   2/3/25 1706    CHIEF COMPLAINT:  Leg Swelling      FINAL IMPRESSION:    ICD-10-CM    1. Cirrhosis of liver with ascites, unspecified hepatic cirrhosis type (H)  K74.60     R18.8       2. Pitting edema  R60.9             IMPRESSION AND PLAN   MDM: Warren Jaramillo is a 44 year old male with a pertinent history of CHF, type 2 diabetes, hypertension, COPD, DVT, liver cirrhosis with ascites and traumatic brain injury who presents to the ED by walking for evaluation of BL lower extremity swelling that he noticed this morning.    Vitals stable. On exam patient is well-appearing in no acute distress.  Cardiac and pulmonary exams unremarkable, no wheezing or evidence of respiratory distress.  Abdomen is significantly distended although no fluid wave or abdominal tenderness appreciated.  He does have a small procedure wound to the left lateral abdomen as he underwent an ultrasound-guided paracentesis yesterday.  He notes he continues to drain fluid from the site.  There is also 2+ pitting edema on bilateral lower extremities.  Distal pulses intact.  Given patient's history and several recent ED visits, symptomatology is most consistent with cirrhosis.  However, we will perform blood work to rule out renal dysfunction, CHF exacerbation. Low suspicion for appendicitis, diverticulitis, pancreatitis, cholecystitis given lack of abdominal pain. No wheezing to suggest acute COPD exacerbation.    CBC reveals leukocytosis at 14.9 however this appears to be patient's baseline over the last several weeks.  Hepatic function panel reveals an elevated alk phos likely as result of the liver failure, hepatic enzymes otherwise normal.  BMP without any RIVERA or acute electrolyte abnormality.  BNP  elevated at 924 however, low suspicion for CHF exacerbation.  Given lack of abdominal pain, I do not feel that abdominal imaging is necessary at this time.  Unfortunately, it is after 7 PM on a weekday and a do not think that patient would be able to get a paracentesis here in the ED given the time.  This was discussed with patient and he expresses understanding.  Additionally, I do not feel that patient meets criteria for admission as he is in stable condition for discharge.  I recommended patient to call [facility] first thing tomorrow morning to schedule an earlier paracentesis as his next scheduled procedure is on 2/10/2025.  In the meantime, I encouraged patient to continue his diuretics to help with the edema and start wearing compression stockings.  Patient is agreeable to this plan and feels comfortable discharge at this time.      Medical Decision Making  Obtained supplemental history:Supplemental history obtained?: Documented in chart and Caregiver  Reviewed external records: External records reviewed?: Documented in chart and Inpatient Record: Both emergency department visits from 02/02/2025  Care impacted by chronic illness:Documented in Chart, Chronic Lung Disease, Diabetes, Heart Disease, Hypertension, and Peripheral Vascular Disease  Care significantly affected by social determinants of health:N/A  Did you consider but not order tests?: Work up considered but not performed and documented in chart, if applicable  Did you interpret images independently?: Independent interpretation of ECG and images noted in documentation, when applicable.  Consultation discussion with other provider:Did you involve another provider (consultant, MH, pharmacy, etc.)?: I discussed the care with another health care provider, see documentation for details.  Discharge. No recommendations on prescription strength medication(s). See documentation for any additional details.    Not Applicable       ED COURSE:  6:01 PM I met and  "introduced myself to the patient. I gathered initial history and performed my physical exam. We discussed plan for initial workup.   6:17 PM I have staffed the patient with Dr. Pruett ED MD, who has evaluated the patient and agrees with all aspects of today's care.   7:17 PM I rechecked the patient and discussed results, discharge, follow up, and reasons to return to the ED.           MEDICATIONS GIVEN IN THE EMERGENCY DEPARTMENT:  Medications - No data to display      NEW PRESCRIPTIONS STARTED AT TODAY'S ED VISIT:  New Prescriptions    No medications on file         BRIEF HPI   Patient information was obtained from: patient and group home staff member   Use of Intrepreter: N/A    Warren Jaramillo is a 44 year old male with a pertinent history of CHF, type 2 diabetes, hypertension, COPD, DVT, and traumatic brain injury who presents to the ED by walking for evaluation of leg swelling.    Per patient and patients staff member, he awoke today (02/03/2025) with swollen ankles and lower legs. He notes that yesterday (02/02/2025) he had a paracentesis done and 9.6 pounds were drained. He has some brown spots on his hands/feet but he says his podiatrist says these are due to \"blood clots coming to the surface.\" Along side his ankle swelling he has been having some difficulties breathing and has gained six pounds since yesterday. 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 Also he has asthma and has an albuterol inhaler and neb which he uses as needed at home. He did note that he is on water pills which he has been taking as scheduled. He also specifically noted that he takes naproxen, famotide, and spirolactone. He has a paracentesis done here in radiology around every seven days. He has been smoking cigarettes since he was 16. At 24 he began to smoke around three packs a day but over the past year he " has cut down to one pack. Currently he lives in a group home.    He has had no chest pain, fever, or blood in his stool. He has no history of COPD (chart says he does). He did not mention taking anything for his symptoms today.    Chart review:  Per Chart Review, the patient was seen on 02/02/025 at [facility] Emergency Department for evaluation of abdominal distention/discomfort. A US paracentesis was ordered. After the paracentesis he felt well and was ready for discharge. He was advised to see his PCP for follow-up.     Per Chart Review, the patient was seen on 02/02/2025 at [facility] Emergency Department for evaluation of leaking after his paracentesis on the morning of this visit. He returned because he felt like he needed to get another one. US paracentesis was ordered and 3.75 L of clear fluid was removed. He was discharged and was encouraged to closely follow-up with his PCP.       REVIEW OF SYSTEMS:  Pertinent positive and negative symptoms per HPI.       MEDICAL HISTORY     Past Medical History:   Diagnosis Date    COPD exacerbation (H) 12/2/2024    DM2 (diabetes mellitus, type 2) (H) 4/28/2020    HTN (hypertension) 7/30/2012    Thyroid nodule 7/31/2019    Rojas's disease (H)        Past Surgical History:   Procedure Laterality Date    COLONOSCOPY      ESOPHAGOSCOPY, GASTROSCOPY, DUODENOSCOPY (EGD), COMBINED N/A 7/21/2023    Procedure: ESOPHAGOGASTRODUODENOSCOPY WITH GASTRIC AND ESOPHAGEAL BIOPSIES;  Surgeon: Filiberto Aragon MD;  Location: [facility] OR    TOOTH [facility]         Family History   Problem Relation Age of Onset    Unknown/Adopted Father     Unknown/Adopted Maternal Grandmother     C.A.D. Maternal Grandfather     Diabetes Maternal Grandfather     Cerebrovascular Disease Maternal Grandfather     Unknown/Adopted Paternal Grandmother     Unknown/Adopted Paternal Grandfather     Unknown/Adopted Brother     Unknown/Adopted Sister        Social History     Tobacco Use    Smoking status:  "Every Day     Current packs/day: 1.00     Types: Cigarettes    Smokeless tobacco: Never   Vaping Use    Vaping status: Never Used   Substance Use Topics    Alcohol use: No     Comment: once every 3 months    Drug use: No         PHYSICAL EXAM     First Vitals:  Patient Vitals for the past 24 hrs:   BP Temp Temp src Pulse Resp SpO2 Height Weight   02/03/25 1925 117/56 -- -- 80 -- 96 % -- --   02/03/25 1715 118/58 97.6  F (36.4  C) Temporal 78 20 96 % 1.651 m (5' 5\") 130.2 kg (287 lb)       PHYSICAL EXAM:  Physical Exam  Vitals and nursing note reviewed.   Constitutional:       General: He is not in acute distress.     Appearance: He is obese. He is not ill-appearing or toxic-appearing.   HENT:      Head: Normocephalic and atraumatic.      Mouth/Throat:      Mouth: Mucous membranes are moist.   Eyes:      General:         Right eye: No discharge.         Left eye: No discharge.      Conjunctiva/sclera: Conjunctivae normal.   Cardiovascular:      Rate and Rhythm: Normal rate and regular rhythm.      Heart sounds: Normal heart sounds.   Pulmonary:      Effort: Pulmonary effort is normal. No respiratory distress.      Breath sounds: Normal breath sounds. No wheezing or rhonchi.   Abdominal:      General: Abdomen is protuberant. Bowel sounds are normal. There is distension.      Palpations: There is no shifting dullness or fluid wave.      Tenderness: There is no abdominal tenderness. There is no guarding or rebound. Negative signs include Hernandez's sign and McBurney's sign.   Musculoskeletal:         General: Normal range of motion.      Cervical back: Neck supple.   Skin:     General: Skin is warm and dry.   Neurological:      Mental Status: He is alert and oriented to person, place, and time. Mental status is at baseline.   Psychiatric:         Mood and Affect: Mood normal.          RESULTS     LAB:  All pertinent labs reviewed and interpreted  Labs Ordered and Resulted from Time of ED Arrival to Time of ED Departure "   BASIC METABOLIC PANEL - Abnormal       Result Value    Sodium 141      Potassium 4.6      Chloride 105      Carbon Dioxide (CO2) 23      Anion Gap 13      Urea Nitrogen 24.5 (*)     Creatinine 1.13      GFR Estimate 82      Calcium 9.8      Glucose 108 (*)    HEPATIC FUNCTION PANEL - Abnormal    Protein Total 6.5      Albumin 3.8      Bilirubin Total 0.3      Alkaline Phosphatase 235 (*)     AST 16      ALT 18      Bilirubin Direct <0.20     NT PROBNP INPATIENT - Abnormal    N terminal Pro BNP Inpatient 924 (*)    CBC WITH PLATELETS AND DIFFERENTIAL - Abnormal    WBC Count 14.9 (*)     RBC Count 4.68      Hemoglobin 12.8 (*)     Hematocrit 40.8      MCV 87      MCH 27.4      MCHC 31.4 (*)     RDW 18.0 (*)     Platelet Count 321      % Neutrophils 66      % Lymphocytes 20      % Monocytes 9      % Eosinophils 3      % Basophils 1      % Immature Granulocytes 1      NRBCs per 100 WBC 0      Absolute Neutrophils 9.8 (*)     Absolute Lymphocytes 3.0      Absolute Monocytes 1.4 (*)     Absolute Eosinophils 0.5      Absolute Basophils 0.1      Absolute Immature Granulocytes 0.1      Absolute NRBCs 0.0         RADIOLOGY:  No orders to display           I, Claus Thakkar, am serving as a scribe to document services personally performed by Mary Patiño PA-C, based on my observation and the provider's statements to me. I, Mary Patiño PA-C attest that Claus Thakkar is acting in a scribe capacity, has observed my performance of the services and has documented them in accordance with my direction.       Mary Patiño PA-C  Emergency Medicine   [facility] EMERGENCY DEPARTMENT      Mary Patiño PA-C  02/03/25 1956

## 2025-02-03 NOTE — ED PROVIDER NOTES
EMERGENCY DEPARTMENT ENCOUNTER      NAME: Warren Jaramillo  AGE: 44 year old male  YOB: 1980  MRN: [id number]  EVALUATION DATE & TIME: No admission date for patient encounter.    PCP: [facility]    ED PROVIDER: Susan Mistry PA-C      Chief Complaint   Patient presents with    Wound Check         FINAL IMPRESSION:  1. Other ascites    2. S/P abdominal paracentesis          ED COURSE & MEDICAL DECISION MAKIN:20 PM I introduced myself to patient, performed initial HPI and examination.     44 year old male with PMH TBI, COPD, Cirrhosis of liver with ascites, type 2 diabetes, heart failure, SVT presents to the Emergency Department for evaluation of oozing/leaking from paracentesis site.    Per chart review, patient has been seen multiple times recently for ascites. Seen this morning, scheduled for paracentesis tomorrow but feels too unwell d/t distention to wait. US paracentesis ordered from ED. 3.75 L clear fluid removed.   Called nurse triage for significant leaking. Reports 8 oz.     Vital signs notable for mild hypertension, otherwise unremarkable.  Exam with well appearing male. Bandage in place left lateral abdomen. Removed, puncture site from recent procedure without surrounding skin changes, with palpation produces a small clear bead consistent with ascites/fluid. No purulence to suggest infection. No abdominal tenderness, nothing to suggest complication from procedure such as perforation or peritonitis. Discussed that oozing may present for the next few days. Provided with gauze and tape to manage. No significant ooze/drainage to suggest need for more aggressive intervention.     Discussed plan with patient. Encouraged close follow up with PCP. Instructed on red flags/indications to return to the emergency department. Patient discharged in stable condition.         Medical Decision Making  Obtained supplemental history:Supplemental history obtained?:  No  Reviewed external records: External records reviewed?: Inpatient Record: [facility] 2/2/2025  Care impacted by chronic illness:Other: Rojas's disease  Care significantly affected by social determinants of health:N/A  Did you consider but not order tests?: Work up considered but not performed and documented in chart, if applicable  Did you interpret images independently?: Independent interpretation of ECG and images noted in documentation, when applicable.  Consultation discussion with other provider:Did you involve another provider (consultant, , pharmacy, etc.)?: No  Discharge. No recommendations on prescription strength medication(s). See documentation for any additional details.  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     The patient reports abdominal distention after having a paracentesis earlier today. He was previously seen earlier today where he had a paracentesis. Since then, he continues to feel abdominal distention, the site continues to leak. He still feels fluid in his abdomen and is wondering if he needs to get another one today. His next scheduled paracentesis is not until 2/10.     Denies fever, chills.     Per chart review, the patient was seen at [facility] Emergency Department on 2/2/2025 for evaluation of abdominal distention. Paracentesis performed. Fluid removed and eager for discharge.      Per chart review, patient with 11 previous ED visits within the last month.       REVIEW OF SYSTEMS   ROS negative unless otherwise stated in HPI    PAST MEDICAL HISTORY:  Past Medical History:   Diagnosis Date    COPD exacerbation (H) 12/2/2024    DM2 (diabetes mellitus, type 2) (H) 4/28/2020    HTN (hypertension) 7/30/2012    Thyroid nodule 7/31/2019    Rojas's disease (H)        PAST SURGICAL HISTORY:  Past Surgical History:   Procedure Laterality Date    COLONOSCOPY      ESOPHAGOSCOPY, GASTROSCOPY, DUODENOSCOPY (EGD), COMBINED N/A 7/21/2023    Procedure: ESOPHAGOGASTRODUODENOSCOPY WITH GASTRIC AND ESOPHAGEAL BIOPSIES;  Surgeon: Filiberto Aragon MD;  Location: [facility] OR    TOOTH EXTRACTION         CURRENT MEDICATIONS:    albuterol (PROAIR HFA/PROVENTIL HFA/VENTOLIN HFA) 108 (90 Base) MCG/ACT inhaler  albuterol (PROVENTIL) (2.5 MG/3ML) 0.083% neb solution  aloe vera GEL  apixaban ANTICOAGULANT (ELIQUIS) 5 MG tablet  bacitracin 500 UNIT/GM OINT  Benzocaine (SOLARCAINE ALOE VERA EX)  benzonatate (TESSALON) 200 MG capsule  Calamine external lotion  carbamide peroxide (DEBROX) 6.5 % otic solution  ciprofloxacin (CIPRO) 500 MG tablet  clotrimazole (LOTRIMIN) 1 % external cream  dextromethorphan-guaiFENesin (MUCINEX DM)  MG 12 hr tablet  diclofenac (VOLTAREN) 1 % topical gel  dicyclomine (BENTYL) 20 MG tablet  EPINEPHrine (ANY BX GENERIC EQUIV) 0.3 MG/0.3ML injection 2-pack  famotidine (PEPCID) 20 MG tablet  FLUoxetine 20 MG tablet  furosemide (LASIX) 40 MG tablet  gabapentin (NEURONTIN) 300 MG capsule  GAVILAX 17 GM/SCOOP powder  hydrocortisone (CORTAID) 1 % external cream  Hydrocortisone (PREPARATION H EX)  levothyroxine (SYNTHROID/LEVOTHROID) 25 MCG tablet  loperamide (IMODIUM) 2 MG capsule  loratadine (CLARITIN) 10 MG tablet  magnesium hydroxide (MILK OF MAGNESIA) 400 MG/5ML suspension  metFORMIN (GLUCOPHAGE) 1000 MG tablet  midodrine (PROAMATINE) 5 MG tablet  montelukast (SINGULAIR) 10 MG  tablet  naproxen (NAPROSYN) 500 MG tablet  nicotine (NICODERM CQ) 21 MG/24HR 24 hr patch  OLANZapine (ZYPREXA) 10 MG tablet  omeprazole (PRILOSEC) 40 MG DR capsule  ondansetron (ZOFRAN ODT) 4 MG ODT tab  pramoxine-calamine (AVEENO) 1-8 % LOTN  rosuvastatin (CRESTOR) 10 MG tablet  spironolactone (ALDACTONE) 100 MG tablet  sucralfate (CARAFATE) 1 GM/10ML suspension  traZODone (DESYREL) 100 MG tablet  TRELEGY ELLIPTA 100-62.5-25 MCG/ACT oral inhaler  Urea 40 % CREA  Vitamins A & D (VITAMIN A & D) OINT  witch hazel-glycerin (TUCKS) pad        ALLERGIES:  Allergies   Allergen Reactions    Apricot Flavoring Agent (Non-Screening) Anaphylaxis    Banana Anaphylaxis     Throat swelling  Throat swelling      Wasp Venom Protein Shortness Of Breath     Other reaction(s): Respiratory Distress  Has an epi pen  Has an epi pen      Bees Anaphylaxis     Have an Epi pen that carries with    Methylphenidate Itching     Other reaction(s): Nightmares    Prunus      Other reaction(s): *Unknown    Sulfa Antibiotics      Headaches and nausea       FAMILY HISTORY:  Family History   Problem Relation Age of Onset    Unknown/Adopted Father     Unknown/Adopted Maternal Grandmother     C.A.D. Maternal Grandfather     Diabetes Maternal Grandfather     Cerebrovascular Disease Maternal Grandfather     Unknown/Adopted Paternal Grandmother     Unknown/Adopted Paternal Grandfather     Unknown/Adopted Brother     Unknown/Adopted Sister        SOCIAL HISTORY:   Social History     Socioeconomic History    Marital status: Single   Tobacco Use    Smoking status: Every Day     Current packs/day: 1.00     Types: Cigarettes    Smokeless tobacco: Never   Vaping Use    Vaping status: Never Used   Substance and Sexual Activity    Alcohol use: No     Comment: once every 3 months    Drug use: No    Sexual activity: Never     Partners: Female   Other Topics Concern     Service No    Blood Transfusions No    Caffeine Concern No    Occupational Exposure No     Hobby Hazards No    Sleep Concern No    Stress Concern Yes     Comment: sometimes    Weight Concern No    Special Diet Yes     Comment: counting carbs    Back Care No    Exercise Yes    Seat Belt Yes    Self-Exams Yes     Social Drivers of Health     Financial Resource Strain: Low Risk  (1/11/2025)    Financial Resource Strain     Within the past 12 months, have you or your family members you live with been unable to get utilities (heat, electricity) when it was really needed?: No   Food Insecurity: Low Risk  (1/11/2025)    Food Insecurity     Within the past 12 months, did you worry that your food would run out before you got money to buy more?: No     Within the past 12 months, did the food you bought just not last and you didn t have money to get more?: No   Transportation Needs: Low Risk  (1/11/2025)    Transportation Needs     Within the past 12 months, has lack of transportation kept you from medical appointments, getting your medicines, non-medical meetings or appointments, work, or from getting things that you need?: No    Received from [facility], [facility]    Social Connections   Interpersonal Safety: High Risk (1/11/2025)    Interpersonal Safety     Do you feel physically and emotionally safe where you currently live?: No     Within the past 12 months, have you been hit, slapped, kicked or otherwise physically hurt by someone?: No     Within the past 12 months, have you been humiliated or emotionally abused in other ways by your partner or ex-partner?: No   Housing Stability: Low Risk  (1/11/2025)    Housing Stability     Do you have housing? : Yes     Are you worried about losing your housing?: No   Recent Concern: Housing Stability - High Risk (12/2/2024)    Housing Stability     Do you have housing? : No     Are you worried about losing your housing?: No       VITALS:  BP (!) 144/60   Pulse 88   Temp 98.1  F (36.7  C) (Oral)   Resp  "18   Ht 1.651 m (5' 5\")   Wt 131.8 kg (290 lb 9.6 oz)   SpO2 94%   BMI 48.36 kg/m      PHYSICAL EXAM    Constitutional: Well developed, Well nourished, NAD, GCS 15   HENT: Normocephalic, Atraumatic  Neck- Supple, Nontender.   Eyes: Conjunctiva normal.   Respiratory: No respiratory distress, speaking in full sentences. Normal breath sounds  Cardiovascular: Normal heart rate, Regular rhythm, No murmurs.  GI: Soft, obese abdomen, nontender. Bandage in place left lateral abdomen. Removed, puncture site from recent procedure without surrounding skin changes, with palpation produces a small clear bead consistent with ascites/fluid.   Musculoskeletal: No deformities, Moves all extremities equally. No calf tenderness or swelling.  Integument: No purulence or surrounding erythema/skin changes at paracentesis site.   Neurologic: Alert & oriented x 3, Normal sensory function. No focal deficits.   Psychiatric: Affect normal, Judgment normal, Mood normal. Cooperative.      LAB:  All pertinent labs reviewed and interpreted.       RADIOLOGY:  Reviewed all pertinent imaging. Please see official radiology report.  No orders to display       EKG:    None.     PROCEDURES:   None.       I, Marcella No, am serving as a scribe to document services personally performed by Susan Mistry PA-C based on my observation and the provider's statements to me. I, Susan Mistry PA-C, attest that Marcella No is acting in a scribe capacity, has observed my performance of the services and has documented them in accordance with my direction.    Susan Mistry PA-C  Emergency Medicine  [facility] EMERGENCY DEPARTMENT  [address] [place] [id number]  [phone]             Susan Mistry PA-C  02/03/25 0111    "

## 2025-02-03 NOTE — DISCHARGE INSTRUCTIONS
Some oozing/leaking after a paracentesis is not uncommon  Continue to bandage as we discussed  It should get better over the next few days.    Call your team tomorrow to see about having another paracentesis earlier than 2/10 as currently scheduled.     Return to the emergency department if you develop any new/worsening symptoms. We would be happy to see you.

## 2025-02-03 NOTE — ED TRIAGE NOTES
Patient presents with bilateral leg swelling, increased SOB, abdominal bloating.  Has paracentesis yesterday, has gained 6 pounds since//

## 2025-02-04 ENCOUNTER — HOSPITAL ENCOUNTER (EMERGENCY)
Facility: HOSPITAL | Age: 45
Discharge: HOME | End: 2025-02-04
Attending: STUDENT IN AN ORGANIZED HEALTH CARE EDUCATION/TRAINING PROGRAM | Admitting: STUDENT IN AN ORGANIZED HEALTH CARE EDUCATION/TRAINING PROGRAM
Payer: COMMERCIAL

## 2025-02-04 ENCOUNTER — ANCILLARY PROCEDURE (OUTPATIENT)
Dept: ULTRASOUND IMAGING | Facility: HOSPITAL | Age: 45
End: 2025-02-04
Attending: STUDENT IN AN ORGANIZED HEALTH CARE EDUCATION/TRAINING PROGRAM
Payer: COMMERCIAL

## 2025-02-04 VITALS
HEART RATE: 87 BPM | RESPIRATION RATE: 20 BRPM | WEIGHT: 288 LBS | DIASTOLIC BLOOD PRESSURE: 67 MMHG | BODY MASS INDEX: 47.93 KG/M2 | OXYGEN SATURATION: 96 % | SYSTOLIC BLOOD PRESSURE: 148 MMHG | TEMPERATURE: 97.8 F

## 2025-02-04 DIAGNOSIS — R18.8 OTHER ASCITES: ICD-10-CM

## 2025-02-04 DIAGNOSIS — R10.32 LEFT LOWER QUADRANT ABDOMINAL PAIN: ICD-10-CM

## 2025-02-04 PROCEDURE — 99284 EMERGENCY DEPT VISIT MOD MDM: CPT | Mod: 25

## 2025-02-04 PROCEDURE — 76705 ECHO EXAM OF ABDOMEN: CPT

## 2025-02-04 ASSESSMENT — COLUMBIA-SUICIDE SEVERITY RATING SCALE - C-SSRS
6. HAVE YOU EVER DONE ANYTHING, STARTED TO DO ANYTHING, OR PREPARED TO DO ANYTHING TO END YOUR LIFE?: YES
1. IN THE PAST MONTH, HAVE YOU WISHED YOU WERE DEAD OR WISHED YOU COULD GO TO SLEEP AND NOT WAKE UP?: NO
2. HAVE YOU ACTUALLY HAD ANY THOUGHTS OF KILLING YOURSELF IN THE PAST MONTH?: NO

## 2025-02-04 ASSESSMENT — ACTIVITIES OF DAILY LIVING (ADL): ADLS_ACUITY_SCORE: 56

## 2025-02-04 NOTE — ED TRIAGE NOTES
Pt was seen yesterday and had his ascites drained.  Pt states that he has been on an every week rotation and states that he is supposed to go to 2 a week.  Pt is complaining of LLQ pain.       Triage Assessment (Adult)       Row Name 02/04/25 0202          Triage Assessment    Airway WDL WDL        Respiratory WDL    Respiratory WDL WDL        Skin Circulation/Temperature WDL    Skin Circulation/Temperature WDL WDL        Cardiac WDL    Cardiac WDL WDL        Peripheral/Neurovascular WDL    Peripheral Neurovascular WDL WDL        Cognitive/Neuro/Behavioral WDL    Cognitive/Neuro/Behavioral WDL WDL

## 2025-02-04 NOTE — ED PROVIDER NOTES
EMERGENCY DEPARTMENT ENCOUNTER      NAME: Warren Jaramillo  AGE: 44 year old male  YOB: 1980  MRN: [id number]  EVALUATION DATE & TIME: 2/4/2025  1:59 AM    PCP: Luzmaria Goldman Rocky    ED PROVIDER: Milton Ernandez MD      Chief Complaint   Patient presents with    Abdominal Pain         FINAL IMPRESSION:  1. Left lower quadrant abdominal pain    2. Other ascites          ED COURSE & MEDICAL DECISION MAKING:    Pertinent Labs & Imaging studies reviewed. (See chart for details)  44 year old male presents to the Emergency Department for evaluation of abd pain.    Patient has a past medical history significant for recurrent abdominal ascites and has been seen in our emergency department frequently over the past several days for recurrent ascites, and did have a paracentesis performed 2 days ago on 2/2.  He presents with left lower quadrant pain, no fever, no generalized abdominal discomfort.  He feels more short of breath when he lies flat.  On examination he has abdominal distention but it is soft, without overlying erythema, does not have generalized tenderness, and has mild left lower quadrant tenderness near his paracentesis site, which otherwise appears within normal limits.  Therefore, I have a low suspicion of SBP and do not feel that diagnostic paracentesis or lab work is indicated.  I did perform an ultrasound to assess for fluid pockets, however I was unable to find a pocket that is greater than 2 cm, and feel that he does not need an emergent paracentesis based on his symptoms, vital signs, and the risk of potential bowel perforation from this small window outweighs the small benefit.  I instead encouraged the patient to call [facility] this morning when their office is open to discuss his continued symptoms and asked to be seen in clinic, where they may be able to do a paracentesis more safely, and he may need to have a more frequent schedule for his paracenteses as  Gael tamayo.  Discussed return precautions, discussed my thoughts and findings with the patient's sister, and attempted to call his group home staff, but there was no answer. Discharged.         2:22 AM I met with the patient, obtained history, performed an initial exam, and discussed options and plan for diagnostics and treatment here in the ED.   2:38 AM We discussed plans for discharge including supportive cares, symptomatic treatment, outpatient follow up, and reasons to return to the emergency department.    Medical Decision Making  Obtained supplemental history:Supplemental history obtained?: No  Reviewed external records: External records reviewed?: Documented in chart  Care impacted by chronic illness:Documented in Chart  Care significantly affected by social determinants of health:N/A  Did you consider but not order tests?: Work up considered but not performed and documented in chart, if applicable  Did you interpret images independently?: Independent interpretation of ECG and images noted in documentation, when applicable.  Consultation discussion with other provider:Did you involve another provider (consultant, , pharmacy, etc.)?: No  Discharge. No recommendations on prescription strength medication(s). See documentation for any additional details.  Not Applicable      At the conclusion of the encounter I discussed the results of all of the tests and the disposition. The questions were answered. The patient or family acknowledged understanding and was agreeable with the care plan.     0 minutes of critical care time     MEDICATIONS GIVEN IN THE EMERGENCY:  Medications - No data to display    NEW PRESCRIPTIONS STARTED AT TODAY'S ER VISIT  Discharge Medication List as of 2/4/2025  2:42 AM             =================================================================    HPI    Patient information was obtained from: the patient     Use of : N/A         Warren Jaramillo is a 44 year  old male with a pertinent history of ascites, congestive heart failure, type 2 diabetes, hypertension, hyperlipidemia, chronic pulmonary obstructive disease and tobacco use who presents to this ED via EMS for evaluation of abdominal distention.     The patient's abdomen is distended and he requires frequent paracenteses. He follows up with [facility] for his ascites. He was seen last night (2/2/2025) of at this emergency department and told to return this morning (2/4/2025) a for a paracentesis. He endorses some left lower quadrant abdominal pain. His weight after his paracentesis on Sunday (2/2/2025) wa\s 281 pounds.  Last night, he weighed in at 288.8 pounds. He also reports having a subjective fever. He is on furosemide and spironolactone.    Per Chart Review, US paracentesis without albumin from 1/23/2025 s/p ultrasound-guided paracentesis. 2.3 liters of clear fluid were removed and patient tolerated procedure well.       PAST MEDICAL HISTORY:  Past Medical History:   Diagnosis Date    COPD exacerbation (H) 12/2/2024    DM2 (diabetes mellitus, type 2) (H) 4/28/2020    HTN (hypertension) 7/30/2012    Thyroid nodule 7/31/2019    Rojas's disease (H)        PAST SURGICAL HISTORY:  Past Surgical History:   Procedure Laterality Date    COLONOSCOPY      ESOPHAGOSCOPY, GASTROSCOPY, DUODENOSCOPY (EGD), COMBINED N/A 7/21/2023    Procedure: ESOPHAGOGASTRODUODENOSCOPY WITH GASTRIC AND ESOPHAGEAL BIOPSIES;  Surgeon: Filiberto Aragon MD;  Location: [facility] OR    TOOTH EXTRACTION             CURRENT MEDICATIONS:    albuterol (PROAIR HFA/PROVENTIL HFA/VENTOLIN HFA) 108 (90 Base) MCG/ACT inhaler  albuterol (PROVENTIL) (2.5 MG/3ML) 0.083% neb solution  aloe vera GEL  apixaban ANTICOAGULANT (ELIQUIS) 5 MG tablet  bacitracin 500 UNIT/GM OINT  Benzocaine (SOLARCAINE ALOE VERA EX)  benzonatate (TESSALON) 200 MG capsule  Calamine external lotion  carbamide peroxide (DEBROX) 6.5 % otic solution  ciprofloxacin (CIPRO) 500 MG  tablet  clotrimazole (LOTRIMIN) 1 % external cream  dextromethorphan-guaiFENesin (MUCINEX DM)  MG 12 hr tablet  diclofenac (VOLTAREN) 1 % topical gel  dicyclomine (BENTYL) 20 MG tablet  EPINEPHrine (ANY BX GENERIC EQUIV) 0.3 MG/0.3ML injection 2-pack  famotidine (PEPCID) 20 MG tablet  FLUoxetine 20 MG tablet  furosemide (LASIX) 40 MG tablet  gabapentin (NEURONTIN) 300 MG capsule  GAVILAX 17 GM/SCOOP powder  hydrocortisone (CORTAID) 1 % external cream  Hydrocortisone (PREPARATION H EX)  levothyroxine (SYNTHROID/LEVOTHROID) 25 MCG tablet  loperamide (IMODIUM) 2 MG capsule  loratadine (CLARITIN) 10 MG tablet  magnesium hydroxide (MILK OF MAGNESIA) 400 MG/5ML suspension  metFORMIN (GLUCOPHAGE) 1000 MG tablet  midodrine (PROAMATINE) 5 MG tablet  montelukast (SINGULAIR) 10 MG tablet  naproxen (NAPROSYN) 500 MG tablet  nicotine (NICODERM CQ) 21 MG/24HR 24 hr patch  OLANZapine (ZYPREXA) 10 MG tablet  omeprazole (PRILOSEC) 40 MG DR capsule  ondansetron (ZOFRAN ODT) 4 MG ODT tab  pramoxine-calamine (AVEENO) 1-8 % LOTN  rosuvastatin (CRESTOR) 10 MG tablet  spironolactone (ALDACTONE) 100 MG tablet  sucralfate (CARAFATE) 1 GM/10ML suspension  traZODone (DESYREL) 100 MG tablet  TRELEGY ELLIPTA 100-62.5-25 MCG/ACT oral inhaler  Urea 40 % CREA  Vitamins A & D (VITAMIN A & D) OINT  witch hazel-glycerin (TUCKS) pad        ALLERGIES:  Allergies   Allergen Reactions    Apricot Flavoring Agent (Non-Screening) Anaphylaxis    Banana Anaphylaxis     Throat swelling  Throat swelling      Wasp Venom Protein Shortness Of Breath     Other reaction(s): Respiratory Distress  Has an epi pen  Has an epi pen      Bees Anaphylaxis     Have an Epi pen that carries with    Methylphenidate Itching     Other reaction(s): Nightmares    Prunus      Other reaction(s): *Unknown    Sulfa Antibiotics      Headaches and nausea       FAMILY HISTORY:  Family History   Problem Relation Age of Onset    Unknown/Adopted Father     Unknown/Adopted Maternal  Grandmother     LOUASEAN. Maternal Grandfather     Diabetes Maternal Grandfather     Cerebrovascular Disease Maternal Grandfather     Unknown/Adopted Paternal Grandmother     Unknown/Adopted Paternal Grandfather     Unknown/Adopted Brother     Unknown/Adopted Sister        SOCIAL HISTORY:   Social History     Socioeconomic History    Marital status: Single   Tobacco Use    Smoking status: Every Day     Current packs/day: 1.00     Types: Cigarettes    Smokeless tobacco: Never   Vaping Use    Vaping status: Never Used   Substance and Sexual Activity    Alcohol use: No     Comment: once every 3 months    Drug use: No    Sexual activity: Never     Partners: Female   Other Topics Concern     Service No    Blood Transfusions No    Caffeine Concern No    Occupational Exposure No    Hobby Hazards No    Sleep Concern No    Stress Concern Yes     Comment: sometimes    Weight Concern No    Special Diet Yes     Comment: counting carbs    Back Care No    Exercise Yes    Seat Belt Yes    Self-Exams Yes     Social Drivers of Health     Financial Resource Strain: Low Risk  (1/11/2025)    Financial Resource Strain     Within the past 12 months, have you or your family members you live with been unable to get utilities (heat, electricity) when it was really needed?: No   Food Insecurity: Low Risk  (1/11/2025)    Food Insecurity     Within the past 12 months, did you worry that your food would run out before you got money to buy more?: No     Within the past 12 months, did the food you bought just not last and you didn t have money to get more?: No   Transportation Needs: Low Risk  (1/11/2025)    Transportation Needs     Within the past 12 months, has lack of transportation kept you from medical appointments, getting your medicines, non-medical meetings or appointments, work, or from getting things that you need?: No    Received from [facility], [facility]     Social Connections   Interpersonal Safety: High Risk (1/11/2025)    Interpersonal Safety     Do you feel physically and emotionally safe where you currently live?: No     Within the past 12 months, have you been hit, slapped, kicked or otherwise physically hurt by someone?: No     Within the past 12 months, have you been humiliated or emotionally abused in other ways by your partner or ex-partner?: No   Housing Stability: Low Risk  (1/11/2025)    Housing Stability     Do you have housing? : Yes     Are you worried about losing your housing?: No   Recent Concern: Housing Stability - High Risk (12/2/2024)    Housing Stability     Do you have housing? : No     Are you worried about losing your housing?: No       VITALS:  BP (!) 148/67   Pulse 87   Temp 97.8  F (36.6  C)   Resp 20   Wt 130.6 kg (288 lb)   SpO2 96%   BMI 47.93 kg/m      PHYSICAL EXAM    Physical Exam  Vitals and nursing note reviewed.   Constitutional:       General: He is not in acute distress.     Appearance: Normal appearance. He is normal weight. He is not ill-appearing.   HENT:      Head: Normocephalic and atraumatic.      Nose: Nose normal.      Mouth/Throat:      Mouth: Mucous membranes are dry.      Pharynx: Oropharynx is clear.   Eyes:      Extraocular Movements: Extraocular movements intact.      Conjunctiva/sclera: Conjunctivae normal.   Cardiovascular:      Rate and Rhythm: Normal rate and regular rhythm.      Pulses: Normal pulses.      Heart sounds: Normal heart sounds. No murmur heard.  Pulmonary:      Effort: Pulmonary effort is normal. No respiratory distress.      Breath sounds: Normal breath sounds.   Abdominal:      General: Abdomen is flat. There is distension.      Palpations: Abdomen is soft.      Tenderness: There is abdominal tenderness (LLQ).   Musculoskeletal:         General: Normal range of motion.      Cervical back: Normal range of motion.      Right lower leg: Edema present.      Left lower leg: Edema present.    Skin:     General: Skin is warm and dry.      Capillary Refill: Capillary refill takes less than 2 seconds.      Coloration: Skin is not jaundiced.   Neurological:      General: No focal deficit present.      Mental Status: He is alert and oriented to person, place, and time. Mental status is at baseline.   Psychiatric:         Mood and Affect: Mood normal.         Behavior: Behavior normal.         Thought Content: Thought content normal.         Judgment: Judgment normal.         RADIOLOGY:  Reviewed all pertinent imaging. Please see official radiology report.  POC US GUIDE FOR PARACENTESIS   ED Interpretation   PROCEDURE: Emergency Department Limited Bedside Screening Ultrasound  ANATOMICAL WINDOW: Bilateral lower quadrants of the abdomen  INDICATIONS: Evaluation of ascites  PROCEDURE PROVIDER:   Milton Ernandez  FINDINGS: Ascites present with the largest pocket being approximately 2 cm above bowel  IMAGES SAVED AND STORED FOR ARCHIVE AND REVIEW: Yes               I, Olga Mart, am serving as a scribe to document services personally performed by Milton Ernandez MD based on my observation and the provider's statements to me. I, Milton Ernandez MD, attest that Olga Mart is acting in a scribe capacity, has observed my performance of the services and has documented them in accordance with my direction.    Milton Ernandez MD  [facility] EMERGENCY DEPARTMENT  [facility] [facility] [address] [phone]       Milton Ernandez MD  02/04/25 [id number]

## 2025-02-04 NOTE — ED PROVIDER NOTES
I am seeing this patient along with Mary Patiño PA-C. I had a face to face encounter with this patient seen by the Advanced Practice Provider (TALIA).  I have seen, examined, and discussed the patient with the TALIA and agree with their assessment and plan of management. I personally saw the patient and performed a substantive portion of the visit including all aspects of the medical decision making.    HPI:  Patient presents for leg swelling. He woke today with swollen ankles and lower legs, with a paracentesis removing 9.6 lbs performed yesterday. 6 lbs weight gain since yesterday. Left lower quadrant abdominal pain. 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   Physical Exam:  2+ pitting edema B/L LE      LABS  Pertinent lab results reviewed in chart.  Labs Ordered and Resulted from Time of ED Arrival to Time of ED Departure - No data to display    EKG      RADIOLOGY  No orders to display       PROCEDURES       ED COURSE & MEDICAL DECISION MAKING    Pertinent Labs and Imagaing reviewed (see chart for details)    44 year old male with a history of cirrhosis, recent diagnosis of hepatorenal syndrome, and several recent ED visits including a paracentesis just done yesterday with 3.75 L removed here with increased weight gain and leg swelling.  Clinically he does have abdominal distention and a fluid wave and pitting edema of his legs.  He is already on diuretics and the weight gain is likely reaccumulation of ascites fluid.  He has no fever or abdominal pain that would be consistent with a concern for SBP and he just had a paracentesis yesterday.  His labs actually looking better than normal with a creatinine that is lower than baseline, LFTs that are not more elevated, white blood cell count that appears stable with no elevation, and a BNP at baseline.  Unfortunately without hypoxia or SBP concerns we do not see any indication for admission or  emergent paracentesis tonight.  Patient will be instructed to follow-up and get this scheduled as an outpatient.  It looks like at 1 point he was needing it weekly.    At the conclusion of the encounter I discussed  the results of all of the tests and the disposition.   The questions were answered.  The patient or family acknowledged understanding and was agreeable with the care plan.       FINAL IMPRESSION      1. Cirrhosis of liver with ascites, unspecified hepatic cirrhosis type (H)            CRITICAL CARE  0 Minutes    Elinor Pruett MD  2/3/2025 6:19 PM     Elinor Pruett MD  02/03/25 [id number]

## 2025-02-04 NOTE — TELEPHONE ENCOUNTER
Warren was just seen in [facility] ER. He was discharged to home. Per ER provider's note, patient wasn't hypoxic, labs looked good so there was need for paracentesis to be done tonight.      Patient was speaking easily and in long sentences. He wanted to return to [facility] and sleep there.   He said he'd been instructed to call his GI provider in the morning to work with them about when to have his next paracentesis.   I asked him if he might sleep better at home tonight. He said he would try that. I reminded him to call his GI provider in the morning.    Nancy ROMAN RN [facility]

## 2025-02-04 NOTE — ED NOTES
Bed: JNED-05  Expected date: 2/4/25  Expected time: 2:00 AM  Means of arrival: Ambulance  Comments:  Hakan. 43 yo male with ascites. VSS.

## 2025-02-04 NOTE — DISCHARGE INSTRUCTIONS
It is important that you call [facility] first thing in the morning when their clinic opens to discuss your more frequent need for taking fluid off of your abdomen, called paracentesis.  This evening on our ultrasound, there is not enough fluid in the abdomen to perform this procedure safely in the ER.  They may be able to help you more in clinic.    Please return to the ER if your symptoms worsen, if you develop fever, generalized abdominal pain.

## 2025-02-04 NOTE — ED NOTES
Bed: [facility]  Expected date:   Expected time:   Means of arrival: Walked  Comments:

## 2025-02-04 NOTE — DISCHARGE INSTRUCTIONS
Given the time of day, it's very unlikely that you would undergo a paracentesis here in the ED tonight. It's very important that you call [facility] tomorrow morning to discuss increasing the frequency of the paracentesis. In the meantime, continue your diuretics and you can wear compression stockings to help with the swelling in your legs. Please return to the ED for any new or worsening symptoms.

## 2025-02-04 NOTE — ED NOTES
Arranged cab for pt.  Pt has no questions upon departure and they will follow up as necessary.  Discharge information discussed and teach back was appropriate.

## 2025-02-07 ENCOUNTER — HOSPITAL ENCOUNTER (INPATIENT)
Facility: HOSPITAL | Age: 45
LOS: 1 days | Discharge: NURSING, REHAB OR LONG-TERM CARE | DRG: 372 | End: 2025-02-09
Attending: EMERGENCY MEDICINE | Admitting: HOSPITALIST
Payer: COMMERCIAL

## 2025-02-07 ENCOUNTER — APPOINTMENT (OUTPATIENT)
Dept: CT IMAGING | Facility: HOSPITAL | Age: 45
DRG: 372 | End: 2025-02-07
Attending: EMERGENCY MEDICINE
Payer: COMMERCIAL

## 2025-02-07 DIAGNOSIS — R10.84 DIFFUSE ABDOMINAL PAIN: ICD-10-CM

## 2025-02-07 LAB
ALBUMIN SERPL BCG-MCNC: 4.1 G/DL (ref 3.5–5.2)
ALBUMIN UR-MCNC: NEGATIVE MG/DL
ALP SERPL-CCNC: 214 U/L (ref 40–150)
ALT SERPL W P-5'-P-CCNC: 18 U/L (ref 0–70)
ANION GAP SERPL CALCULATED.3IONS-SCNC: 9 MMOL/L (ref 7–15)
APPEARANCE FLD: ABNORMAL
APPEARANCE UR: CLEAR
AST SERPL W P-5'-P-CCNC: 24 U/L (ref 0–45)
BASOPHILS # BLD AUTO: 0.1 10E3/UL (ref 0–0.2)
BASOPHILS NFR BLD AUTO: 1 %
BILIRUB SERPL-MCNC: 0.3 MG/DL
BILIRUB UR QL STRIP: NEGATIVE
BUN SERPL-MCNC: 20.9 MG/DL (ref 6–20)
CALCIUM SERPL-MCNC: 9.8 MG/DL (ref 8.8–10.4)
CELL COUNT BODY FLUID SOURCE: ABNORMAL
CHLORIDE SERPL-SCNC: 103 MMOL/L (ref 98–107)
COLOR FLD: YELLOW
COLOR UR AUTO: NORMAL
CREAT SERPL-MCNC: 1.25 MG/DL (ref 0.67–1.17)
CRP SERPL-MCNC: 15.6 MG/L
EGFRCR SERPLBLD CKD-EPI 2021: 73 ML/MIN/1.73M2
EOSINOPHIL # BLD AUTO: 0.5 10E3/UL (ref 0–0.7)
EOSINOPHIL NFR BLD AUTO: 3 %
ERYTHROCYTE [DISTWIDTH] IN BLOOD BY AUTOMATED COUNT: 18.4 % (ref 10–15)
GLUCOSE SERPL-MCNC: 112 MG/DL (ref 70–99)
GLUCOSE UR STRIP-MCNC: NEGATIVE MG/DL
HCO3 SERPL-SCNC: 28 MMOL/L (ref 22–29)
HCT VFR BLD AUTO: 40.1 % (ref 40–53)
HGB BLD-MCNC: 12.6 G/DL (ref 13.3–17.7)
HGB UR QL STRIP: NEGATIVE
HOLD SPECIMEN: NORMAL
IMM GRANULOCYTES # BLD: 0.2 10E3/UL
IMM GRANULOCYTES NFR BLD: 1 %
INR PPP: 1.26 (ref 0.85–1.15)
KETONES UR STRIP-MCNC: NEGATIVE MG/DL
LACTATE SERPL-SCNC: 1.3 MMOL/L (ref 0.7–2)
LEUKOCYTE ESTERASE UR QL STRIP: NEGATIVE
LYMPHOCYTES # BLD AUTO: 2.9 10E3/UL (ref 0.8–5.3)
LYMPHOCYTES NFR BLD AUTO: 19 %
MCH RBC QN AUTO: 27.3 PG (ref 26.5–33)
MCHC RBC AUTO-ENTMCNC: 31.4 G/DL (ref 31.5–36.5)
MCV RBC AUTO: 87 FL (ref 78–100)
MONOCYTES # BLD AUTO: 1.4 10E3/UL (ref 0–1.3)
MONOCYTES NFR BLD AUTO: 9 %
NEUTROPHILS # BLD AUTO: 10.2 10E3/UL (ref 1.6–8.3)
NEUTROPHILS NFR BLD AUTO: 67 %
NITRATE UR QL: NEGATIVE
NRBC # BLD AUTO: 0 10E3/UL
NRBC BLD AUTO-RTO: 0 /100
PH UR STRIP: 5.5 [PH] (ref 5–7)
PLATELET # BLD AUTO: 329 10E3/UL (ref 150–450)
POTASSIUM SERPL-SCNC: 4.2 MMOL/L (ref 3.4–5.3)
PROT SERPL-MCNC: 6.7 G/DL (ref 6.4–8.3)
RBC # BLD AUTO: 4.62 10E6/UL (ref 4.4–5.9)
RBC URINE: 1 /HPF
SODIUM SERPL-SCNC: 140 MMOL/L (ref 135–145)
SP GR UR STRIP: 1.01 (ref 1–1.03)
UROBILINOGEN UR STRIP-MCNC: <2 MG/DL
WBC # BLD AUTO: 15.2 10E3/UL (ref 4–11)
WBC # FLD AUTO: 2574 /UL
WBC URINE: 1 /HPF

## 2025-02-07 PROCEDURE — 89051 BODY FLUID CELL COUNT: CPT | Performed by: EMERGENCY MEDICINE

## 2025-02-07 PROCEDURE — 87070 CULTURE OTHR SPECIMN AEROBIC: CPT | Performed by: EMERGENCY MEDICINE

## 2025-02-07 PROCEDURE — 81001 URINALYSIS AUTO W/SCOPE: CPT | Performed by: EMERGENCY MEDICINE

## 2025-02-07 PROCEDURE — 87040 BLOOD CULTURE FOR BACTERIA: CPT | Performed by: EMERGENCY MEDICINE

## 2025-02-07 PROCEDURE — 36415 COLL VENOUS BLD VENIPUNCTURE: CPT | Performed by: EMERGENCY MEDICINE

## 2025-02-07 PROCEDURE — 36415 COLL VENOUS BLD VENIPUNCTURE: CPT | Performed by: STUDENT IN AN ORGANIZED HEALTH CARE EDUCATION/TRAINING PROGRAM

## 2025-02-07 PROCEDURE — 82042 OTHER SOURCE ALBUMIN QUAN EA: CPT | Performed by: EMERGENCY MEDICINE

## 2025-02-07 PROCEDURE — 85025 COMPLETE CBC W/AUTO DIFF WBC: CPT | Performed by: EMERGENCY MEDICINE

## 2025-02-07 PROCEDURE — 83605 ASSAY OF LACTIC ACID: CPT | Performed by: EMERGENCY MEDICINE

## 2025-02-07 PROCEDURE — 74177 CT ABD & PELVIS W/CONTRAST: CPT

## 2025-02-07 PROCEDURE — 80053 COMPREHEN METABOLIC PANEL: CPT | Performed by: STUDENT IN AN ORGANIZED HEALTH CARE EDUCATION/TRAINING PROGRAM

## 2025-02-07 PROCEDURE — 85025 COMPLETE CBC W/AUTO DIFF WBC: CPT | Performed by: STUDENT IN AN ORGANIZED HEALTH CARE EDUCATION/TRAINING PROGRAM

## 2025-02-07 PROCEDURE — 84157 ASSAY OF PROTEIN OTHER: CPT | Performed by: EMERGENCY MEDICINE

## 2025-02-07 PROCEDURE — 89050 BODY FLUID CELL COUNT: CPT | Performed by: HOSPITALIST

## 2025-02-07 PROCEDURE — 83735 ASSAY OF MAGNESIUM: CPT | Performed by: HOSPITALIST

## 2025-02-07 PROCEDURE — 250N000011 HC RX IP 250 OP 636: Performed by: EMERGENCY MEDICINE

## 2025-02-07 PROCEDURE — 81001 URINALYSIS AUTO W/SCOPE: CPT | Performed by: STUDENT IN AN ORGANIZED HEALTH CARE EDUCATION/TRAINING PROGRAM

## 2025-02-07 PROCEDURE — 86140 C-REACTIVE PROTEIN: CPT | Performed by: EMERGENCY MEDICINE

## 2025-02-07 PROCEDURE — 83880 ASSAY OF NATRIURETIC PEPTIDE: CPT | Performed by: HOSPITALIST

## 2025-02-07 PROCEDURE — 96365 THER/PROPH/DIAG IV INF INIT: CPT

## 2025-02-07 PROCEDURE — 99285 EMERGENCY DEPT VISIT HI MDM: CPT | Mod: 25

## 2025-02-07 PROCEDURE — 80053 COMPREHEN METABOLIC PANEL: CPT | Performed by: EMERGENCY MEDICINE

## 2025-02-07 PROCEDURE — 85610 PROTHROMBIN TIME: CPT | Performed by: EMERGENCY MEDICINE

## 2025-02-07 PROCEDURE — 0W9G3ZX DRAINAGE OF PERITONEAL CAVITY, PERCUTANEOUS APPROACH, DIAGNOSTIC: ICD-10-PCS | Performed by: EMERGENCY MEDICINE

## 2025-02-07 PROCEDURE — 87205 SMEAR GRAM STAIN: CPT | Performed by: EMERGENCY MEDICINE

## 2025-02-07 RX ORDER — IOPAMIDOL 755 MG/ML
90 INJECTION, SOLUTION INTRAVASCULAR ONCE
Status: COMPLETED | OUTPATIENT
Start: 2025-02-07 | End: 2025-02-07

## 2025-02-07 RX ORDER — CEFTRIAXONE 1 G/1
1 INJECTION, POWDER, FOR SOLUTION INTRAMUSCULAR; INTRAVENOUS ONCE
Status: COMPLETED | OUTPATIENT
Start: 2025-02-07 | End: 2025-02-08

## 2025-02-07 RX ADMIN — CEFTRIAXONE SODIUM 1 G: 1 INJECTION, POWDER, FOR SOLUTION INTRAMUSCULAR; INTRAVENOUS at 23:05

## 2025-02-07 RX ADMIN — IOPAMIDOL 90 ML: 755 INJECTION, SOLUTION INTRAVENOUS at 20:08

## 2025-02-07 ASSESSMENT — ENCOUNTER SYMPTOMS
ABDOMINAL DISTENTION: 1
ABDOMINAL PAIN: 1
SHORTNESS OF BREATH: 0
DIARRHEA: 0
DYSURIA: 0
VOMITING: 0
FEVER: 0
NAUSEA: 0
COUGH: 0

## 2025-02-07 ASSESSMENT — ACTIVITIES OF DAILY LIVING (ADL)
ADLS_ACUITY_SCORE: 56

## 2025-02-07 NOTE — ED TRIAGE NOTES
Pt reports last pericentesis was 5 days ago, Next scheduled on 2/10.    Pt feels severe abd swelling, pushing on his bladder, causing incontinence, also short of breath.     Triage Assessment (Adult)       Row Name 02/07/25 2908          Triage Assessment    Airway WDL WDL        Respiratory WDL    Respiratory WDL X;rhythm/pattern     Rhythm/Pattern, Respiratory shortness of breath        Skin Circulation/Temperature WDL    Skin Circulation/Temperature WDL WDL        Cardiac WDL    Cardiac WDL WDL        Peripheral/Neurovascular WDL    Peripheral Neurovascular WDL WDL        Cognitive/Neuro/Behavioral WDL    Cognitive/Neuro/Behavioral WDL WDL

## 2025-02-08 ENCOUNTER — APPOINTMENT (OUTPATIENT)
Dept: ULTRASOUND IMAGING | Facility: HOSPITAL | Age: 45
DRG: 372 | End: 2025-02-08
Attending: HOSPITALIST
Payer: COMMERCIAL

## 2025-02-08 PROBLEM — K76.6 PORTAL HYPERTENSION (H): Status: ACTIVE | Noted: 2025-02-08

## 2025-02-08 PROBLEM — R10.84 DIFFUSE ABDOMINAL PAIN: Status: ACTIVE | Noted: 2025-02-08

## 2025-02-08 PROBLEM — F39 EPISODIC MOOD DISORDER: Status: ACTIVE | Noted: 2023-06-20

## 2025-02-08 LAB
% LINING CELLS, BODY FLUID: 8 %
ABSOLUTE NEUTROPHILS, BODY FLUID: 308.9 /UL
ALBUMIN BODY FLUID SOURCE: NORMAL
ALBUMIN FLD-MCNC: 2.7 G/DL
ANION GAP SERPL CALCULATED.3IONS-SCNC: 8 MMOL/L (ref 7–15)
BUN SERPL-MCNC: 22.1 MG/DL (ref 6–20)
CALCIUM SERPL-MCNC: 9.4 MG/DL (ref 8.8–10.4)
CHLORIDE SERPL-SCNC: 104 MMOL/L (ref 98–107)
CREAT SERPL-MCNC: 1.1 MG/DL (ref 0.67–1.17)
EGFRCR SERPLBLD CKD-EPI 2021: 85 ML/MIN/1.73M2
ERYTHROCYTE [DISTWIDTH] IN BLOOD BY AUTOMATED COUNT: 18.3 % (ref 10–15)
GLUCOSE BLDC GLUCOMTR-MCNC: 125 MG/DL (ref 70–99)
GLUCOSE BLDC GLUCOMTR-MCNC: 131 MG/DL (ref 70–99)
GLUCOSE BLDC GLUCOMTR-MCNC: 173 MG/DL (ref 70–99)
GLUCOSE SERPL-MCNC: 109 MG/DL (ref 70–99)
HCO3 SERPL-SCNC: 27 MMOL/L (ref 22–29)
HCT VFR BLD AUTO: 37.8 % (ref 40–53)
HGB BLD-MCNC: 12.1 G/DL (ref 13.3–17.7)
LYMPHOCYTES NFR FLD MANUAL: 49 %
MAGNESIUM SERPL-MCNC: 1.6 MG/DL (ref 1.7–2.3)
MCH RBC QN AUTO: 27.9 PG (ref 26.5–33)
MCHC RBC AUTO-ENTMCNC: 32 G/DL (ref 31.5–36.5)
MCV RBC AUTO: 87 FL (ref 78–100)
MONOS+MACROS NFR FLD MANUAL: 31 %
NEUTS BAND NFR FLD MANUAL: 12 %
NT-PROBNP SERPL-MCNC: 1101 PG/ML (ref 0–450)
PLATELET # BLD AUTO: 308 10E3/UL (ref 150–450)
POTASSIUM SERPL-SCNC: 4.3 MMOL/L (ref 3.4–5.3)
PROT FLD-MCNC: 4.2 G/DL
PROTEIN BODY FLUID SOURCE: NORMAL
RBC # BLD AUTO: 4.33 10E6/UL (ref 4.4–5.9)
SODIUM SERPL-SCNC: 139 MMOL/L (ref 135–145)
WBC # BLD AUTO: 13.4 10E3/UL (ref 4–11)

## 2025-02-08 PROCEDURE — G0378 HOSPITAL OBSERVATION PER HR: HCPCS

## 2025-02-08 PROCEDURE — 82962 GLUCOSE BLOOD TEST: CPT

## 2025-02-08 PROCEDURE — 82374 ASSAY BLOOD CARBON DIOXIDE: CPT | Performed by: HOSPITALIST

## 2025-02-08 PROCEDURE — 36415 COLL VENOUS BLD VENIPUNCTURE: CPT | Performed by: HOSPITALIST

## 2025-02-08 PROCEDURE — 80048 BASIC METABOLIC PNL TOTAL CA: CPT | Performed by: HOSPITALIST

## 2025-02-08 PROCEDURE — 49083 ABD PARACENTESIS W/IMAGING: CPT

## 2025-02-08 PROCEDURE — 272N000710 US PARACENTESIS WITH ALBUMIN

## 2025-02-08 PROCEDURE — 96366 THER/PROPH/DIAG IV INF ADDON: CPT

## 2025-02-08 PROCEDURE — 99223 1ST HOSP IP/OBS HIGH 75: CPT | Performed by: HOSPITALIST

## 2025-02-08 PROCEDURE — 99207 PR NO BILLABLE SERVICE THIS VISIT: CPT | Performed by: HOSPITALIST

## 2025-02-08 PROCEDURE — P9047 ALBUMIN (HUMAN), 25%, 50ML: HCPCS | Mod: JZ | Performed by: INTERNAL MEDICINE

## 2025-02-08 PROCEDURE — 120N000001 HC R&B MED SURG/OB

## 2025-02-08 PROCEDURE — 250N000013 HC RX MED GY IP 250 OP 250 PS 637

## 2025-02-08 PROCEDURE — 250N000011 HC RX IP 250 OP 636: Performed by: HOSPITALIST

## 2025-02-08 PROCEDURE — 999N000157 HC STATISTIC RCP TIME EA 10 MIN

## 2025-02-08 PROCEDURE — 250N000011 HC RX IP 250 OP 636: Mod: JZ | Performed by: INTERNAL MEDICINE

## 2025-02-08 PROCEDURE — 250N000013 HC RX MED GY IP 250 OP 250 PS 637: Performed by: HOSPITALIST

## 2025-02-08 PROCEDURE — 99207 PR NO BILLABLE SERVICE THIS VISIT: CPT

## 2025-02-08 PROCEDURE — 85027 COMPLETE CBC AUTOMATED: CPT | Performed by: HOSPITALIST

## 2025-02-08 PROCEDURE — 87070 CULTURE OTHR SPECIMN AEROBIC: CPT | Performed by: HOSPITALIST

## 2025-02-08 PROCEDURE — 94660 CPAP INITIATION&MGMT: CPT

## 2025-02-08 RX ORDER — CEFTRIAXONE 2 G/1
2 INJECTION, POWDER, FOR SOLUTION INTRAMUSCULAR; INTRAVENOUS EVERY 24 HOURS
Status: DISCONTINUED | OUTPATIENT
Start: 2025-02-09 | End: 2025-02-09 | Stop reason: HOSPADM

## 2025-02-08 RX ORDER — AMOXICILLIN 250 MG
2 CAPSULE ORAL 2 TIMES DAILY
Status: DISCONTINUED | OUTPATIENT
Start: 2025-02-08 | End: 2025-02-09 | Stop reason: HOSPADM

## 2025-02-08 RX ORDER — AMOXICILLIN 250 MG
1 CAPSULE ORAL 2 TIMES DAILY
Status: DISCONTINUED | OUTPATIENT
Start: 2025-02-08 | End: 2025-02-09 | Stop reason: HOSPADM

## 2025-02-08 RX ORDER — ONDANSETRON 4 MG/1
4 TABLET, ORALLY DISINTEGRATING ORAL EVERY 6 HOURS PRN
Status: DISCONTINUED | OUTPATIENT
Start: 2025-02-08 | End: 2025-02-09 | Stop reason: HOSPADM

## 2025-02-08 RX ORDER — MIDODRINE HYDROCHLORIDE 5 MG/1
5 TABLET ORAL
Status: DISCONTINUED | OUTPATIENT
Start: 2025-02-08 | End: 2025-02-09 | Stop reason: HOSPADM

## 2025-02-08 RX ORDER — DICYCLOMINE HCL 20 MG
20 TABLET ORAL 4 TIMES DAILY PRN
Status: DISCONTINUED | OUTPATIENT
Start: 2025-02-08 | End: 2025-02-09 | Stop reason: HOSPADM

## 2025-02-08 RX ORDER — HYDROMORPHONE HCL IN WATER/PF 6 MG/30 ML
0.4 PATIENT CONTROLLED ANALGESIA SYRINGE INTRAVENOUS
Status: DISCONTINUED | OUTPATIENT
Start: 2025-02-08 | End: 2025-02-09 | Stop reason: HOSPADM

## 2025-02-08 RX ORDER — TRAZODONE HYDROCHLORIDE 50 MG/1
100 TABLET ORAL AT BEDTIME
Status: DISCONTINUED | OUTPATIENT
Start: 2025-02-08 | End: 2025-02-09 | Stop reason: HOSPADM

## 2025-02-08 RX ORDER — FUROSEMIDE 20 MG/1
40 TABLET ORAL DAILY
Status: DISCONTINUED | OUTPATIENT
Start: 2025-02-08 | End: 2025-02-09 | Stop reason: HOSPADM

## 2025-02-08 RX ORDER — AMOXICILLIN 250 MG
2 CAPSULE ORAL 2 TIMES DAILY PRN
Status: DISCONTINUED | OUTPATIENT
Start: 2025-02-08 | End: 2025-02-09 | Stop reason: HOSPADM

## 2025-02-08 RX ORDER — MONTELUKAST SODIUM 10 MG/1
10 TABLET ORAL EVERY MORNING
Status: DISCONTINUED | OUTPATIENT
Start: 2025-02-08 | End: 2025-02-09 | Stop reason: HOSPADM

## 2025-02-08 RX ORDER — HYDROMORPHONE HCL IN WATER/PF 6 MG/30 ML
0.2 PATIENT CONTROLLED ANALGESIA SYRINGE INTRAVENOUS
Status: DISCONTINUED | OUTPATIENT
Start: 2025-02-08 | End: 2025-02-09 | Stop reason: HOSPADM

## 2025-02-08 RX ORDER — ALBUMIN (HUMAN) 12.5 G/50ML
1.5 SOLUTION INTRAVENOUS ONCE
Status: DISCONTINUED | OUTPATIENT
Start: 2025-02-08 | End: 2025-02-08

## 2025-02-08 RX ORDER — ALBUTEROL SULFATE 0.83 MG/ML
2.5 SOLUTION RESPIRATORY (INHALATION) 3 TIMES DAILY PRN
Status: DISCONTINUED | OUTPATIENT
Start: 2025-02-08 | End: 2025-02-09 | Stop reason: HOSPADM

## 2025-02-08 RX ORDER — DEXTROSE MONOHYDRATE 25 G/50ML
25-50 INJECTION, SOLUTION INTRAVENOUS
Status: DISCONTINUED | OUTPATIENT
Start: 2025-02-08 | End: 2025-02-09 | Stop reason: HOSPADM

## 2025-02-08 RX ORDER — SUCRALFATE ORAL 1 G/10ML
1 SUSPENSION ORAL 4 TIMES DAILY PRN
Status: DISCONTINUED | OUTPATIENT
Start: 2025-02-08 | End: 2025-02-09 | Stop reason: HOSPADM

## 2025-02-08 RX ORDER — PANTOPRAZOLE SODIUM 40 MG/1
40 TABLET, DELAYED RELEASE ORAL
Status: DISCONTINUED | OUTPATIENT
Start: 2025-02-08 | End: 2025-02-09 | Stop reason: HOSPADM

## 2025-02-08 RX ORDER — FAMOTIDINE 20 MG/1
20 TABLET, FILM COATED ORAL 2 TIMES DAILY
Status: DISCONTINUED | OUTPATIENT
Start: 2025-02-08 | End: 2025-02-09 | Stop reason: HOSPADM

## 2025-02-08 RX ORDER — ONDANSETRON 2 MG/ML
4 INJECTION INTRAMUSCULAR; INTRAVENOUS EVERY 6 HOURS PRN
Status: DISCONTINUED | OUTPATIENT
Start: 2025-02-08 | End: 2025-02-09 | Stop reason: HOSPADM

## 2025-02-08 RX ORDER — OMEPRAZOLE 20 MG/1
40 CAPSULE, DELAYED RELEASE ORAL EVERY MORNING
Status: DISCONTINUED | OUTPATIENT
Start: 2025-02-08 | End: 2025-02-08 | Stop reason: ALTCHOICE

## 2025-02-08 RX ORDER — ALBUMIN (HUMAN) 12.5 G/50ML
200 SOLUTION INTRAVENOUS ONCE
Status: COMPLETED | OUTPATIENT
Start: 2025-02-08 | End: 2025-02-08

## 2025-02-08 RX ORDER — CEFTRIAXONE 1 G/1
1 INJECTION, POWDER, FOR SOLUTION INTRAMUSCULAR; INTRAVENOUS ONCE
Status: DISCONTINUED | OUTPATIENT
Start: 2025-02-08 | End: 2025-02-08

## 2025-02-08 RX ORDER — LORATADINE 10 MG/1
10 TABLET ORAL DAILY PRN
Status: DISCONTINUED | OUTPATIENT
Start: 2025-02-08 | End: 2025-02-09 | Stop reason: HOSPADM

## 2025-02-08 RX ORDER — ACETAMINOPHEN 650 MG/1
650 SUPPOSITORY RECTAL EVERY 4 HOURS PRN
Status: DISCONTINUED | OUTPATIENT
Start: 2025-02-08 | End: 2025-02-09 | Stop reason: HOSPADM

## 2025-02-08 RX ORDER — ROSUVASTATIN CALCIUM 10 MG/1
10 TABLET, COATED ORAL AT BEDTIME
Status: DISCONTINUED | OUTPATIENT
Start: 2025-02-08 | End: 2025-02-09 | Stop reason: HOSPADM

## 2025-02-08 RX ORDER — AMOXICILLIN 250 MG
1 CAPSULE ORAL 2 TIMES DAILY PRN
Status: DISCONTINUED | OUTPATIENT
Start: 2025-02-08 | End: 2025-02-09 | Stop reason: HOSPADM

## 2025-02-08 RX ORDER — GABAPENTIN 300 MG/1
300 CAPSULE ORAL 3 TIMES DAILY
Status: DISCONTINUED | OUTPATIENT
Start: 2025-02-08 | End: 2025-02-09 | Stop reason: HOSPADM

## 2025-02-08 RX ORDER — OLANZAPINE 5 MG/1
10 TABLET ORAL AT BEDTIME
Status: DISCONTINUED | OUTPATIENT
Start: 2025-02-08 | End: 2025-02-09 | Stop reason: HOSPADM

## 2025-02-08 RX ORDER — ALBUMIN (HUMAN) 12.5 G/50ML
198 SOLUTION INTRAVENOUS ONCE
Status: DISCONTINUED | OUTPATIENT
Start: 2025-02-08 | End: 2025-02-08

## 2025-02-08 RX ORDER — ACETAMINOPHEN 325 MG/1
650 TABLET ORAL EVERY 4 HOURS PRN
Status: DISCONTINUED | OUTPATIENT
Start: 2025-02-08 | End: 2025-02-09 | Stop reason: HOSPADM

## 2025-02-08 RX ORDER — PROCHLORPERAZINE MALEATE 10 MG
10 TABLET ORAL EVERY 6 HOURS PRN
Status: DISCONTINUED | OUTPATIENT
Start: 2025-02-08 | End: 2025-02-09 | Stop reason: HOSPADM

## 2025-02-08 RX ORDER — ALBUMIN (HUMAN) 12.5 G/50ML
1.5 SOLUTION INTRAVENOUS ONCE
Status: DISCONTINUED | OUTPATIENT
Start: 2025-02-08 | End: 2025-02-08 | Stop reason: ALTCHOICE

## 2025-02-08 RX ORDER — ALBUMIN (HUMAN) 12.5 G/50ML
25-50 SOLUTION INTRAVENOUS ONCE
Status: DISCONTINUED | OUTPATIENT
Start: 2025-02-08 | End: 2025-02-08 | Stop reason: ALTCHOICE

## 2025-02-08 RX ORDER — CEFTRIAXONE 1 G/1
1 INJECTION, POWDER, FOR SOLUTION INTRAMUSCULAR; INTRAVENOUS ONCE
Status: COMPLETED | OUTPATIENT
Start: 2025-02-08 | End: 2025-02-08

## 2025-02-08 RX ORDER — SPIRONOLACTONE 25 MG/1
100 TABLET ORAL DAILY
Status: DISCONTINUED | OUTPATIENT
Start: 2025-02-08 | End: 2025-02-09 | Stop reason: HOSPADM

## 2025-02-08 RX ORDER — ENOXAPARIN SODIUM 100 MG/ML
40 INJECTION SUBCUTANEOUS EVERY 12 HOURS
Status: DISCONTINUED | OUTPATIENT
Start: 2025-02-08 | End: 2025-02-09 | Stop reason: ALTCHOICE

## 2025-02-08 RX ORDER — LOPERAMIDE HYDROCHLORIDE 2 MG/1
4 CAPSULE ORAL 4 TIMES DAILY PRN
Status: DISCONTINUED | OUTPATIENT
Start: 2025-02-08 | End: 2025-02-09 | Stop reason: HOSPADM

## 2025-02-08 RX ORDER — NICOTINE POLACRILEX 4 MG
15-30 LOZENGE BUCCAL
Status: DISCONTINUED | OUTPATIENT
Start: 2025-02-08 | End: 2025-02-09 | Stop reason: HOSPADM

## 2025-02-08 RX ORDER — ALBUTEROL SULFATE 90 UG/1
1-2 INHALANT RESPIRATORY (INHALATION) EVERY 6 HOURS PRN
Status: DISCONTINUED | OUTPATIENT
Start: 2025-02-08 | End: 2025-02-09 | Stop reason: HOSPADM

## 2025-02-08 RX ADMIN — MIDODRINE HYDROCHLORIDE 5 MG: 5 TABLET ORAL at 07:54

## 2025-02-08 RX ADMIN — GABAPENTIN 300 MG: 300 CAPSULE ORAL at 07:54

## 2025-02-08 RX ADMIN — OLANZAPINE 10 MG: 5 TABLET, FILM COATED ORAL at 21:30

## 2025-02-08 RX ADMIN — GABAPENTIN 300 MG: 300 CAPSULE ORAL at 14:50

## 2025-02-08 RX ADMIN — MIDODRINE HYDROCHLORIDE 5 MG: 5 TABLET ORAL at 11:34

## 2025-02-08 RX ADMIN — FLUOXETINE HYDROCHLORIDE 20 MG: 20 CAPSULE ORAL at 07:56

## 2025-02-08 RX ADMIN — LEVOTHYROXINE SODIUM 6.25 MCG: 0.03 TABLET ORAL at 07:55

## 2025-02-08 RX ADMIN — PANTOPRAZOLE SODIUM 40 MG: 40 TABLET, DELAYED RELEASE ORAL at 07:55

## 2025-02-08 RX ADMIN — OLANZAPINE 10 MG: 5 TABLET, FILM COATED ORAL at 02:53

## 2025-02-08 RX ADMIN — TRAZODONE HYDROCHLORIDE 100 MG: 50 TABLET ORAL at 02:53

## 2025-02-08 RX ADMIN — SPIRONOLACTONE 100 MG: 25 TABLET, FILM COATED ORAL at 07:55

## 2025-02-08 RX ADMIN — FAMOTIDINE 20 MG: 20 TABLET, FILM COATED ORAL at 07:54

## 2025-02-08 RX ADMIN — NICOTINE 1 PATCH: 7 PATCH, EXTENDED RELEASE TRANSDERMAL at 12:01

## 2025-02-08 RX ADMIN — GABAPENTIN 300 MG: 300 CAPSULE ORAL at 21:30

## 2025-02-08 RX ADMIN — SENNOSIDES AND DOCUSATE SODIUM 1 TABLET: 50; 8.6 TABLET ORAL at 07:55

## 2025-02-08 RX ADMIN — TRAZODONE HYDROCHLORIDE 100 MG: 50 TABLET ORAL at 21:30

## 2025-02-08 RX ADMIN — MONTELUKAST 10 MG: 10 TABLET, FILM COATED ORAL at 07:54

## 2025-02-08 RX ADMIN — FUROSEMIDE 40 MG: 20 TABLET ORAL at 07:54

## 2025-02-08 RX ADMIN — CEFTRIAXONE SODIUM 1 G: 1 INJECTION, POWDER, FOR SOLUTION INTRAMUSCULAR; INTRAVENOUS at 02:53

## 2025-02-08 RX ADMIN — PANTOPRAZOLE SODIUM 40 MG: 40 TABLET, DELAYED RELEASE ORAL at 15:58

## 2025-02-08 RX ADMIN — FAMOTIDINE 20 MG: 20 TABLET, FILM COATED ORAL at 21:30

## 2025-02-08 RX ADMIN — ROSUVASTATIN 10 MG: 10 TABLET, FILM COATED ORAL at 21:30

## 2025-02-08 RX ADMIN — MIDODRINE HYDROCHLORIDE 5 MG: 5 TABLET ORAL at 18:03

## 2025-02-08 RX ADMIN — ROSUVASTATIN 10 MG: 10 TABLET, FILM COATED ORAL at 02:53

## 2025-02-08 RX ADMIN — ALBUMIN HUMAN 200 G: 0.25 SOLUTION INTRAVENOUS at 14:08

## 2025-02-08 ASSESSMENT — ACTIVITIES OF DAILY LIVING (ADL)
ADLS_ACUITY_SCORE: 50
DEPENDENT_IADLS:: CLEANING;LAUNDRY;COOKING;SHOPPING;MEAL PREPARATION;MEDICATION MANAGEMENT;MONEY MANAGEMENT;TRANSPORTATION
ADLS_ACUITY_SCORE: 50
ADLS_ACUITY_SCORE: 56
ADLS_ACUITY_SCORE: 50
ADLS_ACUITY_SCORE: 56
ADLS_ACUITY_SCORE: 50

## 2025-02-08 NOTE — CONSULTS
Gastroenterology Consultation    Patient: Warren Jaramillo   1980  Date of Consult:  2/8/2025  Admission Date/Time: 2/7/2025  7:18 PM  Primary Care Provider:  [facility]    Requesting Physician: Joana Valdez MD    CHIEF COMPLAINT:   SBP/worsening ascites    REASON FOR THE CONSULT:  SBP     HPI:   The patient is a 44 year old White male with a history of type 2 diabetes, hypertension  complicated by cirrhosis complicated by portal hypertension and ascites with history of SBP.  He reports that he was sent here from his group home because of worsening ascites and 9 pound weight gain since yesterday.  Upon arrival to the ER, he was found to have an elevated white count to 15.2 prompting a diagnostic paracentesis where he was found to have an ANC of 308.He was found to have he had a CT that did not show any other abnormalities as the cause of his pain.  He has been initiated on Rocephin, and underwent repeat therapeutic paracentesis today with 4.4 L removed.  He has no other complaints at the time of the visit.  Unfortunately I was not able to obtain several details of his treatment at [facility] due to the ongoing network upgrade, and his chart not being accessible through the mobile hcai either.    REVIEW OF SYSTEMS:  Negative except as in HPI    PAST MEDICAL HISTORY:  Past Medical History:   Diagnosis Date     COPD exacerbation (H) 12/2/2024     DM2 (diabetes mellitus, type 2) (H) 4/28/2020     HTN (hypertension) 7/30/2012     Thyroid nodule 7/31/2019     Rojas's disease (H)        PAST SURGICAL HISTORY:  Past Surgical History:   Procedure Laterality Date     COLONOSCOPY       ESOPHAGOSCOPY, GASTROSCOPY, DUODENOSCOPY (EGD), COMBINED N/A 7/21/2023    Procedure: ESOPHAGOGASTRODUODENOSCOPY WITH GASTRIC AND ESOPHAGEAL BIOPSIES;  Surgeon: Filiberto Aragon MD;  [place] [facility] [place]     [facility]         MEDICATIONS:  Current Outpatient Medications   Medication Instructions      albuterol (PROAIR HFA/PROVENTIL HFA/VENTOLIN HFA) 108 (90 Base) MCG/ACT inhaler 1-2 puffs, Inhalation, EVERY 6 HOURS PRN     albuterol (PROVENTIL) 2.5 mg, 3 TIMES DAILY PRN     aloe vera GEL 1 g, EVERY 1 HOUR PRN     apixaban ANTICOAGULANT (ELIQUIS) 5 mg, 2 TIMES DAILY     bacitracin 500 UNIT/GM OINT 3 TIMES DAILY PRN     Benzocaine (SOLARCAINE ALOE VERA EX) DAILY PRN     benzonatate (TESSALON) 200 mg, Oral, 3 TIMES DAILY PRN     Calamine external lotion PRN     carbamide peroxide (DEBROX) 6.5 % otic solution 5 drops, DAILY PRN     clotrimazole (LOTRIMIN) 1 % external cream 2 TIMES DAILY PRN     dextromethorphan-guaiFENesin (MUCINEX DM)  MG 12 hr tablet 1 tablet, 2 TIMES DAILY PRN     diclofenac (VOLTAREN) 2 g, DAILY PRN     dicyclomine (BENTYL) 20 mg, Oral, 4 TIMES DAILY PRN     EPINEPHrine (ANY BX GENERIC EQUIV) 0.3 mg, PRN     famotidine (PEPCID) 20 mg, Oral, 2 TIMES DAILY     FLUoxetine 20 mg, EVERY MORNING     furosemide (LASIX) 40 mg, Oral, DAILY     gabapentin (NEURONTIN) 300 mg, Oral, 3 TIMES DAILY     GaviLAX 17 g, DAILY PRN     hydrocortisone (CORTAID) 1 % external cream DAILY PRN     Hydrocortisone (PREPARATION H EX) DAILY PRN     levothyroxine (SYNTHROID/LEVOTHROID) 6.25 mcg, Oral, EVERY MORNING BEFORE BREAKFAST     loperamide (IMODIUM) 4 mg, 4 TIMES DAILY PRN     loratadine (CLARITIN) 10 mg, DAILY PRN     magnesium hydroxide (MILK OF MAGNESIA) 400 MG/5ML suspension 30 mLs, DAILY PRN     metFORMIN (GLUCOPHAGE) 1,000 mg, 2 TIMES DAILY WITH MEALS     midodrine (PROAMATINE) 5 mg, Oral, 3 TIMES DAILY WITH MEALS     montelukast (SINGULAIR) 10 mg, EVERY MORNING     nicotine (NICODERM CQ) 21 MG/24HR 24 hr patch 1 patch, Transdermal, DAILY     OLANZapine (ZYPREXA) 10 mg, AT BEDTIME     omeprazole (PRILOSEC) 40 mg, EVERY MORNING     ondansetron (ZOFRAN ODT) 4 mg, Oral, EVERY 8 HOURS PRN     pramoxine-calamine (AVEENO) 1-8 % LOTN DAILY PRN     rosuvastatin (CRESTOR) 10 mg, AT BEDTIME     spironolactone  "(ALDACTONE) 100 mg, Oral, DAILY     sucralfate (CARAFATE) 1 g, 4 TIMES DAILY PRN     traZODone (DESYREL) 100 mg, AT BEDTIME     TRELEGY ELLIPTA 100-62.5-25 MCG/ACT oral inhaler 1 puff, EVERY MORNING     Urea 40 % CREA DAILY PRN     Vitamins A & D (VITAMIN A & D) OINT DAILY PRN     witch hazel-glycerin (TUCKS) pad PRN       ALLERGIES:  Apricot flavoring agent (non-screening), Banana, Wasp venom protein, Bees, Methylphenidate, Prunus, and Sulfa antibiotics    FAMILY HISTORY:  Family History   Problem Relation Age of Onset     Unknown/Adopted Father      Unknown/Adopted Maternal Grandmother      C.A.D. Maternal Grandfather      Diabetes Maternal Grandfather      Cerebrovascular Disease Maternal Grandfather      Unknown/Adopted Paternal Grandmother      Unknown/Adopted Paternal Grandfather      Unknown/Adopted Brother      Unknown/Adopted Sister        SOCIAL HISTORY:  Social History     Tobacco Use     Smoking status: Every Day     Current packs/day: 1.00     Types: Cigarettes     Smokeless tobacco: Never   Substance Use Topics     Alcohol use: No     Comment: once every 3 months       PHYSICAL EXAM:  /67 (BP Location: Left arm)   Pulse 75   Temp 97.8  F (36.6  C) (Oral)   Resp 17   Ht 1.651 m (5' 5\")   Wt 132 kg (291 lb 0.1 oz)   SpO2 95%   BMI 48.43 kg/m    Body mass index is 48.43 kg/m .  Constitutional: healthy, alert, and no distress   Abdomen: Abdomen soft, non-tender. BS normal. No masses, organomegaly    LABS:  CBC:  Recent Labs   Lab Test 02/08/25  0613   WBC 13.4*   RBC 4.33*   HGB 12.1*   HCT 37.8*   MCV 87   MCH 27.9   MCHC 32.0   RDW 18.3*           BMP:  Recent Labs   Lab 02/08/25  1113 02/08/25  0613 02/07/25  1752 02/03/25  1840   NA  --  139 140 141   POTASSIUM  --  4.3 4.2 4.6   CHLORIDE  --  104 103 105   CO2  --  27 28 23   * 109* 112* 108*   CR  --  1.10 1.25* 1.13   BUN  --  22.1* 20.9* 24.5*       Liver/Pancreas:  Recent Labs   Lab 02/07/25  1752 02/03/25  1840   AST 24 " 16   ALT 18 18   ALKPHOS 214* 235*   BILITOTAL 0.3 0.3     Recent Labs   Lab Test 02/07/25  1753   INR 1.26*       IMAGING:    US Paracentesis with Albumin    Result Date: 2/8/2025  EXAM: 1. PARACENTESIS 2. ULTRASOUND GUIDANCE LOCATION: [facility] DATE: 2/8/2025 INDICATION: Ascites. PROCEDURE: Informed consent obtained. Time out performed. The abdomen was prepped and draped in a sterile fashion. 10 mL of 1% lidocaine was infused into local soft tissues. A 5 [place] catheter system was introduced into the abdominal ascites under ultrasound guidance. 4.45 liters of yellow fluid were removed and sent to lab if requested. Patient tolerated procedure well. Ultrasound imaging was obtained and placed in the patient's permanent medical record.     IMPRESSION: 1.  Status post ultrasound-guided paracentesis. Reference CPT Code: 56752    CT Abdomen Pelvis w Contrast    Result Date: 2/7/2025  EXAM: CT ABDOMEN PELVIS W CONTRAST LOCATION: [facility] DATE: 2/7/2025 INDICATION: Left lower quadrant pain. Liver failure with ascites. COMPARISON: CT abdomen pelvis 1/10/2025. TECHNIQUE: CT scan of the abdomen and pelvis was performed following injection of IV contrast. Multiplanar reformats were obtained. Dose reduction techniques were used. CONTRAST: 90ml isovue 370 FINDINGS: LOWER CHEST: Normal. HEPATOBILIARY: Enlarged, cirrhotic liver. No focal liver lesions. Gallbladder is decompressed. No biliary ductal dilation. PANCREAS: Normal. SPLEEN: Mild splenomegaly measuring 13.9 cm. ADRENAL GLANDS: Bilateral adrenal gland myelolipomas measuring 3.2 cm on the left and 4.9 cm on the right. KIDNEYS/BLADDER: Normal. BOWEL: Centralization of bowel loops due to ascites. No bowel obstruction or inflammation. Normal appendix. PERITONEUM/RETROPERITONEUM: Moderate amount of ascites, similar to the prior exam. No free intraperitoneal air. LYMPH NODES: Few scattered mildly enlarged gastrohepatic,  portacaval, mesenteric, and retroperitoneal lymph nodes are unchanged. No newly enlarged lymph node. VASCULATURE: Patent portal, splenic, and superior mesenteric veins. No abdominal aortic aneurysm. PELVIC ORGANS: Normal. MUSCULOSKELETAL: Body wall edema. Ankylosis of the sacroiliac joints. Multilevel degenerative change of the spine. No acute bony abnormality.     IMPRESSION: 1.  No acute abnormality. 2.  Cirrhosis, with features of portal hypertension including splenomegaly and a moderate amount of ascites. The amount of ascites has not significantly changed since the prior exam. 3.  Anasarca.      ASSESSMENT:   Cirrhosis ?possibly MASLD although chart mentions Rojas, ceruloplasmin was normal.  Ascites c/b SBP, on Rocephin    PLAN:  -Give SBP, would recommend completing 2 days of IV antibiotics, then PO Ciprofloxacin 500 mg bid going home for 5 days after that  -1.5g/kg Albumin ordered for today(200mg). He is keen to leave for home but agreeable to stay tonight to complete at least 2 days of IV antibiotics. We discussed that outpatient treatment can be considered in limited cases as long as he does not have features of complicated SBP by tomorrow.    45 minutes of total time was spent providing patient care, including patient evaluation, reviewing documentation/test results, and .          Corbin Worthington MD  Thank you for the opportunity to participate in the care of this patient.   Please feel free to call with any questions or concerns.  [phone].       CC: [facility]

## 2025-02-08 NOTE — PROGRESS NOTES
Patient admitted to room 02 at approximately 0210 via wheel chair from emergency room.  Reason for Admission: SBP  Report received from: Martina Booker RN   Patient was accompanied by Self.  Patient ambulated/transferred:  with stand-by assist. self.  Patient is alert and orientated x 4.  Outpatient Observation education provided to: patient  Safety risks were identified during admission:  none.   Yellow risk/fall band applied:  No  Home meds sent home: No  Home meds sent to pharmacy:No IF YES add 1/2 sheet laminated page reminder to chart/clipboard   Detailed Belongings: clothing; cell phone/electronics; vision aids

## 2025-02-08 NOTE — H&P
[facility]    History and Physical - Hospitalist Service       Date of Admission:  2/7/2025    Assessment & Plan      Warren Jaramillo is a 44 year old male admitted on 2/7/2025.     Principal Problem:    SBP (spontaneous bacterial peritonitis) (H)  Active Problems:    DM2 (diabetes mellitus, type 2) (H)    Episodic mood disorder    AVA treated with BiPAP    Cirrhosis of liver with ascites, unspecified hepatic cirrhosis type (H)    DVT of axillary vein, acute left (H)    Diffuse abdominal pain    Portal hypertension (H)    44-year-old obese male with cirrhosis being followed with [facility] ongoing ascites and is on diuretics at home, history of recent DVT in the left arm about a month ago on Eliquis twice daily- also had biweekly paracentesis last one was Sunday.    history of Diabetes, COPD, hypertension- liver failure with ascites, ADHD, fetal alcohol syndrome, autistic disorder, right-sided CHF, dysuria, left bundle branch block, SBP, obesity, who presents to the ER with complaints of LLQ pain, feels like bladder is full and 9 pound weight gain since yesterday.   He was today feeling some bladder discomfort which was abnormal for him presented to the ED and his white count is elevated which has been elevated in the past but CRP also trending up a bit, CT abdomen without any acute findings and ascites is not markedly increased but is initial diagnostic tap is concerning for SBP with ANC more than 250.  He has been given Rocephin and will need further ongoing cares, cultures and possibly home antibiotics.  He wants to be home preferably by Sunday evening for Super Bowl party.  He is clinically not septic hemodynamically stable has chronic CKD which is stable as well and A1c in the past has been 6.2 which is stable he likes to be on oral diet.  He is ambulatory and relatively comfortable.  Will observe, hold his  Eliquis in anticipation of procedure and can resume anticoagulation   in the evening if remains uneventful.  The meantime give Lovenox for DVT prophylaxis.-Clinically stable respiratory status, does not appear in fluid fluid overload not wheezing.        SBP due to Cirrhosis of liver with ascites, unspecified hepatic cirrhosis type  Presented with suprapubic bladder spasms/abnormal sensation in the lower abdomen  Will observe and given Rocephin that we will continue pending cultures  Will request therapeutic paracentesis through IR in the morning and may need albumin afterwards for SBP, and to avoid any hepatorenal progression  Will consult GI team as well , follows with GI on outpatient basis and may need transplant evaluation referral given his young age, continue his home optimal doses of diuretics Lasix Aldactone at home that will continue along with midodrine 3 times daily  Will defer albumin with our Gi team      DM2 (diabetes mellitus, type 2) (H)  -Has been diet controlled he wants regular diet,  Can consider sliding scale coverage      AVA treated with BiPAP/COPD right-sided heart failure  -Use home CPAP for available  -Clinically stable respiratory status, does not appear in fluid fluid overload not wheezing      Hx of DVT of axillary vein left about a month  -  Will observe, hold his  Eliquis in anticipation of procedure and can resume anticoagulation  in the evening if remains uneventful.  The meantime give Lovenox for DVT prophylaxis.        Portal hypertension (H)  Patient was inquiring about types and other procedures will continue to follow with GI      home depression,ADHD, fetal alcohol syndrome, autistic disorder  -Continue Zyprexa Prozac gabapentin        Initial orders were placed. Anticipated length of stay of 1-2 midnights of hospitalization depending on progression, planning,findings,further testing or treatment needs. Services are medically necessary for evaluation and treatment of the acute & on chronic superimposed conditions with the treatment plan as  "outlined above.  Current problem list also has been updated.       Observation Goals: -diagnostic tests and consults completed and resulted, -vital signs normal or at patient baseline, Nurse to notify provider when observation goals have been met and patient is ready for discharge.  Diet: Regular Diet Adult    DVT Prophylaxis: Enoxaparin (Lovenox) SQ and DOAC  Khan Catheter: Not present  Lines: None     Cardiac Monitoring: None  Code Status: Full Code      Clinically Significant Risk Factors Present on Admission                # Drug Induced Coagulation Defect: home medication list includes an anticoagulant medication    # Hypertension: Noted on problem list  # Chronic heart failure with preserved ejection fraction: heart failure noted on problem list and last echo with EF >50%          # Severe Obesity: Estimated body mass index is 48.76 kg/m  as calculated from the following:    Height as of this encounter: 1.651 m (5' 5\").    Weight as of this encounter: 132.9 kg (293 lb).         # Financial/Environmental Concerns:           Disposition Plan     Medically Ready for Discharge: Anticipated Tomorrow           Esteban Arteaga MD  Hospitalist Service  [facility]  Securely message with [employer] (more info)  Text page via [x+1] Paging/Directory     ______________________________________________________________________    Chief Complaint   Bladder /abd pain    History is obtained from the patient, electronic health record, and emergency department physician    History of Present Illness   Warren Jaramillo is a 44 year old male  with history of Diabetes, COPD, hypertension, Rojas's disease, liver failure with ascites, ADHD, fetal alcohol syndrome, autistic disorder, right-sided CHF, dysuria, left bundle branch block, SBP, obesity, who presents to the ER with complaints of LLQ pain, feels like bladder is full and 9 pound weight gain since yesterday.    Patient reports increasing abdominal " distention, left lower quadrant pain, and weight gain since yesterday.  He is worried about worsening of his ascites.     He last had a paracentesis on February 2, 2025 chart shows that they took off 3.75 L.  He now states that he had increased weight gain overnight even though he states he has been compliant with his diet, including low-salt, compliant with his fluid restriction to around 2000 cc a day.  He reports whenever his belly gets full of ascites he has left side abdominal pain which he is experiencing today.     Denies fevers, cough, chest pain, shortness of breath, vomiting or diarrhea.  He does feel like his bladder is also full.          Past Medical History    Past Medical History:   Diagnosis Date    COPD exacerbation (H) 12/2/2024    DM2 (diabetes mellitus, type 2) (H) 4/28/2020    HTN (hypertension) 7/30/2012    Thyroid nodule 7/31/2019    Rojas's disease (H)        Past Surgical History   Past Surgical History:   Procedure Laterality Date    COLONOSCOPY      ESOPHAGOSCOPY, GASTROSCOPY, DUODENOSCOPY (EGD), COMBINED N/A 7/21/2023    Procedure: ESOPHAGOGASTRODUODENOSCOPY WITH GASTRIC AND ESOPHAGEAL BIOPSIES;  Surgeon: Filiberto Aragon MD;  Location: [facility] OR    TOOTH EXTRACTION         Prior to Admission Medications   Prior to Admission Medications   Prescriptions Last Dose Informant Patient Reported? Taking?   Benzocaine (SOLARCAINE ALOE VERA EX)   Yes Yes   Sig: Externally apply topically daily as needed.   Calamine external lotion   Yes Yes   Sig: Apply topically as needed for itching   EPINEPHrine (ANY BX GENERIC EQUIV) 0.3 MG/0.3ML injection 2-pack   Yes Yes   Sig: Inject 0.3 mg into the muscle as needed for anaphylaxis. May repeat one time in 5-15 minutes if response to initial dose is inadequate.   FLUoxetine 20 MG tablet 2/7/2025 Morning  Yes Yes   Sig: Take 20 mg by mouth every morning.   GAVILAX 17 GM/SCOOP powder   Yes Yes   Sig: Take 17 g by mouth daily as needed.    Hydrocortisone (PREPARATION H EX)   Yes Yes   Sig: Externally apply topically daily as needed.   OLANZapine (ZYPREXA) 10 MG tablet 2/6/2025 Bedtime  Yes Yes   Sig: Take 10 mg by mouth At Bedtime   TRELEGY ELLIPTA 100-62.5-25 MCG/ACT oral inhaler 2/7/2025 Morning  Yes Yes   Sig: Inhale 1 puff into the lungs every morning.   Urea 40 % CREA   Yes Yes   Sig: Apply topically daily as needed.   Vitamins A & D (VITAMIN A & D) OINT   Yes Yes   Sig: Externally apply topically daily as needed.   albuterol (PROAIR HFA/PROVENTIL HFA/VENTOLIN HFA) 108 (90 Base) MCG/ACT inhaler   No Yes   Sig: Inhale 1-2 puffs into the lungs every 6 hours as needed for shortness of breath, wheezing or cough   albuterol (PROVENTIL) (2.5 MG/3ML) 0.083% neb solution   Yes Yes   Sig: Take 2.5 mg by nebulization 3 times daily as needed for shortness of breath, wheezing or cough   aloe vera GEL   Yes Yes   Sig: Apply 1 g topically every hour as needed for skin care Per bottle directions   apixaban ANTICOAGULANT (ELIQUIS) 5 MG tablet 2/7/2025 Morning  Yes Yes   Sig: Take 5 mg by mouth 2 times daily.   bacitracin 500 UNIT/GM OINT   Yes Yes   Sig: Apply topically 3 times daily as needed for wound care   benzonatate (TESSALON) 200 MG capsule   No Yes   Sig: Take 1 capsule (200 mg) by mouth 3 times daily as needed for cough.   carbamide peroxide (DEBROX) 6.5 % otic solution   Yes Yes   Sig: Place 5 drops into both ears daily as needed for other.   clotrimazole (LOTRIMIN) 1 % external cream   Yes Yes   Sig: Apply topically 2 times daily as needed (skin irritation)   dextromethorphan-guaiFENesin (MUCINEX DM)  MG 12 hr tablet   Yes Yes   Sig: Take 1 tablet by mouth 2 times daily as needed.   diclofenac (VOLTAREN) 1 % topical gel   Yes Yes   Sig: Apply 2 g topically daily as needed for moderate pain To joints/back   dicyclomine (BENTYL) 20 MG tablet   No Yes   Sig: Take 1 tablet (20 mg) by mouth 4 times daily as needed (abdominal pain).   famotidine  (PEPCID) 20 MG tablet 2/7/2025 Morning  No Yes   Sig: Take 1 tablet (20 mg) by mouth 2 times daily   furosemide (LASIX) 40 MG tablet 2/7/2025 Morning  No Yes   Sig: Take 1 tablet (40 mg) by mouth daily.   gabapentin (NEURONTIN) 300 MG capsule 2/7/2025 at  2:00 PM  No Yes   Sig: Take 1 capsule (300 mg) by mouth 3 times daily for 20 days.   hydrocortisone (CORTAID) 1 % external cream   Yes Yes   Sig: Apply topically daily as needed.   levothyroxine (SYNTHROID/LEVOTHROID) 25 MCG tablet 2/7/2025 Morning  No Yes   Sig: Take 0.25 tablets (6.25 mcg) by mouth every morning (before breakfast).   loperamide (IMODIUM) 2 MG capsule   Yes Yes   Sig: Take 4 mg by mouth 4 times daily as needed for diarrhea.   loratadine (CLARITIN) 10 MG tablet   Yes Yes   Sig: Take 10 mg by mouth daily as needed for allergies.   magnesium hydroxide (MILK OF MAGNESIA) 400 MG/5ML suspension   Yes Yes   Sig: Take 30 mLs by mouth daily as needed.   metFORMIN (GLUCOPHAGE) 1000 MG tablet 2/7/2025 at  5:00 PM  Yes Yes   Sig: Take 1,000 mg by mouth 2 times daily (with meals)   midodrine (PROAMATINE) 5 MG tablet 2/7/2025 at  2:00 PM  No Yes   Sig: Take 1 tablet (5 mg) by mouth 3 times daily (with meals).   montelukast (SINGULAIR) 10 MG tablet 2/7/2025 Morning  Yes Yes   Sig: Take 10 mg by mouth every morning.   nicotine (NICODERM CQ) 21 MG/24HR 24 hr patch 2/7/2025 Morning  No Yes   Sig: Place 1 patch over 24 hours onto the skin daily.   omeprazole (PRILOSEC) 40 MG DR capsule 2/7/2025 Morning  Yes Yes   Sig: Take 40 mg by mouth every morning.   ondansetron (ZOFRAN ODT) 4 MG ODT tab   No Yes   Sig: Take 1 tablet (4 mg) by mouth every 8 hours as needed for nausea.   pramoxine-calamine (AVEENO) 1-8 % LOTN   Yes Yes   Sig: Apply topically daily as needed for itching.   rosuvastatin (CRESTOR) 10 MG tablet 2/6/2025 Bedtime  Yes Yes   Sig: Take 10 mg by mouth At Bedtime   spironolactone (ALDACTONE) 100 MG tablet 2/7/2025 Morning  No Yes   Sig: Take 1 tablet (100  mg) by mouth daily.   sucralfate (CARAFATE) 1 GM/10ML suspension   Yes Yes   Sig: Take 10 mLs (1 g) by mouth 4 times daily as needed for nausea (heartburn).   traZODone (DESYREL) 100 MG tablet 2/6/2025 Bedtime  Yes Yes   Sig: Take 100 mg by mouth at bedtime.   witch hazel-glycerin (TUCKS) pad   Yes Yes   Sig: Apply topically as needed for hemorrhoids.      Facility-Administered Medications: None        Review of Systems    The 5 point Review of Systems is negative other than noted in the HPI or here.       Social History   I have reviewed this patient's social history and updated it with pertinent information if needed.  Social History     Tobacco Use    Smoking status: Every Day     Current packs/day: 1.00     Types: Cigarettes    Smokeless tobacco: Never   Vaping Use    Vaping status: Never Used   Substance Use Topics    Alcohol use: No     Comment: once every 3 months    Drug use: No         Family History   I have reviewed this patient's family history and updated it with pertinent information if needed.  Family History   Problem Relation Age of Onset    Unknown/Adopted Father     Unknown/Adopted Maternal Grandmother     C.A.D. Maternal Grandfather     Diabetes Maternal Grandfather     Cerebrovascular Disease Maternal Grandfather     Unknown/Adopted Paternal Grandmother     Unknown/Adopted Paternal Grandfather     Unknown/Adopted Brother     Unknown/Adopted Sister          Allergies   Allergies   Allergen Reactions    Apricot Flavoring Agent (Non-Screening) Anaphylaxis    Banana Anaphylaxis     Throat swelling  Throat swelling      Wasp Venom Protein Shortness Of Breath     Other reaction(s): Respiratory Distress  Has an epi pen  Has an epi pen      Bees Anaphylaxis     Have an Epi pen that carries with    Methylphenidate Itching     Other reaction(s): Nightmares    Prunus      Other reaction(s): *Unknown    Sulfa Antibiotics      Headaches and nausea        Physical Exam   Vital Signs: Temp: 97.1  F (36.2  C)  Temp src: Temporal BP: 130/67 Pulse: 82   Resp: 18 SpO2: 95 % O2 Device: None (Room air)    Weight: 293 lbs 0 oz    Alert awake  Vision Baseline  Neck supple  Oral mucosa moist  bilateral air entry heard, no wheezing  S1-S2 normal  Abdomen is soft distended without any guarding or rebound-  Extremities are fully mobile bilateral trace swelling-  No skin cyanosis  Neurologically- no new Gross deficits from baseline-Moving all 4 extremities  Psych-mood okay and appropriate to circumstances      Medical Decision Making       75 MINUTES SPENT BY ME on the date of service doing chart review, history, exam, documentation & further activities per the note.      Data     I have personally reviewed the following data over the past 24 hrs:    15.2 (H)  \   12.6 (L)   / 329     140 103 20.9 (H) /  112 (H)   4.2 28 1.25 (H) \     ALT: 18 AST: 24 AP: 214 (H) TBILI: 0.3   ALB: 4.1 TOT PROTEIN: 6.7 LIPASE: N/A     Trop: N/A BNP: 1,101 (H)     Procal: N/A CRP: 15.60 (H) Lactic Acid: 1.3       INR:  1.26 (H) PTT:  N/A   D-dimer:  N/A Fibrinogen:  N/A       Imaging results reviewed over the past 24 hrs:   Recent Results (from the past 24 hours)   CT Abdomen Pelvis w Contrast    Narrative    EXAM: CT ABDOMEN PELVIS W CONTRAST  LOCATION: [facility]  DATE: 2/7/2025    INDICATION: Left lower quadrant pain. Liver failure with ascites.  COMPARISON: CT abdomen pelvis 1/10/2025.  TECHNIQUE: CT scan of the abdomen and pelvis was performed following injection of IV contrast. Multiplanar reformats were obtained. Dose reduction techniques were used.  CONTRAST: 90ml isovue 370    FINDINGS:     LOWER CHEST: Normal.    HEPATOBILIARY: Enlarged, cirrhotic liver. No focal liver lesions. Gallbladder is decompressed. No biliary ductal dilation.    PANCREAS: Normal.    SPLEEN: Mild splenomegaly measuring 13.9 cm.    ADRENAL GLANDS: Bilateral adrenal gland myelolipomas measuring 3.2 cm on the left and 4.9 cm on the  right.    KIDNEYS/BLADDER: Normal.    BOWEL: Centralization of bowel loops due to ascites. No bowel obstruction or inflammation. Normal appendix.    PERITONEUM/RETROPERITONEUM: Moderate amount of ascites, similar to the prior exam. No free intraperitoneal air.    LYMPH NODES: Few scattered mildly enlarged gastrohepatic, portacaval, mesenteric, and retroperitoneal lymph nodes are unchanged. No newly enlarged lymph node.    VASCULATURE: Patent portal, splenic, and superior mesenteric veins. No abdominal aortic aneurysm.    PELVIC ORGANS: Normal.    MUSCULOSKELETAL: Body wall edema. Ankylosis of the sacroiliac joints. Multilevel degenerative change of the spine. No acute bony abnormality.      Impression    IMPRESSION:     1.  No acute abnormality.    2.  Cirrhosis, with features of portal hypertension including splenomegaly and a moderate amount of ascites. The amount of ascites has not significantly changed since the prior exam.    3.  Anasarca.

## 2025-02-08 NOTE — PROVIDER NOTIFICATION
Notified provider about alert in Epic about the possibility of patient being unable to sign own consent. Patient is currently having his paracentesis.

## 2025-02-08 NOTE — ED NOTES
Patient seeking out nurse very frequently for many non urgent issues. Attempting to condense needs instead of multiple trips and when nurse brings item then asks for another

## 2025-02-08 NOTE — PROGRESS NOTES
"PRIMARY DIAGNOSIS: \"GENERIC\" NURSING  OUTPATIENT/OBSERVATION GOALS TO BE MET BEFORE DISCHARGE:  ADLs back to baseline: Yes    Activity and level of assistance: Ambulating independently.    Pain status: Improved-controlled with oral pain medications.    Return to near baseline physical activity: Yes     Discharge Planner Nurse   Safe discharge environment identified: No  Barriers to discharge: Yes       Entered by: Ora Pierre RN 02/08/2025 2:58 PM     Patient alert and oriented x 4. Patient is having a paracentesis.   "

## 2025-02-08 NOTE — PROGRESS NOTES
Pt does not have home cpap here, there are no hospital units available at this time.  Will supplement pt with oxygen if needed.

## 2025-02-08 NOTE — CONSULTS
Care Management Initial Consult    General Information  Assessment completed with: Other, Pt's Guardian Jesika  Type of CM/SW Visit: Initial Assessment    Primary Care Provider verified and updated as needed: Yes   Readmission within the last 30 days: previous discharge plan unsuccessful   Return Category: Exacerbation of disease  Reason for Consult: discharge planning  Advance Care Planning: Advance Care Planning Reviewed: present on chart (Legal Guardianship paperwork in the chart)          Communication Assessment  Patient's communication style: spoken language (English or Bilingual)    Hearing Difficulty or Deaf: no   Wear Glasses or Blind: yes    Cognitive  Cognitive/Neuro/Behavioral: WDL                      Living Environment:   People in home: facility resident  Lives at [facility] in [place]  Current living Arrangements: group home      Able to return to prior arrangements: yes       Family/Social Support:  Care provided by: self, other (see comments) ( staff)  Provides care for: no one  Marital Status: Single  Support system: Guardian, Other (specify) ( staff)          Description of Support System: Supportive    Support Assessment: Adequate family and caregiver support, Adequate social supports    Current Resources:   Patient receiving home care services: No        Community Resources: Group Home  Equipment currently used at home: none  Supplies currently used at home: Other (Cpap)    Employment/Financial:  Employment Status: disabled        Financial Concerns:             Does the patient's insurance plan have a 3 day qualifying hospital stay waiver?  No    Lifestyle & Psychosocial Needs:  Social Drivers of Health     Food Insecurity: Low Risk  (2/8/2025)    Food Insecurity     Within the past 12 months, did you worry that your food would run out before you got money to buy more?: No     Within the past 12 months, did the food you bought just not last and you didn t have money to get more?: No    Depression: Not at risk (8/14/2023)    PHQ-2     PHQ-2 Score: 0   Housing Stability: Low Risk  (2/8/2025)    Housing Stability     Do you have housing? : Yes     Are you worried about losing your housing?: No   Recent Concern: Housing Stability - High Risk (12/2/2024)    Housing Stability     Do you have housing? : No     Are you worried about losing your housing?: No   Tobacco Use: High Risk (1/17/2025)    Patient History     Smoking Tobacco Use: Every Day     Smokeless Tobacco Use: Never     Passive Exposure: Not on file   Financial Resource Strain: Low Risk  (2/8/2025)    Financial Resource Strain     Within the past 12 months, have you or your family members you live with been unable to get utilities (heat, electricity) when it was really needed?: No   Alcohol Use: Not At Risk (9/22/2022)    Received [facility], [facility]    AUDIT-C     Frequency of Alcohol Consumption: Never     Average Number of Drinks: Not on file     Frequency of Binge Drinking: Not on file   Transportation Needs: Low Risk  (2/8/2025)    Transportation Needs     Within the past 12 months, has lack of transportation kept you from medical appointments, getting your medicines, non-medical meetings or appointments, work, or from getting things that you need?: No   Physical Activity: Not on file   Interpersonal Safety: High Risk (1/11/2025)    Interpersonal Safety     Do you feel physically and emotionally safe where you currently live?: No     Within the past 12 months, have you been hit, slapped, kicked or otherwise physically hurt by someone?: No     Within the past 12 months, have you been humiliated or emotionally abused in other ways by your partner or ex-partner?: No   Stress: Not on file   Social Connections: Unknown (5/1/2023)    [facility], [facility]    Social Connections     Frequency of Communication with  "Friends and Family: Not on file   Health Literacy: Not on file       Functional Status:  Prior to admission patient needed assistance:   Dependent ADLs:: Independent (Uses devices to help assist with dressing/wiping)  Dependent IADLs:: Cleaning, Laundry, Cooking, Shopping, Meal Preparation, Medication Management, Money Management, Transportation       Mental Health Status:  Mental Health Status: No Current Concerns       Chemical Dependency Status:  Chemical Dependency Status: No Current Concerns             Values/Beliefs:  Spiritual, Cultural Beliefs, Sikhism Practices, Values that affect care:                 Discussed  Partnership in Safe Discharge Planning  document with patient/family: No    Additional Information:  Assessed. RNCM introduced self , CM role, and what services CM provides to pt's Guardian Jesika Fina over the phone. She verified demographics and they are presented accurately in pt's chart. Pt has an established PCP at [facility]. Pt has legal guardianship paperwork in the chart.       Pt lives at [facility] ([address] [place] [address]). Pt Ind with ADLS, per guardian, \"pt has a device that assists pt with dressing, and also one for wiping when going to the bathroom.\" Pt dependent on all IADLS, which [facility] assists with all. No home care svcs prior. No mobility aides. Pt's parents are involved as well and talk with pt. Pt has their contacts #'s as well. Pt and pt's guardian anticipate pt returning to  when medically ready. Keeping pt until Sunday 2/9 to get some more IV abx. Pt and GH ok with taking a cab ride home tomorrow. VM left for Guardian to ask if she is ok with pt taking a cab ride home tomorrow. Awaiting call back.         Guardian Jesika # [phone]  [facility] #[phone]  Matilde  Manager #[phone]       [facility] #[phone].      VI discussed with pt's Guardian Jesika , she stated understanding. "       Next Steps: Assist with getting pt a cab voucher if ok with guardian for Sunday 2/9. CM will continue to monitor progression of care, review team recommendations and provide discharge planning assist as needed.         Paola Kern RN

## 2025-02-08 NOTE — PROGRESS NOTES
[facility]    Medicine Progress Note - Hospitalist Service    Date of Admission:  2/7/2025    Assessment & Plan      Warren Jaramillo is a 44-year-old obese male with cirrhosis being followed with [facility] ongoing ascites and is on diuretics at home, history of recent DVT in the left arm about a month ago on Eliquis twice daily- also had biweekly paracentesis last one was Sunday.    history of Diabetes, COPD, hypertension, Rojas's disease, liver failure with ascites, ADHD, fetal alcohol syndrome, autistic disorder, right-sided CHF, dysuria, left bundle branch block, SBP, obesity, who presents to the ER with complaints of LLQ pain, feels like bladder is full and CT abdomen without any acute findings and ascites is not markedly increased but is initial diagnostic tap is concerning for SBP with ANC more than 250.  Patient underwent paracentesis is a 4 L drained.  GI recommend stay 1 more day IV antibiotics for SBP.  Patient wants to go home and states he feels good.  Patient likely discharge tomorrow if no new symptoms and symptoms continue to improve.    #SBP due to Cirrhosis of liver with ascites, unspecified hepatic cirrhosis type  Presented with suprapubic bladder spasms/abnormal sensation in the lower abdomen  CT abdomen without any acute findings and ascites is not markedly increased but is initial diagnostic tap is concerning for SBP with ANC more than 250.   IV Rocephin that we will continue pending cultures  therapeutic paracentesis through IR today   follows with GI on outpatient basis and may need transplant evaluation referral given his young age, continue his home optimal doses of diuretics Lasix Aldactone at home that will continue along with midodrine 3 times daily  GI consult recommendation: Give SBP, would recommend completing 2 days of IV antibiotics, then PO Ciprofloxacin 500 mg bid going home for 5 days after that  -1.5g/kg Albumin ordered for today(200mg). He is  "keen to leave for home but agreeable to stay tonight to complete at least 2 days of IV antibiotics. We discussed that outpatient treatment can be considered in limited cases as long as he does not have features of complicated SBP by tomorrow.    # Diabetes mellitus  Has been diet controlled he wants regular diet,  Accu-Cheks  Sliding scale insulin as needed    #AVA treated with BiPAP/COPD right-sided heart failure  -Use home CPAP for available  -Clinically stable respiratory status, does not appear in fluid fluid overload not wheezing    #Hx of DVT of axillary vein left about a month  Resume Eliquis    #Portal hypertension   Patient was inquiring about types and other procedures will continue to follow with GI     #home depression,ADHD, fetal alcohol syndrome, autistic disorder  -Continue Zyprexa Prozac gabapentin       Observation Goals: -diagnostic tests and consults completed and resulted, -vital signs normal or at patient baseline, Nurse to notify provider when observation goals have been met and patient is ready for discharge.  Diet: Regular Diet Adult    DVT Prophylaxis: Pneumatic Compression Devices  Khan Catheter: Not present  Lines: None     Cardiac Monitoring: None  Code Status: Full Code      Clinically Significant Risk Factors Present on Admission             # Hypomagnesemia: Lowest Mg = 1.6 mg/dL in last 2 days, will replace as needed    # Drug Induced Coagulation Defect: home medication list includes an anticoagulant medication    # Hypertension: Noted on problem list  # Chronic heart failure with preserved ejection fraction: heart failure noted on problem list and last echo with EF >50%          # Severe Obesity: Estimated body mass index is 48.43 kg/m  as calculated from the following:    Height as of this encounter: 1.651 m (5' 5\").    Weight as of this encounter: 132 kg (291 lb 0.1 oz).       # Financial/Environmental Concerns:           Social Drivers of Health    Housing Stability: Low Risk  " (2/8/2025)    Housing Stability     Do you have housing? : Yes     Are you worried about losing your housing?: No   Recent Concern: Housing Stability - High Risk (12/2/2024)    Housing Stability     Do you have housing? : No     Are you worried about losing your housing?: No   Tobacco Use: High Risk (1/17/2025)    Patient History     Smoking Tobacco Use: Every Day     Smokeless Tobacco Use: Never   Interpersonal Safety: High Risk (1/11/2025)    Interpersonal Safety     Do you feel physically and emotionally safe where you currently live?: No     Within the past 12 months, have you been hit, slapped, kicked or otherwise physically hurt by someone?: No     Within the past 12 months, have you been humiliated or emotionally abused in other ways by your partner or ex-partner?: No    [facility], [place]  [facility]    Social Connections          Disposition Plan     Medically Ready for Discharge: Anticipated in 2-4 Days       The patient's care was discussed with the Attending Physician, Dr. Valdez .    Beth Hinkle NP  Hospitalist Service  [facility]  Securely message with [employer] (more info)  Text page via [employer] Paging/Directory   ______________________________________________________________________    Interval History   Patient states she feels improved is ready to go home  Patient denies pain  Patient denies dizziness, chest pain, or SOB    Physical Exam   Vital Signs: Temp: 97.8  F (36.6  C) Temp src: Oral BP: 126/67 Pulse: 75   Resp: 17 SpO2: 95 % O2 Device: None (Room air)    Weight: 291 lbs .12 oz    Constitutional: awake, alert, cooperative, no apparent distress, and appears stated age  Hematologic / Lymphatic: no cervical lymphadenopathy and no supraclavicular lymphadenopathy  Respiratory: No increased work of breathing, good air exchange, clear to auscultation bilaterally, no crackles or wheezing  Cardiovascular:  Normal apical impulse, regular rate and rhythm, normal S1 and S2, no S3 or S4, and no murmur noted  GI: No scars, normal bowel sounds, soft, abd distended, non-tender, no masses palpated, no hepatosplenomegally  Skin: no bruising or bleeding, normal skin color, texture, turgor, and no rashes  Musculoskeletal: There is no redness, warmth, or swelling of the joints.   Neurologic: Awake, alert, oriented to name, place and time.   Neuropsychiatric: General: normal, calm, and normal eye contact    Medical Decision Making       50 MINUTES SPENT BY ME on the date of service doing chart review, history, exam, documentation & further activities per the note.      Data     I have personally reviewed the following data over the past 24 hrs:    13.4 (H)  \   12.1 (L)   / 308     139 104 22.1 (H) /  125 (H)   4.3 27 1.10 \     ALT: 18 AST: 24 AP: 214 (H) TBILI: 0.3   ALB: 4.1 TOT PROTEIN: 6.7 LIPASE: N/A     Trop: N/A BNP: 1,101 (H)     Procal: N/A CRP: 15.60 (H) Lactic Acid: 1.3       INR:  1.26 (H) PTT:  N/A   D-dimer:  N/A Fibrinogen:  N/A       Imaging results reviewed over the past 24 hrs:   Recent Results (from the past 24 hours)   CT Abdomen Pelvis w Contrast    Narrative    EXAM: CT ABDOMEN PELVIS W CONTRAST  LOCATION: [facility]  DATE: 2/7/2025    INDICATION: Left lower quadrant pain. Liver failure with ascites.  COMPARISON: CT abdomen pelvis 1/10/2025.  TECHNIQUE: CT scan of the abdomen and pelvis was performed following injection of IV contrast. Multiplanar reformats were obtained. Dose reduction techniques were used.  CONTRAST: 90ml isovue 370    FINDINGS:     LOWER CHEST: Normal.    HEPATOBILIARY: Enlarged, cirrhotic liver. No focal liver lesions. Gallbladder is decompressed. No biliary ductal dilation.    PANCREAS: Normal.    SPLEEN: Mild splenomegaly measuring 13.9 cm.    ADRENAL GLANDS: Bilateral adrenal gland myelolipomas measuring 3.2 cm on the left and 4.9 cm on the  right.    KIDNEYS/BLADDER: Normal.    BOWEL: Centralization of bowel loops due to ascites. No bowel obstruction or inflammation. Normal appendix.    PERITONEUM/RETROPERITONEUM: Moderate amount of ascites, similar to the prior exam. No free intraperitoneal air.    LYMPH NODES: Few scattered mildly enlarged gastrohepatic, portacaval, mesenteric, and retroperitoneal lymph nodes are unchanged. No newly enlarged lymph node.    VASCULATURE: Patent portal, splenic, and superior mesenteric veins. No abdominal aortic aneurysm.    PELVIC ORGANS: Normal.    MUSCULOSKELETAL: Body wall edema. Ankylosis of the sacroiliac joints. Multilevel degenerative change of the spine. No acute bony abnormality.      Impression    IMPRESSION:     1.  No acute abnormality.    2.  Cirrhosis, with features of portal hypertension including splenomegaly and a moderate amount of ascites. The amount of ascites has not significantly changed since the prior exam.    3.  Anasarca.   US Paracentesis with Albumin    Narrative    EXAM:  1. PARACENTESIS  2. ULTRASOUND GUIDANCE  LOCATION: [facility]  DATE: 2/8/2025    INDICATION: Ascites.    PROCEDURE: Informed consent obtained. Time out performed. The abdomen was prepped and draped in a sterile fashion. 10 mL of 1% lidocaine was infused into local soft tissues. A 5 Samoan catheter system was introduced into the abdominal ascites under   ultrasound guidance.    4.45 liters of yellow fluid were removed and sent to lab if requested.    Patient tolerated procedure well.    Ultrasound imaging was obtained and placed in the patient's permanent medical record.      Impression    IMPRESSION:  1.  Status post ultrasound-guided paracentesis.    Reference CPT Code: 06619

## 2025-02-08 NOTE — PLAN OF CARE
Goal Outcome Evaluation:      Plan of Care Reviewed With: patient, guardian          Outcome Evaluation: Pt and carmella anticipates returning to [facility] at discharge.

## 2025-02-08 NOTE — ED NOTES
"[facility] ED Handoff Report    ED Chief Complaint: ascites    ED Diagnosis:  (R10.84) Diffuse abdominal pain  Comment: concerns for SBP  Plan: monitor       PMH:    Past Medical History:   Diagnosis Date    COPD exacerbation (H) 12/2/2024    DM2 (diabetes mellitus, type 2) (H) 4/28/2020    HTN (hypertension) 7/30/2012    Thyroid nodule 7/31/2019    Rojas's disease (H)         Code Status:  Full Code     Falls Risk: No Band: Not applicable    Current Living Situation/Residence: lives in an apartment     Elimination Status: Continent: Yes     Activity Level: SBA    Patients Preferred Language:  English     Needed: No    Vital Signs:  /67   Pulse 82   Temp 97.1  F (36.2  C) (Temporal)   Resp 18   Ht 1.651 m (5' 5\")   Wt 132.9 kg (293 lb)   SpO2 95%   BMI 48.76 kg/m       Cardiac Rhythm: nsr    Pain Score: 1/10    Is the Patient Confused:  No    Last Food or Drink: 02/08/25 at     Focused Assessment:  ascites, chronic cirrhosis    Tests Performed: Done: Labs and Imaging    Treatments Provided:  antibiotics    Family Dynamics/Concerns: No    Family Updated On Visitor Policy: Yes    Plan of Care Communicated to Family: Yes    Who Was Updated about Plan of Care: provider soke to guardian    Belongings Checklist Done and Signed by Patient: Yes    Belongings Sent with Patient: clothes, glasses, cell phone    Medications sent with patient: n\a, if any come down will tube up    Covid: asymptomatic , not indicated    Additional Information: alert x4 chronic ascites    RN: LOTUS RAMIREZ RN   2/8/2025 1:39 AM        "

## 2025-02-08 NOTE — ED PROVIDER NOTES
EMERGENCY DEPARTMENT ENCOUNTER      NAME: Warren Jaramillo  AGE: 44 year old male  YOB: 1980  MRN: [id number]  EVALUATION DATE & TIME: No admission date for patient encounter.    PCP: Luzmaria Goldman [place]    ED PROVIDER: Evangelina Median M.D.        Chief Complaint   Patient presents with    pericentisis         FINAL IMPRESSION:    1. Diffuse abdominal pain - concerns for SBP         MEDICAL DECISION MAKING:    Warren Jaramillo is a 44 year old male with history of Diabetes, COPD, hypertension, Rojas's disease, liver failure with ascites, ADHD, fetal alcohol syndrome, autistic disorder, right-sided CHF, dysuria, left bundle branch block, SBP, obesity, who presents to the ER with complaints of LLQ pain, feels like bladder is full and 9 pound weight gain since yesterday.    Show a slightly elevated WBC that has increased over the past couple of days as well as a slightly increasing CRP.  Complains of abdominal pain and therefore diagnostic paracentesis was done here in the ER under ultrasound guidance and sent to the lab.  Patient accepted to the hospital and the hospitalist for presumed SBP and antibiotics and albumin initiated.  Patient otherwise hemodynamically stable.  Pt agrees with that plan.      ED COURSE:  7:11 PM  I met with the patient to gather history and perform my exam. ED course and treatment discussed. This patient was taken to a private exam room setting while in the waiting room during ED overcrowding. Patient gave verbal permission to be seen.     7:11 PM   Latest Reference Range & Units 12/01/24 23:50 01/02/25 12:26 01/10/25 12:11 01/13/25 05:20 02/07/25 17:52   CRP Inflammation <5.00 mg/L 21.60 (H) 17.70 (H) 11.00 (H) 6.68 (H) 15.60 (H)      Latest Reference Range & Units 01/27/25 13:01 01/31/25 18:08 02/03/25 18:40 02/07/25 17:52   Creatinine 0.67 - 1.17 mg/dL 1.24 (H) 1.46 (H) 1.13 1.25 (H)       10:28 PM  Pt was consented for diagnostic  paracentesis. He signed on the ipad.  He understands the risks and benefits and the risks of bleeding, infection, and bowel and intra-abdominal organ injury.    10:48 PM  30 ml bushra ascitic fluid removed ultrasound guidance. Pt tolerated well. Fluid sent to lab by RN. I did not do large volume paracentesis since the patient is otherwise stable CT reports that the amount of fluid is stable compared to his prior CT scan from about 1 month ago.    11:01 PM  Patient put in transfer portal at 11 PM.  He would agree to go to [facility] or even the [facility] if a bed is available.    12:15 AM  SOC says no bed in system so pt will admit here. Pt agrees.  Patient asked me to call his girlfriend Rupinder to update ([phone]). She agrees with this plan as well.    12:34 AM  Patient has been accepted to the hospital by the hospitalist and will go to [facility] under observation status.  He would like to give a total of 2 g IV ceftriaxone and therefore an additional 1 g IV has been ordered.  He would like to do the 1.5 g/kg of albumin and this has been ordered.    I do not think that this represents ACS, PE, ruptured AAA, aortic dissection, bowel obstruction, bowel ischemia, cholecystitis, pancreatitis, appendicitis, diverticulitis, kidney stone, pyelonephritis, incarcerated or strangulated hernia, testicular torsion, viscus perforation, perforated GI ulcer, or other such etiologies at this time.    At the conclusion of the encounter I discussed the results of all of the tests and the disposition. Their questions were answered. The patient (and any family present) acknowledged understanding and were agreeable with the care plan.      CONSULTANTS:  Hospitalist - Dr. Arteaga      MEDICATIONS GIVEN IN THE EMERGENCY:  Medications   albumin human 25 % injection 199.25 g (has no administration in time range)   cefTRIAXone (ROCEPHIN) 1 g vial to attach to  mL bag for ADULTS or NS 50 mL bag for PEDS (has no administration in time  range)   albuterol (PROVENTIL HFA/VENTOLIN HFA) inhaler (has no administration in time range)   albuterol (PROVENTIL) neb solution 2.5 mg (has no administration in time range)   dicyclomine (BENTYL) tablet 20 mg (has no administration in time range)   famotidine (PEPCID) tablet 20 mg (has no administration in time range)   FLUoxetine (PROzac) half-tab 20 mg (has no administration in time range)   furosemide (LASIX) tablet 40 mg (has no administration in time range)   gabapentin (NEURONTIN) capsule 300 mg (has no administration in time range)   levothyroxine (SYNTHROID/LEVOTHROID) quarter-tab 6.25 mcg (has no administration in time range)   loperamide (IMODIUM) capsule 4 mg (has no administration in time range)   loratadine (CLARITIN) tablet 10 mg (has no administration in time range)   magnesium hydroxide (MILK OF MAGNESIA) suspension 30 mL (has no administration in time range)   midodrine (PROAMATINE) tablet 5 mg (has no administration in time range)   montelukast (SINGULAIR) tablet 10 mg (has no administration in time range)   OLANZapine (zyPREXA) tablet 10 mg (has no administration in time range)   omeprazole (PriLOSEC) CR capsule 40 mg (has no administration in time range)   rosuvastatin (CRESTOR) tablet 10 mg (has no administration in time range)   spironolactone (ALDACTONE) tablet 100 mg (has no administration in time range)   sucralfate (CARAFATE) suspension 1 g (has no administration in time range)   traZODone (DESYREL) tablet 100 mg (has no administration in time range)   senna-docusate (SENOKOT-S/PERICOLACE) 8.6-50 MG per tablet 1 tablet (has no administration in time range)     Or   senna-docusate (SENOKOT-S/PERICOLACE) 8.6-50 MG per tablet 2 tablet (has no administration in time range)   ondansetron (ZOFRAN ODT) ODT tab 4 mg (has no administration in time range)     Or   ondansetron (ZOFRAN) injection 4 mg (has no administration in time range)   prochlorperazine (COMPAZINE) injection 10 mg (has no  administration in time range)     Or   prochlorperazine (COMPAZINE) tablet 10 mg (has no administration in time range)   enoxaparin ANTICOAGULANT (LOVENOX) injection 40 mg (has no administration in time range)   HYDROmorphone (DILAUDID) injection 0.2 mg (has no administration in time range)   HYDROmorphone (DILAUDID) injection 0.4 mg (has no administration in time range)   acetaminophen (TYLENOL) tablet 650 mg (has no administration in time range)     Or   acetaminophen (TYLENOL) Suppository 650 mg (has no administration in time range)   senna-docusate (SENOKOT-S/PERICOLACE) 8.6-50 MG per tablet 1 tablet (has no administration in time range)     Or   senna-docusate (SENOKOT-S/PERICOLACE) 8.6-50 MG per tablet 2 tablet (has no administration in time range)   melatonin tablet 5 mg (has no administration in time range)   cefTRIAXone (ROCEPHIN) 2 g vial to attach to  ml bag for ADULTS or NS 50 ml bag for PEDS (has no administration in time range)   albumin human 25 % injection 25-50 g (has no administration in time range)   iopamidol (ISOVUE-370) solution 90 mL (90 mLs Intravenous $Given 2/7/25 2008)   cefTRIAXone (ROCEPHIN) 1 g vial to attach to  mL bag for ADULTS or NS 50 mL bag for PEDS (1 g Intravenous $New Bag 2/7/25 4154)           NEW PRESCRIPTIONS STARTED AT TODAY'S ER VISIT  none      CONDITION:  stable        DISPOSITION:  Med surg obs as accepted by Dr. Arteaga, hospitalist       =================================================================  =================================================================  TRIAGE ASSESSMENT:  Pt reports last pericentesis was 5 days ago, Next scheduled on 2/10.    Pt feels severe abd swelling, pushing on his bladder, causing incontinence, also short of breath.    ED Triage Vitals [02/07/25 2365]   Encounter Vitals Group      BP (!) 140/83      Systolic BP Percentile       Diastolic BP Percentile       Pulse 84      Resp 18      Temp 97.1  F (36.2  C)      Temp  "src Temporal      SpO2 97 %      Weight 132.9 kg (293 lb)      Height 1.651 m (5' 5\")         ================================================================  ================================================================    HPI    Patient information was obtained from: patient    Use of Intrepreter: N/A     Warren Jaramillo is a 44 year old male with history of Diabetes, COPD, hypertension, Rojas's disease, liver failure with ascites, ADHD, fetal alcohol syndrome, autistic disorder, right-sided CHF, dysuria, left bundle branch block, SBP, obesity, who presents to the ER with complaints of LLQ pain, feels like bladder is full and 9 pound weight gain since yesterday.    Patient reports increasing abdominal distention, left lower quadrant pain, and weight gain since yesterday.  He is worried about worsening of his ascites.    He last had a paracentesis on February 2, 2025 chart shows that they took off 3.75 L.  He now states that he had increased weight gain overnight even though he states he has been compliant with his diet, including low-salt, compliant with his fluid restriction to around 2000 cc a day.  He reports whenever his belly gets full of ascites he has left side abdominal pain which he is experiencing today.    Denies fevers, cough, chest pain, shortness of breath, vomiting or diarrhea.  He does feel like his bladder is also full.      CHART REVIEW:  Patient was seen in our emergency department back on February 4, 2025 for left lower quadrant abdominal pain and ascites.  Suspicion for SBP at that time.  They were unable to find a fluid pocket greater than 2 cm for emergent paracentesis at that time and did weigh the risks and benefits.      REVIEW OF SYSTEMS  Review of Systems   Constitutional:  Negative for fever.   Respiratory:  Negative for cough and shortness of breath.    Cardiovascular:  Negative for chest pain.   Gastrointestinal:  Positive for abdominal distention and abdominal pain. " Negative for diarrhea, nausea and vomiting.   Genitourinary:  Negative for dysuria.        +feels like bladder is full   All other systems reviewed and are negative.        PAST MEDICAL HISTORY:  Past Medical History:   Diagnosis Date    COPD exacerbation (H) 12/2/2024    DM2 (diabetes mellitus, type 2) (H) 4/28/2020    HTN (hypertension) 7/30/2012    Thyroid nodule 7/31/2019    Rojas's disease (H)          PAST SURGICAL HISTORY:  Past Surgical History:   Procedure Laterality Date    COLONOSCOPY      ESOPHAGOSCOPY, GASTROSCOPY, DUODENOSCOPY (EGD), COMBINED N/A 7/21/2023    Procedure: ESOPHAGOGASTRODUODENOSCOPY WITH GASTRIC AND ESOPHAGEAL BIOPSIES;  Surgeon: Filiberto Aragon MD;  Location[facility] OR    TOOTH EXTRACTION           CURRENT MEDICATIONS:    Prior to Admission medications    Medication Sig Start Date End Date Taking? Authorizing Provider   albuterol (PROAIR HFA/PROVENTIL HFA/VENTOLIN HFA) 108 (90 Base) MCG/ACT inhaler Inhale 1-2 puffs into the lungs every 6 hours as needed for shortness of breath, wheezing or cough 4/12/24   Alejandrina Kelly MD   albuterol (PROVENTIL) (2.5 MG/3ML) 0.083% neb solution Take 2.5 mg by nebulization 3 times daily as needed for shortness of breath, wheezing or cough    Unknown, Entered By History   aloe vera GEL Apply 1 g topically every hour as needed for skin care Per bottle directions    Unknown, Entered By History   apixaban ANTICOAGULANT (ELIQUIS) 5 MG tablet Take 5 mg by mouth 2 times daily.    Reported, Patient   bacitracin 500 UNIT/GM OINT Apply topically 3 times daily as needed for wound care    Unknown, Entered By History   Benzocaine (SOLARCAINE ALOE VERA EX) Externally apply topically daily as needed.    Reported, Patient   benzonatate (TESSALON) 200 MG capsule Take 1 capsule (200 mg) by mouth 3 times daily as needed for cough. 9/22/24   Liliana Alvarez PA-C   Calamine external lotion Apply topically as needed for itching    Unknown, Entered By  History   carbamide peroxide (DEBROX) 6.5 % otic solution Place 5 drops into both ears daily as needed for other.    Reported, Patient   ciprofloxacin (CIPRO) 500 MG tablet Take 1 tablet (500 mg) by mouth every 24 hours. 1/9/25   Denise Frazier MD   clotrimazole (LOTRIMIN) 1 % external cream Apply topically 2 times daily as needed (skin irritation)    Unknown, Entered By History   dextromethorphan-guaiFENesin (MUCINEX DM)  MG 12 hr tablet Take 1 tablet by mouth 2 times daily as needed. 9/22/24   Reported, Patient   diclofenac (VOLTAREN) 1 % topical gel Apply 2 g topically daily as needed for moderate pain To joints/back    Unknown, Entered By History   dicyclomine (BENTYL) 20 MG tablet Take 1 tablet (20 mg) by mouth 4 times daily as needed (abdominal pain). 1/10/25   Reji Santos MD   EPINEPHrine (ANY BX GENERIC EQUIV) 0.3 MG/0.3ML injection 2-pack Inject 0.3 mg into the muscle as needed for anaphylaxis. May repeat one time in 5-15 minutes if response to initial dose is inadequate.    Reported, Patient   famotidine (PEPCID) 20 MG tablet Take 1 tablet (20 mg) by mouth 2 times daily 1/18/24   Elinor Pruett MD   FLUoxetine 20 MG tablet Take 20 mg by mouth every morning. 11/18/24   Reported, Patient   furosemide (LASIX) 40 MG tablet Take 1 tablet (40 mg) by mouth daily. 1/16/25   Ean Villanueva MD   gabapentin (NEURONTIN) 300 MG capsule Take 1 capsule (300 mg) by mouth 3 times daily for 20 days. 1/26/25 2/15/25  Fortino Cabrera DO   GAVILAX 17 GM/SCOOP powder Take 17 g by mouth daily as needed. 5/8/24   Reported, Patient   hydrocortisone (CORTAID) 1 % external cream Apply topically daily as needed.    Reported, Patient   Hydrocortisone (PREPARATION H EX) Externally apply topically daily as needed.    Reported, Patient   levothyroxine (SYNTHROID/LEVOTHROID) 25 MCG tablet Take 0.25 tablets (6.25 mcg) by mouth every morning (before breakfast). 1/17/25   Ean Villanueva MD   loperamide  (IMODIUM) 2 MG capsule Take 4 mg by mouth 4 times daily as needed for diarrhea.    Reported, Patient   loratadine (CLARITIN) 10 MG tablet Take 10 mg by mouth daily as needed for allergies.    Reported, Patient   magnesium hydroxide (MILK OF MAGNESIA) 400 MG/5ML suspension Take 30 mLs by mouth daily as needed.    Reported, Patient   metFORMIN (GLUCOPHAGE) 1000 MG tablet Take 1,000 mg by mouth 2 times daily (with meals)    Unknown, Entered By History   midodrine (PROAMATINE) 5 MG tablet Take 1 tablet (5 mg) by mouth 3 times daily (with meals). 1/8/25   Denise Frazier MD   montelukast (SINGULAIR) 10 MG tablet Take 10 mg by mouth every morning.    Unknown, Entered By History   nicotine (NICODERM CQ) 21 MG/24HR 24 hr patch Place 1 patch over 24 hours onto the skin daily. 1/16/25   Ean Villanueva MD   OLANZapine (ZYPREXA) 10 MG tablet Take 10 mg by mouth At Bedtime    Unknown, Entered By History   omeprazole (PRILOSEC) 40 MG DR capsule Take 40 mg by mouth every morning. 8/19/24   Reported, Patient   ondansetron (ZOFRAN ODT) 4 MG ODT tab Take 1 tablet (4 mg) by mouth every 8 hours as needed for nausea. 1/10/25   Reji Santos MD   pramoxine-calamine (AVEENO) 1-8 % LOTN Apply topically daily as needed for itching.    Reported, Patient   rosuvastatin (CRESTOR) 10 MG tablet Take 10 mg by mouth At Bedtime 4/27/22   Reported, Patient   spironolactone (ALDACTONE) 100 MG tablet Take 1 tablet (100 mg) by mouth daily. 1/16/25   Ean Villanueva MD   sucralfate (CARAFATE) 1 GM/10ML suspension Take 10 mLs (1 g) by mouth 4 times daily as needed for nausea (heartburn). 1/16/25   Ean Villanueva MD   traZODone (DESYREL) 100 MG tablet Take 100 mg by mouth at bedtime. 11/18/24   Reported, Patient   TRELEGY ELLIPTA 100-62.5-25 MCG/ACT oral inhaler Inhale 1 puff into the lungs daily. 10/30/24   Reported, Patient   Urea 40 % CREA Apply topically daily as needed. 2/5/24   Reported, Patient   Vitamins A & D (VITAMIN A & D) OINT  Externally apply topically daily as needed.    Reported, Patient   witch hazel-glycerin (TUCKS) pad Apply topically as needed for hemorrhoids.    Reported, Patient         ALLERGIES:  Allergies   Allergen Reactions    Apricot Flavoring Agent (Non-Screening) Anaphylaxis    Banana Anaphylaxis     Throat swelling  Throat swelling      Wasp Venom Protein Shortness Of Breath     Other reaction(s): Respiratory Distress  Has an epi pen  Has an epi pen      Bees Anaphylaxis     Have an Epi pen that carries with    Methylphenidate Itching     Other reaction(s): Nightmares    Prunus      Other reaction(s): *Unknown    Sulfa Antibiotics      Headaches and nausea         FAMILY HISTORY:  Family History   Problem Relation Age of Onset    Unknown/Adopted Father     Unknown/Adopted Maternal Grandmother     C.A.D. Maternal Grandfather     Diabetes Maternal Grandfather     Cerebrovascular Disease Maternal Grandfather     Unknown/Adopted Paternal Grandmother     Unknown/Adopted Paternal Grandfather     Unknown/Adopted Brother     Unknown/Adopted Sister          SOCIAL HISTORY:  Social History     Socioeconomic History    Marital status: Single   Tobacco Use    Smoking status: Every Day     Current packs/day: 1.00     Types: Cigarettes    Smokeless tobacco: Never   Vaping Use    Vaping status: Never Used   Substance and Sexual Activity    Alcohol use: No     Comment: once every 3 months    Drug use: No    Sexual activity: Never     Partners: Female   Other Topics Concern     Service No    Blood Transfusions No    Caffeine Concern No    Occupational Exposure No    Hobby Hazards No    Sleep Concern No    Stress Concern Yes     Comment: sometimes    Weight Concern No    Special Diet Yes     Comment: counting carbs    Back Care No    Exercise Yes    Seat Belt Yes    Self-Exams Yes     Social Drivers of Health     Financial Resource Strain: Low Risk  (1/11/2025)    Financial Resource Strain     Within the past 12 months, have you  or your family members you live with been unable to get utilities (heat, electricity) when it was really needed?: No   Food Insecurity: Low Risk  (1/11/2025)    Food Insecurity     Within the past 12 months, did you worry that your food would run out before you got money to buy more?: No     Within the past 12 months, did the food you bought just not last and you didn t have money to get more?: No   Transportation Needs: Low Risk  (1/11/2025)    Transportation Needs     Within the past 12 months, has lack of transportation kept you from medical appointments, getting your medicines, non-medical meetings or appointments, work, or from getting things that you need?: No    [facility] [employer] & new test company [facility]    Social Connections   Interpersonal Safety: High Risk (1/11/2025)    Interpersonal Safety     Do you feel physically and emotionally safe where you currently live?: No     Within the past 12 months, have you been hit, slapped, kicked or otherwise physically hurt by someone?: No     Within the past 12 months, have you been humiliated or emotionally abused in other ways by your partner or ex-partner?: No   Housing Stability: Low Risk  (1/11/2025)    Housing Stability     Do you have housing? : Yes     Are you worried about losing your housing?: No   Recent Concern: Housing Stability - High Risk (12/2/2024)    Housing Stability     Do you have housing? : No     Are you worried about losing your housing?: No         VITALS:  Patient Vitals for the past 24 hrs:   BP Temp Temp src Pulse Resp SpO2 Height Weight   02/07/25 2230 130/67 -- -- 82 -- 95 % -- --   02/07/25 2200 134/69 -- -- 89 -- 96 % -- --   02/07/25 2130 128/71 -- -- 82 -- 96 % -- --   02/07/25 2100 115/73 -- -- 82 -- 95 % -- --   02/07/25 2030 124/59 -- -- 86 -- 95 % -- --   02/07/25 2025 126/70 -- -- 85 -- 97 % -- --   02/07/25 1737 (!) 140/83 97.1  F (36.2  C) Temporal 84 18 97 % 1.651 m  "(5' 5\") 132.9 kg (293 lb)       Wt Readings from Last 3 Encounters:   02/07/25 132.9 kg (293 lb)   02/04/25 130.6 kg (288 lb)   02/03/25 130.2 kg (287 lb)       Estimated Creatinine Clearance: 96.1 mL/min (A) (based on SCr of 1.25 mg/dL (H)).    PHYSICAL EXAM    Constitutional:  Well developed, Well nourished, NAD  HENT:  Normocephalic, Atraumatic, Bilateral external ears normal, Nose normal. Neck- Supple, No stridor.   Eyes:  PERRL, EOMI, Conjunctiva normal, No discharge.  Respiratory:  Normal breath sounds, No respiratory distress, No wheezing, Speaks full sentences easily. No cough.   Cardiovascular:  Normal heart rate, Regular rhythm, No murmurs , No rubs, No gallops. Chest wall nontender.   GI:  +obesity.  Bowel sounds normal, Soft, +mild diffuse tenderness, No masses, No flank tenderness. No rebound or guarding.   : deferred  Musculoskeletal: No cyanosis, No clubbing. Good range of motion in all major joints. No major deformities noted.   Integument:  Warm, Dry, No erythema, No rash.  No petechiae.   Neurologic:  Alert & oriented x 3, Normal gait.   Psychiatric:  Affect normal, Cooperative         LAB:  All pertinent labs reviewed and interpreted.  Recent Results (from the past 24 hours)   Comprehensive metabolic panel    Collection Time: 02/07/25  5:52 PM   Result Value Ref Range    Sodium 140 135 - 145 mmol/L    Potassium 4.2 3.4 - 5.3 mmol/L    Carbon Dioxide (CO2) 28 22 - 29 mmol/L    Anion Gap 9 7 - 15 mmol/L    Urea Nitrogen 20.9 (H) 6.0 - 20.0 mg/dL    Creatinine 1.25 (H) 0.67 - 1.17 mg/dL    GFR Estimate 73 >60 mL/min/1.73m2    Calcium 9.8 8.8 - 10.4 mg/dL    Chloride 103 98 - 107 mmol/L    Glucose 112 (H) 70 - 99 mg/dL    Alkaline Phosphatase 214 (H) 40 - 150 U/L    AST 24 0 - 45 U/L    ALT 18 0 - 70 U/L    Protein Total 6.7 6.4 - 8.3 g/dL    Albumin 4.1 3.5 - 5.2 g/dL    Bilirubin Total 0.3 <=1.2 mg/dL   CBC with platelets and differential    Collection Time: 02/07/25  5:52 PM   Result Value Ref " Range    WBC Count 15.2 (H) 4.0 - 11.0 10e3/uL    RBC Count 4.62 4.40 - 5.90 10e6/uL    Hemoglobin 12.6 (L) 13.3 - 17.7 g/dL    Hematocrit 40.1 40.0 - 53.0 %    MCV 87 78 - 100 fL    MCH 27.3 26.5 - 33.0 pg    MCHC 31.4 (L) 31.5 - 36.5 g/dL    RDW 18.4 (H) 10.0 - 15.0 %    Platelet Count 329 150 - 450 10e3/uL    % Neutrophils 67 %    % Lymphocytes 19 %    % Monocytes 9 %    % Eosinophils 3 %    % Basophils 1 %    % Immature Granulocytes 1 %    NRBCs per 100 WBC 0 <1 /100    Absolute Neutrophils 10.2 (H) 1.6 - 8.3 10e3/uL    Absolute Lymphocytes 2.9 0.8 - 5.3 10e3/uL    Absolute Monocytes 1.4 (H) 0.0 - 1.3 10e3/uL    Absolute Eosinophils 0.5 0.0 - 0.7 10e3/uL    Absolute Basophils 0.1 0.0 - 0.2 10e3/uL    Absolute Immature Granulocytes 0.2 <=0.4 10e3/uL    Absolute NRBCs 0.0 10e3/uL   CRP inflammation    Collection Time: 02/07/25  5:52 PM   Result Value Ref Range    CRP Inflammation 15.60 (H) <5.00 mg/L   Nt probnp inpatient    Collection Time: 02/07/25  5:52 PM   Result Value Ref Range    N terminal Pro BNP Inpatient 1,101 (H) 0 - 450 pg/mL   INR    Collection Time: 02/07/25  5:53 PM   Result Value Ref Range    INR 1.26 (H) 0.85 - 1.15   Extra Red Top Tube    Collection Time: 02/07/25  5:53 PM   Result Value Ref Range    Hold Specimen JIC    UA with Microscopic reflex to Culture    Collection Time: 02/07/25  6:03 PM    Specimen: Urine, Midstream   Result Value Ref Range    Color Urine Light Yellow Colorless, Straw, Light Yellow, Yellow    Appearance Urine Clear Clear    Glucose Urine Negative Negative mg/dL    Bilirubin Urine Negative Negative    Ketones Urine Negative Negative mg/dL    Specific Gravity Urine 1.012 1.001 - 1.030    Blood Urine Negative Negative    pH Urine 5.5 5.0 - 7.0    Protein Albumin Urine Negative Negative mg/dL    Urobilinogen Urine <2.0 <2.0 mg/dL    Nitrite Urine Negative Negative    Leukocyte Esterase Urine Negative Negative    RBC Urine 1 <=2 /HPF    WBC Urine 1 <=5 /HPF   Lactic Acid  "Whole Blood with 1X Repeat in 2 HR when >2    Collection Time: 02/07/25  8:20 PM   Result Value Ref Range    Lactic Acid, Initial 1.3 0.7 - 2.0 mmol/L   Albumin fluid    Collection Time: 02/07/25 11:05 PM   Result Value Ref Range    Albumin Fluid Source Abdomen     Albumin fluid 2.7 g/dL   Protein fluid    Collection Time: 02/07/25 11:05 PM   Result Value Ref Range    Protein Fluid Source Abdomen     Protein Total Fluid 4.2 g/dL   Ascites Fluid Aerobic Bacterial Culture Routine With Gram Stain    Collection Time: 02/07/25 11:05 PM    Specimen: Peritoneum; Ascites Fluid   Result Value Ref Range    Gram Stain Result No organisms seen     Gram Stain Result 4+ WBC seen    Cell Count Body Fluid    Collection Time: 02/07/25 11:05 PM   Result Value Ref Range    Color Yellow Colorless, Yellow    Clarity Hazy (A) Clear    Cell Count Fluid Source Abdomen     Total Nucleated Cells 2,574 /uL   Differential Body Fluid    Collection Time: 02/07/25 11:05 PM   Result Value Ref Range    % Neutrophils 12 %    % Lymphocytes 49 %    % Monocyte/Macrophages 31 %    % Lining Cells 8 %    Absolute Neutrophils, Body Fluid 308.9 (HH)   /uL       No results found for: \"ABORH\"        RADIOLOGY:  Reviewed all pertinent imaging. Please see official radiology report.    CT Abdomen Pelvis w Contrast   Final Result   IMPRESSION:       1.  No acute abnormality.      2.  Cirrhosis, with features of portal hypertension including splenomegaly and a moderate amount of ascites. The amount of ascites has not significantly changed since the prior exam.      3.  Anasarca.      US Paracentesis with Albumin    (Results Pending)         EKG:    none        PROCEDURES:  [facility]    -Paracentesis    Date/Time: 2/7/2025 11:01 PM    Performed by: Evangelina Medina MD  Authorized by: Evangelina Medina MD    Risks, benefits and alternatives discussed.      PRE-PROCEDURE DETAILS     Procedure purpose:  Diagnostic    ANESTHESIA " (see MAR for exact dosages):     Anesthesia method:  Local infiltration    Local anesthetic:  Lidocaine 1% w/o epi    PROCEDURE DETAILS     Needle gauge:  18    Ultrasound guidance: yes      Puncture site:  L lower quadrant    Fluid removed amount:  30    Fluid appearance:  Olga    Dressing:  Adhesive bandage      PROCEDURE  Describe Procedure: No signs of immediate complication. No active bleeding. Pt tolerated well. Fluid sent to lab by RN.  Patient Tolerance:  Patient tolerated the procedure well with no immediate complications        Medical Decision Making  Obtained supplemental history:Supplemental history obtained?: No  Reviewed external records: External records reviewed?: Documented in chart and Outpatient Record: see HPI  Care impacted by chronic illness:Documented in Chart  Did you consider but not order tests?: Work up considered but not performed and documented in chart, if applicable  Did you interpret images independently?: Independent interpretation of ECG and images noted in documentation, when applicable.  Consultation discussion with other provider:Did you involve another provider (consultant, MH, pharmacy, etc.)?: I discussed the care with another health care provider, see documentation for details.  Admit.    MIPS: Not Applicable    Evangelina Medina M.D. Formerly [facility]  Emergency Medicine and Medical Toxicology  [facility] EMERGENCY DEPARTMENT  [address] [place] [id number]  [phone]  Dept: [phone]           Evangelina Medina MD  02/08/25 0131

## 2025-02-08 NOTE — PROGRESS NOTES
"PRIMARY DIAGNOSIS: \"GENERIC\" NURSING  OUTPATIENT/OBSERVATION GOALS TO BE MET BEFORE DISCHARGE:  ADLs back to baseline: Yes    Activity and level of assistance: Up with standby assistance.    Pain status: Pain free.    Return to near baseline physical activity: Yes     Discharge Planner Nurse   Safe discharge environment identified: No  Barriers to discharge: Yes       Entered by: Tessie Solis RN 02/08/2025 4:23 AM     Please review provider order for any additional goals.   Nurse to notify provider when observation goals have been met and patient is ready for discharge.      Pt A&Ox4. Denies pain or SOB. VSS. On 2 L of supplemental Oxygen overnight. On IV antibiotics. SBA. Pt will have paracentesis in the morning.   "

## 2025-02-08 NOTE — PROGRESS NOTES
"PRIMARY DIAGNOSIS: \"GENERIC\" NURSING  OUTPATIENT/OBSERVATION GOALS TO BE MET BEFORE DISCHARGE:  ADLs back to baseline: Yes    Activity and level of assistance: Ambulating independently.    Pain status: Improved-controlled with oral pain medications.    Return to near baseline physical activity: Yes     Discharge Planner Nurse   Safe discharge environment identified: No  Barriers to discharge: Yes       Entered by: Ora Pierre RN 02/08/2025 3:06 PM    Patient alert and oriented x4. Patient is receiving albumin via IV now. Patient had 4.4 Liters of fluids removed.      "

## 2025-02-08 NOTE — MEDICATION SCRIBE - ADMISSION MEDICATION HISTORY
Medication Scribe Admission Medication History    Admission medication history is complete. The information provided in this note is only as accurate as the sources available at the time of the update.    Information Source(s): Caregiver via phone    Pertinent Information: patient's medications are being managed by [facility] @ [phone] . Called and received phone medication report.    Changes made to PTA medication list:  Added: None  Deleted: Cipro  Changed: None    Allergies reviewed with patient and updates made in EHR: yes    Medication History Completed By: Jeremy Romo 2/7/2025 7:55 PM    PTA Med List   Medication Sig Last Dose/Taking    albuterol (PROAIR HFA/PROVENTIL HFA/VENTOLIN HFA) 108 (90 Base) MCG/ACT inhaler Inhale 1-2 puffs into the lungs every 6 hours as needed for shortness of breath, wheezing or cough Taking As Needed    albuterol (PROVENTIL) (2.5 MG/3ML) 0.083% neb solution Take 2.5 mg by nebulization 3 times daily as needed for shortness of breath, wheezing or cough Taking As Needed    aloe vera GEL Apply 1 g topically every hour as needed for skin care Per bottle directions Taking As Needed    apixaban ANTICOAGULANT (ELIQUIS) 5 MG tablet Take 5 mg by mouth 2 times daily. 2/7/2025 Morning    bacitracin 500 UNIT/GM OINT Apply topically 3 times daily as needed for wound care Taking As Needed    Benzocaine (SOLARCAINE ALOE VERA EX) Externally apply topically daily as needed. Taking As Needed    benzonatate (TESSALON) 200 MG capsule Take 1 capsule (200 mg) by mouth 3 times daily as needed for cough. Taking As Needed    Calamine external lotion Apply topically as needed for itching Taking As Needed    carbamide peroxide (DEBROX) 6.5 % otic solution Place 5 drops into both ears daily as needed for other. Taking As Needed    clotrimazole (LOTRIMIN) 1 % external cream Apply topically 2 times daily as needed (skin irritation) Taking As Needed    dextromethorphan-guaiFENesin (MUCINEX DM)   MG 12 hr tablet Take 1 tablet by mouth 2 times daily as needed. Taking As Needed    diclofenac (VOLTAREN) 1 % topical gel Apply 2 g topically daily as needed for moderate pain To joints/back Taking As Needed    dicyclomine (BENTYL) 20 MG tablet Take 1 tablet (20 mg) by mouth 4 times daily as needed (abdominal pain). Taking As Needed    EPINEPHrine (ANY BX GENERIC EQUIV) 0.3 MG/0.3ML injection 2-pack Inject 0.3 mg into the muscle as needed for anaphylaxis. May repeat one time in 5-15 minutes if response to initial dose is inadequate. Taking As Needed    famotidine (PEPCID) 20 MG tablet Take 1 tablet (20 mg) by mouth 2 times daily 2/7/2025 Morning    FLUoxetine 20 MG tablet Take 20 mg by mouth every morning. 2/7/2025 Morning    furosemide (LASIX) 40 MG tablet Take 1 tablet (40 mg) by mouth daily. 2/7/2025 Morning    gabapentin (NEURONTIN) 300 MG capsule Take 1 capsule (300 mg) by mouth 3 times daily for 20 days. 2/7/2025 at  2:00 PM    GAVILAX 17 GM/SCOOP powder Take 17 g by mouth daily as needed. Taking As Needed    hydrocortisone (CORTAID) 1 % external cream Apply topically daily as needed. Taking As Needed    Hydrocortisone (PREPARATION H EX) Externally apply topically daily as needed. Taking As Needed    levothyroxine (SYNTHROID/LEVOTHROID) 25 MCG tablet Take 0.25 tablets (6.25 mcg) by mouth every morning (before breakfast). 2/7/2025 Morning    loperamide (IMODIUM) 2 MG capsule Take 4 mg by mouth 4 times daily as needed for diarrhea. Taking As Needed    loratadine (CLARITIN) 10 MG tablet Take 10 mg by mouth daily as needed for allergies. Taking As Needed    magnesium hydroxide (MILK OF MAGNESIA) 400 MG/5ML suspension Take 30 mLs by mouth daily as needed. Taking As Needed    metFORMIN (GLUCOPHAGE) 1000 MG tablet Take 1,000 mg by mouth 2 times daily (with meals) 2/7/2025 at  5:00 PM    midodrine (PROAMATINE) 5 MG tablet Take 1 tablet (5 mg) by mouth 3 times daily (with meals). 2/7/2025 at  2:00 PM    montelukast  (SINGULAIR) 10 MG tablet Take 10 mg by mouth every morning. 2/7/2025 Morning    nicotine (NICODERM CQ) 21 MG/24HR 24 hr patch Place 1 patch over 24 hours onto the skin daily. 2/7/2025 Morning    OLANZapine (ZYPREXA) 10 MG tablet Take 10 mg by mouth At Bedtime 2/6/2025 Bedtime    omeprazole (PRILOSEC) 40 MG DR capsule Take 40 mg by mouth every morning. 2/7/2025 Morning    ondansetron (ZOFRAN ODT) 4 MG ODT tab Take 1 tablet (4 mg) by mouth every 8 hours as needed for nausea. Taking As Needed    pramoxine-calamine (AVEENO) 1-8 % LOTN Apply topically daily as needed for itching. Taking As Needed    rosuvastatin (CRESTOR) 10 MG tablet Take 10 mg by mouth At Bedtime 2/6/2025 Bedtime    spironolactone (ALDACTONE) 100 MG tablet Take 1 tablet (100 mg) by mouth daily. 2/7/2025 Morning    sucralfate (CARAFATE) 1 GM/10ML suspension Take 10 mLs (1 g) by mouth 4 times daily as needed for nausea (heartburn). Taking As Needed    traZODone (DESYREL) 100 MG tablet Take 100 mg by mouth at bedtime. 2/6/2025 Bedtime    TRELEGY ELLIPTA 100-62.5-25 MCG/ACT oral inhaler Inhale 1 puff into the lungs every morning. 2/7/2025 Morning    Urea 40 % CREA Apply topically daily as needed. Taking As Needed    Vitamins A & D (VITAMIN A & D) OINT Externally apply topically daily as needed. Taking As Needed    witch hazel-glycerin (TUCKS) pad Apply topically as needed for hemorrhoids. Taking As Needed

## 2025-02-09 VITALS
HEART RATE: 77 BPM | SYSTOLIC BLOOD PRESSURE: 119 MMHG | BODY MASS INDEX: 48.48 KG/M2 | HEIGHT: 65 IN | OXYGEN SATURATION: 93 % | RESPIRATION RATE: 17 BRPM | DIASTOLIC BLOOD PRESSURE: 65 MMHG | WEIGHT: 291.01 LBS | TEMPERATURE: 97.7 F

## 2025-02-09 LAB
ALBUMIN SERPL BCG-MCNC: 4.6 G/DL (ref 3.5–5.2)
ALP SERPL-CCNC: 146 U/L (ref 40–150)
ALT SERPL W P-5'-P-CCNC: 13 U/L (ref 0–70)
ANION GAP SERPL CALCULATED.3IONS-SCNC: 9 MMOL/L (ref 7–15)
AST SERPL W P-5'-P-CCNC: 15 U/L (ref 0–45)
BILIRUB DIRECT SERPL-MCNC: <0.2 MG/DL (ref 0–0.3)
BILIRUB SERPL-MCNC: 0.5 MG/DL
BUN SERPL-MCNC: 23.8 MG/DL (ref 6–20)
CALCIUM SERPL-MCNC: 9.4 MG/DL (ref 8.8–10.4)
CHLORIDE SERPL-SCNC: 103 MMOL/L (ref 98–107)
CREAT SERPL-MCNC: 0.99 MG/DL (ref 0.67–1.17)
EGFRCR SERPLBLD CKD-EPI 2021: >90 ML/MIN/1.73M2
ERYTHROCYTE [DISTWIDTH] IN BLOOD BY AUTOMATED COUNT: 18.6 % (ref 10–15)
GLUCOSE BLDC GLUCOMTR-MCNC: 125 MG/DL (ref 70–99)
GLUCOSE BLDC GLUCOMTR-MCNC: 128 MG/DL (ref 70–99)
GLUCOSE SERPL-MCNC: 110 MG/DL (ref 70–99)
HCO3 SERPL-SCNC: 29 MMOL/L (ref 22–29)
HCT VFR BLD AUTO: 37.9 % (ref 40–53)
HGB BLD-MCNC: 11.7 G/DL (ref 13.3–17.7)
MAGNESIUM SERPL-MCNC: 2.1 MG/DL (ref 1.7–2.3)
MCH RBC QN AUTO: 27 PG (ref 26.5–33)
MCHC RBC AUTO-ENTMCNC: 30.9 G/DL (ref 31.5–36.5)
MCV RBC AUTO: 88 FL (ref 78–100)
PLATELET # BLD AUTO: 298 10E3/UL (ref 150–450)
POTASSIUM SERPL-SCNC: 4.4 MMOL/L (ref 3.4–5.3)
PROT SERPL-MCNC: 6.6 G/DL (ref 6.4–8.3)
RBC # BLD AUTO: 4.33 10E6/UL (ref 4.4–5.9)
SODIUM SERPL-SCNC: 141 MMOL/L (ref 135–145)
WBC # BLD AUTO: 12.7 10E3/UL (ref 4–11)

## 2025-02-09 PROCEDURE — 250N000011 HC RX IP 250 OP 636: Performed by: HOSPITALIST

## 2025-02-09 PROCEDURE — 83735 ASSAY OF MAGNESIUM: CPT | Performed by: HOSPITALIST

## 2025-02-09 PROCEDURE — 80048 BASIC METABOLIC PNL TOTAL CA: CPT

## 2025-02-09 PROCEDURE — 99207 PR APP CREDIT; MD BILLING SHARED VISIT: CPT | Mod: FS

## 2025-02-09 PROCEDURE — 250N000013 HC RX MED GY IP 250 OP 250 PS 637: Performed by: HOSPITALIST

## 2025-02-09 PROCEDURE — 36415 COLL VENOUS BLD VENIPUNCTURE: CPT

## 2025-02-09 PROCEDURE — 84520 ASSAY OF UREA NITROGEN: CPT

## 2025-02-09 PROCEDURE — 82248 BILIRUBIN DIRECT: CPT

## 2025-02-09 PROCEDURE — 84460 ALANINE AMINO (ALT) (SGPT): CPT

## 2025-02-09 PROCEDURE — 99239 HOSP IP/OBS DSCHRG MGMT >30: CPT | Mod: FS | Performed by: HOSPITALIST

## 2025-02-09 PROCEDURE — 250N000013 HC RX MED GY IP 250 OP 250 PS 637

## 2025-02-09 PROCEDURE — 85027 COMPLETE CBC AUTOMATED: CPT

## 2025-02-09 RX ORDER — CIPROFLOXACIN 500 MG/1
500 TABLET, FILM COATED ORAL 2 TIMES DAILY
Qty: 10 TABLET | Refills: 0 | Status: SHIPPED | OUTPATIENT
Start: 2025-02-09 | End: 2025-02-14

## 2025-02-09 RX ADMIN — APIXABAN 5 MG: 5 TABLET, FILM COATED ORAL at 07:45

## 2025-02-09 RX ADMIN — MIDODRINE HYDROCHLORIDE 5 MG: 5 TABLET ORAL at 07:44

## 2025-02-09 RX ADMIN — NICOTINE 1 PATCH: 7 PATCH, EXTENDED RELEASE TRANSDERMAL at 07:52

## 2025-02-09 RX ADMIN — SPIRONOLACTONE 100 MG: 25 TABLET, FILM COATED ORAL at 07:46

## 2025-02-09 RX ADMIN — SENNOSIDES AND DOCUSATE SODIUM 1 TABLET: 50; 8.6 TABLET ORAL at 07:44

## 2025-02-09 RX ADMIN — LEVOTHYROXINE SODIUM 6.25 MCG: 0.03 TABLET ORAL at 07:47

## 2025-02-09 RX ADMIN — MONTELUKAST 10 MG: 10 TABLET, FILM COATED ORAL at 07:45

## 2025-02-09 RX ADMIN — PANTOPRAZOLE SODIUM 40 MG: 40 TABLET, DELAYED RELEASE ORAL at 07:45

## 2025-02-09 RX ADMIN — FAMOTIDINE 20 MG: 20 TABLET, FILM COATED ORAL at 07:45

## 2025-02-09 RX ADMIN — GABAPENTIN 300 MG: 300 CAPSULE ORAL at 07:45

## 2025-02-09 RX ADMIN — FLUOXETINE HYDROCHLORIDE 20 MG: 20 CAPSULE ORAL at 07:46

## 2025-02-09 RX ADMIN — FUROSEMIDE 40 MG: 20 TABLET ORAL at 07:46

## 2025-02-09 RX ADMIN — CEFTRIAXONE SODIUM 2 G: 2 INJECTION, POWDER, FOR SOLUTION INTRAMUSCULAR; INTRAVENOUS at 05:20

## 2025-02-09 ASSESSMENT — ACTIVITIES OF DAILY LIVING (ADL)
CHANGE_IN_FUNCTIONAL_STATUS_SINCE_ONSET_OF_CURRENT_ILLNESS/INJURY: NO
WALKING_OR_CLIMBING_STAIRS_DIFFICULTY: NO
DIFFICULTY_EATING/SWALLOWING: NO
DOING_ERRANDS_INDEPENDENTLY_DIFFICULTY: YES
ADLS_ACUITY_SCORE: 50
CONCENTRATING,_REMEMBERING_OR_MAKING_DECISIONS_DIFFICULTY: YES
ADLS_ACUITY_SCORE: 50
ADLS_ACUITY_SCORE: 50
DRESSING/BATHING: BATHING DIFFICULTY, ASSISTANCE 1 PERSON
ADLS_ACUITY_SCORE: 50
FALL_HISTORY_WITHIN_LAST_SIX_MONTHS: NO
HEARING_DIFFICULTY_OR_DEAF: NO
ADLS_ACUITY_SCORE: 50
ADLS_ACUITY_SCORE: 50
TOILETING_ISSUES: NO
ADLS_ACUITY_SCORE: 50
DIFFICULTY_COMMUNICATING: YES
ADLS_ACUITY_SCORE: 50
WEAR_GLASSES_OR_BLIND: YES
VISION_MANAGEMENT: GLASSES
DRESSING/BATHING_DIFFICULTY: YES
COMMUNICATION: OTHER (SEE COMMENTS)

## 2025-02-09 NOTE — PROGRESS NOTES
Pt placed on Hospital owned cpap auto-titrating 5-20, room air.  O2 bleed adaptor in line if needed.  RT will continue to monitor.

## 2025-02-09 NOTE — PLAN OF CARE
Goal Outcome Evaluation:      Plan of Care Reviewed With: patient    Overall Patient Progress: improving    Patient alert and oriented x 4. Patient continues to receive Albumin via IV. Patient has been upgraded to inpatient. Patient loves our lemon ice. Patient is on Room Air but may be a bit of o2 at night to keep sats above 90%. Patient is pleasant and cooperative with cares.

## 2025-02-09 NOTE — PROGRESS NOTES
Care Management Discharge Note    Discharge Date: 02/09/2025       Discharge Disposition: Group Home    Discharge Services:  Group Home     Discharge DME:  None     Discharge Transportation: other (see comments) (Cab Voucher)    Private pay costs discussed: Not applicable    Does the patient's insurance plan have a 3 day qualifying hospital stay waiver?  No    PAS Confirmation Code:  NA  Patient/family educated on Medicare website which has current facility and service quality ratings:  NA    Education Provided on the Discharge Plan:  Per Care Team     Persons Notified of Discharge Plans: Yes , pt, pt's guardian, , and  left for  manager Matilde   Patient/Family in Agreement with the Plan: yes    Handoff Referral Completed: Yes, non-[facility] PCP: External handoff communication completed    Additional Information:    Final discharge plan is for pt to go back to [facility]  ([address] [place] [address]) today. Pt plans to take cab ride home, pt will use a cab voucher given by [facility]. Josr Canchola and  manager Matilde is ok with pt transporting by cab,  is unable to pick pt up today. Pt will be sent with a packet as well.       Orders faxed to [facility] pharmacy by Np.       Paola Kern RN

## 2025-02-09 NOTE — DISCHARGE SUMMARY
"[facility]  Hospitalist Discharge Summary      Date of Admission:  2/7/2025  Date of Discharge:  2/9/2025  Discharging Provider: Beth Hinkle NP  Discharge Service: Hospitalist Service    Discharge Diagnoses   Spontaneous bacterial peritonitis    Clinically Significant Risk Factors     # Severe Obesity: Estimated body mass index is 48.43 kg/m  as calculated from the following:    Height as of this encounter: 1.651 m (5' 5\").    Weight as of this encounter: 132 kg (291 lb 0.1 oz).       Follow-ups Needed After Discharge   Follow-up Appointments       Hospital Follow-up with Existing Primary Care Provider (PCP)      Please see details below         Schedule Primary Care visit within: 7 Days               Unresulted Labs Ordered in the Past 30 Days of this Admission       Date and Time Order Name Status Description    2/8/2025 12:32 AM Ascites Fluid Aerobic Bacterial Culture Routine With Gram Stain Preliminary     2/7/2025 10:50 PM Ascites Fluid Aerobic Bacterial Culture Routine With Gram Stain Preliminary     2/7/2025  7:33 PM Blood Culture Peripheral Blood Preliminary     2/7/2025  7:33 PM Blood Culture Peripheral Blood Preliminary             Discharge Disposition   Discharged to home  Condition at discharge: Stable    Hospital Course   Warren Jaramillo is a 44-year-old obese male with cirrhosis being followed with [facility] ongoing ascites and is on diuretics at home, history of recent DVT in the left arm about a month ago on Eliquis twice daily- also had biweekly paracentesis last one was Sunday.    history of Diabetes, COPD, hypertension, Rojas's disease, liver failure with ascites, ADHD, fetal alcohol syndrome, autistic disorder, right-sided CHF, dysuria, left bundle branch block, SBP, obesity, who presents to the ER with complaints of LLQ pain, feels like bladder is full and CT abdomen without any acute findings and ascites is not markedly increased but is initial diagnostic " tap is concerning for SBP with ANC more than 250.  Patient underwent paracentesis yesterday and 4 L drained.   Patient wants to go home and states he feels good.  Patient agrees plan to discharge to home and follow-up with primary care doctor outpatient.  This case management to coordinate discharge planning    #SBP due to Cirrhosis of liver with ascites, unspecified hepatic cirrhosis type  Presented with suprapubic bladder spasms/abnormal sensation in the lower abdomen  CT abdomen without any acute findings and ascites is not markedly increased but is initial diagnostic tap is concerning for SBP with ANC more than 250.   IV Rocephin that we will continue pending cultures  therapeutic paracentesis through IR yesterday   follows with GI on outpatient basis and may need transplant evaluation referral given his young age, continue his home optimal doses of diuretics Lasix Aldactone at home that will continue along with midodrine 3 times daily  GI consult recommendation: Give SBP, would recommend completing 2 days of IV antibiotics, then PO Ciprofloxacin 500 mg bid going home for 5 days after that  -1.5g/kg Albumin ordered for today(200mg). He is keen to leave for home but agreeable to stay tonight to complete at least 2 days of IV antibiotics. We discussed that outpatient treatment can be considered in limited cases as long as he does not have features of complicated SBP by tomorrow.    # Diabetes mellitus  Has been diet controlled he wants regular diet,  Accu-Cheks  Sliding scale insulin as needed    #AAV treated with BiPAP/COPD right-sided heart failure  -Use home CPAP for available  -Clinically stable respiratory status, does not appear in fluid fluid overload not wheezing    #Hx of DVT of axillary vein left about a month  Resume Eliquis    #Portal hypertension   Patient was inquiring about types and other procedures will continue to follow with GI     #home depression,ADHD, fetal alcohol syndrome, autistic  disorder  -Continue Zyprexa Prozac gabapentin    Consultations This Hospital Stay   GASTROENTEROLOGY IP CONSULT  CARE MANAGEMENT / SOCIAL WORK IP CONSULT    Code Status   Full Code    Time Spent on this Encounter   I, Beth Hinkle NP, personally saw the patient today and spent greater than 30 minutes discharging this patient.       Beth Hinkle NP  [facility] EXTENDED RECOVERY AND SHORT STAY  [address] [place] [id number]  Phone: [phone]  Fax: [phone]  ______________________________________________________________________    Physical Exam   Vital Signs: Temp: 97.7  F (36.5  C) Temp src: Oral BP: 119/65 Pulse: 77   Resp: 17 SpO2: 93 % O2 Device: None (Room air)    Weight: 291 lbs .12 oz  Constitutional: awake, alert, cooperative, no apparent distress, and appears stated age  Hematologic / Lymphatic: no cervical lymphadenopathy and no supraclavicular lymphadenopathy  Respiratory: No increased work of breathing, good air exchange, clear to auscultation bilaterally, no crackles or wheezing  Cardiovascular: Normal apical impulse, regular rate and rhythm, normal S1 and S2, no S3 or S4, and no murmur noted  GI: No scars, normal bowel sounds, soft, non-distended, non-tender, no masses palpated, no hepatosplenomegally  Skin: no bruising or bleeding, normal skin color, texture, turgor, and no redness, warmth, or swelling  Musculoskeletal: There is no redness, warmth, or swelling of the joints.   Neurologic: Awake, alert, oriented to name, place and time.   Neuropsychiatric: General: normal, calm, and normal eye contact       Primary Care Physician   [facility]    Discharge Orders      Reason for your hospital stay    SBP-spontaneous bacterial peritonitis     Activity    Your activity upon discharge: activity as tolerated     Diet    Follow this diet upon discharge: Current Diet:Orders Placed This Encounter      Regular Diet Adult     Hospital Follow-up  with Existing Primary Care Provider (PCP)    Please see details below            Significant Results and Procedures   Most Recent 3 CBC's:  Recent Labs   Lab Test 02/09/25  0544 02/08/25  0613 02/07/25  1752   WBC 12.7* 13.4* 15.2*   HGB 11.7* 12.1* 12.6*   MCV 88 87 87    308 329     Most Recent 3 BMP's:  Recent Labs   Lab Test 02/09/25  0720 02/09/25  0544 02/09/25  0237 02/08/25  1113 02/08/25  0613 02/07/25  1752   NA  --  141  --   --  139 140   POTASSIUM  --  4.4  --   --  4.3 4.2   CHLORIDE  --  103  --   --  104 103   CO2  --  29  --   --  27 28   BUN  --  23.8*  --   --  22.1* 20.9*   CR  --  0.99  --   --  1.10 1.25*   ANIONGAP  --  9  --   --  8 9   WES  --  9.4  --   --  9.4 9.8   * 110* 128*   < > 109* 112*    < > = values in this interval not displayed.     Most Recent 2 LFT's:  Recent Labs   Lab Test 02/09/25  0544 02/07/25  1752   AST 15 24   ALT 13 18   ALKPHOS 146 214*   BILITOTAL 0.5 0.3   ,   Results for orders placed or performed during the hospital encounter of 02/07/25   CT Abdomen Pelvis w Contrast    Narrative    EXAM: CT ABDOMEN PELVIS W CONTRAST  LOCATION: [facility]  DATE: 2/7/2025    INDICATION: Left lower quadrant pain. Liver failure with ascites.  COMPARISON: CT abdomen pelvis 1/10/2025.  TECHNIQUE: CT scan of the abdomen and pelvis was performed following injection of IV contrast. Multiplanar reformats were obtained. Dose reduction techniques were used.  CONTRAST: 90ml isovue 370    FINDINGS:     LOWER CHEST: Normal.    HEPATOBILIARY: Enlarged, cirrhotic liver. No focal liver lesions. Gallbladder is decompressed. No biliary ductal dilation.    PANCREAS: Normal.    SPLEEN: Mild splenomegaly measuring 13.9 cm.    ADRENAL GLANDS: Bilateral adrenal gland myelolipomas measuring 3.2 cm on the left and 4.9 cm on the right.    KIDNEYS/BLADDER: Normal.    BOWEL: Centralization of bowel loops due to ascites. No bowel obstruction or inflammation. Normal  appendix.    PERITONEUM/RETROPERITONEUM: Moderate amount of ascites, similar to the prior exam. No free intraperitoneal air.    LYMPH NODES: Few scattered mildly enlarged gastrohepatic, portacaval, mesenteric, and retroperitoneal lymph nodes are unchanged. No newly enlarged lymph node.    VASCULATURE: Patent portal, splenic, and superior mesenteric veins. No abdominal aortic aneurysm.    PELVIC ORGANS: Normal.    MUSCULOSKELETAL: Body wall edema. Ankylosis of the sacroiliac joints. Multilevel degenerative change of the spine. No acute bony abnormality.      Impression    IMPRESSION:     1.  No acute abnormality.    2.  Cirrhosis, with features of portal hypertension including splenomegaly and a moderate amount of ascites. The amount of ascites has not significantly changed since the prior exam.    3.  Anasarca.   US Paracentesis with Albumin    Narrative    EXAM:  1. PARACENTESIS  2. ULTRASOUND GUIDANCE  LOCATION: [facility]  DATE: 2/8/2025    INDICATION: Ascites.    PROCEDURE: Informed consent obtained. Time out performed. The abdomen was prepped and draped in a sterile fashion. 10 mL of 1% lidocaine was infused into local soft tissues. A 5 [place] catheter system was introduced into the abdominal ascites under   ultrasound guidance.    4.45 liters of yellow fluid were removed and sent to lab if requested.    Patient tolerated procedure well.    Ultrasound imaging was obtained and placed in the patient's permanent medical record.      Impression    IMPRESSION:  1.  Status post ultrasound-guided paracentesis.    Reference CPT Code: 17872       Discharge Medications   Current Discharge Medication List        START taking these medications    Details   ciprofloxacin (CIPRO) 500 MG tablet Take 1 tablet (500 mg) by mouth 2 times daily for 5 days.  Qty: 10 tablet, Refills: 0    Associated Diagnoses: Diffuse abdominal pain           CONTINUE these medications which have NOT CHANGED    Details    albuterol (PROAIR HFA/PROVENTIL HFA/VENTOLIN HFA) 108 (90 Base) MCG/ACT inhaler Inhale 1-2 puffs into the lungs every 6 hours as needed for shortness of breath, wheezing or cough  Qty: 18 g, Refills: 0    Comments: Pharmacy may dispense brand covered by insurance (Proair, or proventil or ventolin or generic albuterol inhaler)      albuterol (PROVENTIL) (2.5 MG/3ML) 0.083% neb solution Take 2.5 mg by nebulization 3 times daily as needed for shortness of breath, wheezing or cough      aloe vera GEL Apply 1 g topically every hour as needed for skin care Per bottle directions      apixaban ANTICOAGULANT (ELIQUIS) 5 MG tablet Take 5 mg by mouth 2 times daily.      bacitracin 500 UNIT/GM OINT Apply topically 3 times daily as needed for wound care      Benzocaine (SOLARCAINE ALOE VERA EX) Externally apply topically daily as needed.      benzonatate (TESSALON) 200 MG capsule Take 1 capsule (200 mg) by mouth 3 times daily as needed for cough.  Qty: 50 capsule, Refills: 0      Calamine external lotion Apply topically as needed for itching      carbamide peroxide (DEBROX) 6.5 % otic solution Place 5 drops into both ears daily as needed for other.      clotrimazole (LOTRIMIN) 1 % external cream Apply topically 2 times daily as needed (skin irritation)      dextromethorphan-guaiFENesin (MUCINEX DM)  MG 12 hr tablet Take 1 tablet by mouth 2 times daily as needed.      diclofenac (VOLTAREN) 1 % topical gel Apply 2 g topically daily as needed for moderate pain To joints/back      dicyclomine (BENTYL) 20 MG tablet Take 1 tablet (20 mg) by mouth 4 times daily as needed (abdominal pain).  Qty: 20 tablet, Refills: 0      EPINEPHrine (ANY BX GENERIC EQUIV) 0.3 MG/0.3ML injection 2-pack Inject 0.3 mg into the muscle as needed for anaphylaxis. May repeat one time in 5-15 minutes if response to initial dose is inadequate.      famotidine (PEPCID) 20 MG tablet Take 1 tablet (20 mg) by mouth 2 times daily  Qty: 60 tablet, Refills: 0       FLUoxetine 20 MG tablet Take 20 mg by mouth every morning.      furosemide (LASIX) 40 MG tablet Take 1 tablet (40 mg) by mouth daily.  Qty: 30 tablet, Refills: 0    Associated Diagnoses: Cirrhosis of liver with ascites, unspecified hepatic cirrhosis type (H); Chronic right-sided congestive heart failure (H)      gabapentin (NEURONTIN) 300 MG capsule Take 1 capsule (300 mg) by mouth 3 times daily for 20 days.  Qty: 60 capsule, Refills: 0      GAVILAX 17 GM/SCOOP powder Take 17 g by mouth daily as needed.      hydrocortisone (CORTAID) 1 % external cream Apply topically daily as needed.      Hydrocortisone (PREPARATION H EX) Externally apply topically daily as needed.      levothyroxine (SYNTHROID/LEVOTHROID) 25 MCG tablet Take 0.25 tablets (6.25 mcg) by mouth every morning (before breakfast).  Qty: 8 tablet, Refills: 0    Associated Diagnoses: Other specified hypothyroidism      loperamide (IMODIUM) 2 MG capsule Take 4 mg by mouth 4 times daily as needed for diarrhea.      loratadine (CLARITIN) 10 MG tablet Take 10 mg by mouth daily as needed for allergies.      magnesium hydroxide (MILK OF MAGNESIA) 400 MG/5ML suspension Take 30 mLs by mouth daily as needed.      metFORMIN (GLUCOPHAGE) 1000 MG tablet Take 1,000 mg by mouth 2 times daily (with meals)      midodrine (PROAMATINE) 5 MG tablet Take 1 tablet (5 mg) by mouth 3 times daily (with meals).  Qty: 30 tablet, Refills: 0    Associated Diagnoses: Hepatorenal syndrome (H)      montelukast (SINGULAIR) 10 MG tablet Take 10 mg by mouth every morning.      nicotine (NICODERM CQ) 21 MG/24HR 24 hr patch Place 1 patch over 24 hours onto the skin daily.  Qty: 28 patch, Refills: 0    Associated Diagnoses: Tobacco use      OLANZapine (ZYPREXA) 10 MG tablet Take 10 mg by mouth At Bedtime      omeprazole (PRILOSEC) 40 MG DR capsule Take 40 mg by mouth every morning.      ondansetron (ZOFRAN ODT) 4 MG ODT tab Take 1 tablet (4 mg) by mouth every 8 hours as needed for  nausea.  Qty: 20 tablet, Refills: 0      pramoxine-calamine (AVEENO) 1-8 % LOTN Apply topically daily as needed for itching.      rosuvastatin (CRESTOR) 10 MG tablet Take 10 mg by mouth At Bedtime      spironolactone (ALDACTONE) 100 MG tablet Take 1 tablet (100 mg) by mouth daily.  Qty: 30 tablet, Refills: 0    Associated Diagnoses: Cirrhosis of liver with ascites, unspecified hepatic cirrhosis type (H); Chronic right-sided congestive heart failure (H)      sucralfate (CARAFATE) 1 GM/10ML suspension Take 10 mLs (1 g) by mouth 4 times daily as needed for nausea (heartburn).      traZODone (DESYREL) 100 MG tablet Take 100 mg by mouth at bedtime.      TRELEGY ELLIPTA 100-62.5-25 MCG/ACT oral inhaler Inhale 1 puff into the lungs every morning.      Urea 40 % CREA Apply topically daily as needed.      Vitamins A & D (VITAMIN A & D) OINT Externally apply topically daily as needed.      witch hazel-glycerin (TUCKS) pad Apply topically as needed for hemorrhoids.           Allergies   Allergies   Allergen Reactions    Apricot Flavoring Agent (Non-Screening) Anaphylaxis    Banana Anaphylaxis     Throat swelling  Throat swelling      Wasp Venom Protein Shortness Of Breath     Other reaction(s): Respiratory Distress  Has an epi pen  Has an epi pen      Bees Anaphylaxis     Have an Epi pen that carries with    Methylphenidate Itching     Other reaction(s): Nightmares    Prunus      Other reaction(s): *Unknown    Sulfa Antibiotics      Headaches and nausea

## 2025-02-09 NOTE — PLAN OF CARE
Problem: Adult Inpatient Plan of Care  Goal: Absence of Hospital-Acquired Illness or Injury  Outcome: Progressing  Intervention: Identify and Manage Fall Risk  Recent Flowsheet Documentation  Taken 2/9/2025 0200 by Nallely Stuart RN  Safety Promotion/Fall Prevention: assistive device/personal items within reach  Taken 2/8/2025 2200 by Nallely Stuart RN  Safety Promotion/Fall Prevention: assistive device/personal items within reach  Intervention: Prevent Skin Injury  Recent Flowsheet Documentation  Taken 2/9/2025 0200 by Nallely Stuart RN  Body Position: position changed independently  Taken 2/8/2025 2200 by Nallely Stuart RN  Body Position: position changed independently   Goal Outcome Evaluation:       Neuro: A&Ox4.   Cardiac: Afebrile, VSS.   Respiratory: is in CPAP  GI/: Voiding spontaneously. No BM this shift.  Diet/appetite: Tolerating regular diet. Denies nausea.  Activity: Up with  independent  Pain: Denies   Skin: No new deficits noted.  Lines: piv SL  Drains: no  Replacements: no

## 2025-02-09 NOTE — PLAN OF CARE
Goal Outcome Evaluation:      Plan of Care Reviewed With: patient    Overall Patient Progress: improving    Patient alert and oriented x 4. Patient received all of his albumin and IV abx early this am.  Patient is independent/sba for mobility. Patient is on Room Air. Patient lives in group home and will need a cab home.     Patient discharged to group home at [address] [employer].  Accompanied by no one and staff.  Discharge instructions were reviewed with patient, opportunity offered to ask questions.    Prescriptions N/A. - group home manages medications  Access discontinued: Yes  Care plan and education discontinued: No - patient lives in group home  Home meds retrieved from pharmacy: N/A  Belongings were sent home with patient/family:  Cell phone/electronics:  , Clothing: Shirt(s):  , Pants:  , Underclothes:  , and Outerwear:  , Contacts/Glasses:  , and Shoes:   .

## 2025-02-10 ENCOUNTER — HOSPITAL ENCOUNTER (OUTPATIENT)
Dept: ULTRASOUND IMAGING | Facility: HOSPITAL | Age: 45
Discharge: HOME | End: 2025-02-10
Attending: INTERNAL MEDICINE | Admitting: INTERNAL MEDICINE
Payer: COMMERCIAL

## 2025-02-10 LAB
GLUCOSE BLDC GLUCOMTR-MCNC: 102 MG/DL (ref 70–99)
HOLD SPECIMEN: NORMAL

## 2025-02-10 PROCEDURE — 272N000710 US PARACENTESIS WITH ALBUMIN

## 2025-02-10 PROCEDURE — 272N000042 US PARACENTESIS WITH ALBUMIN

## 2025-02-11 ENCOUNTER — OFFICE VISIT (OUTPATIENT)
Dept: PHYSICAL MEDICINE AND REHAB | Facility: CLINIC | Age: 45
End: 2025-02-11
Attending: STUDENT IN AN ORGANIZED HEALTH CARE EDUCATION/TRAINING PROGRAM
Payer: COMMERCIAL

## 2025-02-11 VITALS
SYSTOLIC BLOOD PRESSURE: 141 MMHG | BODY MASS INDEX: 47.65 KG/M2 | DIASTOLIC BLOOD PRESSURE: 66 MMHG | WEIGHT: 286 LBS | HEART RATE: 93 BPM | HEIGHT: 65 IN

## 2025-02-11 DIAGNOSIS — J32.9 CHRONIC SINUSITIS, UNSPECIFIED LOCATION: Primary | ICD-10-CM

## 2025-02-11 DIAGNOSIS — G60.0 CHARCOT-MARIE-TOOTH SYNDROME: ICD-10-CM

## 2025-02-11 DIAGNOSIS — M54.12 CERVICAL RADICULOPATHY: ICD-10-CM

## 2025-02-11 DIAGNOSIS — G62.9 NEUROPATHY: ICD-10-CM

## 2025-02-11 DIAGNOSIS — H70.90 MASTOIDITIS, UNSPECIFIED LATERALITY: ICD-10-CM

## 2025-02-11 DIAGNOSIS — M54.41 ACUTE RIGHT-SIDED LOW BACK PAIN WITH RIGHT-SIDED SCIATICA: ICD-10-CM

## 2025-02-11 PROCEDURE — 99204 OFFICE O/P NEW MOD 45 MIN: CPT | Performed by: NURSE PRACTITIONER

## 2025-02-11 RX ORDER — METHOCARBAMOL 500 MG/1
500 TABLET, FILM COATED ORAL 4 TIMES DAILY PRN
Qty: 40 TABLET | Refills: 0 | Status: SHIPPED | OUTPATIENT
Start: 2025-02-11

## 2025-02-11 NOTE — PATIENT INSTRUCTIONS
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   Radiology will call you to schedule. Please call below if you do not hear from them in the next couple of days.     [facility] Scheduling:  Please call [phone] to schedule your image(s) (select option #1).    There are 3 different locations you may go as a walk-in to have same-day Xrays done:    [facility]  [address] [address]    [facility] [place]  [address]   [facility] [address]    [facility]  [address] [address]     ~You have been referred for Physical Therapy to [facility]. They will call you to schedule an appointment.      Scheduling phone number is [phone] [facility], or [place] location.  If you have not heard from the scheduling office within 2 business days, please call [phone] for ALL other locations.    Discussed the importance of core strengthening, ROM, stretching exercises and how each of these entities is important in decreasing pain and improving long term spine health.  The purpose of physical therapy is to teach you an individualized home exercise program.  These exercises need to be performed every day in order to decrease pain and prevent future occurrences of pain.           Continue gabapentin 300mg three times daily     Robaxin 500mg (muscle relaxant medication) has been prescribed today. Please take 1 tablet q6hrs as needed for muscle spasms. This medication may cause drowsiness. Please do not work or drive while taking this medication until you know how it affects you. If it does make you  "drowsy, you should only take it before bedtime or at times that you do not have to work/drive.     TENS unit order placed. Your physical therapist can show you how to use one.    Radicular Pain    Radicular pain in either the arm or leg is usually from a bulging disc in the spine. A portion of the herniated disc may press against the nerves as the nerves exit the spine. This causes pain which is felt down the arm or leg. Other causes of radicular pain may include:  Fractures.  Heart disease.  Cancer.  An abnormal and usually degenerative state of the nervous system or nerves (neuropathy).    In most cases, radicular pain is treated without imaging unless symptoms do not start to improve. If that is the case, your provider may order a CT or MRI scan to determine the cause.     Nerves in the cervical spine (neck) may cause radicular pain into the outer shoulder and down the arm. It can spread down to the thumb and fingers. The symptoms vary depending on which nerve root has been affected. In most cases, radicular pain improves with conservative treatment such as physical therapy, cervical traction, medications, and epidural steroid injections. A program for neck rehabilitation with stretching and strengthening exercises is an important part of management. Treatment may take months, and surgery may be considered as a last resort if the symptoms do not improve.    Nerves in the lumbar spine (lower back) may cause radicular pain into the hip and down the leg. The commonly used term for this type of pain is \"sciatica\". Conservative treatment is also recommended for this problem. Most patients feel better after 2 to 4 weeks of rest and other supportive care. You should avoid bending, lifting, and all other activities which can make your pain worse. Physical therapy, traction, medications, and epidural steroid injections can be good options to help with your recovery. A program for back injury rehabilitation with stretching " and strengthening exercises is an important part of management. Surgery may be considered as a last resort if symptoms do not improve with conservative treatment.     You may take over-the-counter or prescription medicines for pain, discomfort, or fever as directed by your caregiver. Muscle relaxants may help by relieving more severe pain and spasm. Neuropathic medication (such as gabapentin, lyrica, or cymbalta) can help decrease your symptoms of nerve pain as well. Cold or massage can also give significant relief. Spinal manipulation is not recommended as it can increase the degree of disc protrusion. We do not recommend taking narcotic medication such as percocet, oxycodone, norco, dilaudid, or others unless pain is severe and not controlled with any other oral options.    Epidural steroid injections are often effective treatment for radicular pain. These injections deliver strong anti-inflammatory medicine to the area directly around the nerve root in the space between your back bones (vertebrae). Your provider can give you more information about steroid injections. These injections are most effective when given within two weeks of the onset of acute pain. You should see your provider for follow up care as recommended.     In most cases, radicular pain is treated without imaging unless symptoms do not start to improve. If that is the case, your provider may order a CT or MRI scan to determine the cause.     SEEK IMMEDIATE MEDICAL CARE IF:  You develop increased pain, weakness, or numbness in your arm or leg.  You develop difficulty with bladder or bowel control.  You develop abdominal pain.    Document Released: 01/25/2006 Document Revised: 03/11/2013 Document Reviewed: 04/12/2010  Patient Information  2013 [employer].       ~Please call our [facility] Nurse Navigation line [phone] with any questions or concerns about your treatment plan, if symptoms worsen and you would like to be seen urgently,  or if you have any new or worsening numbness, weakness, or problems controlling bladder and bowel function.  ~You are also welcome to contact Alejandrina Waite via [employer], but please be aware that responses to [employer] message may take 2-3 days due to the high volume of patients seen in clinic.

## 2025-02-11 NOTE — LETTER
2/11/2025      Warren Jaramillo  [address]  [facility] [address]      Dear Colleague,    Thank you for referring your patient, Warren Jaramillo, to the [facility]. Please see a copy of my visit note below.    ASSESSMENT: Warren Jaramillo is a 44 year old male who presents for consultation at the request of PCP Clinic, [facility], who presents today for new patient evaluation of:    -Acute right-sided low back pain with right-sided sciatica       Patient has R arm and leg weakness on exam today. Recommend adding a cspine MRI. We reviewed his lumbar MRI and XR. He has a disc osteophyte complex/bulge at L3-4 with narrowing around the nerves on both sides. There is mild to moderate right and mild left neural foraminal stenosis at L3-4 with questionable contact of the martín exiting L3 nerve roots by the disc within the neural foramina. There is mild to moderate right foraminal stenosis at L5-S1. We reviewed options for his back pain. He is not currently a steroid injection candidate given his infection. I recommend starting PT. I added a prn muscle relaxer as well. He will continue gabapentin. I have placed orders for a TENS unit.     Rheum referral for workup charcot yogi tooth - parent states workup was previously abbreviated.  Ent referral for ear/sinus findings on brain MRI.    Discussed with pt's mom over phone who agrees with plan        2/11/2025     1:02 PM   OSWESTRY DISABILITY INDEX   Count 2    Sum 4    Oswestry Score (%) 40 %        Patient-reported            Diagnoses and all orders for this visit:  Chronic sinusitis, unspecified location  -     Adult ENT  Referral; Future  Acute right-sided low back pain with right-sided sciatica  -     Spine  Referral  -     methocarbamol (ROBAXIN) 500 MG tablet; Take 1 tablet (500 mg) by mouth 4 times daily as needed for muscle spasms.  -     Physical Therapy  Referral;  Future  Mastoiditis, unspecified laterality  -     Adult ENT  Referral; Future  Neuropathy  -     Tens Unit/Supplies Order for DME - ONLY FOR DME  Charcot-Estrella-Tooth syndrome  -     Adult Rheumatology  Referral; Future  Cervical radiculopathy  -     MR Cervical Spine w/o Contrast; Future  -     Physical Therapy  Referral; Future      PLAN:  Reviewed spine anatomy and disease process. Discussed diagnosis and treatment options with the patient today. A shared decision making model was used.  The patient's values and choices were respected. The following represents what was discussed and decided upon by the provider and the patient.      -DIAGNOSTIC TESTS:  Images were personally reviewed and interpreted and explained to patient today using a spine model.   -cervical MRI without contrast orders placed    -PHYSICAL THERAPY:    --Referral to PT placed  Discussed the importance of core strengthening, ROM, stretching exercises with the patient and how each of these entities is important in decreasing pain.  Explained to the patient that the purpose of physical therapy is to teach the patient a home exercise program.  These exercises need to be performed every day in order to decrease pain and prevent future occurrences of pain.        -MEDICATIONS:    --start robaxin prn  -continue gabapentin  -not nsaid candidate given anticoagulation  -not po steroids candidate given infection  Discussed multiple medication options today with patient. Discussed risks, side effects, and proper use of medications. Patient verbalized understanding.    -INTERVENTIONS:    -Discussed the role of injections with patient today. Patient is not currently a candidate for injections because of his current infection. We did talk about the role of epidural steroid injections or medial branch blocks in future    -PATIENT EDUCATION:  Total time of 40 minutes, on the day of service, spent with the patient, reviewing the chart,  placing orders, and documenting.   -Today we also discussed the pros and cons of the current treatment plan.    -FOLLOW-UP: after Cspine MRI    Advised patient to call the Spine Center if symptoms worsen, new numbness or weakness develops in the legs, or if they develop new or worsening problems controlling bladder or bowel function.   ______________________________________________________________________    SUBJECTIVE:    HPI:  Warren Jaramillo  Is a 44 year old right handed male on eliquis with hx diabetes, COPD, hypertension, ever's disease, liver failure with ascites, ADHD, fetal alcohol syndrome, autistic disorder, right-sided CHF, dysuria, left bundle branch block, SBP, obesity who presents today for new patient evaluation of acute right-sided low back pain with right-sided sciatica     Patient was referred for spine medicine evaluation by the ED on 1/25 for bilateral shoulder blade pain and R lower/mid back pain. Symptoms began after he rolled over in bed. Got up from the bed to walk and got stuck, had to walk crouched forward. Pain level 7/10 today 10 at worst, 5 at best. He has numbness in the R lower back into the right leg and foot. He sometimes gets it in the right arm and feels like he can't feel his fingers or his hand. No arm pain.  He has noticed some worsening balance, some stumbling.     He has tried tylenol no relief.  He tried ice and heat.  Gabapentin 300mg TID has helped and has allowed him to stand and straighten up. He just started that 2 wks ago.    He has since had several admissions for an abdominal infection and ascites requiring drainage. He is currently on antibiotics.    No fevers, chills.     Here with an associate from his group home today    -Treatment to Date:     -Medications:    Current Outpatient Medications   Medication Sig Dispense Refill     methocarbamol (ROBAXIN) 500 MG tablet Take 1 tablet (500 mg) by mouth 4 times daily as needed for muscle spasms. 40 tablet 0      albuterol (PROAIR HFA/PROVENTIL HFA/VENTOLIN HFA) 108 (90 Base) MCG/ACT inhaler Inhale 1-2 puffs into the lungs every 6 hours as needed for shortness of breath, wheezing or cough 18 g 0     albuterol (PROVENTIL) (2.5 MG/3ML) 0.083% neb solution Take 2.5 mg by nebulization 3 times daily as needed for shortness of breath, wheezing or cough       aloe vera GEL Apply 1 g topically every hour as needed for skin care Per bottle directions       apixaban ANTICOAGULANT (ELIQUIS) 5 MG tablet Take 5 mg by mouth 2 times daily.       bacitracin 500 UNIT/GM OINT Apply topically 3 times daily as needed for wound care       Benzocaine (SOLARCAINE ALOE VERA EX) Externally apply topically daily as needed.       benzonatate (TESSALON) 200 MG capsule Take 1 capsule (200 mg) by mouth 3 times daily as needed for cough. 50 capsule 0     Calamine external lotion Apply topically as needed for itching       carbamide peroxide (DEBROX) 6.5 % otic solution Place 5 drops into both ears daily as needed for other.       ciprofloxacin (CIPRO) 500 MG tablet Take 1 tablet (500 mg) by mouth 2 times daily for 5 days. 10 tablet 0     clotrimazole (LOTRIMIN) 1 % external cream Apply topically 2 times daily as needed (skin irritation)       dextromethorphan-guaiFENesin (MUCINEX DM)  MG 12 hr tablet Take 1 tablet by mouth 2 times daily as needed.       diclofenac (VOLTAREN) 1 % topical gel Apply 2 g topically daily as needed for moderate pain To joints/back       dicyclomine (BENTYL) 20 MG tablet Take 1 tablet (20 mg) by mouth 4 times daily as needed (abdominal pain). 20 tablet 0     EPINEPHrine (ANY BX GENERIC EQUIV) 0.3 MG/0.3ML injection 2-pack Inject 0.3 mg into the muscle as needed for anaphylaxis. May repeat one time in 5-15 minutes if response to initial dose is inadequate.       famotidine (PEPCID) 20 MG tablet Take 1 tablet (20 mg) by mouth 2 times daily 60 tablet 0     FLUoxetine 20 MG tablet Take 20 mg by mouth every morning.        furosemide (LASIX) 40 MG tablet Take 1 tablet (40 mg) by mouth daily. 30 tablet 0     gabapentin (NEURONTIN) 300 MG capsule Take 1 capsule (300 mg) by mouth 3 times daily for 20 days. 60 capsule 0     GAVILAX 17 GM/SCOOP powder Take 17 g by mouth daily as needed.       hydrocortisone (CORTAID) 1 % external cream Apply topically daily as needed.       Hydrocortisone (PREPARATION H EX) Externally apply topically daily as needed.       levothyroxine (SYNTHROID/LEVOTHROID) 25 MCG tablet Take 0.25 tablets (6.25 mcg) by mouth every morning (before breakfast). 8 tablet 0     loperamide (IMODIUM) 2 MG capsule Take 4 mg by mouth 4 times daily as needed for diarrhea.       loratadine (CLARITIN) 10 MG tablet Take 10 mg by mouth daily as needed for allergies.       magnesium hydroxide (MILK OF MAGNESIA) 400 MG/5ML suspension Take 30 mLs by mouth daily as needed.       metFORMIN (GLUCOPHAGE) 1000 MG tablet Take 1,000 mg by mouth 2 times daily (with meals)       midodrine (PROAMATINE) 5 MG tablet Take 1 tablet (5 mg) by mouth 3 times daily (with meals). 30 tablet 0     montelukast (SINGULAIR) 10 MG tablet Take 10 mg by mouth every morning.       nicotine (NICODERM CQ) 21 MG/24HR 24 hr patch Place 1 patch over 24 hours onto the skin daily. 28 patch 0     OLANZapine (ZYPREXA) 10 MG tablet Take 10 mg by mouth At Bedtime       omeprazole (PRILOSEC) 40 MG DR capsule Take 40 mg by mouth every morning.       ondansetron (ZOFRAN ODT) 4 MG ODT tab Take 1 tablet (4 mg) by mouth every 8 hours as needed for nausea. 20 tablet 0     pramoxine-calamine (AVEENO) 1-8 % LOTN Apply topically daily as needed for itching.       rosuvastatin (CRESTOR) 10 MG tablet Take 10 mg by mouth At Bedtime       spironolactone (ALDACTONE) 100 MG tablet Take 1 tablet (100 mg) by mouth daily. 30 tablet 0     sucralfate (CARAFATE) 1 GM/10ML suspension Take 10 mLs (1 g) by mouth 4 times daily as needed for nausea (heartburn).       traZODone (DESYREL) 100 MG tablet  "Take 100 mg by mouth at bedtime.       TRELEGY ELLIPTA 100-62.5-25 MCG/ACT oral inhaler Inhale 1 puff into the lungs every morning.       Urea 40 % CREA Apply topically daily as needed.       Vitamins A & D (VITAMIN A & D) OINT Externally apply topically daily as needed.       witch hazel-glycerin (TUCKS) pad Apply topically as needed for hemorrhoids.       No current facility-administered medications for this visit.       Allergies   Allergen Reactions     Apricot Flavoring Agent (Non-Screening) Anaphylaxis     Banana Anaphylaxis     Throat swelling  Throat swelling       Wasp Venom Protein Shortness Of Breath     Other reaction(s): Respiratory Distress  Has an epi pen  Has an epi pen       Bees Anaphylaxis     Have an Epi pen that carries with     Methylphenidate Itching     Other reaction(s): Nightmares     Prunus      Other reaction(s): *Unknown     Sulfa Antibiotics      Headaches and nausea       Past Medical History:   Diagnosis Date     COPD exacerbation (H) 12/2/2024     DM2 (diabetes mellitus, type 2) (H) 4/28/2020     HTN (hypertension) 7/30/2012     Thyroid nodule 7/31/2019     Rojas's disease (H)         Patient Active Problem List   Diagnosis     Attention deficit hyperactivity disorder (ADHD)     Other specified delay in development     Tobacco use     Elevated WBCs     Rectal bleeding     Anal fissure     \"high flow priapism\"      CARDIOVASCULAR SCREENING; LDL GOAL LESS THAN 160     PTSD (post-traumatic stress disorder)     Fetal alcohol syndromes     Seasonal allergic rhinitis     Steatohepatitis     Cardiomegaly     Shortness of breath     Chest pain, unspecified type     Acute right hip pain     Chronic right-sided congestive heart failure (H)     Active autistic disorder     Adjustment disorder with disturbance of conduct     Angiomyolipoma     Ankylosis, sacroiliac joint     Ascites     Charcot-Estrella-Tooth syndrome     Chronic insomnia     Congestive heart failure (H)     DM2 (diabetes " mellitus, type 2) (H)     DM2 (diabetes mellitus, type 2) (H)     Dysphagia     Dysuria     Elevated d-dimer     Episodic mood disorder     Excessive daytime sleepiness     Gait instability     H/O fall     Hematuria     Benign essential hypertension     Hyperglycemia     Incontinence     Myelolipoma of adrenal gland     AVA treated with BiPAP     Abdominal pain, right upper quadrant     PVC's (premature ventricular contractions)     SVT (supraventricular tachycardia)     Heart failure with preserved ejection fraction, NYHA class I (H)     Incomplete left bundle branch block (LBBB)     Suicidal ideation     Cirrhosis of liver with ascites, unspecified hepatic cirrhosis type (H)     Thyroid nodule     ADHD (attention deficit hyperactivity disorder)     Chest pain     Morbid obesity (H)     COPD exacerbation (H)     DVT of axillary vein, acute left (H)     Abdominal bloating     LLQ abdominal pain     SBP (spontaneous bacterial peritonitis) (H)     ASNHL (asymmetrical sensorineural hearing loss)     Traumatic brain injury (H)     Tongue lesion     Acute kidney failure, unspecified     Other specified hypothyroidism     Medication induced coagulopathy     Normal anion gap metabolic acidosis     Diffuse abdominal pain     Portal hypertension (H)       Past Surgical History:   Procedure Laterality Date     COLONOSCOPY       ESOPHAGOSCOPY, GASTROSCOPY, DUODENOSCOPY (EGD), COMBINED N/A 7/21/2023    Procedure: ESOPHAGOGASTRODUODENOSCOPY WITH GASTRIC AND ESOPHAGEAL BIOPSIES;  Surgeon: Filiberto Aragon MD;  [place] [facility] [place]     TOOTH EXTRACTION         Family History   Problem Relation Age of Onset     Unknown/Adopted Father      Unknown/Adopted Maternal Grandmother      C.A.D. Maternal Grandfather      Diabetes Maternal Grandfather      Cerebrovascular Disease Maternal Grandfather      Unknown/Adopted Paternal Grandmother      Unknown/Adopted Paternal Grandfather      Unknown/Adopted Brother      Unknown/Adopted  "Sister        Reviewed past medical, surgical, and family history with patient found on new patient intake packet located in EMR Media tab.     SOCIAL HX: smoker, no alcohol use, no heavy drinking, rec drug use    ROS: positive for weight gain, headache, hoarseness, difficulty swallowing, changes in vision, chest pain, palpitations, feet/leg swelling, color changes in hands/feet, sob, enlarged lymph nodes, abdominal pain, reflux, loss of bowel control, difficulty urinating, loss of bladder control, joint pain, muscle pain, muscle fatigue, sciatica, imbalance, dizziness, easy bruising, insomnia, excessive tiredness, anxiety, depression. Specifically negative for bowel/bladder dysfunction, balance changes, headache, foot drop, fevers, chills, appetite changes, nausea/vomiting, unexplained weight loss. Otherwise 13 systems reviewed are negative. Please see the patient's intake questionnaire from today for details.    OBJECTIVE:  BP (!) 141/66   Pulse 93   Ht 5' 5\" (1.651 m)   Wt 286 lb (129.7 kg)   BMI 47.59 kg/m      PHYSICAL EXAMINATION:    --CONSTITUTIONAL:  Vital signs as above.  No acute distress.  The patient is well nourished and well groomed.  --PSYCHIATRIC:  Appropriate mood and affect. The patient is awake, alert, oriented to person, place, time and answering questions appropriately with clear speech.    --SKIN:  Skin over the face, bilateral lower extremities, and posterior torso is clean, dry, intact. Appears to have papular rash hands/wrists and feet/ankles   --RESPIRATORY: Normal rhythm and effort. No abnormal accessory muscle breathing patterns noted.   --ABDOMINAL:  Non-distended.    --GROSS MOTOR: Gait is non-antalgic. Easily arises from a seated position.     UPPER EXTREMITY MOTOR TESTING:  deltoids right 5/5, left 5/5  triceps: right 5/5, left 5/5  biceps:  right 5/5, left 5/5  handgrips: right 4/5, left 5/5  extensors: right 4-/5, left 4/5  intrinsics: right 4-/5, left 5/5    Sensation in the " upper extremities is intact throughout both arms  Reflexes 1/4 martín upper extremities     --LOWER EXTREMITY MOTOR TESTING:  Hip flexion: right 5/5, left 5/5  Quads: right 4+/5, left 5/5  Hamstrings: right 5/5, left 5/5  Dorsiflexion: right 4/5, left 5/5  Plantar flexion: right 5/5, left 5/5    Great toe MTP extension/EHL: right 5/5, left 5/5    --NEUROLOGICAL: 2/4 patellar and achilles reflexes bilaterally. Sensation to light touch is intact throughout both lower extremities. Babinski is negative. No clonus. Negative juan manuel    --MUSCULOSKELETAL: Lumbar spine inspection reveals no evidence of deformity. No point tenderness to palpation lumbar spine. Some left lower thoracic and R lumbar paraspinal musculature tenderness.     Straight leg raising is negative.    --SACROILIAC JOINT: One finger point test was negative. Negative SI joint tenderness to palpation bilaterally.    --VASCULAR:  Bilateral lower extremities are warm without any discoloration.  There is no pitting edema of the bilateral lower extremities.    RESULTS:   Prior medical records from [facility] Everywhere were reviewed today.    Imaging: Spine imaging was personally reviewed and interpreted today. The images were shown to the patient and the findings were explained using a spine model.    Lumbar spine MRI w/o contrast    Result Date: 1/26/2025  EXAM: MR LUMBAR SPINE W/O CONTRAST LOCATION: [facility] DATE: 1/26/2025 INDICATION: Low back pain COMPARISON: None. TECHNIQUE: Routine Lumbar Spine MRI without IV contrast. FINDINGS: Nomenclature is based on 5 lumbar type vertebral bodies. Normal vertebral body heights and alignment. Normal marrow signal pattern. Subcentimeter osseous hemangiomas are noted involving the T12 and T11 vertebral bodies. No suspicious marrow edema identified. Normal distal spinal cord and cauda equina with conus medullaris at L1-L2.. No extraspinal abnormality. Unremarkable visualized bony  pelvis. T12-L1: Normal disc height. Normal disc signal. No significant disc herniation. No canal or foraminal stenosis. L1-L2: Normal disc height. Normal disc signal. Minimal facet arthropathy. No significant disc herniation. No canal stenosis. No significant right neural foraminal stenosis. No significant left neural foraminal stenosis. L2-L3: Normal disc height. Normal disc signal. Minimal facet arthropathy. No significant disc herniation. No significant canal stenosis. No significant left neural foraminal stenosis. No significant right neural foraminal stenosis. L3-L4: Disc desiccation. Mild disc height loss. Mild facet arthropathy. Trace concentric disc bulge. No significant canal stenosis. Mild left neural foraminal stenosis. Mild to moderate right neural foraminal stenosis. Question contact of the bilateral exiting L3 nerve roots by the disc within the neural foramina (image 5 series 3 and image 12 series 3). L4-L5: Disc desiccation. Mild disc height loss. Mild concentric disc osteophyte complex/bulge. Mild bilateral facet arthropathy. No significant central spinal canal stenosis. Moderate to severe left neural foraminal stenosis. Moderate right neural foraminal stenosis. There is contact of the exiting left L4 nerve root by the facet and disc osteophyte within the neural foramen. Question contact the exiting right L4 nerve root within the neural foramen. L5-S1: Disc desiccation. Mild disc height loss. Bilateral facet arthropathy. Shallow disc bulge. No canal stenosis. Minimal left neural foraminal stenosis. Mild to moderate right neural foraminal stenosis.     IMPRESSION: 1.  Multilevel degenerative changes of the lumbar spine, as above. 2.  At L4-L5, there is no significant central spinal canal stenosis, however there is moderate to severe left and moderate right neural foraminal stenosis. There is contact of the exiting left L4 nerve root by the facet and disc osteophyte within the neural foramen at this  level. Correlation for a left L4 radiculopathy is recommended. 3.  At L3-L4, there is mild left and mild to moderate right neural foraminal stenosis. Question contact of the bilateral exiting L3 nerve roots by the disc within the neural foramina at this level. Correlation for bilateral L3 radiculopathies is recommended. 4.  At L5-S1, there is minimal left and mild to moderate right neural foraminal stenosis.      XR Lumbar Spine 2/3 Views    Result Date: 1/25/2025  EXAM: XR LUMBAR SPINE 2/3 VIEWS LOCATION: [facility] DATE: 1/25/2025 INDICATION: lumbar pain with DDD COMPARISON: CT dated 09/05/2023     IMPRESSION: 6 nonrib-bearing lumbar-type vertebrae. No fracture. Normal vertebral heights and alignment. Mild degenerative disc disease from T12 to L3 and from L4 to S1. The L3-L4 disc space is preserved. Mild degenerative arthritis articular facets from  L5 to S1. Normal extraspinal structures.      This note was dictated using voice recognition software. Any grammatical or context distortions are unintentional and inherent to the software.       Alejandrina Waite FNP-C  [facility]  O. [phone]             Again, thank you for allowing me to participate in the care of your patient.        Sincerely,        ABBIE Coppola CNP    Electronically signed

## 2025-02-11 NOTE — PROGRESS NOTES
ASSESSMENT: Warren Jaramillo is a 44 year old male who presents for consultation at the request of PCP Clinic, [facility], who presents today for new patient evaluation of:    -Acute right-sided low back pain with right-sided sciatica       Patient has R arm and leg weakness on exam today. Recommend adding a cspine MRI. We reviewed his lumbar MRI and XR. He has a disc osteophyte complex/bulge at L3-4 with narrowing around the nerves on both sides. There is mild to moderate right and mild left neural foraminal stenosis at L3-4 with questionable contact of the martín exiting L3 nerve roots by the disc within the neural foramina. There is mild to moderate right foraminal stenosis at L5-S1. We reviewed options for his back pain. He is not currently a steroid injection candidate given his infection. I recommend starting PT. I added a prn muscle relaxer as well. He will continue gabapentin. I have placed orders for a TENS unit.     Rheum referral for workup charcot estrella tooth - parent states workup was previously abbreviated.  Ent referral for ear/sinus findings on brain MRI.    Discussed with pt's mom over phone who agrees with plan        2/11/2025     1:02 PM   OSWESTRY DISABILITY INDEX   Count 2    Sum 4    Oswestry Score (%) 40 %        Patient-reported            Diagnoses and all orders for this visit:  Chronic sinusitis, unspecified location  -     Adult ENT  Referral; Future  Acute right-sided low back pain with right-sided sciatica  -     Spine  Referral  -     methocarbamol (ROBAXIN) 500 MG tablet; Take 1 tablet (500 mg) by mouth 4 times daily as needed for muscle spasms.  -     Physical Therapy  Referral; Future  Mastoiditis, unspecified laterality  -     Adult ENT  Referral; Future  Neuropathy  -     Tens Unit/Supplies Order for DME - ONLY FOR DME  Charcot-Estrella-Tooth syndrome  -     Adult Rheumatology  Referral; Future  Cervical  radiculopathy  -     MR Cervical Spine w/o Contrast; Future  -     Physical Therapy  Referral; Future      PLAN:  Reviewed spine anatomy and disease process. Discussed diagnosis and treatment options with the patient today. A shared decision making model was used.  The patient's values and choices were respected. The following represents what was discussed and decided upon by the provider and the patient.      -DIAGNOSTIC TESTS:  Images were personally reviewed and interpreted and explained to patient today using a spine model.   -cervical MRI without contrast orders placed    -PHYSICAL THERAPY:    --Referral to PT placed  Discussed the importance of core strengthening, ROM, stretching exercises with the patient and how each of these entities is important in decreasing pain.  Explained to the patient that the purpose of physical therapy is to teach the patient a home exercise program.  These exercises need to be performed every day in order to decrease pain and prevent future occurrences of pain.        -MEDICATIONS:    --start robaxin prn  -continue gabapentin  -not nsaid candidate given anticoagulation  -not po steroids candidate given infection  Discussed multiple medication options today with patient. Discussed risks, side effects, and proper use of medications. Patient verbalized understanding.    -INTERVENTIONS:    -Discussed the role of injections with patient today. Patient is not currently a candidate for injections because of his current infection. We did talk about the role of epidural steroid injections or medial branch blocks in future    -PATIENT EDUCATION:  Total time of 40 minutes, on the day of service, spent with the patient, reviewing the chart, placing orders, and documenting.   -Today we also discussed the pros and cons of the current treatment plan.    -FOLLOW-UP: after Cspine MRI    Advised patient to call the Spine Center if symptoms worsen, new numbness or weakness develops in the  legs, or if they develop new or worsening problems controlling bladder or bowel function.   ______________________________________________________________________    SUBJECTIVE:    HPI:  Warren Jaramillo  Is a 44 year old right handed male on eliquis with hx diabetes, COPD, hypertension, ever's disease, liver failure with ascites, ADHD, fetal alcohol syndrome, autistic disorder, right-sided CHF, dysuria, left bundle branch block, SBP, obesity who presents today for new patient evaluation of acute right-sided low back pain with right-sided sciatica     Patient was referred for spine medicine evaluation by the ED on 1/25 for bilateral shoulder blade pain and R lower/mid back pain. Symptoms began after he rolled over in bed. Got up from the bed to walk and got stuck, had to walk crouched forward. Pain level 7/10 today 10 at worst, 5 at best. He has numbness in the R lower back into the right leg and foot. He sometimes gets it in the right arm and feels like he can't feel his fingers or his hand. No arm pain.  He has noticed some worsening balance, some stumbling.     He has tried tylenol no relief.  He tried ice and heat.  Gabapentin 300mg TID has helped and has allowed him to stand and straighten up. He just started that 2 wks ago.    He has since had several admissions for an abdominal infection and ascites requiring drainage. He is currently on antibiotics.    No fevers, chills.     Here with an associate from his group home today    -Treatment to Date:     -Medications:    Current Outpatient Medications   Medication Sig Dispense Refill    methocarbamol (ROBAXIN) 500 MG tablet Take 1 tablet (500 mg) by mouth 4 times daily as needed for muscle spasms. 40 tablet 0    albuterol (PROAIR HFA/PROVENTIL HFA/VENTOLIN HFA) 108 (90 Base) MCG/ACT inhaler Inhale 1-2 puffs into the lungs every 6 hours as needed for shortness of breath, wheezing or cough 18 g 0    albuterol (PROVENTIL) (2.5 MG/3ML) 0.083% neb solution  Take 2.5 mg by nebulization 3 times daily as needed for shortness of breath, wheezing or cough      aloe vera GEL Apply 1 g topically every hour as needed for skin care Per bottle directions      apixaban ANTICOAGULANT (ELIQUIS) 5 MG tablet Take 5 mg by mouth 2 times daily.      bacitracin 500 UNIT/GM OINT Apply topically 3 times daily as needed for wound care      Benzocaine (SOLARCAINE ALOE VERA EX) Externally apply topically daily as needed.      benzonatate (TESSALON) 200 MG capsule Take 1 capsule (200 mg) by mouth 3 times daily as needed for cough. 50 capsule 0    Calamine external lotion Apply topically as needed for itching      carbamide peroxide (DEBROX) 6.5 % otic solution Place 5 drops into both ears daily as needed for other.      ciprofloxacin (CIPRO) 500 MG tablet Take 1 tablet (500 mg) by mouth 2 times daily for 5 days. 10 tablet 0    clotrimazole (LOTRIMIN) 1 % external cream Apply topically 2 times daily as needed (skin irritation)      dextromethorphan-guaiFENesin (MUCINEX DM)  MG 12 hr tablet Take 1 tablet by mouth 2 times daily as needed.      diclofenac (VOLTAREN) 1 % topical gel Apply 2 g topically daily as needed for moderate pain To joints/back      dicyclomine (BENTYL) 20 MG tablet Take 1 tablet (20 mg) by mouth 4 times daily as needed (abdominal pain). 20 tablet 0    EPINEPHrine (ANY BX GENERIC EQUIV) 0.3 MG/0.3ML injection 2-pack Inject 0.3 mg into the muscle as needed for anaphylaxis. May repeat one time in 5-15 minutes if response to initial dose is inadequate.      famotidine (PEPCID) 20 MG tablet Take 1 tablet (20 mg) by mouth 2 times daily 60 tablet 0    FLUoxetine 20 MG tablet Take 20 mg by mouth every morning.      furosemide (LASIX) 40 MG tablet Take 1 tablet (40 mg) by mouth daily. 30 tablet 0    gabapentin (NEURONTIN) 300 MG capsule Take 1 capsule (300 mg) by mouth 3 times daily for 20 days. 60 capsule 0    GAVILAX 17 GM/SCOOP powder Take 17 g by mouth daily as needed.       hydrocortisone (CORTAID) 1 % external cream Apply topically daily as needed.      Hydrocortisone (PREPARATION H EX) Externally apply topically daily as needed.      levothyroxine (SYNTHROID/LEVOTHROID) 25 MCG tablet Take 0.25 tablets (6.25 mcg) by mouth every morning (before breakfast). 8 tablet 0    loperamide (IMODIUM) 2 MG capsule Take 4 mg by mouth 4 times daily as needed for diarrhea.      loratadine (CLARITIN) 10 MG tablet Take 10 mg by mouth daily as needed for allergies.      magnesium hydroxide (MILK OF MAGNESIA) 400 MG/5ML suspension Take 30 mLs by mouth daily as needed.      metFORMIN (GLUCOPHAGE) 1000 MG tablet Take 1,000 mg by mouth 2 times daily (with meals)      midodrine (PROAMATINE) 5 MG tablet Take 1 tablet (5 mg) by mouth 3 times daily (with meals). 30 tablet 0    montelukast (SINGULAIR) 10 MG tablet Take 10 mg by mouth every morning.      nicotine (NICODERM CQ) 21 MG/24HR 24 hr patch Place 1 patch over 24 hours onto the skin daily. 28 patch 0    OLANZapine (ZYPREXA) 10 MG tablet Take 10 mg by mouth At Bedtime      omeprazole (PRILOSEC) 40 MG DR capsule Take 40 mg by mouth every morning.      ondansetron (ZOFRAN ODT) 4 MG ODT tab Take 1 tablet (4 mg) by mouth every 8 hours as needed for nausea. 20 tablet 0    pramoxine-calamine (AVEENO) 1-8 % LOTN Apply topically daily as needed for itching.      rosuvastatin (CRESTOR) 10 MG tablet Take 10 mg by mouth At Bedtime      spironolactone (ALDACTONE) 100 MG tablet Take 1 tablet (100 mg) by mouth daily. 30 tablet 0    sucralfate (CARAFATE) 1 GM/10ML suspension Take 10 mLs (1 g) by mouth 4 times daily as needed for nausea (heartburn).      traZODone (DESYREL) 100 MG tablet Take 100 mg by mouth at bedtime.      TRELEGY ELLIPTA 100-62.5-25 MCG/ACT oral inhaler Inhale 1 puff into the lungs every morning.      Urea 40 % CREA Apply topically daily as needed.      Vitamins A & D (VITAMIN A & D) OINT Externally apply topically daily as needed.      witch  "zully-glycerin (TUCKS) pad Apply topically as needed for hemorrhoids.       No current facility-administered medications for this visit.       Allergies   Allergen Reactions    Apricot Flavoring Agent (Non-Screening) Anaphylaxis    Banana Anaphylaxis     Throat swelling  Throat swelling      Wasp Venom Protein Shortness Of Breath     Other reaction(s): Respiratory Distress  Has an epi pen  Has an epi pen      Bees Anaphylaxis     Have an Epi pen that carries with    Methylphenidate Itching     Other reaction(s): Nightmares    Prunus      Other reaction(s): *Unknown    Sulfa Antibiotics      Headaches and nausea       Past Medical History:   Diagnosis Date    COPD exacerbation (H) 12/2/2024    DM2 (diabetes mellitus, type 2) (H) 4/28/2020    HTN (hypertension) 7/30/2012    Thyroid nodule 7/31/2019    Rojas's disease (H)         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    Tobacco use    Elevated WBCs    Rectal bleeding    Anal fissure    \"high flow priapism\"     CARDIOVASCULAR SCREENING; LDL GOAL LESS THAN 160    PTSD (post-traumatic stress disorder)    Fetal alcohol syndromes    Seasonal allergic rhinitis    Steatohepatitis    Cardiomegaly    Shortness of breath    Chest pain, unspecified type    Acute right hip pain    Chronic right-sided congestive heart failure (H)    Active autistic disorder    Adjustment disorder with disturbance of conduct    Angiomyolipoma    Ankylosis, sacroiliac joint    Ascites    Charcot-Estrella-Tooth syndrome    Chronic insomnia    Congestive heart failure (H)    DM2 (diabetes mellitus, type 2) (H)    DM2 (diabetes mellitus, type 2) (H)    Dysphagia    Dysuria    Elevated d-dimer    Episodic mood disorder    Excessive daytime sleepiness    Gait instability    H/O fall    Hematuria    Benign essential hypertension    Hyperglycemia    Incontinence    Myelolipoma of adrenal gland    AVA treated with " BiPAP    Abdominal pain, right upper quadrant    PVC's (premature ventricular contractions)    SVT (supraventricular tachycardia)    Heart failure with preserved ejection fraction, NYHA class I (H)    Incomplete left bundle branch block (LBBB)    Suicidal ideation    Cirrhosis of liver with ascites, unspecified hepatic cirrhosis type (H)    Thyroid nodule    ADHD (attention deficit hyperactivity disorder)    Chest pain    Morbid obesity (H)    COPD exacerbation (H)    DVT of axillary vein, acute left (H)    Abdominal bloating    LLQ abdominal pain    SBP (spontaneous bacterial peritonitis) (H)    ASNHL (asymmetrical sensorineural hearing loss)    Traumatic brain injury (H)    Tongue lesion    Acute kidney failure, unspecified    Other specified hypothyroidism    Medication induced coagulopathy    Normal anion gap metabolic acidosis    Diffuse abdominal pain    Portal hypertension (H)       Past Surgical History:   Procedure Laterality Date    COLONOSCOPY      ESOPHAGOSCOPY, GASTROSCOPY, DUODENOSCOPY (EGD), COMBINED N/A 7/21/2023    Procedure: ESOPHAGOGASTRODUODENOSCOPY WITH GASTRIC AND ESOPHAGEAL BIOPSIES;  Surgeon: Filiberto Aragon MD;  Location: [facility] OR    TOOTH EXTRACTION         Family History   Problem Relation Age of Onset    Unknown/Adopted Father     Unknown/Adopted Maternal Grandmother     C.A.D. Maternal Grandfather     Diabetes Maternal Grandfather     Cerebrovascular Disease Maternal Grandfather     Unknown/Adopted Paternal Grandmother     Unknown/Adopted Paternal Grandfather     Unknown/Adopted Brother     Unknown/Adopted Sister        Reviewed past medical, surgical, and family history with patient found on new patient intake packet located in EMR Media tab.     SOCIAL HX: smoker, no alcohol use, no heavy drinking, rec drug use    ROS: positive for weight gain, headache, hoarseness, difficulty swallowing, changes in vision, chest pain, palpitations, feet/leg swelling, color changes in  "hands/feet, sob, enlarged lymph nodes, abdominal pain, reflux, loss of bowel control, difficulty urinating, loss of bladder control, joint pain, muscle pain, muscle fatigue, sciatica, imbalance, dizziness, easy bruising, insomnia, excessive tiredness, anxiety, depression. Specifically negative for bowel/bladder dysfunction, balance changes, headache, foot drop, fevers, chills, appetite changes, nausea/vomiting, unexplained weight loss. Otherwise 13 systems reviewed are negative. Please see the patient's intake questionnaire from today for details.    OBJECTIVE:  BP (!) 141/66   Pulse 93   Ht 5' 5\" (1.651 m)   Wt 286 lb (129.7 kg)   BMI 47.59 kg/m      PHYSICAL EXAMINATION:    --CONSTITUTIONAL:  Vital signs as above.  No acute distress.  The patient is well nourished and well groomed.  --PSYCHIATRIC:  Appropriate mood and affect. The patient is awake, alert, oriented to person, place, time and answering questions appropriately with clear speech.    --SKIN:  Skin over the face, bilateral lower extremities, and posterior torso is clean, dry, intact. Appears to have papular rash hands/wrists and feet/ankles   --RESPIRATORY: Normal rhythm and effort. No abnormal accessory muscle breathing patterns noted.   --ABDOMINAL:  Non-distended.    --GROSS MOTOR: Gait is non-antalgic. Easily arises from a seated position.     UPPER EXTREMITY MOTOR TESTING:  deltoids right 5/5, left 5/5  triceps: right 5/5, left 5/5  biceps:  right 5/5, left 5/5  handgrips: right 4/5, left 5/5  extensors: right 4-/5, left 4/5  intrinsics: right 4-/5, left 5/5    Sensation in the upper extremities is intact throughout both arms  Reflexes 1/4 martín upper extremities     --LOWER EXTREMITY MOTOR TESTING:  Hip flexion: right 5/5, left 5/5  Quads: right 4+/5, left 5/5  Hamstrings: right 5/5, left 5/5  Dorsiflexion: right 4/5, left 5/5  Plantar flexion: right 5/5, left 5/5    Great toe MTP extension/EHL: right 5/5, left 5/5    --NEUROLOGICAL: 2/4 " patellar and achilles reflexes bilaterally. Sensation to light touch is intact throughout both lower extremities. Babinski is negative. No clonus. Negative juan manuel    --MUSCULOSKELETAL: Lumbar spine inspection reveals no evidence of deformity. No point tenderness to palpation lumbar spine. Some left lower thoracic and R lumbar paraspinal musculature tenderness.     Straight leg raising is negative.    --SACROILIAC JOINT: One finger point test was negative. Negative SI joint tenderness to palpation bilaterally.    --VASCULAR:  Bilateral lower extremities are warm without any discoloration.  There is no pitting edema of the bilateral lower extremities.    RESULTS:   Prior medical records from [facility] and Care Everywhere were reviewed today.    Imaging: Spine imaging was personally reviewed and interpreted today. The images were shown to the patient and the findings were explained using a spine model.    Lumbar spine MRI w/o contrast    Result Date: 1/26/2025  EXAM: MR LUMBAR SPINE W/O CONTRAST LOCATION: [facility] DATE: 1/26/2025 INDICATION: Low back pain COMPARISON: None. TECHNIQUE: Routine Lumbar Spine MRI without IV contrast. FINDINGS: Nomenclature is based on 5 lumbar type vertebral bodies. Normal vertebral body heights and alignment. Normal marrow signal pattern. Subcentimeter osseous hemangiomas are noted involving the T12 and T11 vertebral bodies. No suspicious marrow edema identified. Normal distal spinal cord and cauda equina with conus medullaris at L1-L2.. No extraspinal abnormality. Unremarkable visualized bony pelvis. T12-L1: Normal disc height. Normal disc signal. No significant disc herniation. No canal or foraminal stenosis. L1-L2: Normal disc height. Normal disc signal. Minimal facet arthropathy. No significant disc herniation. No canal stenosis. No significant right neural foraminal stenosis. No significant left neural foraminal stenosis. L2-L3: Normal disc height.  Normal disc signal. Minimal facet arthropathy. No significant disc herniation. No significant canal stenosis. No significant left neural foraminal stenosis. No significant right neural foraminal stenosis. L3-L4: Disc desiccation. Mild disc height loss. Mild facet arthropathy. Trace concentric disc bulge. No significant canal stenosis. Mild left neural foraminal stenosis. Mild to moderate right neural foraminal stenosis. Question contact of the bilateral exiting L3 nerve roots by the disc within the neural foramina (image 5 series 3 and image 12 series 3). L4-L5: Disc desiccation. Mild disc height loss. Mild concentric disc osteophyte complex/bulge. Mild bilateral facet arthropathy. No significant central spinal canal stenosis. Moderate to severe left neural foraminal stenosis. Moderate right neural foraminal stenosis. There is contact of the exiting left L4 nerve root by the facet and disc osteophyte within the neural foramen. Question contact the exiting right L4 nerve root within the neural foramen. L5-S1: Disc desiccation. Mild disc height loss. Bilateral facet arthropathy. Shallow disc bulge. No canal stenosis. Minimal left neural foraminal stenosis. Mild to moderate right neural foraminal stenosis.     IMPRESSION: 1.  Multilevel degenerative changes of the lumbar spine, as above. 2.  At L4-L5, there is no significant central spinal canal stenosis, however there is moderate to severe left and moderate right neural foraminal stenosis. There is contact of the exiting left L4 nerve root by the facet and disc osteophyte within the neural foramen at this level. Correlation for a left L4 radiculopathy is recommended. 3.  At L3-L4, there is mild left and mild to moderate right neural foraminal stenosis. Question contact of the bilateral exiting L3 nerve roots by the disc within the neural foramina at this level. Correlation for bilateral L3 radiculopathies is recommended. 4.  At L5-S1, there is minimal left and mild to  moderate right neural foraminal stenosis.      XR Lumbar Spine 2/3 Views    Result Date: 1/25/2025  EXAM: XR LUMBAR SPINE 2/3 VIEWS LOCATION: [facility] DATE: 1/25/2025 INDICATION: lumbar pain with DDD COMPARISON: CT dated 09/05/2023     IMPRESSION: 6 nonrib-bearing lumbar-type vertebrae. No fracture. Normal vertebral heights and alignment. Mild degenerative disc disease from T12 to L3 and from L4 to S1. The L3-L4 disc space is preserved. Mild degenerative arthritis articular facets from  L5 to S1. Normal extraspinal structures.      This note was dictated using voice recognition software. Any grammatical or context distortions are unintentional and inherent to the software.       Alejandrina Waite FNP-C  [facility]  O. [phone]

## 2025-02-12 ENCOUNTER — HOSPITAL ENCOUNTER (OUTPATIENT)
Dept: CT IMAGING | Facility: HOSPITAL | Age: 45
Discharge: HOME | End: 2025-02-12
Attending: INTERNAL MEDICINE
Payer: COMMERCIAL

## 2025-02-12 DIAGNOSIS — R18.8 CIRRHOSIS OF LIVER WITH ASCITES, UNSPECIFIED HEPATIC CIRRHOSIS TYPE (H): ICD-10-CM

## 2025-02-12 DIAGNOSIS — K74.60 CIRRHOSIS OF LIVER WITH ASCITES, UNSPECIFIED HEPATIC CIRRHOSIS TYPE (H): ICD-10-CM

## 2025-02-12 PROCEDURE — 250N000009 HC RX 250: Performed by: INTERNAL MEDICINE

## 2025-02-12 PROCEDURE — 74177 CT ABD & PELVIS W/CONTRAST: CPT

## 2025-02-12 PROCEDURE — 250N000011 HC RX IP 250 OP 636: Performed by: INTERNAL MEDICINE

## 2025-02-12 RX ORDER — IOPAMIDOL 755 MG/ML
90 INJECTION, SOLUTION INTRAVASCULAR ONCE
Status: COMPLETED | OUTPATIENT
Start: 2025-02-12 | End: 2025-02-12

## 2025-02-12 RX ADMIN — SODIUM CHLORIDE 90 ML: 9 INJECTION, SOLUTION INTRAVENOUS at 12:10

## 2025-02-12 RX ADMIN — IOPAMIDOL 90 ML: 755 INJECTION, SOLUTION INTRAVENOUS at 12:07

## 2025-02-13 ENCOUNTER — REFERRAL (OUTPATIENT)
Dept: TRANSPLANT | Facility: CLINIC | Age: 45
End: 2025-02-13
Payer: COMMERCIAL

## 2025-02-13 VITALS — BODY MASS INDEX: 47.65 KG/M2 | HEIGHT: 65 IN | WEIGHT: 286 LBS

## 2025-02-13 DIAGNOSIS — K74.60 CIRRHOSIS OF LIVER (H): Primary | ICD-10-CM

## 2025-02-13 LAB
BACTERIA BLD CULT: NO GROWTH
BACTERIA BLD CULT: NO GROWTH
BACTERIA FLD CULT: NO GROWTH
BACTERIA FLD CULT: NO GROWTH
GRAM STAIN RESULT: NORMAL

## 2025-02-13 NOTE — TELEPHONE ENCOUNTER
Patient was asked the following questions during liver intake call.     SOT LIVER INTAKE     PCP: Dr. Felicity Parmar [facility]  Referring Provider: Dr. Jose E Maria   Other Specialities: Audiology, Sleep Medicine, Cardiology  Referring Diagnosis: Cirrhosis of Liver       Insurance information:  [facility] ID: [id number] Group: [id number]   [employer]: [id number]     1) Do you know why you have liver disease: Yes due to liver failure      When was your last drink: 8/07/2024      Have you ever been through treatment for alcohol: No  2) Presence of Ascites: Yes Paracentesis: Yes every 2 days  3) Presence of Hepatic Encephalopathy: No  Medications: No  4) History of GI Bleeding: Possible unsure  5) Oxygen Use: No  6) EGD: 7/21/2023 In Epic  7) Colonoscopy: 8/01/2006 scanned in Epic  8) MELD Score: 8  9) Labs available for review from PCP/GI: [facility]    10) HCC Diagnosis: No (if no, go to #11)          Abdominal CT:           MRI:          Bone Scan:           PET:           Chest CT:           Treatment Notes w/ Images:                                 Referral intake process completed.  Patient is aware that after financial approval is received, medical records will be requested.   Patient confirmed for a callback from transplant coordinator on 2/20/25.  Tentative evaluation date TBD.     Confirmed coordinator will discuss evaluation process in more detail at the time of their call.   Patient is aware of the need to arrange age appropriate cancer screening, vaccinations, and dental care.  Reminded patient to complete questionnaire, complete medical records release, and review packet prior to evaluation visit .  Assessed patient for special needs (ie--wheelchair, assistance, guardian, and ):   Wear Hearing Aids Both Ears  Patient instructed to call [phone] with questions.      Patient gave verbal consent during intake call to obtain medical records and documents outside of [facility]:  Yes  Patient agrees to DocuSign: Yes

## 2025-02-13 NOTE — LETTER
February 13, 2025    Warren Jaramillo  [address]  [place] [facility] [place] [address]    Dear Warren,    Thank you for your interest in the Transplant Center at [facility]. We look forward to being a part of your care team and assisting you through the transplant process.    As we discussed, your transplant coordinator is Zia Reyes (Liver).  You may call your coordinator at any time with questions or concerns.  Your first scheduled call will be on 2/20/25 at 11:00 am.  If this needs to change, call [phone].    Please complete the following.    Fill out and return the enclosed forms  Authorization for Electronic Communication  Authorization to Discuss Protected Health Information  Authorization for Release of Protected Health Information    Sign up for:  [employer], access to your electronic medical record (see enclosed pamphlet)  [email or web address], a transplant education website    You can use these tools to learn more about your transplant, communicate with your care team, and track your medical details       My Transplant Place    Sincerely,    [facility]  Solid Organ Transplant Care Programs  [phone], Option 5    cc: Care Team

## 2025-02-14 ENCOUNTER — HOSPITAL ENCOUNTER (OUTPATIENT)
Dept: ULTRASOUND IMAGING | Facility: HOSPITAL | Age: 45
Discharge: HOME | End: 2025-02-14
Attending: INTERNAL MEDICINE | Admitting: INTERNAL MEDICINE
Payer: COMMERCIAL

## 2025-02-14 DIAGNOSIS — K74.60 CIRRHOSIS OF LIVER WITHOUT ASCITES, UNSPECIFIED HEPATIC CIRRHOSIS TYPE (H): ICD-10-CM

## 2025-02-14 PROCEDURE — 272N000710 US PARACENTESIS WITHOUT ALBUMIN

## 2025-02-15 ENCOUNTER — HOSPITAL ENCOUNTER (EMERGENCY)
Facility: HOSPITAL | Age: 45
Discharge: NURSING, REHAB OR LONG-TERM CARE | End: 2025-02-15
Attending: EMERGENCY MEDICINE | Admitting: EMERGENCY MEDICINE
Payer: COMMERCIAL

## 2025-02-15 ENCOUNTER — ANCILLARY PROCEDURE (OUTPATIENT)
Dept: ULTRASOUND IMAGING | Facility: HOSPITAL | Age: 45
End: 2025-02-15
Attending: EMERGENCY MEDICINE
Payer: COMMERCIAL

## 2025-02-15 VITALS
OXYGEN SATURATION: 95 % | SYSTOLIC BLOOD PRESSURE: 124 MMHG | BODY MASS INDEX: 47.5 KG/M2 | HEART RATE: 89 BPM | TEMPERATURE: 98.2 F | RESPIRATION RATE: 18 BRPM | DIASTOLIC BLOOD PRESSURE: 56 MMHG | HEIGHT: 65 IN | WEIGHT: 285.1 LBS

## 2025-02-15 DIAGNOSIS — K74.60 CIRRHOSIS OF LIVER WITH ASCITES, UNSPECIFIED HEPATIC CIRRHOSIS TYPE (H): ICD-10-CM

## 2025-02-15 DIAGNOSIS — R18.8 CIRRHOSIS OF LIVER WITH ASCITES, UNSPECIFIED HEPATIC CIRRHOSIS TYPE (H): ICD-10-CM

## 2025-02-15 DIAGNOSIS — R11.2 NAUSEA AND VOMITING, UNSPECIFIED VOMITING TYPE: ICD-10-CM

## 2025-02-15 LAB
ALBUMIN SERPL BCG-MCNC: 3.9 G/DL (ref 3.5–5.2)
ALP SERPL-CCNC: 231 U/L (ref 40–150)
ALT SERPL W P-5'-P-CCNC: 22 U/L (ref 0–70)
ANION GAP SERPL CALCULATED.3IONS-SCNC: 11 MMOL/L (ref 7–15)
AST SERPL W P-5'-P-CCNC: 18 U/L (ref 0–45)
BILIRUB SERPL-MCNC: 0.3 MG/DL
BUN SERPL-MCNC: 17.5 MG/DL (ref 6–20)
CALCIUM SERPL-MCNC: 9.6 MG/DL (ref 8.8–10.4)
CHLORIDE SERPL-SCNC: 102 MMOL/L (ref 98–107)
CREAT SERPL-MCNC: 1.08 MG/DL (ref 0.67–1.17)
CRP SERPL-MCNC: 18.3 MG/L
EGFRCR SERPLBLD CKD-EPI 2021: 87 ML/MIN/1.73M2
ERYTHROCYTE [DISTWIDTH] IN BLOOD BY AUTOMATED COUNT: 17.8 % (ref 10–15)
GLUCOSE SERPL-MCNC: 129 MG/DL (ref 70–99)
HCO3 SERPL-SCNC: 26 MMOL/L (ref 22–29)
HCT VFR BLD AUTO: 41 % (ref 40–53)
HGB BLD-MCNC: 12.8 G/DL (ref 13.3–17.7)
INR PPP: 1.22 (ref 0.85–1.15)
LACTATE SERPL-SCNC: 1.3 MMOL/L (ref 0.7–2)
MCH RBC QN AUTO: 27.4 PG (ref 26.5–33)
MCHC RBC AUTO-ENTMCNC: 31.2 G/DL (ref 31.5–36.5)
MCV RBC AUTO: 88 FL (ref 78–100)
PLATELET # BLD AUTO: 308 10E3/UL (ref 150–450)
POTASSIUM SERPL-SCNC: 4.4 MMOL/L (ref 3.4–5.3)
PROCALCITONIN SERPL IA-MCNC: 0.13 NG/ML
PROT SERPL-MCNC: 6.4 G/DL (ref 6.4–8.3)
RBC # BLD AUTO: 4.67 10E6/UL (ref 4.4–5.9)
SODIUM SERPL-SCNC: 139 MMOL/L (ref 135–145)
WBC # BLD AUTO: 15.5 10E3/UL (ref 4–11)

## 2025-02-15 PROCEDURE — 36415 COLL VENOUS BLD VENIPUNCTURE: CPT | Performed by: EMERGENCY MEDICINE

## 2025-02-15 PROCEDURE — 86140 C-REACTIVE PROTEIN: CPT | Performed by: EMERGENCY MEDICINE

## 2025-02-15 PROCEDURE — 84145 PROCALCITONIN (PCT): CPT | Performed by: EMERGENCY MEDICINE

## 2025-02-15 PROCEDURE — 83605 ASSAY OF LACTIC ACID: CPT | Performed by: EMERGENCY MEDICINE

## 2025-02-15 PROCEDURE — 85027 COMPLETE CBC AUTOMATED: CPT | Performed by: EMERGENCY MEDICINE

## 2025-02-15 PROCEDURE — 99284 EMERGENCY DEPT VISIT MOD MDM: CPT | Mod: 25

## 2025-02-15 PROCEDURE — 80053 COMPREHEN METABOLIC PANEL: CPT | Performed by: EMERGENCY MEDICINE

## 2025-02-15 PROCEDURE — 96375 TX/PRO/DX INJ NEW DRUG ADDON: CPT

## 2025-02-15 PROCEDURE — 85610 PROTHROMBIN TIME: CPT | Performed by: EMERGENCY MEDICINE

## 2025-02-15 PROCEDURE — 96374 THER/PROPH/DIAG INJ IV PUSH: CPT

## 2025-02-15 PROCEDURE — 250N000011 HC RX IP 250 OP 636: Performed by: EMERGENCY MEDICINE

## 2025-02-15 RX ORDER — ONDANSETRON 2 MG/ML
4 INJECTION INTRAMUSCULAR; INTRAVENOUS ONCE
Status: COMPLETED | OUTPATIENT
Start: 2025-02-15 | End: 2025-02-15

## 2025-02-15 RX ORDER — ONDANSETRON 4 MG/1
4 TABLET, ORALLY DISINTEGRATING ORAL EVERY 8 HOURS PRN
Qty: 10 TABLET | Refills: 0 | Status: SHIPPED | OUTPATIENT
Start: 2025-02-15 | End: 2025-02-18

## 2025-02-15 RX ADMIN — ONDANSETRON 4 MG: 2 INJECTION, SOLUTION INTRAMUSCULAR; INTRAVENOUS at 19:57

## 2025-02-15 RX ADMIN — FAMOTIDINE 20 MG: 10 INJECTION, SOLUTION INTRAVENOUS at 19:57

## 2025-02-15 ASSESSMENT — ACTIVITIES OF DAILY LIVING (ADL)
ADLS_ACUITY_SCORE: 56
ADLS_ACUITY_SCORE: 56

## 2025-02-15 NOTE — ED PROVIDER NOTES
EMERGENCY DEPARTMENT ENCOUNTER      NAME: Warren Jaramillo  AGE: 44 year old male  YOB: 1980  MRN: [id number]  EVALUATION DATE & TIME: No admission date for patient encounter.    PCP: Luzmaria Goldman [place]    ED PROVIDER: Javad Hdez M.D.      Chief Complaint   Patient presents with    Abdominal Pain    Nausea, Vomiting, & Diarrhea         FINAL IMPRESSION:  Nausea and vomiting  Abdominal pain  Rojas's disease  Cirrhosis with ascites    ED COURSE & MEDICAL DECISION MAKING:    Pertinent Labs & Imaging studies reviewed. (See chart for details)  44 year old male presents to the Emergency Department for evaluation of pain and continued distention.  Patient also reports nausea and vomiting.  Vomited twice today.  Patient with history of cirrhosis related to Rojas's disease.  States that he is getting twice weekly paracentesis.  Most recent paracentesis was yesterday.  Also indicates patient with recent hospitalization over concerns of bacterial peritonitis.  This proved to be unfounded.  Patient requesting repeat paracentesis today.  Exam reveals obese male in mild distress.  Vital signs unremarkable.  Abdomen large but not firm and only mild diffuse inferior tenderness.  Will obtain baseline blood work to assess for electrolyte imbalance be contributory nausea and vomiting.  Will treat symptomatically intravenous Pepcid and Zofran.  Bedside ultrasound will be obtained.  Patient is nontoxic presently.  Do not suspect peritonitis or other significant abdominal pathology.  Patient immediately requesting hospitalization patient appears non toxic with stable vitals signs. Overall exam is benign.    6:53 PM I met with the patient for the initial interview and physical examination. Discussed plan for treatment and workup in the ED.    7:25 PM Performed POCUS.  Minimal ascitic fluid noted.  Immediately after being informed of minimal ascitic fluid patient requesting something to eat  7:53  PM.  Laboratory evaluation with normal electrolytes.  Liver function normal other than slight elevation of alkaline phosphatase.  White cell count mildly elevated 15.5.  This has been consistent with multiple recent testing.  Lactic acid normal at 1.3.  CRP slightly elevated 18.3.  Slightly elevated compared to most recent testing.  Examination and testing here unremarkable regarding potential significant acute pathology.  Patient with a history of chronic abdominal pain related to his marked obesity and recurrent ascites in setting of Rojas's disease/liver failure.  No indications for additional laboratory evaluation, imaging or hospitalization.  Will discharge with Zofran for symptomatic relief       At the conclusion of the encounter I discussed the results of all of the tests and the disposition. The questions were answered and return precautions provided. The patient or family acknowledged understanding and was agreeable with the care plan.       MEDICATIONS GIVEN IN THE EMERGENCY:  Medications - No data to display    NEW PRESCRIPTIONS STARTED AT TODAY'S ER VISI     Current Discharge Medication List        START taking these medications    Details   !! ondansetron (ZOFRAN ODT) 4 MG ODT tab Take 1 tablet (4 mg) by mouth every 8 hours as needed.  Qty: 10 tablet, Refills: 0       !! - Potential duplicate medications found. Please discuss with provider.           =================================================================    HPI    Patient information was obtained from: patient    Use of Intrepreter: N/A         Warren Jaramillo is a 44 year old male with a pertient medical history of liver cirrhosis with ascites, SBP, type 2 diabetes, Rojas's disease, acute kidney failure, chronic right-sided CHF, hypertension, portal hypertension, COPD, AVA, FAS, autism, TBI, and PTSD who presents to the ED for evaluation of abdominal pain.    Per chart review, on 2/7/25, the patient was admitted to [facility]  "treatment of SBP after presenting to the ED with LLQ abdominal pain. Underwent paracentesis on 2/8 and 4L were drained. Concern for SBP, so patient was treated with IV Rocephin. He was discharged with 5 day course of Cipro.     Patient presents with LLQ abdominal pain that he \"always\" gets after having a paracentesis. Today he also reports vomiting up both his lunch and dinner and his abdomen continues to feel distended. He states his legs are swollen too. He feels like the swelling is affecting his breathing and reports worsened shortness of breath after smoking part of a cigarette earlier today. He takes Aldactone daily. For the past week, he has been having 4-5L of fluid drained off every 2 days. Previously he was undergoing paracentesis every 7 days. No other complaints at this time.       REVIEW OF SYSTEMS   Constitutional:  Denies fever, chills  Respiratory:  Endorses increased work of breathing. Denies productive cough.  Cardiovascular:  Denies chest pain, palpitations  GI:  Endorses abdominal pain (LLQ), abdominal distension, nausea, and vomiting. Denies change in bowel or bladder habits   Musculoskeletal:  Endorses leg swelling. Denies any new joint swelling  Skin:  Denies rash   Neurologic:  Denies focal weakness  All systems negative except as marked.     PAST MEDICAL HISTORY:  Past Medical History:   Diagnosis Date    COPD exacerbation (H) 12/2/2024    DM2 (diabetes mellitus, type 2) (H) 4/28/2020    HTN (hypertension) 7/30/2012    Thyroid nodule 7/31/2019    Rojas's disease (H)        PAST SURGICAL HISTORY:  Past Surgical History:   Procedure Laterality Date    COLONOSCOPY      ESOPHAGOSCOPY, GASTROSCOPY, DUODENOSCOPY (EGD), COMBINED N/A 7/21/2023    Procedure: ESOPHAGOGASTRODUODENOSCOPY WITH GASTRIC AND ESOPHAGEAL BIOPSIES;  Surgeon: Filiberto Aragon MD;  Location: [facility] OR    TOOTH EXTRACTION           CURRENT MEDICATIONS:    No current facility-administered medications for this " encounter.    Current Outpatient Medications:     albuterol (PROAIR HFA/PROVENTIL HFA/VENTOLIN HFA) 108 (90 Base) MCG/ACT inhaler, Inhale 1-2 puffs into the lungs every 6 hours as needed for shortness of breath, wheezing or cough, Disp: 18 g, Rfl: 0    albuterol (PROVENTIL) (2.5 MG/3ML) 0.083% neb solution, Take 2.5 mg by nebulization 3 times daily as needed for shortness of breath, wheezing or cough, Disp: , Rfl:     aloe vera GEL, Apply 1 g topically every hour as needed for skin care Per bottle directions, Disp: , Rfl:     apixaban ANTICOAGULANT (ELIQUIS) 5 MG tablet, Take 5 mg by mouth 2 times daily., Disp: , Rfl:     bacitracin 500 UNIT/GM OINT, Apply topically 3 times daily as needed for wound care, Disp: , Rfl:     Benzocaine (SOLARCAINE ALOE VERA EX), Externally apply topically daily as needed., Disp: , Rfl:     benzonatate (TESSALON) 200 MG capsule, Take 1 capsule (200 mg) by mouth 3 times daily as needed for cough., Disp: 50 capsule, Rfl: 0    Calamine external lotion, Apply topically as needed for itching, Disp: , Rfl:     carbamide peroxide (DEBROX) 6.5 % otic solution, Place 5 drops into both ears daily as needed for other., Disp: , Rfl:     clotrimazole (LOTRIMIN) 1 % external cream, Apply topically 2 times daily as needed (skin irritation), Disp: , Rfl:     dextromethorphan-guaiFENesin (MUCINEX DM)  MG 12 hr tablet, Take 1 tablet by mouth 2 times daily as needed., Disp: , Rfl:     diclofenac (VOLTAREN) 1 % topical gel, Apply 2 g topically daily as needed for moderate pain To joints/back, Disp: , Rfl:     dicyclomine (BENTYL) 20 MG tablet, Take 1 tablet (20 mg) by mouth 4 times daily as needed (abdominal pain)., Disp: 20 tablet, Rfl: 0    EPINEPHrine (ANY BX GENERIC EQUIV) 0.3 MG/0.3ML injection 2-pack, Inject 0.3 mg into the muscle as needed for anaphylaxis. May repeat one time in 5-15 minutes if response to initial dose is inadequate., Disp: , Rfl:     famotidine (PEPCID) 20 MG tablet, Take 1  tablet (20 mg) by mouth 2 times daily, Disp: 60 tablet, Rfl: 0    FLUoxetine 20 MG tablet, Take 20 mg by mouth every morning., Disp: , Rfl:     furosemide (LASIX) 40 MG tablet, Take 1 tablet (40 mg) by mouth daily., Disp: 30 tablet, Rfl: 0    gabapentin (NEURONTIN) 300 MG capsule, Take 1 capsule (300 mg) by mouth 3 times daily for 20 days., Disp: 60 capsule, Rfl: 0    GAVILAX 17 GM/SCOOP powder, Take 17 g by mouth daily as needed., Disp: , Rfl:     hydrocortisone (CORTAID) 1 % external cream, Apply topically daily as needed., Disp: , Rfl:     Hydrocortisone (PREPARATION H EX), Externally apply topically daily as needed., Disp: , Rfl:     levothyroxine (SYNTHROID/LEVOTHROID) 25 MCG tablet, Take 0.25 tablets (6.25 mcg) by mouth every morning (before breakfast)., Disp: 8 tablet, Rfl: 0    loperamide (IMODIUM) 2 MG capsule, Take 4 mg by mouth 4 times daily as needed for diarrhea., Disp: , Rfl:     loratadine (CLARITIN) 10 MG tablet, Take 10 mg by mouth daily as needed for allergies., Disp: , Rfl:     magnesium hydroxide (MILK OF MAGNESIA) 400 MG/5ML suspension, Take 30 mLs by mouth daily as needed., Disp: , Rfl:     metFORMIN (GLUCOPHAGE) 1000 MG tablet, Take 1,000 mg by mouth 2 times daily (with meals), Disp: , Rfl:     methocarbamol (ROBAXIN) 500 MG tablet, Take 1 tablet (500 mg) by mouth 4 times daily as needed for muscle spasms., Disp: 40 tablet, Rfl: 0    midodrine (PROAMATINE) 5 MG tablet, Take 1 tablet (5 mg) by mouth 3 times daily (with meals)., Disp: 30 tablet, Rfl: 0    montelukast (SINGULAIR) 10 MG tablet, Take 10 mg by mouth every morning., Disp: , Rfl:     nicotine (NICODERM CQ) 21 MG/24HR 24 hr patch, Place 1 patch over 24 hours onto the skin daily., Disp: 28 patch, Rfl: 0    OLANZapine (ZYPREXA) 10 MG tablet, Take 10 mg by mouth At Bedtime, Disp: , Rfl:     omeprazole (PRILOSEC) 40 MG DR capsule, Take 40 mg by mouth every morning., Disp: , Rfl:     ondansetron (ZOFRAN ODT) 4 MG ODT tab, Take 1 tablet (4  mg) by mouth every 8 hours as needed for nausea., Disp: 20 tablet, Rfl: 0    pramoxine-calamine (AVEENO) 1-8 % LOTN, Apply topically daily as needed for itching., Disp: , Rfl:     rosuvastatin (CRESTOR) 10 MG tablet, Take 10 mg by mouth At Bedtime, Disp: , Rfl:     spironolactone (ALDACTONE) 100 MG tablet, Take 1 tablet (100 mg) by mouth daily., Disp: 30 tablet, Rfl: 0    sucralfate (CARAFATE) 1 GM/10ML suspension, Take 10 mLs (1 g) by mouth 4 times daily as needed for nausea (heartburn)., Disp: , Rfl:     traZODone (DESYREL) 100 MG tablet, Take 100 mg by mouth at bedtime., Disp: , Rfl:     TRELEGY ELLIPTA 100-62.5-25 MCG/ACT oral inhaler, Inhale 1 puff into the lungs every morning., Disp: , Rfl:     Urea 40 % CREA, Apply topically daily as needed., Disp: , Rfl:     Vitamins A & D (VITAMIN A & D) OINT, Externally apply topically daily as needed., Disp: , Rfl:     witch hazel-glycerin (TUCKS) pad, Apply topically as needed for hemorrhoids., Disp: , Rfl:     ALLERGIES:  Allergies   Allergen Reactions    Apricot Flavoring Agent (Non-Screening) Anaphylaxis    Banana Anaphylaxis     Throat swelling  Throat swelling      Wasp Venom Protein Shortness Of Breath     Other reaction(s): Respiratory Distress  Has an epi pen  Has an epi pen      Bees Anaphylaxis     Have an Epi pen that carries with    Methylphenidate Itching     Other reaction(s): Nightmares    Prunus      Other reaction(s): *Unknown    Sulfa Antibiotics      Headaches and nausea       FAMILY HISTORY:  Family History   Problem Relation Age of Onset    Unknown/Adopted Father     Unknown/Adopted Maternal Grandmother     C.A.D. Maternal Grandfather     Diabetes Maternal Grandfather     Cerebrovascular Disease Maternal Grandfather     Unknown/Adopted Paternal Grandmother     Unknown/Adopted Paternal Grandfather     Unknown/Adopted Brother     Unknown/Adopted Sister        SOCIAL HISTORY:   Social History     Socioeconomic History    Marital status: Single   Tobacco  Use    Smoking status: Every Day     Current packs/day: 1.00     Average packs/day: 1 pack/day for 21.1 years (21.1 ttl pk-yrs)     Types: Cigarettes     Start date: 1/1/2004    Smokeless tobacco: Never   Vaping Use    Vaping status: Never Used   Substance and Sexual Activity    Alcohol use: Not Currently     Comment: Last 8/7/2024    Drug use: Never    Sexual activity: Never     Partners: Female   Other Topics Concern     Service No    Blood Transfusions No    Caffeine Concern No    Occupational Exposure No    Hobby Hazards No    Sleep Concern No    Stress Concern Yes     Comment: sometimes    Weight Concern No    Special Diet Yes     Comment: counting carbs    Back Care No    Exercise Yes    Seat Belt Yes    Self-Exams Yes     Social Drivers of Health     Financial Resource Strain: Low Risk  (2/8/2025)    Financial Resource Strain     Within the past 12 months, have you or your family members you live with been unable to get utilities (heat, electricity) when it was really needed?: No   Food Insecurity: Low Risk  (2/8/2025)    Food Insecurity     Within the past 12 months, did you worry that your food would run out before you got money to buy more?: No     Within the past 12 months, did the food you bought just not last and you didn t have money to get more?: No   Transportation Needs: Low Risk  (2/8/2025)    Transportation Needs     Within the past 12 months, has lack of transportation kept you from medical appointments, getting your medicines, non-medical meetings or appointments, work, or from getting things that you need?: No    [facility], [facility]    Social Connections   Interpersonal Safety: Low Risk  (2/9/2025)    Interpersonal Safety     Do you feel physically and emotionally safe where you currently live?: Yes     Within the past 12 months, have you been hit, slapped, kicked or otherwise physically hurt by  "someone?: No     Within the past 12 months, have you been humiliated or emotionally abused in other ways by your partner or ex-partner?: No   Recent Concern: Interpersonal Safety - High Risk (1/11/2025)    Interpersonal Safety     Do you feel physically and emotionally safe where you currently live?: No     Within the past 12 months, have you been hit, slapped, kicked or otherwise physically hurt by someone?: No     Within the past 12 months, have you been humiliated or emotionally abused in other ways by your partner or ex-partner?: No   Housing Stability: Low Risk  (2/8/2025)    Housing Stability     Do you have housing? : Yes     Are you worried about losing your housing?: No   Recent Concern: Housing Stability - High Risk (12/2/2024)    Housing Stability     Do you have housing? : No     Are you worried about losing your housing?: No       VITALS:  Patient Vitals for the past 24 hrs:   BP Temp Temp src Pulse Resp SpO2 Height Weight   02/15/25 1731 135/75 98.2  F (36.8  C) Oral 89 18 97 % 1.651 m (5' 5\") 129.3 kg (285 lb 1.6 oz)        PHYSICAL EXAM    Constitutional:  Mild distress. Obese. Awake, alert.  HENT:  Normocephalic, Atraumatic. Bilateral external ears normal. Oropharynx moist. Nose normal. Neck- Normal range of motion with no guarding, No midline cervical tenderness, Supple, No stridor.   Eyes:  PERRL, EOMI with no signs of entrapment, Conjunctiva normal, No discharge.   Respiratory:  Normal breath sounds, No respiratory distress, No wheezing.    Cardiovascular:  Normal heart rate, Normal rhythm, No appreciable rubs or gallops.   GI:  Abdomen distended. Fairly soft with mild lower abdominal tenderness. No palpable masses  Musculoskeletal: Bilateral lower extremity edema without tenderness. Intact distal pulses, Good range of motion in all major joints. No tenderness to palpation or major deformities noted.  Integument:  Warm, Dry, No erythema, No rash.   Neurologic:  Alert & oriented, Normal motor " function, Normal sensory function, No focal deficits noted.   Psychiatric:  Affect normal, Judgment normal, Mood normal.     LAB:  All pertinent labs reviewed and interpreted.  Results for orders placed or performed during the hospital encounter of 02/15/25   INR     Status: Abnormal   Result Value Ref Range    INR 1.22 (H) 0.85 - 1.15   Comprehensive metabolic panel     Status: Abnormal   Result Value Ref Range    Sodium 139 135 - 145 mmol/L    Potassium 4.4 3.4 - 5.3 mmol/L    Carbon Dioxide (CO2) 26 22 - 29 mmol/L    Anion Gap 11 7 - 15 mmol/L    Urea Nitrogen 17.5 6.0 - 20.0 mg/dL    Creatinine 1.08 0.67 - 1.17 mg/dL    GFR Estimate 87 >60 mL/min/1.73m2    Calcium 9.6 8.8 - 10.4 mg/dL    Chloride 102 98 - 107 mmol/L    Glucose 129 (H) 70 - 99 mg/dL    Alkaline Phosphatase 231 (H) 40 - 150 U/L    AST 18 0 - 45 U/L    ALT 22 0 - 70 U/L    Protein Total 6.4 6.4 - 8.3 g/dL    Albumin 3.9 3.5 - 5.2 g/dL    Bilirubin Total 0.3 <=1.2 mg/dL   Procalcitonin     Status: Normal   Result Value Ref Range    Procalcitonin 0.13 <0.50 ng/mL   Lactic acid whole blood with 1x repeat in 2 hr when >2     Status: Normal   Result Value Ref Range    Lactic Acid, Initial 1.3 0.7 - 2.0 mmol/L   CBC (+ platelets, no diff)     Status: Abnormal   Result Value Ref Range    WBC Count 15.5 (H) 4.0 - 11.0 10e3/uL    RBC Count 4.67 4.40 - 5.90 10e6/uL    Hemoglobin 12.8 (L) 13.3 - 17.7 g/dL    Hematocrit 41.0 40.0 - 53.0 %    MCV 88 78 - 100 fL    MCH 27.4 26.5 - 33.0 pg    MCHC 31.2 (L) 31.5 - 36.5 g/dL    RDW 17.8 (H) 10.0 - 15.0 %    Platelet Count 308 150 - 450 10e3/uL   CRP inflammation     Status: Abnormal   Result Value Ref Range    CRP Inflammation 18.30 (H) <5.00 mg/L        RADIOLOGY:  Reviewed all pertinent imaging. Please see official radiology report.  POC US GUIDE FOR PARACENTESIS    (Results Pending)       PROCEDURES:   PROCEDURE: Emergency Department Limited Bedside Screening Ultrasound   ANATOMICAL WINDOW: Abdominal    INDICATIONS: Recurrent ascites   PROCEDURE PROVIDER:   Javad Hdez   FINDINGS: Minimal ascites   IMAGES SAVED AND STORED FOR ARCHIVE AND REVIEW: No            I, Lisa Villafuerte, am serving as a scribe to document services personally performed by Javad Hdez MD, based on my observation and the provider's statements to me. I, Javad Hdez MD attest that Lisa Villafuerte is acting in a scribe capacity, has observed my performance of the services and has documented them in accordance with my direction.    Javad Hdez M.D.  Emergency Medicine  [facility] EMERGENCY DEPARTMENT     Javad Hdez MD  02/15/25 1956       Javad Hdez MD  02/15/25 1957

## 2025-02-15 NOTE — ED TRIAGE NOTES
Patient to triage with [facility] for N/V/D and LLQ pain today. He had parancentesis yesterday, which he does every 2 days and 3.67 liters drained. Today started having N/V/D. Emetic activity by 3x today and 1 diarrheal episode today. Abdominal pain feels like a pressure/tightness.

## 2025-02-16 NOTE — ED NOTES
Patient IV wrapped with coban per patient request. IV site remains visible. Patient requesting lemon ice, patient informed that writer will bring him one after the POC Paracentesis.

## 2025-02-16 NOTE — ED NOTES
Discharge    Discharge paperwork discussed. Patient questions answered. AVS in hand. Patient discharged from ED.

## 2025-02-18 ENCOUNTER — HOSPITAL ENCOUNTER (OUTPATIENT)
Dept: ULTRASOUND IMAGING | Facility: HOSPITAL | Age: 45
Discharge: HOME | End: 2025-02-18
Attending: INTERNAL MEDICINE
Payer: COMMERCIAL

## 2025-02-18 DIAGNOSIS — K74.60 CIRRHOSIS OF LIVER WITHOUT ASCITES, UNSPECIFIED HEPATIC CIRRHOSIS TYPE (H): ICD-10-CM

## 2025-02-18 PROCEDURE — 76705 ECHO EXAM OF ABDOMEN: CPT

## 2025-02-20 ENCOUNTER — HOSPITAL ENCOUNTER (OUTPATIENT)
Dept: ULTRASOUND IMAGING | Facility: HOSPITAL | Age: 45
End: 2025-02-20
Attending: INTERNAL MEDICINE
Payer: COMMERCIAL

## 2025-02-20 DIAGNOSIS — K74.60 CIRRHOSIS OF LIVER WITHOUT ASCITES, UNSPECIFIED HEPATIC CIRRHOSIS TYPE (H): ICD-10-CM

## 2025-02-20 DIAGNOSIS — R18.8 ASCITES: Primary | ICD-10-CM

## 2025-02-20 PROCEDURE — 89050 BODY FLUID CELL COUNT: CPT | Performed by: INTERNAL MEDICINE

## 2025-02-20 PROCEDURE — 272N000042 US PARACENTESIS WITHOUT ALBUMIN

## 2025-02-20 NOTE — TELEPHONE ENCOUNTER
"LIVER DISEASE MASLD  REFERRING PROVIDER: Dr Maria  [facility] SYSTEM: [facility], [facility]  Numerous Specialities involved  -----------------------------------------------------------------------------------------------------------------------------  MELD 11  DATE OF MELD LABS  02/07/2025      ABO O pos   Na 140  INR 1.26  tBili 0.3  Albumin 4.1  Cr 1.25    HISTORY OF LIVER DISEASE:     44 y.o Male Hx of  type 2 diabetes, hypertension  complicated by cirrhosis complicated by portal hypertension and ascites with history of SBP. Lives in a group home, Jesika Harden, Guardian. Dr Worthington, [facility] Doctor, states \"possibly MASLD although chart mentions Rojas, ceruloplasmin was normal. \" Patient on Rocephin/Cipro for SBP.       Ascites  [place] 100 mg daily,  Furosemide 40 mg daily  Robin LVP twice weekly, SBP, on Abx  TIPS no when I   HE: None documented, episodes of confusion in EPIC appear hypoglycemia related.   Kidney function: RIVERA, CHF  Variceal screening Last EGD in 2023.  \"Guardian states he is not a trustworthy  historically. He may not always understand complex medical conditions and he may also down-play the significance of some symptoms. Mother/Father not immediately involved in healthcare and Guardian doesn't attend medical appointments as there are an abundance of them. Guardian states, staff at Group home attend appointments. \"    HCC Screening Last imaging and location  Alcohol use:  Reports no ETOH since Aug 2024.    Hospitalizations: ED for paracentesis  ---------------------------------------------------------------------------------------------------------------------------------  PMH  Right-sided CHF On Eliquis  Fetal Alcohol Syndrome, ADHD, Autistic Disorder  Dysuria  Morbid Obesity,  BMI 47.44    Chronic Back Pain: \"disc osteophyte complex/bulge at L3-4 with narrowing around the nerves on both sides. There is mild to moderate right and mild left neural foraminal stenosis at L3-4 with questionable contact of " "the martín exiting L3 nerve roots by the disc within the neural foramina. There is mild to moderate right foraminal stenosis at L5-S1. We reviewed options for his back pain.,\" per Neurology consult 02/11/2025    Polypharmacy including Eliquis, Metformin, Levothyroxine, Robaxin, Gabapentin, Bentyl, Midodrine, fluoxetine, Diuretics, Omeprazole    Diabetes T2DM, Metformin,   Abdominal surgery No  CRC Screening: yes in 2004  Vaccinations:  Willing to accept blood products/transfusions?  If no, document reason    SHX  Single    Court- appointed Guardian states he is not a trustworthy  historically. He may not always understand complex medical conditions and he may also down-play the significance of some symptoms. Mother/Father not immediately involved in healthcare and Guardian doesn't attend medical appointments as there are an abundance of them. Guardian states, staff at [facility] home attend appointments.     ---------------------------------------------------------------------------------------------------------------------------------  LDLT Discussed    PLAN    Writer able to connect with guardian via phone, unable to contact patient via phone, VM left for return call.     Screen with Medical Director for consideration of TIPS recommendations while in consultation and SW support vs full PLE.       February 26, 2025 1:04 PM - Zia Reyes RN:   Medical director reviewed, spoke with referring provider and both providers agreed patient should pursue TIPS and close transplant evaluation.  Writer spoke with legal guardian explaining the dissent and plan.  Legal guardian to reach out to home care to establish further consultation for TIPS.    "

## 2025-02-23 ENCOUNTER — HOSPITAL ENCOUNTER (EMERGENCY)
Facility: HOSPITAL | Age: 45
Discharge: HOME | End: 2025-02-24
Attending: EMERGENCY MEDICINE | Admitting: EMERGENCY MEDICINE
Payer: COMMERCIAL

## 2025-02-23 ENCOUNTER — APPOINTMENT (OUTPATIENT)
Dept: CT IMAGING | Facility: HOSPITAL | Age: 45
End: 2025-02-23
Attending: EMERGENCY MEDICINE
Payer: COMMERCIAL

## 2025-02-23 ENCOUNTER — APPOINTMENT (OUTPATIENT)
Dept: MRI IMAGING | Facility: HOSPITAL | Age: 45
End: 2025-02-23
Attending: EMERGENCY MEDICINE
Payer: COMMERCIAL

## 2025-02-23 DIAGNOSIS — Z87.898 HISTORY OF ASCITES: ICD-10-CM

## 2025-02-23 DIAGNOSIS — K74.69 OTHER CIRRHOSIS OF LIVER (H): ICD-10-CM

## 2025-02-23 DIAGNOSIS — Z79.01 ANTICOAGULATED BY ANTICOAGULATION TREATMENT: ICD-10-CM

## 2025-02-23 DIAGNOSIS — Z86.39 HISTORY OF DIABETES MELLITUS: ICD-10-CM

## 2025-02-23 DIAGNOSIS — R10.32 LEFT LOWER QUADRANT ABDOMINAL PAIN: ICD-10-CM

## 2025-02-23 LAB
ALBUMIN SERPL BCG-MCNC: 4 G/DL (ref 3.5–5.2)
ALP SERPL-CCNC: 211 U/L (ref 40–150)
ALT SERPL W P-5'-P-CCNC: 17 U/L (ref 0–70)
ANION GAP SERPL CALCULATED.3IONS-SCNC: 12 MMOL/L (ref 7–15)
APTT PPP: 33 SECONDS (ref 22–38)
AST SERPL W P-5'-P-CCNC: 25 U/L (ref 0–45)
BASOPHILS # BLD AUTO: 0.1 10E3/UL (ref 0–0.2)
BASOPHILS NFR BLD AUTO: 1 %
BILIRUB DIRECT SERPL-MCNC: 0.11 MG/DL (ref 0–0.3)
BILIRUB SERPL-MCNC: 0.4 MG/DL
BUN SERPL-MCNC: 25.1 MG/DL (ref 6–20)
CALCIUM SERPL-MCNC: 9.1 MG/DL (ref 8.8–10.4)
CHLORIDE SERPL-SCNC: 98 MMOL/L (ref 98–107)
CREAT SERPL-MCNC: 1.12 MG/DL (ref 0.67–1.17)
EGFRCR SERPLBLD CKD-EPI 2021: 83 ML/MIN/1.73M2
EOSINOPHIL # BLD AUTO: 0.6 10E3/UL (ref 0–0.7)
EOSINOPHIL NFR BLD AUTO: 3 %
ERYTHROCYTE [DISTWIDTH] IN BLOOD BY AUTOMATED COUNT: 17.5 % (ref 10–15)
FLUAV RNA SPEC QL NAA+PROBE: NEGATIVE
FLUBV RNA RESP QL NAA+PROBE: NEGATIVE
GLUCOSE BLDC GLUCOMTR-MCNC: 115 MG/DL (ref 70–99)
GLUCOSE SERPL-MCNC: 103 MG/DL (ref 70–99)
HCO3 SERPL-SCNC: 27 MMOL/L (ref 22–29)
HCT VFR BLD AUTO: 42.3 % (ref 40–53)
HGB BLD-MCNC: 13.5 G/DL (ref 13.3–17.7)
HOLD SPECIMEN: NORMAL
IMM GRANULOCYTES # BLD: 0.2 10E3/UL
IMM GRANULOCYTES NFR BLD: 1 %
INR PPP: 1.22 (ref 0.85–1.15)
LIPASE SERPL-CCNC: 27 U/L (ref 13–60)
LYMPHOCYTES # BLD AUTO: 2.7 10E3/UL (ref 0.8–5.3)
LYMPHOCYTES NFR BLD AUTO: 16 %
MCH RBC QN AUTO: 28 PG (ref 26.5–33)
MCHC RBC AUTO-ENTMCNC: 31.9 G/DL (ref 31.5–36.5)
MCV RBC AUTO: 88 FL (ref 78–100)
MONOCYTES # BLD AUTO: 1.4 10E3/UL (ref 0–1.3)
MONOCYTES NFR BLD AUTO: 8 %
NEUTROPHILS # BLD AUTO: 12.1 10E3/UL (ref 1.6–8.3)
NEUTROPHILS NFR BLD AUTO: 71 %
NRBC # BLD AUTO: 0 10E3/UL
NRBC BLD AUTO-RTO: 0 /100
PLATELET # BLD AUTO: 383 10E3/UL (ref 150–450)
POTASSIUM SERPL-SCNC: 4.5 MMOL/L (ref 3.4–5.3)
PROT SERPL-MCNC: 6.9 G/DL (ref 6.4–8.3)
RBC # BLD AUTO: 4.82 10E6/UL (ref 4.4–5.9)
RSV RNA SPEC NAA+PROBE: NEGATIVE
SARS-COV-2 RNA RESP QL NAA+PROBE: NEGATIVE
SODIUM SERPL-SCNC: 137 MMOL/L (ref 135–145)
TROPONIN T SERPL HS-MCNC: 25 NG/L
WBC # BLD AUTO: 17.1 10E3/UL (ref 4–11)

## 2025-02-23 PROCEDURE — 70496 CT ANGIOGRAPHY HEAD: CPT

## 2025-02-23 PROCEDURE — 83690 ASSAY OF LIPASE: CPT | Performed by: EMERGENCY MEDICINE

## 2025-02-23 PROCEDURE — 99285 EMERGENCY DEPT VISIT HI MDM: CPT | Mod: 25

## 2025-02-23 PROCEDURE — 36415 COLL VENOUS BLD VENIPUNCTURE: CPT | Performed by: EMERGENCY MEDICINE

## 2025-02-23 PROCEDURE — 85048 AUTOMATED LEUKOCYTE COUNT: CPT | Performed by: EMERGENCY MEDICINE

## 2025-02-23 PROCEDURE — 80053 COMPREHEN METABOLIC PANEL: CPT | Performed by: EMERGENCY MEDICINE

## 2025-02-23 PROCEDURE — 93005 ELECTROCARDIOGRAM TRACING: CPT

## 2025-02-23 PROCEDURE — 85004 AUTOMATED DIFF WBC COUNT: CPT | Performed by: EMERGENCY MEDICINE

## 2025-02-23 PROCEDURE — 250N000009 HC RX 250: Performed by: EMERGENCY MEDICINE

## 2025-02-23 PROCEDURE — A9585 GADOBUTROL INJECTION: HCPCS | Performed by: EMERGENCY MEDICINE

## 2025-02-23 PROCEDURE — 82962 GLUCOSE BLOOD TEST: CPT

## 2025-02-23 PROCEDURE — 93005 ELECTROCARDIOGRAM TRACING: CPT | Performed by: EMERGENCY MEDICINE

## 2025-02-23 PROCEDURE — 49082 ABD PARACENTESIS: CPT

## 2025-02-23 PROCEDURE — 250N000011 HC RX IP 250 OP 636: Performed by: EMERGENCY MEDICINE

## 2025-02-23 PROCEDURE — 80048 BASIC METABOLIC PNL TOTAL CA: CPT | Performed by: EMERGENCY MEDICINE

## 2025-02-23 PROCEDURE — 999N000157 HC STATISTIC RCP TIME EA 10 MIN

## 2025-02-23 PROCEDURE — 82248 BILIRUBIN DIRECT: CPT | Performed by: EMERGENCY MEDICINE

## 2025-02-23 PROCEDURE — 74177 CT ABD & PELVIS W/CONTRAST: CPT

## 2025-02-23 PROCEDURE — 255N000002 HC RX 255 OP 636: Performed by: EMERGENCY MEDICINE

## 2025-02-23 PROCEDURE — 84484 ASSAY OF TROPONIN QUANT: CPT | Performed by: EMERGENCY MEDICINE

## 2025-02-23 PROCEDURE — 87637 SARSCOV2&INF A&B&RSV AMP PRB: CPT | Performed by: EMERGENCY MEDICINE

## 2025-02-23 PROCEDURE — 85610 PROTHROMBIN TIME: CPT | Performed by: EMERGENCY MEDICINE

## 2025-02-23 PROCEDURE — 70553 MRI BRAIN STEM W/O & W/DYE: CPT

## 2025-02-23 PROCEDURE — 85730 THROMBOPLASTIN TIME PARTIAL: CPT | Performed by: EMERGENCY MEDICINE

## 2025-02-23 RX ORDER — LISINOPRIL 10 MG/1
10 TABLET ORAL EVERY MORNING
COMMUNITY
Start: 2025-02-17

## 2025-02-23 RX ORDER — IOPAMIDOL 755 MG/ML
90 INJECTION, SOLUTION INTRAVASCULAR ONCE
Status: COMPLETED | OUTPATIENT
Start: 2025-02-23 | End: 2025-02-23

## 2025-02-23 RX ORDER — IOPAMIDOL 755 MG/ML
67 INJECTION, SOLUTION INTRAVASCULAR ONCE
Status: COMPLETED | OUTPATIENT
Start: 2025-02-23 | End: 2025-02-23

## 2025-02-23 RX ORDER — GADOBUTROL 604.72 MG/ML
13 INJECTION INTRAVENOUS ONCE
Status: COMPLETED | OUTPATIENT
Start: 2025-02-23 | End: 2025-02-23

## 2025-02-23 RX ORDER — EMPAGLIFLOZIN 10 MG/1
10 TABLET, FILM COATED ORAL EVERY MORNING
COMMUNITY
Start: 2025-02-17

## 2025-02-23 RX ADMIN — IOPAMIDOL 90 ML: 755 INJECTION, SOLUTION INTRAVENOUS at 22:04

## 2025-02-23 RX ADMIN — GADOBUTROL 13 ML: 604.72 INJECTION INTRAVENOUS at 21:36

## 2025-02-23 RX ADMIN — IOPAMIDOL 67 ML: 755 INJECTION, SOLUTION INTRAVENOUS at 19:55

## 2025-02-23 RX ADMIN — SODIUM CHLORIDE 100 ML: 9 INJECTION, SOLUTION INTRAVENOUS at 19:55

## 2025-02-23 ASSESSMENT — ACTIVITIES OF DAILY LIVING (ADL)
ADLS_ACUITY_SCORE: 56

## 2025-02-24 ENCOUNTER — ANCILLARY PROCEDURE (OUTPATIENT)
Dept: ULTRASOUND IMAGING | Facility: HOSPITAL | Age: 45
End: 2025-02-24
Attending: EMERGENCY MEDICINE
Payer: COMMERCIAL

## 2025-02-24 ENCOUNTER — APPOINTMENT (OUTPATIENT)
Dept: RADIOLOGY | Facility: HOSPITAL | Age: 45
End: 2025-02-24
Attending: STUDENT IN AN ORGANIZED HEALTH CARE EDUCATION/TRAINING PROGRAM
Payer: COMMERCIAL

## 2025-02-24 ENCOUNTER — NURSE TRIAGE (OUTPATIENT)
Dept: PEDIATRICS | Facility: CLINIC | Age: 45
End: 2025-02-24

## 2025-02-24 ENCOUNTER — TELEPHONE (OUTPATIENT)
Dept: NURSING | Facility: CLINIC | Age: 45
End: 2025-02-24
Payer: COMMERCIAL

## 2025-02-24 VITALS
HEART RATE: 84 BPM | TEMPERATURE: 98 F | RESPIRATION RATE: 24 BRPM | OXYGEN SATURATION: 95 % | BODY MASS INDEX: 47.48 KG/M2 | DIASTOLIC BLOOD PRESSURE: 77 MMHG | WEIGHT: 285 LBS | HEIGHT: 65 IN | SYSTOLIC BLOOD PRESSURE: 145 MMHG

## 2025-02-24 VITALS
RESPIRATION RATE: 16 BRPM | HEART RATE: 80 BPM | TEMPERATURE: 97.7 F | WEIGHT: 280 LBS | OXYGEN SATURATION: 96 % | BODY MASS INDEX: 46.59 KG/M2 | SYSTOLIC BLOOD PRESSURE: 129 MMHG | DIASTOLIC BLOOD PRESSURE: 74 MMHG

## 2025-02-24 DIAGNOSIS — R10.32 LEFT LOWER QUADRANT ABDOMINAL PAIN: ICD-10-CM

## 2025-02-24 DIAGNOSIS — M25.50 POLYARTHRALGIA: ICD-10-CM

## 2025-02-24 DIAGNOSIS — M15.0 PRIMARY OSTEOARTHRITIS INVOLVING MULTIPLE JOINTS: ICD-10-CM

## 2025-02-24 LAB
% LINING CELLS, BODY FLUID: 6 %
ALBUMIN BODY FLUID SOURCE: NORMAL
ALBUMIN FLD-MCNC: 2.9 G/DL
ALBUMIN SERPL BCG-MCNC: 4 G/DL (ref 3.5–5.2)
ALBUMIN UR-MCNC: 10 MG/DL
ALP SERPL-CCNC: 202 U/L (ref 40–150)
ALT SERPL W P-5'-P-CCNC: 17 U/L (ref 0–70)
ANION GAP SERPL CALCULATED.3IONS-SCNC: 13 MMOL/L (ref 7–15)
APPEARANCE FLD: ABNORMAL
APPEARANCE UR: CLEAR
AST SERPL W P-5'-P-CCNC: 25 U/L (ref 0–45)
BASOPHILS # BLD AUTO: 0.1 10E3/UL (ref 0–0.2)
BASOPHILS NFR BLD AUTO: 1 %
BILIRUB DIRECT SERPL-MCNC: 0.14 MG/DL (ref 0–0.3)
BILIRUB SERPL-MCNC: 0.4 MG/DL
BILIRUB UR QL STRIP: NEGATIVE
BUN SERPL-MCNC: 22.6 MG/DL (ref 6–20)
CALCIUM SERPL-MCNC: 9.1 MG/DL (ref 8.8–10.4)
CHLORIDE SERPL-SCNC: 100 MMOL/L (ref 98–107)
COLOR FLD: YELLOW
COLOR UR AUTO: YELLOW
CREAT SERPL-MCNC: 1.15 MG/DL (ref 0.67–1.17)
EGFRCR SERPLBLD CKD-EPI 2021: 80 ML/MIN/1.73M2
EOSINOPHIL # BLD AUTO: 0.6 10E3/UL (ref 0–0.7)
EOSINOPHIL NFR BLD AUTO: 4 %
ERYTHROCYTE [DISTWIDTH] IN BLOOD BY AUTOMATED COUNT: 17.7 % (ref 10–15)
GLUCOSE SERPL-MCNC: 90 MG/DL (ref 70–99)
GLUCOSE UR STRIP-MCNC: NEGATIVE MG/DL
HCO3 SERPL-SCNC: 26 MMOL/L (ref 22–29)
HCT VFR BLD AUTO: 42.3 % (ref 40–53)
HGB BLD-MCNC: 13.6 G/DL (ref 13.3–17.7)
HGB UR QL STRIP: NEGATIVE
HOLD SPECIMEN: NORMAL
IMM GRANULOCYTES # BLD: 0.1 10E3/UL
IMM GRANULOCYTES NFR BLD: 1 %
KETONES UR STRIP-MCNC: NEGATIVE MG/DL
LEUKOCYTE ESTERASE UR QL STRIP: NEGATIVE
LIPASE SERPL-CCNC: 23 U/L (ref 13–60)
LYMPHOCYTES # BLD AUTO: 2.6 10E3/UL (ref 0.8–5.3)
LYMPHOCYTES NFR BLD AUTO: 17 %
LYMPHOCYTES NFR FLD MANUAL: 49 %
MCH RBC QN AUTO: 28.4 PG (ref 26.5–33)
MCHC RBC AUTO-ENTMCNC: 32.2 G/DL (ref 31.5–36.5)
MCV RBC AUTO: 88 FL (ref 78–100)
MONOCYTES # BLD AUTO: 1.3 10E3/UL (ref 0–1.3)
MONOCYTES NFR BLD AUTO: 8 %
MONOS+MACROS NFR FLD MANUAL: 30 %
MUCOUS THREADS #/AREA URNS LPF: PRESENT /LPF
NEUTROPHILS # BLD AUTO: 10.7 10E3/UL (ref 1.6–8.3)
NEUTROPHILS # FLD: 317.7 /UL
NEUTROPHILS NFR BLD AUTO: 70 %
NEUTS BAND NFR FLD MANUAL: 14 %
NITRATE UR QL: NEGATIVE
NRBC # BLD AUTO: 0 10E3/UL
NRBC BLD AUTO-RTO: 0 /100
OTHER CELLS FLD MANUAL: 1 %
PATH REV: ABNORMAL
PH UR STRIP: 5.5 [PH] (ref 5–7)
PLATELET # BLD AUTO: 385 10E3/UL (ref 150–450)
POTASSIUM SERPL-SCNC: 4.7 MMOL/L (ref 3.4–5.3)
PROT FLD-MCNC: 4.4 G/DL
PROT SERPL-MCNC: 7 G/DL (ref 6.4–8.3)
PROTEIN BODY FLUID SOURCE: NORMAL
RBC # BLD AUTO: 4.79 10E6/UL (ref 4.4–5.9)
RBC URINE: 1 /HPF
SODIUM SERPL-SCNC: 139 MMOL/L (ref 135–145)
SP GR UR STRIP: 1.03 (ref 1–1.03)
SPECIMEN SOURCE FLD: ABNORMAL
SQUAMOUS EPITHELIAL: 1 /HPF
TROPONIN T SERPL HS-MCNC: 22 NG/L
UROBILINOGEN UR STRIP-MCNC: <2 MG/DL
WBC # BLD AUTO: 15.4 10E3/UL (ref 4–11)
WBC # FLD AUTO: 2269 /UL
WBC URINE: 1 /HPF

## 2025-02-24 PROCEDURE — 82248 BILIRUBIN DIRECT: CPT | Performed by: EMERGENCY MEDICINE

## 2025-02-24 PROCEDURE — 36415 COLL VENOUS BLD VENIPUNCTURE: CPT | Performed by: EMERGENCY MEDICINE

## 2025-02-24 PROCEDURE — 99284 EMERGENCY DEPT VISIT MOD MDM: CPT | Mod: 25

## 2025-02-24 PROCEDURE — 89051 BODY FLUID CELL COUNT: CPT | Performed by: EMERGENCY MEDICINE

## 2025-02-24 PROCEDURE — 81001 URINALYSIS AUTO W/SCOPE: CPT | Performed by: EMERGENCY MEDICINE

## 2025-02-24 PROCEDURE — 80053 COMPREHEN METABOLIC PANEL: CPT | Performed by: EMERGENCY MEDICINE

## 2025-02-24 PROCEDURE — 87070 CULTURE OTHR SPECIMN AEROBIC: CPT | Performed by: EMERGENCY MEDICINE

## 2025-02-24 PROCEDURE — 86200 CCP ANTIBODY: CPT

## 2025-02-24 PROCEDURE — 86431 RHEUMATOID FACTOR QUANT: CPT

## 2025-02-24 PROCEDURE — 250N000011 HC RX IP 250 OP 636: Performed by: EMERGENCY MEDICINE

## 2025-02-24 PROCEDURE — 82042 OTHER SOURCE ALBUMIN QUAN EA: CPT | Performed by: EMERGENCY MEDICINE

## 2025-02-24 PROCEDURE — 89050 BODY FLUID CELL COUNT: CPT | Performed by: EMERGENCY MEDICINE

## 2025-02-24 PROCEDURE — 71046 X-RAY EXAM CHEST 2 VIEWS: CPT

## 2025-02-24 PROCEDURE — 84157 ASSAY OF PROTEIN OTHER: CPT | Performed by: EMERGENCY MEDICINE

## 2025-02-24 PROCEDURE — 83690 ASSAY OF LIPASE: CPT | Performed by: EMERGENCY MEDICINE

## 2025-02-24 PROCEDURE — 87205 SMEAR GRAM STAIN: CPT | Performed by: EMERGENCY MEDICINE

## 2025-02-24 PROCEDURE — 84550 ASSAY OF BLOOD/URIC ACID: CPT

## 2025-02-24 PROCEDURE — 85025 COMPLETE CBC W/AUTO DIFF WBC: CPT | Performed by: EMERGENCY MEDICINE

## 2025-02-24 PROCEDURE — 96374 THER/PROPH/DIAG INJ IV PUSH: CPT

## 2025-02-24 PROCEDURE — 84484 ASSAY OF TROPONIN QUANT: CPT | Performed by: EMERGENCY MEDICINE

## 2025-02-24 PROCEDURE — 87015 SPECIMEN INFECT AGNT CONCNTJ: CPT | Performed by: EMERGENCY MEDICINE

## 2025-02-24 PROCEDURE — 82310 ASSAY OF CALCIUM: CPT | Performed by: EMERGENCY MEDICINE

## 2025-02-24 RX ORDER — MORPHINE SULFATE 4 MG/ML
4 INJECTION, SOLUTION INTRAMUSCULAR; INTRAVENOUS ONCE
Status: COMPLETED | OUTPATIENT
Start: 2025-02-24 | End: 2025-02-24

## 2025-02-24 RX ADMIN — MORPHINE SULFATE 4 MG: 4 INJECTION, SOLUTION INTRAMUSCULAR; INTRAVENOUS at 21:38

## 2025-02-24 ASSESSMENT — ACTIVITIES OF DAILY LIVING (ADL)
ADLS_ACUITY_SCORE: 56

## 2025-02-24 NOTE — ED TRIAGE NOTES
The patient was here last night. He has abdominal swelling. He has fluid pulled from his abdomen yesterday. He reports pain in his lower left abdominal quadrant that started today. Denies n/v/d. The pain in his abdomen is what brings him in tonight.

## 2025-02-24 NOTE — LETTER
February 24, 2025        Warren Jaramillo  [address]  [place] [address]          Dear Warren Jaramillo:    You were seen in the [facility] Emergency Department at [facility] on 2/24/2025.  We are unable to reach you by phone, so we are sending you this letter.     It is important that you call [facility] Emergency Department lab result nurse at [phone], as we have information to relay to you.   Best time to call back is between 9AM and 5:30PM, 7 days a week.      Sincerely,     [facility] Emergency Department Lab Result RN  [phone]

## 2025-02-24 NOTE — TELEPHONE ENCOUNTER
Patient calling back. Results relayed to patient and he was advised to pass them on to his PCP. Patient verbalized understanding.     TANIA OSPINA RN

## 2025-02-24 NOTE — ED TRIAGE NOTES
Patient brought in by [facility] from [facility]. Patient reports that he developed abdominal pain, diarrhea and abdominal distention after eating dinner. Patient has end stage liver disease and reports he gets a paracentesis around every 2 days. His last paracentesis was on Thursday.  He also reports right sided weakness that started a few hours ago. He repots he has a diagnosis a right sided intermittent weakness one week ago when he was an evaluated for a stroke. Provider called to triage for a neuro evaluation.      Triage Assessment (Adult)       Row Name 02/23/25 1926          Triage Assessment    Airway WDL WDL        Respiratory WDL    Respiratory WDL X  SOB        Cardiac WDL    Cardiac WDL WDL        Peripheral/Neurovascular WDL    Peripheral Neurovascular WDL WDL        Cognitive/Neuro/Behavioral WDL    Cognitive/Neuro/Behavioral WDL WDL

## 2025-02-24 NOTE — DISCHARGE INSTRUCTIONS
Follow-up with your GI doctor soon as possible if you require a paracentesis to remove fluid.  Your workup in the emergency department was reassuring and did not appear to show infection of the fluid inside your abdomen.    Please return to the ER if symptoms worsen.

## 2025-02-24 NOTE — MEDICATION SCRIBE - ADMISSION MEDICATION HISTORY
Medication Scribe Admission Medication History    Admission medication history is complete. The information provided in this note is only as accurate as the sources available at the time of the update.    Information Source(s): Caregiver via phone    Pertinent Information: patient's medications are being managed by [facility] [place] @ [phone] . Called and received phone medication report.     Changes made to PTA medication list:  Added: Jardiance, Lisinopril  Deleted: Gabapentin   Changed: None    Allergies reviewed with patient and updates made in EHR: yes    Medication History Completed By: Jeremy Romo 2/23/2025 10:30 PM    PTA Med List   Medication Sig Last Dose/Taking    albuterol (PROAIR HFA/PROVENTIL HFA/VENTOLIN HFA) 108 (90 Base) MCG/ACT inhaler Inhale 1-2 puffs into the lungs every 6 hours as needed for shortness of breath, wheezing or cough Taking As Needed    albuterol (PROVENTIL) (2.5 MG/3ML) 0.083% neb solution Take 2.5 mg by nebulization 3 times daily as needed for shortness of breath, wheezing or cough Taking As Needed    aloe vera GEL Apply 1 g topically every hour as needed for skin care Per bottle directions Taking As Needed    apixaban ANTICOAGULANT (ELIQUIS) 5 MG tablet Take 5 mg by mouth 2 times daily. 2/23/2025 Evening    bacitracin 500 UNIT/GM OINT Apply topically 3 times daily as needed for wound care Taking As Needed    Benzocaine (SOLARCAINE ALOE VERA EX) Externally apply topically daily as needed. Taking As Needed    benzonatate (TESSALON) 200 MG capsule Take 1 capsule (200 mg) by mouth 3 times daily as needed for cough. Taking As Needed    Calamine external lotion Apply topically as needed for itching Taking As Needed    carbamide peroxide (DEBROX) 6.5 % otic solution Place 5 drops into both ears daily as needed for other. Taking As Needed    clotrimazole (LOTRIMIN) 1 % external cream Apply topically 2 times daily as needed (skin irritation) Taking As Needed     dextromethorphan-guaiFENesin (MUCINEX DM)  MG 12 hr tablet Take 1 tablet by mouth 2 times daily as needed. Taking As Needed    diclofenac (VOLTAREN) 1 % topical gel Apply 2 g topically daily as needed for moderate pain To joints/back Taking As Needed    dicyclomine (BENTYL) 20 MG tablet Take 1 tablet (20 mg) by mouth 4 times daily as needed (abdominal pain). Taking As Needed    EPINEPHrine (ANY BX GENERIC EQUIV) 0.3 MG/0.3ML injection 2-pack Inject 0.3 mg into the muscle as needed for anaphylaxis. May repeat one time in 5-15 minutes if response to initial dose is inadequate. Taking As Needed    famotidine (PEPCID) 20 MG tablet Take 1 tablet (20 mg) by mouth 2 times daily 2/23/2025 Evening    FLUoxetine 20 MG tablet Take 20 mg by mouth every morning. 2/23/2025 Morning    furosemide (LASIX) 40 MG tablet Take 1 tablet (40 mg) by mouth daily. 2/23/2025 Morning    GAVILAX 17 GM/SCOOP powder Take 17 g by mouth daily as needed. Taking As Needed    hydrocortisone (CORTAID) 1 % external cream Apply topically daily as needed. Taking As Needed    Hydrocortisone (PREPARATION H EX) Externally apply topically daily as needed. Taking As Needed    JARDIANCE 10 MG TABS tablet Take 10 mg by mouth every morning. 2/23/2025 Morning    levothyroxine (SYNTHROID/LEVOTHROID) 25 MCG tablet Take 0.25 tablets (6.25 mcg) by mouth every morning (before breakfast). 2/23/2025 Morning    lisinopril (ZESTRIL) 10 MG tablet Take 10 mg by mouth every morning. 2/23/2025 Morning    loperamide (IMODIUM) 2 MG capsule Take 4 mg by mouth 4 times daily as needed for diarrhea. Taking As Needed    loratadine (CLARITIN) 10 MG tablet Take 10 mg by mouth daily as needed for allergies. Taking As Needed    magnesium hydroxide (MILK OF MAGNESIA) 400 MG/5ML suspension Take 30 mLs by mouth daily as needed. Taking As Needed    metFORMIN (GLUCOPHAGE) 1000 MG tablet Take 1,000 mg by mouth 2 times daily (with meals) 2/23/2025 Evening    methocarbamol (ROBAXIN) 500 MG  tablet Take 1 tablet (500 mg) by mouth 4 times daily as needed for muscle spasms. Taking As Needed    midodrine (PROAMATINE) 5 MG tablet Take 1 tablet (5 mg) by mouth 3 times daily (with meals). 2/23/2025 Evening    montelukast (SINGULAIR) 10 MG tablet Take 10 mg by mouth every morning. 2/23/2025 Morning    nicotine (NICODERM CQ) 21 MG/24HR 24 hr patch Place 1 patch over 24 hours onto the skin daily. 2/23/2025    OLANZapine (ZYPREXA) 10 MG tablet Take 10 mg by mouth At Bedtime 2/23/2025 Evening    omeprazole (PRILOSEC) 40 MG DR capsule Take 40 mg by mouth every morning. 2/23/2025 Morning    ondansetron (ZOFRAN ODT) 4 MG ODT tab Take 1 tablet (4 mg) by mouth every 8 hours as needed for nausea. Taking As Needed    pramoxine-calamine (AVEENO) 1-8 % LOTN Apply topically daily as needed for itching. Taking As Needed    rosuvastatin (CRESTOR) 10 MG tablet Take 10 mg by mouth At Bedtime 2/23/2025 Evening    spironolactone (ALDACTONE) 100 MG tablet Take 1 tablet (100 mg) by mouth daily. 2/23/2025 Morning    sucralfate (CARAFATE) 1 GM/10ML suspension Take 10 mLs (1 g) by mouth 4 times daily as needed for nausea (heartburn). Taking As Needed    traZODone (DESYREL) 100 MG tablet Take 100 mg by mouth at bedtime. 2/23/2025 Evening    TRELEGY ELLIPTA 100-62.5-25 MCG/ACT oral inhaler Inhale 1 puff into the lungs every morning. 2/23/2025 Morning    Urea 40 % CREA Apply topically daily as needed. Taking As Needed    Vitamins A & D (VITAMIN A & D) OINT Externally apply topically daily as needed. Taking As Needed    witch hazel-glycerin (TUCKS) pad Apply topically as needed for hemorrhoids. Taking As Needed

## 2025-02-24 NOTE — TELEPHONE ENCOUNTER
[facility]    Reason for call: Lab Result Notification     Lab Result (including Rx patient on, if applicable).  If culture, copy of lab report at bottom.  Lab Result:   Component      Latest Ref Rng 2/24/2025  12:40 AM   Absolute Neutrophils, Body Fluid        /uL 317.7 (HH)       Legend:  (HH) High Panic  Component      Latest Ref Rng 2/24/2025  12:40 AM   Appearance Fluid      Clear  Hazy !       Legend:  ! Abnormal  paracentesis done for liver disease  Creatinine Level (mg/dl)   Creatinine   Date Value Ref Range Status   02/23/2025 1.12 0.67 - 1.17 mg/dL Final   06/21/2012 0.83 0.66 - 1.25 mg/dL Final    Creatinine clearance (ml/min), if applicable    Serum creatinine: 1.12 mg/dL 02/23/25 2001  Estimated creatinine clearance: 105.5 mL/min     Patient's current Symptoms:   Unable to assess, left message.  ED symptoms=abdominal pain, diarrhea and distention after eating dinner.   RN Recommendations/Instructions per [facility] ED lab result protocol:   [facility] ED lab result protocol utilized: Arely Mayo, RN

## 2025-02-24 NOTE — ED NOTES
EMERGENCY DEPARTMENT SIGN OUT NOTE        ED COURSE AND MEDICAL DECISION MAKING  Patient was signed out to me by Dr Teodora Zepeda at 12:41 AM.    In brief, Warren Jaramillo is a 44 year old male who initially presented with abdominal pain and distention after eating dinner, diarrhea and right-sided weakness.    At time of sign out, disposition was pending lab and imaging results, and possible admission.    NAFLD, developmental delay, requesting paracentesis (diagnostic performed). F/up ascites fluid results and CXR then disposition.    CXR negative for acute cardiopulmonary process.    Gram stain of ascites negative. Will discharge at this time with return precautions.    FINAL IMPRESSION    1. Left lower quadrant abdominal pain    2. History of ascites    3. Other cirrhosis of liver (H)    4. Anticoagulated by anticoagulation treatment    5. History of diabetes mellitus        ED MEDS  Medications   iopamidol (ISOVUE-370) solution 67 mL (67 mLs Intravenous $Given 2/23/25 1955)     And   sodium chloride 0.9 % bag for CT scan flush use (100 mLs As instructed $Given 2/23/25 1955)   gadobutrol (GADAVIST) injection 13 mL (13 mLs Intravenous $Given 2/23/25 2136)   iopamidol (ISOVUE-370) solution 90 mL (90 mLs Intravenous $Given 2/23/25 2204)       LAB  Labs Ordered and Resulted from Time of ED Arrival to Time of ED Departure   BASIC METABOLIC PANEL - Abnormal       Result Value    Sodium 137      Potassium 4.5      Chloride 98      Carbon Dioxide (CO2) 27      Anion Gap 12      Urea Nitrogen 25.1 (*)     Creatinine 1.12      GFR Estimate 83      Calcium 9.1      Glucose 103 (*)    INR - Abnormal    INR 1.22 (*)    TROPONIN T, HIGH SENSITIVITY - Abnormal    Troponin T, High Sensitivity 25 (*)    GLUCOSE BY METER - Abnormal    GLUCOSE BY METER POCT 115 (*)    HEPATIC FUNCTION PANEL - Abnormal    Protein Total 6.9      Albumin 4.0      Bilirubin Total 0.4      Alkaline Phosphatase 211 (*)     AST 25      ALT 17       Bilirubin Direct 0.11     CBC WITH PLATELETS AND DIFFERENTIAL - Abnormal    WBC Count 17.1 (*)     RBC Count 4.82      Hemoglobin 13.5      Hematocrit 42.3      MCV 88      MCH 28.0      MCHC 31.9      RDW 17.5 (*)     Platelet Count 383      % Neutrophils 71      % Lymphocytes 16      % Monocytes 8      % Eosinophils 3      % Basophils 1      % Immature Granulocytes 1      NRBCs per 100 WBC 0      Absolute Neutrophils 12.1 (*)     Absolute Lymphocytes 2.7      Absolute Monocytes 1.4 (*)     Absolute Eosinophils 0.6      Absolute Basophils 0.1      Absolute Immature Granulocytes 0.2      Absolute NRBCs 0.0     CELL COUNT BODY FLUID - Abnormal    Color Yellow      Clarity Hazy (*)     Cell Count Fluid Source Abdomen      Total Nucleated Cells 2,269     PARTIAL THROMBOPLASTIN TIME - Normal    aPTT 33     LIPASE - Normal    Lipase 27     INFLUENZA A/B, RSV AND SARS-COV2 PCR - Normal    Influenza A PCR Negative      Influenza B PCR Negative      RSV PCR Negative      SARS CoV2 PCR Negative     TROPONIN T, HIGH SENSITIVITY - Normal    Troponin T, High Sensitivity 22     GLUCOSE MONITOR NURSING POCT   ALBUMIN FLUID   PROTEIN FLUID   DIFERENTIAL BODY FLUID   AEROBIC BACTERIAL CULTURE ROUTINE    Gram Stain Result No organisms seen     CELL COUNT WITH DIFFERENTIAL FLUID       EKG  None    RADIOLOGY    XR Chest 2 Views   Final Result   IMPRESSION: Mild cardiomegaly with the cardio to thoracic ratio at 18.2/33.9 pulmonary vascularity upper limits of normal with no sobeida active CHF. No lung infiltrates, pneumothorax, or pleural effusions.      CT Abdomen Pelvis w Contrast   Final Result   IMPRESSION:    1.  No definite acute findings in the abdomen or pelvis to explain the patient's symptoms. No acute bowel findings. Negative for obstruction. No free air.      2.  Hepatomegaly with cirrhotic changes in the liver.      3.  Evidence of portal venous hypertension with mild splenic enlargement and moderate to large amount of  ascites, similar to previous.       4.  Stable benign bilateral adrenal myelolipomas.      MR Brain w/o & w Contrast   Final Result   IMPRESSION:   1.  No recent infarct, intracranial mass, abnormal enhancement or evidence of intracranial hemorrhage.   2.  The intracranial contents are unremarkable for age.         CTA Head Neck with Contrast   Final Result   IMPRESSION:    HEAD CT:   1.  No acute intracranial findings.      HEAD CTA:    1.  No high-grade stenosis or large vessel occlusion of the [place] vasculature.      NECK CTA:   1.  No significant stenosis of the neck arterial vasculature.      Results called to Dr. Zepeda at 7:54 PM on 02/23/2025.      POC US ABDOMEN LIMITED    (Results Pending)       DISCHARGE MEDS  New Prescriptions    No medications on file       I, Tressa Urbano, am serving as a scribe to document services personally performed by Milton Ernandez MD based on my observation and the provider's statements to me. I, Milton Ernandez MD, attest that Tressa Urbano is acting in a scribe capacity, has observed my performance of the services and has documented them in accordance with my direction.    Milton Ernandez M.D.  [facility] EMERGENCY DEPARTMENT  [address] [place] [id number]  [phone]         Milton Ernandez MD  02/24/25 3602

## 2025-02-24 NOTE — CONSULTS
[facility]    Stroke Telephone Note    I was called by Teodora Zepeda on 02/23/25 regarding patient Warren Jaramillo. The patient is a 44 year old male who had sudden onset right sided numbness 2 hours before coming to the ED. The patient called ambulance and reported to EMS team different symptoms (nausea and vomiting). Only when the patient arrived at the ED did he report to the ED team the numbness of the right side. Per ED MD, neurological exam is negative.       Vitals  BP: 118/52   Pulse: 79   Resp: 20   Temp: 98  F (36.7  C)   Weight: 129.3 kg (285 lb)    Stroke Code Data (for stroke code without tele)  Stroke code activated 02/23/25  1941   Stroke provider first response 02/23/25 1942   Last known normal 02/23/25  1730      Time of discovery (or onset of symptoms) 02/23/25 1730   Head CT read by Stroke Neuro Provider 02/23/25  1950   Was stroke code de-escalated? Yes  02/23/25 1955     Imaging Findings  CT head: no acute pathology  CTA head/neck: no stenosis or occlusion    Intravenous Thrombolysis  Not given due to:   - minor/isolated/quickly resolving symptoms    Endovascular Treatment  Not initiated due to absence of proximal vessel occlusion    Impression  Right sided numbness. Etiology unclear. If this is a stroke it is too mild to warrant treatment with high risk reperfusion therapies.     Recommendations   Admit to observation. MRI brain with and without contrast. General neurology consultation in the morning.     My recommendations are based on the information provided over the phone by Warren Jaramillo's in-person providers. They are not intended to replace the clinical judgment of his in-person providers. I was not requested to personally see or examine the patient at this time.       Paula Cole MD, Msc, FAHA, FAAN   of Neurology  [facility]     02/23/2025 7:58 PM  To page me or covering stroke neurology team member,  "click here: AMCOM  Choose \"On Call\" tab at top, then search dropdown box for \"Neurology Adult\" & press Enter, look for Neuro ICU/Stroke      "

## 2025-02-24 NOTE — ED PROVIDER NOTES
EMERGENCY DEPARTMENT ENCOUNTER      NAME: Warren Jaramillo  AGE: 44 year old male  YOB: 1980  MRN: [id number]  EVALUATION DATE & TIME: No admission date for patient encounter.    PCP: Luzmaria Goldman [place]    ED PROVIDER: Teodora Zepeda M.D.      CHIEF COMPLAINT     Chief Complaint   Patient presents with    Abdominal Pain    Tier 1 stroke code          FINAL IMPRESSION:     1. Left lower quadrant abdominal pain    2. History of ascites    3. Other cirrhosis of liver (H)    4. Anticoagulated by anticoagulation treatment    5. History of diabetes mellitus          MEDICAL DECISION MAKING:     ED Course as of 02/24/25 0109   Sun Feb 23, 2025 1941 Mr. Jaramillo is a 44-year-old male presents via EMS.  Initial complaints was abdominal pain diarrhea and distention after eating dinner.  He denies any fevers or chills.  No chest pain.  Feels short of breath because he feels like he is due for a paracentesis.  Denies dysuria hematuria leg swelling or rashes.  He call 911 for the above symptoms but while at triage he noticed that 2 hours prior to coming he felt that the right arm and the right leg were weak.   1942 Neuro evaluation were requested at triage.  I evaluated patient.  There was no objective weakness of the right upper extremity or the lower extremity.  No facial droop.  Gait was steady but patient states he felt weak therefore a stroke code tier 1 was called.     Mon Feb 24, 2025 0056 Spoke with the stroke team.  CTA head and neck negative for acute.  They de-escalated the stroke code recommended MRI with and without contrast   0057 Patient updated and reevaluated.  He is a large gentleman with very distended abdomen but no localized symptoms palpation.  There is no crackles on auscultation.  He sats 95 to 97% on room air he is rhythm is regular no focal neurological deficits.   0057 Differential for the right arm and right leg weakness were considered including TIA CVA  dissection spinal pathology.  I was able to review previous records patient had similar symptoms in January at the [facility].  Underwent a MRI head and neck with no acute pathology.   0058 On Evaluation of patient stated their right arm weakness and the relative weakness were much better.   0058 Because patient complained of diarrhea influenza COVID's was sent there were negative.   0058 White blood cell count with a normal hemoglobin and a left shift.   0058 He denies any dysuria denies any rashes.  No sore throat no runny nose.   0058 Elevated white blood cell count and the abdominal pain with no guarding or rebound a CT abdomen and pelvis was done.  He complains of shortness of breath because of his abdominal distention.  No evidence of hypoxia.  Etiologies considered were acute coronary syndrome PE congestive heart failure anemia pneumothorax and another.  EKG reveals poor anterior R wave progression low voltage and right axis deviation differential for right axis deviation and low voltage include but not limited to PE tamponade.  I reviewed previous PEs patient had a right axis deviation and low voltage and poor anterior progression before.  Troponin 25.  Chronically elevated over the last 6 months his troponin has ranged from 22-26.  PE consider again right axis deviation seen before appears in no respiratory distress and he is anticoagulated and compliant with his Eliquis.   0059 Definite acute findings were found on the CT abdomen and pelvis.  He has hepatomegaly with cirrhosis portal venous hypertension adrenal myelolipoma's.   0059 Patient presents for abdominal distention and pain in a patient with cirrhotic liver includes spontaneous bacterial peritonitis.  Patient states he does not quite feel that way but this needs to be excluded.   0059 Radiology house to do the paracentesis.  It is my clinical judgment the patient does not need a therapeutic paracentesis therefore I consented patient  for a diagnostic paracentesis.   0100 See procedure below.  Patient tolerated well.  State he was hungry he was given a sandwich.  Fluid sent to the lab.   0101 Patient very familiar with his condition.  As I was doing the procedure he has can a cleaning size of needle how much lidocaine how much fluid was removed.  He is eager to be discharged.   0102 Currently awaiting the Gram stain.  And differential.  If negative patient eager to be discharged will arrange for outpatient paracentesis therapeutic.   0102 Patient was signed out to Dr. Ernandez pending labs and chest x-ray and repeat troponin       Medical Decision Making    History:  Supplemental history from: EMS  External Record(s) reviewed: 1/27/2025 at [facility] ED, the patient presented with weakness and dizziness  Paracentesis done on 2/9/2025.  Fluids were negative.  GI recommended patient sent home with antibiotics.    Work Up:  Chart documentation includes differential considered and any EKGs or imaging independently interpreted by provider, where specified.  In additional to work up documented, I considered the following work up: Urine analysis patient denies urinary symptoms.    External consultation:  Discussion of management with another provider: Neurology/Stroke    Complicating factors:  Care impacted by chronic illness: CHF, Type 2 diabetes, COPD      Disposition considerations: Admission considered. Patient was signed out to the oncoming physician, disposition pending.    MIPS not needed        ED COURSE   7:33 PM I met with the patient and conducted the initial exam and interview.   7:37 PM Tier 1 Stroke Code was called. Last known well was 2 hours ago.  7:42 PM I consulted Stroke/Neurology.  7:55 PM I consulted Stroke/Neurology. Stroke code was de-escalated.  8:26 PM I revisited patient and received additional history.  10:13 PM I revisited and updated patient.  12:02 AM I revisited and updated patient.   12:15 AM I revisited the patient and  "performed the paracentesis procedure.    At the conclusion of the encounter I discussed the results of all of the tests and the disposition. The questions were answered. The patient acknowledged understanding and was agreeable with the care plan.         MEDICATIONS GIVEN IN THE EMERGENCY:     Medications   iopamidol (ISOVUE-370) solution 67 mL (67 mLs Intravenous $Given 2/23/25 1955)     And   sodium chloride 0.9 % bag for CT scan flush use (100 mLs As instructed $Given 2/23/25 1955)   gadobutrol (GADAVIST) injection 13 mL (13 mLs Intravenous $Given 2/23/25 2136)   iopamidol (ISOVUE-370) solution 90 mL (90 mLs Intravenous $Given 2/23/25 2204)       NEW PRESCRIPTIONS STARTED AT TODAY'S ER VISIT     New Prescriptions    No medications on file          =================================================================    HPI     Patient information was obtained from: patient    Use of : N/A       Warren Jaramillo is a 44 year old male who presents by by EMS for abdominal pain, diarrhea, and right sided weakness.    Per chart review, on 01/27/2025 at [facility] ED, the patient presented with weakness and dizziness. Tier 1 stroke code was called. CT without contrast depicted no acute intracranial hemorrhage. ASPECT score was 10/10. ECG and labs were normal. Neurology evaluated patient.     For the past 3 hours, the patient has had lower left abdominal pain and diarrhea. Since 2 hours ago, the patient has had right arm and leg weakness. His arm feels like a \"sandbag\". Now, he continues to have weakness, abdominal pain, and shortness of breath.    On January 27th, the patient had similar right sided weakness and was assessed for a stroke. He has had this abdominal pain in the left region previously, and normally, that indicates to him that he needs a paracentesis. He receives paracentesis every few weeks. His last paracentesis was last Thursday, but he did not receive a call from the hospital to schedule another " one. He has a history of end stage liver disease, asthma and CHF.     He denies hematochezia, fevers, chills, urinary issues, falls, and alcohol usage. He endorses smoking. He lives in a group home.    REVIEW OF SYSTEMS   Review of Systems   SEE HPI    PAST MEDICAL HISTORY:     Past Medical History:   Diagnosis Date    COPD exacerbation (H) 12/2/2024    DM2 (diabetes mellitus, type 2) (H) 4/28/2020    HTN (hypertension) 7/30/2012    Thyroid nodule 7/31/2019    Rojas's disease (H)        PAST SURGICAL HISTORY:     Past Surgical History:   Procedure Laterality Date    COLONOSCOPY      ESOPHAGOSCOPY, GASTROSCOPY, DUODENOSCOPY (EGD), COMBINED N/A 7/21/2023    Procedure: ESOPHAGOGASTRODUODENOSCOPY WITH GASTRIC AND ESOPHAGEAL BIOPSIES;  Surgeon: Filiberto Aragon MD;  Location: [facility] OR    TOOTH EXTRACTION           CURRENT MEDICATIONS:   albuterol (PROAIR HFA/PROVENTIL HFA/VENTOLIN HFA) 108 (90 Base) MCG/ACT inhaler  albuterol (PROVENTIL) (2.5 MG/3ML) 0.083% neb solution  aloe vera GEL  apixaban ANTICOAGULANT (ELIQUIS) 5 MG tablet  bacitracin 500 UNIT/GM OINT  Benzocaine (SOLARCAINE ALOE VERA EX)  benzonatate (TESSALON) 200 MG capsule  Calamine external lotion  carbamide peroxide (DEBROX) 6.5 % otic solution  clotrimazole (LOTRIMIN) 1 % external cream  dextromethorphan-guaiFENesin (MUCINEX DM)  MG 12 hr tablet  diclofenac (VOLTAREN) 1 % topical gel  dicyclomine (BENTYL) 20 MG tablet  EPINEPHrine (ANY BX GENERIC EQUIV) 0.3 MG/0.3ML injection 2-pack  famotidine (PEPCID) 20 MG tablet  FLUoxetine 20 MG tablet  furosemide (LASIX) 40 MG tablet  GAVILAX 17 GM/SCOOP powder  hydrocortisone (CORTAID) 1 % external cream  Hydrocortisone (PREPARATION H EX)  JARDIANCE 10 MG TABS tablet  levothyroxine (SYNTHROID/LEVOTHROID) 25 MCG tablet  lisinopril (ZESTRIL) 10 MG tablet  loperamide (IMODIUM) 2 MG capsule  loratadine (CLARITIN) 10 MG tablet  magnesium hydroxide (MILK OF MAGNESIA) 400 MG/5ML suspension  metFORMIN  (GLUCOPHAGE) 1000 MG tablet  methocarbamol (ROBAXIN) 500 MG tablet  midodrine (PROAMATINE) 5 MG tablet  montelukast (SINGULAIR) 10 MG tablet  nicotine (NICODERM CQ) 21 MG/24HR 24 hr patch  OLANZapine (ZYPREXA) 10 MG tablet  omeprazole (PRILOSEC) 40 MG DR capsule  ondansetron (ZOFRAN ODT) 4 MG ODT tab  pramoxine-calamine (AVEENO) 1-8 % LOTN  rosuvastatin (CRESTOR) 10 MG tablet  spironolactone (ALDACTONE) 100 MG tablet  sucralfate (CARAFATE) 1 GM/10ML suspension  traZODone (DESYREL) 100 MG tablet  TRELEGY ELLIPTA 100-62.5-25 MCG/ACT oral inhaler  Urea 40 % CREA  Vitamins A & D (VITAMIN A & D) OINT  witch hazel-glycerin (TUCKS) pad         ALLERGIES:     Allergies   Allergen Reactions    Apricot Flavoring Agent (Non-Screening) Anaphylaxis    Banana Anaphylaxis     Throat swelling  Throat swelling      Wasp Venom Protein Shortness Of Breath     Other reaction(s): Respiratory Distress  Has an epi pen  Has an epi pen      Bees Anaphylaxis     Have an Epi pen that carries with    Methylphenidate Itching     Other reaction(s): Nightmares    Prunus      Other reaction(s): *Unknown    Sulfa Antibiotics      Headaches and nausea       FAMILY HISTORY:     Family History   Problem Relation Age of Onset    Unknown/Adopted Father     Unknown/Adopted Maternal Grandmother     C.A.D. Maternal Grandfather     Diabetes Maternal Grandfather     Cerebrovascular Disease Maternal Grandfather     Unknown/Adopted Paternal Grandmother     Unknown/Adopted Paternal Grandfather     Unknown/Adopted Brother     Unknown/Adopted Sister        SOCIAL HISTORY:     Social History     Socioeconomic History    Marital status: Single   Tobacco Use    Smoking status: Every Day     Current packs/day: 1.00     Average packs/day: 1 pack/day for 21.1 years (21.1 ttl pk-yrs)     Types: Cigarettes     Start date: 1/1/2004    Smokeless tobacco: Never   Vaping Use    Vaping status: Never Used   Substance and Sexual Activity    Alcohol use: Not Currently      Comment: Last 8/7/2024    Drug use: Never    Sexual activity: Never     Partners: Female   Other Topics Concern     Service No    Blood Transfusions No    Caffeine Concern No    Occupational Exposure No    Hobby Hazards No    Sleep Concern No    Stress Concern Yes     Comment: sometimes    Weight Concern No    Special Diet Yes     Comment: counting carbs    Back Care No    Exercise Yes    Seat Belt Yes    Self-Exams Yes     Social Drivers of Health     Financial Resource Strain: Low Risk  (2/8/2025)    Financial Resource Strain     Within the past 12 months, have you or your family members you live with been unable to get utilities (heat, electricity) when it was really needed?: No   Food Insecurity: Low Risk  (2/8/2025)    Food Insecurity     Within the past 12 months, did you worry that your food would run out before you got money to buy more?: No     Within the past 12 months, did the food you bought just not last and you didn t have money to get more?: No   Transportation Needs: Low Risk  (2/8/2025)    Transportation Needs     Within the past 12 months, has lack of transportation kept you from medical appointments, getting your medicines, non-medical meetings or appointments, work, or from getting things that you need?: No    [facility]    Social Connections   Interpersonal Safety: Low Risk  (2/9/2025)    Interpersonal Safety     Do you feel physically and emotionally safe where you currently live?: Yes     Within the past 12 months, have you been hit, slapped, kicked or otherwise physically hurt by someone?: No     Within the past 12 months, have you been humiliated or emotionally abused in other ways by your partner or ex-partner?: No   Recent Concern: Interpersonal Safety - High Risk (1/11/2025)    Interpersonal Safety     Do you feel physically and emotionally safe where you currently live?: No     Within  "the past 12 months, have you been hit, slapped, kicked or otherwise physically hurt by someone?: No     Within the past 12 months, have you been humiliated or emotionally abused in other ways by your partner or ex-partner?: No   Housing Stability: Low Risk  (2/8/2025)    Housing Stability     Do you have housing? : Yes     Are you worried about losing your housing?: No   Recent Concern: Housing Stability - High Risk (12/2/2024)    Housing Stability     Do you have housing? : No     Are you worried about losing your housing?: No       VITALS:   BP (!) 140/66   Pulse 79   Temp 98  F (36.7  C) (Oral)   Resp 27   Ht 1.651 m (5' 5\")   Wt 129.3 kg (285 lb)   SpO2 95%   BMI 47.43 kg/m      PHYSICAL EXAM     Physical Exam  Vitals and nursing note reviewed. Exam conducted with a chaperone present.   Constitutional:       General: He is not in acute distress.     Appearance: He is obese. He is not ill-appearing, toxic-appearing or diaphoretic.   Neurological:      Mental Status: He is alert.         Physical Exam   Constitutional: Well appearing, obese, no distress    Head: Atraumatic.     Nose: Nose normal.     Mouth/Throat: Oropharynx is clear poor dentition    Eyes: EOM are normal. Pupils are equal, round, and reactive to light.     Ears: Bilateral pearly white tympanic membranes.    Neck: Normal range of motion. Neck supple.     Cardiovascular: Normal rate, regular rhythm and normal heart sounds.  2+ femoral pulses/radial/DP pulses B    Pulmonary/Chest: Normal effort  and breath sounds normal.     Abdominal: Distended, nontender.    Musculoskeletal: Normal range of motion.     Neurological: Moves upper and lower extremities equally.    Lymphatics: no edema, no calves pain, no palpable cords.    : NA    Skin: Skin is warm and dry.  No petechia no purpura    Psychiatric: Normal mood and affect. Behavior is normal.     National Institutes of Health Stroke Scale  Exam Interval: Baseline   Score    Level of " consciousness: (0)   Alert, keenly responsive    LOC questions: (0)   Answers both questions correctly    LOC commands: (0)   Performs both tasks correctly    Best gaze: (0)   Normal    Visual: (0)   No visual loss    Facial palsy: (0)   Normal symmetrical movements    Motor arm (left): (0)   No drift    Motor arm (right): (0)   No drift    Motor leg (left): (0)   No drift    Motor leg (right): (0)   No drift    Limb ataxia: (0)   Absent    Sensory: (0)   Normal- no sensory loss    Best language: (0)   Normal- no aphasia    Dysarthria: (0)   Normal    Extinction and inattention: (0)   No abnormality        Total Score:  0         LAB:     All pertinent labs reviewed and interpreted.  Labs Ordered and Resulted from Time of ED Arrival to Time of ED Departure   BASIC METABOLIC PANEL - Abnormal       Result Value    Sodium 137      Potassium 4.5      Chloride 98      Carbon Dioxide (CO2) 27      Anion Gap 12      Urea Nitrogen 25.1 (*)     Creatinine 1.12      GFR Estimate 83      Calcium 9.1      Glucose 103 (*)    INR - Abnormal    INR 1.22 (*)    TROPONIN T, HIGH SENSITIVITY - Abnormal    Troponin T, High Sensitivity 25 (*)    GLUCOSE BY METER - Abnormal    GLUCOSE BY METER POCT 115 (*)    HEPATIC FUNCTION PANEL - Abnormal    Protein Total 6.9      Albumin 4.0      Bilirubin Total 0.4      Alkaline Phosphatase 211 (*)     AST 25      ALT 17      Bilirubin Direct 0.11     CBC WITH PLATELETS AND DIFFERENTIAL - Abnormal    WBC Count 17.1 (*)     RBC Count 4.82      Hemoglobin 13.5      Hematocrit 42.3      MCV 88      MCH 28.0      MCHC 31.9      RDW 17.5 (*)     Platelet Count 383      % Neutrophils 71      % Lymphocytes 16      % Monocytes 8      % Eosinophils 3      % Basophils 1      % Immature Granulocytes 1      NRBCs per 100 WBC 0      Absolute Neutrophils 12.1 (*)     Absolute Lymphocytes 2.7      Absolute Monocytes 1.4 (*)     Absolute Eosinophils 0.6      Absolute Basophils 0.1      Absolute Immature  Granulocytes 0.2      Absolute NRBCs 0.0     PARTIAL THROMBOPLASTIN TIME - Normal    aPTT 33     LIPASE - Normal    Lipase 27     INFLUENZA A/B, RSV AND SARS-COV2 PCR - Normal    Influenza A PCR Negative      Influenza B PCR Negative      RSV PCR Negative      SARS CoV2 PCR Negative     GLUCOSE MONITOR NURSING POCT   ALBUMIN FLUID   PROTEIN FLUID   CELL COUNT BODY FLUID   TROPONIN T, HIGH SENSITIVITY   DIFERENTIAL BODY FLUID   AEROBIC BACTERIAL CULTURE ROUTINE   CELL COUNT WITH DIFFERENTIAL FLUID        RADIOLOGY:     Reviewed all pertinent imaging. Please see official radiology report.  CT Abdomen Pelvis w Contrast   Final Result   IMPRESSION:    1.  No definite acute findings in the abdomen or pelvis to explain the patient's symptoms. No acute bowel findings. Negative for obstruction. No free air.      2.  Hepatomegaly with cirrhotic changes in the liver.      3.  Evidence of portal venous hypertension with mild splenic enlargement and moderate to large amount of ascites, similar to previous.       4.  Stable benign bilateral adrenal myelolipomas.      MR Brain w/o & w Contrast   Final Result   IMPRESSION:   1.  No recent infarct, intracranial mass, abnormal enhancement or evidence of intracranial hemorrhage.   2.  The intracranial contents are unremarkable for age.         CTA Head Neck with Contrast   Final Result   IMPRESSION:    HEAD CT:   1.  No acute intracranial findings.      HEAD CTA:    1.  No high-grade stenosis or large vessel occlusion of the [place] vasculature.      NECK CTA:   1.  No significant stenosis of the neck arterial vasculature.      Results called to Dr. Zepeda at 7:54 PM on 02/23/2025.      POC US ABDOMEN LIMITED    (Results Pending)   XR Chest 2 Views    (Results Pending)        EKG:     EKG #1  Sinus rhythm poor anterior progression of right axis deviation inverted T wave in 3 and aVF.  Low voltage.    Time:200422    Ventricular rate 81 bmp  Axis normal  ME interval 204  ms  QRS duration 106 ms  QT//478 ms    Compared to previous EKG on compared to EKG on January 27, 2025 axis deviation was not seen before but compared to EKG on December 2024 right axis deviation was seen before.  I have independently reviewed and interpreted the EKG(s) documented above.      PROCEDURES:     Procedures    PROCEDURE: Paracentesis   INDICATIONS: Distention   PROCEDURE PROVIDER: Dr Teodora Zepeda   CONSENT: Risks, benefits and alternatives were discussed with and Written consent was obtained from Patient.   TIME OUT: Universal protocol was followed. TIME OUT conducted just prior to starting procedure confirmed patient identity, site/side, procedure, patient position, and availability of correct equipment. Yes   SITE: Right lower quadrant   MEDICATIONS:  5 mLs of 1% Lidocaine without epinephrine   NOTE: The area was prepped with chlorhexidine and betadine and draped off in the usual sterile fashion.  The RLQ was entered with an 22 gauge needle and 20 ml of fluid was withdrawn. The fluid was straw colored, clear. The needle was then withdrawn and a dressing was applied.   COMPLICATIONS: Patient tolerated procedure well, without complication           I, Bianca Mohr, am serving as a scribe to document services personally performed by Dr. Zepeda based on my observation and the provider's statements to me. I, Teodora Zepeda MD attest that Bianca Mohr is acting in a scribe capacity, has observed my performance of the services and has documented them in accordance with my direction.    Teodora Zepeda M.D.  Emergency Medicine  [facility] EMERGENCY DEPARTMENT  [facility] [facility] [id number] [phone]  Dept: [phone]       Teodora Zepeda MD  02/24/25 0109

## 2025-02-24 NOTE — TELEPHONE ENCOUNTER
S-(situation):     Patient reporting his abdominal/back pain has gotten worse, radiates in to his left kidney area, left back area.    B-(background):   States he has a history of Spontaneous bacterial Peritonitis    Patient recently in ER for abdominal pain. States MRI, CT and paracentesis were done.  Was informed of his high neutrophils.     A-(assessment):     Patient is nervous because his pain is continuing to get worse.     Tylenol is not helping. Can't get relief from the pain.     Started out as LLQ abdominal pain now radiates through his abdomen to his left back/lower shoulder    Rates pain at 8-9/10 and has gotten worse since he left ER.      States no issues with bowels but he states that he can't tell when his bladder is full, numb like, he will attempt to go, with no output but then a little while later he feels like his bladder is going to burst.      States the paracentesis site is very warm, denies tenderness, denies redness.   States he attempted to reach his PCP but it is now after hours.    R-(recommendations):     If pain is getting worse and tylenol is not helping, recommend he should return to ER.     DWAYNE Hammond on 2/24/2025 at 5:52 PM          Reason for Disposition   SEVERE abdominal pain (e.g., excruciating)   Numbness (loss of sensation) in groin or rectal area    Additional Information   Negative: Passed out (e.g., fainted, lost consciousness, blacked out and was not responding)   Negative: Shock suspected (e.g., cold/pale/clammy skin, too weak to stand, low BP, rapid pulse)   Negative: Sounds like a life-threatening emergency to the triager   Negative: Major injury to the back (e.g., MVA, fall > 10 feet or 3 meters, penetrating injury, etc.)   Negative: Pain in the upper back over the ribs (rib cage) that radiates (travels) into the chest   Negative: Pain in the upper back over the ribs (rib cage) and worsened by coughing (or clearly increases with breathing)   Negative: Back pain during  pregnancy   Negative: SEVERE back pain of sudden onset and age > 60 years    Protocols used: Back Pain-A-[place]

## 2025-02-25 ENCOUNTER — MEDICAL CORRESPONDENCE (OUTPATIENT)
Dept: HEALTH INFORMATION MANAGEMENT | Facility: CLINIC | Age: 45
End: 2025-02-25

## 2025-02-25 ENCOUNTER — HOSPITAL ENCOUNTER (OUTPATIENT)
Dept: GENERAL RADIOLOGY | Facility: HOSPITAL | Age: 45
Discharge: HOME | End: 2025-02-25
Attending: INTERNAL MEDICINE
Payer: COMMERCIAL

## 2025-02-25 ENCOUNTER — OFFICE VISIT (OUTPATIENT)
Dept: RHEUMATOLOGY | Facility: CLINIC | Age: 45
End: 2025-02-25
Payer: COMMERCIAL

## 2025-02-25 VITALS — OXYGEN SATURATION: 96 % | DIASTOLIC BLOOD PRESSURE: 71 MMHG | HEART RATE: 84 BPM | SYSTOLIC BLOOD PRESSURE: 116 MMHG

## 2025-02-25 DIAGNOSIS — M15.0 PRIMARY OSTEOARTHRITIS INVOLVING MULTIPLE JOINTS: ICD-10-CM

## 2025-02-25 DIAGNOSIS — K74.60 CIRRHOSIS OF LIVER WITH ASCITES, UNSPECIFIED HEPATIC CIRRHOSIS TYPE (H): ICD-10-CM

## 2025-02-25 DIAGNOSIS — K75.81 STEATOHEPATITIS: ICD-10-CM

## 2025-02-25 DIAGNOSIS — M25.50 POLYARTHRALGIA: ICD-10-CM

## 2025-02-25 DIAGNOSIS — M15.0 PRIMARY OSTEOARTHRITIS INVOLVING MULTIPLE JOINTS: Primary | ICD-10-CM

## 2025-02-25 DIAGNOSIS — R18.8 CIRRHOSIS OF LIVER WITH ASCITES, UNSPECIFIED HEPATIC CIRRHOSIS TYPE (H): ICD-10-CM

## 2025-02-25 LAB
RHEUMATOID FACT SERPL-ACNC: <10 IU/ML
URATE SERPL-MCNC: 10.4 MG/DL (ref 3.4–7)

## 2025-02-25 PROCEDURE — 20610 DRAIN/INJ JOINT/BURSA W/O US: CPT | Mod: LT | Performed by: INTERNAL MEDICINE

## 2025-02-25 PROCEDURE — 3078F DIAST BP <80 MM HG: CPT | Performed by: INTERNAL MEDICINE

## 2025-02-25 PROCEDURE — 73522 X-RAY EXAM HIPS BI 3-4 VIEWS: CPT

## 2025-02-25 PROCEDURE — 3074F SYST BP LT 130 MM HG: CPT | Performed by: INTERNAL MEDICINE

## 2025-02-25 PROCEDURE — 99204 OFFICE O/P NEW MOD 45 MIN: CPT | Mod: 25 | Performed by: INTERNAL MEDICINE

## 2025-02-25 PROCEDURE — 73562 X-RAY EXAM OF KNEE 3: CPT | Mod: 50

## 2025-02-25 RX ORDER — TRIAMCINOLONE ACETONIDE 40 MG/ML
40 INJECTION, SUSPENSION INTRA-ARTICULAR; INTRAMUSCULAR ONCE
Status: COMPLETED | OUTPATIENT
Start: 2025-02-25 | End: 2025-02-25

## 2025-02-25 RX ADMIN — TRIAMCINOLONE ACETONIDE 40 MG: 40 INJECTION, SUSPENSION INTRA-ARTICULAR; INTRAMUSCULAR at 14:32

## 2025-02-25 NOTE — PROGRESS NOTES
This document was created using a software with less than 100% fidelity, at times resulting in unintended, even erroneous syntax and grammar.  The reader is advised to keep this under consideration while reviewing, interpreting this note.      Rheumatology Consult Note      Warren Jaramillo     YOB: 1980 Age: 44 year old    Date of visit: 2/25/25    [facility] [place]    Chief Complaint   Patient presents with:  Consult      Assessment and Plan     Malik was seen today for consult.    Diagnoses and all orders for this visit:    Primary osteoarthritis involving multiple joints  -     XR Knee Standing Bilateral 3 Views; Future  -     XR Pelvis and Hip Bilateral 2 Views; Future  -     Cyclic Citrullinated Peptide Antibody IgG; Future  -     Rheumatoid factor; Future  -     Uric acid; Future  -     triamcinolone (KENALOG-40) injection 40 mg  -     ASPIRATION/INJECTION MAJOR JOINT    Polyarthralgia  -     Cyclic Citrullinated Peptide Antibody IgG; Future  -     Rheumatoid factor; Future  -     Uric acid; Future    Steatohepatitis    Cirrhosis of liver with ascites, unspecified hepatic cirrhosis type (H)    This patient presents with arthralgias predominantly knees, hip.  He has several features which are consistent with osteoarthritis despite his relatively young age.  We discussed this.  The likelihood that he has inflammatory joint disease, and connective tissue disease appears to be remote.  During the workup for her as cirrhosis it seems he been evaluated for variety of autoantibody signals including LEANDRO and per GI reports the LEANDRO was negative.  Given the severity of his pain he would like to proceed with local injection corticosteroid into the left knee.  After pros and cons were discussed including risk of infection, bleeding, skin changes including thinning, pigmentary alteration and scarring to name a few, left knee injected as noted in the orders section. This was done  "with nontouch technique.  The patient tolerated the procedure well and had a brisk Marcaine effect.  The postinjection care was discussed.  X-rays of the knees taken today, personally reviewed the films, findings: While he has intact patellofemoral lateral joint space medial joint space is severely reduced no chondrocalcinosis.  This is commensurate with osteoarthritis.  Further workup as noted.      Return to clinic: 1 months      HPI   Warren Jaramillo is a 44 year old male  is seen today for evaluation of polyarthralgias.  He noted some of the worst symptoms are in his knees, hip areas.  This is troubled her for the past 3 years, progressive, worse toward the end of the day, first thing is stiff all over however takes about 15 to 20 minutes to overcome that stiffness he has several comorbidities including ADD, developmental delay, fetal alcohol syndrome.  He has developed advanced cirrhosis as he puts it, gets paracentesis done every 48 hours and \"is sick and tired\" of this.  In this background he is a resident at the local [employer].  He reports that the pain in the knees especially tends to be worse toward the latter part of the day the more he is up and about the worse it gets.  He has seen swelling of the lower extremities but that is fairly uniform.  He is not sure of the swelling of the knees per se.  He reports pain across his lower back, he was in the emergency room the other day.  He noted overall pain level 10 out of 10 worse on the left knee he has not noted pain swelling stiffness in the small joints of the hands the wrist the elbows the feet or the ankles.  He has noted difficulty performing some of the day-to-day activities as noted.  There is no history of psoriasis himself, ulcerative colitis or Crohn's disease.  He is a smoker.  He took alcohol for about a year and then discontinued.  He has not felt that his psoriasis is secondary to alcoholic disease more likely due to fatty liver " "disease.  He has hypertension history of asthma history of headaches.  He noted that his mom's history of renal failure, and alcoholism.  The rest is not available..  He is on anticoagulation for DVT of the left upper extremity veins secondary to trauma.  He takes Tylenol for his knee pain prior to that he was on ibuprofen.  When he was in the hospital recently narcotic was given and he was on call, [place], and felt very little pain in the knees or the hips.       Active Problem List     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    Tobacco use    Elevated WBCs    Rectal bleeding    Anal fissure    \"high flow priapism\"     CARDIOVASCULAR SCREENING; LDL GOAL LESS THAN 160    PTSD (post-traumatic stress disorder)    Fetal alcohol syndromes    Seasonal allergic rhinitis    Steatohepatitis    Cardiomegaly    Shortness of breath    Chest pain, unspecified type    Acute right hip pain    Chronic right-sided congestive heart failure (H)    Active autistic disorder    Adjustment disorder with disturbance of conduct    Angiomyolipoma    Ankylosis, sacroiliac joint    Ascites    Charcot-Estrella-Tooth syndrome    Chronic insomnia    Congestive heart failure (H)    DM2 (diabetes mellitus, type 2) (H)    DM2 (diabetes mellitus, type 2) (H)    Dysphagia    Dysuria    Elevated d-dimer    Episodic mood disorder    Excessive daytime sleepiness    Gait instability    H/O fall    Hematuria    Benign essential hypertension    Hyperglycemia    Incontinence    Myelolipoma of adrenal gland    AVA treated with BiPAP    Abdominal pain, right upper quadrant    PVC's (premature ventricular contractions)    SVT (supraventricular tachycardia)    Heart failure with preserved ejection fraction, NYHA class I (H)    Incomplete left bundle branch block (LBBB)    Suicidal ideation    Cirrhosis of liver with ascites, unspecified hepatic cirrhosis type (H)    " Thyroid nodule    ADHD (attention deficit hyperactivity disorder)    Chest pain    Morbid obesity (H)    COPD exacerbation (H)    DVT of axillary vein, acute left (H)    Abdominal bloating    LLQ abdominal pain    SBP (spontaneous bacterial peritonitis) (H)    ASNHL (asymmetrical sensorineural hearing loss)    Traumatic brain injury (H)    Tongue lesion    Acute kidney failure, unspecified    Other specified hypothyroidism    Medication induced coagulopathy    Normal anion gap metabolic acidosis    Diffuse abdominal pain    Portal hypertension (H)     Past Medical History     Past Medical History:   Diagnosis Date    COPD exacerbation (H) 12/2/2024    DM2 (diabetes mellitus, type 2) (H) 4/28/2020    HTN (hypertension) 7/30/2012    Thyroid nodule 7/31/2019    Rojas's disease (H)      Past Surgical History     Past Surgical History:   Procedure Laterality Date    COLONOSCOPY      ESOPHAGOSCOPY, GASTROSCOPY, DUODENOSCOPY (EGD), COMBINED N/A 7/21/2023    Procedure: ESOPHAGOGASTRODUODENOSCOPY WITH GASTRIC AND ESOPHAGEAL BIOPSIES;  Surgeon: Filiberto Aragon MD;  Location: [facility] OR    TOOTH EXTRACTION       Allergy     Allergies   Allergen Reactions    Apricot Flavoring Agent (Non-Screening) Anaphylaxis    Banana Anaphylaxis     Throat swelling  Throat swelling      Wasp Venom Protein Shortness Of Breath     Other reaction(s): Respiratory Distress  Has an epi pen  Has an epi pen      Bees Anaphylaxis     Have an Epi pen that carries with    Methylphenidate Itching     Other reaction(s): Nightmares    Prunus      Other reaction(s): *Unknown    Sulfa Antibiotics      Headaches and nausea     Current Medication List     Current Outpatient Medications   Medication Sig Dispense Refill    albuterol (PROAIR HFA/PROVENTIL HFA/VENTOLIN HFA) 108 (90 Base) MCG/ACT inhaler Inhale 1-2 puffs into the lungs every 6 hours as needed for shortness of breath, wheezing or cough 18 g 0    albuterol (PROVENTIL) (2.5 MG/3ML) 0.083% neb  solution Take 2.5 mg by nebulization 3 times daily as needed for shortness of breath, wheezing or cough      aloe vera GEL Apply 1 g topically every hour as needed for skin care Per bottle directions      apixaban ANTICOAGULANT (ELIQUIS) 5 MG tablet Take 5 mg by mouth 2 times daily.      bacitracin 500 UNIT/GM OINT Apply topically 3 times daily as needed for wound care      Benzocaine (SOLARCAINE ALOE VERA EX) Externally apply topically daily as needed.      benzonatate (TESSALON) 200 MG capsule Take 1 capsule (200 mg) by mouth 3 times daily as needed for cough. 50 capsule 0    Calamine external lotion Apply topically as needed for itching      carbamide peroxide (DEBROX) 6.5 % otic solution Place 5 drops into both ears daily as needed for other.      clotrimazole (LOTRIMIN) 1 % external cream Apply topically 2 times daily as needed (skin irritation)      dextromethorphan-guaiFENesin (MUCINEX DM)  MG 12 hr tablet Take 1 tablet by mouth 2 times daily as needed.      diclofenac (VOLTAREN) 1 % topical gel Apply 2 g topically daily as needed for moderate pain To joints/back      dicyclomine (BENTYL) 20 MG tablet Take 1 tablet (20 mg) by mouth 4 times daily as needed (abdominal pain). 20 tablet 0    EPINEPHrine (ANY BX GENERIC EQUIV) 0.3 MG/0.3ML injection 2-pack Inject 0.3 mg into the muscle as needed for anaphylaxis. May repeat one time in 5-15 minutes if response to initial dose is inadequate.      famotidine (PEPCID) 20 MG tablet Take 1 tablet (20 mg) by mouth 2 times daily 60 tablet 0    FLUoxetine 20 MG tablet Take 20 mg by mouth every morning.      furosemide (LASIX) 40 MG tablet Take 1 tablet (40 mg) by mouth daily. 30 tablet 0    GAVILAX 17 GM/SCOOP powder Take 17 g by mouth daily as needed.      hydrocortisone (CORTAID) 1 % external cream Apply topically daily as needed.      Hydrocortisone (PREPARATION H EX) Externally apply topically daily as needed.      JARDIANCE 10 MG TABS tablet Take 10 mg by mouth  every morning.      levothyroxine (SYNTHROID/LEVOTHROID) 25 MCG tablet Take 0.25 tablets (6.25 mcg) by mouth every morning (before breakfast). 8 tablet 0    lisinopril (ZESTRIL) 10 MG tablet Take 10 mg by mouth every morning.      loperamide (IMODIUM) 2 MG capsule Take 4 mg by mouth 4 times daily as needed for diarrhea.      loratadine (CLARITIN) 10 MG tablet Take 10 mg by mouth daily as needed for allergies.      magnesium hydroxide (MILK OF MAGNESIA) 400 MG/5ML suspension Take 30 mLs by mouth daily as needed.      metFORMIN (GLUCOPHAGE) 1000 MG tablet Take 1,000 mg by mouth 2 times daily (with meals)      methocarbamol (ROBAXIN) 500 MG tablet Take 1 tablet (500 mg) by mouth 4 times daily as needed for muscle spasms. 40 tablet 0    midodrine (PROAMATINE) 5 MG tablet Take 1 tablet (5 mg) by mouth 3 times daily (with meals). 30 tablet 0    montelukast (SINGULAIR) 10 MG tablet Take 10 mg by mouth every morning.      nicotine (NICODERM CQ) 21 MG/24HR 24 hr patch Place 1 patch over 24 hours onto the skin daily. 28 patch 0    OLANZapine (ZYPREXA) 10 MG tablet Take 10 mg by mouth At Bedtime      omeprazole (PRILOSEC) 40 MG DR capsule Take 40 mg by mouth every morning.      ondansetron (ZOFRAN ODT) 4 MG ODT tab Take 1 tablet (4 mg) by mouth every 8 hours as needed for nausea. 20 tablet 0    pramoxine-calamine (AVEENO) 1-8 % LOTN Apply topically daily as needed for itching.      rosuvastatin (CRESTOR) 10 MG tablet Take 10 mg by mouth At Bedtime      spironolactone (ALDACTONE) 100 MG tablet Take 1 tablet (100 mg) by mouth daily. 30 tablet 0    sucralfate (CARAFATE) 1 GM/10ML suspension Take 10 mLs (1 g) by mouth 4 times daily as needed for nausea (heartburn).      traZODone (DESYREL) 100 MG tablet Take 100 mg by mouth at bedtime.      TRELEGY ELLIPTA 100-62.5-25 MCG/ACT oral inhaler Inhale 1 puff into the lungs every morning.      Urea 40 % CREA Apply topically daily as needed.      Vitamins A & D (VITAMIN A & D) OINT  Externally apply topically daily as needed.      witch hazel-glycerin (TUCKS) pad Apply topically as needed for hemorrhoids.       No current facility-administered medications for this visit.       No current facility-administered medications for this visit.        Family History     Family History   Problem Relation Age of Onset    Unknown/Adopted Father     Unknown/Adopted Maternal Grandmother     C.A.D. Maternal Grandfather     Diabetes Maternal Grandfather     Cerebrovascular Disease Maternal Grandfather     Unknown/Adopted Paternal Grandmother     Unknown/Adopted Paternal Grandfather     Unknown/Adopted Brother     Unknown/Adopted Sister          Physical Exam     COMPREHENSIVE EXAMINATION:  There were no vitals filed for this visit.  A well appearing alert oriented male. Vital data as noted above. His eyes examined for inflammation/scleromalacia. ENT examined for oral mucositis, moisture, thrush, nasal deformity, external ear redness, deformity. His neck is examined for suppleness and lymphadenopathy.  Cardiopulmonary examination without dyspnea at rest, use of accessory muscles of breathing, wheezing, edema, peripheral or central cyanosis.  Abdomen examined for softness, tenderness, obvious organomegaly.  Skin examined for heliotrope, malar area eruption, lupus pernio, periungual erythema, sclerodactyly, papules, erythema nodosum, purpura, nail pitting, onycholysis, and obvious psoriasis lesion. Neurological examination done for alertness, speech, facial symmetry,  tone and power in upper and lower extremities, and gait. The joint examination is performed for swelling, tenderness, warmth, erythema, and range of motion in the following joints: DIPs, PIPs, MCPs, wrists, first CMC's, elbows, shoulders, hips, knees, ankles, feet; spine for range of motion and paraspinal muscles for tenderness. The salient normal / abnormal findings are appended.  He already has early Heberden's nodes.  He has mild joint line  tenderness of the knees bilaterally left is warmer than the right no detectable effusion is found.  He has large ascites.  He has edema of the lower extremities bilaterally.  There is no synovitis of palpable joints of upper or lower extremities.  He is not tender in the trochanteric areas.  He has reduced internal and external rotation in the hip joints bilaterally.    Labs / Imaging (select studies)     PPD Induration   Date Value Ref Range Status   09/24/2008 0 0 - 5 mm      PPD Redness   Date Value Ref Range Status   09/14/2007 0 mm       Hemoglobin   Date Value Ref Range Status   02/24/2025 13.6 13.3 - 17.7 g/dL Final   02/23/2025 13.5 13.3 - 17.7 g/dL Final   02/15/2025 12.8 (L) 13.3 - 17.7 g/dL Final   06/21/2012 15.9 13.3 - 17.7 g/dL Final   01/26/2011 15.9 13.3 - 17.7 g/dL Final   03/19/2009 16.4 13.3 - 17.7 g/dL Final     Urea Nitrogen   Date Value Ref Range Status   02/24/2025 22.6 (H) 6.0 - 20.0 mg/dL Final   02/23/2025 25.1 (H) 6.0 - 20.0 mg/dL Final   02/15/2025 17.5 6.0 - 20.0 mg/dL Final   06/21/2012 11 5 - 24 mg/dL Final   03/19/2009 14 5 - 24 mg/dL Final     Sed Rate   Date Value Ref Range Status   11/12/2007 2 0 - 15 mm/h Final     AST   Date Value Ref Range Status   02/24/2025 25 0 - 45 U/L Final   02/23/2025 25 0 - 45 U/L Final     Comment:     Specimen is hemolyzed which can falsely elevate AST. Analysis of a non-hemolyzed specimen may result in a lower value.   02/15/2025 18 0 - 45 U/L Final   06/21/2012 24 0 - 45 U/L Final     Albumin   Date Value Ref Range Status   02/24/2025 4.0 3.5 - 5.2 g/dL Final   02/23/2025 4.0 3.5 - 5.2 g/dL Final   02/15/2025 3.9 3.5 - 5.2 g/dL Final   06/21/2012 4.1 3.9 - 5.1 g/dL Final     Alkaline Phosphatase   Date Value Ref Range Status   02/24/2025 202 (H) 40 - 150 U/L Final   02/23/2025 211 (H) 40 - 150 U/L Final   02/15/2025 231 (H) 40 - 150 U/L Final   06/21/2012 88 40 - 150 U/L Final     ALT   Date Value Ref Range Status   02/24/2025 17 0 - 70 U/L Final    02/23/2025 17 0 - 70 U/L Final   02/15/2025 22 0 - 70 U/L Final   06/21/2012 35 0 - 70 U/L Final          Immunization History     Immunization History   Administered Date(s) Administered    COVID-19 12+ (MODERNA) 11/12/2024    COVID-19 Bivalent 18+ (Moderna) 11/30/2022    Influenza Vaccine >6 months,quad, PF 11/30/2022    Influenza Vaccine, 6+MO IM (QUADRIVALENT W/PRESERVATIVES) 10/16/2019    Influenza, Split Virus, Trivalent, Pf (Fluzone\Fluarix) 01/12/2025    Mantoux Tuberculin Skin Test 09/17/2007, 09/22/2008, 06/20/2012    Pneumococcal 20 valent Conjugate (Prevnar 20) 11/30/2022    TDAP (Adacel,Boostrix) 10/10/2015    TDAP Vaccine (Adacel) 05/19/2008    Td (Adult), Adsorbed 01/18/2005

## 2025-02-25 NOTE — ED PROVIDER NOTES
EMERGENCY DEPARTMENT ENCOUNTER      NAME: Warren Jaramillo  AGE: 44 year old male  YOB: 1980  MRN: [id number]  EVALUATION DATE & TIME: 2025  9:03 PM    [facility]    ED PROVIDER: Filiberto Gutierrez M.D.      Chief Complaint   Patient presents with    Abdominal Pain         FINAL IMPRESSION:  1. Left lower quadrant abdominal pain          ED COURSE & MEDICAL DECISION MAKIN:27 PM I met with the patient, obtained history, performed an initial exam, and discussed options and plan for diagnostics and treatment here in the ED.     44 year old male presents to the Emergency Department for evaluation of abdominal pain.  Patient presenting with left-sided abdominal and back pain which started sometime yesterday.  He arrives to the emergency department vitally stable.  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  He had an evaluation yesterday for these issues which seems quite comprehensive.  This included a diagnostic paracentesis with a chronic mild elevation of his neutrophil count and so far negative culture.  He has a chronic leukocytosis which was stable yesterday and on repeat today.  He is a chronic elevated Bashan of his alkaline phosphatase which again is stable with stable LFTs and other labs repeated today.  He had a CT scan yesterday that was negative for any acute intra-abdominal process like kidney stone, bowel obstruction, diverticulitis, etc.  I added a urine analysis which also looks clear.  To some extent the patient's symptoms sound more acute on chronic.  Could certainly be related to recurrent ascites and distention or something like a musculoskeletal low back strain.  Given his reassuring evaluation yesterday, absence of peritonitis, stable repeat labs, I do not think there is any additional workup to complete here in the emergency department.  At the time I  am evaluating him it is about 10:00 at night and we discussed that paracentesis with IR is not available at this hour and he has this scheduled for 3 days from now which seems reasonable given his current exam.  Patient was reassured by the results of his workup and her long discussion following this.  We discussed a plan for discharge and he left in stable condition.    At the conclusion of the encounter I discussed the results of all of the tests and the disposition. The questions were answered. The patient or family acknowledged understanding and was agreeable with the care plan.       Medical Decision Making  Obtained supplemental history:Supplemental history obtained?: No  Reviewed external records: External records reviewed?: Documented in chart  Care impacted by chronic illness:Documented in Chart  Did you consider but not order tests?: Work up considered but not performed and documented in chart, if applicable  Did you interpret images independently?: Independent interpretation of ECG and images noted in documentation, when applicable.  Consultation discussion with other provider:Did you involve another provider (consultant, MH, pharmacy, etc.)?: No  Discharge. No recommendations on prescription strength medication(s). N/A.    MIPS (CTPE, Dental pain, Khan, Sinusitis, Asthma/COPD, Head Trauma): Not Applicable          MEDICATIONS GIVEN IN THE EMERGENCY:  Medications   morphine (PF) injection 4 mg (4 mg Intravenous $Given 2/24/25 1072)       NEW PRESCRIPTIONS STARTED AT TODAY'S ER VISIT  Discharge Medication List as of 2/24/2025 11:05 PM             =================================================================    HPI    Patient information was obtained from: patient     Use of : N/A         Warren Jaramillo is a 44 year old male with a pertinent history of hypertension, type II diabetes, chronic right sided congestive heart failure, SVT, acute kidney failure, cirrhosis of liver with  "ascites, autistic disorder, copd, diffuse abdominal pain, who presents to this ED walk in for evaluation of abdominal pain.     The patient endorses \"severe\" left lower quadrant pain that radiates into his back underneath his left shoulder. He said the pain started yesterday after his paracentesis. He said he has difficulty taking a deep breath and moving due to the pain. He said after the fluid is drained he develops unbearable lower left abdominal pain. He was told that yesterday the fluid drained was a \"yellow/hazy\" color. He reports taking tylenol for pain with no relief. Denies fevers.     Of note, he said his ascites is drained every two days, with his next appointment scheduled for 2/28.     2/7-2/9/2025- [facility] Admission for spontaneous bacterial peritonitis. Patient with SBC with ANC more than 250. Patient underwent paracentesis and 4 L was drained. Treated with IV rocephn. Patient discharged in stable condition with prescription for ciprofloxacin 500 mg.     2/23/2025- [facility] ED visit for abdominal pain, diarrhea and right sided weakness. Imaging CT abdomen/pelvis revealed evidence of hepatomegaly with cirrhotic changes in the liver and portal venous hypertension with mild splenic enlargement. No definite acute findings to explain symptoms. EKG revealed poor anterior R wave progression low voltage and right axis deviation. CXR negative for acute cardiopulmonary process. Gram stain of ascites negative. Patient discharged home in stable condition.     REVIEW OF SYSTEMS   All systems reviewed and negative except as noted in HPI.    PAST MEDICAL HISTORY:  Past Medical History:   Diagnosis Date    COPD exacerbation (H) 12/2/2024    DM2 (diabetes mellitus, type 2) (H) 4/28/2020    HTN (hypertension) 7/30/2012    Thyroid nodule 7/31/2019    Rojas's disease (H)        PAST SURGICAL HISTORY:  Past Surgical History:   Procedure Laterality Date    COLONOSCOPY      ESOPHAGOSCOPY, GASTROSCOPY, DUODENOSCOPY " (EGD), COMBINED N/A 7/21/2023    Procedure: ESOPHAGOGASTRODUODENOSCOPY WITH GASTRIC AND ESOPHAGEAL BIOPSIES;  Surgeon: Filiberto Aragon MD;  Location: [facility] [place] OR    TOOTH EXTRACTION             CURRENT MEDICATIONS:    No current facility-administered medications for this encounter.     Current Outpatient Medications   Medication Sig Dispense Refill    albuterol (PROAIR HFA/PROVENTIL HFA/VENTOLIN HFA) 108 (90 Base) MCG/ACT inhaler Inhale 1-2 puffs into the lungs every 6 hours as needed for shortness of breath, wheezing or cough 18 g 0    albuterol (PROVENTIL) (2.5 MG/3ML) 0.083% neb solution Take 2.5 mg by nebulization 3 times daily as needed for shortness of breath, wheezing or cough      aloe vera GEL Apply 1 g topically every hour as needed for skin care Per bottle directions      apixaban ANTICOAGULANT (ELIQUIS) 5 MG tablet Take 5 mg by mouth 2 times daily.      bacitracin 500 UNIT/GM OINT Apply topically 3 times daily as needed for wound care      Benzocaine (SOLARCAINE ALOE VERA EX) Externally apply topically daily as needed.      benzonatate (TESSALON) 200 MG capsule Take 1 capsule (200 mg) by mouth 3 times daily as needed for cough. 50 capsule 0    Calamine external lotion Apply topically as needed for itching      carbamide peroxide (DEBROX) 6.5 % otic solution Place 5 drops into both ears daily as needed for other.      clotrimazole (LOTRIMIN) 1 % external cream Apply topically 2 times daily as needed (skin irritation)      dextromethorphan-guaiFENesin (MUCINEX DM)  MG 12 hr tablet Take 1 tablet by mouth 2 times daily as needed.      diclofenac (VOLTAREN) 1 % topical gel Apply 2 g topically daily as needed for moderate pain To joints/back      dicyclomine (BENTYL) 20 MG tablet Take 1 tablet (20 mg) by mouth 4 times daily as needed (abdominal pain). 20 tablet 0    EPINEPHrine (ANY BX GENERIC EQUIV) 0.3 MG/0.3ML injection 2-pack Inject 0.3 mg into the muscle as needed for anaphylaxis. May repeat  one time in 5-15 minutes if response to initial dose is inadequate.      famotidine (PEPCID) 20 MG tablet Take 1 tablet (20 mg) by mouth 2 times daily 60 tablet 0    FLUoxetine 20 MG tablet Take 20 mg by mouth every morning.      furosemide (LASIX) 40 MG tablet Take 1 tablet (40 mg) by mouth daily. 30 tablet 0    GAVILAX 17 GM/SCOOP powder Take 17 g by mouth daily as needed.      hydrocortisone (CORTAID) 1 % external cream Apply topically daily as needed.      Hydrocortisone (PREPARATION H EX) Externally apply topically daily as needed.      JARDIANCE 10 MG TABS tablet Take 10 mg by mouth every morning.      levothyroxine (SYNTHROID/LEVOTHROID) 25 MCG tablet Take 0.25 tablets (6.25 mcg) by mouth every morning (before breakfast). 8 tablet 0    lisinopril (ZESTRIL) 10 MG tablet Take 10 mg by mouth every morning.      loperamide (IMODIUM) 2 MG capsule Take 4 mg by mouth 4 times daily as needed for diarrhea.      loratadine (CLARITIN) 10 MG tablet Take 10 mg by mouth daily as needed for allergies.      magnesium hydroxide (MILK OF MAGNESIA) 400 MG/5ML suspension Take 30 mLs by mouth daily as needed.      metFORMIN (GLUCOPHAGE) 1000 MG tablet Take 1,000 mg by mouth 2 times daily (with meals)      methocarbamol (ROBAXIN) 500 MG tablet Take 1 tablet (500 mg) by mouth 4 times daily as needed for muscle spasms. 40 tablet 0    midodrine (PROAMATINE) 5 MG tablet Take 1 tablet (5 mg) by mouth 3 times daily (with meals). 30 tablet 0    montelukast (SINGULAIR) 10 MG tablet Take 10 mg by mouth every morning.      nicotine (NICODERM CQ) 21 MG/24HR 24 hr patch Place 1 patch over 24 hours onto the skin daily. 28 patch 0    OLANZapine (ZYPREXA) 10 MG tablet Take 10 mg by mouth At Bedtime      omeprazole (PRILOSEC) 40 MG DR capsule Take 40 mg by mouth every morning.      ondansetron (ZOFRAN ODT) 4 MG ODT tab Take 1 tablet (4 mg) by mouth every 8 hours as needed for nausea. 20 tablet 0    pramoxine-calamine (AVEENO) 1-8 % LOTN Apply  topically daily as needed for itching.      rosuvastatin (CRESTOR) 10 MG tablet Take 10 mg by mouth At Bedtime      spironolactone (ALDACTONE) 100 MG tablet Take 1 tablet (100 mg) by mouth daily. 30 tablet 0    sucralfate (CARAFATE) 1 GM/10ML suspension Take 10 mLs (1 g) by mouth 4 times daily as needed for nausea (heartburn).      traZODone (DESYREL) 100 MG tablet Take 100 mg by mouth at bedtime.      TRELEGY ELLIPTA 100-62.5-25 MCG/ACT oral inhaler Inhale 1 puff into the lungs every morning.      Urea 40 % CREA Apply topically daily as needed.      Vitamins A & D (VITAMIN A & D) OINT Externally apply topically daily as needed.      witch hazel-glycerin (TUCKS) pad Apply topically as needed for hemorrhoids.           ALLERGIES:  Allergies   Allergen Reactions    Apricot Flavoring Agent (Non-Screening) Anaphylaxis    Banana Anaphylaxis     Throat swelling  Throat swelling      Wasp Venom Protein Shortness Of Breath     Other reaction(s): Respiratory Distress  Has an epi pen  Has an epi pen      Bees Anaphylaxis     Have an Epi pen that carries with    Methylphenidate Itching     Other reaction(s): Nightmares    Prunus      Other reaction(s): *Unknown    Sulfa Antibiotics      Headaches and nausea       FAMILY HISTORY:  Family History   Problem Relation Age of Onset    Unknown/Adopted Father     Unknown/Adopted Maternal Grandmother     C.A.D. Maternal Grandfather     Diabetes Maternal Grandfather     Cerebrovascular Disease Maternal Grandfather     Unknown/Adopted Paternal Grandmother     Unknown/Adopted Paternal Grandfather     Unknown/Adopted Brother     Unknown/Adopted Sister        SOCIAL HISTORY:   Social History     Socioeconomic History    Marital status: Single   Tobacco Use    Smoking status: Every Day     Current packs/day: 1.00     Average packs/day: 1 pack/day for 21.1 years (21.1 ttl pk-yrs)     Types: Cigarettes     Start date: 1/1/2004    Smokeless tobacco: Never   Vaping Use    Vaping status: Never  Used   Substance and Sexual Activity    Alcohol use: Not Currently     Comment: Last 8/7/2024    Drug use: Never    Sexual activity: Never     Partners: Female   Other Topics Concern     Service No    Blood Transfusions No    Caffeine Concern No    Occupational Exposure No    Hobby Hazards No    Sleep Concern No    Stress Concern Yes     Comment: sometimes    Weight Concern No    Special Diet Yes     Comment: counting carbs    Back Care No    Exercise Yes    Seat Belt Yes    Self-Exams Yes     Social Drivers of Health     Financial Resource Strain: Low Risk  (2/8/2025)    Financial Resource Strain     Within the past 12 months, have you or your family members you live with been unable to get utilities (heat, electricity) when it was really needed?: No   Food Insecurity: Low Risk  (2/8/2025)    Food Insecurity     Within the past 12 months, did you worry that your food would run out before you got money to buy more?: No     Within the past 12 months, did the food you bought just not last and you didn t have money to get more?: No   Transportation Needs: Low Risk  (2/8/2025)    Transportation Needs     Within the past 12 months, has lack of transportation kept you from medical appointments, getting your medicines, non-medical meetings or appointments, work, or from getting things that you need?: No    Received from [facility], [facility]    Social Connections   Interpersonal Safety: Low Risk  (2/9/2025)    Interpersonal Safety     Do you feel physically and emotionally safe where you currently live?: Yes     Within the past 12 months, have you been hit, slapped, kicked or otherwise physically hurt by someone?: No     Within the past 12 months, have you been humiliated or emotionally abused in other ways by your partner or ex-partner?: No   Recent Concern: Interpersonal Safety - High Risk (1/11/2025)    Interpersonal Safety     Do you feel  physically and emotionally safe where you currently live?: No     Within the past 12 months, have you been hit, slapped, kicked or otherwise physically hurt by someone?: No     Within the past 12 months, have you been humiliated or emotionally abused in other ways by your partner or ex-partner?: No   Housing Stability: Low Risk  (2/8/2025)    Housing Stability     Do you have housing? : Yes     Are you worried about losing your housing?: No   Recent Concern: Housing Stability - High Risk (12/2/2024)    Housing Stability     Do you have housing? : No     Are you worried about losing your housing?: No       VITALS:  /74   Pulse 80   Temp 97.7  F (36.5  C) (Oral)   Resp 16   Wt 127 kg (280 lb)   SpO2 96%   BMI 46.59 kg/m      PHYSICAL EXAM    Constitutional: Well developed, Well nourished, NAD.   HENT: Normocephalic, Atraumatic. Neck Supple.  Eyes: EOMI, Conjunctiva normal.  Respiratory: Breathing comfortably on room air. Speaks full sentences easily. Lungs clear to ascultation.  Cardiovascular: Normal heart rate, Regular rhythm. No peripheral edema.  Abdomen: Soft, moderately distended with a fluid wave.  Mild tenderness to deep palpation in the left lower quadrant.  Remainder the abdomen is completely nontender and without peritoneal signs.  Musculoskeletal: Good range of motion in all major joints. No major deformities noted.  Integument: Warm, Dry.  Neurologic: Alert & awake, Normal motor function, Normal sensory function, No focal deficits noted.   Psychiatric: Cooperative. Affect appropriate.     LAB:  All pertinent labs reviewed and interpreted.  Labs Ordered and Resulted from Time of ED Arrival to Time of ED Departure   BASIC METABOLIC PANEL - Abnormal       Result Value    Sodium 139      Potassium 4.7      Chloride 100      Carbon Dioxide (CO2) 26      Anion Gap 13      Urea Nitrogen 22.6 (*)     Creatinine 1.15      GFR Estimate 80      Calcium 9.1      Glucose 90     HEPATIC FUNCTION PANEL -  Abnormal    Protein Total 7.0      Albumin 4.0      Bilirubin Total 0.4      Alkaline Phosphatase 202 (*)     AST 25      ALT 17      Bilirubin Direct 0.14     ROUTINE UA WITH MICROSCOPIC REFLEX TO CULTURE - Abnormal    Color Urine Yellow      Appearance Urine Clear      Glucose Urine Negative      Bilirubin Urine Negative      Ketones Urine Negative      Specific Gravity Urine 1.026      Blood Urine Negative      pH Urine 5.5      Protein Albumin Urine 10 (*)     Urobilinogen Urine <2.0      Nitrite Urine Negative      Leukocyte Esterase Urine Negative      Mucus Urine Present (*)     RBC Urine 1      WBC Urine 1      Squamous Epithelials Urine 1     CBC WITH PLATELETS AND DIFFERENTIAL - Abnormal    WBC Count 15.4 (*)     RBC Count 4.79      Hemoglobin 13.6      Hematocrit 42.3      MCV 88      MCH 28.4      MCHC 32.2      RDW 17.7 (*)     Platelet Count 385      % Neutrophils 70      % Lymphocytes 17      % Monocytes 8      % Eosinophils 4      % Basophils 1      % Immature Granulocytes 1      NRBCs per 100 WBC 0      Absolute Neutrophils 10.7 (*)     Absolute Lymphocytes 2.6      Absolute Monocytes 1.3      Absolute Eosinophils 0.6      Absolute Basophils 0.1      Absolute Immature Granulocytes 0.1      Absolute NRBCs 0.0     LIPASE - Normal    Lipase 23             I, Naa Corral am serving as a scribe to document services personally performed by Dr. Filiberto Gutierrez, based on my observation and the provider's statements to me. I, Filiberto Gutierrez MD attest that Naa Corral is acting in a scribe capacity, has observed my performance of the services and has documented them in accordance with my direction.    Filiberto Gutierrez M.D.  Emergency Medicine  [facility] EMERGENCY DEPARTMENT  [address] [place] [id number] [phone]  Dept: [phone]       Filiberto Gutierrez MD  02/25/25 1233

## 2025-02-25 NOTE — ED NOTES
Pt discharged ambulatory to home at 2315. All questions answered. Pt expressed understanding of info. Taxi cab ride was set up for patient, and he is waiting in the lobby.

## 2025-02-25 NOTE — DISCHARGE INSTRUCTIONS
You were seen in the emergency department for abdominal pain.  Your evaluation included review of your lab and imaging from yesterday as well as some repeat lab studies today.  Everything in your evaluation has looked reassuring.  We do not see signs of significant infection, kidney stone, UTI, peritonitis, etc.  Some of your  discomfort could be related to fluid accumulation and we agree with your plan to get your paracentesis done this Friday.  In the meantime please lay low and you can use intermittent lower doses of Tylenol as well as lidocaine patches if you find it helpful for pain.  Try to avoid strenuous activity until follow-up.

## 2025-02-26 LAB — CCP AB SER IA-ACNC: 1.8 U/ML

## 2025-02-27 LAB
BACTERIA FLD CULT: NORMAL
GRAM STAIN RESULT: NORMAL
GRAM STAIN RESULT: NORMAL

## 2025-02-28 ENCOUNTER — HOSPITAL ENCOUNTER (OUTPATIENT)
Dept: ULTRASOUND IMAGING | Facility: HOSPITAL | Age: 45
Discharge: HOME | End: 2025-02-28
Attending: INTERNAL MEDICINE | Admitting: INTERNAL MEDICINE

## 2025-02-28 DIAGNOSIS — K74.60 CIRRHOSIS OF LIVER WITHOUT ASCITES, UNSPECIFIED HEPATIC CIRRHOSIS TYPE (H): ICD-10-CM

## 2025-02-28 LAB
% LINING CELLS, BODY FLUID: 11 %
APPEARANCE FLD: ABNORMAL
COLOR FLD: YELLOW
LYMPHOCYTES NFR FLD MANUAL: 27 %
MONOS+MACROS NFR FLD MANUAL: 46 %
NEUTROPHILS # FLD: 208.2 /UL
NEUTS BAND NFR FLD MANUAL: 16 %
SPECIMEN SOURCE FLD: ABNORMAL
WBC # FLD AUTO: 1301 /UL

## 2025-02-28 PROCEDURE — 89051 BODY FLUID CELL COUNT: CPT | Performed by: INTERNAL MEDICINE

## 2025-02-28 PROCEDURE — 49083 ABD PARACENTESIS W/IMAGING: CPT

## 2025-03-01 LAB
ATRIAL RATE - MUSE: 81 BPM
BACTERIA FLD CULT: NO GROWTH
DIASTOLIC BLOOD PRESSURE - MUSE: NORMAL MMHG
GRAM STAIN RESULT: NORMAL
GRAM STAIN RESULT: NORMAL
INTERPRETATION ECG - MUSE: NORMAL
P AXIS - MUSE: 67 DEGREES
PR INTERVAL - MUSE: 204 MS
QRS DURATION - MUSE: 106 MS
QT - MUSE: 412 MS
QTC - MUSE: 478 MS
R AXIS - MUSE: 106 DEGREES
SYSTOLIC BLOOD PRESSURE - MUSE: NORMAL MMHG
T AXIS - MUSE: -71 DEGREES
VENTRICULAR RATE- MUSE: 81 BPM

## 2025-03-10 ENCOUNTER — THERAPY VISIT (OUTPATIENT)
Dept: PHYSICAL THERAPY | Facility: REHABILITATION | Age: 45
End: 2025-03-10
Attending: NURSE PRACTITIONER
Payer: COMMERCIAL

## 2025-03-10 DIAGNOSIS — M54.41 ACUTE RIGHT-SIDED LOW BACK PAIN WITH RIGHT-SIDED SCIATICA: Primary | ICD-10-CM

## 2025-03-10 DIAGNOSIS — M54.12 CERVICAL RADICULOPATHY: ICD-10-CM

## 2025-03-10 PROCEDURE — 97161 PT EVAL LOW COMPLEX 20 MIN: CPT | Mod: GP | Performed by: PHYSICAL THERAPIST

## 2025-03-10 PROCEDURE — 97110 THERAPEUTIC EXERCISES: CPT | Mod: GP | Performed by: PHYSICAL THERAPIST

## 2025-03-10 NOTE — PROGRESS NOTES
"PHYSICAL THERAPY EVALUATION  Type of Visit: Evaluation              Subjective  Patient states they are having lower back pain, started about 1 month ago that was first noticed after rolling in bed and trying to sit up and then trying to stand. Lower back feels \"locked\" when trying to go from sitting to standing. Was having sensory changes in right leg and this has since subsided    Neck pain - this also started about 1 month ago. hurts with turning side to side and looking down. Was having sensory changes in right arm and this has since subsided    Is unsure what is causing their pain.     Wonders if weight loss is \"screwing up\" their spine, needs to have fluid drained due to liver ascites every 2 days.     States they are having more bowel issues more recently, they will have sensations of passing gas and then they will have diarrhea.     Patient wonders if they need a chiropractor adjustment.     Sleep - wakes up at 4:30 in the morning. Take nap during the day.     Physical activities - limited/none. \"Would like to exercise\" but due to breathing problems, heart failure and liver failure its been a challenge.        Condition type:  Initial onset (within last 3 months)  Cause of current episode:  Unspecified     Nature of treatment:  Rehabilitative  Functional ability:  Moderate functional limitations  Documented POC (choose all that apply):  Measurable short and long term/discharge treatment goals related to physical and functional deficits.;Frequency of treatment visits and treatment activities to address deficit areas.;Patient agrees to program participation including home program  Briefly describe symptoms:  Patient states they are having lower back pain, started about 1 month ago that was first noticed after rolling in bed and trying to sit up and then trying to stand. Lower back feels \"locked\" when trying to go from sitting to standing. Was having sensory changes in right leg and this has since subsided    " "Neck pain - this also started about 1 month ago. hurts with turning side to side and looking down. Was having sensory changes in right arm and this has since subsided  How did the symptoms start:  Patient states they are having lower back pain, started about 1 month ago that was first noticed after rolling in bed and trying to sit up and then trying to stand. Lower back feels \"locked\" when trying to go from sitting to standing.  Average pain/intensity last 24 hours:  5/10  Average pain/intensity past week:  7/10  Frequency of Symptoms:  Occasionally (26-50% of the time)  Symptom impact on ADLs:  Quite a bit  Condition change since eval:  N/A (first visit)  General health reported by patient:  Poor      Presenting condition or subjective complaint:    Date of onset: 02/11/25    Relevant medical history: Arthritis; Bladder or bowel problems; Diabetes; Dizziness; Heart problems; High blood pressure; Implanted device; Osteoarthritis   Dates & types of surgery:      Prior diagnostic imaging/testing results: MRI; CT scan; X-ray; Video fluoroscopic swallow study     Prior therapy history for the same diagnosis, illness or injury: No      Prior Level of Function  Transfers:   Ambulation:   ADL:   IADL:     Living Environment  Social support: With a caregiver/helper   Type of home: 2-story   Stairs to enter the home: Yes 14 Is there a railing: Yes     Ramp: No   Stairs inside the home: Yes 8 Is there a railing: Yes     Help at home: Self Cares (home health aide/personal care attendant, family, etc)  Equipment owned: Straight Cane; Four-point cane; Walker; Dressing equipment     Employment: No  , starting new job next week, [employer]   Hobbies/Interests:      Patient goals for therapy:  would like to stand up straight without having to \"unlock\", be able to transition sitting to standing more comfortably.     Pain assessment:      Objective   LUMBAR SPINE EVALUATION  PAIN:   INTEGUMENTARY (edema, incisions):   POSTURE:   GAIT: "   Weightbearing Status:   Assistive Device(s):   Gait Deviations:   BALANCE/PROPRIOCEPTION:   WEIGHTBEARING ALIGNMENT:   NON-WEIGHTBEARING ALIGNMENT:    ROM:     Neck ROM:  Rotation limited 25% bilaterally with pain  Cervical retraction: limited by 50%, no pain    Shoulder ROM: WFL bilaterally    (Degrees) Left AROM Left PROM  Right AROM Right PROM   Hip Flexion       Hip Extension       Hip Abduction       Hip Adduction       Hip Internal Rotation       Hip External Rotation       Knee Flexion       Knee Extension       Lumbar Side glide     Lumbar Flexion Mild to moderate limitation, low back pain   Lumbar Extension Mild to moderate limitation, low back pain   Pain:   End feel:   PELVIC/SI SCREEN:   STRENGTH:  hip abduction 3/5 bilaterally    MYOTOMES:   DTR S:   CORD SIGNS:   DERMATOMES:   NEURAL TENSION:   FLEXIBILITY:   LUMBAR/HIP Special Tests:    PELVIS/SI SPECIAL TESTS:   FUNCTIONAL TESTS:  able to perform sit to stand without upper extremities, low back pain  PALPATION:   SPINAL SEGMENTAL CONCLUSIONS:       10 meter walk test: 7.82 seconds, reduced trunk motion minimal reciprocal arm swing.     Assessment & Plan   CLINICAL IMPRESSIONS  Medical Diagnosis: M54.41 (ICD-10-CM) - Acute right-sided low back pain with right-sided sciatica  M54.12 (ICD-10-CM) - Cervical radiculopathy    Treatment Diagnosis: Patient has pain with sit to stands and neck ROM and likely associated with reduced overall activity levels   Impression/Assessment: Patient is a 44 year old male with chief complaints of neck and low back pain. Patient has pain with sit to stands and neck ROM and likely associated with reduced overall activity levels.  The following significant findings have been identified: Pain, Decreased ROM/flexibility, Decreased strength, Impaired gait, Impaired muscle performance, Decreased activity tolerance, and Impaired posture. These impairments interfere with their ability to perform self care tasks, recreational  activities, household mobility, and community mobility as compared to previous level of function. Patient will benefit from skilled physical therapy to address impairments and functional limitations in order to achieve their goals.     Clinical Decision Making (Complexity):  Clinical Presentation: Stable/Uncomplicated  Clinical Presentation Rationale: based on medical and personal factors listed in PT evaluation  Clinical Decision Making (Complexity): Low complexity    PLAN OF CARE  Treatment Interventions:  Modalities: E-stim  Interventions: Gait Training, Manual Therapy, Neuromuscular Re-education, Therapeutic Activity, Therapeutic Exercise, Self-Care/Home Management    Long Term Goals     PT Goal 1  Goal Identifier: HEP  Goal Description: Patient will demonstrate at least 50% compliance in their HEP to support progress towards functional and patient specific goals  Target Date: 06/02/25  PT Goal 2  Goal Identifier: 30 second sit to stand  Goal Description: Patient will achieve a score of 6 or greater repetitions in 30 second sit to stand test to reflect progress towards normative trunk and lower extremity strength values.  Target Date: 06/02/25      Frequency of Treatment: every other week  Duration of Treatment: 12 weeks    Recommended Referrals to Other Professionals:   Education Assessment:   Learner/Method: Patient    Risks and benefits of evaluation/treatment have been explained.   Patient/Family/caregiver agrees with Plan of Care.     Evaluation Time:     PT Eval, Low Complexity Minutes (78722): 28       Signing Clinician: Magno Joseph, PT        [facility]                                                                                   OUTPATIENT PHYSICAL THERAPY      PLAN OF TREATMENT FOR OUTPATIENT REHABILITATION   Patient's Last Name, First Name, Warren Epperson YOB: 1980   Provider's Name   [facility]    Medical Record No.  [id number]     Onset Date: 02/11/25  Start of Care Date: 03/10/25     Medical Diagnosis:  M54.41 (ICD-10-CM) - Acute right-sided low back pain with right-sided sciatica  M54.12 (ICD-10-CM) - Cervical radiculopathy      PT Treatment Diagnosis:  Patient has pain with sit to stands and neck ROM and likely associated with reduced overall activity levels Plan of Treatment  Frequency/Duration: every other week/ 12 weeks    Certification date from 03/10/25 to 06/02/25         See note for plan of treatment details and functional goals     Magno Joseph, PT                         I CERTIFY THE NEED FOR THESE SERVICES FURNISHED UNDER        THIS PLAN OF TREATMENT AND WHILE UNDER MY CARE     (Physician attestation of this document indicates review and certification of the therapy plan).              Referring Provider:  Alejandrina Waite    Initial Assessment  See Epic Evaluation- Start of Care Date: 03/10/25

## 2025-03-21 ENCOUNTER — HOSPITAL ENCOUNTER (OUTPATIENT)
Dept: RADIOLOGY | Facility: HOSPITAL | Age: 45
Discharge: HOME | End: 2025-03-21
Payer: COMMERCIAL

## 2025-03-21 DIAGNOSIS — I50.32 CHRONIC HEART FAILURE WITH PRESERVED EJECTION FRACTION (HFPEF) (H): ICD-10-CM

## 2025-03-21 DIAGNOSIS — R06.09 DOE (DYSPNEA ON EXERTION): ICD-10-CM

## 2025-03-21 PROCEDURE — 71046 X-RAY EXAM CHEST 2 VIEWS: CPT

## 2025-03-31 ENCOUNTER — HOSPITAL ENCOUNTER (OUTPATIENT)
Dept: CARDIOLOGY | Facility: HOSPITAL | Age: 45
Discharge: HOME | End: 2025-03-31
Attending: INTERNAL MEDICINE | Admitting: INTERNAL MEDICINE
Payer: COMMERCIAL

## 2025-03-31 DIAGNOSIS — I50.9 CHF (CONGESTIVE HEART FAILURE) (H): ICD-10-CM

## 2025-03-31 DIAGNOSIS — R18.8 CIRRHOSIS OF LIVER WITH ASCITES, UNSPECIFIED HEPATIC CIRRHOSIS TYPE (H): ICD-10-CM

## 2025-03-31 DIAGNOSIS — K74.60 CIRRHOSIS OF LIVER WITH ASCITES, UNSPECIFIED HEPATIC CIRRHOSIS TYPE (H): ICD-10-CM

## 2025-03-31 LAB — LVEF ECHO: NORMAL

## 2025-03-31 PROCEDURE — 93306 TTE W/DOPPLER COMPLETE: CPT | Mod: 26 | Performed by: INTERNAL MEDICINE

## 2025-03-31 PROCEDURE — 255N000002 HC RX 255 OP 636: Performed by: INTERNAL MEDICINE

## 2025-03-31 PROCEDURE — C8929 TTE W OR WO FOL WCON,DOPPLER: HCPCS

## 2025-03-31 RX ADMIN — PERFLUTREN 2 ML: 6.52 INJECTION, SUSPENSION INTRAVENOUS at 10:58

## 2025-04-02 ENCOUNTER — APPOINTMENT (OUTPATIENT)
Dept: MRI IMAGING | Facility: HOSPITAL | Age: 45
DRG: 433 | End: 2025-04-02
Attending: STUDENT IN AN ORGANIZED HEALTH CARE EDUCATION/TRAINING PROGRAM
Payer: COMMERCIAL

## 2025-04-02 ENCOUNTER — APPOINTMENT (OUTPATIENT)
Dept: CT IMAGING | Facility: HOSPITAL | Age: 45
DRG: 433 | End: 2025-04-02
Attending: EMERGENCY MEDICINE
Payer: COMMERCIAL

## 2025-04-02 ENCOUNTER — HOSPITAL ENCOUNTER (INPATIENT)
Facility: HOSPITAL | Age: 45
End: 2025-04-02
Attending: EMERGENCY MEDICINE | Admitting: HOSPITALIST
Payer: COMMERCIAL

## 2025-04-02 DIAGNOSIS — K20.90 ESOPHAGITIS: ICD-10-CM

## 2025-04-02 DIAGNOSIS — R09.89 CHOKING EPISODE: ICD-10-CM

## 2025-04-02 DIAGNOSIS — I21.4 NSTEMI (NON-ST ELEVATED MYOCARDIAL INFARCTION) (H): ICD-10-CM

## 2025-04-02 LAB
ANION GAP SERPL CALCULATED.3IONS-SCNC: 14 MMOL/L (ref 7–15)
APTT PPP: 32 SECONDS (ref 22–38)
BASOPHILS # BLD AUTO: 0.1 10E3/UL (ref 0–0.2)
BASOPHILS NFR BLD AUTO: 1 %
BUN SERPL-MCNC: 25.1 MG/DL (ref 6–20)
CALCIUM SERPL-MCNC: 10.2 MG/DL (ref 8.8–10.4)
CHLORIDE SERPL-SCNC: 99 MMOL/L (ref 98–107)
CREAT SERPL-MCNC: 1.23 MG/DL (ref 0.67–1.17)
EGFRCR SERPLBLD CKD-EPI 2021: 74 ML/MIN/1.73M2
EOSINOPHIL # BLD AUTO: 0.5 10E3/UL (ref 0–0.7)
EOSINOPHIL NFR BLD AUTO: 3 %
ERYTHROCYTE [DISTWIDTH] IN BLOOD BY AUTOMATED COUNT: 15.3 % (ref 10–15)
GLUCOSE SERPL-MCNC: 122 MG/DL (ref 70–99)
HCO3 SERPL-SCNC: 25 MMOL/L (ref 22–29)
HCT VFR BLD AUTO: 40.1 % (ref 40–53)
HGB BLD-MCNC: 13 G/DL (ref 13.3–17.7)
HOLD SPECIMEN: NORMAL
IMM GRANULOCYTES # BLD: 0.1 10E3/UL
IMM GRANULOCYTES NFR BLD: 1 %
INR PPP: 1.17 (ref 0.85–1.15)
LYMPHOCYTES # BLD AUTO: 2.5 10E3/UL (ref 0.8–5.3)
LYMPHOCYTES NFR BLD AUTO: 18 %
MCH RBC QN AUTO: 28.5 PG (ref 26.5–33)
MCHC RBC AUTO-ENTMCNC: 32.4 G/DL (ref 31.5–36.5)
MCV RBC AUTO: 88 FL (ref 78–100)
MONOCYTES # BLD AUTO: 1.2 10E3/UL (ref 0–1.3)
MONOCYTES NFR BLD AUTO: 8 %
NEUTROPHILS # BLD AUTO: 9.9 10E3/UL (ref 1.6–8.3)
NEUTROPHILS NFR BLD AUTO: 70 %
NRBC # BLD AUTO: 0 10E3/UL
NRBC BLD AUTO-RTO: 0 /100
PLATELET # BLD AUTO: 330 10E3/UL (ref 150–450)
POTASSIUM SERPL-SCNC: 4.3 MMOL/L (ref 3.4–5.3)
RBC # BLD AUTO: 4.56 10E6/UL (ref 4.4–5.9)
SODIUM SERPL-SCNC: 138 MMOL/L (ref 135–145)
TROPONIN T SERPL HS-MCNC: 26 NG/L
TROPONIN T SERPL HS-MCNC: 35 NG/L
WBC # BLD AUTO: 14.3 10E3/UL (ref 4–11)

## 2025-04-02 PROCEDURE — A9585 GADOBUTROL INJECTION: HCPCS | Performed by: STUDENT IN AN ORGANIZED HEALTH CARE EDUCATION/TRAINING PROGRAM

## 2025-04-02 PROCEDURE — 70496 CT ANGIOGRAPHY HEAD: CPT

## 2025-04-02 PROCEDURE — 84484 ASSAY OF TROPONIN QUANT: CPT | Performed by: EMERGENCY MEDICINE

## 2025-04-02 PROCEDURE — 74174 CTA ABD&PLVS W/CONTRAST: CPT

## 2025-04-02 PROCEDURE — 80048 BASIC METABOLIC PNL TOTAL CA: CPT | Performed by: EMERGENCY MEDICINE

## 2025-04-02 PROCEDURE — 250N000011 HC RX IP 250 OP 636: Performed by: EMERGENCY MEDICINE

## 2025-04-02 PROCEDURE — 85730 THROMBOPLASTIN TIME PARTIAL: CPT | Performed by: EMERGENCY MEDICINE

## 2025-04-02 PROCEDURE — 93005 ELECTROCARDIOGRAM TRACING: CPT | Performed by: STUDENT IN AN ORGANIZED HEALTH CARE EDUCATION/TRAINING PROGRAM

## 2025-04-02 PROCEDURE — 255N000002 HC RX 255 OP 636: Performed by: STUDENT IN AN ORGANIZED HEALTH CARE EDUCATION/TRAINING PROGRAM

## 2025-04-02 PROCEDURE — 36415 COLL VENOUS BLD VENIPUNCTURE: CPT | Performed by: STUDENT IN AN ORGANIZED HEALTH CARE EDUCATION/TRAINING PROGRAM

## 2025-04-02 PROCEDURE — 71275 CT ANGIOGRAPHY CHEST: CPT

## 2025-04-02 PROCEDURE — 85018 HEMOGLOBIN: CPT | Performed by: EMERGENCY MEDICINE

## 2025-04-02 PROCEDURE — 82310 ASSAY OF CALCIUM: CPT | Performed by: EMERGENCY MEDICINE

## 2025-04-02 PROCEDURE — 36415 COLL VENOUS BLD VENIPUNCTURE: CPT | Performed by: EMERGENCY MEDICINE

## 2025-04-02 PROCEDURE — 99285 EMERGENCY DEPT VISIT HI MDM: CPT | Mod: 25

## 2025-04-02 PROCEDURE — 85025 COMPLETE CBC W/AUTO DIFF WBC: CPT | Performed by: EMERGENCY MEDICINE

## 2025-04-02 PROCEDURE — 85610 PROTHROMBIN TIME: CPT | Performed by: EMERGENCY MEDICINE

## 2025-04-02 PROCEDURE — 70553 MRI BRAIN STEM W/O & W/DYE: CPT

## 2025-04-02 PROCEDURE — 83880 ASSAY OF NATRIURETIC PEPTIDE: CPT | Performed by: HOSPITALIST

## 2025-04-02 PROCEDURE — 93005 ELECTROCARDIOGRAM TRACING: CPT | Performed by: EMERGENCY MEDICINE

## 2025-04-02 RX ORDER — IOPAMIDOL 755 MG/ML
67 INJECTION, SOLUTION INTRAVASCULAR ONCE
Status: COMPLETED | OUTPATIENT
Start: 2025-04-02 | End: 2025-04-02

## 2025-04-02 RX ORDER — IOPAMIDOL 755 MG/ML
90 INJECTION, SOLUTION INTRAVASCULAR ONCE
Status: COMPLETED | OUTPATIENT
Start: 2025-04-02 | End: 2025-04-02

## 2025-04-02 RX ORDER — GADOBUTROL 604.72 MG/ML
13 INJECTION INTRAVENOUS ONCE
Status: COMPLETED | OUTPATIENT
Start: 2025-04-02 | End: 2025-04-02

## 2025-04-02 RX ADMIN — IOPAMIDOL 90 ML: 755 INJECTION, SOLUTION INTRAVENOUS at 20:11

## 2025-04-02 RX ADMIN — GADOBUTROL 13 ML: 604.72 INJECTION INTRAVENOUS at 22:46

## 2025-04-02 RX ADMIN — IOPAMIDOL 90 ML: 755 INJECTION, SOLUTION INTRAVENOUS at 20:20

## 2025-04-02 RX ADMIN — IOPAMIDOL 67 ML: 755 INJECTION, SOLUTION INTRAVENOUS at 21:22

## 2025-04-02 ASSESSMENT — ENCOUNTER SYMPTOMS
HEADACHES: 1
SHORTNESS OF BREATH: 1

## 2025-04-02 ASSESSMENT — ACTIVITIES OF DAILY LIVING (ADL)
ADLS_ACUITY_SCORE: 56

## 2025-04-03 ENCOUNTER — APPOINTMENT (OUTPATIENT)
Dept: CARDIOLOGY | Facility: HOSPITAL | Age: 45
DRG: 433 | End: 2025-04-03
Attending: HOSPITALIST
Payer: COMMERCIAL

## 2025-04-03 VITALS
DIASTOLIC BLOOD PRESSURE: 80 MMHG | WEIGHT: 281.2 LBS | OXYGEN SATURATION: 98 % | RESPIRATION RATE: 17 BRPM | HEART RATE: 73 BPM | TEMPERATURE: 98 F | HEIGHT: 65 IN | SYSTOLIC BLOOD PRESSURE: 123 MMHG | BODY MASS INDEX: 46.85 KG/M2

## 2025-04-03 PROBLEM — K20.90 ESOPHAGITIS: Status: ACTIVE | Noted: 2025-04-03

## 2025-04-03 PROBLEM — I21.4 NSTEMI (NON-ST ELEVATED MYOCARDIAL INFARCTION) (H): Status: ACTIVE | Noted: 2025-04-03

## 2025-04-03 PROBLEM — R09.89 CHOKING EPISODE: Status: ACTIVE | Noted: 2025-04-03

## 2025-04-03 LAB
ANION GAP SERPL CALCULATED.3IONS-SCNC: 10 MMOL/L (ref 7–15)
APTT PPP: 30 SECONDS (ref 22–38)
APTT PPP: 37 SECONDS (ref 22–38)
APTT PPP: 39 SECONDS (ref 22–38)
ATRIAL RATE - MUSE: 91 BPM
BUN SERPL-MCNC: 24 MG/DL (ref 6–20)
CALCIUM SERPL-MCNC: 9.6 MG/DL (ref 8.8–10.4)
CHLORIDE SERPL-SCNC: 103 MMOL/L (ref 98–107)
CHOLEST SERPL-MCNC: 128 MG/DL
CREAT SERPL-MCNC: 1.17 MG/DL (ref 0.67–1.17)
DIASTOLIC BLOOD PRESSURE - MUSE: 61 MMHG
EGFRCR SERPLBLD CKD-EPI 2021: 79 ML/MIN/1.73M2
ERYTHROCYTE [DISTWIDTH] IN BLOOD BY AUTOMATED COUNT: 15.4 % (ref 10–15)
ERYTHROCYTE [DISTWIDTH] IN BLOOD BY AUTOMATED COUNT: 15.5 % (ref 10–15)
GLUCOSE BLDC GLUCOMTR-MCNC: 109 MG/DL (ref 70–99)
GLUCOSE BLDC GLUCOMTR-MCNC: 111 MG/DL (ref 70–99)
GLUCOSE BLDC GLUCOMTR-MCNC: 117 MG/DL (ref 70–99)
GLUCOSE BLDC GLUCOMTR-MCNC: 122 MG/DL (ref 70–99)
GLUCOSE BLDC GLUCOMTR-MCNC: 144 MG/DL (ref 70–99)
GLUCOSE SERPL-MCNC: 123 MG/DL (ref 70–99)
HCO3 SERPL-SCNC: 28 MMOL/L (ref 22–29)
HCT VFR BLD AUTO: 39.6 % (ref 40–53)
HCT VFR BLD AUTO: 39.6 % (ref 40–53)
HDLC SERPL-MCNC: 39 MG/DL
HGB BLD-MCNC: 12.6 G/DL (ref 13.3–17.7)
HGB BLD-MCNC: 12.7 G/DL (ref 13.3–17.7)
INTERPRETATION ECG - MUSE: NORMAL
LDLC SERPL CALC-MCNC: 47 MG/DL
LVEF ECHO: NORMAL
MCH RBC QN AUTO: 28.3 PG (ref 26.5–33)
MCH RBC QN AUTO: 28.6 PG (ref 26.5–33)
MCHC RBC AUTO-ENTMCNC: 31.8 G/DL (ref 31.5–36.5)
MCHC RBC AUTO-ENTMCNC: 32.1 G/DL (ref 31.5–36.5)
MCV RBC AUTO: 88 FL (ref 78–100)
MCV RBC AUTO: 90 FL (ref 78–100)
NONHDLC SERPL-MCNC: 89 MG/DL
NT-PROBNP SERPL-MCNC: 1054 PG/ML (ref 0–450)
P AXIS - MUSE: 68 DEGREES
PLATELET # BLD AUTO: 305 10E3/UL (ref 150–450)
PLATELET # BLD AUTO: 306 10E3/UL (ref 150–450)
POTASSIUM SERPL-SCNC: 4.1 MMOL/L (ref 3.4–5.3)
PR INTERVAL - MUSE: 192 MS
QRS DURATION - MUSE: 114 MS
QT - MUSE: 376 MS
QTC - MUSE: 462 MS
R AXIS - MUSE: 79 DEGREES
RBC # BLD AUTO: 4.41 10E6/UL (ref 4.4–5.9)
RBC # BLD AUTO: 4.48 10E6/UL (ref 4.4–5.9)
SODIUM SERPL-SCNC: 141 MMOL/L (ref 135–145)
SYSTOLIC BLOOD PRESSURE - MUSE: 129 MMHG
T AXIS - MUSE: 230 DEGREES
TRIGL SERPL-MCNC: 210 MG/DL
TROPONIN T SERPL HS-MCNC: 21 NG/L
TROPONIN T SERPL HS-MCNC: 27 NG/L
VENTRICULAR RATE- MUSE: 91 BPM
WBC # BLD AUTO: 10.9 10E3/UL (ref 4–11)
WBC # BLD AUTO: 9.8 10E3/UL (ref 4–11)

## 2025-04-03 PROCEDURE — 99222 1ST HOSP IP/OBS MODERATE 55: CPT | Performed by: INTERNAL MEDICINE

## 2025-04-03 PROCEDURE — 999N000157 HC STATISTIC RCP TIME EA 10 MIN

## 2025-04-03 PROCEDURE — 999N000208 ECHOCARDIOGRAM LIMITED

## 2025-04-03 PROCEDURE — 250N000011 HC RX IP 250 OP 636: Performed by: HOSPITALIST

## 2025-04-03 PROCEDURE — 99223 1ST HOSP IP/OBS HIGH 75: CPT | Mod: AI | Performed by: HOSPITALIST

## 2025-04-03 PROCEDURE — 36415 COLL VENOUS BLD VENIPUNCTURE: CPT | Performed by: INTERNAL MEDICINE

## 2025-04-03 PROCEDURE — 99207 PR NO BILLABLE SERVICE THIS VISIT: CPT | Performed by: INTERNAL MEDICINE

## 2025-04-03 PROCEDURE — 250N000013 HC RX MED GY IP 250 OP 250 PS 637: Performed by: INTERNAL MEDICINE

## 2025-04-03 PROCEDURE — 255N000002 HC RX 255 OP 636: Performed by: HOSPITALIST

## 2025-04-03 PROCEDURE — 36415 COLL VENOUS BLD VENIPUNCTURE: CPT | Performed by: HOSPITALIST

## 2025-04-03 PROCEDURE — 250N000013 HC RX MED GY IP 250 OP 250 PS 637: Performed by: HOSPITALIST

## 2025-04-03 PROCEDURE — 93308 TTE F-UP OR LMTD: CPT | Mod: 26 | Performed by: INTERNAL MEDICINE

## 2025-04-03 PROCEDURE — 85730 THROMBOPLASTIN TIME PARTIAL: CPT | Performed by: INTERNAL MEDICINE

## 2025-04-03 PROCEDURE — 82962 GLUCOSE BLOOD TEST: CPT

## 2025-04-03 PROCEDURE — 93321 DOPPLER ECHO F-UP/LMTD STD: CPT | Mod: 26 | Performed by: INTERNAL MEDICINE

## 2025-04-03 PROCEDURE — 85730 THROMBOPLASTIN TIME PARTIAL: CPT | Performed by: HOSPITALIST

## 2025-04-03 PROCEDURE — 85027 COMPLETE CBC AUTOMATED: CPT | Performed by: HOSPITALIST

## 2025-04-03 PROCEDURE — 80061 LIPID PANEL: CPT | Performed by: HOSPITALIST

## 2025-04-03 PROCEDURE — 80048 BASIC METABOLIC PNL TOTAL CA: CPT | Performed by: HOSPITALIST

## 2025-04-03 PROCEDURE — 84484 ASSAY OF TROPONIN QUANT: CPT | Performed by: HOSPITALIST

## 2025-04-03 PROCEDURE — 82310 ASSAY OF CALCIUM: CPT | Performed by: HOSPITALIST

## 2025-04-03 PROCEDURE — 94660 CPAP INITIATION&MGMT: CPT

## 2025-04-03 PROCEDURE — 120N000001 HC R&B MED SURG/OB

## 2025-04-03 PROCEDURE — 93325 DOPPLER ECHO COLOR FLOW MAPG: CPT | Mod: 26 | Performed by: INTERNAL MEDICINE

## 2025-04-03 RX ORDER — TRAZODONE HYDROCHLORIDE 50 MG/1
100 TABLET ORAL AT BEDTIME
Status: DISPENSED | OUTPATIENT
Start: 2025-04-03

## 2025-04-03 RX ORDER — ONDANSETRON 2 MG/ML
4 INJECTION INTRAMUSCULAR; INTRAVENOUS EVERY 6 HOURS PRN
Status: ACTIVE | OUTPATIENT
Start: 2025-04-03

## 2025-04-03 RX ORDER — IPRATROPIUM BROMIDE AND ALBUTEROL SULFATE 2.5; .5 MG/3ML; MG/3ML
3 SOLUTION RESPIRATORY (INHALATION) EVERY 4 HOURS PRN
Status: ACTIVE | OUTPATIENT
Start: 2025-04-03

## 2025-04-03 RX ORDER — ONDANSETRON 4 MG/1
4 TABLET, ORALLY DISINTEGRATING ORAL EVERY 6 HOURS PRN
Status: ACTIVE | OUTPATIENT
Start: 2025-04-03

## 2025-04-03 RX ORDER — HYDROMORPHONE HCL IN WATER/PF 6 MG/30 ML
0.2 PATIENT CONTROLLED ANALGESIA SYRINGE INTRAVENOUS
Status: ACTIVE | OUTPATIENT
Start: 2025-04-03

## 2025-04-03 RX ORDER — DEXTROSE MONOHYDRATE 25 G/50ML
25-50 INJECTION, SOLUTION INTRAVENOUS
Status: ACTIVE | OUTPATIENT
Start: 2025-04-03

## 2025-04-03 RX ORDER — METHOCARBAMOL 500 MG/1
500 TABLET, FILM COATED ORAL 4 TIMES DAILY PRN
Status: ACTIVE | OUTPATIENT
Start: 2025-04-03

## 2025-04-03 RX ORDER — FAMOTIDINE 20 MG/1
20 TABLET, FILM COATED ORAL 2 TIMES DAILY
Status: DISPENSED | OUTPATIENT
Start: 2025-04-03

## 2025-04-03 RX ORDER — NALOXONE HYDROCHLORIDE 0.4 MG/ML
0.4 INJECTION, SOLUTION INTRAMUSCULAR; INTRAVENOUS; SUBCUTANEOUS
Status: ACTIVE | OUTPATIENT
Start: 2025-04-03

## 2025-04-03 RX ORDER — HYDROMORPHONE HCL IN WATER/PF 6 MG/30 ML
0.4 PATIENT CONTROLLED ANALGESIA SYRINGE INTRAVENOUS
Status: ACTIVE | OUTPATIENT
Start: 2025-04-03

## 2025-04-03 RX ORDER — LISINOPRIL 5 MG/1
10 TABLET ORAL EVERY MORNING
Status: DISPENSED | OUTPATIENT
Start: 2025-04-03

## 2025-04-03 RX ORDER — HEPARIN SODIUM 10000 [USP'U]/100ML
0-5000 INJECTION, SOLUTION INTRAVENOUS CONTINUOUS
Status: DISCONTINUED | OUTPATIENT
Start: 2025-04-03 | End: 2025-04-03

## 2025-04-03 RX ORDER — GINSENG 100 MG
CAPSULE ORAL 3 TIMES DAILY PRN
Status: ACTIVE | OUTPATIENT
Start: 2025-04-03

## 2025-04-03 RX ORDER — NICOTINE 21 MG/24HR
1 PATCH, TRANSDERMAL 24 HOURS TRANSDERMAL EVERY 24 HOURS
Status: DISPENSED | OUTPATIENT
Start: 2025-04-03

## 2025-04-03 RX ORDER — NICOTINE 21 MG/24HR
1 PATCH, TRANSDERMAL 24 HOURS TRANSDERMAL DAILY
Status: DISCONTINUED | OUTPATIENT
Start: 2025-04-03 | End: 2025-04-03

## 2025-04-03 RX ORDER — MONTELUKAST SODIUM 10 MG/1
10 TABLET ORAL EVERY MORNING
Status: DISPENSED | OUTPATIENT
Start: 2025-04-03

## 2025-04-03 RX ORDER — AMOXICILLIN 250 MG
1 CAPSULE ORAL 2 TIMES DAILY PRN
Status: ACTIVE | OUTPATIENT
Start: 2025-04-03

## 2025-04-03 RX ORDER — NICOTINE POLACRILEX 4 MG
15-30 LOZENGE BUCCAL
Status: ACTIVE | OUTPATIENT
Start: 2025-04-03

## 2025-04-03 RX ORDER — LORATADINE 10 MG/1
10 TABLET ORAL DAILY PRN
Status: ACTIVE | OUTPATIENT
Start: 2025-04-03

## 2025-04-03 RX ORDER — ALBUTEROL SULFATE 0.83 MG/ML
2.5 SOLUTION RESPIRATORY (INHALATION) 3 TIMES DAILY PRN
Status: DISCONTINUED | OUTPATIENT
Start: 2025-04-03 | End: 2025-04-03

## 2025-04-03 RX ORDER — OLANZAPINE 5 MG/1
10 TABLET ORAL AT BEDTIME
Status: DISPENSED | OUTPATIENT
Start: 2025-04-03

## 2025-04-03 RX ORDER — ALBUTEROL SULFATE 90 UG/1
1-2 INHALANT RESPIRATORY (INHALATION) EVERY 6 HOURS PRN
Status: ACTIVE | OUTPATIENT
Start: 2025-04-03

## 2025-04-03 RX ORDER — ONDANSETRON 4 MG/1
4 TABLET, ORALLY DISINTEGRATING ORAL EVERY 8 HOURS PRN
Status: DISCONTINUED | OUTPATIENT
Start: 2025-04-03 | End: 2025-04-03

## 2025-04-03 RX ORDER — ROSUVASTATIN CALCIUM 10 MG/1
10 TABLET, COATED ORAL AT BEDTIME
Status: DISPENSED | OUTPATIENT
Start: 2025-04-03

## 2025-04-03 RX ORDER — HYDRALAZINE HYDROCHLORIDE 20 MG/ML
10 INJECTION INTRAMUSCULAR; INTRAVENOUS EVERY 4 HOURS PRN
Status: ACTIVE | OUTPATIENT
Start: 2025-04-03

## 2025-04-03 RX ORDER — FUROSEMIDE 20 MG/1
40 TABLET ORAL DAILY
Status: DISPENSED | OUTPATIENT
Start: 2025-04-03

## 2025-04-03 RX ORDER — NALOXONE HYDROCHLORIDE 0.4 MG/ML
0.2 INJECTION, SOLUTION INTRAMUSCULAR; INTRAVENOUS; SUBCUTANEOUS
Status: ACTIVE | OUTPATIENT
Start: 2025-04-03

## 2025-04-03 RX ORDER — AMOXICILLIN 250 MG
2 CAPSULE ORAL 2 TIMES DAILY PRN
Status: ACTIVE | OUTPATIENT
Start: 2025-04-03

## 2025-04-03 RX ORDER — ACETAMINOPHEN 650 MG/1
650 SUPPOSITORY RECTAL EVERY 4 HOURS PRN
Status: ACTIVE | OUTPATIENT
Start: 2025-04-03

## 2025-04-03 RX ORDER — DICYCLOMINE HCL 20 MG
20 TABLET ORAL 4 TIMES DAILY PRN
Status: ACTIVE | OUTPATIENT
Start: 2025-04-03

## 2025-04-03 RX ORDER — LOPERAMIDE HYDROCHLORIDE 2 MG/1
4 CAPSULE ORAL 4 TIMES DAILY PRN
Status: ACTIVE | OUTPATIENT
Start: 2025-04-03

## 2025-04-03 RX ORDER — OMEPRAZOLE 20 MG/1
40 CAPSULE, DELAYED RELEASE ORAL EVERY MORNING
Status: DISCONTINUED | OUTPATIENT
Start: 2025-04-03 | End: 2025-04-03

## 2025-04-03 RX ORDER — HYDRALAZINE HYDROCHLORIDE 10 MG/1
10 TABLET, FILM COATED ORAL EVERY 4 HOURS PRN
Status: ACTIVE | OUTPATIENT
Start: 2025-04-03

## 2025-04-03 RX ORDER — BENZONATATE 100 MG/1
200 CAPSULE ORAL 3 TIMES DAILY PRN
Status: ACTIVE | OUTPATIENT
Start: 2025-04-03

## 2025-04-03 RX ORDER — PROCHLORPERAZINE MALEATE 10 MG
10 TABLET ORAL EVERY 6 HOURS PRN
Status: ACTIVE | OUTPATIENT
Start: 2025-04-03

## 2025-04-03 RX ORDER — LIDOCAINE 40 MG/G
CREAM TOPICAL
OUTPATIENT
Start: 2025-04-03

## 2025-04-03 RX ORDER — ACETAMINOPHEN 325 MG/1
650 TABLET ORAL EVERY 4 HOURS PRN
Status: ACTIVE | OUTPATIENT
Start: 2025-04-03

## 2025-04-03 RX ORDER — GUAIFENESIN AND DEXTROMETHORPHAN HYDROBROMIDE 600; 30 MG/1; MG/1
1 TABLET, EXTENDED RELEASE ORAL 2 TIMES DAILY PRN
Status: ACTIVE | OUTPATIENT
Start: 2025-04-03

## 2025-04-03 RX ADMIN — HEPARIN SODIUM 1500 UNITS/HR: 10000 INJECTION, SOLUTION INTRAVENOUS at 08:03

## 2025-04-03 RX ADMIN — HEPARIN SODIUM 1500 UNITS/HR: 10000 INJECTION, SOLUTION INTRAVENOUS at 11:43

## 2025-04-03 RX ADMIN — PERFLUTREN 5.5 ML: 6.52 INJECTION, SUSPENSION INTRAVENOUS at 13:13

## 2025-04-03 RX ADMIN — FAMOTIDINE 20 MG: 20 TABLET, FILM COATED ORAL at 20:18

## 2025-04-03 RX ADMIN — TRAZODONE HYDROCHLORIDE 100 MG: 50 TABLET ORAL at 21:26

## 2025-04-03 RX ADMIN — ROSUVASTATIN 10 MG: 10 TABLET, FILM COATED ORAL at 20:18

## 2025-04-03 RX ADMIN — FUROSEMIDE 40 MG: 20 TABLET ORAL at 08:03

## 2025-04-03 RX ADMIN — EMPAGLIFLOZIN 10 MG: 10 TABLET, FILM COATED ORAL at 08:03

## 2025-04-03 RX ADMIN — NICOTINE 1 PATCH: 21 PATCH, EXTENDED RELEASE TRANSDERMAL at 20:21

## 2025-04-03 RX ADMIN — NICOTINE 1 PATCH: 21 PATCH, EXTENDED RELEASE TRANSDERMAL at 08:11

## 2025-04-03 RX ADMIN — OLANZAPINE 10 MG: 5 TABLET, FILM COATED ORAL at 20:18

## 2025-04-03 RX ADMIN — FAMOTIDINE 20 MG: 20 TABLET, FILM COATED ORAL at 08:03

## 2025-04-03 RX ADMIN — PANTOPRAZOLE SODIUM 40 MG: 40 INJECTION, POWDER, FOR SOLUTION INTRAVENOUS at 02:05

## 2025-04-03 RX ADMIN — LEVOTHYROXINE SODIUM 6.25 MCG: 0.03 TABLET ORAL at 08:04

## 2025-04-03 RX ADMIN — FLUOXETINE HYDROCHLORIDE 20 MG: 20 CAPSULE ORAL at 08:03

## 2025-04-03 RX ADMIN — MONTELUKAST 10 MG: 10 TABLET, FILM COATED ORAL at 08:03

## 2025-04-03 RX ADMIN — HEPARIN SODIUM 1800 UNITS/HR: 10000 INJECTION, SOLUTION INTRAVENOUS at 14:29

## 2025-04-03 RX ADMIN — PANTOPRAZOLE SODIUM 40 MG: 40 INJECTION, POWDER, FOR SOLUTION INTRAVENOUS at 08:04

## 2025-04-03 RX ADMIN — HEPARIN SODIUM 1200 UNITS/HR: 10000 INJECTION, SOLUTION INTRAVENOUS at 02:07

## 2025-04-03 RX ADMIN — LISINOPRIL 10 MG: 5 TABLET ORAL at 08:03

## 2025-04-03 ASSESSMENT — ACTIVITIES OF DAILY LIVING (ADL)
ADLS_ACUITY_SCORE: 56
ADLS_ACUITY_SCORE: 59
ADLS_ACUITY_SCORE: 56
ADLS_ACUITY_SCORE: 59
ADLS_ACUITY_SCORE: 56
ADLS_ACUITY_SCORE: 59
ADLS_ACUITY_SCORE: 56

## 2025-04-03 NOTE — MEDICATION SCRIBE - ADMISSION MEDICATION HISTORY
Medication Scribe Admission Medication History    Admission medication history is complete. The information provided in this note is only as accurate as the sources available at the time of the update.    Information Source(s): Facility (U/[facility]/) medication list/MAR via phone    Pertinent Information: PTA med list updated per Group home over the phone.    Changes made to PTA medication list:  Added: None  Deleted: None  Changed: None    Allergies reviewed with patient and updates made in EHR: yes    Medication History Completed By: Deric Galeano 4/3/2025 2:22 AM    PTA Med List   Medication Sig Last Dose/Taking    albuterol (PROAIR HFA/PROVENTIL HFA/VENTOLIN HFA) 108 (90 Base) MCG/ACT inhaler Inhale 1-2 puffs into the lungs every 6 hours as needed for shortness of breath, wheezing or cough Taking As Needed    albuterol (PROVENTIL) (2.5 MG/3ML) 0.083% neb solution Take 2.5 mg by nebulization 3 times daily as needed for shortness of breath, wheezing or cough Taking As Needed    aloe vera GEL Apply 1 g topically every hour as needed for skin care Per bottle directions Taking As Needed    apixaban ANTICOAGULANT (ELIQUIS) 5 MG tablet Take 5 mg by mouth 2 times daily. 4/2/2025 Morning    bacitracin 500 UNIT/GM OINT Apply topically 3 times daily as needed for wound care Taking As Needed    Benzocaine (SOLARCAINE ALOE VERA EX) Externally apply topically daily as needed. Taking As Needed    benzonatate (TESSALON) 200 MG capsule Take 1 capsule (200 mg) by mouth 3 times daily as needed for cough. Taking As Needed    Calamine external lotion Apply topically as needed for itching Taking As Needed    carbamide peroxide (DEBROX) 6.5 % otic solution Place 5 drops into both ears daily as needed for other. Taking As Needed    clotrimazole (LOTRIMIN) 1 % external cream Apply topically 2 times daily as needed (skin irritation) Taking As Needed    Continuous Glucose Sensor (FREESTYLE MEGHANN 3 PLUS SENSOR) MISC USE TO READ BLOOD  SUGAR TWICE DAILY AND AS NEEDED. CHANGE SENSOR EVERY 15 DAYS Taking    dextromethorphan-guaiFENesin (MUCINEX DM)  MG 12 hr tablet Take 1 tablet by mouth 2 times daily as needed. Taking As Needed    diclofenac (VOLTAREN) 1 % topical gel Apply 2 g topically daily as needed for moderate pain To joints/back Taking As Needed    dicyclomine (BENTYL) 20 MG tablet Take 1 tablet (20 mg) by mouth 4 times daily as needed (abdominal pain). Taking As Needed    EPINEPHrine (ANY BX GENERIC EQUIV) 0.3 MG/0.3ML injection 2-pack Inject 0.3 mg into the muscle as needed for anaphylaxis. May repeat one time in 5-15 minutes if response to initial dose is inadequate. Taking As Needed    famotidine (PEPCID) 20 MG tablet Take 1 tablet (20 mg) by mouth 2 times daily 4/2/2025 Morning    FLUoxetine 20 MG tablet Take 20 mg by mouth every morning. 4/2/2025 Morning    furosemide (LASIX) 40 MG tablet Take 1 tablet (40 mg) by mouth daily. 4/2/2025 Morning    GAVILAX 17 GM/SCOOP powder Take 17 g by mouth daily as needed. Taking As Needed    hydrocortisone (CORTAID) 1 % external cream Apply topically daily as needed. Taking As Needed    Hydrocortisone (PREPARATION H EX) Externally apply topically daily as needed. Taking As Needed    JARDIANCE 10 MG TABS tablet Take 10 mg by mouth every morning. 4/2/2025 Morning    levothyroxine (SYNTHROID/LEVOTHROID) 25 MCG tablet Take 0.25 tablets (6.25 mcg) by mouth every morning (before breakfast). 4/2/2025 Morning    lisinopril (ZESTRIL) 10 MG tablet Take 10 mg by mouth every morning. 4/2/2025 Morning    loperamide (IMODIUM) 2 MG capsule Take 4 mg by mouth 4 times daily as needed for diarrhea. Taking As Needed    loratadine (CLARITIN) 10 MG tablet Take 10 mg by mouth daily as needed for allergies. Taking As Needed    magnesium hydroxide (MILK OF MAGNESIA) 400 MG/5ML suspension Take 30 mLs by mouth daily as needed. Taking As Needed    metFORMIN (GLUCOPHAGE) 1000 MG tablet Take 1,000 mg by mouth 2 times daily  (with meals) 4/2/2025 Morning    methocarbamol (ROBAXIN) 500 MG tablet Take 1 tablet (500 mg) by mouth 4 times daily as needed for muscle spasms. Taking As Needed    montelukast (SINGULAIR) 10 MG tablet Take 10 mg by mouth every morning. 4/2/2025 Morning    nicotine (NICODERM CQ) 21 MG/24HR 24 hr patch Place 1 patch over 24 hours onto the skin daily. Taking    OLANZapine (ZYPREXA) 10 MG tablet Take 10 mg by mouth At Bedtime 4/1/2025 Bedtime    omeprazole (PRILOSEC) 40 MG DR capsule Take 40 mg by mouth every morning. 4/2/2025 Morning    ondansetron (ZOFRAN ODT) 4 MG ODT tab Take 1 tablet (4 mg) by mouth every 8 hours as needed for nausea. Taking As Needed    pramoxine-calamine (AVEENO) 1-8 % LOTN Apply topically daily as needed for itching. Taking As Needed    rosuvastatin (CRESTOR) 10 MG tablet Take 10 mg by mouth At Bedtime 4/1/2025 Bedtime    spironolactone (ALDACTONE) 100 MG tablet Take 1 tablet (100 mg) by mouth daily. 4/2/2025 Morning    sucralfate (CARAFATE) 1 GM/10ML suspension Take 10 mLs (1 g) by mouth 4 times daily as needed for nausea (heartburn). Taking As Needed    sulfamethoxazole-trimethoprim (BACTRIM DS) 800-160 MG tablet  4/2/2025 Morning    traZODone (DESYREL) 100 MG tablet Take 100 mg by mouth at bedtime. 4/1/2025 Bedtime    TRELEGY ELLIPTA 100-62.5-25 MCG/ACT oral inhaler Inhale 1 puff into the lungs every morning. 4/2/2025 Morning    Urea 40 % CREA Apply topically daily as needed. Taking As Needed    Vitamins A & D (VITAMIN A & D) OINT Externally apply topically daily as needed. Taking As Needed    witch hazel-glycerin (TUCKS) pad Apply topically as needed for hemorrhoids. Taking As Needed

## 2025-04-03 NOTE — PROGRESS NOTES
Hospitalist follow-up for patient seen by Dr. Arteaga this morning.  Chart reviewed.  DC heparin since cardiology does not think this is non-STEMI.  Blood sugars are controlled.  Hold apixaban in anticipation for EGD.  N.p.o. after midnight.  Paracentesis ordered by GI.  MRI brain negative for stroke.  CTA chest positive for esophagitis.  Continue IV Protonix  Cardiology recommended to continue Jardiance, Lasix lisinopril and Crestor.

## 2025-04-03 NOTE — PROGRESS NOTES
SW called and left voice mail for patient's guardian requesting call back to complete initial assessment.     Aware of consult, will complete upon speaking with patient's guardian.     YOEL Allred on 04/03/25

## 2025-04-03 NOTE — ED PROVIDER NOTES
EMERGENCY DEPARTMENT ENCOUNTER      NAME: Warren Jaramillo  AGE: 44 year old male  YOB: 1980  MRN: [id number]  EVALUATION DATE & TIME: 4/2/2025  7:19 PM    PCP: Luzmaria Goldman McCarr    ED PROVIDER: Manny Louis MD      Chief Complaint   Patient presents with    Chest Pain         FINAL IMPRESSION:  1. NSTEMI (non-ST elevated myocardial infarction) (H)    2. Choking episode    3. Esophagitis          ED COURSE & MEDICAL DECISION MAKING:    Pertinent Labs & Imaging studies reviewed. (See chart for details)  44 year old male presents to the Emergency Department for evaluation of chest pain that occurred at [employer]ile [employer].  Was choking.  911 was called.  Got nitro and aspirin with no relief.  History of diabetes, is on anticoagulation    Patient reports new onset slurred speech dysphagia and balance issue tier 1 stroke code initiated    Differential includes ACS, pulmonary emboli, pneumonia, aortic dissection, esophageal rupture, pneumothorax, pneumonia, malignancy, pleurisy, shingles, and GERD.  Based on history and examination pulmonary emboli and aortic dissection unlikely.       ED Course as of 04/03/25 0025   Wed Apr 02, 2025 1943 Family confirms he is own decision maker   1945 I spoke with stroke neurology.    1946 Patient brought in by EMS for chest pain and possible choking.  Patient tells us his  noticed that his speech was slurred and patient noticed he was having a hard time swallowing that led to him being choking and says his balance is off.  Mild headache.  On blood thinners.   1947 Tier 1 stroke what initiated last known well 630 PM.  I also ordered dissection protocol for chest pain and strokelike symptoms but also will be evaluate for any aspiration or pneumonia   1947 Anticoagulated doubt PE   1947 Per chart review history of dysphagia and balance issues patient states the symptoms are new.  Unclear if these are new symptoms per chart review.  Did talk with  patient's family briefly who confirmed he is his own decision  maker   2006 I spoke with stroke neurology.   2029 I spoke with stroke neurology who recommends MRI, and if MRI is negative, patient can go home from their perspective.    2054 CTA without evidence of aspiration or pneumonia.  No dissection noted.   2149 I updated the patient at bedside.    u Apr 03, 2025   0013 Nitro by paramedics with no improvement.  Is on anticoagulation.  Imaging shows esophagitis.  Pain started with coughing while choking.  Troponin is ruling in for NSTEMI will discuss with cardiology and admit   0022 I spoke with hospitalist   De-escalated, MRI without any acute infarct, troponins are technically ruling in.  Spoke with cardiology recommends serial troponins and echocardiogram but unlikely cardiac source of his symptoms.    Discussed with admitting hospitalist who agrees to admit patient    Medical Decision Making  I reviewed the EMR: Outpatient Record: Cardiology clinic on 21 March heart failure type II on diuretic  Care impacted by Anticoagulated State and Heart Disease  I discussed the care with another health care provider: Cardiology and Dr. Arteaga  Admit.    MIPS (CTPE, Dental pain, Khan, Sinusitis, Asthma/COPD, Head Trauma): CT Pulmonary Angiogram:CT PA was ordered for reason(s) other than PE.    SEPSIS: None              At the conclusion of the encounter I discussed the results of all of the tests and the disposition. The questions were answered. The patient or family acknowledged understanding and was agreeable with the care plan.         Voice recognition software used for this note,  any grammatical or spelling errors are non-intentional. Please contact the author of this note directly if you are in need of any clarification.      MEDICATIONS GIVEN IN THE EMERGENCY:  Medications   senna-docusate (SENOKOT-S/PERICOLACE) 8.6-50 MG per tablet 1 tablet (has no administration in time range)     Or   senna-docusate  (SENOKOT-S/PERICOLACE) 8.6-50 MG per tablet 2 tablet (has no administration in time range)   ondansetron (ZOFRAN ODT) ODT tab 4 mg (has no administration in time range)     Or   ondansetron (ZOFRAN) injection 4 mg (has no administration in time range)   prochlorperazine (COMPAZINE) injection 10 mg (has no administration in time range)     Or   prochlorperazine (COMPAZINE) tablet 10 mg (has no administration in time range)   Patient is already receiving anticoagulation with heparin, enoxaparin (LOVENOX), warfarin (COUMADIN)  or other anticoagulant medication (has no administration in time range)   hydrALAZINE (APRESOLINE) tablet 10 mg (has no administration in time range)     Or   hydrALAZINE (APRESOLINE) injection 10 mg (has no administration in time range)   HYDROmorphone (DILAUDID) injection 0.2 mg (has no administration in time range)   HYDROmorphone (DILAUDID) injection 0.4 mg (has no administration in time range)   acetaminophen (TYLENOL) tablet 650 mg (has no administration in time range)     Or   acetaminophen (TYLENOL) Suppository 650 mg (has no administration in time range)   melatonin tablet 5 mg (has no administration in time range)   omeprazole (PriLOSEC) suspension 20 mg (has no administration in time range)   iopamidol (ISOVUE-370) solution 67 mL (67 mLs Intravenous $Given by Other 4/2/25 2122)     And   sodium chloride 0.9 % bag for CT scan flush (100 mLs As instructed Not Given 4/2/25 2123)   iopamidol (ISOVUE-370) solution 90 mL (90 mLs Intravenous $Given 4/2/25 2011)   iopamidol (ISOVUE-370) solution 90 mL (90 mLs Intravenous $Given 4/2/25 2020)   gadobutrol (GADAVIST) injection 13 mL (13 mLs Intravenous $Given 4/2/25 2246)       NEW PRESCRIPTIONS STARTED AT TODAY'S ER VISIT  New Prescriptions    No medications on file          =================================================================    TRIAGE ASSESSMENT:        HPI    Patient information was obtained from: Patient    Use of :  "N/A        Warren Jaramillo is a 44 year old male with a pertinent history of DM2, hypertension, COPD, anticoagulation on Eliquis, tobacco use, CHF, morbid obesity, and PTSD who presents to this ED via EMS for evaluation of chest pain.     Tonight at around 6:30 PM, the patient was at [facility] this evening and was eating chili when he began choking on a piece of hamburger. He then inhaled and the chunk of meat went down his throat and he then began experiencing left sided chest pain. Patient reports he has been short of breath all day as well. He was approached by a  at [facility] at around 6:45 who was concerned for the patient sweating on his forehead and slurred speech. Patient has had a mild headache in the back of his head all day, and he also endorses some balance issues. Specifically, when he was walking to the ambulance, he felt \"lopsided\". He also notes his tongue feels numb. Patient would like to have someone call and update his parents. He is his own decision maker.     Patient denies any other complaints at this time.      REVIEW OF SYSTEMS   Review of Systems   Respiratory:  Positive for shortness of breath.    Cardiovascular:  Positive for chest pain.   Neurological:  Positive for headaches.        PAST MEDICAL HISTORY:  Past Medical History:   Diagnosis Date    COPD exacerbation (H) 12/2/2024    DM2 (diabetes mellitus, type 2) (H) 4/28/2020    HTN (hypertension) 7/30/2012    Thyroid nodule 7/31/2019    Rojas's disease (H)        PAST SURGICAL HISTORY:  Past Surgical History:   Procedure Laterality Date    COLONOSCOPY      ESOPHAGOSCOPY, GASTROSCOPY, DUODENOSCOPY (EGD), COMBINED N/A 7/21/2023    Procedure: ESOPHAGOGASTRODUODENOSCOPY WITH GASTRIC AND ESOPHAGEAL BIOPSIES;  Surgeon: Filiberto Aragon MD;  Location: [facility] OR    TOOTH EXTRACTION             CURRENT MEDICATIONS:    albuterol (PROAIR HFA/PROVENTIL HFA/VENTOLIN HFA) 108 (90 Base) MCG/ACT inhaler  albuterol " (PROVENTIL) (2.5 MG/3ML) 0.083% neb solution  aloe vera GEL  apixaban ANTICOAGULANT (ELIQUIS) 5 MG tablet  bacitracin 500 UNIT/GM OINT  Benzocaine (SOLARCAINE ALOE VERA EX)  benzonatate (TESSALON) 200 MG capsule  Calamine external lotion  carbamide peroxide (DEBROX) 6.5 % otic solution  clotrimazole (LOTRIMIN) 1 % external cream  Continuous Glucose Sensor (FREESTYLE MEGHANN 3 PLUS SENSOR) MISC  dextromethorphan-guaiFENesin (MUCINEX DM)  MG 12 hr tablet  diclofenac (VOLTAREN) 1 % topical gel  dicyclomine (BENTYL) 20 MG tablet  EPINEPHrine (ANY BX GENERIC EQUIV) 0.3 MG/0.3ML injection 2-pack  famotidine (PEPCID) 20 MG tablet  FLUoxetine 20 MG tablet  furosemide (LASIX) 40 MG tablet  GAVILAX 17 GM/SCOOP powder  hydrocortisone (CORTAID) 1 % external cream  Hydrocortisone (PREPARATION H EX)  JARDIANCE 10 MG TABS tablet  levothyroxine (SYNTHROID/LEVOTHROID) 25 MCG tablet  lisinopril (ZESTRIL) 10 MG tablet  loperamide (IMODIUM) 2 MG capsule  loratadine (CLARITIN) 10 MG tablet  magnesium hydroxide (MILK OF MAGNESIA) 400 MG/5ML suspension  metFORMIN (GLUCOPHAGE) 1000 MG tablet  methocarbamol (ROBAXIN) 500 MG tablet  montelukast (SINGULAIR) 10 MG tablet  nicotine (NICODERM CQ) 21 MG/24HR 24 hr patch  OLANZapine (ZYPREXA) 10 MG tablet  omeprazole (PRILOSEC) 40 MG DR capsule  ondansetron (ZOFRAN ODT) 4 MG ODT tab  pramoxine-calamine (AVEENO) 1-8 % LOTN  rosuvastatin (CRESTOR) 10 MG tablet  spironolactone (ALDACTONE) 100 MG tablet  sucralfate (CARAFATE) 1 GM/10ML suspension  sulfamethoxazole-trimethoprim (BACTRIM DS) 800-160 MG tablet  traZODone (DESYREL) 100 MG tablet  TRELEGY ELLIPTA 100-62.5-25 MCG/ACT oral inhaler  Urea 40 % CREA  Vitamins A & D (VITAMIN A & D) OINT  witch hazel-glycerin (TUCKS) pad        ALLERGIES:  Allergies   Allergen Reactions    Apricot Flavoring Agent (Non-Screening) Anaphylaxis    Banana Anaphylaxis     Throat swelling      Bees Anaphylaxis     Has an Epi pen    Wasp Venom Protein Shortness Of  Breath     Other reaction(s): Respiratory Distress  Has an Epi pen        Methylphenidate Itching     Other reaction(s): Nightmares    Prunus      Other reaction(s): *Unknown    Sulfa Antibiotics      Headaches and nausea       FAMILY HISTORY:  Family History   Problem Relation Age of Onset    Unknown/Adopted Father     Unknown/Adopted Maternal Grandmother     C.A.D. Maternal Grandfather     Diabetes Maternal Grandfather     Cerebrovascular Disease Maternal Grandfather     Unknown/Adopted Paternal Grandmother     Unknown/Adopted Paternal Grandfather     Unknown/Adopted Brother     Unknown/Adopted Sister        SOCIAL HISTORY:   Social History     Socioeconomic History    Marital status: Single   Tobacco Use    Smoking status: Every Day     Current packs/day: 1.00     Average packs/day: 1 pack/day for 21.2 years (21.2 ttl pk-yrs)     Types: Cigarettes     Start date: 1/1/2004    Smokeless tobacco: Never   Vaping Use    Vaping status: Never Used   Substance and Sexual Activity    Alcohol use: Not Currently     Comment: Last 8/7/2024    Drug use: Never    Sexual activity: Never     Partners: Female   Other Topics Concern     Service No    Blood Transfusions No    Caffeine Concern No    Occupational Exposure No    Hobby Hazards No    Sleep Concern No    Stress Concern Yes     Comment: sometimes    Weight Concern No    Special Diet Yes     Comment: counting carbs    Back Care No    Exercise Yes    Seat Belt Yes    Self-Exams Yes     Social Drivers of Health     Financial Resource Strain: Low Risk  (2/8/2025)    Financial Resource Strain     Within the past 12 months, have you or your family members you live with been unable to get utilities (heat, electricity) when it was really needed?: No   Food Insecurity: Low Risk  (2/8/2025)    Food Insecurity     Within the past 12 months, did you worry that your food would run out before you got money to buy more?: No     Within the past 12 months, did the food you bought  "just not last and you didn t have money to get more?: No   Transportation Needs: Low Risk  (2/8/2025)    Transportation Needs     Within the past 12 months, has lack of transportation kept you from medical appointments, getting your medicines, non-medical meetings or appointments, work, or from getting things that you need?: No    [facility]    Social Connections   Interpersonal Safety: Low Risk  (2/9/2025)    Interpersonal Safety     Do you feel physically and emotionally safe where you currently live?: Yes     Within the past 12 months, have you been hit, slapped, kicked or otherwise physically hurt by someone?: No     Within the past 12 months, have you been humiliated or emotionally abused in other ways by your partner or ex-partner?: No   Recent Concern: Interpersonal Safety - High Risk (1/11/2025)    Interpersonal Safety     Do you feel physically and emotionally safe where you currently live?: No     Within the past 12 months, have you been hit, slapped, kicked or otherwise physically hurt by someone?: No     Within the past 12 months, have you been humiliated or emotionally abused in other ways by your partner or ex-partner?: No   Housing Stability: Low Risk  (2/8/2025)    Housing Stability     Do you have housing? : Yes     Are you worried about losing your housing?: No   Recent Concern: Housing Stability - High Risk (12/2/2024)    Housing Stability     Do you have housing? : No     Are you worried about losing your housing?: No       VITALS:  BP (!) 146/81   Pulse 85   Temp 98  F (36.7  C) (Oral)   Resp 19   Ht 1.651 m (5' 5\")   Wt 127.6 kg (281 lb 3.2 oz)   SpO2 94%   BMI 46.79 kg/m      PHYSICAL EXAM      Vitals: BP (!) 146/81   Pulse 85   Temp 98  F (36.7  C) (Oral)   Resp 19   Ht 1.651 m (5' 5\")   Wt 127.6 kg (281 lb 3.2 oz)   SpO2 94%   BMI 46.79 kg/m    General: Appears in no acute distress, " awake, alert, interactive.  Eyes: Conjunctivae non-injected. Sclera anicteric.  HENT: Atraumatic.  Neck: Supple.  Respiratory/Chest: Respiration unlabored.  No wheezing or rhonchi  Abdomen: non distended  Musculoskeletal: Normal extremities. No edema or erythema.  Skin: Normal color. No rash or diaphoresis.  Neurologic: Face symmetric, moves all extremities spontaneously.  Slurred speech.  No stridor or drooling  Psychiatric: Oriented to person, place, and time. Affect appropriate.    LAB:  All pertinent labs reviewed and interpreted.  Results for orders placed or performed during the hospital encounter of 04/02/25   CTA Head Neck with Contrast    Impression     IMPRESSION:   HEAD CT:  1.  No acute intracranial hemorrhage.    2.  No CT evidence acute infarct. Aspect score 10.      Dr. Manny Louis was notified by Dr Jeremy Daniel at  8:05 PM 4/2/2025 CST/CDT.      HEAD CTA:   1.  No intracranial arterial large vessel occlusion or significant stenosis.      NECK CTA:  1.   Limitations described above.    2.  Visualized cervical arteries demonstrate no significant stenosis or occlusion. No cervical artery dissection.      Dr. Manny Louis was notified by Dr Jeremy Daniel at  8:28 PM 4/2/2025 CST/CDT.    CTA Chest Abdomen Pelvis w Contrast    Impression    IMPRESSION:   1.  No acute aortic syndrome. No pulmonary emboli.  2.  Mild mural thickening and mild patulous dilatation of the esophagus and a small esophageal hiatal hernia can be associated with nonspecific esophagitis.  3.  Hepatic cirrhosis, hepatic steatosis, hepatosplenomegaly and mild/moderate abdominal and pelvic ascites.   MR Brain w/o & w Contrast    Impression    IMPRESSION:  1.  No acute infarct.   Extra Blue Top Tube   Result Value Ref Range    Hold Specimen JIC    Extra Red Top Tube   Result Value Ref Range    Hold Specimen JIC    Extra Green Top (Lithium Heparin) Tube   Result Value Ref Range    Hold Specimen JIC    Extra Purple Top Tube   Result Value  Ref Range    Hold Specimen [facility]    Basic metabolic panel   Result Value Ref Range    Sodium 138 135 - 145 mmol/L    Potassium 4.3 3.4 - 5.3 mmol/L    Chloride 99 98 - 107 mmol/L    Carbon Dioxide (CO2) 25 22 - 29 mmol/L    Anion Gap 14 7 - 15 mmol/L    Urea Nitrogen 25.1 (H) 6.0 - 20.0 mg/dL    Creatinine 1.23 (H) 0.67 - 1.17 mg/dL    GFR Estimate 74 >60 mL/min/1.73m2    Calcium 10.2 8.8 - 10.4 mg/dL    Glucose 122 (H) 70 - 99 mg/dL   Result Value Ref Range    INR 1.17 (H) 0.85 - 1.15   Partial thromboplastin time   Result Value Ref Range    aPTT 32 22 - 38 Seconds   Result Value Ref Range    Troponin T, High Sensitivity 26 (H) <=22 ng/L   CBC with platelets and differential   Result Value Ref Range    WBC Count 14.3 (H) 4.0 - 11.0 10e3/uL    RBC Count 4.56 4.40 - 5.90 10e6/uL    Hemoglobin 13.0 (L) 13.3 - 17.7 g/dL    Hematocrit 40.1 40.0 - 53.0 %    MCV 88 78 - 100 fL    MCH 28.5 26.5 - 33.0 pg    MCHC 32.4 31.5 - 36.5 g/dL    RDW 15.3 (H) 10.0 - 15.0 %    Platelet Count 330 150 - 450 10e3/uL    % Neutrophils 70 %    % Lymphocytes 18 %    % Monocytes 8 %    % Eosinophils 3 %    % Basophils 1 %    % Immature Granulocytes 1 %    NRBCs per 100 WBC 0 <1 /100    Absolute Neutrophils 9.9 (H) 1.6 - 8.3 10e3/uL    Absolute Lymphocytes 2.5 0.8 - 5.3 10e3/uL    Absolute Monocytes 1.2 0.0 - 1.3 10e3/uL    Absolute Eosinophils 0.5 0.0 - 0.7 10e3/uL    Absolute Basophils 0.1 0.0 - 0.2 10e3/uL    Absolute Immature Granulocytes 0.1 <=0.4 10e3/uL    Absolute NRBCs 0.0 10e3/uL   Result Value Ref Range    Troponin T, High Sensitivity 35 (H) <=22 ng/L       RADIOLOGY:  Reviewed all pertinent imaging. Please see official radiology report.  MR Brain w/o & w Contrast   Final Result   IMPRESSION:   1.  No acute infarct.      CTA Chest Abdomen Pelvis w Contrast   Final Result   IMPRESSION:    1.  No acute aortic syndrome. No pulmonary emboli.   2.  Mild mural thickening and mild patulous dilatation of the esophagus and a small esophageal  hiatal hernia can be associated with nonspecific esophagitis.   3.  Hepatic cirrhosis, hepatic steatosis, hepatosplenomegaly and mild/moderate abdominal and pelvic ascites.      CTA Head Neck with Contrast   Final Result    IMPRESSION:    HEAD CT:   1.  No acute intracranial hemorrhage.      2.  No CT evidence acute infarct. Aspect score 10.         Dr. Manny Louis was notified by Dr Jeremy Daniel at  8:05 PM 4/2/2025 CST/CDT.         HEAD CTA:    1.  No intracranial arterial large vessel occlusion or significant stenosis.         NECK CTA:   1.   Limitations described above.      2.  Visualized cervical arteries demonstrate no significant stenosis or occlusion. No cervical artery dissection.         Dr. Manny Louis was notified by Dr Jeremy Daniel at  8:28 PM 4/2/2025 CST/CDT.       Echocardiogram Complete    (Results Pending)       EKG:    Performed at: 19:20:59 on 02-Apr-2025    Impression: Sinus rhythm. Anteroseptal infarct (cite on or before 18-Jan-2024).     Rate: 91 BPM  Rhythm: Sinus rhythm  Axis: 68 79 230  IA Interval: 192 ms  QRS Interval: 114 ms  QTc Interval: 376/462 ms  ST Changes: None  Comparison: When compared with ECG of 23-Feb-2025 20:04, No significant change found.    I have independently reviewed and interpreted the EKG(s) documented above.        I, Zia Waite, am serving as a scribe to document services personally performed by Manny Louis MD based on my observation and the provider's statements to me. I, Manny Louis MD, attest that Zia Waite is acting in a scribe capacity, has observed my performance of the services and has documented them in accordance with my direction.    Manny Louis MD  [facility] EMERGENCY DEPARTMENT  [address] [place] [id number]  [phone]      Manny Louis MD  04/03/25 0028

## 2025-04-03 NOTE — PLAN OF CARE
Problem: Adult Inpatient Plan of Care  Goal: Absence of Hospital-Acquired Illness or Injury  Outcome: Progressing  Intervention: Identify and Manage Fall Risk     Problem: Chest Pain  Goal: Resolution of Chest Pain Symptoms  Outcome: Progressing     Problem: Comorbidity Management  Goal: Blood Glucose Levels Within Targeted Range  Outcome: Progressing     Pt Aox4, up independently.  Denies chest pain, tightness, or shortness of breath.  Pt wearing bipap set to home settings overnight.  Tele NSR and heparin gtt infusing.  NPO with cardiology and GI to see.

## 2025-04-03 NOTE — ED TRIAGE NOTES
Pt here d/t CP while at a chili feed at [employer]. Choked on some chili.  was there and called 911 d/t pt reporting stabbing substernal CP. VSS. . Pt states he has a living will at home. Asking about bundle branch blocks. States he does not feel like his belly needs to be drained. Given 2 SL nitroglycerin w/o relief and 324 ASA en route     Triage Assessment (Adult)       Row Name 04/02/25 1924          Triage Assessment    Airway WDL WDL        Respiratory WDL    Respiratory WDL WDL        Skin Circulation/Temperature WDL    Skin Circulation/Temperature WDL WDL        Cognitive/Neuro/Behavioral WDL    Cognitive/Neuro/Behavioral WDL WDL

## 2025-04-03 NOTE — ED NOTES
Bed: [place] [address]  Expected date: 4/2/25  Expected time: 7:08 PM  Means of arrival: Ambulance  Comments:  [facility]  44M  Chest Pain x20 minutes  ASA/Nitroglycerin given, no relief

## 2025-04-03 NOTE — CONSULTS
GI CONSULT NOTE      Name: Warren Jaramillo    Medical Record #: [id number]    YOB: 1980    Date: 4/3/2025    CC: We were consulted by Esteban Arteaga MD to see Warren Jaramillo in regards to esophagitis and possible need for EGD.     HPI: This is a 44 year old male with a history of fetal alcohol syndrome, TBI, ASD, DM 2, cirrhosis, COPD, AVA, DVT managed with Eliquis, morbid obesity who presented with chest pain occurring after a choking episode.  Patient had 3 staff members from [facility] present during today's visit.  Patient reports eating chili in any event last evening.  He was on his third bowl, when he felt himself choking on a piece of hamburger.  Reports he lost consciousness and fell off of his chair.  Emergency responders were called.  He reported left-sided chest pain.  Troponins elevated.  Plans are to be seen by cardiology here.  Seen by neurology and brain MRI was ordered, which was unremarkable.   was contacted via telephone during today's visit; neither her nor staff have witnessed any dysphagia at home, nor any recent complaints of heartburn, nausea or vomiting.  Patient reports his weight has been stable.  He denies pain when swallowing.  However, reports sometimes feeling liquids or mucus require him to swallow a few times for them to get past his upper esophagus.  On famotidine twice daily at home.    Most recent EGD was July 2023 done for dysphagia and abdominal pain.  Path read by  Chief Trunk.  Esophagus was endoscopically normal normal; no varices noted.  Mild inflammation in the gastric body.  Esophageal biopsies notable for chronic esophagitis, negative for Conner's; gastric biopsies negative for H. pylori.    In regards to cirrhosis, this is felt to be related to fatty liver.  He is decompensated, having  ascites.  Previous history of spontaneous bacterial peritonitis.  Follows with our group (Dr Maria and Dr Urbano).  Received multiple  paracenteses in January and February.  Staff notes dramatic abdominal distention and believes it has been a few weeks since his last paracentesis.    Past medical history  Past Medical History:   Diagnosis Date    COPD exacerbation (H) 12/2/2024    DM2 (diabetes mellitus, type 2) (H) 4/28/2020    HTN (hypertension) 7/30/2012    Thyroid nodule 7/31/2019    Rojas's disease (H)      Family history  Family History   Problem Relation Age of Onset    Unknown/Adopted Father     Unknown/Adopted Maternal Grandmother     C.A.D. Maternal Grandfather     Diabetes Maternal Grandfather     Cerebrovascular Disease Maternal Grandfather     Unknown/Adopted Paternal Grandmother     Unknown/Adopted Paternal Grandfather     Unknown/Adopted Brother     Unknown/Adopted Sister      Social history  Social History     Tobacco Use    Smoking status: Every Day     Current packs/day: 1.00     Average packs/day: 1 pack/day for 21.3 years (21.3 ttl pk-yrs)     Types: Cigarettes     Start date: 1/1/2004    Smokeless tobacco: Never   Vaping Use    Vaping status: Never Used   Substance Use Topics    Alcohol use: Not Currently     Comment: Last 8/7/2024    Drug use: Never       Medications:   Current Outpatient Medications   Medication Sig Dispense Refill    albuterol (PROAIR HFA/PROVENTIL HFA/VENTOLIN HFA) 108 (90 Base) MCG/ACT inhaler Inhale 1-2 puffs into the lungs every 6 hours as needed for shortness of breath, wheezing or cough 18 g 0    albuterol (PROVENTIL) (2.5 MG/3ML) 0.083% neb solution Take 2.5 mg by nebulization 3 times daily as needed for shortness of breath, wheezing or cough      aloe vera GEL Apply 1 g topically every hour as needed for skin care Per bottle directions      apixaban ANTICOAGULANT (ELIQUIS) 5 MG tablet Take 5 mg by mouth 2 times daily.      bacitracin 500 UNIT/GM OINT Apply topically 3 times daily as needed for wound care      Benzocaine (SOLARCAINE ALOE VERA EX) Externally apply topically daily as needed.       benzonatate (TESSALON) 200 MG capsule Take 1 capsule (200 mg) by mouth 3 times daily as needed for cough. 50 capsule 0    Calamine external lotion Apply topically as needed for itching      carbamide peroxide (DEBROX) 6.5 % otic solution Place 5 drops into both ears daily as needed for other.      clotrimazole (LOTRIMIN) 1 % external cream Apply topically 2 times daily as needed (skin irritation)      Continuous Glucose Sensor (FREESTYLE MEGHANN 3 PLUS SENSOR) MISC USE TO READ BLOOD SUGAR TWICE DAILY AND AS NEEDED. CHANGE SENSOR EVERY 15 DAYS      dextromethorphan-guaiFENesin (MUCINEX DM)  MG 12 hr tablet Take 1 tablet by mouth 2 times daily as needed.      diclofenac (VOLTAREN) 1 % topical gel Apply 2 g topically daily as needed for moderate pain To joints/back      dicyclomine (BENTYL) 20 MG tablet Take 1 tablet (20 mg) by mouth 4 times daily as needed (abdominal pain). 20 tablet 0    EPINEPHrine (ANY BX GENERIC EQUIV) 0.3 MG/0.3ML injection 2-pack Inject 0.3 mg into the muscle as needed for anaphylaxis. May repeat one time in 5-15 minutes if response to initial dose is inadequate.      famotidine (PEPCID) 20 MG tablet Take 1 tablet (20 mg) by mouth 2 times daily 60 tablet 0    FLUoxetine 20 MG tablet Take 20 mg by mouth every morning.      furosemide (LASIX) 40 MG tablet Take 1 tablet (40 mg) by mouth daily. 30 tablet 0    GAVILAX 17 GM/SCOOP powder Take 17 g by mouth daily as needed.      hydrocortisone (CORTAID) 1 % external cream Apply topically daily as needed.      Hydrocortisone (PREPARATION H EX) Externally apply topically daily as needed.      JARDIANCE 10 MG TABS tablet Take 10 mg by mouth every morning.      levothyroxine (SYNTHROID/LEVOTHROID) 25 MCG tablet Take 0.25 tablets (6.25 mcg) by mouth every morning (before breakfast). 8 tablet 0    lisinopril (ZESTRIL) 10 MG tablet Take 10 mg by mouth every morning.      loperamide (IMODIUM) 2 MG capsule Take 4 mg by mouth 4 times daily as needed for  diarrhea.      loratadine (CLARITIN) 10 MG tablet Take 10 mg by mouth daily as needed for allergies.      magnesium hydroxide (MILK OF MAGNESIA) 400 MG/5ML suspension Take 30 mLs by mouth daily as needed.      metFORMIN (GLUCOPHAGE) 1000 MG tablet Take 1,000 mg by mouth 2 times daily (with meals)      methocarbamol (ROBAXIN) 500 MG tablet Take 1 tablet (500 mg) by mouth 4 times daily as needed for muscle spasms. 40 tablet 0    montelukast (SINGULAIR) 10 MG tablet Take 10 mg by mouth every morning.      nicotine (NICODERM CQ) 21 MG/24HR 24 hr patch Place 1 patch over 24 hours onto the skin daily. 28 patch 0    OLANZapine (ZYPREXA) 10 MG tablet Take 10 mg by mouth At Bedtime      omeprazole (PRILOSEC) 40 MG DR capsule Take 40 mg by mouth every morning.      ondansetron (ZOFRAN ODT) 4 MG ODT tab Take 1 tablet (4 mg) by mouth every 8 hours as needed for nausea. 20 tablet 0    pramoxine-calamine (AVEENO) 1-8 % LOTN Apply topically daily as needed for itching.      rosuvastatin (CRESTOR) 10 MG tablet Take 10 mg by mouth At Bedtime      spironolactone (ALDACTONE) 100 MG tablet Take 1 tablet (100 mg) by mouth daily. 30 tablet 0    sucralfate (CARAFATE) 1 GM/10ML suspension Take 10 mLs (1 g) by mouth 4 times daily as needed for nausea (heartburn).      sulfamethoxazole-trimethoprim (BACTRIM DS) 800-160 MG tablet Take 1 tablet by mouth once.      traZODone (DESYREL) 100 MG tablet Take 100 mg by mouth at bedtime.      TRELEGY ELLIPTA 100-62.5-25 MCG/ACT oral inhaler Inhale 1 puff into the lungs every morning.      Urea 40 % CREA Apply topically daily as needed.      Vitamins A & D (VITAMIN A & D) OINT Externally apply topically daily as needed.      witch hazel-glycerin (TUCKS) pad Apply topically as needed for hemorrhoids.            Allergies:    Allergies   Allergen Reactions    Apricot Flavoring Agent (Non-Screening) Anaphylaxis    Banana Anaphylaxis     Throat swelling      Bees Anaphylaxis     Has an Epi pen    Wasp  "Venom Protein Shortness Of Breath     Other reaction(s): Respiratory Distress  Has an Epi pen        Methylphenidate Itching     Other reaction(s): Nightmares    Prunus      Other reaction(s): *Unknown    Sulfa Antibiotics      Headaches and nausea          REVIEW OF SYSTEMS (ROS): Review of systems is as per HPI.  Remainder of complete review of systems is negative.      PHYSICAL EXAMINATION:      /63   Pulse 77   Temp 97.9  F (36.6  C) (Oral)   Resp 18   Ht 1.651 m (5' 5\")   Wt 127.6 kg (281 lb 3.2 oz)   SpO2 96%   BMI 46.79 kg/m    GEN: Well developed, well nourished 44 year old in no acute distress.  HEENT: sclera anicteric, moist mucous membranes, tongue with white streaks on outer edges, bilaterally.  LYMPH: No cervical lymphadenopathy  PULM: Breathing comfortably on room air.    CARDIO: Regular rate and rhythm  GI: Grossly-distended.  Firm but palpable.  Non-tender to palpation.  Fluid wave.  No guarding.  EXT: warm, no lower extremity edema  NEURO: Alert and oriented.  Speech fluid.    PSYCH: Mental status appropriate, mood and affect normal.      LABS and IMAGING  Recent Labs   Lab 04/03/25  0653 04/02/25 1929   WBC 10.9 14.3*   RBC 4.41 4.56   HGB 12.6* 13.0*   HCT 39.6* 40.1   MCV 90 88   MCH 28.6 28.5   MCHC 31.8 32.4   RDW 15.4* 15.3*    330      Recent Labs   Lab 04/03/25  0653 04/02/25 1929    138   CO2 28 25   BUN 24.0* 25.1*     No results for input(s): \"BILITOT\", \"ALKPHOS\", \"AST\", \"ALT\" in the last 168 hours.  Lab Results   Component Value Date    INR 1.17 (H) 04/02/2025    INR 1.22 (H) 02/23/2025    INR 1.22 (H) 02/15/2025       MR Brain w/o & w Contrast    Result Date: 4/2/2025  EXAM: MR BRAIN W/O and W CONTRAST LOCATION: [facility] DATE: 4/2/2025 INDICATION: Slurred speech, difficulty swallowing, new balance problem COMPARISON: None. CONTRAST: 13ml Gadavist TECHNIQUE: Routine multiplanar multisequence head MRI without and with intravenous " contrast. FINDINGS: INTRACRANIAL CONTENTS: No acute or subacute infarct. No mass, acute hemorrhage, or extra-axial fluid collections. Normal brain parenchymal signal. Mild generalized cerebral atrophy. No hydrocephalus. Normal position of the cerebellar tonsils. No pathologic contrast enhancement. SELLA: No abnormality accounting for technique. OSSEOUS STRUCTURES/SOFT TISSUES: Normal marrow signal. The major intracranial vascular flow voids are maintained. ORBITS: No abnormality accounting for technique. SINUSES/MASTOIDS: Mild mucosal thickening scattered about the paranasal sinuses. Left maxillary sinus prominent retention cyst/mucosal polyp. Right mastoid air cells moderate patchy opacification. Left mastoid air cells essentially clear.     IMPRESSION: 1.  No acute infarct.    CTA Chest Abdomen Pelvis w Contrast    Result Date: 4/2/2025  EXAM: CTA CHEST ABDOMEN PELVIS W CONTRAST LOCATION: [facility] DATE: 4/2/2025 INDICATION: chest pain and strokelike symptoms, also reports he may have choked reports left sided chest pain COMPARISON: 2/23/2025 CT AP and 12/1/2024 CTA chest TECHNIQUE: CT angiogram chest abdomen pelvis during arterial phase of injection of IV contrast as well as noncontrast imaging of the chest.  Multiplanar reformats and MIP reconstructions were performed. Dose reduction techniques were used. CONTRAST: 180 ml isovue 370 FINDINGS: CTA CHEST, ABDOMEN and PELVIS: Normal caliber thoracic and abdominal aorta with no acute aortic syndrome. Normal caliber pulmonary arteries with no pulmonary emboli. Brachiocephalic, mesenteric, renal and iliofemoral arteries are unremarkable. LUNGS AND PLEURA: Lungs are clear. No pleural effusion. No pneumothorax. Trachea and bronchi are patent with no endobronchial filling defect identified. MEDIASTINUM/AXILLAE: No lymphadenopathy. Heart size within normal limits. No pericardial effusion. Mild mural thickening and mild patulous dilatation of the  esophagus and a small esophageal hiatal hernia can be associated with nonspecific esophagitis. CORONARY ARTERY CALCIFICATION: No visible coronary artery calcification. HEPATOBILIARY: Hepatic cirrhosis, hepatic steatosis and hepatomegaly. No focal liver lesion identified. Gallbladder and bile ducts unremarkable. PANCREAS: Normal. SPLEEN: Mild splenic enlargement at 14 cm. ADRENAL GLANDS: Benign 5 cm right adrenal myelolipoma and benign 3 cm left adrenal myelolipoma unchanged and require no follow-up. Adrenal glands otherwise normal. KIDNEY/BLADDER: Kidneys, ureters and bladder are normal. BOWEL: Bowel is normal with no obstruction or inflammatory change. Mild/moderate abdominal and pelvic ascites. LYMPH NODES: Numerous prominent upper abdominal, mesenteric and para-aortic lymph nodes are stable. PELVIC ORGANS: No pelvic mass. 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 No fracture or bone lesion.     IMPRESSION: 1.  No acute aortic syndrome. No pulmonary emboli. 2.  Mild mural thickening and mild patulous dilatation of the esophagus and a small esophageal hiatal hernia can be associated with nonspecific esophagitis. 3.  Hepatic cirrhosis, hepatic steatosis, hepatosplenomegaly and mild/moderate abdominal and pelvic ascites.    CTA Head Neck with Contrast    Result Date: 4/2/2025  EXAM: CTA HEAD NECK W CONTRAST LOCATION: [facility] DATE: 4/2/2025 INDICATION: Code Stroke, evaluate for LVO. Slurred speech. COMPARISON: CTA head and neck 2/22/2025. MRI brain 2/23/2025. CONTRAST: 90ml isovue 370 TECHNIQUE: Head and neck CT angiogram with IV contrast. Noncontrast head CT followed by axial helical CT images of the head and neck vessels obtained during the arterial phase of intravenous contrast administration. Axial 2D reconstructed images and multiplanar 3D MIP reconstructed images of the head and neck vessels were performed by the technologist. Dose reduction techniques  were used. All stenosis measurements made according to NASCET criteria unless otherwise specified. FINDINGS: Streak artifact limits aspects of exam. Portions of exam are limited by poor opacification of the arterial system particularly within the lower neck. HEAD CT: INTRACRANIAL CONTENTS: No intracranial hemorrhage, extraaxial collection, or mass effect.  No CT evidence of acute infarct. Normal parenchymal attenuation. Normal ventricles and sulci. HEAD CTA: No significant stenosis or occlusion.  No brain aneurysm. No AVM/AVF. NECK CTA: RIGHT CAROTID: No significant stenosis/occlusion. No dissection. LEFT CAROTID: No significant stenosis/occlusion. No dissection. VERTEBRAL ARTERIES: Bilateral vertebral artery origins and bilateral V1 segment obscured by artifact.  Otherwise, no significant stenosis or occlusion. No dissection.   AORTIC ARCH: Classic aortic arch anatomy. No significant stenosis at the origin of the great vessels. ARTERIAL PLAQUE: Scattered calcified/noncalcified plaque. NONVASCULAR STRUCTURES: Multiple small and prominent lymph nodes within the neck and mediastinum.      IMPRESSION: HEAD CT: 1.  No acute intracranial hemorrhage. 2.  No CT evidence acute infarct. Aspect score 10. Dr. Manny Louis was notified by Dr Jeremy Daniel at  8:05 PM 2025 CST/CDT. HEAD CTA: 1.  No intracranial arterial large vessel occlusion or significant stenosis. NECK CTA: 1.   Limitations described above. 2.  Visualized cervical arteries demonstrate no significant stenosis or occlusion. No cervical artery dissection. Dr. Manny Louis was notified by Dr Jeremy Daniel at  8:28 PM 2025 CST/CDT.     Echocardiogram Complete    Result Date: 3/31/2025  [id number]^[place]JA^[place] [address]  Name: BOB LO MRN: [id number] : 1980 Study Date: 2025 10:16 AM Age: 44 yrs Gender: Male Patient Location: [facility] Reason For Study:  Cirrhosis of liver with ascites, unspecified hepatic cirrhosis t Ordering Physician: TITA BORJA Referring Physician: TITA BORJA Performed By: LV/MB  BSA: 2.3 m2 Height: 65 in Weight: 280 lb HR: 84 BP: 133/65 mmHg ______________________________________________________________________________ Procedure Echocardiogram with two-dimensional, color and spectral Doppler. Definity (NDC #13100-990) given intravenously. No hemodynamically significant valvular abnormalities on 2D or color flow imaging. Technically difficult, suboptimal study. Compared to the prior study dated 8/2/2023, there have been no changes. ______________________________________________________________________________ Interpretation Summary  1.Left ventricular size, wall motion and function are normal. The ejection fraction is 60-65%. 2.Normal right ventricle size and systolic function. 3.The left atrium is borderline dilated. 4.No hemodynamically significant valvular abnormalities on 2D or color flow imaging. 5.Technically difficult, suboptimal study Compared to the prior study dated 8/2/2023, there have been no changes.If concerned about cardiac pathology would recommend other imaging modality such as MICHAEL or MRI. ______________________________________________________________________________ I      WMSI = 1.00     % Normal = 100  X - Cannot   0 -                      (2) - Mildly 2 -          Segments  Size Interpret    Hyperkinetic 1 - Normal  Hypokinetic  Hypokinetic  1-2     small                                                    7 -          3-5    moderate 3 - Akinetic 4 -          5 -         6 - Akinetic Dyskinetic   6-14    large              Dyskinetic   Aneurysmal  w/scar       w/scar       15-16   diffuse  Left Ventricle Left ventricular size, wall motion and function are normal. The ejection fraction is 60-65%. There is normal left ventricular wall thickness. Left ventricular diastolic function is normal. No regional wall motion  abnormalities noted.  Right Ventricle Normal right ventricle size and systolic function. TAPSE is normal, which is consistent with normal right ventricular systolic function.  Atria The left atrium is borderline dilated. Right atrial size is normal. There is no color Doppler evidence of an atrial shunt.  Mitral Valve The mitral valve is not well visualized. There is no mitral regurgitation noted. There is no mitral valve stenosis.  Tricuspid Valve The tricuspid valve is not well visualized. No tricuspid regurgitation. There is no tricuspid stenosis.  Aortic Valve The aortic valve is not well visualized. No aortic regurgitation is present. No aortic stenosis is present.  Pulmonic Valve The pulmonic valve is not well visualized. This degree of valvular regurgitation is within normal limits. There is no pulmonic valvular stenosis.  Vessels The aorta root is normal. Normal size ascending aorta. IVC diameter <2.1 cm collapsing >50% with sniff suggests a normal RA pressure of 3 mmHg.  Pericardium There is no pericardial effusion.  Rhythm Sinus rhythm was noted.  ______________________________________________________________________________ MMode/2D Measurements & Calculations Ao root diam: 2.7 cm LA dimension: 4.0 cm asc Aorta Diam: 2.6 cm LA/Ao: 1.5 LVOT diam: 2.1 cm LVOT area: 3.5 cm2 Ao root diam index Ht(cm/m): 1.6  Ao root diam index BSA (cm/m2): 1.2 Asc Ao diam index BSA (cm/m2): 1.1 Asc Ao diam index Ht(cm/m): 1.6 TAPSE: 1.8 cm  Doppler Measurements & Calculations MV E max eliza: 114.0 cm/sec MV A max eliza: 48.2 cm/sec MV E/A: 2.4 MV max P.7 mmHg MV mean P.0 mmHg MV V2 VTI: 27.5 cm MVA(VTI): 3.1 cm2 MV dec slope: 965.0 cm/sec2 MV dec time: 0.12 sec Ao V2 max: 139.0 cm/sec Ao max P.0 mmHg Ao V2 mean: 102.0 cm/sec Ao mean P.0 mmHg Ao V2 VTI: 27.1 cm RUDI(I,D): 3.2 cm2 RUDI(V,D): 3.4 cm2 LV V1 max P.6 mmHg LV V1 max: 138.0 cm/sec LV V1 VTI: 24.8 cm SV(LVOT): 85.9 ml SI(LVOT): 37.6 ml/m2 PA V2 max: 154.0  "cm/sec PA max P.5 mmHg PA acc time: 0.08 sec AV Nash Ratio (DI): 0.99 RUDI Index (cm2/m2): 1.4 E/E' av.2  Lateral E/e': 13.9 Medial E/e': 14.5 RV S Nash: 11.5 cm/sec  ______________________________________________________________________________ Report approved by: Jasper Farfan MD on 2025 12:02 PM          ASSESSMENT  This is a 44 year old male with a history of fetal alcohol syndrome, TBI, ASD, DM 2, cirrhosis, COPD, AVA, DVT managed with Eliquis, morbid obesity who presented with chest pain occurring after a choking episode.    1.  Dysphagia  Cause unclear.  On H2RA prior to admission.  Staff has not witnessed any episodes of dysphagia at home.  He felt a piece of hamburger stuck in his esophagus last evening, then chest pain and syncopal event.  CT showing mild thickening of the esophagus and a small hiatal hernia.  Esophageal biopsies in  showed chronic esophagitis, cause unclear.  Differential diagnosis includes esophageal stricture, EOE, reflux, dysmotility, Zenker's, or other.  Favor further evaluation with EGD once medically stable.    2.  Cirrhosis, decompensated  Follows with our group.  Required several paracenteses in January and February.  Currently with gross abdominal distention.  Will plan on paracentesis while here.    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    Tobacco use    Elevated WBCs    Rectal bleeding    Anal fissure    \"high flow priapism\"     CARDIOVASCULAR SCREENING; LDL GOAL LESS THAN 160    PTSD (post-traumatic stress disorder)    Fetal alcohol syndromes    Seasonal allergic rhinitis    Steatohepatitis    Cardiomegaly    Shortness of breath    Chest pain, unspecified type    Acute right hip pain    Chronic right-sided congestive heart failure (H)    Active autistic disorder    Adjustment disorder with disturbance of conduct    Angiomyolipoma    Ankylosis, sacroiliac joint    " Ascites    Charcot-Estrella-Tooth syndrome    Chronic insomnia    Congestive heart failure (H)    DM2 (diabetes mellitus, type 2) (H)    DM2 (diabetes mellitus, type 2) (H)    Dysphagia    Dysuria    Elevated d-dimer    Episodic mood disorder    Excessive daytime sleepiness    Gait instability    H/O fall    Hematuria    Benign essential hypertension    Hyperglycemia    Incontinence    Myelolipoma of adrenal gland    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    Abdominal pain, right upper quadrant    PVC's (premature ventricular contractions)    SVT (supraventricular tachycardia)    Heart failure with preserved ejection fraction, NYHA class I (H)    Incomplete left bundle branch block (LBBB)    Suicidal ideation    Cirrhosis of liver with ascites, unspecified hepatic cirrhosis type (H)    Thyroid nodule    ADHD (attention deficit hyperactivity disorder)    Chest pain    Morbid obesity (H)    COPD exacerbation (H)    DVT of axillary vein, acute left (H)    Abdominal bloating    LLQ abdominal pain    SBP (spontaneous bacterial peritonitis) (H)    ASNHL (asymmetrical sensorineural hearing loss)    Traumatic brain injury (H)    Tongue lesion    Acute kidney failure, unspecified    Other specified hypothyroidism    Medication induced coagulopathy    Normal anion gap metabolic acidosis    Diffuse abdominal pain    Portal hypertension (H)    Esophagitis    Choking episode    NSTEMI (non-ST elevated myocardial infarction) (H)     PLAN  1. EGD when medically stable.   2. NPO at midnight in case he is okay for EGD tomorrow.   3.  Since patient is on Eliquis, would be unable to dilate esophagus if stricture is identified on EGD, though biopsies could be obtained.   4.  Paracentesis.  Send fluid for analysis.  Replace with IV albumin if greater than 5 L fluid removed.  5.  Outpatient follow-up with Dr Maria on 7/16/2025 as scheduled.     A total of 50 minutes was spent on today's care.  Will discuss with Dr. Talbot and update chart with any  further information.  Thank you for asking to consult on this patient.    Bruno Echols PA-C   4/3/2025 10:25 AM  [facility]  [phone]

## 2025-04-03 NOTE — PLAN OF CARE
"  Problem: Adult Inpatient Plan of Care  Goal: Plan of Care Review  Description: The Plan of Care Review/Shift note should be completed every shift.  The Outcome Evaluation is a brief statement about your assessment that the patient is improving, declining, or no change.  This information will be displayed automatically on your shiftnote.  Outcome: Progressing  Flowsheets (Taken 4/3/2025 1152)  Plan of Care Reviewed With: patient  Goal: Patient-Specific Goal (Individualized)  Description: You can add care plan individualizations to a care plan. Examples of Individualization might be:  \"Parent requests to be called daily at 9am for status\", \"I have a hard time hearing out of my right ear\", or \"Do not touch me to wake me up as it startlesme\".  Outcome: Progressing  Goal: Absence of Hospital-Acquired Illness or Injury  Outcome: Progressing  Intervention: Identify and Manage Fall Risk  Recent Flowsheet Documentation  Taken 4/3/2025 1109 by Kane Johnson RN  Safety Promotion/Fall Prevention:   room organization consistent   safety round/check completed   room near nurse's station   room door open   nonskid shoes/slippers when out of bed  Taken 4/3/2025 0800 by Kane Johnson RN  Safety Promotion/Fall Prevention:   room organization consistent   safety round/check completed   room near nurse's station   room door open   nonskid shoes/slippers when out of bed  Intervention: Prevent Skin Injury  Recent Flowsheet Documentation  Taken 4/3/2025 1109 by Kane Johnson RN  Body Position: position changed independently  Taken 4/3/2025 0800 by Kane Johnson RN  Body Position: position changed independently  Goal: Optimal Comfort and Wellbeing  Outcome: Progressing  Goal: Readiness for Transition of Care  Outcome: Progressing     Problem: Chest Pain  Goal: Resolution of Chest Pain Symptoms  Outcome: Progressing     Problem: Comorbidity Management  Goal: Blood Glucose Levels Within Targeted Range  Outcome: Progressing   " Goal Outcome Evaluation:      Plan of Care Reviewed With: patient        Pt is alert and oriented x4. Pt is med complaint. Pt denies pain. Pt's v/s is stable. Pt is up independently. Pt is continue to be NPO for GI consult. Continue to monitor pt.

## 2025-04-03 NOTE — CONSULTS
"  HEART CARE CONSULTATON NOTE        Assessment/Recommendations   Assessment:  1.  Chest pain: Chest pain occurred after choking on chili.  Atypical presentation and normal coronaries on CT angiogram from March 2023.  Only mildly elevated troponins.  No ischemic workup needed at this time.  2.  Liver cirrhosis with ascites requiring periodic paracentesis  3.  Insulin-dependent diabetes mellitus type 2  4.  AVA on CPAP  5.  Morbid obesity  6.  Rojas's disease    Plan:  1.  Repeat limited echocardiogram pending and if no significant change compared to prior no new recommendations  2.  Continue CPAP for AVA  3.  Continue Jardiance, Lasix 40 mg daily, lisinopril and Crestor  Clinically Significant Risk Factors Present on Admission                  # Drug Induced Coagulation Defect: home medication list includes an anticoagulant medication        # Hypertension: Noted on problem list  # Chronic heart failure with preserved ejection fraction: heart failure noted on problem list and last echo with EF >50%            # Severe Obesity: Estimated body mass index is 46.79 kg/m  as calculated from the following:    Height as of this encounter: 1.651 m (5' 5\").    Weight as of this encounter: 127.6 kg (281 lb 3.2 oz).           # Financial/Environmental Concerns:                 History of Present Illness/Subjective    HPI: Warren Jaramillo is a 44 year old male with history of AVA on CPAP, incomplete left bundle branch block, diabetes mellitus type 2, morbid obesity, cirrhosis with ascites requiring periodic paracentesis, COPD, Rojas's disease who was admitted with chest pain after he had an episode of choking after consuming chili.  Chest pain now resolved.  Troponin mildly elevated resulting in cardiology consultation.  Denies any exertional chest pain or recurrent chest pain today.  No increased shortness of breath either.      Twelve-lead EKG 4-25 normal sinus rhythm at 91 bpm anteroseptal MI no change compared to " "prior    Cardiac MRI 12/6/2024  1.  Normal left ventricular size and wall thickness.  D-septal pattern indicates right ventricular pressure  and fluid overload.  The calculated left ventricular ejection fraction is 68%.  2.  No previous myocardial infarction, scar or other infiltrative process.  3.  Borderline right ventricular enlargement with mildly reduced right ventricular systolic function.  The  calculated right ventricular ejection fraction is 42%.   4.  Mild left atrial enlargement.  Moderate right atrial enlargement.  Dilated IVC 2.5 cm indicating  elevated right atrial pressure.  5.  No obvious valvular disease.  6.  No pericardial effusion.  7.  Normal size of thoracic aorta.    CTA angiogram coronary arteries 3/14/2023  Normal coronary anatomy with no evidence of stenosis or plaque.      Physical Examination  Review of Systems   VITALS: /62   Pulse 82   Temp 97.9  F (36.6  C) (Oral)   Resp 27   Ht 1.651 m (5' 5\")   Wt 127.6 kg (281 lb 3.2 oz)   SpO2 95%   BMI 46.79 kg/m    BMI: Body mass index is 46.79 kg/m .  Wt Readings from Last 3 Encounters:   04/02/25 127.6 kg (281 lb 3.2 oz)   03/21/25 127 kg (280 lb)   02/24/25 127 kg (280 lb)       Intake/Output Summary (Last 24 hours) at 4/3/2025 1253  Last data filed at 4/3/2025 0200  Gross per 24 hour   Intake 360 ml   Output 600 ml   Net -240 ml     General Appearance:   no distress, normal body habitus   ENT/Mouth: membranes moist, no oral lesions or bleeding gums.      EYES:  no scleral icterus, normal conjunctivae   Neck: no carotid bruits or thyromegaly   Chest/Lungs:   lungs are clear to auscultation   Cardiovascular:   Regular. Normal first and second heart sounds with no murmur  no edema bilaterally        Extremities: no cyanosis or clubbing   Skin: no xanthelasma, warm.    Neurologic: normal  bilateral, no tremors     Psychiatric: alert and oriented x3, calm     Review Of Systems  Skin: negative  Eyes: negative  Ears/Nose/Throat: " "negative  Respiratory: No shortness of breath, dyspnea on exertion, cough, or hemoptysis  Cardiovascular: negative  Gastrointestinal: negative  Genitourinary: negative  Musculoskeletal: negative  Neurologic: negative  Psychiatric: negative  Hematologic/Lymphatic/Immunologic: negative  Endocrine: negative          Lab Results    Chemistry/lipid CBC Cardiac Enzymes/BNP/TSH/INR   Recent Labs   Lab Test 04/03/25  0653   CHOL 128   HDL 39*   LDL 47   TRIG 210*     Recent Labs   Lab Test 04/03/25  0653 12/02/24  0631 02/15/24  0918   LDL 47 47 59     Recent Labs   Lab Test 04/03/25  1205 04/03/25  0723 04/03/25  0653   NA  --   --  141   POTASSIUM  --   --  4.1   CHLORIDE  --   --  103   CO2  --   --  28   *   < > 123*   BUN  --   --  24.0*   CR  --   --  1.17   GFRESTIMATED  --   --  79   WES  --   --  9.6    < > = values in this interval not displayed.     Recent Labs   Lab Test 04/03/25  0653 04/02/25 1929 02/24/25  1945   CR 1.17 1.23* 1.15     Recent Labs   Lab Test 01/02/25  1226 09/13/24  1100 02/15/24  0918   A1C 6.2* 6.8* 7.5*          Recent Labs   Lab Test 04/03/25  0653   WBC 10.9   HGB 12.6*   HCT 39.6*   MCV 90        Recent Labs   Lab Test 04/03/25  0653 04/02/25 1929 02/24/25  1945   HGB 12.6* 13.0* 13.6    No results for input(s): \"TROPONINI\" in the last 55738 hours.  Recent Labs   Lab Test 04/02/25 2123 02/07/25  1752 02/03/25  1840 02/15/24  0918 11/27/23  1135   NTBNPI 1,054* 1,101* 924*   < >  --    NTBNP  --   --   --   --  495*    < > = values in this interval not displayed.     Recent Labs   Lab Test 01/27/25  1301   TSH 7.50*     Recent Labs   Lab Test 04/02/25  1929 02/23/25  2001 02/15/25  1851   INR 1.17* 1.22* 1.22*        Medical History  Surgical History Family History Social History   Past Medical History:   Diagnosis Date    COPD exacerbation (H) 12/2/2024    DM2 (diabetes mellitus, type 2) (H) 4/28/2020    HTN (hypertension) 7/30/2012    Thyroid nodule 7/31/2019    " Rojas's disease (H)      Past Surgical History:   Procedure Laterality Date    COLONOSCOPY      ESOPHAGOSCOPY, GASTROSCOPY, DUODENOSCOPY (EGD), COMBINED N/A 7/21/2023    Procedure: ESOPHAGOGASTRODUODENOSCOPY WITH GASTRIC AND ESOPHAGEAL BIOPSIES;  Surgeon: Filiberto Aragon MD;  Location: [facility] Main OR    TOOTH EXTRACTION       Family History   Problem Relation Age of Onset    Unknown/Adopted Father     Unknown/Adopted Maternal Grandmother     C.A.D. Maternal Grandfather     Diabetes Maternal Grandfather     Cerebrovascular Disease Maternal Grandfather     Unknown/Adopted Paternal Grandmother     Unknown/Adopted Paternal Grandfather     Unknown/Adopted Brother     Unknown/Adopted Sister         Social History     Socioeconomic History    Marital status: Single     Spouse name: Not on file    Number of children: Not on file    Years of education: Not on file    Highest education level: Not on file   Occupational History    Not on file   Tobacco Use    Smoking status: Every Day     Current packs/day: 1.00     Average packs/day: 1 pack/day for 21.3 years (21.3 ttl pk-yrs)     Types: Cigarettes     Start date: 1/1/2004    Smokeless tobacco: Never   Vaping Use    Vaping status: Never Used   Substance and Sexual Activity    Alcohol use: Not Currently     Comment: Last 8/7/2024    Drug use: Never    Sexual activity: Never     Partners: Female   Other Topics Concern     Service No    Blood Transfusions No    Caffeine Concern No    Occupational Exposure No    Hobby Hazards No    Sleep Concern No    Stress Concern Yes     Comment: sometimes    Weight Concern No    Special Diet Yes     Comment: counting carbs    Back Care No    Exercise Yes    Bike Helmet Not Asked     Comment: N/A    Seat Belt Yes    Self-Exams Yes    Parent/sibling w/ CABG, MI or angioplasty before 65F 55M? Not Asked   Social History Narrative    Not on file     Social Drivers of Health     Financial Resource Strain: Low Risk  (2/8/2025)     Financial Resource Strain     Within the past 12 months, have you or your family members you live with been unable to get utilities (heat, electricity) when it was really needed?: No   Food Insecurity: Low Risk  (2/8/2025)    Food Insecurity     Within the past 12 months, did you worry that your food would run out before you got money to buy more?: No     Within the past 12 months, did the food you bought just not last and you didn t have money to get more?: No   Transportation Needs: Low Risk  (2/8/2025)    Transportation Needs     Within the past 12 months, has lack of transportation kept you from medical appointments, getting your medicines, non-medical meetings or appointments, work, or from getting things that you need?: No   Physical Activity: Not on file   Stress: Not on file   Social Connections: Unknown (5/1/2023)    Received from [facility] [address] [facility], [facility] [address] [facility]    Social Connections     Frequency of Communication with Friends and Family: Not on file   Interpersonal Safety: Low Risk  (2/9/2025)    Interpersonal Safety     Do you feel physically and emotionally safe where you currently live?: Yes     Within the past 12 months, have you been hit, slapped, kicked or otherwise physically hurt by someone?: No     Within the past 12 months, have you been humiliated or emotionally abused in other ways by your partner or ex-partner?: No   Recent Concern: Interpersonal Safety - High Risk (1/11/2025)    Interpersonal Safety     Do you feel physically and emotionally safe where you currently live?: No     Within the past 12 months, have you been hit, slapped, kicked or otherwise physically hurt by someone?: No     Within the past 12 months, have you been humiliated or emotionally abused in other ways by your partner or ex-partner?: No   Housing Stability: Low Risk  (2/8/2025)    Housing Stability     Do you have housing? : Yes     Are you worried about losing  your housing?: No   Recent Concern: Housing Stability - High Risk (12/2/2024)    Housing Stability     Do you have housing? : No     Are you worried about losing your housing?: No         Medications  Allergies   Current Outpatient Medications   Medication Sig Dispense Refill    albuterol (PROAIR HFA/PROVENTIL HFA/VENTOLIN HFA) 108 (90 Base) MCG/ACT inhaler Inhale 1-2 puffs into the lungs every 6 hours as needed for shortness of breath, wheezing or cough 18 g 0    albuterol (PROVENTIL) (2.5 MG/3ML) 0.083% neb solution Take 2.5 mg by nebulization 3 times daily as needed for shortness of breath, wheezing or cough      aloe vera GEL Apply 1 g topically every hour as needed for skin care Per bottle directions      apixaban ANTICOAGULANT (ELIQUIS) 5 MG tablet Take 5 mg by mouth 2 times daily.      bacitracin 500 UNIT/GM OINT Apply topically 3 times daily as needed for wound care      Benzocaine (SOLARCAINE ALOE VERA EX) Externally apply topically daily as needed.      benzonatate (TESSALON) 200 MG capsule Take 1 capsule (200 mg) by mouth 3 times daily as needed for cough. 50 capsule 0    Calamine external lotion Apply topically as needed for itching      carbamide peroxide (DEBROX) 6.5 % otic solution Place 5 drops into both ears daily as needed for other.      clotrimazole (LOTRIMIN) 1 % external cream Apply topically 2 times daily as needed (skin irritation)      Continuous Glucose Sensor (FREESTYLE MEGHANN 3 PLUS SENSOR) MISC USE TO READ BLOOD SUGAR TWICE DAILY AND AS NEEDED. CHANGE SENSOR EVERY 15 DAYS      dextromethorphan-guaiFENesin (MUCINEX DM)  MG 12 hr tablet Take 1 tablet by mouth 2 times daily as needed.      diclofenac (VOLTAREN) 1 % topical gel Apply 2 g topically daily as needed for moderate pain To joints/back      dicyclomine (BENTYL) 20 MG tablet Take 1 tablet (20 mg) by mouth 4 times daily as needed (abdominal pain). 20 tablet 0    EPINEPHrine (ANY BX GENERIC EQUIV) 0.3 MG/0.3ML injection 2-pack  Inject 0.3 mg into the muscle as needed for anaphylaxis. May repeat one time in 5-15 minutes if response to initial dose is inadequate.      famotidine (PEPCID) 20 MG tablet Take 1 tablet (20 mg) by mouth 2 times daily 60 tablet 0    FLUoxetine 20 MG tablet Take 20 mg by mouth every morning.      furosemide (LASIX) 40 MG tablet Take 1 tablet (40 mg) by mouth daily. 30 tablet 0    GAVILAX 17 GM/SCOOP powder Take 17 g by mouth daily as needed.      hydrocortisone (CORTAID) 1 % external cream Apply topically daily as needed.      Hydrocortisone (PREPARATION H EX) Externally apply topically daily as needed.      JARDIANCE 10 MG TABS tablet Take 10 mg by mouth every morning.      levothyroxine (SYNTHROID/LEVOTHROID) 25 MCG tablet Take 0.25 tablets (6.25 mcg) by mouth every morning (before breakfast). 8 tablet 0    lisinopril (ZESTRIL) 10 MG tablet Take 10 mg by mouth every morning.      loperamide (IMODIUM) 2 MG capsule Take 4 mg by mouth 4 times daily as needed for diarrhea.      loratadine (CLARITIN) 10 MG tablet Take 10 mg by mouth daily as needed for allergies.      magnesium hydroxide (MILK OF MAGNESIA) 400 MG/5ML suspension Take 30 mLs by mouth daily as needed.      metFORMIN (GLUCOPHAGE) 1000 MG tablet Take 1,000 mg by mouth 2 times daily (with meals)      methocarbamol (ROBAXIN) 500 MG tablet Take 1 tablet (500 mg) by mouth 4 times daily as needed for muscle spasms. 40 tablet 0    montelukast (SINGULAIR) 10 MG tablet Take 10 mg by mouth every morning.      nicotine (NICODERM CQ) 21 MG/24HR 24 hr patch Place 1 patch over 24 hours onto the skin daily. 28 patch 0    OLANZapine (ZYPREXA) 10 MG tablet Take 10 mg by mouth At Bedtime      omeprazole (PRILOSEC) 40 MG DR capsule Take 40 mg by mouth every morning.      ondansetron (ZOFRAN ODT) 4 MG ODT tab Take 1 tablet (4 mg) by mouth every 8 hours as needed for nausea. 20 tablet 0    pramoxine-calamine (AVEENO) 1-8 % LOTN Apply topically daily as needed for itching.       rosuvastatin (CRESTOR) 10 MG tablet Take 10 mg by mouth At Bedtime      spironolactone (ALDACTONE) 100 MG tablet Take 1 tablet (100 mg) by mouth daily. 30 tablet 0    sucralfate (CARAFATE) 1 GM/10ML suspension Take 10 mLs (1 g) by mouth 4 times daily as needed for nausea (heartburn).      sulfamethoxazole-trimethoprim (BACTRIM DS) 800-160 MG tablet Take 1 tablet by mouth once.      traZODone (DESYREL) 100 MG tablet Take 100 mg by mouth at bedtime.      TRELEGY ELLIPTA 100-62.5-25 MCG/ACT oral inhaler Inhale 1 puff into the lungs every morning.      Urea 40 % CREA Apply topically daily as needed.      Vitamins A & D (VITAMIN A & D) OINT Externally apply topically daily as needed.      witch hazel-glycerin (TUCKS) pad Apply topically as needed for hemorrhoids.          Allergies   Allergen Reactions    Apricot Flavoring Agent (Non-Screening) Anaphylaxis    Banana Anaphylaxis     Throat swelling      Bees Anaphylaxis     Has an Epi pen    Wasp Venom Protein Shortness Of Breath     Other reaction(s): Respiratory Distress  Has an Epi pen        Methylphenidate Itching     Other reaction(s): Nightmares    Prunus      Other reaction(s): *Unknown    Sulfa Antibiotics      Headaches and nausea         Jessy Stewart MD

## 2025-04-03 NOTE — H&P
[facility]    History and Physical - Hospitalist Service       Date of Admission:  4/2/2025    Assessment & Plan      Warren Jaramillo is a 44 year old male admitted on 4/2/2025.     Principal Problem:    NSTEMI (non-ST elevated myocardial infarction) (H)  Active Problems:    DM2 (diabetes mellitus, type 2) (H)    Benign essential hypertension    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    Cirrhosis of liver with ascites, unspecified hepatic cirrhosis type (H)    Morbid obesity (H)    Portal hypertension (H)    Esophagitis    Choking episode    44 year old male with PMH of cirrhosis  being followed with [facility] ongoing ascites and is on diuretics at home, history of recent DVT in the left arm about 2 months ago -on Eliquis twice daily- , not due for paracentesis, history of Diabetes, COPD, hypertension, Rojas's disease, liver failure with ascites, ADHD, fetal alcohol syndrome, autistic disorder, right-sided CHF, dysuria, left bundle branch block, SBP, obesity,dysphagia and balance problems due to TBI hx,   -presents to the Emergency Department for evaluation of chest pain that occurred at [facility].  Patient brought in by EMS for chest pain and possible choking. Patient tells us his  noticed that his speech was slurred and patient noticed he was having a hard time swallowing that led to him being choking and says his balance is off. Mild headache. Got nitro and aspirin with no relief.  History of diabetes, is on anticoagulation with DOAC. CTA without evidence of aspiration or pneumonia. No dissection noted + esophagitis, Patient reports new onset slurred speech dysphagia and balance issue tier 1 stroke code initiated. De-escalated after MRI without any acute infarct, troponins are technically ruling in. Spoke with cardiology recommends serial troponins and echocardiogram but unlikely cardiac source of his symptoms. Nitro by paramedics with no improvement. Is on anticoagulation.  Imaging shows esophagitis. Pain started with coughing while choking. Troponin is ruling in for NSTEMI will discuss with cardiology and admit.    Currently he is hemodynamically stable and normal speech and appears chest pain-free.  He has his guardianship hearing tomorrow over Zoom, supportive family.  Started on heparin drip as he has not taken his Eliquis last night and will continue further evaluation with her cardiologist and gastroenterologist.    # NSTEMI possible- monitor troponins monitor on telemetry. Provide aspirin obtain a lipid profile.Consult cardiology and obtain echocardiogram as well.  Already on Eliquis with last dose missed last night, due to anticipation angio or EGD - start IV heparin   - CT of the chest rules out any active other etiology including less likely to be a thromboembolic disease or any aortic disease.  -GERD is also in differential and will c/w provide PPI- on home pepcid caarfate - but has significant cardiac risk factors as well.     # Cirrhosis of liver with ascites, history of SBP   portal hypertension (H)    Esophagitis  not due for paracentesis,   - follows with GI on outpatient basis and may need transplant evaluation referral given his young age, continue his home optimal doses of diuretics Lasix Aldactone- will continue along with midodrine   GI consulted for EGD     # DM2 (diabetes mellitus, type 2) (H)  - diet controlled.A1C 6.2   Can use ISSS  CKD 2 at baseline Likely due to DM/HTN combination.  - Monitor trend  - Avoid nephrotoxic meds    #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  /COPD right-sided heart failure  -Use home CPAP for available  -Clinically stable respiratory status, does not appear in fluid fluid overload not wheezing     #Hx of DVT of axillary vein left about a month  Resume Eliquis     #Portal hypertension   Patient was inquiring about types and other procedures will continue to follow with GI      #home depression,ADHD, fetal alcohol syndrome, autistic  "disorder  -Continue Zyprexa Prozac gabapentin       Chronic Medical problems-Hold outside hospital medication pending a confirmed pharmacy history and further reconciliation.  Initial orders were placed.  Request consultation to cardiology is appreciated assistance and recommendations. Anticipated length of stay of more than 2 midnights of hospitalization depending on progression, planning,findings,further testing or treatment needs. Services are medically necessary for evaluation and treatment of the acute & on chronic superimposed conditions with the treatment plan as outlined above.  Current problem list also has been updated.          Diet: NPO for Procedure/Surgery per Anesthesia Guidelines Except for: Meds; Clear liquids before procedure/surgery: ADULT (Age GREATER than or Equal to 18 years) - Clear liquids 2 hours before procedure/surgery    DVT Prophylaxis: DOAC  Khan Catheter: Not present  Lines: None     Cardiac Monitoring: ACTIVE order. Indication: Chest pain/ ACS rule out (24 hours)  Code Status: Full Code      Clinically Significant Risk Factors Present on Admission                # Drug Induced Coagulation Defect: home medication list includes an anticoagulant medication    # Hypertension: Noted on problem list  # Chronic heart failure with preserved ejection fraction: heart failure noted on problem list and last echo with EF >50%          # Severe Obesity: Estimated body mass index is 46.79 kg/m  as calculated from the following:    Height as of this encounter: 1.651 m (5' 5\").    Weight as of this encounter: 127.6 kg (281 lb 3.2 oz).         # Financial/Environmental Concerns:           Disposition Plan     Medically Ready for Discharge: Anticipated Tomorrow           Esteban Arteaga MD  Hospitalist Service  [facility]  Securely message with [employer] (more info)  Text page via [employer] Paging/Directory "     ______________________________________________________________________    Chief Complaint   Chest pain    History is obtained from the patient, electronic health record, and emergency department physician    History of Present Illness   Warren Jaramillo is a 44 year old male with PMH of cirrhosis  being followed with [facility] ongoing ascites and is on diuretics at home, history of recent DVT in the left arm about 2 months ago -on Eliquis twice daily- , not due for paracentesis, history of Diabetes, COPD, hypertension, Rojas's disease, liver failure with ascites, ADHD, fetal alcohol syndrome, autistic disorder, right-sided CHF, dysuria, left bundle branch block, SBP, obesity,dysphagia and balance problems due to TBI hx,   -presents to the Emergency Department for evaluation of chest pain that occurred at [employer].  Patient brought in by EMS for chest pain and possible choking. Patient tells us his  noticed that his speech was slurred and patient noticed he was having a hard time swallowing that led to him being choking and says his balance is off. Mild headache.     Past Medical History    Past Medical History:   Diagnosis Date    COPD exacerbation (H) 12/2/2024    DM2 (diabetes mellitus, type 2) (H) 4/28/2020    HTN (hypertension) 7/30/2012    Thyroid nodule 7/31/2019    Rojas's disease (H)        Past Surgical History   Past Surgical History:   Procedure Laterality Date    COLONOSCOPY      ESOPHAGOSCOPY, GASTROSCOPY, DUODENOSCOPY (EGD), COMBINED N/A 7/21/2023    Procedure: ESOPHAGOGASTRODUODENOSCOPY WITH GASTRIC AND ESOPHAGEAL BIOPSIES;  Surgeon: Filiberto Aragon MD;  Location: [facility] [place] OR    TOOTH EXTRACTION         Prior to Admission Medications   Prior to Admission Medications   Prescriptions Last Dose Informant Patient Reported? Taking?   Benzocaine (SOLARCAINE ALOE VERA EX)   Yes No   Sig: Externally apply topically daily as needed.   Calamine external lotion   Yes No   Sig: Apply  topically as needed for itching   Continuous Glucose Sensor (FREESTYLE MEGHANN 3 PLUS SENSOR) MISC   Yes No   Sig: USE TO READ BLOOD SUGAR TWICE DAILY AND AS NEEDED. CHANGE SENSOR EVERY 15 DAYS   EPINEPHrine (ANY BX GENERIC EQUIV) 0.3 MG/0.3ML injection 2-pack   Yes No   Sig: Inject 0.3 mg into the muscle as needed for anaphylaxis. May repeat one time in 5-15 minutes if response to initial dose is inadequate.   FLUoxetine 20 MG tablet   Yes No   Sig: Take 20 mg by mouth every morning.   GAVILAX 17 GM/SCOOP powder   Yes No   Sig: Take 17 g by mouth daily as needed.   Hydrocortisone (PREPARATION H EX)   Yes No   Sig: Externally apply topically daily as needed.   JARDIANCE 10 MG TABS tablet   Yes No   Sig: Take 10 mg by mouth every morning.   OLANZapine (ZYPREXA) 10 MG tablet   Yes No   Sig: Take 10 mg by mouth At Bedtime   TRELEGY ELLIPTA 100-62.5-25 MCG/ACT oral inhaler   Yes No   Sig: Inhale 1 puff into the lungs every morning.   Urea 40 % CREA   Yes No   Sig: Apply topically daily as needed.   Vitamins A & D (VITAMIN A & D) OINT   Yes No   Sig: Externally apply topically daily as needed.   albuterol (PROAIR HFA/PROVENTIL HFA/VENTOLIN HFA) 108 (90 Base) MCG/ACT inhaler   No No   Sig: Inhale 1-2 puffs into the lungs every 6 hours as needed for shortness of breath, wheezing or cough   albuterol (PROVENTIL) (2.5 MG/3ML) 0.083% neb solution   Yes No   Sig: Take 2.5 mg by nebulization 3 times daily as needed for shortness of breath, wheezing or cough   aloe vera GEL   Yes No   Sig: Apply 1 g topically every hour as needed for skin care Per bottle directions   apixaban ANTICOAGULANT (ELIQUIS) 5 MG tablet   Yes No   Sig: Take 5 mg by mouth 2 times daily.   bacitracin 500 UNIT/GM OINT   Yes No   Sig: Apply topically 3 times daily as needed for wound care   benzonatate (TESSALON) 200 MG capsule   No No   Sig: Take 1 capsule (200 mg) by mouth 3 times daily as needed for cough.   carbamide peroxide (DEBROX) 6.5 % otic solution    Yes No   Sig: Place 5 drops into both ears daily as needed for other.   clotrimazole (LOTRIMIN) 1 % external cream   Yes No   Sig: Apply topically 2 times daily as needed (skin irritation)   dextromethorphan-guaiFENesin (MUCINEX DM)  MG 12 hr tablet   Yes No   Sig: Take 1 tablet by mouth 2 times daily as needed.   diclofenac (VOLTAREN) 1 % topical gel   Yes No   Sig: Apply 2 g topically daily as needed for moderate pain To joints/back   dicyclomine (BENTYL) 20 MG tablet   No No   Sig: Take 1 tablet (20 mg) by mouth 4 times daily as needed (abdominal pain).   famotidine (PEPCID) 20 MG tablet   No No   Sig: Take 1 tablet (20 mg) by mouth 2 times daily   furosemide (LASIX) 40 MG tablet   No No   Sig: Take 1 tablet (40 mg) by mouth daily.   hydrocortisone (CORTAID) 1 % external cream   Yes No   Sig: Apply topically daily as needed.   levothyroxine (SYNTHROID/LEVOTHROID) 25 MCG tablet   No No   Sig: Take 0.25 tablets (6.25 mcg) by mouth every morning (before breakfast).   lisinopril (ZESTRIL) 10 MG tablet   Yes No   Sig: Take 10 mg by mouth every morning.   loperamide (IMODIUM) 2 MG capsule   Yes No   Sig: Take 4 mg by mouth 4 times daily as needed for diarrhea.   loratadine (CLARITIN) 10 MG tablet   Yes No   Sig: Take 10 mg by mouth daily as needed for allergies.   magnesium hydroxide (MILK OF MAGNESIA) 400 MG/5ML suspension   Yes No   Sig: Take 30 mLs by mouth daily as needed.   metFORMIN (GLUCOPHAGE) 1000 MG tablet   Yes No   Sig: Take 1,000 mg by mouth 2 times daily (with meals)   methocarbamol (ROBAXIN) 500 MG tablet   No No   Sig: Take 1 tablet (500 mg) by mouth 4 times daily as needed for muscle spasms.   montelukast (SINGULAIR) 10 MG tablet   Yes No   Sig: Take 10 mg by mouth every morning.   nicotine (NICODERM CQ) 21 MG/24HR 24 hr patch   No No   Sig: Place 1 patch over 24 hours onto the skin daily.   omeprazole (PRILOSEC) 40 MG DR capsule   Yes No   Sig: Take 40 mg by mouth every morning.   ondansetron  (ZOFRAN ODT) 4 MG ODT tab   No No   Sig: Take 1 tablet (4 mg) by mouth every 8 hours as needed for nausea.   pramoxine-calamine (AVEENO) 1-8 % LOTN   Yes No   Sig: Apply topically daily as needed for itching.   rosuvastatin (CRESTOR) 10 MG tablet   Yes No   Sig: Take 10 mg by mouth At Bedtime   spironolactone (ALDACTONE) 100 MG tablet   No No   Sig: Take 1 tablet (100 mg) by mouth daily.   sucralfate (CARAFATE) 1 GM/10ML suspension   Yes No   Sig: Take 10 mLs (1 g) by mouth 4 times daily as needed for nausea (heartburn).   sulfamethoxazole-trimethoprim (BACTRIM DS) 800-160 MG tablet   Yes No   traZODone (DESYREL) 100 MG tablet   Yes No   Sig: Take 100 mg by mouth at bedtime.   witch hazel-glycerin (TUCKS) pad   Yes No   Sig: Apply topically as needed for hemorrhoids.      Facility-Administered Medications: None        Review of Systems    The 5 point Review of Systems is negative other than noted in the HPI or here.     Social History   I have reviewed this patient's social history and updated it with pertinent information if needed.  Social History     Tobacco Use    Smoking status: Every Day     Current packs/day: 1.00     Average packs/day: 1 pack/day for 21.3 years (21.3 ttl pk-yrs)     Types: Cigarettes     Start date: 1/1/2004    Smokeless tobacco: Never   Vaping Use    Vaping status: Never Used   Substance Use Topics    Alcohol use: Not Currently     Comment: Last 8/7/2024    Drug use: Never         Family History   I have reviewed this patient's family history and updated it with pertinent information if needed.  Family History   Problem Relation Age of Onset    Unknown/Adopted Father     Unknown/Adopted Maternal Grandmother     C.A.D. Maternal Grandfather     Diabetes Maternal Grandfather     Cerebrovascular Disease Maternal Grandfather     Unknown/Adopted Paternal Grandmother     Unknown/Adopted Paternal Grandfather     Unknown/Adopted Brother     Unknown/Adopted Sister          Allergies   Allergies    Allergen Reactions    Apricot Flavoring Agent (Non-Screening) Anaphylaxis    Banana Anaphylaxis     Throat swelling      Bees Anaphylaxis     Has an Epi pen    Wasp Venom Protein Shortness Of Breath     Other reaction(s): Respiratory Distress  Has an Epi pen        Methylphenidate Itching     Other reaction(s): Nightmares    Prunus      Other reaction(s): *Unknown    Sulfa Antibiotics      Headaches and nausea        Physical Exam   Vital Signs: Temp: 98  F (36.7  C) Temp src: Oral BP: (!) 146/81 Pulse: 85   Resp: 19 SpO2: 94 % O2 Device: None (Room air)    Weight: 281 lbs 3.2 oz    Alert awake-no active distress, speech fairly normal  Vision Baseline  Neck supple  Oral mucosa moist  bilateral air entry heard, no significant wheezing sound  S1-S2 normal  Abdomen is soft no tenderness obese and ascites baseline  Extremities are fully mobile and there is no visible swelling noted  No skin cyanosis  Neurologically- no new Gross deficits from baseline-Moving all 4 extremities  Psych-mood okay and appropriate to circumstances    75 MINUTES SPENT BY ME on the date of service doing chart review, history, exam, documentation & further activities per the note.    Results for orders placed or performed during the hospital encounter of 04/02/25 (from the past 24 hours)   [place] Draw    Narrative    The following orders were created for panel order [place] [facility].  Procedure                               Abnormality         Status                     ---------                               -----------         ------                     Extra Blue Top Tube[[id number]]                             Final result               Extra Red Top Tube[[id number]]                              Final result               Extra Green Top (Lithiu...[[id number]]                      Final result               Extra Purple Top Tube[[id number]]                           Final result                 Please view results for these tests on the  individual orders.   Extra Blue Top Tube   Result Value Ref Range    Hold Specimen JIC    Extra Red Top Tube   Result Value Ref Range    Hold Specimen JIC    Extra Green Top (Lithium Heparin) Tube   Result Value Ref Range    Hold Specimen JIC    Extra Purple Top Tube   Result Value Ref Range    Hold Specimen JIC    CBC with Platelets & Differential    Narrative    The following orders were created for panel order CBC with Platelets & Differential.  Procedure                               Abnormality         Status                     ---------                               -----------         ------                     CBC with platelets and ...[[id number]]  Abnormal            Final result                 Please view results for these tests on the individual orders.   Basic metabolic panel   Result Value Ref Range    Sodium 138 135 - 145 mmol/L    Potassium 4.3 3.4 - 5.3 mmol/L    Chloride 99 98 - 107 mmol/L    Carbon Dioxide (CO2) 25 22 - 29 mmol/L    Anion Gap 14 7 - 15 mmol/L    Urea Nitrogen 25.1 (H) 6.0 - 20.0 mg/dL    Creatinine 1.23 (H) 0.67 - 1.17 mg/dL    GFR Estimate 74 >60 mL/min/1.73m2    Calcium 10.2 8.8 - 10.4 mg/dL    Glucose 122 (H) 70 - 99 mg/dL   INR   Result Value Ref Range    INR 1.17 (H) 0.85 - 1.15   Partial thromboplastin time   Result Value Ref Range    aPTT 32 22 - 38 Seconds   Troponin T, High Sensitivity   Result Value Ref Range    Troponin T, High Sensitivity 26 (H) <=22 ng/L   CBC with platelets and differential   Result Value Ref Range    WBC Count 14.3 (H) 4.0 - 11.0 10e3/uL    RBC Count 4.56 4.40 - 5.90 10e6/uL    Hemoglobin 13.0 (L) 13.3 - 17.7 g/dL    Hematocrit 40.1 40.0 - 53.0 %    MCV 88 78 - 100 fL    MCH 28.5 26.5 - 33.0 pg    MCHC 32.4 31.5 - 36.5 g/dL    RDW 15.3 (H) 10.0 - 15.0 %    Platelet Count 330 150 - 450 10e3/uL    % Neutrophils 70 %    % Lymphocytes 18 %    % Monocytes 8 %    % Eosinophils 3 %    % Basophils 1 %    % Immature Granulocytes 1 %    NRBCs per 100 WBC 0  <1 /100    Absolute Neutrophils 9.9 (H) 1.6 - 8.3 10e3/uL    Absolute Lymphocytes 2.5 0.8 - 5.3 10e3/uL    Absolute Monocytes 1.2 0.0 - 1.3 10e3/uL    Absolute Eosinophils 0.5 0.0 - 0.7 10e3/uL    Absolute Basophils 0.1 0.0 - 0.2 10e3/uL    Absolute Immature Granulocytes 0.1 <=0.4 10e3/uL    Absolute NRBCs 0.0 10e3/uL   CTA Head Neck with Contrast    Narrative    EXAM: CTA HEAD NECK W CONTRAST  LOCATION: [facility]  DATE: 4/2/2025    INDICATION: Code Stroke, evaluate for LVO. Slurred speech.  COMPARISON: CTA head and neck 2/22/2025. MRI brain 2/23/2025.  CONTRAST: 90ml isovue 370  TECHNIQUE: Head and neck CT angiogram with IV contrast. Noncontrast head CT followed by axial helical CT images of the head and neck vessels obtained during the arterial phase of intravenous contrast administration. Axial 2D reconstructed images and   multiplanar 3D MIP reconstructed images of the head and neck vessels were performed by the technologist. Dose reduction techniques were used. All stenosis measurements made according to NASCET criteria unless otherwise specified.    FINDINGS:   Streak artifact limits aspects of exam. Portions of exam are limited by poor opacification of the arterial system particularly within the lower neck.    HEAD CT:   INTRACRANIAL CONTENTS: No intracranial hemorrhage, extraaxial collection, or mass effect.  No CT evidence of acute infarct. Normal parenchymal attenuation. Normal ventricles and sulci.       HEAD CTA:  No significant stenosis or occlusion.      No brain aneurysm. No AVM/AVF.      NECK CTA:  RIGHT CAROTID:   No significant stenosis/occlusion. No dissection.    LEFT CAROTID:  No significant stenosis/occlusion. No dissection.    VERTEBRAL ARTERIES:  Bilateral vertebral artery origins and bilateral V1 segment obscured by artifact.     Otherwise, no significant stenosis or occlusion. No dissection.      AORTIC ARCH: Classic aortic arch anatomy. No significant stenosis at  the origin of the great vessels.    ARTERIAL PLAQUE: Scattered calcified/noncalcified plaque.    NONVASCULAR STRUCTURES:   Multiple small and prominent lymph nodes within the neck and mediastinum.        Impression     IMPRESSION:   HEAD CT:  1.  No acute intracranial hemorrhage.    2.  No CT evidence acute infarct. Aspect score 10.      Dr. Manny Louis was notified by Dr Jeremy Daniel at  8:05 PM 4/2/2025 CST/CDT.      HEAD CTA:   1.  No intracranial arterial large vessel occlusion or significant stenosis.      NECK CTA:  1.   Limitations described above.    2.  Visualized cervical arteries demonstrate no significant stenosis or occlusion. No cervical artery dissection.      Dr. Manny Louis was notified by Dr Jeremy Daniel at  8:28 PM 4/2/2025 CST/CDT.    CTA Chest Abdomen Pelvis w Contrast    Narrative    EXAM: CTA CHEST ABDOMEN PELVIS W CONTRAST  LOCATION: [facility]  DATE: 4/2/2025    INDICATION: chest pain and strokelike symptoms, also reports he may have choked reports left sided chest pain  COMPARISON: 2/23/2025 CT AP and 12/1/2024 CTA chest  TECHNIQUE: CT angiogram chest abdomen pelvis during arterial phase of injection of IV contrast as well as noncontrast imaging of the chest.  Multiplanar reformats and MIP reconstructions were performed. Dose reduction techniques were used.   CONTRAST: 180 ml isovue 370    FINDINGS:  CTA CHEST, ABDOMEN and PELVIS: Normal caliber thoracic and abdominal aorta with no acute aortic syndrome. Normal caliber pulmonary arteries with no pulmonary emboli. Brachiocephalic, mesenteric, renal and iliofemoral arteries are unremarkable.    LUNGS AND PLEURA: Lungs are clear. No pleural effusion. No pneumothorax. Trachea and bronchi are patent with no endobronchial filling defect identified.    MEDIASTINUM/AXILLAE: No lymphadenopathy. Heart size within normal limits. No pericardial effusion. Mild mural thickening and mild patulous dilatation of the esophagus and a  small esophageal hiatal hernia can be associated with nonspecific esophagitis.    CORONARY ARTERY CALCIFICATION: No visible coronary artery calcification.    HEPATOBILIARY: Hepatic cirrhosis, hepatic steatosis and hepatomegaly. No focal liver lesion identified. Gallbladder and bile ducts unremarkable.    PANCREAS: Normal.    SPLEEN: Mild splenic enlargement at 14 cm.    ADRENAL GLANDS: Benign 5 cm right adrenal myelolipoma and benign 3 cm left adrenal myelolipoma unchanged and require no follow-up. Adrenal glands otherwise normal.    KIDNEY/BLADDER: Kidneys, ureters and bladder are normal.    BOWEL: Bowel is normal with no obstruction or inflammatory change. Mild/moderate abdominal and pelvic ascites.    LYMPH NODES: Numerous prominent upper abdominal, mesenteric and para-aortic lymph nodes are stable.    PELVIC ORGANS: No pelvic mass.    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 No fracture or bone lesion.      Impression    IMPRESSION:   1.  No acute aortic syndrome. No pulmonary emboli.  2.  Mild mural thickening and mild patulous dilatation of the esophagus and a small esophageal hiatal hernia can be associated with nonspecific esophagitis.  3.  Hepatic cirrhosis, hepatic steatosis, hepatosplenomegaly and mild/moderate abdominal and pelvic ascites.   Troponin T, High Sensitivity   Result Value Ref Range    Troponin T, High Sensitivity 35 (H) <=22 ng/L   Nt probnp inpatient   Result Value Ref Range    N terminal Pro BNP Inpatient 1,054 (H) 0 - 450 pg/mL   MR Brain w/o & w Contrast    Narrative    EXAM: MR BRAIN W/O and W CONTRAST  LOCATION: [facility]  DATE: 4/2/2025    INDICATION: Slurred speech, difficulty swallowing, new balance problem  COMPARISON: None.  CONTRAST: 13ml Gadavist  TECHNIQUE: Routine multiplanar multisequence head MRI without and with intravenous contrast.    FINDINGS:  INTRACRANIAL CONTENTS: No acute or subacute infarct. No mass, acute  hemorrhage, or extra-axial fluid collections. Normal brain parenchymal signal. Mild generalized cerebral atrophy. No hydrocephalus. Normal position of the cerebellar tonsils. No   pathologic contrast enhancement.    SELLA: No abnormality accounting for technique.    OSSEOUS STRUCTURES/SOFT TISSUES: Normal marrow signal. The major intracranial vascular flow voids are maintained.     ORBITS: No abnormality accounting for technique.     SINUSES/MASTOIDS: Mild mucosal thickening scattered about the paranasal sinuses. Left maxillary sinus prominent retention cyst/mucosal polyp. Right mastoid air cells moderate patchy opacification. Left mastoid air cells essentially clear.       Impression    IMPRESSION:  1.  No acute infarct.

## 2025-04-03 NOTE — CONSULTS
[facility]    Stroke Telephone Note    I was called by Manny Louis on 04/02/25 regarding patient Warren Jaramillo. The patient is a 44 year old man with DM2, hypertension, COPD, tobacco use, CHF, obesity, and h/o DVT on Apixaban, comes in with complaints of difficulty swallowing, slurring of speech and increased imbalance than usual. Per report and chart review, at around 18:30, the patient was eating chili when he began choking on a piece of hamburger. He then inhaled and the chunk of meat went down his throat and he then began experiencing left sided chest pain. He was noticed to have some slurring of speech and patient endorses increased issues with balance as well.    Vitals  BP: 129/61   Pulse: 93   Resp: 20   Temp: 98  F (36.7  C)   Weight: 127.6 kg (281 lb 3.2 oz)    Stroke Code Data (for stroke code without tele)  Stroke code activated 04/02/25 1940   Stroke provider first response 04/02/25 1941   Last known normal 04/02/25 1830      Time of discovery (or onset of symptoms) 04/02/25 1830   Head CT read by Stroke Neuro Provider 04/02/25 1956   Was stroke code de-escalated? Yes  04/02/25 2020     Imaging Findings  CT head: no acute ischemia/hemorrhage   CTA head/neck: No LVO/critical stenoses appreciated (b/l VA origins and bilateral V1 segment obscured by artifact)     Intravenous Thrombolysis  Not given due to:   - minor/isolated/quickly resolving symptoms    Endovascular Treatment  Not initiated due to absence of proximal vessel occlusion    Impression  Slurring of speech  Increased imbalance    Recommendations   MRI Brain w/wo contrast; if no evidence of acute stroke on imaging, he will not need further stroke evaluation.    My recommendations are based on the information provided over the phone by Warren Jaramillo's in-person providers. They are not intended to replace the clinical judgment of his in-person providers. I was not requested to personally see or  "examine the patient at this time.     Bairon Serna MD  Vascular Neurology    To page me or covering stroke neurology team member, click here: AMCOM  Choose \"On Call\" tab at top, then select \"NEUROLOGY/ALL SITES\" from middle drop-down box, press Enter, then look for \"stroke\" or \"telestroke\" for your site.   "

## 2025-04-04 ENCOUNTER — APPOINTMENT (OUTPATIENT)
Dept: ULTRASOUND IMAGING | Facility: HOSPITAL | Age: 45
DRG: 433 | End: 2025-04-04
Attending: PHYSICIAN ASSISTANT
Payer: COMMERCIAL

## 2025-04-04 LAB
AMMONIA PLAS-SCNC: 35 UMOL/L (ref 16–60)
APPEARANCE FLD: ABNORMAL
COLOR FLD: YELLOW
GLUCOSE BLDC GLUCOMTR-MCNC: 105 MG/DL (ref 70–99)
GLUCOSE BLDC GLUCOMTR-MCNC: 116 MG/DL (ref 70–99)
GLUCOSE BLDC GLUCOMTR-MCNC: 121 MG/DL (ref 70–99)
GLUCOSE BLDC GLUCOMTR-MCNC: 132 MG/DL (ref 70–99)
GLUCOSE BLDC GLUCOMTR-MCNC: 161 MG/DL (ref 70–99)
HOLD SPECIMEN: NORMAL
SPECIMEN SOURCE FLD: ABNORMAL
UPPER GI ENDOSCOPY: NORMAL
WBC # FLD AUTO: 1822 /UL

## 2025-04-04 PROCEDURE — P9045 ALBUMIN (HUMAN), 5%, 250 ML: HCPCS | Performed by: ANESTHESIOLOGY

## 2025-04-04 PROCEDURE — 370N000017 HC ANESTHESIA TECHNICAL FEE, PER MIN: Performed by: INTERNAL MEDICINE

## 2025-04-04 PROCEDURE — 250N000013 HC RX MED GY IP 250 OP 250 PS 637: Performed by: INTERNAL MEDICINE

## 2025-04-04 PROCEDURE — 82962 GLUCOSE BLOOD TEST: CPT

## 2025-04-04 PROCEDURE — 710N000009 HC RECOVERY PHASE 1, LEVEL 1, PER MIN: Performed by: INTERNAL MEDICINE

## 2025-04-04 PROCEDURE — 0DJ08ZZ INSPECTION OF UPPER INTESTINAL TRACT, VIA NATURAL OR ARTIFICIAL OPENING ENDOSCOPIC: ICD-10-PCS | Performed by: INTERNAL MEDICINE

## 2025-04-04 PROCEDURE — 360N000075 HC SURGERY LEVEL 2, PER MIN: Performed by: INTERNAL MEDICINE

## 2025-04-04 PROCEDURE — 89051 BODY FLUID CELL COUNT: CPT | Performed by: PHYSICIAN ASSISTANT

## 2025-04-04 PROCEDURE — 99207 PR NO CHARGE LOS: CPT | Performed by: INTERNAL MEDICINE

## 2025-04-04 PROCEDURE — 999N000157 HC STATISTIC RCP TIME EA 10 MIN

## 2025-04-04 PROCEDURE — 36415 COLL VENOUS BLD VENIPUNCTURE: CPT | Performed by: INTERNAL MEDICINE

## 2025-04-04 PROCEDURE — 250N000011 HC RX IP 250 OP 636: Performed by: ANESTHESIOLOGY

## 2025-04-04 PROCEDURE — 999N000141 HC STATISTIC PRE-PROCEDURE NURSING ASSESSMENT: Performed by: INTERNAL MEDICINE

## 2025-04-04 PROCEDURE — 0W9G3ZZ DRAINAGE OF PERITONEAL CAVITY, PERCUTANEOUS APPROACH: ICD-10-PCS | Performed by: RADIOLOGY

## 2025-04-04 PROCEDURE — 250N000011 HC RX IP 250 OP 636: Performed by: HOSPITALIST

## 2025-04-04 PROCEDURE — 87070 CULTURE OTHR SPECIMN AEROBIC: CPT | Performed by: PHYSICIAN ASSISTANT

## 2025-04-04 PROCEDURE — 89050 BODY FLUID CELL COUNT: CPT | Performed by: PHYSICIAN ASSISTANT

## 2025-04-04 PROCEDURE — 82140 ASSAY OF AMMONIA: CPT | Performed by: INTERNAL MEDICINE

## 2025-04-04 PROCEDURE — 87205 SMEAR GRAM STAIN: CPT | Performed by: PHYSICIAN ASSISTANT

## 2025-04-04 PROCEDURE — 49083 ABD PARACENTESIS W/IMAGING: CPT

## 2025-04-04 PROCEDURE — 250N000013 HC RX MED GY IP 250 OP 250 PS 637: Performed by: HOSPITALIST

## 2025-04-04 PROCEDURE — 272N000001 HC OR GENERAL SUPPLY STERILE: Performed by: INTERNAL MEDICINE

## 2025-04-04 PROCEDURE — 250N000009 HC RX 250: Performed by: INTERNAL MEDICINE

## 2025-04-04 PROCEDURE — 272N000710 US PARACENTESIS WITH ALBUMIN

## 2025-04-04 PROCEDURE — 99233 SBSQ HOSP IP/OBS HIGH 50: CPT | Performed by: INTERNAL MEDICINE

## 2025-04-04 PROCEDURE — 120N000001 HC R&B MED SURG/OB

## 2025-04-04 PROCEDURE — 94660 CPAP INITIATION&MGMT: CPT

## 2025-04-04 RX ORDER — OXYCODONE HYDROCHLORIDE 5 MG/1
10 TABLET ORAL
Status: DISCONTINUED | OUTPATIENT
Start: 2025-04-04 | End: 2025-04-04 | Stop reason: HOSPADM

## 2025-04-04 RX ORDER — LIDOCAINE 40 MG/G
CREAM TOPICAL
Status: DISCONTINUED | OUTPATIENT
Start: 2025-04-04 | End: 2025-04-04 | Stop reason: HOSPADM

## 2025-04-04 RX ORDER — ACETAMINOPHEN 325 MG/1
975 TABLET ORAL ONCE
Status: DISCONTINUED | OUTPATIENT
Start: 2025-04-04 | End: 2025-04-04 | Stop reason: HOSPADM

## 2025-04-04 RX ORDER — SODIUM CHLORIDE, SODIUM LACTATE, POTASSIUM CHLORIDE, CALCIUM CHLORIDE 600; 310; 30; 20 MG/100ML; MG/100ML; MG/100ML; MG/100ML
INJECTION, SOLUTION INTRAVENOUS CONTINUOUS
Status: DISCONTINUED | OUTPATIENT
Start: 2025-04-04 | End: 2025-04-04 | Stop reason: HOSPADM

## 2025-04-04 RX ORDER — OXYCODONE HYDROCHLORIDE 5 MG/1
5 TABLET ORAL
Status: DISCONTINUED | OUTPATIENT
Start: 2025-04-04 | End: 2025-04-04 | Stop reason: HOSPADM

## 2025-04-04 RX ORDER — DEXAMETHASONE SODIUM PHOSPHATE 4 MG/ML
4 INJECTION, SOLUTION INTRA-ARTICULAR; INTRALESIONAL; INTRAMUSCULAR; INTRAVENOUS; SOFT TISSUE
Status: DISCONTINUED | OUTPATIENT
Start: 2025-04-04 | End: 2025-04-04 | Stop reason: HOSPADM

## 2025-04-04 RX ORDER — ONDANSETRON 4 MG/1
4 TABLET, ORALLY DISINTEGRATING ORAL EVERY 30 MIN PRN
Status: DISCONTINUED | OUTPATIENT
Start: 2025-04-04 | End: 2025-04-04 | Stop reason: HOSPADM

## 2025-04-04 RX ORDER — NALOXONE HYDROCHLORIDE 1 MG/ML
0.1 INJECTION INTRAMUSCULAR; INTRAVENOUS; SUBCUTANEOUS
Status: DISCONTINUED | OUTPATIENT
Start: 2025-04-04 | End: 2025-04-04 | Stop reason: HOSPADM

## 2025-04-04 RX ORDER — ONDANSETRON 2 MG/ML
4 INJECTION INTRAMUSCULAR; INTRAVENOUS EVERY 30 MIN PRN
Status: DISCONTINUED | OUTPATIENT
Start: 2025-04-04 | End: 2025-04-04 | Stop reason: HOSPADM

## 2025-04-04 RX ADMIN — LISINOPRIL 10 MG: 5 TABLET ORAL at 07:59

## 2025-04-04 RX ADMIN — LEVOTHYROXINE SODIUM 6.25 MCG: 0.03 TABLET ORAL at 06:36

## 2025-04-04 RX ADMIN — APIXABAN 5 MG: 5 TABLET, FILM COATED ORAL at 20:58

## 2025-04-04 RX ADMIN — FAMOTIDINE 20 MG: 20 TABLET, FILM COATED ORAL at 08:00

## 2025-04-04 RX ADMIN — OLANZAPINE 10 MG: 5 TABLET, FILM COATED ORAL at 20:58

## 2025-04-04 RX ADMIN — EMPAGLIFLOZIN 10 MG: 10 TABLET, FILM COATED ORAL at 08:00

## 2025-04-04 RX ADMIN — FLUOXETINE HYDROCHLORIDE 20 MG: 20 CAPSULE ORAL at 07:59

## 2025-04-04 RX ADMIN — ALBUMIN HUMAN 12.5 G: 0.05 INJECTION, SOLUTION INTRAVENOUS at 12:40

## 2025-04-04 RX ADMIN — FUROSEMIDE 40 MG: 20 TABLET ORAL at 08:00

## 2025-04-04 RX ADMIN — NICOTINE 1 PATCH: 21 PATCH, EXTENDED RELEASE TRANSDERMAL at 20:59

## 2025-04-04 RX ADMIN — FAMOTIDINE 20 MG: 20 TABLET, FILM COATED ORAL at 20:58

## 2025-04-04 RX ADMIN — PANTOPRAZOLE SODIUM 40 MG: 40 INJECTION, POWDER, FOR SOLUTION INTRAVENOUS at 08:00

## 2025-04-04 RX ADMIN — TRAZODONE HYDROCHLORIDE 100 MG: 50 TABLET ORAL at 20:58

## 2025-04-04 RX ADMIN — MONTELUKAST 10 MG: 10 TABLET, FILM COATED ORAL at 07:59

## 2025-04-04 RX ADMIN — ROSUVASTATIN 10 MG: 10 TABLET, FILM COATED ORAL at 20:58

## 2025-04-04 ASSESSMENT — ACTIVITIES OF DAILY LIVING (ADL)
ADLS_ACUITY_SCORE: 50
ADLS_ACUITY_SCORE: 45
ADLS_ACUITY_SCORE: 59
ADLS_ACUITY_SCORE: 45
ADLS_ACUITY_SCORE: 45
ADLS_ACUITY_SCORE: 59
ADLS_ACUITY_SCORE: 45
ADLS_ACUITY_SCORE: 59
ADLS_ACUITY_SCORE: 59
ADLS_ACUITY_SCORE: 50
ADLS_ACUITY_SCORE: 59
ADLS_ACUITY_SCORE: 50
ADLS_ACUITY_SCORE: 59
ADLS_ACUITY_SCORE: 59
ADLS_ACUITY_SCORE: 45
ADLS_ACUITY_SCORE: 50
ADLS_ACUITY_SCORE: 45
ADLS_ACUITY_SCORE: 59
DEPENDENT_IADLS:: TRANSPORTATION;MEDICATION MANAGEMENT
ADLS_ACUITY_SCORE: 45
ADLS_ACUITY_SCORE: 50
ADLS_ACUITY_SCORE: 59

## 2025-04-04 NOTE — PLAN OF CARE
"Goal Outcome Evaluation:      Problem: Adult Inpatient Plan of Care  Goal: Patient-Specific Goal (Individualized)  Description: You can add care plan individualizations to a care plan. Examples of Individualization might be:  \"Parent requests to be called daily at 9am for status\", \"I have a hard time hearing out of my right ear\", or \"Do not touch me to wake me up as it startlesme\".  Outcome: Progressing     Problem: Adult Inpatient Plan of Care  Goal: Optimal Comfort and Wellbeing  Outcome: Progressing     Problem: Chest Pain  Goal: Resolution of Chest Pain Symptoms  Outcome: Progressing  A/O x4. VSS. Tele NSR. Denied any chest pain. BG within normal range. Continent of B&B. Independent with ADLs. Good oral intake.                            "

## 2025-04-04 NOTE — PROGRESS NOTES
[facility]    Medicine Progress Note - Hospitalist Service    Date of Admission:  4/2/2025    Assessment & Plan   Warren Jaramillo is a 44 year old male with PMH of cirrhosis  being followed with [facility] ongoing ascites and is on diuretics at home, history of recent DVT in the left arm about 2 months ago on Eliquis twice daily, Diabetes, COPD, hypertension, Rojas's disease, liver failure with ascites, ADHD, fetal alcohol syndrome, autistic disorder, right-sided CHF, dysuria, left bundle branch block, SBP, obesity,dysphagia and balance problems due to TBI hx who was admitted on 4/2/2025 after choking on hamburger and passing out.    Dysphagia  Syncope secondary to choking/hypoxia  -Has been ongoing for the past 3 months and worsening  -Has a history of dysphagia in the past  -SLP consulted  -Full liquid diet for now    NSTEMI   -due to hypoxia in the setting of choking   -Initially on heparin drip, discontinued 4/3    Cirrhosis of liver with ascites, history of SBP portal hypertension  Esophagitis  - follows with GI on outpatient basis and may need transplant evaluation referral given his young age, continue his home optimal doses of diuretics Lasix Aldactone- will continue along with midodrine   -GI consulted for EGD 4/4: Portal hypertension gastropathy    DM2  -diet controlled.A1C 6.2   -Sliding scale insulin with meals and at bedtime    CKD 2 at baseline   -Likely due to DM/HTN combination.  -Monitor trend  -Avoid nephrotoxic meds    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  COPD right-sided heart failure  -Use home CPAP  -Clinically stable respiratory status, does not appear in fluid fluid overload not wheezing     Hx of DVT of axillary vein left   -about a month ago  -Eliquis resumed 4/4     Depression, ADHD  Fetal alcohol syndrome  Autistic disorder  -Continue Zyprexa Prozac gabapentin        Diet: Full Liquid Diet    DVT Prophylaxis: DOAC  Khan Catheter: Not present  Lines: None    "  Cardiac Monitoring: None  Code Status: Full Code      Clinically Significant Risk Factors                # Coagulation Defect: INR = 1.17 (Ref range: 0.85 - 1.15) and/or PTT = 30 Seconds (Ref range: 22 - 38 Seconds), will monitor for bleeding    # Hypertension: Noted on problem list  # Chronic heart failure with preserved ejection fraction: heart failure noted on problem list and last echo with EF >50%           # Obesity: Estimated body mass index is 34.75 kg/m  as calculated from the following:    Height as of this encounter: 1.651 m (5' 5\").    Weight as of this encounter: 94.7 kg (208 lb 12.8 oz)., PRESENT ON ADMISSION     # Financial/Environmental Concerns: none         Social Drivers of Health    Housing Stability: Low Risk  (2/8/2025)    Housing Stability     Do you have housing? : Yes     Are you worried about losing your housing?: No   Recent Concern: Housing Stability - High Risk (12/2/2024)    Housing Stability     Do you have housing? : No     Are you worried about losing your housing?: No   Tobacco Use: High Risk (4/4/2025)    Patient History     Smoking Tobacco Use: Every Day     Smokeless Tobacco Use: Never   Interpersonal Safety: Low Risk  (4/4/2025)    Interpersonal Safety     Do you feel physically and emotionally safe where you currently live?: Yes     Within the past 12 months, have you been hit, slapped, kicked or otherwise physically hurt by someone?: No     Within the past 12 months, have you been humiliated or emotionally abused in other ways by your partner or ex-partner?: No   Recent Concern: Interpersonal Safety - High Risk (1/11/2025)    Interpersonal Safety     Do you feel physically and emotionally safe where you currently live?: No     Within the past 12 months, have you been hit, slapped, kicked or otherwise physically hurt by someone?: No     Within the past 12 months, have you been humiliated or emotionally abused in other ways by your partner or ex-partner?: No    Received from " [facility], [facility]    Social Connections          Disposition Plan     Medically Ready for Discharge: Anticipated Tomorrow             Tila Sanchez MD  Hospitalist Service  [facility]  Securely message with Switchcam (more info)  Text page via NAVX Paging/Directory   ______________________________________________________________________    Interval History   Mr. Jaramillo is doing well at the time of my exam.  He underwent an EGD today and afterwards was having significant hypotension.  He was given IV fluids and albumin and his hypotension has since resolved.  Discussed with anesthesiology.  Will continue to monitor.  He had no signs of bleeding on EGD.  DOAC was restarted today.  Discussed with him that he has been having difficulty swallowing for the last 3 months that seems to be worsening.  When he passed out prior to arrival it was because he was choking on food.    Physical Exam   Vital Signs: Temp: 98.2  F (36.8  C) Temp src: Oral BP: 116/59 Pulse: 81   Resp: 18 SpO2: 93 % O2 Device: None (Room air)    Weight: 208 lbs 12.8 oz    General Appearance: Awake, alert, in no acute distress  Respiratory: CTAB, no wheeze  Cardiovascular: RRR, no murmur noted  GI: nontender, protuberant, normal bowel sounds  Skin: no jaundice, no rash      Medical Decision Making       50 MINUTES SPENT BY ME on the date of service doing chart review, history, exam, documentation & further activities per the note.      Data     I have personally reviewed the following data over the past 24 hrs:    INR:  N/A PTT:  30   D-dimer:  N/A Fibrinogen:  N/A       Imaging results reviewed over the past 24 hrs:   Recent Results (from the past 24 hours)   US Paracentesis with Albumin    Narrative    EXAM:  1. PARACENTESIS  2. ULTRASOUND GUIDANCE  LOCATION: [facility]  DATE: 4/4/2025    INDICATION: Ascites.    PROCEDURE:  Informed consent obtained. Time out performed. The abdomen was prepped and draped in a sterile fashion. 10 mL of 1% lidocaine was infused into local soft tissues. A 5 Hebrew catheter system was introduced into the abdominal ascites under   ultrasound guidance.    4.85 liters of cloudy bushra fluid were removed and sent to lab .    Patient tolerated procedure well.    Ultrasound imaging was obtained and placed in the patient's permanent medical record.      Impression    IMPRESSION:  1.  Status post ultrasound-guided paracentesis.    Reference CPT Code: 19817

## 2025-04-04 NOTE — PLAN OF CARE
Problem: Adult Inpatient Plan of Care  Goal: Plan of Care Review  Description: The Plan of Care Review/Shift note should be completed every shift.  The Outcome Evaluation is a brief statement about your assessment that the patient is improving, declining, or no change.  This information will be displayed automatically on your shiftnote.  Outcome: Progressing   Goal Outcome Evaluation:       Pt alert and oriented x4, VSS, denies chest pain or SOB, wears bipap set to home settings overnight, IND with  ambulation, NPO since midnight for EGD, slept between cares

## 2025-04-04 NOTE — CONSULTS
Care Management Initial Consult    General Information  Assessment completed with: Parents, Marielena  Type of CM/SW Visit: Initial Assessment    Primary Care Provider verified and updated as needed: Yes   Readmission within the last 30 days: no previous admission in last 30 days      Reason for Consult: discharge planning  Advance Care Planning:            Communication Assessment  Patient's communication style: spoken language (English or Bilingual)    Hearing Difficulty or Deaf: yes   Wear Glasses or Blind: yes    Cognitive  Cognitive/Neuro/Behavioral: WDL  Level of Consciousness: alert     Orientation: oriented x 4  Mood/Behavior: calm, cooperative     Speech: clear, logical    Living Environment:   People in home: facility resident  [facility]  Current living Arrangements: group home      Able to return to prior arrangements: yes       Family/Social Support:  Care provided by: self, homecare agency  Provides care for: no one  Marital Status: Single  Support system: Parent(s)          Description of Support System: Supportive, Involved         Current Resources:   Patient receiving home care services: No        Community Resources: [employer] [facility], [facility]  Equipment currently used at home: cane, straight, grab bar, tub/shower, grab bar, toilet, glucometer  Supplies currently used at home: None    Employment/Financial:  Employment Status: unemployed        Financial Concerns: none   Referral to Financial Worker: No       Does the patient's insurance plan have a 3 day qualifying hospital stay waiver?  No    Lifestyle & Psychosocial Needs:  Social Drivers of Health     Food Insecurity: Low Risk  (2/8/2025)    Food Insecurity     Within the past 12 months, did you worry that your food would run out before you got money to buy more?: No     Within the past 12 months, did the food you bought just not last and you didn t have money to get more?: No   Depression: Not at risk (2/11/2025)    PHQ-2     PHQ-2  Score: 0   Housing Stability: Low Risk  (2/8/2025)    Housing Stability     Do you have housing? : Yes     Are you worried about losing your housing?: No   Recent Concern: Housing Stability - High Risk (12/2/2024)    Housing Stability     Do you have housing? : No     Are you worried about losing your housing?: No   Tobacco Use: High Risk (4/4/2025)    Patient History     Smoking Tobacco Use: Every Day     Smokeless Tobacco Use: Never     Passive Exposure: Not on file   Financial Resource Strain: Low Risk  (2/8/2025)    Financial Resource Strain     Within the past 12 months, have you or your family members you live with been unable to get utilities (heat, electricity) when it was really needed?: No   Alcohol Use: Not At Risk (9/22/2022)    [facility]    AUDIT-C     Frequency of Alcohol Consumption: Never     Average Number of Drinks: Not on file     Frequency of Binge Drinking: Not on file   Transportation Needs: Low Risk  (2/8/2025)    Transportation Needs     Within the past 12 months, has lack of transportation kept you from medical appointments, getting your medicines, non-medical meetings or appointments, work, or from getting things that you need?: No   Physical Activity: Not on file   Interpersonal Safety: Low Risk  (4/4/2025)    Interpersonal Safety     Do you feel physically and emotionally safe where you currently live?: Yes     Within the past 12 months, have you been hit, slapped, kicked or otherwise physically hurt by someone?: No     Within the past 12 months, have you been humiliated or emotionally abused in other ways by your partner or ex-partner?: No   Recent Concern: Interpersonal Safety - High Risk (1/11/2025)    Interpersonal Safety     Do you feel physically and emotionally safe where you currently live?: No     Within the past 12 months, have you been hit, slapped, kicked or otherwise physically hurt by someone?: No     Within  the past 12 months, have you been humiliated or emotionally abused in other ways by your partner or ex-partner?: No   Stress: Not on file   Social Connections: Unknown (5/1/2023)    [facility]    Social Connections     Frequency of Communication with Friends and Family: Not on file   Health Literacy: Not on file       Functional Status:  Prior to admission patient needed assistance:   Dependent ADLs:: Independent  Dependent IADLs:: Transportation, Medication Management       Mental Health Status:  Mental Health Status: No Current Concerns       Chemical Dependency Status:  Chemical Dependency Status: No Current Concerns             Values/Beliefs:  Spiritual, Cultural Beliefs, [employer] Practices, Values that affect care: no               Discussed  Partnership in Safe Discharge Planning  document with patient/family: No    Additional Information:  SW reviewed chart. Called number on file for Jesika Harden, who is listed as pt's guardian. Patient answered the phone. Reporting that zoom hearing was completed yesterday and patient is now his own guardian. Requested paperwork so that this can be updated on patient's chart. Offered to come see patient to complete initial assessment. Patient reports he is not in his room at this time, reports SW can call his mother, Marielena. SW called and spoke with Marielena, introduced self, role, and purpose of call. Patient lives at [facility]. At baseline, patient is independent with personal cares and receives reminders as needed for hygiene. [facility] staff assists with medication management and transportation. Patient also uses [employer]. Patient attends Adult day programing two times per week. Marielena confirms information related to guardianship. Baseline information confirmed with [facility] staff. Group home staff will transport if available at time of discharge.      [facility] ph: [phone]  , [place] ph: [phone]    Next Steps: follow care to assist as needed with discharge planning; assist with coordination of discharge back to care home when medically appropriate.     YOEL Allred at 11:07 AM on 04/04/25

## 2025-04-04 NOTE — H&P
GENERAL PRE-PROCEDURE:   Procedure:  EGD  Date/Time:  4/4/2025 11:35 AM    Verbal consent obtained?: Yes    Written consent obtained?: Yes    Risks and benefits: Risks, benefits and alternatives were discussed    Consent given by:  Patient  Patient states understanding of procedure being performed: Yes    Patient's understanding of procedure matches consent: Yes    Procedure consent matches procedure scheduled: Yes    Expected level of sedation:  Deep  Appropriately NPO:  Yes  ASA Class:  3  Mallampati  :  Grade 2- soft palate, base of uvula, tonsillar pillars, and portion of posterior pharyngeal wall visible  Lungs:  Lungs clear with good breath sounds bilaterally  Heart:  Normal heart sounds and rate  History & Physical reviewed:  History and physical reviewed and no updates needed  Statement of review:  I have reviewed the lab findings, diagnostic data, medications, and the plan for sedation

## 2025-04-04 NOTE — CONSULTS
I have reviewed the surgical /preoperative H&P that is linked to this encounter, and examined the patient. There are no significant changes.    Ramesh Talbot MD

## 2025-04-04 NOTE — PLAN OF CARE
Goal Outcome Evaluation:      Plan of Care Reviewed With: patient          Outcome Evaluation: Patient plans to return to group home upon discharge.

## 2025-04-04 NOTE — PLAN OF CARE
"Goal Outcome Evaluation:                      PRIMARY DIAGNOSIS: \"GENERIC\" NURSING  OUTPATIENT/OBSERVATION GOALS TO BE MET BEFORE DISCHARGE:  ADLs back to baseline: Yes    Activity and level of assistance: Ambulating independently.    Pain status: Pain free.    Return to near baseline physical activity: Yes     Discharge Planner Nurse   Safe discharge environment identified: No  Barriers to discharge: Yes Needs EGD, paracentesis and repeat echo.        Entered by: Rebecca Nice RN 04/03/2025 11:26 PM     Please review provider order for any additional goals.   Nurse to notify provider when observation goals have been met and patient is ready for discharge.  "

## 2025-04-04 NOTE — PROGRESS NOTES
Check: Reviewed echocardiogram and shows preserved left ventricular ejection fraction without segmental wall motion abnormalities.  No change compared to prior.  No further cardiac recommendations at this time please call if any further questions or concerns.

## 2025-04-05 ENCOUNTER — APPOINTMENT (OUTPATIENT)
Dept: SPEECH THERAPY | Facility: HOSPITAL | Age: 45
DRG: 433 | End: 2025-04-05
Attending: INTERNAL MEDICINE
Payer: COMMERCIAL

## 2025-04-05 VITALS
RESPIRATION RATE: 18 BRPM | SYSTOLIC BLOOD PRESSURE: 124 MMHG | OXYGEN SATURATION: 92 % | BODY MASS INDEX: 34.79 KG/M2 | DIASTOLIC BLOOD PRESSURE: 60 MMHG | HEART RATE: 80 BPM | WEIGHT: 208.8 LBS | HEIGHT: 65 IN | TEMPERATURE: 97.9 F

## 2025-04-05 PROBLEM — R09.89 CHOKING EPISODE: Status: RESOLVED | Noted: 2025-04-03 | Resolved: 2025-04-05

## 2025-04-05 LAB
% LINING CELLS, BODY FLUID: 16 %
ANION GAP SERPL CALCULATED.3IONS-SCNC: 11 MMOL/L (ref 7–15)
BUN SERPL-MCNC: 26.9 MG/DL (ref 6–20)
CALCIUM SERPL-MCNC: 9.2 MG/DL (ref 8.8–10.4)
CHLORIDE SERPL-SCNC: 98 MMOL/L (ref 98–107)
CREAT SERPL-MCNC: 1.62 MG/DL (ref 0.67–1.17)
EGFRCR SERPLBLD CKD-EPI 2021: 53 ML/MIN/1.73M2
ERYTHROCYTE [DISTWIDTH] IN BLOOD BY AUTOMATED COUNT: 15.4 % (ref 10–15)
GLUCOSE BLDC GLUCOMTR-MCNC: 109 MG/DL (ref 70–99)
GLUCOSE BLDC GLUCOMTR-MCNC: 114 MG/DL (ref 70–99)
GLUCOSE BLDC GLUCOMTR-MCNC: 118 MG/DL (ref 70–99)
GLUCOSE SERPL-MCNC: 117 MG/DL (ref 70–99)
HCO3 SERPL-SCNC: 29 MMOL/L (ref 22–29)
HCT VFR BLD AUTO: 40.4 % (ref 40–53)
HGB BLD-MCNC: 13 G/DL (ref 13.3–17.7)
LYMPHOCYTES NFR FLD MANUAL: 42 %
MCH RBC QN AUTO: 28.9 PG (ref 26.5–33)
MCHC RBC AUTO-ENTMCNC: 32.2 G/DL (ref 31.5–36.5)
MCV RBC AUTO: 90 FL (ref 78–100)
MONOS+MACROS NFR FLD MANUAL: 30 %
NEUTS BAND NFR FLD MANUAL: 12 %
PATH REV: NORMAL
PLATELET # BLD AUTO: 306 10E3/UL (ref 150–450)
POTASSIUM SERPL-SCNC: 5 MMOL/L (ref 3.4–5.3)
RBC # BLD AUTO: 4.5 10E6/UL (ref 4.4–5.9)
SODIUM SERPL-SCNC: 138 MMOL/L (ref 135–145)
WBC # BLD AUTO: 11.9 10E3/UL (ref 4–11)

## 2025-04-05 PROCEDURE — 36415 COLL VENOUS BLD VENIPUNCTURE: CPT | Performed by: INTERNAL MEDICINE

## 2025-04-05 PROCEDURE — 80048 BASIC METABOLIC PNL TOTAL CA: CPT | Performed by: INTERNAL MEDICINE

## 2025-04-05 PROCEDURE — 250N000013 HC RX MED GY IP 250 OP 250 PS 637: Performed by: INTERNAL MEDICINE

## 2025-04-05 PROCEDURE — 92610 EVALUATE SWALLOWING FUNCTION: CPT | Mod: GN | Performed by: SPEECH-LANGUAGE PATHOLOGIST

## 2025-04-05 PROCEDURE — 999N000157 HC STATISTIC RCP TIME EA 10 MIN

## 2025-04-05 PROCEDURE — 92526 ORAL FUNCTION THERAPY: CPT | Mod: GN | Performed by: SPEECH-LANGUAGE PATHOLOGIST

## 2025-04-05 PROCEDURE — 82310 ASSAY OF CALCIUM: CPT | Performed by: INTERNAL MEDICINE

## 2025-04-05 PROCEDURE — 250N000013 HC RX MED GY IP 250 OP 250 PS 637: Performed by: HOSPITALIST

## 2025-04-05 PROCEDURE — 85041 AUTOMATED RBC COUNT: CPT | Performed by: INTERNAL MEDICINE

## 2025-04-05 PROCEDURE — 94660 CPAP INITIATION&MGMT: CPT

## 2025-04-05 PROCEDURE — 85027 COMPLETE CBC AUTOMATED: CPT | Performed by: INTERNAL MEDICINE

## 2025-04-05 RX ORDER — NICOTINE 21 MG/24HR
1 PATCH, TRANSDERMAL 24 HOURS TRANSDERMAL DAILY
Status: DISCONTINUED | OUTPATIENT
Start: 2025-04-05 | End: 2025-04-05 | Stop reason: HOSPADM

## 2025-04-05 RX ADMIN — MONTELUKAST 10 MG: 10 TABLET, FILM COATED ORAL at 08:21

## 2025-04-05 RX ADMIN — EMPAGLIFLOZIN 10 MG: 10 TABLET, FILM COATED ORAL at 08:22

## 2025-04-05 RX ADMIN — FLUOXETINE HYDROCHLORIDE 20 MG: 20 CAPSULE ORAL at 08:23

## 2025-04-05 RX ADMIN — LEVOTHYROXINE SODIUM 6.25 MCG: 0.03 TABLET ORAL at 08:22

## 2025-04-05 RX ADMIN — LISINOPRIL 10 MG: 5 TABLET ORAL at 08:24

## 2025-04-05 RX ADMIN — APIXABAN 5 MG: 5 TABLET, FILM COATED ORAL at 08:23

## 2025-04-05 RX ADMIN — FAMOTIDINE 20 MG: 20 TABLET, FILM COATED ORAL at 08:22

## 2025-04-05 RX ADMIN — NICOTINE 1 PATCH: 21 PATCH, EXTENDED RELEASE TRANSDERMAL at 08:33

## 2025-04-05 RX ADMIN — FUROSEMIDE 40 MG: 20 TABLET ORAL at 08:20

## 2025-04-05 ASSESSMENT — ACTIVITIES OF DAILY LIVING (ADL)
ADLS_ACUITY_SCORE: 44
ADLS_ACUITY_SCORE: 50
ADLS_ACUITY_SCORE: 44
ADLS_ACUITY_SCORE: 50
ADLS_ACUITY_SCORE: 44
ADLS_ACUITY_SCORE: 50
ADLS_ACUITY_SCORE: 44
ADLS_ACUITY_SCORE: 50

## 2025-04-05 NOTE — PROGRESS NOTES
"[facility] Progress Note       SUBJECTIVE:  Patient reports that he is eating well without issues. His first swallow of phlegm in the morning after taking off CPAP feels like it gets stuck. No esophageal dysphagia.        OBJECTIVE:  /60 (BP Location: Right arm)   Pulse 80   Temp 97.9  F (36.6  C) (Oral)   Resp 18   Ht 1.651 m (5' 5\")   Wt 94.7 kg (208 lb 12.8 oz)   SpO2 92%   BMI 34.75 kg/m    Temp (24hrs), Av.8  F (36.6  C), Min:97.2  F (36.2  C), Max:98.5  F (36.9  C)    Patient Vitals for the past 72 hrs:   Weight   25 0700 94.7 kg (208 lb 12.8 oz)   25 127.6 kg (281 lb 3.2 oz)   25 127 kg (280 lb)       Intake/Output Summary (Last 24 hours) at 2025 1336  Last data filed at 2025 0915  Gross per 24 hour   Intake 1560 ml   Output --   Net 1560 ml        PHYSICAL EXAM  GEN: NAD, obese male sitting up in bed  HRT: no LE edema  RESP: unlabored  ABD: obese abdomen, nondistended  SKIN: No rash or jaundice      Additional Data:  I have reviewed the patient's new clinical lab results:     Recent Labs   Lab Test 25  0656 25  1329 25  0653 04/02/25  1929 02/24/25  1945 02/23/25  2001 02/15/25  1851   WBC 11.9* 9.8 10.9 14.3*   < > 17.1* 15.5*   HGB 13.0* 12.7* 12.6* 13.0*   < > 13.5 12.8*   MCV 90 88 90 88   < > 88 88    306 305 330   < > 383 308   INR  --   --   --  1.17*  --  1.22* 1.22*    < > = values in this interval not displayed.     Recent Labs   Lab Test 25  0656 25  0653 25   POTASSIUM 5.0 4.1 4.3   CHLORIDE 98 103 99   CO2 29 28 25   BUN 26.9* 24.0* 25.1*   ANIONGAP 11 10 14     Recent Labs   Lab Test 25  2153 25  1945 02/23/25  2001 02/15/25  1851 25  0544 25  1803 01/15/25  0515 25  1546 25  0518 01/10/25  1211   ALBUMIN  --  4.0 4.0 3.9   < >  --    < >  --    < > 4.9   BILITOTAL  --  0.4 0.4 0.3   < >  --    < >  --    < > 0.5   ALT  --  17 17 22   < >  --    < " >  --    < > 42   AST  --  25 25 18   < >  --    < >  --    < > 29   PROTEIN 10*  --   --   --   --  Negative  --  Negative  --   --    LIPASE  --  23 27  --   --   --   --   --   --  18    < > = values in this interval not displayed.         Imaging results:  US-guided paracentesis 4/4/25:  IMPRESSION:  1.  Status post ultrasound-guided paracentesis with 4.85 L removed, negative for SBP.     CTA chest/abdomen/pelvis 4/2/25:  FINDINGS:  CTA CHEST, ABDOMEN and PELVIS: Normal caliber thoracic and abdominal aorta with no acute aortic syndrome. Normal caliber pulmonary arteries with no pulmonary emboli. Brachiocephalic, mesenteric, renal and iliofemoral arteries are unremarkable.  LUNGS AND PLEURA: Lungs are clear. No pleural effusion. No pneumothorax. Trachea and bronchi are patent with no endobronchial filling defect identified.  MEDIASTINUM/AXILLAE: No lymphadenopathy. Heart size within normal limits. No pericardial effusion. Mild mural thickening and mild patulous dilatation of the esophagus and a small esophageal hiatal hernia can be associated with nonspecific esophagitis.  CORONARY ARTERY CALCIFICATION: No visible coronary artery calcification.  HEPATOBILIARY: Hepatic cirrhosis, hepatic steatosis and hepatomegaly. No focal liver lesion identified. Gallbladder and bile ducts unremarkable.  PANCREAS: Normal.  SPLEEN: Mild splenic enlargement at 14 cm.  ADRENAL GLANDS: Benign 5 cm right adrenal myelolipoma and benign 3 cm left adrenal myelolipoma unchanged and require no follow-up. Adrenal glands otherwise normal.  KIDNEY/BLADDER: Kidneys, ureters and bladder are normal.  BOWEL: Bowel is normal with no obstruction or inflammatory change. Mild/moderate abdominal and pelvic ascites.  LYMPH NODES: Numerous prominent upper abdominal, mesenteric and para-aortic lymph nodes are stable.  PELVIC ORGANS: No pelvic mass.  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 No fracture or bone lesion.                                                                    IMPRESSION:   1.  No acute aortic syndrome. No pulmonary emboli.  2.  Mild mural thickening and mild patulous dilatation of the esophagus and a small esophageal hiatal hernia can be associated with nonspecific esophagitis.  3.  Hepatic cirrhosis, hepatic steatosis, hepatosplenomegaly and mild/moderate abdominal and pelvic ascites.    Procedure results:  EGD 4/4/25 ([place]):  Findings:       The esophagus was normal.        Mild portal hypertensive gastropathy was found in the gastric body.        The examined duodenum was normal.                                                                                    Impression:     - Normal esophagus.                          - Portal hypertensive gastropathy.                          - Normal examined duodenum.                          - No specimens collected.   Recommendation:        - Full liquid diet.                          - [facility] to [facility] care.        IMPRESSION:  Dysphagia  This is a 43 y/o male with PMH fetal alcohol syndrome, TBI, ASD, type 2 diabetes, COPD, asthma, DVT on Eliquis, obesity, fatty liver cirrhosis with history of ascites/SBP who lives in a group home admitted 4/3 after presenting for chest pain after choking episode. CT showed esophageal thickening and dilatation. EGD 4/4 was unremarkable for cause of symptoms, noted portal hypertensive gastropathy but no varices. He denies further swallowing/eating issues.     Cirrhosis- He follows at [facility] with need for recurrent paracenteses despite diuretics. He had a 4.85 L tap yesterday, negative for SBP. He is on SBP ppx with Bactrim. He has outpatient follow up scheduled at [facility] 7/16.     Plan:  - Regular diet as tolerated  - Continue diuretics, midodrine, outpatient paracenteses asneeded    We will no longer actively follow this patient in-house. Please call if questions arise or patient's status changes.          (Dr. Talbot)  Aliosn Wilson PA-C  [facility]  4/5/2025 1:36 PM  [phone] (office)    22 minutes of total time was spent providing patient care, including patient evaluation, reviewing documentation/test results, , and documentation.  ________________________________________________________________________

## 2025-04-05 NOTE — PLAN OF CARE
Problem: Chest Pain  Goal: Resolution of Chest Pain Symptoms  Outcome: Progressing     Problem: Comorbidity Management  Goal: Blood Glucose Levels Within Targeted Range  Outcome: Adequate for Care Transition   Goal Outcome Evaluation:  A&OX4, very pleasant. BIPAP worn overnight. Independent in room. No telemetry.     Patient was caught smoking in room last evening. He was very cooperative, cigarettes and lighter placed in med room.

## 2025-04-05 NOTE — DISCHARGE SUMMARY
"[facility]  Hospitalist Discharge Summary      Date of Admission:  4/2/2025  Date of Discharge:  4/5/2025  2:38 PM  Discharging Provider: Tila Sanchez MD  Discharge Service: Hospitalist Service    Discharge Diagnoses   Dysphagia  Syncope secondary to choking/hypoxia  NSTEMI  Cirrhosis with ascites and portal hypertension, esophagitis  Diabetes mellitus type 2  CKD stage II  AVA on BiPAP  COPD with right-sided heart failure  History of DVT on Eliquis  Depression, ADD, fetal alcohol syndrome, and autistic disorder    Clinically Significant Risk Factors     # Obesity: Estimated body mass index is 34.75 kg/m  as calculated from the following:    Height as of this encounter: 1.651 m (5' 5\").    Weight as of this encounter: 94.7 kg (208 lb 12.8 oz).       Follow-ups Needed After Discharge   Follow-up Appointments       Hospital Follow-up with Existing Primary Care Provider (PCP)          Schedule Primary Care visit within: 7 Days   Recommended labs and Imaging (to be ordered by Primary Care Provider): Basic metabolic panel to monitor creatinine and electrolytes               Unresulted Labs Ordered in the Past 30 Days of this Admission       Date and Time Order Name Status Description    4/3/2025 12:40 PM Ascites Fluid Aerobic Bacterial Culture Routine With Gram Stain Preliminary         These results will be followed up by Tila Sanchez    Discharge Disposition   Discharged to home  Condition at discharge: Stable    Hospital Course   Warren Jaramillo is a 44 year old male with PMH of cirrhosis  being followed with [facility] ongoing ascites and is on diuretics at home, history of recent DVT in the left arm about 2 months ago on Eliquis twice daily, Diabetes, COPD, hypertension, Rojas's disease, liver failure with ascites, ADHD, fetal alcohol syndrome, autistic disorder, right-sided CHF, dysuria, left bundle branch block, SBP, obesity,dysphagia and balance problems due to TBI hx " who was admitted on 4/2/2025 after choking on hamburger and passing out.  He had a workup for cardiac reason for syncope with a normal echo and an EKG that appears the same as prior EKGs.  He was evaluated for dysphagia by SLP and they recommended easy to chew textures with thin liquids.     Dysphagia  Syncope secondary to choking/hypoxia  -Has been ongoing for the past 3 months and worsening  -Has a history of dysphagia in the past  -SLP consulted: Recommended to be on easy to chew textures and thin liquids     NSTEMI   -due to hypoxia in the setting of choking   -Initially on heparin drip, discontinued 4/3  -EKG 4/2: Sinus rhythm, anteroseptal infarct that is present on prior EKGs  -Echo 4/3: LVEF 60 to 65%, difficult to see RV, no obvious valvular abnormalities     Cirrhosis with ascites, history of SBP portal hypertension  Esophagitis  -follows with GI on outpatient basis and may need transplant evaluation referral given his young age, continue his home optimal doses of diuretics Lasix Aldactone- will continue along with midodrine   -GI consulted for EGD 4/4: Portal hypertension gastropathy     DM2  -diet controlled.A1C 6.2      CKD 2 at baseline   -Likely due to DM/HTN combination     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  COPD right-sided heart failure  -Use home CPAP     Hx of DVT of axillary vein left   -about a month ago  -Eliquis resumed 4/4     Depression, ADHD  Fetal alcohol syndrome  Autistic disorder  -Continue Zyprexa Prozac gabapentin    Consultations This Hospital Stay   CARDIOLOGY IP CONSULT  GASTROENTEROLOGY IP CONSULT  PHARMACY IP CONSULT  PHARMACY IP CONSULT  CARE MANAGEMENT / SOCIAL WORK IP CONSULT  SPEECH LANGUAGE PATH ADULT IP CONSULT    Code Status   Full Code    Time Spent on this Encounter   ITila MD, personally saw the patient today and spent greater than 30 minutes discharging this patient.       Tila Sanchez MD  [facility]  [address]  [facility] [address]  Phone: [phone]  Fax: [phone]  ______________________________________________________________________    Physical Exam   Vital Signs: Temp: 97.9  F (36.6  C) Temp src: Oral BP: 124/60 Pulse: 80   Resp: 18 SpO2: 92 % O2 Device: None (Room air)    Weight: 208 lbs 12.8 oz  General Appearance: Awake, alert, in no acute distress  Respiratory: CTAB, no wheeze  Cardiovascular: RRR, no murmur noted  GI: soft, nontender, non distended, normal bowel sounds  Skin: no jaundice, no rash         Primary Care Physician   [facility]    Discharge Orders      Reason for your hospital stay    Choking/aspiration event that led to syncope     Activity    Your activity upon discharge: activity as tolerated     Diet    Follow this diet upon discharge: Easy to Chew Diet (level 7)     Hospital Follow-up with Existing Primary Care Provider (PCP)            Significant Results and Procedures   Most Recent 3 CBC's:  Recent Labs   Lab Test 04/05/25  0656 04/03/25  1329 04/03/25  0653   WBC 11.9* 9.8 10.9   HGB 13.0* 12.7* 12.6*   MCV 90 88 90    306 305     Most Recent 3 BMP's:  Recent Labs   Lab Test 04/05/25  1106 04/05/25  0755 04/05/25  0656 04/03/25  0723 04/03/25  0653 04/03/25  0239 04/02/25  1929   NA  --   --  138  --  141  --  138   POTASSIUM  --   --  5.0  --  4.1  --  4.3   CHLORIDE  --   --  98  --  103  --  99   CO2  --   --  29  --  28  --  25   BUN  --   --  26.9*  --  24.0*  --  25.1*   CR  --   --  1.62*  --  1.17  --  1.23*   ANIONGAP  --   --  11  --  10  --  14   WES  --   --  9.2  --  9.6  --  10.2   * 118* 117*   < > 123*   < > 122*    < > = values in this interval not displayed.     Most Recent 2 LFT's:  Recent Labs   Lab Test 02/24/25  1945 02/23/25 2001   AST 25 25   ALT 17 17   ALKPHOS 202* 211*   BILITOTAL 0.4 0.4         Recent Labs   Lab Test 04/02/25  2123 02/07/25  1752 02/03/25  1840 02/15/24  0918 11/27/23  1135   NTBNPI 1,054*  1,101* 924*   < >  --    NTBNP  --   --   --   --  495*    < > = values in this interval not displayed.     7-Day Micro Results       Collected Updated Procedure Result Status      04/04/2025 1120 04/05/2025 0701 Cell count with differential fluid [[id number]]    (Abnormal)   Pleural fluid from Abdomen    Final result Component Value   No component results            04/04/2025 1120 04/09/2025 0714 Ascites Fluid Aerobic Bacterial Culture Routine With Gram Stain [[id number]]   Ascites Fluid from Abdomen    Final result Component Value   Culture No Growth   Gram Stain Result No organisms seen    1+ WBC seen               04/04/2025 1120 04/04/2025 1415 Cell Count Body Fluid [[id number]]    (Abnormal)   Pleural fluid from Abdomen    Final result Component Value Units   Color Yellow    Clarity Hazy    Cell Count Fluid Source Peritoneum    Total Nucleated Cells 1,822 /uL            04/04/2025 1120 04/05/2025 0701 Differential Body Fluid [[id number]]    Pleural fluid from Abdomen    Final result Component Value Units   % Neutrophils 12 %   % Lymphocytes 42 %   % Monocyte/Macrophages 30 %   % Lining Cells 16 %   Pathologist Review Comments (Body Fluid Diff) Agree with differential. Lissa Smart M.D. 4/4/2025 1455                 ,   Results for orders placed or performed during the hospital encounter of 04/02/25   CTA Head Neck with Contrast    Narrative    EXAM: CTA HEAD NECK W CONTRAST  LOCATION: [facility]  DATE: 4/2/2025    INDICATION: Code Stroke, evaluate for LVO. Slurred speech.  COMPARISON: CTA head and neck 2/22/2025. MRI brain 2/23/2025.  CONTRAST: 90ml isovue 370  TECHNIQUE: Head and neck CT angiogram with IV contrast. Noncontrast head CT followed by axial helical CT images of the head and neck vessels obtained during the arterial phase of intravenous contrast administration. Axial 2D reconstructed images and   multiplanar 3D MIP reconstructed images of the head and neck vessels  were performed by the technologist. Dose reduction techniques were used. All stenosis measurements made according to NASCET criteria unless otherwise specified.    FINDINGS:   Streak artifact limits aspects of exam. Portions of exam are limited by poor opacification of the arterial system particularly within the lower neck.    HEAD CT:   INTRACRANIAL CONTENTS: No intracranial hemorrhage, extraaxial collection, or mass effect.  No CT evidence of acute infarct. Normal parenchymal attenuation. Normal ventricles and sulci.       HEAD CTA:  No significant stenosis or occlusion.      No brain aneurysm. No AVM/AVF.      NECK CTA:  RIGHT CAROTID:   No significant stenosis/occlusion. No dissection.    LEFT CAROTID:  No significant stenosis/occlusion. No dissection.    VERTEBRAL ARTERIES:  Bilateral vertebral artery origins and bilateral V1 segment obscured by artifact.     Otherwise, no significant stenosis or occlusion. No dissection.      AORTIC ARCH: Classic aortic arch anatomy. No significant stenosis at the origin of the great vessels.    ARTERIAL PLAQUE: Scattered calcified/noncalcified plaque.    NONVASCULAR STRUCTURES:   Multiple small and prominent lymph nodes within the neck and mediastinum.        Impression     IMPRESSION:   HEAD CT:  1.  No acute intracranial hemorrhage.    2.  No CT evidence acute infarct. Aspect score 10.      Dr. Manny Louis was notified by Dr Jeremy Daniel at  8:05 PM 4/2/2025 CST/CDT.      HEAD CTA:   1.  No intracranial arterial large vessel occlusion or significant stenosis.      NECK CTA:  1.   Limitations described above.    2.  Visualized cervical arteries demonstrate no significant stenosis or occlusion. No cervical artery dissection.      Dr. Manny Louis was notified by Dr Jeremy Daniel at  8:28 PM 4/2/2025 CST/CDT.    CTA Chest Abdomen Pelvis w Contrast    Narrative    EXAM: CTA CHEST ABDOMEN PELVIS W CONTRAST  LOCATION: [facility]  DATE:  4/2/2025    INDICATION: chest pain and strokelike symptoms, also reports he may have choked reports left sided chest pain  COMPARISON: 2/23/2025 CT AP and 12/1/2024 CTA chest  TECHNIQUE: CT angiogram chest abdomen pelvis during arterial phase of injection of IV contrast as well as noncontrast imaging of the chest.  Multiplanar reformats and MIP reconstructions were performed. Dose reduction techniques were used.   CONTRAST: 180 ml isovue 370    FINDINGS:  CTA CHEST, ABDOMEN and PELVIS: Normal caliber thoracic and abdominal aorta with no acute aortic syndrome. Normal caliber pulmonary arteries with no pulmonary emboli. Brachiocephalic, mesenteric, renal and iliofemoral arteries are unremarkable.    LUNGS AND PLEURA: Lungs are clear. No pleural effusion. No pneumothorax. Trachea and bronchi are patent with no endobronchial filling defect identified.    MEDIASTINUM/AXILLAE: No lymphadenopathy. Heart size within normal limits. No pericardial effusion. Mild mural thickening and mild patulous dilatation of the esophagus and a small esophageal hiatal hernia can be associated with nonspecific esophagitis.    CORONARY ARTERY CALCIFICATION: No visible coronary artery calcification.    HEPATOBILIARY: Hepatic cirrhosis, hepatic steatosis and hepatomegaly. No focal liver lesion identified. Gallbladder and bile ducts unremarkable.    PANCREAS: Normal.    SPLEEN: Mild splenic enlargement at 14 cm.    ADRENAL GLANDS: Benign 5 cm right adrenal myelolipoma and benign 3 cm left adrenal myelolipoma unchanged and require no follow-up. Adrenal glands otherwise normal.    KIDNEY/BLADDER: Kidneys, ureters and bladder are normal.    BOWEL: Bowel is normal with no obstruction or inflammatory change. Mild/moderate abdominal and pelvic ascites.    LYMPH NODES: Numerous prominent upper abdominal, mesenteric and para-aortic lymph nodes are stable.    PELVIC ORGANS: No pelvic mass.    MUSCULOSKELETAL: Chronic symmetric ankylosis of the sacroiliac  joints. No fracture or bone lesion.      Impression    IMPRESSION:   1.  No acute aortic syndrome. No pulmonary emboli.  2.  Mild mural thickening and mild patulous dilatation of the esophagus and a small esophageal hiatal hernia can be associated with nonspecific esophagitis.  3.  Hepatic cirrhosis, hepatic steatosis, hepatosplenomegaly and mild/moderate abdominal and pelvic ascites.   MR Brain w/o & w Contrast    Narrative    EXAM: MR BRAIN W/O and W CONTRAST  LOCATION: [facility]  DATE: 4/2/2025    INDICATION: Slurred speech, difficulty swallowing, new balance problem  COMPARISON: None.  CONTRAST: 13ml Gadavist  TECHNIQUE: Routine multiplanar multisequence head MRI without and with intravenous contrast.    FINDINGS:  INTRACRANIAL CONTENTS: No acute or subacute infarct. No mass, acute hemorrhage, or extra-axial fluid collections. Normal brain parenchymal signal. Mild generalized cerebral atrophy. No hydrocephalus. Normal position of the cerebellar tonsils. No   pathologic contrast enhancement.    SELLA: No abnormality accounting for technique.    OSSEOUS STRUCTURES/SOFT TISSUES: Normal marrow signal. The major intracranial vascular flow voids are maintained.     ORBITS: No abnormality accounting for technique.     SINUSES/MASTOIDS: Mild mucosal thickening scattered about the paranasal sinuses. Left maxillary sinus prominent retention cyst/mucosal polyp. Right mastoid air cells moderate patchy opacification. Left mastoid air cells essentially clear.       Impression    IMPRESSION:  1.  No acute infarct.   US Paracentesis with Albumin    Narrative    EXAM:  1. PARACENTESIS  2. ULTRASOUND GUIDANCE  LOCATION: [facility]  DATE: 4/4/2025    INDICATION: Ascites.    PROCEDURE: Informed consent obtained. Time out performed. The abdomen was prepped and draped in a sterile fashion. 10 mL of 1% lidocaine was infused into local soft tissues. A 5 Upper sorbian catheter system was  introduced into the abdominal ascites under   ultrasound guidance.    4.85 liters of cloudy bushra fluid were removed and sent to lab .    Patient tolerated procedure well.    Ultrasound imaging was obtained and placed in the patient's permanent medical record.      Impression    IMPRESSION:  1.  Status post ultrasound-guided paracentesis.    Reference CPT Code: 82705   Echo Limited     Value    LVEF  60-65%    Narrative    [id number]  [id number]  [id number]  [id number]^ISAAC^[place] [address]     Name: BOB LO  MRN: [id number]  : 1980  Study Date: 2025 12:48 PM  Age: 44 yrs  Gender: Male  Patient Location: [facility]  Reason For Study: Chest Pain  Ordering Physician: JAN GRAY  Performed By: HALEY     BSA: 2.3 m2  Height: 65 in  Weight: 281 lb  HR: 80  BP: 101/53 mmHg  ______________________________________________________________________________  Procedure  Echocardiogram with two-dimensional, color and spectral Doppler. Definity (NDC  #29254-023) given intravenously. Lot [id number], Exp Dec 2025.  ______________________________________________________________________________  Interpretation Summary     The visual ejection fraction is 60-65%. No regional wall motion abnormalities  noted.  Right ventricular function cannot be assessed due to poor image quality.  No obvious valve disease  Technically difficult, suboptimal study. Compared to the prior study dated  3/31/2025, there have been no changes.  ______________________________________________________________________________  Left Ventricle  The left ventricle is normal in size. There is mild concentric left  ventricular hypertrophy. The visual ejection fraction is 60-65%. No regional  wall motion abnormalities noted.     Right Ventricle  The right ventricle is not well visualized. Right ventricular function cannot  be assessed due to poor image quality.     Atria  The left atrium is not well  visualized. Right atrium not well visualized.     Mitral Valve  Mitral valve leaflets appear normal.     Tricuspid Valve  The tricuspid valve is not well visualized.     Aortic Valve  The aortic valve is not well visualized.     Pulmonic Valve  The pulmonic valve is not well visualized.     Vessels  IVC diameter >2.1 cm collapsing <50% with sniff suggests a high RA pressure  estimated at 15 mmHg or greater.     Pericardium  There is no pericardial effusion.     ______________________________________________________________________________  MMode/2D Measurements & Calculations  IVSd: 1.3 cm  LVIDd: 4.5 cm  LVIDs: 3.4 cm  LVPWd: 1.2 cm  FS: 25.3 %     LV mass(C)d: 198.8 grams  LV mass(C)dI: 87.0 grams/m2  LA dimension: 3.8 cm  EF Biplane: 57.9 %  RWT: 0.51     Time Measurements  MM HR: 80.0 BPM     ______________________________________________________________________________  Report approved by: Dinora Mejias MD on 04/03/2025 03:59 PM             Discharge Medications   Current Discharge Medication List        CONTINUE these medications which have NOT CHANGED    Details   albuterol (PROAIR HFA/PROVENTIL HFA/VENTOLIN HFA) 108 (90 Base) MCG/ACT inhaler Inhale 1-2 puffs into the lungs every 6 hours as needed for shortness of breath, wheezing or cough  Qty: 18 g, Refills: 0    Comments: Pharmacy may dispense brand covered by insurance (Proair, or proventil or ventolin or generic albuterol inhaler)      albuterol (PROVENTIL) (2.5 MG/3ML) 0.083% neb solution Take 2.5 mg by nebulization 3 times daily as needed for shortness of breath, wheezing or cough      aloe vera GEL Apply 1 g topically every hour as needed for skin care Per bottle directions      apixaban ANTICOAGULANT (ELIQUIS) 5 MG tablet Take 5 mg by mouth 2 times daily.      bacitracin 500 UNIT/GM OINT Apply topically 3 times daily as needed for wound care      Benzocaine (SOLARCAINE ALOE VERA EX) Externally apply topically daily as needed.      benzonatate  (TESSALON) 200 MG capsule Take 1 capsule (200 mg) by mouth 3 times daily as needed for cough.  Qty: 50 capsule, Refills: 0      Calamine external lotion Apply topically as needed for itching      carbamide peroxide (DEBROX) 6.5 % otic solution Place 5 drops into both ears daily as needed for other.      clotrimazole (LOTRIMIN) 1 % external cream Apply topically 2 times daily as needed (skin irritation)      Continuous Glucose Sensor (FREESTYLE MEGHANN 3 PLUS SENSOR) MISC USE TO READ BLOOD SUGAR TWICE DAILY AND AS NEEDED. CHANGE SENSOR EVERY 15 DAYS      dextromethorphan-guaiFENesin (MUCINEX DM)  MG 12 hr tablet Take 1 tablet by mouth 2 times daily as needed.      diclofenac (VOLTAREN) 1 % topical gel Apply 2 g topically daily as needed for moderate pain To joints/back      dicyclomine (BENTYL) 20 MG tablet Take 1 tablet (20 mg) by mouth 4 times daily as needed (abdominal pain).  Qty: 20 tablet, Refills: 0      EPINEPHrine (ANY BX GENERIC EQUIV) 0.3 MG/0.3ML injection 2-pack Inject 0.3 mg into the muscle as needed for anaphylaxis. May repeat one time in 5-15 minutes if response to initial dose is inadequate.      famotidine (PEPCID) 20 MG tablet Take 1 tablet (20 mg) by mouth 2 times daily  Qty: 60 tablet, Refills: 0      FLUoxetine 20 MG tablet Take 20 mg by mouth every morning.      furosemide (LASIX) 40 MG tablet Take 1 tablet (40 mg) by mouth daily.  Qty: 30 tablet, Refills: 0    Associated Diagnoses: Cirrhosis of liver with ascites, unspecified hepatic cirrhosis type (H); Chronic right-sided congestive heart failure (H)      GAVILAX 17 GM/SCOOP powder Take 17 g by mouth daily as needed.      hydrocortisone (CORTAID) 1 % external cream Apply topically daily as needed.      Hydrocortisone (PREPARATION H EX) Externally apply topically daily as needed.      JARDIANCE 10 MG TABS tablet Take 10 mg by mouth every morning.      levothyroxine (SYNTHROID/LEVOTHROID) 25 MCG tablet Take 0.25 tablets (6.25 mcg) by mouth  every morning (before breakfast).  Qty: 8 tablet, Refills: 0    Associated Diagnoses: Other specified hypothyroidism      lisinopril (ZESTRIL) 10 MG tablet Take 10 mg by mouth every morning.      loperamide (IMODIUM) 2 MG capsule Take 4 mg by mouth 4 times daily as needed for diarrhea.      loratadine (CLARITIN) 10 MG tablet Take 10 mg by mouth daily as needed for allergies.      magnesium hydroxide (MILK OF MAGNESIA) 400 MG/5ML suspension Take 30 mLs by mouth daily as needed.      metFORMIN (GLUCOPHAGE) 1000 MG tablet Take 1,000 mg by mouth 2 times daily (with meals)      methocarbamol (ROBAXIN) 500 MG tablet Take 1 tablet (500 mg) by mouth 4 times daily as needed for muscle spasms.  Qty: 40 tablet, Refills: 0    Associated Diagnoses: Acute right-sided low back pain with right-sided sciatica      montelukast (SINGULAIR) 10 MG tablet Take 10 mg by mouth every morning.      nicotine (NICODERM CQ) 21 MG/24HR 24 hr patch Place 1 patch over 24 hours onto the skin daily.  Qty: 28 patch, Refills: 0    Associated Diagnoses: Tobacco use      OLANZapine (ZYPREXA) 10 MG tablet Take 10 mg by mouth At Bedtime      omeprazole (PRILOSEC) 40 MG DR capsule Take 40 mg by mouth every morning.      ondansetron (ZOFRAN ODT) 4 MG ODT tab Take 1 tablet (4 mg) by mouth every 8 hours as needed for nausea.  Qty: 20 tablet, Refills: 0      pramoxine-calamine (AVEENO) 1-8 % LOTN Apply topically daily as needed for itching.      rosuvastatin (CRESTOR) 10 MG tablet Take 10 mg by mouth At Bedtime      spironolactone (ALDACTONE) 100 MG tablet Take 1 tablet (100 mg) by mouth daily.  Qty: 30 tablet, Refills: 0    Associated Diagnoses: Cirrhosis of liver with ascites, unspecified hepatic cirrhosis type (H); Chronic right-sided congestive heart failure (H)      sucralfate (CARAFATE) 1 GM/10ML suspension Take 10 mLs (1 g) by mouth 4 times daily as needed for nausea (heartburn).      sulfamethoxazole-trimethoprim (BACTRIM DS) 800-160 MG tablet Take 1  tablet by mouth once.      traZODone (DESYREL) 100 MG tablet Take 100 mg by mouth at bedtime.      TRELEGY ELLIPTA 100-62.5-25 MCG/ACT oral inhaler Inhale 1 puff into the lungs every morning.      Urea 40 % CREA Apply topically daily as needed.      Vitamins A & D (VITAMIN A & D) OINT Externally apply topically daily as needed.      witch hazel-glycerin (TUCKS) pad Apply topically as needed for hemorrhoids.           Allergies   Allergies   Allergen Reactions    Apricot Flavoring Agent (Non-Screening) Anaphylaxis    Banana Anaphylaxis     Throat swelling      Bees Anaphylaxis     Has an Epi pen    Wasp Venom Protein Shortness Of Breath     Other reaction(s): Respiratory Distress  Has an Epi pen        Methylphenidate Itching     Other reaction(s): Nightmares    Prunus      Other reaction(s): *Unknown    Sulfa Antibiotics      Headaches and nausea

## 2025-04-05 NOTE — PLAN OF CARE
Goal Outcome Evaluation:       Pt is alert oriented x4 and is able to communicate his needs to staff. VSS on R/A. Denies pain. Up ad-marija in room. Tolerated soft diet and thin liquids well this caroline. Currently in bed with BIPAP on and resting comfortably. Plan of care ongoing.

## 2025-04-05 NOTE — PROGRESS NOTES
Care Management Discharge Note    Discharge Date: 04/05/2025       Discharge Disposition: Group Home    Discharge Services: None    Discharge DME: None    Discharge Transportation: public transportation    Private pay costs discussed: Not applicable    Does the patient's insurance plan have a 3 day qualifying hospital stay waiver?  No    PAS Confirmation Code: NA  Patient/family educated on Medicare website which has current facility and service quality ratings: no    Education Provided on the Discharge Plan: Yes  Persons Notified of Discharge Plans: Pt and group home  Patient/Family in Agreement with the Plan: yes    Handoff Referral Completed: No, handoff not indicated or clinically appropriate    Additional Information:  Pt to discharge back to group [place] today 4/5 via cab voucher. Pt called his group home on speaker with SW; group home staff confirmed Pt can return and that Pt can be transported by cab. No anticipated or requested CM needs at time of discharge.     GABRIELLE GregorioW

## 2025-04-05 NOTE — PROGRESS NOTES
"Speech-Language Pathology: Clinical Swallow Evaluation     04/05/25 1300   Appointment Info   Signing Clinician's Name / Credentials (SLP) Kaci Ledezma MA, CCC-SLP   General Information   Onset of Illness/Injury or Date of Surgery 04/02/25   Referring Physician Tila Sanchez MD   Pertinent History of Current Problem Per H&P, pt had a choking episode, specifically \"44 year old male with PMH of cirrhosis  being followed with [facility] ongoing ascites and is on diuretics at home, history of recent DVT in the left arm about 2 months ago -on Eliquis twice daily- , not due for paracentesis, history of Diabetes, COPD, hypertension, Rojas's disease, liver failure with ascites, ADHD, fetal alcohol syndrome, autistic disorder, right-sided CHF, dysuria, left bundle branch block, SBP, obesity,dysphagia and balance problems due to TBI hx,   -presents to the Emergency Department for evaluation of chest pain that occurred at [employer].  Patient brought in by EMS for chest pain and possible choking. Patient tells us his  noticed that his speech was slurred and patient noticed he was having a hard time swallowing that led to him being choking and says his balance is off. Mild headache. Got nitro and aspirin with no relief.  History of diabetes, is on anticoagulation with DOAC. CTA without evidence of aspiration or pneumonia. No dissection noted + esophagitis, Patient reports new onset slurred speech dysphagia and balance issue tier 1 stroke code initiated. De-escalated after MRI without any acute infarct, troponins are technically ruling in. Spoke with cardiology recommends serial troponins and echocardiogram but unlikely cardiac source of his symptoms. Nitro by paramedics with no improvement. Is on anticoagulation. Imaging shows esophagitis. Pain started with coughing while choking. Troponin is ruling in for NSTEMI will discuss with cardiology and admit.\"   General Observations Alert and cooperative; per RNsvetlana " "reported to her that he'd already been cleared by SLP for PO intake/discharge.   Type of Evaluation   Type of Evaluation Swallow Evaluation   Oral Motor   Oral Musculature generally intact   Mucosal Quality good   Dentition (Oral Motor)   Dentition (Oral Motor) adequate dentition   Facial Symmetry (Oral Motor)   Facial Symmetry (Oral Motor) WNL   Lip Function (Oral Motor)   Lip Range of Motion (Oral Motor) WNL   Lip Strength (Oral Motor) WNL   Tongue Function (Oral Motor)   Tongue Strength (Oral Motor) WNL   Tongue Coordination/Speed (Oral Motor) WNL   Tongue ROM (Oral Motor) WNL   Jaw Function (Oral Motor)   Jaw Function (Oral Motor) WNL   Facial Sensation   Facial Sensation WNL   Cough/Swallow/Gag Reflex (Oral Motor)   Soft Palate/Velum (Oral Motor) WNL   Volitional Throat Clear/Cough (Oral Motor) WNL   Volitional Swallow (Oral Motor) WNL   Vocal Quality/Secretion Management (Oral Motor)   Vocal Quality (Oral Motor) WNL   Secretion Management (Oral Motor) WNL   General Swallowing Observations   Past History of Dysphagia VFSS in August of 2023 revealed mild dysphagia, primarily in delayed pharyngeal swallow response. At that time, regular solids and thin liquids were recommended. Pt reports this current event occurred because he \"hiccuped\" while there was a small piece of hamburger in pharynx and he aspirated it, making him cough until he passed out.   Respiratory Support room air   Current Diet/Method of Nutritional Intake (General Swallowing Observations, NIS) thin liquids (level 0);easy to chew (level 7)   Swallowing Evaluation Clinical swallow evaluation   Clinical Swallow Evaluation   Feeding Assistance no assistance needed   Additional evaluation(s) completed today No   Clinical Swallow Evaluation Textures Trialed thin liquids;solid foods   Clinical Swallow Eval: Thin Liquid Texture Trial   Mode of Presentation, Thin Liquids cup   Volume of Liquid or Food Presented 6 ounces   Oral Phase of Swallow WFL " "  Pharyngeal Phase of Swallow intact   Diagnostic Statement No overt s/s aspiration   Clinical Swallow Evaluation: Solid Food Texture Trial   Mode of Presentation self-fed   Volume Presented cracker with peanut buttern   Oral Phase WFL   Pharyngeal Phase intact   Diagnostic Statement No overt s/s aspiration, but pt endorsed \"difficulty\" getting solid to clear pharynx without liquid wash   Esophageal Phase of Swallow   Patient reports or presents with symptoms of esophageal dysphagia No   Swallowing Recommendations   Diet Consistency Recommendations thin liquids (level 0);easy to chew (level 7)   Supervision Level for Intake distant supervision needed   Mode of Delivery Recommendations slow rate of intake   Swallowing Maneuver Recommendations alternate food and liquid intake   Recommended Feeding/Eating Techniques (Swallow Eval) maintain upright sitting position for eating   Medication Administration Recommendations, Swallowing (SLP) Per patient preference   Instrumental Assessment Recommendations instrumental evaluation not recommended at this time   Comment, Swallowing Recommendations Patient appears to be at similar risk for aspiration when eating/drinking as previous assessment in 2023. Fast rate of intake, combined with a very mild deficit in pharyngeal function make for the risk of occasional aspiration events. This risk is not enough to make dramatic changes in the preparation of his food - IDDSI level 7 easy to chew and thin are both very appropriate.   General Therapy Interventions   Planned Therapy Interventions Dysphagia Treatment   Dysphagia treatment Instruction of safe swallow strategies   Clinical Impression   Criteria for Skilled Therapeutic Interventions Met (SLP Eval) Yes, treatment indicated   SLP Diagnosis pharyngeal dysphagia   Risks & Benefits of therapy have been explained evaluation/treatment results reviewed;care plan/treatment goals reviewed;participants voiced agreement with care " plan;participants included;patient   Clinical Impression Comments Patient participated in Clinical Swallow Evaluation. No s/s aspiration with any intake. Oral motor was WFL. Mastication was adequate. Hyolaryngeal movement was present. Recommend continue current diet IDDSI level 7 easy to chew and thin. Instructed on safe swallow precautions, most of which pt is already aware of/following. No onling anticipated SLP needs at this time. Please contact SLP with any questions or concerns.   SLP Total Evaluation Time   Eval: oral/pharyngeal swallow function, clinical swallow Minutes (62789) 20   SLP Goals   Therapy Frequency (SLP Eval) one time eval and treatment only   SLP Predicted Duration/Target Date for Goal Attainment 04/05/25   SLP Goals Swallow   SLP: Safely tolerate diet without signs/symptoms of aspiration Easy to chew diet;Thin liquids;With use of swallow precautions   Interventions   Interventions Quick Adds Swallowing Dysfunction   Swallowing Intervention   Treatment of Swallowing Dysfunction &/or Oral Function for Feeding Minutes (97586) 10   Treatment Detail/Skilled Intervention Educated RE: results of past swallow evals and connection to current set of symptoms. Instructed on slow rate of intake, no straws (pt already doing at home), distant supervision when eating.   SLP Discharge Planning   SLP Plan Eval and x1 treatment recommended/completed.   SLP Discharge Recommendation other (see comments)   SLP Rationale for DC Rec No anticipated SLP needs at this time.   SLP Brief overview of current status  Recommend easy to chew textures (7) and thin liquids (0). No anticipated SLP needs at this time. Please contact SLP with any questions or concerns.   SLP Time and Intention   Total Session Time (sum of timed and untimed services) 30

## 2025-04-06 NOTE — RESULT ENCOUNTER NOTE
Patient was admitted for choking incident and discharged home. Abdominal tap ordered by GI. No organism noted. Patient wasn't treated for SBP during this admission but has a history of SBP. No indication to contact patient but will forward results to discharging provider and GI ordering provider.

## 2025-04-07 ENCOUNTER — TRANSFERRED RECORDS (OUTPATIENT)
Dept: HEALTH INFORMATION MANAGEMENT | Facility: CLINIC | Age: 45
End: 2025-04-07

## 2025-04-07 ENCOUNTER — PATIENT OUTREACH (OUTPATIENT)
Dept: CARE COORDINATION | Facility: CLINIC | Age: 45
End: 2025-04-07
Payer: COMMERCIAL

## 2025-04-07 NOTE — PROGRESS NOTES
[facility]: [facility]    Background: Transitional Care Management program identified per system criteria and reviewed by [facility] team for possible outreach.    Assessment: Upon chart review, [facility] Team member will not proceed with patient outreach related to this episode of Transitional Care Management program due to reason below:    Patient has discharged to a Memory Care, Long-term Care, Assisted Living or Group Home where patient is receiving on-site support with their daily cares, including support with hospital follow up plan.    Plan: Transitional Care Management episode addressed appropriately per reason noted above.      HELENE Govea  [facility]    *Connected Care Resource Team does NOT follow patient ongoing. Referrals are identified based on internal discharge reports and the outreach is to ensure patient has an understanding of their discharge instructions.

## 2025-04-09 ENCOUNTER — TELEPHONE (OUTPATIENT)
Dept: PHYSICAL THERAPY | Facility: REHABILITATION | Age: 45
End: 2025-04-09

## 2025-04-09 LAB
BACTERIA FLD CULT: NO GROWTH
GRAM STAIN RESULT: NORMAL
GRAM STAIN RESULT: NORMAL

## 2025-04-09 NOTE — TELEPHONE ENCOUNTER
Called patient about no-show. Informed patient about their future appointments as well as cancellation policy. Requested patient call clinic if needing to change their appointments.

## 2025-04-12 ENCOUNTER — LAB (OUTPATIENT)
Dept: LAB | Facility: HOSPITAL | Age: 45
End: 2025-04-12
Payer: COMMERCIAL

## 2025-04-12 DIAGNOSIS — E03.9 MYXEDEMA HEART DISEASE: Primary | ICD-10-CM

## 2025-04-12 DIAGNOSIS — N28.9 KIDNEY DISEASE: ICD-10-CM

## 2025-04-12 DIAGNOSIS — I51.9 MYXEDEMA HEART DISEASE: Primary | ICD-10-CM

## 2025-04-12 LAB
ANION GAP SERPL CALCULATED.3IONS-SCNC: 15 MMOL/L (ref 7–15)
BUN SERPL-MCNC: 19.1 MG/DL (ref 6–20)
CALCIUM SERPL-MCNC: 9.4 MG/DL (ref 8.8–10.4)
CHLORIDE SERPL-SCNC: 99 MMOL/L (ref 98–107)
CREAT SERPL-MCNC: 1.13 MG/DL (ref 0.67–1.17)
EGFRCR SERPLBLD CKD-EPI 2021: 82 ML/MIN/1.73M2
GLUCOSE SERPL-MCNC: 209 MG/DL (ref 70–99)
HCO3 SERPL-SCNC: 22 MMOL/L (ref 22–29)
POTASSIUM SERPL-SCNC: 4.1 MMOL/L (ref 3.4–5.3)
SODIUM SERPL-SCNC: 136 MMOL/L (ref 135–145)
T4 FREE SERPL-MCNC: 1.02 NG/DL (ref 0.9–1.7)
TSH SERPL DL<=0.005 MIU/L-ACNC: 7.77 UIU/ML (ref 0.3–4.2)

## 2025-04-12 PROCEDURE — 80048 BASIC METABOLIC PNL TOTAL CA: CPT

## 2025-04-12 PROCEDURE — 82374 ASSAY BLOOD CARBON DIOXIDE: CPT

## 2025-04-12 PROCEDURE — 84443 ASSAY THYROID STIM HORMONE: CPT

## 2025-04-12 PROCEDURE — 36415 COLL VENOUS BLD VENIPUNCTURE: CPT

## 2025-04-12 PROCEDURE — 84439 ASSAY OF FREE THYROXINE: CPT

## 2025-04-14 ENCOUNTER — TRANSFERRED RECORDS (OUTPATIENT)
Dept: HEALTH INFORMATION MANAGEMENT | Facility: CLINIC | Age: 45
End: 2025-04-14

## 2025-04-14 LAB — RETINOPATHY: NEGATIVE

## 2025-04-15 ENCOUNTER — THERAPY VISIT (OUTPATIENT)
Dept: PHYSICAL THERAPY | Facility: REHABILITATION | Age: 45
End: 2025-04-15
Payer: COMMERCIAL

## 2025-04-15 DIAGNOSIS — M54.41 ACUTE RIGHT-SIDED LOW BACK PAIN WITH RIGHT-SIDED SCIATICA: Primary | ICD-10-CM

## 2025-04-15 PROCEDURE — 97110 THERAPEUTIC EXERCISES: CPT | Mod: GP | Performed by: PHYSICAL THERAPIST

## 2025-04-19 ENCOUNTER — HEALTH MAINTENANCE LETTER (OUTPATIENT)
Age: 45
End: 2025-04-19

## 2025-04-20 ENCOUNTER — ANCILLARY PROCEDURE (OUTPATIENT)
Dept: ULTRASOUND IMAGING | Facility: HOSPITAL | Age: 45
DRG: 371 | End: 2025-04-20
Attending: EMERGENCY MEDICINE
Payer: COMMERCIAL

## 2025-04-20 ENCOUNTER — APPOINTMENT (OUTPATIENT)
Dept: CT IMAGING | Facility: HOSPITAL | Age: 45
DRG: 371 | End: 2025-04-20
Attending: EMERGENCY MEDICINE
Payer: COMMERCIAL

## 2025-04-20 ENCOUNTER — HOSPITAL ENCOUNTER (INPATIENT)
Facility: HOSPITAL | Age: 45
LOS: 1 days | Discharge: NURSING, REHAB OR LONG-TERM CARE | DRG: 371 | End: 2025-04-22
Attending: EMERGENCY MEDICINE | Admitting: INTERNAL MEDICINE
Payer: COMMERCIAL

## 2025-04-20 ENCOUNTER — APPOINTMENT (OUTPATIENT)
Dept: RADIOLOGY | Facility: HOSPITAL | Age: 45
DRG: 371 | End: 2025-04-20
Attending: EMERGENCY MEDICINE
Payer: COMMERCIAL

## 2025-04-20 DIAGNOSIS — K65.2 SPONTANEOUS BACTERIAL PERITONITIS (H): Primary | ICD-10-CM

## 2025-04-20 DIAGNOSIS — R18.8 OTHER ASCITES: ICD-10-CM

## 2025-04-20 LAB
ALBUMIN SERPL BCG-MCNC: 4.2 G/DL (ref 3.5–5.2)
ALP SERPL-CCNC: 218 U/L (ref 40–150)
ALT SERPL W P-5'-P-CCNC: 22 U/L (ref 0–70)
ANION GAP SERPL CALCULATED.3IONS-SCNC: 14 MMOL/L (ref 7–15)
AST SERPL W P-5'-P-CCNC: 18 U/L (ref 0–45)
BASOPHILS # BLD AUTO: 0.1 10E3/UL (ref 0–0.2)
BASOPHILS NFR BLD AUTO: 1 %
BILIRUB DIRECT SERPL-MCNC: 0.17 MG/DL (ref 0–0.3)
BILIRUB SERPL-MCNC: 0.3 MG/DL
BUN SERPL-MCNC: 18.2 MG/DL (ref 6–20)
CALCIUM SERPL-MCNC: 9.7 MG/DL (ref 8.8–10.4)
CHLORIDE SERPL-SCNC: 101 MMOL/L (ref 98–107)
CREAT SERPL-MCNC: 1.22 MG/DL (ref 0.67–1.17)
EGFRCR SERPLBLD CKD-EPI 2021: 75 ML/MIN/1.73M2
EOSINOPHIL # BLD AUTO: 0.6 10E3/UL (ref 0–0.7)
EOSINOPHIL NFR BLD AUTO: 4 %
ERYTHROCYTE [DISTWIDTH] IN BLOOD BY AUTOMATED COUNT: 15.1 % (ref 10–15)
GLUCOSE BLDC GLUCOMTR-MCNC: 172 MG/DL (ref 70–99)
GLUCOSE SERPL-MCNC: 134 MG/DL (ref 70–99)
HCO3 SERPL-SCNC: 23 MMOL/L (ref 22–29)
HCT VFR BLD AUTO: 40 % (ref 40–53)
HGB BLD-MCNC: 12.8 G/DL (ref 13.3–17.7)
IMM GRANULOCYTES # BLD: 0.1 10E3/UL
IMM GRANULOCYTES NFR BLD: 1 %
LIPASE SERPL-CCNC: 27 U/L (ref 13–60)
LYMPHOCYTES # BLD AUTO: 2.1 10E3/UL (ref 0.8–5.3)
LYMPHOCYTES NFR BLD AUTO: 17 %
MAGNESIUM SERPL-MCNC: 1.7 MG/DL (ref 1.7–2.3)
MCH RBC QN AUTO: 28.6 PG (ref 26.5–33)
MCHC RBC AUTO-ENTMCNC: 32 G/DL (ref 31.5–36.5)
MCV RBC AUTO: 90 FL (ref 78–100)
MONOCYTES # BLD AUTO: 1.3 10E3/UL (ref 0–1.3)
MONOCYTES NFR BLD AUTO: 11 %
NEUTROPHILS # BLD AUTO: 8.5 10E3/UL (ref 1.6–8.3)
NEUTROPHILS NFR BLD AUTO: 67 %
NRBC # BLD AUTO: 0 10E3/UL
NRBC BLD AUTO-RTO: 0 /100
NT-PROBNP SERPL-MCNC: 765 PG/ML (ref 0–450)
PLATELET # BLD AUTO: 300 10E3/UL (ref 150–450)
POTASSIUM SERPL-SCNC: 4.1 MMOL/L (ref 3.4–5.3)
PROT SERPL-MCNC: 7.2 G/DL (ref 6.4–8.3)
RBC # BLD AUTO: 4.47 10E6/UL (ref 4.4–5.9)
SODIUM SERPL-SCNC: 138 MMOL/L (ref 135–145)
WBC # BLD AUTO: 12.6 10E3/UL (ref 4–11)

## 2025-04-20 PROCEDURE — 84157 ASSAY OF PROTEIN OTHER: CPT | Performed by: INTERNAL MEDICINE

## 2025-04-20 PROCEDURE — 250N000011 HC RX IP 250 OP 636: Performed by: EMERGENCY MEDICINE

## 2025-04-20 PROCEDURE — 96365 THER/PROPH/DIAG IV INF INIT: CPT

## 2025-04-20 PROCEDURE — 250N000013 HC RX MED GY IP 250 OP 250 PS 637: Performed by: INTERNAL MEDICINE

## 2025-04-20 PROCEDURE — 83880 ASSAY OF NATRIURETIC PEPTIDE: CPT | Performed by: EMERGENCY MEDICINE

## 2025-04-20 PROCEDURE — 89050 BODY FLUID CELL COUNT: CPT | Performed by: INTERNAL MEDICINE

## 2025-04-20 PROCEDURE — 71046 X-RAY EXAM CHEST 2 VIEWS: CPT

## 2025-04-20 PROCEDURE — 83735 ASSAY OF MAGNESIUM: CPT | Performed by: EMERGENCY MEDICINE

## 2025-04-20 PROCEDURE — 82962 GLUCOSE BLOOD TEST: CPT

## 2025-04-20 PROCEDURE — 82042 OTHER SOURCE ALBUMIN QUAN EA: CPT | Performed by: INTERNAL MEDICINE

## 2025-04-20 PROCEDURE — 80053 COMPREHEN METABOLIC PANEL: CPT | Performed by: EMERGENCY MEDICINE

## 2025-04-20 PROCEDURE — 82248 BILIRUBIN DIRECT: CPT | Performed by: EMERGENCY MEDICINE

## 2025-04-20 PROCEDURE — 83690 ASSAY OF LIPASE: CPT | Performed by: EMERGENCY MEDICINE

## 2025-04-20 PROCEDURE — 85025 COMPLETE CBC W/AUTO DIFF WBC: CPT | Performed by: EMERGENCY MEDICINE

## 2025-04-20 PROCEDURE — 99285 EMERGENCY DEPT VISIT HI MDM: CPT | Mod: 25

## 2025-04-20 PROCEDURE — G0378 HOSPITAL OBSERVATION PER HR: HCPCS

## 2025-04-20 PROCEDURE — 96375 TX/PRO/DX INJ NEW DRUG ADDON: CPT

## 2025-04-20 PROCEDURE — 84157 ASSAY OF PROTEIN OTHER: CPT | Performed by: EMERGENCY MEDICINE

## 2025-04-20 PROCEDURE — 36415 COLL VENOUS BLD VENIPUNCTURE: CPT | Performed by: EMERGENCY MEDICINE

## 2025-04-20 PROCEDURE — 87070 CULTURE OTHR SPECIMN AEROBIC: CPT | Performed by: EMERGENCY MEDICINE

## 2025-04-20 PROCEDURE — 93005 ELECTROCARDIOGRAM TRACING: CPT | Performed by: EMERGENCY MEDICINE

## 2025-04-20 PROCEDURE — 0W9G3ZX DRAINAGE OF PERITONEAL CAVITY, PERCUTANEOUS APPROACH, DIAGNOSTIC: ICD-10-PCS | Performed by: EMERGENCY MEDICINE

## 2025-04-20 PROCEDURE — 82042 OTHER SOURCE ALBUMIN QUAN EA: CPT | Performed by: EMERGENCY MEDICINE

## 2025-04-20 PROCEDURE — 99223 1ST HOSP IP/OBS HIGH 75: CPT | Performed by: INTERNAL MEDICINE

## 2025-04-20 PROCEDURE — 87205 SMEAR GRAM STAIN: CPT | Performed by: EMERGENCY MEDICINE

## 2025-04-20 PROCEDURE — 74177 CT ABD & PELVIS W/CONTRAST: CPT

## 2025-04-20 PROCEDURE — 250N000011 HC RX IP 250 OP 636: Performed by: INTERNAL MEDICINE

## 2025-04-20 PROCEDURE — 89050 BODY FLUID CELL COUNT: CPT | Performed by: EMERGENCY MEDICINE

## 2025-04-20 RX ORDER — ONDANSETRON 2 MG/ML
4 INJECTION INTRAMUSCULAR; INTRAVENOUS EVERY 6 HOURS PRN
Status: DISCONTINUED | OUTPATIENT
Start: 2025-04-20 | End: 2025-04-22 | Stop reason: HOSPADM

## 2025-04-20 RX ORDER — ALBUTEROL SULFATE 0.83 MG/ML
2.5 SOLUTION RESPIRATORY (INHALATION) EVERY 4 HOURS PRN
Status: DISCONTINUED | OUTPATIENT
Start: 2025-04-20 | End: 2025-04-22 | Stop reason: HOSPADM

## 2025-04-20 RX ORDER — PROCHLORPERAZINE MALEATE 10 MG
10 TABLET ORAL EVERY 6 HOURS PRN
Status: DISCONTINUED | OUTPATIENT
Start: 2025-04-20 | End: 2025-04-22 | Stop reason: HOSPADM

## 2025-04-20 RX ORDER — ROSUVASTATIN CALCIUM 10 MG/1
10 TABLET, COATED ORAL ONCE
Status: COMPLETED | OUTPATIENT
Start: 2025-04-20 | End: 2025-04-20

## 2025-04-20 RX ORDER — IOPAMIDOL 755 MG/ML
90 INJECTION, SOLUTION INTRAVASCULAR ONCE
Status: COMPLETED | OUTPATIENT
Start: 2025-04-20 | End: 2025-04-20

## 2025-04-20 RX ORDER — NICOTINE POLACRILEX 4 MG
15-30 LOZENGE BUCCAL
Status: DISCONTINUED | OUTPATIENT
Start: 2025-04-20 | End: 2025-04-21

## 2025-04-20 RX ORDER — ONDANSETRON 4 MG/1
4 TABLET, ORALLY DISINTEGRATING ORAL EVERY 6 HOURS PRN
Status: DISCONTINUED | OUTPATIENT
Start: 2025-04-20 | End: 2025-04-22 | Stop reason: HOSPADM

## 2025-04-20 RX ORDER — DEXTROSE MONOHYDRATE 25 G/50ML
25-50 INJECTION, SOLUTION INTRAVENOUS
Status: DISCONTINUED | OUTPATIENT
Start: 2025-04-20 | End: 2025-04-21

## 2025-04-20 RX ORDER — CEFTRIAXONE 1 G/1
1 INJECTION, POWDER, FOR SOLUTION INTRAMUSCULAR; INTRAVENOUS ONCE
Status: COMPLETED | OUTPATIENT
Start: 2025-04-20 | End: 2025-04-20

## 2025-04-20 RX ORDER — OLANZAPINE 10 MG/1
10 TABLET, FILM COATED ORAL AT BEDTIME
Status: DISCONTINUED | OUTPATIENT
Start: 2025-04-20 | End: 2025-04-21

## 2025-04-20 RX ORDER — FUROSEMIDE 10 MG/ML
40 INJECTION INTRAMUSCULAR; INTRAVENOUS ONCE
Status: COMPLETED | OUTPATIENT
Start: 2025-04-20 | End: 2025-04-20

## 2025-04-20 RX ORDER — TRAZODONE HYDROCHLORIDE 50 MG/1
100 TABLET ORAL ONCE
Status: COMPLETED | OUTPATIENT
Start: 2025-04-20 | End: 2025-04-20

## 2025-04-20 RX ORDER — CEFTRIAXONE 2 G/1
2 INJECTION, POWDER, FOR SOLUTION INTRAMUSCULAR; INTRAVENOUS EVERY 24 HOURS
Status: DISCONTINUED | OUTPATIENT
Start: 2025-04-21 | End: 2025-04-22 | Stop reason: HOSPADM

## 2025-04-20 RX ADMIN — FUROSEMIDE 40 MG: 10 INJECTION, SOLUTION INTRAMUSCULAR; INTRAVENOUS at 22:02

## 2025-04-20 RX ADMIN — APIXABAN 5 MG: 5 TABLET, FILM COATED ORAL at 22:00

## 2025-04-20 RX ADMIN — IOPAMIDOL 90 ML: 755 INJECTION, SOLUTION INTRAVENOUS at 17:48

## 2025-04-20 RX ADMIN — CEFTRIAXONE SODIUM 1 G: 1 INJECTION, POWDER, FOR SOLUTION INTRAMUSCULAR; INTRAVENOUS at 20:02

## 2025-04-20 RX ADMIN — ROSUVASTATIN 10 MG: 10 TABLET, FILM COATED ORAL at 22:06

## 2025-04-20 RX ADMIN — OLANZAPINE 10 MG: 5 TABLET, FILM COATED ORAL at 22:01

## 2025-04-20 RX ADMIN — TRAZODONE HYDROCHLORIDE 100 MG: 50 TABLET ORAL at 22:00

## 2025-04-20 ASSESSMENT — ACTIVITIES OF DAILY LIVING (ADL)
ADLS_ACUITY_SCORE: 54

## 2025-04-20 NOTE — ED TRIAGE NOTES
Pt arrives amb to triage with SOB and dyspnea on exertion. Pt has gained about 10lbs today, thinks he needs a paracentesis. Has standing orders for them. Some wheezes heard with exhales. Pt says he has a hx of drug and alcohol use and is due to check into rehab tomorrow. VSS in triage      Triage Assessment (Adult)       Row Name 04/20/25 0659          Triage Assessment    Airway WDL WDL        Respiratory WDL    Respiratory WDL X;rhythm/pattern     Rhythm/Pattern, Respiratory shortness of breath;dyspnea upon exertion

## 2025-04-20 NOTE — ED PROVIDER NOTES
EMERGENCY DEPARTMENT ENCOUNTER      NAME: Warren Jaramillo  AGE: 44 year old male  YOB: 1980  MRN: [id number]  EVALUATION DATE & TIME: 4/20/2025  4:20 PM    [facility]    ED PROVIDER: Teodora Zepeda M.D.      CHIEF COMPLAINT     Chief Complaint   Patient presents with    Shortness of Breath         FINAL IMPRESSION:     1. Other ascites          MEDICAL DECISION MAKING:     ED Course as of 04/21/25 0038   Sun Apr 20, 2025   1624 Mr. Jaramillo is a 45yo male with PMH significant for cirrhosis CHF diabetes portal hypertension obesity    Patient presents self from his group home.  He said he has gained 10 pounds today.  Feels like he needs a paracentesis.  Has not had one since March.  He denies any chest pain but he is feels that he is crackly.  He recently had an endoscopy because he has been choking and fainting.  He denies any fevers or chills no pain with urination no diarrhea no leg swelling.    Vitals  Initially hypertensive hypothermic repeated normotensive temperature is 98.8 is satting 98% on room air room evaluation.    Exam  Findings nontoxic well-appearing very distended abdomen is speaking in full sentences no respiratory distress no lower deformity edema.    I considered a final diagnosis based on the patient's symptoms included but no limited to SBP obstruction cystitis pneumonia electrolyte normalities among others.      1636 No evidence of respiratory distress.  Will start an IV.  Will check basic labs.  Will image abdomen.  Will do a diagnostic paracentesis and admit for therapeutic presidency cases given no respiratory distress   1647 EKG reveals sinus rhythm poor anterior progression normal axis   1712 Labs elevated white blood cell count of 12.6 normal hemoglobin.   1828 CT abdomen and pelvis no free air formal read by radiologist.   2316 Discussed with patient diagnostic paracentesis that he is in agreement.  Please see report.   Mon Apr 21, 2025    0034 I am familiar with patient and traditionally he has always have reveals predominance on his paracentesis fluids therefore he was admitted started on Rocephin.   0034 Clinical impression and decision making  44-year-old male history of cirrhosis presents here with abdominal distention states he had paracentesis.  On exam well-appearing no distress.  Fluid obtained for diagnostic studies.  Gram stain negative but predominance of neutrophils with criteria for SBP.  Started on Rocephin.  Admitted for further evaluation discussion with GI   0036 Absolute Neutrophils, Body Fluid(!!): 381.0           Medical Decision Making  I reviewed the EMR: Inpatient Record: 4/2/2025-4/5/2025 [facility]'s Admission for dysphagia and NSTEMI  Care impacted by Cirrhosis, Diabetes, Heart Disease, and Hypertension  I considered additional work up, including therapeutic paracentesis, but deferred unable to perform in the emergency department  I independently interpreted the ct abdomen and note ascites. See radiology report for final interpretation.  I discussed the care with another health care provider: Hospitalist  Admit.    MIPS (CTPE, Dental pain, Khan, Sinusitis, Asthma/COPD, Head Trauma): Not Applicable    SEPSIS: None            ED COURSE     4:26 PM Introduced myself to the patient, obtained history of present illness, and performed initial physical exam at this time.   7:43 PM I spoke with Hospitalist, Dr. Camargo, about patient's case. She accepts patient for admission at this time.    At the conclusion of the encounter I discussed the results of all of the tests and the disposition. The questions were answered. The patient acknowledged understanding and was agreeable with the care plan.         MEDICATIONS GIVEN IN THE EMERGENCY:     Medications   ondansetron (ZOFRAN ODT) ODT tab 4 mg (has no administration in time range)     Or   ondansetron (ZOFRAN) injection 4 mg (has no administration in time range)    prochlorperazine (COMPAZINE) injection 10 mg (has no administration in time range)     Or   prochlorperazine (COMPAZINE) tablet 10 mg (has no administration in time range)   Patient is already receiving anticoagulation with heparin, enoxaparin (LOVENOX), warfarin (COUMADIN)  or other anticoagulant medication (has no administration in time range)   glucose gel 15-30 g (has no administration in time range)     Or   dextrose 50 % injection 25-50 mL (has no administration in time range)     Or   glucagon injection 1 mg (has no administration in time range)   insulin aspart (NovoLOG) injection (RAPID ACTING) (has no administration in time range)   insulin aspart (NovoLOG) injection (RAPID ACTING) ( Subcutaneous Not Given 4/20/25 2152)   OLANZapine (zyPREXA) tablet 10 mg (10 mg Oral $Given 4/20/25 2201)   albuterol (PROVENTIL) neb solution 2.5 mg (has no administration in time range)   cefTRIAXone (ROCEPHIN) 2 g vial to attach to  ml bag for ADULTS or NS 50 ml bag for PEDS (has no administration in time range)   iopamidol (ISOVUE-370) solution 90 mL (90 mLs Intravenous $Given 4/20/25 1748)   cefTRIAXone (ROCEPHIN) 1 g vial to attach to  mL bag for ADULTS or NS 50 mL bag for PEDS (0 g Intravenous Stopped 4/20/25 2038)   apixaban ANTICOAGULANT (ELIQUIS) tablet 5 mg (5 mg Oral $Given 4/20/25 2200)   furosemide (LASIX) injection 40 mg (40 mg Intravenous $Given 4/20/25 2202)   rosuvastatin (CRESTOR) tablet 10 mg (10 mg Oral $Given 4/20/25 2206)   traZODone (DESYREL) tablet 100 mg (100 mg Oral $Given 4/20/25 2200)       NEW PRESCRIPTIONS STARTED AT TODAY'S ER VISIT     New Prescriptions    No medications on file          =================================================================    HPI     Patient information was obtained from: Patient     Use of : N/A         Warren CHILEL Clint is a 44 year old male who presents by walk-in for evaluation of shortness of breath.     Patient reports new onset  of shortness of breath today which he believes is associated with his cirrhosis. Patient states that he has gained 10 lbs since yesterday and thinks he needs paracentesis. His last paracentesis was in March 2025. He denies chest pain, but endorses a crackly feeling in his chest. He recently had an endoscopy for choking causing syncopal episodes after eating a hamburger. No diarrhea, dysuria, hematuria, fever, chills, new cough, or lower extremity edema. No other symptoms, complaints, or concerns at this time.     Per chart review:  4/2/2025-4/5/2025 Patient was admitted to [facility] for dysphagia with syncope secondary to choking/hypoxia and NSTEMI. SLP consulted for choking/hypoxia who recommended to be on easy to chew textures and thin liquids. NSTEMI due to hypoxia in the setting of choking. Initially on heparin drip, discontinued 4/3. EKG 4/2: Sinus rhythm, anteroseptal infarct that is present on prior EKGs. Echo 4/3: LVEF 60 to 65%, difficult to see RV, no obvious valvular abnormalities. History of cirrhosis with ascites and SBP portal hypertension who currently follows with [facility]. GI consulted for EGD 4/4: Portal hypertension gastropathy. Patient discharged home in stable condition with no new medications.     REVIEW OF SYSTEMS   Review of Systems   SEE HPI    PAST MEDICAL HISTORY:     Past Medical History:   Diagnosis Date    COPD exacerbation (H) 12/02/2024    DM2 (diabetes mellitus, type 2) (H) 04/28/2020    HTN (hypertension) 07/30/2012    Sleep apnea     Thyroid nodule 07/31/2019    Rojas's disease (H)        PAST SURGICAL HISTORY:     Past Surgical History:   Procedure Laterality Date    COLONOSCOPY      ESOPHAGOSCOPY, GASTROSCOPY, DUODENOSCOPY (EGD), COMBINED N/A 7/21/2023    Procedure: ESOPHAGOGASTRODUODENOSCOPY WITH GASTRIC AND ESOPHAGEAL BIOPSIES;  Surgeon: Filiberto Aragon MD;  Location: [facility] OR    ESOPHAGOSCOPY, GASTROSCOPY, DUODENOSCOPY (EGD), COMBINED N/A  4/4/2025    Procedure: ESOPHAGOGASTRODUODENOSCOPY;  Surgeon: Ramesh Talbot MD;  Location: [facility] [place]    TOOTH EXTRACTION           CURRENT MEDICATIONS:   albuterol (PROAIR HFA/PROVENTIL HFA/VENTOLIN HFA) 108 (90 Base) MCG/ACT inhaler  albuterol (PROVENTIL) (2.5 MG/3ML) 0.083% neb solution  aloe vera GEL  apixaban ANTICOAGULANT (ELIQUIS) 5 MG tablet  bacitracin 500 UNIT/GM OINT  Benzocaine (SOLARCAINE ALOE VERA EX)  benzonatate (TESSALON) 200 MG capsule  Calamine external lotion  carbamide peroxide (DEBROX) 6.5 % otic solution  clotrimazole (LOTRIMIN) 1 % external cream  dextromethorphan-guaiFENesin (MUCINEX DM)  MG 12 hr tablet  diclofenac (VOLTAREN) 1 % topical gel  famotidine (PEPCID) 20 MG tablet  FLUoxetine 20 MG tablet  furosemide (LASIX) 40 MG tablet  GAVILAX 17 GM/SCOOP powder  hydrocortisone (CORTAID) 1 % external cream  Hydrocortisone (PREPARATION H EX)  JARDIANCE 10 MG TABS tablet  levothyroxine (SYNTHROID/LEVOTHROID) 25 MCG tablet  lisinopril (ZESTRIL) 10 MG tablet  loperamide (IMODIUM) 2 MG capsule  loratadine (CLARITIN) 10 MG tablet  magnesium hydroxide (MILK OF MAGNESIA) 400 MG/5ML suspension  metFORMIN (GLUCOPHAGE) 1000 MG tablet  methocarbamol (ROBAXIN) 500 MG tablet  montelukast (SINGULAIR) 10 MG tablet  OLANZapine (ZYPREXA) 10 MG tablet  omeprazole (PRILOSEC) 40 MG DR capsule  ondansetron (ZOFRAN ODT) 4 MG ODT tab  pramoxine-calamine (AVEENO) 1-8 % LOTN  rosuvastatin (CRESTOR) 10 MG tablet  spironolactone (ALDACTONE) 100 MG tablet  sulfamethoxazole-trimethoprim (BACTRIM DS) 800-160 MG tablet  traZODone (DESYREL) 100 MG tablet  TRELEGY ELLIPTA 100-62.5-25 MCG/ACT oral inhaler  Urea 40 % CREA  Vitamins A & D (VITAMIN A & D) OINT  witch hazel-glycerin (TUCKS) pad  Continuous Glucose Sensor (FREESTYLE MEGHANN 3 PLUS SENSOR) MISC  EPINEPHrine (ANY BX GENERIC EQUIV) 0.3 MG/0.3ML injection 2-pack         ALLERGIES:     Allergies   Allergen Reactions    Apricot Flavoring Agent  (Non-Screening) Anaphylaxis    Banana Anaphylaxis     Throat swelling      Bees Anaphylaxis     Has an Epi pen    Wasp Venom Protein Shortness Of Breath     Other reaction(s): Respiratory Distress  Has an Epi pen        Methylphenidate Itching     Other reaction(s): Nightmares    Prunus      Other reaction(s): *Unknown    Sulfa Antibiotics      Headaches and nausea       FAMILY HISTORY:     Family History   Problem Relation Age of Onset    Unknown/Adopted Father     Unknown/Adopted Maternal Grandmother     C.A.D. Maternal Grandfather     Diabetes Maternal Grandfather     Cerebrovascular Disease Maternal Grandfather     Unknown/Adopted Paternal Grandmother     Unknown/Adopted Paternal Grandfather     Unknown/Adopted Brother     Unknown/Adopted Sister        SOCIAL HISTORY:     Social History     Socioeconomic History    Marital status: Single   Tobacco Use    Smoking status: Every Day     Current packs/day: 1.00     Average packs/day: 1 pack/day for 21.3 years (21.3 ttl pk-yrs)     Types: Cigarettes     Start date: 1/1/2004    Smokeless tobacco: Never   Vaping Use    Vaping status: Never Used   Substance and Sexual Activity    Alcohol use: Not Currently     Comment: Last 8/7/2024    Sexual activity: Never     Partners: Female   Other Topics Concern     Service No    Blood Transfusions No    Caffeine Concern No    Occupational Exposure No    Hobby Hazards No    Sleep Concern No    Stress Concern Yes     Comment: sometimes    Weight Concern No    Special Diet Yes     Comment: counting carbs    Back Care No    Exercise Yes    Seat Belt Yes    Self-Exams Yes     Social Drivers of Health     Financial Resource Strain: Low Risk  (4/4/2025)    Financial Resource Strain     Within the past 12 months, have you or your family members you live with been unable to get utilities (heat, electricity) when it was really needed?: No   Food Insecurity: Low Risk  (4/4/2025)    Food Insecurity     Within the past 12 months, did  "you worry that your food would run out before you got money to buy more?: No     Within the past 12 months, did the food you bought just not last and you didn t have money to get more?: No   Transportation Needs: Low Risk  (4/4/2025)    Transportation Needs     Within the past 12 months, has lack of transportation kept you from medical appointments, getting your medicines, non-medical meetings or appointments, work, or from getting things that you need?: No    Received from [facility], [facility]    Social Connections   Interpersonal Safety: Low Risk  (4/4/2025)    Interpersonal Safety     Do you feel physically and emotionally safe where you currently live?: Yes     Within the past 12 months, have you been hit, slapped, kicked or otherwise physically hurt by someone?: No     Within the past 12 months, have you been humiliated or emotionally abused in other ways by your partner or ex-partner?: No   Recent Concern: Interpersonal Safety - High Risk (1/11/2025)    Interpersonal Safety     Do you feel physically and emotionally safe where you currently live?: No     Within the past 12 months, have you been hit, slapped, kicked or otherwise physically hurt by someone?: No     Within the past 12 months, have you been humiliated or emotionally abused in other ways by your partner or ex-partner?: No   Housing Stability: Low Risk  (4/4/2025)    Housing Stability     Do you have housing? : Yes     Are you worried about losing your housing?: No       VITALS:   BP (!) 131/93   Pulse 89   Temp 98.8  F (37.1  C) (Oral)   Resp 20   Ht 1.651 m (5' 5\")   Wt 132.5 kg (292 lb 3.2 oz)   SpO2 96%   BMI 48.62 kg/m      PHYSICAL EXAM     Physical Exam  Vitals and nursing note reviewed.   Constitutional:       Appearance: He is obese. He is not ill-appearing.   Abdominal:      Comments: Distended         Physical Exam   Constitutional: Non-toxic appearing. " Chronically ill. No distress.     Head: Atraumatic.     Nose: Nose normal.     Mouth/Throat: Oropharynx is clear and moist.     Eyes: EOM are normal. Pupils are equal, round, and reactive to light.     Cardiovascular: Normal rate, regular rhythm and normal heart sounds.  2+ femoral pulses/radial/DP pulses B    Pulmonary/Chest: Normal effort  and breath sounds normal.     Abdominal: soft nontender. Abdomen distended.     Musculoskeletal: Normal range of motion.     Neurological: Moves upper and lower extremities equally.    Lymphatics: no edema, no calves pain, no palpable cords.    : NA    Skin: Skin is warm and dry.     Psychiatric: Normal mood and affect. Behavior is normal.  Very talkative in no distress      LAB:     All pertinent labs reviewed and interpreted.  Labs Ordered and Resulted from Time of ED Arrival to Time of ED Departure   BASIC METABOLIC PANEL - Abnormal       Result Value    Sodium 138      Potassium 4.1      Chloride 101      Carbon Dioxide (CO2) 23      Anion Gap 14      Urea Nitrogen 18.2      Creatinine 1.22 (*)     GFR Estimate 75      Calcium 9.7      Glucose 134 (*)    HEPATIC FUNCTION PANEL - Abnormal    Protein Total 7.2      Albumin 4.2      Bilirubin Total 0.3      Alkaline Phosphatase 218 (*)     AST 18      ALT 22      Bilirubin Direct 0.17     NT PROBNP INPATIENT - Abnormal    N terminal Pro BNP Inpatient 765 (*)    CBC WITH PLATELETS AND DIFFERENTIAL - Abnormal    WBC Count 12.6 (*)     RBC Count 4.47      Hemoglobin 12.8 (*)     Hematocrit 40.0      MCV 90      MCH 28.6      MCHC 32.0      RDW 15.1 (*)     Platelet Count 300      % Neutrophils 67      % Lymphocytes 17      % Monocytes 11      % Eosinophils 4      % Basophils 1      % Immature Granulocytes 1      NRBCs per 100 WBC 0      Absolute Neutrophils 8.5 (*)     Absolute Lymphocytes 2.1      Absolute Monocytes 1.3      Absolute Eosinophils 0.6      Absolute Basophils 0.1      Absolute Immature Granulocytes 0.1       Absolute NRBCs 0.0     CELL COUNT BODY FLUID - Abnormal    Color Yellow      Clarity Cloudy (*)     Cell Count Fluid Source Abdomen      Total Nucleated Cells 1,524     DIFERENTIAL BODY FLUID - Abnormal    % Neutrophils 25      % Lymphocytes 53      % Monocyte/Macrophages 15      % Lining Cells 6      % Other Cells 1      Absolute Neutrophils, Body Fluid 381.0 (*)    GLUCOSE BY METER - Abnormal    GLUCOSE BY METER POCT 172 (*)    LIPASE - Normal    Lipase 27     MAGNESIUM - Normal    Magnesium 1.7     ALBUMIN FLUID   PROTEIN FLUID   GLUCOSE MONITOR NURSING POCT   GLUCOSE MONITOR NURSING POCT   COMPREHENSIVE METABOLIC PANEL   CBC WITH PLATELETS   GLUCOSE MONITOR NURSING POCT   GLUCOSE MONITOR NURSING POCT   GLUCOSE MONITOR NURSING POCT   GLUCOSE MONITOR NURSING POCT   AEROBIC BACTERIAL CULTURE ROUTINE    Gram Stain Result No organisms seen      Gram Stain Result 3+ WBC seen     CELL COUNT WITH DIFFERENTIAL FLUID        RADIOLOGY:     Reviewed all pertinent imaging. Please see official radiology report.  Chest XR,  PA & LAT   Final Result   IMPRESSION:       No focal airspace disease. No pleural effusion or pneumothorax.      Stable borderline cardiomegaly.      Multilevel degenerative changes of the spine.      CT Abdomen Pelvis w Contrast   Final Result   IMPRESSION:    1.  No acute abnormality in the abdomen or pelvis.   2.  Unchanged cirrhotic liver morphology and findings suggesting portal hypertension with small volume ascites.      POC US ABDOMEN LIMITED   ED Interpretation   Ascites           EKG:     EKG #1  Sinus rhythm poor anterior progression normal axis    Time:[id number]    Ventricular rate 91 bmp  Axis normal  UT interval 192 ms  QRS duration 114 ms  QT//462 ms    Compared to previous EKG on April 2, 2025 no significant changes.  I have independently reviewed and interpreted the EKG(s) documented above.      PROCEDURES:     PROCEDURE: Paracentesis   INDICATIONS: Ascites   PROCEDURE PROVIDER: Dr Araiza  Duane   CONSENT: Risks, benefits and alternatives were discussed with and Written consent was obtained from Patient.   TIME OUT: Universal protocol was followed. TIME OUT conducted just prior to starting procedure confirmed patient identity, site/side, procedure, patient position, and availability of correct equipment. Yes   SITE: Right lower quadrant   MEDICATIONS:  10 mLs of 1% Lidocaine without epinephrine   NOTE: The area was prepped with chlorhexidine and draped off in the usual sterile fashion.  The joint was entered with an 18 gauge needle and 15 ml of fluid was withdrawn. The fluid was straw colored, clear. The needle was then withdrawn and a dressing was applied.   COMPLICATIONS: Patient tolerated procedure well, without complication              Procedures  I, Neelima Vallejo, am serving as a scribe to document services personally performed by Dr. Zepeda based on my observation and the provider's statements to me. I, Teodora Zepeda MD attest that Neelima Vallejo is acting in a scribe capacity, has observed my performance of the services and has documented them in accordance with my direction.    Teodora Zepeda M.D.  Emergency Medicine  [facility] EMERGENCY DEPARTMENT  [facility] [facility] [id number] [phone]  Dept: [phone]       Teodora Zepeda MD  04/21/25 0038

## 2025-04-21 ENCOUNTER — APPOINTMENT (OUTPATIENT)
Dept: ULTRASOUND IMAGING | Facility: HOSPITAL | Age: 45
DRG: 371 | End: 2025-04-21
Attending: INTERNAL MEDICINE
Payer: COMMERCIAL

## 2025-04-21 ENCOUNTER — APPOINTMENT (OUTPATIENT)
Dept: PHYSICAL THERAPY | Facility: HOSPITAL | Age: 45
DRG: 371 | End: 2025-04-21
Attending: INTERNAL MEDICINE
Payer: COMMERCIAL

## 2025-04-21 LAB
% LINING CELLS, BODY FLUID: 6 %
ALBUMIN BODY FLUID SOURCE: NORMAL
ALBUMIN BODY FLUID SOURCE: NORMAL
ALBUMIN FLD-MCNC: 3.4 G/DL
ALBUMIN FLD-MCNC: 3.4 G/DL
ALBUMIN SERPL BCG-MCNC: 4.3 G/DL (ref 3.5–5.2)
ALP SERPL-CCNC: 204 U/L (ref 40–150)
ALT SERPL W P-5'-P-CCNC: 21 U/L (ref 0–70)
ANION GAP SERPL CALCULATED.3IONS-SCNC: 13 MMOL/L (ref 7–15)
APPEARANCE FLD: ABNORMAL
AST SERPL W P-5'-P-CCNC: 16 U/L (ref 0–45)
BILIRUB SERPL-MCNC: 0.5 MG/DL
BUN SERPL-MCNC: 19.9 MG/DL (ref 6–20)
CALCIUM SERPL-MCNC: 9.6 MG/DL (ref 8.8–10.4)
CHLORIDE SERPL-SCNC: 101 MMOL/L (ref 98–107)
COLOR FLD: YELLOW
CREAT SERPL-MCNC: 1.14 MG/DL (ref 0.67–1.17)
EGFRCR SERPLBLD CKD-EPI 2021: 81 ML/MIN/1.73M2
ERYTHROCYTE [DISTWIDTH] IN BLOOD BY AUTOMATED COUNT: 15.4 % (ref 10–15)
GLUCOSE BLDC GLUCOMTR-MCNC: 102 MG/DL (ref 70–99)
GLUCOSE BLDC GLUCOMTR-MCNC: 137 MG/DL (ref 70–99)
GLUCOSE BLDC GLUCOMTR-MCNC: 149 MG/DL (ref 70–99)
GLUCOSE BLDC GLUCOMTR-MCNC: 166 MG/DL (ref 70–99)
GLUCOSE SERPL-MCNC: 142 MG/DL (ref 70–99)
HCO3 SERPL-SCNC: 25 MMOL/L (ref 22–29)
HCT VFR BLD AUTO: 40.4 % (ref 40–53)
HGB BLD-MCNC: 13.1 G/DL (ref 13.3–17.7)
LYMPHOCYTES NFR FLD MANUAL: 53 %
MAGNESIUM SERPL-MCNC: 1.8 MG/DL (ref 1.7–2.3)
MCH RBC QN AUTO: 28.9 PG (ref 26.5–33)
MCHC RBC AUTO-ENTMCNC: 32.4 G/DL (ref 31.5–36.5)
MCV RBC AUTO: 89 FL (ref 78–100)
MONOS+MACROS NFR FLD MANUAL: 15 %
NEUTROPHILS # FLD: 381 /UL
NEUTS BAND NFR FLD MANUAL: 25 %
OTHER CELLS FLD MANUAL: 1 %
PATH REV: ABNORMAL
PLATELET # BLD AUTO: 301 10E3/UL (ref 150–450)
POTASSIUM SERPL-SCNC: 4.1 MMOL/L (ref 3.4–5.3)
PROT FLD-MCNC: 5 G/DL
PROT FLD-MCNC: 5.1 G/DL
PROT SERPL-MCNC: 7.2 G/DL (ref 6.4–8.3)
PROTEIN BODY FLUID SOURCE: NORMAL
PROTEIN BODY FLUID SOURCE: NORMAL
RBC # BLD AUTO: 4.53 10E6/UL (ref 4.4–5.9)
SODIUM SERPL-SCNC: 139 MMOL/L (ref 135–145)
SPECIMEN SOURCE FLD: ABNORMAL
TROPONIN T SERPL HS-MCNC: 23 NG/L
WBC # BLD AUTO: 11.6 10E3/UL (ref 4–11)
WBC # FLD AUTO: 1524 /UL

## 2025-04-21 PROCEDURE — 96366 THER/PROPH/DIAG IV INF ADDON: CPT

## 2025-04-21 PROCEDURE — 80053 COMPREHEN METABOLIC PANEL: CPT | Performed by: INTERNAL MEDICINE

## 2025-04-21 PROCEDURE — 97161 PT EVAL LOW COMPLEX 20 MIN: CPT | Mod: GP

## 2025-04-21 PROCEDURE — 36415 COLL VENOUS BLD VENIPUNCTURE: CPT | Performed by: INTERNAL MEDICINE

## 2025-04-21 PROCEDURE — 83735 ASSAY OF MAGNESIUM: CPT | Performed by: INTERNAL MEDICINE

## 2025-04-21 PROCEDURE — 96376 TX/PRO/DX INJ SAME DRUG ADON: CPT

## 2025-04-21 PROCEDURE — 82962 GLUCOSE BLOOD TEST: CPT

## 2025-04-21 PROCEDURE — 96367 TX/PROPH/DG ADDL SEQ IV INF: CPT

## 2025-04-21 PROCEDURE — 250N000011 HC RX IP 250 OP 636: Performed by: INTERNAL MEDICINE

## 2025-04-21 PROCEDURE — 87070 CULTURE OTHR SPECIMN AEROBIC: CPT | Performed by: INTERNAL MEDICINE

## 2025-04-21 PROCEDURE — P9047 ALBUMIN (HUMAN), 25%, 50ML: HCPCS | Performed by: INTERNAL MEDICINE

## 2025-04-21 PROCEDURE — 85041 AUTOMATED RBC COUNT: CPT | Performed by: INTERNAL MEDICINE

## 2025-04-21 PROCEDURE — 0W9G3ZZ DRAINAGE OF PERITONEAL CAVITY, PERCUTANEOUS APPROACH: ICD-10-PCS | Performed by: RADIOLOGY

## 2025-04-21 PROCEDURE — 85018 HEMOGLOBIN: CPT | Performed by: INTERNAL MEDICINE

## 2025-04-21 PROCEDURE — 84484 ASSAY OF TROPONIN QUANT: CPT | Performed by: INTERNAL MEDICINE

## 2025-04-21 PROCEDURE — 120N000001 HC R&B MED SURG/OB

## 2025-04-21 PROCEDURE — 272N000042 US PARACENTESIS WITH ALBUMIN

## 2025-04-21 PROCEDURE — 250N000013 HC RX MED GY IP 250 OP 250 PS 637: Performed by: INTERNAL MEDICINE

## 2025-04-21 PROCEDURE — 272N000710 US PARACENTESIS WITH ALBUMIN

## 2025-04-21 PROCEDURE — G0378 HOSPITAL OBSERVATION PER HR: HCPCS

## 2025-04-21 PROCEDURE — 5A09357 ASSISTANCE WITH RESPIRATORY VENTILATION, LESS THAN 24 CONSECUTIVE HOURS, CONTINUOUS POSITIVE AIRWAY PRESSURE: ICD-10-PCS | Performed by: INTERNAL MEDICINE

## 2025-04-21 PROCEDURE — 999N000157 HC STATISTIC RCP TIME EA 10 MIN

## 2025-04-21 PROCEDURE — 97116 GAIT TRAINING THERAPY: CPT | Mod: GP

## 2025-04-21 RX ORDER — PANTOPRAZOLE SODIUM 40 MG/1
40 TABLET, DELAYED RELEASE ORAL
Status: DISCONTINUED | OUTPATIENT
Start: 2025-04-21 | End: 2025-04-22 | Stop reason: HOSPADM

## 2025-04-21 RX ORDER — METHOCARBAMOL 500 MG/1
500 TABLET, FILM COATED ORAL 4 TIMES DAILY PRN
Status: DISCONTINUED | OUTPATIENT
Start: 2025-04-21 | End: 2025-04-22 | Stop reason: HOSPADM

## 2025-04-21 RX ORDER — ALBUTEROL SULFATE 90 UG/1
1-2 INHALANT RESPIRATORY (INHALATION) EVERY 6 HOURS PRN
Status: DISCONTINUED | OUTPATIENT
Start: 2025-04-21 | End: 2025-04-22 | Stop reason: HOSPADM

## 2025-04-21 RX ORDER — SPIRONOLACTONE 25 MG/1
100 TABLET ORAL DAILY
Status: DISCONTINUED | OUTPATIENT
Start: 2025-04-21 | End: 2025-04-22 | Stop reason: HOSPADM

## 2025-04-21 RX ORDER — ROSUVASTATIN CALCIUM 10 MG/1
10 TABLET, COATED ORAL AT BEDTIME
Status: DISCONTINUED | OUTPATIENT
Start: 2025-04-21 | End: 2025-04-22 | Stop reason: HOSPADM

## 2025-04-21 RX ORDER — MONTELUKAST SODIUM 10 MG/1
10 TABLET ORAL EVERY MORNING
Status: DISCONTINUED | OUTPATIENT
Start: 2025-04-21 | End: 2025-04-22 | Stop reason: HOSPADM

## 2025-04-21 RX ORDER — LEVOTHYROXINE SODIUM 25 UG/1
12.5 TABLET ORAL
COMMUNITY

## 2025-04-21 RX ORDER — MAGNESIUM SULFATE HEPTAHYDRATE 40 MG/ML
2 INJECTION, SOLUTION INTRAVENOUS ONCE
Status: COMPLETED | OUTPATIENT
Start: 2025-04-21 | End: 2025-04-21

## 2025-04-21 RX ORDER — NICOTINE POLACRILEX 4 MG
15-30 LOZENGE BUCCAL
Status: DISCONTINUED | OUTPATIENT
Start: 2025-04-21 | End: 2025-04-22 | Stop reason: HOSPADM

## 2025-04-21 RX ORDER — OLANZAPINE 5 MG/1
10 TABLET, FILM COATED ORAL AT BEDTIME
Status: DISCONTINUED | OUTPATIENT
Start: 2025-04-21 | End: 2025-04-22 | Stop reason: HOSPADM

## 2025-04-21 RX ORDER — FLUTICASONE FUROATE AND VILANTEROL 100; 25 UG/1; UG/1
1 POWDER RESPIRATORY (INHALATION) DAILY
Status: DISCONTINUED | OUTPATIENT
Start: 2025-04-21 | End: 2025-04-22 | Stop reason: HOSPADM

## 2025-04-21 RX ORDER — BENZONATATE 100 MG/1
200 CAPSULE ORAL 3 TIMES DAILY PRN
Status: DISCONTINUED | OUTPATIENT
Start: 2025-04-21 | End: 2025-04-22 | Stop reason: HOSPADM

## 2025-04-21 RX ORDER — LISINOPRIL 5 MG/1
10 TABLET ORAL EVERY MORNING
Status: DISCONTINUED | OUTPATIENT
Start: 2025-04-21 | End: 2025-04-22 | Stop reason: HOSPADM

## 2025-04-21 RX ORDER — FUROSEMIDE 10 MG/ML
20 INJECTION INTRAMUSCULAR; INTRAVENOUS
Status: COMPLETED | OUTPATIENT
Start: 2025-04-21 | End: 2025-04-21

## 2025-04-21 RX ORDER — FUROSEMIDE 20 MG/1
40 TABLET ORAL DAILY
Status: DISCONTINUED | OUTPATIENT
Start: 2025-04-21 | End: 2025-04-21

## 2025-04-21 RX ORDER — ALBUTEROL SULFATE 0.83 MG/ML
2.5 SOLUTION RESPIRATORY (INHALATION) 3 TIMES DAILY PRN
Status: DISCONTINUED | OUTPATIENT
Start: 2025-04-21 | End: 2025-04-21

## 2025-04-21 RX ORDER — POLYETHYLENE GLYCOL 3350 17 G/17G
17 POWDER, FOR SOLUTION ORAL DAILY PRN
Status: DISCONTINUED | OUTPATIENT
Start: 2025-04-21 | End: 2025-04-22 | Stop reason: HOSPADM

## 2025-04-21 RX ORDER — SULFAMETHOXAZOLE AND TRIMETHOPRIM 800; 160 MG/1; MG/1
1 TABLET ORAL DAILY
Status: DISCONTINUED | OUTPATIENT
Start: 2025-04-21 | End: 2025-04-22 | Stop reason: HOSPADM

## 2025-04-21 RX ORDER — LOPERAMIDE HYDROCHLORIDE 2 MG/1
4 CAPSULE ORAL 4 TIMES DAILY PRN
Status: DISCONTINUED | OUTPATIENT
Start: 2025-04-21 | End: 2025-04-22 | Stop reason: HOSPADM

## 2025-04-21 RX ORDER — TRAZODONE HYDROCHLORIDE 50 MG/1
100 TABLET ORAL AT BEDTIME
Status: DISCONTINUED | OUTPATIENT
Start: 2025-04-21 | End: 2025-04-22 | Stop reason: HOSPADM

## 2025-04-21 RX ORDER — FAMOTIDINE 20 MG/1
20 TABLET, FILM COATED ORAL 2 TIMES DAILY
Status: DISCONTINUED | OUTPATIENT
Start: 2025-04-21 | End: 2025-04-22 | Stop reason: HOSPADM

## 2025-04-21 RX ORDER — DEXTROSE MONOHYDRATE 25 G/50ML
25-50 INJECTION, SOLUTION INTRAVENOUS
Status: DISCONTINUED | OUTPATIENT
Start: 2025-04-21 | End: 2025-04-22 | Stop reason: HOSPADM

## 2025-04-21 RX ORDER — GUAIFENESIN AND DEXTROMETHORPHAN HYDROBROMIDE 600; 30 MG/1; MG/1
1 TABLET, EXTENDED RELEASE ORAL 2 TIMES DAILY PRN
Status: DISCONTINUED | OUTPATIENT
Start: 2025-04-21 | End: 2025-04-22 | Stop reason: HOSPADM

## 2025-04-21 RX ORDER — OMEPRAZOLE 20 MG/1
40 CAPSULE, DELAYED RELEASE ORAL EVERY MORNING
Status: DISCONTINUED | OUTPATIENT
Start: 2025-04-21 | End: 2025-04-21

## 2025-04-21 RX ORDER — ALBUMIN (HUMAN) 12.5 G/50ML
25-50 SOLUTION INTRAVENOUS ONCE
Status: COMPLETED | OUTPATIENT
Start: 2025-04-21 | End: 2025-04-21

## 2025-04-21 RX ORDER — FUROSEMIDE 20 MG/1
40 TABLET ORAL DAILY
Status: DISCONTINUED | OUTPATIENT
Start: 2025-04-22 | End: 2025-04-22 | Stop reason: HOSPADM

## 2025-04-21 RX ORDER — LORATADINE 10 MG/1
10 TABLET ORAL DAILY PRN
Status: DISCONTINUED | OUTPATIENT
Start: 2025-04-21 | End: 2025-04-22 | Stop reason: HOSPADM

## 2025-04-21 RX ORDER — EPINEPHRINE 1 MG/ML
0.3 INJECTION, SOLUTION INTRAMUSCULAR; SUBCUTANEOUS
Status: DISCONTINUED | OUTPATIENT
Start: 2025-04-21 | End: 2025-04-22 | Stop reason: HOSPADM

## 2025-04-21 RX ADMIN — CEFTRIAXONE SODIUM 2 G: 2 INJECTION, POWDER, FOR SOLUTION INTRAMUSCULAR; INTRAVENOUS at 20:14

## 2025-04-21 RX ADMIN — FAMOTIDINE 20 MG: 20 TABLET, FILM COATED ORAL at 08:02

## 2025-04-21 RX ADMIN — MAGNESIUM SULFATE HEPTAHYDRATE 2 G: 2 INJECTION, SOLUTION INTRAVENOUS at 10:33

## 2025-04-21 RX ADMIN — UMECLIDINIUM 1 PUFF: 62.5 AEROSOL, POWDER ORAL at 08:11

## 2025-04-21 RX ADMIN — ALBUMIN HUMAN 25 G: 0.25 SOLUTION INTRAVENOUS at 15:40

## 2025-04-21 RX ADMIN — LISINOPRIL 10 MG: 5 TABLET ORAL at 08:05

## 2025-04-21 RX ADMIN — APIXABAN 5 MG: 5 TABLET, FILM COATED ORAL at 08:02

## 2025-04-21 RX ADMIN — FLUTICASONE FUROATE AND VILANTEROL TRIFENATATE 1 PUFF: 100; 25 POWDER RESPIRATORY (INHALATION) at 08:11

## 2025-04-21 RX ADMIN — ROSUVASTATIN 10 MG: 10 TABLET, FILM COATED ORAL at 22:00

## 2025-04-21 RX ADMIN — METHOCARBAMOL 500 MG: 500 TABLET ORAL at 22:05

## 2025-04-21 RX ADMIN — OLANZAPINE 10 MG: 10 TABLET, FILM COATED ORAL at 22:00

## 2025-04-21 RX ADMIN — APIXABAN 5 MG: 5 TABLET, FILM COATED ORAL at 20:16

## 2025-04-21 RX ADMIN — FUROSEMIDE 20 MG: 10 INJECTION, SOLUTION INTRAMUSCULAR; INTRAVENOUS at 15:42

## 2025-04-21 RX ADMIN — FLUOXETINE HYDROCHLORIDE 20 MG: 20 CAPSULE ORAL at 08:09

## 2025-04-21 RX ADMIN — FAMOTIDINE 20 MG: 20 TABLET, FILM COATED ORAL at 20:16

## 2025-04-21 RX ADMIN — SPIRONOLACTONE 100 MG: 25 TABLET, FILM COATED ORAL at 08:00

## 2025-04-21 RX ADMIN — EMPAGLIFLOZIN 10 MG: 10 TABLET, FILM COATED ORAL at 08:06

## 2025-04-21 RX ADMIN — SULFAMETHOXAZOLE AND TRIMETHOPRIM 1 TABLET: 800; 160 TABLET ORAL at 08:02

## 2025-04-21 RX ADMIN — PANTOPRAZOLE SODIUM 40 MG: 20 TABLET, DELAYED RELEASE ORAL at 15:47

## 2025-04-21 RX ADMIN — LEVOTHYROXINE SODIUM 6.25 MCG: 0.03 TABLET ORAL at 07:30

## 2025-04-21 RX ADMIN — MONTELUKAST 10 MG: 10 TABLET, FILM COATED ORAL at 08:06

## 2025-04-21 RX ADMIN — TRAZODONE HYDROCHLORIDE 100 MG: 50 TABLET ORAL at 22:00

## 2025-04-21 RX ADMIN — LEVOTHYROXINE SODIUM 12.5 MCG: 0.03 TABLET ORAL at 08:08

## 2025-04-21 RX ADMIN — PANTOPRAZOLE SODIUM 40 MG: 20 TABLET, DELAYED RELEASE ORAL at 08:02

## 2025-04-21 RX ADMIN — FUROSEMIDE 20 MG: 10 INJECTION, SOLUTION INTRAMUSCULAR; INTRAVENOUS at 08:11

## 2025-04-21 ASSESSMENT — ACTIVITIES OF DAILY LIVING (ADL)
ADLS_ACUITY_SCORE: 59
ADLS_ACUITY_SCORE: 54
ADLS_ACUITY_SCORE: 59
ADLS_ACUITY_SCORE: 54

## 2025-04-21 ASSESSMENT — ENCOUNTER SYMPTOMS: SHORTNESS OF BREATH: 1

## 2025-04-21 NOTE — PROGRESS NOTES
[facility]    Medicine Progress Note - Hospitalist Service    Date of Admission:  4/20/2025    Assessment & Plan      Warren Jaramillo is a 44 year old male with PMH of nonalcoholic liver cirrhosis requiring recurrent paracentesis, SBP, left arm DVT about 2 months ago on Eliquis, and IDDM, COPD, HTN, CHF, LBBB, Rojas's disease, ADHD, fetal alcohol syndrome, autistic disorder,  admitted on 4/20/2025 with:    #Portal hypertension with ascites and recurrent SBP.  #Dyspnea on exertion.  Likely CHF exacerbation.  proBNP elevated at 765.  Most recent echo on 4/3/2025: LVEF 60-65%  Reviewed patient's weight chart, which was texted to him from his group home-today's p.m. weight is up to 287 pounds, historically he has been 281-283.  Previous paracentesis on 4/2/25-4.85 L of cloudy bushra fluid.  CT abdomen-cirrhotic liver morphology small volume ascites.  CXR-no focal lungs.  Disease, stable borderline cardiomegaly.  Diagnostic paracentesis in ER, total nucleated cells 1301.  Empirically treating suspected SBP with ceftriaxone.  Follow ascitic fluid cultures.  Appreciate GI input --ordered paracentesis with albumin.  Continue IV diuretics today and resume home diuretics from tomorrow     # DM2 (diabetes mellitus, type 2) diet controlled, (H) A1c 6.2.  - Insulin sliding scale and hypoglycemia protocol.    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  Renal function baseline.  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     #Hx of DVT of axillary vein left about a month  PTA Eliquis    # Mental health disorders: Depression,ADHD, fetal alcohol syndrome, autistic disorder  -Continue PTA Zyprexa Prozac gabapentin  - Personally discussed with care manager/consult worker, recommended chemical dependency consult.  This was discussed with patient.            Diet: 2 Gram  "Sodium Diet  Fluid restriction 1500 ML FLUID    DVT Prophylaxis: DOAC  Khan Catheter: Not present  Lines: None     Cardiac Monitoring: None  Code Status: Full Code      Clinically Significant Risk Factors Present on Admission                # Drug Induced Coagulation Defect: home medication list includes an anticoagulant medication    # Hypertension: Noted on problem list  # Acute heart failure with preserved ejection fraction: heart failure noted on problem list, last echo with EF >50%, and receiving IV diuretics          # Severe Obesity: Estimated body mass index is 47.43 kg/m  as calculated from the following:    Height as of this encounter: 1.651 m (5' 5\").    Weight as of this encounter: 129.3 kg (285 lb).         # Financial/Environmental Concerns: none         Social Drivers of Health    Tobacco Use: High Risk (4/4/2025)    Patient History     Smoking Tobacco Use: Every Day     Smokeless Tobacco Use: Never   Interpersonal Safety: Low Risk  (4/4/2025)    Interpersonal Safety     Do you feel physically and emotionally safe where you currently live?: Yes     Within the past 12 months, have you been hit, slapped, kicked or otherwise physically hurt by someone?: No     Within the past 12 months, have you been humiliated or emotionally abused in other ways by your partner or ex-partner?: No   Recent Concern: Interpersonal Safety - High Risk (1/11/2025)    Interpersonal Safety     Do you feel physically and emotionally safe where you currently live?: No     Within the past 12 months, have you been hit, slapped, kicked or otherwise physically hurt by someone?: No     Within the past 12 months, have you been humiliated or emotionally abused in other ways by your partner or ex-partner?: No    Received from [facility], [facility]    Social Connections          Disposition Plan     Medically Ready for Discharge: Anticipated Tomorrow             Lorne " MD MARY  Hospitalist Service  [facility]  Securely message with [employer] (more info)  Text page via [employer] Paging/Directory   ______________________________________________________________________    Interval History   Patient is new to me.  Patient seen and examined.  Notes, labs, imaging report personally reviewed.  Patient reported urinating a lot since on IV Lasix.  Patient denied short of breath, chest pain, dizziness.  However, he still reports distended abdomen.  Patient requested to discuss with his ex-girlfriend from his phone.  Discussed with nursing staffs.    Physical Exam   Vital Signs: Temp: 98  F (36.7  C) Temp src: Oral BP: 114/66 Pulse: 75   Resp: 18 SpO2: 96 % O2 Device: None (Room air)    Weight: 285 lbs 0 oz      General: Not in obvious distress.  HEENT: Normocephalic, supple neck  Chest: Clear to auscultation bilateral anteriorly, no wheezing  Heart: S1S2 normal, regular  Abdomen: Soft.  Distended, no significant tenderness appreciated  Extremities: + legs swelling  Neuro: alert and awake, grossly non-focal        Medical Decision Making             Data     I have personally reviewed the following data over the past 24 hrs:    11.6 (H)  \   13.1 (L)   / 301     139 101 19.9 /  102 (H)   4.1 25 1.14 \     ALT: 21 AST: 16 AP: 204 (H) TBILI: 0.5   ALB: 4.3 TOT PROTEIN: 7.2 LIPASE: 27     Trop: 23 (H) BNP: 765 (H)       Imaging results reviewed over the past 24 hrs:   Recent Results (from the past 24 hours)   POC US ABDOMEN LIMITED    Impression    Ascites   CT Abdomen Pelvis w Contrast    Narrative    EXAM: CT ABDOMEN PELVIS W CONTRAST  LOCATION: [facility]  DATE: 4/20/2025    INDICATION: Abdominal distention and pain  COMPARISON: CTA chest, abdomen and pelvis 4/2/2025  TECHNIQUE: CT scan of the abdomen and pelvis was performed following injection of IV contrast. Multiplanar reformats were obtained. Dose reduction techniques were used.  CONTRAST:  [id number] 90ml    FINDINGS:   LOWER CHEST: Normal.    HEPATOBILIARY: Unchanged steatosis and morphologic changes suggesting cirrhosis. No focal lesion. Gallbladder is contracted.    PANCREAS: Unremarkable.    SPLEEN: Nonenlarged.    ADRENAL GLANDS: Unchanged benign bilateral adrenal myelolipomas.    KIDNEYS/BLADDER: The kidneys enhance symmetrically. No urolithiasis or hydronephrosis. Urinary bladder is unremarkable.    BOWEL: No bowel obstruction. Normal appendix. Small volume ascites, similar to 4/2/2025. No peritoneal thickening. No pneumatosis or pneumoperitoneum.    LYMPH NODES: Scattered prominent retroperitoneal lymph nodes are unchanged and likely reactive.    VASCULATURE: The abdominal aorta is nonaneurysmal.    PELVIC ORGANS: Unremarkable.    MUSCULOSKELETAL: No acute osseous abnormality.      Impression    IMPRESSION:   1.  No acute abnormality in the abdomen or pelvis.  2.  Unchanged cirrhotic liver morphology and findings suggesting portal hypertension with small volume ascites.   Chest XR,  PA & LAT    Narrative    EXAM: XR CHEST 2 VIEWS  LOCATION: [facility]  DATE: 4/20/2025    INDICATION: Shortness of breath  COMPARISON: 3/21/2025      Impression    IMPRESSION:     No focal airspace disease. No pleural effusion or pneumothorax.    Stable borderline cardiomegaly.    Multilevel degenerative changes of the spine.

## 2025-04-21 NOTE — PHARMACY-ADMISSION MEDICATION HISTORY
Pharmacist Admission Medication History    Admission medication history is complete. The information provided in this note is only as accurate as the sources available at the time of the update.    Information Source(s): Patient via in-person    Pertinent Information: Patient took morning medications today PTA 4/20/25. Patient will be due for five medications this evening: Famotidine, Eliquis, Olanzapine, Rosuvastatin, Trazodone.     Update 4/21: Patient dose of Levothyroxine changed to 25 mcg , take one-half tablet daily in the morning . Reached [facility] to confirm this dose.     Changes made to PTA medication list:  Added: None  Deleted: Nicotine patch, Sucralafate, dicyclomine  Changed: None    Allergies reviewed with patient and updates made in EHR: yes    Medication History Completed By: INES LUNA RPH 4/20/2025 9:34 PM    PTA Med List   Medication Sig Last Dose/Taking    albuterol (PROAIR HFA/PROVENTIL HFA/VENTOLIN HFA) 108 (90 Base) MCG/ACT inhaler Inhale 1-2 puffs into the lungs every 6 hours as needed for shortness of breath, wheezing or cough Past Month    albuterol (PROVENTIL) (2.5 MG/3ML) 0.083% neb solution Take 2.5 mg by nebulization 3 times daily as needed for shortness of breath, wheezing or cough Past Month    aloe vera GEL Apply 1 g topically every hour as needed for skin care Per bottle directions Past Month    apixaban ANTICOAGULANT (ELIQUIS) 5 MG tablet Take 5 mg by mouth 2 times daily. 4/20/2025 Morning    bacitracin 500 UNIT/GM OINT Apply topically 3 times daily as needed for wound care Past Month    Benzocaine (SOLARCAINE ALOE VERA EX) Externally apply topically daily as needed. Past Month    benzonatate (TESSALON) 200 MG capsule Take 1 capsule (200 mg) by mouth 3 times daily as needed for cough. More than a month    Calamine external lotion Apply topically as needed for itching Past Month    carbamide peroxide (DEBROX) 6.5 % otic solution Place 5 drops into both ears daily as  needed for other. Past Month    clotrimazole (LOTRIMIN) 1 % external cream Apply topically 2 times daily as needed (skin irritation) Past Month    dextromethorphan-guaiFENesin (MUCINEX DM)  MG 12 hr tablet Take 1 tablet by mouth 2 times daily as needed. Past Month    diclofenac (VOLTAREN) 1 % topical gel Apply 2 g topically daily as needed for moderate pain To joints/back Past Month    famotidine (PEPCID) 20 MG tablet Take 1 tablet (20 mg) by mouth 2 times daily 4/20/2025 Morning    FLUoxetine 20 MG tablet Take 20 mg by mouth every morning. 4/20/2025 Morning    furosemide (LASIX) 40 MG tablet Take 1 tablet (40 mg) by mouth daily. 4/20/2025 Morning    GAVILAX 17 GM/SCOOP powder Take 17 g by mouth daily as needed. Past Month Morning    hydrocortisone (CORTAID) 1 % external cream Apply topically daily as needed. Past Month    Hydrocortisone (PREPARATION H EX) Externally apply topically daily as needed. Past Month    JARDIANCE 10 MG TABS tablet Take 10 mg by mouth every morning. 4/20/2025 Morning    levothyroxine (SYNTHROID/LEVOTHROID) 25 MCG tablet Take 0.25 tablets (6.25 mcg) by mouth every morning (before breakfast). 4/20/2025 Morning    lisinopril (ZESTRIL) 10 MG tablet Take 10 mg by mouth every morning. 4/20/2025 Morning    loperamide (IMODIUM) 2 MG capsule Take 4 mg by mouth 4 times daily as needed for diarrhea. Past Month    loratadine (CLARITIN) 10 MG tablet Take 10 mg by mouth daily as needed for allergies. Past Month    magnesium hydroxide (MILK OF MAGNESIA) 400 MG/5ML suspension Take 30 mLs by mouth daily as needed. Past Month    metFORMIN (GLUCOPHAGE) 1000 MG tablet Take 1,000 mg by mouth 2 times daily (with meals) 4/20/2025 Evening    methocarbamol (ROBAXIN) 500 MG tablet Take 1 tablet (500 mg) by mouth 4 times daily as needed for muscle spasms. Past Month    montelukast (SINGULAIR) 10 MG tablet Take 10 mg by mouth every morning. 4/20/2025 Morning    OLANZapine (ZYPREXA) 10 MG tablet Take 10 mg by  mouth At Bedtime 4/19/2025 Bedtime    omeprazole (PRILOSEC) 40 MG DR capsule Take 40 mg by mouth every morning. 4/20/2025 Morning    ondansetron (ZOFRAN ODT) 4 MG ODT tab Take 1 tablet (4 mg) by mouth every 8 hours as needed for nausea. Past Month    pramoxine-calamine (AVEENO) 1-8 % LOTN Apply topically daily as needed for itching. Past Month    rosuvastatin (CRESTOR) 10 MG tablet Take 10 mg by mouth At Bedtime 4/19/2025 Bedtime    spironolactone (ALDACTONE) 100 MG tablet Take 1 tablet (100 mg) by mouth daily. 4/20/2025 Morning    sulfamethoxazole-trimethoprim (BACTRIM DS) 800-160 MG tablet Take 1 tablet by mouth daily. 4/20/2025 Morning    traZODone (DESYREL) 100 MG tablet Take 100 mg by mouth at bedtime. 4/19/2025 Evening    TRELEGY ELLIPTA 100-62.5-25 MCG/ACT oral inhaler Inhale 1 puff into the lungs every morning. 4/20/2025 Morning    Urea 40 % CREA Apply topically daily as needed. Past Month    Vitamins A & D (VITAMIN A & D) OINT Externally apply topically daily as needed. Past Month    witch hazel-glycerin (TUCKS) pad Apply topically as needed for hemorrhoids. Past Month

## 2025-04-21 NOTE — PROGRESS NOTES
Notified care mgmt, pt is stating that he had an appointment for chem dep treatment at [facility], but then just found out he was not accepted.  This was the reason pt wanted to be discharged today. Requested care [facility] to follow up with pt / guardian.

## 2025-04-21 NOTE — CONSULTS
Care Management Initial Consult    General Information  Assessment completed with: Patient, Other ( staff),    Type of CM/SW Visit: Initial Assessment    Primary Care Provider verified and updated as needed: Yes   Readmission within the last 30 days: no previous admission in last 30 days      Reason for Consult: discharge planning  Advance Care Planning: Advance Care Planning Reviewed: present on chart          Communication Assessment  Patient's communication style: spoken language (English or Bilingual)             Cognitive  Cognitive/Neuro/Behavioral: WDL                      Living Environment:   People in home: facility resident     Current living Arrangements: group home      Able to return to prior arrangements: yes       Family/Social Support:  Care provided by: self ( staff)  Provides care for: no one     Support system:            Description of Support System:           Current Resources:   Patient receiving home care services: No        Community Resources: [employer], [employer] Work[employer]  Equipment currently used at home: none  Supplies currently used at home: None    Employment/Financial:  Employment Status: disabled        Financial Concerns: none           Does the patient's insurance plan have a 3 day qualifying hospital stay waiver?  Yes     Which insurance plan 3 day waiver is available? Alternative insurance waiver    Will the waiver be used for post-acute placement? Undetermined at this time    Lifestyle & Psychosocial Needs:  Social Drivers of Health     Food Insecurity: Low Risk  (4/4/2025)    Food Insecurity     Within the past 12 months, did you worry that your food would run out before you got money to buy more?: No     Within the past 12 months, did the food you bought just not last and you didn t have money to get more?: No   Depression: Not at risk (2/11/2025)    PHQ-2     PHQ-2 Score: 0   Housing Stability: Low Risk  (4/4/2025)    Housing Stability     Do you have housing? : Yes      Are you worried about losing your housing?: No   Tobacco Use: High Risk (4/4/2025)    Patient History     Smoking Tobacco Use: Every Day     Smokeless Tobacco Use: Never     Passive Exposure: Not on file   Financial Resource Strain: Low Risk  (4/4/2025)    Financial Resource Strain     Within the past 12 months, have you or your family members you live with been unable to get utilities (heat, electricity) when it was really needed?: No   Alcohol Use: Not At Risk (9/22/2022)    Received from [facility], [facility]    AUDIT-C     Frequency of Alcohol Consumption: Never     Average Number of Drinks: Not on file     Frequency of Binge Drinking: Not on file   Transportation Needs: Low Risk  (4/4/2025)    Transportation Needs     Within the past 12 months, has lack of transportation kept you from medical appointments, getting your medicines, non-medical meetings or appointments, work, or from getting things that you need?: No   Physical Activity: Not on file   Interpersonal Safety: Low Risk  (4/4/2025)    Interpersonal Safety     Do you feel physically and emotionally safe where you currently live?: Yes     Within the past 12 months, have you been hit, slapped, kicked or otherwise physically hurt by someone?: No     Within the past 12 months, have you been humiliated or emotionally abused in other ways by your partner or ex-partner?: No   Recent Concern: Interpersonal Safety - High Risk (1/11/2025)    Interpersonal Safety     Do you feel physically and emotionally safe where you currently live?: No     Within the past 12 months, have you been hit, slapped, kicked or otherwise physically hurt by someone?: No     Within the past 12 months, have you been humiliated or emotionally abused in other ways by your partner or ex-partner?: No   Stress: Not on file   Social Connections: Unknown (5/1/2023)    [facility], [facility]    Social Connections     Frequency of Communication with Friends and Family: Not on file   Health Literacy: Not on file       Functional Status:  Prior to admission patient needed assistance:   Dependent ADLs:: Independent  Dependent IADLs:: Transportation, Medication Management, Cleaning, Cooking, Shopping, Laundry, Money Management       Mental Health Status:  Mental Health Status: No Current Concerns       Chemical Dependency Status:  Chemical Dependency Status: No Current Concerns             Values/Beliefs:  Spiritual, Cultural Beliefs, Shinto Practices, Values that affect care: no               Discussed  Partnership in Safe Discharge Planning  document with patient/family: No    Additional Information:  CM met with pt in room to discuss discharge planning and complete initial assessment. Demographics confirmed and updated on facesheet.     Pt lives  at [facility]  . Pt does not use an assistive device. Pt is independent with ADLs and gets assist with IADLs from group [place] staff.  Pt typically attends an Adult day program however pt recently told the day program he would no longer be attending . Pt states that as of last month he attended a court hearing and was granted termination of his guardian so he is now his own guardian however pt says that he does not have paperwork stating this.     Pt states he was hoping to be able [facility] for [facility] however they state that they can not accept him due to medical needs. Pt states that he was going to treatment for alcohol and marijuana use. He states he last used marijuana in January and last alcohol use was two weeks ago. Pt states the [facility] was going to send his referral to another agency that may be able to meet his needs. Pt states he is agreeable to meeting with [facility] counselor here in the hospital to attempt to find alternative treatment options.     CM called Jesika Harden  "([phone]) who is listed as pt's guardian. She confirms that the court has granted pt to be his own guardian and confirms there is no paperwork yet stating this. She states they are still waiting for the paperwork from the court.     CM called pt's group home  [facility] ([phone]) who confirms that pt is independent with personal cares and staff assist with cleaning, meals, transportation, and medications. [facility] brought up concerns that pt has not been making good choices ever since being made his own guardian. She states he has been drinking heavily and using marijuana.     [facility] states that group home staff may be able to transport at discharge otherwise pt can take cab home. [facility] asked for discharge paperwork to be faxed to ([phone]).    11:53 AM  CM reached out to [employer] who looked up court documents online. Per [employer] and online court site, there is no court order currently stating that pt's guardianship is terminated, so \"The current guardian remains until the court issues an order dismissing the guardian and appointing the new party.\"    CM updated the phone number of pt's guardian in his chart as previously the phone number listed for pt's guardian was pt's cell phone. Phone number now updated.     Next Steps: coordinate discharge back to Group home    Contacts:  Jesika Harden - guardian ([phone])  [facility] - group home coordinator ([phone] fax [phone])    Colette Bah, [facility]      "

## 2025-04-21 NOTE — PLAN OF CARE
Goal Outcome Evaluation:      Plan of Care Reviewed With: patient, caregiver          Outcome Evaluation: Anticipate return to group home

## 2025-04-21 NOTE — CONSULTS
[facility]    Warren Jaramillo   [address]  [facility] [address]  44 year old male    Admission Date/Time: 4/20/2025  4:20 PM    Primary Care Provider:  Mary Kelly    Requesting Physician: Lorne HERNANDEZ MD    CHIEF COMPLAINT:   Shortness of breath    REASON FOR THE CONSULT: Liver cirrhosis and recurrent ascites    HPI:   Warren Jaramillo is a 44 year old male with complex medical history including fetal alcohol syndrome, traumatic brain injury, metabolic syndrome, remote history of alcohol misuse, reported decompensated liver cirrhosis (thought to be due to MASLD) per notes, who was admitted on 4/20/2025 with shortness of breath and increased ascites.    He reports that he was in his usual state of health until yesterday around 10 AM.  He started feeling some shortness of breath and wheezing.  He checked his weight and it was 281 pounds which is his baseline.  He rechecked his weight later during the day and it was up to 287.  Given that he was previously advised to report to the ED with rapid weight gain, he reported to the ED for further management.  His weight on presentation to the ED was 292.  He reports that he does follow a low-sodium diet.  He reports that he has been limiting his liquid intake to 2.5 L daily.  He does report that his abdomen is more tense. No abdominal pain.  He is being evaluated for TIPS due to recurrent ascites.  He did have diagnostic paracentesis done yesterday in the ER which showed evidence of SBP with PMNs of 381.  He was started on ceftriaxone.    CT scan 4/20/2025:  1.  No acute abnormality in the abdomen or pelvis.  2.  Unchanged cirrhotic liver morphology and findings suggesting portal hypertension with small volume ascites.    REVIEW OF SYSTEMS:  Review of Systems   Respiratory:  Positive for shortness of breath.    All other systems reviewed and are negative.      PAST MEDICAL HISTORY:  Past Medical History:   Diagnosis Date    COPD  exacerbation (H) 12/02/2024    DM2 (diabetes mellitus, type 2) (H) 04/28/2020    HTN (hypertension) 07/30/2012    Sleep apnea     Thyroid nodule 07/31/2019    Rojas's disease (H)        PAST SURGICAL HISTORY:  Past Surgical History:   Procedure Laterality Date    COLONOSCOPY      ESOPHAGOSCOPY, GASTROSCOPY, DUODENOSCOPY (EGD), COMBINED N/A 7/21/2023    Procedure: ESOPHAGOGASTRODUODENOSCOPY WITH GASTRIC AND ESOPHAGEAL BIOPSIES;  Surgeon: Filiberto Aragon MD;  Location: [facility] OR    ESOPHAGOSCOPY, GASTROSCOPY, DUODENOSCOPY (EGD), COMBINED N/A 4/4/2025    Procedure: ESOPHAGOGASTRODUODENOSCOPY;  Surgeon: Ramesh Talbot MD;  Location: [facility] OR    TOOTH EXTRACTION         FAMILY HISTORY:  Family History   Problem Relation Age of Onset    Unknown/Adopted Father     Unknown/Adopted Maternal Grandmother     C.A.D. Maternal Grandfather     Diabetes Maternal Grandfather     Cerebrovascular Disease Maternal Grandfather     Unknown/Adopted Paternal Grandmother     Unknown/Adopted Paternal Grandfather     Unknown/Adopted Brother     Unknown/Adopted Sister        SOCIAL HISTORY:  Social History     Tobacco Use    Smoking status: Every Day     Current packs/day: 1.00     Average packs/day: 1 pack/day for 21.3 years (21.3 ttl pk-yrs)     Types: Cigarettes     Start date: 1/1/2004    Smokeless tobacco: Never   Substance Use Topics    Alcohol use: Not Currently     Comment: Last 8/7/2024       ALLERGIES/SENSITIVITIES:  Apricot flavoring agent (non-screening), Banana, Bees, Wasp venom protein, Methylphenidate, and Prunus    MEDICATIONS:  Current Outpatient Medications   Medication Sig Dispense Refill    albuterol (PROAIR HFA/PROVENTIL HFA/VENTOLIN HFA) 108 (90 Base) MCG/ACT inhaler Inhale 1-2 puffs into the lungs every 6 hours as needed for shortness of breath, wheezing or cough 18 g 0    albuterol (PROVENTIL) (2.5 MG/3ML) 0.083% neb solution Take 2.5 mg by nebulization 3 times daily as needed for shortness of  breath, wheezing or cough      aloe vera GEL Apply 1 g topically every hour as needed for skin care Per bottle directions      apixaban ANTICOAGULANT (ELIQUIS) 5 MG tablet Take 5 mg by mouth 2 times daily.      bacitracin 500 UNIT/GM OINT Apply topically 3 times daily as needed for wound care      Benzocaine (SOLARCAINE ALOE VERA EX) Externally apply topically daily as needed.      benzonatate (TESSALON) 200 MG capsule Take 1 capsule (200 mg) by mouth 3 times daily as needed for cough. 50 capsule 0    Calamine external lotion Apply topically as needed for itching      carbamide peroxide (DEBROX) 6.5 % otic solution Place 5 drops into both ears daily as needed for other.      clotrimazole (LOTRIMIN) 1 % external cream Apply topically 2 times daily as needed (skin irritation)      dextromethorphan-guaiFENesin (MUCINEX DM)  MG 12 hr tablet Take 1 tablet by mouth 2 times daily as needed.      diclofenac (VOLTAREN) 1 % topical gel Apply 2 g topically daily as needed for moderate pain To joints/back      famotidine (PEPCID) 20 MG tablet Take 1 tablet (20 mg) by mouth 2 times daily 60 tablet 0    FLUoxetine 20 MG tablet Take 20 mg by mouth every morning.      furosemide (LASIX) 40 MG tablet Take 1 tablet (40 mg) by mouth daily. 30 tablet 0    GAVILAX 17 GM/SCOOP powder Take 17 g by mouth daily as needed.      hydrocortisone (CORTAID) 1 % external cream Apply topically daily as needed.      Hydrocortisone (PREPARATION H EX) Externally apply topically daily as needed.      JARDIANCE 10 MG TABS tablet Take 10 mg by mouth every morning.      levothyroxine (SYNTHROID/LEVOTHROID) 25 MCG tablet Take 12.5 mcg by mouth every morning (before breakfast).      lisinopril (ZESTRIL) 10 MG tablet Take 10 mg by mouth every morning.      loperamide (IMODIUM) 2 MG capsule Take 4 mg by mouth 4 times daily as needed for diarrhea.      loratadine (CLARITIN) 10 MG tablet Take 10 mg by mouth daily as needed for allergies.      magnesium  "hydroxide (MILK OF MAGNESIA) 400 MG/5ML suspension Take 30 mLs by mouth daily as needed.      metFORMIN (GLUCOPHAGE) 1000 MG tablet Take 1,000 mg by mouth 2 times daily (with meals)      methocarbamol (ROBAXIN) 500 MG tablet Take 1 tablet (500 mg) by mouth 4 times daily as needed for muscle spasms. 40 tablet 0    montelukast (SINGULAIR) 10 MG tablet Take 10 mg by mouth every morning.      OLANZapine (ZYPREXA) 10 MG tablet Take 10 mg by mouth At Bedtime      omeprazole (PRILOSEC) 40 MG DR capsule Take 40 mg by mouth every morning.      ondansetron (ZOFRAN ODT) 4 MG ODT tab Take 1 tablet (4 mg) by mouth every 8 hours as needed for nausea. 20 tablet 0    pramoxine-calamine (AVEENO) 1-8 % LOTN Apply topically daily as needed for itching.      rosuvastatin (CRESTOR) 10 MG tablet Take 10 mg by mouth At Bedtime      spironolactone (ALDACTONE) 100 MG tablet Take 1 tablet (100 mg) by mouth daily. 30 tablet 0    sulfamethoxazole-trimethoprim (BACTRIM DS) 800-160 MG tablet Take 1 tablet by mouth daily.      traZODone (DESYREL) 100 MG tablet Take 100 mg by mouth at bedtime.      TRELEGY ELLIPTA 100-62.5-25 MCG/ACT oral inhaler Inhale 1 puff into the lungs every morning.      Urea 40 % CREA Apply topically daily as needed.      Vitamins A & D (VITAMIN A & D) OINT Externally apply topically daily as needed.      witch hazel-glycerin (TUCKS) pad Apply topically as needed for hemorrhoids.      Continuous Glucose Sensor (FREESTYLE MEGHANN 3 PLUS SENSOR) MISC USE TO READ BLOOD SUGAR TWICE DAILY AND AS NEEDED. CHANGE SENSOR EVERY 15 DAYS      EPINEPHrine (ANY BX GENERIC EQUIV) 0.3 MG/0.3ML injection 2-pack Inject 0.3 mg into the muscle as needed for anaphylaxis. May repeat one time in 5-15 minutes if response to initial dose is inadequate.           PHYSICAL EXAM:  /55   Pulse 77   Temp 98  F (36.7  C) (Oral)   Resp 18   Ht 1.651 m (5' 5\")   Wt 129.3 kg (285 lb)   SpO2 95%   BMI 47.43 kg/m    Body mass index is 47.43 " kg/m .  General: A&O, NAD, non-toxic appearing  Eyes: no icterus or conjunctivitis  ENT: oropharynx clear without ulcerations  Neck/Thyroid: supple, no masses  Pulmonary: CTA B  Cardiovascular: RR, S1, S2  Gastrointestinal: Distended, tense, nontender  Skin: no jaundice/petechiae/rashes  Lymph nodes: No cervical or supraclavicular lymphadenopathy  Extremities: No edema      LABORATORY DATA:  CBC:  Recent Labs   Lab Test 04/21/25  0643   WBC 11.6*   RBC 4.53   HGB 13.1*   HCT 40.4   MCV 89   MCH 28.9   MCHC 32.4   RDW 15.4*           BMP:  Recent Labs   Lab 04/21/25  0717 04/21/25  0643 04/20/25  2151 04/20/25  1642   NA  --  139  --  138   POTASSIUM  --  4.1  --  4.1   CHLORIDE  --  101  --  101   CO2  --  25  --  23   * 142* 172* 134*   CR  --  1.14  --  1.22*   BUN  --  19.9  --  18.2       INR:  Recent Labs   Lab Test 04/02/25  1929   INR 1.17*       Liver and Pancreas panel:  Recent Labs   Lab 04/21/25  0643 04/20/25  1642   AST 16 18   ALT 21 22   ALKPHOS 204* 218*   BILITOTAL 0.5 0.3   LIPASE  --  27         IMAGING:    POC US ABDOMEN LIMITED    Result Date: 4/21/2025  Ascites    CT Abdomen Pelvis w Contrast    Result Date: 4/20/2025  EXAM: CT ABDOMEN PELVIS W CONTRAST LOCATION: [facility] DATE: 4/20/2025 INDICATION: Abdominal distention and pain COMPARISON: CTA chest, abdomen and pelvis 4/2/2025 TECHNIQUE: CT scan of the abdomen and pelvis was performed following injection of IV contrast. Multiplanar reformats were obtained. Dose reduction techniques were used. CONTRAST: Fyawhg586 90ml FINDINGS: LOWER CHEST: Normal. HEPATOBILIARY: Unchanged steatosis and morphologic changes suggesting cirrhosis. No focal lesion. Gallbladder is contracted. PANCREAS: Unremarkable. SPLEEN: Nonenlarged. ADRENAL GLANDS: Unchanged benign bilateral adrenal myelolipomas. KIDNEYS/BLADDER: The kidneys enhance symmetrically. No urolithiasis or hydronephrosis. Urinary bladder is unremarkable. BOWEL:  No bowel obstruction. Normal appendix. Small volume ascites, similar to 4/2/2025. No peritoneal thickening. No pneumatosis or pneumoperitoneum. LYMPH NODES: Scattered prominent retroperitoneal lymph nodes are unchanged and likely reactive. VASCULATURE: The abdominal aorta is nonaneurysmal. PELVIC ORGANS: Unremarkable. MUSCULOSKELETAL: No acute osseous abnormality.     IMPRESSION: 1.  No acute abnormality in the abdomen or pelvis. 2.  Unchanged cirrhotic liver morphology and findings suggesting portal hypertension with small volume ascites.    Chest XR,  PA & LAT    Result Date: 4/20/2025  EXAM: XR CHEST 2 VIEWS LOCATION: [facility] DATE: 4/20/2025 INDICATION: Shortness of breath COMPARISON: 3/21/2025     IMPRESSION: No focal airspace disease. No pleural effusion or pneumothorax. Stable borderline cardiomegaly. Multilevel degenerative changes of the spine.      [facility]    ASSESSMENT AND RECOMMENDATIONS:   Warren Jaramillo  is a 44 year old male with complex medical history including fetal alcohol syndrome, traumatic brain injury, metabolic syndrome, remote history of alcohol misuse, reported decompensated liver cirrhosis thought to be secondary to MASLD who presents with shortness of breath and increased ascites.  He was found to have small volume ascites on imaging and diagnostic paracentesis confirmed the presence of SBP.  Reported decompensated liver cirrhosis.  Seems like this diagnosis was made based on imaging revealing possible cirrhosis, and ascites.  Also reported hepatosplenomegaly.  Normal platelets. His current fib 4 score is 0.51 which suggests against advanced fibrosis in the liver.  Recurrent ascites which could be related to high volume intake or lack of compliance to low-sodium diet.  Fluid total protein is > 2.5, SAAG < 1.1 (0.8), which is not consistent with ascites secondary to portal hypertension. Differential diagnosis include  cardiac ascites (would expect neutrophils to be less than 250), chylous ascites (would expect elevated triglycerides), pancreatic ascites, peritoneal carcinomatosis, peritoneal TB.  SBP.    Recommendations:  Continue with low-sodium diet.  Agree with ceftriaxone.    Fluid restriction to 1500 mL/day.  Dietitian consultation may be useful for further education about low-sodium diet.  Continue to follow-up in the liver clinic in the outpatient setting.   Needs ascitic fluid analysis to check triglycerides, amylase and cytology with next tap. Prefer large tap for the cytology part.   Non-invasive liver fibrosis evaluation testing can be done in the outpatient setting. Will defer to the liver provider.   I would recommend to hold off TIPS given that the etiology of ascites is not clear at this time.     This note was edited 4/22/2025 after reviewing ascitic fluid analysis.             Holland Manjarrez MD  Thank you for the opportunity to participate in the care of this patient.   Please feel free to contact us with any questions or concerns.  Phone number [phone] ext [phone].

## 2025-04-21 NOTE — PROGRESS NOTES
04/21/25 0830   Appointment Info   Signing Clinician's Name / Credentials (PT) Aundrea Thakkar DPT   Quick Adds   Quick Adds Certification;[facility] Auth & Certification   Living Environment   People in Home facility resident   Current Living Arrangements group home   Home Accessibility stairs to enter home   Number of Stairs, Main Entrance 4   Stair Railings, Main Entrance railings safe and in good condition   Living Environment Comments staff 24/7   Self-Care   Usual Activity Tolerance good   Current Activity Tolerance fair   Equipment Currently Used at Home cane, straight;grab bar, tub/shower;grab bar, toilet;glucometer   Fall history within last six months no   Activity/Exercise/Self-Care Comment normally just lays in bed until he needs to go out to smoke   General Information   Onset of Illness/Injury or Date of Surgery 04/20/25   Referring Physician Lorne HERNANDEZ MD   Patient/Family Therapy Goals Statement (PT) go home   Pertinent History of Current Problem (include personal factors and/or comorbidities that impact the POC) Warren Jaramillo is a 44 year old male with PMH of nonalcoholic liver cirrhosis requiring recurrent paracentesis, SBP, left arm DVT about 2 months ago on Eliquis, and IDDM, COPD, HTN, CHF, LBBB, Rojas's disease, ADHD, fetal alcohol syndrome, autistic disorder, presented to ED for evaluation of exertional dyspnea with abdominal girth.  Patient believes he gained near 5 pounds in 1-2 days.  His group home texted his weight, which showed today's weight of 287 pounds, yesterday was 282.  Patient denies associated chest pain, cardiac palpitations, nausea, vomiting, leg edema.  He was recently on April 4-5 hospitalized for tachypnea secondary to choking and hypoxia, NSTEMI.   Existing Precautions/Restrictions fall   Cognition   Affect/Mental Status (Cognition) WFL   Pain Assessment   Patient Currently in Pain No   Range of Motion (ROM)   Range of Motion ROM is WFL   Strength (Manual Muscle  Testing)   Strength (Manual Muscle Testing) Deficits observed during functional mobility   Bed Mobility   Comment, (Bed Mobility) up in chair before and after PT   Transfers   Transfers sit-stand transfer   Sit-Stand Transfer   Sit-Stand Levy (Transfers) independent   Assistive Device (Sit-Stand Transfers) other (see comments)  (none)   Gait/Stairs (Locomotion)   Levy Level (Gait) supervision   Assistive Device (Gait)   (none)   Distance in Feet (Gait) 100'   Pattern (Gait) step-through   Negotiation (Stairs) stairs independence;handrail location;number of steps   Levy Level (Stairs) supervision   Handrail Location (Stairs) right side (ascending)   Number of Steps (Stairs) 3   Comment, (Gait/Stairs) step stool with handle   Clinical Impression   Criteria for Skilled Therapeutic Intervention Yes, treatment indicated   PT Diagnosis (PT) impaired functional mobility, gait abnormality   Influenced by the following impairments decreased strength, decreased endurance   Functional limitations due to impairments gait, transfers, bed mob   Clinical Presentation (PT Evaluation Complexity) stable   Clinical Presentation Rationale pt presents as medically diagnosed   Clinical Decision Making (Complexity) low complexity   Planned Therapy Interventions (PT) balance training;bed mobility training;gait training;home exercise program;neuromuscular re-education;patient/family education;strengthening;transfer training;stair training   Risk & Benefits of therapy have been explained evaluation/treatment results reviewed;patient   PT Total Evaluation Time   PT Pippa Low Complexity Minutes (93400) 10   Therapy Certification   Start of care date 04/21/25   Certification date from 04/21/25   Certification date to 04/28/25   Medical Diagnosis other ascites   [facility] Authorization Information   Condition type Recurrent (multiple episodes of < 3 months)   Cause of current episode Repetitive   Nature of treatment  Rehabilitative   Functional ability No functional limitations   Documented POC (choose all that apply) Measurable short and long term/discharge treatment goals related to physical and functional deficits.;Frequency of treatment visits and treatment activities to address deficit areas.;Patient agrees to program participation including home program   Briefly describe symptoms shortness of breath   How did the symptoms start inc weight gain, shortness of breath with mobility   Last 24H: avg pain/symptom intensity  no pain   Past wk: avg pain/symptom intensity no pain   Frequency of Symptoms Frequently (51-75% of the time)   Symptom impact on ADLs Moderately   General health reported by patient Good   Physical Therapy Goals   PT Frequency One time eval and treatment only   PT Predicted Duration/Target Date for Goal Attainment 04/28/25   PT Goals Gait;Stairs   PT: Gait Independent;100 feet   PT: Stairs Supervision/stand-by assist;4 stairs;Rail on right   Interventions   Interventions Quick Adds Gait Training;Therapeutic Activity   Therapeutic Activity   Therapeutic Activities: dynamic activities to improve functional performance Minutes (19425) 5   Symptoms Noted During/After Treatment None   Treatment Detail/Skilled Intervention additional sit <> stand on EOB with SBA-ind. Inc time and effort with inc SOB noted. Pt noted to be up independently in room per RN staff. now wearing shoes (was previously barefoot per RN - pt educated on safety). Sitting up on rolling chair at end of session - educated on safety and to sit in chair that doesn't roll - pt declined. RN notified.   Gait Training   Gait Training Minutes (43643) 10   Symptoms Noted During/After Treatment (Gait Training) shortness of breath   Treatment Detail/Skilled Intervention additional amb x 100' without AD, some SOB w/ activity, O2 at 92% on RA at end, HR in 80s (pt feels as if HR elevated). Amb with SBA-ind. Cues for resting as needed for endurance. inc  education needed re: getting up at home and walking every hour to prevent atrophy and fatigue, pt reported understanding.   Distance in Feet 100   [place] Level (Gait Training) stand-by assist   Physical Assistance Level (Gait Training) supervision   Weight Bearing (Gait Training) weight-bearing as tolerated   Assistive Device (Gait Training)   (none)   PT Discharge Planning   PT Plan dc PT   PT Discharge Recommendation (DC Rec) home with assist   PT Rationale for DC Rec home with assist from group home staff as needed   PT Brief overview of current status amb x 200' total without AD, SBA-ind. Good stability, somewhat poor endurance   PT Total Distance Amb During Session (feet) 200   PT Equipment Needed at Discharge other (see comments)  (none currently)   Physical Therapy Time and Intention   Timed Code Treatment Minutes 15   Total Session Time (sum of timed and untimed services) 25     [facility]                                                                                   OUTPATIENT PHYSICAL THERAPY    PLAN OF TREATMENT FOR OUTPATIENT REHABILITATION   Patient's Last Name, First Name, Warren Epperson YOB: 1980   Provider's Name   [facility]   Medical Record No.  [id number]     Onset Date: 04/20/25 Start of Care Date: 04/21/25     Medical Diagnosis:  other ascites               PT Diagnosis:  impaired functional mobility, gait abnormality Certification Dates:  From: 04/21/25  To: 04/28/25       See note for plan of treatment, functional goals, and certification details.    I CERTIFY THE NEED FOR THESE SERVICES FURNISHED UNDER        THIS PLAN OF TREATMENT AND WHILE UNDER MY CARE (Physician co-signature of this document indicates review and certification of the therapy plan).

## 2025-04-21 NOTE — ED NOTES
Bed: JNED-22  Expected date: 4/20/25  Expected time:   Means of arrival:   Comments:  Hold for room 3

## 2025-04-22 VITALS
HEIGHT: 65 IN | BODY MASS INDEX: 47.48 KG/M2 | HEART RATE: 75 BPM | DIASTOLIC BLOOD PRESSURE: 56 MMHG | WEIGHT: 285 LBS | TEMPERATURE: 98.1 F | SYSTOLIC BLOOD PRESSURE: 97 MMHG | OXYGEN SATURATION: 97 % | RESPIRATION RATE: 20 BRPM

## 2025-04-22 LAB
ATRIAL RATE - MUSE: 84 BPM
DIASTOLIC BLOOD PRESSURE - MUSE: 68 MMHG
GLUCOSE BLDC GLUCOMTR-MCNC: 152 MG/DL (ref 70–99)
INTERPRETATION ECG - MUSE: NORMAL
P AXIS - MUSE: 71 DEGREES
PR INTERVAL - MUSE: 200 MS
QRS DURATION - MUSE: 106 MS
QT - MUSE: 384 MS
QTC - MUSE: 453 MS
R AXIS - MUSE: 107 DEGREES
SYSTOLIC BLOOD PRESSURE - MUSE: 159 MMHG
T AXIS - MUSE: -65 DEGREES
VENTRICULAR RATE- MUSE: 84 BPM

## 2025-04-22 PROCEDURE — 250N000013 HC RX MED GY IP 250 OP 250 PS 637: Performed by: INTERNAL MEDICINE

## 2025-04-22 PROCEDURE — 999N000157 HC STATISTIC RCP TIME EA 10 MIN

## 2025-04-22 PROCEDURE — 82962 GLUCOSE BLOOD TEST: CPT

## 2025-04-22 RX ORDER — SULFAMETHOXAZOLE AND TRIMETHOPRIM 800; 160 MG/1; MG/1
1 TABLET ORAL DAILY
Status: SHIPPED
Start: 2025-04-27

## 2025-04-22 RX ORDER — CIPROFLOXACIN 500 MG/1
500 TABLET, FILM COATED ORAL 2 TIMES DAILY
Qty: 10 TABLET | Refills: 0 | Status: SHIPPED | OUTPATIENT
Start: 2025-04-22 | End: 2025-04-27

## 2025-04-22 RX ADMIN — SPIRONOLACTONE 100 MG: 25 TABLET, FILM COATED ORAL at 07:50

## 2025-04-22 RX ADMIN — SULFAMETHOXAZOLE AND TRIMETHOPRIM 1 TABLET: 800; 160 TABLET ORAL at 07:50

## 2025-04-22 RX ADMIN — LEVOTHYROXINE SODIUM 12.5 MCG: 0.03 TABLET ORAL at 07:50

## 2025-04-22 RX ADMIN — UMECLIDINIUM 1 PUFF: 62.5 AEROSOL, POWDER ORAL at 07:49

## 2025-04-22 RX ADMIN — FUROSEMIDE 40 MG: 20 TABLET ORAL at 07:50

## 2025-04-22 RX ADMIN — APIXABAN 5 MG: 5 TABLET, FILM COATED ORAL at 07:50

## 2025-04-22 RX ADMIN — FAMOTIDINE 20 MG: 20 TABLET, FILM COATED ORAL at 07:50

## 2025-04-22 RX ADMIN — EMPAGLIFLOZIN 10 MG: 10 TABLET, FILM COATED ORAL at 07:50

## 2025-04-22 RX ADMIN — MONTELUKAST 10 MG: 10 TABLET, FILM COATED ORAL at 07:50

## 2025-04-22 RX ADMIN — FLUTICASONE FUROATE AND VILANTEROL TRIFENATATE 1 PUFF: 100; 25 POWDER RESPIRATORY (INHALATION) at 07:49

## 2025-04-22 RX ADMIN — FLUOXETINE HYDROCHLORIDE 20 MG: 20 CAPSULE ORAL at 07:51

## 2025-04-22 RX ADMIN — PANTOPRAZOLE SODIUM 40 MG: 20 TABLET, DELAYED RELEASE ORAL at 07:50

## 2025-04-22 ASSESSMENT — ACTIVITIES OF DAILY LIVING (ADL)
ADLS_ACUITY_SCORE: 59

## 2025-04-22 NOTE — PROGRESS NOTES
"Care Management Discharge Note    Discharge Date: 04/23/2025       Discharge Disposition:  back to group home.    Discharge Services:  none    Discharge DME:  none    Discharge Transportation:  Group Home Staff    Private pay costs discussed: Not applicable    Does the patient's insurance plan have a 3 day qualifying hospital stay waiver?  No    PAS Confirmation Code:  N/A  Patient/family educated on Medicare website which has current facility and service quality ratings:  N/A    Education Provided on the Discharge Plan:  yes  Persons Notified of Discharge Plans: Guardian Jesika Harden and patient Warren  Patient/Family in Agreement with the Plan:  yes    Handoff Referral Completed: No, handoff not indicated or clinically appropriate    Additional Information:  Dr. Lugo notified me Warren is ready for discharge this morning and discharge paperwork is done/complete. He asked me to coordinate a ride for the patient. I spoke with Ginna at Warren's [facility] - she said a staff member is on their way now and can take him home if everything is ready. She requested discharge paperwork be faxed to [phone] which we have done.     Per my colleague's note yesterday - \"The current guardian remains until the court issues an order dismissing the guardian and appointing the new party\" - given this, I contacted and spoke with Jesika Harden (Guardian) to inform her of Warren's discharge from the hospital back to his group home. She verbalized understanding and explained she would reaching out to her  to help facilitate guardianship resolution.     I updated Warren that  staff will be transporting home and that I spoke with Jesika to update her that he would be discharging back to the [facility] today. He verbalized understanding and appeared eager to discharge.      Tila De Leon RN  [facility] ED Care Manager  Desk Phone #: [phone]  Cell #: [phone]        "

## 2025-04-22 NOTE — CONSULTS
CLINICAL NUTRITION - BRIEF EDUCATION    Introduced self and purpose of visit: pt immediately responded knowing the 2000mg sodium limit, states he limits himself to foods with less 10% sodium per serving on nutrition labels. I asked pt to name one food that was high sodium which he has either stopped eating or significantly limited: he replied fried rice.    Pt stated did not have any questions for RD.    Pt accepted handouts:  -Low Sodium nutrition therapy (nutrition care manual)  -Tips for a low sodium diet (forms on demand)

## 2025-04-22 NOTE — PROGRESS NOTES
[facility] Progress Note  Patient was discharged before I saw him today. I have requested a follow up in the liver clinic and notified Dr. Maria with my recommendations.                                              SAV SMILEY MD   Thank you for the opportunity to participate in the care of this patient.   Please feel free to contact us with any questions or concerns.  Phone number [phone]. [phone].             ---

## 2025-04-22 NOTE — PLAN OF CARE
Physical Therapy Discharge Summary    Reason for therapy discharge:    All goals and outcomes met, no further needs identified.    Progress towards therapy goal(s). See goals on Care Plan in [facility] electronic health record for goal details.  Goals met    Therapy recommendation(s):    No further therapy is recommended.

## 2025-04-22 NOTE — PLAN OF CARE
Problem: Adult Inpatient Plan of Care  Goal: Optimal Comfort and Wellbeing  Outcome: Progressing   Goal Outcome Evaluation:  VSS overnight.  No complaints of pain.  Alert and oriented and able to make needs known.

## 2025-04-22 NOTE — PLAN OF CARE
"  Problem: Adult Inpatient Plan of Care  Goal: Plan of Care Review  Description: The Plan of Care Review/Shift note should be completed every shift.  The Outcome Evaluation is a brief statement about your assessment that the patient is improving, declining, or no change.  This information will be displayed automatically on your shiftnote.  Outcome: Met  Goal: Patient-Specific Goal (Individualized)  Description: You can add care plan individualizations to a care plan. Examples of Individualization might be:  \"Parent requests to be called daily at 9am for status\", \"I have a hard time hearing out of my right ear\", or \"Do not touch me to wake me up as it startlesme\".  Outcome: Met  Goal: Absence of Hospital-Acquired Illness or Injury  Outcome: Met  Intervention: Identify and Manage Fall Risk  Recent Flowsheet Documentation  Taken 4/22/2025 0800 by Kane Johnson RN  Safety Promotion/Fall Prevention:   patient and family education   room near nurse's station   room organization consistent   safety round/check completed  Intervention: Prevent Skin Injury  Recent Flowsheet Documentation  Taken 4/22/2025 0800 by Kane Johnson RN  Body Position: position changed independently  Goal: Optimal Comfort and Wellbeing  Outcome: Met  Goal: Readiness for Transition of Care  Outcome: Met   Goal Outcome Evaluation:         Pt is alert and oriented x3. Pt is med complaint. Pt denies pain. Pt is up independently. Pt received all of his discharge paperwork and belongings. Pt will leave the hospital with his group home staff at 11:11.                "

## 2025-04-22 NOTE — DISCHARGE SUMMARY
"[facility]  Hospitalist Discharge Summary      Date of Admission:  4/20/2025  Date of Discharge:  4/22/2025  Discharging Provider: Genesis Lugo MD  Discharge Service: Hospitalist Service    Discharge Diagnoses   SBP    Clinically Significant Risk Factors     # Severe Obesity: Estimated body mass index is 47.43 kg/m  as calculated from the following:    Height as of this encounter: 1.651 m (5' 5\").    Weight as of this encounter: 129.3 kg (285 lb).       Follow-ups Needed After Discharge   Follow-up Appointments       Follow Up      Follow up with Liver clinic as scheduled.        Hospital Follow-up with Existing Primary Care Provider (PCP)          Schedule Primary Care visit within: 7 Days   Recommended labs and Imaging (to be ordered by Primary Care Provider): Ascitic fluid culture result follow up               Unresulted Labs Ordered in the Past 30 Days of this Admission       Date and Time Order Name Status Description    4/21/2025 12:50 PM Ascites Fluid Aerobic Bacterial Culture Routine With Gram Stain Preliminary     4/20/2025  4:34 PM Ascites Fluid Aerobic Bacterial Culture Routine With Gram Stain Preliminary         These results will be followed up by PCP    Discharge Disposition   Discharged to home  Condition at discharge: Stable    Hospital Course   Warren Jaramillo is a 44 year old male with PMH of nonalcoholic liver cirrhosis requiring recurrent paracentesis, SBP, left arm DVT about 2 months ago on Eliquis, and IDDM, COPD, HTN, CHF, LBBB, Rojas's disease, ADHD, fetal alcohol syndrome, autistic disorder,  admitted on 4/20/2025 with SBP and CHF exacerbation.   -- s/p iv lasix for fluid overload.  -- S/p IV Abx for sbp. To complete course with oral cipro. Needs to be on secondary prophylaxis (Bactrim)  -- Details below:  # Portal hypertension with ascites and recurrent SBP.  # Acute on chronic HFpEF- mild exacerbation   -- RICKETTS, proBNP elevated at 765, weight gain Ppt " by diet non compliance.  -- Most recent echo on 4/3/2025: LVEF 60-65%  -- Previous paracentesis on 4/2/25-4.85 L of cloudy bushra fluid.  -- CT abdomen-cirrhotic liver morphology small volume ascites.  -- CXR-no focal lungs.  Disease, stable borderline cardiomegaly.  -- Diagnostic paracentesis in ER, total nucleated cells 1301.  -- Empirically treating suspected SBP with ceftriaxone.  Follow ascitic fluid cultures.  -- Appreciate GI input   -- S/p iv lasix. Continue with po diuretics, adherence to low salt diet and fluid restriction.   # DM2 (diabetes mellitus, type 2) diet controlled, (H) A1c 6.2.  -- Insulin sliding scale and hypoglycemia protocol.  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  Renal function baseline.  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  #Hx of DVT of axillary vein left about a month  PTA Eliquis  # Mental health disorders: Depression,ADHD, fetal alcohol syndrome, autistic disorder  -- Continue PTA Zyprexa Prozac gabapentin  # Severe (morbid) obesity  -- BMI 47 kg/m2. Weight loss recommended.   Patient is clinically and hemodynamically stable for discharge. Medication reconciliation was done. Medications sent to patient's preferred pharmacy. All labs and radiologic findings discussed with patient. Follow up appointments and recommendations as shown below. Patient verbalized understanding and agreed to plan of care. All questions answered.     Consultations This Hospital Stay   CARE MANAGEMENT / SOCIAL WORK IP CONSULT  CARE MANAGEMENT / SOCIAL WORK IP CONSULT  GASTROENTEROLOGY IP CONSULT  CARE MANAGEMENT / SOCIAL WORK IP CONSULT  PHYSICAL THERAPY ADULT IP CONSULT  OCCUPATIONAL THERAPY ADULT IP CONSULT  CHEMICAL DEPENDENCY IP CONSULT  NUTRITION SERVICES ADULT IP CONSULT    Code Status   Full Code    Time Spent on this Encounter   I, Genesis Lugo MD, personally saw the  patient today and spent greater than 30 minutes discharging this patient.       Genesis Lugo MD  [facility] EMERGENCY DEPARTMENT  [address] [facility] [facility] [address] [address]  Phone: [phone]  ______________________________________________________________________    Physical Exam   Vital Signs: Temp: 98.1  F (36.7  C) Temp src: Oral BP: 97/56 Pulse: 75   Resp: 20 SpO2: 97 %      Weight: 285 lbs 0 oz  GEN: Alert and oriented. Not in acute distress.  HEENT: Atraumatic, mucous membrane- moist and pink.  Chest: Bilateral air entry.  CVS: S1S2 regular.   Abdomen: Soft. Non-tender. No organomegaly. No guarding or rigidity. Bowel sounds active.   Extremities: No pedal edema.  CNS: No involuntary movements.  Skin: no cyanosis or clubbing.        Primary Care Physician   Mary Kelly    Discharge Orders      Reason for your hospital stay    Decompensated cirrhosis, spontaneous bacterial peritonitis     Activity    Your activity upon discharge: activity as tolerated     Follow Up    Follow up with Liver clinic as scheduled.     Diet    Follow this diet upon discharge: Current Diet:Orders Placed This Encounter      Fluid restriction 1500 ML FLUID      2 Gram Sodium Diet     Hospital Follow-up with Existing Primary Care Provider (PCP)            Significant Results and Procedures       Discharge Medications   Current Discharge Medication List        START taking these medications    Details   ciprofloxacin (CIPRO) 500 MG tablet Take 1 tablet (500 mg) by mouth 2 times daily for 5 days.  Qty: 10 tablet, Refills: 0    Associated Diagnoses: Spontaneous bacterial peritonitis (H)           CONTINUE these medications which have CHANGED    Details   sulfamethoxazole-trimethoprim (BACTRIM DS) 800-160 MG tablet Take 1 tablet by mouth daily. Resume after completing ciprofloxacin.    Associated Diagnoses: Spontaneous bacterial peritonitis (H)           CONTINUE these medications which have NOT CHANGED     Details   albuterol (PROAIR HFA/PROVENTIL HFA/VENTOLIN HFA) 108 (90 Base) MCG/ACT inhaler Inhale 1-2 puffs into the lungs every 6 hours as needed for shortness of breath, wheezing or cough  Qty: 18 g, Refills: 0    Comments: Pharmacy may dispense brand covered by insurance (Proair, or proventil or ventolin or generic albuterol inhaler)      albuterol (PROVENTIL) (2.5 MG/3ML) 0.083% neb solution Take 2.5 mg by nebulization 3 times daily as needed for shortness of breath, wheezing or cough      aloe vera GEL Apply 1 g topically every hour as needed for skin care Per bottle directions      apixaban ANTICOAGULANT (ELIQUIS) 5 MG tablet Take 5 mg by mouth 2 times daily.      bacitracin 500 UNIT/GM OINT Apply topically 3 times daily as needed for wound care      Benzocaine (SOLARCAINE ALOE VERA EX) Externally apply topically daily as needed.      benzonatate (TESSALON) 200 MG capsule Take 1 capsule (200 mg) by mouth 3 times daily as needed for cough.  Qty: 50 capsule, Refills: 0      Calamine external lotion Apply topically as needed for itching      carbamide peroxide (DEBROX) 6.5 % otic solution Place 5 drops into both ears daily as needed for other.      clotrimazole (LOTRIMIN) 1 % external cream Apply topically 2 times daily as needed (skin irritation)      dextromethorphan-guaiFENesin (MUCINEX DM)  MG 12 hr tablet Take 1 tablet by mouth 2 times daily as needed.      diclofenac (VOLTAREN) 1 % topical gel Apply 2 g topically daily as needed for moderate pain To joints/back      famotidine (PEPCID) 20 MG tablet Take 1 tablet (20 mg) by mouth 2 times daily  Qty: 60 tablet, Refills: 0      FLUoxetine 20 MG tablet Take 20 mg by mouth every morning.      furosemide (LASIX) 40 MG tablet Take 1 tablet (40 mg) by mouth daily.  Qty: 30 tablet, Refills: 0    Associated Diagnoses: Cirrhosis of liver with ascites, unspecified hepatic cirrhosis type (H); Chronic right-sided congestive heart failure (H)      GAVILAX 17  GM/SCOOP powder Take 17 g by mouth daily as needed.      hydrocortisone (CORTAID) 1 % external cream Apply topically daily as needed.      Hydrocortisone (PREPARATION H EX) Externally apply topically daily as needed.      JARDIANCE 10 MG TABS tablet Take 10 mg by mouth every morning.      levothyroxine (SYNTHROID/LEVOTHROID) 25 MCG tablet Take 12.5 mcg by mouth every morning (before breakfast).      lisinopril (ZESTRIL) 10 MG tablet Take 10 mg by mouth every morning.      loperamide (IMODIUM) 2 MG capsule Take 4 mg by mouth 4 times daily as needed for diarrhea.      loratadine (CLARITIN) 10 MG tablet Take 10 mg by mouth daily as needed for allergies.      magnesium hydroxide (MILK OF MAGNESIA) 400 MG/5ML suspension Take 30 mLs by mouth daily as needed.      metFORMIN (GLUCOPHAGE) 1000 MG tablet Take 1,000 mg by mouth 2 times daily (with meals)      methocarbamol (ROBAXIN) 500 MG tablet Take 1 tablet (500 mg) by mouth 4 times daily as needed for muscle spasms.  Qty: 40 tablet, Refills: 0    Associated Diagnoses: Acute right-sided low back pain with right-sided sciatica      montelukast (SINGULAIR) 10 MG tablet Take 10 mg by mouth every morning.      OLANZapine (ZYPREXA) 10 MG tablet Take 10 mg by mouth At Bedtime      omeprazole (PRILOSEC) 40 MG DR capsule Take 40 mg by mouth every morning.      ondansetron (ZOFRAN ODT) 4 MG ODT tab Take 1 tablet (4 mg) by mouth every 8 hours as needed for nausea.  Qty: 20 tablet, Refills: 0      pramoxine-calamine (AVEENO) 1-8 % LOTN Apply topically daily as needed for itching.      rosuvastatin (CRESTOR) 10 MG tablet Take 10 mg by mouth At Bedtime      spironolactone (ALDACTONE) 100 MG tablet Take 1 tablet (100 mg) by mouth daily.  Qty: 30 tablet, Refills: 0    Associated Diagnoses: Cirrhosis of liver with ascites, unspecified hepatic cirrhosis type (H); Chronic right-sided congestive heart failure (H)      traZODone (DESYREL) 100 MG tablet Take 100 mg by mouth at bedtime.       TRELEGY ELLIPTA 100-62.5-25 MCG/ACT oral inhaler Inhale 1 puff into the lungs every morning.      Urea 40 % CREA Apply topically daily as needed.      Vitamins A & D (VITAMIN A & D) OINT Externally apply topically daily as needed.      witch hazel-glycerin (TUCKS) pad Apply topically as needed for hemorrhoids.      Continuous Glucose Sensor (FREESTYLE MEGHANN 3 PLUS SENSOR) MISC USE TO READ BLOOD SUGAR TWICE DAILY AND AS NEEDED. CHANGE SENSOR EVERY 15 DAYS      EPINEPHrine (ANY BX GENERIC EQUIV) 0.3 MG/0.3ML injection 2-pack Inject 0.3 mg into the muscle as needed for anaphylaxis. May repeat one time in 5-15 minutes if response to initial dose is inadequate.           Allergies   Allergies   Allergen Reactions    Apricot Flavoring Agent (Non-Screening) Anaphylaxis    Banana Anaphylaxis     Throat swelling      Bees Anaphylaxis     Has an Epi pen    Wasp Venom Protein Shortness Of Breath     Other reaction(s): Respiratory Distress  Has an Epi pen        Methylphenidate Itching     Other reaction(s): Nightmares    Prunus      Other reaction(s): *Unknown

## 2025-04-23 ENCOUNTER — PATIENT OUTREACH (OUTPATIENT)
Dept: CARE COORDINATION | Facility: CLINIC | Age: 45
End: 2025-04-23
Payer: COMMERCIAL

## 2025-04-23 NOTE — PROGRESS NOTES
[facility]    Background: Transitional Care Management program identified per system criteria and reviewed by [facility] team for possible outreach.    Assessment: Upon chart review, [facility] Team member will not proceed with patient outreach related to this episode of Transitional Care Management program due to reason below:    Patient has discharged to a Memory Care, Long-term Care, Assisted Living or Group Home where patient is receiving on-site support with their daily cares, including support with hospital follow up plan.    Patient discharged back to his Group Home. No [facility] outreach call needed at this time. Chart review only.    Plan: Transitional Care Management episode addressed appropriately per reason noted above.      HELENE Johnson  [phone]  [facility]     *Connected Care Resource Team does NOT follow patient ongoing. Referrals are identified based on internal discharge reports and the outreach is to ensure patient has an understanding of their discharge instructions.

## 2025-04-24 LAB
BACTERIA FLD CULT: NORMAL
BACTERIA FLD CULT: NORMAL
GRAM STAIN RESULT: NORMAL

## 2025-04-26 LAB
BACTERIA FLD CULT: NO GROWTH
GRAM STAIN RESULT: NORMAL
GRAM STAIN RESULT: NORMAL

## 2025-05-04 ENCOUNTER — APPOINTMENT (OUTPATIENT)
Dept: RADIOLOGY | Facility: HOSPITAL | Age: 45
End: 2025-05-04
Attending: PHYSICIAN ASSISTANT
Payer: COMMERCIAL

## 2025-05-04 ENCOUNTER — HOSPITAL ENCOUNTER (EMERGENCY)
Facility: HOSPITAL | Age: 45
Discharge: HOME | End: 2025-05-04
Attending: EMERGENCY MEDICINE | Admitting: EMERGENCY MEDICINE
Payer: COMMERCIAL

## 2025-05-04 VITALS
SYSTOLIC BLOOD PRESSURE: 145 MMHG | BODY MASS INDEX: 48.56 KG/M2 | TEMPERATURE: 99.3 F | RESPIRATION RATE: 30 BRPM | DIASTOLIC BLOOD PRESSURE: 81 MMHG | WEIGHT: 291.8 LBS | HEART RATE: 86 BPM | OXYGEN SATURATION: 95 %

## 2025-05-04 DIAGNOSIS — R18.8 CIRRHOSIS OF LIVER WITH ASCITES, UNSPECIFIED HEPATIC CIRRHOSIS TYPE (H): ICD-10-CM

## 2025-05-04 DIAGNOSIS — K74.60 CIRRHOSIS OF LIVER WITH ASCITES, UNSPECIFIED HEPATIC CIRRHOSIS TYPE (H): ICD-10-CM

## 2025-05-04 LAB
ALBUMIN SERPL BCG-MCNC: 4.2 G/DL (ref 3.5–5.2)
ALP SERPL-CCNC: 258 U/L (ref 40–150)
ALT SERPL W P-5'-P-CCNC: 25 U/L (ref 0–70)
ANION GAP SERPL CALCULATED.3IONS-SCNC: 11 MMOL/L (ref 7–15)
AST SERPL W P-5'-P-CCNC: 18 U/L (ref 0–45)
BASOPHILS # BLD AUTO: 0.1 10E3/UL (ref 0–0.2)
BASOPHILS NFR BLD AUTO: 1 %
BILIRUB SERPL-MCNC: 0.3 MG/DL
BUN SERPL-MCNC: 24.8 MG/DL (ref 6–20)
CALCIUM SERPL-MCNC: 9.8 MG/DL (ref 8.8–10.4)
CHLORIDE SERPL-SCNC: 105 MMOL/L (ref 98–107)
CREAT SERPL-MCNC: 1.21 MG/DL (ref 0.67–1.17)
EGFRCR SERPLBLD CKD-EPI 2021: 76 ML/MIN/1.73M2
EOSINOPHIL # BLD AUTO: 0.6 10E3/UL (ref 0–0.7)
EOSINOPHIL NFR BLD AUTO: 5 %
ERYTHROCYTE [DISTWIDTH] IN BLOOD BY AUTOMATED COUNT: 14.6 % (ref 10–15)
GLUCOSE SERPL-MCNC: 137 MG/DL (ref 70–99)
HCO3 SERPL-SCNC: 21 MMOL/L (ref 22–29)
HCT VFR BLD AUTO: 39.6 % (ref 40–53)
HGB BLD-MCNC: 13 G/DL (ref 13.3–17.7)
IMM GRANULOCYTES # BLD: 0.1 10E3/UL
IMM GRANULOCYTES NFR BLD: 1 %
LIPASE SERPL-CCNC: 27 U/L (ref 13–60)
LYMPHOCYTES # BLD AUTO: 1.9 10E3/UL (ref 0.8–5.3)
LYMPHOCYTES NFR BLD AUTO: 16 %
MAGNESIUM SERPL-MCNC: 1.7 MG/DL (ref 1.7–2.3)
MCH RBC QN AUTO: 28.9 PG (ref 26.5–33)
MCHC RBC AUTO-ENTMCNC: 32.8 G/DL (ref 31.5–36.5)
MCV RBC AUTO: 88 FL (ref 78–100)
MONOCYTES # BLD AUTO: 1.3 10E3/UL (ref 0–1.3)
MONOCYTES NFR BLD AUTO: 11 %
NEUTROPHILS # BLD AUTO: 8 10E3/UL (ref 1.6–8.3)
NEUTROPHILS NFR BLD AUTO: 67 %
NRBC # BLD AUTO: 0 10E3/UL
NRBC BLD AUTO-RTO: 0 /100
NT-PROBNP SERPL-MCNC: 734 PG/ML (ref 0–450)
PLATELET # BLD AUTO: 310 10E3/UL (ref 150–450)
POTASSIUM SERPL-SCNC: 4.4 MMOL/L (ref 3.4–5.3)
PROT SERPL-MCNC: 7 G/DL (ref 6.4–8.3)
RBC # BLD AUTO: 4.5 10E6/UL (ref 4.4–5.9)
SODIUM SERPL-SCNC: 137 MMOL/L (ref 135–145)
TROPONIN T SERPL HS-MCNC: 24 NG/L
WBC # BLD AUTO: 11.9 10E3/UL (ref 4–11)

## 2025-05-04 PROCEDURE — 71046 X-RAY EXAM CHEST 2 VIEWS: CPT

## 2025-05-04 PROCEDURE — 93005 ELECTROCARDIOGRAM TRACING: CPT | Performed by: PHYSICIAN ASSISTANT

## 2025-05-04 PROCEDURE — 99285 EMERGENCY DEPT VISIT HI MDM: CPT | Mod: 25 | Performed by: EMERGENCY MEDICINE

## 2025-05-04 PROCEDURE — 36415 COLL VENOUS BLD VENIPUNCTURE: CPT | Performed by: PHYSICIAN ASSISTANT

## 2025-05-04 PROCEDURE — 83735 ASSAY OF MAGNESIUM: CPT | Performed by: PHYSICIAN ASSISTANT

## 2025-05-04 PROCEDURE — 83880 ASSAY OF NATRIURETIC PEPTIDE: CPT | Performed by: PHYSICIAN ASSISTANT

## 2025-05-04 PROCEDURE — 83690 ASSAY OF LIPASE: CPT | Performed by: PHYSICIAN ASSISTANT

## 2025-05-04 PROCEDURE — 84484 ASSAY OF TROPONIN QUANT: CPT | Performed by: PHYSICIAN ASSISTANT

## 2025-05-04 PROCEDURE — 85025 COMPLETE CBC W/AUTO DIFF WBC: CPT | Performed by: PHYSICIAN ASSISTANT

## 2025-05-04 PROCEDURE — 82040 ASSAY OF SERUM ALBUMIN: CPT | Performed by: PHYSICIAN ASSISTANT

## 2025-05-04 ASSESSMENT — ACTIVITIES OF DAILY LIVING (ADL)
ADLS_ACUITY_SCORE: 59
ADLS_ACUITY_SCORE: 59

## 2025-05-04 NOTE — ED TRIAGE NOTES
"Pt states he is here for a paracentesis, pt states he was at a family retreat on Friday and had \"all sorts of candy, jerky, and lots of food that is probably not good for me\".  \" I need a paracentesis immediately\".  Pt able to talk in full sentences.      Triage Assessment (Adult)       Row Name 05/04/25 1833          Triage Assessment    Airway WDL WDL        Respiratory WDL    Respiratory WDL X;rhythm/pattern     Rhythm/Pattern, Respiratory tachypneic        Skin Circulation/Temperature WDL    Skin Circulation/Temperature WDL WDL        Cardiac WDL    Cardiac WDL WDL        Peripheral/Neurovascular WDL    Peripheral Neurovascular WDL WDL        Cognitive/Neuro/Behavioral WDL    Cognitive/Neuro/Behavioral WDL WDL        [place]ber[place] Coma Scale    Best Eye Response 4-->(E4) spontaneous     Best Motor Response 6-->(M6) obeys commands     Best Verbal Response 5-->(V5) oriented     [place]berly Coma Scale Score 15     Assessment Qualifiers patient not sedated/intubated                     "

## 2025-05-04 NOTE — LETTER
05/07/2025    Warren Jaramillo  [address]  [place] [address]          Dear Warren Jaramillo:  You are receiving this letter regarding a special care plan developed for you by the [facility] Emergency Department (ED) Care Plan Committee. This letter is to provide information about the special care plan created for you. Our goal is to help promote a healing and safe environment for you while keeping patients and staff safe. The special care plan was created due to the following concerns:  Frequent visits for paracentesis, often requiring admission and antibiotics for concern of SBP (spontaneous bacterial peritonitis)   Interestingly, your fluid studies from the ascites often indicate possible SBP (9 of last 10 positive for significant numbers of PMNs or white blood cells), but the cultures don't really grow any bacteria (11 of last 11 have been negative)    The following recommendations have been made by the ED Care Plan Committee for your safety, the safety of others, and to achieve the most therapeutic benefit in an Emergency Department setting:  We will talk with Gastroenterology to see what they think of your fluid studies at future visits and coordinate with them if they think you can possibly be safely discharged from the ED if you are otherwise looking and feeling well, but just need a paracentesis  We may also look at arranging urgent GI follow up or even urgent Radiology follow up (for a rapid outpatient paracentesis in 1-2 days) if it looks like there will be a significant delay in radiology being able to do your paracentesis in the ED    The plan will apply to every ED visit. The ED Care Plan Committee will review your care plan regularly, typically every 18 months. If you do not have any ED visits during that timeframe, the care plan will likely be retired. The team will take into consideration changes to the care plan based on your behaviors and clinical presentation.    If  you have specific questions on your care plan, you can reach out to;  Dr. Richardson (ED Care Plans Committee Chair): [email or web address]  Dr. Card ([facility] ED Medical Director): [email or web address]    If you have any additional concerns, you can reach out to patient relations at [phone].    Thank you for the opportunity to care for you,     Dr. Nj Richardson   [facility] ED Care Plan Committee Chair    Electronically signed

## 2025-05-05 NOTE — ED PROVIDER NOTES
Emergency Department Encounter   NAME: Warren Jaramillo ; AGE: 44 year old male ; YOB: 1980 ; MRN: [id number] ; PCP: Mary Kelly   ED PROVIDER: Danuta Bajwa PA-C    Evaluation Date & Time:   5/4/2025  7:04 PM    CHIEF COMPLAINT:  Breathing Problem and Weight gain      Impression and Plan   MDM: Warren Jaramillo is a 44 year old male who presents to the ED for evaluation of abdominal distension/weight gain.  Patient has a history of nonalcoholic liver cirrhosis and is well-known to our ED with recurrent SBP.  Per chart review, he was just discharged 2 weeks ago with a similar presentation.  He did complete his course of ciprofloxacin and is currently on prophylactic Bactrim.  He follows with [facility], and has plans for liver biopsy later this week with plans for upcoming TIPS procedure.  He does admit that he did not take his Lasix today as he is now out of the prescription and consumes significant salty foods and liquid over the weekend which likely exacerbated his ascites.  His abdomen is distended with palpable fluid wave.  He does have some tenderness over his left lower quadrant though no rebound, guarding, or evidence of peritonitis.  He does report a 20 pound weight gain since Friday.  He is afebrile and vitally stable here in the ED.  He does have some mild increased respiratory effort though lungs are clear and he is 96% on room air.  Given his recurrent SBP with symptoms that feel similar today, do feel that he requires paracentesis.  Unfortunately, we do not have capabilities over the weekend.  Plan at this time is to obtain laboratory assessment, and likely admit for paracentesis tomorrow with IR.     Labs overall appear stable for patient.  He has some mild leukocytosis of 11.9.  He has remained afebrile and vitally stable.  At this time, do feel that it is appropriate to wait for paracentesis in the a.m. prior to initiating antibiotics.  Tenderness of LLQ is mild,  labs stable, and he states pain is consistent with his typical ascites presentations. Given this, will defer abdominal CT at this time. His EKG appears similar to previous. He does have a chronic troponin leak and trop today is around his baseline at 24. Delta troponin ordered though low suspicion for ACS. BNP chronically elevated, and 734 today. No peripheral edema and CXR pending. Suspect his SOB today is due to his abdominal distension in the setting of ascites.      MIPS (CTPE, Dental pain, Khan, Sinusitis, Asthma/COPD, Head Trauma): Not Applicable    SEPSIS: None        ED COURSE:  7:11 PM I met and introduced myself to the patient. I gathered initial history and performed my physical exam. We discussed plan for initial workup.   8:00 PM I have staffed the patient with Dr. Rick ED MD, who has evaluated the patient and agrees with all aspects of today's care.   8:24 PM I have signed the patient's care out to Dr. Rick ED MD, at the end of my shift.     At the conclusion of the encounter I discussed the results of all the tests and the disposition. The questions were answered. The patient or family acknowledged understanding and was agreeable with the care plan.    FINAL IMPRESSION:    ICD-10-CM    1. Cirrhosis of liver with ascites, unspecified hepatic cirrhosis type (H)  K74.60     R18.8             MEDICATIONS GIVEN IN THE EMERGENCY DEPARTMENT:  Medications - No data to display      NEW PRESCRIPTIONS STARTED AT TODAY'S ED VISIT:  New Prescriptions    No medications on file         HPI   Use of Intrepreter: N/A     Warren Jaramillo is a 44 year old male with a pertinent history of nonalcoholic liver cirrhosis requiring recurrent paracentesis, SBP, left arm DVT about 2 months ago on Eliquis, and IDDM, COPD, HTN, CHF, LBBB, Rojas's disease, ADHD, fetal alcohol syndrome, autistic disorder, who presents to the ED for evaluation of abdominal distension/SOB.     Per patient, he requires recurrent  paracentesis every 6 days for fluid in his abdomen.  His last paracentesis was several weeks ago while he was in the hospital.  He states that he was at a [employer] retreat this weekend and drink more fluid than normal as well as ate salty snacks.  He believes that he gained 20 pounds since Friday and had quite sudden abdominal distention and discomfort.  He states that he is now feeling short of breath.  He has not had any fevers or chills, no nausea or vomiting, he is passing flatulence with normal stools.  No chest pain.    REVIEW OF SYSTEMS:  Pertinent positive and negative symptoms per HPI.       Medical History     Past Medical History:   Diagnosis Date    COPD exacerbation (H) 12/02/2024    DM2 (diabetes mellitus, type 2) (H) 04/28/2020    HTN (hypertension) 07/30/2012    Sleep apnea     Thyroid nodule 07/31/2019    Rojas's disease (H)        Past Surgical History:   Procedure Laterality Date    COLONOSCOPY      ESOPHAGOSCOPY, GASTROSCOPY, DUODENOSCOPY (EGD), COMBINED N/A 7/21/2023    Procedure: ESOPHAGOGASTRODUODENOSCOPY WITH GASTRIC AND ESOPHAGEAL BIOPSIES;  Surgeon: Filiberto Aragon MD;  Location: [facility] OR    ESOPHAGOSCOPY, GASTROSCOPY, DUODENOSCOPY (EGD), COMBINED N/A 4/4/2025    Procedure: ESOPHAGOGASTRODUODENOSCOPY;  Surgeon: Ramesh Talbot MD;  Location: [facility] OR    TOOTH EXTRACTION         Family History   Problem Relation Age of Onset    Unknown/Adopted Father     Unknown/Adopted Maternal Grandmother     C.A.D. Maternal Grandfather     Diabetes Maternal Grandfather     Cerebrovascular Disease Maternal Grandfather     Unknown/Adopted Paternal Grandmother     Unknown/Adopted Paternal Grandfather     Unknown/Adopted Brother     Unknown/Adopted Sister        Social History     Tobacco Use    Smoking status: Every Day     Current packs/day: 1.00     Average packs/day: 1 pack/day for 21.3 years (21.3 ttl pk-yrs)     Types: Cigarettes     Start date: 1/1/2004    Smokeless tobacco: Never    Vaping Use    Vaping status: Never Used   Substance Use Topics    Alcohol use: Not Currently     Comment: Last 8/7/2024       albuterol (PROAIR HFA/PROVENTIL HFA/VENTOLIN HFA) 108 (90 Base) MCG/ACT inhaler  albuterol (PROVENTIL) (2.5 MG/3ML) 0.083% neb solution  aloe vera GEL  apixaban ANTICOAGULANT (ELIQUIS) 5 MG tablet  bacitracin 500 UNIT/GM OINT  Benzocaine (SOLARCAINE ALOE VERA EX)  benzonatate (TESSALON) 200 MG capsule  Calamine external lotion  carbamide peroxide (DEBROX) 6.5 % otic solution  clotrimazole (LOTRIMIN) 1 % external cream  Continuous Glucose Sensor (FREESTYLE MEGHANN 3 PLUS SENSOR) MISC  dextromethorphan-guaiFENesin (MUCINEX DM)  MG 12 hr tablet  diclofenac (VOLTAREN) 1 % topical gel  EPINEPHrine (ANY BX GENERIC EQUIV) 0.3 MG/0.3ML injection 2-pack  famotidine (PEPCID) 20 MG tablet  FLUoxetine 20 MG tablet  furosemide (LASIX) 40 MG tablet  GAVILAX 17 GM/SCOOP powder  hydrocortisone (CORTAID) 1 % external cream  Hydrocortisone (PREPARATION H EX)  JARDIANCE 10 MG TABS tablet  levothyroxine (SYNTHROID/LEVOTHROID) 25 MCG tablet  lisinopril (ZESTRIL) 10 MG tablet  loperamide (IMODIUM) 2 MG capsule  loratadine (CLARITIN) 10 MG tablet  magnesium hydroxide (MILK OF MAGNESIA) 400 MG/5ML suspension  metFORMIN (GLUCOPHAGE) 1000 MG tablet  methocarbamol (ROBAXIN) 500 MG tablet  montelukast (SINGULAIR) 10 MG tablet  OLANZapine (ZYPREXA) 10 MG tablet  omeprazole (PRILOSEC) 40 MG DR capsule  ondansetron (ZOFRAN ODT) 4 MG ODT tab  pramoxine-calamine (AVEENO) 1-8 % LOTN  rosuvastatin (CRESTOR) 10 MG tablet  spironolactone (ALDACTONE) 100 MG tablet  sulfamethoxazole-trimethoprim (BACTRIM DS) 800-160 MG tablet  traZODone (DESYREL) 100 MG tablet  TRELEGY ELLIPTA 100-62.5-25 MCG/ACT oral inhaler  Urea 40 % CREA  Vitamins A & D (VITAMIN A & D) OINT  witch hazel-glycerin (TUCKS) pad          Physical Exam     First Vitals:  Patient Vitals for the past 24 hrs:   BP Temp Temp src Pulse Resp SpO2 Weight   05/04/25  1913 -- -- -- 88 -- 96 % --   05/04/25 1831 136/67 99.3  F (37.4  C) Oral 91 22 97 % 132.4 kg (291 lb 12.8 oz)         PHYSICAL EXAM:   Physical Exam  Constitutional:       General: He is not in acute distress.     Comments: Chronically ill appearing.    Eyes:      Conjunctiva/sclera: Conjunctivae normal.   Cardiovascular:      Rate and Rhythm: Normal rate and regular rhythm.      Heart sounds: Normal heart sounds.   Pulmonary:      Effort: No respiratory distress.      Breath sounds: Normal breath sounds. No wheezing, rhonchi or rales.      Comments: Mild increased effort.  Abdominal:      General: There is distension.      Tenderness: There is abdominal tenderness (mild LLQ). There is no right CVA tenderness, left CVA tenderness, guarding or rebound.      Comments: Palpable fluid wave.    Musculoskeletal:      Comments: No lower extremity pitting edema.    Skin:     Comments: Copper appearance of skin.    Neurological:      Mental Status: He is alert.             Results     LAB:  All pertinent labs reviewed and interpreted  Labs Ordered and Resulted from Time of ED Arrival to Time of ED Departure   COMPREHENSIVE METABOLIC PANEL - Abnormal       Result Value    Sodium 137      Potassium 4.4      Carbon Dioxide (CO2) 21 (*)     Anion Gap 11      Urea Nitrogen 24.8 (*)     Creatinine 1.21 (*)     GFR Estimate 76      Calcium 9.8      Chloride 105      Glucose 137 (*)     Alkaline Phosphatase 258 (*)     AST 18      ALT 25      Protein Total 7.0      Albumin 4.2      Bilirubin Total 0.3     CBC WITH PLATELETS AND DIFFERENTIAL - Abnormal    WBC Count 11.9 (*)     RBC Count 4.50      Hemoglobin 13.0 (*)     Hematocrit 39.6 (*)     MCV 88      MCH 28.9      MCHC 32.8      RDW 14.6      Platelet Count 310      % Neutrophils 67      % Lymphocytes 16      % Monocytes 11      % Eosinophils 5      % Basophils 1      % Immature Granulocytes 1      NRBCs per 100 WBC 0      Absolute Neutrophils 8.0      Absolute Lymphocytes 1.9       Absolute Monocytes 1.3      Absolute Eosinophils 0.6      Absolute Basophils 0.1      Absolute Immature Granulocytes 0.1      Absolute NRBCs 0.0     LIPASE - Normal    Lipase 27     MAGNESIUM - Normal    Magnesium 1.7     NT PROBNP INPATIENT   ALBUMIN FLUID   PROTEIN FLUID   TROPONIN T, HIGH SENSITIVITY   AEROBIC BACTERIAL CULTURE ROUTINE   CELL COUNT WITH DIFFERENTIAL FLUID       RADIOLOGY:  Chest XR,  PA & LAT    (Results Pending)   US Paracentesis without Albumin    (Results Pending)         Danuta Bajwa PA-C   Emergency Medicine   [facility] EMERGENCY DEPARTMENT       Danuta Bajwa PA-C  05/04/25 204

## 2025-05-05 NOTE — ED PROVIDER NOTES
Emergency Department Midlevel Supervisory Note    Date/Time:5/4/2025 8:16 PM    I personally saw the patient and performed a substantive portion of the visit including all aspects of the medical decision making.  I am seeing this patient along with Danuta Bajwa PA-C.  Fortino MATOS D.O., have reviewed the documentation, personally taken the patient's history, performed an exam and agree with the physical finds, diagnosis and management plan.    Prior records were reviewed.  I personally performed history and physical.  I personally reviewed lab, EKG, and radiology results as indicated.  Care was discussed with mid-level provider.  Diagnosis and disposition were discussed.  Final disposition will be per the TALIA depending diagnostic studies and patient's clinical trajectory.      ED Course:    The patient presented to the emergency department today complaining of abdominal distention and weight gain.  He has a history of liver cirrhosis and frequently requires paracentesis.  He has had a few episodes of spontaneous bacterial peritonitis and is currently on antibiotics.  On exam, he has significant abdominal distention without any significant tenderness.  Vital signs are stable.  Laboratory testing shows no significant change from prior levels.  We discussed options of staying in the hospital for further testing but the patient would prefer to go home and follow-up for an outpatient paracentesis in the next 1-2 days.  Given his otherwise well appearance, I feel that this is reasonable and I have encouraged him to continue his current antibiotic regimen.  He has been advised to return to the ER sooner for any worsening symptoms or other concerns.      DIAGNOSIS:  1. Cirrhosis of liver with ascites, unspecified hepatic cirrhosis type (H)          Labs and Imaging:  Results for orders placed or performed during the hospital encounter of 05/04/25   Chest XR,  PA & LAT    Impression    IMPRESSION: Borderline heart  size with no evidence for sobeida pulmonary venous congestion. No pleural effusions, inflammatory infiltrates, or pneumothorax. Stable since 4/20/2025.   Comprehensive metabolic panel   Result Value Ref Range    Sodium 137 135 - 145 mmol/L    Potassium 4.4 3.4 - 5.3 mmol/L    Carbon Dioxide (CO2) 21 (L) 22 - 29 mmol/L    Anion Gap 11 7 - 15 mmol/L    Urea Nitrogen 24.8 (H) 6.0 - 20.0 mg/dL    Creatinine 1.21 (H) 0.67 - 1.17 mg/dL    GFR Estimate 76 >60 mL/min/1.73m2    Calcium 9.8 8.8 - 10.4 mg/dL    Chloride 105 98 - 107 mmol/L    Glucose 137 (H) 70 - 99 mg/dL    Alkaline Phosphatase 258 (H) 40 - 150 U/L    AST 18 0 - 45 U/L    ALT 25 0 - 70 U/L    Protein Total 7.0 6.4 - 8.3 g/dL    Albumin 4.2 3.5 - 5.2 g/dL    Bilirubin Total 0.3 <=1.2 mg/dL   Result Value Ref Range    Lipase 27 13 - 60 U/L   Result Value Ref Range    Magnesium 1.7 1.7 - 2.3 mg/dL   Nt probnp inpatient   Result Value Ref Range    N terminal Pro BNP Inpatient 734 (H) 0 - 450 pg/mL   CBC with platelets and differential   Result Value Ref Range    WBC Count 11.9 (H) 4.0 - 11.0 10e3/uL    RBC Count 4.50 4.40 - 5.90 10e6/uL    Hemoglobin 13.0 (L) 13.3 - 17.7 g/dL    Hematocrit 39.6 (L) 40.0 - 53.0 %    MCV 88 78 - 100 fL    MCH 28.9 26.5 - 33.0 pg    MCHC 32.8 31.5 - 36.5 g/dL    RDW 14.6 10.0 - 15.0 %    Platelet Count 310 150 - 450 10e3/uL    % Neutrophils 67 %    % Lymphocytes 16 %    % Monocytes 11 %    % Eosinophils 5 %    % Basophils 1 %    % Immature Granulocytes 1 %    NRBCs per 100 WBC 0 <1 /100    Absolute Neutrophils 8.0 1.6 - 8.3 10e3/uL    Absolute Lymphocytes 1.9 0.8 - 5.3 10e3/uL    Absolute Monocytes 1.3 0.0 - 1.3 10e3/uL    Absolute Eosinophils 0.6 0.0 - 0.7 10e3/uL    Absolute Basophils 0.1 0.0 - 0.2 10e3/uL    Absolute Immature Granulocytes 0.1 <=0.4 10e3/uL    Absolute NRBCs 0.0 10e3/uL   Result Value Ref Range    Troponin T, High Sensitivity 24 (H) <=22 ng/L         Fortino Cabrera D.O.  Emergency Medicine  [facility]  [facility] EMERGENCY DEPARTMENT  [address] [place] [id number]  [phone]  Dept: [phone]             Fortino Cabrera DO  05/04/25 2121

## 2025-05-05 NOTE — DISCHARGE INSTRUCTIONS
Fortunately your lab testing today is not significantly different from prior levels.  Follow-up in the next 1-2 days for an outpatient paracentesis and return to the ER sooner for any worsening symptoms or other concerns.

## 2025-05-06 ENCOUNTER — HOSPITAL ENCOUNTER (OUTPATIENT)
Dept: ULTRASOUND IMAGING | Facility: HOSPITAL | Age: 45
Discharge: HOME | End: 2025-05-06
Attending: INTERNAL MEDICINE | Admitting: RADIOLOGY
Payer: COMMERCIAL

## 2025-05-06 DIAGNOSIS — R18.8 CIRRHOSIS OF LIVER WITH ASCITES, UNSPECIFIED HEPATIC CIRRHOSIS TYPE (H): ICD-10-CM

## 2025-05-06 DIAGNOSIS — K74.60 CIRRHOSIS OF LIVER WITH ASCITES, UNSPECIFIED HEPATIC CIRRHOSIS TYPE (H): ICD-10-CM

## 2025-05-06 PROCEDURE — 49083 ABD PARACENTESIS W/IMAGING: CPT

## 2025-05-10 LAB
ATRIAL RATE - MUSE: 86 BPM
DIASTOLIC BLOOD PRESSURE - MUSE: NORMAL MMHG
INTERPRETATION ECG - MUSE: NORMAL
P AXIS - MUSE: 72 DEGREES
PR INTERVAL - MUSE: 204 MS
QRS DURATION - MUSE: 106 MS
QT - MUSE: 372 MS
QTC - MUSE: 445 MS
R AXIS - MUSE: 106 DEGREES
SYSTOLIC BLOOD PRESSURE - MUSE: NORMAL MMHG
T AXIS - MUSE: -89 DEGREES
VENTRICULAR RATE- MUSE: 86 BPM

## 2025-05-14 ENCOUNTER — HOSPITAL ENCOUNTER (OUTPATIENT)
Dept: ULTRASOUND IMAGING | Facility: HOSPITAL | Age: 45
Discharge: HOME | End: 2025-05-14
Attending: INTERNAL MEDICINE
Payer: COMMERCIAL

## 2025-05-14 DIAGNOSIS — K74.60 CIRRHOSIS OF LIVER WITH ASCITES, UNSPECIFIED HEPATIC CIRRHOSIS TYPE (H): ICD-10-CM

## 2025-05-14 DIAGNOSIS — R18.8 CIRRHOSIS OF LIVER WITH ASCITES, UNSPECIFIED HEPATIC CIRRHOSIS TYPE (H): ICD-10-CM

## 2025-05-14 PROCEDURE — 272N000042 US PARACENTESIS WITHOUT ALBUMIN

## 2025-05-15 ENCOUNTER — HOSPITAL ENCOUNTER (EMERGENCY)
Facility: HOSPITAL | Age: 45
Discharge: HOME | End: 2025-05-15
Attending: EMERGENCY MEDICINE
Payer: COMMERCIAL

## 2025-05-15 ENCOUNTER — APPOINTMENT (OUTPATIENT)
Dept: CT IMAGING | Facility: HOSPITAL | Age: 45
End: 2025-05-15
Attending: EMERGENCY MEDICINE
Payer: COMMERCIAL

## 2025-05-15 ENCOUNTER — HOSPITAL ENCOUNTER (INPATIENT)
Facility: HOSPITAL | Age: 45
DRG: 371 | End: 2025-05-15
Attending: EMERGENCY MEDICINE
Payer: COMMERCIAL

## 2025-05-15 VITALS
HEART RATE: 86 BPM | DIASTOLIC BLOOD PRESSURE: 71 MMHG | BODY MASS INDEX: 48.32 KG/M2 | TEMPERATURE: 98.3 F | SYSTOLIC BLOOD PRESSURE: 130 MMHG | RESPIRATION RATE: 18 BRPM | WEIGHT: 290 LBS | OXYGEN SATURATION: 94 % | HEIGHT: 65 IN

## 2025-05-15 DIAGNOSIS — R10.31 RLQ ABDOMINAL PAIN: ICD-10-CM

## 2025-05-15 DIAGNOSIS — K65.2 SBP (SPONTANEOUS BACTERIAL PERITONITIS) (H): Primary | ICD-10-CM

## 2025-05-15 DIAGNOSIS — K22.6 MALLORY-WEISS TEAR: ICD-10-CM

## 2025-05-15 DIAGNOSIS — R19.7 DIARRHEA, UNSPECIFIED TYPE: ICD-10-CM

## 2025-05-15 PROBLEM — R18.8 ASCITES: Status: ACTIVE | Noted: 2023-08-07

## 2025-05-15 LAB
ALBUMIN SERPL BCG-MCNC: 3.8 G/DL (ref 3.5–5.2)
ALBUMIN SERPL BCG-MCNC: 3.8 G/DL (ref 3.5–5.2)
ALP SERPL-CCNC: 178 U/L (ref 40–150)
ALP SERPL-CCNC: 201 U/L (ref 40–150)
ALT SERPL W P-5'-P-CCNC: 16 U/L (ref 0–70)
ALT SERPL W P-5'-P-CCNC: 21 U/L (ref 0–70)
ANION GAP SERPL CALCULATED.3IONS-SCNC: 10 MMOL/L (ref 7–15)
ANION GAP SERPL CALCULATED.3IONS-SCNC: 11 MMOL/L (ref 7–15)
AST SERPL W P-5'-P-CCNC: 13 U/L (ref 0–45)
AST SERPL W P-5'-P-CCNC: 18 U/L (ref 0–45)
BASOPHILS # BLD AUTO: 0.1 10E3/UL (ref 0–0.2)
BASOPHILS # BLD AUTO: 0.1 10E3/UL (ref 0–0.2)
BASOPHILS NFR BLD AUTO: 1 %
BASOPHILS NFR BLD AUTO: 1 %
BILIRUB DIRECT SERPL-MCNC: 0.15 MG/DL (ref 0–0.3)
BILIRUB SERPL-MCNC: 0.3 MG/DL
BILIRUB SERPL-MCNC: 0.6 MG/DL
BUN SERPL-MCNC: 18.3 MG/DL (ref 6–20)
BUN SERPL-MCNC: 21.8 MG/DL (ref 6–20)
CALCIUM SERPL-MCNC: 8.9 MG/DL (ref 8.8–10.4)
CALCIUM SERPL-MCNC: 9.1 MG/DL (ref 8.8–10.4)
CHLORIDE SERPL-SCNC: 101 MMOL/L (ref 98–107)
CHLORIDE SERPL-SCNC: 98 MMOL/L (ref 98–107)
CREAT SERPL-MCNC: 1.23 MG/DL (ref 0.67–1.17)
CREAT SERPL-MCNC: 1.29 MG/DL (ref 0.67–1.17)
CRP SERPL-MCNC: 85.8 MG/L
EGFRCR SERPLBLD CKD-EPI 2021: 70 ML/MIN/1.73M2
EGFRCR SERPLBLD CKD-EPI 2021: 74 ML/MIN/1.73M2
ELLIPTOCYTES BLD QL SMEAR: SLIGHT
EOSINOPHIL # BLD AUTO: 0.3 10E3/UL (ref 0–0.7)
EOSINOPHIL # BLD AUTO: 0.5 10E3/UL (ref 0–0.7)
EOSINOPHIL NFR BLD AUTO: 2 %
EOSINOPHIL NFR BLD AUTO: 4 %
ERYTHROCYTE [DISTWIDTH] IN BLOOD BY AUTOMATED COUNT: 14.3 % (ref 10–15)
ERYTHROCYTE [DISTWIDTH] IN BLOOD BY AUTOMATED COUNT: 14.6 % (ref 10–15)
EST. AVERAGE GLUCOSE BLD GHB EST-MCNC: 146 MG/DL
GLUCOSE SERPL-MCNC: 126 MG/DL (ref 70–99)
GLUCOSE SERPL-MCNC: 178 MG/DL (ref 70–99)
HBA1C MFR BLD: 6.7 %
HCO3 SERPL-SCNC: 25 MMOL/L (ref 22–29)
HCO3 SERPL-SCNC: 25 MMOL/L (ref 22–29)
HCT VFR BLD AUTO: 32.8 % (ref 40–53)
HCT VFR BLD AUTO: 41.8 % (ref 40–53)
HEMOCCULT STL QL: NEGATIVE
HGB BLD-MCNC: 10.6 G/DL (ref 13.3–17.7)
HGB BLD-MCNC: 13.2 G/DL (ref 13.3–17.7)
HOLD SPECIMEN: NORMAL
IMM GRANULOCYTES # BLD: 0.1 10E3/UL
IMM GRANULOCYTES # BLD: 0.1 10E3/UL
IMM GRANULOCYTES NFR BLD: 0 %
IMM GRANULOCYTES NFR BLD: 0 %
INR PPP: 1.16 (ref 0.85–1.15)
LIPASE SERPL-CCNC: 22 U/L (ref 13–60)
LYMPHOCYTES # BLD AUTO: 1.6 10E3/UL (ref 0.8–5.3)
LYMPHOCYTES # BLD AUTO: 2.2 10E3/UL (ref 0.8–5.3)
LYMPHOCYTES NFR BLD AUTO: 11 %
LYMPHOCYTES NFR BLD AUTO: 16 %
MAGNESIUM SERPL-MCNC: 1.7 MG/DL (ref 1.7–2.3)
MCH RBC QN AUTO: 27.8 PG (ref 26.5–33)
MCH RBC QN AUTO: 28 PG (ref 26.5–33)
MCHC RBC AUTO-ENTMCNC: 31.6 G/DL (ref 31.5–36.5)
MCHC RBC AUTO-ENTMCNC: 32.3 G/DL (ref 31.5–36.5)
MCV RBC AUTO: 87 FL (ref 78–100)
MCV RBC AUTO: 88 FL (ref 78–100)
MONOCYTES # BLD AUTO: 1.5 10E3/UL (ref 0–1.3)
MONOCYTES # BLD AUTO: 1.9 10E3/UL (ref 0–1.3)
MONOCYTES NFR BLD AUTO: 10 %
MONOCYTES NFR BLD AUTO: 13 %
NEUTROPHILS # BLD AUTO: 10.2 10E3/UL (ref 1.6–8.3)
NEUTROPHILS # BLD AUTO: 9.6 10E3/UL (ref 1.6–8.3)
NEUTROPHILS NFR BLD AUTO: 69 %
NEUTROPHILS NFR BLD AUTO: 73 %
NRBC # BLD AUTO: 0 10E3/UL
NRBC # BLD AUTO: 0 10E3/UL
NRBC BLD AUTO-RTO: 0 /100
NRBC BLD AUTO-RTO: 0 /100
PLAT MORPH BLD: ABNORMAL
PLATELET # BLD AUTO: 293 10E3/UL (ref 150–450)
PLATELET # BLD AUTO: 304 10E3/UL (ref 150–450)
POTASSIUM SERPL-SCNC: 4.2 MMOL/L (ref 3.4–5.3)
POTASSIUM SERPL-SCNC: 5 MMOL/L (ref 3.4–5.3)
PROCALCITONIN SERPL IA-MCNC: 0.33 NG/ML
PROT SERPL-MCNC: 6.3 G/DL (ref 6.4–8.3)
PROT SERPL-MCNC: 6.4 G/DL (ref 6.4–8.3)
PROTHROMBIN TIME: 15 SECONDS (ref 11.8–14.8)
RBC # BLD AUTO: 3.79 10E6/UL (ref 4.4–5.9)
RBC # BLD AUTO: 4.74 10E6/UL (ref 4.4–5.9)
RBC MORPH BLD: ABNORMAL
SODIUM SERPL-SCNC: 134 MMOL/L (ref 135–145)
SODIUM SERPL-SCNC: 136 MMOL/L (ref 135–145)
WBC # BLD AUTO: 14 10E3/UL (ref 4–11)
WBC # BLD AUTO: 14.1 10E3/UL (ref 4–11)

## 2025-05-15 PROCEDURE — 96375 TX/PRO/DX INJ NEW DRUG ADDON: CPT

## 2025-05-15 PROCEDURE — 120N000001 HC R&B MED SURG/OB

## 2025-05-15 PROCEDURE — 258N000003 HC RX IP 258 OP 636: Performed by: EMERGENCY MEDICINE

## 2025-05-15 PROCEDURE — 99285 EMERGENCY DEPT VISIT HI MDM: CPT

## 2025-05-15 PROCEDURE — 36415 COLL VENOUS BLD VENIPUNCTURE: CPT | Performed by: EMERGENCY MEDICINE

## 2025-05-15 PROCEDURE — G0378 HOSPITAL OBSERVATION PER HR: HCPCS

## 2025-05-15 PROCEDURE — 84145 PROCALCITONIN (PCT): CPT | Performed by: EMERGENCY MEDICINE

## 2025-05-15 PROCEDURE — 83036 HEMOGLOBIN GLYCOSYLATED A1C: CPT | Performed by: HOSPITALIST

## 2025-05-15 PROCEDURE — 86140 C-REACTIVE PROTEIN: CPT | Performed by: EMERGENCY MEDICINE

## 2025-05-15 PROCEDURE — 250N000011 HC RX IP 250 OP 636: Performed by: EMERGENCY MEDICINE

## 2025-05-15 PROCEDURE — 83735 ASSAY OF MAGNESIUM: CPT | Performed by: EMERGENCY MEDICINE

## 2025-05-15 PROCEDURE — 99285 EMERGENCY DEPT VISIT HI MDM: CPT | Mod: 25

## 2025-05-15 PROCEDURE — 74177 CT ABD & PELVIS W/CONTRAST: CPT

## 2025-05-15 PROCEDURE — 82272 OCCULT BLD FECES 1-3 TESTS: CPT | Performed by: EMERGENCY MEDICINE

## 2025-05-15 PROCEDURE — 83690 ASSAY OF LIPASE: CPT | Performed by: EMERGENCY MEDICINE

## 2025-05-15 PROCEDURE — 83615 LACTATE (LD) (LDH) ENZYME: CPT

## 2025-05-15 PROCEDURE — 250N000011 HC RX IP 250 OP 636: Mod: JZ | Performed by: EMERGENCY MEDICINE

## 2025-05-15 PROCEDURE — 85610 PROTHROMBIN TIME: CPT | Performed by: EMERGENCY MEDICINE

## 2025-05-15 PROCEDURE — 99223 1ST HOSP IP/OBS HIGH 75: CPT | Performed by: HOSPITALIST

## 2025-05-15 PROCEDURE — 96365 THER/PROPH/DIAG IV INF INIT: CPT | Mod: 59

## 2025-05-15 PROCEDURE — 96361 HYDRATE IV INFUSION ADD-ON: CPT

## 2025-05-15 PROCEDURE — 85025 COMPLETE CBC W/AUTO DIFF WBC: CPT | Performed by: EMERGENCY MEDICINE

## 2025-05-15 PROCEDURE — 87507 IADNA-DNA/RNA PROBE TQ 12-25: CPT | Performed by: EMERGENCY MEDICINE

## 2025-05-15 PROCEDURE — 82374 ASSAY BLOOD CARBON DIOXIDE: CPT | Performed by: EMERGENCY MEDICINE

## 2025-05-15 PROCEDURE — 250N000013 HC RX MED GY IP 250 OP 250 PS 637: Performed by: HOSPITALIST

## 2025-05-15 PROCEDURE — 87493 C DIFF AMPLIFIED PROBE: CPT | Performed by: EMERGENCY MEDICINE

## 2025-05-15 PROCEDURE — 82248 BILIRUBIN DIRECT: CPT | Performed by: EMERGENCY MEDICINE

## 2025-05-15 PROCEDURE — 80048 BASIC METABOLIC PNL TOTAL CA: CPT | Performed by: EMERGENCY MEDICINE

## 2025-05-15 RX ORDER — ALBUMIN (HUMAN) 12.5 G/50ML
25-50 SOLUTION INTRAVENOUS ONCE
Status: DISCONTINUED | OUTPATIENT
Start: 2025-05-16 | End: 2025-05-16

## 2025-05-15 RX ORDER — DEXTROSE MONOHYDRATE 25 G/50ML
25-50 INJECTION, SOLUTION INTRAVENOUS
Status: DISCONTINUED | OUTPATIENT
Start: 2025-05-15 | End: 2025-05-26 | Stop reason: HOSPADM

## 2025-05-15 RX ORDER — ONDANSETRON 2 MG/ML
4 INJECTION INTRAMUSCULAR; INTRAVENOUS EVERY 6 HOURS PRN
Status: DISCONTINUED | OUTPATIENT
Start: 2025-05-15 | End: 2025-05-26 | Stop reason: HOSPADM

## 2025-05-15 RX ORDER — OLANZAPINE 2.5 MG/1
10 TABLET, FILM COATED ORAL AT BEDTIME
Status: DISCONTINUED | OUTPATIENT
Start: 2025-05-15 | End: 2025-05-26 | Stop reason: HOSPADM

## 2025-05-15 RX ORDER — BENZONATATE 100 MG/1
200 CAPSULE ORAL 3 TIMES DAILY PRN
Status: DISCONTINUED | OUTPATIENT
Start: 2025-05-15 | End: 2025-05-26 | Stop reason: HOSPADM

## 2025-05-15 RX ORDER — FLUTICASONE FUROATE AND VILANTEROL 100; 25 UG/1; UG/1
1 POWDER RESPIRATORY (INHALATION) DAILY
Status: DISCONTINUED | OUTPATIENT
Start: 2025-05-16 | End: 2025-05-26 | Stop reason: HOSPADM

## 2025-05-15 RX ORDER — ROSUVASTATIN CALCIUM 10 MG/1
10 TABLET, COATED ORAL AT BEDTIME
Status: DISCONTINUED | OUTPATIENT
Start: 2025-05-15 | End: 2025-05-26 | Stop reason: HOSPADM

## 2025-05-15 RX ORDER — MONTELUKAST SODIUM 10 MG/1
10 TABLET ORAL EVERY MORNING
Status: DISCONTINUED | OUTPATIENT
Start: 2025-05-16 | End: 2025-05-26 | Stop reason: HOSPADM

## 2025-05-15 RX ORDER — OMEPRAZOLE 20 MG/1
40 CAPSULE, DELAYED RELEASE ORAL EVERY MORNING
Status: DISCONTINUED | OUTPATIENT
Start: 2025-05-16 | End: 2025-05-15

## 2025-05-15 RX ORDER — IOPAMIDOL 755 MG/ML
90 INJECTION, SOLUTION INTRAVASCULAR ONCE
Status: COMPLETED | OUTPATIENT
Start: 2025-05-15 | End: 2025-05-15

## 2025-05-15 RX ORDER — METHOCARBAMOL 500 MG/1
500 TABLET, FILM COATED ORAL 4 TIMES DAILY PRN
Status: DISCONTINUED | OUTPATIENT
Start: 2025-05-15 | End: 2025-05-26 | Stop reason: HOSPADM

## 2025-05-15 RX ORDER — PANTOPRAZOLE SODIUM 40 MG/1
40 TABLET, DELAYED RELEASE ORAL
Status: DISCONTINUED | OUTPATIENT
Start: 2025-05-16 | End: 2025-05-26 | Stop reason: HOSPADM

## 2025-05-15 RX ORDER — SPIRONOLACTONE 100 MG/1
100 TABLET, FILM COATED ORAL DAILY
Status: DISCONTINUED | OUTPATIENT
Start: 2025-05-16 | End: 2025-05-26 | Stop reason: HOSPADM

## 2025-05-15 RX ORDER — LISINOPRIL 5 MG/1
10 TABLET ORAL EVERY MORNING
Status: DISCONTINUED | OUTPATIENT
Start: 2025-05-16 | End: 2025-05-26 | Stop reason: HOSPADM

## 2025-05-15 RX ORDER — NICOTINE POLACRILEX 4 MG
15-30 LOZENGE BUCCAL
Status: DISCONTINUED | OUTPATIENT
Start: 2025-05-15 | End: 2025-05-26 | Stop reason: HOSPADM

## 2025-05-15 RX ORDER — CEFTRIAXONE 2 G/1
2 INJECTION, POWDER, FOR SOLUTION INTRAMUSCULAR; INTRAVENOUS ONCE
Status: COMPLETED | OUTPATIENT
Start: 2025-05-15 | End: 2025-05-15

## 2025-05-15 RX ORDER — TRAZODONE HYDROCHLORIDE 50 MG/1
100 TABLET ORAL AT BEDTIME
Status: DISCONTINUED | OUTPATIENT
Start: 2025-05-15 | End: 2025-05-26 | Stop reason: HOSPADM

## 2025-05-15 RX ORDER — ALBUTEROL SULFATE 90 UG/1
1-2 INHALANT RESPIRATORY (INHALATION) EVERY 6 HOURS PRN
Status: DISCONTINUED | OUTPATIENT
Start: 2025-05-15 | End: 2025-05-26 | Stop reason: HOSPADM

## 2025-05-15 RX ORDER — FUROSEMIDE 20 MG/1
40 TABLET ORAL DAILY
Status: DISCONTINUED | OUTPATIENT
Start: 2025-05-16 | End: 2025-05-18

## 2025-05-15 RX ORDER — PROCHLORPERAZINE MALEATE 10 MG
10 TABLET ORAL EVERY 6 HOURS PRN
Status: DISCONTINUED | OUTPATIENT
Start: 2025-05-15 | End: 2025-05-26 | Stop reason: HOSPADM

## 2025-05-15 RX ORDER — SULFAMETHOXAZOLE AND TRIMETHOPRIM 800; 160 MG/1; MG/1
1 TABLET ORAL DAILY
Status: DISCONTINUED | OUTPATIENT
Start: 2025-05-16 | End: 2025-05-26 | Stop reason: HOSPADM

## 2025-05-15 RX ORDER — HYDROMORPHONE HYDROCHLORIDE 1 MG/ML
0.5 INJECTION, SOLUTION INTRAMUSCULAR; INTRAVENOUS; SUBCUTANEOUS
Refills: 0 | Status: COMPLETED | OUTPATIENT
Start: 2025-05-15 | End: 2025-05-15

## 2025-05-15 RX ORDER — NICOTINE 21 MG/24HR
1 PATCH, TRANSDERMAL 24 HOURS TRANSDERMAL DAILY
Status: DISCONTINUED | OUTPATIENT
Start: 2025-05-16 | End: 2025-05-26 | Stop reason: HOSPADM

## 2025-05-15 RX ORDER — FAMOTIDINE 20 MG/1
20 TABLET, FILM COATED ORAL 2 TIMES DAILY
Status: DISCONTINUED | OUTPATIENT
Start: 2025-05-15 | End: 2025-05-18

## 2025-05-15 RX ORDER — ONDANSETRON 4 MG/1
4 TABLET, ORALLY DISINTEGRATING ORAL EVERY 6 HOURS PRN
Status: DISCONTINUED | OUTPATIENT
Start: 2025-05-15 | End: 2025-05-26 | Stop reason: HOSPADM

## 2025-05-15 RX ORDER — HYDROMORPHONE HYDROCHLORIDE 1 MG/ML
0.5 INJECTION, SOLUTION INTRAMUSCULAR; INTRAVENOUS; SUBCUTANEOUS EVERY 30 MIN PRN
Refills: 0 | Status: DISCONTINUED | OUTPATIENT
Start: 2025-05-15 | End: 2025-05-26 | Stop reason: HOSPADM

## 2025-05-15 RX ORDER — ACETAMINOPHEN 325 MG/1
325 TABLET ORAL EVERY 4 HOURS PRN
Status: DISCONTINUED | OUTPATIENT
Start: 2025-05-15 | End: 2025-05-26 | Stop reason: HOSPADM

## 2025-05-15 RX ADMIN — PANTOPRAZOLE SODIUM 80 MG: 40 INJECTION, POWDER, FOR SOLUTION INTRAVENOUS at 20:34

## 2025-05-15 RX ADMIN — SODIUM CHLORIDE 500 ML: 0.9 INJECTION, SOLUTION INTRAVENOUS at 20:35

## 2025-05-15 RX ADMIN — IOPAMIDOL 90 ML: 755 INJECTION, SOLUTION INTRAVENOUS at 01:21

## 2025-05-15 RX ADMIN — HYDROMORPHONE HYDROCHLORIDE 0.5 MG: 1 INJECTION, SOLUTION INTRAMUSCULAR; INTRAVENOUS; SUBCUTANEOUS at 20:33

## 2025-05-15 RX ADMIN — ROSUVASTATIN 10 MG: 10 TABLET, FILM COATED ORAL at 22:53

## 2025-05-15 RX ADMIN — FAMOTIDINE 20 MG: 20 TABLET, FILM COATED ORAL at 22:53

## 2025-05-15 RX ADMIN — OLANZAPINE 10 MG: 10 TABLET, FILM COATED ORAL at 22:54

## 2025-05-15 RX ADMIN — TRAZODONE HYDROCHLORIDE 100 MG: 50 TABLET ORAL at 22:53

## 2025-05-15 RX ADMIN — HYDROMORPHONE HYDROCHLORIDE 0.5 MG: 1 INJECTION, SOLUTION INTRAMUSCULAR; INTRAVENOUS; SUBCUTANEOUS at 00:45

## 2025-05-15 RX ADMIN — CEFTRIAXONE SODIUM 2 G: 2 INJECTION, POWDER, FOR SOLUTION INTRAMUSCULAR; INTRAVENOUS at 03:01

## 2025-05-15 ASSESSMENT — COLUMBIA-SUICIDE SEVERITY RATING SCALE - C-SSRS
2. HAVE YOU ACTUALLY HAD ANY THOUGHTS OF KILLING YOURSELF IN THE PAST MONTH?: NO
6. HAVE YOU EVER DONE ANYTHING, STARTED TO DO ANYTHING, OR PREPARED TO DO ANYTHING TO END YOUR LIFE?: NO
1. IN THE PAST MONTH, HAVE YOU WISHED YOU WERE DEAD OR WISHED YOU COULD GO TO SLEEP AND NOT WAKE UP?: NO
6. HAVE YOU EVER DONE ANYTHING, STARTED TO DO ANYTHING, OR PREPARED TO DO ANYTHING TO END YOUR LIFE?: NO
1. IN THE PAST MONTH, HAVE YOU WISHED YOU WERE DEAD OR WISHED YOU COULD GO TO SLEEP AND NOT WAKE UP?: NO
2. HAVE YOU ACTUALLY HAD ANY THOUGHTS OF KILLING YOURSELF IN THE PAST MONTH?: NO

## 2025-05-15 ASSESSMENT — ACTIVITIES OF DAILY LIVING (ADL)
ADLS_ACUITY_SCORE: 59

## 2025-05-15 ASSESSMENT — ENCOUNTER SYMPTOMS
NAUSEA: 0
VOMITING: 1
DIARRHEA: 1
ABDOMINAL PAIN: 1

## 2025-05-15 NOTE — ED TRIAGE NOTES
Pt reports increasing pain since being seen this morning,  pt reports he had several episodes of emesis that at first had small streaks of blood and at largest ere nickel sized blood clots.  Pt concerned that there is air in his bladder, also concerned about possible bowel obstruction d/t having diarrhea.      Triage Assessment (Adult)       Row Name 05/15/25 2979          Triage Assessment    Airway WDL WDL        Respiratory WDL    Respiratory WDL X;rhythm/pattern     Rhythm/Pattern, Respiratory tachypneic        Skin Circulation/Temperature WDL    Skin Circulation/Temperature WDL WDL        Cardiac WDL    Cardiac WDL WDL        Peripheral/Neurovascular WDL    Peripheral Neurovascular WDL WDL        Cognitive/Neuro/Behavioral WDL    Cognitive/Neuro/Behavioral WDL WDL

## 2025-05-15 NOTE — ED NOTES
Pt requested for update to be called to his mom, Marielena. Attempted calling number listed in pt's chart. No answer. Unable to leave VM.

## 2025-05-15 NOTE — ED PROVIDER NOTES
NAME: Warren Jaramillo  AGE: 44 year old male  YOB: 1980  MRN: [id number]  EVALUATION DATE & TIME: 5/15/2025 12:21 AM    PCP: Mary Kelly    ED PROVIDER: Warren Foreman M.D.      Chief Complaint   Patient presents with    Abdominal Pain     FINAL IMPRESSION:  1. RLQ abdominal pain      MEDICAL DECISION MAKIN:30 AM I met with the patient, obtained history, performed an initial exam, and discussed options and plan for diagnostics and treatment here in the ED.   Patient was clinically assessed and consented to treatment. After assessment, medical decision making and workup were discussed with the patient. The patient was agreeable to plan for testing, workup, and treatment.  Pertinent Labs & Imaging studies reviewed. (See chart for details)  2:21 AM I rechecked with the patient, updating them on the plan for discharge from the emergency department.   2:41 AM I spoke with Dr. Miller, [facility], regarding the patient's plan of care. Given the patient's white count, plan to keep him overnight for repeat paracentesis in the morning and discharge thereafter.   2:48 AM I updated the patient on my discussion with [facility]; he does not wan to stay, instead wishing to go home and follow-up later as an outpatient for paracentesis as he has done before. He understands that the site might be infected at this time; but would prefer to go home and proceed with his outpatient protocol plan for care plan.  Patient reports they have let him go home before and that usually he gets into get a repeat paracentesis. Plan to discharge after our discussion of risks and benefits which patient understands fully and will refer to [facility] for stat appointment.        Medical Decision Making  I reviewed the EMR: Outpatient Record: Recent ER visits from  as well as care plan and previous paracentesis results  Care impacted by chronic liver disease  I considered additional work up, including paracentesis  You may receive a survey regarding the care you received during your visit. Your input is valuable to us. We encourage you to complete and return your survey. We hope you will choose us in the future for your healthcare needs. and analysis for SBP, but deferred after patient refused admission and preferred discharge.  I discussed the care with another health care provider: Gastroenterology  Discharge. No recommendations on prescription strength medication(s). See documentation for any additional details.    MIPS (CTPE, Dental pain, Khan, Sinusitis, Asthma/COPD, Head Trauma): Not Applicable    SEPSIS: None    Warren Jaramillo is a 44 year old male who presents with abdominal pain.   Differential diagnosis includes but not limited to SBP, colonic injury, pain at paracentesis site, hematoma.  Patient is a 44-year-old male with history of liver disease and ascites with frequent paracentesis.  Patient just had one on the 14th in the morning and seemed to be doing well until tonight when he started having pain at the site.  It did refer around to the front but did not seem to be diffuse.  Patient does have history of anticoagulation and could have a hematoma there.  After discussion with patient we did get labs which showed a white count of 14,000 but hemoglobin was stable.  LFTs did not show definitive or significant elevation and creatinine is at baseline for patient.  He did receive some IV fluids along with some pain medication and was pain-free.  On reexamination patient had no pain there.  Lipase was normal and no signs of pancreatitis.  Patient sent for CT scan to evaluate the colon as well as any complications from the procedure.  No acute findings following the paracentesis such as perforation or hematoma in the wall.  Patient appears otherwise comfortable now and pain medication should be wearing off after it has been several hours.  Patient reports feeling better and would like to go home.  I did follow patient's care plan and discussed patient with [facility].  I did speak with Dr. Miller who given patient's past history would recommend admission for diagnostic paracentesis since previous was just therapeutic and fluids  were not sent.  Patient however refusing this and reports that he would like to proceed with this care plan and go home for paracentesis.  Patient reports A-fib been able to schedule an outpatient form sometime same day and he would prefer to do this as opposed to staying in the ER in the hospital.  Patient reports his pain is now gone and after discussion with patient of the risks and benefits we will follow through with his care plan which I did discuss with Dr. Miller that patient may not wish to stay which he was aware of.  Patient will be given referral for paracentesis with [facility] at stat process and follow-up with [facility].  Patient feels comfortable doing this and understands the risks.  Patient was given his Rocephin 2 g as per protocol and will plan for outpatient paracentesis.    0 minutes of critical care time    MEDICATIONS GIVEN IN THE EMERGENCY:  Medications   HYDROmorphone (PF) (DILAUDID) injection 0.5 mg (0.5 mg Intravenous $Given 5/15/25 0045)   iopamidol (ISOVUE-370) solution 90 mL (90 mLs Intravenous $Given 5/15/25 0121)   cefTRIAXone (ROCEPHIN) 2 g vial to attach to  ml bag for ADULTS or NS 50 ml bag for PEDS (0 g Intravenous Stopped 5/15/25 0336)       NEW PRESCRIPTIONS STARTED AT TODAY'S ER VISIT:  Discharge Medication List as of 5/15/2025  3:50 AM             =================================================================    HPI    Patient information was obtained from: Patient    Use of : N/A         Warren Jaramillo is a 44 year old male with a past medical history of ascites, hypertension, cardiomegaly, HFpEF, liver cirrhosis, COPD, type 2 diabetes, DVT, Rojas's disease, incomplete left bundle branch block, obesity, NSTEMI, TBI, and tobacco use who presents to this emergency department by EMS for evaluation of abdominal pain.     Per chart review: Patient underwent US paracentesis by Dr. Maria yesterday (5/14/2025) at [facility].     Patient reports that he felt fine, even improved, following his recent paracentesis having had 4L fluid removed. Unfortunately, he later developed pain at the procedure site; right lower quadrant abdomen to central abdominal pain. He suspects that his bowels might have been injured during the paracentesis due to increased gas and flatulence since then. He has been taking pain medications since 2130 yesterday evening (5/14) without relief of his pain. Now in the emergency department, he is worried about a potential injury related to his paracentesis as his usual abdominal pain tends to be skin-deep; his current pain is more profound.     Patient denies nausea, vomiting, diarrhea, or any other symptoms at this time.       REVIEW OF SYSTEMS   Review of Systems     PAST MEDICAL HISTORY:  Past Medical History:   Diagnosis Date    COPD exacerbation (H) 12/02/2024    DM2 (diabetes mellitus, type 2) (H) 04/28/2020    HTN (hypertension) 07/30/2012    Sleep apnea     Thyroid nodule 07/31/2019    Rojas's disease (H)        PAST SURGICAL HISTORY:  Past Surgical History:   Procedure Laterality Date    COLONOSCOPY      ESOPHAGOSCOPY, GASTROSCOPY, DUODENOSCOPY (EGD), COMBINED N/A 7/21/2023    Procedure: ESOPHAGOGASTRODUODENOSCOPY WITH GASTRIC AND ESOPHAGEAL BIOPSIES;  Surgeon: Filiberto Aragon MD;  Location: [facility] OR    ESOPHAGOSCOPY, GASTROSCOPY, DUODENOSCOPY (EGD), COMBINED N/A 4/4/2025    Procedure: ESOPHAGOGASTRODUODENOSCOPY;  Surgeon: Ramesh Talbot MD;  Location: [facility] OR    TOOTH EXTRACTION         CURRENT MEDICATIONS:    No current facility-administered medications for this encounter.    Current Outpatient Medications:     albuterol (PROAIR HFA/PROVENTIL HFA/VENTOLIN HFA) 108 (90 Base) MCG/ACT inhaler, Inhale 1-2 puffs into the lungs every 6 hours as needed for shortness of breath, wheezing or cough, Disp: 18 g, Rfl: 0    albuterol (PROVENTIL) (2.5 MG/3ML) 0.083% neb solution, Take 2.5  mg by nebulization 3 times daily as needed for shortness of breath, wheezing or cough, Disp: , Rfl:     aloe vera GEL, Apply 1 g topically every hour as needed for skin care Per bottle directions, Disp: , Rfl:     apixaban ANTICOAGULANT (ELIQUIS) 5 MG tablet, Take 5 mg by mouth 2 times daily., Disp: , Rfl:     bacitracin 500 UNIT/GM OINT, Apply topically 3 times daily as needed for wound care, Disp: , Rfl:     Benzocaine (SOLARCAINE ALOE VERA EX), Externally apply topically daily as needed., Disp: , Rfl:     benzonatate (TESSALON) 200 MG capsule, Take 1 capsule (200 mg) by mouth 3 times daily as needed for cough., Disp: 50 capsule, Rfl: 0    Calamine external lotion, Apply topically as needed for itching, Disp: , Rfl:     carbamide peroxide (DEBROX) 6.5 % otic solution, Place 5 drops into both ears daily as needed for other., Disp: , Rfl:     clotrimazole (LOTRIMIN) 1 % external cream, Apply topically 2 times daily as needed (skin irritation), Disp: , Rfl:     Continuous Glucose Sensor (FREESTYLE MEGHANN 3 PLUS SENSOR) MISC, USE TO READ BLOOD SUGAR TWICE DAILY AND AS NEEDED. CHANGE SENSOR EVERY 15 DAYS, Disp: , Rfl:     dextromethorphan-guaiFENesin (MUCINEX DM)  MG 12 hr tablet, Take 1 tablet by mouth 2 times daily as needed., Disp: , Rfl:     diclofenac (VOLTAREN) 1 % topical gel, Apply 2 g topically daily as needed for moderate pain To joints/back, Disp: , Rfl:     EPINEPHrine (ANY BX GENERIC EQUIV) 0.3 MG/0.3ML injection 2-pack, Inject 0.3 mg into the muscle as needed for anaphylaxis. May repeat one time in 5-15 minutes if response to initial dose is inadequate., Disp: , Rfl:     famotidine (PEPCID) 20 MG tablet, Take 1 tablet (20 mg) by mouth 2 times daily, Disp: 60 tablet, Rfl: 0    FLUoxetine 20 MG tablet, Take 20 mg by mouth every morning., Disp: , Rfl:     furosemide (LASIX) 40 MG tablet, Take 1 tablet (40 mg) by mouth daily., Disp: 30 tablet, Rfl: 0    GAVILAX 17 GM/SCOOP powder, Take 17 g by mouth  daily as needed., Disp: , Rfl:     hydrocortisone (CORTAID) 1 % external cream, Apply topically daily as needed., Disp: , Rfl:     Hydrocortisone (PREPARATION H EX), Externally apply topically daily as needed., Disp: , Rfl:     JARDIANCE 10 MG TABS tablet, Take 10 mg by mouth every morning., Disp: , Rfl:     levothyroxine (SYNTHROID/LEVOTHROID) 25 MCG tablet, Take 12.5 mcg by mouth every morning (before breakfast)., Disp: , Rfl:     lisinopril (ZESTRIL) 10 MG tablet, Take 10 mg by mouth every morning., Disp: , Rfl:     loperamide (IMODIUM) 2 MG capsule, Take 4 mg by mouth 4 times daily as needed for diarrhea., Disp: , Rfl:     loratadine (CLARITIN) 10 MG tablet, Take 10 mg by mouth daily as needed for allergies., Disp: , Rfl:     magnesium hydroxide (MILK OF MAGNESIA) 400 MG/5ML suspension, Take 30 mLs by mouth daily as needed., Disp: , Rfl:     metFORMIN (GLUCOPHAGE) 1000 MG tablet, Take 1,000 mg by mouth 2 times daily (with meals), Disp: , Rfl:     methocarbamol (ROBAXIN) 500 MG tablet, Take 1 tablet (500 mg) by mouth 4 times daily as needed for muscle spasms., Disp: 40 tablet, Rfl: 0    montelukast (SINGULAIR) 10 MG tablet, Take 10 mg by mouth every morning., Disp: , Rfl:     OLANZapine (ZYPREXA) 10 MG tablet, Take 10 mg by mouth At Bedtime, Disp: , Rfl:     omeprazole (PRILOSEC) 40 MG DR capsule, Take 40 mg by mouth every morning., Disp: , Rfl:     ondansetron (ZOFRAN ODT) 4 MG ODT tab, Take 1 tablet (4 mg) by mouth every 8 hours as needed for nausea., Disp: 20 tablet, Rfl: 0    pramoxine-calamine (AVEENO) 1-8 % LOTN, Apply topically daily as needed for itching., Disp: , Rfl:     rosuvastatin (CRESTOR) 10 MG tablet, Take 10 mg by mouth At Bedtime, Disp: , Rfl:     spironolactone (ALDACTONE) 100 MG tablet, Take 1 tablet (100 mg) by mouth daily., Disp: 30 tablet, Rfl: 0    sulfamethoxazole-trimethoprim (BACTRIM DS) 800-160 MG tablet, Take 1 tablet by mouth daily. Resume after completing ciprofloxacin., Disp: ,  Rfl:     traZODone (DESYREL) 100 MG tablet, Take 100 mg by mouth at bedtime., Disp: , Rfl:     TRELEGY ELLIPTA 100-62.5-25 MCG/ACT oral inhaler, Inhale 1 puff into the lungs every morning., Disp: , Rfl:     Urea 40 % CREA, Apply topically daily as needed., Disp: , Rfl:     Vitamins A & D (VITAMIN A & D) OINT, Externally apply topically daily as needed., Disp: , Rfl:     witch hazel-glycerin (TUCKS) pad, Apply topically as needed for hemorrhoids., Disp: , Rfl:     ALLERGIES:  Allergies   Allergen Reactions    Apricot Flavoring Agent (Non-Screening) Anaphylaxis    Banana Anaphylaxis     Throat swelling      Bees Anaphylaxis     Has an Epi pen    Wasp Venom Protein Shortness Of Breath     Other reaction(s): Respiratory Distress  Has an Epi pen        Methylphenidate Itching     Other reaction(s): Nightmares    Prunus      Other reaction(s): *Unknown       FAMILY HISTORY:  Family History   Problem Relation Age of Onset    Unknown/Adopted Father     Unknown/Adopted Maternal Grandmother     C.A.D. Maternal Grandfather     Diabetes Maternal Grandfather     Cerebrovascular Disease Maternal Grandfather     Unknown/Adopted Paternal Grandmother     Unknown/Adopted Paternal Grandfather     Unknown/Adopted Brother     Unknown/Adopted Sister        SOCIAL HISTORY:   Social History     Socioeconomic History    Marital status: Single   Tobacco Use    Smoking status: Every Day     Current packs/day: 1.00     Average packs/day: 1 pack/day for 21.4 years (21.4 ttl pk-yrs)     Types: Cigarettes     Start date: 1/1/2004    Smokeless tobacco: Never   Vaping Use    Vaping status: Never Used   Substance and Sexual Activity    Alcohol use: Not Currently     Comment: Last 8/7/2024    Sexual activity: Never     Partners: Female   Other Topics Concern     Service No    Blood Transfusions No    Caffeine Concern No    Occupational Exposure No    Hobby Hazards No    Sleep Concern No    Stress Concern Yes     Comment: sometimes    Weight  Concern No    Special Diet Yes     Comment: counting carbs    Back Care No    Exercise Yes    Seat Belt Yes    Self-Exams Yes     Social Drivers of Health     Financial Resource Strain: Low Risk  (4/4/2025)    Financial Resource Strain     Within the past 12 months, have you or your family members you live with been unable to get utilities (heat, electricity) when it was really needed?: No   Food Insecurity: Low Risk  (4/4/2025)    Food Insecurity     Within the past 12 months, did you worry that your food would run out before you got money to buy more?: No     Within the past 12 months, did the food you bought just not last and you didn t have money to get more?: No   Transportation Needs: Low Risk  (4/4/2025)    Transportation Needs     Within the past 12 months, has lack of transportation kept you from medical appointments, getting your medicines, non-medical meetings or appointments, work, or from getting things that you need?: No    Received from [facility]    Social Connections   Interpersonal Safety: Low Risk  (4/4/2025)    Interpersonal Safety     Do you feel physically and emotionally safe where you currently live?: Yes     Within the past 12 months, have you been hit, slapped, kicked or otherwise physically hurt by someone?: No     Within the past 12 months, have you been humiliated or emotionally abused in other ways by your partner or ex-partner?: No   Recent Concern: Interpersonal Safety - High Risk (1/11/2025)    Interpersonal Safety     Do you feel physically and emotionally safe where you currently live?: No     Within the past 12 months, have you been hit, slapped, kicked or otherwise physically hurt by someone?: No     Within the past 12 months, have you been humiliated or emotionally abused in other ways by your partner or ex-partner?: No   Housing Stability: Low Risk  (4/4/2025)    Housing Stability     Do you have housing? : Yes     Are you worried about losing  "your housing?: No       PHYSICAL EXAM:    Vitals: /71   Pulse 86   Temp 98.3  F (36.8  C) (Oral)   Resp 18   Ht 1.651 m (5' 5\")   Wt 131.5 kg (290 lb)   SpO2 94%   BMI 48.26 kg/m     Physical Exam  Vitals and nursing note reviewed.   Constitutional:       General: He is not in acute distress.     Appearance: He is well-developed. He is obese. He is not ill-appearing, toxic-appearing or diaphoretic.   HENT:      Head: Normocephalic.   Eyes:      General: No scleral icterus.  Cardiovascular:      Rate and Rhythm: Normal rate and regular rhythm.      Heart sounds: Normal heart sounds.   Pulmonary:      Effort: Pulmonary effort is normal. No respiratory distress.      Breath sounds: Normal breath sounds.   Abdominal:      General: Abdomen is protuberant. Bowel sounds are normal.      Palpations: Abdomen is soft. There is fluid wave.      Tenderness: There is abdominal tenderness in the right lower quadrant. There is no right CVA tenderness, left CVA tenderness, guarding or rebound.      Hernia: No hernia is present.      Comments: Paracentesis site looks intact with no bleeding, no hematoma, no fluctuance or swelling, no erythema   Skin:     Capillary Refill: Capillary refill takes less than 2 seconds.      Coloration: Skin is not jaundiced, mottled or pale.      Findings: No erythema or rash.   Neurological:      General: No focal deficit present.      Mental Status: He is alert.   Psychiatric:         Behavior: Behavior normal.           LAB:  All pertinent labs reviewed and interpreted.  Labs Ordered and Resulted from Time of ED Arrival to Time of ED Departure   BASIC METABOLIC PANEL - Abnormal       Result Value    Sodium 136      Potassium 4.2      Chloride 101      Carbon Dioxide (CO2) 25      Anion Gap 10      Urea Nitrogen 21.8 (*)     Creatinine 1.23 (*)     GFR Estimate 74      Calcium 8.9      Glucose 178 (*)    HEPATIC FUNCTION PANEL - Abnormal    Protein Total 6.4      Albumin 3.8      " Bilirubin Total 0.3      Alkaline Phosphatase 201 (*)     AST 18      ALT 21      Bilirubin Direct 0.15     INR - Abnormal    INR 1.16 (*)     PT 15.0 (*)    CBC WITH PLATELETS AND DIFFERENTIAL - Abnormal    WBC Count 14.0 (*)     RBC Count 4.74      Hemoglobin 13.2 (*)     Hematocrit 41.8      MCV 88      MCH 27.8      MCHC 31.6      RDW 14.6      Platelet Count 304      % Neutrophils 69      % Lymphocytes 16      % Monocytes 10      % Eosinophils 4      % Basophils 1      % Immature Granulocytes 0      NRBCs per 100 WBC 0      Absolute Neutrophils 9.6 (*)     Absolute Lymphocytes 2.2      Absolute Monocytes 1.5 (*)     Absolute Eosinophils 0.5      Absolute Basophils 0.1      Absolute Immature Granulocytes 0.1      Absolute NRBCs 0.0     MAGNESIUM - Normal    Magnesium 1.7     LIPASE - Normal    Lipase 22         RADIOLOGY:  CT Abdomen Pelvis w Contrast   Final Result   IMPRESSION:    1.  Hepatic steatosis, cirrhosis, and mild ascites. No evidence for bowel injury status post paracentesis.      US Paracentesis without Albumin    (Results Pending)       PROCEDURES:   Procedures   None      I, Nguyễn Green, am serving as a scribe to document services personally performed by Dr. Warren Foreman  based on my observation and the provider's statements to me. I, Warren Foreman MD attest that Nguyễn Green is acting in a scribe capacity, has observed my performance of the services and has documented them in accordance with my direction.      Warren Foreman M.D.  Emergency Medicine  [facility] Emergency Department      Warren Foreman MD  05/15/25 7346

## 2025-05-15 NOTE — ED NOTES
Bed: [facility]-  Expected date:   Expected time:   Means of arrival: Walked  Comments:

## 2025-05-15 NOTE — ED NOTES
Update called to Sunday,  staff. Staff will be present once pt gets home. Questions asked and answered.

## 2025-05-15 NOTE — ED NOTES
Bed: [facility]  Expected date: 5/15/25  Expected time: 12:16 AM  Means of arrival: Ambulance  Comments:  45 yo male with abdominal pain. Recent diagnosis of pancreatitis.

## 2025-05-15 NOTE — ED TRIAGE NOTES
Pt arrives via EMS from [facility]. EMS reports that the pt has a abdominal paracentesis done yesterday and 6 L was taken out. Pt started to developed right lower abdominal pain shortly after and now pain has increased.. EMS administer 600 mg Tylenol en route.      Triage Assessment (Adult)       Row Name 05/15/25 0026          Triage Assessment    Airway WDL WDL        Respiratory WDL    Respiratory WDL WDL        Skin Circulation/Temperature WDL    Skin Circulation/Temperature WDL WDL        Cardiac WDL    Cardiac WDL WDL        Peripheral/Neurovascular WDL    Peripheral Neurovascular WDL WDL

## 2025-05-16 ENCOUNTER — APPOINTMENT (OUTPATIENT)
Dept: ULTRASOUND IMAGING | Facility: HOSPITAL | Age: 45
DRG: 371 | End: 2025-05-16
Attending: HOSPITALIST
Payer: COMMERCIAL

## 2025-05-16 PROBLEM — K22.6 MALLORY-WEISS TEAR: Status: ACTIVE | Noted: 2025-05-16

## 2025-05-16 PROBLEM — R19.7 DIARRHEA, UNSPECIFIED TYPE: Status: ACTIVE | Noted: 2025-05-16

## 2025-05-16 LAB
ADV 40+41 DNA STL QL NAA+NON-PROBE: NEGATIVE
ALBUMIN UR-MCNC: 20 MG/DL
AMYLASE BODY FLUID SOURCE: NORMAL
AMYLASE FLD-CCNC: 25 U/L
ANION GAP SERPL CALCULATED.3IONS-SCNC: 9 MMOL/L (ref 7–15)
APPEARANCE UR: CLEAR
ASTRO TYP 1-8 RNA STL QL NAA+NON-PROBE: NEGATIVE
BACTERIA #/AREA URNS HPF: ABNORMAL /HPF
BASOPHILS # BLD AUTO: 0.1 10E3/UL (ref 0–0.2)
BASOPHILS NFR BLD AUTO: 1 %
BILIRUB UR QL STRIP: NEGATIVE
BUN SERPL-MCNC: 23 MG/DL (ref 6–20)
C CAYETANENSIS DNA STL QL NAA+NON-PROBE: NEGATIVE
C DIFF TOX B STL QL: NEGATIVE
CALCIUM SERPL-MCNC: 8.2 MG/DL (ref 8.8–10.4)
CAMPYLOBACTER DNA SPEC NAA+PROBE: NEGATIVE
CHLORIDE SERPL-SCNC: 97 MMOL/L (ref 98–107)
COLOR UR AUTO: YELLOW
CREAT SERPL-MCNC: 1.77 MG/DL (ref 0.67–1.17)
CRYPTOSP DNA STL QL NAA+NON-PROBE: NEGATIVE
E COLI O157 DNA STL QL NAA+NON-PROBE: NORMAL
E HISTOLYT DNA STL QL NAA+NON-PROBE: NEGATIVE
EAEC ASTA GENE ISLT QL NAA+PROBE: NEGATIVE
EC STX1+STX2 GENES STL QL NAA+NON-PROBE: NEGATIVE
EGFRCR SERPLBLD CKD-EPI 2021: 48 ML/MIN/1.73M2
EOSINOPHIL # BLD AUTO: 0.4 10E3/UL (ref 0–0.7)
EOSINOPHIL NFR BLD AUTO: 3 %
EPEC EAE GENE STL QL NAA+NON-PROBE: NEGATIVE
ERYTHROCYTE [DISTWIDTH] IN BLOOD BY AUTOMATED COUNT: 14.6 % (ref 10–15)
ETEC LTA+ST1A+ST1B TOX ST NAA+NON-PROBE: NEGATIVE
G LAMBLIA DNA STL QL NAA+NON-PROBE: NEGATIVE
GLUCOSE BLDC GLUCOMTR-MCNC: 126 MG/DL (ref 70–99)
GLUCOSE BLDC GLUCOMTR-MCNC: 138 MG/DL (ref 70–99)
GLUCOSE BLDC GLUCOMTR-MCNC: 144 MG/DL (ref 70–99)
GLUCOSE BLDC GLUCOMTR-MCNC: 149 MG/DL (ref 70–99)
GLUCOSE BLDC GLUCOMTR-MCNC: 158 MG/DL (ref 70–99)
GLUCOSE SERPL-MCNC: 140 MG/DL (ref 70–99)
GLUCOSE UR STRIP-MCNC: NEGATIVE MG/DL
HCO3 SERPL-SCNC: 28 MMOL/L (ref 22–29)
HCT VFR BLD AUTO: 29.9 % (ref 40–53)
HGB BLD-MCNC: 7.2 G/DL (ref 13.3–17.7)
HGB BLD-MCNC: 8 G/DL (ref 13.3–17.7)
HGB BLD-MCNC: 9.5 G/DL (ref 13.3–17.7)
HGB UR QL STRIP: NEGATIVE
HOLD SPECIMEN: NORMAL
HYALINE CASTS: 3 /LPF
IMM GRANULOCYTES # BLD: 0.1 10E3/UL
IMM GRANULOCYTES NFR BLD: 1 %
KETONES UR STRIP-MCNC: NEGATIVE MG/DL
LD BODY BODY FLUID SOURCE: NORMAL
LDH FLD L TO P-CCNC: 159 U/L
LEUKOCYTE ESTERASE UR QL STRIP: NEGATIVE
LYMPHOCYTES # BLD AUTO: 1.4 10E3/UL (ref 0.8–5.3)
LYMPHOCYTES NFR BLD AUTO: 12 %
MCH RBC QN AUTO: 27.7 PG (ref 26.5–33)
MCHC RBC AUTO-ENTMCNC: 31.8 G/DL (ref 31.5–36.5)
MCV RBC AUTO: 87 FL (ref 78–100)
MCV RBC AUTO: 88 FL (ref 78–100)
MCV RBC AUTO: 88 FL (ref 78–100)
MONOCYTES # BLD AUTO: 2.1 10E3/UL (ref 0–1.3)
MONOCYTES NFR BLD AUTO: 17 %
MUCOUS THREADS #/AREA URNS LPF: PRESENT /LPF
NEUTROPHILS # BLD AUTO: 8.1 10E3/UL (ref 1.6–8.3)
NEUTROPHILS NFR BLD AUTO: 67 %
NITRATE UR QL: NEGATIVE
NOROVIRUS GI+II RNA STL QL NAA+NON-PROBE: NEGATIVE
NRBC # BLD AUTO: 0 10E3/UL
NRBC BLD AUTO-RTO: 0 /100
P SHIGELLOIDES DNA STL QL NAA+NON-PROBE: NEGATIVE
PH UR STRIP: 5.5 [PH] (ref 5–7)
PLAT MORPH BLD: NORMAL
PLATELET # BLD AUTO: 290 10E3/UL (ref 150–450)
POTASSIUM SERPL-SCNC: 5 MMOL/L (ref 3.4–5.3)
RBC # BLD AUTO: 3.43 10E6/UL (ref 4.4–5.9)
RBC MORPH BLD: NORMAL
RBC URINE: 2 /HPF
RVA RNA STL QL NAA+NON-PROBE: NEGATIVE
SALMONELLA SP RPOD STL QL NAA+PROBE: NEGATIVE
SAPO I+II+IV+V RNA STL QL NAA+NON-PROBE: NEGATIVE
SHIGELLA SP+EIEC IPAH ST NAA+NON-PROBE: NEGATIVE
SODIUM SERPL-SCNC: 134 MMOL/L (ref 135–145)
SP GR UR STRIP: 1.03 (ref 1–1.03)
SQUAMOUS EPITHELIAL: 1 /HPF
TRIGL FLD-MCNC: 145 MG/DL
TRIGLYCERIDE BODY FLUID SOURCE: NORMAL
UROBILINOGEN UR STRIP-MCNC: NORMAL MG/DL
V CHOLERAE DNA SPEC QL NAA+PROBE: NEGATIVE
VIBRIO DNA SPEC NAA+PROBE: NEGATIVE
WBC # BLD AUTO: 12.2 10E3/UL (ref 4–11)
WBC URINE: 2 /HPF
Y ENTEROCOL DNA STL QL NAA+PROBE: NEGATIVE

## 2025-05-16 PROCEDURE — 88112 CYTOPATH CELL ENHANCE TECH: CPT | Mod: TC | Performed by: INTERNAL MEDICINE

## 2025-05-16 PROCEDURE — 82150 ASSAY OF AMYLASE: CPT | Performed by: INTERNAL MEDICINE

## 2025-05-16 PROCEDURE — 84478 ASSAY OF TRIGLYCERIDES: CPT | Performed by: INTERNAL MEDICINE

## 2025-05-16 PROCEDURE — 83615 LACTATE (LD) (LDH) ENZYME: CPT | Performed by: INTERNAL MEDICINE

## 2025-05-16 PROCEDURE — 999N000157 HC STATISTIC RCP TIME EA 10 MIN

## 2025-05-16 PROCEDURE — 250N000011 HC RX IP 250 OP 636: Performed by: INTERNAL MEDICINE

## 2025-05-16 PROCEDURE — 82962 GLUCOSE BLOOD TEST: CPT

## 2025-05-16 PROCEDURE — 120N000001 HC R&B MED SURG/OB

## 2025-05-16 PROCEDURE — 83615 LACTATE (LD) (LDH) ENZYME: CPT

## 2025-05-16 PROCEDURE — 36415 COLL VENOUS BLD VENIPUNCTURE: CPT

## 2025-05-16 PROCEDURE — 49083 ABD PARACENTESIS W/IMAGING: CPT

## 2025-05-16 PROCEDURE — 85018 HEMOGLOBIN: CPT

## 2025-05-16 PROCEDURE — 87205 SMEAR GRAM STAIN: CPT | Performed by: HOSPITALIST

## 2025-05-16 PROCEDURE — 88112 CYTOPATH CELL ENHANCE TECH: CPT | Mod: 26 | Performed by: PATHOLOGY

## 2025-05-16 PROCEDURE — 96365 THER/PROPH/DIAG IV INF INIT: CPT

## 2025-05-16 PROCEDURE — 36415 COLL VENOUS BLD VENIPUNCTURE: CPT | Performed by: HOSPITALIST

## 2025-05-16 PROCEDURE — 250N000013 HC RX MED GY IP 250 OP 250 PS 637: Performed by: HOSPITALIST

## 2025-05-16 PROCEDURE — 88305 TISSUE EXAM BY PATHOLOGIST: CPT | Mod: 26 | Performed by: PATHOLOGY

## 2025-05-16 PROCEDURE — G0378 HOSPITAL OBSERVATION PER HR: HCPCS

## 2025-05-16 PROCEDURE — 96366 THER/PROPH/DIAG IV INF ADDON: CPT

## 2025-05-16 PROCEDURE — 82947 ASSAY GLUCOSE BLOOD QUANT: CPT | Performed by: HOSPITALIST

## 2025-05-16 PROCEDURE — 94660 CPAP INITIATION&MGMT: CPT

## 2025-05-16 PROCEDURE — 87070 CULTURE OTHR SPECIMN AEROBIC: CPT | Performed by: HOSPITALIST

## 2025-05-16 PROCEDURE — 0W9G3ZZ DRAINAGE OF PERITONEAL CAVITY, PERCUTANEOUS APPROACH: ICD-10-PCS | Performed by: FAMILY MEDICINE

## 2025-05-16 PROCEDURE — P9047 ALBUMIN (HUMAN), 25%, 50ML: HCPCS | Performed by: INTERNAL MEDICINE

## 2025-05-16 PROCEDURE — 84157 ASSAY OF PROTEIN OTHER: CPT

## 2025-05-16 PROCEDURE — 250N000011 HC RX IP 250 OP 636: Performed by: HOSPITALIST

## 2025-05-16 PROCEDURE — 84311 SPECTROPHOTOMETRY: CPT | Performed by: INTERNAL MEDICINE

## 2025-05-16 PROCEDURE — 96376 TX/PRO/DX INJ SAME DRUG ADON: CPT

## 2025-05-16 PROCEDURE — 250N000011 HC RX IP 250 OP 636: Mod: JZ | Performed by: EMERGENCY MEDICINE

## 2025-05-16 PROCEDURE — 85004 AUTOMATED DIFF WBC COUNT: CPT | Performed by: HOSPITALIST

## 2025-05-16 PROCEDURE — 99232 SBSQ HOSP IP/OBS MODERATE 35: CPT | Mod: GC

## 2025-05-16 PROCEDURE — 81001 URINALYSIS AUTO W/SCOPE: CPT | Performed by: EMERGENCY MEDICINE

## 2025-05-16 RX ORDER — ALBUMIN (HUMAN) 12.5 G/50ML
100 SOLUTION INTRAVENOUS ONCE
Status: COMPLETED | OUTPATIENT
Start: 2025-05-16 | End: 2025-05-16

## 2025-05-16 RX ADMIN — OLANZAPINE 10 MG: 10 TABLET, FILM COATED ORAL at 19:57

## 2025-05-16 RX ADMIN — NICOTINE 1 PATCH: 21 PATCH, EXTENDED RELEASE TRANSDERMAL at 09:22

## 2025-05-16 RX ADMIN — FAMOTIDINE 20 MG: 20 TABLET, FILM COATED ORAL at 08:17

## 2025-05-16 RX ADMIN — HYDROMORPHONE HYDROCHLORIDE 0.5 MG: 1 INJECTION, SOLUTION INTRAMUSCULAR; INTRAVENOUS; SUBCUTANEOUS at 08:23

## 2025-05-16 RX ADMIN — ROSUVASTATIN 10 MG: 10 TABLET, FILM COATED ORAL at 19:57

## 2025-05-16 RX ADMIN — FAMOTIDINE 20 MG: 20 TABLET, FILM COATED ORAL at 19:57

## 2025-05-16 RX ADMIN — LEVOTHYROXINE SODIUM 12.5 MCG: 0.03 TABLET ORAL at 08:18

## 2025-05-16 RX ADMIN — TRAZODONE HYDROCHLORIDE 100 MG: 50 TABLET ORAL at 19:57

## 2025-05-16 RX ADMIN — FLUOXETINE HYDROCHLORIDE 20 MG: 20 CAPSULE ORAL at 08:18

## 2025-05-16 RX ADMIN — PANTOPRAZOLE SODIUM 40 MG: 20 TABLET, DELAYED RELEASE ORAL at 05:58

## 2025-05-16 RX ADMIN — FUROSEMIDE 40 MG: 20 TABLET ORAL at 08:17

## 2025-05-16 RX ADMIN — FLUTICASONE FUROATE AND VILANTEROL TRIFENATATE 1 PUFF: 100; 25 POWDER RESPIRATORY (INHALATION) at 08:23

## 2025-05-16 RX ADMIN — LISINOPRIL 10 MG: 5 TABLET ORAL at 08:17

## 2025-05-16 RX ADMIN — PANTOPRAZOLE SODIUM 40 MG: 20 TABLET, DELAYED RELEASE ORAL at 16:55

## 2025-05-16 RX ADMIN — ONDANSETRON 4 MG: 2 INJECTION, SOLUTION INTRAMUSCULAR; INTRAVENOUS at 21:33

## 2025-05-16 RX ADMIN — MONTELUKAST 10 MG: 10 TABLET, FILM COATED ORAL at 08:18

## 2025-05-16 RX ADMIN — SULFAMETHOXAZOLE AND TRIMETHOPRIM 1 TABLET: 800; 160 TABLET ORAL at 08:17

## 2025-05-16 RX ADMIN — SPIRONOLACTONE 100 MG: 25 TABLET, FILM COATED ORAL at 08:17

## 2025-05-16 RX ADMIN — UMECLIDINIUM 1 PUFF: 62.5 AEROSOL, POWDER ORAL at 08:23

## 2025-05-16 RX ADMIN — ALBUMIN HUMAN 100 G: 0.25 SOLUTION INTRAVENOUS at 12:30

## 2025-05-16 ASSESSMENT — ACTIVITIES OF DAILY LIVING (ADL)
ADLS_ACUITY_SCORE: 54
ADLS_ACUITY_SCORE: 58
ADLS_ACUITY_SCORE: 58
ADLS_ACUITY_SCORE: 59
ADLS_ACUITY_SCORE: 58
ADLS_ACUITY_SCORE: 57
ADLS_ACUITY_SCORE: 58
ADLS_ACUITY_SCORE: 59
ADLS_ACUITY_SCORE: 58
ADLS_ACUITY_SCORE: 58
ADLS_ACUITY_SCORE: 55
ADLS_ACUITY_SCORE: 54
ADLS_ACUITY_SCORE: 58
ADLS_ACUITY_SCORE: 54
ADLS_ACUITY_SCORE: 58
ADLS_ACUITY_SCORE: 57
ADLS_ACUITY_SCORE: 58
ADLS_ACUITY_SCORE: 57
DEPENDENT_IADLS:: CLEANING;COOKING;LAUNDRY;SHOPPING;MEAL PREPARATION;TRANSPORTATION;MEDICATION MANAGEMENT
ADLS_ACUITY_SCORE: 55
ADLS_ACUITY_SCORE: 54
ADLS_ACUITY_SCORE: 60

## 2025-05-16 NOTE — PLAN OF CARE
PRIMARY DIAGNOSIS: ACUTE PAIN  OUTPATIENT/OBSERVATION GOALS TO BE MET BEFORE DISCHARGE:  1. Pain Status: Improved but still requiring IV narcotics.    2. Return to near baseline physical activity: Yes    3. Cleared for discharge by consultants (if involved): No    Discharge Planner Nurse   Safe discharge environment identified: No  Barriers to discharge: Yes, paracentesis, pain control and consults pending.       Entered by: Xochilt Norman RN 05/16/2025 2:31 AM     Please review provider order for any additional goals.   Nurse to notify provider when observation goals have been met and patient is ready for discharge.

## 2025-05-16 NOTE — CONSULTS
CONSULTING PHYSICIAN   Ean Venegas MD     REASON FOR CONSULTATION   Cirrhosis     CHIEF COMPLAINT   Warren Jaramillo came to the hospital for evaluation of abdominal pain     HISTORY OF PRESENT ILLNESS   Warren Jaramillo is a 44 year old male with hypertension, diabetes, sleep apnea, obesity, COPD, cirrhosis secondary to metabolic associated liver disease and alcohol use disorder with ascites admitted with abdominal pain following paracentesis    Patient with multiple paracenteses for management of his ascites.  He does maintain an diuretics in the form of spironolactone and Lasix but does not manage his sodium intake well.  Had a paracentesis with 4 L removed of ascites.  He notes right-sided abdominal pain radiating to his left abdomen.  He states typically he gets left-sided discomfort after an abdominal paracentesis    Patient had a CT scan in the emergency room without significant findings.  There is no hematoma or evidence for perforation.    Ascitic fluid analysis not obtained from most recent paracentesis.  Past ascites analysis shows consistent low SAAG and high total protein.     Acute on chronic renal insufficiency noted with a creatinine of 1.77 increased from 1.29    Last upper endoscopy performed April 2025 with portal hypertensive changes in the stomach but negative for varices       PAST HISTORY   Past Medical History:   Diagnosis Date    COPD exacerbation (H) 12/02/2024    DM2 (diabetes mellitus, type 2) (H) 04/28/2020    HTN (hypertension) 07/30/2012    Sleep apnea     Thyroid nodule 07/31/2019    Rojas's disease (H)       Past Surgical History:   Procedure Laterality Date    COLONOSCOPY      ESOPHAGOSCOPY, GASTROSCOPY, DUODENOSCOPY (EGD), COMBINED N/A 7/21/2023    Procedure: ESOPHAGOGASTRODUODENOSCOPY WITH GASTRIC AND ESOPHAGEAL BIOPSIES;  Surgeon: Filiberto Aragon MD;  Location: [facility] OR    ESOPHAGOSCOPY, GASTROSCOPY, DUODENOSCOPY  "(EGD), COMBINED N/A 4/4/2025    Procedure: ESOPHAGOGASTRODUODENOSCOPY;  Surgeon: Ramesh Talbot MD;  Location[place] [facility] [place]    TOOTH EXTRACTION          Family History Social History   Family History   Problem Relation Age of Onset    Unknown/Adopted Father     Unknown/Adopted Maternal Grandmother     C.A.D. Maternal Grandfather     Diabetes Maternal Grandfather     Cerebrovascular Disease Maternal Grandfather     Unknown/Adopted Paternal Grandmother     Unknown/Adopted Paternal Grandfather     Unknown/Adopted Brother     Unknown/Adopted Sister     Social History     Tobacco Use    Smoking status: Every Day     Current packs/day: 1.00     Average packs/day: 1 pack/day for 21.4 years (21.4 ttl pk-yrs)     Types: Cigarettes     Start date: 1/1/2004    Smokeless tobacco: Never   Vaping Use    Vaping status: Never Used   Substance Use Topics    Alcohol use: Not Currently     Comment: Last 8/7/2024          MEDICATIONS & ALLERGIES   (Not in a hospital admission)       ALLERGIES   Allergies   Allergen Reactions    Apricot Flavoring Agent (Non-Screening) Anaphylaxis    Banana Anaphylaxis     Throat swelling      Bees Anaphylaxis     Has an Epi pen    Wasp Venom Protein Shortness Of Breath     Other reaction(s): Respiratory Distress  Has an Epi pen        Methylphenidate Itching     Other reaction(s): Nightmares    Prunus      Other reaction(s): *Unknown         REVIEW OF SYSTEMS   A comprehensive review of systems was performed and was otherwise noncontributory.     OBJECTIVE   Vitals Blood pressure 115/68, pulse 89, temperature 98  F (36.7  C), temperature source Axillary, resp. rate 18, height 1.651 m (5' 5\"), weight 127.8 kg (281 lb 12.8 oz), SpO2 95%.           Physical  Exam  GENERAL: alert and oriented, well nourished in no apparent distress   SKIN: warm and dry, no rashes   HEENT: atraumatic, anicteric, moist mucous membranes, neck soft/supple    PULMONARY: normal resp effort, breath sounds clear to " auscultation bilateral   CARDIOVASCULAR: normal rate and rhythm, no murmurs, no edema   ABDOMEN: Distended abdomen, bowel sounds normal.  Negative for any tenderness to palpation.  Negative for guarding or rebound.   MUSCULOSKELETAL: joints and gait normal   NEUROLOGICAL: appropriate mental status, grossly intact  DERM: no rash, no jaundice   PSYCHIATRIC: normal mood, affect and insight        LABORATORY    ELECTROLYTE PANEL   Recent Labs   Lab 05/16/25  0715 05/16/25  0644 05/16/25  0246 05/15/25  2011 05/15/25  0042   NA  --  134*  --  134* 136   POTASSIUM  --  5.0  --  5.0 4.2   CHLORIDE  --  97*  --  98 101   CO2  --  28  --  25 25   * 140* 144* 126* 178*   CR  --  1.77*  --  1.29* 1.23*   BUN  --  23.0*  --  18.3 21.8*      HEMATOLOGY PANEL   Recent Labs   Lab 05/16/25  0644 05/15/25  2011 05/15/25  0042   HGB 9.5* 10.6* 13.2*   MCV 87 87 88   WBC 12.2* 14.1* 14.0*    293 304   INR  --   --  1.16*      LIVER AND PANCREAS PANEL   Recent Labs   Lab 05/15/25  2011 05/15/25  0042   AST 13 18   ALT 16 21   ALKPHOS 178* 201*   BILITOTAL 0.6 0.3   LIPASE  --  22     IMAGING STUDIES    EXAM: CT ABDOMEN PELVIS W CONTRAST  LOCATION: [facility]  DATE: 5/15/2025     INDICATION: Patient with a recent paracentesis, patient concern for bowel injury during procedure.  Right lower quadrant pain  COMPARISON: 4/20/2025.  TECHNIQUE: CT scan of the abdomen and pelvis was performed following injection of IV contrast. Multiplanar reformats were obtained. Dose reduction techniques were used.  CONTRAST: isovue 370 90ml     FINDINGS:   LOWER CHEST: Normal.     HEPATOBILIARY: Mild hepatomegaly. Hepatic steatosis. Cirrhotic morphology. No calcified gallstones.     PANCREAS: Normal.     SPLEEN: Normal.     ADRENAL GLANDS: Stable bilateral adrenal myelolipomas measuring 4.5 on the right and 3.4 on the left.     KIDNEYS/BLADDER: Normal.     BOWEL: Mild ascites. No free air. No bowel wall thickening or  abnormal enhancement. No obstruction.     LYMPH NODES: Shotty mesenteric and retroperitoneal lymph nodes, unchanged. No pelvic adenopathy.     VASCULATURE: No aneurysm.     PELVIC ORGANS: Normal.     MUSCULOSKELETAL: Small fat-containing left inguinal hernia.                                                                      IMPRESSION:   1.  Hepatic steatosis, cirrhosis, and mild ascites. No evidence for bowel injury status post paracentesis.    I have reviewed the current diagnostic and laboratory tests.           IMPRESSION   Warren Jaramillo is a 44 year old male with hypertension, diabetes, sleep apnea, obesity, COPD, cirrhosis secondary to metabolic associated liver disease and alcohol use disorder with ascites admitted with abdominal pain following paracentesis    Abdominal pain-imaging negative for any concerning findings.  Negative for hematoma or perforation.  This may be abdominal wall pain.  Current physical exam is negative for any guarding or rebound.    Ascites-unusual in that SAAG is low and total protein is elevated.  Patient is scheduled for a liver biopsy and HVPG assessment in June.   Patient is scheduled for a diagnostic paracentesis and will add on labs for further evaluation including cytology.    RIVERA on CKD-may represent HRS given unremarkable UA.  Will treat with albumin today             Surinder Jacobs MD  Thank you for the opportunity to participate in the care of this patient.   Please feel free to call me with any questions or concerns.  Phone number [phone].

## 2025-05-16 NOTE — PROGRESS NOTES
[facility]    Progress Note - Hospitalist Service       Date of Admission:  5/15/2025    Assessment & Plan   Warren Jaramillo is a 44 year old male admitted on 5/15/2025. He has a history of HTN, type 2 diabetes, AVA, COPD, Rojas's disease and hepatic cirrhosis with ascites and is admitted for ongoing abdominal pain, nausea vomiting with an episode of hematamesis and diarrhea.    Patient initially admitted by the hospitalist team and transferred to our care on day one of admission.    Abdominal pain  Cirrhosis with ascites/frequent paracentesis   History of Rojas's disease  Episode of hematemesis   Patient presented ED with worsening ongoing abdominal pain.  Patient has history of hepatic cirrhosis and ascites with frequent paracentesis.  Recently had 4 L removed.  Has been complaining of left lower quadrant abdominal discomfort.  Patient also notes 1 episode hematemesis prior to admission.  Patient was vitally stable on admission and CT abdomen pelvis with no acute findings except for mild ascites.  GI consulted on admission with recommendation as below.  Of note per chart review patient recently had upper endoscopy in April 2025 with portal hypertensive changes in the stomach but negative for varices.  Enteric panel negative.  Abdominal pain is likely abdominal wall pain due to ascites.   -GI consulted, appreciate recs    -Diagnostic paracentesis with ascitic fluid analysis and cytology    -Albumin infusion     - Outpatient follow-up for liver biopsy and HVPG assessment in June    Hypotension-resolved  S/p paracentesis  Patient is s/p paracentesis. RN paged blood pressure with SBP mid 80s and patient feeling lightheaded.  On arrival to the room patient reports lightheadedness is improving placed in the Trendelenburg position and just recently started albumin infusion.  Will review the chart to see how much fluid was removed during paracentesis.  Monitor closely.  - Continue  albumin infusion  - Monitor BP closely    RIVERA on CKD.   On admission creatinine 1.7.  Baseline around 1.2.  Avoid nephrotoxins continue to monitor closely  -Daily BMP    Acute on chronic microcytic anemia  Presented with abdominal pain and one episode of hematemesis.  Admitted with a hemoglobin of 13.2, downtrending 9.5.  Will continue to monitor closely.  GI following no plan for EGD at this time.  Per chart review patient had EGD one month ago with findings of portal gastropathy and no varices.  Will continue to monitor closely.  Repeat hemoglobin at 1800.  -Daily CBC  -Repeat hemoglobin 1800  - Hold DOAC.    History of HFpEF  History of HFpEF.  Most recent echo with EF 60 to 65%.  No signs of CHF exacerbation.  -Continue PTA Lasix and spironolactone    Type 2 diabetes  Most recent A1c 6.7.  On PTA metformin and Jardiance.  -Hold PTA metformin and Jardiance  -Diabetic diet  -Start sliding scale insulin    Hx of DVT  History of occlusive DVT diagnosed in 2024. On PTA apixaban held as above for anemia with concern for bleeding as above.  - Hold PTA apixaban  - Outpatient follow-up to determine length of anticoagulation.      Chronic conditions:  Hypothyroidism: Continue PTA levothyroxine  HTN: Continue PTA lisinopril  AVA; BiPAP as needed for sleep  Mood disorder: Continue PTA fluoxetine and olanzapine             Observation Goals: -diagnostic tests and consults completed and resulted, -vital signs normal or at patient baseline, Nurse to notify provider when observation goals have been met and patient is ready for discharge.  Diet: Fluid restriction 2000 ML FLUID  Clear Liquid Diet    DVT Prophylaxis: DOAC  Khan Catheter: Not present  Fluids: PO  Lines: None     Cardiac Monitoring: None  Code Status: Full Code      Clinically Significant Risk Factors Present on Admission         # Hyponatremia: Lowest Na = 134 mmol/L in last 2 days, will monitor as appropriate  # Hypochloremia: Lowest Cl = 97 mmol/L in last 2  "days, will monitor as appropriate  # Hypocalcemia: Lowest Ca = 8.2 mg/dL in last 2 days, will monitor and replace as appropriate      # Drug Induced Coagulation Defect: home medication list includes an anticoagulant medication   # Acute Kidney Injury, unspecified: based on a >150% or 0.3 mg/dL increase in last creatinine compared to past 90 day average, will monitor renal function  # Hypertension: Noted on problem list  # Chronic heart failure with preserved ejection fraction: heart failure noted on problem list and last echo with EF >50%     # Anemia: based on hgb <11      # DMII: A1C = 6.7 % (Ref range: <5.7 %) within past 6 months    # Morbid Obesity: Estimated body mass index is 46.89 kg/m  as calculated from the following:    Height as of this encounter: 1.651 m (5' 5\").    Weight as of this encounter: 127.8 kg (281 lb 12.8 oz).       # Financial/Environmental Concerns: none         Social Drivers of Health   Tobacco Use: High Risk (5/15/2025)    Patient History     Smoking Tobacco Use: Every Day     Smokeless Tobacco Use: Never   Interpersonal Safety: Low Risk  (4/4/2025)    Interpersonal Safety     Do you feel physically and emotionally safe where you currently live?: Yes     Within the past 12 months, have you been hit, slapped, kicked or otherwise physically hurt by someone?: No     Within the past 12 months, have you been humiliated or emotionally abused in other ways by your partner or ex-partner?: No   Recent Concern: Interpersonal Safety - High Risk (1/11/2025)    Interpersonal Safety     Do you feel physically and emotionally safe where you currently live?: No     Within the past 12 months, have you been hit, slapped, kicked or otherwise physically hurt by someone?: No     Within the past 12 months, have you been humiliated or emotionally abused in other ways by your partner or ex-partner?: No    Received from [employer] [facility]    Social Connections         Disposition Plan "     Medically Ready for Discharge: Anticipated Tomorrow         The patient's care was discussed with the Attending Physician, Dr. Venegas.    Young Saeed MD  Hospitalist Service  [facility]  Securely message with [employer] (more info)  Text page via [employer] Paging/Directory   ______________________________________________________________________    Interval History   No acute events overnight.  Patient notes still abdominal discomfort mostly right and left lower quadrant.  Patient notes 1 episode of hematemesis prior to admission no recurrence.  And had few dark stools prior to admission.  Otherwise no nausea and vomiting resolved.  He is currently on clear liquids and tolerating.    Nursing paged regarding hypotension after paracentesis.  Blood pressure stable with systolic in the 90s.  And lightheadedness resolved.  Patient is currently getting IV albumin infusion.  Will continue to monitor closely.  Physical Exam   Vital Signs: Temp: 97.8  F (36.6  C) Temp src: Oral BP: 90/44 Pulse: 89   Resp: 18 SpO2: 95 % O2 Device: BiPAP/CPAP    Weight: 281 lbs 12.8 oz  General Appearance: Alert and oriented, NAD  Respiratory: normal work of breathing, clear breath sounds, no wheezing  Cardiovascular: normal S1, S2, no murmur  GI: soft, distended, nontender and no guarding.    Skin: normal turgor and appearance, no visible rash   Other: No lower limb edema B/L        Medical Decision Making             Data     I have personally reviewed the following data over the past 24 hrs:    12.2 (H)  \   9.5 (L)   / 290     134 (L) 97 (L) 23.0 (H) /  138 (H)   5.0 28 1.77 (H) \     ALT: 16 AST: 13 AP: 178 (H) TBILI: 0.6   ALB: 3.8 TOT PROTEIN: 6.3 (L) LIPASE: N/A     TSH: N/A T4: N/A A1C: 6.7 (H)     Procal: 0.33 CRP: 85.80 (H) Lactic Acid: N/A         Imaging results reviewed over the past 24 hrs:   Recent Results (from the past 24 hours)   US Paracentesis with Albumin    Narrative    EXAM:  1. PARACENTESIS  2.  ULTRASOUND GUIDANCE  LOCATION: [facility]  DATE: 5/16/2025    INDICATION: Ascites.    PROCEDURE: Informed consent obtained. Time out performed. The abdomen was prepped and draped in a sterile fashion. 10 mL of 1% lidocaine was infused into local soft tissues. A 5 Latvian catheter system was introduced into the abdominal ascites under   ultrasound guidance.    4.75 liters of bloody fluid were removed and sent to lab if requested.    Patient tolerated procedure well.    Ultrasound imaging was obtained and placed in the patient's permanent medical record.      Impression    IMPRESSION:  1.  Status post ultrasound-guided paracentesis.    Reference CPT Code: 45176      Griseofulvin Counseling:  I discussed with the patient the risks of griseofulvin including but not limited to photosensitivity, cytopenia, liver damage, nausea/vomiting and severe allergy.  The patient understands that this medication is best absorbed when taken with a fatty meal (e.g., ice cream or french fries).

## 2025-05-16 NOTE — PROGRESS NOTES
PRIMARY DIAGNOSIS: ACUTE PAIN  OUTPATIENT/OBSERVATION GOALS TO BE MET BEFORE DISCHARGE:  1. Pain Status: Improved with use of alternative comfort measures i.e.: positioning and rest    2. Return to near baseline physical activity: Yes    3. Cleared for discharge by consultants (if involved): No    Discharge Planner Nurse   Safe discharge environment identified: No  Barriers to discharge: Yes, pending paracentesis and consults.       Entered by: Xochilt Norman RN 05/16/2025 6:52 AM     Please review provider order for any additional goals.   Nurse to notify provider when observation goals have been met and patient is ready for discharge.

## 2025-05-16 NOTE — PLAN OF CARE
Goal Outcome Evaluation:      Plan of Care Reviewed With: patient, guardian          Outcome Evaluation: Goal is return to group home when medically ready. Guardian requests review for possible TIPS procedure while here.

## 2025-05-16 NOTE — ED NOTES
[facility] ED Handoff Report    ED Chief Complaint: abdominal pain    ED Diagnosis:  (R10.31) RLQ abdominal pain     PMH:    Past Medical History:   Diagnosis Date    COPD exacerbation (H) 12/02/2024    DM2 (diabetes mellitus, type 2) (H) 04/28/2020    HTN (hypertension) 07/30/2012    Sleep apnea     Thyroid nodule 07/31/2019    Rojas's disease (H)         Code Status:  Prior     Falls Risk: No Band: Not applicable    Current Living Situation/Residence: lives in a group home     Elimination Status: Continent: Yes     Activity Level: Independent    Patients Preferred Language:  English     Needed: No    Vital Signs:  /58   Pulse 100   Temp 99.2  F (37.3  C) (Oral)   Resp 24   Wt 127.8 kg (281 lb 12.8 oz)   SpO2 94%   BMI 46.89 kg/m       Cardiac Rhythm:     Pain Score: 4/10    Is the Patient Confused:  No    Last Food or Drink: 05/15/25 at     Focused Assessment:  Pt reports increasing pain since being seen this morning, pt reports he had several episodes of emesis that at first had small streaks of blood and at largest ere nickel sized blood clots. Pt concerned that there is air in his bladder, also concerned about possible bowel obstruction d/t having diarrhea.     Tests Performed: Done: Labs and Imaging    Treatments Provided:  see MAR    Family Dynamics/Concerns: No    Family Updated On Visitor Policy: No    Plan of Care Communicated to Family: No    Who Was Updated about Plan of Care:      Belongings Checklist Done and Signed by Patient: No    Belongings Sent with Patient:     Medications sent with patient:     Covid: asymptomatic , negative    Additional Information:     RN: Marcella Camara RN 5/15/2025 9:52 PM

## 2025-05-16 NOTE — ED PROVIDER NOTES
Emergency Department Encounter      NAME: Warren Jaramillo  AGE: 44 year old male  YOB: 1980  MRN: [id number]  EVALUATION DATE & TIME: 5/15/2025  6:43 PM    PCP: Mary Kelly    ED PROVIDER: Milton Jaramillo M.D.      Chief Complaint   Patient presents with    Emesis    Abdominal Pain    Hematemesis                FINAL IMPRESSION:  1. RLQ abdominal pain    2. Danni-Arnett tear    3. Diarrhea, unspecified type        MEDICAL DECISION MAKIN:29 PM I met with the patient, obtained history, performed an initial exam, and discussed options and plan for diagnostics and treatment here in the ED.   8:03 PM I spoke with Jesika Fina, the patient's legal guardian. She confirms that she is still his legal guardian.      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  I reviewed the patient's medical records and he had been seen here earlier in the day and was discharged at 4 AM.  He had had a paracentesis done however no lab work was sent.  He was supposed to return tomorrow morning to get a sample of the ascites for lab test.  He says that after he returned home from the ER at 6 AM he felt some bladder fullness and had a little dysuria then had some nausea and vomiting.  He noticed some dime sized blood clots in the emesis.  He also had 3 episodes of watery diarrhea.  I spoke with Jesika Harden who is his legal guardian.  She agreed with the plan for workup and admission.  I spoke with Dr. Gibson and reviewed the patient's presentation and history with him.  He agreed with admitted the patient.  He wanted to defer given antibiotics as the patient only had a low-grade temperature.  If he spiked a fever while in the hospital he was then to get antibiotics.  Otherwise the preference would be to obtain the ascites fluid for culture prior to antibiotics  being given.  I admitted the patient to Dr. Rodriguez  I had considered doing a CT scan however he just had one done a little over 12 hours ago in the ER for the same problem.  I did send off lab work and it was noted that his white blood cell count was 14.1 however it is noted on his care plan that his white blood cell count typically runs between 12,000 and 16,000.  His CRP was quite elevated though at 85.8.    Pertinent Labs & Imaging studies reviewed. (See chart for details)    Medical Decision Making  I obtained history from Guardian  I reviewed the EMR: Outpatient Record: The ER record from earlier on 15 May when he saw Dr. Foreman for the same problem  Care impacted by cirrhosis, hypertension, type 2 diabetes, chronic abdominal pain  I considered additional work up, including abdominal CT, but deferred because he had just had a abdominal pelvic CT done a little over 12 hours ago for the same exact problem  I discussed the care with another health care provider: Dr. Gibson from [facility].    MIPS (CTPE, Dental pain, Khan, Sinusitis, Asthma/COPD, Head Trauma): Not Applicable    SEPSIS: None        MEDICATIONS GIVEN IN THE EMERGENCY:  Medications   HYDROmorphone (PF) (DILAUDID) injection 0.5 mg (0.5 mg Intravenous $Given 5/15/25 2033)   apixaban ANTICOAGULANT (ELIQUIS) tablet 5 mg ( Oral Automatically Held 5/18/25 2000)   famotidine (PEPCID) tablet 20 mg (20 mg Oral $Given 5/15/25 2253)   FLUoxetine (PROzac) capsule 20 mg (has no administration in time range)   furosemide (LASIX) tablet 40 mg (has no administration in time range)   empagliflozin (JARDIANCE) tablet 10 mg ( Oral Automatically Held 5/19/25 0800)   levothyroxine (SYNTHROID/LEVOTHROID) half-tab 12.5 mcg (has no administration in time range)   lisinopril (ZESTRIL) tablet 10 mg (has no administration in time range)   metFORMIN (GLUCOPHAGE) tablet 1,000 mg (has no administration in time range)   montelukast (SINGULAIR) tablet 10 mg (has no  administration in time range)   OLANZapine (zyPREXA) tablet 10 mg (10 mg Oral $Given 5/15/25 2254)   rosuvastatin (CRESTOR) tablet 10 mg (10 mg Oral $Given 5/15/25 2253)   spironolactone (ALDACTONE) tablet 100 mg (has no administration in time range)   sulfamethoxazole-trimethoprim (BACTRIM DS) 800-160 MG per tablet 1 tablet (has no administration in time range)   traZODone (DESYREL) tablet 100 mg (100 mg Oral $Given 5/15/25 2253)   fluticasone-vilanterol (BREO ELLIPTA) 100-25 MCG/ACT inhaler 1 puff (has no administration in time range)     And   umeclidinium (INCRUSE ELLIPTA) 62.5 MCG/ACT inhaler 1 puff (has no administration in time range)   albuterol (PROVENTIL HFA/VENTOLIN HFA) inhaler (has no administration in time range)   benzonatate (TESSALON) capsule 200 mg (has no administration in time range)   methocarbamol (ROBAXIN) tablet 500 mg (has no administration in time range)   ondansetron (ZOFRAN ODT) ODT tab 4 mg (has no administration in time range)     Or   ondansetron (ZOFRAN) injection 4 mg (has no administration in time range)   prochlorperazine (COMPAZINE) injection 10 mg (has no administration in time range)     Or   prochlorperazine (COMPAZINE) tablet 10 mg (has no administration in time range)   glucose gel 15-30 g (has no administration in time range)     Or   dextrose 50 % injection 25-50 mL (has no administration in time range)     Or   glucagon injection 1 mg (has no administration in time range)   Patient is already receiving anticoagulation with heparin, enoxaparin (LOVENOX), warfarin (COUMADIN)  or other anticoagulant medication (has no administration in time range)   acetaminophen (TYLENOL) tablet 325 mg (has no administration in time range)   insulin aspart (NovoLOG) injection (RAPID ACTING) (has no administration in time range)   insulin aspart (NovoLOG) injection (RAPID ACTING) ( Subcutaneous Not Given 5/15/25 2241)   pantoprazole (PROTONIX) EC tablet 40 mg (has no administration in time  range)   nicotine (NICODERM CQ) 21 MG/24HR 24 hr patch 1 patch (has no administration in time range)   albumin human 25 % injection 25-50 g (has no administration in time range)   sodium chloride 0.9% BOLUS 500 mL (0 mLs Intravenous Stopped 5/15/25 2154)   pantoprazole (PROTONIX) IV push injection 80 mg (80 mg Intravenous $Given 5/15/25 2034)       NEW PRESCRIPTIONS STARTED AT TODAY'S ER VISIT:  New Prescriptions    No medications on file          =================================================================    HPI    Patient information was obtained from: patient     Use of : N/A       Warren Jaramillo is a 44 year old male with a past medical history of 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     Per chart review, the patient was seen here yesterday and discharged this morning. He complained of lower abdominal pain. Labs normal. CT normal as well. He was otherwise comfortable after pain medications, given Rocephin according to his care plan, and [facility] was consulted (per care plan) and he was discharged with plan for paracentesis in the next few days.     The patient had a paracentesis yesterday at 2:45 PM where they magdiel from the right side. He then went home, ate dinner, and had his typical LLQ abdominal pain that normally follows paracenteses drawn from the right. He fell asleep at 8:00 PM but woke again at 10:30 PM with pain stretching across his lower abdomen. He then came to this ED around 10:30 PM (see chart review above) and was given dilaudid which relieved his pain. Labs and imaging were unremarkable and the patient was sent home with plan to get a paracentesis on 5/16 at 12:15 PM. The patient was able to fall asleep this morning but then woke at 6:00 AM with a sensation that his bladder was full. After urinating, he had a burning  "sensation \"in my bladder, not the penis\". He then had diarrhea and vomiting. The diarrhea was watery with \"dark brown/black pepper flakes\" and he had concurrent vomiting that had \"dime sized\" clots in it. Throughout today he has had 2 more episodes of diarrhea but no more vomiting. Since this morning, he has had lower abdominal pain that is worst in the RLQ but radiates diffusely through the abdomen. At 10:45 AM the patient had a GI appointment but he says he got \"chewed out for not following the low sodium diet\". Now, he has \"excruciating pain\" and is requesting dilaudid. His scheduled paracentesis tomorrow is to get a single syringe-full and test it. He explains that he has had many CT scans for his abdominal pain and they always come back \"fine\". He denies current nausea or any other complaints at this time.       REVIEW OF SYSTEMS   Review of Systems   Gastrointestinal:  Positive for abdominal pain, diarrhea and vomiting. Negative for nausea.   All other systems reviewed and are negative.       PAST MEDICAL HISTORY:  Past Medical History:   Diagnosis Date    COPD exacerbation (H) 12/02/2024    DM2 (diabetes mellitus, type 2) (H) 04/28/2020    HTN (hypertension) 07/30/2012    Sleep apnea     Thyroid nodule 07/31/2019    Rojas's disease (H)        PAST SURGICAL HISTORY:  Past Surgical History:   Procedure Laterality Date    COLONOSCOPY      ESOPHAGOSCOPY, GASTROSCOPY, DUODENOSCOPY (EGD), COMBINED N/A 7/21/2023    Procedure: ESOPHAGOGASTRODUODENOSCOPY WITH GASTRIC AND ESOPHAGEAL BIOPSIES;  Surgeon: Filiberto Aragon MD;  Location: [facility] OR    ESOPHAGOSCOPY, GASTROSCOPY, DUODENOSCOPY (EGD), COMBINED N/A 4/4/2025    Procedure: ESOPHAGOGASTRODUODENOSCOPY;  Surgeon: Ramesh Talbot MD;  Location: [facility] OR    TOOTH EXTRACTION         CURRENT MEDICATIONS:      Current Facility-Administered Medications:     acetaminophen (TYLENOL) tablet 325 mg, 325 mg, Oral, Q4H PRN, Maninder Rodriguez MD    [START " ON 5/16/2025] albumin human 25 % injection 25-50 g, 25-50 g, Intravenous, Once, Maninder Rodriguez MD    albuterol (PROVENTIL HFA/VENTOLIN HFA) inhaler, 1-2 puff, Inhalation, Q6H PRN, Maninder Rodriguez MD    [Held by provider] apixaban ANTICOAGULANT (ELIQUIS) tablet 5 mg, 5 mg, Oral, BID, Maninder Rodriguez MD    benzonatate (TESSALON) capsule 200 mg, 200 mg, Oral, TID PRN, Maninder Rodriguez MD    glucose gel 15-30 g, 15-30 g, Oral, Q15 Min PRN **OR** dextrose 50 % injection 25-50 mL, 25-50 mL, Intravenous, Q15 Min PRN **OR** glucagon injection 1 mg, 1 mg, Subcutaneous, Q15 Min PRN, Maninder Rodriguez MD    [Held by provider] empagliflozin (JARDIANCE) tablet 10 mg, 10 mg, Oral, QAM, Maninder Rodriguez MD    famotidine (PEPCID) tablet 20 mg, 20 mg, Oral, BID, Maninder Rodriguez MD, 20 mg at 05/15/25 2253    [START ON 5/16/2025] FLUoxetine (PROzac) capsule 20 mg, 20 mg, Oral, Michael KWONG Angel Luis, MD    [START ON 5/16/2025] fluticasone-vilanterol (BREO ELLIPTA) 100-25 MCG/ACT inhaler 1 puff, 1 puff, Inhalation, Daily **AND** [START ON 5/16/2025] umeclidinium (INCRUSE ELLIPTA) 62.5 MCG/ACT inhaler 1 puff, 1 puff, Inhalation, Daily, Maninder Rodriguez MD    [START ON 5/16/2025] furosemide (LASIX) tablet 40 mg, 40 mg, Oral, Daily, Maninder Rodriguez MD    HYDROmorphone (PF) (DILAUDID) injection 0.5 mg, 0.5 mg, Intravenous, Q30 Min PRN, Milton Jaramillo MD, 0.5 mg at 05/15/25 2033    [START ON 5/16/2025] insulin aspart (NovoLOG) injection (RAPID ACTING), 1-3 Units, Subcutaneous, TID Michael GERBER Angel Luis, MD    insulin aspart (NovoLOG) injection (RAPID ACTING), 1-3 Units, Subcutaneous, At Bedtime, Maninder Rodriguez MD    [START ON 5/16/2025] levothyroxine (SYNTHROID/LEVOTHROID) half-tab 12.5 mcg, 12.5 mcg, Oral, QAM Michael GERBER Angel Luis, MD    [START ON 5/16/2025] lisinopril (ZESTRIL) tablet 10 mg, 10 mg, Oral, Michael KWONG Angel Luis, MD    [START ON 5/17/2025] metFORMIN (GLUCOPHAGE) tablet 1,000  mg, 1,000 mg, Oral, BID w/meals, Maninder Rodriguez MD    methocarbamol (ROBAXIN) tablet 500 mg, 500 mg, Oral, 4x Daily PRN, Maninder Rodriguez MD    [START ON 5/16/2025] montelukast (SINGULAIR) tablet 10 mg, 10 mg, Oral, QAM, Maninder Rodriguez MD    [START ON 5/16/2025] nicotine (NICODERM CQ) 21 MG/24HR 24 hr patch 1 patch, 1 patch, Transdermal, Daily, Maninder Rodriguez MD    OLANZapine (zyPREXA) tablet 10 mg, 10 mg, Oral, At Bedtime, Maninder Rodriguez MD, 10 mg at 05/15/25 2254    ondansetron (ZOFRAN ODT) ODT tab 4 mg, 4 mg, Oral, Q6H PRN **OR** ondansetron (ZOFRAN) injection 4 mg, 4 mg, Intravenous, Q6H PRN, Maninder Rodriguez MD    [START ON 5/16/2025] pantoprazole (PROTONIX) EC tablet 40 mg, 40 mg, Oral, BID AC, Maninder Rodriguez MD    Patient is already receiving anticoagulation with heparin, enoxaparin (LOVENOX), warfarin (COUMADIN)  or other anticoagulant medication, , Does not apply, Continuous PRN, Maninder Rodriguez MD    prochlorperazine (COMPAZINE) injection 10 mg, 10 mg, Intravenous, Q6H PRN **OR** prochlorperazine (COMPAZINE) tablet 10 mg, 10 mg, Oral, Q6H PRN, Maninder Rodriguez MD    rosuvastatin (CRESTOR) tablet 10 mg, 10 mg, Oral, At Bedtime, Maninder Rodriguez MD, 10 mg at 05/15/25 2253    [START ON 5/16/2025] spironolactone (ALDACTONE) tablet 100 mg, 100 mg, Oral, Daily, Maninder Rodriguez MD    [START ON 5/16/2025] sulfamethoxazole-trimethoprim (BACTRIM DS) 800-160 MG per tablet 1 tablet, 1 tablet, Oral, Daily, Maninder Rodriguez MD    traZODone (DESYREL) tablet 100 mg, 100 mg, Oral, At Bedtime, Maninder Rodriguez MD, 100 mg at 05/15/25 7532    Current Outpatient Medications:     albuterol (PROAIR HFA/PROVENTIL HFA/VENTOLIN HFA) 108 (90 Base) MCG/ACT inhaler, Inhale 1-2 puffs into the lungs every 6 hours as needed for shortness of breath, wheezing or cough, Disp: 18 g, Rfl: 0    albuterol (PROVENTIL) (2.5 MG/3ML) 0.083% neb solution, Take 2.5 mg by nebulization 3 times daily as  needed for shortness of breath, wheezing or cough, Disp: , Rfl:     aloe vera GEL, Apply 1 g topically every hour as needed for skin care Per bottle directions, Disp: , Rfl:     apixaban ANTICOAGULANT (ELIQUIS) 5 MG tablet, Take 5 mg by mouth 2 times daily., Disp: , Rfl:     bacitracin 500 UNIT/GM OINT, Apply topically 3 times daily as needed for wound care, Disp: , Rfl:     Benzocaine (SOLARCAINE ALOE VERA EX), Externally apply topically daily as needed., Disp: , Rfl:     benzonatate (TESSALON) 200 MG capsule, Take 1 capsule (200 mg) by mouth 3 times daily as needed for cough., Disp: 50 capsule, Rfl: 0    Calamine external lotion, Apply topically as needed for itching, Disp: , Rfl:     carbamide peroxide (DEBROX) 6.5 % otic solution, Place 5 drops into both ears daily as needed for other (ear wax)., Disp: , Rfl:     clotrimazole (LOTRIMIN) 1 % external cream, Apply topically 2 times daily as needed (skin irritation), Disp: , Rfl:     dextromethorphan-guaiFENesin (MUCINEX DM)  MG 12 hr tablet, Take 1 tablet by mouth 2 times daily as needed., Disp: , Rfl:     diclofenac (VOLTAREN) 1 % topical gel, Apply 2 g topically daily as needed for moderate pain To joints/back, Disp: , Rfl:     EPINEPHrine (ANY BX GENERIC EQUIV) 0.3 MG/0.3ML injection 2-pack, Inject 0.3 mg into the muscle as needed for anaphylaxis. May repeat one time in 5-15 minutes if response to initial dose is inadequate., Disp: , Rfl:     famotidine (PEPCID) 20 MG tablet, Take 1 tablet (20 mg) by mouth 2 times daily, Disp: 60 tablet, Rfl: 0    FLUoxetine 20 MG tablet, Take 20 mg by mouth every morning., Disp: , Rfl:     furosemide (LASIX) 40 MG tablet, Take 1 tablet (40 mg) by mouth daily., Disp: 30 tablet, Rfl: 0    GAVILAX 17 GM/SCOOP powder, Take 17 g by mouth daily as needed for constipation., Disp: , Rfl:     hydrocortisone (CORTAID) 1 % external cream, Apply topically daily as needed for itching., Disp: , Rfl:     Hydrocortisone (PREPARATION H  EX), Externally apply topically daily as needed (hemorrhoids)., Disp: , Rfl:     JARDIANCE 10 MG TABS tablet, Take 10 mg by mouth every morning., Disp: , Rfl:     levothyroxine (SYNTHROID/LEVOTHROID) 25 MCG tablet, Take 12.5 mcg by mouth every morning (before breakfast)., Disp: , Rfl:     lisinopril (ZESTRIL) 10 MG tablet, Take 10 mg by mouth every morning., Disp: , Rfl:     loperamide (IMODIUM) 2 MG capsule, Take 4 mg by mouth 4 times daily as needed for diarrhea., Disp: , Rfl:     loratadine (CLARITIN) 10 MG tablet, Take 10 mg by mouth daily as needed for allergies., Disp: , Rfl:     magnesium hydroxide (MILK OF MAGNESIA) 400 MG/5ML suspension, Take 30 mLs by mouth daily as needed for heartburn., Disp: , Rfl:     metFORMIN (GLUCOPHAGE) 1000 MG tablet, Take 1,000 mg by mouth 2 times daily (with meals), Disp: , Rfl:     methocarbamol (ROBAXIN) 500 MG tablet, Take 1 tablet (500 mg) by mouth 4 times daily as needed for muscle spasms., Disp: 40 tablet, Rfl: 0    montelukast (SINGULAIR) 10 MG tablet, Take 10 mg by mouth every morning., Disp: , Rfl:     OLANZapine (ZYPREXA) 10 MG tablet, Take 10 mg by mouth At Bedtime, Disp: , Rfl:     omeprazole (PRILOSEC) 40 MG DR capsule, Take 40 mg by mouth every morning., Disp: , Rfl:     ondansetron (ZOFRAN ODT) 4 MG ODT tab, Take 1 tablet (4 mg) by mouth every 8 hours as needed for nausea., Disp: 20 tablet, Rfl: 0    pramoxine-calamine (AVEENO) 1-8 % LOTN, Apply topically daily as needed for itching., Disp: , Rfl:     rosuvastatin (CRESTOR) 10 MG tablet, Take 10 mg by mouth At Bedtime, Disp: , Rfl:     spironolactone (ALDACTONE) 100 MG tablet, Take 1 tablet (100 mg) by mouth daily., Disp: 30 tablet, Rfl: 0    sulfamethoxazole-trimethoprim (BACTRIM DS) 800-160 MG tablet, Take 1 tablet by mouth daily. Resume after completing ciprofloxacin., Disp: , Rfl:     traZODone (DESYREL) 100 MG tablet, Take 100 mg by mouth at bedtime., Disp: , Rfl:     TRELEGY ELLIPTA 100-62.5-25 MCG/ACT oral  inhaler, Inhale 1 puff into the lungs every morning., Disp: , Rfl:     Urea 40 % CREA, Apply topically daily as needed for dry skin., Disp: , Rfl:     Vitamins A & D (VITAMIN A & D) OINT, Externally apply topically daily as needed (general skin health)., Disp: , Rfl:     witch hazel-glycerin (TUCKS) pad, Apply topically as needed for hemorrhoids., Disp: , Rfl:     Continuous Glucose Sensor (FREESTYLE MEGHANN 3 PLUS SENSOR) MISC, USE TO READ BLOOD SUGAR TWICE DAILY AND AS NEEDED. CHANGE SENSOR EVERY 15 DAYS, Disp: , Rfl:     ALLERGIES:  Allergies   Allergen Reactions    Apricot Flavoring Agent (Non-Screening) Anaphylaxis    Banana Anaphylaxis     Throat swelling      Bees Anaphylaxis     Has an Epi pen    Wasp Venom Protein Shortness Of Breath     Other reaction(s): Respiratory Distress  Has an Epi pen        Methylphenidate Itching     Other reaction(s): Nightmares    Prunus      Other reaction(s): *Unknown       FAMILY HISTORY:  Family History   Problem Relation Age of Onset    Unknown/Adopted Father     Unknown/Adopted Maternal Grandmother     C.A.D. Maternal Grandfather     Diabetes Maternal Grandfather     Cerebrovascular Disease Maternal Grandfather     Unknown/Adopted Paternal Grandmother     Unknown/Adopted Paternal Grandfather     Unknown/Adopted Brother     Unknown/Adopted Sister        SOCIAL HISTORY:   Social History     Socioeconomic History    Marital status: Single   Tobacco Use    Smoking status: Every Day     Current packs/day: 1.00     Average packs/day: 1 pack/day for 21.4 years (21.4 ttl pk-yrs)     Types: Cigarettes     Start date: 1/1/2004    Smokeless tobacco: Never   Vaping Use    Vaping status: Never Used   Substance and Sexual Activity    Alcohol use: Not Currently     Comment: Last 8/7/2024    Sexual activity: Never     Partners: Female   Other Topics Concern     Service No    Blood Transfusions No    Caffeine Concern No    Occupational Exposure No    Hobby Hazards No    Sleep Concern  No    Stress Concern Yes     Comment: sometimes    Weight Concern No    Special Diet Yes     Comment: counting carbs    Back Care No    Exercise Yes    Seat Belt Yes    Self-Exams Yes     Social Drivers of Health     Financial Resource Strain: Low Risk  (4/4/2025)    Financial Resource Strain     Within the past 12 months, have you or your family members you live with been unable to get utilities (heat, electricity) when it was really needed?: No   Food Insecurity: Low Risk  (4/4/2025)    Food Insecurity     Within the past 12 months, did you worry that your food would run out before you got money to buy more?: No     Within the past 12 months, did the food you bought just not last and you didn t have money to get more?: No   Transportation Needs: Low Risk  (4/4/2025)    Transportation Needs     Within the past 12 months, has lack of transportation kept you from medical appointments, getting your medicines, non-medical meetings or appointments, work, or from getting things that you need?: No    [facility]    Social Connections   Interpersonal Safety: Low Risk  (4/4/2025)    Interpersonal Safety     Do you feel physically and emotionally safe where you currently live?: Yes     Within the past 12 months, have you been hit, slapped, kicked or otherwise physically hurt by someone?: No     Within the past 12 months, have you been humiliated or emotionally abused in other ways by your partner or ex-partner?: No   Recent Concern: Interpersonal Safety - High Risk (1/11/2025)    Interpersonal Safety     Do you feel physically and emotionally safe where you currently live?: No     Within the past 12 months, have you been hit, slapped, kicked or otherwise physically hurt by someone?: No     Within the past 12 months, have you been humiliated or emotionally abused in other ways by your partner or ex-partner?: No   Housing Stability: Low Risk  (4/4/2025)    Housing Stability     Do  you have housing? : Yes     Are you worried about losing your housing?: No       PHYSICAL EXAM:    Vitals: /70   Pulse 101   Temp 99.2  F (37.3  C) (Oral)   Resp 24   Wt 127.8 kg (281 lb 12.8 oz)   SpO2 94%   BMI 46.89 kg/m     Constitutional: Well developed, well nourished. Comfortable appearing.  HEAD:Normocephalic, atraumatic,   ENT: mucous membranes moist, nose normal.   Neck- Supple, gross ROM intact.  No JVD.  No palpable nodes.  Pulmonary: Clear to auscultation bilaterally, no respiratory distress, no wheezing, speaks full sentences easily.  Cardiovascular: Normal heart rate, regular rhythm, no murmurs. No lower extremity edema, 2+ DP pulses.   GI: Soft, no masses.  No hepatosplenomegaly. Hypoactive bowel sounds. Obese. Tender over RLQ.  Musculoskeletal: Moving all 4 extremities intentionally and without pain. No obvious deformity.  Back: No CVA tenderness  Skin: Warm, dry, no rash.  Neurologic: Alert & oriented x 3, speech clear, moving all extremities spontaneously   Psychiatric: Affect normal, cooperative.     LAB:  All pertinent labs reviewed and interpreted.  Labs Ordered and Resulted from Time of ED Arrival to Time of ED Departure   COMPREHENSIVE METABOLIC PANEL - Abnormal       Result Value    Sodium 134 (*)     Potassium 5.0      Carbon Dioxide (CO2) 25      Anion Gap 11      Urea Nitrogen 18.3      Creatinine 1.29 (*)     GFR Estimate 70      Calcium 9.1      Chloride 98      Glucose 126 (*)     Alkaline Phosphatase 178 (*)     AST 13      ALT 16      Protein Total 6.3 (*)     Albumin 3.8      Bilirubin Total 0.6     CRP INFLAMMATION - Abnormal    CRP Inflammation 85.80 (*)    CBC WITH PLATELETS AND DIFFERENTIAL - Abnormal    WBC Count 14.1 (*)     RBC Count 3.79 (*)     Hemoglobin 10.6 (*)     Hematocrit 32.8 (*)     MCV 87      MCH 28.0      MCHC 32.3      RDW 14.3      Platelet Count 293      % Neutrophils 73      % Lymphocytes 11      % Monocytes 13      % Eosinophils 2      %  Basophils 1      % Immature Granulocytes 0      NRBCs per 100 WBC 0      Absolute Neutrophils 10.2 (*)     Absolute Lymphocytes 1.6      Absolute Monocytes 1.9 (*)     Absolute Eosinophils 0.3      Absolute Basophils 0.1      Absolute Immature Granulocytes 0.1      Absolute NRBCs 0.0     RBC AND PLATELET MORPHOLOGY - Abnormal    RBC Morphology Confirmed RBC Indices      Platelet Assessment        Value: Automated Count Confirmed. Platelet morphology is normal.    Elliptocytes Slight (*)    HEMOGLOBIN A1C - Abnormal    Estimated Average Glucose 146 (*)     Hemoglobin A1C 6.7 (*)    PROCALCITONIN - Normal    Procalcitonin 0.33     OCCULT BLOOD STOOL - Normal    Occult Blood Negative     ROUTINE UA WITH MICROSCOPIC REFLEX TO CULTURE   GLUCOSE MONITOR NURSING POCT   ENTERIC BACTERIA AND VIRUS PANEL BY PCR   C. DIFFICILE TOXIN B PCR WITH REFLEX TO C. DIFFICILE EIA       RADIOLOGY:  No orders to display       EKG:   None    PROCEDURES:   Procedures       I, Mark Sheppard, am serving as a scribe to document services personally performed by Dr. Milton Jaramillo based on my observation and the provider's statements to me. I, Milton Jaramillo M.D. attest that Mark Sheppard is acting in a scribe capacity, has observed my performance of the services and has documented them in accordance with my direction.      Milton Jaramillo M.D.  Emergency Medicine  [facility] EMERGENCY DEPARTMENT  [address] [id number] [phone]  Dept: [phone]     Milton Jaramillo MD  05/16/25 0001

## 2025-05-16 NOTE — CONSULTS
Care Management Initial Consult    General Information  Assessment completed with: Patient, Other (Guardian Jesika), Lisa  Type of CM/SW Visit: Initial Assessment    Primary Care Provider verified and updated as needed: Yes   Readmission within the last 30 days: no previous admission in last 30 days      Reason for Consult: discharge planning, utilization management concerns  Advance Care Planning: Advance Care Planning Reviewed: present on chart  Message sent to naaya to request review of Guardianship status     Communication Assessment  Patient's communication style: spoken language (English or Bilingual)        Cognitive  Cognitive/Neuro/Behavioral: WDL                      Living Environment:   People in home: other (see comments)     Current living Arrangements: group home      Able to return to prior arrangements: yes     Family/Social Support:  Care provided by: self, other (see comments)  Provides care for: no one, unable/limited ability to care for self  Marital Status: Single  Support system: Parent(s), Facility resident(s)/Staff          Description of Support System:         Current Resources:   Patient receiving home care services: No     Community Resources: Group Home  Equipment currently used at home:  (Has a new walker at home but does not use it)  Supplies currently used at home:  (CPAP)    Employment/Financial:  Employment Status: disabled        Financial Concerns: none   Referral to Financial Worker: No     Does the patient's insurance plan have a 3 day qualifying hospital stay waiver?  Yes     Which insurance plan 3 day waiver is available? Alternative insurance waiver    Will the waiver be used for post-acute placement? No    Lifestyle & Psychosocial Needs:  Social Drivers of Health     Food Insecurity: Low Risk  (4/4/2025)    Food Insecurity     Within the past 12 months, did you worry that your food would run out before you got money to buy more?: No     Within the  past 12 months, did the food you bought just not last and you didn t have money to get more?: No   Depression: Not at risk (2/11/2025)    PHQ-2     PHQ-2 Score: 0   Housing Stability: Low Risk  (4/4/2025)    Housing Stability     Do you have housing? : Yes     Are you worried about losing your housing?: No   Tobacco Use: High Risk (5/15/2025)    Patient History     Smoking Tobacco Use: Every Day     Smokeless Tobacco Use: Never     Passive Exposure: Not on file   Financial Resource Strain: Low Risk  (4/4/2025)    Financial Resource Strain     Within the past 12 months, have you or your family members you live with been unable to get utilities (heat, electricity) when it was really needed?: No   Alcohol Use: Not At Risk (9/22/2022)    Received from [facility] and Counts include [facility] beds at the [facility]    AUDIT-C     Frequency of Alcohol Consumption: Never     Average Number of Drinks: Not on file     Frequency of Binge Drinking: Not on file   Transportation Needs: Low Risk  (4/4/2025)    Transportation Needs     Within the past 12 months, has lack of transportation kept you from medical appointments, getting your medicines, non-medical meetings or appointments, work, or from getting things that you need?: No   Physical Activity: Not on file   Interpersonal Safety: Low Risk  (4/4/2025)    Interpersonal Safety     Do you feel physically and emotionally safe where you currently live?: Yes     Within the past 12 months, have you been hit, slapped, kicked or otherwise physically hurt by someone?: No     Within the past 12 months, have you been humiliated or emotionally abused in other ways by your partner or ex-partner?: No   Recent Concern: Interpersonal Safety - High Risk (1/11/2025)    Interpersonal Safety     Do you feel physically and emotionally safe where you currently live?: No     Within the past 12 months, have you been hit, slapped, kicked or otherwise physically hurt by someone?: No     Within the past 12 months, have you been  humiliated or emotionally abused in other ways by your partner or ex-partner?: No   Stress: Not on file   Social Connections: Unknown (5/1/2023)    [facility] from [facility]    Social Connections     Frequency of Communication with Friends and Family: Not on file   Health Literacy: Not on file     Functional Status:  Prior to admission patient needed assistance:   Dependent ADLs:: Independent  Dependent IADLs:: Cleaning, Cooking, Laundry, Shopping, Meal Preparation, Transportation, Medication Management     Mental Health Status:  Mental Health Status: No Current Concerns       Chemical Dependency Status:  Chemical Dependency Status: No Current Concerns          Values/Beliefs:  Spiritual, Cultural Beliefs, Tenriism Practices, Values that affect care: no          Values/Beliefs Comment: Db    Discussed  Partnership in Safe Discharge Planning  document with patient/family: No    Additional Information:  Met with patient to review observation status billing under Medicare guidelines and provide a copy of the [facility] brochure. Patient alert, answering questions appropriately and engaged in the conversation.   Patient lives in a group home, [facility]. Address, phone number and PCP confirmed. He is independent with activities of daily living and the Group Home provides assistance with instrumental activities of daily living (IADLs).  Spoke with guardian Jesika Harden by telephone. Per Jesika, she received a call from the [facility] stating that she is still patient's guardian. She acknowledges that this process has been confusing but she is heavily involved with patient care. Per Jseika, the GI specialist who sees patient as an outpatient does feel that a TIPS procedure may be beneficial. Text page sent to attending.   Writer called aMtilde at the group home to confirm discharge planning and services. No answer, writer left a message with confidential call back number.   10:55 AM:  Received a  call from [facility] who states that they do provide assist with activities of daily living as needed. She would likely be able to coordinate transportation but if not, we would need to set up wheelchair transport. Please reach out Matilde closer to discharge to discuss. She will be available on the weekend as well. She agrees that the TIPS was to be done as an outpatient at [facility] but it needed to be rescheduled due to difficulty scheduled for June 5th at this point.     Next Steps: Coordinate return to group home when medically ready for discharge.    Contacts:  Jesika Harden - guardian ([phone])  [facility] - group home coordinator ([phone], fax [phone])  Pharmacy - [facility] ([phone])    Tricia Alanis RN

## 2025-05-16 NOTE — PHARMACY-ADMISSION MEDICATION HISTORY
Pharmacy Intern Admission Medication History    Admission medication history is complete. The information provided in this note is only as accurate as the sources available at the time of the update.    Information Source(s): Patient and CareEverywhere/SureScripts via in-person    Pertinent Information: Patient is a good med historian. 2nd metformin dose taken today.    Changes made to PTA medication list:  Added: None  Deleted: None  Changed: None    Allergies reviewed with patient and updates made in EHR: yes    Medication History Completed By: AMADA SANCHEZ 5/15/2025 9:19 PM    PTA Med List   Medication Sig Last Dose/Taking    albuterol (PROAIR HFA/PROVENTIL HFA/VENTOLIN HFA) 108 (90 Base) MCG/ACT inhaler Inhale 1-2 puffs into the lungs every 6 hours as needed for shortness of breath, wheezing or cough Taking As Needed    albuterol (PROVENTIL) (2.5 MG/3ML) 0.083% neb solution Take 2.5 mg by nebulization 3 times daily as needed for shortness of breath, wheezing or cough Taking As Needed    aloe vera GEL Apply 1 g topically every hour as needed for skin care Per bottle directions Taking As Needed    apixaban ANTICOAGULANT (ELIQUIS) 5 MG tablet Take 5 mg by mouth 2 times daily. 5/15/2025 Morning    bacitracin 500 UNIT/GM OINT Apply topically 3 times daily as needed for wound care Taking As Needed    Benzocaine (SOLARCAINE ALOE VERA EX) Externally apply topically daily as needed. Taking As Needed    benzonatate (TESSALON) 200 MG capsule Take 1 capsule (200 mg) by mouth 3 times daily as needed for cough. Taking As Needed    Calamine external lotion Apply topically as needed for itching Taking As Needed    carbamide peroxide (DEBROX) 6.5 % otic solution Place 5 drops into both ears daily as needed for other (ear wax). Taking As Needed    clotrimazole (LOTRIMIN) 1 % external cream Apply topically 2 times daily as needed (skin irritation) Taking As Needed    dextromethorphan-guaiFENesin (MUCINEX DM)  MG 12 hr  tablet Take 1 tablet by mouth 2 times daily as needed. Taking As Needed    diclofenac (VOLTAREN) 1 % topical gel Apply 2 g topically daily as needed for moderate pain To joints/back Taking As Needed    EPINEPHrine (ANY BX GENERIC EQUIV) 0.3 MG/0.3ML injection 2-pack Inject 0.3 mg into the muscle as needed for anaphylaxis. May repeat one time in 5-15 minutes if response to initial dose is inadequate. Taking As Needed    famotidine (PEPCID) 20 MG tablet Take 1 tablet (20 mg) by mouth 2 times daily 5/15/2025 Morning    FLUoxetine 20 MG tablet Take 20 mg by mouth every morning. 5/15/2025 Morning    furosemide (LASIX) 40 MG tablet Take 1 tablet (40 mg) by mouth daily. 5/15/2025 Morning    GAVILAX 17 GM/SCOOP powder Take 17 g by mouth daily as needed for constipation. Taking As Needed    hydrocortisone (CORTAID) 1 % external cream Apply topically daily as needed for itching. Taking As Needed    Hydrocortisone (PREPARATION H EX) Externally apply topically daily as needed (hemorrhoids). Taking As Needed    JARDIANCE 10 MG TABS tablet Take 10 mg by mouth every morning. 5/15/2025 Morning    levothyroxine (SYNTHROID/LEVOTHROID) 25 MCG tablet Take 12.5 mcg by mouth every morning (before breakfast). 5/15/2025 Morning    lisinopril (ZESTRIL) 10 MG tablet Take 10 mg by mouth every morning. 5/15/2025 Morning    loperamide (IMODIUM) 2 MG capsule Take 4 mg by mouth 4 times daily as needed for diarrhea. Taking As Needed    loratadine (CLARITIN) 10 MG tablet Take 10 mg by mouth daily as needed for allergies. Taking As Needed    magnesium hydroxide (MILK OF MAGNESIA) 400 MG/5ML suspension Take 30 mLs by mouth daily as needed for heartburn. Taking As Needed    metFORMIN (GLUCOPHAGE) 1000 MG tablet Take 1,000 mg by mouth 2 times daily (with meals) 5/15/2025    methocarbamol (ROBAXIN) 500 MG tablet Take 1 tablet (500 mg) by mouth 4 times daily as needed for muscle spasms. Taking As Needed    montelukast (SINGULAIR) 10 MG tablet Take 10 mg  by mouth every morning. 5/15/2025 Morning    OLANZapine (ZYPREXA) 10 MG tablet Take 10 mg by mouth At Bedtime 5/14/2025 Bedtime    omeprazole (PRILOSEC) 40 MG DR capsule Take 40 mg by mouth every morning. 5/15/2025 Morning    ondansetron (ZOFRAN ODT) 4 MG ODT tab Take 1 tablet (4 mg) by mouth every 8 hours as needed for nausea. Taking As Needed    pramoxine-calamine (AVEENO) 1-8 % LOTN Apply topically daily as needed for itching. Taking As Needed    rosuvastatin (CRESTOR) 10 MG tablet Take 10 mg by mouth At Bedtime 5/14/2025 Bedtime    spironolactone (ALDACTONE) 100 MG tablet Take 1 tablet (100 mg) by mouth daily. 5/15/2025 Morning    sulfamethoxazole-trimethoprim (BACTRIM DS) 800-160 MG tablet Take 1 tablet by mouth daily. Resume after completing ciprofloxacin. 5/15/2025 Morning    traZODone (DESYREL) 100 MG tablet Take 100 mg by mouth at bedtime. 5/14/2025 Bedtime    TRELEGY ELLIPTA 100-62.5-25 MCG/ACT oral inhaler Inhale 1 puff into the lungs every morning. 5/15/2025 Morning    Urea 40 % CREA Apply topically daily as needed for dry skin. Taking As Needed    Vitamins A & D (VITAMIN A & D) OINT Externally apply topically daily as needed (general skin health). Taking As Needed    witch hazel-glycerin (TUCKS) pad Apply topically as needed for hemorrhoids. Taking As Needed

## 2025-05-16 NOTE — H&P
[facility]    History and Physical - Hospitalist Service       Date of Admission:  5/15/2025    Assessment & Plan      Warren Jaramillo is a 44 year old male admitted on 5/15/2025. He came to the ED for evaluation of ongoing abdominal pain, nausea, vomiting, diarrhea    #Acute on chronic abdominal pain  - Afebrile, does not appear toxic and no mental status changes  - CT abdomen pelvis with contrast 5/15/2025 at 1:34 AM showed no evidence for bowel injury  - Treated with ceftriaxone 2 g IV on 5/15/2025 at 3:01 AM in the ED  - Check diagnostic (culture only) and therapeutic paracentesis  - Continue PTA Bactrim DS  - GI consult    #Acute gastroenteritis, probable  - Overnight stay at a cabin in [facility] on 5/2/2025  - Enteric isolation   - Follow-up enteric bacterial virus panel PCR    #Hematemesis  - No recurrence since 5/15/2025 at 6 AM  - Probable Danni-Arnett tear versus esophagitis as noted on CT from 4/2/2025 (mild mural thickening and mild patulous dilatation of the esophagus and a small esophageal hiatal hernia)  - Treated with Protonix 80 mg IV x 1 in the ED  - Patient is hungry, start clear liquid diet only  - Hold PTA apixaban    #Acute on chronic anemia  - Hemoglobin 10.6 down from 13.2  - Check hemoglobin in a.m.    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    #Hyponatremia, mild  - Probable mildly volume overloaded from ascites    #Chronic kidney disease stage II  - Avoid nephrotoxins    #Chronic heart failure with preserved ejection fraction, right heart failure  - No signs of acute CHF  - 2 L fluid restriction, low-sodium diet when advanced  - Continue PTA furosemide, spironolactone    #Essential hypertension  - Continue PTA lisinopril    #Type 2 diabetes mellitus without complications without long-term current use of insulin  - Hold PTA metformin x 48 hours after IV contrast  - Hold PTA empagliflozin until diet is advanced  - Low insulin resistance scale  - Hypoglycemia monitoring    #AVA  #Obstructive lung disease  - BiPAP as needed for sleep  - Continue PTA Trelegy Ellipta (auto substituted with Breo Ellipta), montelukast    #Hypothyroidism  - Continue PTA levothyroxine    #Mood disorder  #PTSD  #Developmental delay  #Fetal alcohol syndrome  - Continue PTA fluoxetine and olanzapine    #Morbid obesity  - BMI 46.89     Observation Goals: -diagnostic tests and consults completed and resulted, -vital signs normal or at patient baseline, Nurse to notify provider when observation goals have been met and patient is ready for discharge.  Diet: Fluid restriction 2000 ML FLUID  Clear Liquid Diet    DVT Prophylaxis: Pneumatic Compression Devices  Khan Catheter: Not present  Lines: None     Cardiac Monitoring: None  Code Status: Full Code          Disposition Plan     Medically Ready for Discharge: Anticipated Tomorrow           Maninder Rodriguez MD  Hospitalist Service  [facility]  Securely message with [employer] (more info)  Text page via [facility] Paging/Directory     ______________________________________________________________________    Chief Complaint   Abdominal pain, nausea, vomiting, diarrhea    History is obtained from the patient, electronic health record, and patient's parents    History of Present Illness    Warren Jaramillo is a 44 year old male who returned to the ED from longterm for evaluation of abdominal pain, nausea, vomiting and diarrhea.  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  Patient had a therapeutic paracentesis on 5/14/2025 where 4 L of yellow fluid were removed.  He usually would develop left lower quadrant pain after right-sided paracentesis.  However, he came to the ED early morning on 5/15/2025 secondary to worsening right lower quadrant pain since after paracentesis.  Patient was concerned about a bowel perforation and a CT abdomen pelvis showed no evidence for bowel injury.  Patient has an ED treatment plan, was treated with ceftriaxone IV x 1 and scheduled for outpatient paracentesis on 5/16/2025.  He returned to the group home and was able to rest but woke up with ongoing right lower quadrant abdominal pain, nausea, vomiting and diarrhea.  Patient reported a dime size blood clot and ground pepper colored soft stool.  He denied fevers, chills, chest pain, shortness of breath or palpitations.  He spent an overnight at a cabin in [facility] on 5/2/2025.      Past Medical History    Past Medical History:   Diagnosis Date    COPD exacerbation (H) 12/02/2024    DM2 (diabetes mellitus, type 2) (H) 04/28/2020    HTN (hypertension) 07/30/2012    Sleep apnea     Thyroid nodule 07/31/2019    Rojas's disease (H)        Past Surgical History   Past Surgical History:   Procedure Laterality Date    COLONOSCOPY      ESOPHAGOSCOPY, GASTROSCOPY, DUODENOSCOPY (EGD), COMBINED N/A 7/21/2023    Procedure: ESOPHAGOGASTRODUODENOSCOPY WITH GASTRIC AND ESOPHAGEAL BIOPSIES;   Surgeon: Filiberto Aragon MD;  [place] [facility] [place]    ESOPHAGOSCOPY, GASTROSCOPY, DUODENOSCOPY (EGD), COMBINED N/A 4/4/2025    Procedure: ESOPHAGOGASTRODUODENOSCOPY;  Surgeon: Ramesh Talbot MD;  [place] [facility] [place]    TO[place] EXTRACTION         Prior to Admission Medications   Prior to Admission Medications   Prescriptions Last Dose Informant Patient Reported? Taking?   Benzocaine (SOLARCAINE ALOE VERA EX)  Care Giver Yes Yes   Sig: Externally apply topically daily as needed.   Calamine external lotion  Care Giver Yes Yes   Sig: Apply topically as needed for itching   Continuous Glucose Sensor (FREESTYLE MEGHANN 3 PLUS SENSOR) MISC  Care Giver Yes No   Sig: USE TO READ BLOOD SUGAR TWICE DAILY AND AS NEEDED. CHANGE SENSOR EVERY 15 DAYS   EPINEPHrine (ANY BX GENERIC EQUIV) 0.3 MG/0.3ML injection 2-pack  Care Giver Yes Yes   Sig: Inject 0.3 mg into the muscle as needed for anaphylaxis. May repeat one time in 5-15 minutes if response to initial dose is inadequate.   FLUoxetine 20 MG tablet 5/15/2025 Morning Care Giver Yes Yes   Sig: Take 20 mg by mouth every morning.   GAVILAX 17 GM/SCOOP powder  Care Giver Yes Yes   Sig: Take 17 g by mouth daily as needed for constipation.   Hydrocortisone (PREPARATION H EX)  Care Giver Yes Yes   Sig: Externally apply topically daily as needed (hemorrhoids).   JARDIANCE 10 MG TABS tablet 5/15/2025 Morning Care Giver Yes Yes   Sig: Take 10 mg by mouth every morning.   OLANZapine (ZYPREXA) 10 MG tablet 5/14/2025 Bedtime Care Giver Yes Yes   Sig: Take 10 mg by mouth At Bedtime   TRELEGY ELLIPTA 100-62.5-25 MCG/ACT oral inhaler 5/15/2025 Morning Care Giver Yes Yes   Sig: Inhale 1 puff into the lungs every morning.   Urea 40 % CREA  Care Giver Yes Yes   Sig: Apply topically daily as needed for dry skin.   Vitamins A & D (VITAMIN A & D) OINT  Care Giver Yes Yes   Sig: Externally apply topically daily as needed (general skin health).   albuterol (PROAIR HFA/PROVENTIL  HFA/VENTOLIN HFA) 108 (90 Base) MCG/ACT inhaler  Care Giver No Yes   Sig: Inhale 1-2 puffs into the lungs every 6 hours as needed for shortness of breath, wheezing or cough   albuterol (PROVENTIL) (2.5 MG/3ML) 0.083% neb solution  Care Giver Yes Yes   Sig: Take 2.5 mg by nebulization 3 times daily as needed for shortness of breath, wheezing or cough   aloe vera GEL  Care Giver Yes Yes   Sig: Apply 1 g topically every hour as needed for skin care Per bottle directions   apixaban ANTICOAGULANT (ELIQUIS) 5 MG tablet 5/15/2025 Morning Care Giver Yes Yes   Sig: Take 5 mg by mouth 2 times daily.   bacitracin 500 UNIT/GM OINT  Care Giver Yes Yes   Sig: Apply topically 3 times daily as needed for wound care   benzonatate (TESSALON) 200 MG capsule  Care Giver No Yes   Sig: Take 1 capsule (200 mg) by mouth 3 times daily as needed for cough.   carbamide peroxide (DEBROX) 6.5 % otic solution  Care Giver Yes Yes   Sig: Place 5 drops into both ears daily as needed for other (ear wax).   clotrimazole (LOTRIMIN) 1 % external cream  Care Giver Yes Yes   Sig: Apply topically 2 times daily as needed (skin irritation)   dextromethorphan-guaiFENesin (MUCINEX DM)  MG 12 hr tablet  Care Giver Yes Yes   Sig: Take 1 tablet by mouth 2 times daily as needed.   diclofenac (VOLTAREN) 1 % topical gel  Care Giver Yes Yes   Sig: Apply 2 g topically daily as needed for moderate pain To joints/back   famotidine (PEPCID) 20 MG tablet 5/15/2025 Morning Care Giver No Yes   Sig: Take 1 tablet (20 mg) by mouth 2 times daily   furosemide (LASIX) 40 MG tablet 5/15/2025 Morning Care Giver No Yes   Sig: Take 1 tablet (40 mg) by mouth daily.   hydrocortisone (CORTAID) 1 % external cream  Care Giver Yes Yes   Sig: Apply topically daily as needed for itching.   levothyroxine (SYNTHROID/LEVOTHROID) 25 MCG tablet 5/15/2025 Morning  Yes Yes   Sig: Take 12.5 mcg by mouth every morning (before breakfast).   lisinopril (ZESTRIL) 10 MG tablet 5/15/2025 Morning  Care Giver Yes Yes   Sig: Take 10 mg by mouth every morning.   loperamide (IMODIUM) 2 MG capsule  Care Giver Yes Yes   Sig: Take 4 mg by mouth 4 times daily as needed for diarrhea.   loratadine (CLARITIN) 10 MG tablet  Care Giver Yes Yes   Sig: Take 10 mg by mouth daily as needed for allergies.   magnesium hydroxide (MILK OF MAGNESIA) 400 MG/5ML suspension  Care Giver Yes Yes   Sig: Take 30 mLs by mouth daily as needed for heartburn.   metFORMIN (GLUCOPHAGE) 1000 MG tablet 5/15/2025 Care Giver Yes Yes   Sig: Take 1,000 mg by mouth 2 times daily (with meals)   methocarbamol (ROBAXIN) 500 MG tablet  Care Giver No Yes   Sig: Take 1 tablet (500 mg) by mouth 4 times daily as needed for muscle spasms.   montelukast (SINGULAIR) 10 MG tablet 5/15/2025 Morning Care Giver Yes Yes   Sig: Take 10 mg by mouth every morning.   omeprazole (PRILOSEC) 40 MG DR capsule 5/15/2025 Morning Care Giver Yes Yes   Sig: Take 40 mg by mouth every morning.   ondansetron (ZOFRAN ODT) 4 MG ODT tab  Care Giver No Yes   Sig: Take 1 tablet (4 mg) by mouth every 8 hours as needed for nausea.   pramoxine-calamine (AVEENO) 1-8 % LOTN  Care Giver Yes Yes   Sig: Apply topically daily as needed for itching.   rosuvastatin (CRESTOR) 10 MG tablet 5/14/2025 Bedtime Care Giver Yes Yes   Sig: Take 10 mg by mouth At Bedtime   spironolactone (ALDACTONE) 100 MG tablet 5/15/2025 Morning Care Giver No Yes   Sig: Take 1 tablet (100 mg) by mouth daily.   sulfamethoxazole-trimethoprim (BACTRIM DS) 800-160 MG tablet 5/15/2025 Morning  No Yes   Sig: Take 1 tablet by mouth daily. Resume after completing ciprofloxacin.   traZODone (DESYREL) 100 MG tablet 5/14/2025 Bedtime Care Giver Yes Yes   Sig: Take 100 mg by mouth at bedtime.   witch hazel-glycerin (TUCKS) pad  Care Giver Yes Yes   Sig: Apply topically as needed for hemorrhoids.      Facility-Administered Medications: None           Physical Exam   Vital Signs: Temp: 99.2  F (37.3  C) Temp src: Oral BP: 124/56 Pulse:  98   Resp: 24 SpO2: 92 % O2 Device: None (Room air)    Weight: 281 lbs 12.8 oz    Constitutional: no apparent distress  Respiratory: no increased work of breathing and clear to auscultation  Cardiovascular: regular rate and rhythm  GI: normal bowel sounds, firm, distended, and tenderness noted in the right lower quadrant and in the left lower quadrant  Skin: no jaundice  Musculoskeletal: no lower extremity pitting edema present    Medical Decision Making       MANAGEMENT DISCUSSED with the following over the past 24 hours: Patient and POA       Data     I have personally reviewed the following data over the past 24 hrs:    14.1 (H)  \   10.6 (L)   / 293     134 (L) 98 18.3 /  126 (H)   5.0 25 1.29 (H) \     ALT: 16 AST: 13 AP: 178 (H) TBILI: 0.6   ALB: 3.8 TOT PROTEIN: 6.3 (L) LIPASE: 22     TSH: N/A T4: N/A A1C: 6.7 (H)     Procal: 0.33 CRP: 85.80 (H) Lactic Acid: N/A       INR:  1.16 (H) PTT:  N/A   D-dimer:  N/A Fibrinogen:  N/A       Imaging results reviewed over the past 24 hrs:   Recent Results (from the past 24 hours)   CT Abdomen Pelvis w Contrast    Narrative    EXAM: CT ABDOMEN PELVIS W CONTRAST  LOCATION: [facility]  DATE: 5/15/2025    INDICATION: Patient with a recent paracentesis, patient concern for bowel injury during procedure.  Right lower quadrant pain  COMPARISON: 4/20/2025.  TECHNIQUE: CT scan of the abdomen and pelvis was performed following injection of IV contrast. Multiplanar reformats were obtained. Dose reduction techniques were used.  CONTRAST: isovue 370 90ml    FINDINGS:   LOWER CHEST: Normal.    HEPATOBILIARY: Mild hepatomegaly. Hepatic steatosis. Cirrhotic morphology. No calcified gallstones.    PANCREAS: Normal.    SPLEEN: Normal.    ADRENAL GLANDS: Stable bilateral adrenal myelolipomas measuring 4.5 on the right and 3.4 on the left.    KIDNEYS/BLADDER: Normal.    BOWEL: Mild ascites. No free air. No bowel wall thickening or abnormal enhancement. No  obstruction.    LYMPH NODES: Shotty mesenteric and retroperitoneal lymph nodes, unchanged. No pelvic adenopathy.    VASCULATURE: No aneurysm.    PELVIC ORGANS: Normal.    MUSCULOSKELETAL: Small fat-containing left inguinal hernia.      Impression    IMPRESSION:   1.  Hepatic steatosis, cirrhosis, and mild ascites. No evidence for bowel injury status post paracentesis.

## 2025-05-16 NOTE — PROGRESS NOTES
"PRIMARY DIAGNOSIS: \"GENERIC\" NURSING  OUTPATIENT/OBSERVATION GOALS TO BE MET BEFORE DISCHARGE:  ADLs back to baseline: Yes    Activity and level of assistance: Ambulating independently.    Pain status: Improved but still requiring IV narcotics.    Return to near baseline physical activity: Yes     Discharge Planner Nurse   Safe discharge environment identified: Yes  Barriers to discharge: Patient just had paracentesis this morning, finishing up albumin, blood pressures stabilizing, waiting for results from labs on ascites fluid. GI still following patient.       Entered by: Tasneem Colon RN 05/16/2025 2:08 PM     Please review provider order for any additional goals.   Nurse to notify provider when observation goals have been met and patient is ready for discharge.  "

## 2025-05-16 NOTE — PLAN OF CARE
Problem: Adult Inpatient Plan of Care  Goal: Absence of Hospital-Acquired Illness or Injury  Outcome: Progressing  Intervention: Identify and Manage Fall Risk  Recent Flowsheet Documentation  Taken 5/16/2025 1631 by Tasneem Colon RN  Safety Promotion/Fall Prevention:   clutter free environment maintained   nonskid shoes/slippers when out of bed  Taken 5/16/2025 0800 by Tasneem Colon RN  Safety Promotion/Fall Prevention:   clutter free environment maintained   nonskid shoes/slippers when out of bed  Intervention: Prevent Skin Injury  Recent Flowsheet Documentation  Taken 5/16/2025 1631 by Tasneem Colon RN  Body Position: position changed independently  Taken 5/16/2025 0800 by Tasneem Colon RN  Body Position: position changed independently  Intervention: Prevent and Manage VTE (Venous Thromboembolism) Risk  Recent Flowsheet Documentation  Taken 5/16/2025 1631 by Tasneem Colon RN  VTE Prevention/Management: SCDs off (sequential compression devices)  Taken 5/16/2025 0800 by Tasneem Colon RN  VTE Prevention/Management: SCDs off (sequential compression devices)  Goal: Optimal Comfort and Wellbeing  Outcome: Progressing     Problem: Pain Acute  Goal: Optimal Pain Control and Function  Outcome: Progressing  Intervention: Prevent or Manage Pain  Recent Flowsheet Documentation  Taken 5/16/2025 1631 by Tasneem Colon RN  Medication Review/Management: medications reviewed  Taken 5/16/2025 0800 by Tasneem Colon RN  Medication Review/Management: medications reviewed   Goal Outcome Evaluation:       Patient alert and oriented. Reported having moderate abdominal pain. Administered prn dilaudid for pain management. This morning he had a paracentesis, they took off 4.75L fluid. When patient got back his blood pressure was fluctuating between systolic 70-90's. Patient initially was symptomatic but felt much better after the albumin. His blood pressures continue to  fluctuate, MD aware. He has strong peripheral pulses. All other vital signs have been stable. He uses a CPAP at night his group home staff will be bringing his home CPAP.

## 2025-05-16 NOTE — PROVIDER NOTIFICATION
12:44 Notified Dr Saeed that patient was symptomatic, blood pressures have been variable in the systolic 70-90's.     Dr Saeed did not give any new orders, said to continue the albumin.

## 2025-05-17 LAB
% LINING CELLS, BODY FLUID: 2 %
ABO + RH BLD: NORMAL
APPEARANCE FLD: ABNORMAL
APTT PPP: 35 SECONDS (ref 22–38)
BILIRUB DIRECT SERPL-MCNC: 0.31 MG/DL (ref 0–0.3)
BILIRUB SERPL-MCNC: 0.6 MG/DL
BLD GP AB SCN SERPL QL: NEGATIVE
BLD PROD TYP BPU: NORMAL
BLOOD COMPONENT TYPE: NORMAL
CODING SYSTEM: NORMAL
COLOR FLD: ABNORMAL
CROSSMATCH: NORMAL
FIBRINOGEN PPP-MCNC: 500 MG/DL (ref 170–510)
GLUCOSE BLDC GLUCOMTR-MCNC: 117 MG/DL (ref 70–99)
GLUCOSE BLDC GLUCOMTR-MCNC: 119 MG/DL (ref 70–99)
GLUCOSE BLDC GLUCOMTR-MCNC: 122 MG/DL (ref 70–99)
GLUCOSE BLDC GLUCOMTR-MCNC: 124 MG/DL (ref 70–99)
HAPTOGLOB SERPL-MCNC: 211 MG/DL (ref 30–200)
HGB BLD-MCNC: 7 G/DL (ref 13.3–17.7)
HGB BLD-MCNC: 7.2 G/DL (ref 13.3–17.7)
HGB BLD-MCNC: 7.8 G/DL (ref 13.3–17.7)
INR PPP: 1.33 (ref 0.85–1.15)
ISSUE DATE AND TIME: NORMAL
LDH SERPL L TO P-CCNC: 138 U/L (ref 0–250)
LDH SERPL L TO P-CCNC: 183 U/L (ref 0–250)
LDH SERPL L TO P-CCNC: 91 U/L (ref 0–250)
LYMPHOCYTES NFR FLD MANUAL: 4 %
MCV RBC AUTO: 86 FL (ref 78–100)
MCV RBC AUTO: 87 FL (ref 78–100)
MCV RBC AUTO: 88 FL (ref 78–100)
MONOS+MACROS NFR FLD MANUAL: 81 %
NEUTROPHILS # FLD: 690.3 /UL (ref ?–250)
NEUTS BAND NFR FLD MANUAL: 13 %
PROT FLD-MCNC: 4.7 G/DL
PROTEIN BODY FLUID SOURCE: NORMAL
PROTHROMBIN TIME: 16.7 SECONDS (ref 11.8–14.8)
SPECIMEN EXP DATE BLD: NORMAL
SPECIMEN SOURCE FLD: ABNORMAL
UNIT ABO/RH: NORMAL
UNIT NUMBER: NORMAL
UNIT STATUS: NORMAL
UNIT TYPE ISBT: 5100
WBC # FLD AUTO: 5310 /UL

## 2025-05-17 PROCEDURE — 86923 COMPATIBILITY TEST ELECTRIC: CPT

## 2025-05-17 PROCEDURE — 85610 PROTHROMBIN TIME: CPT

## 2025-05-17 PROCEDURE — 99232 SBSQ HOSP IP/OBS MODERATE 35: CPT | Mod: GC

## 2025-05-17 PROCEDURE — 89050 BODY FLUID CELL COUNT: CPT

## 2025-05-17 PROCEDURE — P9045 ALBUMIN (HUMAN), 5%, 250 ML: HCPCS

## 2025-05-17 PROCEDURE — 250N000011 HC RX IP 250 OP 636

## 2025-05-17 PROCEDURE — 85730 THROMBOPLASTIN TIME PARTIAL: CPT

## 2025-05-17 PROCEDURE — 82247 BILIRUBIN TOTAL: CPT

## 2025-05-17 PROCEDURE — 999N000157 HC STATISTIC RCP TIME EA 10 MIN

## 2025-05-17 PROCEDURE — 94660 CPAP INITIATION&MGMT: CPT

## 2025-05-17 PROCEDURE — P9016 RBC LEUKOCYTES REDUCED: HCPCS

## 2025-05-17 PROCEDURE — 120N000001 HC R&B MED SURG/OB

## 2025-05-17 PROCEDURE — 258N000003 HC RX IP 258 OP 636

## 2025-05-17 PROCEDURE — 83010 ASSAY OF HAPTOGLOBIN QUANT: CPT

## 2025-05-17 PROCEDURE — 85384 FIBRINOGEN ACTIVITY: CPT

## 2025-05-17 PROCEDURE — 250N000013 HC RX MED GY IP 250 OP 250 PS 637: Performed by: HOSPITALIST

## 2025-05-17 PROCEDURE — 86850 RBC ANTIBODY SCREEN: CPT

## 2025-05-17 PROCEDURE — 85018 HEMOGLOBIN: CPT

## 2025-05-17 PROCEDURE — 83615 LACTATE (LD) (LDH) ENZYME: CPT

## 2025-05-17 PROCEDURE — 36415 COLL VENOUS BLD VENIPUNCTURE: CPT

## 2025-05-17 RX ORDER — NALOXONE HYDROCHLORIDE 0.4 MG/ML
0.2 INJECTION, SOLUTION INTRAMUSCULAR; INTRAVENOUS; SUBCUTANEOUS
Status: DISCONTINUED | OUTPATIENT
Start: 2025-05-17 | End: 2025-05-26 | Stop reason: HOSPADM

## 2025-05-17 RX ORDER — IOPAMIDOL 755 MG/ML
90 INJECTION, SOLUTION INTRAVASCULAR ONCE
Status: COMPLETED | OUTPATIENT
Start: 2025-05-17 | End: 2025-05-17

## 2025-05-17 RX ORDER — NALOXONE HYDROCHLORIDE 0.4 MG/ML
0.4 INJECTION, SOLUTION INTRAMUSCULAR; INTRAVENOUS; SUBCUTANEOUS
Status: DISCONTINUED | OUTPATIENT
Start: 2025-05-17 | End: 2025-05-26 | Stop reason: HOSPADM

## 2025-05-17 RX ORDER — CEFTRIAXONE 2 G/1
2 INJECTION, POWDER, FOR SOLUTION INTRAMUSCULAR; INTRAVENOUS EVERY 24 HOURS
Status: DISCONTINUED | OUTPATIENT
Start: 2025-05-17 | End: 2025-05-21

## 2025-05-17 RX ADMIN — LEVOTHYROXINE SODIUM 12.5 MCG: 0.03 TABLET ORAL at 08:05

## 2025-05-17 RX ADMIN — OLANZAPINE 10 MG: 10 TABLET, FILM COATED ORAL at 20:24

## 2025-05-17 RX ADMIN — PANTOPRAZOLE SODIUM 40 MG: 20 TABLET, DELAYED RELEASE ORAL at 08:06

## 2025-05-17 RX ADMIN — FAMOTIDINE 20 MG: 20 TABLET, FILM COATED ORAL at 20:24

## 2025-05-17 RX ADMIN — CEFTRIAXONE SODIUM 2 G: 2 INJECTION, POWDER, FOR SOLUTION INTRAMUSCULAR; INTRAVENOUS at 17:13

## 2025-05-17 RX ADMIN — FLUTICASONE FUROATE AND VILANTEROL TRIFENATATE 1 PUFF: 100; 25 POWDER RESPIRATORY (INHALATION) at 08:10

## 2025-05-17 RX ADMIN — IOPAMIDOL 90 ML: 755 INJECTION, SOLUTION INTRAVENOUS at 02:47

## 2025-05-17 RX ADMIN — ALBUMIN HUMAN 25 G: 0.05 INJECTION, SOLUTION INTRAVENOUS at 11:58

## 2025-05-17 RX ADMIN — PANTOPRAZOLE SODIUM 40 MG: 20 TABLET, DELAYED RELEASE ORAL at 17:12

## 2025-05-17 RX ADMIN — SODIUM CHLORIDE 250 ML: 0.9 INJECTION, SOLUTION INTRAVENOUS at 11:01

## 2025-05-17 RX ADMIN — NICOTINE 1 PATCH: 21 PATCH, EXTENDED RELEASE TRANSDERMAL at 08:08

## 2025-05-17 RX ADMIN — UMECLIDINIUM 1 PUFF: 62.5 AEROSOL, POWDER ORAL at 08:10

## 2025-05-17 RX ADMIN — ROSUVASTATIN 10 MG: 10 TABLET, FILM COATED ORAL at 20:23

## 2025-05-17 RX ADMIN — SULFAMETHOXAZOLE AND TRIMETHOPRIM 1 TABLET: 800; 160 TABLET ORAL at 09:08

## 2025-05-17 RX ADMIN — FLUOXETINE HYDROCHLORIDE 20 MG: 20 CAPSULE ORAL at 09:09

## 2025-05-17 RX ADMIN — MONTELUKAST 10 MG: 10 TABLET, FILM COATED ORAL at 09:08

## 2025-05-17 RX ADMIN — METFORMIN HYDROCHLORIDE 1000 MG: 500 TABLET, FILM COATED ORAL at 09:08

## 2025-05-17 RX ADMIN — TRAZODONE HYDROCHLORIDE 100 MG: 50 TABLET ORAL at 20:24

## 2025-05-17 ASSESSMENT — ACTIVITIES OF DAILY LIVING (ADL)
ADLS_ACUITY_SCORE: 59
ADLS_ACUITY_SCORE: 59
ADLS_ACUITY_SCORE: 60
ADLS_ACUITY_SCORE: 56
ADLS_ACUITY_SCORE: 60
ADLS_ACUITY_SCORE: 60
ADLS_ACUITY_SCORE: 56
ADLS_ACUITY_SCORE: 56
ADLS_ACUITY_SCORE: 59
ADLS_ACUITY_SCORE: 56
ADLS_ACUITY_SCORE: 60
ADLS_ACUITY_SCORE: 56
ADLS_ACUITY_SCORE: 59
ADLS_ACUITY_SCORE: 60
ADLS_ACUITY_SCORE: 56
ADLS_ACUITY_SCORE: 59
ADLS_ACUITY_SCORE: 56
ADLS_ACUITY_SCORE: 60

## 2025-05-17 NOTE — PROGRESS NOTES
Dual Skin Assessment Note:    Patient admitted from ED from to P2.     Comprehensive skin inspection completed by myself and Irlanda Chapman RN.     Abnormal skin assessment findings: bruises on lower lt abdomin, and rt/lt forearms. Scabs on lt and rt chin.     LDA Initiated for skin breakdown/non-blanchable redness: No    Provider notified: No    If yes, WOC Consult order obtained: No    Sonia Neumann RN 11:00 PM

## 2025-05-17 NOTE — PROGRESS NOTES
Progress Note    Time of event: 11:54 PM May 16, 2025    Description of event:  Patient's evening hemoglobin returned at 8.0.  Has been gradually downtrending since the time of admission.  Baseline appears to be around 13 and subsequent hemoglobin checks have been steadily declining.  Patient did have an episode of hematemesis prior to admission and has a known history of cirrhosis with ascites concerning for possible acute Blood Loss Anemia Secondary to GI bleed.     Paged to GI regarding downtrending hemoglobin.  Recommending CT angio of the abdomen to rule out GI bleed and need for possible interventional radiology intervention.    Patient does have RIVERA at this time with creatinine 1.77 up from baseline around 1.2.  Estimated GFR of 48 and no significant history of kidney disease.  Thus, with GFR greater than 30 and need for ruling out GI bleed, benefits appear to outweigh risks and would recommend patient does undergo CT angio of the abdomen.    Plan:  -Formal GI consult placed  - Every 6 hemoglobin checks  - Transfuse as necessary for hemoglobin less than 7  - Obtain CTA of the abdomen    Discussed with: bedside nurse    Sabine Taylor DO    Addendum:   Repeat hemoglobin 7.2. CTA Abdomen/pelvis negative for acute bleed. However, there is some small volume ascites.      IMPRESSION:  1.  Small volume abdominopelvic ascites which appears dense in several locations for example along the right paracolic gutter and in the dependent pelvis, compatible with internal blood products. However there is no evidence of acute bleeding.  2.  Cirrhotic liver morphology with hepatic steatosis.  3.  Recanalized periumbilical veins. Numerous upper abdominal portosystemic collaterals.  4.  No celiac artery stenosis. Conventional hepatic arterial anatomy.     The patient himself does have mild diffuse abdominal pain and complains of dark, tarry stools. Spoke with [facility]. Did not feel strongly regarding need for surgery consult but  that it may be worthwhile to have them come by to see the patient given presence of some intrabdominal blood. Notably, patient's paracentesis was grossly bloody at the time of the procedure which may explain the presence of persistent small volume intraabdominal fluid/blood. Occult blood testing was negative so did not feel that this was an acute GI tract bleed and would likely not require scope. Recommended hemolysis labs in addition to possible surgery consult.

## 2025-05-17 NOTE — PROGRESS NOTES
Care Management Follow Up    Length of Stay (days): 1    Expected Discharge Date: 05/18/2025    Anticipated Discharge Plan:       Transportation: Anticipate [facility] to transport, informed their staff our transport is very backed up    PT Recommendations:    OT Recommendations:        Barriers to Discharge: medical stability    Prior Living Situation: group home with other (see comments)    Discussed  Partnership in Safe Discharge Planning  document with patient/family: No     Handoff Completed: No, handoff not indicated or clinically appropriate    Patient/Spokesperson Updated: Yes. Who? Gamal at [facility]    Additional Information:  Chart reviewed. Provider noted today, pt is anticipated to discharge tomorrow.     Discharge transport coordinated with  staff, Gamal [phone]. He is planning to arrive at 2p to transport pt. Please update Gamal if pt has a change in condition.   - would prefer if [facility] coordinated transport, informed staff our transport options are very backed up and if they can provide transport, it would be greatly appreciated.       [place] msg to provider 5/17 at 1424  [id number] CECI Linder [phone]. FYI - chart reviewed, see that you are anticipating pt to discharge tomorrow. I called  to coordinate transportation for discharge and  staff are planning to arrive here tomorrow around 2p. CM will continue to follow for any change in condition or additional discharge needs.     Next Steps: Cont to follow for med progression and assist with transport at discharge as needed.     Niyah Flores, RN

## 2025-05-17 NOTE — PROVIDER NOTIFICATION
Lab called with critical absolute neutrophil from the abdomen body fluid that was collected 5/16 was 690.3. Updated MD Saeed. IV abx were ordered.     Ellie Dodson RN 5/17/2025

## 2025-05-17 NOTE — PROGRESS NOTES
[facility] DIGESTIVE HEALTH PROGRESS NOTE      Subjective:                - Hemoglobin has been declining from 9.5 yesterday morning to 7 this morning.  -Denies any melena or hematochezia.  - CTA showed no active bleeding but did show old blood products in the pelvis  - Reports his abdominal pain is overall improving  - Does have some nausea and is wondering if he can get something for that.  - Did have additional paracentesis yesterday.  4.75 L of bloody fluid were removed.  Unfortunately cell count and total protein were not sent on that.  Amylase and triglyceride levels were unremarkable.  Cytology is pending.  LD H is 159 in the fluid but no serum LDH was sent.  - Mom was on the phone when I saw him this morning.    Objective:                  Vital signs in last 24 hrs;  Temp:  [97.8  F (36.6  C)-98.7  F (37.1  C)] 98  F (36.7  C)  Pulse:  [81-97] 82  Resp:  [18-22] 22  BP: ()/(33-64) 97/47  FiO2 (%):  [21 %] 21 %  SpO2:  [92 %-98 %] 94 %    Physical Exam:   General: Comfortable appearing. Awake.  Lying in bed, talking to his mom on the phone.  Cardiovascular: Regular rate. No edema  Chest: Non-labored breathing. Symmetric chest rise.   Abdomen: soft, mildly distended, mildly tender to palpation in the bilateral lower abdomen, no rebound or guarding  Neurologic: Alert, no focal defects.     Current Labs:  CBC RESULTS:   Recent Labs   Lab Test 05/17/25  0627 05/16/25  2312 05/16/25  1746 05/16/25  0644 05/15/25  2011 05/15/25  0042   WBC  --   --   --  12.2* 14.1* 14.0*   RBC  --   --   --  3.43* 3.79* 4.74   HGB 7.0* 7.2* 8.0* 9.5* 10.6* 13.2*   HCT  --   --   --  29.9* 32.8* 41.8   MCV 87 88 88 87 87 88   MCH  --   --   --  27.7 28.0 27.8   MCHC  --   --   --  31.8 32.3 31.6   RDW  --   --   --  14.6 14.3 14.6   PLT  --   --   --  290 293 304        CMP Results:   Recent Labs   Lab Test 05/17/25  0745 05/17/25  0643 05/16/25  1948 05/16/25  1711 05/16/25  0715 05/16/25  0644 05/16/25  0246 05/15/25  2011  05/15/25  0042 05/04/25  2000   NA  --   --   --   --   --  134*  --  134* 136 137   POTASSIUM  --   --   --   --   --  5.0  --  5.0 4.2 4.4   CHLORIDE  --   --   --   --   --  97*  --  98 101 105   CO2  --   --   --   --   --  28  --  25 25 21*   ANIONGAP  --   --   --   --   --  9  --  11 10 11   *  --  126* 149*   < > 140*   < > 126* 178* 137*   BUN  --   --   --   --   --  23.0*  --  18.3 21.8* 24.8*   CR  --   --   --   --   --  1.77*  --  1.29* 1.23* 1.21*   BILITOTAL  --  0.6  --   --   --   --   --  0.6 0.3 0.3   ALKPHOS  --   --   --   --   --   --   --  178* 201* 258*   ALT  --   --   --   --   --   --   --  16 21 25   AST  --   --   --   --   --   --   --  13 18 18    < > = values in this interval not displayed.        INR Results:   Recent Labs   Lab Test 05/17/25  0643 05/15/25  0042 04/02/25  1929   INR 1.33* 1.16* 1.17*          Imaging:  [unfilled]    Assessment:       This is a 44-year-old man with cirrhosis secondary to metabolic associated liver disease and alcohol use, COPD, AVA, obesity, hypertension, diabetes, history of DVT on apixaban who was admitted with abdominal pain following routine paracentesis.    Initial CT scan was unremarkable.  He subsequently underwent a second paracentesis yesterday with 4.75 L of bloody fluid removed and had a decline in his hemoglobin from 9.5->7 with CTA showing no active bleed but old blood products in the pelvis, concerning for bleeding complication from paracentesis.  Overall he is feeling better.    Plan:     -Please see if cell count with differential and serum albumin and total protein can be added onto fluid studies from yesterday  -Continue Bactrim for SBP prophylaxis  -Serial CBC, active type and screen, transfusion as needed  -If evidence of overt ongoing GI blood loss, likely will need to consider endoscopic evaluation (recent EGD 4/2025 with PHG but no varices)  -Continue to hold apixaban for now  - zofran as needed for  nausea    Total time spent today in the management of this patient (which may include  chart review, review of imaging, discussion of case with additional specialists and/or family members, face to face consultation/exam with patient, documentation, and coordination of care): 40 minutes          Radha Smith MD  Thank you for the opportunity to participate in the care of this patient.   Please feel free to call me with any questions or concerns.  Phone number [phone].          5/17/2025 8:59 AM

## 2025-05-17 NOTE — PLAN OF CARE
Goal Outcome Evaluation:       Problem: Comorbidity Management  Goal: Blood Glucose Levels Within Targeted Range  Outcome: Progressing  Intervention: Monitor and Manage Glycemia     Problem: Anemia  Goal: Anemia Symptom Improvement  Outcome: Progressing  Intervention: Monitor and Manage Anemia     Problem: Adult Inpatient Plan of Care  Goal: Optimal Comfort and Wellbeing  Outcome: Progressing       Patient alert and oriented x4, up with assist of one with walker and gait belt. BP's low most of shift and patient complaining of dizziness. IV bolus, albumin and 1 unit blood transfusion given per orders. BP and dizziness improving. Blood glucose 119 and 122-no sliding scale given. Tolerating blood so far. Will continue to monitor.     Ellie Dodson RN 5/17/2025 3:26 PM

## 2025-05-17 NOTE — PROVIDER NOTIFICATION
Patient has been having low BP in the 90's and 80's since this morning. Pt feeling very dizzy. Updated resident Claudia before she left for the day and then updated MD Venegas and Christophe. They are holding diuretics, lisinopril, and ordered a 250 ml bolus of fluid.  1 unit of blood ordered and albumin as ordered.     Ellie Dodson RN 5/17/2025

## 2025-05-17 NOTE — PROGRESS NOTES
"Pt remains on hospital BiPAP auto titration EPAP 16 cm, Max 20 cm without supplemental O2, pt requested to stay on BiPAP for now and states that he uses his home BiPAP sometimes during the day. RT to monitor    BP 97/47 (BP Location: Right arm)   Pulse 82   Temp 98  F (36.7  C) (Axillary)   Resp 22   Ht 1.651 m (5' 5\")   Wt 122.3 kg (269 lb 11.2 oz)   SpO2 94%   BMI 44.88 kg/m        "

## 2025-05-17 NOTE — PROGRESS NOTES
[facility]    Progress Note - Hospitalist Service       Date of Admission:  5/15/2025    Assessment & Plan   Warren Jaramillo is a 44 year old male admitted on 5/15/2025. He has a history of HTN, type 2 diabetes, AVA, COPD, Rojas's disease and hepatic cirrhosis with ascites and is admitted for ongoing abdominal pain, nausea vomiting with an episode of hematamesis and diarrhea. Now reporting melena and has a significant Hgb drop, repeat CTA negative for acute bleed. GI following.     Patient initially admitted by the hospitalist team and transferred to our care on day one of admission.    Abdominal pain  Cirrhosis with ascites/frequent paracentesis   Episode of hematemesis   Melena   Acute blood loss anemia   SBP  Patient presented ED with worsening ongoing abdominal pain.  Patient has history of hepatic cirrhosis and ascites with frequent paracentesis.  Recently had 4 L removed.  Has been complaining of left lower quadrant abdominal discomfort.  Patient also notes 1 episode hematemesis prior to admission.  Patient was vitally stable on admission and CT abdomen pelvis with no acute findings except for mild ascites.  GI consulted on admission with recommendation as below.  Of note per chart review patient recently had upper endoscopy in April 2025 with portal hypertensive changes in the stomach but negative for varices.  Enteric panel negative.  Abdominal pain is likely abdominal wall pain due to ascites.  Hide/16 overnight patient's hemoglobin continue to trend down from 9.5-7.2.  Repeat CTA abdomen pelvis with no signs of acute bleeding and showed small volume abdominopelvic ascites more dense could be compatible with internal blood products.  Patient was hypotensive and reporting lightheadedness this morning received 1 unit of blood and albumin.  Will continue to trend hemoglobin.  Discussed with GI that patient is reporting melena x 2 this morning. Recs to keep n.p.o. at  midnight and continue tending hgb for possible EGD tomorrow.  -GI consulted, appreciate recs    -Diagnostic paracentesis with ascitic fluid analysis and cytology    -Albumin infusion     -N.p.o. at midnight for possible EGD    -Continue every 6 hemoglobin checks    -Patient is receiving 1 unit blood transfusion    -Outpatient follow-up for liver biopsy and HVPG assessment in June    -Concern for SBP, start ceftriaxone 2g q24 hrs         Hypotension  S/p dx paracentesis 5/17 5/17 Patient is s/p paracentesis. RN paged blood pressure with SBP mid 80s and patient feeling lightheaded.  On arrival to the room patient reports lightheadedness is improving placed in the Trendelenburg position and just recently started albumin infusion.  Will review the chart to see how much fluid was removed during paracentesis.  Monitor closely. Hgb is trending down as above, continues to have soft BP, receiving one unit of blood and albumin x1.  - Continue albumin infusion  - Monitor BP closely  - q 6hr hgb checks    RIVERA on CKD.   On admission creatinine 1.7.  Baseline around 1.2.  Avoid nephrotoxins continue to monitor closely  -Daily BMP    Acute on chronic microcytic anemia  Presented with abdominal pain and one episode of hematemesis.  Admitted with a hemoglobin of 13.2, downtrending 9.5.  Will continue to monitor closely.  GI following no plan for EGD at this time.  Per chart review patient had EGD one month ago with findings of portal gastropathy and no varices.  Will continue to monitor closely.  Repeat hemoglobin at 1800.  -Daily CBC  -Repeat hemoglobin 1800  - Hold DOAC.    History of HFpEF  History of HFpEF.  Most recent echo with EF 60 to 65%.  No signs of CHF exacerbation.  -Continue PTA Lasix and spironolactone    Type 2 diabetes  Most recent A1c 6.7.  On PTA metformin and Jardiance.  -Hold PTA metformin and Jardiance  -Diabetic diet  -Start sliding scale insulin    Hx of DVT  History of occlusive DVT diagnosed in 2024. On  "PTA apixaban held as above for anemia with concern for bleeding as above.  - Hold PTA apixaban  - Outpatient follow-up to determine length of anticoagulation.      Chronic conditions:  Hypothyroidism: Continue PTA levothyroxine  HTN: Continue PTA lisinopril  AVA; BiPAP as needed for sleep  Mood disorder: Continue PTA fluoxetine and olanzapine                Diet: Fluid restriction 2000 ML FLUID  Full Liquid Diet  NPO for Procedure/Surgery per Anesthesia Guidelines Except for: Meds; Clear liquids before procedure/surgery: ADULT (Age GREATER than or Equal to 18 years) - Clear liquids 2 hours before procedure/surgery    DVT Prophylaxis: DOAC  Khan Catheter: Not present  Fluids: PO  Lines: None     Cardiac Monitoring: None  Code Status: Full Code      Clinically Significant Risk Factors Present on Admission         # Hyponatremia: Lowest Na = 134 mmol/L in last 2 days, will monitor as appropriate  # Hypochloremia: Lowest Cl = 97 mmol/L in last 2 days, will monitor as appropriate  # Hypocalcemia: Lowest Ca = 8.2 mg/dL in last 2 days, will monitor and replace as appropriate        # Drug Induced Coagulation Defect: home medication list includes an anticoagulant medication   # Acute Kidney Injury, unspecified: based on a >150% or 0.3 mg/dL increase in last creatinine compared to past 90 day average, will monitor renal function  # Hypertension: Noted on problem list  # Chronic heart failure with preserved ejection fraction: heart failure noted on problem list and last echo with EF >50%     # Anemia: based on hgb <11  # Anemia: based on hgb <11      # DMII: A1C = 6.7 % (Ref range: <5.7 %) within past 6 months    # Morbid Obesity: Estimated body mass index is 44.88 kg/m  as calculated from the following:    Height as of this encounter: 1.651 m (5' 5\").    Weight as of this encounter: 122.3 kg (269 lb 11.2 oz).         # Financial/Environmental Concerns: none         Social Drivers of Health   Tobacco Use: High Risk " (5/15/2025)    Patient History     Smoking Tobacco Use: Every Day     Smokeless Tobacco Use: Never    Received from "[employer] & [facility]    Social Connections         Disposition Plan     Medically Ready for Discharge: Anticipated Tomorrow         The patient's care was discussed with the Attending Physician, Dr. Venegas.    Young Saeed MD  Hospitalist Service  [facility]  Securely message with [employer] (more info)  Text page via [facility] Paging/Directory   ______________________________________________________________________    Interval History   Patient reports dark black stool x 2 this morning and overnight.  Reports lightheadedness this morning, soft blood pressures.  1 unit of blood transfusion ordered.  Otherwise denies shortness of breath chest pain.  No nausea vomiting no recurrence of hematemesis.    Physical Exam   Vital Signs: Temp: 98.9  F (37.2  C) Temp src: Axillary BP: 108/50 Pulse: 80   Resp: 20 SpO2: 93 % O2 Device: BiPAP/CPAP    Weight: 269 lbs 11.2 oz  General Appearance: Alert and oriented, NAD  Respiratory: normal work of breathing, clear breath sounds, no wheezing  Cardiovascular: normal S1, S2, no murmur  GI: soft, distended, tender LUQ, and no guarding.   Skin: normal turgor and appearance, no visible rash   Other: No lower limb edema B/L        Medical Decision Making             Data     I have personally reviewed the following data over the past 24 hrs:    N/A  \   7.2 (L)   / N/A     N/A N/A N/A /  119 (H)   N/A N/A N/A \     ALT: N/A AST: N/A AP: N/A TBILI: 0.6   ALB: N/A TOT PROTEIN: N/A LIPASE: N/A     INR:  1.33 (H) PTT:  35   D-dimer:  N/A Fibrinogen:  500     Ferritin:  N/A % Retic:  N/A LDH:  91       Imaging results reviewed over the past 24 hrs:   Recent Results (from the past 24 hours)   CTA Abdomen Pelvis with Contrast    Narrative    EXAM: CTA ABDOMEN PELVIS WITH CONTRAST  LOCATION: [facility]  DATE:  5/17/2025    INDICATION: Hemoglobin downtrending, history of cirrhosis, concern for bleed.  COMPARISON: CT abdomen/pelvis with contrast 05/15/2025.  TECHNIQUE: CT angiogram abdomen pelvis during arterial phase of injection of IV contrast. 2D and 3D MIP reconstructions were performed by the CT technologist. Dose reduction techniques were used.  CONTRAST: 90ml isovue 370    FINDINGS:  ANGIOGRAM ABDOMEN/PELVIS: No aortic aneurysm or dissection. No celiac artery stenosis. Conventional hepatic arterial anatomy. No superior mesenteric artery stenosis or occlusion. Inferior mesenteric artery is patent. Patent renal arteries without   significant stenosis. Scattered atherosclerotic calcifications of the aortoiliac vessels. Portal veins appear patent. Recanalized periumbilical veins. Numerous upper abdominal portosystemic collaterals.    LOWER CHEST: Normal.    HEPATOBILIARY: Cirrhotic liver morphology with hepatic steatosis. Unremarkable gallbladder.    PANCREAS: Normal.    SPLEEN: Prominent spleen.    ADRENAL GLANDS: Similar size and appearance of bilateral benign adrenal myelolipomas.    KIDNEYS/BLADDER: No hydronephrosis. Urinary bladder is partially decompressed. Likely residual excreted contrast within the urinary bladder.    BOWEL: No bowel obstruction.    ASCITES: Small volume abdominopelvic ascites which appears dense in several locations for example along the right paracolic gutter and in the dependent pelvis, compatible with internal blood products. However there is no evidence of acute bleeding.    LYMPH NODES: Shotty retroperitoneal lymph nodes, similar to prior and may be reactive.    PELVIC ORGANS: Unremarkable.    MUSCULOSKELETAL: Multilevel degenerative changes of the thoracic and lumbosacral spine. No acute osseous abnormality.      Impression    IMPRESSION:  1.  Small volume abdominopelvic ascites which appears dense in several locations for example along the right paracolic gutter and in the dependent  pelvis, compatible with internal blood products. However there is no evidence of acute bleeding.  2.  Cirrhotic liver morphology with hepatic steatosis.  3.  Recanalized periumbilical veins. Numerous upper abdominal portosystemic collaterals.  4.  No celiac artery stenosis. Conventional hepatic arterial anatomy.

## 2025-05-17 NOTE — PLAN OF CARE
Problem: Adult Inpatient Plan of Care  Goal: Optimal Comfort and Wellbeing  Outcome: Progressing   Goal Outcome Evaluation:      Plan of Care Reviewed With: patient    Explained cares and answered questions. Nausea managed with PRN Zofran. Take meds whole with fluids. Educate and encouraged pt to use call light to call for assistance. Dr. Sabine Taylor requested [facility] paged due to hg 8. Call from HELEN, Dr. Maria recommending CT and angiogram.

## 2025-05-17 NOTE — PLAN OF CARE
Problem: Adult Inpatient Plan of Care  Goal: Plan of Care Review  Description: The Plan of Care Review/Shift note should be completed every shift.  The Outcome Evaluation is a brief statement about your assessment that the patient is improving, declining, or no change.  This information will be displayed automatically on your shiftnote.  Outcome: Progressing  Flowsheets (Taken 5/17/2025 0644)  Plan of Care Reviewed With: patient     Problem: Pain Acute  Goal: Optimal Pain Control and Function  Intervention: Prevent or Manage Pain  Recent Flowsheet Documentation  Taken 5/17/2025 0031 by Iban Polanco RN  Medication Review/Management: medications reviewed     Problem: Anemia  Goal: Anemia Symptom Improvement  Intervention: Monitor and Manage Anemia  Recent Flowsheet Documentation  Taken 5/17/2025 0031 by Iban Polanco RN  Safety Promotion/Fall Prevention:   clutter free environment maintained   nonskid shoes/slippers when out of bed   Goal Outcome Evaluation:      Plan of Care Reviewed With: patient  Patient is alert and oriented x 4. Had stat CT with contrast to abdomen at 0215. Hgb noted to be dropping 9.5, 8.0, 7.2. Hgb rechecked every 6 hours. [facility] paged per Dr. Taylor's request regarding the CT scan findings and concern to Hgb drop. Blood consent form signed in case pt's Hgb drops further. [facility] to see pt.

## 2025-05-18 LAB
ADENOSINE DEAMINASE PRT-CCNC: 39 U/L
ANION GAP SERPL CALCULATED.3IONS-SCNC: 15 MMOL/L (ref 7–15)
ANION GAP SERPL CALCULATED.3IONS-SCNC: 15 MMOL/L (ref 7–15)
BASOPHILS # BLD AUTO: 0.1 10E3/UL (ref 0–0.2)
BASOPHILS NFR BLD AUTO: 1 %
BUN SERPL-MCNC: 45.1 MG/DL (ref 6–20)
BUN SERPL-MCNC: 46.3 MG/DL (ref 6–20)
CALCIUM SERPL-MCNC: 7.4 MG/DL (ref 8.8–10.4)
CALCIUM SERPL-MCNC: 7.5 MG/DL (ref 8.8–10.4)
CHLORIDE SERPL-SCNC: 92 MMOL/L (ref 98–107)
CHLORIDE SERPL-SCNC: 94 MMOL/L (ref 98–107)
CREAT SERPL-MCNC: 7.09 MG/DL (ref 0.67–1.17)
CREAT SERPL-MCNC: 7.23 MG/DL (ref 0.67–1.17)
EGFRCR SERPLBLD CKD-EPI 2021: 9 ML/MIN/1.73M2
EGFRCR SERPLBLD CKD-EPI 2021: 9 ML/MIN/1.73M2
EOSINOPHIL # BLD AUTO: 0.5 10E3/UL (ref 0–0.7)
EOSINOPHIL NFR BLD AUTO: 5 %
ERYTHROCYTE [DISTWIDTH] IN BLOOD BY AUTOMATED COUNT: 14.6 % (ref 10–15)
GLUCOSE BLDC GLUCOMTR-MCNC: 110 MG/DL (ref 70–99)
GLUCOSE BLDC GLUCOMTR-MCNC: 120 MG/DL (ref 70–99)
GLUCOSE BLDC GLUCOMTR-MCNC: 135 MG/DL (ref 70–99)
GLUCOSE BLDC GLUCOMTR-MCNC: 138 MG/DL (ref 70–99)
GLUCOSE BLDC GLUCOMTR-MCNC: 159 MG/DL (ref 70–99)
GLUCOSE SERPL-MCNC: 105 MG/DL (ref 70–99)
GLUCOSE SERPL-MCNC: 110 MG/DL (ref 70–99)
HCO3 SERPL-SCNC: 21 MMOL/L (ref 22–29)
HCO3 SERPL-SCNC: 22 MMOL/L (ref 22–29)
HCT VFR BLD AUTO: 24.8 % (ref 40–53)
HGB BLD-MCNC: 7.9 G/DL (ref 13.3–17.7)
HGB BLD-MCNC: 7.9 G/DL (ref 13.3–17.7)
HGB BLD-MCNC: 8 G/DL (ref 13.3–17.7)
HGB BLD-MCNC: 8.1 G/DL (ref 13.3–17.7)
IMM GRANULOCYTES # BLD: 0 10E3/UL
IMM GRANULOCYTES NFR BLD: 0 %
LYMPHOCYTES # BLD AUTO: 1.6 10E3/UL (ref 0.8–5.3)
LYMPHOCYTES NFR BLD AUTO: 16 %
MCH RBC QN AUTO: 28.2 PG (ref 26.5–33)
MCHC RBC AUTO-ENTMCNC: 32.3 G/DL (ref 31.5–36.5)
MCV RBC AUTO: 86 FL (ref 78–100)
MCV RBC AUTO: 87 FL (ref 78–100)
MONOCYTES # BLD AUTO: 1.1 10E3/UL (ref 0–1.3)
MONOCYTES NFR BLD AUTO: 12 %
NEUTROPHILS # BLD AUTO: 6.4 10E3/UL (ref 1.6–8.3)
NEUTROPHILS NFR BLD AUTO: 67 %
NRBC # BLD AUTO: 0 10E3/UL
NRBC BLD AUTO-RTO: 0 /100
PLATELET # BLD AUTO: 286 10E3/UL (ref 150–450)
POTASSIUM SERPL-SCNC: 4.8 MMOL/L (ref 3.4–5.3)
POTASSIUM SERPL-SCNC: 5.1 MMOL/L (ref 3.4–5.3)
RBC # BLD AUTO: 2.84 10E6/UL (ref 4.4–5.9)
SODIUM SERPL-SCNC: 128 MMOL/L (ref 135–145)
SODIUM SERPL-SCNC: 131 MMOL/L (ref 135–145)
WBC # BLD AUTO: 9.7 10E3/UL (ref 4–11)

## 2025-05-18 PROCEDURE — 85014 HEMATOCRIT: CPT

## 2025-05-18 PROCEDURE — P9047 ALBUMIN (HUMAN), 25%, 50ML: HCPCS | Mod: JZ | Performed by: INTERNAL MEDICINE

## 2025-05-18 PROCEDURE — 36415 COLL VENOUS BLD VENIPUNCTURE: CPT

## 2025-05-18 PROCEDURE — 120N000001 HC R&B MED SURG/OB

## 2025-05-18 PROCEDURE — 99232 SBSQ HOSP IP/OBS MODERATE 35: CPT | Mod: GC

## 2025-05-18 PROCEDURE — 85018 HEMOGLOBIN: CPT

## 2025-05-18 PROCEDURE — 250N000011 HC RX IP 250 OP 636

## 2025-05-18 PROCEDURE — P9047 ALBUMIN (HUMAN), 25%, 50ML: HCPCS | Mod: JZ

## 2025-05-18 PROCEDURE — 250N000011 HC RX IP 250 OP 636: Mod: JZ | Performed by: INTERNAL MEDICINE

## 2025-05-18 PROCEDURE — 99254 IP/OBS CNSLTJ NEW/EST MOD 60: CPT | Performed by: INTERNAL MEDICINE

## 2025-05-18 PROCEDURE — 82310 ASSAY OF CALCIUM: CPT

## 2025-05-18 PROCEDURE — 250N000013 HC RX MED GY IP 250 OP 250 PS 637: Performed by: HOSPITALIST

## 2025-05-18 RX ORDER — ACETAMINOPHEN 325 MG/1
325 TABLET ORAL
Status: DISCONTINUED | OUTPATIENT
Start: 2025-05-18 | End: 2025-05-18

## 2025-05-18 RX ORDER — FUROSEMIDE 10 MG/ML
60 INJECTION INTRAMUSCULAR; INTRAVENOUS EVERY 8 HOURS PRN
Status: DISCONTINUED | OUTPATIENT
Start: 2025-05-18 | End: 2025-05-26 | Stop reason: HOSPADM

## 2025-05-18 RX ORDER — ALBUMIN (HUMAN) 12.5 G/50ML
50 SOLUTION INTRAVENOUS ONCE
Status: COMPLETED | OUTPATIENT
Start: 2025-05-18 | End: 2025-05-18

## 2025-05-18 RX ORDER — FAMOTIDINE 20 MG/1
20 TABLET, FILM COATED ORAL
Status: DISCONTINUED | OUTPATIENT
Start: 2025-05-20 | End: 2025-05-22

## 2025-05-18 RX ADMIN — FAMOTIDINE 20 MG: 20 TABLET, FILM COATED ORAL at 07:58

## 2025-05-18 RX ADMIN — ALBUMIN HUMAN 50 G: 0.25 SOLUTION INTRAVENOUS at 10:32

## 2025-05-18 RX ADMIN — FLUOXETINE HYDROCHLORIDE 20 MG: 20 CAPSULE ORAL at 07:59

## 2025-05-18 RX ADMIN — PANTOPRAZOLE SODIUM 40 MG: 20 TABLET, DELAYED RELEASE ORAL at 17:12

## 2025-05-18 RX ADMIN — ACETAMINOPHEN 325 MG: 325 TABLET ORAL at 21:41

## 2025-05-18 RX ADMIN — MONTELUKAST 10 MG: 10 TABLET, FILM COATED ORAL at 07:58

## 2025-05-18 RX ADMIN — CEFTRIAXONE SODIUM 2 G: 2 INJECTION, POWDER, FOR SOLUTION INTRAMUSCULAR; INTRAVENOUS at 17:12

## 2025-05-18 RX ADMIN — UMECLIDINIUM 1 PUFF: 62.5 AEROSOL, POWDER ORAL at 08:02

## 2025-05-18 RX ADMIN — LEVOTHYROXINE SODIUM 12.5 MCG: 0.03 TABLET ORAL at 07:58

## 2025-05-18 RX ADMIN — PANTOPRAZOLE SODIUM 40 MG: 20 TABLET, DELAYED RELEASE ORAL at 07:58

## 2025-05-18 RX ADMIN — OLANZAPINE 10 MG: 10 TABLET, FILM COATED ORAL at 18:01

## 2025-05-18 RX ADMIN — TRAZODONE HYDROCHLORIDE 100 MG: 50 TABLET ORAL at 18:01

## 2025-05-18 RX ADMIN — ALBUMIN HUMAN 50 G: 0.25 SOLUTION INTRAVENOUS at 13:16

## 2025-05-18 RX ADMIN — ROSUVASTATIN 10 MG: 10 TABLET, FILM COATED ORAL at 18:01

## 2025-05-18 RX ADMIN — FLUTICASONE FUROATE AND VILANTEROL TRIFENATATE 1 PUFF: 100; 25 POWDER RESPIRATORY (INHALATION) at 08:02

## 2025-05-18 RX ADMIN — NICOTINE 1 PATCH: 21 PATCH, EXTENDED RELEASE TRANSDERMAL at 08:01

## 2025-05-18 ASSESSMENT — ACTIVITIES OF DAILY LIVING (ADL)
ADLS_ACUITY_SCORE: 59

## 2025-05-18 NOTE — PROGRESS NOTES
Care Management Follow Up    Length of Stay (days): 2    Expected Discharge Date: 05/18/2025     Concerns to be Addressed:    Care progression - discharge planning   Patient plan of care discussed at interdisciplinary rounds: Yes    Anticipated Discharge Disposition:  TBD    Anticipated Discharge Services:  TBD  Anticipated Discharge DME:  NA    Patient/family educated on Medicare website which has current facility and service quality ratings:  NA  Education Provided on the Discharge Plan:  Yes per team  Patient/Family in Agreement with the Plan:  NA    Referrals Placed by CM/SW:  NA  Private pay costs discussed: Not applicable    Discussed  Partnership in Safe Discharge Planning  document with patient/family: Yes: legal Jesika bhatt     Handoff Completed: No, handoff not indicated or clinically appropriate    Additional Information:  7104 message sent to Dr. Young Saeed regarding the anticipated discharge.    Dr. Saeed message back stating patient is not ready.     staff, Gamal [phone] called and said he planned to transport at 1400.   Updated Gamal, patient is not ready for discharge today.    Called to update Jesika hough    Next Steps: RNCM to follow for medical progression, recommendations, and final discharge plan.     Janine Rinaldi RN

## 2025-05-18 NOTE — CONSULTS
"[facility]  [facility]   Nephrology Consultation/Initial Inpatient Care    Warren Jaramillo   MRN: [id number]  : 1980   DOA: 5/15/2025     REASON FOR CONSULTATION: We are asked to see pt by Dr. 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 who frequently presents to the ER with abdominal pain complaints and has frequent paracentesis procedures\"  Admitted for workup and started on abx  Cr has risen from 1.2 on admit to 7.1 today; 1.8 on  and no value on   Pt says still making urine but not as much as usual; had bladder scans and were reading for low volumes  Had CT with dye on admit and repeat on on overnoc of - due to concern for bleeding  Has been receiving albumin               REVIEW OF SYSTEMS:  ROS was completely reviewed and otherwise negative and non-contributory    Past Medical History:   Diagnosis Date    COPD exacerbation (H) 2024    DM2 (diabetes mellitus, type 2) (H) 2020    HTN (hypertension) 2012    Sleep apnea     Thyroid nodule 2019    Rojas's disease (H)        Social History     Socioeconomic History    Marital status: Single     Spouse name: Not on file    Number of children: Not on file    Years of education: Not on file    Highest education level: Not on file   Occupational History    Not on file   Tobacco Use    Smoking status: Every Day     Current packs/day: 1.00     Average packs/day: 1 pack/day for 21.4 years (21.4 ttl pk-yrs)     Types: Cigarettes     Start " date: 1/1/2004    Smokeless tobacco: Never   Vaping Use    Vaping status: Never Used   Substance and Sexual Activity    Alcohol use: Not Currently     Comment: Last 8/7/2024    Drug use: Not on file     Comment: last 1 month ago    Sexual activity: Never     Partners: Female   Other Topics Concern     Service No    Blood Transfusions No    Caffeine Concern No    Occupational Exposure No    Hobby Hazards No    Sleep Concern No    Stress Concern Yes     Comment: sometimes    Weight Concern No    Special Diet Yes     Comment: counting carbs    Back Care No    Exercise Yes    Bike Helmet Not Asked     Comment: N/A    Seat Belt Yes    Self-Exams Yes    Parent/sibling w/ CABG, MI or angioplasty before 65F 55M? Not Asked   Social History Narrative    Not on file     Social Drivers of Health     Financial Resource Strain: Low Risk  (5/16/2025)    Financial Resource Strain     Within the past 12 months, have you or your family members you live with been unable to get utilities (heat, electricity) when it was really needed?: No   Food Insecurity: Low Risk  (5/16/2025)    Food Insecurity     Within the past 12 months, did you worry that your food would run out before you got money to buy more?: No     Within the past 12 months, did the food you bought just not last and you didn t have money to get more?: No   Transportation Needs: Low Risk  (5/16/2025)    Transportation Needs     Within the past 12 months, has lack of transportation kept you from medical appointments, getting your medicines, non-medical meetings or appointments, work, or from getting things that you need?: No   Physical Activity: Not on file   Stress: Not on file   Social Connections: Unknown (5/1/2023)    Received from [facility] [employer] [facility]    Social Connections     Frequency of Communication with Friends and Family: Not on file   Interpersonal Safety: Low Risk  (5/16/2025)    Interpersonal Safety     Do you feel physically and  emotionally safe where you currently live?: Yes     Within the past 12 months, have you been hit, slapped, kicked or otherwise physically hurt by someone?: No     Within the past 12 months, have you been humiliated or emotionally abused in other ways by your partner or ex-partner?: No   Housing Stability: Low Risk  (5/16/2025)    Housing Stability     Do you have housing? : Yes     Are you worried about losing your housing?: No       Family History   Problem Relation Age of Onset    Unknown/Adopted Father     Unknown/Adopted Maternal Grandmother     C.A.D. Maternal Grandfather     Diabetes Maternal Grandfather     Cerebrovascular Disease Maternal Grandfather     Unknown/Adopted Paternal Grandmother     Unknown/Adopted Paternal Grandfather     Unknown/Adopted Brother     Unknown/Adopted Sister        Allergies   Allergen Reactions    Apricot Flavoring Agent (Non-Screening) Anaphylaxis    Banana Anaphylaxis     Throat swelling      Bees Anaphylaxis     Has an Epi pen    Wasp Venom Protein Shortness Of Breath     Other reaction(s): Respiratory Distress  Has an Epi pen        Methylphenidate Itching     Other reaction(s): Nightmares    Prunus      Other reaction(s): *Unknown       MEDICATIONS:  Current Facility-Administered Medications   Medication Dose Route Frequency Provider Last Rate Last Admin    albumin human 25 % injection 50 g  50 g Intravenous Once Young Saeed MD        albumin human 25 % injection 50 g  50 g Intravenous Once Radha Smith MD        [Held by provider] apixaban ANTICOAGULANT (ELIQUIS) tablet 5 mg  5 mg Oral BID Maninder Rodriguez MD        cefTRIAXone (ROCEPHIN) 2 g vial to attach to  ml bag for ADULTS or NS 50 ml bag for PEDS  2 g Intravenous Q24H Young Saeed MD   2 g at 05/17/25 1713    [Held by provider] empagliflozin (JARDIANCE) tablet 10 mg  10 mg Oral QAM Maninder Rodriguez MD        famotidine (PEPCID) tablet 20 mg  20 mg Oral BID Maninder Rodriguez MD   20 mg at 05/18/25  0758    FLUoxetine (PROzac) capsule 20 mg  20 mg Oral QAM Mnainder Rodriguez MD   20 mg at 05/18/25 0759    fluticasone-vilanterol (BREO ELLIPTA) 100-25 MCG/ACT inhaler 1 puff  1 puff Inhalation Daily Maninder Rodriguez MD   1 puff at 05/18/25 0802    And    umeclidinium (INCRUSE ELLIPTA) 62.5 MCG/ACT inhaler 1 puff  1 puff Inhalation Daily Maninder Rodriguez MD   1 puff at 05/18/25 0802    [Held by provider] furosemide (LASIX) tablet 40 mg  40 mg Oral Daily Maninder Rodriguez MD   40 mg at 05/16/25 0817    insulin aspart (NovoLOG) injection (RAPID ACTING)  1-3 Units Subcutaneous TID  Maninder Rodriguez MD   1 Units at 05/16/25 1726    insulin aspart (NovoLOG) injection (RAPID ACTING)  1-3 Units Subcutaneous At Bedtime Maninder Rodriguez MD        levothyroxine (SYNTHROID/LEVOTHROID) half-tab 12.5 mcg  12.5 mcg Oral QAM  Maninder Rodriguez MD   12.5 mcg at 05/18/25 0758    [Held by provider] lisinopril (ZESTRIL) tablet 10 mg  10 mg Oral Maninder Erickson MD   10 mg at 05/16/25 0817    [Held by provider] metFORMIN (GLUCOPHAGE) tablet 1,000 mg  1,000 mg Oral BID w/meals Maninder Rodriguez MD   1,000 mg at 05/17/25 0908    montelukast (SINGULAIR) tablet 10 mg  10 mg Oral [facility] Maninder Rodriguez MD   10 mg at 05/18/25 0758    nicotine (NICODERM CQ) 21 MG/24HR 24 hr patch 1 patch  1 patch Transdermal Daily Maninder Rodriguez MD   1 patch at 05/18/25 0801    OLANZapine (zyPREXA) tablet 10 mg  10 mg Oral At Bedtime Maninder Rodriguez MD   10 mg at 05/17/25 2024    pantoprazole (PROTONIX) EC tablet 40 mg  40 mg Oral BID AC Maninder Rodriguez MD   40 mg at 05/18/25 0758    rosuvastatin (CRESTOR) tablet 10 mg  10 mg Oral At Bedtime Maninder Rodriguez MD   10 mg at 05/17/25 2023    [Held by provider] spironolactone (ALDACTONE) tablet 100 mg  100 mg Oral Daily Mnainder Rodriguez MD   100 mg at 05/16/25 0817    [Held by provider] sulfamethoxazole-trimethoprim (BACTRIM DS) 800-160 MG per tablet 1 tablet  1  "tablet Oral Daily Maninder Rodriguez MD   1 tablet at 05/17/25 0908    traZODone (DESYREL) tablet 100 mg  100 mg Oral At Bedtime Maninder Rodriguez MD   100 mg at 05/17/25 2024         PHYSICAL EXAM    /55 (BP Location: Right arm)   Pulse 73   Temp 98  F (36.7  C) (Axillary)   Resp 18   Ht 1.651 m (5' 5\")   Wt 122.3 kg (269 lb 11.2 oz)   SpO2 97%   BMI 44.88 kg/m        Intake/Output Summary (Last 24 hours) at 5/18/2025 0925  Last data filed at 5/18/2025 0659  Gross per 24 hour   Intake 1171 ml   Output 100 ml   Net 1071 ml       Alert/oriented x 3; awake and NAD  HEENT NC/AT; perrla; OP clear without lesions; mmm  Neck supple without LAD, TM  CV; RRR without rub or murmur  Lung: clear and equal; no extra sounds  Ab: soft andNT: + ascites  Ext++edema and well perfused  Skin; no rash  Neuro; grossly intact    LABORATORIES    Last Renal Panel:  Sodium   Date Value Ref Range Status   05/18/2025 131 (L) 135 - 145 mmol/L Final   06/21/2012 141 133 - 144 mmol/L Final     Potassium   Date Value Ref Range Status   05/18/2025 5.1 3.4 - 5.3 mmol/L Final   06/21/2012 4.3 3.4 - 5.3 mmol/L Final     Chloride   Date Value Ref Range Status   05/18/2025 94 (L) 98 - 107 mmol/L Final   06/21/2012 103 94 - 109 mmol/L Final     Carbon Dioxide   Date Value Ref Range Status   06/21/2012 28 20 - 32 mmol/L Final     Carbon Dioxide (CO2)   Date Value Ref Range Status   05/18/2025 22 22 - 29 mmol/L Final     Anion Gap   Date Value Ref Range Status   05/18/2025 15 7 - 15 mmol/L Final   06/21/2012 10 6 - 17 mmol/L Final     Glucose   Date Value Ref Range Status   05/18/2025 105 (H) 70 - 99 mg/dL Final   06/21/2012 82 60 - 99 mg/dL Final     GLUCOSE BY METER POCT   Date Value Ref Range Status   05/18/2025 120 (H) 70 - 99 mg/dL Final     Urea Nitrogen   Date Value Ref Range Status   05/18/2025 45.1 (H) 6.0 - 20.0 mg/dL Final   06/21/2012 11 5 - 24 mg/dL Final     Creatinine   Date Value Ref Range Status   05/18/2025 7.09 (H) 0.67 " "- 1.17 mg/dL Final   06/21/2012 0.83 0.66 - 1.25 mg/dL Final     GFR Estimate   Date Value Ref Range Status   05/18/2025 9 (L) >60 mL/min/1.73m2 Final     Comment:     eGFR calculated using 2021 CKD-EPI equation.   06/21/2012 >90 >60 mL/min/1.7m2 Final     GFR, ESTIMATED POCT   Date Value Ref Range Status   01/27/2025 >60 >60 mL/min/1.73m2 Final     Calcium   Date Value Ref Range Status   05/18/2025 7.4 (L) 8.8 - 10.4 mg/dL Final   06/21/2012 9.5 8.5 - 10.4 mg/dL Final     Phosphorus   Date Value Ref Range Status   01/08/2025 4.1 2.5 - 4.5 mg/dL Final     Albumin   Date Value Ref Range Status   05/15/2025 3.8 3.5 - 5.2 g/dL Final   06/21/2012 4.1 3.9 - 5.1 g/dL Final     No components found for: \"URINE\"   Lab Results   Component Value Date    WBC 9.7 05/18/2025    WBC 11.3 06/21/2012     Lab Results   Component Value Date    RBC 2.84 05/18/2025    RBC 5.36 06/21/2012     Lab Results   Component Value Date    HGB 8.0 05/18/2025    HGB 8.0 05/18/2025    HGB 15.9 06/21/2012     Lab Results   Component Value Date    HCT 24.8 05/18/2025    HCT 46.1 06/21/2012     No components found for: \"MCT\"  Lab Results   Component Value Date    MCV 87 05/18/2025    MCV 87 05/18/2025    MCV 86 06/21/2012     Lab Results   Component Value Date    MCH 28.2 05/18/2025    MCH 29.7 06/21/2012     Lab Results   Component Value Date    MCHC 32.3 05/18/2025    MCHC 34.5 06/21/2012     Lab Results   Component Value Date    RDW 14.6 05/18/2025    RDW 13.6 06/21/2012     Lab Results   Component Value Date     05/18/2025     06/21/2012         I reviewed all labs    ASSESSMENT/PLAN:  44 year old male    ARF/CKD; has some mild underlying CKD; now ARF and likely contributors are 2 dye loads with some contribution from hemodynamic injury ( pt had low bp after belly tap and continued on ACE and diuretics); urine ok and no obstruction on imaging; no indication for dialysis today and hope pt will plateau and improved; try to optimize " hemodynamics; doubt HRS as primary cause of ARF but liver hemodynamics can be an  unfavorable environment for renal recovery; hold bactrim for now  Acute on chronic ab pain; long history of presenting with ab pain; on abx and supportive cares; s/p paracentesis; being covered for SBP and fluid consistent with SBP  Anemia; concern for GI loss; following and transfuse prn; received blood yesterday; hgb stable  GI bleed: GI following and possible EGD in am  Uro; bladder partly decompressed on imaging; blaldder scans may not be reliable; will have ham placed to monitor urine and ensure drainage  Hyponatremia; in setting of volume overload with chronic liver disease and volume overload  Volume status; elevated with ascites and edema; typically on diuretics; will write for some prn diuretics based on poor urine output  Hematemsis; prob M-W tear; on PPI; holding anticoagulation; on for h/o DVT  OSAS  Hypothyroid; on replacement  Developmental delay, fetal ETOH syndrome, PTSD  H/o DVT; holding eliquis  DM; holding jardiance and metformin    Pt asked that I call his adopted mom Jeana with update on cell [phone] which I did; questions answered; discussed possible need for dialysis      Rupinder Morgan MD  Nephrology

## 2025-05-18 NOTE — PROGRESS NOTES
[facility]    Progress Note - Hospitalist Service       Date of Admission:  5/15/2025    Assessment & Plan   Warren Jaramillo is a 44 year old male admitted on 5/15/2025. He has a history of HTN, type 2 diabetes, AVA, COPD, Rojas's disease and hepatic cirrhosis with ascites and is admitted for ongoing abdominal pain, nausea vomiting with an episode of hematamesis and diarrhea. Now reporting melena and has a significant Hgb drop, repeat CTA negative for acute bleed. GI following.     Patient initially admitted by the hospitalist team and transferred to our care on day one of admission.    Abdominal pain  Cirrhosis with ascites/frequent paracentesis   Episode of hematemesis   Melena   Acute blood loss anemia   SBP  Patient presented ED with worsening ongoing abdominal pain.  Patient has history of hepatic cirrhosis and ascites with frequent paracentesis.  Recently had 4 L removed.  Has been complaining of left lower quadrant abdominal discomfort.  Patient also notes 1 episode hematemesis prior to admission.  Patient was vitally stable on admission and CT abdomen pelvis with no acute findings except for mild ascites.  GI consulted on admission with recommendation as below.  Of note per chart review patient recently had upper endoscopy in April 2025 with portal hypertensive changes in the stomach but negative for varices.  Enteric panel negative.  Abdominal pain is likely abdominal wall pain due to ascites.  Hide/16 overnight patient's hemoglobin continue to trend down from 9.5-7.2.  Repeat CTA abdomen pelvis with no signs of acute bleeding and showed small volume abdominopelvic ascites more dense could be compatible with internal blood products.  Patient was hypotensive and reporting lightheadedness this morning received 1 unit of blood and albumin.  Will continue to trend hemoglobin.  Discussed with GI that patient is reporting melena x 2 this morning. Recs to keep n.p.o. at  midnight and continue tending hgb for possible EGD tomorrow.  -GI consulted, appreciate recs    -Diagnostic paracentesis with ascitic fluid analysis and cytology    -Albumin infusion     -Continue every 6 hemoglobin checks    -s/p unit blood transfusion and albumin x1, 5/17    -Outpatient follow-up for liver biopsy and HVPG assessment in June    -Continue ceftriaxone for SBP    Hypotension-resolved  S/p dx paracentesis 5/17 5/17 Patient is s/p paracentesis. RN paged blood pressure with SBP mid 80s and patient feeling lightheaded.  On arrival to the room patient reports lightheadedness is improving placed in the Trendelenburg position and just recently started albumin infusion.  Will review the chart to see how much fluid was removed during paracentesis.  Monitor closely. Hgb is trending down as above, continues to have soft BP, receiving one unit of blood and albumin x1.  - Continue albumin infusion  - Monitor BP closely  - q 6hr hgb checks    RIVERA on CKD.   ARF  On admission creatinine 1.7. creatinine abruptly elevated to 7.0 this morning, in the setting of cirrhosis with ascites, SBP, and acute blood loss anemia likely UGIB. RIVERA likely multifactorial--volume loss, SBP with hypotension and contrast exposure. Nephrology consulted, appreciate recs.   -Daily BMP  -Nephrology consulted, appreciate recs  -Albumin 100 g today, repeat tomorrow  -Renally dose meds  - monitor urine output    Acute on chronic microcytic anemia  Presented with abdominal pain and one episode of hematemesis.  Admitted with a hemoglobin of 13.2, downtrending 9.5.  Will continue to monitor closely.  GI following no plan for EGD at this time.  Per chart review patient had EGD one month ago with findings of portal gastropathy and no varices.  Will continue to monitor closely.  Repeat hemoglobin at 1800.  -Daily CBC  -Repeat hemoglobin 1800  - Hold DOAC.    History of HFpEF  History of HFpEF.  Most recent echo with EF 60 to 65%.  No signs of CHF  "exacerbation.  -Continue PTA Lasix and spironolactone    Type 2 diabetes  Most recent A1c 6.7.  On PTA metformin and Jardiance.  -Hold PTA metformin and Jardiance  -Diabetic diet  -Start sliding scale insulin    Hx of DVT  History of occlusive DVT diagnosed in 2024. On PTA apixaban held as above for anemia with concern for bleeding as above.  - Hold PTA apixaban  - Outpatient follow-up to determine length of anticoagulation.      Chronic conditions:  Hypothyroidism: Continue PTA levothyroxine  HTN: Continue PTA lisinopril  AVA; BiPAP as needed for sleep  Mood disorder: Continue PTA fluoxetine and olanzapine                Diet: Fluid restriction 2000 ML FLUID  Renal Diet (non-dialysis)    DVT Prophylaxis: DOAC  Khan Catheter: Not present  Fluids: PO  Lines: None     Cardiac Monitoring: None  Code Status: Full Code      Clinically Significant Risk Factors         # Hyponatremia: Lowest Na = 128 mmol/L in last 2 days, will monitor as appropriate  # Hypochloremia: Lowest Cl = 92 mmol/L in last 2 days, will monitor as appropriate         # Coagulation Defect: INR = 1.33 (Ref range: 0.85 - 1.15) and/or PTT = 35 Seconds (Ref range: 22 - 38 Seconds), will monitor for bleeding   # Acute Kidney Injury, unspecified: based on a >150% or 0.3 mg/dL increase in last creatinine compared to past 90 day average, will monitor renal function  # Hypertension: Noted on problem list  # Chronic heart failure with preserved ejection fraction: heart failure noted on problem list and last echo with EF >50%          # DMII: A1C = 6.7 % (Ref range: <5.7 %) within past 6 months, PRESENT ON ADMISSION  # Morbid Obesity: Estimated body mass index is 45.53 kg/m  as calculated from the following:    Height as of this encounter: 1.651 m (5' 5\").    Weight as of this encounter: 124.1 kg (273 lb 9.6 oz)., PRESENT ON ADMISSION       # Financial/Environmental Concerns: none         Social Drivers of Health   Tobacco Use: High Risk (5/15/2025)    Patient " History     Smoking Tobacco Use: Every Day     Smokeless Tobacco Use: Never    Received from [facility]    Social Connections         Disposition Plan     Medically Ready for Discharge: Anticipated Tomorrow         The patient's care was discussed with the Attending Physician, Dr. Venegas.    Young Saeed MD  Hospitalist Service  [facility]  Securely message with [employer] (more info)  Text page via [employer] Paging/Directory   ______________________________________________________________________    Interval History   Today notes feeling better compared to yesterday more alert denies lightheadedness.  Abdominal pain improving.  Reports melena x 2 this morning.  Receiving albumin.  Denies chest pain or shortness of breath.  No nausea vomiting or recurrence of hematemesis.      Physical Exam   Vital Signs: Temp: 98.5  F (36.9  C) Temp src: Oral BP: 98/55 Pulse: 69   Resp: 20 SpO2: 95 % O2 Device: None (Room air)    Weight: 273 lbs 9.6 oz  General Appearance: Alert and oriented, NAD  Respiratory: normal work of breathing, clear breath sounds, no wheezing  Cardiovascular: normal S1, S2, no murmur  GI: soft, distended, tender LUQ, and no guarding.   Skin: normal turgor and appearance, no visible rash   Other: No lower limb edema B/L        Medical Decision Making             Data     I have personally reviewed the following data over the past 24 hrs:    9.7  \   7.9 (L)   / 286     128 (L) 92 (L) 46.3 (H) /  135 (H)   4.8 21 (L) 7.23 (H) \       Imaging results reviewed over the past 24 hrs:   No results found for this or any previous visit (from the past 24 hours).

## 2025-05-18 NOTE — PLAN OF CARE
Problem: Anemia  Goal: Anemia Symptom Improvement  Intervention: Monitor and Manage Anemia  Recent Flowsheet Documentation  Taken 5/18/2025 0015 by Iban Polanco RN  Safety Promotion/Fall Prevention:   clutter free environment maintained   activity supervised     Problem: Pain Acute  Goal: Optimal Pain Control and Function  Outcome: Progressing  Intervention: Prevent or Manage Pain  Recent Flowsheet Documentation  Taken 5/18/2025 0015 by Iban Polanco RN  Sensory Stimulation Regulation: care clustered  Sleep/Rest Enhancement: awakenings minimized  Bowel Elimination Promotion:   adequate fluid intake promoted   ambulation promoted  Medication Review/Management: medications reviewed     Problem: Adult Inpatient Plan of Care  Goal: Plan of Care Review  Description: The Plan of Care Review/Shift note should be completed every shift.  The Outcome Evaluation is a brief statement about your assessment that the patient is improving, declining, or no change.  This information will be displayed automatically on your shiftnote.  Outcome: Progressing  Flowsheets (Taken 5/18/2025 0538)  Plan of Care Reviewed With: patient   Goal Outcome Evaluation:      Plan of Care Reviewed With: patient    Patient denies pain except for numbness and tingling to right upper extremity. Used BiPAP overnight. Vital signs are stable. Last Hgb at 0000 was 8.1, being checked every 6 hours. NPO since midnight per orders.

## 2025-05-18 NOTE — PLAN OF CARE
Goal Outcome Evaluation:    Problem: Anemia  Goal: Anemia Symptom Improvement  Outcome: Progressing  Intervention: Monitor and Manage Anemia    Problem: Comorbidity Management  Goal: Blood Glucose Levels Within Targeted Range  Outcome: Progressing  Intervention: Monitor and Manage Glycemia     Patient alert and oriented x4, up with stand by assistance. IV albumin given per orders. Hemoglobin stable at 7.9. Bowel movements have been brown. Khan placed per nephrology order for monitoring output and high cret 7.23. Denies pain. Blood glucose 120 and 135. Will continue to monitor.     Ellie Dodson RN 5/18/2025 3:22 PM

## 2025-05-18 NOTE — PLAN OF CARE
Goal Outcome Evaluation:      Plan of Care Reviewed With: patient    Pt tolerated 1 unit of blood administration. Blood pressure continues to be in soft. Explain cares and answered pt's questions. Pt received bed bath and continued helen cares per request when trying to void or trying to have BM. Pt explained he doesn't feel he is getting proper care at his group home but the attending  is aware of the issue.

## 2025-05-18 NOTE — PROGRESS NOTES
[facility] DIGESTIVE HEALTH PROGRESS NOTE      Subjective:                - Reports he is having dark brown to black stools.  Was transfused 1 unit of PRBCs for hemoglobin of 7.2 yesterday for symptomatic anemia with repeat stable at 7.8, 8.1, and 8.0.  He denies any hematemesis.  -Fluid studies returned positive for SBP with 690 neutrophils.  This was a bloody tap and red blood cells not reported.  He was started on SBP treatment with ceftriaxone 2 g daily and was also given albumin 25 g yesterday and 50 g today.  -Creatinine is markedly up to 7 today from 1.7 on 5/16  - Reports his abdominal pain is overall improving    Objective:                  Vital signs in last 24 hrs;  Temp:  [97.6  F (36.4  C)-98.9  F (37.2  C)] 98  F (36.7  C)  Pulse:  [73-91] 73  Resp:  [18-20] 18  BP: ()/(40-56) 107/55  SpO2:  [93 %-97 %] 97 %    Physical Exam:   General: Comfortable appearing. Awake.   Cardiovascular: Regular rate. No edema  Chest: Non-labored breathing. Symmetric chest rise.   Abdomen: soft, moderately distended, mildly tender to palpation in the left midr abdomen, no rebound or guarding  Neurologic: Alert, no focal defects.     Current Labs:  CBC RESULTS:   Recent Labs   Lab Test 05/17/25  0627 05/16/25  2312 05/16/25  1746 05/16/25  0644 05/15/25  2011 05/15/25  0042   WBC  --   --   --  12.2* 14.1* 14.0*   RBC  --   --   --  3.43* 3.79* 4.74   HGB 7.0* 7.2* 8.0* 9.5* 10.6* 13.2*   HCT  --   --   --  29.9* 32.8* 41.8   MCV 87 88 88 87 87 88   MCH  --   --   --  27.7 28.0 27.8   MCHC  --   --   --  31.8 32.3 31.6   RDW  --   --   --  14.6 14.3 14.6   PLT  --   --   --  290 293 304        CMP Results:   Recent Labs   Lab Test 05/17/25  0745 05/17/25  0643 05/16/25  1948 05/16/25  1711 05/16/25  0715 05/16/25  0644 05/16/25  0246 05/15/25  2011 05/15/25  0042 05/04/25 2000   NA  --   --   --   --   --  134*  --  134* 136 137   POTASSIUM  --   --   --   --   --  5.0  --  5.0 4.2 4.4   CHLORIDE  --   --   --   --   --   97*  --  98 101 105   CO2  --   --   --   --   --  28  --  25 25 21*   ANIONGAP  --   --   --   --   --  9  --  11 10 11   *  --  126* 149*   < > 140*   < > 126* 178* 137*   BUN  --   --   --   --   --  23.0*  --  18.3 21.8* 24.8*   CR  --   --   --   --   --  1.77*  --  1.29* 1.23* 1.21*   BILITOTAL  --  0.6  --   --   --   --   --  0.6 0.3 0.3   ALKPHOS  --   --   --   --   --   --   --  178* 201* 258*   ALT  --   --   --   --   --   --   --  16 21 25   AST  --   --   --   --   --   --   --  13 18 18    < > = values in this interval not displayed.        INR Results:   Recent Labs   Lab Test 05/17/25  0643 05/15/25  0042 04/02/25  1929   INR 1.33* 1.16* 1.17*          Imaging:  [unfilled]    Assessment:       This is a 44-year-old man with cirrhosis secondary to metabolic associated liver disease and alcohol use complicated by ascites requiring biweekly paracentesis and prior SBP, COPD, AVA, obesity, hypertension, diabetes, history of DVT on apixaban who was admitted with abdominal pain following routine paracentesis.        Initial CT scan was unremarkable.  He subsequently underwent a second paracentesis 5/`6 with 4.75 L of bloody fluid removed and had a decline in his hemoglobin from 9.5->7 with CTA showing no active bleed but old blood products in the pelvis, concerning for bleeding complication from paracentesis.  Fluid studies (which were later added on bc not originally sent) also revealed elevated neutrophil count to 690.  It is possible that this number is falsely elevated due to bloody tap.  Red blood cells not reported so unable to correct for number of red blood cells in the fluid.  Being treated for SBP.  Of note he has been on suppressive therapy with Bactrim though it is possible that he has developed some resistance related to this.    Of note  creatinine today is markedly worse at 7 previously 1.72 days ago.  This is concerning for HRS in the setting of SBP.  He has been given a  total of 75 g of albumin since learning of the SBP diagnosis.    Also reports some possible melena but hemoglobin has been stable.  Had an episode of hematemesis prior to admission but none since.  Most recent EGD from April 2025 with portal hypertensive gastropathy but no varices.  He is very eager to repeat an EGD for further evaluation.    Plan:     - renal consult  - would recommend  giving another 50G of albumin today, and 100 g again tomorrow  - strict Is and Os  -Renally dose all meds  -Continue treatment for SBP with ceftriaxone, renally dosed  -Would not perform large-volume paracentesis in the setting of significant renal dysfunction/suspected HRS.  Small volume of fluid can be removed for comfort purposes if needed but would generally try to keep this under 2 L (or as per renal recs)  -With any repeat paracentesis would recommend sending cell count with differential and culture  -Consider infectious disease consult to determine what prophylactic therapy he should be placed on after he completes treatment for SBP given that he developed SBP with Bactrim use.  -Continue to hold apixaban for now  -Serial CBC, active type and screen  - continue BID PPI  - initially we discussed tentative EGD tomorrow but in light of severe renal dysfunction would hold for now unless evidence of active GIB until renal function stabilizes  -GI will follow     Total time spent today in the management of this patient (which may include  chart review, review of imaging, discussion of case with additional specialists and/or family members, face to face consultation/exam with patient, documentation, and coordination of care): 45 minutes          Radha Smith MD  Thank you for the opportunity to participate in the care of this patient.   Please feel free to call me with any questions or concerns.  Phone number [phone].          5/17/2025 8:59 AM

## 2025-05-19 LAB
ALBUMIN SERPL BCG-MCNC: 4.5 G/DL (ref 3.5–5.2)
ANION GAP SERPL CALCULATED.3IONS-SCNC: 17 MMOL/L (ref 7–15)
BASOPHILS # BLD AUTO: 0.1 10E3/UL (ref 0–0.2)
BASOPHILS NFR BLD AUTO: 1 %
BUN SERPL-MCNC: 54.5 MG/DL (ref 6–20)
CALCIUM SERPL-MCNC: 8.3 MG/DL (ref 8.8–10.4)
CHLORIDE SERPL-SCNC: 94 MMOL/L (ref 98–107)
CREAT SERPL-MCNC: 7.23 MG/DL (ref 0.67–1.17)
EGFRCR SERPLBLD CKD-EPI 2021: 9 ML/MIN/1.73M2
EOSINOPHIL # BLD AUTO: 0.4 10E3/UL (ref 0–0.7)
EOSINOPHIL NFR BLD AUTO: 4 %
ERYTHROCYTE [DISTWIDTH] IN BLOOD BY AUTOMATED COUNT: 14.7 % (ref 10–15)
GLUCOSE BLDC GLUCOMTR-MCNC: 119 MG/DL (ref 70–99)
GLUCOSE BLDC GLUCOMTR-MCNC: 120 MG/DL (ref 70–99)
GLUCOSE BLDC GLUCOMTR-MCNC: 128 MG/DL (ref 70–99)
GLUCOSE BLDC GLUCOMTR-MCNC: 130 MG/DL (ref 70–99)
GLUCOSE BLDC GLUCOMTR-MCNC: 138 MG/DL (ref 70–99)
GLUCOSE BLDC GLUCOMTR-MCNC: 156 MG/DL (ref 70–99)
GLUCOSE SERPL-MCNC: 116 MG/DL (ref 70–99)
HCO3 SERPL-SCNC: 19 MMOL/L (ref 22–29)
HCT VFR BLD AUTO: 25.8 % (ref 40–53)
HGB BLD-MCNC: 8.4 G/DL (ref 13.3–17.7)
HGB BLD-MCNC: 8.4 G/DL (ref 13.3–17.7)
HGB BLD-MCNC: 8.6 G/DL (ref 13.3–17.7)
HGB BLD-MCNC: 8.8 G/DL (ref 13.3–17.7)
IMM GRANULOCYTES # BLD: 0.1 10E3/UL
IMM GRANULOCYTES NFR BLD: 1 %
LYMPHOCYTES # BLD AUTO: 1.6 10E3/UL (ref 0.8–5.3)
LYMPHOCYTES NFR BLD AUTO: 15 %
MCH RBC QN AUTO: 28.1 PG (ref 26.5–33)
MCHC RBC AUTO-ENTMCNC: 32.6 G/DL (ref 31.5–36.5)
MCV RBC AUTO: 86 FL (ref 78–100)
MONOCYTES # BLD AUTO: 1.1 10E3/UL (ref 0–1.3)
MONOCYTES NFR BLD AUTO: 10 %
NEUTROPHILS # BLD AUTO: 7.5 10E3/UL (ref 1.6–8.3)
NEUTROPHILS NFR BLD AUTO: 71 %
NRBC # BLD AUTO: 0 10E3/UL
NRBC BLD AUTO-RTO: 0 /100
PLATELET # BLD AUTO: 334 10E3/UL (ref 150–450)
POTASSIUM SERPL-SCNC: 4.7 MMOL/L (ref 3.4–5.3)
RBC # BLD AUTO: 2.99 10E6/UL (ref 4.4–5.9)
SODIUM SERPL-SCNC: 130 MMOL/L (ref 135–145)
WBC # BLD AUTO: 10.6 10E3/UL (ref 4–11)

## 2025-05-19 PROCEDURE — 36415 COLL VENOUS BLD VENIPUNCTURE: CPT

## 2025-05-19 PROCEDURE — 82040 ASSAY OF SERUM ALBUMIN: CPT

## 2025-05-19 PROCEDURE — 120N000001 HC R&B MED SURG/OB

## 2025-05-19 PROCEDURE — 82565 ASSAY OF CREATININE: CPT

## 2025-05-19 PROCEDURE — 250N000013 HC RX MED GY IP 250 OP 250 PS 637

## 2025-05-19 PROCEDURE — 250N000011 HC RX IP 250 OP 636

## 2025-05-19 PROCEDURE — 99233 SBSQ HOSP IP/OBS HIGH 50: CPT

## 2025-05-19 PROCEDURE — 99232 SBSQ HOSP IP/OBS MODERATE 35: CPT

## 2025-05-19 PROCEDURE — 250N000013 HC RX MED GY IP 250 OP 250 PS 637: Performed by: HOSPITALIST

## 2025-05-19 PROCEDURE — 85025 COMPLETE CBC W/AUTO DIFF WBC: CPT

## 2025-05-19 PROCEDURE — 85018 HEMOGLOBIN: CPT

## 2025-05-19 RX ORDER — HYDROXYZINE HYDROCHLORIDE 25 MG/1
25 TABLET, FILM COATED ORAL DAILY PRN
Status: DISCONTINUED | OUTPATIENT
Start: 2025-05-19 | End: 2025-05-26 | Stop reason: HOSPADM

## 2025-05-19 RX ORDER — SODIUM BICARBONATE 650 MG/1
1300 TABLET ORAL 2 TIMES DAILY
Status: DISCONTINUED | OUTPATIENT
Start: 2025-05-19 | End: 2025-05-26 | Stop reason: HOSPADM

## 2025-05-19 RX ORDER — HYDROXYZINE HYDROCHLORIDE 10 MG/1
10 TABLET, FILM COATED ORAL
Status: DISCONTINUED | OUTPATIENT
Start: 2025-05-19 | End: 2025-05-26 | Stop reason: HOSPADM

## 2025-05-19 RX ADMIN — MONTELUKAST 10 MG: 10 TABLET, FILM COATED ORAL at 09:18

## 2025-05-19 RX ADMIN — METHOCARBAMOL 500 MG: 500 TABLET ORAL at 01:42

## 2025-05-19 RX ADMIN — TRAZODONE HYDROCHLORIDE 100 MG: 50 TABLET ORAL at 19:24

## 2025-05-19 RX ADMIN — ROSUVASTATIN 10 MG: 10 TABLET, FILM COATED ORAL at 19:24

## 2025-05-19 RX ADMIN — FLUOXETINE HYDROCHLORIDE 20 MG: 20 CAPSULE ORAL at 09:18

## 2025-05-19 RX ADMIN — PANTOPRAZOLE SODIUM 40 MG: 20 TABLET, DELAYED RELEASE ORAL at 09:18

## 2025-05-19 RX ADMIN — UMECLIDINIUM 1 PUFF: 62.5 AEROSOL, POWDER ORAL at 09:22

## 2025-05-19 RX ADMIN — HYDROXYZINE HYDROCHLORIDE 10 MG: 10 TABLET ORAL at 19:26

## 2025-05-19 RX ADMIN — SODIUM BICARBONATE 1300 MG: 650 TABLET ORAL at 19:23

## 2025-05-19 RX ADMIN — SODIUM BICARBONATE 1300 MG: 650 TABLET ORAL at 12:54

## 2025-05-19 RX ADMIN — ACETAMINOPHEN 325 MG: 325 TABLET ORAL at 04:51

## 2025-05-19 RX ADMIN — OLANZAPINE 10 MG: 10 TABLET, FILM COATED ORAL at 19:24

## 2025-05-19 RX ADMIN — FLUTICASONE FUROATE AND VILANTEROL TRIFENATATE 1 PUFF: 100; 25 POWDER RESPIRATORY (INHALATION) at 09:22

## 2025-05-19 RX ADMIN — HYDROXYZINE HYDROCHLORIDE 25 MG: 25 TABLET, FILM COATED ORAL at 05:30

## 2025-05-19 RX ADMIN — LEVOTHYROXINE SODIUM 12.5 MCG: 0.03 TABLET ORAL at 09:18

## 2025-05-19 RX ADMIN — PANTOPRAZOLE SODIUM 40 MG: 20 TABLET, DELAYED RELEASE ORAL at 17:10

## 2025-05-19 RX ADMIN — CEFTRIAXONE SODIUM 2 G: 2 INJECTION, POWDER, FOR SOLUTION INTRAMUSCULAR; INTRAVENOUS at 17:10

## 2025-05-19 ASSESSMENT — ACTIVITIES OF DAILY LIVING (ADL)
ADLS_ACUITY_SCORE: 61

## 2025-05-19 NOTE — PLAN OF CARE
Goal Outcome Evaluation:               Problem: Adult Inpatient Plan of Care  Goal: Optimal Comfort and Wellbeing  Outcome: Progressing    Encouraged pt to assist with self cares. Explained cares and follow daily routines to allow pt time to rest. Notified provider regarding pt's reporting having bladder spasm. Provider recommends pt taking Tylenol. Encouraged and assisted pt with repositioning with use of pillows to decrease risk of pressure sores.

## 2025-05-19 NOTE — PROGRESS NOTES
Care Management Follow Up    Length of Stay (days): 3    Expected Discharge Date: 05/20/2025    Anticipated Discharge Plan:  Group Home    Transportation: Anticipate  staff     PT Recommendations:    OT Recommendations:        Barriers to Discharge: medical stability    Prior Living Situation: group home with other (see comments)    Discussed  Partnership in Safe Discharge Planning  document with patient/family: No     Handoff Completed: No, handoff not indicated or clinically appropriate    Patient/Spokesperson Updated: No    Additional Information:  Chart reviewed. Anticipate return to group home when medically ready     Next Steps: continue to follow     Tricia Moreno RN

## 2025-05-19 NOTE — PROVIDER NOTIFICATION
"Pt is complaining of \"sand paper\" like feeling and itching to his bilateral lower extremities. Dr. Alfred villalta MD saw patient and ordered prn Atarax 25 mg po every day prn.   "

## 2025-05-19 NOTE — PROGRESS NOTES
RENAL PROGRESS NOTE    CC:  RIVERA on CKD    ASSESSMENT & PLAN:     PLAN:   -Continue expectant management (avoid nephrotoxins, renal dose medications, avoid hyper/Hypotension, daily wt, daily I/O), no urgent indications for dialysis. ALIIVA Typically peaks 3-5 days post exposure, hopefully renal function plateauing and we will soon see recovery.   -start oral sodium bicarbonate.   -HOLD ACE  -PRN diuresis   -Has OP Nephrology follow up Monday 6/23/25 130 pm with Renee WALKER daily    Non-oliguric RIVERA on CKD2; has some mild underlying CKD (BL Cr 1.1-1.2); now ARF likely 2/2 ALIVIA (2 dye loads 5/15, 5/17), +/- Variable hemodynamics (Low BP s/p belly tap while on ACE and diuretics). urine bland, Renal US No Leeds/MAss. ; no indication for dialysis today and hope pt will plateau and improved; try to optimize hemodynamics; doubt HRS as primary cause of ARF but liver hemodynamics can be an  unfavorable environment for renal recovery; hold bactrim for now    Metabolic acidosis: 2/2 RIVERA. start Bicarb.     SBP: long history of presenting with ab pain. on ABX/Tap consistent with SBP and supportive cares. s/p paracentesis.    Anemia: concern for GI loss; following and transfuse prn. S/P blood transfusion alst wk. hgb stable    ?GIB: possible EGD today. On PPI, HELD AC (on prior for H/O DVT)GI following    Uro: bladder partly decompressed on imaging. S/p ham to monitor I/O    Hyponatremia: 2/2 Hypervolemia with chronic liver disease    Volume:  Hypervolemic with scites and edema; typically on diuretics, current PRN diuresis pending UO.     Hypothyroid; on replacement    Developmental delay, fetal ETOH syndrome, PTSD    H/o DVT:  holding eliquis    DM:  holding jardiance and metformin    SUBJECTIVE:    Pt resting in bed, appears comfortable  Mother present via phone  Denies HA, feeling dizzy, SOB, fever,r rickie , Edema, CP  Report increased UO  Wt maintaining, VSS  ALIVIA 5/15, 5/17 CTA abd 90 ml Isovue 370, while on  ACE/Diuresis/Softer BPs  S/p Albumin  Appetite good  Denies s/s of hyponatremia  Discussed labs/plan and answered all questions       OBJECTIVE:  Physical Exam   Temp: 97.5  F (36.4  C) Temp src: Oral BP: 125/72 Pulse: 82   Resp: 18 SpO2: 98 % O2 Device: None (Room air)    Vitals:    05/16/25 1709 05/18/25 1230 05/19/25 0644   Weight: 122.3 kg (269 lb 11.2 oz) 124.1 kg (273 lb 9.6 oz) 125 kg (275 lb 9.2 oz)     Vital Signs with Ranges  Temp:  [97.5  F (36.4  C)-98.6  F (37  C)] 97.5  F (36.4  C)  Pulse:  [67-87] 82  Resp:  [18-20] 18  BP: ()/(54-72) 125/72  SpO2:  [95 %-98 %] 98 %  I/O last 3 completed shifts:  In: 938 [P.O.:938]  Out: 1200 [Urine:1200]  Patient Vitals for the past 72 hrs:   Weight   05/19/25 0644 125 kg (275 lb 9.2 oz)   05/18/25 1230 124.1 kg (273 lb 9.6 oz)   05/16/25 1709 122.3 kg (269 lb 11.2 oz)     Intake/Output Summary (Last 24 hours) at 5/19/2025 1053  Last data filed at 5/19/2025 0618  Gross per 24 hour   Intake 938 ml   Output 1200 ml   Net -262 ml       PHYSICAL EXAM:  GEN: NAD, aox3  CV: RRR   Lung: clear and equal  Ab: soft and NT, + ascites  Ext: +trace LLE BL and well perfused  Skin: no rash  Neuro: NFD, no asterixsis      LABORATORY STUDIES:     Recent Labs   Lab 05/19/25  0606 05/18/25  2335 05/18/25  1744 05/18/25  1133 05/18/25  0644 05/18/25  0000 05/16/25  1746 05/16/25  0644 05/15/25  2011 05/15/25  0042   WBC 10.6  --   --   --  9.7  --   --  12.2* 14.1* 14.0*   RBC 2.99*  --   --   --  2.84*  --   --  3.43* 3.79* 4.74   HGB 8.4*  8.4* 7.9* 8.0* 7.9* 8.0*  8.0* 8.1*   < > 9.5* 10.6* 13.2*   HCT 25.8*  --   --   --  24.8*  --   --  29.9* 32.8* 41.8     --   --   --  286  --   --  290 293 304    < > = values in this interval not displayed.       Basic Metabolic Panel:  Recent Labs   Lab 05/19/25  0846 05/19/25  0726 05/19/25  0606 05/19/25  0251 05/18/25  1945 05/18/25  1702 05/18/25  1202 05/18/25  0920 05/18/25  0816 05/18/25  0644 05/16/25  0715 05/16/25  0644  05/16/25  0246 05/15/25  2011 05/15/25  0042   NA  --   --  130*  --   --   --   --  128*  --  131*  --  134*  --  134* 136   POTASSIUM  --   --  4.7  --   --   --   --  4.8  --  5.1  --  5.0  --  5.0 4.2   CHLORIDE  --   --  94*  --   --   --   --  92*  --  94*  --  97*  --  98 101   CO2  --   --  19*  --   --   --   --  21*  --  22  --  28 -- 25 25   BUN  --   --  54.5*  --   --   --   --  46.3*  --  45.1*  --  23.0*  --  18.3 21.8*   CR  --   --  7.23*  --   --   --   --  7.23*  --  7.09*  --  1.77*  --  1.29* 1.23*   * 128* 116* 119* 138* 159*   < > 110*   < > 105*   < > 140*   < > 126* 178*   WES  --   --  8.3*  --   --   --   --  7.5*  --  7.4*  --  8.2*  --  9.1 8.9    < > = values in this interval not displayed.       INR  Recent Labs   Lab 05/17/25  0643 05/15/25  0042   INR 1.33* 1.16*        Recent Labs   Lab Test 05/19/25  0606 05/18/25  2335 05/18/25  1133 05/18/25  0644 05/17/25  1109 05/17/25  0643 05/15/25  2011 05/15/25  0042   INR  --   --   --   --   --  1.33*  --  1.16*   WBC 10.6  --   --  9.7  --   --    < > 14.0*   HGB 8.4*  8.4* 7.9*   < > 8.0*  8.0*   < >  --    < > 13.2*     --   --  286  --   --    < > 304    < > = values in this interval not displayed.       Personally reviewed current labs    Abbey Marrero FNP-BC  [facility]  [phone]

## 2025-05-19 NOTE — PROGRESS NOTES
[facility] DIGESTIVE HEALTH PROGRESS NOTE      Subjective:              Warren is feeling okay today.  No abdominal pain.  No further episodes of vomiting or melena.    Objective:                Body mass index is 45.86 kg/m .  Vital signs in last 24 hrs;  Temp:  [97.5  F (36.4  C)-98.6  F (37  C)] 97.5  F (36.4  C)  Pulse:  [67-87] 82  Resp:  [18-20] 18  BP: (123-128)/(54-72) 125/72  SpO2:  [95 %-98 %] 98 %    Physical Exam:   General: Comfortable appearing. Awake.   Cardiovascular: Regular rate.   Chest: Non-labored breathing. Symmetric chest rise.   Abdomen: Distended abdomen.  Soft.  Not tense.  Neurologic: Alert, no focal defects.  Very pleasant.    Current Labs:  CMP Results:   Recent Labs   Lab 05/19/25  1200 05/19/25  0846 05/19/25  0726 05/19/25  0606 05/18/25  1202 05/18/25  0920 05/18/25  0816 05/18/25  0644 05/17/25  0745 05/17/25  0643 05/16/25  0715 05/16/25  0644 05/16/25  0246 05/15/25  2011 05/15/25  0042   NA  --   --   --  130*  --  128*  --  131*  --   --   --  134*  --  134* 136   POTASSIUM  --   --   --  4.7  --  4.8  --  5.1  --   --   --  5.0  --  5.0 4.2   CHLORIDE  --   --   --  94*  --  92*  --  94*  --   --   --  97*  --  98 101   CO2  --   --   --  19*  --  21*  --  22  --   --   --  28  --  25 25   * 120* 128* 116*   < > 110*   < > 105*   < >  --    < > 140*   < > 126* 178*   BUN  --   --   --  54.5*  --  46.3*  --  45.1*  --   --   --  23.0*  --  18.3 21.8*   CR  --   --   --  7.23*  --  7.23*  --  7.09*  --   --   --  1.77*  --  1.29* 1.23*   BILITOTAL  --   --   --   --   --   --   --   --   --  0.6  --   --   --  0.6 0.3   ALKPHOS  --   --   --   --   --   --   --   --   --   --   --   --   --  178* 201*   ALT  --   --   --   --   --   --   --   --   --   --   --   --   --  16 21   AST  --   --   --   --   --   --   --   --   --   --   --   --   --  13 18    < > = values in this interval not displayed.      CBC  Recent Labs   Lab 05/19/25  1123 05/19/25  0606 05/18/25  2314  05/18/25  1744 05/18/25  1133 05/18/25  0644 05/16/25  1746 05/16/25  0644 05/15/25  2011   WBC  --  10.6  --   --   --  9.7  --  12.2* 14.1*   RBC  --  2.99*  --   --   --  2.84*  --  3.43* 3.79*   HGB 8.6* 8.4*  8.4* 7.9* 8.0*   < > 8.0*  8.0*   < > 9.5* 10.6*   HCT  --  25.8*  --   --   --  24.8*  --  29.9* 32.8*   MCV 86 86  86 86 86   < > 87  87   < > 87 87   RDW  --  14.7  --   --   --  14.6  --  14.6 14.3   PLT  --  334  --   --   --  286  --  290 293    < > = values in this interval not displayed.     INR  Recent Labs   Lab 05/17/25 0643 05/15/25  0042   INR 1.33* 1.16*      LIPASE  Recent Labs   Lab 05/15/25  0042   LIPASE 22     MELD 3.0: 27 at 5/19/2025 11:23 AM  MELD-Na: 27 at 5/19/2025  6:06 AM  Calculated from:  Serum Creatinine: 7.23 mg/dL (Using max of 3 mg/dL) at 5/19/2025  6:06 AM  Serum Sodium: 130 mmol/L at 5/19/2025  6:06 AM  Total Bilirubin: 0.6 mg/dL (Using min of 1 mg/dL) at 5/17/2025  6:43 AM  Serum Albumin: 4.5 g/dL (Using max of 3.5 g/dL) at 5/19/2025 11:23 AM  INR(ratio): 1.33 at 5/17/2025  6:43 AM  Age at listing (hypothetical): 44 years  Sex: Male at 5/19/2025 11:23 AM      Relevant Imaging:  No new abdominal imaging    Assessment:       This is a 44-year-old man with cirrhosis secondary to metabolic dysfunction associated steatotic liver disease and alcohol use, complicated by ascites requiring frequent  paracentesis and prior SBP, COPD, AVA, obesity, hypertension, diabetes, history of DVT on apixaban who was admitted with abdominal pain following routine paracentesis.         # Decompensated MASLD/alcohol-related cirrhosis  -Complicated by ascites requiring frequent paracenteses.  -SBP-developed SBP on 5/16 paracentesis despite being on Bactrim for SBP prophylaxis.  Complicating factor is the the tap was bloody.  No RBCs reported so cannot correct for possible large number of RBCs.  -EV -none present.  EGD April 4, 2025 showed portal hypertensive gastropathy but no esophageal  varices.    # RIVERA  -Nephrology following.  Suspects injury from IV contrast.    - The patient has received albumin for volume expansion, SBP.      # Melena, hematemesis?    - Patient reportedly had hematemesis prior to admission.    - Hemoglobin also reports some possible melena but hemoglobin has been stable.  Had an episode of hematemesis prior to admission but none since.  Most recent EGD from April 2025 with portal hypertensive gastropathy but no varices.  He is very eager to repeat an EGD for further evaluation.      - Hemoglobin is down here.  This is likely related to traumatic paracentesis.  His hemoglobin has now been mostly stable since May 16.    Plan:     -Continue SBP treatment.  Plan for 7-day course.  -Will plan for repeat diagnostic paracentesis tomorrow to ensure he is responding.  Given recent hospitalizations, he may require broader spectrum antibiotics.  - Will defer additional albumin to nephrology  - Consider ID consult for further guidance on SBP prophylaxis given he developed SBP on Bactrim.  - No plans for EGD at this time given stable hemoglobin and no further evidence of GI bleeding.  - No plans for TIPS given RIVERA and SBP.  This was discussed with the patient's guardian, Jesika Harden.  - We will follow along.    38 minutes of total time was spent providing patient care, including patient evaluation, reviewing documentation/test results and .           Anurag Gill, DO  Thank you for the opportunity to participate in the care of this patient.   Please feel free to call me with any questions or concerns.  Phone number [phone].

## 2025-05-19 NOTE — PLAN OF CARE
Goal Outcome Evaluation:    Problem: Pain Acute  Goal: Optimal Pain Control and Function  Intervention: Prevent or Manage Pain  Recent Flowsheet Documentation  Taken 5/19/2025 0920 by Charmaine Lester RN  Medication Review/Management: medications reviewed     Problem: Anemia  Goal: Anemia Symptom Improvement  Intervention: Monitor and Manage Anemia  Recent Flowsheet Documentation  Taken 5/19/2025 0920 by Charmaine Lester, RN  Safety Promotion/Fall Prevention: activity supervised     Pt AO x4. Denies pain. Up with SBA, able to make needs know.

## 2025-05-19 NOTE — PROGRESS NOTES
[facility]    Progress Note - Hospitalist Service       Date of Admission:  5/15/2025    Assessment & Plan   Warren Jaramillo is a 44 year old male admitted on 5/15/2025. He has a history of HTN, type 2 diabetes, AVA, COPD, Rojas's disease and hepatic cirrhosis with ascites and is admitted for ongoing abdominal pain, nausea vomiting with an episode of hematamesis and diarrhea. Now reporting melena and has a significant Hgb drop, repeat CTA negative for acute bleed. GI following.     Patient initially admitted by the hospitalist team and transferred to our care on day one of admission.    Abdominal pain  Cirrhosis with ascites/frequent paracentesis   Episode of hematemesis   Melena   Acute blood loss anemia   SBP  Patient presented ED with worsening ongoing abdominal pain.  Patient has history of hepatic cirrhosis and ascites with frequent paracentesis.  Recently had 4 L removed.  Has been complaining of left lower quadrant abdominal discomfort.  Patient also notes 1 episode hematemesis prior to admission.  Patient was vitally stable on admission and CT abdomen pelvis with no acute findings except for mild ascites.  GI consulted on admission with recommendation as below.  Of note per chart review patient recently had upper endoscopy in April 2025 with portal hypertensive changes in the stomach but negative for varices.  Enteric panel negative.  Abdominal pain is likely abdominal wall pain due to ascites.  Hide/16 overnight patient's hemoglobin continue to trend down from 9.5-7.2.  Repeat CTA abdomen pelvis with no signs of acute bleeding and showed small volume abdominopelvic ascites more dense could be compatible with internal blood products.  Patient was hypotensive and reporting lightheadedness this morning received 1 unit of blood and albumin.  Will continue to trend hemoglobin.  Discussed with GI that patient is reporting melena x 2 on 5/17-18. Initial recs to keep n.p.o. at  midnight and continue tending hgb for possible EGD , now with ARF and stable hgb, GI deferred EGD for now will continue monitoring.  -GI consulted, appreciate recs    -Diagnostic paracentesis with ascitic fluid analysis and cytology    -Albumin infusion     -Continue daily hgb checks    -s/p unit blood transfusion and albumin x1, 5/17    -Outpatient follow-up for liver biopsy and HVPG assessment in June    -Continue ceftriaxone for SBP    Hypotension-resolved  S/p dx paracentesis 5/17 5/17 Patient is s/p paracentesis. RN paged blood pressure with SBP mid 80s and patient feeling lightheaded.  On arrival to the room patient reports lightheadedness is improving placed in the Trendelenburg position and just recently started albumin infusion.  Will review the chart to see how much fluid was removed during paracentesis.  Monitor closely. Hgb is trending down as above, continues to have soft BP, receiving one unit of blood and albumin x1.  - Continue albumin infusion  - Monitor BP closely  - q 6hr hgb checks    RIVERA on CKD.   ARF  Hyponatremia   On admission creatinine 1.7. creatinine abruptly elevated to 7.0 this morning, in the setting of cirrhosis with ascites, SBP, and acute blood loss anemia likely UGIB. RIVERA likely multifactorial--volume loss, SBP with hypotension and contrast exposure. Nephrology consulted, appreciate recs.   -Daily BMP  -Nephrology consulted, appreciate recs  -Albumin 100 g 5/18, will await nephrology recs today  -Renally dose meds  -monitor urine output, ham in place    Acute on chronic microcytic anemia  Presented with abdominal pain and one episode of hematemesis.  Admitted with a hemoglobin of 13.2, downtrending 9.5.  Will continue to monitor closely.  GI following no plan for EGD at this time.  Per chart review patient had EGD one month ago with findings of portal gastropathy and no varices.  Will continue to monitor closely.  -Daily CBC  -continue to Hold DOAC.    History of HFpEF  History  "of HFpEF.  Most recent echo with EF 60 to 65%.  No signs of CHF exacerbation.  -Continue PTA Lasix and spironolactone    Type 2 diabetes  Most recent A1c 6.7.  On PTA metformin and Jardiance.  -Hold PTA metformin and Jardiance  -Diabetic diet  -Start sliding scale insulin    Hx of DVT  History of occlusive DVT diagnosed in 2024. On PTA apixaban held as above for anemia with concern for bleeding as above.  - Hold PTA apixaban  - Outpatient follow-up to determine length of anticoagulation.      Chronic conditions:  Hypothyroidism: Continue PTA levothyroxine  HTN: hold PTA lisinopril  AVA; BiPAP as needed for sleep  Mood disorder: Continue PTA fluoxetine and olanzapine                Diet: Fluid restriction 2000 ML FLUID  Renal Diet (non-dialysis)    DVT Prophylaxis: DOAC  Khan Catheter: PRESENT, indication: Acute retention or obstruction  Fluids: PO  Lines: None     Cardiac Monitoring: None  Code Status: Full Code      Clinically Significant Risk Factors         # Hyponatremia: Lowest Na = 128 mmol/L in last 2 days, will monitor as appropriate  # Hypochloremia: Lowest Cl = 92 mmol/L in last 2 days, will monitor as appropriate  # Hypocalcemia: Lowest Ca = 7.4 mg/dL in last 2 days, will monitor and replace as appropriate        # Coagulation Defect: INR = 1.33 (Ref range: 0.85 - 1.15) and/or PTT = 35 Seconds (Ref range: 22 - 38 Seconds), will monitor for bleeding   # Acute Kidney Injury, unspecified: based on a >150% or 0.3 mg/dL increase in last creatinine compared to past 90 day average, will monitor renal function  # Hypertension: Noted on problem list  # Chronic heart failure with preserved ejection fraction: heart failure noted on problem list and last echo with EF >50%          # DMII: A1C = 6.7 % (Ref range: <5.7 %) within past 6 months, PRESENT ON ADMISSION  # Morbid Obesity: Estimated body mass index is 45.86 kg/m  as calculated from the following:    Height as of this encounter: 1.651 m (5' 5\").    Weight as " of this encounter: 125 kg (275 lb 9.2 oz)., PRESENT ON ADMISSION       # Financial/Environmental Concerns: none         Social Drivers of Health   Tobacco Use: High Risk (5/15/2025)    Patient History     Smoking Tobacco Use: Every Day     Smokeless Tobacco Use: Never    Received from [facility]    Social Connections         Disposition Plan     Medically Ready for Discharge: Anticipated Tomorrow         The patient's care was discussed with the Attending Physician, Dr. Venegas.    Young Saeed MD  Hospitalist Service  [facility]  Securely message with [employer] (more info)  Text page via "[employer] Paging/Directory   ______________________________________________________________________    Interval History   Acute events overnight.  Had itching on bilateral lower extremity last night that has since resolved.  No melena today.  Denies chest pain or shortness of breath or lightheadedness.  No nausea or vomiting.    Today notes feeling better compared to yesterday more alert denies lightheadedness.  Abdominal pain improving.  Reports melena x 2 this morning.  Receiving albumin.  Denies chest pain or shortness of breath.  No nausea vomiting or recurrence of hematemesis.      Physical Exam   Vital Signs: Temp: 97.5  F (36.4  C) Temp src: Oral BP: 125/72 Pulse: 82   Resp: 18 SpO2: 98 % O2 Device: None (Room air)    Weight: 275 lbs 9.2 oz  General Appearance: Alert and oriented, NAD  Respiratory: normal work of breathing, clear breath sounds, no wheezing  Cardiovascular: normal S1, S2, no murmur  GI: soft, distended, tender LUQ, and no guarding.   Skin: normal turgor and appearance, no visible rash   Other: No lower limb edema B/L        Medical Decision Making             Data     I have personally reviewed the following data over the past 24 hrs:    10.6  \   8.6 (L)   / 334     130 (L) 94 (L) 54.5 (H) /  130 (H)   4.7 19 (L) 7.23 (H) \     ALT: N/A AST: N/A AP: N/A  TBILI: N/A   ALB: 4.5 TOT PROTEIN: N/A LIPASE: N/A       Imaging results reviewed over the past 24 hrs:   No results found for this or any previous visit (from the past 24 hours).

## 2025-05-19 NOTE — PLAN OF CARE
Goal Outcome Evaluation:      Plan of Care Reviewed With: patient  Patient is alert and oriented x 4. Complained of insomnia due to muscle spasms to his legs and numbness to the right thigh. Given prn Robaxin at 0142 and Tylenol 325 mg at 0451. Later, described feelings to his legs sand-paper like. Denies pain. Pt is restless, uses his home BiPAP intermittently throughout the night. 2 am . Last Hgb 7.9, checked every 6 hours.       Problem: Adult Inpatient Plan of Care  Goal: Plan of Care Review  Description: The Plan of Care Review/Shift note should be completed every shift.  The Outcome Evaluation is a brief statement about your assessment that the patient is improving, declining, or no change.  This information will be displayed automatically on your shiftnote.  Outcome: Progressing  Flowsheets (Taken 5/19/2025 0518)  Plan of Care Reviewed With: patient     Problem: Comorbidity Management  Goal: Blood Glucose Levels Within Targeted Range  Outcome: Progressing  Intervention: Monitor and Manage Glycemia  Recent Flowsheet Documentation  Taken 5/18/2025 3949 by Iban Polanco RN  Medication Review/Management: medications reviewed     Problem: Anemia  Goal: Anemia Symptom Improvement  Outcome: Progressing  Intervention: Monitor and Manage Anemia  Recent Flowsheet Documentation  Taken 5/18/2025 7961 by Iban Polanco RN  Safety Promotion/Fall Prevention:   clutter free environment maintained   activity supervised

## 2025-05-20 ENCOUNTER — APPOINTMENT (OUTPATIENT)
Dept: ULTRASOUND IMAGING | Facility: HOSPITAL | Age: 45
DRG: 371 | End: 2025-05-20
Attending: INTERNAL MEDICINE
Payer: COMMERCIAL

## 2025-05-20 LAB
% LINING CELLS, BODY FLUID: 7 %
ANION GAP SERPL CALCULATED.3IONS-SCNC: 14 MMOL/L (ref 7–15)
APPEARANCE FLD: ABNORMAL
BASOPHILS # BLD AUTO: 0.1 10E3/UL (ref 0–0.2)
BASOPHILS NFR BLD AUTO: 1 %
BUN SERPL-MCNC: 61.2 MG/DL (ref 6–20)
CALCIUM SERPL-MCNC: 8.2 MG/DL (ref 8.8–10.4)
CHLORIDE SERPL-SCNC: 97 MMOL/L (ref 98–107)
COLOR FLD: ABNORMAL
CREAT SERPL-MCNC: 6.78 MG/DL (ref 0.67–1.17)
EGFRCR SERPLBLD CKD-EPI 2021: 10 ML/MIN/1.73M2
EOSINOPHIL # BLD AUTO: 0.4 10E3/UL (ref 0–0.7)
EOSINOPHIL NFR BLD AUTO: 4 %
ERYTHROCYTE [DISTWIDTH] IN BLOOD BY AUTOMATED COUNT: 15 % (ref 10–15)
GLUCOSE BLDC GLUCOMTR-MCNC: 117 MG/DL (ref 70–99)
GLUCOSE BLDC GLUCOMTR-MCNC: 117 MG/DL (ref 70–99)
GLUCOSE BLDC GLUCOMTR-MCNC: 128 MG/DL (ref 70–99)
GLUCOSE BLDC GLUCOMTR-MCNC: 138 MG/DL (ref 70–99)
GLUCOSE SERPL-MCNC: 109 MG/DL (ref 70–99)
HCO3 SERPL-SCNC: 22 MMOL/L (ref 22–29)
HCT VFR BLD AUTO: 25.3 % (ref 40–53)
HGB BLD-MCNC: 8.1 G/DL (ref 13.3–17.7)
HGB BLD-MCNC: 8.3 G/DL (ref 13.3–17.7)
HGB BLD-MCNC: 8.4 G/DL (ref 13.3–17.7)
HGB BLD-MCNC: 8.5 G/DL (ref 13.3–17.7)
HGB BLD-MCNC: 8.5 G/DL (ref 13.3–17.7)
IMM GRANULOCYTES # BLD: 0 10E3/UL
IMM GRANULOCYTES NFR BLD: 0 %
LYMPHOCYTES # BLD AUTO: 1.6 10E3/UL (ref 0.8–5.3)
LYMPHOCYTES NFR BLD AUTO: 18 %
LYMPHOCYTES NFR FLD MANUAL: 25 %
MCH RBC QN AUTO: 29 PG (ref 26.5–33)
MCHC RBC AUTO-ENTMCNC: 33.6 G/DL (ref 31.5–36.5)
MCV RBC AUTO: 85 FL (ref 78–100)
MCV RBC AUTO: 86 FL (ref 78–100)
MCV RBC AUTO: 86 FL (ref 78–100)
MCV RBC AUTO: 87 FL (ref 78–100)
MCV RBC AUTO: 88 FL (ref 78–100)
MONOCYTES # BLD AUTO: 1.3 10E3/UL (ref 0–1.3)
MONOCYTES NFR BLD AUTO: 14 %
MONOS+MACROS NFR FLD MANUAL: 55 %
NEUTROPHILS # BLD AUTO: 5.8 10E3/UL (ref 1.6–8.3)
NEUTROPHILS # FLD: 536 /UL (ref ?–250)
NEUTROPHILS NFR BLD AUTO: 63 %
NEUTS BAND NFR FLD MANUAL: 13 %
NRBC # BLD AUTO: 0 10E3/UL
NRBC BLD AUTO-RTO: 0 /100
PATH REPORT.COMMENTS IMP SPEC: NORMAL
PATH REPORT.FINAL DX SPEC: NORMAL
PATH REPORT.GROSS SPEC: NORMAL
PATH REPORT.MICROSCOPIC SPEC OTHER STN: NORMAL
PLATELET # BLD AUTO: 347 10E3/UL (ref 150–450)
POTASSIUM SERPL-SCNC: 4.8 MMOL/L (ref 3.4–5.3)
RBC # BLD AUTO: 2.93 10E6/UL (ref 4.4–5.9)
SODIUM SERPL-SCNC: 133 MMOL/L (ref 135–145)
SPECIMEN SOURCE FLD: ABNORMAL
WBC # BLD AUTO: 9.2 10E3/UL (ref 4–11)
WBC # FLD AUTO: 4123 /UL

## 2025-05-20 PROCEDURE — 272N000710 US PARACENTESIS WITHOUT ALBUMIN

## 2025-05-20 PROCEDURE — 99232 SBSQ HOSP IP/OBS MODERATE 35: CPT

## 2025-05-20 PROCEDURE — 85018 HEMOGLOBIN: CPT

## 2025-05-20 PROCEDURE — 82310 ASSAY OF CALCIUM: CPT

## 2025-05-20 PROCEDURE — 250N000013 HC RX MED GY IP 250 OP 250 PS 637: Performed by: FAMILY MEDICINE

## 2025-05-20 PROCEDURE — 99233 SBSQ HOSP IP/OBS HIGH 50: CPT

## 2025-05-20 PROCEDURE — 85004 AUTOMATED DIFF WBC COUNT: CPT

## 2025-05-20 PROCEDURE — 250N000013 HC RX MED GY IP 250 OP 250 PS 637: Performed by: HOSPITALIST

## 2025-05-20 PROCEDURE — 87070 CULTURE OTHR SPECIMN AEROBIC: CPT | Performed by: INTERNAL MEDICINE

## 2025-05-20 PROCEDURE — 36415 COLL VENOUS BLD VENIPUNCTURE: CPT

## 2025-05-20 PROCEDURE — 120N000001 HC R&B MED SURG/OB

## 2025-05-20 PROCEDURE — 89051 BODY FLUID CELL COUNT: CPT | Performed by: INTERNAL MEDICINE

## 2025-05-20 PROCEDURE — 250N000013 HC RX MED GY IP 250 OP 250 PS 637

## 2025-05-20 PROCEDURE — 250N000011 HC RX IP 250 OP 636

## 2025-05-20 RX ADMIN — MONTELUKAST 10 MG: 10 TABLET, FILM COATED ORAL at 09:14

## 2025-05-20 RX ADMIN — OLANZAPINE 10 MG: 10 TABLET, FILM COATED ORAL at 19:22

## 2025-05-20 RX ADMIN — FLUOXETINE HYDROCHLORIDE 20 MG: 20 CAPSULE ORAL at 09:16

## 2025-05-20 RX ADMIN — ROSUVASTATIN 10 MG: 10 TABLET, FILM COATED ORAL at 19:22

## 2025-05-20 RX ADMIN — UMECLIDINIUM 1 PUFF: 62.5 AEROSOL, POWDER ORAL at 09:16

## 2025-05-20 RX ADMIN — LEVOTHYROXINE SODIUM 12.5 MCG: 0.03 TABLET ORAL at 09:15

## 2025-05-20 RX ADMIN — SODIUM BICARBONATE 1300 MG: 650 TABLET ORAL at 19:21

## 2025-05-20 RX ADMIN — PANTOPRAZOLE SODIUM 40 MG: 20 TABLET, DELAYED RELEASE ORAL at 16:16

## 2025-05-20 RX ADMIN — FAMOTIDINE 20 MG: 20 TABLET, FILM COATED ORAL at 09:15

## 2025-05-20 RX ADMIN — TRAZODONE HYDROCHLORIDE 100 MG: 50 TABLET ORAL at 19:21

## 2025-05-20 RX ADMIN — SODIUM BICARBONATE 1300 MG: 650 TABLET ORAL at 09:15

## 2025-05-20 RX ADMIN — FLUTICASONE FUROATE AND VILANTEROL TRIFENATATE 1 PUFF: 100; 25 POWDER RESPIRATORY (INHALATION) at 09:16

## 2025-05-20 RX ADMIN — PANTOPRAZOLE SODIUM 40 MG: 20 TABLET, DELAYED RELEASE ORAL at 09:15

## 2025-05-20 RX ADMIN — CEFTRIAXONE SODIUM 2 G: 2 INJECTION, POWDER, FOR SOLUTION INTRAMUSCULAR; INTRAVENOUS at 16:16

## 2025-05-20 ASSESSMENT — ACTIVITIES OF DAILY LIVING (ADL)
ADLS_ACUITY_SCORE: 61
ADLS_ACUITY_SCORE: 57
ADLS_ACUITY_SCORE: 61
ADLS_ACUITY_SCORE: 58
ADLS_ACUITY_SCORE: 61
ADLS_ACUITY_SCORE: 58
ADLS_ACUITY_SCORE: 61
ADLS_ACUITY_SCORE: 57
ADLS_ACUITY_SCORE: 57
ADLS_ACUITY_SCORE: 61
ADLS_ACUITY_SCORE: 59
ADLS_ACUITY_SCORE: 58
ADLS_ACUITY_SCORE: 57
ADLS_ACUITY_SCORE: 57
ADLS_ACUITY_SCORE: 58
ADLS_ACUITY_SCORE: 61
ADLS_ACUITY_SCORE: 58
ADLS_ACUITY_SCORE: 61
ADLS_ACUITY_SCORE: 58
ADLS_ACUITY_SCORE: 61

## 2025-05-20 NOTE — PLAN OF CARE
Problem: Comorbidity Management  Goal: Blood Glucose Levels Within Targeted Range  5/20/2025 0553 by Sandy Rehman RN  Outcome: Progressing  5/20/2025 0553 by Sandy Rehman RN  Outcome: Progressing  Intervention: Monitor and Manage Glycemia  Recent Flowsheet Documentation  Taken 5/20/2025 0000 by Sandy Rehman RN  Medication Review/Management: medications reviewed     Problem: Pain Acute  Goal: Optimal Pain Control and Function  5/20/2025 0553 by Sandy Rehman RN  Outcome: Progressing  5/20/2025 0553 by Sandy Rehman RN  Outcome: Progressing  Intervention: Prevent or Manage Pain  Recent Flowsheet Documentation  Taken 5/20/2025 0000 by Sandy Rehman RN  Sensory Stimulation Regulation: care clustered  Medication Review/Management: medications reviewed     Problem: Anemia  Goal: Anemia Symptom Improvement  5/20/2025 0553 by Sandy Rehman RN  Outcome: Progressing  5/20/2025 0553 by Sandy Rehman RN  Outcome: Progressing  Intervention: Monitor and Manage Anemia  Recent Flowsheet Documentation  Taken 5/20/2025 0000 by Sandy Rehman RN  Safety Promotion/Fall Prevention:   clutter free environment maintained   activity supervised   Goal Outcome Evaluation:       Pt AxO x4, VSS on RA CPAP overnight, denies pain, BS good at bedtime refused 0200 check. Cares clustered tonight. Khan patent. No significant events Hgb check 0000 8.3

## 2025-05-20 NOTE — PLAN OF CARE
Problem: Adult Inpatient Plan of Care  Goal: Optimal Comfort and Wellbeing  Outcome: Progressing   Goal Outcome Evaluation:      Plan of Care Reviewed With: patient    Pt explained he did not get much sleep yesterday and would like to be able to fall asleep/get more rest tonight. Notified pharmacist to adjust medication times to follow pt's daily routine. Notified and requested PRN bedtime medication from provider to help pt sleep. Cluster care to support pt with having more time to rest/sleep. Pt steady and using walker to walk in room. Denies pain, nausea and dizziness. Pt involved with cares.

## 2025-05-20 NOTE — PLAN OF CARE
Problem: Adult Inpatient Plan of Care  Goal: Optimal Comfort and Wellbeing  Outcome: Progressing     Problem: Pain Acute  Goal: Optimal Pain Control and Function  Outcome: Progressing  Intervention: Optimize Psychosocial Wellbeing  Recent Flowsheet Documentation  Taken 5/20/2025 0905 by Jaylen Salazar RN  Diversional Activities: television  Intervention: Prevent or Manage Pain  Recent Flowsheet Documentation  Taken 5/20/2025 0905 by Jaylen Salazar RN  Sensory Stimulation Regulation: care clustered   Goal Outcome Evaluation:    Pt denies pain.  Wanted his ham catheter out before going to Paracentesis.  Both Nephrology and Hospitalist are in pt's room and was ok to remove ham catheter. No insulin coverage needed.  Pt on Cpap while napping.      Pt came to the desk and said he wanted to go outside for a smoke.  When walk into 216 the room smells like Cigarette smoke.  Asked pt to hand over cigarette and lighter,  he said no.  Called security, cigarette and lighter are now with security.

## 2025-05-20 NOTE — PLAN OF CARE
Problem: Pain Acute  Goal: Optimal Pain Control and Function  Outcome: Progressing  Intervention: Prevent or Manage Pain  Recent Flowsheet Documentation  Taken 5/20/2025 1600 by Colette Miranda RN  Bowel Elimination Promotion:   adequate fluid intake promoted   ambulation promoted     Problem: Comorbidity Management  Goal: Blood Glucose Levels Within Targeted Range  Outcome: Progressing     Problem: Anemia  Goal: Anemia Symptom Improvement  Outcome: Progressing       Goal Outcome Evaluation:    A&Ox4. Independent. VSS. Room air. Denies pain. Abdomen distended. Voiding. Passing gas. No bowel movement this shift. New PIV placed.

## 2025-05-20 NOTE — PROGRESS NOTES
[facility]    Progress Note - Hospitalist Service       Date of Admission:  5/15/2025    Assessment & Plan   Warren Jaramillo is a 44 year old male admitted on 5/15/2025. He has a history of HTN, type 2 diabetes, AVA, COPD, Rojas's disease and hepatic cirrhosis with ascites and is admitted for ongoing abdominal pain, nausea vomiting with an episode of hematamesis and diarrhea. 5/17 rreporting melena and has a significant Hgb drop, repeat CTA negative for acute bleed. GI following. Developed SBP and ARF with creatinine now slowly improving.      Patient initially admitted by the hospitalist team and transferred to our care on day one of admission.    Abdominal pain  Cirrhosis with ascites/frequent paracentesis   Episode of hematemesis   Melena -resolved  Acute blood loss anemia   SBP  Patient presented ED with worsening ongoing abdominal pain.  Patient has history of hepatic cirrhosis and ascites with frequent paracentesis.  Recently had 4 L removed.  Has been complaining of left lower quadrant abdominal discomfort.  Patient also notes 1 episode hematemesis prior to admission.  Patient was vitally stable on admission and CT abdomen pelvis with no acute findings except for mild ascites.  GI consulted on admission with recommendation as below.  Of note per chart review patient recently had upper endoscopy in April 2025 with portal hypertensive changes in the stomach but negative for varices.  Enteric panel negative.  Abdominal pain is likely abdominal wall pain due to ascites.  Hide/16 overnight patient's hemoglobin continue to trend down from 9.5-7.2.  Repeat CTA abdomen pelvis with no signs of acute bleeding and showed small volume abdominopelvic ascites more dense could be compatible with internal blood products.  Patient was hypotensive and reporting lightheadedness AM of 5/17 and received 1 unit of blood and albumin.  Will continue to trend hemoglobin.  Discussed with GI that  patient is reporting melena x 2 on 5/17-18. Initial recs to keep n.p.o. at midnight and continue trending hgb for possible EGD , now with ARF and stable hgb, GI deferred EGD for now will continue monitoring.  -GI consulted, appreciate recs    -Diagnostic paracentesis with ascitic fluid analysis and cytology    -Albumin infusion 5/18    -Continue daily hgb checks    -s/p unit blood transfusion and albumin x1, 5/17    -Outpatient follow-up for liver biopsy and HVPG assessment in June    -Continue ceftriaxone for SBP for a course of 7 days.     -Repeat dx paracentesis 5/20    Hypotension-resolved  S/p dx paracentesis 5/17 5/17 Patient is s/p paracentesis. RN paged blood pressure with SBP mid 80s and patient feeling lightheaded.  On arrival to the room patient reports lightheadedness is improving placed in the Trendelenburg position and just recently started albumin infusion. Will review the chart to see how much fluid was removed during paracentesis. Monitor closely. Hgb is trending down as above, continues to have soft BP, receiving one unit of blood and albumin x1. Hgb stable, s/p 1 unit of pRBCs.   - Continue albumin infusion  - Monitor BP closely  - q 6hr hgb checks    RIVERA on CKD.   ARF  Hyponatremia   On admission creatinine 1.7. creatinine abruptly elevated to 7.0 this morning, in the setting of cirrhosis with ascites, SBP, and acute blood loss anemia likely UGIB. RIVERA likely multifactorial--volume loss, SBP with hypotension and contrast exposure. Nephrology consulted, appreciate recs.   -Daily BMP  -Nephrology consulted, appreciate recs  -Albumin 100 g 5/18, will await nephrology recs      - creatinine slightly improving, good UOP, no urgent need HD   -Renally dose meds  -monitor urine output, ham removed today    Acute blood loss anemia likely 2/2 UGIB  Presented with abdominal pain and one episode of hematemesis.  Admitted with a hemoglobin of 13.2, downtrending 9.5.  Will continue to monitor closely. Melena  now resolved. GI following no plan for EGD at this time given ARF and stable Hgb.  Per chart review patient had EGD one month ago with findings of portal gastropathy and no varices.  Will continue to monitor closely.  -Daily CBC  -continue to Hold DOAC.  - GI following    History of HFpEF  History of HFpEF.  Most recent echo with EF 60 to 65%.  No signs of CHF exacerbation.  -hold PTA Lasix and spironolactone    Type 2 diabetes  Most recent A1c 6.7.  On PTA metformin and Jardiance.  -Hold PTA metformin and Jardiance  -Diabetic diet  -Start sliding scale insulin    Hx of DVT  History of occlusive DVT diagnosed in 2024. On PTA apixaban held as above for anemia with concern for bleeding as above.  - Hold PTA apixaban  - Outpatient follow-up to determine length of anticoagulation.      Chronic conditions:  Hypothyroidism: Continue PTA levothyroxine  HTN: hold PTA lisinopril  AVA; BiPAP as needed for sleep  Mood disorder: Continue PTA fluoxetine and olanzapine                Diet: Fluid restriction 2000 ML FLUID  Renal Diet (non-dialysis)    DVT Prophylaxis: DOAC  Khan Catheter: PRESENT, indication: Acute retention or obstruction  Fluids: PO  Lines: None     Cardiac Monitoring: None  Code Status: Full Code      Clinically Significant Risk Factors         # Hyponatremia: Lowest Na = 130 mmol/L in last 2 days, will monitor as appropriate  # Hypochloremia: Lowest Cl = 94 mmol/L in last 2 days, will monitor as appropriate  # Hypocalcemia: Lowest Ca = 8.2 mg/dL in last 2 days, will monitor and replace as appropriate          # Acute Kidney Injury, unspecified: based on a >150% or 0.3 mg/dL increase in last creatinine compared to past 90 day average, will monitor renal function  # Hypertension: Noted on problem list  # Chronic heart failure with preserved ejection fraction: heart failure noted on problem list and last echo with EF >50%          # DMII: A1C = 6.7 % (Ref range: <5.7 %) within past 6 months, PRESENT ON  "ADMISSION  # Morbid Obesity: Estimated body mass index is 45.86 kg/m  as calculated from the following:    Height as of this encounter: 1.651 m (5' 5\").    Weight as of this encounter: 125 kg (275 lb 9.2 oz)., PRESENT ON ADMISSION       # Financial/Environmental Concerns: none         Social Drivers of Health   Tobacco Use: High Risk (5/15/2025)    Patient History     Smoking Tobacco Use: Every Day     Smokeless Tobacco Use: Never    Received from [employer] [facility] [address]    Social Connections         Disposition Plan     Medically Ready for Discharge: Anticipated Tomorrow         The patient's care was discussed with the Attending Physician, Dr. Venegas.    Young Saeed MD  Hospitalist Service  [facility]  Securely message with [employer] (more info)  Text page via [facility] Paging/Directory   ______________________________________________________________________    Interval History   No acute events overnight.  Reports fatigue otherwise denies melena for the past 2 days.  No abdominal pain nausea or vomiting.  Nephrology provider in the room discussed patient's case and we called and updated the patient's mother Sarah regarding the plan of care.     Physical Exam   Vital Signs: Temp: 98.3  F (36.8  C) Temp src: Oral BP: 130/59 Pulse: 75   Resp: 22 SpO2: 97 % O2 Device: BiPAP/CPAP    Weight: 275 lbs 9.2 oz  General Appearance: Alert and oriented, NAD  Respiratory: normal work of breathing, clear breath sounds, no wheezing  Cardiovascular: normal S1, S2, no murmur  GI: soft, distended, non tender, and no guarding.   Skin: normal turgor and appearance, no visible rash   Other: No lower limb edema B/L        Medical Decision Making             Data     I have personally reviewed the following data over the past 24 hrs:    9.2  \   8.5 (L)   / 347     133 (L) 97 (L) 61.2 (H) /  117 (H)   4.8 22 6.78 (H) \     ALT: N/A AST: N/A AP: N/A TBILI: N/A   ALB: 4.5 TOT PROTEIN: N/A LIPASE: N/A "       Imaging results reviewed over the past 24 hrs:   No results found for this or any previous visit (from the past 24 hours).

## 2025-05-20 NOTE — PROGRESS NOTES
RENAL PROGRESS NOTE    CC:  RIVERA on CKD    ASSESSMENT & PLAN:     PLAN:   -Continue expectant management (avoid nephrotoxins, renal dose medications, avoid hyper/Hypotension, daily wt, daily I/O), no urgent indications for dialysis. ALIVIA Typically peaks 3-5 days post exposure, hopefully renal function plateauing and we will soon see recovery.   -PO sodium bicarbonate.   -HOLD ACE  -Has OP Nephrology follow up Monday 6/23/25 130 pm with Renee West PA-C  -BMP daily    Non-oliguric RIVERA on CKD2; has some mild underlying CKD (BL Cr 1.1-1.2); now ARF likely 2/2 ALIVIA (2 dye loads 5/15, 5/17), +/- Variable hemodynamics (Low BP s/p belly tap while on ACE and diuretics). urine bland, Renal US No [place]. ; no indication for dialysis today and hope pt will plateau and improved; try to optimize hemodynamics; doubt HRS as primary cause of ARF but liver hemodynamics can be an  unfavorable environment for renal recovery; hold bactrim for now  Cr imprvoing 7.2>6.7,and UO picking up    Metabolic acidosis: 2/2 RIVERA. On Bicarb. resolved    SBP: long history of presenting with ab pain. on ABX/Tap consistent with SBP and supportive cares. s/p paracentesis.    Anemia: concern for GI loss; following and transfuse prn. S/P blood transfusion alst wk. hgb stable    ?GIB: possible EGD today. On PPI, HELD AC (on prior for H/O DVT)GI following    Uro: bladder partly decompressed on imaging. S/p ham to monitor I/O, good UO, could trial Catheter removal, bladder scans Q shift.     Hyponatremia: 2/2 Hypervolemia with chronic liver disease, improving.     Volume:  Hypervolemic with scites and edema; typically on diuretics, current PRN diuresis pending UO.     Hypothyroid; on replacement    Developmental delay, fetal ETOH syndrome, PTSD    H/o DVT:  holding eliquis    DM:  holding jardiance and metformin    SUBJECTIVE:    Pt resting in bed, appears comfortable  Saw at bedside with RN, and primary resident MD  Mother present via phone  Denies  HA, feeling dizzy, SOB, fever,r rickie , Edema, CP  Report increased UO  Wt maintaining, VSS  ALIVIA 5/15, 5/17 CTA   S/p Albumin  Appetite good, denies over uremic Sx  Good UO , 2.3L yesterday  Denies s/s of hyponatremia, improving  Pt c/o penile pain from cath, Renal okay with cath removal, follow UO close, bladder scans Q shift.   Discussed labs/plan and answered all questions       OBJECTIVE:  Physical Exam   Temp: 98.3  F (36.8  C) Temp src: Oral BP: 120/56 Pulse: 76   Resp: 21 SpO2: 95 % O2 Device: BiPAP/CPAP    Vitals:    05/16/25 1709 05/18/25 1230 05/19/25 0644   Weight: 122.3 kg (269 lb 11.2 oz) 124.1 kg (273 lb 9.6 oz) 125 kg (275 lb 9.2 oz)     Vital Signs with Ranges  Temp:  [97.6  F (36.4  C)-98.4  F (36.9  C)] 98.3  F (36.8  C)  Pulse:  [76-85] 76  Resp:  [18-21] 21  BP: (120-135)/(56-64) 120/56  SpO2:  [95 %-97 %] 95 %  I/O last 3 completed shifts:  In: 720 [P.O.:720]  Out: 2375 [Urine:2375]  Patient Vitals for the past 72 hrs:   Weight   05/19/25 0644 125 kg (275 lb 9.2 oz)   05/18/25 1230 124.1 kg (273 lb 9.6 oz)     Intake/Output Summary (Last 24 hours) at 5/19/2025 1053  Last data filed at 5/19/2025 0618  Gross per 24 hour   Intake 938 ml   Output 1200 ml   Net -262 ml       PHYSICAL EXAM:  GEN: NAD, aox3  CV: RRR   Lung: clear and equal  Ab: soft and NT, + ascites  Ext: +trace LLE BL and well perfused  Skin: no rash  Neuro: NFD, no asterixsis      LABORATORY STUDIES:     Recent Labs   Lab 05/20/25  0001 05/19/25  1821 05/19/25  1123 05/19/25  0606 05/18/25  2335 05/18/25 1744 05/18/25  1133 05/18/25 0644 05/16/25  1746 05/16/25  0644 05/15/25  2011 05/15/25  0042   WBC  --   --   --  10.6  --   --   --  9.7  --  12.2* 14.1* 14.0*   RBC  --   --   --  2.99*  --   --   --  2.84*  --  3.43* 3.79* 4.74   HGB 8.3* 8.8* 8.6* 8.4*  8.4* 7.9* 8.0*   < > 8.0*  8.0*   < > 9.5* 10.6* 13.2*   HCT  --   --   --  25.8*  --   --   --  24.8*  --  29.9* 32.8* 41.8   PLT  --   --   --  334  --   --   --  286  --   290 293 304    < > = values in this interval not displayed.       Basic Metabolic Panel:  Recent Labs   Lab 05/19/25 2003 05/19/25  1651 05/19/25  1200 05/19/25  0846 05/19/25  0726 05/19/25  0606 05/18/25  1202 05/18/25  0920 05/18/25  0816 05/18/25  0644 05/16/25  0715 05/16/25  0644 05/16/25  0246 05/15/25  2011 05/15/25  0042   NA  --   --   --   --   --  130*  --  128*  --  131*  --  134*  --  134* 136   POTASSIUM  --   --   --   --   --  4.7  --  4.8  --  5.1  --  5.0  --  5.0 4.2   CHLORIDE  --   --   --   --   --  94*  --  92*  --  94*  --  97*  --  98 101   CO2  --   --   --   --   --  19*  --  21*  --  22  --  28  --  25 25   BUN  --   --   --   --   --  54.5*  --  46.3*  --  45.1*  --  23.0*  --  18.3 21.8*   CR  --   --   --   --   --  7.23*  --  7.23*  --  7.09*  --  1.77*  --  1.29* 1.23*   * 138* 130* 120* 128* 116*   < > 110*   < > 105*   < > 140*   < > 126* 178*   WES  --   --   --   --   --  8.3*  --  7.5*  --  7.4*  --  8.2*  --  9.1 8.9    < > = values in this interval not displayed.       INR  Recent Labs   Lab 05/17/25  0643 05/15/25  0042   INR 1.33* 1.16*        Recent Labs   Lab Test 05/20/25  0001 05/19/25  1821 05/19/25  1123 05/19/25  0606 05/18/25  1133 05/18/25  0644 05/17/25  1109 05/17/25  0643 05/15/25  2011 05/15/25  0042   INR  --   --   --   --   --   --   --  1.33*  --  1.16*   WBC  --   --   --  10.6  --  9.7  --   --    < > 14.0*   HGB 8.3* 8.8*   < > 8.4*  8.4*   < > 8.0*  8.0*   < >  --    < > 13.2*   PLT  --   --   --  334  --  286  --   --    < > 304    < > = values in this interval not displayed.       Personally reviewed current labs    Abbey LUCIA-BC  [facility]  [phone]

## 2025-05-20 NOTE — PROGRESS NOTES
[facility] DIGESTIVE HEALTH PROGRESS NOTE      Subjective:          Warren was seen resting with his BiPAP.  Not complaining of any abdominal pain.  Tolerated his paracentesis well.       Objective:                Body mass index is 45.86 kg/m .  Vital signs in last 24 hrs;  Temp:  [97.6  F (36.4  C)-98.4  F (36.9  C)] 98.3  F (36.8  C)  Pulse:  [75-85] 75  Resp:  [18-22] 22  BP: (120-135)/(56-64) 130/59  SpO2:  [95 %-97 %] 97 %    Physical Exam:   General: Comfortable appearing. Awake.   Cardiovascular: Regular rate.   Chest: Non-labored breathing. Symmetric chest rise.   Abdomen: Distended abdomen.  Soft.  Not tense.  Neurologic: Alert, no focal defects.  Very pleasant.    Current Labs:  CMP Results:   Recent Labs   Lab 05/20/25  0926 05/20/25  0851 05/19/25 2003 05/19/25  1651 05/19/25  0726 05/19/25  0606 05/18/25  1202 05/18/25  0920 05/18/25  0816 05/18/25  0644 05/17/25  0745 05/17/25  0643 05/16/25  0246 05/15/25  2011 05/15/25  0042   NA  --  133*  --   --   --  130*  --  128*  --  131*  --   --    < > 134* 136   POTASSIUM  --  4.8  --   --   --  4.7  --  4.8  --  5.1  --   --    < > 5.0 4.2   CHLORIDE  --  97*  --   --   --  94*  --  92*  --  94*  --   --    < > 98 101   CO2  --  22  --   --   --  19*  --  21*  --  22  --   --    < > 25 25   * 109* 156* 138*   < > 116*   < > 110*   < > 105*   < >  --    < > 126* 178*   BUN  --  61.2*  --   --   --  54.5*  --  46.3*  --  45.1*  --   --    < > 18.3 21.8*   CR  --  6.78*  --   --   --  7.23*  --  7.23*  --  7.09*  --   --    < > 1.29* 1.23*   BILITOTAL  --   --   --   --   --   --   --   --   --   --   --  0.6  --  0.6 0.3   ALKPHOS  --   --   --   --   --   --   --   --   --   --   --   --   --  178* 201*   ALT  --   --   --   --   --   --   --   --   --   --   --   --   --  16 21   AST  --   --   --   --   --   --   --   --   --   --   --   --   --  13 18    < > = values in this interval not displayed.      CBC  Recent Labs   Lab 05/20/25  3879  05/20/25  0851 05/20/25  0001 05/19/25  1821 05/19/25  1123 05/19/25  0606 05/18/25  1133 05/18/25  0644 05/16/25  1746 05/16/25 0644   WBC  --  9.2  --   --   --  10.6  --  9.7  --  12.2*   RBC  --  2.93*  --   --   --  2.99*  --  2.84*  --  3.43*   HGB 8.1* 8.5* 8.3* 8.8*   < > 8.4*  8.4*   < > 8.0*  8.0*   < > 9.5*   HCT  --  25.3*  --   --   --  25.8*  --  24.8*  --  29.9*   MCV 87 86 85 86   < > 86  86   < > 87  87   < > 87   RDW  --  15.0  --   --   --  14.7  --  14.6  --  14.6   PLT  --  347  --   --   --  334  --  286  --  290    < > = values in this interval not displayed.     INR  Recent Labs   Lab 05/17/25  0643 05/15/25  0042   INR 1.33* 1.16*      LIPASE  Recent Labs   Lab 05/15/25  0042   LIPASE 22     MELD 3.0: 17 at 5/17/2025  6:43 AM  MELD-Na: 27 at 5/19/2025  6:06 AM  Calculated from:  Serum Creatinine: 1.77 mg/dL at 5/16/2025  6:44 AM  Serum Sodium: 134 mmol/L at 5/16/2025  6:44 AM  Total Bilirubin: 0.6 mg/dL (Using min of 1 mg/dL) at 5/17/2025  6:43 AM  Serum Albumin: 3.8 g/dL (Using max of 3.5 g/dL) at 5/15/2025  8:11 PM  INR(ratio): 1.33 at 5/17/2025  6:43 AM  Age at listing (hypothetical): 44 years  Sex: Male at 5/17/2025  6:43 AM      Relevant Imaging:  No new abdominal imaging    Assessment:       This is a 44-year-old man with cirrhosis secondary to metabolic dysfunction associated steatotic liver disease and alcohol use, complicated by ascites requiring frequent  paracentesis and prior SBP, COPD, AVA, obesity, hypertension, diabetes, history of DVT on apixaban who was admitted with abdominal pain following routine paracentesis.         # Decompensated MASLD/alcohol-related cirrhosis  -Complicated by ascites requiring frequent paracenteses.  -SBP-developed SBP on 5/16 paracentesis despite being on Bactrim for SBP prophylaxis.  Complicating factor is the the tap was bloody.  No RBCs reported so cannot correct for possible large number of RBCs.  -Repeat diagnostic paracentesis today.   Results pending.  -EV -none present.  EGD April 4, 2025 showed portal hypertensive gastropathy but no esophageal varices.    # RIVERA  - Nephrology following.  Suspects injury from IV contrast.    - The patient has received albumin for volume expansion, SBP.    - Renal function has plateaued, slight improvement today.    # Melena, hematemesis?    - Patient reportedly had hematemesis prior to admission.    - Warren also reports some possible melena but hemoglobin has been stable.  - No sign of overt GI bleeding here.   - Most recent EGD from April 2025 with portal hypertensive gastropathy but no varices.     Plan:     -Continue SBP treatment.  Plan for 7-day course.  -Follow-up cell count and culture from today's paracentesis to ensure adequate response on ceftriaxone.  Given recent hospitalizations, he may require broader spectrum antibiotics.  - Consider ID consult for further guidance on SBP prophylaxis given he developed SBP on Bactrim.  - No plans for EGD at this time given stable hemoglobin and no further evidence of GI bleeding.  - No plans for TIPS given RIVERA and SBP.  This was discussed with the patient's guardian, Jesika Harden.  - We will follow along.    25 minutes of total time was spent providing patient care, including patient evaluation, reviewing documentation/test results and .           Anurag Gill, DO  Thank you for the opportunity to participate in the care of this patient.   Please feel free to call me with any questions or concerns.  Phone number [phone].

## 2025-05-21 LAB
ANION GAP SERPL CALCULATED.3IONS-SCNC: 14 MMOL/L (ref 7–15)
BACTERIA FLD CULT: NO GROWTH
BASOPHILS # BLD AUTO: 0.1 10E3/UL (ref 0–0.2)
BASOPHILS NFR BLD AUTO: 1 %
BUN SERPL-MCNC: 64.2 MG/DL (ref 6–20)
CALCIUM SERPL-MCNC: 9 MG/DL (ref 8.8–10.4)
CHLORIDE SERPL-SCNC: 102 MMOL/L (ref 98–107)
CREAT SERPL-MCNC: 5.26 MG/DL (ref 0.67–1.17)
EGFRCR SERPLBLD CKD-EPI 2021: 13 ML/MIN/1.73M2
EOSINOPHIL # BLD AUTO: 0.5 10E3/UL (ref 0–0.7)
EOSINOPHIL NFR BLD AUTO: 5 %
ERYTHROCYTE [DISTWIDTH] IN BLOOD BY AUTOMATED COUNT: 14.8 % (ref 10–15)
GLUCOSE BLDC GLUCOMTR-MCNC: 106 MG/DL (ref 70–99)
GLUCOSE BLDC GLUCOMTR-MCNC: 107 MG/DL (ref 70–99)
GLUCOSE BLDC GLUCOMTR-MCNC: 109 MG/DL (ref 70–99)
GLUCOSE BLDC GLUCOMTR-MCNC: 127 MG/DL (ref 70–99)
GLUCOSE BLDC GLUCOMTR-MCNC: 146 MG/DL (ref 70–99)
GLUCOSE BLDC GLUCOMTR-MCNC: 153 MG/DL (ref 70–99)
GLUCOSE SERPL-MCNC: 105 MG/DL (ref 70–99)
GRAM STAIN RESULT: NORMAL
GRAM STAIN RESULT: NORMAL
HCO3 SERPL-SCNC: 21 MMOL/L (ref 22–29)
HCT VFR BLD AUTO: 27.8 % (ref 40–53)
HGB BLD-MCNC: 8.6 G/DL (ref 13.3–17.7)
HGB BLD-MCNC: 8.6 G/DL (ref 13.3–17.7)
HGB BLD-MCNC: 9 G/DL (ref 13.3–17.7)
IMM GRANULOCYTES # BLD: 0 10E3/UL
IMM GRANULOCYTES NFR BLD: 0 %
LYMPHOCYTES # BLD AUTO: 1.9 10E3/UL (ref 0.8–5.3)
LYMPHOCYTES NFR BLD AUTO: 21 %
MCH RBC QN AUTO: 28.5 PG (ref 26.5–33)
MCHC RBC AUTO-ENTMCNC: 32.4 G/DL (ref 31.5–36.5)
MCV RBC AUTO: 87 FL (ref 78–100)
MCV RBC AUTO: 87 FL (ref 78–100)
MCV RBC AUTO: 88 FL (ref 78–100)
MONOCYTES # BLD AUTO: 1 10E3/UL (ref 0–1.3)
MONOCYTES NFR BLD AUTO: 11 %
NEUTROPHILS # BLD AUTO: 5.7 10E3/UL (ref 1.6–8.3)
NEUTROPHILS NFR BLD AUTO: 62 %
NRBC # BLD AUTO: 0 10E3/UL
NRBC BLD AUTO-RTO: 0 /100
PLATELET # BLD AUTO: 430 10E3/UL (ref 150–450)
POTASSIUM SERPL-SCNC: 4.7 MMOL/L (ref 3.4–5.3)
RBC # BLD AUTO: 3.16 10E6/UL (ref 4.4–5.9)
SODIUM SERPL-SCNC: 137 MMOL/L (ref 135–145)
WBC # BLD AUTO: 9.2 10E3/UL (ref 4–11)

## 2025-05-21 PROCEDURE — 250N000011 HC RX IP 250 OP 636: Mod: JZ | Performed by: INTERNAL MEDICINE

## 2025-05-21 PROCEDURE — 250N000013 HC RX MED GY IP 250 OP 250 PS 637: Performed by: HOSPITALIST

## 2025-05-21 PROCEDURE — 99232 SBSQ HOSP IP/OBS MODERATE 35: CPT | Mod: GC

## 2025-05-21 PROCEDURE — 85048 AUTOMATED LEUKOCYTE COUNT: CPT

## 2025-05-21 PROCEDURE — 120N000001 HC R&B MED SURG/OB

## 2025-05-21 PROCEDURE — 36415 COLL VENOUS BLD VENIPUNCTURE: CPT

## 2025-05-21 PROCEDURE — 250N000013 HC RX MED GY IP 250 OP 250 PS 637

## 2025-05-21 PROCEDURE — 99232 SBSQ HOSP IP/OBS MODERATE 35: CPT

## 2025-05-21 PROCEDURE — 99222 1ST HOSP IP/OBS MODERATE 55: CPT | Performed by: INTERNAL MEDICINE

## 2025-05-21 PROCEDURE — 85018 HEMOGLOBIN: CPT

## 2025-05-21 PROCEDURE — 80048 BASIC METABOLIC PNL TOTAL CA: CPT

## 2025-05-21 RX ORDER — MEROPENEM 500 MG/1
500 INJECTION, POWDER, FOR SOLUTION INTRAVENOUS EVERY 12 HOURS
Status: DISCONTINUED | OUTPATIENT
Start: 2025-05-21 | End: 2025-05-22

## 2025-05-21 RX ADMIN — PANTOPRAZOLE SODIUM 40 MG: 20 TABLET, DELAYED RELEASE ORAL at 06:32

## 2025-05-21 RX ADMIN — SODIUM BICARBONATE 1300 MG: 650 TABLET ORAL at 06:37

## 2025-05-21 RX ADMIN — PANTOPRAZOLE SODIUM 40 MG: 20 TABLET, DELAYED RELEASE ORAL at 16:59

## 2025-05-21 RX ADMIN — UMECLIDINIUM 1 PUFF: 62.5 AEROSOL, POWDER ORAL at 09:08

## 2025-05-21 RX ADMIN — ROSUVASTATIN 10 MG: 10 TABLET, FILM COATED ORAL at 18:56

## 2025-05-21 RX ADMIN — FLUOXETINE HYDROCHLORIDE 20 MG: 20 CAPSULE ORAL at 09:08

## 2025-05-21 RX ADMIN — MONTELUKAST 10 MG: 10 TABLET, FILM COATED ORAL at 09:08

## 2025-05-21 RX ADMIN — MEROPENEM 500 MG: 500 INJECTION, POWDER, FOR SOLUTION INTRAVENOUS at 16:59

## 2025-05-21 RX ADMIN — FLUTICASONE FUROATE AND VILANTEROL TRIFENATATE 1 PUFF: 100; 25 POWDER RESPIRATORY (INHALATION) at 09:07

## 2025-05-21 RX ADMIN — SODIUM BICARBONATE 1300 MG: 650 TABLET ORAL at 18:56

## 2025-05-21 RX ADMIN — LEVOTHYROXINE SODIUM 12.5 MCG: 0.03 TABLET ORAL at 06:32

## 2025-05-21 RX ADMIN — TRAZODONE HYDROCHLORIDE 100 MG: 50 TABLET ORAL at 18:55

## 2025-05-21 RX ADMIN — HYDROXYZINE HYDROCHLORIDE 10 MG: 10 TABLET ORAL at 19:01

## 2025-05-21 RX ADMIN — OLANZAPINE 10 MG: 10 TABLET, FILM COATED ORAL at 18:56

## 2025-05-21 ASSESSMENT — ACTIVITIES OF DAILY LIVING (ADL)
ADLS_ACUITY_SCORE: 57
ADLS_ACUITY_SCORE: 57
ADLS_ACUITY_SCORE: 56
ADLS_ACUITY_SCORE: 57
ADLS_ACUITY_SCORE: 58
ADLS_ACUITY_SCORE: 56
ADLS_ACUITY_SCORE: 56
ADLS_ACUITY_SCORE: 57
ADLS_ACUITY_SCORE: 57
ADLS_ACUITY_SCORE: 56
ADLS_ACUITY_SCORE: 56
ADLS_ACUITY_SCORE: 57
ADLS_ACUITY_SCORE: 58
ADLS_ACUITY_SCORE: 57
ADLS_ACUITY_SCORE: 56
ADLS_ACUITY_SCORE: 56
ADLS_ACUITY_SCORE: 57
ADLS_ACUITY_SCORE: 56
ADLS_ACUITY_SCORE: 58
ADLS_ACUITY_SCORE: 58
ADLS_ACUITY_SCORE: 57

## 2025-05-21 NOTE — PLAN OF CARE
Problem: Comorbidity Management  Goal: Blood Glucose Levels Within Targeted Range  Outcome: Progressing  Intervention: Monitor and Manage Glycemia  Recent Flowsheet Documentation  Taken 5/21/2025 1627 by Jaylen Salazar, RN  Medication Review/Management: medications reviewed   Goal Outcome Evaluation:    Pt wanted to eat early and his blood glucose check early for 106 No insulin coverage.  Then again before lunch is 107.  No insulin needed.  Pt ambulated couple of times.  Kept asking when is discharge?

## 2025-05-21 NOTE — PLAN OF CARE
Problem: Adult Inpatient Plan of Care  Goal: Optimal Comfort and Wellbeing  Outcome: Progressing   Goal Outcome Evaluation:       Pt denies pain. Awakenings minimized. BiPap on overnight. Independent in room. BG at 0200 109.

## 2025-05-21 NOTE — PROGRESS NOTES
"[facility] Digestive Health progress Note    Subjective: Centesis yesterday showed nucleated cells 4123, 13% neutrophils for an absolute neutrophil count of 536.    Patient denies abdominal pain (he did have right lower quadrant discomfort previously).  No fever, chills, sweats or night sweats.  He wonders about going home.    Nurse states that he has been smoking in the bathroom.  Refuses nicotine patch.    Objective:  Vital signs in last 24 hours  Temp:  [97.2  F (36.2  C)-98  F (36.7  C)] 97.2  F (36.2  C)  Pulse:  [69-84] 69  Resp:  [16-20] 16  BP: ()/(50-60) 115/54  SpO2:  [96 %-98 %] 97 %     Gen: Awake, no acute distress  Pulmonary: Lungs clear to ausculation bilaterally  Cardiovascular: Regular  Gastrointestinal: Positive bowel sounds, soft, non-tender, distended, not tense, no rebound or guarding    Assessment: 1.  SBP-repeat paracentesis from yesterday shows SBP still.  Given refractory SBP despite treatments, being on prophylactic Bactrim previously.  I will consult infectious disease to help with treatment.  From my point of view, it would not be safe to discharge patient today.    2.  RIVERA-nephrology following.  Appears to be slowly improving.    3.  Cirrhosis-complicated by ascites and now SBP.  Portal hypertensive gastropathy, no varices.    4.  Previous GI bleeding-no recent bleeding here.  No varices with upper endoscopies.  No need for repeat EGD at this point.    5.  History of DVT    Plan: Consult infectious disease regarding continued SBP despite treatment (and despite previous prophylaxis with Bactrim).    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    Tobacco use    Elevated WBCs    Rectal bleeding    Anal fissure    \"high flow priapism\"     CARDIOVASCULAR SCREENING; LDL GOAL LESS THAN 160    PTSD (post-traumatic stress disorder)    Fetal alcohol syndromes    Seasonal allergic rhinitis    Steatohepatitis    " Cardiomegaly    Shortness of breath    Chest pain, unspecified type    Acute right hip pain    Chronic right-sided congestive heart failure (H)    Active autistic disorder    Adjustment disorder with disturbance of conduct    Angiomyolipoma    Ankylosis, sacroiliac joint    Ascites    Charcot-Estrella-Tooth syndrome    Chronic insomnia    Congestive heart failure (H)    DM2 (diabetes mellitus, type 2) (H)    DM2 (diabetes mellitus, type 2) (H)    Dysphagia    Dysuria    Elevated d-dimer    Episodic mood disorder    Excessive daytime sleepiness    Gait instability    H/O fall    Hematuria    Benign essential hypertension    Hyperglycemia    Incontinence    Myelolipoma of adrenal gland    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    Abdominal pain, right upper quadrant    PVC's (premature ventricular contractions)    SVT (supraventricular tachycardia)    Heart failure with preserved ejection fraction, NYHA class I (H)    Incomplete left bundle branch block (LBBB)    Suicidal ideation    Cirrhosis of liver with ascites, unspecified hepatic cirrhosis type (H)    Thyroid nodule    ADHD (attention deficit hyperactivity disorder)    Chest pain    Morbid obesity (H)    COPD exacerbation (H)    DVT of axillary vein, acute left (H)    Abdominal bloating    LLQ abdominal pain    SBP (spontaneous bacterial peritonitis) (H)    ASNHL (asymmetrical sensorineural hearing loss)    Traumatic brain injury (H)    Tongue lesion    Acute kidney failure, unspecified    Other specified hypothyroidism    Medication induced coagulopathy    Normal anion gap metabolic acidosis    Diffuse abdominal pain    Portal hypertension (H)    Esophagitis    NSTEMI (non-ST elevated myocardial infarction) (H)    Other ascites    RLQ abdominal pain    Danni-Arnett tear    Diarrhea, unspecified type       Labs:  CMP Results:   Recent Labs   Lab Test 05/21/25  0842 05/21/25  0703 05/21/25  0619 05/17/25  0745 05/17/25  0643 05/16/25  0246 05/15/25  2011   NA  --   --  137    < >  --    < > 134*   POTASSIUM  --   --  4.7   < >  --    < > 5.0   CHLORIDE  --   --  102   < >  --    < > 98   CO2  --   --  21*   < >  --    < > 25   ANIONGAP  --   --  14   < >  --    < > 11   *   < > 105*   < >  --    < > 126*   BUN  --   --  64.2*   < >  --    < > 18.3   CR  --   --  5.26*   < >  --    < > 1.29*   BILITOTAL  --   --   --   --  0.6  --  0.6   ALKPHOS  --   --   --   --   --   --  178*   ALT  --   --   --   --   --   --  16   AST  --   --   --   --   --   --  13    < > = values in this interval not displayed.      CBC  Recent Labs   Lab 05/21/25  0619 05/20/25  2335 05/20/25  1738 05/20/25  1151 05/20/25  0851 05/19/25  1123 05/19/25  0606 05/18/25  1133 05/18/25  0644   WBC 9.2  --   --   --  9.2  --  10.6  --  9.7   RBC 3.16*  --   --   --  2.93*  --  2.99*  --  2.84*   HGB 9.0* 8.5* 8.4* 8.1* 8.5*   < > 8.4*  8.4*   < > 8.0*  8.0*   HCT 27.8*  --   --   --  25.3*  --  25.8*  --  24.8*   MCV 88 86 88 87 86   < > 86  86   < > 87  87   MCH 28.5  --   --   --  29.0  --  28.1  --  28.2   MCHC 32.4  --   --   --  33.6  --  32.6  --  32.3   RDW 14.8  --   --   --  15.0  --  14.7  --  14.6     --   --   --  347  --  334  --  286    < > = values in this interval not displayed.     INR  Recent Labs   Lab 05/17/25  0643 05/15/25  0042   INR 1.33* 1.16*      Lipase   Date Value Ref Range Status   05/15/2025 22 13 - 60 U/L Final   05/04/2025 27 13 - 60 U/L Final   04/20/2025 27 13 - 60 U/L Final     Recent Labs   Lab 05/17/25  0643 05/15/25  2011 05/15/25  0042   AST  --  13 18   ALT  --  16 21   ALKPHOS  --  178* 201*   BILITOTAL 0.6 0.6 0.3   LIPASE  --   --  22       MELD 3.0: 16 at 5/17/2025  6:43 AM  MELD-Na: 27 at 5/19/2025  6:06 AM  Calculated from:  Serum Creatinine: 1.77 mg/dL at 5/16/2025  6:44 AM  Serum Sodium: 134 mmol/L at 5/16/2025  6:44 AM  Total Bilirubin: 0.6 mg/dL (Using min of 1 mg/dL) at 5/17/2025  6:43 AM  Serum Albumin: 3.8 g/dL (Using max of 3.5 g/dL) at 5/15/2025   8:11 PM  INR(ratio): 1.16 at 5/15/2025 12:42 AM  Age at listing (hypothetical): 44 years  Sex: Male at 5/17/2025  6:43 AM       Imaging: US Paracentesis without Albumin  Result Date: 5/20/2025  EXAM: 1. PARACENTESIS 2. ULTRASOUND GUIDANCE LOCATION: [facility] DATE: 5/20/2025 INDICATION: Ascites. PROCEDURE: Informed consent obtained. Time out performed. The abdomen was prepped and draped in a sterile fashion. 5 mL of 1% lidocaine was infused into local soft tissues. A 5 Sudanese catheter system was introduced into the abdominal ascites under ultrasound guidance. 2.5 liters of clear fluid were removed and sent to lab if requested. Patient tolerated procedure well. Ultrasound imaging was obtained and placed in the patient's permanent medical record.     IMPRESSION: 1.  Status post ultrasound-guided paracentesis. Reference CPT Code: 37234      The total time spent with this patient was 35 minutes.                                                Scott Lockwood MD  Thank you for the opportunity to participate in the care of this patient.   Please feel free to call me with any questions or concerns.  Phone number [phone].

## 2025-05-21 NOTE — PROGRESS NOTES
RENAL PROGRESS NOTE    CC:  RIVERA on CKD    ASSESSMENT & PLAN:     PLAN:   -Continue expectant management (avoid nephrotoxins, renal dose medications, avoid hyper/Hypotension, daily wt, daily I/O), no urgent indications for dialysis. ALIVIA Typically peaks 3-5 days post exposure, hopefully renal function plateauing and we will soon see recovery. Cr serially improving.  -Continue PO bicarbonate.   -HOLD ACE  -Has OP Nephrology follow up Monday 6/23/25 130 pm with Renee West PA-C  -BMP daily    Non-oliguric RIVERA on CKD2; has some mild underlying CKD (BL Cr 1.1-1.2); now ARF likely 2/2 ALIVIA (2 dye loads 5/15, 5/17), +/- Variable hemodynamics (Low BP s/p belly tap while on ACE and diuretics). urine bland, Renal US No [place]/MAss. ; no indication for dialysis today and hope pt will plateau and improved; try to optimize hemodynamics; doubt HRS as primary cause of ARF but liver hemodynamics can be an  unfavorable environment for renal recovery; hold bactrim for now  Cr imprvoing 7.2>6.7>5.2,and UO picking up    Metabolic acidosis: 2/2 RIVERA. On Bicarb. resolved    SBP: long history of presenting with ab pain. on ABX/Tap consistent with SBP and supportive cares. s/p paracentesis.    Anemia: Hg 9s, S/P blood transfusion last wk. hgb stable    ?GIB: s/p EGD. On PPI, HELD AC (on prior for H/O DVT)GI following    Uro: bladder partly decompressed on imaging. S/p ham to monitor I/O, good UO, could trial Catheter removal, bladder scans Q shift.     Hyponatremia: 2/2 Hypervolemia with chronic liver disease, improving.     Volume:  Hypervolemic with scites and edema; typically on diuretics, current PRN diuresis pending UO.     Hypothyroid; on replacement    Developmental delay, fetal ETOH syndrome, PTSD    H/o DVT:  holding eliquis    DM:  holding jardiance and metformin    SUBJECTIVE:    Pt resting in bed, appears comfortable  Denies ABD pain, ID following SBP  S/p para yesterday  Denies HA, feeling dizzy, SOB, fever,r rickie , Edema,  CP  Report increased UO, ham removed 5/20  Wt maintaining, VSS  ALIVIA 5/15, 5/17 CTA   Appetite good, denies over uremic Sx  Good UO  Discussed labs/plan and answered all questions  I called pts mother and updated via phone today       OBJECTIVE:  Physical Exam   Temp: 97.2  F (36.2  C) Temp src: Axillary BP: 115/54 Pulse: 69   Resp: 16 SpO2: 97 % O2 Device: BiPAP/CPAP    Vitals:    05/16/25 1709 05/18/25 1230 05/19/25 0644   Weight: 122.3 kg (269 lb 11.2 oz) 124.1 kg (273 lb 9.6 oz) 125 kg (275 lb 9.2 oz)     Vital Signs with Ranges  Temp:  [97.2  F (36.2  C)-98  F (36.7  C)] 97.2  F (36.2  C)  Pulse:  [69-84] 69  Resp:  [16-20] 16  BP: ()/(50-60) 115/54  SpO2:  [96 %-98 %] 97 %  I/O last 3 completed shifts:  In: 580 [P.O.:580]  Out: 1750 [Urine:1750]  Patient Vitals for the past 72 hrs:   Weight   05/19/25 0644 125 kg (275 lb 9.2 oz)   05/18/25 1230 124.1 kg (273 lb 9.6 oz)     Intake/Output Summary (Last 24 hours) at 5/19/2025 1053  Last data filed at 5/19/2025 0618  Gross per 24 hour   Intake 938 ml   Output 1200 ml   Net -262 ml       PHYSICAL EXAM:  GEN: NAD, aox3  CV: RRR   Lung: clear and equal  Ab: soft and NT, + ascites  Ext: +trace LLE BL and well perfused  Skin: no rash  Neuro: NFD, no asterixsis      LABORATORY STUDIES:     Recent Labs   Lab 05/21/25  0619 05/20/25  2335 05/20/25  1738 05/20/25  1151 05/20/25  0851 05/20/25  0001 05/19/25  1123 05/19/25  0606 05/18/25  1133 05/18/25  0644 05/16/25  1746 05/16/25  0644 05/15/25  2011   WBC 9.2  --   --   --  9.2  --   --  10.6  --  9.7  --  12.2* 14.1*   RBC 3.16*  --   --   --  2.93*  --   --  2.99*  --  2.84*  --  3.43* 3.79*   HGB 9.0* 8.5* 8.4* 8.1* 8.5* 8.3*   < > 8.4*  8.4*   < > 8.0*  8.0*   < > 9.5* 10.6*   HCT 27.8*  --   --   --  25.3*  --   --  25.8*  --  24.8*  --  29.9* 32.8*     --   --   --  347  --   --  334  --  286  --  290 293    < > = values in this interval not displayed.       Basic Metabolic Panel:  Recent Labs   Lab  05/21/25  0842 05/21/25  0703 05/21/25  0619 05/21/25  0237 05/20/25  1924 05/20/25  1606 05/20/25  0926 05/20/25  0851 05/19/25  0726 05/19/25  0606 05/18/25  1202 05/18/25  0920 05/18/25  0816 05/18/25  0644 05/16/25  0715 05/16/25  0644   NA  --   --  137  --   --   --   --  133*  --  130*  --  128*  --  131*  --  134*   POTASSIUM  --   --  4.7  --   --   --   --  4.8  --  4.7  --  4.8  --  5.1  --  5.0   CHLORIDE  --   --  102  --   --   --   --  97*  --  94*  --  92*  --  94*  --  97*   CO2  --   --  21*  --   --   --   --  22  --  19*  --  21*  --  22  --  28   BUN  --   --  64.2*  --   --   --   --  61.2*  --  54.5*  --  46.3*  --  45.1*  --  23.0*   CR  --   --  5.26*  --   --   --   --  6.78*  --  7.23*  --  7.23*  --  7.09*  --  1.77*   * 106* 105* 109* 138* 117*   < > 109*   < > 116*   < > 110*   < > 105*   < > 140*   WES  --   --  9.0  --   --   --   --  8.2*  --  8.3*  --  7.5*  --  7.4*  --  8.2*    < > = values in this interval not displayed.       INR  Recent Labs   Lab 05/17/25  0643 05/15/25  0042   INR 1.33* 1.16*        Recent Labs   Lab Test 05/21/25  0619 05/20/25  2335 05/20/25  1151 05/20/25  0851 05/17/25  1109 05/17/25  0643 05/15/25  2011 05/15/25  0042   INR  --   --   --   --   --  1.33*  --  1.16*   WBC 9.2  --   --  9.2   < >  --    < > 14.0*   HGB 9.0* 8.5*   < > 8.5*   < >  --    < > 13.2*     --   --  347   < >  --    < > 304    < > = values in this interval not displayed.       Personally reviewed current labs    Abbey LUCIA-BC  [facility]  [phone]

## 2025-05-21 NOTE — CONSULTS
Consultation - Infectious Disease  [facility]  Warren Jaramillo,  1980, MRN [id number]    Admitting Dx: Danni-Arnett tear [K22.6]  RLQ abdominal pain [R10.31]  Diarrhea, unspecified type [R19.7]    PCP: Mary Kelly, [phone]       ASSESSMENT   44-year-old man with a history of cirrhosis, hypertension, HFpEF, COPD, diabetes admitted with abdominal pain.  ID is consulted regarding SBP treatment.    Spontaneous bacterial peritonitis.  History of ascites, on prophylactic Bactrim.  Previously getting paracentesis every few weeks, now every other day for the past month.  Admitted after developing abdominal pain at the site of a recent paracentesis.  Repeat paracentesis with high white cell count and bloody aspirate.  Cultures negative.  Treated with ceftriaxone.  Repeat paracentesis with slight decrease in neutrophils, still bloody.  Cultures negative to date.  Hospital course complicated by acute kidney injury, improving.  Anemia.  Drop in hemoglobin after admission.  Report of melena.  Received blood transfusion.  Hemoglobin now stable.  Evaluated by GI.    Principal Problem:    Cirrhosis of liver with ascites, unspecified hepatic cirrhosis type (H)  Active Problems:    Tobacco use    Chronic right-sided congestive heart failure (H)    Ascites    DM2 (diabetes mellitus, type 2) (H)    Benign essential hypertension    AVA treated with BiPAP     RLQ abdominal pain    Danni-Arnett tear    Diarrhea, unspecified type       PLAN   -Unclear if persistent neutrophilia in ascites is related to a traumatic tap (presence of blood) or resistant infection.  No growth on cultures to guide us.  - Stop ceftriaxone  -Start meropenem  -Recommend repeat paracentesis in 2 to 3 days to ensure cell counts are improving      Thank you for this consult. Will follow.    Drake Huggins MD  [facility]  Direct messaging: [facility] Paging  On-Call ID provider: [phone], option:  9      ===========================================      Chief Complaint   Cirrhosis of liver with ascites, unspecified hepatic cirrhosis type (H)       HPI     We have been requested by Brenden Polanco MD to evaluate Warren Jaramillo for the above.    History obtained by patient    Warren Jaramillo is a 44 year old man with a history of cirrhosis, HFpEF, hypertension, COPD, diabetes admitted with abdominal pain.  He has required increased frequency of paracenteses over the past month.  He underwent a routine paracentesis on 5/14.  He came to the hospital on 5/15 with right lower quadrant pain, in the area of his paracentesis.  Initial paracentesis with 5310 white cells, 690 neutrophils.  Fluid was bloody in appearance.  Cultures were negative.  He was started on ceftriaxone.  Overall his symptoms have improved.  Repeat paracentesis on 5/20 showed 4123 white cells, and 536 neutrophils.  Ascites was still bloody in appearance.  His course has been complicated by acute kidney injury and is followed by nephrology with some improvement in creatinine.  He did have a drop in hemoglobin since admission.  Report of melena on admission.  He did receive a blood transfusion.          Review of Systems   Ten systems reviewed and negative except for what is noted in the HPI       Medical History  Past Medical History:   Diagnosis Date    COPD exacerbation (H) 12/02/2024    DM2 (diabetes mellitus, type 2) (H) 04/28/2020    HTN (hypertension) 07/30/2012    Sleep apnea     Thyroid nodule 07/31/2019    Rojas's disease (H)     Surgical History  He  has a past surgical history that includes colonoscopy; Tooth Extraction; Esophagoscopy, gastroscopy, duodenoscopy (EGD), combined (N/A, 7/21/2023); and Esophagoscopy, gastroscopy, duodenoscopy (EGD), combined (N/A, 4/4/2025).     Social History  Reviewed, and he  reports that he has been smoking cigarettes. He started smoking about 21 years ago. He has a 21.4 pack-year  "smoking history. He has never used smokeless tobacco. He reports that he does not currently use alcohol.  Drug: Marijuana.  Social History     Social History Narrative    Not on file     Family History  family history includes C.A.D. in his maternal grandfather; Cerebrovascular Disease in his maternal grandfather; Diabetes in his maternal grandfather; Unknown/Adopted in his brother, father, maternal grandmother, paternal grandfather, paternal grandmother, and sister.  family history reviewed and is not pertinent to the presenting problem.            Allergies     Allergies   Allergen Reactions    Apricot Flavoring Agent (Non-Screening) Anaphylaxis    Banana Anaphylaxis     Throat swelling      Bees Anaphylaxis     Has an Epi pen    Wasp Venom Protein Shortness Of Breath     Other reaction(s): Respiratory Distress  Has an Epi pen        Methylphenidate Itching     Other reaction(s): Nightmares    Prunus      Other reaction(s): *Unknown           Antibiotics   Ceftriaxone 5/15-    Previous:  Bactrim prior to arrival, stopped on 5/17.      Physical Exam     Temp:  [97.2  F (36.2  C)-98  F (36.7  C)] 97.4  F (36.3  C)  Pulse:  [69-84] 75  Resp:  [16-18] 17  BP: (100-119)/(54-60) 117/56  SpO2:  [96 %-98 %] 98 %    /56 (BP Location: Right arm)   Pulse 75   Temp 97.4  F (36.3  C) (Oral)   Resp 17   Ht 1.651 m (5' 5\")   Wt 125 kg (275 lb 9.2 oz)   SpO2 98%   BMI 45.86 kg/m      GENERAL:  well-developed, well-nourished, sitting in bed in no acute distress.   HENT:  Head is normocephalic, atraumatic. Oropharynx is moist without exudates or ulcers.  EYES:  Eyes have anicteric sclerae without conjunctival injection or stigmata of endocarditis.   NECK:  Supple.  LUNGS:  Clear to auscultation.  CARDIOVASCULAR:  Regular rate and rhythm with no murmurs, gallops or rubs.  ABDOMEN:  Normal bowel sounds, soft, mild tenderness. No appreciable hepatosplenomegaly  EXT: Extremities warm and without edema.  SKIN:  No acute " rashes.  No stigmata of endocarditis.  NEUROLOGIC:  Grossly nonfocal.      Cultures   5/16 ascites culture: No organisms on Gram stain; culture negative  5/20 ascites culture: No organisms on Gram stain; culture no growth to date    No results found for the last 90 days.       Laboratory results     Recent Labs   Lab 05/21/25  1142 05/21/25  0619 05/20/25  2335 05/20/25  1151 05/20/25  0851 05/19/25  1123 05/19/25  0606   WBC  --  9.2  --   --  9.2  --  10.6   HGB 8.6* 9.0* 8.5*   < > 8.5*   < > 8.4*  8.4*   PLT  --  430  --   --  347  --  334    < > = values in this interval not displayed.       Recent Labs   Lab 05/21/25  0619 05/20/25  0851 05/19/25  1123 05/19/25  0606 05/16/25  0644 05/15/25  2011 05/15/25  0042    133*  --  130*   < > 134* 136   CO2 21* 22  --  19*   < > 25 25   BUN 64.2* 61.2*  --  54.5*   < > 18.3 21.8*   ALBUMIN  --   --  4.5  --   --  3.8 3.8   ALKPHOS  --   --   --   --   --  178* 201*   ALT  --   --   --   --   --  16 21   AST  --   --   --   --   --  13 18    < > = values in this interval not displayed.       Recent Labs   Lab 05/15/25  2011   CRPI 85.80*             Imaging   Radiology results reviewed    US Paracentesis without Albumin  Result Date: 5/20/2025  EXAM: 1. PARACENTESIS 2. ULTRASOUND GUIDANCE LOCATION: [facility] DATE: 5/20/2025 INDICATION: Ascites. PROCEDURE: Informed consent obtained. Time out performed. The abdomen was prepped and draped in a sterile fashion. 5 mL of 1% lidocaine was infused into local soft tissues. A 5 [place] catheter system was introduced into the abdominal ascites under ultrasound guidance. 2.5 liters of clear fluid were removed and sent to lab if requested. Patient tolerated procedure well. Ultrasound imaging was obtained and placed in the patient's permanent medical record.     IMPRESSION: 1.  Status post ultrasound-guided paracentesis. Reference CPT Code: 27870    CTA Abdomen Pelvis with Contrast  Result Date:  5/17/2025  EXAM: CTA ABDOMEN PELVIS WITH CONTRAST LOCATION: [facility] DATE: 5/17/2025 INDICATION: Hemoglobin downtrending, history of cirrhosis, concern for bleed. COMPARISON: CT abdomen/pelvis with contrast 05/15/2025. TECHNIQUE: CT angiogram abdomen pelvis during arterial phase of injection of IV contrast. 2D and 3D MIP reconstructions were performed by the CT technologist. Dose reduction techniques were used. CONTRAST: 90ml isovue 370 FINDINGS: ANGIOGRAM ABDOMEN/PELVIS: No aortic aneurysm or dissection. No celiac artery stenosis. Conventional hepatic arterial anatomy. No superior mesenteric artery stenosis or occlusion. Inferior mesenteric artery is patent. Patent renal arteries without significant stenosis. Scattered atherosclerotic calcifications of the aortoiliac vessels. Portal veins appear patent. Recanalized periumbilical veins. Numerous upper abdominal portosystemic collaterals. LOWER CHEST: Normal. HEPATOBILIARY: Cirrhotic liver morphology with hepatic steatosis. Unremarkable gallbladder. PANCREAS: Normal. SPLEEN: Prominent spleen. ADRENAL GLANDS: Similar size and appearance of bilateral benign adrenal myelolipomas. KIDNEYS/BLADDER: No hydronephrosis. Urinary bladder is partially decompressed. Likely residual excreted contrast within the urinary bladder. BOWEL: No bowel obstruction. ASCITES: Small volume abdominopelvic ascites which appears dense in several locations for example along the right paracolic gutter and in the dependent pelvis, compatible with internal blood products. However there is no evidence of acute bleeding. LYMPH NODES: Shotty retroperitoneal lymph nodes, similar to prior and may be reactive. PELVIC ORGANS: Unremarkable. MUSCULOSKELETAL: Multilevel degenerative changes of the thoracic and lumbosacral spine. No acute osseous abnormality.     IMPRESSION: 1.  Small volume abdominopelvic ascites which appears dense in several locations for example along the right  paracolic gutter and in the dependent pelvis, compatible with internal blood products. However there is no evidence of acute bleeding. 2.  Cirrhotic liver morphology with hepatic steatosis. 3.  Recanalized periumbilical veins. Numerous upper abdominal portosystemic collaterals. 4.  No celiac artery stenosis. Conventional hepatic arterial anatomy.    US Paracentesis with Albumin  Result Date: 5/16/2025  EXAM: 1. PARACENTESIS 2. ULTRASOUND GUIDANCE LOCATION: [facility] DATE: 5/16/2025 INDICATION: Ascites. PROCEDURE: Informed consent obtained. Time out performed. The abdomen was prepped and draped in a sterile fashion. 10 mL of 1% lidocaine was infused into local soft tissues. A 5 German catheter system was introduced into the abdominal ascites under ultrasound guidance. 4.75 liters of bloody fluid were removed and sent to lab if requested. Patient tolerated procedure well. Ultrasound imaging was obtained and placed in the patient's permanent medical record.     IMPRESSION: 1.  Status post ultrasound-guided paracentesis. Reference CPT Code: 97952    CT Abdomen Pelvis w Contrast  Result Date: 5/15/2025  EXAM: CT ABDOMEN PELVIS W CONTRAST LOCATION: [facility] DATE: 5/15/2025 INDICATION: Patient with a recent paracentesis, patient concern for bowel injury during procedure.  Right lower quadrant pain COMPARISON: 4/20/2025. TECHNIQUE: CT scan of the abdomen and pelvis was performed following injection of IV contrast. Multiplanar reformats were obtained. Dose reduction techniques were used. CONTRAST: isovue 370 90ml FINDINGS: LOWER CHEST: Normal. HEPATOBILIARY: Mild hepatomegaly. Hepatic steatosis. Cirrhotic morphology. No calcified gallstones. PANCREAS: Normal. SPLEEN: Normal. ADRENAL GLANDS: Stable bilateral adrenal myelolipomas measuring 4.5 on the right and 3.4 on the left. KIDNEYS/BLADDER: Normal. BOWEL: Mild ascites. No free air. No bowel wall thickening or abnormal  enhancement. No obstruction. LYMPH NODES: Shotty mesenteric and retroperitoneal lymph nodes, unchanged. No pelvic adenopathy. VASCULATURE: No aneurysm. PELVIC ORGANS: Normal. MUSCULOSKELETAL: Small fat-containing left inguinal hernia.     IMPRESSION: 1.  Hepatic steatosis, cirrhosis, and mild ascites. No evidence for bowel injury status post paracentesis.      Data reviewed today: I reviewed all medications, new labs and imaging results over the last 24 hours. I personally reviewed the abdominal CT image(s) showing ascites and bloody appearing fluid.  The patient's care was discussed with the Patient and Patient's Family.

## 2025-05-21 NOTE — PROGRESS NOTES
[facility]    Progress Note - Hospitalist Service       Date of Admission:  5/15/2025    Assessment & Plan   Warren Jaramillo is a 44 year old male admitted on 5/15/2025. He has a history of HTN, type 2 diabetes, AVA, COPD, Rojas's disease and hepatic cirrhosis with ascites and is admitted for ongoing abdominal pain, nausea vomiting with an episode of hematamesis and diarrhea. 5/17 rreporting melena and has a significant Hgb drop, repeat CTA negative for acute bleed. GI following. Developed SBP and ARF with creatinine now slowly improving.      Patient initially admitted by the hospitalist team and transferred to our care on day one of admission.    Abdominal pain  Cirrhosis with ascites/frequent paracentesis   Episode of hematemesis   Melena -resolved  Acute blood loss anemia   SBP  Patient presented ED with worsening ongoing abdominal pain.  Patient has history of hepatic cirrhosis and ascites with frequent paracentesis.  Recently had 4 L removed.  Has been complaining of left lower quadrant abdominal discomfort.  Patient also notes 1 episode hematemesis prior to admission.  Patient was vitally stable on admission and CT abdomen pelvis with no acute findings except for mild ascites.  GI consulted on admission with recommendation as below.  Of note per chart review patient recently had upper endoscopy in April 2025 with portal hypertensive changes in the stomach but negative for varices.  Enteric panel negative.  Abdominal pain is likely abdominal wall pain due to ascites.  Hide/16 overnight patient's hemoglobin continue to trend down from 9.5-7.2.  Repeat CTA abdomen pelvis with no signs of acute bleeding and showed small volume abdominopelvic ascites more dense could be compatible with internal blood products.  Patient was hypotensive and reporting lightheadedness AM of 5/17 and received 1 unit of blood and albumin.  Will continue to trend hemoglobin.  Discussed with GI that  patient is reporting melena x 2 on 5/17-18. Initial recs to keep n.p.o. at midnight and continue trending hgb for possible EGD , now with ARF and stable hgb, GI deferred EGD for now will continue monitoring.  -GI consulted, appreciate recs    -Diagnostic paracentesis with ascitic fluid analysis and cytology    -Albumin infusion 5/18    -Continue daily hgb checks    -s/p unit blood transfusion and albumin x1, 5/17    -Outpatient follow-up for liver biopsy and HVPG assessment in June    -Continue ceftriaxone 5/17 for SBP for a course of 7 days.     -Repeat dx paracentesis 5/20, SBP not improving despite treatment and prophylaxis with Bactrim  -  ID consulted appreciate recs    Hypotension-resolved  S/p dx paracentesis 5/17 5/17 Patient is s/p paracentesis. RN paged blood pressure with SBP mid 80s and patient feeling lightheaded.  On arrival to the room patient reports lightheadedness is improving placed in the Trendelenburg position and just recently started albumin infusion. Will review the chart to see how much fluid was removed during paracentesis. Monitor closely. Hgb is trending down as above, continues to have soft BP, receiving one unit of blood and albumin x1. Hgb stable, s/p 1 unit of pRBCs.   - Continue albumin infusion  - Monitor BP closely  - q 6hr hgb checks    RIVERA on CKD.   ARF  Hyponatremia -resolved  On admission creatinine 1.7. creatinine abruptly elevated to 7.0 this morning, in the setting of cirrhosis with ascites, SBP, and acute blood loss anemia likely UGIB. RIVERA likely multifactorial--volume loss, SBP with hypotension and contrast exposure. Nephrology consulted, appreciate recs.  Urine output creatinine plateauing and now slowly improving.  -Daily BMP  -Nephrology consulted, appreciate recs  -Albumin 100 g 5/18, will await nephrology recs      - creatinine slightly improving, good UOP, no urgent need HD   -Renally dose meds  -continue to monitor urine output, ham removed today    Acute blood  loss anemia likely 2/2 UGIB  Presented with abdominal pain and one episode of hematemesis.  Admitted with a hemoglobin of 13.2, downtrending 9.5.  Will continue to monitor closely. Melena now resolved. GI following no plan for EGD at this time given ARF and stable Hgb.  Per chart review patient had EGD one month ago with findings of portal gastropathy and no varices.  Will continue to monitor closely.  -Daily CBC  -continue to Hold DOAC.  - GI following    History of HFpEF  History of HFpEF.  Most recent echo with EF 60 to 65%.  No signs of CHF exacerbation.  -hold PTA Lasix and spironolactone    Type 2 diabetes  Most recent A1c 6.7.  On PTA metformin and Jardiance.  -Hold PTA metformin and Jardiance  -Diabetic diet  -Start sliding scale insulin    Hx of DVT  History of occlusive DVT diagnosed in 2024. On PTA apixaban held as above for anemia with concern for bleeding as above.  - Hold PTA apixaban  - Outpatient follow-up to determine length of anticoagulation.      Chronic conditions:  Hypothyroidism: Continue PTA levothyroxine  HTN: hold PTA lisinopril  AVA; BiPAP as needed for sleep  Mood disorder: Continue PTA fluoxetine and olanzapine                Diet: Fluid restriction 2000 ML FLUID  Renal Diet (non-dialysis)    DVT Prophylaxis: DOAC  Khan Catheter: Not present  Fluids: PO  Lines: None     Cardiac Monitoring: None  Code Status: Full Code      Clinically Significant Risk Factors         # Hyponatremia: Lowest Na = 133 mmol/L in last 2 days, will monitor as appropriate  # Hypochloremia: Lowest Cl = 97 mmol/L in last 2 days, will monitor as appropriate           # Acute Kidney Injury, unspecified: based on a >150% or 0.3 mg/dL increase in last creatinine compared to past 90 day average, will monitor renal function  # Hypertension: Noted on problem list  # Chronic heart failure with preserved ejection fraction: heart failure noted on problem list and last echo with EF >50%          # DMII: A1C = 6.7 % (Ref  "range: <5.7 %) within past 6 months   # Morbid Obesity: Estimated body mass index is 45.86 kg/m  as calculated from the following:    Height as of this encounter: 1.651 m (5' 5\").    Weight as of this encounter: 125 kg (275 lb 9.2 oz).        # Financial/Environmental Concerns: none         Social Drivers of Health   Tobacco Use: High Risk (5/15/2025)    Patient History     Smoking Tobacco Use: Every Day     Smokeless Tobacco Use: Never    Received from [facility]    Social Connections         Disposition Plan     Medically Ready for Discharge: Anticipated Tomorrow         The patient's care was discussed with the Attending Physician, Dr. Venegas.    Young Saeed MD  Hospitalist Service  [facility]  Securely message with [employer] (more info)  Text page via [employer] Paging/Directory   ______________________________________________________________________    Interval History   No acute events overnight feels overall improved today. No abdominal pain. No melena.    Physical Exam   Vital Signs: Temp: 97.2  F (36.2  C) Temp src: Axillary BP: 115/54 Pulse: 69   Resp: 16 SpO2: 97 % O2 Device: BiPAP/CPAP    Weight: 275 lbs 9.2 oz  General Appearance: Alert and oriented, NAD  Respiratory: normal work of breathing, clear breath sounds, no wheezing  Cardiovascular: normal S1, S2, no murmur  GI: soft, distended, non tender, and no guarding.   Skin: normal turgor and appearance, no visible rash   MSK: No lower limb edema B/L        Medical Decision Making             Data     I have personally reviewed the following data over the past 24 hrs:    9.2  \   9.0 (L)   / 430     137 102 64.2 (H) /  153 (H)   4.7 21 (L) 5.26 (H) \       Imaging results reviewed over the past 24 hrs:   No results found for this or any previous visit (from the past 24 hours).    "

## 2025-05-21 NOTE — PROGRESS NOTES
Care Management Follow Up    Length of Stay (days): 5    Expected Discharge Date: 05/21/2025    Anticipated Discharge Plan:  Group Home    Transportation: Anticipate  staff     PT Recommendations:    OT Recommendations:        Barriers to Discharge: medical stability    Prior Living Situation: group home with other (see comments)    Discussed  Partnership in Safe Discharge Planning  document with patient/family: No     Handoff Completed: No, handoff not indicated or clinically appropriate    Patient/Spokesperson Updated: No    Additional Information:  Chart reviewed. ID consult pending . Anticipate return to  when medically ready     Next Steps: continue to follow     Tricia Moreno RN

## 2025-05-21 NOTE — PROGRESS NOTES
"SPIRITUAL HEALTH SERVICES NOTE  [facility]; P2    Reason for Visit: Saw Warren due to admission screening response    SPIRITUAL ASSESSMENT  In decent spirits  Reports good support from his emilia community    Patient/Family Understanding of Illness and Goals of Care - Warren shared that he has had a paracentesis several times in the last few weeks and that he had a lot of fluid pulled off each time. He notes that last night the fluid was much more bloody. He believes this may be due to \"bacteria eating (his) colon\". He was hoping to discharge to home today, but says that he is waiting to see Infectious Disease first.     Distress and Loss - None identified. Warren says he was diagnosed with diabetes two years ago and feels that his has been well controlled     Strengths, Coping, and Resources - Warren identifies his [employer] community as a source of support. He also noted that his parents have been to see him a few times while he has been here and asked me to call them to let them know that I spoke with him. He enjoys talking with friends on the phone to pass the time.     Meaning, Beliefs, and Spirituality - Warren is connected to [facility]. They are aware of his admission. He invited me to pray for him and welcomed my offer of a daily devotional.     Plan of Care: No further plans for follow-up at this time, but will remain available for further support as patient/family needs/desires.    Amaya Dee M.Div.    Office: [phone] (for non-urgent requests)  Please Vocera or page through [facility] for time-sensitive requests    "

## 2025-05-22 VITALS
RESPIRATION RATE: 22 BRPM | DIASTOLIC BLOOD PRESSURE: 58 MMHG | HEIGHT: 65 IN | HEART RATE: 70 BPM | OXYGEN SATURATION: 97 % | TEMPERATURE: 98.2 F | WEIGHT: 272 LBS | SYSTOLIC BLOOD PRESSURE: 121 MMHG | BODY MASS INDEX: 45.32 KG/M2

## 2025-05-22 LAB
ANION GAP SERPL CALCULATED.3IONS-SCNC: 13 MMOL/L (ref 7–15)
BACTERIA FLD CULT: NORMAL
BASOPHILS # BLD AUTO: 0.1 10E3/UL (ref 0–0.2)
BASOPHILS NFR BLD AUTO: 1 %
BUN SERPL-MCNC: 61.4 MG/DL (ref 6–20)
CALCIUM SERPL-MCNC: 9.1 MG/DL (ref 8.8–10.4)
CHLORIDE SERPL-SCNC: 101 MMOL/L (ref 98–107)
CREAT SERPL-MCNC: 3.48 MG/DL (ref 0.67–1.17)
EGFRCR SERPLBLD CKD-EPI 2021: 21 ML/MIN/1.73M2
EOSINOPHIL # BLD AUTO: 0.5 10E3/UL (ref 0–0.7)
EOSINOPHIL NFR BLD AUTO: 6 %
ERYTHROCYTE [DISTWIDTH] IN BLOOD BY AUTOMATED COUNT: 14.6 % (ref 10–15)
GLUCOSE BLDC GLUCOMTR-MCNC: 102 MG/DL (ref 70–99)
GLUCOSE BLDC GLUCOMTR-MCNC: 113 MG/DL (ref 70–99)
GLUCOSE BLDC GLUCOMTR-MCNC: 126 MG/DL (ref 70–99)
GLUCOSE BLDC GLUCOMTR-MCNC: 139 MG/DL (ref 70–99)
GLUCOSE BLDC GLUCOMTR-MCNC: 144 MG/DL (ref 70–99)
GLUCOSE SERPL-MCNC: 104 MG/DL (ref 70–99)
GRAM STAIN RESULT: NORMAL
GRAM STAIN RESULT: NORMAL
HCO3 SERPL-SCNC: 22 MMOL/L (ref 22–29)
HCT VFR BLD AUTO: 28.4 % (ref 40–53)
HGB BLD-MCNC: 8.6 G/DL (ref 13.3–17.7)
HGB BLD-MCNC: 9.1 G/DL (ref 13.3–17.7)
HGB BLD-MCNC: 9.3 G/DL (ref 13.3–17.7)
HGB BLD-MCNC: 9.4 G/DL (ref 13.3–17.7)
IMM GRANULOCYTES # BLD: 0.1 10E3/UL
IMM GRANULOCYTES NFR BLD: 1 %
LYMPHOCYTES # BLD AUTO: 2 10E3/UL (ref 0.8–5.3)
LYMPHOCYTES NFR BLD AUTO: 20 %
MCH RBC QN AUTO: 28 PG (ref 26.5–33)
MCHC RBC AUTO-ENTMCNC: 32 G/DL (ref 31.5–36.5)
MCV RBC AUTO: 87 FL (ref 78–100)
MCV RBC AUTO: 88 FL (ref 78–100)
MONOCYTES # BLD AUTO: 1.1 10E3/UL (ref 0–1.3)
MONOCYTES NFR BLD AUTO: 11 %
NEUTROPHILS # BLD AUTO: 6 10E3/UL (ref 1.6–8.3)
NEUTROPHILS NFR BLD AUTO: 61 %
NRBC # BLD AUTO: 0 10E3/UL
NRBC BLD AUTO-RTO: 0 /100
PLATELET # BLD AUTO: 482 10E3/UL (ref 150–450)
POTASSIUM SERPL-SCNC: 4.7 MMOL/L (ref 3.4–5.3)
RBC # BLD AUTO: 3.25 10E6/UL (ref 4.4–5.9)
SODIUM SERPL-SCNC: 136 MMOL/L (ref 135–145)
WBC # BLD AUTO: 9.8 10E3/UL (ref 4–11)

## 2025-05-22 PROCEDURE — 250N000013 HC RX MED GY IP 250 OP 250 PS 637: Performed by: HOSPITALIST

## 2025-05-22 PROCEDURE — 82565 ASSAY OF CREATININE: CPT

## 2025-05-22 PROCEDURE — 99232 SBSQ HOSP IP/OBS MODERATE 35: CPT

## 2025-05-22 PROCEDURE — 36415 COLL VENOUS BLD VENIPUNCTURE: CPT

## 2025-05-22 PROCEDURE — 250N000011 HC RX IP 250 OP 636: Mod: JZ | Performed by: FAMILY MEDICINE

## 2025-05-22 PROCEDURE — 85041 AUTOMATED RBC COUNT: CPT

## 2025-05-22 PROCEDURE — 99232 SBSQ HOSP IP/OBS MODERATE 35: CPT | Performed by: INTERNAL MEDICINE

## 2025-05-22 PROCEDURE — 250N000011 HC RX IP 250 OP 636: Mod: JZ | Performed by: INTERNAL MEDICINE

## 2025-05-22 PROCEDURE — 99232 SBSQ HOSP IP/OBS MODERATE 35: CPT | Mod: GC

## 2025-05-22 PROCEDURE — 85018 HEMOGLOBIN: CPT

## 2025-05-22 PROCEDURE — 250N000013 HC RX MED GY IP 250 OP 250 PS 637: Performed by: FAMILY MEDICINE

## 2025-05-22 PROCEDURE — 250N000013 HC RX MED GY IP 250 OP 250 PS 637

## 2025-05-22 PROCEDURE — 120N000001 HC R&B MED SURG/OB

## 2025-05-22 RX ORDER — MEROPENEM 500 MG/1
500 INJECTION, POWDER, FOR SOLUTION INTRAVENOUS EVERY 8 HOURS
Status: DISCONTINUED | OUTPATIENT
Start: 2025-05-22 | End: 2025-05-23

## 2025-05-22 RX ORDER — FAMOTIDINE 20 MG/1
20 TABLET, FILM COATED ORAL DAILY
Status: DISCONTINUED | OUTPATIENT
Start: 2025-05-23 | End: 2025-05-26 | Stop reason: HOSPADM

## 2025-05-22 RX ADMIN — PANTOPRAZOLE SODIUM 40 MG: 20 TABLET, DELAYED RELEASE ORAL at 08:38

## 2025-05-22 RX ADMIN — FAMOTIDINE 20 MG: 20 TABLET, FILM COATED ORAL at 08:39

## 2025-05-22 RX ADMIN — LEVOTHYROXINE SODIUM 12.5 MCG: 0.03 TABLET ORAL at 08:39

## 2025-05-22 RX ADMIN — PANTOPRAZOLE SODIUM 40 MG: 20 TABLET, DELAYED RELEASE ORAL at 16:45

## 2025-05-22 RX ADMIN — MEROPENEM 500 MG: 500 INJECTION, POWDER, FOR SOLUTION INTRAVENOUS at 04:28

## 2025-05-22 RX ADMIN — MONTELUKAST 10 MG: 10 TABLET, FILM COATED ORAL at 08:41

## 2025-05-22 RX ADMIN — MEROPENEM 500 MG: 500 INJECTION, POWDER, FOR SOLUTION INTRAVENOUS at 12:01

## 2025-05-22 RX ADMIN — MEROPENEM 500 MG: 500 INJECTION, POWDER, FOR SOLUTION INTRAVENOUS at 20:15

## 2025-05-22 RX ADMIN — TRAZODONE HYDROCHLORIDE 100 MG: 50 TABLET ORAL at 20:14

## 2025-05-22 RX ADMIN — ROSUVASTATIN 10 MG: 10 TABLET, FILM COATED ORAL at 20:15

## 2025-05-22 RX ADMIN — HYDROXYZINE HYDROCHLORIDE 10 MG: 10 TABLET ORAL at 20:24

## 2025-05-22 RX ADMIN — OLANZAPINE 10 MG: 10 TABLET, FILM COATED ORAL at 20:14

## 2025-05-22 RX ADMIN — SODIUM BICARBONATE 1300 MG: 650 TABLET ORAL at 20:14

## 2025-05-22 RX ADMIN — FLUOXETINE HYDROCHLORIDE 20 MG: 20 CAPSULE ORAL at 08:40

## 2025-05-22 RX ADMIN — UMECLIDINIUM 1 PUFF: 62.5 AEROSOL, POWDER ORAL at 08:42

## 2025-05-22 RX ADMIN — SODIUM BICARBONATE 1300 MG: 650 TABLET ORAL at 08:39

## 2025-05-22 RX ADMIN — FLUTICASONE FUROATE AND VILANTEROL TRIFENATATE 1 PUFF: 100; 25 POWDER RESPIRATORY (INHALATION) at 08:42

## 2025-05-22 ASSESSMENT — ACTIVITIES OF DAILY LIVING (ADL)
ADLS_ACUITY_SCORE: 55
ADLS_ACUITY_SCORE: 56
ADLS_ACUITY_SCORE: 55

## 2025-05-22 NOTE — PLAN OF CARE
Problem: Liver Failure  Goal: Absence of Bleeding  Outcome: Progressing     Problem: Liver Failure  Goal: Blood Glucose Level Within Target Range  Outcome: Progressing     Problem: Liver Failure  Goal: Optimal Pain Control, Comfort and Function  Outcome: Progressing     Problem: Liver Failure  Goal: Absence of Infection Signs and Symptoms  Outcome: Progressing     Goal Outcome Evaluation: Patient is A/O ind in room w/ walker, voiding adequately, no BM for shift, cares clustered to promote sleep. R PIV SL, no diarrhea, no hematemesis, pain, no PRN's given. ABD rounded/ ascites, , HGB 8.6. No significant events, continue to monitor.      Expected Discharge: 05/22/2025   Discharge Plan:  Group Home   Transport: Anticipate  staff   Barriers to Discharge: medical stability  OP Nephrology follow up Monday 6/23/25 130 pm with Renee Monzon RN

## 2025-05-22 NOTE — PROGRESS NOTES
RENAL PROGRESS NOTE    CC:  RIVERA on CKD    ASSESSMENT & PLAN:     PLAN:   -Continue expectant management (avoid nephrotoxins, renal dose medications, avoid hyper/Hypotension, daily wt, daily I/O), no urgent indications for dialysis. ALIVIA Typically peaks 3-5 days post exposure,Cr now serially improving.  -Continue PO bicarbonate.   -HOLD ACE (would be optimal to resume ACE/ARB therapy when renal function returning to baseline/RIVERA resolves, for long term renal protection. This can be done at a later date)  -Has OP Nephrology follow up Monday 6/23/25 130 pm with Renee West PA-C  -From a kidney stand point,  No objection to discharge when medically cleared. We can follow renal recovery upon discharge. Labs 1 WK, Renal follow up in 4 WKs.   -BMP daily    Non-oliguric RIVERA on CKD2; has some mild underlying CKD (BL Cr 1.1-1.2); now ARF likely 2/2 ALIVIA (2 dye loads 5/15, 5/17), +/- Variable hemodynamics (Low BP s/p belly tap while on ACE and diuretics). urine bland, Renal US No Red Bud/MAss. ; no indication for dialysis today and hope pt will plateau and improved; try to optimize hemodynamics; doubt HRS as primary cause of ARF but liver hemodynamics can be an  unfavorable environment for renal recovery; hold bactrim for now  Cr imprvoing 7.2>6.7>5.2>3.4,and UO picking up    Metabolic acidosis: 2/2 RIVERA. On Bicarb. resolved    SBP: long history of presenting with ab pain. on ABX/Tap consistent with SBP and supportive cares. s/p paracentesis.    Anemia: Hg 9s, S/P blood transfusion last wk. hgb stable    ?GIB: s/p EGD. On PPI, HELD AC (on prior for H/O DVT)GI following    Uro: bladder partly decompressed on imaging. S/p ham to monitor I/O, good UO, could trial Catheter removal, bladder scans Q shift.     Hyponatremia: 2/2 Hypervolemia with chronic liver disease, improving.     Volume:  Hypervolemic with scites and edema; typically on diuretics, current PRN diuresis pending UO.     Hypothyroid; on replacement    Developmental  delay, fetal ETOH syndrome, PTSD    H/o DVT:  holding eliquis    DM:  holding jardiance and metformin    SUBJECTIVE:    Pt resting in bed, appears comfortable  He is eager to go home  Denies ABD pain, ID following SBP  S/p para   Appears euvolemic, on RA  Denies HA, feeling dizzy, SOB, fever,r rickie , Edema, CP  Report increased UO, ahm removed 5/20  Wt maintaining, VSS  ALIVIA 5/15, 5/17 CTA   Appetite good, denies over uremic Sx  Good UO  Discussed labs/plan and answered all questions         OBJECTIVE:  Physical Exam   Temp: 98  F (36.7  C) Temp src: Oral BP: 130/59 Pulse: 83   Resp: 18 SpO2: 98 % O2 Device: None (Room air)    Vitals:    05/18/25 1230 05/19/25 0644 05/22/25 0802   Weight: 124.1 kg (273 lb 9.6 oz) 125 kg (275 lb 9.2 oz) 123.4 kg (272 lb)     Vital Signs with Ranges  Temp:  [97.4  F (36.3  C)-98.5  F (36.9  C)] 98  F (36.7  C)  Pulse:  [66-83] 83  Resp:  [17-18] 18  BP: (108-130)/(44-59) 130/59  SpO2:  [98 %-99 %] 98 %  I/O last 3 completed shifts:  In: 1520 [P.O.:1520]  Out: 2500 [Urine:2500]  Patient Vitals for the past 72 hrs:   Weight   05/22/25 0802 123.4 kg (272 lb)     Intake/Output Summary (Last 24 hours) at 5/19/2025 1053  Last data filed at 5/19/2025 0618  Gross per 24 hour   Intake 938 ml   Output 1200 ml   Net -262 ml       PHYSICAL EXAM:  GEN: NAD, aox3  CV: RRR   Lung: clear and equal, RA  Ab: soft and NT, + ascites  Ext: no edema and well perfused  Skin: no rash  Neuro: NFD, no asterixsis      LABORATORY STUDIES:     Recent Labs   Lab 05/22/25  0626 05/21/25  2348 05/21/25  1732 05/21/25  1142 05/21/25  0619 05/20/25  2335 05/20/25  1151 05/20/25  0851 05/19/25  1123 05/19/25  0606 05/18/25  1133 05/18/25  0644 05/16/25  1746 05/16/25 0644   WBC 9.8  --   --   --  9.2  --   --  9.2  --  10.6  --  9.7  --  12.2*   RBC 3.25*  --   --   --  3.16*  --   --  2.93*  --  2.99*  --  2.84*  --  3.43*   HGB 9.1* 8.6* 8.6* 8.6* 9.0* 8.5*   < > 8.5*   < > 8.4*  8.4*   < > 8.0*  8.0*   < > 9.5*    HCT 28.4*  --   --   --  27.8*  --   --  25.3*  --  25.8*  --  24.8*  --  29.9*   *  --   --   --  430  --   --  347  --  334  --  286  --  290    < > = values in this interval not displayed.       Basic Metabolic Panel:  Recent Labs   Lab 05/22/25  0636 05/22/25  0626 05/22/25  0419 05/21/25  1904 05/21/25  1612 05/21/25  1151 05/21/25  0703 05/21/25  0619 05/20/25  0926 05/20/25  0851 05/19/25  0726 05/19/25  0606 05/18/25  1202 05/18/25  0920 05/18/25  0816 05/18/25  0644   NA  --  136  --   --   --   --   --  137  --  133*  --  130*  --  128*  --  131*   POTASSIUM  --  4.7  --   --   --   --   --  4.7  --  4.8  --  4.7  --  4.8  --  5.1   CHLORIDE  --  101  --   --   --   --   --  102  --  97*  --  94*  --  92*  --  94*   CO2  --  22  --   --   --   --   --  21*  --  22  --  19*  --  21*  --  22   BUN  --  61.4*  --   --   --   --   --  64.2*  --  61.2*  --  54.5*  --  46.3*  --  45.1*   CR  --  3.48*  --   --   --   --   --  5.26*  --  6.78*  --  7.23*  --  7.23*  --  7.09*   * 104* 126* 146* 127* 107*   < > 105*   < > 109*   < > 116*   < > 110*   < > 105*   WES  --  9.1  --   --   --   --   --  9.0  --  8.2*  --  8.3*  --  7.5*  --  7.4*    < > = values in this interval not displayed.       INR  Recent Labs   Lab 05/17/25  0643   INR 1.33*        Recent Labs   Lab Test 05/22/25  0626 05/21/25  2348 05/21/25  1142 05/21/25  0619 05/17/25  1109 05/17/25  0643 05/15/25  2011 05/15/25  0042   INR  --   --   --   --   --  1.33*  --  1.16*   WBC 9.8  --   --  9.2   < >  --    < > 14.0*   HGB 9.1* 8.6*   < > 9.0*   < >  --    < > 13.2*   *  --   --  430   < >  --    < > 304    < > = values in this interval not displayed.       Personally reviewed current labs    Abbey LUCIA-BC  [facility]  [phone]

## 2025-05-22 NOTE — PROGRESS NOTES
[facility]    Progress Note - Hospitalist Service       Date of Admission:  5/15/2025    Assessment & Plan   Warren Jaramillo is a 44 year old male admitted on 5/15/2025. He has a history of HTN, type 2 diabetes, AVA, COPD, Rojas's disease and hepatic cirrhosis with ascites and is admitted for ongoing abdominal pain, nausea vomiting with an episode of hematamesis and diarrhea. 5/17 reporting melena and has a significant Hgb drop, repeat CTA negative for acute bleed. GI following. Developed SBP and RIVERA with creatinine now slowly improving.      Patient initially admitted by the hospitalist team and transferred to our care on day one of admission.    Abdominal pain  Cirrhosis with ascites/frequent paracentesis   Episode of hematemesis   Melena -resolved  Acute blood loss anemia   SBP  Patient presented ED with worsening ongoing abdominal pain.  Patient has history of hepatic cirrhosis and ascites with frequent paracentesis.  Recently had 4 L removed.  Has been complaining of left lower quadrant abdominal discomfort.  Patient also notes 1 episode hematemesis prior to admission.  Patient was vitally stable on admission and CT abdomen pelvis with no acute findings except for mild ascites.  GI consulted on admission with recommendation as below.  Of note per chart review patient recently had upper endoscopy in April 2025 with portal hypertensive changes in the stomach but negative for varices.  Enteric panel negative.  Abdominal pain is likely abdominal wall pain due to ascites.  Hide/16 overnight patient's hemoglobin continue to trend down from 9.5-7.2.  Repeat CTA abdomen pelvis with no signs of acute bleeding and showed small volume abdominopelvic ascites more dense could be compatible with internal blood products.  Patient was hypotensive and reporting lightheadedness AM of 5/17 and received 1 unit of blood and albumin.  Will continue to trend hemoglobin.  Discussed with GI that  patient is reporting melena x 2 on 5/17-18. Initial recs to keep n.p.o. at midnight and continue trending hgb for possible EGD , now with ARF and stable hgb, GI deferred EGD for now will continue monitoring.  -GI consulted, appreciate recs    -Diagnostic paracentesis with ascitic fluid analysis and cytology    -Albumin infusions  5/17 and 5/19    -Continue daily hgb checks, Hgb stable    -S/p unit blood transfusion and albumin x1, 5/17    -Outpatient follow-up for liver biopsy and HVPG assessment in June    -Ceftriaxone 5/17-5/21 for SBP, repeat dx paracentesis with slight decrease neutrophils   -ID consulted, switched to meropenem 5/22, repeat paracentesis in 2-3 days    Hypotension-resolved  S/p dx paracentesis 5/17 5/17 Patient is s/p paracentesis. RN paged blood pressure with SBP mid 80s and patient feeling lightheaded.  On arrival to the room patient reports lightheadedness is improving placed in the Trendelenburg position and just recently started albumin infusion. Will review the chart to see how much fluid was removed during paracentesis. Monitor closely. Hgb is trending down as above, continues to have soft BP, receiving one unit of blood and albumin x1. Hgb stable, s/p 1 unit of pRBCs.   - Continue albumin infusion  - Monitor BP closely  - q 6hr hgb checks    RIVERA on CKD.   RIVERA-improving  Hyponatremia -resolved  On admission creatinine 1.7. creatinine abruptly elevated to 7.0 this morning, in the setting of cirrhosis with ascites, SBP, and acute blood loss anemia likely UGIB. RIVERA likely multifactorial--volume loss, SBP with hypotension and contrast exposure. Nephrology consulted, appreciate recs.  Urine output and creatinine improving.  -Daily BMP  -Nephrology consulted, appreciate recs  -Albumin 100 g 5/18, will await nephrology recs      - creatinine slightly improving, good UOP, no urgent need HD   -Renally dose meds  -continue to monitor urine output, ham removed    Acute blood loss anemia likely 2/2  "UGIB  Presented with abdominal pain and one episode of hematemesis.  Admitted with a hemoglobin of 13.2, downtrending 9.5.  Will continue to monitor closely. Melena now resolved. GI following no plan for EGD at this time given ARF and stable Hgb.  Per chart review patient had EGD one month ago with findings of portal gastropathy and no varices.  Will continue to monitor closely.  -Daily CBC  -continue to Hold DOAC.  -GI following    History of HFpEF  History of HFpEF.  Most recent echo with EF 60 to 65%.  No signs of CHF exacerbation.  -hold PTA Lasix and spironolactone    Type 2 diabetes  Most recent A1c 6.7.  On PTA metformin and Jardiance.  -Hold PTA metformin and Jardiance  -Diabetic diet  -Start sliding scale insulin    Hx of DVT  History of occlusive DVT diagnosed in 2024. On PTA apixaban held as above for anemia with concern for bleeding as above.  - Hold PTA apixaban  - Outpatient follow-up to determine length of anticoagulation.      Chronic conditions:  Hypothyroidism: Continue PTA levothyroxine  HTN: hold PTA lisinopril  AVA; BiPAP as needed for sleep  Mood disorder: Continue PTA fluoxetine and olanzapine                Diet: Fluid restriction 2000 ML FLUID  Renal Diet (non-dialysis)    DVT Prophylaxis: DOAC  Khan Catheter: Not present  Fluids: PO  Lines: None     Cardiac Monitoring: None  Code Status: Full Code      Clinically Significant Risk Factors                    # Acute Kidney Injury, unspecified: based on a >150% or 0.3 mg/dL increase in last creatinine compared to past 90 day average, will monitor renal function  # Hypertension: Noted on problem list  # Chronic heart failure with preserved ejection fraction: heart failure noted on problem list and last echo with EF >50%          # DMII: A1C = 6.7 % (Ref range: <5.7 %) within past 6 months   # Morbid Obesity: Estimated body mass index is 45.26 kg/m  as calculated from the following:    Height as of this encounter: 1.651 m (5' 5\").    Weight as " of this encounter: 123.4 kg (272 lb).        # Financial/Environmental Concerns: none         Social Drivers of Health   Tobacco Use: High Risk (5/15/2025)    Patient History     Smoking Tobacco Use: Every Day     Smokeless Tobacco Use: Never    Received from [facility]    Social Connections         Disposition Plan     Medically Ready for Discharge: Anticipated Tomorrow         The patient's care was discussed with the Attending Physician, Dr. Venegas.    Young Saeed MD  Hospitalist Service  [facility]  Securely message with [employer] (more info)  Text page via [employer] Paging/Directory   ______________________________________________________________________    Interval History   No acute events overnight. Feels overall well. No abdominal pain. No melena.       Physical Exam   Vital Signs: Temp: 98  F (36.7  C) Temp src: Oral BP: 130/59 Pulse: 83   Resp: 18 SpO2: 98 % O2 Device: None (Room air)    Weight: 272 lbs 0 oz  General Appearance: Alert and oriented, NAD  Respiratory: normal work of breathing, clear breath sounds, no wheezing  Cardiovascular: normal S1, S2, no murmur  GI: soft, distended, non tender, and no guarding.   Skin: normal turgor and appearance, no visible rash   MSK: No lower limb edema B/L        Medical Decision Making             Data     I have personally reviewed the following data over the past 24 hrs:    9.8  \   9.1 (L)   / 482 (H)     136 101 61.4 (H) /  102 (H)   4.7 22 3.48 (H) \       Imaging results reviewed over the past 24 hrs:   No results found for this or any previous visit (from the past 24 hours).

## 2025-05-22 NOTE — PLAN OF CARE
Problem: Pain Acute  Goal: Optimal Pain Control and Function  Outcome: Progressing     Problem: Comorbidity Management  Goal: Blood Glucose Levels Within Targeted Range  Outcome: Progressing   Goal Outcome Evaluation:       Patient denies pain or discomfort, blood sugars were 126 and 146, did not meet parameters for sliding scale insulin, ambulated in hallways, alert and oriented vitals stable, using BI-PAP intermittently, no respiratory distress, no nausea and vomiting, diarrhea or hematemesis.

## 2025-05-22 NOTE — PLAN OF CARE
Problem: Adult Inpatient Plan of Care  Goal: Plan of Care Review  Description: The Plan of Care Review/Shift note should be completed every shift.  The Outcome Evaluation is a brief statement about your assessment that the patient is improving, declining, or no change.  This information will be displayed automatically on your shiftnote.  Outcome: Progressing   Goal Outcome Evaluation:    0700-.1900... Pt is a/o x4, ambulating independently with his walker on the unit, denied pain, remains on IV ABX. Pt has been requesting to go home, no discharge orders yet. Anticipated discharge tomorrow. BG= 113, 104 and 144 mg/dL. Will continue to monitor

## 2025-05-22 NOTE — PROGRESS NOTES
Care Management Follow Up    Length of Stay (days): 6    Expected Discharge Date: 05/23/2025     Concerns to be Addressed:    Care progression - discharge planning   Patient plan of care discussed at interdisciplinary rounds: Yes    Anticipated Discharge Disposition:  TBD    Anticipated Discharge Services:  TBD  Anticipated Discharge DME:  NA    Patient/family educated on Medicare website which has current facility and service quality ratings:  NA  Education Provided on the Discharge Plan:  Yes per team  Patient/Family in Agreement with the Plan:  NA    Referrals Placed by CM/SW:  NA  Private pay costs discussed: Not applicable    Discussed  Partnership in Safe Discharge Planning  document with patient/family: Yes: legal guardiJesika thorne     Handoff Completed: No, handoff not indicated or clinically appropriate    Additional Information:  Chart reviewed  - paracentesis in 1 to 2 days to follow counts.   Called to update Jesika hough.  Called to update [facility]    Next Steps: RNCM to follow for medical progression, recommendations, and final discharge plan.     Janine Rinaldi RN

## 2025-05-22 NOTE — PROGRESS NOTES
Infectious Diseases Progress Note  [facility]    Date of visit: 05/22/2025       ASSESSMENT   44-year-old man with a history of cirrhosis, hypertension, HFpEF, COPD, diabetes admitted with abdominal pain.  ID is consulted regarding SBP treatment.    Spontaneous bacterial peritonitis.  History of ascites, on prophylactic Bactrim.  Previously getting paracentesis every few weeks, now every other day for the past month.  Admitted after developing abdominal pain at the site of a recent paracentesis.  Repeat paracentesis with high white cell count and bloody aspirate.  Cultures negative.  Treated with ceftriaxone.  Repeat paracentesis with slight decrease in neutrophils, still bloody.  Cultures negative to date.  Hospital course complicated by acute kidney injury, improving.  Anemia.  Drop in hemoglobin after admission.  Report of melena.  Received blood transfusion.  Hemoglobin now stable.  Evaluated by GI.    Principal Problem:    Cirrhosis of liver with ascites, unspecified hepatic cirrhosis type (H)  Active Problems:    Tobacco use    Chronic right-sided congestive heart failure (H)    Ascites    DM2 (diabetes mellitus, type 2) (H)    Benign essential hypertension    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    RLQ abdominal pain    Danni-Arnett tear    Diarrhea, unspecified type       PLAN   -Unclear if persistent neutrophilia in ascites is related to a traumatic tap (presence of blood) or resistant infection.  No growth on cultures to guide us.  -trial of meropenem, dose adjusted with improved renal function.   -repeat paracentesis with cell counts 1-2 days to see response. Consider doing tomorrow, prior to upcoming weekend      Drake Huggins MD  [facility]  Direct messaging: [employer] Paging  On-Call ID provider: [phone], option: 9      ===========================================    SUBJECTIVE / INTERVAL HISTORY:     No events. Feels fine. Tolerating antibiotics  "            Antibiotics   Meropenem 5/21-    Previous:  Bactrim prior to arrival, stopped on 5/17.  Ceftriaxone 5/15-5/20    Physical Exam     Temp:  [97.4  F (36.3  C)-98.5  F (36.9  C)] 98  F (36.7  C)  Pulse:  [66-83] 83  Resp:  [17-18] 18  BP: (108-130)/(44-59) 130/59  SpO2:  [98 %-99 %] 98 %    /59 (BP Location: Right arm)   Pulse 83   Temp 98  F (36.7  C) (Oral)   Resp 18   Ht 1.651 m (5' 5\")   Wt 123.4 kg (272 lb)   SpO2 98%   BMI 45.26 kg/m      GENERAL:  well-developed, well-nourished, sitting in bed in no acute distress.   HENT:  Head is normocephalic, atraumatic.   EYES:  Eyes have anicteric sclerae without conjunctival injection   LUNGS:  normal respiratory pattern   ABDOMEN:  soft, no tenderness  EXT: Extremities warm and without edema.  SKIN:  No acute rashes.    NEUROLOGIC:  Grossly nonfocal.      Cultures   5/16 ascites culture: No organisms on Gram stain; culture negative  5/20 ascites culture: No organisms on Gram stain; culture no growth to date    No results found for the last 90 days.       Pertinent Labs:     Recent Labs   Lab 05/22/25  1159 05/22/25  0626 05/21/25  2348 05/21/25  1142 05/21/25  0619 05/20/25  1151 05/20/25  0851   WBC  --  9.8  --   --  9.2  --  9.2   HGB 9.4* 9.1* 8.6*   < > 9.0*   < > 8.5*   PLT  --  482*  --   --  430  --  347    < > = values in this interval not displayed.       Recent Labs   Lab 05/22/25  0626 05/21/25  0619 05/20/25  0851 05/19/25  1123 05/16/25  0644 05/15/25  2011    137 133*  --    < > 134*   CO2 22 21* 22  --    < > 25   BUN 61.4* 64.2* 61.2*  --    < > 18.3   ALBUMIN  --   --   --  4.5  --  3.8   ALKPHOS  --   --   --   --   --  178*   ALT  --   --   --   --   --  16   AST  --   --   --   --   --  13    < > = values in this interval not displayed.       Recent Labs   Lab 05/15/25  2011   CRPI 85.80*           Imaging:     US Paracentesis without Albumin  Result Date: 5/20/2025  EXAM: 1. PARACENTESIS 2. ULTRASOUND GUIDANCE " LOCATION: [facility] DATE: 5/20/2025 INDICATION: Ascites. PROCEDURE: Informed consent obtained. Time out performed. The abdomen was prepped and draped in a sterile fashion. 5 mL of 1% lidocaine was infused into local soft tissues. A 5 Tamazight catheter system was introduced into the abdominal ascites under ultrasound guidance. 2.5 liters of clear fluid were removed and sent to lab if requested. Patient tolerated procedure well. Ultrasound imaging was obtained and placed in the patient's permanent medical record.     IMPRESSION: 1.  Status post ultrasound-guided paracentesis. Reference CPT Code: 06227    CTA Abdomen Pelvis with Contrast  Result Date: 5/17/2025  EXAM: CTA ABDOMEN PELVIS WITH CONTRAST LOCATION: [facility] DATE: 5/17/2025 INDICATION: Hemoglobin downtrending, history of cirrhosis, concern for bleed. COMPARISON: CT abdomen/pelvis with contrast 05/15/2025. TECHNIQUE: CT angiogram abdomen pelvis during arterial phase of injection of IV contrast. 2D and 3D MIP reconstructions were performed by the CT technologist. Dose reduction techniques were used. CONTRAST: 90ml isovue 370 FINDINGS: ANGIOGRAM ABDOMEN/PELVIS: No aortic aneurysm or dissection. No celiac artery stenosis. Conventional hepatic arterial anatomy. No superior mesenteric artery stenosis or occlusion. Inferior mesenteric artery is patent. Patent renal arteries without significant stenosis. Scattered atherosclerotic calcifications of the aortoiliac vessels. Portal veins appear patent. Recanalized periumbilical veins. Numerous upper abdominal portosystemic collaterals. LOWER CHEST: Normal. HEPATOBILIARY: Cirrhotic liver morphology with hepatic steatosis. Unremarkable gallbladder. PANCREAS: Normal. SPLEEN: Prominent spleen. ADRENAL GLANDS: Similar size and appearance of bilateral benign adrenal myelolipomas. KIDNEYS/BLADDER: No hydronephrosis. Urinary bladder is partially decompressed. Likely residual  excreted contrast within the urinary bladder. BOWEL: No bowel obstruction. ASCITES: Small volume abdominopelvic ascites which appears dense in several locations for example along the right paracolic gutter and in the dependent pelvis, compatible with internal blood products. However there is no evidence of acute bleeding. LYMPH NODES: Shotty retroperitoneal lymph nodes, similar to prior and may be reactive. PELVIC ORGANS: Unremarkable. MUSCULOSKELETAL: Multilevel degenerative changes of the thoracic and lumbosacral spine. No acute osseous abnormality.     IMPRESSION: 1.  Small volume abdominopelvic ascites which appears dense in several locations for example along the right paracolic gutter and in the dependent pelvis, compatible with internal blood products. However there is no evidence of acute bleeding. 2.  Cirrhotic liver morphology with hepatic steatosis. 3.  Recanalized periumbilical veins. Numerous upper abdominal portosystemic collaterals. 4.  No celiac artery stenosis. Conventional hepatic arterial anatomy.    US Paracentesis with Albumin  Result Date: 5/16/2025  EXAM: 1. PARACENTESIS 2. ULTRASOUND GUIDANCE LOCATION: [facility] DATE: 5/16/2025 INDICATION: Ascites. PROCEDURE: Informed consent obtained. Time out performed. The abdomen was prepped and draped in a sterile fashion. 10 mL of 1% lidocaine was infused into local soft tissues. A 5 Tamazight catheter system was introduced into the abdominal ascites under ultrasound guidance. 4.75 liters of bloody fluid were removed and sent to lab if requested. Patient tolerated procedure well. Ultrasound imaging was obtained and placed in the patient's permanent medical record.     IMPRESSION: 1.  Status post ultrasound-guided paracentesis. Reference CPT Code: 92705    US Paracentesis without Albumin  Result Date: 5/15/2025  EXAM: 1. PARACENTESIS 2. ULTRASOUND GUIDANCE LOCATION: [facility] DATE: 5/14/2025 INDICATION:  Ascites. PROCEDURE: Informed consent obtained. Time out performed. The abdomen was prepped and draped in a sterile fashion. 10 mL of 1% lidocaine was infused into local soft tissues. A 5 Greenlandic catheter system was introduced into the abdominal ascites under ultrasound guidance. 4 liters of yellow fluid were removed and sent to lab if requested. Patient tolerated procedure well. Ultrasound imaging was obtained and placed in the patient's permanent medical record.     IMPRESSION: 1.  Status post ultrasound-guided paracentesis. Reference CPT Code: 20530    CT Abdomen Pelvis w Contrast  Result Date: 5/15/2025  EXAM: CT ABDOMEN PELVIS W CONTRAST LOCATION: [facility] DATE: 5/15/2025 INDICATION: Patient with a recent paracentesis, patient concern for bowel injury during procedure.  Right lower quadrant pain COMPARISON: 4/20/2025. TECHNIQUE: CT scan of the abdomen and pelvis was performed following injection of IV contrast. Multiplanar reformats were obtained. Dose reduction techniques were used. CONTRAST: isovue 370 90ml FINDINGS: LOWER CHEST: Normal. HEPATOBILIARY: Mild hepatomegaly. Hepatic steatosis. Cirrhotic morphology. No calcified gallstones. PANCREAS: Normal. SPLEEN: Normal. ADRENAL GLANDS: Stable bilateral adrenal myelolipomas measuring 4.5 on the right and 3.4 on the left. KIDNEYS/BLADDER: Normal. BOWEL: Mild ascites. No free air. No bowel wall thickening or abnormal enhancement. No obstruction. LYMPH NODES: Shotty mesenteric and retroperitoneal lymph nodes, unchanged. No pelvic adenopathy. VASCULATURE: No aneurysm. PELVIC ORGANS: Normal. MUSCULOSKELETAL: Small fat-containing left inguinal hernia.     IMPRESSION: 1.  Hepatic steatosis, cirrhosis, and mild ascites. No evidence for bowel injury status post paracentesis.    US Paracentesis without Albumin  Result Date: 5/6/2025  EXAM: 1. PARACENTESIS 2. ULTRASOUND GUIDANCE LOCATION: [facility] DATE: 5/6/2025  INDICATION: Ascites. PROCEDURE: Informed consent obtained. Time out performed. The abdomen was prepped and draped in a sterile fashion. 10 mL of 1% lidocaine was infused into local soft tissues. A 5 Malawian catheter system was introduced into the abdominal ascites under ultrasound guidance. 4.6 liters of clear fluid were removed and sent to lab if requested. Patient tolerated procedure well. Ultrasound imaging was obtained and placed in the patient's permanent medical record.     IMPRESSION: 1.  Status post ultrasound-guided paracentesis. Reference CPT Code: 28225    Chest XR,  PA & LAT  Result Date: 5/4/2025  EXAM: XR CHEST 2 VIEWS LOCATION: [facility] DATE: 5/4/2025 INDICATION: SOB COMPARISON: None.     IMPRESSION: Borderline heart size with no evidence for sobeida pulmonary venous congestion. No pleural effusions, inflammatory infiltrates, or pneumothorax. Stable since 4/20/2025.          Data reviewed today: I reviewed all medications, new labs and imaging results over the last 24 hours. I personally reviewed no images or EKG's today.  The patient's care was discussed with the Patient.

## 2025-05-22 NOTE — PROGRESS NOTES
"GI Progress Note  Warren Jaramillo  -46     Subjective:   Sitting up on edge of bed.  Asking when he might be able to go home.  He is aware of needing low-sodium diet at home.  Shares that staff often purchases at prepares food that is not low-sodium.     Objective:   /59 (BP Location: Right arm)   Pulse 83   Temp 98  F (36.7  C) (Oral)   Resp 18   Ht 1.651 m (5' 5\")   Wt 123.4 kg (272 lb)   SpO2 98%   BMI 45.26 kg/m    Body mass index is 45.26 kg/m .   Gen: No acute distress  Cardio: RRR  GI: Non-distended, BS positive, soft, non-tender. No guarding.    Laboratory  Recent Labs   Lab 05/22/25  1159 05/22/25  0626 05/21/25  2348 05/21/25  1142 05/21/25  0619 05/20/25  1151 05/20/25  0851   WBC  --  9.8  --   --  9.2  --  9.2   RBC  --  3.25*  --   --  3.16*  --  2.93*   HGB 9.4* 9.1* 8.6*   < > 9.0*   < > 8.5*   HCT  --  28.4*  --   --  27.8*  --  25.3*   MCV 87 87 87   < > 88   < > 86   MCH  --  28.0  --   --  28.5  --  29.0   MCHC  --  32.0  --   --  32.4  --  33.6   RDW  --  14.6  --   --  14.8  --  15.0   PLT  --  482*  --   --  430  --  347    < > = values in this interval not displayed.      Recent Labs   Lab 05/22/25  0626 05/21/25  0619 05/20/25  0851    137 133*   CO2 22 21* 22   BUN 61.4* 64.2* 61.2*     Recent Labs   Lab 05/15/25  2011   ALKPHOS 178*   AST 13   ALT 16     Lab Results   Component Value Date    INR 1.33 (H) 05/17/2025    INR 1.16 (H) 05/15/2025    INR 1.17 (H) 04/02/2025       US Paracentesis without Albumin  Result Date: 5/20/2025  EXAM: 1. PARACENTESIS 2. ULTRASOUND GUIDANCE LOCATION: [facility] DATE: 5/20/2025 INDICATION: Ascites. PROCEDURE: Informed consent obtained. Time out performed. The abdomen was prepped and draped in a sterile fashion. 5 mL of 1% lidocaine was infused into local soft tissues. A 5 [place] catheter system was introduced into the abdominal ascites under ultrasound guidance. 2.5 liters of clear fluid were removed " "and sent to lab if requested. Patient tolerated procedure well. Ultrasound imaging was obtained and placed in the patient's permanent medical record.     IMPRESSION: 1.  Status post ultrasound-guided paracentesis. Reference CPT Code: 29380      Assessment:   This is a 44-year-old man with cirrhosis secondary to metabolic dysfunction associated steatotic liver disease and alcohol use, complicated by ascites requiring frequent  paracentesis and prior SBP, COPD, AVA, obesity, hypertension, diabetes, history of DVT on apixaban who was admitted with abdominal pain following routine paracentesis.        1.  SBP-paracentesis 5/16 with 690 neutrophils.  Paracentesis 5/20 with 536 neutrophils.    Had been on prophylaxis with Bactrim, though still got SBP.  ID consult yesterday appreciated.  Switched from ceftriaxone to meropenem with plans for repeat paracentesis in 2 to 3 days to make sure counts are improving. ID raises question of bloody tap versus infection.    2.  Cirrhosis secondary to MASLD/EtOH. Decompensated with ascites.   Ascites - he is aware of need for low-sodium diet.  Discussed again today.  Unfortunately, no plans for TIPS, given RIVERA and SBP.  Portal hypertensive gastropathy, no varices per EGD April 2025.  Coagulopathy-mild  LFTs with mild elevation.     3.  Previous GI bleeding-no recent bleeding here.  No varices with upper endoscopies.  No need for repeat EGD at this point.     4.  RIVERA-nephrology following.  Gradually improving.    5.  History of DVT    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    Tobacco use    Elevated WBCs    Rectal bleeding    Anal fissure    \"high flow priapism\"     CARDIOVASCULAR SCREENING; LDL GOAL LESS THAN 160    PTSD (post-traumatic stress disorder)    Fetal alcohol syndromes    Seasonal allergic rhinitis    Steatohepatitis    Cardiomegaly    Shortness of breath    Chest pain, unspecified " type    Acute right hip pain    Chronic right-sided congestive heart failure (H)    Active autistic disorder    Adjustment disorder with disturbance of conduct    Angiomyolipoma    Ankylosis, sacroiliac joint    Ascites    Charcot-Estrella-Tooth syndrome    Chronic insomnia    Congestive heart failure (H)    DM2 (diabetes mellitus, type 2) (H)    DM2 (diabetes mellitus, type 2) (H)    Dysphagia    Dysuria    Elevated d-dimer    Episodic mood disorder    Excessive daytime sleepiness    Gait instability    H/O fall    Hematuria    Benign essential hypertension    Hyperglycemia    Incontinence    Myelolipoma of adrenal gland    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    Abdominal pain, right upper quadrant    PVC's (premature ventricular contractions)    SVT (supraventricular tachycardia)    Heart failure with preserved ejection fraction, NYHA class I (H)    Incomplete left bundle branch block (LBBB)    Suicidal ideation    Cirrhosis of liver with ascites, unspecified hepatic cirrhosis type (H)    Thyroid nodule    ADHD (attention deficit hyperactivity disorder)    Chest pain    Morbid obesity (H)    COPD exacerbation (H)    DVT of axillary vein, acute left (H)    Abdominal bloating    LLQ abdominal pain    SBP (spontaneous bacterial peritonitis) (H)    ASNHL (asymmetrical sensorineural hearing loss)    Traumatic brain injury (H)    Tongue lesion    Acute kidney failure, unspecified    Other specified hypothyroidism    Medication induced coagulopathy    Normal anion gap metabolic acidosis    Diffuse abdominal pain    Portal hypertension (H)    Esophagitis    NSTEMI (non-ST elevated myocardial infarction) (H)    Other ascites    RLQ abdominal pain    Danni-Arnett tear    Diarrhea, unspecified type      Plan:   1.  Antibiotics per ID.  Input appreciated.  2.  Agree with plan for paracentesis in 1 to 2 days to follow counts.  3.  Low-sodium diet  4.  Continue to avoid alcohol.  5.  Outpatient follow-up [facility] hepatology clinic.    Thank  you.  Bruno Echols PA-C  [facility]  [phone]     A total of 25 minutes was spent in today's care.

## 2025-05-23 ENCOUNTER — APPOINTMENT (OUTPATIENT)
Dept: ULTRASOUND IMAGING | Facility: HOSPITAL | Age: 45
End: 2025-05-23
Attending: PHYSICIAN ASSISTANT
Payer: COMMERCIAL

## 2025-05-23 LAB
% LINING CELLS, BODY FLUID: 2 %
ANION GAP SERPL CALCULATED.3IONS-SCNC: 12 MMOL/L (ref 7–15)
APPEARANCE FLD: ABNORMAL
BACTERIA SPEC CULT: NORMAL
BASOPHILS # BLD AUTO: 0.1 10E3/UL (ref 0–0.2)
BASOPHILS NFR BLD AUTO: 1 %
BUN SERPL-MCNC: 48.7 MG/DL (ref 6–20)
CALCIUM SERPL-MCNC: 9 MG/DL (ref 8.8–10.4)
CHLORIDE SERPL-SCNC: 105 MMOL/L (ref 98–107)
COLOR FLD: ABNORMAL
CREAT SERPL-MCNC: 2.08 MG/DL (ref 0.67–1.17)
EGFRCR SERPLBLD CKD-EPI 2021: 40 ML/MIN/1.73M2
EOSINOPHIL # BLD AUTO: 0.7 10E3/UL (ref 0–0.7)
EOSINOPHIL NFR BLD AUTO: 7 %
ERYTHROCYTE [DISTWIDTH] IN BLOOD BY AUTOMATED COUNT: 14.4 % (ref 10–15)
GLUCOSE BLDC GLUCOMTR-MCNC: 106 MG/DL (ref 70–99)
GLUCOSE BLDC GLUCOMTR-MCNC: 130 MG/DL (ref 70–99)
GLUCOSE BLDC GLUCOMTR-MCNC: 137 MG/DL (ref 70–99)
GLUCOSE BLDC GLUCOMTR-MCNC: 154 MG/DL (ref 70–99)
GLUCOSE BLDC GLUCOMTR-MCNC: 88 MG/DL (ref 70–99)
GLUCOSE SERPL-MCNC: 103 MG/DL (ref 70–99)
GRAM STAIN RESULT: NORMAL
GRAM STAIN RESULT: NORMAL
HCO3 SERPL-SCNC: 23 MMOL/L (ref 22–29)
HCT VFR BLD AUTO: 29.4 % (ref 40–53)
HGB BLD-MCNC: 8.8 G/DL (ref 13.3–17.7)
HGB BLD-MCNC: 8.8 G/DL (ref 13.3–17.7)
HGB BLD-MCNC: 9.3 G/DL (ref 13.3–17.7)
HGB BLD-MCNC: 9.4 G/DL (ref 13.3–17.7)
HGB BLD-MCNC: 9.4 G/DL (ref 13.3–17.7)
HOLD SPECIMEN: NORMAL
IMM GRANULOCYTES # BLD: 0.1 10E3/UL
IMM GRANULOCYTES NFR BLD: 1 %
LYMPHOCYTES # BLD AUTO: 2 10E3/UL (ref 0.8–5.3)
LYMPHOCYTES NFR BLD AUTO: 21 %
LYMPHOCYTES NFR FLD MANUAL: 24 %
MCH RBC QN AUTO: 28 PG (ref 26.5–33)
MCHC RBC AUTO-ENTMCNC: 31.6 G/DL (ref 31.5–36.5)
MCV RBC AUTO: 87 FL (ref 78–100)
MCV RBC AUTO: 87 FL (ref 78–100)
MCV RBC AUTO: 88 FL (ref 78–100)
MCV RBC AUTO: 88 FL (ref 78–100)
MCV RBC AUTO: 89 FL (ref 78–100)
MONOCYTES # BLD AUTO: 1.2 10E3/UL (ref 0–1.3)
MONOCYTES NFR BLD AUTO: 12 %
MONOS+MACROS NFR FLD MANUAL: 44 %
NEUTROPHILS # BLD AUTO: 5.6 10E3/UL (ref 1.6–8.3)
NEUTROPHILS # FLD: 1344.9 /UL (ref ?–250)
NEUTROPHILS NFR BLD AUTO: 58 %
NEUTS BAND NFR FLD MANUAL: 30 %
NRBC # BLD AUTO: 0 10E3/UL
NRBC BLD AUTO-RTO: 0 /100
PLATELET # BLD AUTO: 494 10E3/UL (ref 150–450)
POTASSIUM SERPL-SCNC: 4.8 MMOL/L (ref 3.4–5.3)
RBC # BLD AUTO: 3.32 10E6/UL (ref 4.4–5.9)
SODIUM SERPL-SCNC: 140 MMOL/L (ref 135–145)
SPECIMEN SOURCE FLD: ABNORMAL
WBC # BLD AUTO: 9.6 10E3/UL (ref 4–11)
WBC # FLD AUTO: 4483 /UL

## 2025-05-23 PROCEDURE — 87205 SMEAR GRAM STAIN: CPT | Performed by: PHYSICIAN ASSISTANT

## 2025-05-23 PROCEDURE — 89050 BODY FLUID CELL COUNT: CPT | Performed by: PHYSICIAN ASSISTANT

## 2025-05-23 PROCEDURE — 999N000157 HC STATISTIC RCP TIME EA 10 MIN

## 2025-05-23 PROCEDURE — 99232 SBSQ HOSP IP/OBS MODERATE 35: CPT | Mod: GC

## 2025-05-23 PROCEDURE — 250N000013 HC RX MED GY IP 250 OP 250 PS 637: Performed by: PHYSICIAN ASSISTANT

## 2025-05-23 PROCEDURE — 250N000013 HC RX MED GY IP 250 OP 250 PS 637: Performed by: HOSPITALIST

## 2025-05-23 PROCEDURE — 36415 COLL VENOUS BLD VENIPUNCTURE: CPT

## 2025-05-23 PROCEDURE — 80048 BASIC METABOLIC PNL TOTAL CA: CPT

## 2025-05-23 PROCEDURE — 87070 CULTURE OTHR SPECIMN AEROBIC: CPT | Performed by: FAMILY MEDICINE

## 2025-05-23 PROCEDURE — 250N000013 HC RX MED GY IP 250 OP 250 PS 637: Performed by: FAMILY MEDICINE

## 2025-05-23 PROCEDURE — 250N000011 HC RX IP 250 OP 636: Mod: JZ | Performed by: FAMILY MEDICINE

## 2025-05-23 PROCEDURE — 250N000013 HC RX MED GY IP 250 OP 250 PS 637

## 2025-05-23 PROCEDURE — 87075 CULTR BACTERIA EXCEPT BLOOD: CPT | Performed by: PHYSICIAN ASSISTANT

## 2025-05-23 PROCEDURE — 85018 HEMOGLOBIN: CPT

## 2025-05-23 PROCEDURE — 99232 SBSQ HOSP IP/OBS MODERATE 35: CPT

## 2025-05-23 PROCEDURE — 99232 SBSQ HOSP IP/OBS MODERATE 35: CPT | Performed by: INTERNAL MEDICINE

## 2025-05-23 PROCEDURE — 85025 COMPLETE CBC W/AUTO DIFF WBC: CPT

## 2025-05-23 PROCEDURE — 272N000710 US PARACENTESIS WITH ALBUMIN

## 2025-05-23 PROCEDURE — 120N000001 HC R&B MED SURG/OB

## 2025-05-23 RX ORDER — LIDOCAINE 4 G/G
1 PATCH TOPICAL EVERY 24 HOURS
Status: COMPLETED | OUTPATIENT
Start: 2025-05-23 | End: 2025-05-26

## 2025-05-23 RX ORDER — MEROPENEM 1 G/1
1 INJECTION, POWDER, FOR SOLUTION INTRAVENOUS EVERY 8 HOURS
Status: DISCONTINUED | OUTPATIENT
Start: 2025-05-23 | End: 2025-05-24

## 2025-05-23 RX ADMIN — FLUTICASONE FUROATE AND VILANTEROL TRIFENATATE 1 PUFF: 100; 25 POWDER RESPIRATORY (INHALATION) at 08:34

## 2025-05-23 RX ADMIN — FAMOTIDINE 20 MG: 20 TABLET, FILM COATED ORAL at 08:35

## 2025-05-23 RX ADMIN — MEROPENEM 1 G: 1 INJECTION, POWDER, FOR SOLUTION INTRAVENOUS at 13:05

## 2025-05-23 RX ADMIN — LIDOCAINE 1 PATCH: 4 PATCH TOPICAL at 13:45

## 2025-05-23 RX ADMIN — UMECLIDINIUM 1 PUFF: 62.5 AEROSOL, POWDER ORAL at 08:34

## 2025-05-23 RX ADMIN — PANTOPRAZOLE SODIUM 40 MG: 20 TABLET, DELAYED RELEASE ORAL at 08:35

## 2025-05-23 RX ADMIN — MEROPENEM 1 G: 1 INJECTION, POWDER, FOR SOLUTION INTRAVENOUS at 19:41

## 2025-05-23 RX ADMIN — FLUOXETINE HYDROCHLORIDE 20 MG: 20 CAPSULE ORAL at 08:35

## 2025-05-23 RX ADMIN — PANTOPRAZOLE SODIUM 40 MG: 20 TABLET, DELAYED RELEASE ORAL at 16:38

## 2025-05-23 RX ADMIN — SODIUM BICARBONATE 1300 MG: 650 TABLET ORAL at 19:41

## 2025-05-23 RX ADMIN — HYDROXYZINE HYDROCHLORIDE 10 MG: 10 TABLET ORAL at 19:52

## 2025-05-23 RX ADMIN — MEROPENEM 500 MG: 500 INJECTION, POWDER, FOR SOLUTION INTRAVENOUS at 03:55

## 2025-05-23 RX ADMIN — OLANZAPINE 10 MG: 10 TABLET, FILM COATED ORAL at 19:41

## 2025-05-23 RX ADMIN — LEVOTHYROXINE SODIUM 12.5 MCG: 0.03 TABLET ORAL at 08:35

## 2025-05-23 RX ADMIN — ROSUVASTATIN 10 MG: 10 TABLET, FILM COATED ORAL at 19:41

## 2025-05-23 RX ADMIN — SODIUM BICARBONATE 1300 MG: 650 TABLET ORAL at 08:35

## 2025-05-23 RX ADMIN — TRAZODONE HYDROCHLORIDE 100 MG: 50 TABLET ORAL at 19:41

## 2025-05-23 RX ADMIN — MONTELUKAST 10 MG: 10 TABLET, FILM COATED ORAL at 08:35

## 2025-05-23 ASSESSMENT — ACTIVITIES OF DAILY LIVING (ADL)
ADLS_ACUITY_SCORE: 55

## 2025-05-23 NOTE — PROGRESS NOTES
Infectious Diseases Progress Note  [facility]    Date of visit: 05/23/2025       ASSESSMENT   44-year-old man with a history of cirrhosis, hypertension, HFpEF, COPD, diabetes admitted with abdominal pain.  ID is consulted regarding SBP treatment.    Spontaneous bacterial peritonitis.  History of ascites, on prophylactic Bactrim.  Previously getting paracentesis every few weeks, now every other day for the past month.  Admitted after developing abdominal pain at the site of a recent paracentesis.  Repeat paracentesis with high white cell count and bloody aspirate.  Cultures negative.  Treated with ceftriaxone.  Repeat paracentesis with slight decrease in neutrophils, still bloody.  Cultures negative to date. Previously on TMP/SMX prophylaxis.  Hospital course complicated by acute kidney injury, improving.  Anemia.  Drop in hemoglobin after admission.  Report of melena.  Received blood transfusion.  Hemoglobin now stable.  Evaluated by GI.    Principal Problem:    Cirrhosis of liver with ascites, unspecified hepatic cirrhosis type (H)  Active Problems:    Tobacco use    Chronic right-sided congestive heart failure (H)    Ascites    DM2 (diabetes mellitus, type 2) (H)    Benign essential hypertension    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    RLQ abdominal pain    Danni-Arnett tear    Diarrhea, unspecified type       PLAN   -Unclear if persistent neutrophilia in ascites is related to a traumatic tap (presence of blood) or resistant infection.  No growth on cultures to guide us.  -trial of meropenem, dose adjusted with improved renal function - pharmacy assistance appreciated   -repeat paracentesis with cell counts today.     Addendum: 2.5 L removed, dark colored. Cell counts pending at this time.    Drake Huggins MD  [facility]  Direct messaging: [employer] Paging  On-Call ID provider: [phone], option: 9      ===========================================    SUBJECTIVE / INTERVAL HISTORY:  "    No events. Feels fine. Paracentesis this afternoon.      Antibiotics   Meropenem 5/21-    Previous:  Bactrim prior to arrival, stopped on 5/17.  Ceftriaxone 5/15-5/20    Physical Exam     Temp:  [98  F (36.7  C)-98.5  F (36.9  C)] 98  F (36.7  C)  Pulse:  [70-80] 77  Resp:  [20-22] 20  BP: (121-139)/(56-62) 139/62  SpO2:  [97 %-98 %] 98 %    /62 (BP Location: Right arm)   Pulse 77   Temp 98  F (36.7  C) (Oral)   Resp 20   Ht 1.651 m (5' 5\")   Wt 121.2 kg (267 lb 4.8 oz)   SpO2 98%   BMI 44.48 kg/m      GENERAL:  well-developed, well-nourished, sitting in bed in no acute distress.   HENT:  Head is normocephalic, atraumatic.   EYES:  Eyes have anicteric sclerae without conjunctival injection   LUNGS:  normal respiratory pattern   ABDOMEN:  soft, no tenderness. Feels distended  EXT: Extremities warm and without edema.  SKIN:  No acute rashes.    NEUROLOGIC:  Grossly nonfocal.      Cultures   5/16 ascites culture: No organisms on Gram stain; culture negative  5/20 ascites culture: No organisms on Gram stain; culture no growth to date    No results found for the last 90 days.       Pertinent Labs:     Recent Labs   Lab 05/23/25  1159 05/23/25  0613 05/22/25  2351 05/22/25  1159 05/22/25  0626 05/21/25  1142 05/21/25  0619   WBC  --  9.6  --   --  9.8  --  9.2   HGB 9.4* 9.3* 8.8*   < > 9.1*   < > 9.0*   PLT  --  494*  --   --  482*  --  430    < > = values in this interval not displayed.       Recent Labs   Lab 05/23/25  0613 05/22/25  0626 05/21/25  0619 05/20/25  0851 05/19/25  1123    136 137   < >  --    CO2 23 22 21*   < >  --    BUN 48.7* 61.4* 64.2*   < >  --    ALBUMIN  --   --   --   --  4.5    < > = values in this interval not displayed.       No results for input(s): \"CRPI\", \"SED\" in the last 168 hours.          Imaging:     US Paracentesis without Albumin  Result Date: 5/20/2025  EXAM: 1. PARACENTESIS 2. ULTRASOUND GUIDANCE LOCATION: [facility] DATE: 5/20/2025 " INDICATION: Ascites. PROCEDURE: Informed consent obtained. Time out performed. The abdomen was prepped and draped in a sterile fashion. 5 mL of 1% lidocaine was infused into local soft tissues. A 5 [place] catheter system was introduced into the abdominal ascites under ultrasound guidance. 2.5 liters of clear fluid were removed and sent to lab if requested. Patient tolerated procedure well. Ultrasound imaging was obtained and placed in the patient's permanent medical record.     IMPRESSION: 1.  Status post ultrasound-guided paracentesis. Reference CPT Code: 12255    CTA Abdomen Pelvis with Contrast  Result Date: 5/17/2025  EXAM: CTA ABDOMEN PELVIS WITH CONTRAST LOCATION: [facility] DATE: 5/17/2025 INDICATION: Hemoglobin downtrending, history of cirrhosis, concern for bleed. COMPARISON: CT abdomen/pelvis with contrast 05/15/2025. TECHNIQUE: CT angiogram abdomen pelvis during arterial phase of injection of IV contrast. 2D and 3D MIP reconstructions were performed by the CT technologist. Dose reduction techniques were used. CONTRAST: 90ml isovue 370 FINDINGS: ANGIOGRAM ABDOMEN/PELVIS: No aortic aneurysm or dissection. No celiac artery stenosis. Conventional hepatic arterial anatomy. No superior mesenteric artery stenosis or occlusion. Inferior mesenteric artery is patent. Patent renal arteries without significant stenosis. Scattered atherosclerotic calcifications of the aortoiliac vessels. Portal veins appear patent. Recanalized periumbilical veins. Numerous upper abdominal portosystemic collaterals. LOWER CHEST: Normal. HEPATOBILIARY: Cirrhotic liver morphology with hepatic steatosis. Unremarkable gallbladder. PANCREAS: Normal. SPLEEN: Prominent spleen. ADRENAL GLANDS: Similar size and appearance of bilateral benign adrenal myelolipomas. KIDNEYS/BLADDER: No hydronephrosis. Urinary bladder is partially decompressed. Likely residual excreted contrast within the urinary bladder. BOWEL: No bowel  obstruction. ASCITES: Small volume abdominopelvic ascites which appears dense in several locations for example along the right paracolic gutter and in the dependent pelvis, compatible with internal blood products. However there is no evidence of acute bleeding. LYMPH NODES: Shotty retroperitoneal lymph nodes, similar to prior and may be reactive. PELVIC ORGANS: Unremarkable. MUSCULOSKELETAL: Multilevel degenerative changes of the thoracic and lumbosacral spine. No acute osseous abnormality.     IMPRESSION: 1.  Small volume abdominopelvic ascites which appears dense in several locations for example along the right paracolic gutter and in the dependent pelvis, compatible with internal blood products. However there is no evidence of acute bleeding. 2.  Cirrhotic liver morphology with hepatic steatosis. 3.  Recanalized periumbilical veins. Numerous upper abdominal portosystemic collaterals. 4.  No celiac artery stenosis. Conventional hepatic arterial anatomy.    US Paracentesis with Albumin  Result Date: 5/16/2025  EXAM: 1. PARACENTESIS 2. ULTRASOUND GUIDANCE LOCATION: [facility] DATE: 5/16/2025 INDICATION: Ascites. PROCEDURE: Informed consent obtained. Time out performed. The abdomen was prepped and draped in a sterile fashion. 10 mL of 1% lidocaine was infused into local soft tissues. A 5 [place] catheter system was introduced into the abdominal ascites under ultrasound guidance. 4.75 liters of bloody fluid were removed and sent to lab if requested. Patient tolerated procedure well. Ultrasound imaging was obtained and placed in the patient's permanent medical record.     IMPRESSION: 1.  Status post ultrasound-guided paracentesis. Reference CPT Code: 45338    US Paracentesis without Albumin  Result Date: 5/15/2025  EXAM: 1. PARACENTESIS 2. ULTRASOUND GUIDANCE LOCATION: [facility] DATE: 5/14/2025 INDICATION: Ascites. PROCEDURE: Informed consent obtained. Time out  performed. The abdomen was prepped and draped in a sterile fashion. 10 mL of 1% lidocaine was infused into local soft tissues. A 5 [place] catheter system was introduced into the abdominal ascites under ultrasound guidance. 4 liters of yellow fluid were removed and sent to lab if requested. Patient tolerated procedure well. Ultrasound imaging was obtained and placed in the patient's permanent medical record.     IMPRESSION: 1.  Status post ultrasound-guided paracentesis. Reference CPT Code: 18637    CT Abdomen Pelvis w Contrast  Result Date: 5/15/2025  EXAM: CT ABDOMEN PELVIS W CONTRAST LOCATION: [facility] DATE: 5/15/2025 INDICATION: Patient with a recent paracentesis, patient concern for bowel injury during procedure.  Right lower quadrant pain COMPARISON: 4/20/2025. TECHNIQUE: CT scan of the abdomen and pelvis was performed following injection of IV contrast. Multiplanar reformats were obtained. Dose reduction techniques were used. CONTRAST: isovue 370 90ml FINDINGS: LOWER CHEST: Normal. HEPATOBILIARY: Mild hepatomegaly. Hepatic steatosis. Cirrhotic morphology. No calcified gallstones. PANCREAS: Normal. SPLEEN: Normal. ADRENAL GLANDS: Stable bilateral adrenal myelolipomas measuring 4.5 on the right and 3.4 on the left. KIDNEYS/BLADDER: Normal. BOWEL: Mild ascites. No free air. No bowel wall thickening or abnormal enhancement. No obstruction. LYMPH NODES: Shotty mesenteric and retroperitoneal lymph nodes, unchanged. No pelvic adenopathy. VASCULATURE: No aneurysm. PELVIC ORGANS: Normal. MUSCULOSKELETAL: Small fat-containing left inguinal hernia.     IMPRESSION: 1.  Hepatic steatosis, cirrhosis, and mild ascites. No evidence for bowel injury status post paracentesis.    US Paracentesis without Albumin  Result Date: 5/6/2025  EXAM: 1. PARACENTESIS 2. ULTRASOUND GUIDANCE LOCATION: [facility] DATE: 5/6/2025 INDICATION: Ascites. PROCEDURE: Informed consent obtained. Time  out performed. The abdomen was prepped and draped in a sterile fashion. 10 mL of 1% lidocaine was infused into local soft tissues. A 5 [place] catheter system was introduced into the abdominal ascites under ultrasound guidance. 4.6 liters of clear fluid were removed and sent to lab if requested. Patient tolerated procedure well. Ultrasound imaging was obtained and placed in the patient's permanent medical record.     IMPRESSION: 1.  Status post ultrasound-guided paracentesis. Reference CPT Code: 21470    Chest XR,  PA & LAT  Result Date: 5/4/2025  EXAM: XR CHEST 2 VIEWS LOCATION: [facility] DATE: 5/4/2025 INDICATION: SOB COMPARISON: None.     IMPRESSION: Borderline heart size with no evidence for sobeida pulmonary venous congestion. No pleural effusions, inflammatory infiltrates, or pneumothorax. Stable since 4/20/2025.          Data reviewed today: I reviewed all medications, new labs and imaging results over the last 24 hours. I personally reviewed no images or EKG's today.  The patient's care was discussed with the Bedside Nurse and Patient.

## 2025-05-23 NOTE — PLAN OF CARE
Problem: Adult Inpatient Plan of Care  Goal: Optimal Comfort and Wellbeing  Outcome: Progressing     Problem: Pain Acute  Goal: Optimal Pain Control and Function  Intervention: Prevent or Manage Pain  Recent Flowsheet Documentation  Taken 5/23/2025 1400 by Diya Jo RN  Sensory Stimulation Regulation: care clustered  Medication Review/Management: medications reviewed     Problem: Comorbidity Management  Goal: Blood Glucose Levels Within Targeted Range  Intervention: Monitor and Manage Glycemia  Recent Flowsheet Documentation  Taken 5/23/2025 1400 by Diya Jo RN  Medication Review/Management: medications reviewed   Goal Outcome Evaluation:         Pt reports right lower back/abd pain, lidocaine patch applied.  Pt eager to go home, frequently out walking in halls.  Plan for US paracentesis this afternoon.    Diya Jo, RN

## 2025-05-23 NOTE — PROGRESS NOTES
"GI Progress Note  Warren Jaramillo  -31     Subjective:   Standing in hallway, greeting me as I approach from several rooms away.   Asking when he might be able to go home -- I explained that we will get paracentesis with fluid studies today.  He ate taco meat and rice for lunch today.  Did not finish his salad.  I reinforced need to make sure he is eating a low-sodium diet.  C/O pain on right lateral abdomen -- worse in the morning when getting up.      Objective:   /62 (BP Location: Right arm)   Pulse 77   Temp 98  F (36.7  C) (Oral)   Resp 20   Ht 1.651 m (5' 5\")   Wt 121.2 kg (267 lb 4.8 oz)   SpO2 98%   BMI 44.48 kg/m    Body mass index is 44.48 kg/m .   Gen: No acute distress  Cardio: RRR  GI: Protuberant and distended.  Firm but palpable. No guarding.  Neuro: Answers questions appropriately.  No evidence of confusion on our discussion.  Psych: Pleasant and cooperative.    Laboratory  Recent Labs   Lab 05/23/25  1159 05/23/25  0613 05/22/25  2351 05/22/25  1159 05/22/25  0626 05/21/25  1142 05/21/25  0619   WBC  --  9.6  --   --  9.8  --  9.2   RBC  --  3.32*  --   --  3.25*  --  3.16*   HGB 9.4* 9.3* 8.8*   < > 9.1*   < > 9.0*   HCT  --  29.4*  --   --  28.4*  --  27.8*   MCV 88 89 87   < > 87   < > 88   MCH  --  28.0  --   --  28.0  --  28.5   MCHC  --  31.6  --   --  32.0  --  32.4   RDW  --  14.4  --   --  14.6  --  14.8   PLT  --  494*  --   --  482*  --  430    < > = values in this interval not displayed.      Recent Labs   Lab 05/23/25  0613 05/22/25  0626 05/21/25  0619    136 137   CO2 23 22 21*   BUN 48.7* 61.4* 64.2*     No results for input(s): \"BILITOT\", \"ALKPHOS\", \"AST\", \"ALT\" in the last 168 hours.    Lab Results   Component Value Date    INR 1.33 (H) 05/17/2025    INR 1.16 (H) 05/15/2025    INR 1.17 (H) 04/02/2025       US Paracentesis without Albumin  Result Date: 5/20/2025  EXAM: 1. PARACENTESIS 2. ULTRASOUND GUIDANCE LOCATION: [facility] " DATE: 5/20/2025 INDICATION: Ascites. PROCEDURE: Informed consent obtained. Time out performed. The abdomen was prepped and draped in a sterile fashion. 5 mL of 1% lidocaine was infused into local soft tissues. A 5 Kinyarwanda catheter system was introduced into the abdominal ascites under ultrasound guidance. 2.5 liters of clear fluid were removed and sent to lab if requested. Patient tolerated procedure well. Ultrasound imaging was obtained and placed in the patient's permanent medical record.     IMPRESSION: 1.  Status post ultrasound-guided paracentesis. Reference CPT Code: 32970      Assessment:   This is a 44-year-old man with cirrhosis secondary to metabolic dysfunction associated steatotic liver disease and alcohol use, complicated by ascites requiring frequent  paracentesis and prior SBP, COPD, AVA, obesity, hypertension, diabetes, history of DVT on apixaban who was admitted with abdominal pain following routine paracentesis.        1.  SBP-paracentesis 5/16 with 690 neutrophils.  Paracentesis 5/20 with 536 neutrophils.    Had been on prophylaxis with Bactrim, though still got SBP.  ID consult appreciated-- switched from ceftriaxone to meropenem with plans for repeat paracentesis in near future -- will order paracentesis today.     2.  Cirrhosis secondary to MASLD/EtOH. Decompensated with ascites.   Ascites - he is aware of need for low-sodium diet.  Discussed again today.  Unfortunately, no plans for TIPS, given RIVERA and SBP.  Portal hypertensive gastropathy, no varices per EGD April 2025.  Coagulopathy-mild  LFTs with mild alk phos elevation; otherwise normal.      3.  Previous GI bleeding-no recent bleeding here.  No varices with upper endoscopies.  No need for repeat EGD at this point.     4.  RIVERA-nephrology followed and has since signed off.    5.  Right-sided abdominal pain -lateral aspect  Per description, question of this might be musculoskeletal, as it can be provoked with movements.  Location is a fairly  "unusual area for this to be GI associated.  Topical lidocaine patch.    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    Tobacco use    Elevated WBCs    Rectal bleeding    Anal fissure    \"high flow priapism\"     CARDIOVASCULAR SCREENING; LDL GOAL LESS THAN 160    PTSD (post-traumatic stress disorder)    Fetal alcohol syndromes    Seasonal allergic rhinitis    Steatohepatitis    Cardiomegaly    Shortness of breath    Chest pain, unspecified type    Acute right hip pain    Chronic right-sided congestive heart failure (H)    Active autistic disorder    Adjustment disorder with disturbance of conduct    Angiomyolipoma    Ankylosis, sacroiliac joint    Ascites    Charcot-Estrella-Tooth syndrome    Chronic insomnia    Congestive heart failure (H)    DM2 (diabetes mellitus, type 2) (H)    DM2 (diabetes mellitus, type 2) (H)    Dysphagia    Dysuria    Elevated d-dimer    Episodic mood disorder    Excessive daytime sleepiness    Gait instability    H/O fall    Hematuria    Benign essential hypertension    Hyperglycemia    Incontinence    Myelolipoma of adrenal gland    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    Abdominal pain, right upper quadrant    PVC's (premature ventricular contractions)    SVT (supraventricular tachycardia)    Heart failure with preserved ejection fraction, NYHA class I (H)    Incomplete left bundle branch block (LBBB)    Suicidal ideation    Cirrhosis of liver with ascites, unspecified hepatic cirrhosis type (H)    Thyroid nodule    ADHD (attention deficit hyperactivity disorder)    Chest pain    Morbid obesity (H)    COPD exacerbation (H)    DVT of axillary vein, acute left (H)    Abdominal bloating    LLQ abdominal pain    SBP (spontaneous bacterial peritonitis) (H)    ASNHL (asymmetrical sensorineural hearing loss)    Traumatic brain injury (H)    Tongue lesion    Acute kidney failure, unspecified    Other specified " hypothyroidism    Medication induced coagulopathy    Normal anion gap metabolic acidosis    Diffuse abdominal pain    Portal hypertension (H)    Esophagitis    NSTEMI (non-ST elevated myocardial infarction) (H)    Other ascites    RLQ abdominal pain    Danni-Arnett tear    Diarrhea, unspecified type      Plan:   1.  Paracentesis -diagnostic and therapeutic with culture, Gram stain, cell count and differential.  Order entered to replace with IV albumin if greater than 4-5 L removed.  2.  Antibiotics per ID.  Input appreciated.  On meropenem now.  3.  Low-sodium diet.  Less than 2000 mg/day.  4.  Continue to avoid alcohol.  5.  Topical lidocaine patch to right lateral abdomen.   6.  Outpatient follow-up [facility] hepatology clinic.    Thank you.  Bruno Echols PA-C  [facility]  [phone]     A total of 25 minutes was spent in today's care.    ADDENDUM  Curbside discussion with radiology about paracentesis and plans - they were OK with paracentesis.     Patient had ~2.5 L paracentesis this afternoon.  Thus, no need for IV albumin.  Cell count and differential are pending.  If those are showing clear improvement, he would then be okay for discharge from GI perspective, though would need a ID regarding when to switch to oral antibiotics and what antibiotics to send patient out with.      Thank you.  Bruno Echols PA-C  [facility]  [phone]     A total of 30 min was spent on today's care.

## 2025-05-23 NOTE — PROGRESS NOTES
[facility]    Progress Note - Hospitalist Service       Date of Admission:  5/15/2025    Assessment & Plan   Warren Jaramillo is a 44 year old male admitted on 5/15/2025. He has a history of HTN, type 2 diabetes, AVA, COPD, Rojas's disease and hepatic cirrhosis with ascites and is admitted for ongoing abdominal pain, nausea vomiting with an episode of hematamesis and diarrhea. 5/17 reporting melena and has a significant Hgb drop, repeat CTA negative for acute bleed. GI following. Developed SBP and RIVERA with creatinine now improving.      Patient initially admitted by the hospitalist team and transferred to our care on day one of admission.    Abdominal pain  Cirrhosis with ascites/frequent paracentesis   Episode of hematemesis   Melena -resolved  Acute blood loss anemia   SBP  Patient presented ED with worsening ongoing abdominal pain.  Patient has history of hepatic cirrhosis and ascites with frequent paracentesis.  Recently had 4 L removed.  Has been complaining of left lower quadrant abdominal discomfort.  Patient also notes 1 episode hematemesis prior to admission.  Patient was vitally stable on admission and CT abdomen pelvis with no acute findings except for mild ascites.  GI consulted on admission with recommendation as below.  Of note per chart review patient recently had upper endoscopy in April 2025 with portal hypertensive changes in the stomach but negative for varices.  Enteric panel negative.  Abdominal pain is likely abdominal wall pain due to ascites.  Hide/16 overnight patient's hemoglobin continue to trend down from 9.5-7.2.  Repeat CTA abdomen pelvis with no signs of acute bleeding and showed small volume abdominopelvic ascites more dense could be compatible with internal blood products.  Patient was hypotensive and reporting lightheadedness AM of 5/17 and received 1 unit of blood and albumin. Will continue to trend hemoglobin. Discussed with GI that patient is  reporting melena x 2 on 5/17-18. Initial recs to keep n.p.o. at midnight and continue trending hgb for possible EGD , now with ARF and stable hgb, GI deferred EGD for now will continue monitoring.   -GI consulted, appreciate recs    -Diagnostic paracentesis with ascitic fluid analysis and cytology    -Albumin infusions  5/17 and 5/19    -Continue daily hgb checks, Hgb stable    -S/p unit blood transfusion and albumin x1, 5/17    -Outpatient follow-up for liver biopsy and HVPG assessment in June    -Ceftriaxone 5/17-5/21 for SBP, repeat dx paracentesis with slight decrease neutrophils   -ID consulted, switched to meropenem 5/22, repeat paracentesis in 2-3 days    Hypotension-resolved  S/p dx paracentesis 5/17 5/17 Patient is s/p paracentesis. RN paged blood pressure with SBP mid 80s and patient feeling lightheaded.  On arrival to the room patient reports lightheadedness is improving placed in the Trendelenburg position and just recently started albumin infusion. Will review the chart to see how much fluid was removed during paracentesis. Monitor closely. Hgb is trending down as above, continues to have soft BP, receiving one unit of blood and albumin x1. Hgb stable, s/p 1 unit of pRBCs. Received albumin infusions, BP stable and melena resolved. Hgb stable.     RIVERA on CKD.   RIVERA-improving  Hyponatremia -resolved  On admission creatinine 1.7. creatinine abruptly elevated to 7.0 in the setting of cirrhosis with ascites, SBP, and acute blood loss anemia likely UGIB. RIVERA likely multifactorial--volume loss, SBP with hypotension and contrast exposure. Nephrology consulted, appreciate recs. Urine output and creatinine improving.   -Daily BMP  -Nephrology consulted, appreciate recs  -Albumin 100 g 5/18  -Creatinine continues to improve, good UOP  -Renally dose meds    Acute blood loss anemia likely 2/2 UGIB  Presented with abdominal pain and one episode of hematemesis.  Admitted with a hemoglobin of 13.2, downtrending 9.5.   "Will continue to monitor closely. Melena now resolved. GI following no plan for EGD at this time given ARF and stable Hgb.  Per chart review patient had EGD one month ago with findings of portal gastropathy and no varices.  Will continue to monitor closely.  -Daily CBC  -Continue to Hold DOAC.  -GI following    History of HFpEF  History of HFpEF.  Most recent echo with EF 60 to 65%.  No signs of CHF exacerbation.  -hold PTA Lasix and spironolactone    Type 2 diabetes  Most recent A1c 6.7.  On PTA metformin and Jardiance.  -Hold PTA metformin and Jardiance  -Diabetic diet  -Start sliding scale insulin    Hx of DVT  History of occlusive DVT diagnosed in 2024. On PTA apixaban held as above for anemia with concern for bleeding as above.  - Hold PTA apixaban  - Outpatient follow-up to determine length of anticoagulation.      Chronic conditions:  Hypothyroidism: Continue PTA levothyroxine  HTN: hold PTA lisinopril  AVA; BiPAP as needed for sleep  Mood disorder: Continue PTA fluoxetine and olanzapine          Diet: Fluid restriction 2000 ML FLUID  Regular Diet Adult    DVT Prophylaxis: DOAC  Khan Catheter: Not present  Fluids: PO  Lines: None     Cardiac Monitoring: None  Code Status: Full Code      Clinically Significant Risk Factors                     # Hypertension: Noted on problem list  # Chronic heart failure with preserved ejection fraction: heart failure noted on problem list and last echo with EF >50%          # DMII: A1C = 6.7 % (Ref range: <5.7 %) within past 6 months   # Morbid Obesity: Estimated body mass index is 44.48 kg/m  as calculated from the following:    Height as of this encounter: 1.651 m (5' 5\").    Weight as of this encounter: 121.2 kg (267 lb 4.8 oz).        # Financial/Environmental Concerns: none         Social Drivers of Health   Tobacco Use: High Risk (5/15/2025)    Patient History     Smoking Tobacco Use: Every Day     Smokeless Tobacco Use: Never    Received from [employer] [facility] " [facility]    Social Connections         Disposition Plan     Medically Ready for Discharge: Anticipated Tomorrow         The patient's care was discussed with the Attending Physician, Dr. Rosenstein.    Young Saeed MD  Hospitalist Service  [facility]  Securely message with [employer] (more info)  Text page via [employer] Paging/Directory   ______________________________________________________________________    Interval History   No acute events overnight. Feels improved. No abdominal pain. Good appetite, no N/V. No diarrhea or melena.     Physical Exam   Vital Signs: Temp: 98.5  F (36.9  C) Temp src: Oral BP: 124/56 Pulse: 80   Resp: 20 SpO2: 98 % O2 Device: None (Room air)    Weight: 267 lbs 4.8 oz  General Appearance: Alert and oriented, NAD  Respiratory: normal work of breathing, clear breath sounds, no wheezing  Cardiovascular: normal S1, S2, no murmur  GI: soft, distended, non tender, and no guarding.   Skin: normal turgor and appearance, no visible rash   MSK: No lower limb edema B/L        Medical Decision Making             Data     I have personally reviewed the following data over the past 24 hrs:    9.6  \   9.3 (L)   / 494 (H)     140 105 48.7 (H) /  106 (H)   4.8 23 2.08 (H) \       Imaging results reviewed over the past 24 hrs:   No results found for this or any previous visit (from the past 24 hours).

## 2025-05-23 NOTE — PROGRESS NOTES
Care Management Follow Up    Length of Stay (days): 7    Expected Discharge Date: 05/25/2025     Concerns to be Addressed: discharge planning    Patient plan of care discussed at interdisciplinary rounds: Yes    Anticipated Discharge Disposition: Group Home    Anticipated Discharge Services:  Group Home - [facility]  Anticipated Discharge DME:  n/a    Patient/family educated on Medicare website which has current facility and service quality ratings:  N/a  Education Provided on the Discharge Plan:  Yes  Patient/Family in Agreement with the Plan:  n/a    Referrals Placed by CM/SW:  n/a  Private pay costs discussed: Not applicable    Discussed  Partnership in Safe Discharge Planning  document with patient/family: Yes: Group home     Handoff Completed: No, handoff not indicated or clinically appropriate    Additional Information:  Per chart review, pt not med ready to discharge. Called [facility] to provide updates. Attempted to call legal guardian, went to voicemail.     Next Steps: CM will continue to monitor progression of care, review team recommendations and provide discharge planning assist as needed.      Davy Cerna RN

## 2025-05-23 NOTE — PLAN OF CARE
Problem: Adult Inpatient Plan of Care  Goal: Optimal Comfort and Wellbeing  Outcome: Progressing     Problem: Pain Acute  Goal: Optimal Pain Control and Function  Outcome: Progressing  Intervention: Prevent or Manage Pain  Recent Flowsheet Documentation  Taken 5/23/2025 0400 by Trini Mcclure RN  Medication Review/Management: medications reviewed  Taken 5/22/2025 2011 by Trini Mcclure RN  Medication Review/Management: medications reviewed     Problem: Anemia  Goal: Anemia Symptom Improvement  Outcome: Progressing  Intervention: Monitor and Manage Anemia  Recent Flowsheet Documentation  Taken 5/23/2025 0400 by Trini Mcclure RN  Safety Promotion/Fall Prevention:   clutter free environment maintained   lighting adjusted   nonskid shoes/slippers when out of bed  Taken 5/22/2025 2011 by Trini Mcclure RN  Safety Promotion/Fall Prevention:   clutter free environment maintained   lighting adjusted   nonskid shoes/slippers when out of bed     Problem: Liver Failure  Goal: Absence of Bleeding  Outcome: Progressing  Goal: Blood Glucose Level Within Target Range  Outcome: Progressing  Intervention: Optimize Glycemic Control  Recent Flowsheet Documentation  Taken 5/23/2025 0400 by Trini Mcclure RN  Hyperglycemia Management: blood glucose monitored  Hypoglycemia Management: blood glucose monitored  Taken 5/22/2025 2011 by Trini Mcclure RN  Hyperglycemia Management: blood glucose monitored  Hypoglycemia Management: blood glucose monitored   Goal Outcome Evaluation:       Pt is A/O x4. Denies pain.  and 130. Renal diet. CPAP at night, VSS continue to monitor. Trini Mcclure RN

## 2025-05-23 NOTE — PROGRESS NOTES
RENAL PROGRESS NOTE    CC:  RIVERA on CKD    ASSESSMENT & PLAN:     PLAN:   -Continue expectant management (avoid nephrotoxins, renal dose medications, avoid hyper/Hypotension, daily wt, daily I/O), no urgent indications for dialysis. ALIVIA Typically peaks 3-5 days post exposure,Cr now serially improving.  -Continue PO bicarbonate.   -HOLD ACE (would be optimal to resume ACE/ARB therapy when renal function returning to baseline/RIVERA resolves, for long term renal protection. This can be done at a later date)  -Has OP Nephrology follow up Monday 6/23/25 130 pm with Renee West PA-C  -From a kidney stand point,  No objection to discharge when medically cleared. We can follow renal recovery upon discharge. Labs 1 WK, Renal follow up in 4 WKs.   -With such good renal recovery, Nephrology will sign off. Please re-consult need arise.   -BMP daily    Non-oliguric RIVERA on CKD2; has some mild underlying CKD (BL Cr 1.1-1.2); now ARF likely 2/2 ALIVIA (2 dye loads 5/15, 5/17), +/- Variable hemodynamics (Low BP s/p belly tap while on ACE and diuretics). urine bland, Renal US No [place]/MAss. ; no indication for dialysis today and hope pt will plateau and improved; try to optimize hemodynamics; doubt HRS as primary cause of ARF but liver hemodynamics can be an  unfavorable environment for renal recovery; hold bactrim for now  Cr imprvoing 7.2>6.7>5.2>3.4>2.0,and UO picking up    Metabolic acidosis: 2/2 RIVERA. On Bicarb. resolved    SBP: long history of presenting with ab pain. on ABX/Tap consistent with SBP and supportive cares. s/p paracentesis.    Anemia: Hg 9s, S/P blood transfusion last wk. hgb stable    ?GIB: s/p EGD. On PPI, HELD AC (on prior for H/O DVT)GI following    Uro: bladder partly decompressed on imaging. S/p ham to monitor I/O, good UO, could trial Catheter removal, bladder scans Q shift.     Hyponatremia: 2/2 Hypervolemia with chronic liver disease, improving.     Volume:  Hypervolemic with scites and edema; typically on  diuretics, current PRN diuresis pending UO.     Hypothyroid; on replacement    Developmental delay, fetal ETOH syndrome, PTSD    H/o DVT:  holding eliquis    DM:  holding jardiance and metformin    SUBJECTIVE:    Pt resting in bed, appears comfortable  He is eager to go home  Denies ABD pain, ID following SBP  S/p para   Appears euvolemic, on RA  Denies HA, feeling dizzy, SOB, fever,r rickie , Edema, CP  Report increased UO, ham removed 5/20  Wt maintaining, VSS  ALIVIA 5/15, 5/17 CTA   Appetite good, denies over uremic Sx  Good UO, cR serially improving.   Discussed labs/plan and answered all questions         OBJECTIVE:  Physical Exam   Temp: 98.5  F (36.9  C) Temp src: Oral BP: 124/56 Pulse: 80   Resp: 20 SpO2: 98 % O2 Device: None (Room air)    Vitals:    05/19/25 0644 05/22/25 0802 05/23/25 0617   Weight: 125 kg (275 lb 9.2 oz) 123.4 kg (272 lb) 121.2 kg (267 lb 4.8 oz)     Vital Signs with Ranges  Temp:  [98  F (36.7  C)-98.5  F (36.9  C)] 98.5  F (36.9  C)  Pulse:  [70-80] 80  Resp:  [20-22] 20  BP: (121-129)/(56-60) 124/56  SpO2:  [97 %-98 %] 98 %  I/O last 3 completed shifts:  In: 1300 [P.O.:1300]  Out: 3100 [Urine:3100]  Patient Vitals for the past 72 hrs:   Weight   05/23/25 0617 121.2 kg (267 lb 4.8 oz)   05/22/25 0802 123.4 kg (272 lb)     Intake/Output Summary (Last 24 hours) at 5/19/2025 1053  Last data filed at 5/19/2025 0618  Gross per 24 hour   Intake 938 ml   Output 1200 ml   Net -262 ml       PHYSICAL EXAM:  GEN: NAD, aox3  CV: RRR   Lung: clear and equal, RA  Ab: soft and NT, + ascites  Ext: no edema and well perfused  Skin: no rash  Neuro: NFD, no asterixsis      LABORATORY STUDIES:     Recent Labs   Lab 05/23/25  0613 05/22/25  2351 05/22/25  1819 05/22/25  1159 05/22/25  0626 05/21/25  2348 05/21/25  1142 05/21/25  0619 05/20/25  1151 05/20/25  0851 05/19/25  1123 05/19/25  0606 05/18/25  1133 05/18/25  0644   WBC 9.6  --   --   --  9.8  --   --  9.2  --  9.2  --  10.6  --  9.7   RBC 3.32*  --    --   --  3.25*  --   --  3.16*  --  2.93*  --  2.99*  --  2.84*   HGB 9.3* 8.8* 9.3* 9.4* 9.1* 8.6*   < > 9.0*   < > 8.5*   < > 8.4*  8.4*   < > 8.0*  8.0*   HCT 29.4*  --   --   --  28.4*  --   --  27.8*  --  25.3*  --  25.8*  --  24.8*   *  --   --   --  482*  --   --  430  --  347  --  334  --  286    < > = values in this interval not displayed.       Basic Metabolic Panel:  Recent Labs   Lab 05/23/25  0709 05/23/25  0613 05/23/25  0201 05/22/25 2010 05/22/25  1635 05/22/25  1058 05/22/25  0636 05/22/25  0626 05/21/25  0703 05/21/25  0619 05/20/25  0926 05/20/25  0851 05/19/25  0726 05/19/25  0606 05/18/25  1202 05/18/25  0920   NA  --  140  --   --   --   --   --  136  --  137  --  133*  --  130*  --  128*   POTASSIUM  --  4.8  --   --   --   --   --  4.7  --  4.7  --  4.8  --  4.7  --  4.8   CHLORIDE  --  105  --   --   --   --   --  101  --  102  --  97*  --  94*  --  92*   CO2  --  23  --   --   --   --   --  22  --  21*  --  22  --  19*  --  21*   BUN  --  48.7*  --   --   --   --   --  61.4*  --  64.2*  --  61.2*  --  54.5*  --  46.3*   CR  --  2.08*  --   --   --   --   --  3.48*  --  5.26*  --  6.78*  --  7.23*  --  7.23*   * 103* 130* 139* 144* 102*   < > 104*   < > 105*   < > 109*   < > 116*   < > 110*   WES  --  9.0  --   --   --   --   --  9.1  --  9.0  --  8.2*  --  8.3*  --  7.5*    < > = values in this interval not displayed.       INR  Recent Labs   Lab 05/17/25  0643   INR 1.33*        Recent Labs   Lab Test 05/23/25  0613 05/22/25  2351 05/22/25  1159 05/22/25  0626 05/17/25  1109 05/17/25  0643 05/15/25  2011 05/15/25  0042   INR  --   --   --   --   --  1.33*  --  1.16*   WBC 9.6  --   --  9.8   < >  --    < > 14.0*   HGB 9.3* 8.8*   < > 9.1*   < >  --    < > 13.2*   *  --   --  482*   < >  --    < > 304    < > = values in this interval not displayed.       Personally reviewed current labs    Abbey LUCIA-BC  [facility]  [phone]

## 2025-05-23 NOTE — PROGRESS NOTES
CLINICAL NUTRITION SERVICES    Reviewed nutrition risk factors due to LOS. No concerns with oral intake per [employer] (room service meal ordering system). Reviewed wt hx. No unintentional wt loss. No indication of pressure injury.    Follow Up / Monitoring:   Per policy unless consulted

## 2025-05-24 LAB
ANION GAP SERPL CALCULATED.3IONS-SCNC: 11 MMOL/L (ref 7–15)
BASOPHILS # BLD AUTO: 0.1 10E3/UL (ref 0–0.2)
BASOPHILS NFR BLD AUTO: 1 %
BUN SERPL-MCNC: 36.5 MG/DL (ref 6–20)
CALCIUM SERPL-MCNC: 9 MG/DL (ref 8.8–10.4)
CHLORIDE SERPL-SCNC: 108 MMOL/L (ref 98–107)
CREAT SERPL-MCNC: 1.45 MG/DL (ref 0.67–1.17)
EGFRCR SERPLBLD CKD-EPI 2021: 61 ML/MIN/1.73M2
EOSINOPHIL # BLD AUTO: 0.7 10E3/UL (ref 0–0.7)
EOSINOPHIL NFR BLD AUTO: 7 %
ERYTHROCYTE [DISTWIDTH] IN BLOOD BY AUTOMATED COUNT: 14.5 % (ref 10–15)
GLUCOSE BLDC GLUCOMTR-MCNC: 107 MG/DL (ref 70–99)
GLUCOSE BLDC GLUCOMTR-MCNC: 111 MG/DL (ref 70–99)
GLUCOSE BLDC GLUCOMTR-MCNC: 138 MG/DL (ref 70–99)
GLUCOSE BLDC GLUCOMTR-MCNC: 150 MG/DL (ref 70–99)
GLUCOSE BLDC GLUCOMTR-MCNC: 99 MG/DL (ref 70–99)
GLUCOSE SERPL-MCNC: 108 MG/DL (ref 70–99)
HCO3 SERPL-SCNC: 23 MMOL/L (ref 22–29)
HCT VFR BLD AUTO: 27.4 % (ref 40–53)
HGB BLD-MCNC: 8.8 G/DL (ref 13.3–17.7)
HGB BLD-MCNC: 9 G/DL (ref 13.3–17.7)
HGB BLD-MCNC: 9 G/DL (ref 13.3–17.7)
IMM GRANULOCYTES # BLD: 0.1 10E3/UL
IMM GRANULOCYTES NFR BLD: 1 %
LYMPHOCYTES # BLD AUTO: 2 10E3/UL (ref 0.8–5.3)
LYMPHOCYTES NFR BLD AUTO: 22 %
MCH RBC QN AUTO: 28.8 PG (ref 26.5–33)
MCHC RBC AUTO-ENTMCNC: 32.8 G/DL (ref 31.5–36.5)
MCV RBC AUTO: 88 FL (ref 78–100)
MCV RBC AUTO: 88 FL (ref 78–100)
MCV RBC AUTO: 90 FL (ref 78–100)
MONOCYTES # BLD AUTO: 1 10E3/UL (ref 0–1.3)
MONOCYTES NFR BLD AUTO: 11 %
NEUTROPHILS # BLD AUTO: 5.3 10E3/UL (ref 1.6–8.3)
NEUTROPHILS NFR BLD AUTO: 58 %
NRBC # BLD AUTO: 0 10E3/UL
NRBC BLD AUTO-RTO: 0 /100
PLATELET # BLD AUTO: 474 10E3/UL (ref 150–450)
POTASSIUM SERPL-SCNC: 4.7 MMOL/L (ref 3.4–5.3)
RBC # BLD AUTO: 3.13 10E6/UL (ref 4.4–5.9)
SODIUM SERPL-SCNC: 142 MMOL/L (ref 135–145)
WBC # BLD AUTO: 9.1 10E3/UL (ref 4–11)

## 2025-05-24 PROCEDURE — 250N000013 HC RX MED GY IP 250 OP 250 PS 637

## 2025-05-24 PROCEDURE — 120N000001 HC R&B MED SURG/OB

## 2025-05-24 PROCEDURE — 82310 ASSAY OF CALCIUM: CPT

## 2025-05-24 PROCEDURE — 36415 COLL VENOUS BLD VENIPUNCTURE: CPT

## 2025-05-24 PROCEDURE — 85025 COMPLETE CBC W/AUTO DIFF WBC: CPT

## 2025-05-24 PROCEDURE — 99232 SBSQ HOSP IP/OBS MODERATE 35: CPT | Mod: GC

## 2025-05-24 PROCEDURE — 85018 HEMOGLOBIN: CPT

## 2025-05-24 PROCEDURE — 250N000013 HC RX MED GY IP 250 OP 250 PS 637: Performed by: HOSPITALIST

## 2025-05-24 PROCEDURE — 99232 SBSQ HOSP IP/OBS MODERATE 35: CPT | Performed by: INTERNAL MEDICINE

## 2025-05-24 PROCEDURE — 999N000157 HC STATISTIC RCP TIME EA 10 MIN

## 2025-05-24 PROCEDURE — 250N000013 HC RX MED GY IP 250 OP 250 PS 637: Performed by: FAMILY MEDICINE

## 2025-05-24 PROCEDURE — 250N000011 HC RX IP 250 OP 636: Mod: JZ | Performed by: FAMILY MEDICINE

## 2025-05-24 RX ORDER — LEVOFLOXACIN 750 MG/1
750 TABLET, FILM COATED ORAL DAILY
Status: DISCONTINUED | OUTPATIENT
Start: 2025-05-24 | End: 2025-05-26 | Stop reason: HOSPADM

## 2025-05-24 RX ADMIN — OLANZAPINE 10 MG: 10 TABLET, FILM COATED ORAL at 20:07

## 2025-05-24 RX ADMIN — UMECLIDINIUM 1 PUFF: 62.5 AEROSOL, POWDER ORAL at 08:12

## 2025-05-24 RX ADMIN — LEVOTHYROXINE SODIUM 12.5 MCG: 0.03 TABLET ORAL at 08:11

## 2025-05-24 RX ADMIN — FLUOXETINE HYDROCHLORIDE 20 MG: 20 CAPSULE ORAL at 08:11

## 2025-05-24 RX ADMIN — MEROPENEM 1 G: 1 INJECTION, POWDER, FOR SOLUTION INTRAVENOUS at 12:53

## 2025-05-24 RX ADMIN — FLUTICASONE FUROATE AND VILANTEROL TRIFENATATE 1 PUFF: 100; 25 POWDER RESPIRATORY (INHALATION) at 08:12

## 2025-05-24 RX ADMIN — SODIUM BICARBONATE 1300 MG: 650 TABLET ORAL at 20:07

## 2025-05-24 RX ADMIN — LEVOFLOXACIN 750 MG: 750 TABLET, FILM COATED ORAL at 22:30

## 2025-05-24 RX ADMIN — HYDROXYZINE HYDROCHLORIDE 10 MG: 10 TABLET ORAL at 20:30

## 2025-05-24 RX ADMIN — MONTELUKAST 10 MG: 10 TABLET, FILM COATED ORAL at 08:11

## 2025-05-24 RX ADMIN — TRAZODONE HYDROCHLORIDE 100 MG: 50 TABLET ORAL at 20:07

## 2025-05-24 RX ADMIN — SODIUM BICARBONATE 1300 MG: 650 TABLET ORAL at 07:14

## 2025-05-24 RX ADMIN — ROSUVASTATIN 10 MG: 10 TABLET, FILM COATED ORAL at 20:06

## 2025-05-24 RX ADMIN — PANTOPRAZOLE SODIUM 40 MG: 20 TABLET, DELAYED RELEASE ORAL at 07:14

## 2025-05-24 RX ADMIN — FAMOTIDINE 20 MG: 20 TABLET, FILM COATED ORAL at 08:11

## 2025-05-24 RX ADMIN — MEROPENEM 1 G: 1 INJECTION, POWDER, FOR SOLUTION INTRAVENOUS at 04:55

## 2025-05-24 RX ADMIN — PANTOPRAZOLE SODIUM 40 MG: 20 TABLET, DELAYED RELEASE ORAL at 15:42

## 2025-05-24 ASSESSMENT — ACTIVITIES OF DAILY LIVING (ADL)
ADLS_ACUITY_SCORE: 56
ADLS_ACUITY_SCORE: 56
ADLS_ACUITY_SCORE: 55
ADLS_ACUITY_SCORE: 55
ADLS_ACUITY_SCORE: 56
ADLS_ACUITY_SCORE: 55
ADLS_ACUITY_SCORE: 56
ADLS_ACUITY_SCORE: 56
ADLS_ACUITY_SCORE: 55
ADLS_ACUITY_SCORE: 56
ADLS_ACUITY_SCORE: 55
ADLS_ACUITY_SCORE: 56
ADLS_ACUITY_SCORE: 55
ADLS_ACUITY_SCORE: 56
ADLS_ACUITY_SCORE: 56
ADLS_ACUITY_SCORE: 55

## 2025-05-24 NOTE — PLAN OF CARE
Problem: Adult Inpatient Plan of Care  Goal: Readiness for Transition of Care  Outcome: Progressing     Problem: Pain Acute  Goal: Optimal Pain Control and Function  Intervention: Optimize Psychosocial Wellbeing  Recent Flowsheet Documentation  Taken 5/24/2025 0900 by Diya Jo, RN  Diversional Activities: television   Goal Outcome Evaluation:       Pt cleared for discharge, group home stating they cannot take him back until Monday due to staffing.  Pt anxious wanting to go home, calling multiple group home staff, asking if he can go outside to smoke.  Pt repeatedly reminded that we have to get approval from house supervisor before going back to group home and that he cannot smoke on hospital grounds. Pt frustrated that he is not able to go home.    Diya Jo, RN

## 2025-05-24 NOTE — PROVIDER NOTIFICATION
Lab notified charge RN absolute neutrophil count from body fluid 1344.9.    -Notified residence through pager.

## 2025-05-24 NOTE — PROGRESS NOTES
[facility]    Progress Note - Hospitalist Service       Date of Admission:  5/15/2025    Assessment & Plan   Warren Jaramillo is a 44 year old male admitted on 5/15/2025. He has a history of HTN, type 2 diabetes, AVA, COPD, Rojas's disease and hepatic cirrhosis with ascites and is admitted for ongoing abdominal pain, nausea vomiting with an episode of hematamesis and diarrhea. 5/17 reporting melena and has a significant Hgb drop, repeat CTA negative for acute bleed. GI following. Developed SBP with increasing ANC despite ceftriaxone and then meropenem.      Patient initially admitted by the hospitalist team and transferred to our care on day one of admission.    Updates 5/24/25:   - Paracentesis yesterday. ANC now very elevated 1330 despite multiple days on meropenem.   - Patient feels very well clinically. No abdominal pain, fever. Great energy level. Eating well.   - Per ID, reasonable to discharge patient on PO levaquin or cefdinir since he is feeling so well and wants to discharge.   - Group home unable to accept patient today due to short staffing. Could take him on Monday   - Parents initially stated that they would take patient to their home for the next 2 days. Guardian agreed to this plan. Later parents stated they do not feel comfortable managing all of patient's medications and medical needs at home. Plan is for discharge to group home on Monday. Informed patient. He is disappointed but accepting of this.     SBP  Cirrhosis with ascites/frequent paracentesis   Episode of hematemesis   Melena -resolved  Acute blood loss anemia   Patient presented ED with worsening ongoing abdominal pain.  Patient has history of hepatic cirrhosis and ascites with frequent paracentesis.  Recently had 4 L removed.  Has been complaining of left lower quadrant abdominal discomfort.  Patient also notes 1 episode hematemesis prior to admission.  Patient was vitally stable on admission and CT  abdomen pelvis with no acute findings except for mild ascites.  GI consulted on admission with recommendation as below.  Of note per chart review patient recently had upper endoscopy in April 2025 with portal hypertensive changes in the stomach but negative for varices.  Enteric panel negative.  Abdominal pain is likely abdominal wall pain due to ascites.  Hide/16 overnight patient's hemoglobin continue to trend down from 9.5-7.2.  Repeat CTA abdomen pelvis with no signs of acute bleeding and showed small volume abdominopelvic ascites more dense could be compatible with internal blood products.  Patient was hypotensive and reporting lightheadedness AM of 5/17 and received 1 unit of blood and albumin. Will continue to trend hemoglobin. Discussed with GI that patient is reporting melena x 2 on 5/17-18. Initial recs to keep n.p.o. at midnight and continue trending hgb for possible EGD , now with ARF and stable hgb, GI deferred EGD for now will continue monitoring. Developed SBP, treated with ceftriaxone initially, subsequent paracentesis with only minimal improvement in ANC (536), switched to meropenem 5/22, repeat para 5/23 with significant increase in ANC (1344). Patient feels very well clinically with no abdominal pain, fever, or leukocytosis. Per ID, suspect the needle is reaching into loculated collections and this is confounding the result; other things to consider would be TB and cancer.   -GI consulted, appreciate recs    -Diagnostic paracentesis with ascitic fluid analysis and cytology    -Albumin infusions  5/17 and 5/19    -Continue daily hgb checks, Hgb stable    -S/p unit blood transfusion and albumin x1, 5/17    -Outpatient follow-up for liver biopsy and HVPG assessment in June    -Ceftriaxone 5/17-5/21 for SBP, repeat dx paracentesis with slight decrease neutrophils   -ID consulted     -Switched to meropenem 5/22     -Repeat paracentesis 5/23 with significant increase in ANCs (1330)     -Since patient is  feeling very well clinically, would be reasonable to discharge with PO levaquin or cefdinir x 5 days.      -Recommend outpatient paracentesis and send ADA, AFB stain/culture, cytology.    Hypotension-resolved  S/p dx paracentesis 5/17 5/17 Patient is s/p paracentesis. RN paged blood pressure with SBP mid 80s and patient feeling lightheaded.  On arrival to the room patient reports lightheadedness is improving placed in the Trendelenburg position and just recently started albumin infusion. Will review the chart to see how much fluid was removed during paracentesis. Monitor closely. Hgb is trending down as above, continues to have soft BP, receiving one unit of blood and albumin x1. Hgb stable, s/p 1 unit of pRBCs. Received albumin infusions, BP stable and melena resolved. Hgb stable.     RIVERA on CKD.   RIVERA-improving  Hyponatremia -resolved  On admission creatinine 1.7. creatinine abruptly elevated to 7.0 in the setting of cirrhosis with ascites, SBP, and acute blood loss anemia likely UGIB. RIVERA likely multifactorial--volume loss, SBP with hypotension and contrast exposure. Nephrology consulted, appreciate recs. Urine output and creatinine improving.   -Daily BMP  -Nephrology consulted, appreciate recs  -Albumin 100 g 5/18  -Creatinine continues to improve, good UOP  -Renally dose meds    Acute blood loss anemia likely 2/2 UGIB  Presented with abdominal pain and one episode of hematemesis.  Admitted with a hemoglobin of 13.2, downtrending 9.5.  Will continue to monitor closely. Melena now resolved. GI following no plan for EGD at this time given ARF and stable Hgb.  Per chart review patient had EGD one month ago with findings of portal gastropathy and no varices.  Will continue to monitor closely.  -Daily CBC  -Continue to Hold DOAC.  -GI following    History of HFpEF  History of HFpEF.  Most recent echo with EF 60 to 65%.  No signs of CHF exacerbation.  -hold PTA Lasix and spironolactone    Type 2 diabetes  Most  "recent A1c 6.7.  On PTA metformin and Jardiance.  -Hold PTA metformin and Jardiance  -Diabetic diet  -Start sliding scale insulin    Hx of DVT  History of occlusive DVT diagnosed in 2024. On PTA apixaban held as above for anemia with concern for bleeding as above.  - Hold PTA apixaban  - Outpatient follow-up to determine length of anticoagulation.      Chronic conditions:  Hypothyroidism: Continue PTA levothyroxine  HTN: hold PTA lisinopril  AVA; BiPAP as needed for sleep  Mood disorder: Continue PTA fluoxetine and olanzapine          Diet: Fluid restriction 2000 ML FLUID  Regular Diet Adult    DVT Prophylaxis: DOAC  Khan Catheter: Not present  Fluids: PO  Lines: None     Cardiac Monitoring: None  Code Status: Full Code      Clinically Significant Risk Factors          # Hyperchloremia: Highest Cl = 108 mmol/L in last 2 days, will monitor as appropriate                # Hypertension: Noted on problem list  # Chronic heart failure with preserved ejection fraction: heart failure noted on problem list and last echo with EF >50%          # DMII: A1C = 6.7 % (Ref range: <5.7 %) within past 6 months   # Morbid Obesity: Estimated body mass index is 44.65 kg/m  as calculated from the following:    Height as of this encounter: 1.651 m (5' 5\").    Weight as of this encounter: 121.7 kg (268 lb 4.8 oz).        # Financial/Environmental Concerns: none         Social Drivers of Health   Tobacco Use: High Risk (5/15/2025)    Patient History     Smoking Tobacco Use: Every Day     Smokeless Tobacco Use: Never    Received from [facility]    Social Connections         Disposition Plan     Medically Ready for Discharge: tomorrow      The patient's care was discussed with the Attending Physician, Dr. Perla.    Barbra Escalante MD  Hospitalist Service  [facility]  Securely message with [employer] (more info)  Text page via [employer] Paging/Directory "   ______________________________________________________________________    Interval History   No acute events overnight. Feels improved. No abdominal pain. Good appetite, no N/V. No diarrhea or melena. No fever, chills.     Physical Exam   Vital Signs: Temp: 98.1  F (36.7  C) Temp src: Oral BP: 132/61 Pulse: 79   Resp: 16 SpO2: 97 % O2 Device: None (Room air)    Weight: 268 lbs 4.8 oz  General Appearance: Alert and oriented, NAD  Respiratory: normal work of breathing, clear breath sounds, no wheezing  Cardiovascular: normal S1, S2, no murmur  GI: soft, distended, non tender, and no guarding.   Skin: normal turgor and appearance, no visible rash   MSK: No lower limb edema B/L        Medical Decision Making             Data     I have personally reviewed the following data over the past 24 hrs:    9.1  \   8.8 (L)   / 474 (H)     142 108 (H) 36.5 (H) /  111 (H)   4.7 23 1.45 (H) \       Imaging results reviewed over the past 24 hrs:   Recent Results (from the past 24 hours)   US Paracentesis with Albumin    Narrative    EXAM:  1. PARACENTESIS  2. ULTRASOUND GUIDANCE  LOCATION: [facility]  DATE: 5/23/2025    INDICATION: Ascites.    PROCEDURE: Informed consent obtained. Time out performed. The abdomen was prepped and draped in a sterile fashion. 10 mL of 1% lidocaine was infused into local soft tissues. A 5 [place] of [place] catheter system was introduced into the abdominal ascites under   ultrasound guidance.    2.45 liters of clear reddish/brown fluid were removed and sent to lab if requested.    Patient tolerated procedure well.    Ultrasound imaging was obtained and placed in the patient's permanent medical record.      Impression    IMPRESSION:  Status post ultrasound-guided paracentesis.    Reference CPT Code: 46434

## 2025-05-24 NOTE — PROGRESS NOTES
Received call from bedside nurse Diya that patient will be ready for discharge this afternoon. This writer called group [facility] to make arrangements for patient's return. We spoke at length and unfortunately as much as they would like to receive patient back today, [facility] indicates they are unable to accept him back due to reduced staffing today and tomorrow. The  will be working a partial day on Monday and they can accept patient back then.    This writer updated patient's nurse and she was with the patient. Patient states he wishes to return home and he is going to call [facility].    Will await any updates.    Edith Espinosa, LEILA, LGSW

## 2025-05-24 NOTE — PLAN OF CARE
Goal Outcome Evaluation:    Problem: Adult Inpatient Plan of Care  Goal: Optimal Comfort and Wellbeing  Outcome: Progressing  Patient denies pain or discomfort when asked. Up independently in the room, No issues noted overnight.

## 2025-05-24 NOTE — PLAN OF CARE
Problem: Adult Inpatient Plan of Care  Goal: Absence of Hospital-Acquired Illness or Injury  Intervention: Prevent Infection  Recent Flowsheet Documentation  Taken 5/23/2025 1644 by Padmaja Zepeda RN  Infection Prevention: rest/sleep promoted   Scheduled iv abx given.  Problem: Comorbidity Management  Goal: Blood Glucose Levels Within Targeted Range  Outcome: Progressing  Intervention: Monitor and Manage Glycemia  Recent Flowsheet Documentation  Taken 5/23/2025 1644 by Padmaja Zepeda RN  Medication Review/Management: medications reviewed   BG=37 and 154.  Problem: Anemia  Goal: Anemia Symptom Improvement  Outcome: Progressing  Intervention: Monitor and Manage Anemia  Recent Flowsheet Documentation  Taken 5/23/2025 1644 by Padmaja Zepeda RN  Safety Promotion/Fall Prevention:   clutter free environment maintained   lighting adjusted   nonskid shoes/slippers when out of bed  Latest hemoglobin=9.4.

## 2025-05-24 NOTE — PROGRESS NOTES
Infectious Diseases Progress Note  [facility]    Date of visit: 05/24/2025       ASSESSMENT   44-year-old man with a history of cirrhosis, hypertension, HFpEF, COPD, diabetes admitted with abdominal pain.  ID is consulted regarding SBP treatment.    Spontaneous bacterial peritonitis.  History of ascites, on prophylactic Bactrim.  Previously getting paracentesis every few weeks, now every other day for the past month.  Admitted after developing abdominal pain at the site of a recent paracentesis.  Repeat paracentesis with high white cell count and bloody aspirate.  Cultures negative.  Treated with ceftriaxone.  Repeat paracentesis with slight decrease in neutrophils, still bloody.  Cultures negative to date. Previously on TMP/SMX prophylaxis.  Hospital course complicated by acute kidney injury, improving.  Anemia.  Drop in hemoglobin after admission.  Report of melena.  Received blood transfusion.  Hemoglobin now stable.  Evaluated by GI.    Principal Problem:    Cirrhosis of liver with ascites, unspecified hepatic cirrhosis type (H)  Active Problems:    Tobacco use    Chronic right-sided congestive heart failure (H)    Ascites    DM2 (diabetes mellitus, type 2) (H)    Benign essential hypertension    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    RLQ abdominal pain    Danni-Arentt tear    Diarrhea, unspecified type       PLAN     Day 4 meropenem  He's asymptomatic and wants to go home  Abdomen non tender  He's bleeding with taps while on Eliquis , although stenting or shunt planned    I suspect the needle is reaching daniel loculated collections n and this is confounding , along with the bleed  No neutrophilia    Other things to think about are Tb (low risk, no hx, but had been homeless 2 years ago), and cancer  No evidence of secondary peritonitis    Since he s like to get discharged,  Can go home and get his outpt and oral levaquin or cefdinir x 5 days (for whatever its worth)  Retap as outpt and send ADA, AFB stain/cx,  "and cytology     Note utility of so called SBP prophylaxis has been questioned lately    Antwan Machuca M.D.        ===========================================    SUBJECTIVE / INTERVAL HISTORY:     No events. Feels fine.       Antibiotics   Meropenem 5/21-    Previous:  Bactrim prior to arrival, stopped on 5/17.  Ceftriaxone 5/15-5/20    Physical Exam     Temp:  [97.5  F (36.4  C)-98.2  F (36.8  C)] 98.1  F (36.7  C)  Pulse:  [68-86] 79  Resp:  [16-20] 16  BP: (123-132)/(59-62) 132/61  SpO2:  [97 %-98 %] 97 %    /61 (BP Location: Right arm)   Pulse 79   Temp 98.1  F (36.7  C) (Oral)   Resp 16   Ht 1.651 m (5' 5\")   Wt 121.7 kg (268 lb 4.8 oz)   SpO2 97%   BMI 44.65 kg/m      GENERAL:  well-developed, well-nourished, sitting in bed in no acute distress.   HENT:  Head is normocephalic, atraumatic.   EYES:  Eyes have anicteric sclerae without conjunctival injection   LUNGS:  normal respiratory pattern   ABDOMEN:  soft, no tenderness. Feels distended  EXT: Extremities warm and without edema.  SKIN:  No acute rashes.    NEUROLOGIC:  Grossly nonfocal.      Cultures   5/16 ascites culture: No organisms on Gram stain; culture negative  5/20 ascites culture: No organisms on Gram stain; culture no growth to date    No results found for the last 90 days.       Pertinent Labs:     Recent Labs   Lab 05/24/25  1157 05/24/25  0701 05/23/25  2338 05/23/25  1159 05/23/25  0613 05/22/25  1159 05/22/25  0626   WBC  --  9.1  --   --  9.6  --  9.8   HGB 8.8* 9.0* 8.8*   < > 9.3*   < > 9.1*   PLT  --  474*  --   --  494*  --  482*    < > = values in this interval not displayed.       Recent Labs   Lab 05/24/25  0701 05/23/25  0613 05/22/25  0626 05/20/25  0851 05/19/25  1123    140 136   < >  --    CO2 23 23 22   < >  --    BUN 36.5* 48.7* 61.4*   < >  --    ALBUMIN  --   --   --   --  4.5    < > = values in this interval not displayed.       No results for input(s): \"CRPI\", \"SED\" in the last 168 " hours.          Imaging:     US Paracentesis with Albumin  Result Date: 5/23/2025  EXAM: 1. PARACENTESIS 2. ULTRASOUND GUIDANCE LOCATION: [facility] DATE: 5/23/2025 INDICATION: Ascites. PROCEDURE: Informed consent obtained. Time out performed. The abdomen was prepped and draped in a sterile fashion. 10 mL of 1% lidocaine was infused into local soft tissues. A 5 Slovenian catheter system was introduced into the abdominal ascites under ultrasound guidance. 2.45 liters of clear reddish/brown fluid were removed and sent to lab if requested. Patient tolerated procedure well. Ultrasound imaging was obtained and placed in the patient's permanent medical record.     IMPRESSION: Status post ultrasound-guided paracentesis. Reference CPT Code: 33974    US Paracentesis without Albumin  Result Date: 5/20/2025  EXAM: 1. PARACENTESIS 2. ULTRASOUND GUIDANCE LOCATION: [facility] DATE: 5/20/2025 INDICATION: Ascites. PROCEDURE: Informed consent obtained. Time out performed. The abdomen was prepped and draped in a sterile fashion. 5 mL of 1% lidocaine was infused into local soft tissues. A 5 Slovenian catheter system was introduced into the abdominal ascites under ultrasound guidance. 2.5 liters of clear fluid were removed and sent to lab if requested. Patient tolerated procedure well. Ultrasound imaging was obtained and placed in the patient's permanent medical record.     IMPRESSION: 1.  Status post ultrasound-guided paracentesis. Reference CPT Code: 44612    CTA Abdomen Pelvis with Contrast  Result Date: 5/17/2025  EXAM: CTA ABDOMEN PELVIS WITH CONTRAST LOCATION: [facility] DATE: 5/17/2025 INDICATION: Hemoglobin downtrending, history of cirrhosis, concern for bleed. COMPARISON: CT abdomen/pelvis with contrast 05/15/2025. TECHNIQUE: CT angiogram abdomen pelvis during arterial phase of injection of IV contrast. 2D and 3D MIP reconstructions were performed by the CT  technologist. Dose reduction techniques were used. CONTRAST: 90ml isovue 370 FINDINGS: ANGIOGRAM ABDOMEN/PELVIS: No aortic aneurysm or dissection. No celiac artery stenosis. Conventional hepatic arterial anatomy. No superior mesenteric artery stenosis or occlusion. Inferior mesenteric artery is patent. Patent renal arteries without significant stenosis. Scattered atherosclerotic calcifications of the aortoiliac vessels. Portal veins appear patent. Recanalized periumbilical veins. Numerous upper abdominal portosystemic collaterals. LOWER CHEST: Normal. HEPATOBILIARY: Cirrhotic liver morphology with hepatic steatosis. Unremarkable gallbladder. PANCREAS: Normal. SPLEEN: Prominent spleen. ADRENAL GLANDS: Similar size and appearance of bilateral benign adrenal myelolipomas. KIDNEYS/BLADDER: No hydronephrosis. Urinary bladder is partially decompressed. Likely residual excreted contrast within the urinary bladder. BOWEL: No bowel obstruction. ASCITES: Small volume abdominopelvic ascites which appears dense in several locations for example along the right paracolic gutter and in the dependent pelvis, compatible with internal blood products. However there is no evidence of acute bleeding. LYMPH NODES: Shotty retroperitoneal lymph nodes, similar to prior and may be reactive. PELVIC ORGANS: Unremarkable. MUSCULOSKELETAL: Multilevel degenerative changes of the thoracic and lumbosacral spine. No acute osseous abnormality.     IMPRESSION: 1.  Small volume abdominopelvic ascites which appears dense in several locations for example along the right paracolic gutter and in the dependent pelvis, compatible with internal blood products. However there is no evidence of acute bleeding. 2.  Cirrhotic liver morphology with hepatic steatosis. 3.  Recanalized periumbilical veins. Numerous upper abdominal portosystemic collaterals. 4.  No celiac artery stenosis. Conventional hepatic arterial anatomy.    US Paracentesis with Albumin  Result  Date: 5/16/2025  EXAM: 1. PARACENTESIS 2. ULTRASOUND GUIDANCE LOCATION: [facility] DATE: 5/16/2025 INDICATION: Ascites. PROCEDURE: Informed consent obtained. Time out performed. The abdomen was prepped and draped in a sterile fashion. 10 mL of 1% lidocaine was infused into local soft tissues. A 5 Colombian catheter system was introduced into the abdominal ascites under ultrasound guidance. 4.75 liters of bloody fluid were removed and sent to lab if requested. Patient tolerated procedure well. Ultrasound imaging was obtained and placed in the patient's permanent medical record.     IMPRESSION: 1.  Status post ultrasound-guided paracentesis. Reference CPT Code: 99852    US Paracentesis without Albumin  Result Date: 5/15/2025  EXAM: 1. PARACENTESIS 2. ULTRASOUND GUIDANCE LOCATION: [facility] DATE: 5/14/2025 INDICATION: Ascites. PROCEDURE: Informed consent obtained. Time out performed. The abdomen was prepped and draped in a sterile fashion. 10 mL of 1% lidocaine was infused into local soft tissues. A 5 Colombian catheter system was introduced into the abdominal ascites under ultrasound guidance. 4 liters of yellow fluid were removed and sent to lab if requested. Patient tolerated procedure well. Ultrasound imaging was obtained and placed in the patient's permanent medical record.     IMPRESSION: 1.  Status post ultrasound-guided paracentesis. Reference CPT Code: 24409    CT Abdomen Pelvis w Contrast  Result Date: 5/15/2025  EXAM: CT ABDOMEN PELVIS W CONTRAST LOCATION: [facility] DATE: 5/15/2025 INDICATION: Patient with a recent paracentesis, patient concern for bowel injury during procedure.  Right lower quadrant pain COMPARISON: 4/20/2025. TECHNIQUE: CT scan of the abdomen and pelvis was performed following injection of IV contrast. Multiplanar reformats were obtained. Dose reduction techniques were used. CONTRAST: isovue 370 90ml FINDINGS: LOWER  CHEST: Normal. HEPATOBILIARY: Mild hepatomegaly. Hepatic steatosis. Cirrhotic morphology. No calcified gallstones. PANCREAS: Normal. SPLEEN: Normal. ADRENAL GLANDS: Stable bilateral adrenal myelolipomas measuring 4.5 on the right and 3.4 on the left. KIDNEYS/BLADDER: Normal. BOWEL: Mild ascites. No free air. No bowel wall thickening or abnormal enhancement. No obstruction. LYMPH NODES: Shotty mesenteric and retroperitoneal lymph nodes, unchanged. No pelvic adenopathy. VASCULATURE: No aneurysm. PELVIC ORGANS: Normal. MUSCULOSKELETAL: Small fat-containing left inguinal hernia.     IMPRESSION: 1.  Hepatic steatosis, cirrhosis, and mild ascites. No evidence for bowel injury status post paracentesis.    US Paracentesis without Albumin  Result Date: 5/6/2025  EXAM: 1. PARACENTESIS 2. ULTRASOUND GUIDANCE LOCATION: [facility] DATE: 5/6/2025 INDICATION: Ascites. PROCEDURE: Informed consent obtained. Time out performed. The abdomen was prepped and draped in a sterile fashion. 10 mL of 1% lidocaine was infused into local soft tissues. A 5 Nigerien catheter system was introduced into the abdominal ascites under ultrasound guidance. 4.6 liters of clear fluid were removed and sent to lab if requested. Patient tolerated procedure well. Ultrasound imaging was obtained and placed in the patient's permanent medical record.     IMPRESSION: 1.  Status post ultrasound-guided paracentesis. Reference CPT Code: 34901    Chest XR,  PA & LAT  Result Date: 5/4/2025  EXAM: XR CHEST 2 VIEWS LOCATION: [facility] DATE: 5/4/2025 INDICATION: SOB COMPARISON: None.     IMPRESSION: Borderline heart size with no evidence for sobeida pulmonary venous congestion. No pleural effusions, inflammatory infiltrates, or pneumothorax. Stable since 4/20/2025.          Data reviewed today: I reviewed all medications, new labs and imaging results over the last 24 hours. I personally reviewed no images or EKG's  today.  The patient's care was discussed with the Bedside Nurse and Patient.

## 2025-05-24 NOTE — PROGRESS NOTES
"[facility] progress Note    Subjective: 2.45 L paracentesis yesterday.  This looked red, bloody.  4,483 total nucleated cells, 30% neutrophils for ANC 1,345.    Patient without abdominal pain, fever, chills, sweats or night sweats.    Objective:  Vital signs in last 24 hours  Temp:  [97.5  F (36.4  C)-98.2  F (36.8  C)] 97.5  F (36.4  C)  Pulse:  [68-86] 78  Resp:  [20] 20  BP: (123-139)/(59-62) 132/62  SpO2:  [98 %] 98 %     Gen: Awake, no acute distress  Cardiovascular: Regular  Gastrointestinal: Positive bowel sounds, soft, non-tender, non-distended, no rebound or guarding    Assessment: 1.  SBP-white blood cell continues to be elevated, further increased compared to previously.  He has been on meropenem.  No symptoms.  This is of unclear etiology.  Will defer to infectious disease for their opinion.    2.  Cirrhosis-secondary to MAFLD/alcohol.  Patient with ascites and SBP.  No varices.  Portal hypertensive gastropathy seen with upper endoscopy 4/2025.  On Lasix.  Had acute renal insufficiency.  Nephrology had been following.    Plan: Continue with diuretics.  Follow weight, renal function.  Defer to infectious disease regarding ascites elevated white blood cell count.    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    Tobacco use    Elevated WBCs    Rectal bleeding    Anal fissure    \"high flow priapism\"     CARDIOVASCULAR SCREENING; LDL GOAL LESS THAN 160    PTSD (post-traumatic stress disorder)    Fetal alcohol syndromes    Seasonal allergic rhinitis    Steatohepatitis    Cardiomegaly    Shortness of breath    Chest pain, unspecified type    Acute right hip pain    Chronic right-sided congestive heart failure (H)    Active autistic disorder    Adjustment disorder with disturbance of conduct    Angiomyolipoma    Ankylosis, sacroiliac joint    Ascites    Charcot-Estrella-Tooth syndrome    Chronic insomnia    Congestive " heart failure (H)    DM2 (diabetes mellitus, type 2) (H)    DM2 (diabetes mellitus, type 2) (H)    Dysphagia    Dysuria    Elevated d-dimer    Episodic mood disorder    Excessive daytime sleepiness    Gait instability    H/O fall    Hematuria    Benign essential hypertension    Hyperglycemia    Incontinence    Myelolipoma of adrenal gland    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    Abdominal pain, right upper quadrant    PVC's (premature ventricular contractions)    SVT (supraventricular tachycardia)    Heart failure with preserved ejection fraction, NYHA class I (H)    Incomplete left bundle branch block (LBBB)    Suicidal ideation    Cirrhosis of liver with ascites, unspecified hepatic cirrhosis type (H)    Thyroid nodule    ADHD (attention deficit hyperactivity disorder)    Chest pain    Morbid obesity (H)    COPD exacerbation (H)    DVT of axillary vein, acute left (H)    Abdominal bloating    LLQ abdominal pain    SBP (spontaneous bacterial peritonitis) (H)    ASNHL (asymmetrical sensorineural hearing loss)    Traumatic brain injury (H)    Tongue lesion    Acute kidney failure, unspecified    Other specified hypothyroidism    Medication induced coagulopathy    Normal anion gap metabolic acidosis    Diffuse abdominal pain    Portal hypertension (H)    Esophagitis    NSTEMI (non-ST elevated myocardial infarction) (H)    Other ascites    RLQ abdominal pain    Danni-Arnett tear    Diarrhea, unspecified type       Labs:  CMP Results:   Recent Labs   Lab Test 05/24/25  0722 05/24/25  0701 05/17/25  0745 05/17/25  0643 05/16/25  0246 05/15/25  2011   NA  --  142   < >  --    < > 134*   POTASSIUM  --  4.7   < >  --    < > 5.0   CHLORIDE  --  108*   < >  --    < > 98   CO2  --  23   < >  --    < > 25   ANIONGAP  --  11   < >  --    < > 11   * 108*   < >  --    < > 126*   BUN  --  36.5*   < >  --    < > 18.3   CR  --  1.45*   < >  --    < > 1.29*   BILITOTAL  --   --   --  0.6  --  0.6   ALKPHOS  --   --   --   --   --   "178*   ALT  --   --   --   --   --  16   AST  --   --   --   --   --  13    < > = values in this interval not displayed.      CBC  Recent Labs   Lab 05/24/25  0701 05/23/25  2338 05/23/25  1749 05/23/25  1159 05/23/25  0613 05/22/25  1159 05/22/25  0626 05/21/25  1142 05/21/25  0619   WBC 9.1  --   --   --  9.6  --  9.8  --  9.2   RBC 3.13*  --   --   --  3.32*  --  3.25*  --  3.16*   HGB 9.0* 8.8* 9.4* 9.4* 9.3*   < > 9.1*   < > 9.0*   HCT 27.4*  --   --   --  29.4*  --  28.4*  --  27.8*   MCV 88 88 87 88 89   < > 87   < > 88   MCH 28.8  --   --   --  28.0  --  28.0  --  28.5   MCHC 32.8  --   --   --  31.6  --  32.0  --  32.4   RDW 14.5  --   --   --  14.4  --  14.6  --  14.8   *  --   --   --  494*  --  482*  --  430    < > = values in this interval not displayed.     INRNo lab results found in last 7 days.   Lipase   Date Value Ref Range Status   05/15/2025 22 13 - 60 U/L Final   05/04/2025 27 13 - 60 U/L Final   04/20/2025 27 13 - 60 U/L Final     No results for input(s): \"AST\", \"ALT\", \"ALKPHOS\", \"BILITOTAL\", \"LIPASE\" in the last 168 hours.    Imaging: US Paracentesis with Albumin  Result Date: 5/23/2025  EXAM: 1. PARACENTESIS 2. ULTRASOUND GUIDANCE LOCATION: [facility] DATE: 5/23/2025 INDICATION: Ascites. PROCEDURE: Informed consent obtained. Time out performed. The abdomen was prepped and draped in a sterile fashion. 10 mL of 1% lidocaine was infused into local soft tissues. A 5 Montenegrin catheter system was introduced into the abdominal ascites under ultrasound guidance. 2.45 liters of clear reddish/brown fluid were removed and sent to lab if requested. Patient tolerated procedure well. Ultrasound imaging was obtained and placed in the patient's permanent medical record.     IMPRESSION: Status post ultrasound-guided paracentesis. Reference CPT Code: 22553      The total time spent with this patient was 25 minutes.                                                Scott Lockwood" MD  Thank you for the opportunity to participate in the care of this patient.   Please feel free to call me with any questions or concerns.  Phone number [phone].

## 2025-05-25 LAB
BACTERIA FLD CULT: NO GROWTH
GLUCOSE BLDC GLUCOMTR-MCNC: 112 MG/DL (ref 70–99)
GLUCOSE BLDC GLUCOMTR-MCNC: 115 MG/DL (ref 70–99)
GLUCOSE BLDC GLUCOMTR-MCNC: 116 MG/DL (ref 70–99)
GLUCOSE BLDC GLUCOMTR-MCNC: 139 MG/DL (ref 70–99)
GRAM STAIN RESULT: NORMAL
GRAM STAIN RESULT: NORMAL
HGB BLD-MCNC: 8.7 G/DL (ref 13.3–17.7)
HGB BLD-MCNC: 9 G/DL (ref 13.3–17.7)
HGB BLD-MCNC: 9.2 G/DL (ref 13.3–17.7)
HGB BLD-MCNC: 9.4 G/DL (ref 13.3–17.7)
HOLD SPECIMEN: NORMAL
MCV RBC AUTO: 88 FL (ref 78–100)
MCV RBC AUTO: 88 FL (ref 78–100)
MCV RBC AUTO: 89 FL (ref 78–100)
MCV RBC AUTO: 89 FL (ref 78–100)

## 2025-05-25 PROCEDURE — 250N000013 HC RX MED GY IP 250 OP 250 PS 637: Performed by: FAMILY MEDICINE

## 2025-05-25 PROCEDURE — 99231 SBSQ HOSP IP/OBS SF/LOW 25: CPT | Mod: GC

## 2025-05-25 PROCEDURE — 250N000013 HC RX MED GY IP 250 OP 250 PS 637: Performed by: HOSPITALIST

## 2025-05-25 PROCEDURE — 250N000013 HC RX MED GY IP 250 OP 250 PS 637: Performed by: PHYSICIAN ASSISTANT

## 2025-05-25 PROCEDURE — 250N000013 HC RX MED GY IP 250 OP 250 PS 637

## 2025-05-25 PROCEDURE — 120N000001 HC R&B MED SURG/OB

## 2025-05-25 PROCEDURE — 36415 COLL VENOUS BLD VENIPUNCTURE: CPT

## 2025-05-25 PROCEDURE — 85018 HEMOGLOBIN: CPT

## 2025-05-25 RX ADMIN — PANTOPRAZOLE SODIUM 40 MG: 20 TABLET, DELAYED RELEASE ORAL at 06:31

## 2025-05-25 RX ADMIN — SODIUM BICARBONATE 1300 MG: 650 TABLET ORAL at 06:31

## 2025-05-25 RX ADMIN — SODIUM BICARBONATE 1300 MG: 650 TABLET ORAL at 19:54

## 2025-05-25 RX ADMIN — UMECLIDINIUM 1 PUFF: 62.5 AEROSOL, POWDER ORAL at 08:11

## 2025-05-25 RX ADMIN — ROSUVASTATIN 10 MG: 10 TABLET, FILM COATED ORAL at 19:54

## 2025-05-25 RX ADMIN — OLANZAPINE 10 MG: 10 TABLET, FILM COATED ORAL at 19:54

## 2025-05-25 RX ADMIN — PANTOPRAZOLE SODIUM 40 MG: 20 TABLET, DELAYED RELEASE ORAL at 16:28

## 2025-05-25 RX ADMIN — TRAZODONE HYDROCHLORIDE 100 MG: 50 TABLET ORAL at 19:54

## 2025-05-25 RX ADMIN — LEVOFLOXACIN 750 MG: 750 TABLET, FILM COATED ORAL at 21:23

## 2025-05-25 RX ADMIN — LIDOCAINE 1 PATCH: 4 PATCH TOPICAL at 16:27

## 2025-05-25 RX ADMIN — MONTELUKAST 10 MG: 10 TABLET, FILM COATED ORAL at 08:11

## 2025-05-25 RX ADMIN — FLUOXETINE HYDROCHLORIDE 20 MG: 20 CAPSULE ORAL at 08:11

## 2025-05-25 RX ADMIN — HYDROXYZINE HYDROCHLORIDE 10 MG: 10 TABLET ORAL at 21:23

## 2025-05-25 RX ADMIN — FAMOTIDINE 20 MG: 20 TABLET, FILM COATED ORAL at 08:11

## 2025-05-25 RX ADMIN — LEVOTHYROXINE SODIUM 12.5 MCG: 0.03 TABLET ORAL at 06:31

## 2025-05-25 RX ADMIN — FLUTICASONE FUROATE AND VILANTEROL TRIFENATATE 1 PUFF: 100; 25 POWDER RESPIRATORY (INHALATION) at 08:10

## 2025-05-25 ASSESSMENT — ACTIVITIES OF DAILY LIVING (ADL)
ADLS_ACUITY_SCORE: 55

## 2025-05-25 NOTE — PLAN OF CARE
Goal Outcome Evaluation:    Problem: Adult Inpatient Plan of Care  Goal: Optimal Comfort and Wellbeing  Outcome: Progressing  Denies pain or discomfort when asked. Appears to be sleeping soundly throughout the night. Had a small BM this am.

## 2025-05-25 NOTE — PROGRESS NOTES
[facility] progress Note    Plan per ID noted.  It was felt patient could be discharged on oral antibiotics.  Unfortunately group home does not have availability until Monday.    We will sign off at this point, call if questions.                                                Scott Lockwood MD  Thank you for the opportunity to participate in the care of this patient.   Please feel free to call me with any questions or concerns.  Phone number [phone].

## 2025-05-25 NOTE — PLAN OF CARE
Problem: Adult Inpatient Plan of Care  Goal: Optimal Comfort and Wellbeing  Outcome: Progressing   Goal Outcome Evaluation:         Patient declined Nicoderm patch. Denies pain. Remained in room today napping. Good appetite. Return to group home possibly tomorrow.

## 2025-05-25 NOTE — PLAN OF CARE
Problem: Adult Inpatient Plan of Care  Goal: Absence of Hospital-Acquired Illness or Injury  Intervention: Identify and Manage Fall Risk  Recent Flowsheet Documentation  Taken 5/24/2025 1540 by Padmaja Zepeda RN  Safety Promotion/Fall Prevention:   activity supervised   clutter free environment maintained   increased rounding and observation   increase visualization of patient   nonskid shoes/slippers when out of bed   room door open   safety round/check completed   supervised activity   toileting scheduled     Problem: Adult Inpatient Plan of Care  Goal: Absence of Hospital-Acquired Illness or Injury  Intervention: Prevent Infection  Recent Flowsheet Documentation  Taken 5/24/2025 1540 by Padmaja Zepeda RN  Infection Prevention: rest/sleep promoted   Iv abx switched to levofloxacin tablet.  Problem: Comorbidity Management  Goal: Blood Glucose Levels Within Targeted Range  Outcome: Progressing  Intervention: Monitor and Manage Glycemia  Recent Flowsheet Documentation  Taken 5/24/2025 1540 by Padmaja Zepeda RN  Medication Review/Management: medications reviewed   BG=99 and 138.  Problem: Anemia  Goal: Anemia Symptom Improvement  Outcome: Progressing  Intervention: Monitor and Manage Anemia  Recent Flowsheet Documentation  Taken 5/24/2025 1540 by Padmaja Zepeda RN  Safety Promotion/Fall Prevention:   activity supervised   clutter free environment maintained   increased rounding and observation   increase visualization of patient   nonskid shoes/slippers when out of bed   room door open   safety round/check completed   supervised activity   toileting scheduled  Latest Hgb=9.

## 2025-05-26 VITALS
SYSTOLIC BLOOD PRESSURE: 137 MMHG | OXYGEN SATURATION: 97 % | RESPIRATION RATE: 18 BRPM | HEART RATE: 78 BPM | BODY MASS INDEX: 44.7 KG/M2 | DIASTOLIC BLOOD PRESSURE: 65 MMHG | WEIGHT: 268.3 LBS | TEMPERATURE: 97.6 F | HEIGHT: 65 IN

## 2025-05-26 LAB
GLUCOSE BLDC GLUCOMTR-MCNC: 106 MG/DL (ref 70–99)
GLUCOSE BLDC GLUCOMTR-MCNC: 133 MG/DL (ref 70–99)
HGB BLD-MCNC: 9 G/DL (ref 13.3–17.7)
HGB BLD-MCNC: 9.1 G/DL (ref 13.3–17.7)
HOLD SPECIMEN: NORMAL
MCV RBC AUTO: 88 FL (ref 78–100)
MCV RBC AUTO: 89 FL (ref 78–100)

## 2025-05-26 PROCEDURE — 36415 COLL VENOUS BLD VENIPUNCTURE: CPT

## 2025-05-26 PROCEDURE — 99238 HOSP IP/OBS DSCHRG MGMT 30/<: CPT | Mod: GC

## 2025-05-26 PROCEDURE — 85018 HEMOGLOBIN: CPT

## 2025-05-26 PROCEDURE — 250N000013 HC RX MED GY IP 250 OP 250 PS 637: Performed by: HOSPITALIST

## 2025-05-26 PROCEDURE — 250N000013 HC RX MED GY IP 250 OP 250 PS 637

## 2025-05-26 PROCEDURE — 250N000013 HC RX MED GY IP 250 OP 250 PS 637: Performed by: FAMILY MEDICINE

## 2025-05-26 RX ORDER — LEVOFLOXACIN 750 MG/1
750 TABLET, FILM COATED ORAL DAILY
Qty: 3 TABLET | Refills: 0 | Status: SHIPPED | OUTPATIENT
Start: 2025-05-26 | End: 2025-05-29

## 2025-05-26 RX ORDER — SULFAMETHOXAZOLE AND TRIMETHOPRIM 800; 160 MG/1; MG/1
1 TABLET ORAL DAILY
COMMUNITY

## 2025-05-26 RX ADMIN — LEVOTHYROXINE SODIUM 12.5 MCG: 0.03 TABLET ORAL at 06:40

## 2025-05-26 RX ADMIN — UMECLIDINIUM 1 PUFF: 62.5 AEROSOL, POWDER ORAL at 09:16

## 2025-05-26 RX ADMIN — FAMOTIDINE 20 MG: 20 TABLET, FILM COATED ORAL at 09:14

## 2025-05-26 RX ADMIN — PANTOPRAZOLE SODIUM 40 MG: 20 TABLET, DELAYED RELEASE ORAL at 06:40

## 2025-05-26 RX ADMIN — FLUTICASONE FUROATE AND VILANTEROL TRIFENATATE 1 PUFF: 100; 25 POWDER RESPIRATORY (INHALATION) at 09:16

## 2025-05-26 RX ADMIN — MONTELUKAST 10 MG: 10 TABLET, FILM COATED ORAL at 09:14

## 2025-05-26 RX ADMIN — SODIUM BICARBONATE 1300 MG: 650 TABLET ORAL at 06:40

## 2025-05-26 RX ADMIN — FLUOXETINE HYDROCHLORIDE 20 MG: 20 CAPSULE ORAL at 09:14

## 2025-05-26 ASSESSMENT — ACTIVITIES OF DAILY LIVING (ADL)
ADLS_ACUITY_SCORE: 55

## 2025-05-26 NOTE — PLAN OF CARE
Problem: Adult Inpatient Plan of Care  Goal: Plan of Care Review  Description: The Plan of Care Review/Shift note should be completed every shift.  The Outcome Evaluation is a brief statement about your assessment that the patient is improving, declining, or no change.  This information will be displayed automatically on your shiftnote.  Outcome: Met     Problem: Adult Inpatient Plan of Care  Goal: Readiness for Transition of Care  Outcome: Met   Goal Outcome Evaluation:       Patient discharging back to Group home 1130. Group home transporting. Patient has good appetite, denies pain, and stated he is ready to go.

## 2025-05-26 NOTE — PLAN OF CARE
Problem: Liver Failure  Goal: Blood Glucose Level Within Target Range  Outcome: Progressing  Intervention: Bs 133 at 2am  Recent Flowsheet Documentation  Taken 5/26/2025 0039 by Don Penaloza RN  Hyperglycemia Management: blood glucose monitored  Hypoglycemia Management: blood glucose monitored     Problem: Anemia  Goal: Anemia Symptom Improvement  Outcome: Progressing  Intervention: Monitor and Manage Anemia  Recent Flowsheet Documentation  Taken 5/26/2025 0039 by Don Penaloza, RN  Safety Promotion/Fall Prevention:   clutter free environment maintained   nonskid shoes/slippers when out of bed   Goal Outcome Evaluation: Hgb 9.1  at midnight       Denies pain, ready to go home per pt.

## 2025-05-26 NOTE — DISCHARGE SUMMARY
"[facility]  Discharge Summary - Medicine & Pediatrics       Date of Admission:  5/15/2025  Date of Discharge:  5/26/2025 10:20 AM  Discharging Provider: RONNELL Villafuerte, Dr. Perla  Discharge Service: Hospitalist Service    Discharge Diagnoses   RLQ abdominal pain  Melena   Diarrhea, unspecified type  SBP (spontaneous bacterial peritonitis) (H)  RIVERA      Clinically Significant Risk Factors     # DMII: A1C = 6.7 % (Ref range: <5.7 %) within past 6 months    # Morbid Obesity: Estimated body mass index is 44.65 kg/m  as calculated from the following:    Height as of this encounter: 1.651 m (5' 5\").    Weight as of this encounter: 121.7 kg (268 lb 4.8 oz).       Follow-ups Needed After Discharge   Follow-up Appointments       Hospital Follow-up with Existing Primary Care Provider (PCP)          Schedule Primary Care visit within: 7 Days           Follow-up with primary doctor within 1 week  Follow-up with GI and nephrology as scheduled.    Unresulted Labs Ordered in the Past 30 Days of this Admission       Date and Time Order Name Status Description    5/23/2025  9:03 PM Ascites Fluid Aerobic Bacterial Culture Routine Without Gram Stain Preliminary     5/23/2025 12:37 PM Anaerobic Bacterial Culture Routine Preliminary         These results will be followed up by GI    Discharge Disposition   Discharged to Group Home  Condition at discharge: Stable    Hospital Course   Warren Jaramillo was admitted on 5/15/2025 for for ongoing abdominal pain, nausea vomiting with an episode of hematamesis and diarrhea. 5/17 reporting melena and has a significant Hgb drop, repeat CTA negative for acute bleed. GI following. Developed SBP with increasing ANC despite ceftriaxone and then meropenem.     The following problems were addressed during his hospitalization:     SBP  Cirrhosis with ascites/frequent paracentesis   Episode of hematemesis   Melena -resolved  Acute blood loss anemia   Patient presented ED " with worsening ongoing abdominal pain.  Patient has history of hepatic cirrhosis and ascites with frequent paracentesis.  Recently had 4 L removed.  Has been complaining of left lower quadrant abdominal discomfort.  Patient also notes 1 episode hematemesis prior to admission.  Patient was vitally stable on admission and CT abdomen pelvis with no acute findings except for mild ascites.  GI consulted on admission with recommendation as below.  Of note per chart review patient recently had upper endoscopy in April 2025 with portal hypertensive changes in the stomach but negative for varices.  Enteric panel negative.  Abdominal pain is likely abdominal wall pain due to ascites.  Hide/16 overnight patient's hemoglobin continue to trend down from 9.5-7.2.  Repeat CTA abdomen pelvis with no signs of acute bleeding and showed small volume abdominopelvic ascites more dense could be compatible with internal blood products.  Patient was hypotensive and reporting lightheadedness AM of 5/17 and received 1 unit of blood and albumin. Will continue to trend hemoglobin. Discussed with GI that patient is reporting melena x 2 on 5/17-18. Initial recs to keep n.p.o. at midnight and continue trending hgb for possible EGD , now with ARF and stable hgb, GI deferred EGD for now will continue monitoring. Developed SBP, treated with ceftriaxone initially, subsequent paracentesis with only minimal improvement in ANC (536), switched to meropenem 5/22, repeat para 5/23 with significant increase in ANC (1344). Patient feels very well clinically with no abdominal pain, fever, or leukocytosis. Per ID, suspect the needle is reaching into loculated collections and this is confounding the result; other things to consider would be TB and cancer.  Per ID, reasonable to discharge patient on PO levaquin or cefdinir since he is feeling so well and wants to discharge.  - Levaquin 750 mg for total x 5 days.  - Follow-up with GI outpatient      -Recommend  outpatient paracentesis and send ADA, AFB stain/culture, cytology.     Hypotension-resolved  S/p dx paracentesis 5/17 5/17 Patient is s/p paracentesis. RN paged blood pressure with SBP mid 80s and patient feeling lightheaded.  On arrival to the room patient reports lightheadedness is improving placed in the Trendelenburg position and just recently started albumin infusion. Will review the chart to see how much fluid was removed during paracentesis. Monitor closely. Hgb is trending down as above, continues to have soft BP, receiving one unit of blood and albumin x1. Hgb stable, s/p 1 unit of pRBCs. Received albumin infusions, BP stable and melena resolved. Hgb stable.      RIVERA on CKD.   RIVERA-improving  Hyponatremia -resolved  On admission creatinine 1.7. creatinine abruptly elevated to 7.0 in the setting of cirrhosis with ascites, SBP, and acute blood loss anemia likely UGIB. RIVERA likely multifactorial--volume loss, SBP with hypotension and contrast exposure. Nephrology consulted, appreciate recs. Urine output and creatinine improving.   - Has outpatient follow-up scheduled with nephrology  -Continue to hold lisinopril for now, until back to baseline creatinine     Acute blood loss anemia likely 2/2 UGIB  Presented with abdominal pain and one episode of hematemesis.  Admitted with a hemoglobin of 13.2, downtrending 9.5.  Will continue to monitor closely. Melena now resolved. GI following no plan for EGD at this time given ARF and stable Hgb.  Per chart review patient had EGD one month ago with findings of portal gastropathy and no varices.  Will continue to monitor closely.  - GI follow-up outpatient     History of HFpEF  History of HFpEF.  Most recent echo with EF 60 to 65%.  No signs of CHF exacerbation.  -hold PTA Lasix , restart PTA spironolactone     Type 2 diabetes  Most recent A1c 6.7.  On PTA metformin and Jardiance.     Hx of DVT  History of occlusive DVT diagnosed in 2024. On PTA apixaban held as above for  anemia with concern for bleeding as above.  Provoked DVT on apixaban which has been discontinued during this hospitalization.  Per chart review provoked DVT of upper extremity that was related to olecranon fracture in December 2024.  Consider further workup outpatient if needed.        Chronic conditions:  Hypothyroidism: Continue PTA levothyroxine  HTN: hold PTA lisinopril  AVA; BiPAP as needed for sleep  Mood disorder: Continue PTA fluoxetine and olanzapine      Called and updated Guardian Jesika with discharge and outpatient care plan.     Consultations This Hospital Stay   CARE MANAGEMENT / SOCIAL WORK IP CONSULT  GASTROENTEROLOGY IP CONSULT  CARE MANAGEMENT / SOCIAL WORK IP CONSULT  GASTROENTEROLOGY IP CONSULT  NEPHROLOGY IP CONSULT  PHARMACY IP CONSULT  PHARMACY IP CONSULT  INFECTIOUS DISEASES IP CONSULT    Code Status   Full Code       The patient was discussed with Dr. Pan Saeed MD  [place] [facility] [address] [place] [address]  Phone: [phone]  Fax: [phone]  ______________________________________________________________________    Physical Exam   Vital Signs: Temp: 97.6  F (36.4  C) Temp src: Axillary BP: 137/65 Pulse: 78   Resp: 18 SpO2: 97 % O2 Device: None (Room air)    Weight: 268 lbs 4.8 oz    General Appearance: Alert and oriented, NAD  Respiratory: normal work of breathing, clear breath sounds, no wheezing  Cardiovascular: normal S1, S2, no murmur  GI: soft, distended, no epigastric tenderness  Skin: normal turgor and appearance, no visible rash   Other: No lower limb edema B/L      Primary Care Physician   Mary Kelly    Discharge Orders      Reason for your hospital stay    You were admitted with abdominal pain and diagnosed with spontaneous bacterial peritonitis. Your hospital stay was complicated by kidney injury, but kidney function is now improving. Will continue antibiotics and follow up with GI for repeat  fluid check and nephrology to monitor kidneys.     Activity    Your activity upon discharge: activity as tolerated     Diet    Follow this diet upon discharge: Current Diet:Orders Placed This Encounter      Fluid restriction 2000 ML FLUID      Regular Diet Adult     Hospital Follow-up with Existing Primary Care Provider (PCP)            Significant Results and Procedures   Most Recent 3 CBC's:  Recent Labs   Lab Test 05/26/25  0653 05/26/25  0013 05/25/25  1840 05/24/25  1157 05/24/25  0701 05/23/25  1159 05/23/25  0613 05/22/25  1159 05/22/25  0626   WBC  --   --   --   --  9.1  --  9.6  --  9.8   HGB 9.0* 9.1* 9.4*   < > 9.0*   < > 9.3*   < > 9.1*   MCV 88 89 89   < > 88   < > 89   < > 87   PLT  --   --   --   --  474*  --  494*  --  482*    < > = values in this interval not displayed.     Most Recent 3 BMP's:  Recent Labs   Lab Test 05/26/25  0907 05/26/25  0158 05/25/25 2007 05/24/25  0722 05/24/25  0701 05/23/25  0709 05/23/25  0613 05/22/25  0636 05/22/25  0626   NA  --   --   --   --  142  --  140  --  136   POTASSIUM  --   --   --   --  4.7  --  4.8  --  4.7   CHLORIDE  --   --   --   --  108*  --  105  --  101   CO2  --   --   --   --  23  --  23  --  22   BUN  --   --   --   --  36.5*  --  48.7*  --  61.4*   CR  --   --   --   --  1.45*  --  2.08*  --  3.48*   ANIONGAP  --   --   --   --  11  --  12  --  13   WES  --   --   --   --  9.0  --  9.0  --  9.1   * 133* 116*   < > 108*   < > 103*   < > 104*    < > = values in this interval not displayed.   ,   Results for orders placed or performed during the hospital encounter of 05/15/25   US Paracentesis with Albumin    Narrative    EXAM:  1. PARACENTESIS  2. ULTRASOUND GUIDANCE  LOCATION: [facility]  DATE: 5/16/2025    INDICATION: Ascites.    PROCEDURE: Informed consent obtained. Time out performed. The abdomen was prepped and draped in a sterile fashion. 10 mL of 1% lidocaine was infused into local soft tissues. A 5 Iranian  catheter system was introduced into the abdominal ascites under   ultrasound guidance.    4.75 liters of bloody fluid were removed and sent to lab if requested.    Patient tolerated procedure well.    Ultrasound imaging was obtained and placed in the patient's permanent medical record.      Impression    IMPRESSION:  1.  Status post ultrasound-guided paracentesis.    Reference CPT Code: 84618   CTA Abdomen Pelvis with Contrast    Narrative    EXAM: CTA ABDOMEN PELVIS WITH CONTRAST  LOCATION: [facility]  DATE: 5/17/2025    INDICATION: Hemoglobin downtrending, history of cirrhosis, concern for bleed.  COMPARISON: CT abdomen/pelvis with contrast 05/15/2025.  TECHNIQUE: CT angiogram abdomen pelvis during arterial phase of injection of IV contrast. 2D and 3D MIP reconstructions were performed by the CT technologist. Dose reduction techniques were used.  CONTRAST: 90ml isovue 370    FINDINGS:  ANGIOGRAM ABDOMEN/PELVIS: No aortic aneurysm or dissection. No celiac artery stenosis. Conventional hepatic arterial anatomy. No superior mesenteric artery stenosis or occlusion. Inferior mesenteric artery is patent. Patent renal arteries without   significant stenosis. Scattered atherosclerotic calcifications of the aortoiliac vessels. Portal veins appear patent. Recanalized periumbilical veins. Numerous upper abdominal portosystemic collaterals.    LOWER CHEST: Normal.    HEPATOBILIARY: Cirrhotic liver morphology with hepatic steatosis. Unremarkable gallbladder.    PANCREAS: Normal.    SPLEEN: Prominent spleen.    ADRENAL GLANDS: Similar size and appearance of bilateral benign adrenal myelolipomas.    KIDNEYS/BLADDER: No hydronephrosis. Urinary bladder is partially decompressed. Likely residual excreted contrast within the urinary bladder.    BOWEL: No bowel obstruction.    ASCITES: Small volume abdominopelvic ascites which appears dense in several locations for example along the right paracolic gutter and  in the dependent pelvis, compatible with internal blood products. However there is no evidence of acute bleeding.    LYMPH NODES: Shotty retroperitoneal lymph nodes, similar to prior and may be reactive.    PELVIC ORGANS: Unremarkable.    MUSCULOSKELETAL: Multilevel degenerative changes of the thoracic and lumbosacral spine. No acute osseous abnormality.      Impression    IMPRESSION:  1.  Small volume abdominopelvic ascites which appears dense in several locations for example along the right paracolic gutter and in the dependent pelvis, compatible with internal blood products. However there is no evidence of acute bleeding.  2.  Cirrhotic liver morphology with hepatic steatosis.  3.  Recanalized periumbilical veins. Numerous upper abdominal portosystemic collaterals.  4.  No celiac artery stenosis. Conventional hepatic arterial anatomy.   US Paracentesis without Albumin    Narrative    EXAM:  1. PARACENTESIS  2. ULTRASOUND GUIDANCE  LOCATION: [facility]  DATE: 5/20/2025    INDICATION: Ascites.    PROCEDURE: Informed consent obtained. Time out performed. The abdomen was prepped and draped in a sterile fashion. 5 mL of 1% lidocaine was infused into local soft tissues. A 5 Kazakh catheter system was introduced into the abdominal ascites under   ultrasound guidance.    2.5 liters of clear fluid were removed and sent to lab if requested.    Patient tolerated procedure well.    Ultrasound imaging was obtained and placed in the patient's permanent medical record.      Impression    IMPRESSION:  1.  Status post ultrasound-guided paracentesis.    Reference CPT Code: 36401   US Paracentesis with Albumin    Narrative    EXAM:  1. PARACENTESIS  2. ULTRASOUND GUIDANCE  LOCATION: [facility]  DATE: 5/23/2025    INDICATION: Ascites.    PROCEDURE: Informed consent obtained. Time out performed. The abdomen was prepped and draped in a sterile fashion. 10 mL of 1% lidocaine was infused  into local soft tissues. A 5 French catheter system was introduced into the abdominal ascites under   ultrasound guidance.    2.45 liters of clear reddish/brown fluid were removed and sent to lab if requested.    Patient tolerated procedure well.    Ultrasound imaging was obtained and placed in the patient's permanent medical record.      Impression    IMPRESSION:  Status post ultrasound-guided paracentesis.    Reference CPT Code: 44683       Discharge Medications   Discharge Medication List as of 5/26/2025 10:09 AM        START taking these medications    Details   levofloxacin (LEVAQUIN) 750 MG tablet Take 1 tablet (750 mg) by mouth daily for 3 days., Disp-3 tablet, R-0, E-Prescribe           CONTINUE these medications which have NOT CHANGED    Details   albuterol (PROAIR HFA/PROVENTIL HFA/VENTOLIN HFA) 108 (90 Base) MCG/ACT inhaler Inhale 1-2 puffs into the lungs every 6 hours as needed for shortness of breath, wheezing or cough, Disp-18 g, R-0, [facility] may dispense brand covered by insurance (Proair, or proventil or ventolin or generic albuterol inhaler)      albuterol (PROVENTIL) (2.5 MG/3ML) 0.083% neb solution Take 2.5 mg by nebulization 3 times daily as needed for shortness of breath, wheezing or cough, Historical      aloe vera GEL Apply 1 g topically every hour as needed for skin care Per bottle directions, Historical      bacitracin 500 UNIT/GM OINT Apply topically 3 times daily as needed for wound careHistorical      Benzocaine (SOLARCAINE ALOE VERA EX) Externally apply topically daily as needed., Historical      benzonatate (TESSALON) 200 MG capsule Take 1 capsule (200 mg) by mouth 3 times daily as needed for cough., Disp-50 capsule, R-0, E-Prescribe      Calamine external lotion Apply topically as needed for itchingHistorical      carbamide peroxide (DEBROX) 6.5 % otic solution Place 5 drops into both ears daily as needed for other (ear wax)., Historical      clotrimazole (LOTRIMIN) 1 %  external cream Apply topically 2 times daily as needed (skin irritation)Historical      dextromethorphan-guaiFENesin (MUCINEX DM)  MG 12 hr tablet Take 1 tablet by mouth 2 times daily as needed., Historical      diclofenac (VOLTAREN) 1 % topical gel Apply 2 g topically daily as needed for moderate pain To joints/back, Historical      EPINEPHrine (ANY BX GENERIC EQUIV) 0.3 MG/0.3ML injection 2-pack Inject 0.3 mg into the muscle as needed for anaphylaxis. May repeat one time in 5-15 minutes if response to initial dose is inadequate., Historical      famotidine (PEPCID) 20 MG tablet Take 1 tablet (20 mg) by mouth 2 times daily, Disp-60 tablet, R-0, E-Prescribe      FLUoxetine 20 MG tablet Take 20 mg by mouth every morning., Historical      furosemide (LASIX) 40 MG tablet Take 1 tablet (40 mg) by mouth daily., Disp-30 tablet, R-0, E-PrescribePaused since today until manually resumed      GAVILAX 17 GM/SCOOP powder Take 17 g by mouth daily as needed for constipation., CHERYLE, Historical      hydrocortisone (CORTAID) 1 % external cream Apply topically daily as needed for itching.Historical      Hydrocortisone (PREPARATION H EX) Externally apply topically daily as needed (hemorrhoids)., Historical      JARDIANCE 10 MG TABS tablet Take 10 mg by mouth every morning., CHERYLE, Historical      levothyroxine (SYNTHROID/LEVOTHROID) 25 MCG tablet Take 12.5 mcg by mouth every morning (before breakfast)., Historical      lisinopril (ZESTRIL) 10 MG tablet Take 10 mg by mouth every morning., HistoricalPaused since today until manually resumed      loperamide (IMODIUM) 2 MG capsule Take 4 mg by mouth 4 times daily as needed for diarrhea., Historical      loratadine (CLARITIN) 10 MG tablet Take 10 mg by mouth daily as needed for allergies., Historical      magnesium hydroxide (MILK OF MAGNESIA) 400 MG/5ML suspension Take 30 mLs by mouth daily as needed for heartburn., Historical      metFORMIN (GLUCOPHAGE) 1000 MG tablet Take 1,000 mg  by mouth 2 times daily (with meals), Historical      methocarbamol (ROBAXIN) 500 MG tablet Take 1 tablet (500 mg) by mouth 4 times daily as needed for muscle spasms., Disp-40 tablet, R-0, E-Prescribe      montelukast (SINGULAIR) 10 MG tablet Take 10 mg by mouth every morning., Historical      OLANZapine (ZYPREXA) 10 MG tablet Take 10 mg by mouth At Bedtime, Historical      omeprazole (PRILOSEC) 40 MG DR capsule Take 40 mg by mouth every morning., Historical      ondansetron (ZOFRAN ODT) 4 MG ODT tab Take 1 tablet (4 mg) by mouth every 8 hours as needed for nausea., Disp-20 tablet, R-0, E-Prescribe      pramoxine-calamine (AVEENO) 1-8 % LOTN Apply topically daily as needed for itching., Historical      rosuvastatin (CRESTOR) 10 MG tablet Take 10 mg by mouth At Bedtime, Historical      spironolactone (ALDACTONE) 100 MG tablet Take 1 tablet (100 mg) by mouth daily., Disp-30 tablet, R-0, E-Prescribe      traZODone (DESYREL) 100 MG tablet Take 100 mg by mouth at bedtime., Historical      TRELEGY ELLIPTA 100-62.5-25 MCG/ACT oral inhaler Inhale 1 puff into the lungs every morning., CHERYLE, Historical      Urea 40 % CREA Apply topically daily as needed for dry skin.Historical      Vitamins A & D (VITAMIN A & D) OINT Externally apply topically daily as needed (general skin health)., Historical      witch hazel-glycerin (TUCKS) pad Apply topically as needed for hemorrhoids.Historical      Continuous Glucose Sensor (FREESTYLE MEGHANN 3 PLUS SENSOR) MISC USE TO READ BLOOD SUGAR TWICE DAILY AND AS NEEDED. CHANGE SENSOR EVERY 15 DAYS, Historical           STOP taking these medications       apixaban ANTICOAGULANT (ELIQUIS) 5 MG tablet Comments:   Reason for Stopping:         sulfamethoxazole-trimethoprim (BACTRIM DS) 800-160 MG tablet Comments:   Reason for Stopping:             Allergies   Allergies   Allergen Reactions    Apricot Flavoring Agent (Non-Screening) Anaphylaxis    Banana Anaphylaxis     Throat swelling      Bees  Anaphylaxis     Has an Epi pen    Wasp Venom Protein Shortness Of Breath     Other reaction(s): Respiratory Distress  Has an Epi pen        Methylphenidate Itching     Other reaction(s): Nightmares    Prunus      Other reaction(s): *Unknown

## 2025-05-26 NOTE — PROGRESS NOTES
Care Management Discharge Note    Discharge Date: 05/26/2025     Discharge Disposition: Group Home    Discharge Services: None    Discharge DME: None    Discharge Transportation: Group home staff will transport    Private pay costs discussed: Not applicable    Does the patient's insurance plan have a 3 day qualifying hospital stay waiver?  Yes     Which insurance plan 3 day waiver is available? Alternative insurance waiver    Will the waiver be used for post-acute placement? No    PAS Confirmation Code: N/A  Patient/family educated on Medicare website which has current facility and service quality ratings: N/A    Education Provided on the Discharge Plan: Per Team  Persons Notified of Discharge Plans: Patient, Guardian, Group Home Staff  Patient/Family in Agreement with the Plan: Yes    Handoff Referral Completed: Yes, non-[facility] PCP: External handoff communication completed    Additional Information:  SWCM reviewed chart updates, confirmed with hospitalist that Pt is medically ready for discharge. Contacted [facility] group home staff at [phone], confirmed that staff can  and transport Pt today at 11:30am. Nursing staff and Dr. Christophe santamaria    Left voicemail for guardian Jesika Fina ([phone]) to notify of Pt's discharge plan.    No home care needs. Pt will discharge to group [facility] with staff transport.    Faxed discharge orders to [facility] at [phone].    10:16 AM  Pt called [facility] staff directly, requesting to be picked up sooner. Confirmed with nursing staff that Pt is ready. Pt is requesting to wait outside front entrance for staff. Called [facility] home , confirmed that staff is on the way and they do not have concerns of Pt waiting outside. Nursing staff will give Pt copy of discharge paperwork to provide to staff.    WILFRIDO Morales

## 2025-05-26 NOTE — PLAN OF CARE
Problem: Pain Acute  Goal: Optimal Pain Control and Function  Outcome: Progressing  Intervention: Prevent or Manage Pain  Recent Flowsheet Documentation  Taken 5/25/2025 1630 by Padmaja Zepeda RN  Sensory Stimulation Regulation: care clustered  Medication Review/Management: medications reviewed     Problem: Anemia  Goal: Anemia Symptom Improvement  Outcome: Progressing  Intervention: Monitor and Manage Anemia  Recent Flowsheet Documentation  Taken 5/25/2025 1630 by Padmaja Zepeda RN  Safety Promotion/Fall Prevention:   clutter free environment maintained   nonskid shoes/slippers when out of bed   Latest hgb=9.4.  Problem: Liver Failure  Goal: Blood Glucose Level Within Target Range  Outcome: Progressing  Intervention: Optimize Glycemic Control  Recent Flowsheet Documentation  Taken 5/25/2025 1630 by Padmaja Zepeda RN  Hyperglycemia Management: blood glucose monitored  Hypoglycemia Management: blood glucose monitored  NW=768 and 116.    Problem: Liver Failure  Goal: Absence of Infection Signs and Symptoms  Outcome: Progressing   Afebrile. Scheduled antibiotic given.

## 2025-05-28 ENCOUNTER — HOSPITAL ENCOUNTER (EMERGENCY)
Facility: HOSPITAL | Age: 45
Discharge: HOME | End: 2025-05-29
Attending: EMERGENCY MEDICINE | Admitting: EMERGENCY MEDICINE
Payer: COMMERCIAL

## 2025-05-28 VITALS
HEART RATE: 95 BPM | BODY MASS INDEX: 47.41 KG/M2 | SYSTOLIC BLOOD PRESSURE: 138 MMHG | DIASTOLIC BLOOD PRESSURE: 71 MMHG | RESPIRATION RATE: 20 BRPM | TEMPERATURE: 97.8 F | WEIGHT: 284.9 LBS | OXYGEN SATURATION: 99 %

## 2025-05-28 DIAGNOSIS — B37.2 CANDIDIASIS OF SKIN: ICD-10-CM

## 2025-05-28 LAB — BACTERIA FLD CULT: NO GROWTH

## 2025-05-28 PROCEDURE — 99283 EMERGENCY DEPT VISIT LOW MDM: CPT

## 2025-05-28 RX ORDER — NYSTATIN 100000 [USP'U]/G
POWDER TOPICAL 2 TIMES DAILY
Qty: 30 G | Refills: 0 | Status: SHIPPED | OUTPATIENT
Start: 2025-05-28 | End: 2025-06-11

## 2025-05-28 ASSESSMENT — ACTIVITIES OF DAILY LIVING (ADL): ADLS_ACUITY_SCORE: 56

## 2025-05-29 LAB — BACTERIA FLD CULT: NORMAL

## 2025-05-29 PROCEDURE — 250N000013 HC RX MED GY IP 250 OP 250 PS 637: Performed by: EMERGENCY MEDICINE

## 2025-05-29 RX ADMIN — MICONAZOLE NITRATE ANTIFUNGAL POWDER: 2 POWDER TOPICAL at 00:11

## 2025-05-29 NOTE — ED TRIAGE NOTES
Pt here because he has been having diarrhea and rectum is very sore, wondering if we can get him some tucks pads.  Pt brought in by group home staff who dropped him off.      Triage Assessment (Adult)       Row Name 05/28/25 4260          Triage Assessment    Airway WDL WDL        Respiratory WDL    Respiratory WDL WDL        Skin Circulation/Temperature WDL    Skin Circulation/Temperature WDL WDL        Cardiac WDL    Cardiac WDL WDL        Peripheral/Neurovascular WDL    Peripheral Neurovascular WDL WDL        Cognitive/Neuro/Behavioral WDL    Cognitive/Neuro/Behavioral WDL WDL

## 2025-05-29 NOTE — ED PROVIDER NOTES
NAME: Warren Jaramillo  AGE: 44 year old male  YOB: 1980  MRN: [id number]  EVALUATION DATE & TIME: 5/28/2025 10:20 PM    PCP: Mayr Kelly    ED PROVIDER: Warren Foreman M.D.      Chief Complaint   Patient presents with    Rectal/perineal Pain           Rectal Bleeding         FINAL IMPRESSION:  1. Candidiasis of skin        MEDICAL DECISION MAKING:    10:22 PM I met with the patient, obtained history, performed an initial exam, and discussed options and plan for diagnostics and treatment here in the ED.   Patient was clinically assessed and consented to treatment. After assessment, medical decision making and workup were discussed with the patient. The patient was agreeable to plan for testing, workup, and treatment.  Pertinent Labs & Imaging studies reviewed. (See chart for details)       Medical Decision Making  I reviewed the EMR: Inpatient Record: Recent admission for GI bleed and discharge recommendation  Care impacted by coronary disease and recent GI bleed  Discharge. I prescribed additional prescription strength medication(s) as charted. See documentation for any additional details.    MIPS (CTPE, Dental pain, Khan, Sinusitis, Asthma/COPD, Head Trauma): Not Applicable    SEPSIS: None        Warren Jaramillo is a 44 year old male who presents with rectal pain and bright red blood when he wipes.   Differential diagnosis includes but not limited to hemorrhoid, anal fissure, GI bleed, perianal abscess.  Patient is 44-year-old male who was just admitted for GI bleed and had profuse diarrhea.  Patient reports the diarrhea has subsided somewhat but continued going home.  With the diarrhea he has had some rectal irritation and he reports when he wipes it feels like sandpaper and he will notice some bright red blood on the tissue paper intermittently.  Patient is coming in requesting Tucks pads as he believes he has hemorrhoids.  On examination patient does not have obvious  hemorrhoid but does have excoriation of the skin around the rectum due to heat/moisture rash and likely candidal inflammation.  I discussed with patient need to keep to keep the area clean and dry.  We also started him on nystatin powder to help with some of the moisture and the  suspected candidal findings.  I do not feel this is likely recurrence of his GI bleed given that the blood is only on the tissue paper and mainly when he wipes and irritates the skin there.  Patient comfortable with this plan and will be discharged home with prescription.    0 minutes of critical care time    MEDICATIONS GIVEN IN THE EMERGENCY:  Medications   miconazole (MICATIN) 2 % powder ( Topical $Given 5/29/25 0011)       NEW PRESCRIPTIONS STARTED AT TODAY'S ER VISIT:  Discharge Medication List as of 5/29/2025 12:09 AM        START taking these medications    Details   nystatin (MYCOSTATIN) 859235 UNIT/GM external powder Apply topically 2 times daily for 14 days.Disp-30 g, R-0Local Print                =================================================================    HPI    Patient information was obtained from: Patient    Use of : N/A    Warren Jaramillo is a 44 year old male with a past medical history of ever's disease, HTN, COPD, diabetes, ADHD, active autistic disorder, anal fissure, CHF, esophagitis, PTSD, NSTEMI, tobacco use, TBI, and suicidal ideation, who presents with perirectal irritation and pain with wiping after bowel movement and noted bright red blood on the tissue.  Patient states that he was just admitted for diarrhea and GI bleed.  Patient reports he is done well since going home but has had continued intermittent loose stools.  He reports with these when he wipes the area is extremely irritated and it feels like sandpaper when he goes over the skin and rectum.  He is not sure if he has a hemorrhoid but he feels that is likely what is since when he wipes hard he will find blood on the tissue  that is bright red dots.  He has not noted blood in that stool or toilet after he goes.  No dark stools.  Patient believes he has hemorrhoids and would like Tucks pads.      REVIEW OF SYSTEMS   Review of Systems     PAST MEDICAL HISTORY:  Past Medical History:   Diagnosis Date    COPD exacerbation (H) 12/02/2024    DM2 (diabetes mellitus, type 2) (H) 04/28/2020    HTN (hypertension) 07/30/2012    Sleep apnea     Thyroid nodule 07/31/2019    Rojsa's disease (H)        PAST SURGICAL HISTORY:  Past Surgical History:   Procedure Laterality Date    COLONOSCOPY      ESOPHAGOSCOPY, GASTROSCOPY, DUODENOSCOPY (EGD), COMBINED N/A 7/21/2023    Procedure: ESOPHAGOGASTRODUODENOSCOPY WITH GASTRIC AND ESOPHAGEAL BIOPSIES;  Surgeon: Filiberto Aragon MD;  Location: [facility] [place]    ESOPHAGOSCOPY, GASTROSCOPY, DUODENOSCOPY (EGD), COMBINED N/A 4/4/2025    Procedure: ESOPHAGOGASTRODUODENOSCOPY;  Surgeon: Ramesh Talbot MD;  Location: [facility] [place] OR    TOOTH EXTRACTION         CURRENT MEDICATIONS:    No current facility-administered medications for this encounter.    Current Outpatient Medications:     nystatin (MYCOSTATIN) 326706 UNIT/GM external powder, Apply topically 2 times daily for 14 days., Disp: 30 g, Rfl: 0    albuterol (PROAIR HFA/PROVENTIL HFA/VENTOLIN HFA) 108 (90 Base) MCG/ACT inhaler, Inhale 1-2 puffs into the lungs every 6 hours as needed for shortness of breath, wheezing or cough, Disp: 18 g, Rfl: 0    albuterol (PROVENTIL) (2.5 MG/3ML) 0.083% neb solution, Take 2.5 mg by nebulization 3 times daily as needed for shortness of breath, wheezing or cough, Disp: , Rfl:     aloe vera GEL, Apply 1 g topically every hour as needed for skin care Per bottle directions, Disp: , Rfl:     bacitracin 500 UNIT/GM OINT, Apply topically 3 times daily as needed for wound care, Disp: , Rfl:     Benzocaine (SOLARCAINE ALOE VERA EX), Externally apply topically daily as needed., Disp: , Rfl:     benzonatate (TESSALON) 200 MG  capsule, Take 1 capsule (200 mg) by mouth 3 times daily as needed for cough., Disp: 50 capsule, Rfl: 0    Calamine external lotion, Apply topically as needed for itching, Disp: , Rfl:     carbamide peroxide (DEBROX) 6.5 % otic solution, Place 5 drops into both ears daily as needed for other (ear wax)., Disp: , Rfl:     clotrimazole (LOTRIMIN) 1 % external cream, Apply topically 2 times daily as needed (skin irritation), Disp: , Rfl:     Continuous Glucose Sensor (FREESTYLE MEGHANN 3 PLUS SENSOR) MISC, USE TO READ BLOOD SUGAR TWICE DAILY AND AS NEEDED. CHANGE SENSOR EVERY 15 DAYS, Disp: , Rfl:     dextromethorphan-guaiFENesin (MUCINEX DM)  MG 12 hr tablet, Take 1 tablet by mouth 2 times daily as needed., Disp: , Rfl:     diclofenac (VOLTAREN) 1 % topical gel, Apply 2 g topically daily as needed for moderate pain To joints/back, Disp: , Rfl:     EPINEPHrine (ANY BX GENERIC EQUIV) 0.3 MG/0.3ML injection 2-pack, Inject 0.3 mg into the muscle as needed for anaphylaxis. May repeat one time in 5-15 minutes if response to initial dose is inadequate., Disp: , Rfl:     famotidine (PEPCID) 20 MG tablet, Take 1 tablet (20 mg) by mouth 2 times daily, Disp: 60 tablet, Rfl: 0    FLUoxetine 20 MG tablet, Take 20 mg by mouth every morning., Disp: , Rfl:     [Paused] furosemide (LASIX) 40 MG tablet, Take 1 tablet (40 mg) by mouth daily., Disp: 30 tablet, Rfl: 0    GAVILAX 17 GM/SCOOP powder, Take 17 g by mouth daily as needed for constipation., Disp: , Rfl:     hydrocortisone (CORTAID) 1 % external cream, Apply topically daily as needed for itching., Disp: , Rfl:     Hydrocortisone (PREPARATION H EX), Externally apply topically daily as needed (hemorrhoids)., Disp: , Rfl:     JARDIANCE 10 MG TABS tablet, Take 10 mg by mouth every morning., Disp: , Rfl:     levofloxacin (LEVAQUIN) 750 MG tablet, Take 1 tablet (750 mg) by mouth daily for 3 days., Disp: 3 tablet, Rfl: 0    levothyroxine (SYNTHROID/LEVOTHROID) 25 MCG tablet, Take  12.5 mcg by mouth every morning (before breakfast)., Disp: , Rfl:     [Paused] lisinopril (ZESTRIL) 10 MG tablet, Take 10 mg by mouth every morning., Disp: , Rfl:     loperamide (IMODIUM) 2 MG capsule, Take 4 mg by mouth 4 times daily as needed for diarrhea., Disp: , Rfl:     loratadine (CLARITIN) 10 MG tablet, Take 10 mg by mouth daily as needed for allergies., Disp: , Rfl:     magnesium hydroxide (MILK OF MAGNESIA) 400 MG/5ML suspension, Take 30 mLs by mouth daily as needed for heartburn., Disp: , Rfl:     metFORMIN (GLUCOPHAGE) 1000 MG tablet, Take 1,000 mg by mouth 2 times daily (with meals), Disp: , Rfl:     methocarbamol (ROBAXIN) 500 MG tablet, Take 1 tablet (500 mg) by mouth 4 times daily as needed for muscle spasms., Disp: 40 tablet, Rfl: 0    montelukast (SINGULAIR) 10 MG tablet, Take 10 mg by mouth every morning., Disp: , Rfl:     OLANZapine (ZYPREXA) 10 MG tablet, Take 10 mg by mouth At Bedtime, Disp: , Rfl:     omeprazole (PRILOSEC) 40 MG DR capsule, Take 40 mg by mouth every morning., Disp: , Rfl:     ondansetron (ZOFRAN ODT) 4 MG ODT tab, Take 1 tablet (4 mg) by mouth every 8 hours as needed for nausea., Disp: 20 tablet, Rfl: 0    pramoxine-calamine (AVEENO) 1-8 % LOTN, Apply topically daily as needed for itching., Disp: , Rfl:     rosuvastatin (CRESTOR) 10 MG tablet, Take 10 mg by mouth At Bedtime, Disp: , Rfl:     spironolactone (ALDACTONE) 100 MG tablet, Take 1 tablet (100 mg) by mouth daily., Disp: 30 tablet, Rfl: 0    sulfamethoxazole-trimethoprim (BACTRIM DS) 800-160 MG tablet, Take 1 tablet by mouth daily., Disp: , Rfl:     traZODone (DESYREL) 100 MG tablet, Take 100 mg by mouth at bedtime., Disp: , Rfl:     TRELEGY ELLIPTA 100-62.5-25 MCG/ACT oral inhaler, Inhale 1 puff into the lungs every morning., Disp: , Rfl:     Urea 40 % CREA, Apply topically daily as needed for dry skin., Disp: , Rfl:     Vitamins A & D (VITAMIN A & D) OINT, Externally apply topically daily as needed (general skin  health)., Disp: , Rfl:     witch hazel-glycerin (TUCKS) pad, Apply topically as needed for hemorrhoids., Disp: , Rfl:     ALLERGIES:  Allergies   Allergen Reactions    Apricot Flavoring Agent (Non-Screening) Anaphylaxis    Banana Anaphylaxis     Throat swelling      Bees Anaphylaxis     Has an Epi pen    Wasp Venom Protein Shortness Of Breath     Other reaction(s): Respiratory Distress  Has an Epi pen        Methylphenidate Itching     Other reaction(s): Nightmares    Prunus      Other reaction(s): *Unknown       FAMILY HISTORY:  Family History   Problem Relation Age of Onset    Unknown/Adopted Father     Unknown/Adopted Maternal Grandmother     C.A.D. Maternal Grandfather     Diabetes Maternal Grandfather     Cerebrovascular Disease Maternal Grandfather     Unknown/Adopted Paternal Grandmother     Unknown/Adopted Paternal Grandfather     Unknown/Adopted Brother     Unknown/Adopted Sister        SOCIAL HISTORY:   Social History     Socioeconomic History    Marital status: Single   Tobacco Use    Smoking status: Every Day     Current packs/day: 1.00     Average packs/day: 1 pack/day for 21.4 years (21.4 ttl pk-yrs)     Types: Cigarettes     Start date: 1/1/2004    Smokeless tobacco: Never   Vaping Use    Vaping status: Never Used   Substance and Sexual Activity    Alcohol use: Not Currently     Comment: Last 8/7/2024    Sexual activity: Never     Partners: Female   Other Topics Concern     Service No    Blood Transfusions No    Caffeine Concern No    Occupational Exposure No    Hobby Hazards No    Sleep Concern No    Stress Concern Yes     Comment: sometimes    Weight Concern No    Special Diet Yes     Comment: counting carbs    Back Care No    Exercise Yes    Seat Belt Yes    Self-Exams Yes     Social Drivers of Health     Financial Resource Strain: Low Risk  (5/16/2025)    Financial Resource Strain     Within the past 12 months, have you or your family members you live with been unable to get utilities  (heat, electricity) when it was really needed?: No   Food Insecurity: Low Risk  (5/16/2025)    Food Insecurity     Within the past 12 months, did you worry that your food would run out before you got money to buy more?: No     Within the past 12 months, did the food you bought just not last and you didn t have money to get more?: No   Transportation Needs: Low Risk  (5/16/2025)    Transportation Needs     Within the past 12 months, has lack of transportation kept you from medical appointments, getting your medicines, non-medical meetings or appointments, work, or from getting things that you need?: No    Received from [facility]    Social Connections   Interpersonal Safety: Low Risk  (5/16/2025)    Interpersonal Safety     Do you feel physically and emotionally safe where you currently live?: Yes     Within the past 12 months, have you been hit, slapped, kicked or otherwise physically hurt by someone?: No     Within the past 12 months, have you been humiliated or emotionally abused in other ways by your partner or ex-partner?: No   Housing Stability: Low Risk  (5/16/2025)    Housing Stability     Do you have housing? : Yes     Are you worried about losing your housing?: No       PHYSICAL EXAM:    Vitals: /71   Pulse 95   Temp 97.8  F (36.6  C) (Temporal)   Resp 20   Wt 129.2 kg (284 lb 14.4 oz)   SpO2 99%   BMI 47.41 kg/m     Physical Exam  Vitals and nursing note reviewed.   Constitutional:       General: He is not in acute distress.     Appearance: Normal appearance. He is obese.   Cardiovascular:      Rate and Rhythm: Normal rate and regular rhythm.      Heart sounds: Normal heart sounds.   Pulmonary:      Effort: Pulmonary effort is normal. No respiratory distress.   Abdominal:      Palpations: Abdomen is soft.      Tenderness: There is no abdominal tenderness.   Genitourinary:     Rectum: Tenderness present. No anal fissure, external hemorrhoid or internal  hemorrhoid.       Skin:     General: Skin is warm and dry.      Findings: Rash present.   Neurological:      Mental Status: He is alert.           LAB:  All pertinent labs reviewed and interpreted.  Labs Ordered and Resulted from Time of ED Arrival to Time of ED Departure - No data to display    RADIOLOGY:  No orders to display       PROCEDURES:   Procedures       I, Rejiwaldo Karrie, am serving as a scribe to document services personally performed by Dr. Warren Foreman  based on my observation and the provider's statements to me. I, Warren Foreman MD attest that Giovani Yoon is acting in a scribe capacity, has observed my performance of the services and has documented them in accordance with my direction.      Warren Foreman M.D.  Emergency Medicine  [facility] Emergency Department     Warren Foreman MD  05/29/25 0451

## 2025-05-30 ENCOUNTER — HOSPITAL ENCOUNTER (EMERGENCY)
Facility: HOSPITAL | Age: 45
Discharge: HOME | End: 2025-05-30
Attending: EMERGENCY MEDICINE | Admitting: EMERGENCY MEDICINE
Payer: COMMERCIAL

## 2025-05-30 ENCOUNTER — APPOINTMENT (OUTPATIENT)
Dept: RADIOLOGY | Facility: HOSPITAL | Age: 45
End: 2025-05-30
Attending: EMERGENCY MEDICINE
Payer: COMMERCIAL

## 2025-05-30 VITALS
SYSTOLIC BLOOD PRESSURE: 133 MMHG | OXYGEN SATURATION: 98 % | HEART RATE: 82 BPM | RESPIRATION RATE: 28 BRPM | TEMPERATURE: 98.4 F | DIASTOLIC BLOOD PRESSURE: 63 MMHG

## 2025-05-30 DIAGNOSIS — R07.9 CHEST PAIN, UNSPECIFIED TYPE: ICD-10-CM

## 2025-05-30 DIAGNOSIS — R06.09 DOE (DYSPNEA ON EXERTION): ICD-10-CM

## 2025-05-30 LAB
ANION GAP SERPL CALCULATED.3IONS-SCNC: 13 MMOL/L (ref 7–15)
BACTERIA FLD CULT: NORMAL
BASOPHILS # BLD AUTO: 0.1 10E3/UL (ref 0–0.2)
BASOPHILS NFR BLD AUTO: 1 %
BUN SERPL-MCNC: 25 MG/DL (ref 6–20)
CALCIUM SERPL-MCNC: 9.1 MG/DL (ref 8.8–10.4)
CHLORIDE SERPL-SCNC: 110 MMOL/L (ref 98–107)
CREAT SERPL-MCNC: 1.64 MG/DL (ref 0.67–1.17)
EGFRCR SERPLBLD CKD-EPI 2021: 53 ML/MIN/1.73M2
EOSINOPHIL # BLD AUTO: 0.5 10E3/UL (ref 0–0.7)
EOSINOPHIL NFR BLD AUTO: 4 %
ERYTHROCYTE [DISTWIDTH] IN BLOOD BY AUTOMATED COUNT: 14.6 % (ref 10–15)
GLUCOSE SERPL-MCNC: 99 MG/DL (ref 70–99)
HCO3 SERPL-SCNC: 17 MMOL/L (ref 22–29)
HCT VFR BLD AUTO: 27.4 % (ref 40–53)
HGB BLD-MCNC: 9.1 G/DL (ref 13.3–17.7)
HOLD SPECIMEN: NORMAL
IMM GRANULOCYTES # BLD: 0.1 10E3/UL
IMM GRANULOCYTES NFR BLD: 1 %
LYMPHOCYTES # BLD AUTO: 2.1 10E3/UL (ref 0.8–5.3)
LYMPHOCYTES NFR BLD AUTO: 14 %
MCH RBC QN AUTO: 28.7 PG (ref 26.5–33)
MCHC RBC AUTO-ENTMCNC: 33.2 G/DL (ref 31.5–36.5)
MCV RBC AUTO: 86 FL (ref 78–100)
MONOCYTES # BLD AUTO: 1.2 10E3/UL (ref 0–1.3)
MONOCYTES NFR BLD AUTO: 8 %
NEUTROPHILS # BLD AUTO: 10.6 10E3/UL (ref 1.6–8.3)
NEUTROPHILS NFR BLD AUTO: 73 %
NRBC # BLD AUTO: 0 10E3/UL
NRBC BLD AUTO-RTO: 0 /100
PLATELET # BLD AUTO: 496 10E3/UL (ref 150–450)
POTASSIUM SERPL-SCNC: 4.5 MMOL/L (ref 3.4–5.3)
RBC # BLD AUTO: 3.17 10E6/UL (ref 4.4–5.9)
SODIUM SERPL-SCNC: 140 MMOL/L (ref 135–145)
TROPONIN T SERPL HS-MCNC: 29 NG/L
TROPONIN T SERPL HS-MCNC: 33 NG/L
WBC # BLD AUTO: 14.6 10E3/UL (ref 4–11)

## 2025-05-30 PROCEDURE — 84484 ASSAY OF TROPONIN QUANT: CPT | Performed by: EMERGENCY MEDICINE

## 2025-05-30 PROCEDURE — 84520 ASSAY OF UREA NITROGEN: CPT | Performed by: EMERGENCY MEDICINE

## 2025-05-30 PROCEDURE — 85014 HEMATOCRIT: CPT | Performed by: EMERGENCY MEDICINE

## 2025-05-30 PROCEDURE — 99285 EMERGENCY DEPT VISIT HI MDM: CPT | Mod: 25 | Performed by: EMERGENCY MEDICINE

## 2025-05-30 PROCEDURE — 36415 COLL VENOUS BLD VENIPUNCTURE: CPT | Performed by: STUDENT IN AN ORGANIZED HEALTH CARE EDUCATION/TRAINING PROGRAM

## 2025-05-30 PROCEDURE — 36415 COLL VENOUS BLD VENIPUNCTURE: CPT | Performed by: EMERGENCY MEDICINE

## 2025-05-30 PROCEDURE — 93005 ELECTROCARDIOGRAM TRACING: CPT | Performed by: EMERGENCY MEDICINE

## 2025-05-30 PROCEDURE — 71045 X-RAY EXAM CHEST 1 VIEW: CPT

## 2025-05-30 ASSESSMENT — ACTIVITIES OF DAILY LIVING (ADL)
ADLS_ACUITY_SCORE: 56

## 2025-05-30 NOTE — ED TRIAGE NOTES
He comes from a group home where he started to have chest pain while sitting after smoking a pack of cigarettes. EMS was called and they gave 324mg of ASA and two nitroglycerine which he states helped with his pain. He is ambulatory and alert at this time.

## 2025-05-30 NOTE — ED PROVIDER NOTES
EMERGENCY DEPARTMENT ENCOUNTER      NAME: Warren Jaramillo  AGE: 44 year old male  YOB: 1980  MRN: [id number]  EVALUATION DATE & TIME: 5/30/2025  3:55 PM    PCP: Mary Kelly    ED PROVIDER: Jaquelin Mnozon M.D.      Chief Complaint   Patient presents with    Chest Pain         FINAL IMPRESSION:  1. Chest pain, unspecified type    2. RICKETTS (dyspnea on exertion)        ED COURSE & MEDICAL DECISION MAKING:    Pertinent Labs & Imaging studies reviewed. (See chart for details)  ED Course as of 05/30/25 2152   Fri May 30, 2025   1700 Patient is a 44-year-old male comes in today for evaluation of some shortness of breath going up some stairs.  He said he had been outside on his back deck smoking.  He said he smoked entire pack of cigarettes over the course of about 6 hours and once he finished his last cigarette he went upstairs and got short of breath at the top of the 14 steps.  He then developed some left-sided chest pain that he says was radiating to his nipple.  He felt like somebody punched him in the chest.  He said he could not take a deep breath.  He had missed his paracentesis that was scheduled 2 days ago and so he called the radiologist to reschedule that and they connected him with the nurse triage line who recommended they call 911 and come in here.  He looks fine here.  He is a patient that is well-known to this emergency department.  I seen him several times and he looks as well as always.  Vital signs were unremarkable.  His EKG is nonischemic.  Initial troponin is 33 which is pretty similar to his baseline which is in the 20s.  He had just been admitted for some RIVERA.  Creatinine is 1.64 which is similar to the 1.45 when he was discharged.  Hemoglobin is stable.  White count is always slightly elevated.  Patient appears in no distress.  I did talk to his mother and told her what the plan was.  At this point organ to get a repeat troponin and a chest x-ray but if those look okay I  think he can go home.  He can get his paracentesis done as an outpatient.  He has no significant abdominal tenderness and I will think it needs to be done emergently.  I also talked to him and his mom about his need to stop smoking.  I suspect smoking a pack of cigarettes within 6 hours probably contributed to his shortness of breath with walking up the stairs right afterward.  They are in agreement with the plan.   1923 I discussed results and plan for discharge with the patient.  I discussed it with his mom as well.  They are in agreement.       Medical Decision Making    History:  Supplemental history from or  use: None  External Record(s) reviewed: I reviewed the patient's discharge summary from 5/26/2025.  Patient had been admitted to the hospital on 5/15/2025 for ongoing abdominal pain and nausea and vomiting with an episode of hematemesis and diarrhea.  He had reported melena 2 days later and had a significant drop in his hemoglobin.  He had CT imaging that was negative for an acute bleed.  Talked troponin GI was following.  Hemoglobin had initially been 13.2 and down trended to 9.5.  Bleeding had resolved and that is where his new hemoglobin was.    Work Up:  Emergent/Severe conditions considered and evaluated for: Electrolyte abnormality, hyperglycemia, hypoglycemia, acidosis, anemia, thrombocytopenia, renal failure, dehydration, ACS, pericarditis, myocarditis, pneumothorax, pneumonia, esophageal rupture  I independently reviewed and interpreted EKG and chest x-ray which showed no pneumothorax. See full radiology report for all details. Rhythm strip shows sinus rhythm at 83 bpm..  In addition to work up documented, I considered the following work up: None  Medications given that require intensive monitoring for toxicity: None    External consultation:  Discussion of management with another provider: None    Complicating factors:  Care impacted by chronic illness: DVT, diabetes, heart failure with  preserved ejection fraction, upper GI bleed, chronic kidney disease, Rojas's disease, cirrhosis    Disposition considerations: Considered admission to the hospital but work up came back reassuring and patient was able to discharge home.    Prescriptions considered/prescribed: None    MIPS (CTA, Dental pain, Khan, Sinusitis, Asthma/COPD, Head Trauma)    At the conclusion of the encounter I discussed  the results of all of the tests and the disposition with patient.   All questions were answered.  The patient acknowledged understanding and was involved in the decision making regarding the overall care plan.      I discussed with patient the utility, limitations and findings of the exam/interventions/studies done during this visit as well as the list of differential diagnosis and symptoms to monitor/return to ER for.  Additional verbal discharge instructions were provided.     MEDICATIONS GIVEN IN THE EMERGENCY:  Medications - No data to display    NEW PRESCRIPTIONS STARTED AT TODAY'S ER VISIT  Discharge Medication List as of 5/30/2025  7:23 PM             =================================================================    HPI    Triage Note: He comes from a group home where he started to have chest pain while sitting after smoking a pack of cigarettes. EMS was called and they gave 324mg of ASA and two nitroglycerine which he states helped with his pain. He is ambulatory and alert at this time.     Warren Jaramillo is a 44 year old male who presents for evaluation of some shortness of breath and some chest pain after he walked up some steps following smoking a pack of cigarettes over the course of just 6 hours.    PAST MEDICAL HISTORY:  Past Medical History:   Diagnosis Date    COPD exacerbation (H) 12/02/2024    DM2 (diabetes mellitus, type 2) (H) 04/28/2020    HTN (hypertension) 07/30/2012    Sleep apnea     Thyroid nodule 07/31/2019    Rojas's disease (H)        PAST SURGICAL HISTORY:  Past Surgical  History:   Procedure Laterality Date    COLONOSCOPY      ESOPHAGOSCOPY, GASTROSCOPY, DUODENOSCOPY (EGD), COMBINED N/A 7/21/2023    Procedure: ESOPHAGOGASTRODUODENOSCOPY WITH GASTRIC AND ESOPHAGEAL BIOPSIES;  Surgeon: Filiberto Aragon MD;  Location: [facility] [place]    ESOPHAGOSCOPY, GASTROSCOPY, DUODENOSCOPY (EGD), COMBINED N/A 4/4/2025    Procedure: ESOPHAGOGASTRODUODENOSCOPY;  Surgeon: Ramesh Talbot MD;  Location: [facility] [place]    TOOTH EXTRACTION         CURRENT MEDICATIONS:    No current facility-administered medications for this encounter.    Current Outpatient Medications:     albuterol (PROAIR HFA/PROVENTIL HFA/VENTOLIN HFA) 108 (90 Base) MCG/ACT inhaler, Inhale 1-2 puffs into the lungs every 6 hours as needed for shortness of breath, wheezing or cough, Disp: 18 g, Rfl: 0    albuterol (PROVENTIL) (2.5 MG/3ML) 0.083% neb solution, Take 2.5 mg by nebulization 3 times daily as needed for shortness of breath, wheezing or cough, Disp: , Rfl:     aloe vera GEL, Apply 1 g topically every hour as needed for skin care Per bottle directions, Disp: , Rfl:     bacitracin 500 UNIT/GM OINT, Apply topically 3 times daily as needed for wound care, Disp: , Rfl:     Benzocaine (SOLARCAINE ALOE VERA EX), Externally apply topically daily as needed., Disp: , Rfl:     benzonatate (TESSALON) 200 MG capsule, Take 1 capsule (200 mg) by mouth 3 times daily as needed for cough., Disp: 50 capsule, Rfl: 0    Calamine external lotion, Apply topically as needed for itching, Disp: , Rfl:     carbamide peroxide (DEBROX) 6.5 % otic solution, Place 5 drops into both ears daily as needed for other (ear wax)., Disp: , Rfl:     clotrimazole (LOTRIMIN) 1 % external cream, Apply topically 2 times daily as needed (skin irritation), Disp: , Rfl:     Continuous Glucose Sensor (FREESTYLE MEGHANN 3 PLUS SENSOR) MISC, USE TO READ BLOOD SUGAR TWICE DAILY AND AS NEEDED. CHANGE SENSOR EVERY 15 DAYS, Disp: , Rfl:     dextromethorphan-guaiFENesin  (MUCINEX DM)  MG 12 hr tablet, Take 1 tablet by mouth 2 times daily as needed., Disp: , Rfl:     diclofenac (VOLTAREN) 1 % topical gel, Apply 2 g topically daily as needed for moderate pain To joints/back, Disp: , Rfl:     EPINEPHrine (ANY BX GENERIC EQUIV) 0.3 MG/0.3ML injection 2-pack, Inject 0.3 mg into the muscle as needed for anaphylaxis. May repeat one time in 5-15 minutes if response to initial dose is inadequate., Disp: , Rfl:     famotidine (PEPCID) 20 MG tablet, Take 1 tablet (20 mg) by mouth 2 times daily, Disp: 60 tablet, Rfl: 0    FLUoxetine 20 MG tablet, Take 20 mg by mouth every morning., Disp: , Rfl:     [Paused] furosemide (LASIX) 40 MG tablet, Take 1 tablet (40 mg) by mouth daily., Disp: 30 tablet, Rfl: 0    GAVILAX 17 GM/SCOOP powder, Take 17 g by mouth daily as needed for constipation., Disp: , Rfl:     hydrocortisone (CORTAID) 1 % external cream, Apply topically daily as needed for itching., Disp: , Rfl:     Hydrocortisone (PREPARATION H EX), Externally apply topically daily as needed (hemorrhoids)., Disp: , Rfl:     JARDIANCE 10 MG TABS tablet, Take 10 mg by mouth every morning., Disp: , Rfl:     levothyroxine (SYNTHROID/LEVOTHROID) 25 MCG tablet, Take 12.5 mcg by mouth every morning (before breakfast)., Disp: , Rfl:     [Paused] lisinopril (ZESTRIL) 10 MG tablet, Take 10 mg by mouth every morning., Disp: , Rfl:     loperamide (IMODIUM) 2 MG capsule, Take 4 mg by mouth 4 times daily as needed for diarrhea., Disp: , Rfl:     loratadine (CLARITIN) 10 MG tablet, Take 10 mg by mouth daily as needed for allergies., Disp: , Rfl:     magnesium hydroxide (MILK OF MAGNESIA) 400 MG/5ML suspension, Take 30 mLs by mouth daily as needed for heartburn., Disp: , Rfl:     metFORMIN (GLUCOPHAGE) 1000 MG tablet, Take 1,000 mg by mouth 2 times daily (with meals), Disp: , Rfl:     methocarbamol (ROBAXIN) 500 MG tablet, Take 1 tablet (500 mg) by mouth 4 times daily as needed for muscle spasms., Disp: 40  tablet, Rfl: 0    montelukast (SINGULAIR) 10 MG tablet, Take 10 mg by mouth every morning., Disp: , Rfl:     nystatin (MYCOSTATIN) 231362 UNIT/GM external powder, Apply topically 2 times daily for 14 days., Disp: 30 g, Rfl: 0    OLANZapine (ZYPREXA) 10 MG tablet, Take 10 mg by mouth At Bedtime, Disp: , Rfl:     omeprazole (PRILOSEC) 40 MG DR capsule, Take 40 mg by mouth every morning., Disp: , Rfl:     ondansetron (ZOFRAN ODT) 4 MG ODT tab, Take 1 tablet (4 mg) by mouth every 8 hours as needed for nausea., Disp: 20 tablet, Rfl: 0    pramoxine-calamine (AVEENO) 1-8 % LOTN, Apply topically daily as needed for itching., Disp: , Rfl:     rosuvastatin (CRESTOR) 10 MG tablet, Take 10 mg by mouth At Bedtime, Disp: , Rfl:     spironolactone (ALDACTONE) 100 MG tablet, Take 1 tablet (100 mg) by mouth daily., Disp: 30 tablet, Rfl: 0    sulfamethoxazole-trimethoprim (BACTRIM DS) 800-160 MG tablet, Take 1 tablet by mouth daily., Disp: , Rfl:     traZODone (DESYREL) 100 MG tablet, Take 100 mg by mouth at bedtime., Disp: , Rfl:     TRELEGY ELLIPTA 100-62.5-25 MCG/ACT oral inhaler, Inhale 1 puff into the lungs every morning., Disp: , Rfl:     Urea 40 % CREA, Apply topically daily as needed for dry skin., Disp: , Rfl:     Vitamins A & D (VITAMIN A & D) OINT, Externally apply topically daily as needed (general skin health)., Disp: , Rfl:     witch hazel-glycerin (TUCKS) pad, Apply topically as needed for hemorrhoids., Disp: , Rfl:     ALLERGIES:  Allergies   Allergen Reactions    Apricot Flavoring Agent (Non-Screening) Anaphylaxis    Banana Anaphylaxis     Throat swelling      Bees Anaphylaxis     Has an Epi pen    Wasp Venom Protein Shortness Of Breath     Other reaction(s): Respiratory Distress  Has an Epi pen        Methylphenidate Itching     Other reaction(s): Nightmares    Prunus      Other reaction(s): *Unknown       FAMILY HISTORY:  Family History   Problem Relation Age of Onset    Unknown/Adopted Father     Unknown/Adopted  Maternal Grandmother     C.A.D. Maternal Grandfather     Diabetes Maternal Grandfather     Cerebrovascular Disease Maternal Grandfather     Unknown/Adopted Paternal Grandmother     Unknown/Adopted Paternal Grandfather     Unknown/Adopted Brother     Unknown/Adopted Sister        SOCIAL HISTORY:   Social History     Socioeconomic History    Marital status: Single   Tobacco Use    Smoking status: Every Day     Current packs/day: 1.00     Average packs/day: 1 pack/day for 21.4 years (21.4 ttl pk-yrs)     Types: Cigarettes     Start date: 1/1/2004    Smokeless tobacco: Never   Vaping Use    Vaping status: Never Used   Substance and Sexual Activity    Alcohol use: Not Currently     Comment: Last 8/7/2024    Sexual activity: Never     Partners: Female   Other Topics Concern     Service No    Blood Transfusions No    Caffeine Concern No    Occupational Exposure No    Hobby Hazards No    Sleep Concern No    Stress Concern Yes     Comment: sometimes    Weight Concern No    Special Diet Yes     Comment: counting carbs    Back Care No    Exercise Yes    Seat Belt Yes    Self-Exams Yes     Social Drivers of Health     Financial Resource Strain: Low Risk  (5/16/2025)    Financial Resource Strain     Within the past 12 months, have you or your family members you live with been unable to get utilities (heat, electricity) when it was really needed?: No   Food Insecurity: Low Risk  (5/16/2025)    Food Insecurity     Within the past 12 months, did you worry that your food would run out before you got money to buy more?: No     Within the past 12 months, did the food you bought just not last and you didn t have money to get more?: No   Transportation Needs: Low Risk  (5/16/2025)    Transportation Needs     Within the past 12 months, has lack of transportation kept you from medical appointments, getting your medicines, non-medical meetings or appointments, work, or from getting things that you need?: No    Received from [facility]    Social Connections   Interpersonal Safety: Low Risk  (5/16/2025)    Interpersonal Safety     Do you feel physically and emotionally safe where you currently live?: Yes     Within the past 12 months, have you been hit, slapped, kicked or otherwise physically hurt by someone?: No     Within the past 12 months, have you been humiliated or emotionally abused in other ways by your partner or ex-partner?: No   Housing Stability: Low Risk  (5/16/2025)    Housing Stability     Do you have housing? : Yes     Are you worried about losing your housing?: No       PHYSICAL EXAM    VITAL SIGNS: /63   Pulse 82   Temp 98.4  F (36.9  C) (Oral)   Resp 28   SpO2 98%    GENERAL: Awake, alert, answering questions appropriately, no acute distress  SPEECH:  Easy to understand speech, Normal volume and donte  PULMONARY: No respiratory distress, Lungs clear to auscultation bilaterally  CARDIOVASCULAR: Regular rate and rhythm, Distal pulses present and normal.  ABDOMINAL: Soft, distended, Nontender, No rebound or guarding, No palpable masses  EXTREMITIES: No lower extremity edema.  PSYCH: Normal mood and affect     LAB:  All pertinent labs reviewed and interpreted.  Results for orders placed or performed during the hospital encounter of 05/30/25   XR Chest Port 1 View    Impression    IMPRESSION: Stable mild asymmetric elevation of the right hemidiaphragm. No acute findings. The lungs are clear and there are no pleural effusions. Stable heart size.   Basic metabolic panel   Result Value Ref Range    Sodium 140 135 - 145 mmol/L    Potassium 4.5 3.4 - 5.3 mmol/L    Chloride 110 (H) 98 - 107 mmol/L    Carbon Dioxide (CO2) 17 (L) 22 - 29 mmol/L    Anion Gap 13 7 - 15 mmol/L    Urea Nitrogen 25.0 (H) 6.0 - 20.0 mg/dL    Creatinine 1.64 (H) 0.67 - 1.17 mg/dL    GFR Estimate 53 (L) >60 mL/min/1.73m2    Calcium 9.1 8.8 - 10.4 mg/dL    Glucose 99 70 - 99 mg/dL   Result Value Ref Range    Troponin  T, High Sensitivity 33 (H) <=22 ng/L   CBC with platelets and differential   Result Value Ref Range    WBC Count 14.6 (H) 4.0 - 11.0 10e3/uL    RBC Count 3.17 (L) 4.40 - 5.90 10e6/uL    Hemoglobin 9.1 (L) 13.3 - 17.7 g/dL    Hematocrit 27.4 (L) 40.0 - 53.0 %    MCV 86 78 - 100 fL    MCH 28.7 26.5 - 33.0 pg    MCHC 33.2 31.5 - 36.5 g/dL    RDW 14.6 10.0 - 15.0 %    Platelet Count 496 (H) 150 - 450 10e3/uL    % Neutrophils 73 %    % Lymphocytes 14 %    % Monocytes 8 %    % Eosinophils 4 %    % Basophils 1 %    % Immature Granulocytes 1 %    NRBCs per 100 WBC 0 <1 /100    Absolute Neutrophils 10.6 (H) 1.6 - 8.3 10e3/uL    Absolute Lymphocytes 2.1 0.8 - 5.3 10e3/uL    Absolute Monocytes 1.2 0.0 - 1.3 10e3/uL    Absolute Eosinophils 0.5 0.0 - 0.7 10e3/uL    Absolute Basophils 0.1 0.0 - 0.2 10e3/uL    Absolute Immature Granulocytes 0.1 <=0.4 10e3/uL    Absolute NRBCs 0.0 10e3/uL   Extra Blue Top Tube   Result Value Ref Range    Hold Specimen JIC    Result Value Ref Range    Troponin T, High Sensitivity 29 (H) <=22 ng/L       RADIOLOGY:  XR Chest Port 1 View   Final Result   IMPRESSION: Stable mild asymmetric elevation of the right hemidiaphragm. No acute findings. The lungs are clear and there are no pleural effusions. Stable heart size.            EKG:    Date and time: May 30, 2025 at 1558  Rate: 86 bpm  Rhythm: Sinus rhythm with first-degree AV block  MS interval: 210 ms  QRS interval: 104 ms  QT/QTc: 370/442 ms  ST changes or T wave changes: No acute ST or T wave abnormality  Change from prior ECG: No significant change from prior  I have independently reviewed and interpreted this EKG.     Jaquelin Monzon M.D.  Emergency Medicine  [facility] EMERGENCY DEPARTMENT  [address] [address] [phone]  Dept: [phone]       Jaquelin Monzon MD  05/30/25 9883

## 2025-05-30 NOTE — ED NOTES
Bed: JNED-05  Expected date: 5/30/25  Expected time: 3:52 PM  Means of arrival: Ambulance  Comments:  [facility]  44M  Chest pain

## 2025-05-31 NOTE — DISCHARGE INSTRUCTIONS
You were seen in the Emergency Department today for evaluation of some shortness of breath and chest pain.  Your lab work showed no cause of your symptoms. Your imaging studies showed no significant cause of your symptoms.  You should stop smoking.  Follow up with your primary care physician to ensure resolution of symptoms. Return if you have new or worsening symptoms.

## 2025-06-01 ENCOUNTER — NURSE TRIAGE (OUTPATIENT)
Dept: NURSING | Facility: CLINIC | Age: 45
End: 2025-06-01
Payer: COMMERCIAL

## 2025-06-01 ENCOUNTER — HOSPITAL ENCOUNTER (OUTPATIENT)
Facility: HOSPITAL | Age: 45
Setting detail: OBSERVATION
Discharge: HOME | End: 2025-06-02
Attending: STUDENT IN AN ORGANIZED HEALTH CARE EDUCATION/TRAINING PROGRAM | Admitting: RADIOLOGY
Payer: COMMERCIAL

## 2025-06-01 DIAGNOSIS — I50.812 CHRONIC RIGHT-SIDED CONGESTIVE HEART FAILURE (H): ICD-10-CM

## 2025-06-01 DIAGNOSIS — R18.8 CIRRHOSIS OF LIVER WITH ASCITES, UNSPECIFIED HEPATIC CIRRHOSIS TYPE (H): ICD-10-CM

## 2025-06-01 DIAGNOSIS — K74.60 CIRRHOSIS OF LIVER WITH ASCITES, UNSPECIFIED HEPATIC CIRRHOSIS TYPE (H): ICD-10-CM

## 2025-06-01 DIAGNOSIS — R18.8 OTHER ASCITES: ICD-10-CM

## 2025-06-01 LAB
ALBUMIN SERPL BCG-MCNC: 3.8 G/DL (ref 3.5–5.2)
ALP SERPL-CCNC: 312 U/L (ref 40–150)
ALT SERPL W P-5'-P-CCNC: 73 U/L (ref 0–70)
ANION GAP SERPL CALCULATED.3IONS-SCNC: 12 MMOL/L (ref 7–15)
AST SERPL W P-5'-P-CCNC: 45 U/L (ref 0–45)
BASOPHILS # BLD AUTO: 0.1 10E3/UL (ref 0–0.2)
BASOPHILS NFR BLD AUTO: 1 %
BILIRUB SERPL-MCNC: 0.2 MG/DL
BUN SERPL-MCNC: 23.8 MG/DL (ref 6–20)
CALCIUM SERPL-MCNC: 9.1 MG/DL (ref 8.8–10.4)
CHLORIDE SERPL-SCNC: 109 MMOL/L (ref 98–107)
CREAT SERPL-MCNC: 1.36 MG/DL (ref 0.67–1.17)
EGFRCR SERPLBLD CKD-EPI 2021: 66 ML/MIN/1.73M2
EOSINOPHIL # BLD AUTO: 0.5 10E3/UL (ref 0–0.7)
EOSINOPHIL NFR BLD AUTO: 3 %
ERYTHROCYTE [DISTWIDTH] IN BLOOD BY AUTOMATED COUNT: 14.7 % (ref 10–15)
GLUCOSE SERPL-MCNC: 123 MG/DL (ref 70–99)
HCO3 SERPL-SCNC: 18 MMOL/L (ref 22–29)
HCT VFR BLD AUTO: 28.6 % (ref 40–53)
HGB BLD-MCNC: 9 G/DL (ref 13.3–17.7)
IMM GRANULOCYTES # BLD: 0.1 10E3/UL
IMM GRANULOCYTES NFR BLD: 1 %
LYMPHOCYTES # BLD AUTO: 2.3 10E3/UL (ref 0.8–5.3)
LYMPHOCYTES NFR BLD AUTO: 15 %
MCH RBC QN AUTO: 27.5 PG (ref 26.5–33)
MCHC RBC AUTO-ENTMCNC: 31.5 G/DL (ref 31.5–36.5)
MCV RBC AUTO: 88 FL (ref 78–100)
MONOCYTES # BLD AUTO: 1.3 10E3/UL (ref 0–1.3)
MONOCYTES NFR BLD AUTO: 9 %
NEUTROPHILS # BLD AUTO: 11.1 10E3/UL (ref 1.6–8.3)
NEUTROPHILS NFR BLD AUTO: 72 %
NRBC # BLD AUTO: 0 10E3/UL
NRBC BLD AUTO-RTO: 0 /100
PLATELET # BLD AUTO: 453 10E3/UL (ref 150–450)
POTASSIUM SERPL-SCNC: 4.7 MMOL/L (ref 3.4–5.3)
PROT SERPL-MCNC: 6.1 G/DL (ref 6.4–8.3)
RBC # BLD AUTO: 3.27 10E6/UL (ref 4.4–5.9)
SODIUM SERPL-SCNC: 139 MMOL/L (ref 135–145)
TROPONIN T SERPL HS-MCNC: 26 NG/L
WBC # BLD AUTO: 15.4 10E3/UL (ref 4–11)

## 2025-06-01 PROCEDURE — 94640 AIRWAY INHALATION TREATMENT: CPT

## 2025-06-01 PROCEDURE — 80053 COMPREHEN METABOLIC PANEL: CPT | Performed by: STUDENT IN AN ORGANIZED HEALTH CARE EDUCATION/TRAINING PROGRAM

## 2025-06-01 PROCEDURE — 99285 EMERGENCY DEPT VISIT HI MDM: CPT

## 2025-06-01 PROCEDURE — 250N000009 HC RX 250: Performed by: STUDENT IN AN ORGANIZED HEALTH CARE EDUCATION/TRAINING PROGRAM

## 2025-06-01 PROCEDURE — 85025 COMPLETE CBC W/AUTO DIFF WBC: CPT | Performed by: STUDENT IN AN ORGANIZED HEALTH CARE EDUCATION/TRAINING PROGRAM

## 2025-06-01 PROCEDURE — 84484 ASSAY OF TROPONIN QUANT: CPT | Performed by: STUDENT IN AN ORGANIZED HEALTH CARE EDUCATION/TRAINING PROGRAM

## 2025-06-01 PROCEDURE — 36415 COLL VENOUS BLD VENIPUNCTURE: CPT | Performed by: STUDENT IN AN ORGANIZED HEALTH CARE EDUCATION/TRAINING PROGRAM

## 2025-06-01 PROCEDURE — 93005 ELECTROCARDIOGRAM TRACING: CPT

## 2025-06-01 RX ORDER — IPRATROPIUM BROMIDE AND ALBUTEROL SULFATE 2.5; .5 MG/3ML; MG/3ML
3 SOLUTION RESPIRATORY (INHALATION) ONCE
Status: COMPLETED | OUTPATIENT
Start: 2025-06-01 | End: 2025-06-01

## 2025-06-01 RX ADMIN — IPRATROPIUM BROMIDE AND ALBUTEROL SULFATE 3 ML: .5; 3 SOLUTION RESPIRATORY (INHALATION) at 21:27

## 2025-06-01 ASSESSMENT — ACTIVITIES OF DAILY LIVING (ADL)
ADLS_ACUITY_SCORE: 56

## 2025-06-02 ENCOUNTER — HOSPITAL ENCOUNTER (OUTPATIENT)
Dept: ULTRASOUND IMAGING | Facility: HOSPITAL | Age: 45
Setting detail: OBSERVATION
Discharge: HOME | End: 2025-06-02
Attending: STUDENT IN AN ORGANIZED HEALTH CARE EDUCATION/TRAINING PROGRAM
Payer: COMMERCIAL

## 2025-06-02 ENCOUNTER — APPOINTMENT (OUTPATIENT)
Dept: RADIOLOGY | Facility: HOSPITAL | Age: 45
End: 2025-06-02
Attending: STUDENT IN AN ORGANIZED HEALTH CARE EDUCATION/TRAINING PROGRAM
Payer: COMMERCIAL

## 2025-06-02 VITALS
HEART RATE: 79 BPM | OXYGEN SATURATION: 97 % | SYSTOLIC BLOOD PRESSURE: 119 MMHG | BODY MASS INDEX: 48.63 KG/M2 | DIASTOLIC BLOOD PRESSURE: 56 MMHG | HEIGHT: 65 IN | RESPIRATION RATE: 18 BRPM | TEMPERATURE: 98 F | WEIGHT: 291.89 LBS

## 2025-06-02 DIAGNOSIS — R18.8 OTHER ASCITES: ICD-10-CM

## 2025-06-02 LAB
ALBUMIN SERPL BCG-MCNC: 3.7 G/DL (ref 3.5–5.2)
ALP SERPL-CCNC: 296 U/L (ref 40–150)
ALT SERPL W P-5'-P-CCNC: 73 U/L (ref 0–70)
ANION GAP SERPL CALCULATED.3IONS-SCNC: 13 MMOL/L (ref 7–15)
AST SERPL W P-5'-P-CCNC: 45 U/L (ref 0–45)
ATRIAL RATE - MUSE: 86 BPM
ATRIAL RATE - MUSE: 87 BPM
BILIRUB SERPL-MCNC: 0.2 MG/DL
BUN SERPL-MCNC: 24 MG/DL (ref 6–20)
CALCIUM SERPL-MCNC: 9 MG/DL (ref 8.8–10.4)
CHLORIDE SERPL-SCNC: 109 MMOL/L (ref 98–107)
CREAT SERPL-MCNC: 1.36 MG/DL (ref 0.67–1.17)
DIASTOLIC BLOOD PRESSURE - MUSE: 57 MMHG
DIASTOLIC BLOOD PRESSURE - MUSE: 58 MMHG
EGFRCR SERPLBLD CKD-EPI 2021: 66 ML/MIN/1.73M2
ERYTHROCYTE [DISTWIDTH] IN BLOOD BY AUTOMATED COUNT: 15 % (ref 10–15)
GLUCOSE BLDC GLUCOMTR-MCNC: 107 MG/DL (ref 70–99)
GLUCOSE BLDC GLUCOMTR-MCNC: 131 MG/DL (ref 70–99)
GLUCOSE SERPL-MCNC: 119 MG/DL (ref 70–99)
HCO3 SERPL-SCNC: 18 MMOL/L (ref 22–29)
HCT VFR BLD AUTO: 29.1 % (ref 40–53)
HGB BLD-MCNC: 9 G/DL (ref 13.3–17.7)
HOLD SPECIMEN: NORMAL
INTERPRETATION ECG - MUSE: NORMAL
INTERPRETATION ECG - MUSE: NORMAL
MCH RBC QN AUTO: 27.8 PG (ref 26.5–33)
MCHC RBC AUTO-ENTMCNC: 30.9 G/DL (ref 31.5–36.5)
MCV RBC AUTO: 90 FL (ref 78–100)
P AXIS - MUSE: 65 DEGREES
P AXIS - MUSE: 73 DEGREES
PLATELET # BLD AUTO: 429 10E3/UL (ref 150–450)
POTASSIUM SERPL-SCNC: 4.4 MMOL/L (ref 3.4–5.3)
PR INTERVAL - MUSE: 204 MS
PR INTERVAL - MUSE: 210 MS
PROT SERPL-MCNC: 6.2 G/DL (ref 6.4–8.3)
QRS DURATION - MUSE: 102 MS
QRS DURATION - MUSE: 104 MS
QT - MUSE: 366 MS
QT - MUSE: 370 MS
QTC - MUSE: 432 MS
QTC - MUSE: 442 MS
R AXIS - MUSE: 89 DEGREES
R AXIS - MUSE: 94 DEGREES
RBC # BLD AUTO: 3.24 10E6/UL (ref 4.4–5.9)
SODIUM SERPL-SCNC: 140 MMOL/L (ref 135–145)
SYSTOLIC BLOOD PRESSURE - MUSE: 127 MMHG
SYSTOLIC BLOOD PRESSURE - MUSE: 133 MMHG
T AXIS - MUSE: 180 DEGREES
T AXIS - MUSE: 208 DEGREES
TROPONIN T SERPL HS-MCNC: 27 NG/L
TROPONIN T SERPL HS-MCNC: 30 NG/L
TROPONIN T SERPL HS-MCNC: 35 NG/L
VENTRICULAR RATE- MUSE: 84 BPM
VENTRICULAR RATE- MUSE: 86 BPM
WBC # BLD AUTO: 14.6 10E3/UL (ref 4–11)

## 2025-06-02 PROCEDURE — 250N000013 HC RX MED GY IP 250 OP 250 PS 637

## 2025-06-02 PROCEDURE — 71046 X-RAY EXAM CHEST 2 VIEWS: CPT

## 2025-06-02 PROCEDURE — 84132 ASSAY OF SERUM POTASSIUM: CPT

## 2025-06-02 PROCEDURE — 36415 COLL VENOUS BLD VENIPUNCTURE: CPT

## 2025-06-02 PROCEDURE — G0378 HOSPITAL OBSERVATION PER HR: HCPCS

## 2025-06-02 PROCEDURE — 99235 HOSP IP/OBS SAME DATE MOD 70: CPT | Mod: GC

## 2025-06-02 PROCEDURE — 999N000157 HC STATISTIC RCP TIME EA 10 MIN

## 2025-06-02 PROCEDURE — 94660 CPAP INITIATION&MGMT: CPT

## 2025-06-02 PROCEDURE — 84484 ASSAY OF TROPONIN QUANT: CPT

## 2025-06-02 PROCEDURE — 85014 HEMATOCRIT: CPT

## 2025-06-02 PROCEDURE — 82962 GLUCOSE BLOOD TEST: CPT

## 2025-06-02 PROCEDURE — 272N000042 US PARACENTESIS WITHOUT ALBUMIN

## 2025-06-02 RX ORDER — ACETAMINOPHEN 650 MG/1
650 SUPPOSITORY RECTAL EVERY 4 HOURS PRN
Status: DISCONTINUED | OUTPATIENT
Start: 2025-06-02 | End: 2025-06-02 | Stop reason: HOSPADM

## 2025-06-02 RX ORDER — ONDANSETRON 4 MG/1
4 TABLET, ORALLY DISINTEGRATING ORAL EVERY 6 HOURS PRN
Status: DISCONTINUED | OUTPATIENT
Start: 2025-06-02 | End: 2025-06-02 | Stop reason: HOSPADM

## 2025-06-02 RX ORDER — AMOXICILLIN 250 MG
2 CAPSULE ORAL 2 TIMES DAILY PRN
Status: DISCONTINUED | OUTPATIENT
Start: 2025-06-02 | End: 2025-06-02 | Stop reason: HOSPADM

## 2025-06-02 RX ORDER — SPIRONOLACTONE 100 MG/1
50 TABLET, FILM COATED ORAL DAILY
Qty: 30 TABLET | Refills: 0 | Status: SHIPPED | OUTPATIENT
Start: 2025-06-02

## 2025-06-02 RX ORDER — ONDANSETRON 2 MG/ML
4 INJECTION INTRAMUSCULAR; INTRAVENOUS EVERY 6 HOURS PRN
Status: DISCONTINUED | OUTPATIENT
Start: 2025-06-02 | End: 2025-06-02 | Stop reason: HOSPADM

## 2025-06-02 RX ORDER — PROCHLORPERAZINE MALEATE 10 MG
10 TABLET ORAL EVERY 6 HOURS PRN
Status: DISCONTINUED | OUTPATIENT
Start: 2025-06-02 | End: 2025-06-02 | Stop reason: HOSPADM

## 2025-06-02 RX ORDER — NICOTINE POLACRILEX 4 MG
15-30 LOZENGE BUCCAL
Status: DISCONTINUED | OUTPATIENT
Start: 2025-06-02 | End: 2025-06-02 | Stop reason: HOSPADM

## 2025-06-02 RX ORDER — POLYETHYLENE GLYCOL 3350 17 G/17G
17 POWDER, FOR SOLUTION ORAL 2 TIMES DAILY PRN
Status: DISCONTINUED | OUTPATIENT
Start: 2025-06-02 | End: 2025-06-02 | Stop reason: HOSPADM

## 2025-06-02 RX ORDER — FUROSEMIDE 40 MG/1
20 TABLET ORAL DAILY
Qty: 30 TABLET | Refills: 0 | Status: SHIPPED | OUTPATIENT
Start: 2025-06-02

## 2025-06-02 RX ORDER — AMOXICILLIN 250 MG
1 CAPSULE ORAL 2 TIMES DAILY PRN
Status: DISCONTINUED | OUTPATIENT
Start: 2025-06-02 | End: 2025-06-02 | Stop reason: HOSPADM

## 2025-06-02 RX ORDER — CIPROFLOXACIN 250 MG/1
250 TABLET, FILM COATED ORAL EVERY 12 HOURS SCHEDULED
Status: DISCONTINUED | OUTPATIENT
Start: 2025-06-02 | End: 2025-06-02

## 2025-06-02 RX ORDER — DEXTROSE MONOHYDRATE 25 G/50ML
25-50 INJECTION, SOLUTION INTRAVENOUS
Status: DISCONTINUED | OUTPATIENT
Start: 2025-06-02 | End: 2025-06-02 | Stop reason: HOSPADM

## 2025-06-02 RX ORDER — ACETAMINOPHEN 325 MG/1
650 TABLET ORAL EVERY 4 HOURS PRN
Status: DISCONTINUED | OUTPATIENT
Start: 2025-06-02 | End: 2025-06-02 | Stop reason: HOSPADM

## 2025-06-02 RX ORDER — SULFAMETHOXAZOLE AND TRIMETHOPRIM 800; 160 MG/1; MG/1
1 TABLET ORAL DAILY
Status: DISCONTINUED | OUTPATIENT
Start: 2025-06-02 | End: 2025-06-02 | Stop reason: HOSPADM

## 2025-06-02 RX ADMIN — SULFAMETHOXAZOLE AND TRIMETHOPRIM 1 TABLET: 800; 160 TABLET ORAL at 08:03

## 2025-06-02 ASSESSMENT — ACTIVITIES OF DAILY LIVING (ADL)
ADLS_ACUITY_SCORE: 56
ADLS_ACUITY_SCORE: 53
ADLS_ACUITY_SCORE: 56
DEPENDENT_IADLS:: CLEANING;COOKING;LAUNDRY;SHOPPING;MEAL PREPARATION;TRANSPORTATION;MEDICATION MANAGEMENT
ADLS_ACUITY_SCORE: 53
ADLS_ACUITY_SCORE: 53
ADLS_ACUITY_SCORE: 56

## 2025-06-02 NOTE — PROGRESS NOTES
Pt A&Ox4. VSS. Uses Bipap overnight. Independent. Last . Independent to the bathroom. 2 g Sodium Diet. On Fluid restriction 2000 ml.

## 2025-06-02 NOTE — ED NOTES
[facility] ED Handoff Report    ED Chief Complaint: Flank Pain    ED Diagnosis:  (R18.8) Other ascites  Comment:   Plan: US Paracentesis without Albumin               PMH:    Past Medical History:   Diagnosis Date    COPD exacerbation (H) 12/02/2024    DM2 (diabetes mellitus, type 2) (H) 04/28/2020    HTN (hypertension) 07/30/2012    Sleep apnea     Thyroid nodule 07/31/2019    Rojas's disease (H)         Code Status:  Prior     Falls Risk: Yes Band: Not applicable    Current Living Situation/Residence: lives in a group home     Elimination Status: Continent: Yes     Activity Level: Independent    Patients Preferred Language:  English     Needed: No    Vital Signs:  BP (!) 147/68   Pulse 86   Temp 98.7  F (37.1  C) (Oral)   Resp 20   Wt 131.8 kg (290 lb 9.6 oz)   SpO2 97%   BMI 48.36 kg/m       Cardiac Rhythm:     Pain Score: 0/10    Is the Patient Confused:  No    Last Food or Drink: 06/02/25 at in the ER    Focused Assessment:  patient missed paracentesis and had increased shortness of breath. Trop was increased in the ER and will be monitored overnight.    Tests Performed: Done: Labs and Imaging    Treatments Provided:      Family Dynamics/Concerns: No    Family Updated On Visitor Policy: Yes    Plan of Care Communicated to Family: Yes    Who Was Updated about Plan of Care: patient can self update    Belongings Checklist Done and Signed by Patient: No    Medications sent with patient:     Covid: asymptomatic , not tested    Additional Information:     Tasia Malik RN 6/2/2025 1:20 AM

## 2025-06-02 NOTE — H&P
[facility]    History and Physical - Hospitalist Service       Date of Admission:  6/1/2025    Assessment & Plan      Warren Jaramillo is a 44 year old male admitted on 6/1/2025. He has a history of cirrhosis, SBP, HTN, COPD, tobacco use disorder, diabetes, HFpEF, upper GIB, and Rojas's disease, and CKD and is admitted for abdominal distention and chest pain.     Cirrhosis with ascites/frequent paracentesis  History of Rojas's disease   Leukocytosis   Malik is a 44-year-old male with a history of cirrhosis and Rojas's disease with recurrent ascites requiring frequent outpatient paracentesis. Patient presents to the ED with worsening abdominal distention after missing his outpatient paracentesis. Notably, he was recently admitted from 5/15-5/26/25 due to SBP and acute blood loss anemia.  He would have completed 7 days of Levaquin on 6/1 for management of SBP. Patient reporting significant abdominal discomfort with distention and 30 pound weight gain over the last few days.  Vitally stable on admission with mild hypertension.  Afebrile.  CBC notable for leukocytosis greater than 15 (up from 14.6 three days prior).  CMP notable for elevated alk phos and ALT.  RIVERA improved from 3 days prior.  Overall picture consistent with ascites due to cirrhosis requiring paracentesis.  Low concern for SBP at this point due to absence of fever and abdominal pain but will treat prophylactically.  Will admit for observation and place consult for paracentesis.  - Admit for observation   - IR consulted for paracentesis    - Obtain fluid studies given recent hx of SBP  - Treat prophylactically for SBP with Bactrim daily   - Trend LFTs     Chest pain   Elevated troponin   Concern for NSTEMI   Patient complaining of chest pain on admission. Initial troponin 26 which is near patient's baseline from prior evaluations. However, delta trop up to 35 which met criteria for admission. ECG in the ED similar to  prior without ST elevations. Deferred heparin administration for now and will trend troponin. May consider cardiology consultation and heparin administration if troponin continues to trend upward.   - Admit for obs  - Trend troponin   - Consider cardiology consultation pending troponin results     Normocytic anemia   Hemoglobin of 9.0 on admission, stable since discharge. No evidence or symptoms of acute bleed at this time. On famotidine and PPI.   - Continue PTA famotidine and PPI    - Trend CBC  - Continue to hold PTA apixaban     History of HFpEF  History of HFpEF.  Most recent echo with EF 60 to 65%. Trace edema  at admission. No signs of CHF exacerbation.  -Continue PTA spironolactone   - PTA Lasix held at recent discharge due to RIVERA      Type 2 diabetes  Most recent A1c 6.7. On PTA metformin and Jardiance.  -Hold PTA metformin and Jardiance  -Diabetic diet  -medium sliding scale insulin     Hx of DVT  History of occlusive DVT diagnosed in 2024. On PTA apixaban but this was held during recent admission given concern for bleeding and dropping hemoglobin.   - Hold PTA apixaban        Chronic conditions:  Hypothyroidism: PTA levothyroxine  HTN: Hold PTA lisinopril (RIVERA)  HLD: PTA rosuvastatin   AVA; BiPAP/CPAP as needed for sleep  COPD: Albuterol inhaler prn, pta singulair  Mood disorder: Continue PTA fluoxetine and olanzapine  Sleep: PTA trazodone      Observation Goals: -diagnostic tests and consults completed and resulted, -vital signs normal or at patient baseline, -adequate pain control on oral analgesics, -safe disposition plan has been identified, Nurse to notify provider when observation goals have been met and patient is ready for discharge.  Diet: Fluid restriction 2000 ML FLUID  2 Gram Sodium Diet  DVT Prophylaxis: Pneumatic Compression Devices  Khan Catheter: Not present  Fluids: PO  Lines: None     Cardiac Monitoring: ACTIVE order. Indication: Chest pain/ ACS rule out (24 hours)  Code Status: Full  "Code    Clinically Significant Risk Factors Present on Admission          # Hyperchloremia: Highest Cl = 109 mmol/L in last 2 days, will monitor as appropriate              # Hypertension: Noted on problem list    # Chronic heart failure with preserved ejection fraction: heart failure noted on problem list and last echo with EF >50%     # Anemia: based on hgb <11      # DMII: A1C = 6.7 % (Ref range: <5.7 %) within past 6 months    # Morbid Obesity: Estimated body mass index is 48.36 kg/m  as calculated from the following:    Height as of 5/16/25: 1.651 m (5' 5\").    Weight as of this encounter: 131.8 kg (290 lb 9.6 oz).         # Financial/Environmental Concerns:           Disposition Plan      Expected Discharge Date: 06/03/2025                The patient's care will be formally staffed with the attending physician during morning report.     Sabine Taylor DO  Hospitalist Service  [facility]  Securely message with [employer] (more info)  Text page via [employer] Paging/Directory   ______________________________________________________________________    Chief Complaint   Abdominal distention, chest pain     History is obtained from the patient    History of Present Illness   Warren Jaramillo is a 44 year old male who presents with worsening abdominal pain and distention over the last several days.  Patient states that he missed his outpatient paracentesis as he was stuck in a meeting with his care coordinator and legal guardian.    Over the last several days he has had 30 pound weight gain and significant only worsening abdominal distention.  This is not associated with any abdominal pain, nausea, vomiting, constipation, or diarrhea.  He has not had any fever or chills recently.  Does endorse chest pain and some difficulty getting a deep breath due to distention.  Describes his chest pain as midline epigastric pain that is somewhat pressure-like in sensation.  Denies any diaphoresis or " radiating pain.    Was recently hospitalized from 5/15-5/26 for SBP and acute blood loss anemia.  Reports  That he had some medications held at discharge including Lasix and lisinopril.  Otherwise no recent medication changes or changes to medical history.    Past Medical History    Past Medical History:   Diagnosis Date    COPD exacerbation (H) 12/02/2024    DM2 (diabetes mellitus, type 2) (H) 04/28/2020    HTN (hypertension) 07/30/2012    Sleep apnea     Thyroid nodule 07/31/2019    Rojas's disease (H)        Past Surgical History   Past Surgical History:   Procedure Laterality Date    COLONOSCOPY      ESOPHAGOSCOPY, GASTROSCOPY, DUODENOSCOPY (EGD), COMBINED N/A 7/21/2023    Procedure: ESOPHAGOGASTRODUODENOSCOPY WITH GASTRIC AND ESOPHAGEAL BIOPSIES;  Surgeon: Filiberto Aragon MD;  Location: [facility] OR    ESOPHAGOSCOPY, GASTROSCOPY, DUODENOSCOPY (EGD), COMBINED N/A 4/4/2025    Procedure: ESOPHAGOGASTRODUODENOSCOPY;  Surgeon: Ramesh Talbot MD;  Location: [facility] OR    TOOTH EXTRACTION         Prior to Admission Medications   Prior to Admission Medications   Prescriptions Last Dose Informant Patient Reported? Taking?   Benzocaine (SOLARCAINE ALOE VERA EX)  Care Giver Yes No   Sig: Externally apply topically daily as needed.   Calamine external lotion  Care Giver Yes No   Sig: Apply topically as needed for itching   Continuous Glucose Sensor (FREESTYLE MEGHANN 3 PLUS SENSOR) MISC  Care Giver Yes No   Sig: USE TO READ BLOOD SUGAR TWICE DAILY AND AS NEEDED. CHANGE SENSOR EVERY 15 DAYS   EPINEPHrine (ANY BX GENERIC EQUIV) 0.3 MG/0.3ML injection 2-pack  Care Giver Yes No   Sig: Inject 0.3 mg into the muscle as needed for anaphylaxis. May repeat one time in 5-15 minutes if response to initial dose is inadequate.   FLUoxetine 20 MG tablet  Care Giver Yes No   Sig: Take 20 mg by mouth every morning.   GAVILAX 17 GM/SCOOP powder  Care Giver Yes No   Sig: Take 17 g by mouth daily as needed for constipation.    Hydrocortisone (PREPARATION H EX)  Care Giver Yes No   Sig: Externally apply topically daily as needed (hemorrhoids).   JARDIANCE 10 MG TABS tablet  Care Giver Yes No   Sig: Take 10 mg by mouth every morning.   OLANZapine (ZYPREXA) 10 MG tablet  Care Giver Yes No   Sig: Take 10 mg by mouth At Bedtime   TRELEGY ELLIPTA 100-62.5-25 MCG/ACT oral inhaler  Care Giver Yes No   Sig: Inhale 1 puff into the lungs every morning.   Urea 40 % CREA  Care Giver Yes No   Sig: Apply topically daily as needed for dry skin.   Vitamins A & D (VITAMIN A & D) OINT  Care Giver Yes No   Sig: Externally apply topically daily as needed (general skin health).   albuterol (PROAIR HFA/PROVENTIL HFA/VENTOLIN HFA) 108 (90 Base) MCG/ACT inhaler  Care Giver No No   Sig: Inhale 1-2 puffs into the lungs every 6 hours as needed for shortness of breath, wheezing or cough   albuterol (PROVENTIL) (2.5 MG/3ML) 0.083% neb solution  Care Giver Yes No   Sig: Take 2.5 mg by nebulization 3 times daily as needed for shortness of breath, wheezing or cough   aloe vera GEL  Care Giver Yes No   Sig: Apply 1 g topically every hour as needed for skin care Per bottle directions   bacitracin 500 UNIT/GM OINT  Care Giver Yes No   Sig: Apply topically 3 times daily as needed for wound care   benzonatate (TESSALON) 200 MG capsule  Care Giver No No   Sig: Take 1 capsule (200 mg) by mouth 3 times daily as needed for cough.   carbamide peroxide (DEBROX) 6.5 % otic solution  Care Giver Yes No   Sig: Place 5 drops into both ears daily as needed for other (ear wax).   clotrimazole (LOTRIMIN) 1 % external cream  Care Giver Yes No   Sig: Apply topically 2 times daily as needed (skin irritation)   dextromethorphan-guaiFENesin (MUCINEX DM)  MG 12 hr tablet  Care Giver Yes No   Sig: Take 1 tablet by mouth 2 times daily as needed.   diclofenac (VOLTAREN) 1 % topical gel  Care Giver Yes No   Sig: Apply 2 g topically daily as needed for moderate pain To joints/back    famotidine (PEPCID) 20 MG tablet  Care Giver No No   Sig: Take 1 tablet (20 mg) by mouth 2 times daily   furosemide (LASIX) 40 MG tablet  Care Giver No No   Sig: Take 1 tablet (40 mg) by mouth daily.   hydrocortisone (CORTAID) 1 % external cream  Care Giver Yes No   Sig: Apply topically daily as needed for itching.   levothyroxine (SYNTHROID/LEVOTHROID) 25 MCG tablet   Yes No   Sig: Take 12.5 mcg by mouth every morning (before breakfast).   lisinopril (ZESTRIL) 10 MG tablet  Care Giver Yes No   Sig: Take 10 mg by mouth every morning.   loperamide (IMODIUM) 2 MG capsule  Care Giver Yes No   Sig: Take 4 mg by mouth 4 times daily as needed for diarrhea.   loratadine (CLARITIN) 10 MG tablet  Care Giver Yes No   Sig: Take 10 mg by mouth daily as needed for allergies.   magnesium hydroxide (MILK OF MAGNESIA) 400 MG/5ML suspension  Care Giver Yes No   Sig: Take 30 mLs by mouth daily as needed for heartburn.   metFORMIN (GLUCOPHAGE) 1000 MG tablet  Care Giver Yes No   Sig: Take 1,000 mg by mouth 2 times daily (with meals)   methocarbamol (ROBAXIN) 500 MG tablet  Care Giver No No   Sig: Take 1 tablet (500 mg) by mouth 4 times daily as needed for muscle spasms.   montelukast (SINGULAIR) 10 MG tablet  Care Giver Yes No   Sig: Take 10 mg by mouth every morning.   nystatin (MYCOSTATIN) 679002 UNIT/GM external powder   No No   Sig: Apply topically 2 times daily for 14 days.   omeprazole (PRILOSEC) 40 MG DR capsule  Care Giver Yes No   Sig: Take 40 mg by mouth every morning.   ondansetron (ZOFRAN ODT) 4 MG ODT tab  Care Giver No No   Sig: Take 1 tablet (4 mg) by mouth every 8 hours as needed for nausea.   pramoxine-calamine (AVEENO) 1-8 % LOTN  Care Giver Yes No   Sig: Apply topically daily as needed for itching.   rosuvastatin (CRESTOR) 10 MG tablet  Care Giver Yes No   Sig: Take 10 mg by mouth At Bedtime   spironolactone (ALDACTONE) 100 MG tablet  Care Giver No No   Sig: Take 1 tablet (100 mg) by mouth daily.    sulfamethoxazole-trimethoprim (BACTRIM DS) 800-160 MG tablet   Yes No   Sig: Take 1 tablet by mouth daily.   traZODone (DESYREL) 100 MG tablet  Care Giver Yes No   Sig: Take 100 mg by mouth at bedtime.   witch hazel-glycerin (TUCKS) pad  Care Giver Yes No   Sig: Apply topically as needed for hemorrhoids.      Facility-Administered Medications: None           Physical Exam   Vital Signs: Temp: 98.7  F (37.1  C) Temp src: Oral BP: (!) 147/68 Pulse: 86   Resp: 20 SpO2: 97 % O2 Device: None (Room air)    Weight: 290 lbs 9.6 oz    GEN: Pleasant male, in no acute distress.   HEENT: Normocephalic, atraumatic. Extraoccular eye movements intact. Anicteric sclera. Moist mucous membranes.  PULM: Somewhat shallow breathing. No use of accessory muscles. Able to speak in clear, full sentences. Clear to ausculation bilaterally. No wheezes or crackles.   CV: Regular rate and rhythm. Normal S1, S2. No rubs, murmurs, or gallops.   ABDOMEN: Distended, firm abdomen. Normoactive bowel sounds. Non-tender to palpation.   EXTREMITIES:  No clubbing, cyanosis. Trace bilateral lower extremity edema.    NEURO:  Awake. Oriented to person, place, time and situation. Cranial nerves 2-12 grossly intact. Moving all extremities.    PSYCH: Mildly anxious. Appropriate affect.        Medical Decision Making             Data     I have personally reviewed the following data over the past 24 hrs:    15.4 (H)  \   9.0 (L)   / 453 (H)     139 109 (H) 23.8 (H) /  123 (H)   4.7 18 (L) 1.36 (H) \     ALT: 73 (H) AST: 45 AP: 312 (H) TBILI: 0.2   ALB: 3.8 TOT PROTEIN: 6.1 (L) LIPASE: N/A     Trop: 35 (H) BNP: N/A       Imaging results reviewed over the past 24 hrs:   Recent Results (from the past 24 hours)   Chest XR,  PA & LAT    Narrative    EXAM: XR CHEST 2 VIEWS  LOCATION: [facility]  DATE: 6/2/2025    INDICATION: chest pain  COMPARISON: 5/30/2025      Impression    IMPRESSION: Heart size at upper limits normal. Mild prominence of  the central interstitium. No airspace consolidation. No pneumothorax or pleural effusion.

## 2025-06-02 NOTE — DISCHARGE SUMMARY
"[facility]  Discharge Summary - Medicine & Pediatrics       Date of Admission:  6/1/2025  Date of Discharge:  6/2/2025   Discharging Provider: Dr Tricia Simmons   Discharge Service: Hospitalist Service    Discharge Diagnoses   Cirrhosis with ascites requiring frequent paracentesis   Chest pain 2/2 to ascites, resolved with paracentesis     Clinically Significant Risk Factors     # DMII: A1C = 6.7 % (Ref range: <5.7 %) within past 6 months    # Morbid Obesity: Estimated body mass index is 48.57 kg/m  as calculated from the following:    Height as of this encounter: 1.651 m (5' 5\").    Weight as of this encounter: 132.4 kg (291 lb 14.2 oz).       Follow-ups Needed After Discharge   Continue with outpatient paracenteses   Continue follow up with GI as scheduled     Discharge Disposition   Discharged to home  Condition at discharge: Stable    Hospital Course   Warren Jaramillo is a 44 year old male admitted briefly from 6/1/2025 - 6/2/2025. 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 Admitted for abdominal distention and chest pain in the setting of missing outpatient paracentesis. Symptoms completely resolved with paracentesis during which 6.1 L where pulled.      Cirrhosis with ascites, requiring frequent paracentesis  History of Rojas's disease   Malik is a 44-year-old male with a history of cirrhosis and Rojas's disease with recurrent ascites requiring frequent outpatient paracentesis. Patient presents to the ED with worsening abdominal distention after missing his outpatient paracentesis. Notably, he was recently admitted from 5/15-5/26/25 due to SBP and acute blood loss anemia.  He would have completed 7 days of Levaquin on 6/1 for management of SBP; otherwise has been on Bactrim for SBP prophylaxis. Symptoms significantly improved following paracentesis " with removal of 6.1 L/   - Discharge home   - Continue outpatient paracentesis   - Continue Bactrim for SBP prophylaxis   - Continue Spironolactone 50 mg (patient reported GI recommended this new dose) + Lasix 20 mg (held as previous discharge for RIVERA, now improved)   - Close follow up with PCP, recheck BMP at that time to ensure stable renal function   - Continue close follow up with GI      Chest pain - resolved   Patient complaining of chest pain on admission. Initial troponin 26 which is near patient's baseline from prior evaluations. However, delta trop up to 35 which met criteria for admission. ECG in the ED similar to prior without ST elevations. Symptoms completely resolved following paracentesis, suspect his was the underlying etiology.       Normocytic anemia   Concern for GI Bleed   See discharge summary from 5/26. Hemoglobin of 9.0 on admission, stable since discharge. No evidence or symptoms of acute bleed at this time. On famotidine and PPI.   - Continue PTA famotidine and PPI    - Continue to hold PTA apixaban (held at last hospital discharge, agree with continuing to hold at this time)   - Follow up with PCP, continue discussion regarding holding of DOAC   - follow up with GI      History of HFpEF  History of HFpEF.  Most recent echo with EF 60 to 65%. Trace edema  at admission. No signs of CHF exacerbation.  - Continue Spironolactone 50 mg (patient reported GI recommended this new dose) + Lasix 20 mg (held as previous discharge for RIVERA, now improved)     Type 2 diabetes  Most recent A1c 6.7. On PTA metformin and Jardiance.  -Continue PTA metformin and Jardiance    Hx of DVT  History of occlusive DVT diagnosed in 2024. Previously on apixaban but this was held during recent admission given concern for GI bleed. Will continue holding at this time, but recommend ongoing conversation with PCP and GI.   - Hold PTA apixaban    RIVERA   Notable RIVERA on previous admission, improved on this admission with  creatinine 1.36 on day of discharge. Lasix (HF) and lisinopril (HTN) held at last discharge. Will resume lasix but continue holding lisinopril given low normal blood pressures during this admission.   - Resume lasix   - continue holding lisinopril, recommending ongoing conversations regarding this with PCP      Chronic conditions:  Hypothyroidism: PTA levothyroxine  HTN: Hold PTA lisinopril   HLD: PTA rosuvastatin   COPD: Albuterol inhaler prn, pta singulair  Mood disorder: Continue PTA fluoxetine and olanzapine  Sleep: PTA trazodone     Consultations This Hospital Stay   CARE MANAGEMENT / SOCIAL WORK IP CONSULT  INTERVENTIONAL RADIOLOGY ADULT/PEDS IP CONSULT  CARE MANAGEMENT / SOCIAL WORK IP CONSULT    Code Status   Prior       The patient was discussed with Dr. Rubi Simmons MD  Family Medicine Residency Service  [facility] EXTENDED RECOVERY AND SHORT STAY  [address] [place] [id number]  Phone: [phone]  Fax: [phone]  ______________________________________________________________________    Physical Exam   Vital Signs:                    Weight: 291 lbs 14.22 oz  GEN: Pleasant male, in no acute distress.   HEENT: Normocephalic, atraumatic.  PULM: No respiratory distress. Clear to ausculation bilaterally. No wheezes or crackles.   CV: Regular rate and rhythm. Normal S1, S2. No rubs, murmurs, or gallops.   ABDOMEN: Mildly distended but soft abdomen following paracentesis. No pain with palpation. Normoactive bowel sounds.   EXTREMITIES:  Trace bilateral lower extremity edema.    PSYCH: Mildly anxious, some pressured speech. Appropriate affect.        Primary Care Physician   Mary Kelly    Discharge Orders      US Paracentesis without Albumin     Reason for your hospital stay    Admitted for chest pain, felt to be ascites due to missed paracentesis. Improved after paracentesis in the hospital. Continue your preventative antibiotic (bactrim) and follow up  with GI for ongoing outpatient paracentesis. Start spironolactone at lower dose per your GI doctor. Also resume lasix at lower dose.     Follow Up    Follow up wit GI. Continue outpatient paracentesis as scheduled.     Diet    Follow this diet upon discharge: Current Diet:Orders Placed This Encounter      Fluid restriction 2000 ML FLUID      2 Gram Sodium Diet     Hospital Follow-up with Existing Primary Care Provider (PCP)            Significant Results and Procedures   Most Recent 3 CBC's:  Recent Labs   Lab Test 06/02/25  0254 06/01/25 2132 05/30/25  1605   WBC 14.6* 15.4* 14.6*   HGB 9.0* 9.0* 9.1*   MCV 90 88 86    453* 496*     Most Recent 3 BMP's:  Recent Labs   Lab Test 06/02/25  0806 06/02/25 0254 06/02/25  0203 06/01/25 2132 05/30/25  1605   NA  --  140  --  139 140   POTASSIUM  --  4.4  --  4.7 4.5   CHLORIDE  --  109*  --  109* 110*   CO2  --  18*  --  18* 17*   BUN  --  24.0*  --  23.8* 25.0*   CR  --  1.36*  --  1.36* 1.64*   ANIONGAP  --  13  --  12 13   WES  --  9.0  --  9.1 9.1   * 119* 131* 123* 99       Discharge Medications   Discharge Medication List as of 6/2/2025 11:13 AM        CONTINUE these medications which have CHANGED    Details   furosemide (LASIX) 40 MG tablet Take 0.5 tablets (20 mg) by mouth daily., Disp-30 tablet, R-0, E-Prescribe      spironolactone (ALDACTONE) 100 MG tablet Take 0.5 tablets (50 mg) by mouth daily., Disp-30 tablet, R-0, E-Prescribe           CONTINUE these medications which have NOT CHANGED    Details   albuterol (PROAIR HFA/PROVENTIL HFA/VENTOLIN HFA) 108 (90 Base) MCG/ACT inhaler Inhale 1-2 puffs into the lungs every 6 hours as needed for shortness of breath, wheezing or cough, Disp-18 g, R-0, Local PrintPharmacy may dispense brand covered by insurance (Proair, or proventil or ventolin or generic albuterol inhaler)      albuterol (PROVENTIL) (2.5 MG/3ML) 0.083% neb solution Take 2.5 mg by nebulization 3 times daily as needed for shortness of  breath, wheezing or cough, Historical      aloe vera GEL Apply 1 g topically every hour as needed for skin care Per bottle directions, Historical      bacitracin 500 UNIT/GM OINT Apply topically 3 times daily as needed for wound careHistorical      Benzocaine (SOLARCAINE ALOE VERA EX) Externally apply topically daily as needed., Historical      benzonatate (TESSALON) 200 MG capsule Take 1 capsule (200 mg) by mouth 3 times daily as needed for cough., Disp-50 capsule, R-0, E-Prescribe      Calamine external lotion Apply topically as needed for itchingHistorical      carbamide peroxide (DEBROX) 6.5 % otic solution Place 5 drops into both ears daily as needed for other (ear wax)., Historical      clotrimazole (LOTRIMIN) 1 % external cream Apply topically 2 times daily as needed (skin irritation)Historical      Continuous Glucose Sensor (FREESTYLE MEGHANN 3 PLUS SENSOR) MISC USE TO READ BLOOD SUGAR TWICE DAILY AND AS NEEDED. CHANGE SENSOR EVERY 15 DAYS, Historical      dextromethorphan-guaiFENesin (MUCINEX DM)  MG 12 hr tablet Take 1 tablet by mouth 2 times daily as needed., Historical      diclofenac (VOLTAREN) 1 % topical gel Apply 2 g topically daily as needed for moderate pain To joints/back, Historical      EPINEPHrine (ANY BX GENERIC EQUIV) 0.3 MG/0.3ML injection 2-pack Inject 0.3 mg into the muscle as needed for anaphylaxis. May repeat one time in 5-15 minutes if response to initial dose is inadequate., Historical      famotidine (PEPCID) 20 MG tablet Take 1 tablet (20 mg) by mouth 2 times daily, Disp-60 tablet, R-0, E-Prescribe      FLUoxetine 20 MG tablet Take 20 mg by mouth every morning., Historical      GAVILAX 17 GM/SCOOP powder Take 17 g by mouth daily as needed for constipation., CHERYLE, Historical      hydrocortisone (CORTAID) 1 % external cream Apply topically daily as needed for itching.Historical      Hydrocortisone (PREPARATION H EX) Externally apply topically daily as needed (hemorrhoids).,  Historical      JARDIANCE 10 MG TABS tablet Take 10 mg by mouth every morning., CHERYLE, Historical      levothyroxine (SYNTHROID/LEVOTHROID) 25 MCG tablet Take 12.5 mcg by mouth every morning (before breakfast)., Historical      lisinopril (ZESTRIL) 10 MG tablet Take 10 mg by mouth every morning., HistoricalPaused since today until manually resumed      loperamide (IMODIUM) 2 MG capsule Take 4 mg by mouth 4 times daily as needed for diarrhea., Historical      loratadine (CLARITIN) 10 MG tablet Take 10 mg by mouth daily as needed for allergies., Historical      magnesium hydroxide (MILK OF MAGNESIA) 400 MG/5ML suspension Take 30 mLs by mouth daily as needed for heartburn., Historical      metFORMIN (GLUCOPHAGE) 1000 MG tablet Take 1,000 mg by mouth 2 times daily (with meals), Historical      methocarbamol (ROBAXIN) 500 MG tablet Take 1 tablet (500 mg) by mouth 4 times daily as needed for muscle spasms., Disp-40 tablet, R-0, E-Prescribe      montelukast (SINGULAIR) 10 MG tablet Take 10 mg by mouth every morning., Historical      nystatin (MYCOSTATIN) 095566 UNIT/GM external powder Apply topically 2 times daily for 14 days.Disp-30 g, R-0Local Print      OLANZapine (ZYPREXA) 10 MG tablet Take 10 mg by mouth At Bedtime, Historical      omeprazole (PRILOSEC) 40 MG DR capsule Take 40 mg by mouth every morning., Historical      ondansetron (ZOFRAN ODT) 4 MG ODT tab Take 1 tablet (4 mg) by mouth every 8 hours as needed for nausea., Disp-20 tablet, R-0, E-Prescribe      pramoxine-calamine (AVEENO) 1-8 % LOTN Apply topically daily as needed for itching., Historical      rosuvastatin (CRESTOR) 10 MG tablet Take 10 mg by mouth At Bedtime, Historical      sulfamethoxazole-trimethoprim (BACTRIM DS) 800-160 MG tablet Take 1 tablet by mouth daily., Historical      traZODone (DESYREL) 100 MG tablet Take 100 mg by mouth at bedtime., Historical      TRELEGY ELLIPTA 100-62.5-25 MCG/ACT oral inhaler Inhale 1 puff into the lungs every  morning., CHERYLE, Historical      Urea 40 % CREA Apply topically daily as needed for dry skin.Historical      Vitamins A & D (VITAMIN A & D) OINT Externally apply topically daily as needed (general skin health)., Historical      witch hazel-glycerin (TUCKS) pad Apply topically as needed for hemorrhoids.Historical           Allergies   Allergies   Allergen Reactions    Apricot Flavoring Agent (Non-Screening) Anaphylaxis    Banana Anaphylaxis     Throat swelling      Bees Anaphylaxis     Has an Epi pen    Wasp Venom Protein Shortness Of Breath     Other reaction(s): Respiratory Distress  Has an Epi pen        Methylphenidate Itching     Other reaction(s): Nightmares    Prunus      Other reaction(s): *Unknown

## 2025-06-02 NOTE — ED PROVIDER NOTES
Emergency Department Encounter         FINAL IMPRESSION:  Abdominal distention, chest pain          ED COURSE AND MEDICAL DECISION MAKING       ED Course as of 06/02/25 0043   Sun Jun 01, 2025 2123 Patient is a 44-year-old who is well-known to this emergency room with multimedical problems history of cirrhosis as well as heart disease and lung disease smokes 1.5 packs of cigarettes a day, here with bloated sensation abdominal distention and mild intermittent shortness of breath.  Reports that he missed his prior paracentesis because of a meeting.  Here now with worsening abdominal distention.  No actual abdominal pain.  No dysuria or black or bloody stools.  No vomiting.  Looks well.  His baseline.  Stable vitals.  Abdomen is generally distended and not painful.  Will draw basic labs today, as well as place outpatient order for paracentesis   Mon Jun 02, 2025   0024 Spoke with Dr. Sabine Taylor PV, regarding admission.         Medical Decision Making      Admit.    MIPS (CTPE, Dental pain, Khan, Sinusitis, Asthma/COPD, Head Trauma): Not Applicable    SEPSIS: None        At the conclusion of the encounter I discussed the results of all the tests and the disposition. The questions were answered. The patient or family acknowledged understanding and was agreeable with the care plan.        MEDICATIONS GIVEN IN THE EMERGENCY DEPARTMENT:  Medications - No data to display    NEW PRESCRIPTIONS STARTED AT TODAY'S ED VISIT:  New Prescriptions    No medications on file       HPI     Patient information obtained from: Patient    Use of : N/A    Warren Jaramillo is a 44 year old male with a pertinent history of DVT, diabetes, heart failure with preserved ejection fraction, upper GI bleed, chronic kidney disease, Rojas's disease, cirrhosis who presents to this ED via ambulance for evaluation of flank pain and abdominal distension.    Patient reports 30 lbs weight gain within the last week with increased  abdominal distension. He was supposed to get tapped on 05/28/2025 (5 days ago), but missed it due a meeting with his legal guardian and . Patient also reports sudden onset of flank pain at 1400 today with associated urge to urinate after urinating. He confirms shortness of breath due to the abdominal distension and smoking a pack a day. Patient denies issues with bowel movement and abdominal pain.      MEDICAL HISTORY     Past Medical History:   Diagnosis Date    COPD exacerbation (H) 12/02/2024    DM2 (diabetes mellitus, type 2) (H) 04/28/2020    HTN (hypertension) 07/30/2012    Sleep apnea     Thyroid nodule 07/31/2019    Rojas's disease (H)        Past Surgical History:   Procedure Laterality Date    COLONOSCOPY      ESOPHAGOSCOPY, GASTROSCOPY, DUODENOSCOPY (EGD), COMBINED N/A 7/21/2023    Procedure: ESOPHAGOGASTRODUODENOSCOPY WITH GASTRIC AND ESOPHAGEAL BIOPSIES;  Surgeon: Filiberto Aragon MD;  Location: [facility] OR    ESOPHAGOSCOPY, GASTROSCOPY, DUODENOSCOPY (EGD), COMBINED N/A 4/4/2025    Procedure: ESOPHAGOGASTRODUODENOSCOPY;  Surgeon: Ramesh Talbot MD;  Location: [facility] OR    TOOTH EXTRACTION         Social History     Tobacco Use    Smoking status: Every Day     Current packs/day: 1.00     Average packs/day: 1 pack/day for 21.4 years (21.4 ttl pk-yrs)     Types: Cigarettes     Start date: 1/1/2004    Smokeless tobacco: Never   Vaping Use    Vaping status: Never Used   Substance Use Topics    Alcohol use: Not Currently     Comment: Last 8/7/2024       albuterol (PROAIR HFA/PROVENTIL HFA/VENTOLIN HFA) 108 (90 Base) MCG/ACT inhaler  albuterol (PROVENTIL) (2.5 MG/3ML) 0.083% neb solution  aloe vera GEL  bacitracin 500 UNIT/GM OINT  Benzocaine (SOLARCAINE ALOE VERA EX)  benzonatate (TESSALON) 200 MG capsule  Calamine external lotion  carbamide peroxide (DEBROX) 6.5 % otic solution  clotrimazole (LOTRIMIN) 1 % external cream  Continuous Glucose Sensor (FREESTYLE MEGHANN 3 PLUS  SENSOR) MISC  dextromethorphan-guaiFENesin (MUCINEX DM)  MG 12 hr tablet  diclofenac (VOLTAREN) 1 % topical gel  EPINEPHrine (ANY BX GENERIC EQUIV) 0.3 MG/0.3ML injection 2-pack  famotidine (PEPCID) 20 MG tablet  FLUoxetine 20 MG tablet  [Paused] furosemide (LASIX) 40 MG tablet  GAVILAX 17 GM/SCOOP powder  hydrocortisone (CORTAID) 1 % external cream  Hydrocortisone (PREPARATION H EX)  JARDIANCE 10 MG TABS tablet  levothyroxine (SYNTHROID/LEVOTHROID) 25 MCG tablet  [Paused] lisinopril (ZESTRIL) 10 MG tablet  loperamide (IMODIUM) 2 MG capsule  loratadine (CLARITIN) 10 MG tablet  magnesium hydroxide (MILK OF MAGNESIA) 400 MG/5ML suspension  metFORMIN (GLUCOPHAGE) 1000 MG tablet  methocarbamol (ROBAXIN) 500 MG tablet  montelukast (SINGULAIR) 10 MG tablet  nystatin (MYCOSTATIN) 803547 UNIT/GM external powder  OLANZapine (ZYPREXA) 10 MG tablet  omeprazole (PRILOSEC) 40 MG DR capsule  ondansetron (ZOFRAN ODT) 4 MG ODT tab  pramoxine-calamine (AVEENO) 1-8 % LOTN  rosuvastatin (CRESTOR) 10 MG tablet  spironolactone (ALDACTONE) 100 MG tablet  sulfamethoxazole-trimethoprim (BACTRIM DS) 800-160 MG tablet  traZODone (DESYREL) 100 MG tablet  TRELEGY ELLIPTA 100-62.5-25 MCG/ACT oral inhaler  Urea 40 % CREA  Vitamins A & D (VITAMIN A & D) OINT  witch hazel-glycerin (TUCKS) pad            PHYSICAL EXAM     /56   Pulse 88   Temp 98.7  F (37.1  C) (Oral)   Resp 20   Wt 131.8 kg (290 lb 9.6 oz)   SpO2 99%   BMI 48.36 kg/m        PHYSICAL EXAM:     General: Patient appears well, nontoxic, comfortable  HEENT: Moist mucous membranes,  No head trauma.    Cardiovascular: Normal rate, normal rhythm, no extremity edema.  No appreciable murmur.  Respiratory: No signs of respiratory distress, lungs are clear to auscultation bilaterally with no wheezes rhonchi or rales.  Abdominal: Soft, nontender, generalized distention, no palpable masses, no guarding, no rebound  Musculoskeletal: Full range of motion of joints, no  deformities appreciated.  Neurological: Alert and oriented, grossly neurologically intact.  Psychological: Normal affect and mood.  Integument: No rashes appreciated          RESULTS       Labs Ordered and Resulted from Time of ED Arrival to Time of ED Departure - No data to display    No orders to display         PROCEDURES:  Procedures:  Procedures       IGiovani am serving as a scribe to document services personally performed by Anurag Richardson DO, based on my observations and the provider's statements to me.  I, Anurag Richardson DO, attest that Giovani Yoon is acting in a scribe capacity, has observed my performance of the services and has documented them in accordance with my direction.    Anurag Richardson DO  Emergency Medicine  [facility] EMERGENCY DEPARTMENT     Anurag Richardson DO  06/02/25 0054

## 2025-06-02 NOTE — ED TRIAGE NOTES
Arrival ems from group home, pt states 30lb weight gain over last week, increased abd distension. Pt states needs paracentesis, developed sharp bilateral flank pain at 1400 today.     Triage Assessment (Adult)       Row Name 06/01/25 2051          Triage Assessment    Airway WDL WDL        Respiratory WDL    Respiratory WDL X;rhythm/pattern     Rhythm/Pattern, Respiratory shortness of breath        Cardiac WDL    Cardiac WDL WDL

## 2025-06-02 NOTE — PLAN OF CARE
Problem: Adult Inpatient Plan of Care  Goal: Plan of Care Review  Description: The Plan of Care Review/Shift note should be completed every shift.  The Outcome Evaluation is a brief statement about your assessment that the patient is improving, declining, or no change.  This information will be displayed automatically on your shift  note.  Outcome: Adequate for Care Transition  Flowsheets (Taken 6/2/2025 1104)  Plan of Care Reviewed With: patient   Goal Outcome Evaluation:      Plan of Care Reviewed With: patient        Patient discharged to group home at 1120 via [facility].  Accompanied by self and staff.  Discharge instructions were reviewed with patient, opportunity offered to ask questions.    Prescriptions faxed to pharmacy.  Access discontinued: Yes  Care plan and education discontinued: Yes  Home meds retrieved from pharmacy: N/A  Belongings were sent home with patient/family:  Cell phone/electronics:  , Clothing: Shirt(s):  , Pants:  , Underclothes:  , and Outerwear:  , and Contacts/Glasses:  . Pt received their belongings that had been stored with security also.

## 2025-06-02 NOTE — ED NOTES
Bed: JNED-21  Expected date: 6/1/25  Expected time: 8:41 PM  Means of arrival: Ambulance  Comments:  Hakan 45 yo M 30 lb weight gain x 1 week- hx of liver failure

## 2025-06-02 NOTE — PROGRESS NOTES
Patient admitted to room 04 at approximately 0200 via wheel chair from emergency room.  Reason for Admission: ascites  Report received from: Tasia Malik RN   Patient was accompanied by Self.  Patient ambulated/transferred:  independently. self.  Patient is alert and orientated x 3.  Outpatient Observation education provided to: patient  Safety risks were identified during admission:  none.   Yellow risk/fall band applied:  No  Home meds sent home: No  Home meds sent to pharmacy:No IF YES add 1/2 sheet laminated page reminder to chart/clipboard   Detailed Belongings: clothing, cell phone.

## 2025-06-02 NOTE — PROGRESS NOTES
Patient taken off CPAP after Patient informed RT was on Bipap at home. Patient placed on BIPAP home settings 20/16, Rate 10. Tolerating well at this time.         Gloria Thorpe, RT

## 2025-06-02 NOTE — CONSULTS
Care Management Initial Consult    General Information  Assessment completed with: Patient, Other,    Type of CM/SW Visit: Initial Assessment    Primary Care Provider verified and updated as needed: Yes   Readmission within the last 30 days: current reason for admission unrelated to previous admission         Advance Care Planning: Advance Care Planning Reviewed: present on chart          Communication Assessment  Patient's communication style: spoken language (English or Bilingual)    Hearing Difficulty or Deaf: yes        Cognitive  Cognitive/Neuro/Behavioral: WDL                      Living Environment:   People in home: other (see comments)     Current living Arrangements: group home      Able to return to prior arrangements: yes       Family/Social Support:  Care provided by: self, other (see comments)  Provides care for: no one, unable/limited ability to care for self  Marital Status: Single  Support system: Guardian          Description of Support System: Supportive    Support Assessment: Adequate family and caregiver support    Current Resources:   Patient receiving home care services: No        Community Resources: Group Home  Equipment currently used at home: glucometer, cane, straight  Supplies currently used at home: None    Employment/Financial:  Employment Status: disabled        Financial Concerns: none   Referral to Financial Worker: No       Does the patient's insurance plan have a 3 day qualifying hospital stay waiver?  No    Lifestyle & Psychosocial Needs:  Social Drivers of Health     Food Insecurity: Low Risk  (6/2/2025)    Food Insecurity     Within the past 12 months, did you worry that your food would run out before you got money to buy more?: No     Within the past 12 months, did the food you bought just not last and you didn t have money to get more?: No   Depression: Not at risk (2/11/2025)    PHQ-2     PHQ-2 Score: 0   Housing Stability: Low Risk  (6/2/2025)    Housing Stability     Do  you have housing? : Yes     Are you worried about losing your housing?: No   Tobacco Use: High Risk (5/28/2025)    Patient History     Smoking Tobacco Use: Every Day     Smokeless Tobacco Use: Never     Passive Exposure: Not on file   Financial Resource Strain: Low Risk  (6/2/2025)    Financial Resource Strain     Within the past 12 months, have you or your family members you live with been unable to get utilities (heat, electricity) when it was really needed?: No   Alcohol Use: Not At Risk (9/22/2022)    Received from [facility]    AUDIT-C     Frequency of Alcohol Consumption: Never     Average Number of Drinks: Not on file     Frequency of Binge Drinking: Not on file   Transportation Needs: Low Risk  (6/2/2025)    Transportation Needs     Within the past 12 months, has lack of transportation kept you from medical appointments, getting your medicines, non-medical meetings or appointments, work, or from getting things that you need?: No   Physical Activity: Not on file   Interpersonal Safety: Low Risk  (5/16/2025)    Interpersonal Safety     Do you feel physically and emotionally safe where you currently live?: Yes     Within the past 12 months, have you been hit, slapped, kicked or otherwise physically hurt by someone?: No     Within the past 12 months, have you been humiliated or emotionally abused in other ways by your partner or ex-partner?: No   Stress: Not on file   Social Connections: Unknown (5/1/2023)    Received from [facility]    Social Connections     Frequency of Communication with Friends and Family: Not on file   Health Literacy: Not on file       Functional Status:  Prior to admission patient needed assistance:   Dependent ADLs:: Independent  Dependent IADLs:: Cleaning, Cooking, Laundry, Shopping, Meal Preparation, Transportation, Medication Management  Assesssment of Functional Status: At functional baseline    Mental Health Status:  Mental Health  Status: No Current Concerns       Chemical Dependency Status:  Chemical Dependency Status: No Current Concerns             Values/Beliefs:  Spiritual, Cultural Beliefs, Buddhism Practices, Values that affect care: yes  Description of Beliefs that Will Affect Care: Anabaptist    Cultural/Buddhism Practices Patient Routinely Participates In: ceremony       Discussed  Partnership in Safe Discharge Planning  document with patient/family: No    Additional Information:  SW met with pt to introduce role of CM, complete  initial assessment, and to discuss needs at time of d/c. Pt comes from home; lives in group home, and noted to be independent at baseline. Pt has Legal guardian (Jesika [phone]) who is now aware of hospitalization; pt is medcally stable to return to group home (agreeable to pt taking cab).  SW was present in room with pt while talking to . Discharge summary sent with pt.  11:16 AM    Brenna Kjellberg, BSW LSW  6/2/2025

## 2025-06-03 ENCOUNTER — PATIENT OUTREACH (OUTPATIENT)
Dept: CARE COORDINATION | Facility: CLINIC | Age: 45
End: 2025-06-03
Payer: COMMERCIAL

## 2025-06-03 DIAGNOSIS — Z09 HOSPITAL DISCHARGE FOLLOW-UP: ICD-10-CM

## 2025-06-03 NOTE — PROGRESS NOTES
Connected Care Resource Center: [facility]    Background: Transitional Care Management program identified per system criteria and reviewed by [facility] team for possible outreach.    Assessment: Upon chart review, [facility] Team member will not proceed with patient outreach related to this episode of Transitional Care Management program due to reason below:    Patient has discharged to a Memory Care, Long-term Care, Assisted Living or Group Home where patient is receiving on-site support with their daily cares, including support with hospital follow up plan.    Plan: Transitional Care Management episode addressed appropriately per reason noted above.      Lissy Freeman  Community Health Worker  [facility]  [phone]      *Connected Care Resource Team does NOT follow patient ongoing. Referrals are identified based on internal discharge reports and the outreach is to ensure patient has an understanding of their discharge instructions.

## 2025-06-04 ENCOUNTER — TELEPHONE (OUTPATIENT)
Dept: PHARMACY | Facility: OTHER | Age: 45
End: 2025-06-04
Payer: COMMERCIAL

## 2025-06-04 NOTE — TELEPHONE ENCOUNTER
MTM referral from: Transitions of Care (recent hospital discharge, TCU discharge, or ED visit)    MTM referral outreach attempt #1 on June 4, 2025 at 11:11 AM      Outcome: Patient is not interested at this time because him or group home staff have no questions    Use   [facility] med adv (LAURA 6/2)    for the carrier/Plan on the flowsheet          ELVIA Arnold

## 2025-06-05 ENCOUNTER — LAB (OUTPATIENT)
Dept: LAB | Facility: HOSPITAL | Age: 45
End: 2025-06-05
Payer: COMMERCIAL

## 2025-06-05 DIAGNOSIS — N17.9 ACUTE KIDNEY FAILURE, UNSPECIFIED: Primary | ICD-10-CM

## 2025-06-05 DIAGNOSIS — E03.9 HYPOTHYROIDISM: ICD-10-CM

## 2025-06-05 LAB
ANION GAP SERPL CALCULATED.3IONS-SCNC: 12 MMOL/L (ref 7–15)
BUN SERPL-MCNC: 28.9 MG/DL (ref 6–20)
CALCIUM SERPL-MCNC: 9.5 MG/DL (ref 8.8–10.4)
CHLORIDE SERPL-SCNC: 104 MMOL/L (ref 98–107)
CREAT SERPL-MCNC: 1.27 MG/DL (ref 0.67–1.17)
EGFRCR SERPLBLD CKD-EPI 2021: 71 ML/MIN/1.73M2
ERYTHROCYTE [DISTWIDTH] IN BLOOD BY AUTOMATED COUNT: 14.9 % (ref 10–15)
GLUCOSE SERPL-MCNC: 98 MG/DL (ref 70–99)
HCO3 SERPL-SCNC: 23 MMOL/L (ref 22–29)
HCT VFR BLD AUTO: 31.6 % (ref 40–53)
HGB BLD-MCNC: 10.3 G/DL (ref 13.3–17.7)
MCH RBC QN AUTO: 27.8 PG (ref 26.5–33)
MCHC RBC AUTO-ENTMCNC: 32.6 G/DL (ref 31.5–36.5)
MCV RBC AUTO: 85 FL (ref 78–100)
PLATELET # BLD AUTO: 451 10E3/UL (ref 150–450)
POTASSIUM SERPL-SCNC: 4.4 MMOL/L (ref 3.4–5.3)
RBC # BLD AUTO: 3.7 10E6/UL (ref 4.4–5.9)
SODIUM SERPL-SCNC: 139 MMOL/L (ref 135–145)
T4 FREE SERPL-MCNC: 0.94 NG/DL (ref 0.9–1.7)
TSH SERPL DL<=0.005 MIU/L-ACNC: 11.37 UIU/ML (ref 0.3–4.2)
WBC # BLD AUTO: 14.8 10E3/UL (ref 4–11)

## 2025-06-05 PROCEDURE — 36415 COLL VENOUS BLD VENIPUNCTURE: CPT

## 2025-06-05 PROCEDURE — 80048 BASIC METABOLIC PNL TOTAL CA: CPT

## 2025-06-05 PROCEDURE — 84443 ASSAY THYROID STIM HORMONE: CPT

## 2025-06-05 PROCEDURE — 85027 COMPLETE CBC AUTOMATED: CPT

## 2025-06-05 PROCEDURE — 84439 ASSAY OF FREE THYROXINE: CPT

## 2025-06-07 ENCOUNTER — HOSPITAL ENCOUNTER (EMERGENCY)
Facility: HOSPITAL | Age: 45
Discharge: HOME | End: 2025-06-07
Attending: EMERGENCY MEDICINE | Admitting: EMERGENCY MEDICINE
Payer: COMMERCIAL

## 2025-06-07 ENCOUNTER — APPOINTMENT (OUTPATIENT)
Dept: CT IMAGING | Facility: HOSPITAL | Age: 45
End: 2025-06-07
Attending: EMERGENCY MEDICINE
Payer: COMMERCIAL

## 2025-06-07 VITALS
RESPIRATION RATE: 20 BRPM | TEMPERATURE: 97 F | DIASTOLIC BLOOD PRESSURE: 57 MMHG | HEART RATE: 84 BPM | WEIGHT: 290 LBS | OXYGEN SATURATION: 97 % | SYSTOLIC BLOOD PRESSURE: 131 MMHG | BODY MASS INDEX: 48.26 KG/M2

## 2025-06-07 DIAGNOSIS — G89.18 POSTOPERATIVE PAIN: ICD-10-CM

## 2025-06-07 LAB
ANION GAP SERPL CALCULATED.3IONS-SCNC: 13 MMOL/L (ref 7–15)
BASOPHILS # BLD AUTO: 0.1 10E3/UL (ref 0–0.2)
BASOPHILS NFR BLD AUTO: 1 %
BUN SERPL-MCNC: 20.1 MG/DL (ref 6–20)
CALCIUM SERPL-MCNC: 8.8 MG/DL (ref 8.8–10.4)
CHLORIDE SERPL-SCNC: 106 MMOL/L (ref 98–107)
CREAT SERPL-MCNC: 1.27 MG/DL (ref 0.67–1.17)
EGFRCR SERPLBLD CKD-EPI 2021: 71 ML/MIN/1.73M2
EOSINOPHIL # BLD AUTO: 0.7 10E3/UL (ref 0–0.7)
EOSINOPHIL NFR BLD AUTO: 4 %
ERYTHROCYTE [DISTWIDTH] IN BLOOD BY AUTOMATED COUNT: 14.9 % (ref 10–15)
GLUCOSE SERPL-MCNC: 100 MG/DL (ref 70–99)
HCO3 SERPL-SCNC: 23 MMOL/L (ref 22–29)
HCT VFR BLD AUTO: 31.5 % (ref 40–53)
HGB BLD-MCNC: 9.9 G/DL (ref 13.3–17.7)
HOLD SPECIMEN: NORMAL
IMM GRANULOCYTES # BLD: 0.1 10E3/UL
IMM GRANULOCYTES NFR BLD: 1 %
LYMPHOCYTES # BLD AUTO: 2.2 10E3/UL (ref 0.8–5.3)
LYMPHOCYTES NFR BLD AUTO: 14 %
MCH RBC QN AUTO: 27.3 PG (ref 26.5–33)
MCHC RBC AUTO-ENTMCNC: 31.4 G/DL (ref 31.5–36.5)
MCV RBC AUTO: 87 FL (ref 78–100)
MONOCYTES # BLD AUTO: 1.5 10E3/UL (ref 0–1.3)
MONOCYTES NFR BLD AUTO: 9 %
NEUTROPHILS # BLD AUTO: 11.1 10E3/UL (ref 1.6–8.3)
NEUTROPHILS NFR BLD AUTO: 71 %
NRBC # BLD AUTO: 0 10E3/UL
NRBC BLD AUTO-RTO: 0 /100
PLATELET # BLD AUTO: 402 10E3/UL (ref 150–450)
POTASSIUM SERPL-SCNC: 4.4 MMOL/L (ref 3.4–5.3)
RBC # BLD AUTO: 3.62 10E6/UL (ref 4.4–5.9)
SODIUM SERPL-SCNC: 142 MMOL/L (ref 135–145)
WBC # BLD AUTO: 15.6 10E3/UL (ref 4–11)

## 2025-06-07 PROCEDURE — 80048 BASIC METABOLIC PNL TOTAL CA: CPT | Performed by: EMERGENCY MEDICINE

## 2025-06-07 PROCEDURE — 36415 COLL VENOUS BLD VENIPUNCTURE: CPT | Performed by: EMERGENCY MEDICINE

## 2025-06-07 PROCEDURE — 250N000011 HC RX IP 250 OP 636: Performed by: EMERGENCY MEDICINE

## 2025-06-07 PROCEDURE — 99285 EMERGENCY DEPT VISIT HI MDM: CPT | Mod: 25

## 2025-06-07 PROCEDURE — 70491 CT SOFT TISSUE NECK W/DYE: CPT

## 2025-06-07 PROCEDURE — 96374 THER/PROPH/DIAG INJ IV PUSH: CPT | Mod: 59

## 2025-06-07 PROCEDURE — 85025 COMPLETE CBC W/AUTO DIFF WBC: CPT | Performed by: EMERGENCY MEDICINE

## 2025-06-07 PROCEDURE — 250N000011 HC RX IP 250 OP 636: Mod: JZ | Performed by: EMERGENCY MEDICINE

## 2025-06-07 RX ORDER — KETOROLAC TROMETHAMINE 15 MG/ML
15 INJECTION, SOLUTION INTRAMUSCULAR; INTRAVENOUS ONCE
Status: COMPLETED | OUTPATIENT
Start: 2025-06-07 | End: 2025-06-07

## 2025-06-07 RX ORDER — IOPAMIDOL 755 MG/ML
90 INJECTION, SOLUTION INTRAVASCULAR ONCE
Status: COMPLETED | OUTPATIENT
Start: 2025-06-07 | End: 2025-06-07

## 2025-06-07 RX ADMIN — KETOROLAC TROMETHAMINE 15 MG: 15 INJECTION, SOLUTION INTRAMUSCULAR; INTRAVENOUS at 21:07

## 2025-06-07 RX ADMIN — IOPAMIDOL 90 ML: 755 INJECTION, SOLUTION INTRAVENOUS at 20:11

## 2025-06-07 ASSESSMENT — ACTIVITIES OF DAILY LIVING (ADL)
ADLS_ACUITY_SCORE: 56

## 2025-06-07 NOTE — ED TRIAGE NOTES
The pt presents for evaluation of neck pain, R ear pain, chills, and pain across his abdomen when he coughs. He had a liver biopsy yesterday where they inserted into his neck. He is concerned his symptoms today are related to yesterday's procedure.

## 2025-06-07 NOTE — ED PROVIDER NOTES
"  Emergency Department Encounter     Evaluation Date & Time:   No admission date for patient encounter.    CHIEF COMPLAINT:  Otalgia (/) and Chills      Triage Note:       ED COURSE & MEDICAL DECISION MAKING:     Pt is s/p transjugular liver biopsy yesterday for unknown reasons. Here for pain to right side of neck where puncture was.  Feels like there's swelling into that side, although nothing visible.  Reports swallowing sensation, but exam is rather reassuring.  Nevertheless, complex pt with frequent ED visits.  Will get labs, CT soft tissue neck to rule out expanding mass/hematoma. Anticipate discharge.  Abdominal wall pain, but nothing to suggest SBP and pt has no clinical signs of serious illness/infection.  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  He has emergency care plan updated this month in May that shows he almost always \"rules in\" for SBP based on cell count, but cultures have NOT grown any bacteria in past 11 of 11 diagnostic paracentesis.    ED Course as of 06/07/25 2120   Sat Jun 07, 2025 1836 I introduced myself to the patient, obtained patient history, performed a physical exam, and discussed plan for ED workup including potential diagnostic laboratory/imaging studies and interventions.    1957 WBC and Hgb baseline for pt.   2047 CT neck showing no hematoma, only expected changes from recent puncture.  Will update pt, anticipatory guidance, follow up.   2055 Reevaluated and updated patient. We discussed plan for discharge as well as supportive cares at home and reasons for return to the ED including new or worsening symptoms. All questions and concerns addressed. Patient to be discharged by RN. They are agreeable and comfortable with plan.    Instructed pt on ibuprofen prn, avoiding tylenol with chronic liver disease.         Medical Decision Making  Care impacted by chronic liver disease, Diabetes, " Hyperlipidemia, and Hypertension  Discharge. No recommendations on prescription strength medication(s). See documentation for any additional details.    MIPS (CTPE, Dental pain, Khan, Sinusitis, Asthma/COPD, Head Trauma): Not Applicable    SEPSIS: None          MEDICATIONS GIVEN IN THE EMERGENCY DEPARTMENT:  Medications   iopamidol (ISOVUE-370) solution 90 mL (90 mLs Intravenous $Given 6/7/25 2011)   ketorolac (TORADOL) injection 15 mg (15 mg Intravenous $Given 6/7/25 2107)       NEW PRESCRIPTIONS STARTED AT TODAY'S ED VISIT:  New Prescriptions    No medications on file       HPI   HPI     Warren Jaramillo is a 44 year old male with a pertinent history of NSTEMI, portal hypertension, TBI, DVT, liver cirrhosis with ascites, congestive heart failure, Rojas's disease, DMII, hypertension, COPD, and tobacco abuse who presents to this ED via EMS for evaluation of neck pain.    Patient underwent a trans-jugular liver biopsy yesterday and now reports neck pain worst at the entry site. He also reports pharyngitis, left ear pain, rib pain, subjective fever, cough, and chills. He hears a vibrating sound in his left ear and notes the hearing in that ear is muffled. His rib pain is exerted with his cough. Denies being on anti-coagulants.     REVIEW OF SYSTEMS:  See HPI      Medical History     Past Medical History:   Diagnosis Date    COPD exacerbation (H) 12/02/2024    DM2 (diabetes mellitus, type 2) (H) 04/28/2020    HTN (hypertension) 07/30/2012    Sleep apnea     Thyroid nodule 07/31/2019    Rojas's disease (H)        Past Surgical History:   Procedure Laterality Date    COLONOSCOPY      ESOPHAGOSCOPY, GASTROSCOPY, DUODENOSCOPY (EGD), COMBINED N/A 7/21/2023    Procedure: ESOPHAGOGASTRODUODENOSCOPY WITH GASTRIC AND ESOPHAGEAL BIOPSIES;  Surgeon: Filiberto Aragon MD;  Location: [facility] OR    ESOPHAGOSCOPY, GASTROSCOPY, DUODENOSCOPY (EGD), COMBINED N/A 4/4/2025    Procedure: ESOPHAGOGASTRODUODENOSCOPY;  Surgeon:  Ramesh Talbot MD;  Location: [facility] OR    TOOTH EXTRACTION         Family History   Problem Relation Age of Onset    Unknown/Adopted Father     Unknown/Adopted Maternal Grandmother     C.A.D. Maternal Grandfather     Diabetes Maternal Grandfather     Cerebrovascular Disease Maternal Grandfather     Unknown/Adopted Paternal Grandmother     Unknown/Adopted Paternal Grandfather     Unknown/Adopted Brother     Unknown/Adopted Sister        Social History     Tobacco Use    Smoking status: Every Day     Current packs/day: 1.00     Average packs/day: 1 pack/day for 21.4 years (21.4 ttl pk-yrs)     Types: Cigarettes     Start date: 1/1/2004    Smokeless tobacco: Never   Vaping Use    Vaping status: Never Used   Substance Use Topics    Alcohol use: Not Currently     Comment: Last 8/7/2024       albuterol (PROAIR HFA/PROVENTIL HFA/VENTOLIN HFA) 108 (90 Base) MCG/ACT inhaler  albuterol (PROVENTIL) (2.5 MG/3ML) 0.083% neb solution  aloe vera GEL  bacitracin 500 UNIT/GM OINT  Benzocaine (SOLARCAINE ALOE VERA EX)  benzonatate (TESSALON) 200 MG capsule  Calamine external lotion  carbamide peroxide (DEBROX) 6.5 % otic solution  clotrimazole (LOTRIMIN) 1 % external cream  Continuous Glucose Sensor (FREESTYLE MEGHANN 3 PLUS SENSOR) MISC  dextromethorphan-guaiFENesin (MUCINEX DM)  MG 12 hr tablet  diclofenac (VOLTAREN) 1 % topical gel  EPINEPHrine (ANY BX GENERIC EQUIV) 0.3 MG/0.3ML injection 2-pack  famotidine (PEPCID) 20 MG tablet  FLUoxetine 20 MG tablet  furosemide (LASIX) 40 MG tablet  GAVILAX 17 GM/SCOOP powder  hydrocortisone (CORTAID) 1 % external cream  Hydrocortisone (PREPARATION H EX)  JARDIANCE 10 MG TABS tablet  levothyroxine (SYNTHROID/LEVOTHROID) 25 MCG tablet  [Paused] lisinopril (ZESTRIL) 10 MG tablet  loperamide (IMODIUM) 2 MG capsule  loratadine (CLARITIN) 10 MG tablet  magnesium hydroxide (MILK OF MAGNESIA) 400 MG/5ML suspension  metFORMIN (GLUCOPHAGE) 1000 MG tablet  methocarbamol (ROBAXIN) 500  MG tablet  montelukast (SINGULAIR) 10 MG tablet  nystatin (MYCOSTATIN) 118516 UNIT/GM external powder  OLANZapine (ZYPREXA) 10 MG tablet  omeprazole (PRILOSEC) 40 MG DR capsule  ondansetron (ZOFRAN ODT) 4 MG ODT tab  pramoxine-calamine (AVEENO) 1-8 % LOTN  rosuvastatin (CRESTOR) 10 MG tablet  spironolactone (ALDACTONE) 100 MG tablet  sulfamethoxazole-trimethoprim (BACTRIM DS) 800-160 MG tablet  traZODone (DESYREL) 100 MG tablet  TRELEGY ELLIPTA 100-62.5-25 MCG/ACT oral inhaler  Urea 40 % CREA  Vitamins A & D (VITAMIN A & D) OINT  witch hazel-glycerin (TUCKS) pad        Physical Exam     Vitals:  /57   Pulse 84   Temp 97  F (36.1  C) (Temporal)   Resp 20   Wt 131.5 kg (290 lb)   SpO2 97%   BMI 48.26 kg/m      PHYSICAL EXAM:   Physical Exam  Vitals and nursing note reviewed.   Constitutional:       General: He is not in acute distress.     Appearance: Normal appearance.   HENT:      Head: Normocephalic and atraumatic.      Right Ear: Tympanic membrane and ear canal normal.      Nose: Nose normal.      Mouth/Throat:      Mouth: Mucous membranes are moist.      Pharynx: No pharyngeal swelling.      Comments: Posterior oropharynx without swelling.  Eyes:      Pupils: Pupils are equal, round, and reactive to light.   Neck:      Comments: Tiny closed puncture wound on the right side of the neck that is without surrounding warmth or erythema. No large palpable hematoma.   Cardiovascular:      Rate and Rhythm: Normal rate and regular rhythm.      Pulses: Normal pulses.           Radial pulses are 2+ on the right side and 2+ on the left side.        Dorsalis pedis pulses are 2+ on the right side and 2+ on the left side.   Pulmonary:      Effort: Pulmonary effort is normal. No respiratory distress.      Breath sounds: Normal breath sounds.   Abdominal:      Palpations: Abdomen is soft.      Tenderness: There is no abdominal tenderness.   Musculoskeletal:      Cervical back: Full passive range of motion without pain  and neck supple.      Comments: No calf tenderness or swelling b/l   Skin:     General: Skin is warm.      Findings: No rash.   Neurological:      General: No focal deficit present.      Mental Status: He is alert. Mental status is at baseline.      Comments: Fluent speech, no acute lateralizing deficits   Psychiatric:         Mood and Affect: Mood normal.         Behavior: Behavior normal.         Results     LAB:  All pertinent labs reviewed and interpreted  Labs Ordered and Resulted from Time of ED Arrival to Time of ED Departure   BASIC METABOLIC PANEL - Abnormal       Result Value    Sodium 142      Potassium 4.4      Chloride 106      Carbon Dioxide (CO2) 23      Anion Gap 13      Urea Nitrogen 20.1 (*)     Creatinine 1.27 (*)     GFR Estimate 71      Calcium 8.8      Glucose 100 (*)    CBC WITH PLATELETS AND DIFFERENTIAL - Abnormal    WBC Count 15.6 (*)     RBC Count 3.62 (*)     Hemoglobin 9.9 (*)     Hematocrit 31.5 (*)     MCV 87      MCH 27.3      MCHC 31.4 (*)     RDW 14.9      Platelet Count 402      % Neutrophils 71      % Lymphocytes 14      % Monocytes 9      % Eosinophils 4      % Basophils 1      % Immature Granulocytes 1      NRBCs per 100 WBC 0      Absolute Neutrophils 11.1 (*)     Absolute Lymphocytes 2.2      Absolute Monocytes 1.5 (*)     Absolute Eosinophils 0.7      Absolute Basophils 0.1      Absolute Immature Granulocytes 0.1      Absolute NRBCs 0.0         RADIOLOGY:  Soft tissue neck CT w contrast   Final Result   IMPRESSION:    1.  Minimal fat stranding and cutaneous soft tissue edema around the right IJ, likely related to recent procedure. No hematoma.   2.  No mass or cervical adenopathy.                      ECG:  none    PROCEDURES:  Procedures:  none      FINAL IMPRESSION:    ICD-10-CM    1. Postoperative pain  G89.18           0 minutes of critical care time      Ariana MATOS, am serving as a scribe to document services personally performed by Dr. Fortino Chery, based  on my observations and the provider's statements to me. I, Fortino Chery, DO attest that Ariana Walters is acting in a scribe capacity, has observed my performance of the services and has documented them in accordance with my direction.      Fortino Chery DO  Emergency Medicine  [facility] EMERGENCY DEPARTMENT  6/7/2025  6:19 PM          Fortino Chery MD  06/07/25 0246

## 2025-06-08 NOTE — DISCHARGE INSTRUCTIONS
Workup, including CT of neck and labs reassuring.  Tylenol is metabolized by the liver, so you should avoid this.  Ok to take ibuprofen 600mg 3 times a day with food.  Ibuprofen is safe for the liver, but can affect the kidneys and stomach, so take with food to help protect stomach.  Follow up with primary clinic.  Neck pain should be improving slowly over next week or so.

## 2025-06-10 ENCOUNTER — HOSPITAL ENCOUNTER (INPATIENT)
Facility: HOSPITAL | Age: 45
DRG: 432 | End: 2025-06-10
Attending: EMERGENCY MEDICINE | Admitting: FAMILY MEDICINE
Payer: COMMERCIAL

## 2025-06-10 ENCOUNTER — APPOINTMENT (OUTPATIENT)
Dept: ULTRASOUND IMAGING | Facility: HOSPITAL | Age: 45
DRG: 432 | End: 2025-06-10
Attending: EMERGENCY MEDICINE
Payer: COMMERCIAL

## 2025-06-10 ENCOUNTER — APPOINTMENT (OUTPATIENT)
Dept: CT IMAGING | Facility: HOSPITAL | Age: 45
DRG: 432 | End: 2025-06-10
Attending: EMERGENCY MEDICINE
Payer: COMMERCIAL

## 2025-06-10 DIAGNOSIS — B37.9 CANDIDA INFECTION: ICD-10-CM

## 2025-06-10 DIAGNOSIS — R18.8 CIRRHOSIS OF LIVER WITH ASCITES, UNSPECIFIED HEPATIC CIRRHOSIS TYPE (H): Primary | ICD-10-CM

## 2025-06-10 DIAGNOSIS — K65.2 SBP (SPONTANEOUS BACTERIAL PERITONITIS) (H): ICD-10-CM

## 2025-06-10 DIAGNOSIS — K74.60 CIRRHOSIS OF LIVER WITH ASCITES, UNSPECIFIED HEPATIC CIRRHOSIS TYPE (H): Primary | ICD-10-CM

## 2025-06-10 LAB
% LINING CELLS, BODY FLUID: 8 %
ALBUMIN SERPL BCG-MCNC: 3.8 G/DL (ref 3.5–5.2)
ALBUMIN UR-MCNC: NEGATIVE MG/DL
ALP SERPL-CCNC: 280 U/L (ref 40–150)
ALT SERPL W P-5'-P-CCNC: 32 U/L (ref 0–70)
ANION GAP SERPL CALCULATED.3IONS-SCNC: 13 MMOL/L (ref 7–15)
APPEARANCE FLD: ABNORMAL
APPEARANCE UR: CLEAR
AST SERPL W P-5'-P-CCNC: 18 U/L (ref 0–45)
BACTERIA #/AREA URNS HPF: ABNORMAL /HPF
BASOPHILS # BLD AUTO: 0.1 10E3/UL (ref 0–0.2)
BASOPHILS NFR BLD AUTO: 1 %
BILIRUB DIRECT SERPL-MCNC: 0.14 MG/DL (ref 0–0.3)
BILIRUB SERPL-MCNC: 0.3 MG/DL
BILIRUB UR QL STRIP: NEGATIVE
BUN SERPL-MCNC: 19.5 MG/DL (ref 6–20)
CALCIUM SERPL-MCNC: 9.1 MG/DL (ref 8.8–10.4)
CHLORIDE SERPL-SCNC: 103 MMOL/L (ref 98–107)
COLOR FLD: YELLOW
COLOR UR AUTO: ABNORMAL
CREAT SERPL-MCNC: 1.25 MG/DL (ref 0.67–1.17)
EGFRCR SERPLBLD CKD-EPI 2021: 73 ML/MIN/1.73M2
ELLIPTOCYTES BLD QL SMEAR: SLIGHT
EOSINOPHIL # BLD AUTO: 0.7 10E3/UL (ref 0–0.7)
EOSINOPHIL NFR BLD AUTO: 4 %
ERYTHROCYTE [DISTWIDTH] IN BLOOD BY AUTOMATED COUNT: 15.2 % (ref 10–15)
GLUCOSE SERPL-MCNC: 113 MG/DL (ref 70–99)
GLUCOSE UR STRIP-MCNC: >1000 MG/DL
HCO3 SERPL-SCNC: 23 MMOL/L (ref 22–29)
HCT VFR BLD AUTO: 31.8 % (ref 40–53)
HGB BLD-MCNC: 9.9 G/DL (ref 13.3–17.7)
HGB UR QL STRIP: NEGATIVE
HOLD SPECIMEN: NORMAL
IMM GRANULOCYTES # BLD: 0.1 10E3/UL
IMM GRANULOCYTES NFR BLD: 1 %
KETONES UR STRIP-MCNC: NEGATIVE MG/DL
LEUKOCYTE ESTERASE UR QL STRIP: NEGATIVE
LIPASE SERPL-CCNC: 28 U/L (ref 13–60)
LYMPHOCYTES # BLD AUTO: 2.1 10E3/UL (ref 0.8–5.3)
LYMPHOCYTES NFR BLD AUTO: 13 %
LYMPHOCYTES NFR FLD MANUAL: 17 %
MCH RBC QN AUTO: 26.9 PG (ref 26.5–33)
MCHC RBC AUTO-ENTMCNC: 31.1 G/DL (ref 31.5–36.5)
MCV RBC AUTO: 86 FL (ref 78–100)
MONOCYTES # BLD AUTO: 1.7 10E3/UL (ref 0–1.3)
MONOCYTES NFR BLD AUTO: 10 %
MONOS+MACROS NFR FLD MANUAL: 37 %
NEUTROPHILS # BLD AUTO: 11.8 10E3/UL (ref 1.6–8.3)
NEUTROPHILS # FLD: 759.2 /UL (ref ?–250)
NEUTROPHILS NFR BLD AUTO: 72 %
NEUTS BAND NFR FLD MANUAL: 38 %
NITRATE UR QL: NEGATIVE
NRBC # BLD AUTO: 0 10E3/UL
NRBC BLD AUTO-RTO: 0 /100
PH UR STRIP: 5.5 [PH] (ref 5–7)
PLAT MORPH BLD: ABNORMAL
PLATELET # BLD AUTO: 428 10E3/UL (ref 150–450)
POTASSIUM SERPL-SCNC: 4.2 MMOL/L (ref 3.4–5.3)
PROT SERPL-MCNC: 6.5 G/DL (ref 6.4–8.3)
RBC # BLD AUTO: 3.68 10E6/UL (ref 4.4–5.9)
RBC MORPH BLD: ABNORMAL
RBC URINE: 1 /HPF
SODIUM SERPL-SCNC: 139 MMOL/L (ref 135–145)
SP GR UR STRIP: 1.02 (ref 1–1.03)
SPECIMEN SOURCE FLD: ABNORMAL
SPERM #/AREA URNS HPF: PRESENT /HPF
SQUAMOUS EPITHELIAL: <1 /HPF
UROBILINOGEN UR STRIP-MCNC: NORMAL MG/DL
WBC # BLD AUTO: 16.4 10E3/UL (ref 4–11)
WBC # FLD AUTO: 1998 /UL
WBC URINE: 1 /HPF

## 2025-06-10 PROCEDURE — 88112 CYTOPATH CELL ENHANCE TECH: CPT | Mod: 26 | Performed by: PATHOLOGY

## 2025-06-10 PROCEDURE — 88341 IMHCHEM/IMCYTCHM EA ADD ANTB: CPT | Mod: TC | Performed by: PHYSICIAN ASSISTANT

## 2025-06-10 PROCEDURE — 49083 ABD PARACENTESIS W/IMAGING: CPT

## 2025-06-10 PROCEDURE — 84157 ASSAY OF PROTEIN OTHER: CPT | Performed by: EMERGENCY MEDICINE

## 2025-06-10 PROCEDURE — 85004 AUTOMATED DIFF WBC COUNT: CPT | Performed by: EMERGENCY MEDICINE

## 2025-06-10 PROCEDURE — 83690 ASSAY OF LIPASE: CPT | Performed by: EMERGENCY MEDICINE

## 2025-06-10 PROCEDURE — 89050 BODY FLUID CELL COUNT: CPT | Performed by: EMERGENCY MEDICINE

## 2025-06-10 PROCEDURE — 0W9G3ZZ DRAINAGE OF PERITONEAL CAVITY, PERCUTANEOUS APPROACH: ICD-10-PCS | Performed by: RADIOLOGY

## 2025-06-10 PROCEDURE — 36415 COLL VENOUS BLD VENIPUNCTURE: CPT | Performed by: EMERGENCY MEDICINE

## 2025-06-10 PROCEDURE — 87070 CULTURE OTHR SPECIMN AEROBIC: CPT | Performed by: EMERGENCY MEDICINE

## 2025-06-10 PROCEDURE — 82042 OTHER SOURCE ALBUMIN QUAN EA: CPT | Performed by: EMERGENCY MEDICINE

## 2025-06-10 PROCEDURE — 87801 DETECT AGNT MULT DNA AMPLI: CPT | Performed by: INTERNAL MEDICINE

## 2025-06-10 PROCEDURE — P9047 ALBUMIN (HUMAN), 25%, 50ML: HCPCS | Performed by: RADIOLOGY

## 2025-06-10 PROCEDURE — 88341 IMHCHEM/IMCYTCHM EA ADD ANTB: CPT | Mod: 26 | Performed by: PATHOLOGY

## 2025-06-10 PROCEDURE — 82248 BILIRUBIN DIRECT: CPT | Performed by: EMERGENCY MEDICINE

## 2025-06-10 PROCEDURE — 99285 EMERGENCY DEPT VISIT HI MDM: CPT | Mod: 25

## 2025-06-10 PROCEDURE — 88305 TISSUE EXAM BY PATHOLOGIST: CPT | Mod: 26 | Performed by: PATHOLOGY

## 2025-06-10 PROCEDURE — 81003 URINALYSIS AUTO W/O SCOPE: CPT | Performed by: EMERGENCY MEDICINE

## 2025-06-10 PROCEDURE — 250N000011 HC RX IP 250 OP 636: Performed by: RADIOLOGY

## 2025-06-10 PROCEDURE — 74177 CT ABD & PELVIS W/CONTRAST: CPT

## 2025-06-10 PROCEDURE — 250N000011 HC RX IP 250 OP 636: Performed by: EMERGENCY MEDICINE

## 2025-06-10 PROCEDURE — 96365 THER/PROPH/DIAG IV INF INIT: CPT | Mod: 59

## 2025-06-10 PROCEDURE — 80048 BASIC METABOLIC PNL TOTAL CA: CPT | Performed by: EMERGENCY MEDICINE

## 2025-06-10 PROCEDURE — 88342 IMHCHEM/IMCYTCHM 1ST ANTB: CPT | Mod: 26 | Performed by: PATHOLOGY

## 2025-06-10 PROCEDURE — 87205 SMEAR GRAM STAIN: CPT | Performed by: EMERGENCY MEDICINE

## 2025-06-10 RX ORDER — IOPAMIDOL 755 MG/ML
90 INJECTION, SOLUTION INTRAVASCULAR ONCE
Status: COMPLETED | OUTPATIENT
Start: 2025-06-10 | End: 2025-06-10

## 2025-06-10 RX ORDER — ALBUMIN (HUMAN) 12.5 G/50ML
25 SOLUTION INTRAVENOUS ONCE
Status: COMPLETED | OUTPATIENT
Start: 2025-06-10 | End: 2025-06-10

## 2025-06-10 RX ORDER — ALBUMIN (HUMAN) 12.5 G/50ML
25-50 SOLUTION INTRAVENOUS ONCE
Status: DISCONTINUED | OUTPATIENT
Start: 2025-06-10 | End: 2025-06-10

## 2025-06-10 RX ORDER — PIPERACILLIN SODIUM, TAZOBACTAM SODIUM 4; .5 G/20ML; G/20ML
4.5 INJECTION, POWDER, LYOPHILIZED, FOR SOLUTION INTRAVENOUS ONCE
Status: COMPLETED | OUTPATIENT
Start: 2025-06-10 | End: 2025-06-11

## 2025-06-10 RX ADMIN — IOPAMIDOL 90 ML: 755 INJECTION, SOLUTION INTRAVENOUS at 22:44

## 2025-06-10 RX ADMIN — ALBUMIN HUMAN 25 G: 0.25 SOLUTION INTRAVENOUS at 22:08

## 2025-06-10 ASSESSMENT — ACTIVITIES OF DAILY LIVING (ADL)
ADLS_ACUITY_SCORE: 56

## 2025-06-10 NOTE — ED TRIAGE NOTES
Pt reports increased abd fullness, pt scheduled to have paracentesis tomorrow but pt feels he can't wait until then.  Pt reports R flank pain as well, he was also told he has some results from [facility] that were concerning.      Triage Assessment (Adult)       Row Name 06/10/25 6697          Triage Assessment    Airway WDL WDL        Respiratory WDL    Respiratory WDL X;rhythm/pattern     Rhythm/Pattern, Respiratory shortness of breath;tachypneic        Skin Circulation/Temperature WDL    Skin Circulation/Temperature WDL WDL        Cardiac WDL    Cardiac WDL WDL        Peripheral/Neurovascular WDL    Peripheral Neurovascular WDL WDL        Cognitive/Neuro/Behavioral WDL    Cognitive/Neuro/Behavioral WDL WDL

## 2025-06-11 ENCOUNTER — APPOINTMENT (OUTPATIENT)
Dept: ULTRASOUND IMAGING | Facility: HOSPITAL | Age: 45
DRG: 432 | End: 2025-06-11
Attending: PHYSICIAN ASSISTANT
Payer: COMMERCIAL

## 2025-06-11 LAB
AFP SERPL-MCNC: <1.8 NG/ML
ALBUMIN BODY FLUID SOURCE: NORMAL
ALBUMIN FLD-MCNC: 2.9 G/DL
ALBUMIN SERPL BCG-MCNC: 3.6 G/DL (ref 3.5–5.2)
ALP SERPL-CCNC: 237 U/L (ref 40–150)
ALT SERPL W P-5'-P-CCNC: 29 U/L (ref 0–70)
ANION GAP SERPL CALCULATED.3IONS-SCNC: 10 MMOL/L (ref 7–15)
AST SERPL W P-5'-P-CCNC: 18 U/L (ref 0–45)
BASOPHILS # BLD AUTO: 0.1 10E3/UL (ref 0–0.2)
BASOPHILS NFR BLD AUTO: 1 %
BILIRUB DIRECT SERPL-MCNC: 0.14 MG/DL (ref 0–0.3)
BILIRUB SERPL-MCNC: 0.3 MG/DL
BUN SERPL-MCNC: 21.4 MG/DL (ref 6–20)
CALCIUM SERPL-MCNC: 8.9 MG/DL (ref 8.8–10.4)
CHLORIDE SERPL-SCNC: 101 MMOL/L (ref 98–107)
CREAT SERPL-MCNC: 1.18 MG/DL (ref 0.67–1.17)
EGFRCR SERPLBLD CKD-EPI 2021: 78 ML/MIN/1.73M2
EOSINOPHIL # BLD AUTO: 0.7 10E3/UL (ref 0–0.7)
EOSINOPHIL NFR BLD AUTO: 5 %
ERYTHROCYTE [DISTWIDTH] IN BLOOD BY AUTOMATED COUNT: 15.2 % (ref 10–15)
GLUCOSE BLDC GLUCOMTR-MCNC: 114 MG/DL (ref 70–99)
GLUCOSE BLDC GLUCOMTR-MCNC: 116 MG/DL (ref 70–99)
GLUCOSE BLDC GLUCOMTR-MCNC: 136 MG/DL (ref 70–99)
GLUCOSE BLDC GLUCOMTR-MCNC: 139 MG/DL (ref 70–99)
GLUCOSE BLDC GLUCOMTR-MCNC: 146 MG/DL (ref 70–99)
GLUCOSE SERPL-MCNC: 102 MG/DL (ref 70–99)
HCO3 SERPL-SCNC: 25 MMOL/L (ref 22–29)
HCT VFR BLD AUTO: 30.5 % (ref 40–53)
HGB BLD-MCNC: 9.4 G/DL (ref 13.3–17.7)
IMM GRANULOCYTES # BLD: 0.1 10E3/UL
IMM GRANULOCYTES NFR BLD: 1 %
INR PPP: 1.15 (ref 0.85–1.15)
LYMPHOCYTES # BLD AUTO: 1.9 10E3/UL (ref 0.8–5.3)
LYMPHOCYTES NFR BLD AUTO: 15 %
MCH RBC QN AUTO: 26.9 PG (ref 26.5–33)
MCHC RBC AUTO-ENTMCNC: 30.8 G/DL (ref 31.5–36.5)
MCV RBC AUTO: 87 FL (ref 78–100)
MONOCYTES # BLD AUTO: 1.4 10E3/UL (ref 0–1.3)
MONOCYTES NFR BLD AUTO: 11 %
NEUTROPHILS # BLD AUTO: 8.8 10E3/UL (ref 1.6–8.3)
NEUTROPHILS NFR BLD AUTO: 68 %
NRBC # BLD AUTO: 0 10E3/UL
NRBC BLD AUTO-RTO: 0 /100
NT-PROBNP SERPL-MCNC: 795 PG/ML (ref 0–93)
PLATELET # BLD AUTO: 419 10E3/UL (ref 150–450)
POTASSIUM SERPL-SCNC: 4 MMOL/L (ref 3.4–5.3)
PROT FLD-MCNC: 4.2 G/DL
PROT SERPL-MCNC: 6 G/DL (ref 6.4–8.3)
PROTEIN BODY FLUID SOURCE: NORMAL
PROTHROMBIN TIME: 15 SECONDS (ref 11.8–14.8)
RBC # BLD AUTO: 3.5 10E6/UL (ref 4.4–5.9)
SODIUM SERPL-SCNC: 136 MMOL/L (ref 135–145)
WBC # BLD AUTO: 12.9 10E3/UL (ref 4–11)

## 2025-06-11 PROCEDURE — 94660 CPAP INITIATION&MGMT: CPT

## 2025-06-11 PROCEDURE — 250N000011 HC RX IP 250 OP 636: Performed by: EMERGENCY MEDICINE

## 2025-06-11 PROCEDURE — 99222 1ST HOSP IP/OBS MODERATE 55: CPT | Performed by: INTERNAL MEDICINE

## 2025-06-11 PROCEDURE — 85004 AUTOMATED DIFF WBC COUNT: CPT

## 2025-06-11 PROCEDURE — 250N000013 HC RX MED GY IP 250 OP 250 PS 637

## 2025-06-11 PROCEDURE — 250N000011 HC RX IP 250 OP 636

## 2025-06-11 PROCEDURE — P9047 ALBUMIN (HUMAN), 25%, 50ML: HCPCS | Performed by: PHYSICIAN ASSISTANT

## 2025-06-11 PROCEDURE — 250N000011 HC RX IP 250 OP 636: Performed by: PHYSICIAN ASSISTANT

## 2025-06-11 PROCEDURE — 85610 PROTHROMBIN TIME: CPT | Performed by: FAMILY MEDICINE

## 2025-06-11 PROCEDURE — 99222 1ST HOSP IP/OBS MODERATE 55: CPT | Mod: AI

## 2025-06-11 PROCEDURE — 36415 COLL VENOUS BLD VENIPUNCTURE: CPT | Performed by: FAMILY MEDICINE

## 2025-06-11 PROCEDURE — 80048 BASIC METABOLIC PNL TOTAL CA: CPT

## 2025-06-11 PROCEDURE — 84155 ASSAY OF PROTEIN SERUM: CPT | Performed by: PHYSICIAN ASSISTANT

## 2025-06-11 PROCEDURE — 120N000001 HC R&B MED SURG/OB

## 2025-06-11 PROCEDURE — 83880 ASSAY OF NATRIURETIC PEPTIDE: CPT | Performed by: FAMILY MEDICINE

## 2025-06-11 PROCEDURE — 250N000011 HC RX IP 250 OP 636: Performed by: INTERNAL MEDICINE

## 2025-06-11 PROCEDURE — 36415 COLL VENOUS BLD VENIPUNCTURE: CPT | Performed by: EMERGENCY MEDICINE

## 2025-06-11 PROCEDURE — 999N000157 HC STATISTIC RCP TIME EA 10 MIN

## 2025-06-11 PROCEDURE — 250N000012 HC RX MED GY IP 250 OP 636 PS 637

## 2025-06-11 PROCEDURE — 76705 ECHO EXAM OF ABDOMEN: CPT

## 2025-06-11 PROCEDURE — 87040 BLOOD CULTURE FOR BACTERIA: CPT | Performed by: EMERGENCY MEDICINE

## 2025-06-11 PROCEDURE — 82105 ALPHA-FETOPROTEIN SERUM: CPT | Performed by: PHYSICIAN ASSISTANT

## 2025-06-11 PROCEDURE — 36415 COLL VENOUS BLD VENIPUNCTURE: CPT

## 2025-06-11 RX ORDER — FLUTICASONE FUROATE AND VILANTEROL 100; 25 UG/1; UG/1
1 POWDER RESPIRATORY (INHALATION) DAILY
Status: DISCONTINUED | OUTPATIENT
Start: 2025-06-11 | End: 2025-06-13 | Stop reason: HOSPADM

## 2025-06-11 RX ORDER — LISINOPRIL 5 MG/1
10 TABLET ORAL EVERY MORNING
Status: DISCONTINUED | OUTPATIENT
Start: 2025-06-12 | End: 2025-06-13 | Stop reason: HOSPADM

## 2025-06-11 RX ORDER — NICOTINE POLACRILEX 4 MG
15-30 LOZENGE BUCCAL
Status: DISCONTINUED | OUTPATIENT
Start: 2025-06-11 | End: 2025-06-13 | Stop reason: HOSPADM

## 2025-06-11 RX ORDER — PROCHLORPERAZINE MALEATE 10 MG
10 TABLET ORAL EVERY 6 HOURS PRN
Status: DISCONTINUED | OUTPATIENT
Start: 2025-06-11 | End: 2025-06-13 | Stop reason: HOSPADM

## 2025-06-11 RX ORDER — SPIRONOLACTONE 25 MG/1
50 TABLET ORAL DAILY
Status: DISCONTINUED | OUTPATIENT
Start: 2025-06-11 | End: 2025-06-13 | Stop reason: HOSPADM

## 2025-06-11 RX ORDER — MONTELUKAST SODIUM 10 MG/1
10 TABLET ORAL EVERY MORNING
Status: DISCONTINUED | OUTPATIENT
Start: 2025-06-11 | End: 2025-06-13 | Stop reason: HOSPADM

## 2025-06-11 RX ORDER — ALBUMIN (HUMAN) 12.5 G/50ML
75 SOLUTION INTRAVENOUS ONCE
Status: COMPLETED | OUTPATIENT
Start: 2025-06-11 | End: 2025-06-11

## 2025-06-11 RX ORDER — TRAZODONE HYDROCHLORIDE 50 MG/1
100 TABLET ORAL AT BEDTIME
Status: DISCONTINUED | OUTPATIENT
Start: 2025-06-11 | End: 2025-06-13 | Stop reason: HOSPADM

## 2025-06-11 RX ORDER — ALBUMIN (HUMAN) 12.5 G/50ML
75 SOLUTION INTRAVENOUS ONCE
Status: COMPLETED | OUTPATIENT
Start: 2025-06-13 | End: 2025-06-13

## 2025-06-11 RX ORDER — ALBUTEROL SULFATE 90 UG/1
1-2 INHALANT RESPIRATORY (INHALATION) EVERY 6 HOURS PRN
Status: DISCONTINUED | OUTPATIENT
Start: 2025-06-11 | End: 2025-06-13 | Stop reason: HOSPADM

## 2025-06-11 RX ORDER — SPIRONOLACTONE 50 MG/1
50 TABLET, FILM COATED ORAL DAILY
Status: ON HOLD | COMMUNITY
Start: 2025-06-02

## 2025-06-11 RX ORDER — NICOTINE 21 MG/24HR
1 PATCH, TRANSDERMAL 24 HOURS TRANSDERMAL DAILY
Status: DISCONTINUED | OUTPATIENT
Start: 2025-06-11 | End: 2025-06-13 | Stop reason: HOSPADM

## 2025-06-11 RX ORDER — ALBUTEROL SULFATE 0.83 MG/ML
2.5 SOLUTION RESPIRATORY (INHALATION) 3 TIMES DAILY PRN
Status: DISCONTINUED | OUTPATIENT
Start: 2025-06-11 | End: 2025-06-13 | Stop reason: HOSPADM

## 2025-06-11 RX ORDER — DEXTROSE MONOHYDRATE 25 G/50ML
25-50 INJECTION, SOLUTION INTRAVENOUS
Status: DISCONTINUED | OUTPATIENT
Start: 2025-06-11 | End: 2025-06-13 | Stop reason: HOSPADM

## 2025-06-11 RX ORDER — SULFAMETHOXAZOLE AND TRIMETHOPRIM 800; 160 MG/1; MG/1
1 TABLET ORAL DAILY
Status: DISCONTINUED | OUTPATIENT
Start: 2025-06-11 | End: 2025-06-13 | Stop reason: HOSPADM

## 2025-06-11 RX ORDER — FAMOTIDINE 20 MG/1
20 TABLET, FILM COATED ORAL 2 TIMES DAILY
Status: DISCONTINUED | OUTPATIENT
Start: 2025-06-11 | End: 2025-06-13 | Stop reason: HOSPADM

## 2025-06-11 RX ORDER — NYSTATIN 100000 [USP'U]/G
POWDER TOPICAL 2 TIMES DAILY
Status: DISCONTINUED | OUTPATIENT
Start: 2025-06-11 | End: 2025-06-11 | Stop reason: ALTCHOICE

## 2025-06-11 RX ORDER — FUROSEMIDE 20 MG/1
20 TABLET ORAL DAILY
Status: DISCONTINUED | OUTPATIENT
Start: 2025-06-11 | End: 2025-06-13 | Stop reason: HOSPADM

## 2025-06-11 RX ORDER — PIPERACILLIN SODIUM, TAZOBACTAM SODIUM 4; .5 G/20ML; G/20ML
4.5 INJECTION, POWDER, LYOPHILIZED, FOR SOLUTION INTRAVENOUS EVERY 6 HOURS
Status: DISCONTINUED | OUTPATIENT
Start: 2025-06-11 | End: 2025-06-11

## 2025-06-11 RX ORDER — BENZOCAINE/MENTHOL 6 MG-10 MG
LOZENGE MUCOUS MEMBRANE DAILY PRN
Status: DISCONTINUED | OUTPATIENT
Start: 2025-06-11 | End: 2025-06-13 | Stop reason: HOSPADM

## 2025-06-11 RX ORDER — CALCIUM CARBONATE 500 MG/1
1000 TABLET, CHEWABLE ORAL 4 TIMES DAILY PRN
Status: DISCONTINUED | OUTPATIENT
Start: 2025-06-11 | End: 2025-06-13 | Stop reason: HOSPADM

## 2025-06-11 RX ORDER — AMOXICILLIN 250 MG
1 CAPSULE ORAL 2 TIMES DAILY PRN
Status: DISCONTINUED | OUTPATIENT
Start: 2025-06-11 | End: 2025-06-13 | Stop reason: HOSPADM

## 2025-06-11 RX ORDER — GINSENG 100 MG
CAPSULE ORAL 3 TIMES DAILY PRN
Status: DISCONTINUED | OUTPATIENT
Start: 2025-06-11 | End: 2025-06-13 | Stop reason: HOSPADM

## 2025-06-11 RX ORDER — ONDANSETRON 4 MG/1
4 TABLET, ORALLY DISINTEGRATING ORAL EVERY 6 HOURS PRN
Status: DISCONTINUED | OUTPATIENT
Start: 2025-06-11 | End: 2025-06-13 | Stop reason: HOSPADM

## 2025-06-11 RX ORDER — PIPERACILLIN SODIUM, TAZOBACTAM SODIUM 3; .375 G/15ML; G/15ML
3.38 INJECTION, POWDER, LYOPHILIZED, FOR SOLUTION INTRAVENOUS EVERY 6 HOURS
Status: DISCONTINUED | OUTPATIENT
Start: 2025-06-11 | End: 2025-06-11

## 2025-06-11 RX ORDER — ONDANSETRON 2 MG/ML
4 INJECTION INTRAMUSCULAR; INTRAVENOUS EVERY 6 HOURS PRN
Status: DISCONTINUED | OUTPATIENT
Start: 2025-06-11 | End: 2025-06-13 | Stop reason: HOSPADM

## 2025-06-11 RX ORDER — AMOXICILLIN 250 MG
2 CAPSULE ORAL 2 TIMES DAILY PRN
Status: DISCONTINUED | OUTPATIENT
Start: 2025-06-11 | End: 2025-06-13 | Stop reason: HOSPADM

## 2025-06-11 RX ORDER — PIPERACILLIN SODIUM, TAZOBACTAM SODIUM 3; .375 G/15ML; G/15ML
3.38 INJECTION, POWDER, LYOPHILIZED, FOR SOLUTION INTRAVENOUS EVERY 6 HOURS
Status: DISCONTINUED | OUTPATIENT
Start: 2025-06-11 | End: 2025-06-11 | Stop reason: DRUGHIGH

## 2025-06-11 RX ORDER — OMEPRAZOLE 20 MG/1
40 CAPSULE, DELAYED RELEASE ORAL EVERY MORNING
Status: DISCONTINUED | OUTPATIENT
Start: 2025-06-11 | End: 2025-06-11 | Stop reason: ALTCHOICE

## 2025-06-11 RX ORDER — CEFTRIAXONE 2 G/1
2 INJECTION, POWDER, FOR SOLUTION INTRAMUSCULAR; INTRAVENOUS EVERY 24 HOURS
Status: DISCONTINUED | OUTPATIENT
Start: 2025-06-11 | End: 2025-06-13 | Stop reason: HOSPADM

## 2025-06-11 RX ORDER — PANTOPRAZOLE SODIUM 40 MG/1
40 TABLET, DELAYED RELEASE ORAL
Status: DISCONTINUED | OUTPATIENT
Start: 2025-06-11 | End: 2025-06-13 | Stop reason: HOSPADM

## 2025-06-11 RX ORDER — METHOCARBAMOL 500 MG/1
500 TABLET, FILM COATED ORAL 4 TIMES DAILY PRN
Status: DISCONTINUED | OUTPATIENT
Start: 2025-06-11 | End: 2025-06-13 | Stop reason: HOSPADM

## 2025-06-11 RX ORDER — LIDOCAINE 40 MG/G
CREAM TOPICAL
Status: DISCONTINUED | OUTPATIENT
Start: 2025-06-11 | End: 2025-06-13 | Stop reason: HOSPADM

## 2025-06-11 RX ORDER — ROSUVASTATIN CALCIUM 10 MG/1
10 TABLET, COATED ORAL AT BEDTIME
Status: DISCONTINUED | OUTPATIENT
Start: 2025-06-11 | End: 2025-06-13 | Stop reason: HOSPADM

## 2025-06-11 RX ORDER — POLYETHYLENE GLYCOL 3350 17 G/17G
17 POWDER, FOR SOLUTION ORAL 2 TIMES DAILY PRN
Status: DISCONTINUED | OUTPATIENT
Start: 2025-06-11 | End: 2025-06-13 | Stop reason: HOSPADM

## 2025-06-11 RX ORDER — CLOTRIMAZOLE 1 %
CREAM (GRAM) TOPICAL 2 TIMES DAILY PRN
Status: DISCONTINUED | OUTPATIENT
Start: 2025-06-11 | End: 2025-06-13 | Stop reason: HOSPADM

## 2025-06-11 RX ORDER — LORATADINE 10 MG/1
10 TABLET ORAL DAILY PRN
Status: DISCONTINUED | OUTPATIENT
Start: 2025-06-11 | End: 2025-06-13 | Stop reason: HOSPADM

## 2025-06-11 RX ORDER — OLANZAPINE 5 MG/1
10 TABLET, FILM COATED ORAL AT BEDTIME
Status: DISCONTINUED | OUTPATIENT
Start: 2025-06-11 | End: 2025-06-13 | Stop reason: HOSPADM

## 2025-06-11 RX ADMIN — INSULIN ASPART 1 UNITS: 100 INJECTION, SOLUTION INTRAVENOUS; SUBCUTANEOUS at 17:19

## 2025-06-11 RX ADMIN — OLANZAPINE 10 MG: 5 TABLET, FILM COATED ORAL at 22:17

## 2025-06-11 RX ADMIN — ALBUMIN HUMAN 75 G: 0.25 SOLUTION INTRAVENOUS at 09:37

## 2025-06-11 RX ADMIN — PIPERACILLIN AND TAZOBACTAM 4.5 G: 4; .5 INJECTION, POWDER, LYOPHILIZED, FOR SOLUTION INTRAVENOUS; PARENTERAL at 00:33

## 2025-06-11 RX ADMIN — ROSUVASTATIN 10 MG: 10 TABLET, FILM COATED ORAL at 04:27

## 2025-06-11 RX ADMIN — MONTELUKAST SODIUM 10 MG: 10 TABLET, FILM COATED ORAL at 09:33

## 2025-06-11 RX ADMIN — CEFTRIAXONE SODIUM 2 G: 2 INJECTION, POWDER, FOR SOLUTION INTRAMUSCULAR; INTRAVENOUS at 17:18

## 2025-06-11 RX ADMIN — FLUTICASONE FUROATE AND VILANTEROL TRIFENATATE 1 PUFF: 100; 25 POWDER RESPIRATORY (INHALATION) at 09:36

## 2025-06-11 RX ADMIN — OLANZAPINE 10 MG: 5 TABLET, FILM COATED ORAL at 04:49

## 2025-06-11 RX ADMIN — FAMOTIDINE 20 MG: 20 TABLET, FILM COATED ORAL at 22:18

## 2025-06-11 RX ADMIN — UMECLIDINIUM 1 PUFF: 62.5 AEROSOL, POWDER ORAL at 09:36

## 2025-06-11 RX ADMIN — SPIRONOLACTONE 50 MG: 25 TABLET, FILM COATED ORAL at 09:33

## 2025-06-11 RX ADMIN — MICONAZOLE NITRATE ANTIFUNGAL POWDER: 2 POWDER TOPICAL at 09:36

## 2025-06-11 RX ADMIN — PIPERACILLIN AND TAZOBACTAM 4.5 G: 4; .5 INJECTION, POWDER, LYOPHILIZED, FOR SOLUTION INTRAVENOUS; PARENTERAL at 13:03

## 2025-06-11 RX ADMIN — NICOTINE 1 PATCH: 14 PATCH, EXTENDED RELEASE TRANSDERMAL at 09:34

## 2025-06-11 RX ADMIN — PANTOPRAZOLE SODIUM 40 MG: 40 TABLET, DELAYED RELEASE ORAL at 09:33

## 2025-06-11 RX ADMIN — FUROSEMIDE 20 MG: 20 TABLET ORAL at 09:34

## 2025-06-11 RX ADMIN — ROSUVASTATIN 10 MG: 10 TABLET, FILM COATED ORAL at 22:19

## 2025-06-11 RX ADMIN — PIPERACILLIN AND TAZOBACTAM 4.5 G: 4; .5 INJECTION, POWDER, LYOPHILIZED, FOR SOLUTION INTRAVENOUS; PARENTERAL at 06:22

## 2025-06-11 RX ADMIN — TRAZODONE HYDROCHLORIDE 100 MG: 50 TABLET ORAL at 22:18

## 2025-06-11 RX ADMIN — TRAZODONE HYDROCHLORIDE 100 MG: 50 TABLET ORAL at 04:27

## 2025-06-11 RX ADMIN — FAMOTIDINE 20 MG: 20 TABLET, FILM COATED ORAL at 09:34

## 2025-06-11 RX ADMIN — MICONAZOLE NITRATE ANTIFUNGAL POWDER: 2 POWDER TOPICAL at 22:19

## 2025-06-11 ASSESSMENT — ACTIVITIES OF DAILY LIVING (ADL)
CHANGE_IN_FUNCTIONAL_STATUS_SINCE_ONSET_OF_CURRENT_ILLNESS/INJURY: NO
ADLS_ACUITY_SCORE: 44
ADLS_ACUITY_SCORE: 44
ADLS_ACUITY_SCORE: 56
WEAR_GLASSES_OR_BLIND: YES
DEPENDENT_IADLS:: CLEANING;COOKING;LAUNDRY;SHOPPING;MEAL PREPARATION;MEDICATION MANAGEMENT;MONEY MANAGEMENT;TRANSPORTATION
ADLS_ACUITY_SCORE: 44
FALL_HISTORY_WITHIN_LAST_SIX_MONTHS: YES
WALKING_OR_CLIMBING_STAIRS_DIFFICULTY: YES
TOILETING: 1-->ASSISTANCE (EQUIPMENT/PERSON) NEEDED
TOILETING: 1-->ASSISTANCE (EQUIPMENT/PERSON) NEEDED (NOT DEVELOPMENTALLY APPROPRIATE)
ADLS_ACUITY_SCORE: 56
ADLS_ACUITY_SCORE: 44
HEARING_DIFFICULTY_OR_DEAF: NO
DRESSING/BATHING_DIFFICULTY: YES
ADLS_ACUITY_SCORE: 44
EQUIPMENT_CURRENTLY_USED_AT_HOME: GLUCOMETER
ADLS_ACUITY_SCORE: 56
DIFFICULTY_EATING/SWALLOWING: NO
VISION_MANAGEMENT: GLASSES
DIFFICULTY_COMMUNICATING: NO
DOING_ERRANDS_INDEPENDENTLY_DIFFICULTY: NO
NUMBER_OF_TIMES_PATIENT_HAS_FALLEN_WITHIN_LAST_SIX_MONTHS: 1
ADLS_ACUITY_SCORE: 56
ADLS_ACUITY_SCORE: 44
CONCENTRATING,_REMEMBERING_OR_MAKING_DECISIONS_DIFFICULTY: NO
TOILETING_ISSUES: YES
ADLS_ACUITY_SCORE: 44
TOILETING_ASSISTANCE: TOILETING DIFFICULTY, ASSISTANCE 1 PERSON
ADLS_ACUITY_SCORE: 44
ADLS_ACUITY_SCORE: 44

## 2025-06-11 NOTE — H&P
[facility]    History and Physical - Hospitalist Service       Date of Admission:  6/10/2025    Assessment & Plan      Warren Jaramillo is a 44 year old male admitted on 6/10/2025. He has a history of cirrhosis with ascites requiring frequent paracentesis, Rojas disease, T2DM, HTN, AVA, COPD and is admitted for abdominal distention and SOB found to have significantly elevated ANC and ascitic fluid concerning for SBP.     Cirrhosis with ascites, requiring frequent paracentesis  Abdominal discomfort  Concern for resistant SBP  Possible venous outflow obstruction  Patient has history of cirrhosis, recently reports requiring more frequent paracentesis now twice weekly over the past 3-4 months.  He presented with abdominal distention, discomfort and shortness of breath.  Patient notes that he was due for paracentesis on 6/11, 5 L were removed in the ED and sent for analysis.  On admission, he showed me a letter from [facility] noting possible new diagnosis of venous outflow obstruction based on a transjugular liver biopsy performed on 6/6, question if this may be contributing to his worsening ascites.  Also ascitic fluid showed ANC of 759.  Patient was recently hospitalized from 5/15 - 5/26 for concern of persistent SBP with ANC in the 1000's despite treatment with ceftriaxone followed by meropenem with clinical improvement but with worsening ANC.  He was discharged on oral Levaquin with planned repeat ascitic fluid studies and consideration of other causes.  He reports only one episode of chills 2 days ago, no fever and white blood count elevated at 16 which is consistent with his baseline over the past few weeks. CT abdomen pelvis showed cirrhotic changes of the liver but otherwise unremarkable. Vitals and exam reassuring.   - Follow ascitic fluid studies  - Follow blood cultures  - Paracentesis in the ED, received albumin x 1  - Daily CBC   - Continue IV Zosyn  - GI consult, appreciate  "recs  - Patient has scheduled liver Doppler ultrasound outpatient with [facility] to evaluate for venous outflow obstruction.   - PTA Lasix  - Consider consulting ID, pending clinical course    RIVERA  History of RIVERA with creatinine peaking at 7.0 during recent admission 3-4 weeks ago, at that time thought to be secondary to contrast-induced nephropathy.  Now it is improved closet to baseline at 1.2 will continue to monitor closely.  -Daily BMP  -Avoid nephrotoxic meds  COPD: Continue PTA inhalers    Chronic normocytic Anemia   Hx of GIB  Hemoglobin 9.9, close to baseline.  No symptoms or signs of active bleeding.  -Daily hemoglobin    T2DM  A1c 6.7. PTA metformin and jardiance.   - hold PTA meds  - MDSSI    Chronic conditions:   History of DVT: Provoked completed 3 months of apixaban discontinued on  recent admission 5/25 follow-up with PCP  HTN: Continue PTA spironolactone  Insomnia: Continue PTA trazodone  Mental health; continue PTA Zyprexa  GERD: continue PTA Protonix  HLD: Continue PTA rosuvastatin  COPD: Continue PTA inhalers        Diet: Combination Diet Regular Diet Adult  DVT Prophylaxis: Pneumatic Compression Devices  Khan Catheter: Not present  Fluids: po  Lines: None     Cardiac Monitoring: None  Code Status: Full Code    Clinically Significant Risk Factors Present on Admission                   # Hypertension: Noted on problem list  # Chronic heart failure with preserved ejection fraction: heart failure noted on problem list and last echo with EF >50%     # Anemia: based on hgb <11      # DMII: A1C = 6.7 % (Ref range: <5.7 %) within past 6 months    # Morbid Obesity: Estimated body mass index is 49.06 kg/m  as calculated from the following:    Height as of 6/2/25: 1.651 m (5' 5\").    Weight as of this encounter: 133.7 kg (294 lb 12.8 oz).         # Financial/Environmental Concerns:           Disposition Plan      Expected Discharge Date: 06/13/2025                The patient's care was discussed with the Day " Team.      Young Saeed MD  Hospitalist Service  [facility]  Securely message with [employer] (more info)  Text page via [employer] Paging/Directory   ______________________________________________________________________    Chief Complaint   Abdominal discomfort and distention  Shortness of breath    History is obtained from the patient    History of Present Illness   Warren Jaramillo is a 44 year old male who has a history of cirrhosis with ascites requiring frequent paracentesis, Rojas disease, AVA, COPD, type 2 diabetes, hypertension, GI bleed, and is admitted for abdominal distention and discomfort status post paracentesis in the ED with 5 L removed.    Patient notes over the past 1 to 2 days having abdominal distention, discomfort in the lower abdomen, and started to feel shortness of breath worsening over the 1 day.  Patient was scheduled to have paracentesis tomorrow 6/11.  He notes not being able to breathe comfortably and so he came into the emergency department.  Patient notes episode of chills 2 days ago.  No fever, no nausea vomiting no dizziness or chest pain.  No melena.    Patient reports that 6/6 she had a transjugular liver biopsy.  Showed me his results on a letter from  which shows venous outflow obstruction.  With the plan for Doppler liver ultrasound.     Patient notes significant improvement in abdominal discomfort and bleeding since ascitic fluid was removed in the ED.  Currently was removed received albumin.  Vitals stable      Past Medical History    Past Medical History:   Diagnosis Date    COPD exacerbation (H) 12/02/2024    DM2 (diabetes mellitus, type 2) (H) 04/28/2020    HTN (hypertension) 07/30/2012    Sleep apnea     Thyroid nodule 07/31/2019    Rojas's disease (H)        Past Surgical History   Past Surgical History:   Procedure Laterality Date    COLONOSCOPY      ESOPHAGOSCOPY, GASTROSCOPY, DUODENOSCOPY (EGD), COMBINED N/A 7/21/2023    Procedure:  ESOPHAGOGASTRODUODENOSCOPY WITH GASTRIC AND ESOPHAGEAL BIOPSIES;  Surgeon: Filiberto Aragon MD;  Location: [facility] OR    ESOPHAGOSCOPY, GASTROSCOPY, DUODENOSCOPY (EGD), COMBINED N/A 4/4/2025    Procedure: ESOPHAGOGASTRODUODENOSCOPY;  Surgeon: Ramesh Talbot MD;  Location: [facility] OR    TOOTH EXTRACTION         Prior to Admission Medications   Prior to Admission Medications   Prescriptions Last Dose Informant Patient Reported? Taking?   Benzocaine (SOLARCAINE ALOE VERA EX)  Care Giver, Self Yes Yes   Sig: Externally apply topically daily as needed.   Calamine external lotion  Care Giver, Self Yes Yes   Sig: Apply topically as needed for itching   Continuous Glucose Sensor (FREESTYLE MEGHANN 3 PLUS SENSOR) MISC  Care Giver, Self Yes No   Sig: USE TO READ BLOOD SUGAR TWICE DAILY AND AS NEEDED. CHANGE SENSOR EVERY 15 DAYS   EPINEPHrine (ANY BX GENERIC EQUIV) 0.3 MG/0.3ML injection 2-pack  Care Giver, Self Yes Yes   Sig: Inject 0.3 mg into the muscle as needed for anaphylaxis. May repeat one time in 5-15 minutes if response to initial dose is inadequate.   GAVILAX 17 GM/SCOOP powder  Care Giver, Self Yes Yes   Sig: Take 17 g by mouth daily as needed for constipation.   Hydrocortisone (PREPARATION H EX)  Care Giver, Self Yes Yes   Sig: Externally apply topically daily as needed (hemorrhoids).   JARDIANCE 10 MG TABS tablet 6/10/2025 Morning Care Giver, Self Yes Yes   Sig: Take 10 mg by mouth every morning.   OLANZapine (ZYPREXA) 10 MG tablet 6/9/2025 Bedtime Care Giver, Self Yes Yes   Sig: Take 10 mg by mouth At Bedtime   TRELEGY ELLIPTA 100-62.5-25 MCG/ACT oral inhaler 6/10/2025 Morning Care Giver, Self Yes Yes   Sig: Inhale 1 puff into the lungs every morning.   Urea 40 % CREA  Care Giver, Self Yes Yes   Sig: Apply topically daily as needed for dry skin.   Vitamins A & D (VITAMIN A & D) OINT  Care Giver, Self Yes Yes   Sig: Externally apply topically daily as needed (general skin health).   albuterol  (PROAIR HFA/PROVENTIL HFA/VENTOLIN HFA) 108 (90 Base) MCG/ACT inhaler  Care Giver, Self No Yes   Sig: Inhale 1-2 puffs into the lungs every 6 hours as needed for shortness of breath, wheezing or cough   albuterol (PROVENTIL) (2.5 MG/3ML) 0.083% neb solution  Care Giver, Self Yes Yes   Sig: Take 2.5 mg by nebulization 3 times daily as needed for shortness of breath, wheezing or cough   aloe vera GEL  Care Giver, Self Yes Yes   Sig: Apply 1 g topically every hour as needed for skin care Per bottle directions   bacitracin 500 UNIT/GM OINT  Care Giver, Self Yes Yes   Sig: Apply topically 3 times daily as needed for wound care   benzonatate (TESSALON) 200 MG capsule  Care Giver, Self No Yes   Sig: Take 1 capsule (200 mg) by mouth 3 times daily as needed for cough.   carbamide peroxide (DEBROX) 6.5 % otic solution  Care Giver, Self Yes Yes   Sig: Place 5 drops into both ears daily as needed for other (ear wax).   clotrimazole (LOTRIMIN) 1 % external cream  Care Giver, Self Yes Yes   Sig: Apply topically 2 times daily as needed (skin irritation)   dextromethorphan-guaiFENesin (MUCINEX DM)  MG 12 hr tablet  Care Giver, Self Yes Yes   Sig: Take 1 tablet by mouth 2 times daily as needed.   diclofenac (VOLTAREN) 1 % topical gel  Care Giver, Self Yes Yes   Sig: Apply 2 g topically daily as needed for moderate pain To joints/back   famotidine (PEPCID) 20 MG tablet 6/10/2025 Morning Care Giver, Self No Yes   Sig: Take 1 tablet (20 mg) by mouth 2 times daily   furosemide (LASIX) 40 MG tablet 6/10/2025 Morning Self No Yes   Sig: Take 0.5 tablets (20 mg) by mouth daily.   hydrocortisone (CORTAID) 1 % external cream  Care Giver, Self Yes Yes   Sig: Apply topically daily as needed for itching.   levothyroxine (SYNTHROID/LEVOTHROID) 25 MCG tablet 6/10/2025 Morning Self Yes Yes   Sig: Take 12.5 mcg by mouth every morning (before breakfast).   lisinopril (ZESTRIL) 10 MG tablet 6/10/2025 Morning Care Giver, Self Yes Yes   Sig: Take  10 mg by mouth every morning.   loperamide (IMODIUM) 2 MG capsule  Care Giver, Self Yes Yes   Sig: Take 4 mg by mouth 4 times daily as needed for diarrhea.   loratadine (CLARITIN) 10 MG tablet  Care Giver, Self Yes Yes   Sig: Take 10 mg by mouth daily as needed for allergies.   magnesium hydroxide (MILK OF MAGNESIA) 400 MG/5ML suspension  Care Giver, Self Yes Yes   Sig: Take 30 mLs by mouth daily as needed for heartburn.   metFORMIN (GLUCOPHAGE) 1000 MG tablet 6/10/2025 Evening Care Giver, Self Yes Yes   Sig: Take 1,000 mg by mouth 2 times daily (with meals)   methocarbamol (ROBAXIN) 500 MG tablet  Care Giver, Self No Yes   Sig: Take 1 tablet (500 mg) by mouth 4 times daily as needed for muscle spasms.   montelukast (SINGULAIR) 10 MG tablet 6/10/2025 Morning Care Giver, Self Yes Yes   Sig: Take 10 mg by mouth every morning.   nystatin (MYCOSTATIN) 458015 UNIT/GM external powder 6/10/2025 Morning Self No Yes   Sig: Apply topically 2 times daily for 14 days.   omeprazole (PRILOSEC) 40 MG DR capsule 6/10/2025 Morning Care Giver, Self Yes Yes   Sig: Take 40 mg by mouth every morning.   ondansetron (ZOFRAN ODT) 4 MG ODT tab  Care Giver, Self No Yes   Sig: Take 1 tablet (4 mg) by mouth every 8 hours as needed for nausea.   pramoxine-calamine (AVEENO) 1-8 % LOTN  Care Giver, Self Yes Yes   Sig: Apply topically daily as needed for itching.   rosuvastatin (CRESTOR) 10 MG tablet 6/9/2025 Bedtime Care Giver, Self Yes Yes   Sig: Take 10 mg by mouth At Bedtime   spironolactone (ALDACTONE) 50 MG tablet 6/10/2025 Morning Self Yes Yes   Sig: Take 50 mg by mouth daily.   sulfamethoxazole-trimethoprim (BACTRIM DS) 800-160 MG tablet 6/10/2025 Morning Self Yes Yes   Sig: Take 1 tablet by mouth daily.   traZODone (DESYREL) 100 MG tablet 6/9/2025 Bedtime Care Giver, Self Yes Yes   Sig: Take 100 mg by mouth at bedtime.   witch hazel-glycerin (TUCKS) pad  Care Giver, Self Yes Yes   Sig: Apply topically as needed for hemorrhoids.       Facility-Administered Medications: None        Review of Systems    The 10 point Review of Systems is negative other than noted in the HPI or here.     Social History   I have reviewed this patient's social history and updated it with pertinent information if needed.  Social History     Tobacco Use    Smoking status: Every Day     Current packs/day: 1.00     Average packs/day: 1 pack/day for 21.4 years (21.4 ttl pk-yrs)     Types: Cigarettes     Start date: 1/1/2004    Smokeless tobacco: Never   Vaping Use    Vaping status: Never Used   Substance Use Topics    Alcohol use: Not Currently     Comment: Last 8/7/2024         Family History   I have reviewed this patient's family history and updated it with pertinent information if needed.  Family History   Problem Relation Age of Onset    Unknown/Adopted Father     Unknown/Adopted Maternal Grandmother     C.A.D. Maternal Grandfather     Diabetes Maternal Grandfather     Cerebrovascular Disease Maternal Grandfather     Unknown/Adopted Paternal Grandmother     Unknown/Adopted Paternal Grandfather     Unknown/Adopted Brother     Unknown/Adopted Sister          Allergies   Allergies   Allergen Reactions    Apricot Flavoring Agent (Non-Screening) Anaphylaxis    Banana Anaphylaxis     Throat swelling      Bees Anaphylaxis     Has an Epi pen    Wasp Venom Protein Shortness Of Breath     Other reaction(s): Respiratory Distress  Has an Epi pen        Methylphenidate Itching     Other reaction(s): Nightmares    Prunus      Other reaction(s): *Unknown        Physical Exam   Vital Signs: Temp: 98.3  F (36.8  C) Temp src: Oral BP: 128/62 Pulse: 84   Resp: 22 SpO2: 98 % O2 Device: None (Room air)    Weight: 294 lbs 12.8 oz  Physical Exam  Constitutional:       General: He is not in acute distress.     Appearance: Normal appearance.   HENT:      Head: Normocephalic and atraumatic.      Nose: Nose normal. No congestion.      Mouth/Throat:      Mouth: Mucous membranes are moist.       Pharynx: Oropharynx is clear.   Eyes:      General: No scleral icterus.     Conjunctiva/sclera: Conjunctivae normal.   Cardiovascular:      Rate and Rhythm: Regular rhythm.      Heart sounds: Normal heart sounds. No murmur heard.  Pulmonary:      Effort: Pulmonary effort is normal.      Breath sounds: Normal breath sounds. No wheezing.   Abdominal:      General: There is distension.      Tenderness: There is no abdominal tenderness. There is no guarding.   Musculoskeletal:         General: Normal range of motion.      Right lower leg: No edema.      Left lower leg: No edema.   Skin:     General: Skin is warm and dry.      Coloration: Skin is not jaundiced.   Neurological:      General: No focal deficit present.      Mental Status: He is alert and oriented to person, place, and time. Mental status is at baseline.   Psychiatric:         Mood and Affect: Mood normal.         Behavior: Behavior normal.         Medical Decision Making             Data     I have personally reviewed the following data over the past 24 hrs:    12.9 (H)  \   9.4 (L)   / 419     136 101 21.4 (H) /  102 (H)   4.0 25 1.18 (H) \     ALT: 32 AST: 18 AP: 280 (H) TBILI: 0.3   ALB: 3.8 TOT PROTEIN: 6.5 LIPASE: 28       Imaging results reviewed over the past 24 hrs:   Recent Results (from the past 24 hours)   US Paracentesis without Albumin    [facility] RADIOLOGY  LOCATION: [facility]  DATE: 6/10/2025    PROCEDURE: IMAGING GUIDED PARACENTESIS    INTERVENTIONAL RADIOLOGIST: Zia Schaefer MD.     INDICATION: 44-year-old male with hypoxia in the setting of recurrent large-volume ascites.    CONSENT: The risks, benefits and alternatives of an imaging guided paracentesis were discussed with the patient  in detail. All questions were answered. Informed consent was given to proceed with the procedure.    MODERATE SEDATION: None.    ADDITIONAL MEDICATIONS: None.    COMPLICATIONS: No immediate complications.    PROCEDURE:     A limited ultrasound was performed for localization purposes.    Using sterile technique 10 mL of Xylocaine was infused into the local soft tissues. Under direct ultrasound guidance a 5 Maori Yueh catheter was inserted into the ascitic fluid.    A total of 5100 mL of clear yellow ascitic fluid was removed and sent to the lab if diagnostic analysis was requested.    FINDINGS:  The initial ultrasound shows a large amount of peritoneal ascites.    Images obtained during catheter placement show the access needle with tip in the peritoneal ascites.      Impression    IMPRESSION:    Successful ultrasound-guided paracentesis, as discussed above.         CT Abdomen Pelvis w Contrast    Narrative    EXAM: CT ABDOMEN PELVIS W CONTRAST  LOCATION: [facility]  DATE: 06/10/2025    INDICATION: Abdominal distension, diffuse pain, chills.  COMPARISON: CTA abdomen and pelvis without/with IV contrast 05/17/2025.  TECHNIQUE: CT scan of the abdomen and pelvis was performed following injection of IV contrast. Multiplanar reformats were obtained. Dose reduction techniques were used.  CONTRAST: 90 mL Isovue 370 IV.    FINDINGS:   LOWER CHEST: Lung bases are clear. No pleural effusion on either side. Normal cardiac size. No pericardial effusion. No significant change.    HEPATOBILIARY: Cirrhotic configuration of the liver with lobulated contour and diffuse fatty infiltration. No discrete lesion. Patent portal veins. The gallbladder is not well-distended. Small-volume abdominopelvic ascites.    PANCREAS: Normal.    SPLEEN: Normal.    ADRENAL GLANDS: Bilateral benign adrenal fatty myelolipomas, stable.    KIDNEYS/BLADDER: No urinary tract calculi. Both kidneys are well-perfused without hydronephrosis or hydroureter. Normal urinary bladder.    BOWEL: Stomach is filled with residual food material. Formed stool material within normal-caliber colon, without mechanical obstruction or free gas. Distal colonic mild  diverticulosis without acute inflammation or secondary complications.    LYMPH NODES: Shotty subcentimeter retroperitoneal nodes, likely reactive, similar to prior.    VASCULATURE: Normal-caliber abdominal aorta and IVC.    PELVIC ORGANS: Small-volume ascites. Slight atherosclerotic changes. Distal colonic diverticulosis.    MUSCULOSKELETAL: Mild diffuse body wall edema. Mild degenerative changes involving the spine and joints of the pelvis. Partial fusion of both SI joints.      Impression    IMPRESSION:   1.  Cirrhotic configuration of the liver with lobulated contour and diffuse fatty infiltration, unchanged. Small-volume abdominopelvic ascites. Mild diffuse body wall edema.    2.  No urinary tract calculi or obstruction. Bilateral benign adrenal fatty myelolipomas.    3.  Distal colonic diverticulosis. No mechanical obstruction or free gas.

## 2025-06-11 NOTE — CONSULTS
"INFECTIOUS DISEASE CONSULT NOTE  Date: 06/11/2025     ================================================================  SUBJECTIVE    HPI  Warren Jaramillo is a 44 year old with recurrent SBP.  PMH cirrhosis, HFpEF, COPD, DM2.    Long-standing ascites, on prophylactic TMP-SMX.  Normally gets paracentesis every few weeks, though recently 3x weekly.  Most recent SBP prior to admission 5/21, culture negative.  He was given a course of meropenem followed by several days of levofloxacin.    Now readmitted 6/10 with more of the same.  He had been about 9 days since his last paracentesis, and his only symptom was fluid overload.  Of note, had transjugular liver biopsy on 6/6.    Past Medical History  Active Ambulatory Problems     Diagnosis Date Noted    Attention deficit hyperactivity disorder (ADHD) 09/12/2007    Other specified delay in development 09/12/2007    Tobacco use 11/12/2007    Elevated WBCs 07/16/2008    Rectal bleeding 09/22/2008    Anal fissure 10/28/2008    \"high flow priapism\"  01/02/2009    CARDIOVASCULAR SCREENING; LDL GOAL LESS THAN 160 10/31/2010    PTSD (post-traumatic stress disorder) 11/16/2010    Fetal alcohol syndromes 11/16/2010    Seasonal allergic rhinitis 08/22/2011    Steatohepatitis 02/04/2023    Cardiomegaly 08/01/2023    Shortness of breath 08/01/2023    Chest pain, unspecified type 08/01/2023    Acute right hip pain 08/18/2023    Chronic right-sided congestive heart failure (H) 11/16/2023    Active autistic disorder 01/23/2012    Adjustment disorder with disturbance of conduct 06/01/2012    Angiomyolipoma 11/22/2023    Ankylosis, sacroiliac joint 03/04/2019    Ascites 08/07/2023    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    Chronic insomnia 07/20/2020    Congestive heart failure (H) 11/14/2023    DM2 (diabetes mellitus, type 2) (H) 04/28/2020    DM2 (diabetes mellitus, type 2) (H) 02/21/2023    Dysphagia 11/22/2023    Dysuria 05/08/2023    Elevated d-dimer 12/09/2019    Episodic " mood disorder 06/20/2023    Excessive daytime sleepiness 07/13/2020    Gait instability 06/21/2023    H/O fall 02/21/2023    Hematuria 04/25/2023    Benign essential hypertension 07/30/2012    Hyperglycemia 04/19/2023    Incontinence 05/17/2023    Myelolipoma of adrenal gland 03/04/2019    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 01/14/2019    Abdominal pain, right upper quadrant 05/08/2024    PVC's (premature ventricular contractions) 08/16/2024    SVT (supraventricular tachycardia) 08/16/2024    Heart failure with preserved ejection fraction, NYHA class I (H) 08/16/2024    Incomplete left bundle branch block (LBBB) 08/16/2024    Suicidal ideation 09/02/2024    Cirrhosis of liver with ascites, unspecified hepatic cirrhosis type (H) 12/02/2024    Thyroid nodule 07/31/2019    ADHD (attention deficit hyperactivity disorder) 06/01/2012    Chest pain 12/09/2019    Morbid obesity (H) 12/02/2024    COPD exacerbation (H) 12/02/2024    DVT of axillary vein, acute left (H) 12/02/2024    Abdominal bloating 01/02/2025    LLQ abdominal pain 01/02/2025    SBP (spontaneous bacterial peritonitis) (H) 01/02/2025    ASNHL (asymmetrical sensorineural hearing loss) 08/06/2024    Traumatic brain injury (H) 06/01/2012    Tongue lesion 01/04/2025    Acute kidney failure, unspecified 01/06/2025    Other specified hypothyroidism 01/11/2025    Medication induced coagulopathy 01/12/2025    Normal anion gap metabolic acidosis 01/14/2025    Diffuse abdominal pain 02/08/2025    Portal hypertension (H) 02/08/2025    Esophagitis 04/03/2025    NSTEMI (non-ST elevated myocardial infarction) (H) 04/03/2025    Other ascites 04/20/2025    RLQ abdominal pain 05/15/2025    Danni-Arnett tear 05/16/2025    Diarrhea, unspecified type 05/16/2025     Resolved Ambulatory Problems     Diagnosis Date Noted    Other viral warts 07/25/2007    Exercise-induced asthma 09/12/2007    Other acne 10/17/2007    Constipation 12/31/2007    Hypersomnia with sleep apnea 12/31/2007     Neuropathy 03/27/2009    Knee joint effusion 07/31/2009    Choking episode 04/03/2025     Past Medical History:   Diagnosis Date    HTN (hypertension) 07/30/2012    Sleep apnea     Rojas's disease (H)        Past Surgical History  He   has a past surgical history that includes colonoscopy; Tooth Extraction; Esophagoscopy, gastroscopy, duodenoscopy (EGD), combined (N/A, 7/21/2023); and Esophagoscopy, gastroscopy, duodenoscopy (EGD), combined (N/A, 4/4/2025).    Social History  he   reports that he has been smoking cigarettes. He started smoking about 21 years ago. He has a 21.4 pack-year smoking history. He has never used smokeless tobacco. He reports that he does not currently use alcohol.  Drug: Marijuana.    Family History  Reviewed and non-contributory  family history includes C.A.D. in his maternal grandfather; Cerebrovascular Disease in his maternal grandfather; Diabetes in his maternal grandfather; Unknown/Adopted in his brother, father, maternal grandmother, paternal grandfather, paternal grandmother, and sister.    Social Needs  Social History     Social History Narrative    Not on file       ================================================================  OBJECTIVE  /55 (BP Location: Left arm)   Pulse 75   Temp 97  F (36.1  C) (Axillary)   Resp 20   Wt 133.7 kg (294 lb 12.8 oz)   SpO2 97%   BMI 49.06 kg/m      Pertinent labs  CBC RESULTS:   Recent Labs   Lab Test 06/11/25  0515   WBC 12.9*   RBC 3.50*   HGB 9.4*   HCT 30.5*   MCV 87   MCH 26.9   MCHC 30.8*   RDW 15.2*           Physical Exam  General: alert, cooperative, no apparent distress  HEENT: atraumatic, normocephalic, no scleral icterus, moist mucus membranes, no cervical lymphadenopathy  Heart: Normal rate, regular rhythm  Lungs: good inspiratory effort  Abdomen: soft, non-tender, distended  Extremities: no peripheral edema, no new rashes or lesions    Imaging  6/10 CT  1.  Cirrhotic configuration of the liver with lobulated  contour and diffuse fatty infiltration, unchanged. Small-volume abdominopelvic ascites. Mild diffuse body wall edema.  2.  No urinary tract calculi or obstruction. Bilateral benign adrenal fatty myelolipomas.  3.  Distal colonic diverticulosis. No mechanical obstruction or free gas.    Microbiology data  6/10 paracentesis   759 PMNs   Culture NGTD  6/11 blood: NGTD      I have personally reviewed the relevant laboratory, imaging, and microbiology data  ================================================================  Assessment  Warren Jaramillo is a 44 year old with recurrent SBP.  PMH cirrhosis, HFpEF, COPD, DM2.    Long-standing ascites, on prophylactic TMP-SMX.  Normally gets paracentesis every few weeks, though recently 3x weekly.  Most recent SBP prior to admission 5/21, culture negative.  He was given a course of meropenem followed by several days of levofloxacin.  Now readmitted 6/10 with more of the same.  He had been about 9 days since his last paracentesis, and his only symptom was fluid overload.  Of note, had transjugular liver biopsy on 6/6.    His ascites PMN count trend is 381 (4/20) --> 690 (516) --> 1,345 (5/23) --> 759 (6/10).  It is unclear why he has such persistent leukocytosis despite multiple rounds of antibiotic treatment.     Possible theories:  1) smoldering inflammation from non-infectious source (e.g. malignancy)  2) Anatomic defect (e.g. GI leak)  3) Secondary to very high frequency of paracenteses (I.e. traumatic)  4) Inadequately treated infection (e.g. resistant organism)  5) atypical infection (e.g. TB, fungal)    Options 4 and 5 seem less likely given exposure history and lack of organism growth on paracentesis culture.    Ultimately, it is very hard NOT to treat him, so feel obligated to pursue another ~7 day course of antibiotic therapy.      Impression  # Recurrent peritonitis   - Ddx infectious vs. Malignancy vs. Inflammatory  # Decompensated cirrhosis   - Recurrent  ascites requiring >1x weekly paracentesis   - ?Rojas's disease vs. EtOH abuse (both are documented)  # HFpEF  # COPD  # DM2    ================================================================  Plan  - Ceftriaxone  - Trend paracentesis cultures  - ID will follow    Zia Bajwa MD, MPH  [facility]   Available via Epic secure chat (preferred) or [facility] paging

## 2025-06-11 NOTE — CONSULTS
GI CONSULT NOTE      Name: Warren Jaramillo  Medical Record #: [id number]  YOB: 1980  Date of Admission: 6/10/2025  Date/Time: 6/11/2025/2:38 PM     CHIEF COMPLAINT: Abdominal distention     HISTORY OF PRESENT ILLNESS: We were asked to see Warren Jaramillo by Dr Rosenstein for evaluation of decompensated liver disease.  This is a very pleasant 44-year-old male with history of hypertension, diabetes, sleep apnea, obesity, COPD, DVT (not on Apixaban), cirrhosis presumed metabolic associated liver disease/possible component of alcohol related, but liver biopsy June 6 with findings of chronic venous outflow obstruction complicated by ascites and spontaneous bacterial peritonitis 5/2025 who presented to the hospital with the chief complaint of abdominal distention and shortness of breath.  The patient has a complicated course with hospitalization in May for SBP despite being on Bactrim for prophylaxis. There was associated acute kidney injury.  Ascites had low SAAG previously (4/20/25 0.8). Cytology 5/15/25 was negative. He was treated with Ceftriaxone and  repeat paracentesis showed persistent high white cell count and bloody aspirate, but cultures negative.  He was switched to meropenem and was prescribed Levaquin at discharge per infectious disease. He reports taking Bactrim for prophylaxis since completing the course of Levaquin. He also reports taking medications exactly as prescribed since discharge, limiting sodium intake <2g/day, and denies using alcohol.     The patient describes having fairly acute onset of shortness of breath 6/10/25.  He has at times felt chilled but denies having fevers.  His energy level has been good.  He is eating well and denies having abdominal pain but reports having discomfort associated with distention.  He has not had any nausea or vomiting.  He is typically passing 2 stools per day.  He has not noted any black or bloody stools.      In the ER, he had  "paracentesis with 5100 mL of yellow ascitic fluid removed.  Fluid analysis shows 1998 nucleated cells 759 absolute neutrophils. SAAG is 0.7 making portal htn less likely.  INR is not elevated at 1.15.  Liver function tests are close to baseline with alk phos elevated to 237 but otherwise LFTs unremarkable.  BC showed mild leukocytosis with white count of 16.4 and down to 12.9 on June 11.  He does have chronic anemia with hemoglobin of 9.4 and MCV of 87.  Platelets are normal at 419,000.  Blood cultures are pending.  CT abdomen and pelvis showed findings of cirrhosis with small volume ascites.  The portal veins are patent.  Patient has been started on ceftriaxone.  Tells me his abdominal distention is better today.  Feels less short of breath.    The patient denies drinking alcohol. Documentation from [facility] clinic visit 5/15/25 report last alcohol use was \"2-4weeks ago.\"  Denies using recreational drugs but does report occasional marijuana use.  He lives in a group home.     Previous evaluation:  -- liver biopsy June 6, 2025 showed findings of chronic venous outflow obstruction and minimal steatosis <5%. Outpatient orders have been placed for duplex US.     --ECHO 3/31/25   1.Left ventricular size, wall motion and function are normal. The ejection  fraction is 60-65%.  2.Normal right ventricle size and systolic function.  3.The left atrium is borderline dilated.  4.No hemodynamically significant valvular abnormalities on 2D or color flow  imaging.  5.Technically difficult, suboptimal study  Compared to the prior study dated 8/2/2023, there have been no changes.If  concerned about cardiac pathology would recommend other imaging modality such  as MICHAEL or MRI.      --Upper endoscopy April 2025 showed findings of portal hypertensive gastropathy but no varices.          REVIEW OF SYSTEMS (ROS): Complete review of systems negative other than listed in HPI.    PAST MEDICAL HISTORY:  Past Medical History:   Diagnosis Date    " COPD exacerbation (H) 12/02/2024    DM2 (diabetes mellitus, type 2) (H) 04/28/2020    HTN (hypertension) 07/30/2012    Sleep apnea     Thyroid nodule 07/31/2019    Rojas's disease (H)         FAMILY HISTORY:  Family History   Problem Relation Age of Onset    Unknown/Adopted Father     Unknown/Adopted Maternal Grandmother     C.A.D. Maternal Grandfather     Diabetes Maternal Grandfather     Cerebrovascular Disease Maternal Grandfather     Unknown/Adopted Paternal Grandmother     Unknown/Adopted Paternal Grandfather     Unknown/Adopted Brother     Unknown/Adopted Sister        SOCIAL HISTORY:        MEDICATIONS PRIOR TO ADMISSION:   Medications Prior to Admission   Medication Sig Dispense Refill Last Dose/Taking    albuterol (PROAIR HFA/PROVENTIL HFA/VENTOLIN HFA) 108 (90 Base) MCG/ACT inhaler Inhale 1-2 puffs into the lungs every 6 hours as needed for shortness of breath, wheezing or cough 18 g 0 Taking As Needed    albuterol (PROVENTIL) (2.5 MG/3ML) 0.083% neb solution Take 2.5 mg by nebulization 3 times daily as needed for shortness of breath, wheezing or cough   Taking As Needed    aloe vera GEL Apply 1 g topically every hour as needed for skin care Per bottle directions   Taking As Needed    bacitracin 500 UNIT/GM OINT Apply topically 3 times daily as needed for wound care   Taking As Needed    Benzocaine (SOLARCAINE ALOE VERA EX) Externally apply topically daily as needed.   Taking As Needed    benzonatate (TESSALON) 200 MG capsule Take 1 capsule (200 mg) by mouth 3 times daily as needed for cough. 50 capsule 0 Taking As Needed    Calamine external lotion Apply topically as needed for itching   Taking As Needed    carbamide peroxide (DEBROX) 6.5 % otic solution Place 5 drops into both ears daily as needed for other (ear wax).   Taking As Needed    clotrimazole (LOTRIMIN) 1 % external cream Apply topically 2 times daily as needed (skin irritation)   Taking As Needed    dextromethorphan-guaiFENesin (MUCINEX DM)   MG 12 hr tablet Take 1 tablet by mouth 2 times daily as needed.   Taking As Needed    diclofenac (VOLTAREN) 1 % topical gel Apply 2 g topically daily as needed for moderate pain To joints/back   Taking As Needed    EPINEPHrine (ANY BX GENERIC EQUIV) 0.3 MG/0.3ML injection 2-pack Inject 0.3 mg into the muscle as needed for anaphylaxis. May repeat one time in 5-15 minutes if response to initial dose is inadequate.   Taking As Needed    famotidine (PEPCID) 20 MG tablet Take 1 tablet (20 mg) by mouth 2 times daily 60 tablet 0 6/10/2025 Morning    furosemide (LASIX) 40 MG tablet Take 0.5 tablets (20 mg) by mouth daily. 30 tablet 0 6/10/2025 Morning    GAVILAX 17 GM/SCOOP powder Take 17 g by mouth daily as needed for constipation.   Taking As Needed    hydrocortisone (CORTAID) 1 % external cream Apply topically daily as needed for itching.   Taking As Needed    Hydrocortisone (PREPARATION H EX) Externally apply topically daily as needed (hemorrhoids).   Taking As Needed    JARDIANCE 10 MG TABS tablet Take 10 mg by mouth every morning.   6/10/2025 Morning    levothyroxine (SYNTHROID/LEVOTHROID) 25 MCG tablet Take 12.5 mcg by mouth every morning (before breakfast).   6/10/2025 Morning    [Paused] lisinopril (ZESTRIL) 10 MG tablet Take 10 mg by mouth every morning.   6/10/2025 Morning    loperamide (IMODIUM) 2 MG capsule Take 4 mg by mouth 4 times daily as needed for diarrhea.   Taking As Needed    loratadine (CLARITIN) 10 MG tablet Take 10 mg by mouth daily as needed for allergies.   Taking As Needed    magnesium hydroxide (MILK OF MAGNESIA) 400 MG/5ML suspension Take 30 mLs by mouth daily as needed for heartburn.   Taking As Needed    metFORMIN (GLUCOPHAGE) 1000 MG tablet Take 1,000 mg by mouth 2 times daily (with meals)   6/10/2025 Evening    methocarbamol (ROBAXIN) 500 MG tablet Take 1 tablet (500 mg) by mouth 4 times daily as needed for muscle spasms. 40 tablet 0 Taking As Needed    montelukast (SINGULAIR) 10 MG  tablet Take 10 mg by mouth every morning.   6/10/2025 Morning    nystatin (MYCOSTATIN) 684051 UNIT/GM external powder Apply topically 2 times daily for 14 days. 30 g 0 6/10/2025 Morning    OLANZapine (ZYPREXA) 10 MG tablet Take 10 mg by mouth At Bedtime   6/9/2025 Bedtime    omeprazole (PRILOSEC) 40 MG DR capsule Take 40 mg by mouth every morning.   6/10/2025 Morning    ondansetron (ZOFRAN ODT) 4 MG ODT tab Take 1 tablet (4 mg) by mouth every 8 hours as needed for nausea. 20 tablet 0 Taking As Needed    pramoxine-calamine (AVEENO) 1-8 % LOTN Apply topically daily as needed for itching.   Taking As Needed    rosuvastatin (CRESTOR) 10 MG tablet Take 10 mg by mouth At Bedtime   6/9/2025 Bedtime    spironolactone (ALDACTONE) 50 MG tablet Take 50 mg by mouth daily.   6/10/2025 Morning    sulfamethoxazole-trimethoprim (BACTRIM DS) 800-160 MG tablet Take 1 tablet by mouth daily.   6/10/2025 Morning    traZODone (DESYREL) 100 MG tablet Take 100 mg by mouth at bedtime.   6/9/2025 Bedtime    TRELEGY ELLIPTA 100-62.5-25 MCG/ACT oral inhaler Inhale 1 puff into the lungs every morning.   6/10/2025 Morning    Urea 40 % CREA Apply topically daily as needed for dry skin.   Taking As Needed    Vitamins A & D (VITAMIN A & D) OINT Externally apply topically daily as needed (general skin health).   Taking As Needed    witch hazel-glycerin (TUCKS) pad Apply topically as needed for hemorrhoids.   Taking As Needed    Continuous Glucose Sensor (FREESTYLE MEGHANN 3 PLUS SENSOR) MISC USE TO READ BLOOD SUGAR TWICE DAILY AND AS NEEDED. CHANGE SENSOR EVERY 15 DAYS             ALLERGIES: Apricot flavoring agent (non-screening), Banana, Bees, Wasp venom protein, Methylphenidate, and Prunus    PHYSICAL EXAM:    /55 (BP Location: Left arm)   Pulse 75   Temp 97  F (36.1  C) (Axillary)   Resp 20   Wt 133.7 kg (294 lb 12.8 oz)   SpO2 96%   BMI 49.06 kg/m      GENERAL: Pleasant, no obvious distress, no jaundice, no asterixis  EYES: No scleral  icterus  LUNGS: Clear to auscultation bilaterally  HEART: S1S2, no lower extremity edema  ABDOMEN: distended. Positive bowel sounds. Soft, non-tender, no guarding/rebound/  MUSKULOSKELETAL:  Warm and well perfused, moves all extremities well  SKIN: No jaundice  NEUROLOGIC: Alert and oriented  PSYCHIATRIC: Normal affect    LAB DATA:  CMP Results:   Recent Labs   Lab Test 06/11/25  1335 06/11/25  0810 06/11/25  0515 06/11/25  0421 06/10/25  1933 06/07/25  1923 06/02/25  0806 06/02/25  0254   NA  --   --  136  --  139 142   < > 140   POTASSIUM  --   --  4.0  --  4.2 4.4   < > 4.4   CHLORIDE  --   --  101  --  103 106   < > 109*   CO2  --   --  25  --  23 23   < > 18*   ANIONGAP  --   --  10  --  13 13   < > 13   * 136* 102*   < > 113* 100*   < > 119*   BUN  --   --  21.4*  --  19.5 20.1*   < > 24.0*   CR  --   --  1.18*  --  1.25* 1.27*   < > 1.36*   BILITOTAL  --   --  0.3  --  0.3  --   --  0.2   ALKPHOS  --   --  237*  --  280*  --   --  296*   ALT  --   --  29  --  32  --   --  73*   AST  --   --  18  --  18  --   --  45    < > = values in this interval not displayed.      CBC  Recent Labs   Lab 06/11/25  0515 06/10/25  1933 06/07/25  1923 06/05/25  1246   WBC 12.9* 16.4* 15.6* 14.8*   RBC 3.50* 3.68* 3.62* 3.70*   HGB 9.4* 9.9* 9.9* 10.3*   HCT 30.5* 31.8* 31.5* 31.6*   MCV 87 86 87 85   MCH 26.9 26.9 27.3 27.8   MCHC 30.8* 31.1* 31.4* 32.6   RDW 15.2* 15.2* 14.9 14.9    428 402 451*     INR  Recent Labs   Lab 06/11/25  0909   INR 1.15      Lipase   Date Value Ref Range Status   06/10/2025 28 13 - 60 U/L Final   05/15/2025 22 13 - 60 U/L Final   05/04/2025 27 13 - 60 U/L Final       IMAGING:  EXAM: CT ABDOMEN PELVIS W CONTRAST  LOCATION: [facility]  DATE: 06/10/2025     INDICATION: Abdominal distension, diffuse pain, chills.  COMPARISON: CTA abdomen and pelvis without/with IV contrast 05/17/2025.  TECHNIQUE: CT scan of the abdomen and pelvis was performed following injection  of IV contrast. Multiplanar reformats were obtained. Dose reduction techniques were used.  CONTRAST: 90 mL Isovue 370 IV.     FINDINGS:   LOWER CHEST: Lung bases are clear. No pleural effusion on either side. Normal cardiac size. No pericardial effusion. No significant change.     HEPATOBILIARY: Cirrhotic configuration of the liver with lobulated contour and diffuse fatty infiltration. No discrete lesion. Patent portal veins. The gallbladder is not well-distended. Small-volume abdominopelvic ascites.     PANCREAS: Normal.     SPLEEN: Normal.     ADRENAL GLANDS: Bilateral benign adrenal fatty myelolipomas, stable.     KIDNEYS/BLADDER: No urinary tract calculi. Both kidneys are well-perfused without hydronephrosis or hydroureter. Normal urinary bladder.     BOWEL: Stomach is filled with residual food material. Formed stool material within normal-caliber colon, without mechanical obstruction or free gas. Distal colonic mild diverticulosis without acute inflammation or secondary complications.     LYMPH NODES: Shotty subcentimeter retroperitoneal nodes, likely reactive, similar to prior.     VASCULATURE: Normal-caliber abdominal aorta and IVC.     PELVIC ORGANS: Small-volume ascites. Slight atherosclerotic changes. Distal colonic diverticulosis.     MUSCULOSKELETAL: Mild diffuse body wall edema. Mild degenerative changes involving the spine and joints of the pelvis. Partial fusion of both SI joints.                                                                      IMPRESSION:   1.  Cirrhotic configuration of the liver with lobulated contour and diffuse fatty infiltration, unchanged. Small-volume abdominopelvic ascites. Mild diffuse body wall edema.     2.  No urinary tract calculi or obstruction. Bilateral benign adrenal fatty myelolipomas.     3.  Distal colonic diverticulosis. No mechanical obstruction or free gas.    ASSESSMENT:  Decompensated liver disease complicated by recurrent spontaneous bacterial  peritonitis-- This 44-year-old male with history of hypertension, diabetes, sleep apnea, obesity, COPD, DVT on Apixaban, cirrhosis presumed metabolic associated liver disease/possible component of alcohol related, but liver biopsy June 6 with findings of chronic venous outflow obstruction complicated by ascites and spontaneous bacterial peritonitis 5/2025 who presented to the hospital with the chief complaint of abdominal distention and shortness of breath. His presentation is similar to May 2025 when he was found to have SBP with negative cultures. He also has a history of low SAAG ascites 4/20/25 when SAAG was 0.8. Despite being treated with Meropenenem followed by Levaquin and Bactrim for prophylaxis after SBP in May, the patient has recurrent SB with SAAG 0.7 (portal hypertension not likely the cause of ascites). Appreciate input from infectious disease-- on Ceftriaxone and will follow cultures. I also added on cytology and AFP. Will give albumin.   Given question of venous outflow obstruction on liver biopsy, will proceed with duplex ultrasound to better assess portal flow/evaluate for Budd chiari syndrome.   Will need to monitor renal function closely- low threshold to involve nephrology with worsening renal function.   No current signs of encephalopathy or gi bleeding .  Today's MELD-NA=13    PLAN:  Continue Ceftriaxone as recommended by infectious disease. Follow cultures from paracentesis.  Give albumin today and day #3.  Cytology has been added on along with AFP.   Follow LFTs, renal function, inr, CBC.   Duplex ultrasound to better assess portal flow/chronic venous outflow obstruction on liver biopsy.   Monitor for signs of gi bleeding and encephalopathy.   <2g Na diet.   Continue Lasix 20mg and Aldactone 50mg.   Continue ppi and H2 blocker.      Total time spent on this encounter=50minutes  Discussed with Dr. Chelsi Santacruz PA-C  Thank you for the  opportunity to participate in the care of this patient.   Please feel free to call me with any questions or concerns.  Phone number [phone].

## 2025-06-11 NOTE — PLAN OF CARE
Problem: Infection  Goal: Absence of Infection Signs and Symptoms  Outcome: Progressing   Goal Outcome Evaluation: VSS on room air. IV antibiotic given as ordered.     Problem: Adult Inpatient Plan of Care  Goal: Optimal Comfort and Wellbeing  Outcome: Progressing  Denies pain    Arrived to unit from ED about 0400. A&O x4, transfers himself from wheelchair to bed without difficulty. Blood and paracentesis fluid cultures pending. Blood sugar=139. Chronic Bipap at night.

## 2025-06-11 NOTE — H&P (VIEW-ONLY)
GI CONSULT NOTE      Name: Warren Jaramillo  Medical Record #: [id number]  YOB: 1980  Date of Admission: 6/10/2025  Date/Time: 6/11/2025/2:38 PM     CHIEF COMPLAINT: Abdominal distention     HISTORY OF PRESENT ILLNESS: We were asked to see Warren Jaramillo by Dr Rosenstein for evaluation of decompensated liver disease.  This is a very pleasant 44-year-old male with history of hypertension, diabetes, sleep apnea, obesity, COPD, DVT (not on Apixaban), cirrhosis presumed metabolic associated liver disease/possible component of alcohol related, but liver biopsy June 6 with findings of chronic venous outflow obstruction complicated by ascites and spontaneous bacterial peritonitis 5/2025 who presented to the hospital with the chief complaint of abdominal distention and shortness of breath.  The patient has a complicated course with hospitalization in May for SBP despite being on Bactrim for prophylaxis. There was associated acute kidney injury.  Ascites had low SAAG previously (4/20/25 0.8). Cytology 5/15/25 was negative. He was treated with Ceftriaxone and  repeat paracentesis showed persistent high white cell count and bloody aspirate, but cultures negative.  He was switched to meropenem and was prescribed Levaquin at discharge per infectious disease. He reports taking Bactrim for prophylaxis since completing the course of Levaquin. He also reports taking medications exactly as prescribed since discharge, limiting sodium intake <2g/day, and denies using alcohol.     The patient describes having fairly acute onset of shortness of breath 6/10/25.  He has at times felt chilled but denies having fevers.  His energy level has been good.  He is eating well and denies having abdominal pain but reports having discomfort associated with distention.  He has not had any nausea or vomiting.  He is typically passing 2 stools per day.  He has not noted any black or bloody stools.      In the ER, he had  "paracentesis with 5100 mL of yellow ascitic fluid removed.  Fluid analysis shows 1998 nucleated cells 759 absolute neutrophils. SAAG is 0.7 making portal htn less likely.  INR is not elevated at 1.15.  Liver function tests are close to baseline with alk phos elevated to 237 but otherwise LFTs unremarkable.  BC showed mild leukocytosis with white count of 16.4 and down to 12.9 on June 11.  He does have chronic anemia with hemoglobin of 9.4 and MCV of 87.  Platelets are normal at 419,000.  Blood cultures are pending.  CT abdomen and pelvis showed findings of cirrhosis with small volume ascites.  The portal veins are patent.  Patient has been started on ceftriaxone.  Tells me his abdominal distention is better today.  Feels less short of breath.    The patient denies drinking alcohol. Documentation from [facility] clinic visit 5/15/25 report last alcohol use was \"2-4weeks ago.\"  Denies using recreational drugs but does report occasional marijuana use.  He lives in a group home.     Previous evaluation:  -- liver biopsy June 6, 2025 showed findings of chronic venous outflow obstruction and minimal steatosis <5%. Outpatient orders have been placed for duplex US.     --ECHO 3/31/25   1.Left ventricular size, wall motion and function are normal. The ejection  fraction is 60-65%.  2.Normal right ventricle size and systolic function.  3.The left atrium is borderline dilated.  4.No hemodynamically significant valvular abnormalities on 2D or color flow  imaging.  5.Technically difficult, suboptimal study  Compared to the prior study dated 8/2/2023, there have been no changes.If  concerned about cardiac pathology would recommend other imaging modality such  as MICHAEL or MRI.      --Upper endoscopy April 2025 showed findings of portal hypertensive gastropathy but no varices.          REVIEW OF SYSTEMS (ROS): Complete review of systems negative other than listed in HPI.    PAST MEDICAL HISTORY:  Past Medical History:   Diagnosis Date    " COPD exacerbation (H) 12/02/2024    DM2 (diabetes mellitus, type 2) (H) 04/28/2020    HTN (hypertension) 07/30/2012    Sleep apnea     Thyroid nodule 07/31/2019    Rojas's disease (H)         FAMILY HISTORY:  Family History   Problem Relation Age of Onset    Unknown/Adopted Father     Unknown/Adopted Maternal Grandmother     C.A.D. Maternal Grandfather     Diabetes Maternal Grandfather     Cerebrovascular Disease Maternal Grandfather     Unknown/Adopted Paternal Grandmother     Unknown/Adopted Paternal Grandfather     Unknown/Adopted Brother     Unknown/Adopted Sister        SOCIAL HISTORY:        MEDICATIONS PRIOR TO ADMISSION:   Medications Prior to Admission   Medication Sig Dispense Refill Last Dose/Taking    albuterol (PROAIR HFA/PROVENTIL HFA/VENTOLIN HFA) 108 (90 Base) MCG/ACT inhaler Inhale 1-2 puffs into the lungs every 6 hours as needed for shortness of breath, wheezing or cough 18 g 0 Taking As Needed    albuterol (PROVENTIL) (2.5 MG/3ML) 0.083% neb solution Take 2.5 mg by nebulization 3 times daily as needed for shortness of breath, wheezing or cough   Taking As Needed    aloe vera GEL Apply 1 g topically every hour as needed for skin care Per bottle directions   Taking As Needed    bacitracin 500 UNIT/GM OINT Apply topically 3 times daily as needed for wound care   Taking As Needed    Benzocaine (SOLARCAINE ALOE VERA EX) Externally apply topically daily as needed.   Taking As Needed    benzonatate (TESSALON) 200 MG capsule Take 1 capsule (200 mg) by mouth 3 times daily as needed for cough. 50 capsule 0 Taking As Needed    Calamine external lotion Apply topically as needed for itching   Taking As Needed    carbamide peroxide (DEBROX) 6.5 % otic solution Place 5 drops into both ears daily as needed for other (ear wax).   Taking As Needed    clotrimazole (LOTRIMIN) 1 % external cream Apply topically 2 times daily as needed (skin irritation)   Taking As Needed    dextromethorphan-guaiFENesin (MUCINEX DM)   MG 12 hr tablet Take 1 tablet by mouth 2 times daily as needed.   Taking As Needed    diclofenac (VOLTAREN) 1 % topical gel Apply 2 g topically daily as needed for moderate pain To joints/back   Taking As Needed    EPINEPHrine (ANY BX GENERIC EQUIV) 0.3 MG/0.3ML injection 2-pack Inject 0.3 mg into the muscle as needed for anaphylaxis. May repeat one time in 5-15 minutes if response to initial dose is inadequate.   Taking As Needed    famotidine (PEPCID) 20 MG tablet Take 1 tablet (20 mg) by mouth 2 times daily 60 tablet 0 6/10/2025 Morning    furosemide (LASIX) 40 MG tablet Take 0.5 tablets (20 mg) by mouth daily. 30 tablet 0 6/10/2025 Morning    GAVILAX 17 GM/SCOOP powder Take 17 g by mouth daily as needed for constipation.   Taking As Needed    hydrocortisone (CORTAID) 1 % external cream Apply topically daily as needed for itching.   Taking As Needed    Hydrocortisone (PREPARATION H EX) Externally apply topically daily as needed (hemorrhoids).   Taking As Needed    JARDIANCE 10 MG TABS tablet Take 10 mg by mouth every morning.   6/10/2025 Morning    levothyroxine (SYNTHROID/LEVOTHROID) 25 MCG tablet Take 12.5 mcg by mouth every morning (before breakfast).   6/10/2025 Morning    [Paused] lisinopril (ZESTRIL) 10 MG tablet Take 10 mg by mouth every morning.   6/10/2025 Morning    loperamide (IMODIUM) 2 MG capsule Take 4 mg by mouth 4 times daily as needed for diarrhea.   Taking As Needed    loratadine (CLARITIN) 10 MG tablet Take 10 mg by mouth daily as needed for allergies.   Taking As Needed    magnesium hydroxide (MILK OF MAGNESIA) 400 MG/5ML suspension Take 30 mLs by mouth daily as needed for heartburn.   Taking As Needed    metFORMIN (GLUCOPHAGE) 1000 MG tablet Take 1,000 mg by mouth 2 times daily (with meals)   6/10/2025 Evening    methocarbamol (ROBAXIN) 500 MG tablet Take 1 tablet (500 mg) by mouth 4 times daily as needed for muscle spasms. 40 tablet 0 Taking As Needed    montelukast (SINGULAIR) 10 MG  tablet Take 10 mg by mouth every morning.   6/10/2025 Morning    nystatin (MYCOSTATIN) 002198 UNIT/GM external powder Apply topically 2 times daily for 14 days. 30 g 0 6/10/2025 Morning    OLANZapine (ZYPREXA) 10 MG tablet Take 10 mg by mouth At Bedtime   6/9/2025 Bedtime    omeprazole (PRILOSEC) 40 MG DR capsule Take 40 mg by mouth every morning.   6/10/2025 Morning    ondansetron (ZOFRAN ODT) 4 MG ODT tab Take 1 tablet (4 mg) by mouth every 8 hours as needed for nausea. 20 tablet 0 Taking As Needed    pramoxine-calamine (AVEENO) 1-8 % LOTN Apply topically daily as needed for itching.   Taking As Needed    rosuvastatin (CRESTOR) 10 MG tablet Take 10 mg by mouth At Bedtime   6/9/2025 Bedtime    spironolactone (ALDACTONE) 50 MG tablet Take 50 mg by mouth daily.   6/10/2025 Morning    sulfamethoxazole-trimethoprim (BACTRIM DS) 800-160 MG tablet Take 1 tablet by mouth daily.   6/10/2025 Morning    traZODone (DESYREL) 100 MG tablet Take 100 mg by mouth at bedtime.   6/9/2025 Bedtime    TRELEGY ELLIPTA 100-62.5-25 MCG/ACT oral inhaler Inhale 1 puff into the lungs every morning.   6/10/2025 Morning    Urea 40 % CREA Apply topically daily as needed for dry skin.   Taking As Needed    Vitamins A & D (VITAMIN A & D) OINT Externally apply topically daily as needed (general skin health).   Taking As Needed    witch hazel-glycerin (TUCKS) pad Apply topically as needed for hemorrhoids.   Taking As Needed    Continuous Glucose Sensor (FREESTYLE MEGHANN 3 PLUS SENSOR) MISC USE TO READ BLOOD SUGAR TWICE DAILY AND AS NEEDED. CHANGE SENSOR EVERY 15 DAYS             ALLERGIES: Apricot flavoring agent (non-screening), Banana, Bees, Wasp venom protein, Methylphenidate, and Prunus    PHYSICAL EXAM:    /55 (BP Location: Left arm)   Pulse 75   Temp 97  F (36.1  C) (Axillary)   Resp 20   Wt 133.7 kg (294 lb 12.8 oz)   SpO2 96%   BMI 49.06 kg/m      GENERAL: Pleasant, no obvious distress, no jaundice, no asterixis  EYES: No scleral  icterus  LUNGS: Clear to auscultation bilaterally  HEART: S1S2, no lower extremity edema  ABDOMEN: distended. Positive bowel sounds. Soft, non-tender, no guarding/rebound/  MUSKULOSKELETAL:  Warm and well perfused, moves all extremities well  SKIN: No jaundice  NEUROLOGIC: Alert and oriented  PSYCHIATRIC: Normal affect    LAB DATA:  CMP Results:   Recent Labs   Lab Test 06/11/25  1335 06/11/25  0810 06/11/25  0515 06/11/25  0421 06/10/25  1933 06/07/25  1923 06/02/25  0806 06/02/25  0254   NA  --   --  136  --  139 142   < > 140   POTASSIUM  --   --  4.0  --  4.2 4.4   < > 4.4   CHLORIDE  --   --  101  --  103 106   < > 109*   CO2  --   --  25  --  23 23   < > 18*   ANIONGAP  --   --  10  --  13 13   < > 13   * 136* 102*   < > 113* 100*   < > 119*   BUN  --   --  21.4*  --  19.5 20.1*   < > 24.0*   CR  --   --  1.18*  --  1.25* 1.27*   < > 1.36*   BILITOTAL  --   --  0.3  --  0.3  --   --  0.2   ALKPHOS  --   --  237*  --  280*  --   --  296*   ALT  --   --  29  --  32  --   --  73*   AST  --   --  18  --  18  --   --  45    < > = values in this interval not displayed.      CBC  Recent Labs   Lab 06/11/25  0515 06/10/25  1933 06/07/25  1923 06/05/25  1246   WBC 12.9* 16.4* 15.6* 14.8*   RBC 3.50* 3.68* 3.62* 3.70*   HGB 9.4* 9.9* 9.9* 10.3*   HCT 30.5* 31.8* 31.5* 31.6*   MCV 87 86 87 85   MCH 26.9 26.9 27.3 27.8   MCHC 30.8* 31.1* 31.4* 32.6   RDW 15.2* 15.2* 14.9 14.9    428 402 451*     INR  Recent Labs   Lab 06/11/25  0909   INR 1.15      Lipase   Date Value Ref Range Status   06/10/2025 28 13 - 60 U/L Final   05/15/2025 22 13 - 60 U/L Final   05/04/2025 27 13 - 60 U/L Final       IMAGING:  EXAM: CT ABDOMEN PELVIS W CONTRAST  LOCATION: [facility]  DATE: 06/10/2025     INDICATION: Abdominal distension, diffuse pain, chills.  COMPARISON: CTA abdomen and pelvis without/with IV contrast 05/17/2025.  TECHNIQUE: CT scan of the abdomen and pelvis was performed following injection  of IV contrast. Multiplanar reformats were obtained. Dose reduction techniques were used.  CONTRAST: 90 mL Isovue 370 IV.     FINDINGS:   LOWER CHEST: Lung bases are clear. No pleural effusion on either side. Normal cardiac size. No pericardial effusion. No significant change.     HEPATOBILIARY: Cirrhotic configuration of the liver with lobulated contour and diffuse fatty infiltration. No discrete lesion. Patent portal veins. The gallbladder is not well-distended. Small-volume abdominopelvic ascites.     PANCREAS: Normal.     SPLEEN: Normal.     ADRENAL GLANDS: Bilateral benign adrenal fatty myelolipomas, stable.     KIDNEYS/BLADDER: No urinary tract calculi. Both kidneys are well-perfused without hydronephrosis or hydroureter. Normal urinary bladder.     BOWEL: Stomach is filled with residual food material. Formed stool material within normal-caliber colon, without mechanical obstruction or free gas. Distal colonic mild diverticulosis without acute inflammation or secondary complications.     LYMPH NODES: Shotty subcentimeter retroperitoneal nodes, likely reactive, similar to prior.     VASCULATURE: Normal-caliber abdominal aorta and IVC.     PELVIC ORGANS: Small-volume ascites. Slight atherosclerotic changes. Distal colonic diverticulosis.     MUSCULOSKELETAL: Mild diffuse body wall edema. Mild degenerative changes involving the spine and joints of the pelvis. Partial fusion of both SI joints.                                                                      IMPRESSION:   1.  Cirrhotic configuration of the liver with lobulated contour and diffuse fatty infiltration, unchanged. Small-volume abdominopelvic ascites. Mild diffuse body wall edema.     2.  No urinary tract calculi or obstruction. Bilateral benign adrenal fatty myelolipomas.     3.  Distal colonic diverticulosis. No mechanical obstruction or free gas.    ASSESSMENT:  Decompensated liver disease complicated by recurrent spontaneous bacterial  peritonitis-- This 44-year-old male with history of hypertension, diabetes, sleep apnea, obesity, COPD, DVT on Apixaban, cirrhosis presumed metabolic associated liver disease/possible component of alcohol related, but liver biopsy June 6 with findings of chronic venous outflow obstruction complicated by ascites and spontaneous bacterial peritonitis 5/2025 who presented to the hospital with the chief complaint of abdominal distention and shortness of breath. His presentation is similar to May 2025 when he was found to have SBP with negative cultures. He also has a history of low SAAG ascites 4/20/25 when SAAG was 0.8. Despite being treated with Meropenenem followed by Levaquin and Bactrim for prophylaxis after SBP in May, the patient has recurrent SB with SAAG 0.7 (portal hypertension not likely the cause of ascites). Appreciate input from infectious disease-- on Ceftriaxone and will follow cultures. I also added on cytology and AFP. Will give albumin.   Given question of venous outflow obstruction on liver biopsy, will proceed with duplex ultrasound to better assess portal flow/evaluate for Budd chiari syndrome.   Will need to monitor renal function closely- low threshold to involve nephrology with worsening renal function.   No current signs of encephalopathy or gi bleeding .  Today's MELD-NA=13    PLAN:  Continue Ceftriaxone as recommended by infectious disease. Follow cultures from paracentesis.  Give albumin today and day #3.  Cytology has been added on along with AFP.   Follow LFTs, renal function, inr, CBC.   Duplex ultrasound to better assess portal flow/chronic venous outflow obstruction on liver biopsy.   Monitor for signs of gi bleeding and encephalopathy.   <2g Na diet.   Continue Lasix 20mg and Aldactone 50mg.   Continue ppi and H2 blocker.      Total time spent on this encounter=50minutes  Discussed with Dr. Chelsi Santacruz PA-C  Thank you for the  opportunity to participate in the care of this patient.   Please feel free to call me with any questions or concerns.  Phone number [phone].

## 2025-06-11 NOTE — PLAN OF CARE
Goal Outcome Evaluation:      Plan of Care Reviewed With: guardian, caregiver          Outcome Evaluation: Anticipate discharge back to [facility] when medically ready.

## 2025-06-11 NOTE — PROGRESS NOTES
Per patient, uses BiPAP w/ 20/16 settings at home. Didn't bring his unit. Requested replacement. Set him up with hosp V60, see details below. Fitted with large face mask. Tolerating set-up at the time of this note.      06/11/25 0505   Mode: CPAP/ BiPAP/ AVAPS/ AVAPS AE   CPAP/BiPAP/ AVAPS/ AVAPS AE Mode BiPAP S/T   Equipment   Device V60   CPAP/BiPAP/Settings   IPAP/EPAP (cmH2O) 20/16   Oxygen (%) 21       /62 (BP Location: Right arm, Patient Position: Supine, Cuff Size: Adult Regular)   Pulse 84   Temp 98.3  F (36.8  C) (Oral)   Resp 22   Wt 133.7 kg (294 lb 12.8 oz)   SpO2 98%   BMI 49.06 kg/m       Sin Page, RRT

## 2025-06-11 NOTE — MEDICATION SCRIBE - ADMISSION MEDICATION HISTORY
Medication Scribe Admission Medication History    Admission medication history is complete. The information provided in this note is only as accurate as the sources available at the time of the update.    Information Source(s): Patient via in-person    Pertinent Information: PTA med list updated per patient.  Patient states that he took all of his morning medication and his evening Metformin prior coming to ED.    Changes made to PTA medication list:  Added: None  Deleted: Fluoxetine (per patient and care giver over the phone)  Changed: None    Allergies reviewed with patient and updates made in EHR: yes    Medication History Completed By: Deric Galeano 6/11/2025 12:51 AM    PTA Med List   Medication Sig Last Dose/Taking    albuterol (PROAIR HFA/PROVENTIL HFA/VENTOLIN HFA) 108 (90 Base) MCG/ACT inhaler Inhale 1-2 puffs into the lungs every 6 hours as needed for shortness of breath, wheezing or cough Taking As Needed    albuterol (PROVENTIL) (2.5 MG/3ML) 0.083% neb solution Take 2.5 mg by nebulization 3 times daily as needed for shortness of breath, wheezing or cough Taking As Needed    aloe vera GEL Apply 1 g topically every hour as needed for skin care Per bottle directions Taking As Needed    bacitracin 500 UNIT/GM OINT Apply topically 3 times daily as needed for wound care Taking As Needed    Benzocaine (SOLARCAINE ALOE VERA EX) Externally apply topically daily as needed. Taking As Needed    benzonatate (TESSALON) 200 MG capsule Take 1 capsule (200 mg) by mouth 3 times daily as needed for cough. Taking As Needed    Calamine external lotion Apply topically as needed for itching Taking As Needed    carbamide peroxide (DEBROX) 6.5 % otic solution Place 5 drops into both ears daily as needed for other (ear wax). Taking As Needed    clotrimazole (LOTRIMIN) 1 % external cream Apply topically 2 times daily as needed (skin irritation) Taking As Needed    dextromethorphan-guaiFENesin (MUCINEX DM)  MG 12 hr  tablet Take 1 tablet by mouth 2 times daily as needed. Taking As Needed    diclofenac (VOLTAREN) 1 % topical gel Apply 2 g topically daily as needed for moderate pain To joints/back Taking As Needed    EPINEPHrine (ANY BX GENERIC EQUIV) 0.3 MG/0.3ML injection 2-pack Inject 0.3 mg into the muscle as needed for anaphylaxis. May repeat one time in 5-15 minutes if response to initial dose is inadequate. Taking As Needed    famotidine (PEPCID) 20 MG tablet Take 1 tablet (20 mg) by mouth 2 times daily 6/10/2025 Morning    furosemide (LASIX) 40 MG tablet Take 0.5 tablets (20 mg) by mouth daily. 6/10/2025 Morning    GAVILAX 17 GM/SCOOP powder Take 17 g by mouth daily as needed for constipation. Taking As Needed    hydrocortisone (CORTAID) 1 % external cream Apply topically daily as needed for itching. Taking As Needed    Hydrocortisone (PREPARATION H EX) Externally apply topically daily as needed (hemorrhoids). Taking As Needed    JARDIANCE 10 MG TABS tablet Take 10 mg by mouth every morning. 6/10/2025 Morning    levothyroxine (SYNTHROID/LEVOTHROID) 25 MCG tablet Take 12.5 mcg by mouth every morning (before breakfast). 6/10/2025 Morning    [Paused] lisinopril (ZESTRIL) 10 MG tablet Take 10 mg by mouth every morning. 6/10/2025 Morning    loperamide (IMODIUM) 2 MG capsule Take 4 mg by mouth 4 times daily as needed for diarrhea. Taking As Needed    loratadine (CLARITIN) 10 MG tablet Take 10 mg by mouth daily as needed for allergies. Taking As Needed    magnesium hydroxide (MILK OF MAGNESIA) 400 MG/5ML suspension Take 30 mLs by mouth daily as needed for heartburn. Taking As Needed    metFORMIN (GLUCOPHAGE) 1000 MG tablet Take 1,000 mg by mouth 2 times daily (with meals) 6/10/2025 Evening    methocarbamol (ROBAXIN) 500 MG tablet Take 1 tablet (500 mg) by mouth 4 times daily as needed for muscle spasms. Taking As Needed    montelukast (SINGULAIR) 10 MG tablet Take 10 mg by mouth every morning. 6/10/2025 Morning    nystatin  (MYCOSTATIN) 627553 UNIT/GM external powder Apply topically 2 times daily for 14 days. 6/10/2025 Morning    OLANZapine (ZYPREXA) 10 MG tablet Take 10 mg by mouth At Bedtime 6/9/2025 Bedtime    omeprazole (PRILOSEC) 40 MG DR capsule Take 40 mg by mouth every morning. 6/10/2025 Morning    ondansetron (ZOFRAN ODT) 4 MG ODT tab Take 1 tablet (4 mg) by mouth every 8 hours as needed for nausea. Taking As Needed    pramoxine-calamine (AVEENO) 1-8 % LOTN Apply topically daily as needed for itching. Taking As Needed    rosuvastatin (CRESTOR) 10 MG tablet Take 10 mg by mouth At Bedtime 6/9/2025 Bedtime    spironolactone (ALDACTONE) 50 MG tablet Take 50 mg by mouth daily. 6/10/2025 Morning    sulfamethoxazole-trimethoprim (BACTRIM DS) 800-160 MG tablet Take 1 tablet by mouth daily. 6/10/2025 Morning    traZODone (DESYREL) 100 MG tablet Take 100 mg by mouth at bedtime. 6/9/2025 Bedtime    TRELEGY ELLIPTA 100-62.5-25 MCG/ACT oral inhaler Inhale 1 puff into the lungs every morning. 6/10/2025 Morning    Urea 40 % CREA Apply topically daily as needed for dry skin. Taking As Needed    Vitamins A & D (VITAMIN A & D) OINT Externally apply topically daily as needed (general skin health). Taking As Needed    witch hazel-glycerin (TUCKS) pad Apply topically as needed for hemorrhoids. Taking As Needed

## 2025-06-11 NOTE — ED NOTES
[facility] ED Handoff Report    ED Chief Complaint: Flank pain, abdominal pain, breathing problem    ED Diagnosis:  (K65.2) SBP (spontaneous bacterial peritonitis) (H)  Comment:   Plan:        PMH:    Past Medical History:   Diagnosis Date    COPD exacerbation (H) 12/02/2024    DM2 (diabetes mellitus, type 2) (H) 04/28/2020    HTN (hypertension) 07/30/2012    Sleep apnea     Thyroid nodule 07/31/2019    Rojas's disease (H)         Code Status:  Prior     Falls Risk: No Band: Not applicable    Current Living Situation/Residence: lives in a group home     Elimination Status: Continent: Yes     Activity Level: Independent    Patients Preferred Language:  English     Needed: No    Vital Signs:  /60   Pulse 96   Temp 97.9  F (36.6  C) (Temporal)   Resp 20   Wt 133.7 kg (294 lb 12.8 oz)   SpO2 97%   BMI 49.06 kg/m       Cardiac Rhythm: N/A    Pain Score: 4/10    Is the Patient Confused:  No    Last Food or Drink: 06/11/25 at 0130    Focused Assessment:  Pt's only complaints at this time are RLQ abdominal pain and abdominal bloating.    Tests Performed: Done: Labs and Imaging    Treatments Provided:  Albumin, Zosyn    Family Dynamics/Concerns: No    Family Updated On Visitor Policy: No    Plan of Care Communicated to Family: No    Who Was Updated about Plan of Care: Group home staff by pt    Belongings Checklist Done and Signed by Patient: Yes    Medications sent with patient: None      Additional Information: Pt is A&Ox4 and independent.    Noel Kapoor RN 6/11/2025 1:37 AM

## 2025-06-11 NOTE — PLAN OF CARE
"Goal Outcome Evaluation:      Problem: Adult Inpatient Plan of Care  Goal: Patient-Specific Goal (Individualized)  Description: You can add care plan individualizations to a care plan. Examples of Individualization might be:  \"Parent requests to be called daily at 9am for status\", \"I have a hard time hearing out of my right ear\", or \"Do not touch me to wake me up as it startles  me\".  Outcome: Progressing     Problem: Adult Inpatient Plan of Care  Goal: Absence of Hospital-Acquired Illness or Injury  Outcome: Progressing     Problem: Adult Inpatient Plan of Care  Goal: Optimal Comfort and Wellbeing  Outcome: Progressing  A/O x4. VSS. Denied pain. BG WNL. Independent with ADLs. Good oral intake.                                   "

## 2025-06-11 NOTE — CONSULTS
Care Management Initial Consult    General Information  Assessment completed with: Caregiver, Other (guardian),    Type of CM/SW Visit: Initial Assessment    Primary Care Provider verified and updated as needed: Yes   Readmission within the last 30 days:  (yes)      Reason for Consult: discharge planning  Advance Care Planning:            Communication Assessment  Patient's communication style: spoken language (English or Bilingual)    Hearing Difficulty or Deaf: no   Wear Glasses or Blind: yes    Cognitive  Cognitive/Neuro/Behavioral: WDL                      Living Environment:   People in home: facility resident     Current living Arrangements: group home      Able to return to prior arrangements: yes       Family/Social Support:  Care provided by: self, other (see comments)  Provides care for: no one, unable/limited ability to care for self  Marital Status: Single  Support system: Parent(s)          Description of Support System: Supportive, Involved         Current Resources:   Patient receiving home care services: No        Community Resources:    Equipment currently used at home: glucometer  Supplies currently used at home: None    Employment/Financial:  Employment Status: unemployed        Financial Concerns: none   Referral to Financial Worker: No       Does the patient's insurance plan have a 3 day qualifying hospital stay waiver?  No    Lifestyle & Psychosocial Needs:  Social Drivers of Health     Food Insecurity: Low Risk  (6/11/2025)    Food Insecurity     Within the past 12 months, did you worry that your food would run out before you got money to buy more?: No     Within the past 12 months, did the food you bought just not last and you didn t have money to get more?: No   Depression: Not at risk (2/11/2025)    PHQ-2     PHQ-2 Score: 0   Housing Stability: High Risk (6/11/2025)    Housing Stability     Do you have housing? : Yes     Are you worried about losing your housing?: Yes   Tobacco Use: High  Risk (6/6/2025)    Received from [facility]    Patient History     Smoking Tobacco Use: Every Day     Smokeless Tobacco Use: Never     Passive Exposure: Not on file   Financial Resource Strain: High Risk (6/11/2025)    Financial Resource Strain     Within the past 12 months, have you or your family members you live with been unable to get utilities (heat, electricity) when it was really needed?: Yes   Alcohol Use: Not At Risk (9/22/2022)    Received from [facility]    AUDIT-C     Frequency of Alcohol Consumption: Never     Average Number of Drinks: Not on file     Frequency of Binge Drinking: Not on file   Transportation Needs: Low Risk  (6/11/2025)    Transportation Needs     Within the past 12 months, has lack of transportation kept you from medical appointments, getting your medicines, non-medical meetings or appointments, work, or from getting things that you need?: No   Physical Activity: Not on file   Interpersonal Safety: Low Risk  (6/11/2025)    Interpersonal Safety     Do you feel physically and emotionally safe where you currently live?: Yes     Within the past 12 months, have you been hit, slapped, kicked or otherwise physically hurt by someone?: No     Within the past 12 months, have you been humiliated or emotionally abused in other ways by your partner or ex-partner?: No   Stress: Not on file   Social Connections: Unknown (5/1/2023)    [facility] [facility]    Social Connections     Frequency of Communication with Friends and Family: Not on file   Health Literacy: Not on file       Functional Status:  Prior to admission patient needed assistance:   Dependent ADLs:: Independent  Dependent IADLs:: Cleaning, Cooking, Laundry, Shopping, Meal Preparation, Medication Management, Money Management, Transportation       Mental Health Status:  Mental Health Status: No Current Concerns       Chemical Dependency  Status:  Chemical Dependency Status: No Current Concerns             Values/Beliefs:  Spiritual, Cultural Beliefs, Restorationism Practices, Values that affect care: no               Discussed  Partnership in Safe Discharge Planning  document with patient/family: No    Additional Information:  GABBY reviewed chart. Called and spoke with Jesika Harden, patient's guardian. She confirms patient lives at [facility] group home. GABBY called and spoke with Matilde  (ph: [phone]). Matilde reports patient is able to ambulate independently without assistive device. He is mostly independent with ADLs, will sometimes call for help with toileting or showering. Group home staff assists with IADLs. Matilde reports patient works with an  in the community. Group home staff provides transportation and patient also uses [employer]. Matilde reports that if staff is available at discharge, they will transport him home. Matilde requests that  calls with updates related to discharge planning.     Next Steps: coordinate discharge back to [facility] when medically ready.     YOEL Allred

## 2025-06-11 NOTE — PROGRESS NOTES
Resident/Fellow Attestation   I, Bernarda Crespo MD, was present with the medical/TALIA student who participated in the service and in the documentation of the note.  I have verified the history and personally performed the physical exam and medical decision making.  I agree with the assessment and plan of care as documented in the note.      Bernarda Crespo MD  PGY1  Date of Service (when I saw the patient): 06/11/25    [facility]    Progress Note - Hospitalist Service    Date of Admission:  6/10/2025    Assessment & Plan   Warren Jaramillo is a 44 year old male admitted on 6/10/2025. He has a history of cirrhosis with ascites requiring frequent paracentesis, Rojas disease, T2DM, HTN, AVA, COPD and is admitted for abdominal distention and SOB found to have significantly elevated ANC and ascitic fluid concerning for SBP.     Cirrhosis with ascites, requiring frequent paracentesis  Abdominal discomfort  Concern for resistant SBP  Possible venous outflow obstruction  Patient has history of cirrhosis, recently reports requiring more frequent paracentesis now twice weekly over the past 3-4 months.  He presented with abdominal distention, discomfort and shortness of breath.  Patient notes that he was due for paracentesis on 6/11, 5 L were removed in the ED and sent for analysis.  On admission, he showed me a letter from [facility] noting possible new diagnosis of venous outflow obstruction based on a transjugular liver biopsy performed on 6/6, question if this may be contributing to his worsening ascites.  Also ascitic fluid showed ANC of 759.  Patient was hospitalized from 5/15 - 5/26 for concern of persistent SBP with ANC in the 1000's despite treatment with ceftriaxone followed by meropenem with clinical improvement but with worsening ANC.  He was discharged on oral Levaquin with planned repeat ascitic fluid studies and consideration of other causes.  He reports only one episode of chills 2 days  ago, no fever and white blood count elevated at 16 on admission, improved to 12 today (6/11). CT abdomen pelvis showed cirrhotic changes of the liver but otherwise unremarkable. Vitals and exam reassuring. SAAG score is 0.7, indicating cause of ascites is not necessarily portal hypertension.  - Follow ascitic fluid studies  - Follow blood cultures  - Paracentesis in the ED, received albumin x 1  - Daily CBC   - Continue IV Zosyn  - GI consult, appreciate recs  - Patient has scheduled liver Doppler ultrasound outpatient with [facility] to evaluate for venous outflow obstruction.   - PTA Lasix & spironolactone    RIVERA  History of RIVERA with creatinine peaking at 7.0 during recent admission 3-4 weeks ago, at that time thought to be secondary to contrast-induced nephropathy.  Now it is improved closet to baseline at 1.2 will continue to monitor closely.    -Daily BMP  -Avoid nephrotoxic meds    COPD  -Continue PTA inhalers    Chronic normocytic Anemia   Hx of GIB  Hemoglobin 9.9, close to baseline.  No symptoms or signs of active bleeding.  -Daily hemoglobin    T2DM  A1c 6.7. PTA metformin and jardiance.   - hold PTA meds  - MDSSI    Chronic conditions:   History of DVT: Provoked completed 3 months of apixaban discontinued on  recent admission 5/25 follow-up with PCP  HTN: Continue PTA spironolactone  Insomnia: Continue PTA trazodone  Mental health; continue PTA Zyprexa  GERD: continue PTA Protonix  HLD: Continue PTA rosuvastatin  COPD: Continue PTA inhalers         Diet: Combination Diet Regular Diet Adult    DVT Prophylaxis: Pneumatic Compression Devices  Hkan Catheter: Not present  Fluids: PO  Lines: None     Cardiac Monitoring: None  Code Status: Full Code      Clinically Significant Risk Factors Present on Admission                   # Hypertension: Noted on problem list  # Chronic heart failure with preserved ejection fraction: heart failure noted on problem list and last echo with EF >50%     # Anemia: based on hgb  "<11      # DMII: A1C = 6.7 % (Ref range: <5.7 %) within past 6 months    # Morbid Obesity: Estimated body mass index is 49.06 kg/m  as calculated from the following:    Height as of 6/2/25: 1.651 m (5' 5\").    Weight as of this encounter: 133.7 kg (294 lb 12.8 oz).       # Financial/Environmental Concerns: none         Social Drivers of Health   Housing Stability: High Risk (6/11/2025)    Housing Stability     Do you have housing? : Yes     Are you worried about losing your housing?: Yes   Tobacco Use: High Risk (6/6/2025)    [facility]    Patient History     Smoking Tobacco Use: Every Day     Smokeless Tobacco Use: Never   Financial Resource Strain: High Risk (6/11/2025)    Financial Resource Strain     Within the past 12 months, have you or your family members you live with been unable to get utilities (heat, electricity) when it was really needed?: Yes    Received from [employer] [facility]    Social Connections         Disposition Plan     Medically Ready for Discharge: Anticipated in 2-4 Days         The patient's care was discussed with the Attending Physician, Dr. Rosenstein.    Huey Eng  Medical Student  Hospitalist Service  [facility]  Securely message with [place]  (more info)  Text page via [employer] Paging/Directory   ______________________________________________________________________    Interval History   No adverse events overnight. Reports he is no longer feeling fullness near the bottom of his diaphragm that he associates with increased ascitic fluid. He states he feels like he has ~5lbs more to lose for fluid, and has minimally associated RUQ abdominal pain. He has had no fevers, chills. No diarrhea. No constipation.    Physical Exam   Vital Signs: Temp: 97  F (36.1  C) Temp src: Axillary BP: 120/55 Pulse: 75   Resp: 20 SpO2: 97 % O2 Device: BiPAP/CPAP    Weight: 294 lbs 12.8 oz    Constitutional: " awake, alert, cooperative, no apparent distress, and appears stated age  Respiratory: No increased work of breathing, good air exchange, clear to auscultation bilaterally, no crackles or wheezing  Cardiovascular: Normal apical impulse, regular rate and rhythm, normal S1 and S2, no murmur noted  GI: normal bowel sounds, distended, non-tender, and ascites present. Dull to percussion in the lower abdomen. Tympanic in the LUQ.    Medical Decision Making       Please see A&P for additional details of medical decision making.      Data   ------------------------- PAST 24 HR DATA REVIEWED -----------------------------------------------    I have personally reviewed the following data over the past 24 hrs:    12.9 (H)  \   9.4 (L)   / 419     136 101 21.4 (H) /  136 (H)   4.0 25 1.18 (H) \     ALT: 29 AST: 18 AP: 237 (H) TBILI: 0.3   ALB: 3.6 TOT PROTEIN: 6.0 (L) LIPASE: 28     Trop: N/A BNP: 795 (H)     INR:  1.15 PTT:  N/A   D-dimer:  N/A Fibrinogen:  N/A       Imaging results reviewed over the past 24 hrs:   Recent Results (from the past 24 hours)   US Paracentesis without Albumin    Narrative    [facility]  LOCATION: [facility]  DATE: 6/10/2025    PROCEDURE: IMAGING GUIDED PARACENTESIS    INTERVENTIONAL RADIOLOGIST: Zia Schaefer MD.     INDICATION: 44-year-old male with hypoxia in the setting of recurrent large-volume ascites.    CONSENT: The risks, benefits and alternatives of an imaging guided paracentesis were discussed with the patient  in detail. All questions were answered. Informed consent was given to proceed with the procedure.    MODERATE SEDATION: None.    ADDITIONAL MEDICATIONS: None.    COMPLICATIONS: No immediate complications.    PROCEDURE:    A limited ultrasound was performed for localization purposes.    Using sterile technique 10 mL of Xylocaine was infused into the local soft tissues. Under direct ultrasound guidance a 5 Tamazight Xamplified catheter was inserted into the  ascitic fluid.    A total of 5100 mL of clear yellow ascitic fluid was removed and sent to the lab if diagnostic analysis was requested.    FINDINGS:  The initial ultrasound shows a large amount of peritoneal ascites.    Images obtained during catheter placement show the access needle with tip in the peritoneal ascites.      Impression    IMPRESSION:    Successful ultrasound-guided paracentesis, as discussed above.         CT Abdomen Pelvis w Contrast    Narrative    EXAM: CT ABDOMEN PELVIS W CONTRAST  LOCATION: [facility]  DATE: 06/10/2025    INDICATION: Abdominal distension, diffuse pain, chills.  COMPARISON: CTA abdomen and pelvis without/with IV contrast 05/17/2025.  TECHNIQUE: CT scan of the abdomen and pelvis was performed following injection of IV contrast. Multiplanar reformats were obtained. Dose reduction techniques were used.  CONTRAST: 90 mL Isovue 370 IV.    FINDINGS:   LOWER CHEST: Lung bases are clear. No pleural effusion on either side. Normal cardiac size. No pericardial effusion. No significant change.    HEPATOBILIARY: Cirrhotic configuration of the liver with lobulated contour and diffuse fatty infiltration. No discrete lesion. Patent portal veins. The gallbladder is not well-distended. Small-volume abdominopelvic ascites.    PANCREAS: Normal.    SPLEEN: Normal.    ADRENAL GLANDS: Bilateral benign adrenal fatty myelolipomas, stable.    KIDNEYS/BLADDER: No urinary tract calculi. Both kidneys are well-perfused without hydronephrosis or hydroureter. Normal urinary bladder.    BOWEL: Stomach is filled with residual food material. Formed stool material within normal-caliber colon, without mechanical obstruction or free gas. Distal colonic mild diverticulosis without acute inflammation or secondary complications.    LYMPH NODES: Shotty subcentimeter retroperitoneal nodes, likely reactive, similar to prior.    VASCULATURE: Normal-caliber abdominal aorta and IVC.    PELVIC  ORGANS: Small-volume ascites. Slight atherosclerotic changes. Distal colonic diverticulosis.    MUSCULOSKELETAL: Mild diffuse body wall edema. Mild degenerative changes involving the spine and joints of the pelvis. Partial fusion of both SI joints.      Impression    IMPRESSION:   1.  Cirrhotic configuration of the liver with lobulated contour and diffuse fatty infiltration, unchanged. Small-volume abdominopelvic ascites. Mild diffuse body wall edema.    2.  No urinary tract calculi or obstruction. Bilateral benign adrenal fatty myelolipomas.    3.  Distal colonic diverticulosis. No mechanical obstruction or free gas.

## 2025-06-11 NOTE — ED PROVIDER NOTES
EMERGENCY DEPARTMENT ENCOUNTER      NAME: Warren Jaramillo  AGE: 44 year old male  YOB: 1980  MRN: [id number]  EVALUATION DATE & TIME: 6/10/2025  6:38 PM    PCP: Mary Kelly    ED PROVIDER: Ean Muir D.O.      Chief Complaint   Patient presents with    Abdominal Pain    Breathing Problem    Flank Pain              FINAL IMPRESSION:  1. SBP (spontaneous bacterial peritonitis) (H)        ED COURSE & MEDICAL DECISION MAKIN:40 PM I met with the patient to gather history and to perform my initial exam. I discussed the plan for care while in the Emergency Department.  7:03 PM IR reported that they will come down to perform the paracentesis on the patient.  11:16 PM I spoke with the patient. I attempted to transfer the patient for admission. The patient will be admitted for antibiotics.  11:58 PM I spoke with Dr. Saeed, [facility], regarding plan for admission. The patient is accepted for admission.       Pertinent Labs & Imaging studies reviewed. (See chart for details)  44 year old male presents to the Emergency Department for evaluation of abdominal distention with known history of ascites.  Patient has been previously drained.  He came in today due to shortness of breath due to his abdominal distention.  Clinically there is no concern for pneumonia, PE, pneumothorax, or other acute process of his shortness of breath I do believe it to be secondary to the severe distention secondary to his ascites.  However the patient did report some chills with his symptoms, therefore I did decide to perform labs today.  Does have a significantly elevated white count at 16.4, was taken for paracentesis.  On the paracentesis, we do see obvious elevation in his neutrophil count, raising concern for the potential for SBP.  Because of this, antibiotics have been started and I will admit for further management as he does have significant potential for SBP with the findings on lab testing.   "Discussed with the resident team who agreed to the admission.    Medical Decision Making  Care impacted by Liver Disease  Admit.    MIPS (CTPE, Dental pain, Khan, Sinusitis, Asthma/COPD, Head Trauma): Not Applicable    SEPSIS: None          At the conclusion of the encounter I discussed the results of all of the tests and the disposition. The questions were answered. The patient or family acknowledged understanding and was agreeable with the care plan.      HPI    Patient information was obtained from: The patient    Use of : N/A       Warren Jaramillo is a 44 year old male with PMHx of NSTEMI, portal hypertension, TBI, DVT, non-alcoholic liver cirrhosis with ascites, CHF, CKD, TUD, HFpEF, upper GIB, Rojas's disease, DMII, HTN and COPD, who presents with abdominal distention, difficulty breathing and flank pain.    The patient reports that his abdomen is very distended and feels like his abdomen is full of fluid, and believes he needs a paracentesis here in the ED. He states that the last time he had a paracentesis on 06/02/2025 and notes that they drained 6 liters of fluids off his abdomen. He reports that he was told to cancel his next paracentesis appointment on 06/04/2025, and explains that he has not had a paracentesis since the 06/02. He adds that he feels like his abdomen is \"rock hard\". States that he has a hard time breathing.    He mentions that he had a liver biopsy on Friday (06/06/2025) at [facility] and mentions that they punctured and went through his \"transjugular\" vein in his right-sided neck rather than through the abdomen, to get to his liver and biopsy the liver. Notes that he underwent four liver biopsies. He was having a hard time turning his neck following this liver biopsy and mentions that that he presented to the ED with the complaint of neck pain on Sunday (06/08/2025), and indicates that he was prescribed ibuprofen and Tylenol for the pain. Per chart " "review, the patient presented to [facility] ED on 6/7/2025 for neck pain.    He reports that his provider reached out to him through the patient portal today (06/10/2025) and notes that he was notified about his liver biopsy results, and indicates that he was told he had \"venous outflow blockage\".    He's been feeling hot and cold/chills (hot chandler cold flashes). States that he feels like his \"internal organs is literally on fire\" now. This morning, he started experiencing right-sided flank pain when he woke up.    Mentions that he was hospitalized 1-2 weeks ago, and notes that he was told that he was in \"kidney failure\" because of his kidney function testing results.    The patient states that he has non-alcoholic liver cirrhosis (aka VILA). He explains that he feels like his liver is \"leeching: and \"leaking fluid into [his] abdomen\". Reports that he was a heavy drinker in 2016 and was drinking three 24-packs of alcohol for one year, but states that his provider suspected that it is very unlikely that his liver cirrhosis was caused by alcohol use. No additional complaints or concerns reported at this time.      PAST MEDICAL HISTORY:  Past Medical History:   Diagnosis Date    COPD exacerbation (H) 12/02/2024    DM2 (diabetes mellitus, type 2) (H) 04/28/2020    HTN (hypertension) 07/30/2012    Sleep apnea     Thyroid nodule 07/31/2019    Rojas's disease (H)        PAST SURGICAL HISTORY:  Past Surgical History:   Procedure Laterality Date    COLONOSCOPY      ESOPHAGOSCOPY, GASTROSCOPY, DUODENOSCOPY (EGD), COMBINED N/A 7/21/2023    Procedure: ESOPHAGOGASTRODUODENOSCOPY WITH GASTRIC AND ESOPHAGEAL BIOPSIES;  Surgeon: Filiberto Aragon MD;  Location: [facility] OR    ESOPHAGOSCOPY, GASTROSCOPY, DUODENOSCOPY (EGD), COMBINED N/A 4/4/2025    Procedure: ESOPHAGOGASTRODUODENOSCOPY;  Surgeon: Ramesh Talbot MD;  Location: [facility] OR    TOOTH EXTRACTION           CURRENT MEDICATIONS:    Current " Facility-Administered Medications   Medication Dose Route Frequency Provider Last Rate Last Admin    piperacillin-tazobactam (ZOSYN) 4.5 g vial to attach to  mL bag  4.5 g Intravenous Once Ean Muir, DO         Current Outpatient Medications   Medication Sig Dispense Refill    albuterol (PROAIR HFA/PROVENTIL HFA/VENTOLIN HFA) 108 (90 Base) MCG/ACT inhaler Inhale 1-2 puffs into the lungs every 6 hours as needed for shortness of breath, wheezing or cough 18 g 0    albuterol (PROVENTIL) (2.5 MG/3ML) 0.083% neb solution Take 2.5 mg by nebulization 3 times daily as needed for shortness of breath, wheezing or cough      aloe vera GEL Apply 1 g topically every hour as needed for skin care Per bottle directions      bacitracin 500 UNIT/GM OINT Apply topically 3 times daily as needed for wound care      Benzocaine (SOLARCAINE ALOE VERA EX) Externally apply topically daily as needed.      benzonatate (TESSALON) 200 MG capsule Take 1 capsule (200 mg) by mouth 3 times daily as needed for cough. 50 capsule 0    Calamine external lotion Apply topically as needed for itching      carbamide peroxide (DEBROX) 6.5 % otic solution Place 5 drops into both ears daily as needed for other (ear wax).      clotrimazole (LOTRIMIN) 1 % external cream Apply topically 2 times daily as needed (skin irritation)      Continuous Glucose Sensor (FREESTYLE MEGHANN 3 PLUS SENSOR) MISC USE TO READ BLOOD SUGAR TWICE DAILY AND AS NEEDED. CHANGE SENSOR EVERY 15 DAYS      dextromethorphan-guaiFENesin (MUCINEX DM)  MG 12 hr tablet Take 1 tablet by mouth 2 times daily as needed.      diclofenac (VOLTAREN) 1 % topical gel Apply 2 g topically daily as needed for moderate pain To joints/back      EPINEPHrine (ANY BX GENERIC EQUIV) 0.3 MG/0.3ML injection 2-pack Inject 0.3 mg into the muscle as needed for anaphylaxis. May repeat one time in 5-15 minutes if response to initial dose is inadequate.      famotidine (PEPCID) 20 MG tablet Take 1  tablet (20 mg) by mouth 2 times daily 60 tablet 0    FLUoxetine 20 MG tablet Take 20 mg by mouth every morning.      furosemide (LASIX) 40 MG tablet Take 0.5 tablets (20 mg) by mouth daily. 30 tablet 0    GAVILAX 17 GM/SCOOP powder Take 17 g by mouth daily as needed for constipation.      hydrocortisone (CORTAID) 1 % external cream Apply topically daily as needed for itching.      Hydrocortisone (PREPARATION H EX) Externally apply topically daily as needed (hemorrhoids).      JARDIANCE 10 MG TABS tablet Take 10 mg by mouth every morning.      levothyroxine (SYNTHROID/LEVOTHROID) 25 MCG tablet Take 12.5 mcg by mouth every morning (before breakfast).      [Paused] lisinopril (ZESTRIL) 10 MG tablet Take 10 mg by mouth every morning.      loperamide (IMODIUM) 2 MG capsule Take 4 mg by mouth 4 times daily as needed for diarrhea.      loratadine (CLARITIN) 10 MG tablet Take 10 mg by mouth daily as needed for allergies.      magnesium hydroxide (MILK OF MAGNESIA) 400 MG/5ML suspension Take 30 mLs by mouth daily as needed for heartburn.      metFORMIN (GLUCOPHAGE) 1000 MG tablet Take 1,000 mg by mouth 2 times daily (with meals)      methocarbamol (ROBAXIN) 500 MG tablet Take 1 tablet (500 mg) by mouth 4 times daily as needed for muscle spasms. 40 tablet 0    montelukast (SINGULAIR) 10 MG tablet Take 10 mg by mouth every morning.      nystatin (MYCOSTATIN) 020116 UNIT/GM external powder Apply topically 2 times daily for 14 days. 30 g 0    OLANZapine (ZYPREXA) 10 MG tablet Take 10 mg by mouth At Bedtime      omeprazole (PRILOSEC) 40 MG DR capsule Take 40 mg by mouth every morning.      ondansetron (ZOFRAN ODT) 4 MG ODT tab Take 1 tablet (4 mg) by mouth every 8 hours as needed for nausea. 20 tablet 0    pramoxine-calamine (AVEENO) 1-8 % LOTN Apply topically daily as needed for itching.      rosuvastatin (CRESTOR) 10 MG tablet Take 10 mg by mouth At Bedtime      spironolactone (ALDACTONE) 100 MG tablet Take 0.5 tablets (50 mg)  by mouth daily. 30 tablet 0    sulfamethoxazole-trimethoprim (BACTRIM DS) 800-160 MG tablet Take 1 tablet by mouth daily.      traZODone (DESYREL) 100 MG tablet Take 100 mg by mouth at bedtime.      TRELEGY ELLIPTA 100-62.5-25 MCG/ACT oral inhaler Inhale 1 puff into the lungs every morning.      Urea 40 % CREA Apply topically daily as needed for dry skin.      Vitamins A & D (VITAMIN A & D) OINT Externally apply topically daily as needed (general skin health).      witch hazel-glycerin (TUCKS) pad Apply topically as needed for hemorrhoids.           ALLERGIES:  Allergies   Allergen Reactions    Apricot Flavoring Agent (Non-Screening) Anaphylaxis    Banana Anaphylaxis     Throat swelling      Bees Anaphylaxis     Has an Epi pen    Wasp Venom Protein Shortness Of Breath     Other reaction(s): Respiratory Distress  Has an Epi pen        Methylphenidate Itching     Other reaction(s): Nightmares    Prunus      Other reaction(s): *Unknown       FAMILY HISTORY:  Family History   Problem Relation Age of Onset    Unknown/Adopted Father     Unknown/Adopted Maternal Grandmother     C.A.D. Maternal Grandfather     Diabetes Maternal Grandfather     Cerebrovascular Disease Maternal Grandfather     Unknown/Adopted Paternal Grandmother     Unknown/Adopted Paternal Grandfather     Unknown/Adopted Brother     Unknown/Adopted Sister        SOCIAL HISTORY:  Social History     Socioeconomic History    Marital status: Single   Tobacco Use    Smoking status: Every Day     Current packs/day: 1.00     Average packs/day: 1 pack/day for 21.4 years (21.4 ttl pk-yrs)     Types: Cigarettes     Start date: 1/1/2004    Smokeless tobacco: Never   Vaping Use    Vaping status: Never Used   Substance and Sexual Activity    Alcohol use: Not Currently     Comment: Last 8/7/2024    Sexual activity: Never     Partners: Female   Other Topics Concern     Service No    Blood Transfusions No    Caffeine Concern No    Occupational Exposure No    Hobby  Hazards No    Sleep Concern No    Stress Concern Yes     Comment: sometimes    Weight Concern No    Special Diet Yes     Comment: counting carbs    Back Care No    Exercise Yes    Seat Belt Yes    Self-Exams Yes     Social Drivers of Health     Financial Resource Strain: Low Risk  (6/2/2025)    Financial Resource Strain     Within the past 12 months, have you or your family members you live with been unable to get utilities (heat, electricity) when it was really needed?: No   Food Insecurity: Low Risk  (6/2/2025)    Food Insecurity     Within the past 12 months, did you worry that your food would run out before you got money to buy more?: No     Within the past 12 months, did the food you bought just not last and you didn t have money to get more?: No   Transportation Needs: Low Risk  (6/2/2025)    Transportation Needs     Within the past 12 months, has lack of transportation kept you from medical appointments, getting your medicines, non-medical meetings or appointments, work, or from getting things that you need?: No    [facility]    Social Connections   Interpersonal Safety: Low Risk  (5/16/2025)    Interpersonal Safety     Do you feel physically and emotionally safe where you currently live?: Yes     Within the past 12 months, have you been hit, slapped, kicked or otherwise physically hurt by someone?: No     Within the past 12 months, have you been humiliated or emotionally abused in other ways by your partner or ex-partner?: No   Housing Stability: Low Risk  (6/2/2025)    Housing Stability     Do you have housing? : Yes     Are you worried about losing your housing?: No       VITALS:  Patient Vitals for the past 24 hrs:   BP Temp Temp src Pulse Resp SpO2 Weight   06/10/25 2300 133/60 -- -- -- -- -- --   06/10/25 1834 (!) 155/73 97.9  F (36.6  C) Temporal 96 20 97 % 133.7 kg (294 lb 12.8 oz)       PHYSICAL EXAM    VITAL SIGNS: /60   Pulse 96   Temp 97.9  F  (36.6  C) (Temporal)   Resp 20   Wt 133.7 kg (294 lb 12.8 oz)   SpO2 97%   BMI 49.06 kg/m      General Appearance: Well-appearing, well-nourished, no acute distress  Head:  Normocephalic, without obvious abnormality, atraumatic  Eyes:  PERRL, conjunctiva/corneas clear, EOM's intact,  ENT:  Lips, mucosa, and tongue normal, membranes are moist without pallor  Neck:  Normal ROM, symmetrical, trachea midline    Cardio:  Regular rate and rhythm, no murmur, rub or gallop, 2+ pulses symmetric in all extremities  Pulm:  Clear to auscultation bilaterally, respirations unlabored,  Abdomen:  Soft, non-tender, distended abdomen, no rebound or guarding.  Musculoskeletal: Full ROM, no edema, no cyanosis, good ROM of major joints  Integument:  Warm, Dry, No erythema, No rash.    Neurologic:  Alert & oriented.  No focal deficits appreciated.    Psychiatric:  Affect normal, Judgment normal, Mood normal.      LABS  Results for orders placed or performed during the hospital encounter of 06/10/25 (from the past 24 hours)   CBC with platelets + differential    Narrative    The following orders were created for panel order CBC with platelets + differential.  Procedure                               Abnormality         Status                     ---------                               -----------         ------                     CBC with platelets and ...[[id number]]  Abnormal            Final result               RBC and Platelet Morpho...[[id number]]  Abnormal            Final result                 Please view results for these tests on the individual orders.   Basic metabolic panel   Result Value Ref Range    Sodium 139 135 - 145 mmol/L    Potassium 4.2 3.4 - 5.3 mmol/L    Chloride 103 98 - 107 mmol/L    Carbon Dioxide (CO2) 23 22 - 29 mmol/L    Anion Gap 13 7 - 15 mmol/L    Urea Nitrogen 19.5 6.0 - 20.0 mg/dL    Creatinine 1.25 (H) 0.67 - 1.17 mg/dL    GFR Estimate 73 >60 mL/min/1.73m2    Calcium 9.1 8.8 - 10.4 mg/dL    Glucose  113 (H) 70 - 99 mg/dL   Hepatic function panel   Result Value Ref Range    Protein Total 6.5 6.4 - 8.3 g/dL    Albumin 3.8 3.5 - 5.2 g/dL    Bilirubin Total 0.3 <=1.2 mg/dL    Alkaline Phosphatase 280 (H) 40 - 150 U/L    AST 18 0 - 45 U/L    ALT 32 0 - 70 U/L    Bilirubin Direct 0.14 0.00 - 0.30 mg/dL   Lipase   Result Value Ref Range    Lipase 28 13 - 60 U/L   CBC with platelets and differential   Result Value Ref Range    WBC Count 16.4 (H) 4.0 - 11.0 10e3/uL    RBC Count 3.68 (L) 4.40 - 5.90 10e6/uL    Hemoglobin 9.9 (L) 13.3 - 17.7 g/dL    Hematocrit 31.8 (L) 40.0 - 53.0 %    MCV 86 78 - 100 fL    MCH 26.9 26.5 - 33.0 pg    MCHC 31.1 (L) 31.5 - 36.5 g/dL    RDW 15.2 (H) 10.0 - 15.0 %    Platelet Count 428 150 - 450 10e3/uL    % Neutrophils 72 %    % Lymphocytes 13 %    % Monocytes 10 %    % Eosinophils 4 %    % Basophils 1 %    % Immature Granulocytes 1 %    NRBCs per 100 WBC 0 <1 /100    Absolute Neutrophils 11.8 (H) 1.6 - 8.3 10e3/uL    Absolute Lymphocytes 2.1 0.8 - 5.3 10e3/uL    Absolute Monocytes 1.7 (H) 0.0 - 1.3 10e3/uL    Absolute Eosinophils 0.7 0.0 - 0.7 10e3/uL    Absolute Basophils 0.1 0.0 - 0.2 10e3/uL    Absolute Immature Granulocytes 0.1 <=0.4 10e3/uL    Absolute NRBCs 0.0 10e3/uL   RBC and Platelet Morphology   Result Value Ref Range    RBC Morphology Confirmed RBC Indices     Platelet Assessment  Automated Count Confirmed. Platelet morphology is normal.     Automated Count Confirmed. Platelet morphology is normal.    Elliptocytes Slight (A) None Seen   Cell count with differential fluid    Narrative    The following orders were created for panel order Cell count with differential fluid.  Procedure                               Abnormality         Status                     ---------                               -----------         ------                     Cell Count Body Fluid[[id number]]       Abnormal            Final result               Differential Body Fluid[[id number]]     Abnormal             Final result                 Please view results for these tests on the individual orders.   Ascites Fluid Aerobic Bacterial Culture Routine With Gram Stain    Specimen: Peritoneum; Ascites Fluid   Result Value Ref Range    Gram Stain Result No organisms seen    Cell Count Body Fluid   Result Value Ref Range    Color Yellow Colorless, Yellow    Clarity Hazy (A) Clear    Cell Count Fluid Source Abdomen     Total Nucleated Cells 1,998 /uL    Narrative    No reference ranges have been established.  This result  should be interpreted in the context of the patient's clinical condition and   compared to simultaneous measurement in the patient's blood.         Differential Body Fluid   Result Value Ref Range    % Neutrophils 38 %    % Lymphocytes 17 %    % Monocyte/Macrophages 37 %    % Lining Cells 8 %    Absolute Neutrophils, Body Fluid 759.2 (HH)   /uL    Narrative    A polymorphonuclear cell (PMN or alternatively called neutrophil) count >250.0 cells/uL can indicate infection/spontaneous bacterial peritonitis in patients with ascites related to portal hypertension or another appropriate clinical context, and in the absence of other known causes for high PMN count, such as inflammation or peripheral blood contamination. Correlation with other clinical parameters and microbiology results is recommended.  No reference ranges have been established. This result should be interpreted in the context of the patient's clinical condition and compared to simultaneous measurement in the patient's blood.   Extra Tube    Narrative    The following orders were created for panel order Extra Tube.  Procedure                               Abnormality         Status                     ---------                               -----------         ------                     Extra Body Fluid/CSF Co...[[id number]]                      Final result                 Please view results for these tests on the individual orders.   Extra  Body Fluid/CSF Collection   Result Value Ref Range    Hold Specimen [facility]    US Paracentesis without Albumin    Narrative    [facility]  LOCATION: [facility]  DATE: 6/10/2025    PROCEDURE: IMAGING GUIDED PARACENTESIS    INTERVENTIONAL RADIOLOGIST: Zia Schaefer MD.     INDICATION: 44-year-old male with hypoxia in the setting of recurrent large-volume ascites.    CONSENT: The risks, benefits and alternatives of an imaging guided paracentesis were discussed with the patient  in detail. All questions were answered. Informed consent was given to proceed with the procedure.    MODERATE SEDATION: None.    ADDITIONAL MEDICATIONS: None.    COMPLICATIONS: No immediate complications.    PROCEDURE:    A limited ultrasound was performed for localization purposes.    Using sterile technique 10 mL of Xylocaine was infused into the local soft tissues. Under direct ultrasound guidance a 5 Persian Yueh catheter was inserted into the ascitic fluid.    A total of 5100 mL of clear yellow ascitic fluid was removed and sent to the lab if diagnostic analysis was requested.    FINDINGS:  The initial ultrasound shows a large amount of peritoneal ascites.    Images obtained during catheter placement show the access needle with tip in the peritoneal ascites.      Impression    IMPRESSION:    Successful ultrasound-guided paracentesis, as discussed above.         UA with Microscopic reflex to Culture    Specimen: Urine, NOS   Result Value Ref Range    Color Urine Light Yellow Colorless, Straw, Light Yellow, Yellow    Appearance Urine Clear Clear    Glucose Urine >1000 (A) Negative mg/dL    Bilirubin Urine Negative Negative    Ketones Urine Negative Negative mg/dL    Specific Gravity Urine 1.022 1.001 - 1.030    Blood Urine Negative Negative    pH Urine 5.5 5.0 - 7.0    Protein Albumin Urine Negative Negative mg/dL    Urobilinogen Urine Normal Normal mg/dL    Nitrite Urine Negative Negative    Leukocyte Esterase Urine  Negative Negative    Bacteria Urine Few (A) None Seen /HPF    Sperm Urine Present (A) None Seen /HPF    RBC Urine 1 <=2 /HPF    WBC Urine 1 <=5 /HPF    Squamous Epithelials Urine <1 <=1 /HPF    Narrative    Urine Culture not indicated   CT Abdomen Pelvis w Contrast    Narrative    EXAM: CT ABDOMEN PELVIS W CONTRAST  LOCATION: [facility]  DATE: 06/10/2025    INDICATION: Abdominal distension, diffuse pain, chills.  COMPARISON: CTA abdomen and pelvis without/with IV contrast 05/17/2025.  TECHNIQUE: CT scan of the abdomen and pelvis was performed following injection of IV contrast. Multiplanar reformats were obtained. Dose reduction techniques were used.  CONTRAST: 90 mL Isovue 370 IV.    FINDINGS:   LOWER CHEST: Lung bases are clear. No pleural effusion on either side. Normal cardiac size. No pericardial effusion. No significant change.    HEPATOBILIARY: Cirrhotic configuration of the liver with lobulated contour and diffuse fatty infiltration. No discrete lesion. Patent portal veins. The gallbladder is not well-distended. Small-volume abdominopelvic ascites.    PANCREAS: Normal.    SPLEEN: Normal.    ADRENAL GLANDS: Bilateral benign adrenal fatty myelolipomas, stable.    KIDNEYS/BLADDER: No urinary tract calculi. Both kidneys are well-perfused without hydronephrosis or hydroureter. Normal urinary bladder.    BOWEL: Stomach is filled with residual food material. Formed stool material within normal-caliber colon, without mechanical obstruction or free gas. Distal colonic mild diverticulosis without acute inflammation or secondary complications.    LYMPH NODES: Shotty subcentimeter retroperitoneal nodes, likely reactive, similar to prior.    VASCULATURE: Normal-caliber abdominal aorta and IVC.    PELVIC ORGANS: Small-volume ascites. Slight atherosclerotic changes. Distal colonic diverticulosis.    MUSCULOSKELETAL: Mild diffuse body wall edema. Mild degenerative changes involving the spine and  joints of the pelvis. Partial fusion of both SI joints.      Impression    IMPRESSION:   1.  Cirrhotic configuration of the liver with lobulated contour and diffuse fatty infiltration, unchanged. Small-volume abdominopelvic ascites. Mild diffuse body wall edema.    2.  No urinary tract calculi or obstruction. Bilateral benign adrenal fatty myelolipomas.    3.  Distal colonic diverticulosis. No mechanical obstruction or free gas.         RADIOLOGY  CT Abdomen Pelvis w Contrast   Final Result   IMPRESSION:    1.  Cirrhotic configuration of the liver with lobulated contour and diffuse fatty infiltration, unchanged. Small-volume abdominopelvic ascites. Mild diffuse body wall edema.      2.  No urinary tract calculi or obstruction. Bilateral benign adrenal fatty myelolipomas.      3.  Distal colonic diverticulosis. No mechanical obstruction or free gas.      US Paracentesis without Albumin   Final Result   IMPRESSION:     Successful ultrasound-guided paracentesis, as discussed above.                          MEDICATIONS GIVEN IN THE EMERGENCY:  Medications   piperacillin-tazobactam (ZOSYN) 4.5 g vial to attach to  mL bag (has no administration in time range)   albumin human 25 % injection 25 g (0 g Intravenous Stopped 6/10/25 2309)   iopamidol (ISOVUE-370) solution 90 mL (90 mLs Intravenous $Given 6/10/25 2244)       NEW PRESCRIPTIONS STARTED AT TODAY'S ER VISIT  New Prescriptions    No medications on file        I, Tressa Urbano, am serving as a scribe to document services personally performed by Ean Muir D.O., based on my observations and the provider's statements to me.  I, Ean Muir D.O., attest that Tressa Urbano is acting in a scribe capacity, has observed my performance of the services and has documented them in accordance with my direction.     Ean Muir D.O.  Emergency Medicine  [facility] EMERGENCY DEPARTMENT  [address] [place] [id number] [phone]  Dept:  [phone]       Ean Muir,   06/11/25 0023

## 2025-06-12 VITALS
SYSTOLIC BLOOD PRESSURE: 136 MMHG | TEMPERATURE: 97 F | HEART RATE: 72 BPM | DIASTOLIC BLOOD PRESSURE: 65 MMHG | OXYGEN SATURATION: 98 % | WEIGHT: 294.8 LBS | RESPIRATION RATE: 19 BRPM | BODY MASS INDEX: 49.06 KG/M2

## 2025-06-12 LAB
ALBUMIN SERPL BCG-MCNC: 3.9 G/DL (ref 3.5–5.2)
ALP SERPL-CCNC: 203 U/L (ref 40–150)
ALT SERPL W P-5'-P-CCNC: 23 U/L (ref 0–70)
ANION GAP SERPL CALCULATED.3IONS-SCNC: 12 MMOL/L (ref 7–15)
AST SERPL W P-5'-P-CCNC: 15 U/L (ref 0–45)
BACTERIA FLD CULT: NO GROWTH
BACTERIA SPEC CULT: NORMAL
BACTERIA SPEC CULT: NORMAL
BASOPHILS # BLD AUTO: 0.1 10E3/UL (ref 0–0.2)
BASOPHILS NFR BLD AUTO: 1 %
BILIRUB DIRECT SERPL-MCNC: 0.16 MG/DL (ref 0–0.3)
BILIRUB SERPL-MCNC: 0.3 MG/DL
BUN SERPL-MCNC: 20 MG/DL (ref 6–20)
CALCIUM SERPL-MCNC: 9 MG/DL (ref 8.8–10.4)
CHLORIDE SERPL-SCNC: 106 MMOL/L (ref 98–107)
CREAT SERPL-MCNC: 1.15 MG/DL (ref 0.67–1.17)
EGFRCR SERPLBLD CKD-EPI 2021: 80 ML/MIN/1.73M2
ELLIPTOCYTES BLD QL SMEAR: SLIGHT
EOSINOPHIL # BLD AUTO: 0.7 10E3/UL (ref 0–0.7)
EOSINOPHIL NFR BLD AUTO: 5 %
ERYTHROCYTE [DISTWIDTH] IN BLOOD BY AUTOMATED COUNT: 15.5 % (ref 10–15)
GLUCOSE BLDC GLUCOMTR-MCNC: 112 MG/DL (ref 70–99)
GLUCOSE BLDC GLUCOMTR-MCNC: 121 MG/DL (ref 70–99)
GLUCOSE BLDC GLUCOMTR-MCNC: 139 MG/DL (ref 70–99)
GLUCOSE BLDC GLUCOMTR-MCNC: 140 MG/DL (ref 70–99)
GLUCOSE BLDC GLUCOMTR-MCNC: 175 MG/DL (ref 70–99)
GLUCOSE SERPL-MCNC: 113 MG/DL (ref 70–99)
GRAM STAIN RESULT: NORMAL
GRAM STAIN RESULT: NORMAL
HCO3 SERPL-SCNC: 24 MMOL/L (ref 22–29)
HCT VFR BLD AUTO: 30.5 % (ref 40–53)
HGB BLD-MCNC: 9.5 G/DL (ref 13.3–17.7)
HOLD SPECIMEN: NORMAL
IMM GRANULOCYTES # BLD: 0.1 10E3/UL
IMM GRANULOCYTES NFR BLD: 1 %
INR PPP: 1.16 (ref 0.85–1.15)
LYMPHOCYTES # BLD AUTO: 1.8 10E3/UL (ref 0.8–5.3)
LYMPHOCYTES NFR BLD AUTO: 13 %
MCH RBC QN AUTO: 27 PG (ref 26.5–33)
MCHC RBC AUTO-ENTMCNC: 31.1 G/DL (ref 31.5–36.5)
MCV RBC AUTO: 87 FL (ref 78–100)
MONOCYTES # BLD AUTO: 1.6 10E3/UL (ref 0–1.3)
MONOCYTES NFR BLD AUTO: 11 %
NEUTROPHILS # BLD AUTO: 9.4 10E3/UL (ref 1.6–8.3)
NEUTROPHILS NFR BLD AUTO: 69 %
NRBC # BLD AUTO: 0 10E3/UL
NRBC BLD AUTO-RTO: 0 /100
PLAT MORPH BLD: ABNORMAL
PLATELET # BLD AUTO: 395 10E3/UL (ref 150–450)
POLYCHROMASIA BLD QL SMEAR: SLIGHT
POTASSIUM SERPL-SCNC: 4.1 MMOL/L (ref 3.4–5.3)
PROT SERPL-MCNC: 6.2 G/DL (ref 6.4–8.3)
PROTHROMBIN TIME: 15 SECONDS (ref 11.8–14.8)
RBC # BLD AUTO: 3.52 10E6/UL (ref 4.4–5.9)
RBC MORPH BLD: ABNORMAL
SODIUM SERPL-SCNC: 142 MMOL/L (ref 135–145)
WBC # BLD AUTO: 13.7 10E3/UL (ref 4–11)

## 2025-06-12 PROCEDURE — 36415 COLL VENOUS BLD VENIPUNCTURE: CPT

## 2025-06-12 PROCEDURE — 250N000013 HC RX MED GY IP 250 OP 250 PS 637

## 2025-06-12 PROCEDURE — 82310 ASSAY OF CALCIUM: CPT

## 2025-06-12 PROCEDURE — 999N000157 HC STATISTIC RCP TIME EA 10 MIN

## 2025-06-12 PROCEDURE — 94660 CPAP INITIATION&MGMT: CPT

## 2025-06-12 PROCEDURE — 99232 SBSQ HOSP IP/OBS MODERATE 35: CPT | Performed by: INTERNAL MEDICINE

## 2025-06-12 PROCEDURE — 99232 SBSQ HOSP IP/OBS MODERATE 35: CPT | Mod: GC

## 2025-06-12 PROCEDURE — 85610 PROTHROMBIN TIME: CPT | Performed by: PHYSICIAN ASSISTANT

## 2025-06-12 PROCEDURE — 85004 AUTOMATED DIFF WBC COUNT: CPT

## 2025-06-12 PROCEDURE — 120N000001 HC R&B MED SURG/OB

## 2025-06-12 PROCEDURE — 250N000011 HC RX IP 250 OP 636: Performed by: INTERNAL MEDICINE

## 2025-06-12 PROCEDURE — 82248 BILIRUBIN DIRECT: CPT | Performed by: PHYSICIAN ASSISTANT

## 2025-06-12 PROCEDURE — 250N000011 HC RX IP 250 OP 636

## 2025-06-12 RX ORDER — ENOXAPARIN SODIUM 100 MG/ML
40 INJECTION SUBCUTANEOUS EVERY 12 HOURS
Status: DISCONTINUED | OUTPATIENT
Start: 2025-06-12 | End: 2025-06-13 | Stop reason: HOSPADM

## 2025-06-12 RX ADMIN — OLANZAPINE 10 MG: 5 TABLET, FILM COATED ORAL at 21:17

## 2025-06-12 RX ADMIN — ENOXAPARIN SODIUM 40 MG: 40 INJECTION SUBCUTANEOUS at 22:35

## 2025-06-12 RX ADMIN — INSULIN ASPART 1 UNITS: 100 INJECTION, SOLUTION INTRAVENOUS; SUBCUTANEOUS at 11:36

## 2025-06-12 RX ADMIN — MICONAZOLE NITRATE ANTIFUNGAL POWDER: 2 POWDER TOPICAL at 21:15

## 2025-06-12 RX ADMIN — CEFTRIAXONE SODIUM 2 G: 2 INJECTION, POWDER, FOR SOLUTION INTRAMUSCULAR; INTRAVENOUS at 17:38

## 2025-06-12 RX ADMIN — PANTOPRAZOLE SODIUM 40 MG: 40 TABLET, DELAYED RELEASE ORAL at 06:31

## 2025-06-12 RX ADMIN — LEVOTHYROXINE SODIUM 12.5 MCG: 0.03 TABLET ORAL at 06:31

## 2025-06-12 RX ADMIN — UMECLIDINIUM 1 PUFF: 62.5 AEROSOL, POWDER ORAL at 09:32

## 2025-06-12 RX ADMIN — MONTELUKAST SODIUM 10 MG: 10 TABLET, FILM COATED ORAL at 09:22

## 2025-06-12 RX ADMIN — FLUTICASONE FUROATE AND VILANTEROL TRIFENATATE 1 PUFF: 100; 25 POWDER RESPIRATORY (INHALATION) at 09:32

## 2025-06-12 RX ADMIN — MICONAZOLE NITRATE ANTIFUNGAL POWDER: 2 POWDER TOPICAL at 09:25

## 2025-06-12 RX ADMIN — PANTOPRAZOLE SODIUM 40 MG: 40 TABLET, DELAYED RELEASE ORAL at 16:35

## 2025-06-12 RX ADMIN — FAMOTIDINE 20 MG: 20 TABLET, FILM COATED ORAL at 09:22

## 2025-06-12 RX ADMIN — FAMOTIDINE 20 MG: 20 TABLET, FILM COATED ORAL at 21:17

## 2025-06-12 RX ADMIN — NICOTINE 1 PATCH: 14 PATCH, EXTENDED RELEASE TRANSDERMAL at 09:25

## 2025-06-12 RX ADMIN — ENOXAPARIN SODIUM 40 MG: 40 INJECTION SUBCUTANEOUS at 11:36

## 2025-06-12 RX ADMIN — TRAZODONE HYDROCHLORIDE 100 MG: 50 TABLET ORAL at 21:17

## 2025-06-12 RX ADMIN — FUROSEMIDE 20 MG: 20 TABLET ORAL at 09:22

## 2025-06-12 RX ADMIN — SPIRONOLACTONE 50 MG: 25 TABLET, FILM COATED ORAL at 09:22

## 2025-06-12 RX ADMIN — ROSUVASTATIN 10 MG: 10 TABLET, FILM COATED ORAL at 21:17

## 2025-06-12 ASSESSMENT — ACTIVITIES OF DAILY LIVING (ADL)
ADLS_ACUITY_SCORE: 44
ADLS_ACUITY_SCORE: 44
ADLS_ACUITY_SCORE: 52
ADLS_ACUITY_SCORE: 44
ADLS_ACUITY_SCORE: 52
ADLS_ACUITY_SCORE: 52
ADLS_ACUITY_SCORE: 44
ADLS_ACUITY_SCORE: 52
ADLS_ACUITY_SCORE: 44
ADLS_ACUITY_SCORE: 44
ADLS_ACUITY_SCORE: 52
ADLS_ACUITY_SCORE: 44
ADLS_ACUITY_SCORE: 52
ADLS_ACUITY_SCORE: 44
ADLS_ACUITY_SCORE: 52
ADLS_ACUITY_SCORE: 44
ADLS_ACUITY_SCORE: 44

## 2025-06-12 NOTE — PLAN OF CARE
"  Problem: Adult Inpatient Plan of Care  Goal: Plan of Care Review  Description: The Plan of Care Review/Shift note should be completed every shift.  The Outcome Evaluation is a brief statement about your assessment that the patient is improving, declining, or no change.  This information will be displayed automatically on your shift  note.  Outcome: Progressing  Goal: Patient-Specific Goal (Individualized)  Description: You can add care plan individualizations to a care plan. Examples of Individualization might be:  \"Parent requests to be called daily at 9am for status\", \"I have a hard time hearing out of my right ear\", or \"Do not touch me to wake me up as it startles  me\".  Outcome: Progressing  Goal: Absence of Hospital-Acquired Illness or Injury  Outcome: Progressing  Intervention: Prevent Infection  Recent Flowsheet Documentation  Taken 6/11/2025 1850 by Deepika Brady RN  Infection Prevention:   rest/sleep promoted   single patient room provided  Goal: Optimal Comfort and Wellbeing  Outcome: Progressing  Goal: Readiness for Transition of Care  Outcome: Progressing     Problem: Infection  Goal: Absence of Infection Signs and Symptoms  Outcome: Progressing     Problem: Comorbidity Management  Goal: Maintenance of COPD Symptom Control  Outcome: Progressing  Goal: Blood Glucose Levels Within Targeted Range  Outcome: Progressing  Goal: Blood Pressure in Desired Range  Outcome: Progressing     Problem: Acute Kidney Injury/Impairment  Goal: Fluid and Electrolyte Balance  Outcome: Progressing  Goal: Improved Oral Intake  Outcome: Progressing  Goal: Effective Renal Function  Outcome: Progressing     Problem: Anemia  Goal: Anemia Symptom Improvement  Outcome: Progressing     Goal Outcome Evaluation:  Patient is alert and oriented x 4 and able to make needs known. Patient denies pain. Using BiPap intermittently (able to take off and place back on independently.) Patient up independently in the room. Tolerating " regular diet.

## 2025-06-12 NOTE — PLAN OF CARE
"  Problem: Adult Inpatient Plan of Care  Goal: Plan of Care Review  Description: The Plan of Care Review/Shift note should be completed every shift.  The Outcome Evaluation is a brief statement about your assessment that the patient is improving, declining, or no change.  This information will be displayed automatically on your shift  note.  Outcome: Progressing  Goal: Patient-Specific Goal (Individualized)  Description: You can add care plan individualizations to a care plan. Examples of Individualization might be:  \"Parent requests to be called daily at 9am for status\", \"I have a hard time hearing out of my right ear\", or \"Do not touch me to wake me up as it startles  me\".  Outcome: Progressing  Goal: Absence of Hospital-Acquired Illness or Injury  Outcome: Progressing  Intervention: Prevent Infection  Recent Flowsheet Documentation  Taken 6/12/2025 0200 by Saray Crawford RN  Infection Prevention: hand hygiene promoted  Goal: Optimal Comfort and Wellbeing  Outcome: Progressing  Goal: Readiness for Transition of Care  Outcome: Progressing     Problem: Infection  Goal: Absence of Infection Signs and Symptoms  Outcome: Progressing   Goal Outcome Evaluation:       Pt had a restful night, Bipap at night with saturation remains in the poonam 90s, denies pain, up to the bathroom independently.  @ 0200.                      "

## 2025-06-12 NOTE — PLAN OF CARE
Patient alert and oriented this shift. Denies abdominal pain today.  Vitals stable. Will have a Gerry tomorrow. Amaya Jennings RN     Problem: Adult Inpatient Plan of Care  Goal: Plan of Care Review  Description: The Plan of Care Review/Shift note should be completed every shift.  The Outcome Evaluation is a brief statement about your assessment that the patient is improving, declining, or no change.  This information will be displayed automatically on your shift  note.  Outcome: Progressing  Goal: Absence of Hospital-Acquired Illness or Injury  Outcome: Progressing  Intervention: Identify and Manage Fall Risk  Recent Flowsheet Documentation  Taken 6/12/2025 0950 by Amaya Jennings, RN  Safety Promotion/Fall Prevention:   assistive device/personal items within reach   lighting adjusted   patient and family education   room door open   safety round/check completed  Intervention: Prevent Infection  Recent Flowsheet Documentation  Taken 6/12/2025 0950 by Amaya Jennings, RN  Infection Prevention:   rest/sleep promoted   single patient room provided  Goal: Optimal Comfort and Wellbeing  Outcome: Progressing     Problem: Acute Kidney Injury/Impairment  Goal: Fluid and Electrolyte Balance  Outcome: Progressing  Goal: Improved Oral Intake  Outcome: Progressing  Goal: Effective Renal Function  Outcome: Progressing   Goal Outcome Evaluation:

## 2025-06-12 NOTE — PROGRESS NOTES
Resident/Fellow Attestation   I, Bernarda Crespo MD, was present with the medical/TALIA student who participated in the service and in the documentation of the note.  I have verified the history and personally performed the physical exam and medical decision making.  I agree with the assessment and plan of care as documented in the note.      Bernarda Crespo MD  PGY1  Date of Service (when I saw the patient): 06/12/25    [facility]    Progress Note - Hospitalist Service       Date of Admission:  6/10/2025    Assessment & Plan   Warren Jaramillo is a 44 year old male admitted on 6/10/2025. He has a history of cirrhosis with ascites requiring frequent paracentesis, Rojas disease, T2DM, HTN, AVA, COPD and is admitted for abdominal distention and SOB found to have significantly elevated ANC and ascitic fluid concerning for resistant SBP.     Cirrhosis with ascites, requiring frequent paracentesis  Abdominal discomfort  Concern for resistant SBP  Possible venous outflow obstruction  Patient has history of cirrhosis, recently reports requiring more frequent paracentesis now twice weekly over the past 3-4 months. He is s/p paracentesis with 5 L removed in the ED and sent for analysis. Ascitic fluid showed ANC of 759.  Patient was hospitalized from 5/15 - 5/26 for concern of persistent SBP with ANC in the 1000's despite treatment with ceftriaxone followed by meropenem with clinical improvement but with worsening ANC.  He was discharged on oral Levaquin with planned repeat ascitic fluid studies and consideration of other causes. CT abdomen pelvis showed cirrhotic changes of the liver but otherwise unremarkable. Vitals and exam remain reassuring. SAAG score is 0.7, indicating cause of ascites is not necessarily portal hypertension. Remains inpatient for further work-up of persistent leukocytosis in ascitic fluid and IV antibiotics.  - Paracentesis in the ED. 5.1L off, received albumin x 1  -  Daily CBC   - ID following, appreciate expertise  - Continue IV Ceftriaxone, 7 total days, through 6/17. Cefdinir PO also acceptable through same course  - Transition to Bactrim prophylaxis on 6/18.   - Follow ascitic fluid studies  - 16s and fungal screening (6/10 samples)  - Follow blood cultures  - Consider other etiology for persistent leukocytosis despite multiple rounds of abx.  - GI following, appreciate expertise  - Liver Doppler ultrasound to evaluate for venous outflow obstruction.  - PTA Lasix & spironolactone    Hx of RIVERA  History of RIVERA with creatinine peaking at 7.0 during recent admission 3-4 weeks ago, at that time thought to be secondary to contrast-induced nephropathy.  Now it is improved closet to baseline at 1.2 will continue to monitor closely.    -Daily BMP  -Avoid nephrotoxic meds    COPD  Controlled with PTA inhalers.  -Continue PTA inhalers    Chronic normocytic Anemia   Hx of GIB  Hemoglobin 9.9, close to baseline.  No symptoms or signs of active bleeding.  -Daily hemoglobin    T2DM  A1c 6.7. PTA metformin and jardiance.   - hold PTA meds  - MDSSI    Chronic conditions:   History of DVT: Provoked completed 3 months of apixaban discontinued on  recent admission 5/25 follow-up with PCP  HTN: Continue PTA spironolactone  Insomnia: Continue PTA trazodone  Mental health; continue PTA Zyprexa  GERD: continue PTA Protonix  HLD: Continue PTA rosuvastatin  COPD: Continue PTA inhalers         Diet: Combination Diet Regular Diet Adult    DVT Prophylaxis: Enoxaparin (Lovenox) SQ  Khan Catheter: Not present  Fluids: PO  Lines: None     Cardiac Monitoring: None  Code Status: Full Code      Clinically Significant Risk Factors                # Coagulation Defect: INR = 1.16 (Ref range: 0.85 - 1.15) and/or PTT = 35 Seconds (Ref range: 22 - 38 Seconds), will monitor for bleeding    # Hypertension: Noted on problem list  # Chronic heart failure with preserved ejection fraction: heart failure noted on problem  "list and last echo with EF >50%          # DMII: A1C = 6.7 % (Ref range: <5.7 %) within past 6 months, PRESENT ON ADMISSION  # Morbid Obesity: Estimated body mass index is 49.06 kg/m  as calculated from the following:    Height as of 6/2/25: 1.651 m (5' 5\").    Weight as of this encounter: 133.7 kg (294 lb 12.8 oz)., PRESENT ON ADMISSION       # Financial/Environmental Concerns: none         Social Drivers of Health   Housing Stability: High Risk (6/11/2025)    Housing Stability     Do you have housing? : Yes     Are you worried about losing your housing?: Yes   Tobacco Use: High Risk (6/6/2025)    [facility]    Patient History     Smoking Tobacco Use: Every Day     Smokeless Tobacco Use: Never   Financial Resource Strain: High Risk (6/11/2025)    Financial Resource Strain     Within the past 12 months, have you or your family members you live with been unable to get utilities (heat, electricity) when it was really needed?: Yes    [facility]    Social Connections         Disposition Plan     Medically Ready for Discharge: Anticipated in 2-4 Days       The patient's care was discussed with the Attending Physician, Dr. Rosenstein.    Huey Eng  Medical Student  Hospitalist Service  [facility]  Securely message with [employer] (more info)  Text page via [employer] Paging/Directory   ______________________________________________________________________    Interval History   No adverse events overnight. Feeling at his baseline. No fevers, chills. No diarrhea. No constipation.    Physical Exam   Vital Signs: Temp: 98.1  F (36.7  C) Temp src: Oral BP: 131/69 Pulse: 74   Resp: 16 SpO2: 97 % O2 Device: None (Room air)    Weight: 294 lbs 12.8 oz    Constitutional: awake, alert, cooperative, no apparent distress, and appears stated age  Respiratory: No increased work of breathing, good air exchange, clear to " auscultation bilaterally, no crackles or wheezing  Cardiovascular: Normal apical impulse, regular rate and rhythm, normal S1 and S2, no murmur noted  GI: normal bowel sounds, distended, non-tender, and ascites present. Dull to percussion in the lower abdomen. Tympanic in the LUQ.    Medical Decision Making       Please see A&P for additional details of medical decision making.      Data   ------------------------- PAST 24 HR DATA REVIEWED -----------------------------------------------    I have personally reviewed the following data over the past 24 hrs:    13.7 (H)  \   9.5 (L)   / 395     142 106 20.0 /  112 (H)   4.1 24 1.15 \     ALT: 23 AST: 15 AP: 203 (H) TBILI: 0.3   ALB: 3.9 TOT PROTEIN: 6.2 (L) LIPASE: N/A     INR:  1.16 (H) PTT:  N/A   D-dimer:  N/A Fibrinogen:  N/A

## 2025-06-12 NOTE — PROGRESS NOTES
Care Management Follow Up    Length of Stay (days): 1    Expected Discharge Date: 06/13/2025     Concerns to be Addressed: Care progression - discharge planning     Patient plan of care discussed at interdisciplinary rounds: Yes    Anticipated Discharge Disposition:  TBD    Anticipated Discharge Services:  TBD  Anticipated Discharge DME:  NA    Patient/family educated on Medicare website which has current facility and service quality ratings:  NA  Education Provided on the Discharge Plan:  Yes per team  Patient/Family in Agreement with the Plan:  NA    Referrals Placed by CM/SW:  NA  Private pay costs discussed: Not applicable    Discussed  Partnership in Safe Discharge Planning  document with patient/family: Yes: Group home [facility]    Handoff Completed: No, handoff not indicated or clinically appropriate    Additional Information:  Called [facility] and spoke with Ginna regarding the ID iv abx plan. Ginna said, no the group home cannot take patient back with IV antibiotic. They do not have full time nurses. Their staffs have minimal training.  Ginna also declined [facility]  Ginna prefer outpatient infusion center and the group home staff will provide the transport.    Updated patient    Next Steps: RNCM to follow for medical progression, recommendations, and final discharge plan.     Janine Rinaldi RN

## 2025-06-12 NOTE — PROGRESS NOTES
INFECTIOUS DISEASE FOLLOW UP NOTE  Date: 06/12/2025     ================================================================  SUBJECTIVE  No events, feeling good    ================================================================  OBJECTIVE  /69 (BP Location: Left arm)   Pulse 74   Temp 98.1  F (36.7  C) (Oral)   Resp 16   Wt 133.7 kg (294 lb 12.8 oz)   SpO2 97%   BMI 49.06 kg/m      Pertinent labs  CBC RESULTS:   Recent Labs   Lab Test 06/12/25  0515   WBC 13.7*   RBC 3.52*   HGB 9.5*   HCT 30.5*   MCV 87   MCH 27.0   MCHC 31.1*   RDW 15.5*              Physical Exam  General: alert, cooperative, no apparent distress  HEENT: atraumatic, normocephalic, no scleral icterus, moist mucus membranes, no cervical lymphadenopathy  Heart: Normal rate, regular rhythm  Lungs: good inspiratory effort  Abdomen: soft, non-tender, distended  Extremities: no peripheral edema, no new rashes or lesions     Imaging  6/10 CT  1.  Cirrhotic configuration of the liver with lobulated contour and diffuse fatty infiltration, unchanged. Small-volume abdominopelvic ascites. Mild diffuse body wall edema.  2.  No urinary tract calculi or obstruction. Bilateral benign adrenal fatty myelolipomas.  3.  Distal colonic diverticulosis. No mechanical obstruction or free gas.     Microbiology data  6/10 paracentesis              759 PMNs              Culture NGTD  6/11 blood: NGTD        I have personally reviewed the relevant laboratory, imaging, and microbiology data  ================================================================  Assessment  Warren Jaramillo is a 44 year old with recurrent SBP.  PMH cirrhosis, HFpEF, COPD, DM2.     Long-standing ascites, on prophylactic TMP-SMX.  Normally gets paracentesis every few weeks, though recently 3x weekly.  Most recent SBP prior to admission 5/21, culture negative.  He was given a course of meropenem followed by several days of levofloxacin.  Now readmitted 6/10 with more of the  same.  He had been about 9 days since his last paracentesis, and his only symptom was fluid overload.  Of note, had transjugular liver biopsy on 6/6.     His ascites PMN count trend is 381 (4/20) --> 690 (516) --> 1,345 (5/23) --> 759 (6/10).  It is unclear why he has such persistent leukocytosis despite multiple rounds of antibiotic treatment.      Possible theories:  1) smoldering inflammation from non-infectious source (e.g. malignancy)  2) Anatomic defect (e.g. GI leak)  3) Secondary to very high frequency of paracenteses (I.e. traumatic)  4) Inadequately treated infection (e.g. resistant organism)  5) atypical infection (e.g. TB, fungal)     Options 4 and 5 seem less likely given exposure history and lack of organism growth on paracentesis culture. Ultimately, it is very hard NOT to treat him, so feel obligated to pursue another ~7 day course of antibiotic therapy.     I did send the remaining ascites fluid for 16s and fungal sequencing (samples from 6/10).  Future options for evaluation of underlying etiology include PET-CT, ex lap. Plan is still evolving, and probably is best to wait for next gen sequencing prior to more invasive options given his clinical stability.        Impression  # Recurrent peritonitis              - Ddx infectious vs. Malignancy vs. Inflammatory  # Decompensated cirrhosis              - Recurrent ascites requiring >1x weekly paracentesis              - ?Rojas's disease vs. EtOH abuse (both are documented)  # HFpEF  # COPD  # DM2     ================================================================  Plan  - Ceftriaxone through 6/17   - Preference is for IV therapy, however if there are issues with this due to group home situation, would be acceptable to use cefdinir PO  - On 6/18, transition back to TMP-SMX for prophylaxis  - Trend results of 16s sequencing from 6/10  - ID will follow    Zia Bajwa MD, MPH  [facility]   Available via [employer] secure chat  (preferred) or [employer] paging

## 2025-06-12 NOTE — PROGRESS NOTES
GASTROENTEROLOGY PROGRESS NOTE     SUBJECTIVE   The patient denies having abdominal pain, fever, chills. Reviewed duplex ultrasound results.  Passing brown stool. Appetite is good.   He reports having a history of heavy alcohol use but tells me has not drank for 9months.   He was adopted and does not know his family medical history.      OBJECTIVE     Vitals Blood pressure 131/69, pulse 74, temperature 98.1  F (36.7  C), temperature source Oral, resp. rate 16, weight 133.7 kg (294 lb 12.8 oz), SpO2 97%.          Physical Exam  General: awake, alert, responds appropriately, no jaundice, no asterixis.   Cardiovascular: S1S2   Chest: lungs are clear    Abdomen: +bs, distended but soft, not tender   Neurologic: grossly intact        LABORATORY    ELECTROLYTE PANEL   Recent Labs   Lab 06/12/25  1109 06/12/25  0805 06/12/25  0515 06/11/25  0810 06/11/25  0515 06/11/25  0421 06/10/25  1933   NA  --   --  142  --  136  --  139   POTASSIUM  --   --  4.1  --  4.0  --  4.2   CHLORIDE  --   --  106  --  101  --  103   CO2  --   --  24  --  25  --  23   * 112* 113*   < > 102*   < > 113*   CR  --   --  1.15  --  1.18*  --  1.25*   BUN  --   --  20.0  --  21.4*  --  19.5    < > = values in this interval not displayed.      HEMATOLOGY PANEL   Recent Labs   Lab 06/12/25 0515 06/11/25  0909 06/11/25  0515 06/10/25  1933   HGB 9.5*  --  9.4* 9.9*   MCV 87  --  87 86   WBC 13.7*  --  12.9* 16.4*     --  419 428   INR 1.16* 1.15  --   --       LIVER AND PANCREAS PANEL   Recent Labs   Lab 06/12/25  0515 06/11/25  0515 06/10/25  1933   AST 15 18 18   ALT 23 29 32   ALKPHOS 203* 237* 280*   BILITOTAL 0.3 0.3 0.3   LIPASE  --   --  28       6/11/25 paracentesis with 5100 mL of yellow ascitic fluid removed.  Fluid analysis shows 1998 nucleated cells 759 absolute neutrophils. SAAG is 0.7 making portal htn less likely.   Fungal and bacterial studies, cytology pending.   CX +3WBCs, but no organisms    AFP <1.8    6/11/25  Blood culture X2 negative 6/11/25    Previous evaluation:  -- liver biopsy June 6, 2025 showed findings of chronic venous outflow obstruction and minimal steatosis <5%. Outpatient orders have been placed for duplex US.      --ECHO 3/31/25   1.Left ventricular size, wall motion and function are normal. The ejection  fraction is 60-65%.  2.Normal right ventricle size and systolic function.  3.The left atrium is borderline dilated.  4.No hemodynamically significant valvular abnormalities on 2D or color flow  imaging.  5.Technically difficult, suboptimal study  Compared to the prior study dated 8/2/2023, there have been no changes.If  concerned about cardiac pathology would recommend other imaging modality such  as MICHAEL or MRI.        --Upper endoscopy April 2025 showed findings of portal hypertensive gastropathy but no varices.    IMAGING STUDIES    EXAM: CT ABDOMEN PELVIS W CONTRAST  LOCATION: [facility]  DATE: 06/10/2025     INDICATION: Abdominal distension, diffuse pain, chills.  COMPARISON: CTA abdomen and pelvis without/with IV contrast 05/17/2025.  TECHNIQUE: CT scan of the abdomen and pelvis was performed following injection of IV contrast. Multiplanar reformats were obtained. Dose reduction techniques were used.  CONTRAST: 90 mL Isovue 370 IV.     FINDINGS:   LOWER CHEST: Lung bases are clear. No pleural effusion on either side. Normal cardiac size. No pericardial effusion. No significant change.     HEPATOBILIARY: Cirrhotic configuration of the liver with lobulated contour and diffuse fatty infiltration. No discrete lesion. Patent portal veins. The gallbladder is not well-distended. Small-volume abdominopelvic ascites.     PANCREAS: Normal.     SPLEEN: Normal.     ADRENAL GLANDS: Bilateral benign adrenal fatty myelolipomas, stable.     KIDNEYS/BLADDER: No urinary tract calculi. Both kidneys are well-perfused without hydronephrosis or hydroureter. Normal urinary bladder.      BOWEL: Stomach is filled with residual food material. Formed stool material within normal-caliber colon, without mechanical obstruction or free gas. Distal colonic mild diverticulosis without acute inflammation or secondary complications.     LYMPH NODES: Shotty subcentimeter retroperitoneal nodes, likely reactive, similar to prior.     VASCULATURE: Normal-caliber abdominal aorta and IVC.     PELVIC ORGANS: Small-volume ascites. Slight atherosclerotic changes. Distal colonic diverticulosis.     MUSCULOSKELETAL: Mild diffuse body wall edema. Mild degenerative changes involving the spine and joints of the pelvis. Partial fusion of both SI joints.                                                                      IMPRESSION:   1.  Cirrhotic configuration of the liver with lobulated contour and diffuse fatty infiltration, unchanged. Small-volume abdominopelvic ascites. Mild diffuse body wall edema.     2.  No urinary tract calculi or obstruction. Bilateral benign adrenal fatty myelolipomas.     3.  Distal colonic diverticulosis. No mechanical obstruction or free gas.      EXAM: US ABDOMEN LIMITED WITH DOPPLER COMPLETE  LOCATION: [facility]  DATE: 6/11/2025     INDICATION: ?budd chiari, liver bx with findings of venous outflow obstruction, SAAG 0.7, ?etiology, please check portal flow  COMPARISON: 6/10/2025  TECHNIQUE: Complete abdominal ultrasound. Color flow with spectral Doppler and waveform analysis performed.     FINDINGS:     GALLBLADDER: No stones or pericholecystic fluid. Negative sonographic Hernandez's sign. Wall thickening versus prominent pericholecystic fat.     BILE DUCTS: No intrahepatic biliary dilatation. The common duct was not visualized due to body habitus or bowel gas.     LIVER: Morphologic changes of cirrhosis. No focal mass.      RIGHT KIDNEY: 12.6 cm.. No hydronephrosis.      LEFT KIDNEY: Not assessed     SPLEEN: Not assessed     PANCREAS: The visualized portions are  normal.     AORTA: Not assessed     IVC: Normal where visualized.     Small volume of perihepatic ascites .     ABDOMINAL DUPLEX: The hepatic artery, hepatic veins, IVC, portal veins, and splenic vein are patent with flow in the normal direction.                                                                       IMPRESSION:  1.  Morphologic changes of cirrhosis with ascites.  2.  Patent portal vein        I have reviewed the current diagnostic and laboratory tests.              IMPRESSION   Decompensated liver disease complicated by recurrent spontaneous bacterial peritonitis-- This 44-year-old male with history of hypertension, diabetes, sleep apnea, obesity, COPD, DVT history (not on blood thinners), cirrhosis presumed metabolic associated liver disease/possible component of alcohol related, but liver biopsy June 6 with findings of chronic venous outflow obstruction complicated by ascites and spontaneous bacterial peritonitis 5/2025 who presented to the hospital with the chief complaint of abdominal distention and shortness of breath.   His presentation is similar to May 2025 when he was found to have SBP with negative cultures. He also has a history of low SAAG ascites 4/20/25 when SAAG was 0.8 and 0.7 this admission. Despite being treated with Meropenenem followed by Levaquin and Bactrim for prophylaxis after SBP in May, the patient has recurrent SBP (low SAAG so portal hypertension not likely the cause of ascites). Appreciate input from infectious disease-- on Ceftriaxone and will follow cultures and pending studies. I also added on cytology. Will give albumin with next dose 6/13/25.   Given question of venous outflow obstruction on liver biopsy, duplex ultrasound  was done 6/11/25 but showed patient portal flow (Budd-chiari not likely). Will repeat cardiac echo.     Will need to monitor renal function closely- low threshold to involve nephrology with worsening renal function.   No current signs of  encephalopathy or gi bleeding .   MELD-NA=13 6/11/25         PLAN   Follow paracentesis pending studies--Fungal and bacterial studies, cytology pending.   Follow blood cultures  Appreciate input from infectious disease-- continue antibiotics. Albumin ordered for tomorrow/day 3.  Follow MELD labs/renal function.   <2gNa diet  Continue Lasix 20mg and Aldactone 50mg.   Continue ppi and H2 blocker.    Cardiac echo is pending-- I will actually change this to transesophageal echo given TTE in March could not evaluate right ventricle (poor image quality).  I discussed with the patient's primary hepatologist (Dr Maria) who might discuss with surgery for possible exp. Laparotomy given ?underlying malignancy.    Total time spent on this encounter=40minutes.         Ramila Santacruz PA-C  Thank you for the opportunity to participate in the care of this patient.   Please feel free to call me with any questions or concerns.  Phone number [phone].

## 2025-06-13 ENCOUNTER — APPOINTMENT (OUTPATIENT)
Dept: CARDIOLOGY | Facility: HOSPITAL | Age: 45
DRG: 432 | End: 2025-06-13
Attending: PHYSICIAN ASSISTANT
Payer: COMMERCIAL

## 2025-06-13 ENCOUNTER — APPOINTMENT (OUTPATIENT)
Dept: ULTRASOUND IMAGING | Facility: HOSPITAL | Age: 45
DRG: 432 | End: 2025-06-13
Attending: INTERNAL MEDICINE
Payer: COMMERCIAL

## 2025-06-13 VITALS
OXYGEN SATURATION: 98 % | RESPIRATION RATE: 22 BRPM | HEART RATE: 84 BPM | WEIGHT: 294.8 LBS | TEMPERATURE: 98.3 F | BODY MASS INDEX: 49.06 KG/M2 | DIASTOLIC BLOOD PRESSURE: 63 MMHG | SYSTOLIC BLOOD PRESSURE: 131 MMHG

## 2025-06-13 LAB
% LINING CELLS, BODY FLUID: 1 %
ALBUMIN SERPL BCG-MCNC: 3.6 G/DL (ref 3.5–5.2)
ALP SERPL-CCNC: 201 U/L (ref 40–150)
ALT SERPL W P-5'-P-CCNC: 25 U/L (ref 0–70)
ANION GAP SERPL CALCULATED.3IONS-SCNC: 9 MMOL/L (ref 7–15)
APPEARANCE FLD: ABNORMAL
AST SERPL W P-5'-P-CCNC: 15 U/L (ref 0–45)
BASOPHILS # BLD AUTO: 0.1 10E3/UL (ref 0–0.2)
BASOPHILS NFR BLD AUTO: 1 %
BILIRUB DIRECT SERPL-MCNC: 0.15 MG/DL (ref 0–0.3)
BILIRUB SERPL-MCNC: 0.3 MG/DL
BUN SERPL-MCNC: 18.6 MG/DL (ref 6–20)
CALCIUM SERPL-MCNC: 8.7 MG/DL (ref 8.8–10.4)
CHLORIDE SERPL-SCNC: 105 MMOL/L (ref 98–107)
COLOR FLD: YELLOW
CREAT SERPL-MCNC: 1.05 MG/DL (ref 0.67–1.17)
EGFRCR SERPLBLD CKD-EPI 2021: 90 ML/MIN/1.73M2
EOSINOPHIL # BLD AUTO: 0.7 10E3/UL (ref 0–0.7)
EOSINOPHIL NFR BLD AUTO: 6 %
ERYTHROCYTE [DISTWIDTH] IN BLOOD BY AUTOMATED COUNT: 15.3 % (ref 10–15)
GLUCOSE BLDC GLUCOMTR-MCNC: 109 MG/DL (ref 70–99)
GLUCOSE BLDC GLUCOMTR-MCNC: 118 MG/DL (ref 70–99)
GLUCOSE BLDC GLUCOMTR-MCNC: 126 MG/DL (ref 70–99)
GLUCOSE BLDC GLUCOMTR-MCNC: 143 MG/DL (ref 70–99)
GLUCOSE SERPL-MCNC: 117 MG/DL (ref 70–99)
HCO3 SERPL-SCNC: 25 MMOL/L (ref 22–29)
HCT VFR BLD AUTO: 31.5 % (ref 40–53)
HGB BLD-MCNC: 9.5 G/DL (ref 13.3–17.7)
IMM GRANULOCYTES # BLD: 0.1 10E3/UL
IMM GRANULOCYTES NFR BLD: 1 %
INR PPP: 1.18 (ref 0.85–1.15)
LVEF ECHO: NORMAL
LYMPHOCYTES # BLD AUTO: 1.8 10E3/UL (ref 0.8–5.3)
LYMPHOCYTES NFR BLD AUTO: 14 %
LYMPHOCYTES NFR FLD MANUAL: 35 %
MCH RBC QN AUTO: 26.2 PG (ref 26.5–33)
MCHC RBC AUTO-ENTMCNC: 30.2 G/DL (ref 31.5–36.5)
MCV RBC AUTO: 87 FL (ref 78–100)
MONOCYTES # BLD AUTO: 1.4 10E3/UL (ref 0–1.3)
MONOCYTES NFR BLD AUTO: 11 %
MONOS+MACROS NFR FLD MANUAL: 5 %
NEUTROPHILS # BLD AUTO: 8.4 10E3/UL (ref 1.6–8.3)
NEUTROPHILS # FLD: 684.4 /UL (ref ?–250)
NEUTROPHILS NFR BLD AUTO: 67 %
NEUTS BAND NFR FLD MANUAL: 59 %
NRBC # BLD AUTO: 0 10E3/UL
NRBC BLD AUTO-RTO: 0 /100
PATH REPORT.COMMENTS IMP SPEC: ABNORMAL
PATH REPORT.COMMENTS IMP SPEC: ABNORMAL
PATH REPORT.COMMENTS IMP SPEC: YES
PATH REPORT.FINAL DX SPEC: ABNORMAL
PATH REPORT.GROSS SPEC: ABNORMAL
PATH REPORT.MICROSCOPIC SPEC OTHER STN: ABNORMAL
PATH REPORT.RELEVANT HX SPEC: ABNORMAL
PLATELET # BLD AUTO: 405 10E3/UL (ref 150–450)
POTASSIUM SERPL-SCNC: 4.4 MMOL/L (ref 3.4–5.3)
PROT SERPL-MCNC: 5.9 G/DL (ref 6.4–8.3)
PROTHROMBIN TIME: 15.2 SECONDS (ref 11.8–14.8)
RBC # BLD AUTO: 3.62 10E6/UL (ref 4.4–5.9)
SODIUM SERPL-SCNC: 139 MMOL/L (ref 135–145)
SPECIMEN SOURCE FLD: ABNORMAL
WBC # BLD AUTO: 12.5 10E3/UL (ref 4–11)
WBC # FLD AUTO: 1160 /UL

## 2025-06-13 PROCEDURE — 87075 CULTR BACTERIA EXCEPT BLOOD: CPT | Performed by: INTERNAL MEDICINE

## 2025-06-13 PROCEDURE — 99152 MOD SED SAME PHYS/QHP 5/>YRS: CPT | Performed by: INTERNAL MEDICINE

## 2025-06-13 PROCEDURE — 250N000011 HC RX IP 250 OP 636: Performed by: INTERNAL MEDICINE

## 2025-06-13 PROCEDURE — 0W9G3ZZ DRAINAGE OF PERITONEAL CAVITY, PERCUTANEOUS APPROACH: ICD-10-PCS | Performed by: RADIOLOGY

## 2025-06-13 PROCEDURE — 93312 ECHO TRANSESOPHAGEAL: CPT | Mod: 26 | Performed by: INTERNAL MEDICINE

## 2025-06-13 PROCEDURE — 87070 CULTURE OTHR SPECIMN AEROBIC: CPT | Performed by: INTERNAL MEDICINE

## 2025-06-13 PROCEDURE — 93325 DOPPLER ECHO COLOR FLOW MAPG: CPT

## 2025-06-13 PROCEDURE — 82248 BILIRUBIN DIRECT: CPT | Performed by: PHYSICIAN ASSISTANT

## 2025-06-13 PROCEDURE — 250N000011 HC RX IP 250 OP 636: Mod: JZ | Performed by: PHYSICIAN ASSISTANT

## 2025-06-13 PROCEDURE — 94660 CPAP INITIATION&MGMT: CPT

## 2025-06-13 PROCEDURE — 93325 DOPPLER ECHO COLOR FLOW MAPG: CPT | Mod: 26 | Performed by: INTERNAL MEDICINE

## 2025-06-13 PROCEDURE — 250N000013 HC RX MED GY IP 250 OP 250 PS 637

## 2025-06-13 PROCEDURE — 250N000011 HC RX IP 250 OP 636: Mod: JZ | Performed by: INTERNAL MEDICINE

## 2025-06-13 PROCEDURE — 85004 AUTOMATED DIFF WBC COUNT: CPT

## 2025-06-13 PROCEDURE — 272N000710 US PARACENTESIS WITHOUT ALBUMIN

## 2025-06-13 PROCEDURE — 99232 SBSQ HOSP IP/OBS MODERATE 35: CPT | Performed by: INTERNAL MEDICINE

## 2025-06-13 PROCEDURE — 99238 HOSP IP/OBS DSCHRG MGMT 30/<: CPT | Mod: GC

## 2025-06-13 PROCEDURE — 93320 DOPPLER ECHO COMPLETE: CPT | Mod: 26 | Performed by: INTERNAL MEDICINE

## 2025-06-13 PROCEDURE — 999N000157 HC STATISTIC RCP TIME EA 10 MIN

## 2025-06-13 PROCEDURE — P9047 ALBUMIN (HUMAN), 25%, 50ML: HCPCS | Mod: JZ | Performed by: PHYSICIAN ASSISTANT

## 2025-06-13 PROCEDURE — 89050 BODY FLUID CELL COUNT: CPT | Performed by: INTERNAL MEDICINE

## 2025-06-13 PROCEDURE — 85610 PROTHROMBIN TIME: CPT | Performed by: PHYSICIAN ASSISTANT

## 2025-06-13 PROCEDURE — 36415 COLL VENOUS BLD VENIPUNCTURE: CPT

## 2025-06-13 PROCEDURE — 250N000009 HC RX 250: Performed by: INTERNAL MEDICINE

## 2025-06-13 PROCEDURE — 87205 SMEAR GRAM STAIN: CPT | Performed by: INTERNAL MEDICINE

## 2025-06-13 PROCEDURE — 250N000011 HC RX IP 250 OP 636

## 2025-06-13 RX ORDER — BENZOCAINE/MENTHOL 6 MG-10 MG
1 LOZENGE MUCOUS MEMBRANE 3 TIMES DAILY PRN
Status: DISCONTINUED | OUTPATIENT
Start: 2025-06-13 | End: 2025-06-13 | Stop reason: HOSPADM

## 2025-06-13 RX ORDER — MAGNESIUM HYDROXIDE/ALUMINUM HYDROXICE/SIMETHICONE 120; 1200; 1200 MG/30ML; MG/30ML; MG/30ML
30 SUSPENSION ORAL EVERY 8 HOURS PRN
Status: DISCONTINUED | OUTPATIENT
Start: 2025-06-13 | End: 2025-06-13 | Stop reason: HOSPADM

## 2025-06-13 RX ORDER — NYSTATIN 100000 [USP'U]/G
POWDER TOPICAL 2 TIMES DAILY
Qty: 30 G | Refills: 0 | Status: ON HOLD | OUTPATIENT
Start: 2025-06-13

## 2025-06-13 RX ORDER — SULFAMETHOXAZOLE AND TRIMETHOPRIM 800; 160 MG/1; MG/1
1 TABLET ORAL DAILY
Qty: 90 TABLET | Refills: 3 | Status: SHIPPED | OUTPATIENT
Start: 2025-06-18 | End: 2025-06-13

## 2025-06-13 RX ORDER — LIDOCAINE HYDROCHLORIDE 20 MG/ML
SOLUTION OROPHARYNGEAL
Status: COMPLETED | OUTPATIENT
Start: 2025-06-13 | End: 2025-06-13

## 2025-06-13 RX ORDER — FENTANYL CITRATE 50 UG/ML
INJECTION, SOLUTION INTRAMUSCULAR; INTRAVENOUS
Status: COMPLETED | OUTPATIENT
Start: 2025-06-13 | End: 2025-06-13

## 2025-06-13 RX ORDER — CEFDINIR 300 MG/1
300 CAPSULE ORAL 2 TIMES DAILY
Qty: 8 CAPSULE | Refills: 0 | Status: ON HOLD | OUTPATIENT
Start: 2025-06-14

## 2025-06-13 RX ORDER — ACETAMINOPHEN 325 MG/1
650 TABLET ORAL EVERY 4 HOURS PRN
Status: DISCONTINUED | OUTPATIENT
Start: 2025-06-13 | End: 2025-06-13 | Stop reason: HOSPADM

## 2025-06-13 RX ORDER — CEFDINIR 300 MG/1
300 CAPSULE ORAL 2 TIMES DAILY
Qty: 8 CAPSULE | Refills: 0 | Status: SHIPPED | OUTPATIENT
Start: 2025-06-14 | End: 2025-06-13

## 2025-06-13 RX ORDER — SULFAMETHOXAZOLE AND TRIMETHOPRIM 800; 160 MG/1; MG/1
1 TABLET ORAL DAILY
Qty: 90 TABLET | Refills: 3 | Status: ON HOLD | OUTPATIENT
Start: 2025-06-18

## 2025-06-13 RX ADMIN — SPIRONOLACTONE 50 MG: 25 TABLET, FILM COATED ORAL at 11:43

## 2025-06-13 RX ADMIN — FLUTICASONE FUROATE AND VILANTEROL TRIFENATATE 1 PUFF: 100; 25 POWDER RESPIRATORY (INHALATION) at 11:40

## 2025-06-13 RX ADMIN — LIDOCAINE HYDROCHLORIDE 15 ML: 20 SOLUTION ORAL; TOPICAL at 10:19

## 2025-06-13 RX ADMIN — MONTELUKAST SODIUM 10 MG: 10 TABLET, FILM COATED ORAL at 11:41

## 2025-06-13 RX ADMIN — FENTANYL CITRATE 25 MCG: 50 INJECTION, SOLUTION INTRAMUSCULAR; INTRAVENOUS at 10:31

## 2025-06-13 RX ADMIN — UMECLIDINIUM 1 PUFF: 62.5 AEROSOL, POWDER ORAL at 11:41

## 2025-06-13 RX ADMIN — CEFTRIAXONE SODIUM 2 G: 2 INJECTION, POWDER, FOR SOLUTION INTRAMUSCULAR; INTRAVENOUS at 16:07

## 2025-06-13 RX ADMIN — INSULIN ASPART 1 UNITS: 100 INJECTION, SOLUTION INTRAVENOUS; SUBCUTANEOUS at 16:44

## 2025-06-13 RX ADMIN — FUROSEMIDE 20 MG: 20 TABLET ORAL at 11:40

## 2025-06-13 RX ADMIN — NICOTINE 1 PATCH: 14 PATCH, EXTENDED RELEASE TRANSDERMAL at 08:01

## 2025-06-13 RX ADMIN — ENOXAPARIN SODIUM 40 MG: 40 INJECTION SUBCUTANEOUS at 11:40

## 2025-06-13 RX ADMIN — MIDAZOLAM HYDROCHLORIDE 1 MG: 1 INJECTION, SOLUTION INTRAMUSCULAR; INTRAVENOUS at 10:27

## 2025-06-13 RX ADMIN — FAMOTIDINE 20 MG: 20 TABLET, FILM COATED ORAL at 11:40

## 2025-06-13 RX ADMIN — LEVOTHYROXINE SODIUM 12.5 MCG: 0.03 TABLET ORAL at 11:40

## 2025-06-13 RX ADMIN — MIDAZOLAM HYDROCHLORIDE 1 MG: 1 INJECTION, SOLUTION INTRAMUSCULAR; INTRAVENOUS at 10:23

## 2025-06-13 RX ADMIN — PANTOPRAZOLE SODIUM 40 MG: 40 TABLET, DELAYED RELEASE ORAL at 16:38

## 2025-06-13 RX ADMIN — ALBUMIN HUMAN 75 G: 0.25 SOLUTION INTRAVENOUS at 11:43

## 2025-06-13 RX ADMIN — FENTANYL CITRATE 25 MCG: 50 INJECTION, SOLUTION INTRAMUSCULAR; INTRAVENOUS at 10:30

## 2025-06-13 RX ADMIN — FENTANYL CITRATE 25 MCG: 50 INJECTION, SOLUTION INTRAMUSCULAR; INTRAVENOUS at 10:23

## 2025-06-13 ASSESSMENT — ACTIVITIES OF DAILY LIVING (ADL)
ADLS_ACUITY_SCORE: 51
ADLS_ACUITY_SCORE: 53
ADLS_ACUITY_SCORE: 51
ADLS_ACUITY_SCORE: 53
ADLS_ACUITY_SCORE: 51
ADLS_ACUITY_SCORE: 53
ADLS_ACUITY_SCORE: 51
ADLS_ACUITY_SCORE: 51
ADLS_ACUITY_SCORE: 53
ADLS_ACUITY_SCORE: 53
ADLS_ACUITY_SCORE: 51
ADLS_ACUITY_SCORE: 51

## 2025-06-13 NOTE — PROGRESS NOTES
Care Management Follow Up    Length of Stay (days): 2    Expected Discharge Date: 06/15/2025     Concerns to be Addressed: Care progression - discharge planning     Patient plan of care discussed at interdisciplinary rounds: Yes    Anticipated Discharge Disposition:  TBD    Anticipated Discharge Services:  TBD  Anticipated Discharge DME:  NA    Patient/family educated on Medicare website which has current facility and service quality ratings:  NA  Education Provided on the Discharge Plan:  Yes per team  Patient/Family in Agreement with the Plan:  NA    Referrals Placed by CM/SW:  NA  Private pay costs discussed: Not applicable    Discussed  Partnership in Safe Discharge Planning  document with patient/family: Yes: Jesika Bhatt and [facility]    Handoff Completed: No, handoff not indicated or clinically appropriate    Additional Information:  0826 called Jesika bahtt, to update on ID plan and awaiting final ID plan. Jesika agreed with the plan for outpatient infusion center to finish the IV antibiotic if needed. Jesika states TCU would not be a good option for patient. He will not do well.    Next Steps: RNCM to follow for medical progression, recommendations, and final discharge plan.     Janine Rinaldi RN     1402 rec'd a message from Dr. Crespo, patient is hoping to discharge later tonight after his paracentesis.     Sent a message to request Dr. Crespo to call.  Dr. Crespo called. Clarified the antibiotic will be switched to oral.     Called [facility] nurse, Ginna, and left a message to return call.    1434 rec'd a message from Dr. Barrera, next abx dose is at 6pm. would prefer to wait until after that is complete.    1436 called [facility] and spoke with Dayne. Dayne said [facility] is not available. He will try to call [facility] to return call.    1500 [facility] called and said patient can return, but would like to know the time patient is ready to schedule the transport. [facility]na wants all new scripts sent to  [facility].  Contact for [facility] nursing station given to [facility] packet and discharge orders fax to [phone]     Message sent to update Dr. Barrera  Called to update charge nurse for the evening    1530 called guardian, Jesika, to update on the above.

## 2025-06-13 NOTE — PLAN OF CARE
Pt A&Ox4, calm, cooperative  - vitals stable, BiPap on overnight  - 2 AM blood sugar 126  - denied pain or SOB  - NPO since midnight, plan for MICHAEL 6/13        =================================================================    Goal Outcome Evaluation:      Plan of Care Reviewed With: patient    Overall Patient Progress: improving      Problem: Comorbidity Management  Goal: Maintenance of COPD Symptom Control  Outcome: Progressing  Intervention: Maintain COPD Symptom Control  Recent Flowsheet Documentation  Taken 6/13/2025 0045 by Vita Neil RN  Medication Review/Management: medications reviewed  Goal: Blood Glucose Levels Within Targeted Range  Outcome: Progressing  Intervention: Monitor and Manage Glycemia  Recent Flowsheet Documentation  Taken 6/13/2025 0045 by Vita Neil RN  Medication Review/Management: medications reviewed  Goal: Blood Pressure in Desired Range  Outcome: Progressing  Intervention: Maintain Blood Pressure Management  Recent Flowsheet Documentation  Taken 6/13/2025 0045 by Vita Neil RN  Medication Review/Management: medications reviewed     Problem: Anemia  Goal: Anemia Symptom Improvement  Outcome: Progressing  Intervention: Monitor and Manage Anemia  Recent Flowsheet Documentation  Taken 6/13/2025 0045 by Vita Neil RN  Safety Promotion/Fall Prevention:   assistive device/personal items within reach   nonskid shoes/slippers when out of bed   patient and family education   mobility aid in reach   lighting adjusted   safety round/check completed

## 2025-06-13 NOTE — PROGRESS NOTES
Gastroenterology progress note    SUBJECTIVE:  Warren notes that he is feeling well.  MICHAEL today was limited showed that the left ventricle was normal in size with a normal ejection fraction.  Right ventricle was also normal in size with normal systolic function.  Trivial pericardial effusion was seen.  No obvious abnormalities on the study.  He would like a repeat paracentesis today as he is feeling full.  He would also like to go home today as he has a meeting with family tomorrow morning that he would like to make.    OBJECTIVE:  Vitals last 24 hours  Temp:  [97  F (36.1  C)-98.2  F (36.8  C)] 97.2  F (36.2  C)  Pulse:  [] 81  Resp:  [14-40] 24  BP: (104-178)/(53-93) 111/59  SpO2:  [96 %-100 %] 96 % O2 Device: None (Room air)    Body mass index is 49.06 kg/m .  General: Laying in bed in no acute distress  Eyes no scleral icterus  Skin: No jaundice  Heart: Regular rate and rhythm with no murmurs rubs or gallops  Lungs: Clear to auscultation bilaterally  Abdomen: Protuberant with ascites, positive bowel sounds, nontender to palpation  Extremities: Venous stasis changes bilateral lower extremities, 1+ pedal edema bilaterally  Neuro: Alert and oriented      LABS:  CBC:  Recent Labs   Lab Test 06/13/25  0552   WBC 12.5*   RBC 3.62*   HGB 9.5*   HCT 31.5*   MCV 87   MCH 26.2*   MCHC 30.2*   RDW 15.3*          BMP:  Recent Labs   Lab 06/13/25  1155 06/13/25  0623 06/13/25  0552 06/12/25  0805 06/12/25  0515 06/11/25  0810 06/11/25  0515   NA  --   --  139  --  142  --  136   POTASSIUM  --   --  4.4  --  4.1  --  4.0   CHLORIDE  --   --  105  --  106  --  101   CO2  --   --  25  --  24  --  25   * 109* 117*   < > 113*   < > 102*   CR  --   --  1.05  --  1.15  --  1.18*   BUN  --   --  18.6  --  20.0  --  21.4*    < > = values in this interval not displayed.       Liver/Pancreas:  Recent Labs   Lab 06/13/25  0552 06/12/25  0515 06/11/25  0515 06/10/25  1933   AST 15 15 18 18   ALT 25 23 29 32    ALKPHOS 201* 203* 237* 280*   BILITOTAL 0.3 0.3 0.3 0.3   LIPASE  --   --   --  28     Recent Labs   Lab Test 25  0552   INR 1.18*         IMAGING:  Echocardiogram MICHAEL  Result Date: 2025  [id number]^MARIA ELENA^[place] [address]  Name: BOB LO MRN: [id number] : 1980 Study Date: 2025 10:01 AM Age: 44 yrs Gender: Male Patient Location: [facility] Reason For Study: Edema Ordering Physician: GISELE ARREDONDO Performed By: PATRICIA/MAY^^^^  BSA: 2.3 m2 Height: 65 in Weight: 294 lb HR: 99 ______________________________________________________________________________ Procedure Transesophageal Echocardiogram with two-dimensional, color and spectral Doppler. ______________________________________________________________________________ Interpretation Summary  Limited imaging done due to poor patient tolerance and hypotension limiting further sedation  Trivial pericardial effusion The left ventricle is normal in size. The visual ejection fraction is 60-65%. Regional wall motion is grossly normal but endocardial border definition is limited Normal right ventricle size and systolic function. No hemodynamically significant valve lesions ______________________________________________________________________________ MICHAEL I determined this patient to be an appropriate candidate for the planned sedation and procedure and have reassessed the patient immediately prior to sedation and procedure. Total sedation time: 11 minutes of continuous bedside 1:1 monitoring. Versed (2mg) was given intravenously. Fentanyl (75mcg) was given intravenously. Informed consent for Transesophegeal echo obtained. The Transducer was inserted without difficulty . Complications None. The MICHAEL was terminated due to patient intolerance of procedure.  Left Ventricle The left ventricle is normal in size. The visual ejection fraction is 60-65%. Regional wall motion  is grossly normal but endocardial border definition is limited.  Right Ventricle Normal right ventricle size and systolic function.  Atria There is no color Doppler evidence of a PFO. No thrombus is detected in the left atrial appendage.  Mitral Valve Mitral valve leaflets appear normal. There is mild (1+) mitral regurgitation. There is no mitral valve stenosis.  Tricuspid Valve Normal tricuspid valve. There is trace tricuspid regurgitation.  Aortic Valve The aortic valve is trileaflet. No aortic regurgitation is present. No aortic stenosis is present.  Pulmonic Valve The pulmonic valve is not well visualized.  Vessels Normal size aorta. No significant atherosclerosis in visualized aorta.  Pericardial/Pleural Trivial pericardial effusion. ______________________________________________________________________________ Report approved by: Ibeth Brown MD on 06/13/2025 11:23 AM  ______________________________________________________________________________      US Abdomen Limited w Doppler Complete  Result Date: 6/11/2025  EXAM: US ABDOMEN LIMITED WITH DOPPLER COMPLETE LOCATION: [facility] DATE: 6/11/2025 INDICATION: ?budd chiari, liver bx with findings of venous outflow obstruction, SAAG 0.7, ?etiology, please check portal flow COMPARISON: 6/10/2025 TECHNIQUE: Complete abdominal ultrasound. Color flow with spectral Doppler and waveform analysis performed.  FINDINGS: GALLBLADDER: No stones or pericholecystic fluid. Negative sonographic Hernandez's sign. Wall thickening versus prominent pericholecystic fat. BILE DUCTS: No intrahepatic biliary dilatation. The common duct was not visualized due to body habitus or bowel gas. LIVER: Morphologic changes of cirrhosis. No focal mass. RIGHT KIDNEY: 12.6 cm.. No hydronephrosis. LEFT KIDNEY: Not assessed SPLEEN: Not assessed PANCREAS: The visualized portions are normal. AORTA: Not assessed IVC: Normal where visualized. Small volume of perihepatic ascites .  ABDOMINAL DUPLEX: The hepatic artery, hepatic veins, IVC, portal veins, and splenic vein are patent with flow in the normal direction.     IMPRESSION: 1.  Morphologic changes of cirrhosis with ascites. 2.  Patent portal vein     CT Abdomen Pelvis w Contrast  Result Date: 6/10/2025  EXAM: CT ABDOMEN PELVIS W CONTRAST LOCATION: [facility] DATE: 06/10/2025 INDICATION: Abdominal distension, diffuse pain, chills. COMPARISON: CTA abdomen and pelvis without/with IV contrast 05/17/2025. TECHNIQUE: CT scan of the abdomen and pelvis was performed following injection of IV contrast. Multiplanar reformats were obtained. Dose reduction techniques were used. CONTRAST: 90 mL Isovue 370 IV. FINDINGS: LOWER CHEST: Lung bases are clear. No pleural effusion on either side. Normal cardiac size. No pericardial effusion. No significant change. HEPATOBILIARY: Cirrhotic configuration of the liver with lobulated contour and diffuse fatty infiltration. No discrete lesion. Patent portal veins. The gallbladder is not well-distended. Small-volume abdominopelvic ascites. PANCREAS: Normal. SPLEEN: Normal. ADRENAL GLANDS: Bilateral benign adrenal fatty myelolipomas, stable. KIDNEYS/BLADDER: No urinary tract calculi. Both kidneys are well-perfused without hydronephrosis or hydroureter. Normal urinary bladder. BOWEL: Stomach is filled with residual food material. Formed stool material within normal-caliber colon, without mechanical obstruction or free gas. Distal colonic mild diverticulosis without acute inflammation or secondary complications. LYMPH NODES: Shotty subcentimeter retroperitoneal nodes, likely reactive, similar to prior. VASCULATURE: Normal-caliber abdominal aorta and IVC. PELVIC ORGANS: Small-volume ascites. Slight atherosclerotic changes. Distal colonic diverticulosis. MUSCULOSKELETAL: Mild diffuse body wall edema. Mild degenerative changes involving the spine and joints of the pelvis. Partial fusion of both  SI joints.     IMPRESSION: 1.  Cirrhotic configuration of the liver with lobulated contour and diffuse fatty infiltration, unchanged. Small-volume abdominopelvic ascites. Mild diffuse body wall edema. 2.  No urinary tract calculi or obstruction. Bilateral benign adrenal fatty myelolipomas. 3.  Distal colonic diverticulosis. No mechanical obstruction or free gas.    US Paracentesis without Albumin  Result Date: 6/10/2025  [place] [facility] LOCATION: [facility] DATE: 6/10/2025 PROCEDURE: IMAGING GUIDED PARACENTESIS INTERVENTIONAL RADIOLOGIST: Zia Schaefer MD. INDICATION: 44-year-old male with hypoxia in the setting of recurrent large-volume ascites. CONSENT: The risks, benefits and alternatives of an imaging guided paracentesis were discussed with the patient  in detail. All questions were answered. Informed consent was given to proceed with the procedure. MODERATE SEDATION: None. ADDITIONAL MEDICATIONS: None. COMPLICATIONS: No immediate complications. PROCEDURE:  A limited ultrasound was performed for localization purposes. Using sterile technique 10 mL of Xylocaine was infused into the local soft tissues. Under direct ultrasound guidance a 5 Israeli Yueh catheter was inserted into the ascitic fluid. A total of 5100 mL of clear yellow ascitic fluid was removed and sent to the lab if diagnostic analysis was requested. FINDINGS: The initial ultrasound shows a large amount of peritoneal ascites. Images obtained during catheter placement show the access needle with tip in the peritoneal ascites.     IMPRESSION:  Successful ultrasound-guided paracentesis, as discussed above.         ASSESSMENT:  1.  Cirrhosis secondary to metabolic liver disease, decompensated with ascites  2.  Recurrent SBP of unclear etiology  3.  Venous outflow obstruction noted on liver biopsy With normal duplex ultrasound and CT.  Patient Active Problem List   Diagnosis    Attention deficit hyperactivity disorder (ADHD)     "Other specified delay in development    Tobacco use    Elevated WBCs    Rectal bleeding    Anal fissure    \"high flow priapism\"     CARDIOVASCULAR SCREENING; LDL GOAL LESS THAN 160    PTSD (post-traumatic stress disorder)    Fetal alcohol syndromes    Seasonal allergic rhinitis    Steatohepatitis    Cardiomegaly    Shortness of breath    Chest pain, unspecified type    Acute right hip pain    Chronic right-sided congestive heart failure (H)    Active autistic disorder    Adjustment disorder with disturbance of conduct    Angiomyolipoma    Ankylosis, sacroiliac joint    Ascites    Charcot-Estrella-Tooth syndrome    Chronic insomnia    Congestive heart failure (H)    DM2 (diabetes mellitus, type 2) (H)    DM2 (diabetes mellitus, type 2) (H)    Dysphagia    Dysuria    Elevated d-dimer    Episodic mood disorder    Excessive daytime sleepiness    Gait instability    H/O fall    Hematuria    Benign essential hypertension    Hyperglycemia    Incontinence    Myelolipoma of adrenal gland    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    Abdominal pain, right upper quadrant    PVC's (premature ventricular contractions)    SVT (supraventricular tachycardia)    Heart failure with preserved ejection fraction, NYHA class I (H)    Incomplete left bundle branch block (LBBB)    Suicidal ideation    Cirrhosis of liver with ascites, unspecified hepatic cirrhosis type (H)    Thyroid nodule    ADHD (attention deficit hyperactivity disorder)    Chest pain    Morbid obesity (H)    COPD exacerbation (H)    DVT of axillary vein, acute left (H)    Abdominal bloating    LLQ abdominal pain    SBP (spontaneous bacterial peritonitis) (H)    ASNHL (asymmetrical sensorineural hearing loss)    Traumatic brain injury (H)    Tongue lesion    Acute kidney failure, unspecified    Other specified hypothyroidism    Medication induced coagulopathy    Normal anion gap metabolic acidosis    Diffuse abdominal pain    Portal hypertension (H)    Esophagitis    NSTEMI (non-ST " elevated myocardial infarction) (H)    Other ascites    RLQ abdominal pain    Danni-Arnett tear    Diarrhea, unspecified type       PLAN:  - I have ordered repeat diagnostic and therapeutic paracentesis for the patient.  He would like to get this done before he leaves the hospital.  I am checking with Dr. Bajwa of infectious disease, cell count and differential as well as cultures were ordered.  I also reordered cytology.  - The patient would like to discharge today.  Dr. Crespo was present in the room.  She will touch base with infectious disease.  As he is feeling well and labs have been stable, I am okay with discharge from the GI standpoint.  He does have follow-up with Dr. Maria in 1 month.  I have updated Dr. Maria regarding the plan.  Prior to discharge, he should complete the albumin scheduled for today and have his paracentesis.  - Plans for cardiology outpatient follow-up per Dr. Maria.  - Continue to follow blood cultures and studies from ascitic fluid.  - Low-sodium diet.    GI will continue to follow along.  Please call or page with questions.    Approximately 33 minutes of total time was spent providing patient care, including patient evaluation, reviewing documentation/test results, and .                                                                       Shanda Gagnon MD  Thank you for the opportunity to participate in the care of this patient.   Please feel free to call me with any questions or concerns.  Phone number [phone].

## 2025-06-13 NOTE — PRE-PROCEDURE
PRE-MICHAEL  GENERAL PRE-PROCEDURE:     Written consent obtained?: Yes    Risks and benefits: Risks, benefits and alternatives were discussed    Consent given by:  Patient  Patient states understanding of procedure being performed: Yes    Patient's understanding of procedure matches consent: Yes    Procedure consent matches procedure scheduled: Yes    Expected level of sedation:  Moderate  Appropriately NPO:  Yes  Mallampati  :  Grade 1- soft palate, uvula, tonsillar pillars, and posterior pharyngeal wall visible  Lungs:  Lungs clear with good breath sounds bilaterally  Heart:  Normal heart sounds and rate  History & Physical reviewed:  History and physical reviewed and no updates needed  Statement of review:  I have reviewed the lab findings, diagnostic data, medications, and the plan for sedation      Ibeth Brown MD  Noninvasive Cardiologist   [facility]

## 2025-06-13 NOTE — DISCHARGE SUMMARY
"[facility]  Discharge Summary - Medicine & Pediatrics       Date of Admission:  6/10/2025  Date of Discharge:  6/13/2025  Discharging Provider: Dr. Rosenstein   Discharge Service: Hospitalist Service    Discharge Diagnoses   Cirrhosis with ascites   Resistant SBP     Clinically Significant Risk Factors     # DMII: A1C = 6.7 % (Ref range: <5.7 %) within past 6 months  # Morbid Obesity: Estimated body mass index is 49.06 kg/m  as calculated from the following:    Height as of 6/2/25: 1.651 m (5' 5\").    Weight as of this encounter: 133.7 kg (294 lb 12.8 oz).       Follow-ups Needed After Discharge     Unresulted Labs Ordered in the Past 30 Days of this Admission       Date and Time Order Name Status Description    6/11/2025  2:44 PM Fungal PCR with Separately Reported Reflex Testing In process     6/11/2025  2:43 PM Bacterial DNA 16S Ribosomal Non Blood with Reflex to NGS 16S with Reflex to Enterobacterales Identification In process     6/10/2025 11:15 PM Blood Culture Peripheral blood (BC) Hand, Right Preliminary     6/10/2025 11:15 PM Blood Culture Peripheral blood (BC) Arm, Right Preliminary     6/10/2025  7:03 PM Ascites Fluid Aerobic Bacterial Culture Routine With Gram Stain Preliminary         These results will be followed up by GI provider.     Discharge Disposition   Discharged to home  Condition at discharge: Stable    Hospital Course   Warren Jaramillo was admitted on 6/10/2025 for cirrhosis with ascites requiring frequent paracentesis and found to have significantly elevated ANC and ascitic fluid concerning for resistant SBP. .  The following problems were addressed during his hospitalization:    Cirrhosis with ascites, requiring frequent paracentesis  Resistant SBP  Patient has history of cirrhosis, likely secondary to alcohol use and MASLD, requiring more frequent paracentesis, now twice weekly over the past 3-4 months. Patient was hospitalized from 5/15 - 5/26 for concern " of persistent SBP with ANC in the 1000's despite treatment with ceftriaxone followed by meropenem with clinical improvement but with worsening ANC. He is s/p paracentesis with 5 L removed. Ascitic fluid showed ANC of 759, but negative fluid cultures.  CT abdomen pelvis showed cirrhotic changes of the liver but otherwise unremarkable. Vitals and exam remain reassuring. Initial SAAG score is 0.7, indicating cause of ascites is not necessarily portal hypertension. GI and ID are involved in the patient's care, and continue to investigate causes for increased ascites. Results of 16s sequencing pending. Plan to trend WBC count on future paracentesis. MICHAEL obtained during admission, results overall unremarkable. Liver Doppler US unrevealing for Budd-Chiari syndrome. Patient required another therapeutic paracentesis on day of discharge. He received ceftriaxone x3 days, and will discharge home with PO cefdinir to complete 7 day course of antibiotics. Then transition back to Bactrim for further prophylaxis. Patient will schedule biweekly paracentesis with assistance from primary hepatologist, Dr. Maria. Has follow up appointment scheduled in one month. On day of discharge, patient clinically well appearing and anxious to discharge. Patient voiced understanding of plan and follow up recommendations.      Hx of RIVERA  History of RIVERA with creatinine peaking at 7.0 during recent admission 3-4 weeks ago, at that time thought to be secondary to contrast-induced nephropathy.  Now it is improved closer to baseline, remained stable throughout hospital admission.     COPD  Controlled with PTA inhalers.  -Continued PTA inhalers     Chronic normocytic Anemia   Hx of GIB  Hemoglobin 9.9, close to baseline.  No symptoms or signs of active bleeding.     T2DM  A1c 6.7. PTA metformin and jardiance.      Chronic conditions:   History of DVT: Provoked completed 3 months of apixaban discontinued on  recent admission 5/25 follow-up with PCP  HTN:  Continue PTA spironolactone  Insomnia: Continue PTA trazodone  Mental health; continue PTA Zyprexa  GERD: continue PTA Protonix  HLD: Continue PTA rosuvastatin  COPD: Continue PTA inhalers      Consultations This Hospital Stay   GASTROENTEROLOGY IP CONSULT  CARE MANAGEMENT / SOCIAL WORK IP CONSULT  INFECTIOUS DISEASES IP CONSULT    Code Status   Full Code       The patient was discussed with Dr. Rosenstein Kaitlin Thiel, MD  [address] [address]  Phone: [phone]  Fax: [phone]  ______________________________________________________________________    Physical Exam   Vital Signs: Temp: 97.2  F (36.2  C) Temp src: Tympanic BP: 111/59 Pulse: 81   Resp: 24 SpO2: 96 % O2 Device: None (Room air) Oxygen Delivery: 2 LPM  Weight: 294 lbs 12.8 oz  Constitutional: Resting comfortably in bed. Not in distress or discomfort.  Respiratory: No increased work of breathing, good air exchange, clear to auscultation bilaterally, no crackles or wheezing.  Cardiovascular: regular rate and rhythm, normal S1 and S2. No murmur noted  GI: Abdomen is distended, greater than yesterday's (6/12) examination. Moderately tender to palpation in the mid-abdomen. Fluid wave is present.       Primary Care Physician   Mary Kelly    Discharge Orders   No discharge procedures on file.    Significant Results and Procedures   Results for orders placed or performed during the hospital encounter of 06/10/25   CT Abdomen Pelvis w Contrast    Narrative    EXAM: CT ABDOMEN PELVIS W CONTRAST  LOCATION: [facility]  DATE: 06/10/2025    INDICATION: Abdominal distension, diffuse pain, chills.  COMPARISON: CTA abdomen and pelvis without/with IV contrast 05/17/2025.  TECHNIQUE: CT scan of the abdomen and pelvis was performed following injection of IV contrast. Multiplanar reformats were obtained. Dose reduction techniques were used.  CONTRAST: 90 mL Isovue 370  IV.    FINDINGS:   LOWER CHEST: Lung bases are clear. No pleural effusion on either side. Normal cardiac size. No pericardial effusion. No significant change.    HEPATOBILIARY: Cirrhotic configuration of the liver with lobulated contour and diffuse fatty infiltration. No discrete lesion. Patent portal veins. The gallbladder is not well-distended. Small-volume abdominopelvic ascites.    PANCREAS: Normal.    SPLEEN: Normal.    ADRENAL GLANDS: Bilateral benign adrenal fatty myelolipomas, stable.    KIDNEYS/BLADDER: No urinary tract calculi. Both kidneys are well-perfused without hydronephrosis or hydroureter. Normal urinary bladder.    BOWEL: Stomach is filled with residual food material. Formed stool material within normal-caliber colon, without mechanical obstruction or free gas. Distal colonic mild diverticulosis without acute inflammation or secondary complications.    LYMPH NODES: Shotty subcentimeter retroperitoneal nodes, likely reactive, similar to prior.    VASCULATURE: Normal-caliber abdominal aorta and IVC.    PELVIC ORGANS: Small-volume ascites. Slight atherosclerotic changes. Distal colonic diverticulosis.    MUSCULOSKELETAL: Mild diffuse body wall edema. Mild degenerative changes involving the spine and joints of the pelvis. Partial fusion of both SI joints.      Impression    IMPRESSION:   1.  Cirrhotic configuration of the liver with lobulated contour and diffuse fatty infiltration, unchanged. Small-volume abdominopelvic ascites. Mild diffuse body wall edema.    2.  No urinary tract calculi or obstruction. Bilateral benign adrenal fatty myelolipomas.    3.  Distal colonic diverticulosis. No mechanical obstruction or free gas.   US Paracentesis without Albumin    Narrative    [facility]  LOCATION: [facility]  DATE: 6/10/2025    PROCEDURE: IMAGING GUIDED PARACENTESIS    INTERVENTIONAL RADIOLOGIST: Zia Schaefer MD.     INDICATION: 44-year-old male with hypoxia in the setting  of recurrent large-volume ascites.    CONSENT: The risks, benefits and alternatives of an imaging guided paracentesis were discussed with the patient  in detail. All questions were answered. Informed consent was given to proceed with the procedure.    MODERATE SEDATION: None.    ADDITIONAL MEDICATIONS: None.    COMPLICATIONS: No immediate complications.    PROCEDURE:    A limited ultrasound was performed for localization purposes.    Using sterile technique 10 mL of Xylocaine was infused into the local soft tissues. Under direct ultrasound guidance a 5 Bahraini Yueh catheter was inserted into the ascitic fluid.    A total of 5100 mL of clear yellow ascitic fluid was removed and sent to the lab if diagnostic analysis was requested.    FINDINGS:  The initial ultrasound shows a large amount of peritoneal ascites.    Images obtained during catheter placement show the access needle with tip in the peritoneal ascites.      Impression    IMPRESSION:    Successful ultrasound-guided paracentesis, as discussed above.         US Abdomen Limited w Doppler Complete    Narrative    EXAM: US ABDOMEN LIMITED WITH DOPPLER COMPLETE  LOCATION: [facility]  DATE: 6/11/2025    INDICATION: ?budd chiari, liver bx with findings of venous outflow obstruction, SAAG 0.7, ?etiology, please check portal flow  COMPARISON: 6/10/2025  TECHNIQUE: Complete abdominal ultrasound. Color flow with spectral Doppler and waveform analysis performed.     FINDINGS:    GALLBLADDER: No stones or pericholecystic fluid. Negative sonographic Hernandez's sign. Wall thickening versus prominent pericholecystic fat.    BILE DUCTS: No intrahepatic biliary dilatation. The common duct was not visualized due to body habitus or bowel gas.    LIVER: Morphologic changes of cirrhosis. No focal mass.     RIGHT KIDNEY: 12.6 cm.. No hydronephrosis.     LEFT KIDNEY: Not assessed    SPLEEN: Not assessed    PANCREAS: The visualized portions are normal.    AORTA:  Not assessed    IVC: Normal where visualized.    Small volume of perihepatic ascites .    ABDOMINAL DUPLEX: The hepatic artery, hepatic veins, IVC, portal veins, and splenic vein are patent with flow in the normal direction.       Impression    IMPRESSION:  1.  Morphologic changes of cirrhosis with ascites.  2.  Patent portal vein       Echocardiogram Complete *Canceled*    Narrative    The following orders were created for panel order Echocardiogram Complete.  Procedure                               Abnormality         Status                     ---------                               -----------         ------                       Please view results for these tests on the individual orders.   Echocardiogram Complete *Canceled*    Narrative    The following orders were created for panel order Echocardiogram Complete.  Procedure                               Abnormality         Status                     ---------                               -----------         ------                       Please view results for these tests on the individual orders.   Echocardiogram MICHAEL     Value    LVEF  60-65%    Narrative    [id number]  [id number]  [id number]  [id number]^MARIA ELENA^[place] [address]     Name: BOB LO  MRN: [id number]  : 1980  Study Date: 2025 10:01 AM  Age: 44 yrs  Gender: Male  Patient Location: [facility]  Reason For Study: Edema  Ordering Physician: GISELE ARREDONDO  Performed By: PATRICIA/MAY^^^^     BSA: 2.3 m2  Height: 65 in  Weight: 294 lb  HR: 99  ______________________________________________________________________________  Procedure  Transesophageal Echocardiogram with two-dimensional, color and spectral  Doppler.  ______________________________________________________________________________  Interpretation Summary     Limited imaging done due to poor patient tolerance and hypotension limiting  further sedation     Trivial pericardial  effusion  The left ventricle is normal in size.  The visual ejection fraction is 60-65%.  Regional wall motion is grossly normal but endocardial border definition is  limited  Normal right ventricle size and systolic function.  No hemodynamically significant valve lesions  ______________________________________________________________________________  MICHAEL  I determined this patient to be an appropriate candidate for the planned  sedation and procedure and have reassessed the patient immediately prior to  sedation and procedure. Total sedation time: 11 minutes of continuous bedside  1:1 monitoring. Versed (2mg) was given intravenously. Fentanyl (75mcg) was  given intravenously. Informed consent for Transesophegeal echo obtained. The  Transducer was inserted without difficulty . Complications None. The MICHAEL was  terminated due to patient intolerance of procedure.     Left Ventricle  The left ventricle is normal in size. The visual ejection fraction is 60-65%.  Regional wall motion is grossly normal but endocardial border definition is  limited.     Right Ventricle  Normal right ventricle size and systolic function.     Atria  There is no color Doppler evidence of a PFO. No thrombus is detected in the  left atrial appendage.     Mitral Valve  Mitral valve leaflets appear normal. There is mild (1+) mitral regurgitation.  There is no mitral valve stenosis.     Tricuspid Valve  Normal tricuspid valve. There is trace tricuspid regurgitation.     Aortic Valve  The aortic valve is trileaflet. No aortic regurgitation is present. No aortic  stenosis is present.     Pulmonic Valve  The pulmonic valve is not well visualized.     Vessels  Normal size aorta. No significant atherosclerosis in visualized aorta.     Pericardial/Pleural  Trivial pericardial effusion.  ______________________________________________________________________________  Report approved by: Ibeth Brown MD on 06/13/2025 11:23 AM      ______________________________________________________________________________          Discharge Medications   Current Discharge Medication List        CONTINUE these medications which have NOT CHANGED    Details   albuterol (PROAIR HFA/PROVENTIL HFA/VENTOLIN HFA) 108 (90 Base) MCG/ACT inhaler Inhale 1-2 puffs into the lungs every 6 hours as needed for shortness of breath, wheezing or cough  Qty: 18 g, Refills: 0    Comments: Pharmacy may dispense brand covered by insurance (Proair, or proventil or ventolin or generic albuterol inhaler)      albuterol (PROVENTIL) (2.5 MG/3ML) 0.083% neb solution Take 2.5 mg by nebulization 3 times daily as needed for shortness of breath, wheezing or cough      aloe vera GEL Apply 1 g topically every hour as needed for skin care Per bottle directions      bacitracin 500 UNIT/GM OINT Apply topically 3 times daily as needed for wound care      Benzocaine (SOLARCAINE ALOE VERA EX) Externally apply topically daily as needed.      benzonatate (TESSALON) 200 MG capsule Take 1 capsule (200 mg) by mouth 3 times daily as needed for cough.  Qty: 50 capsule, Refills: 0      Calamine external lotion Apply topically as needed for itching      carbamide peroxide (DEBROX) 6.5 % otic solution Place 5 drops into both ears daily as needed for other (ear wax).      clotrimazole (LOTRIMIN) 1 % external cream Apply topically 2 times daily as needed (skin irritation)      dextromethorphan-guaiFENesin (MUCINEX DM)  MG 12 hr tablet Take 1 tablet by mouth 2 times daily as needed.      diclofenac (VOLTAREN) 1 % topical gel Apply 2 g topically daily as needed for moderate pain To joints/back      EPINEPHrine (ANY BX GENERIC EQUIV) 0.3 MG/0.3ML injection 2-pack Inject 0.3 mg into the muscle as needed for anaphylaxis. May repeat one time in 5-15 minutes if response to initial dose is inadequate.      famotidine (PEPCID) 20 MG tablet Take 1 tablet (20 mg) by mouth 2 times daily  Qty: 60 tablet, Refills:  0      furosemide (LASIX) 40 MG tablet Take 0.5 tablets (20 mg) by mouth daily.  Qty: 30 tablet, Refills: 0    Associated Diagnoses: Cirrhosis of liver with ascites, unspecified hepatic cirrhosis type (H); Chronic right-sided congestive heart failure (H)      GAVILAX 17 GM/SCOOP powder Take 17 g by mouth daily as needed for constipation.      hydrocortisone (CORTAID) 1 % external cream Apply topically daily as needed for itching.      Hydrocortisone (PREPARATION H EX) Externally apply topically daily as needed (hemorrhoids).      JARDIANCE 10 MG TABS tablet Take 10 mg by mouth every morning.      levothyroxine (SYNTHROID/LEVOTHROID) 25 MCG tablet Take 12.5 mcg by mouth every morning (before breakfast).      lisinopril (ZESTRIL) 10 MG tablet Take 10 mg by mouth every morning.      loperamide (IMODIUM) 2 MG capsule Take 4 mg by mouth 4 times daily as needed for diarrhea.      loratadine (CLARITIN) 10 MG tablet Take 10 mg by mouth daily as needed for allergies.      magnesium hydroxide (MILK OF MAGNESIA) 400 MG/5ML suspension Take 30 mLs by mouth daily as needed for heartburn.      metFORMIN (GLUCOPHAGE) 1000 MG tablet Take 1,000 mg by mouth 2 times daily (with meals)      methocarbamol (ROBAXIN) 500 MG tablet Take 1 tablet (500 mg) by mouth 4 times daily as needed for muscle spasms.  Qty: 40 tablet, Refills: 0    Associated Diagnoses: Acute right-sided low back pain with right-sided sciatica      montelukast (SINGULAIR) 10 MG tablet Take 10 mg by mouth every morning.      OLANZapine (ZYPREXA) 10 MG tablet Take 10 mg by mouth At Bedtime      omeprazole (PRILOSEC) 40 MG DR capsule Take 40 mg by mouth every morning.      ondansetron (ZOFRAN ODT) 4 MG ODT tab Take 1 tablet (4 mg) by mouth every 8 hours as needed for nausea.  Qty: 20 tablet, Refills: 0      pramoxine-calamine (AVEENO) 1-8 % LOTN Apply topically daily as needed for itching.      rosuvastatin (CRESTOR) 10 MG tablet Take 10 mg by mouth At Bedtime       spironolactone (ALDACTONE) 50 MG tablet Take 50 mg by mouth daily.      sulfamethoxazole-trimethoprim (BACTRIM DS) 800-160 MG tablet Take 1 tablet by mouth daily.      traZODone (DESYREL) 100 MG tablet Take 100 mg by mouth at bedtime.      TRELEGY ELLIPTA 100-62.5-25 MCG/ACT oral inhaler Inhale 1 puff into the lungs every morning.      Urea 40 % CREA Apply topically daily as needed for dry skin.      Vitamins A & D (VITAMIN A & D) OINT Externally apply topically daily as needed (general skin health).      witch hazel-glycerin (TUCKS) pad Apply topically as needed for hemorrhoids.      Continuous Glucose Sensor (FREESTYLE MEGHANN 3 PLUS SENSOR) MISC USE TO READ BLOOD SUGAR TWICE DAILY AND AS NEEDED. CHANGE SENSOR EVERY 15 DAYS           STOP taking these medications       nystatin (MYCOSTATIN) 100451 UNIT/GM external powder Comments:   Reason for Stopping:             Allergies   Allergies   Allergen Reactions    Apricot Flavoring Agent (Non-Screening) Anaphylaxis    Banana Anaphylaxis     Throat swelling      Bees Anaphylaxis     Has an Epi pen    Wasp Venom Protein Shortness Of Breath     Other reaction(s): Respiratory Distress  Has an Epi pen        Methylphenidate Itching     Other reaction(s): Nightmares    Prunus      Other reaction(s): *Unknown

## 2025-06-13 NOTE — PROGRESS NOTES
Resident/Fellow Attestation   I, Bernarda Crespo MD, was present with the medical/TALIA student who participated in the service and in the documentation of the note.  I have verified the history and personally performed the physical exam and medical decision making.  I agree with the assessment and plan of care as documented in the note.      Bernarda Crespo MD  PGY1  Date of Service (when I saw the patient): 06/13/25    [facility]    Progress Note - Hospitalist Service       Date of Admission:  6/10/2025    Assessment & Plan   Warren Jaramillo is a 44 year old male admitted on 6/10/2025. He has a history of cirrhosis with ascites requiring frequent paracentesis, Rojas disease, T2DM, HTN, AVA, COPD and is admitted for abdominal distention and SOB found to have significantly elevated ANC and ascitic fluid concerning for resistant SBP.     Cirrhosis with ascites, requiring frequent paracentesis  Abdominal discomfort  Concern for resistant SBP  Possible venous outflow obstruction  Patient has history of cirrhosis, likely secondary to alcohol use and MASLD, requiring more frequent paracentesis, now twice weekly over the past 3-4 months. Patient was hospitalized from 5/15 - 5/26 for concern of persistent SBP with ANC in the 1000's despite treatment with ceftriaxone followed by meropenem with clinical improvement but with worsening ANC. He is s/p paracentesis with 5 L removed. Ascitic fluid showed ANC of 759.  CT abdomen pelvis showed cirrhotic changes of the liver but otherwise unremarkable. Vitals and exam remain reassuring. Initial SAAG score is 0.7, indicating cause of ascites is not necessarily portal hypertension. GI and ID are involved in the patient's care, and continue to investigate causes for increased ascites. He remains inpatient for further work-up of persistent leukocytosis in ascitic fluid and IV antibiotics.  - Paracentesis x1 on 6/10 with 5.1L off, received albumin x 1.   -  Discuss with GI today for potential paracentesis given with worsening abdominal distention consistent with ascitic fluid collection.   - Daily CBC   - ID following, appreciate expertise  - Continue IV Ceftriaxone, 7 total days, through 6/17. Cefdinir PO also acceptable through same course  - Transition to Bactrim prophylaxis on 6/18.   - Follow ascitic fluid studies.   - 16s and fungal screening (6/10 samples).  - Follow blood cultures  - Consider other etiology for persistent leukocytosis despite multiple rounds of abx.  - GI following, appreciate expertise  - Liver Doppler ultrasound unrevealing for Budd-Chiari syndrome  - MICHAEL today to evaluate for cardiac causes for hemodynamic instability contributing to ascites  - Consulted with primary Hepatologist (Dr. Maria) who may discuss with surgery for possible exploratory laparotomy.  - PTA Lasix & spironolactone    Hx of RIVERA  History of RIVERA with creatinine peaking at 7.0 during recent admission 3-4 weeks ago, at that time thought to be secondary to contrast-induced nephropathy.  Now it is improved closet to baseline at 1.2 will continue to monitor closely.  BMP and Cr remain stable.  -Daily BMP  -Avoid nephrotoxic meds    COPD  Controlled with PTA inhalers.  -Continue PTA inhalers    Chronic normocytic Anemia   Hx of GIB  Hemoglobin 9.9, close to baseline.  No symptoms or signs of active bleeding.  -Daily hemoglobin    T2DM  A1c 6.7. PTA metformin and jardiance.   - hold PTA meds  - MDSSI    Chronic conditions:   History of DVT: Provoked completed 3 months of apixaban discontinued on  recent admission 5/25 follow-up with PCP  HTN: Continue PTA spironolactone  Insomnia: Continue PTA trazodone  Mental health; continue PTA Zyprexa  GERD: continue PTA Protonix  HLD: Continue PTA rosuvastatin  COPD: Continue PTA inhalers          Diet: NPO for Medical/Clinical Reasons Except for: Meds    DVT Prophylaxis: Enoxaparin (Lovenox) SQ  Khan Catheter: Not present  Fluids:  "PO  Lines: None     Cardiac Monitoring: None  Code Status: Full Code      Clinically Significant Risk Factors                # Coagulation Defect: INR = 1.18 (Ref range: 0.85 - 1.15) and/or PTT = 35 Seconds (Ref range: 22 - 38 Seconds), will monitor for bleeding    # Hypertension: Noted on problem list  # Chronic heart failure with preserved ejection fraction: heart failure noted on problem list and last echo with EF >50%          # DMII: A1C = 6.7 % (Ref range: <5.7 %) within past 6 months, PRESENT ON ADMISSION  # Morbid Obesity: Estimated body mass index is 49.06 kg/m  as calculated from the following:    Height as of 6/2/25: 1.651 m (5' 5\").    Weight as of this encounter: 133.7 kg (294 lb 12.8 oz)., PRESENT ON ADMISSION     # Financial/Environmental Concerns: none         Social Drivers of Health   Housing Stability: High Risk (6/11/2025)    Housing Stability     Do you have housing? : Yes     Are you worried about losing your housing?: Yes   Tobacco Use: High Risk (6/6/2025)    Received from [facility]    Patient History     Smoking Tobacco Use: Every Day     Smokeless Tobacco Use: Never   Financial Resource Strain: High Risk (6/11/2025)    Financial Resource Strain     Within the past 12 months, have you or your family members you live with been unable to get utilities (heat, electricity) when it was really needed?: Yes    Received from [facility]    Social Connections         Disposition Plan     Medically Ready for Discharge: Anticipated in 2-4 Days         The patient's care was discussed with the Attending Physician, Dr. Rosenstein.    Huey Eng  Medical Student  Hospitalist Service  [facility]  Securely message with [employer] (more info)  Text page via [employer] Paging/Directory   ______________________________________________________________________    Interval History   NAEO. Feels more distended than prior days. " Continues to feel clinically well otherwise.    Physical Exam   Vital Signs: Temp: 97.4  F (36.3  C) Temp src: Tympanic BP: 107/59 Pulse: 68   Resp: 25 SpO2: 98 % O2 Device: Nasal cannula Oxygen Delivery: 2 LPM  Weight: 294 lbs 12.8 oz    Constitutional: Resting comfortably in bed. Not in distress or discomfort.  Respiratory: No increased work of breathing, good air exchange, clear to auscultation bilaterally, no crackles or wheezing.  Cardiovascular: regular rate and rhythm, normal S1 and S2. No murmur noted  GI: Abdomen is distended, greater than yesterday's (6/12) examination. Moderately tender to palpation in the mid-abdomen. Fluid wave is present.     Medical Decision Making       Please see A&P for additional details of medical decision making.      Data   ------------------------- PAST 24 HR DATA REVIEWED -----------------------------------------------    I have personally reviewed the following data over the past 24 hrs:    12.5 (H)  \   9.5 (L)   / 405     139 105 18.6 /  109 (H)   4.4 25 1.05 \     ALT: 25 AST: 15 AP: 201 (H) TBILI: 0.3   ALB: 3.6 TOT PROTEIN: 5.9 (L) LIPASE: N/A     INR:  1.18 (H) PTT:  N/A   D-dimer:  N/A Fibrinogen:  N/A       Imaging results reviewed over the past 24 hrs:   No results found for this or any previous visit (from the past 24 hours).

## 2025-06-13 NOTE — PROGRESS NOTES
INFECTIOUS DISEASE FOLLOW UP NOTE  Date: 06/13/2025     ================================================================  SUBJECTIVE  No events, feeling good    ================================================================  OBJECTIVE  /65 (BP Location: Left arm)   Pulse 82   Temp 98.2  F (36.8  C) (Oral)   Resp 18   Wt 133.7 kg (294 lb 12.8 oz)   SpO2 97%   BMI 49.06 kg/m      Pertinent labs  CBC RESULTS:   Recent Labs   Lab Test 06/12/25  0515   WBC 13.7*   RBC 3.52*   HGB 9.5*   HCT 30.5*   MCV 87   MCH 27.0   MCHC 31.1*   RDW 15.5*              Physical Exam  General: alert, cooperative, no apparent distress  HEENT: atraumatic, normocephalic, no scleral icterus, moist mucus membranes, no cervical lymphadenopathy  Heart: Normal rate, regular rhythm  Lungs: good inspiratory effort  Abdomen: soft, non-tender, distended  Extremities: no peripheral edema, no new rashes or lesions     Imaging  6/10 CT  1.  Cirrhotic configuration of the liver with lobulated contour and diffuse fatty infiltration, unchanged. Small-volume abdominopelvic ascites. Mild diffuse body wall edema.  2.  No urinary tract calculi or obstruction. Bilateral benign adrenal fatty myelolipomas.  3.  Distal colonic diverticulosis. No mechanical obstruction or free gas.    6/12 MICHAEL  Limited imaging done due to poor patient tolerance and hypotension limiting further sedation     Trivial pericardial effusion  The left ventricle is normal in size.  The visual ejection fraction is 60-65%.  Regional wall motion is grossly normal but endocardial border definition is limited  Normal right ventricle size and systolic function.  No hemodynamically significant valve lesions     Microbiology data  6/10 paracentesis              759 PMNs              Culture NGTD  6/11 blood: NGTD        I have personally reviewed the relevant laboratory, imaging, and microbiology  data  ================================================================  Assessment  Warren Jaramillo is a 44 year old with recurrent SBP.  PMH cirrhosis, HFpEF, COPD, DM2.     Long-standing ascites, on prophylactic TMP-SMX.  Normally gets paracentesis every few weeks, though recently 3x weekly.  Most recent SBP prior to admission 5/21, culture negative.  He was given a course of meropenem followed by several days of levofloxacin.  Now readmitted 6/10 with more of the same.  He had been about 9 days since his last paracentesis, and his only symptom was fluid overload.  Of note, had transjugular liver biopsy on 6/6.     His ascites PMN count trend is 381 (4/20) --> 690 (516) --> 1,345 (5/23) --> 759 (6/10).  It is unclear why he has such persistent leukocytosis despite multiple rounds of antibiotic treatment.      Possible theories:  1) smoldering inflammation from non-infectious source (e.g. malignancy)  2) Anatomic defect (e.g. GI leak)  3) Secondary to very high frequency of paracenteses (I.e. traumatic)  4) Inadequately treated infection (e.g. resistant organism)  5) atypical infection (e.g. TB, fungal)     Options 4 and 5 seem less likely given exposure history and lack of organism growth on paracentesis culture. Ultimately, it is very hard NOT to treat him, so feel obligated to pursue another ~7 day course of antibiotic therapy.     I did send the remaining ascites fluid for 16s and fungal sequencing (samples from 6/10).  Future options for evaluation of underlying etiology include PET-CT, ex lap. Plan is still evolving, and probably is best to wait for next gen sequencing prior to more invasive options given his clinical stability.        Impression  # Recurrent peritonitis              - Ddx infectious vs. Malignancy vs. Inflammatory  # Decompensated cirrhosis              - Recurrent ascites requiring >1x weekly paracentesis              - ?Rojas's disease vs. EtOH abuse (both are documented)  #  HFpEF  # COPD  # DM2     ================================================================  Plan  - Ceftriaxone through 6/17   - Preference is for IV therapy, however if there are issues with this due to group home situation, would be acceptable to use cefdinir PO 2x daily  - On 6/18, transition back to TMP-SMX for prophylaxis  - Trend results of 16s sequencing from 6/10  - Continue to trend WBC count on future paracenteses (repeat para planned later this afternoon)  - ID will follow    Zia Bajwa MD, MPH  [facility]   Available via Epic secure chat (preferred) or [employer] paging

## 2025-06-13 NOTE — PLAN OF CARE
"  Problem: Adult Inpatient Plan of Care  Goal: Plan of Care Review  Description: The Plan of Care Review/Shift note should be completed every shift.  The Outcome Evaluation is a brief statement about your assessment that the patient is improving, declining, or no change.  This information will be displayed automatically on your shift  note.  Outcome: Progressing  Goal: Patient-Specific Goal (Individualized)  Description: You can add care plan individualizations to a care plan. Examples of Individualization might be:  \"Parent requests to be called daily at 9am for status\", \"I have a hard time hearing out of my right ear\", or \"Do not touch me to wake me up as it startles  me\".  Outcome: Progressing  Goal: Absence of Hospital-Acquired Illness or Injury  Outcome: Progressing  Intervention: Prevent Infection  Recent Flowsheet Documentation  Taken 6/12/2025 1635 by Deepika Brady, RN  Infection Prevention: hand hygiene promoted  Taken 6/12/2025 1530 by Deepika Brady, RN  Infection Prevention:   rest/sleep promoted   single patient room provided  Goal: Optimal Comfort and Wellbeing  Outcome: Progressing  Goal: Readiness for Transition of Care  Outcome: Progressing     Problem: Comorbidity Management  Goal: Maintenance of COPD Symptom Control  Outcome: Progressing  Goal: Blood Glucose Levels Within Targeted Range  Outcome: Progressing  Goal: Blood Pressure in Desired Range  Outcome: Progressing     Problem: Acute Kidney Injury/Impairment  Goal: Fluid and Electrolyte Balance  Outcome: Progressing  Goal: Improved Oral Intake  Outcome: Progressing  Goal: Effective Renal Function  Outcome: Progressing     Problem: Anemia  Goal: Anemia Symptom Improvement  Outcome: Progressing     Goal Outcome Evaluation:  Patient is alert and oriented x 4 and able to make needs known. Patient using BiPap intermittently, able to take on and off by himself. Patient up independently in the room. Denies pain. NPO at midnight for a MICHAEL.     "

## 2025-06-13 NOTE — PLAN OF CARE
"  Problem: Adult Inpatient Plan of Care  Goal: Plan of Care Review  Description: The Plan of Care Review/Shift note should be completed every shift.  The Outcome Evaluation is a brief statement about your assessment that the patient is improving, declining, or no change.  This information will be displayed automatically on your shift  note.  Outcome: Met  Goal: Patient-Specific Goal (Individualized)  Description: You can add care plan individualizations to a care plan. Examples of Individualization might be:  \"Parent requests to be called daily at 9am for status\", \"I have a hard time hearing out of my right ear\", or \"Do not touch me to wake me up as it startles  me\".  Outcome: Met  Goal: Absence of Hospital-Acquired Illness or Injury  Outcome: Met  Intervention: Identify and Manage Fall Risk  Recent Flowsheet Documentation  Taken 6/13/2025 1630 by Saray Crawford RN  Safety Promotion/Fall Prevention:   assistive device/personal items within reach   nonskid shoes/slippers when out of bed   patient and family education   mobility aid in reach   lighting adjusted   safety round/check completed  Intervention: Prevent Skin Injury  Recent Flowsheet Documentation  Taken 6/13/2025 1630 by Saray Crawford RN  Body Position: position changed independently  Intervention: Prevent Infection  Recent Flowsheet Documentation  Taken 6/13/2025 1630 by Saray Crawford RN  Infection Prevention: hand hygiene promoted  Goal: Optimal Comfort and Wellbeing  Outcome: Met  Goal: Readiness for Transition of Care  Outcome: Met     Problem: Infection  Goal: Absence of Infection Signs and Symptoms  Outcome: Met   Goal Outcome Evaluation:       Pt alert and oriented, discharge back to the group home, all orders and discharge instructions faxed to the group home, and a copy given to the Pt. Updated Pt's guardian (Jesika Fina) on Pt discharge and also get OK from her regarding Pt waiting in the 1st lobby until his transport  pick him up. Pt is " now in the 1st lobby and awaiting his transport.

## 2025-06-13 NOTE — PROGRESS NOTES
Transesophageal Echocardiogram:    Patient did not tolerate procedure very well. Despite additional medication given for sedation, pt was still retching constantly after MICHAEL probe inserted. Oral suctioning done to help remove secretions. VSS, however BP and HR slightly elevated during procedure. Returned back to baseline post procedure. MICHAEL probe only inserted for a total of 5 minutes. Pt received a total of Versed 2mg IV and Fentanyl 75mcg IV for sedation. NPO until 1135 and no hot foods/liquids til 1635. Talked to patient's nurse, Shira, to update. Pt also asked that writer call and update his mother. Writer called Jeana at 1050 and gave update prior to transferring back to his room at 1100.     Results pending cardiology interpretation.    Jesika Brown RN

## 2025-06-15 ENCOUNTER — HOSPITAL ENCOUNTER (INPATIENT)
Facility: HOSPITAL | Age: 45
End: 2025-06-15
Attending: EMERGENCY MEDICINE | Admitting: FAMILY MEDICINE
Payer: COMMERCIAL

## 2025-06-15 ENCOUNTER — VIRTUAL VISIT (OUTPATIENT)
Dept: URGENT CARE | Facility: CLINIC | Age: 45
End: 2025-06-15
Payer: COMMERCIAL

## 2025-06-15 DIAGNOSIS — I50.30 HEART FAILURE WITH PRESERVED EJECTION FRACTION, NYHA CLASS I (H): ICD-10-CM

## 2025-06-15 DIAGNOSIS — K70.31 ASCITES DUE TO ALCOHOLIC CIRRHOSIS (H): ICD-10-CM

## 2025-06-15 DIAGNOSIS — R06.02 SHORTNESS OF BREATH: ICD-10-CM

## 2025-06-15 DIAGNOSIS — Z76.89 REFERRAL OF PATIENT: ICD-10-CM

## 2025-06-15 DIAGNOSIS — K74.60 CIRRHOSIS OF LIVER WITH ASCITES, UNSPECIFIED HEPATIC CIRRHOSIS TYPE (H): ICD-10-CM

## 2025-06-15 DIAGNOSIS — I50.812 CHRONIC RIGHT-SIDED CONGESTIVE HEART FAILURE (H): ICD-10-CM

## 2025-06-15 DIAGNOSIS — I51.9 RIGHT VENTRICULAR DYSFUNCTION: Primary | ICD-10-CM

## 2025-06-15 DIAGNOSIS — R18.8 CIRRHOSIS OF LIVER WITH ASCITES, UNSPECIFIED HEPATIC CIRRHOSIS TYPE (H): ICD-10-CM

## 2025-06-15 DIAGNOSIS — R18.8 OTHER ASCITES: Primary | ICD-10-CM

## 2025-06-15 LAB
ALBUMIN SERPL BCG-MCNC: 4.1 G/DL (ref 3.5–5.2)
ALP SERPL-CCNC: 261 U/L (ref 40–150)
ALT SERPL W P-5'-P-CCNC: 32 U/L (ref 0–70)
ANION GAP SERPL CALCULATED.3IONS-SCNC: 13 MMOL/L (ref 7–15)
AST SERPL W P-5'-P-CCNC: 25 U/L (ref 0–45)
BASOPHILS # BLD AUTO: 0.1 10E3/UL (ref 0–0.2)
BASOPHILS NFR BLD AUTO: 1 %
BILIRUB SERPL-MCNC: 0.2 MG/DL
BUN SERPL-MCNC: 18.5 MG/DL (ref 6–20)
CALCIUM SERPL-MCNC: 9.4 MG/DL (ref 8.8–10.4)
CHLORIDE SERPL-SCNC: 104 MMOL/L (ref 98–107)
CREAT SERPL-MCNC: 1.19 MG/DL (ref 0.67–1.17)
EGFRCR SERPLBLD CKD-EPI 2021: 77 ML/MIN/1.73M2
EOSINOPHIL # BLD AUTO: 0.6 10E3/UL (ref 0–0.7)
EOSINOPHIL NFR BLD AUTO: 4 %
ERYTHROCYTE [DISTWIDTH] IN BLOOD BY AUTOMATED COUNT: 15.1 % (ref 10–15)
GLUCOSE SERPL-MCNC: 122 MG/DL (ref 70–99)
HCO3 SERPL-SCNC: 21 MMOL/L (ref 22–29)
HCT VFR BLD AUTO: 31.1 % (ref 40–53)
HGB BLD-MCNC: 9.9 G/DL (ref 13.3–17.7)
IMM GRANULOCYTES # BLD: 0.1 10E3/UL
IMM GRANULOCYTES NFR BLD: 1 %
LIPASE SERPL-CCNC: 30 U/L (ref 13–60)
LYMPHOCYTES # BLD AUTO: 2.2 10E3/UL (ref 0.8–5.3)
LYMPHOCYTES NFR BLD AUTO: 15 %
MCH RBC QN AUTO: 26.8 PG (ref 26.5–33)
MCHC RBC AUTO-ENTMCNC: 31.8 G/DL (ref 31.5–36.5)
MCV RBC AUTO: 84 FL (ref 78–100)
MONOCYTES # BLD AUTO: 1.5 10E3/UL (ref 0–1.3)
MONOCYTES NFR BLD AUTO: 10 %
NEUTROPHILS # BLD AUTO: 10 10E3/UL (ref 1.6–8.3)
NEUTROPHILS NFR BLD AUTO: 69 %
NRBC # BLD AUTO: 0 10E3/UL
NRBC BLD AUTO-RTO: 0 /100
PLATELET # BLD AUTO: 431 10E3/UL (ref 150–450)
POTASSIUM SERPL-SCNC: 4.4 MMOL/L (ref 3.4–5.3)
PROT SERPL-MCNC: 6.8 G/DL (ref 6.4–8.3)
RBC # BLD AUTO: 3.69 10E6/UL (ref 4.4–5.9)
SODIUM SERPL-SCNC: 138 MMOL/L (ref 135–145)
WBC # BLD AUTO: 14.5 10E3/UL (ref 4–11)

## 2025-06-15 PROCEDURE — 99207 PR NON-BILLABLE SERV PER CHARTING: CPT

## 2025-06-15 PROCEDURE — 36415 COLL VENOUS BLD VENIPUNCTURE: CPT | Performed by: EMERGENCY MEDICINE

## 2025-06-15 PROCEDURE — 83690 ASSAY OF LIPASE: CPT | Performed by: EMERGENCY MEDICINE

## 2025-06-15 PROCEDURE — 80053 COMPREHEN METABOLIC PANEL: CPT | Performed by: EMERGENCY MEDICINE

## 2025-06-15 PROCEDURE — 93005 ELECTROCARDIOGRAM TRACING: CPT | Performed by: EMERGENCY MEDICINE

## 2025-06-15 PROCEDURE — 85025 COMPLETE CBC W/AUTO DIFF WBC: CPT | Performed by: EMERGENCY MEDICINE

## 2025-06-15 PROCEDURE — 99285 EMERGENCY DEPT VISIT HI MDM: CPT | Mod: 25

## 2025-06-15 ASSESSMENT — ACTIVITIES OF DAILY LIVING (ADL)
ADLS_ACUITY_SCORE: 61
ADLS_ACUITY_SCORE: 61

## 2025-06-15 NOTE — PROGRESS NOTES
Warren is a 44 year old male who presents for a billable video visit.    ASSESSMENT/PLAN:  Diagnoses and all orders for this visit:    Other ascites    Referral of patient      MDM  Liver cirrhosis with recurrent ascites, status post recent hospitalization for SBP  Concern for rapid fluid reaccumulation post-paracentesis  Profuse sweating - unclear etiology; concerning for possible infection, volume overload, or hepatic decompensation     Plan  Patient advised to go to the emergency department due to:  Rapid weight gain suggesting significant ascites  Risk of respiratory compromise or spontaneous bacterial peritonitis recurrence  New symptom of profuse sweating which may indicate systemic infection or worsening liver function    Patient agreed to plan and verbalized understanding.     SUBJECTIVE:  Patient seen via video visit. He has a history of liver cirrhosis with ascites and was recently hospitalized from 6/10/2025 to 6/13/2025 for spontaneous bacterial peritonitis (SBP). He was discharged 3 days ago on cefdinir twice daily for 7 days, with a plan to resume Bactrim afterward for SBP prophylaxis. Under the care of hepatologist Dr. Maria, he is scheduled for biweekly paracentesis, with the most recent performed 2 days ago prior to discharge.    Since discharge, the patient reports a 20-pound weight gain, which he attributes to fluid reaccumulation in the abdomen. He expresses concern about worsening ascites and is uncertain whether he should present to urgent care or the emergency department. He also reports significant sweating, describing himself as  dripping in sweat  despite being in an air-conditioned environment. He has not taken his temperature.    ROS: Pertinent ROS neg other than the symptoms noted above in the HPI.     OBJECTIVE:  Vitals not done due to this being a virtual visit    GENERAL: healthy, alert and no distress  EYES: Eyes grossly normal to inspection,conjunctivae and sclerae  normal  RESP: Able to speak in complete sentences, no audible wheeze or cough  SKIN: He is sweating  NEURO: mentation intact and speech normal  PSYCH: mentation appears normal, affect normal/bright    Video-Visit Details    Type of service:  Video Visit  Video Start Time: 5:27 pm  Video End Time: 5:30 pm    Originating Location: Home    Distant Location:  [facility]     Platform used for Video Visit: Ricci Neal PA-C

## 2025-06-15 NOTE — PROGRESS NOTES
Red Nj Kelley reports >20# wt gain since discharge from hospital on Fri, 6/13.  - Was 275# at discharge; Current wt reported as 299#    He also reports that he is having a hard time taking a deep breath.  He is speaking in complete sentences.    Reiterated advice to be evaluated in ED, as per [facility] Provider visit he had previously today, (noted below).    He will proceed to [facility] ED    Edith Viramontes RN  [facility]

## 2025-06-15 NOTE — Clinical Note
Potential access sites were evaluated for patency using ultrasound.   The right brachial vein and right radial site was selected. Access was obtained under with Sonosite guidance using a micropuncture 21 gauge needle with direct visualization of needle entry.

## 2025-06-15 NOTE — Clinical Note
The ECG shows a sinus rhythm, a bundle branch block and an A-V block. The A-V block is 1st degree. ECG rate  = 76 bpm.

## 2025-06-15 NOTE — Clinical Note
Prepped: groin, right radial and right brachial. Prepped with: ChloraPrep. The patient was draped. .

## 2025-06-16 ENCOUNTER — APPOINTMENT (OUTPATIENT)
Dept: ULTRASOUND IMAGING | Facility: HOSPITAL | Age: 45
End: 2025-06-16
Payer: COMMERCIAL

## 2025-06-16 ENCOUNTER — TRANSCRIBE ORDERS (OUTPATIENT)
Dept: OTHER | Age: 45
End: 2025-06-16

## 2025-06-16 DIAGNOSIS — K65.2 SPONTANEOUS BACTERIAL PERITONITIS (H): Primary | ICD-10-CM

## 2025-06-16 PROBLEM — K70.31 ASCITES DUE TO ALCOHOLIC CIRRHOSIS (H): Status: ACTIVE | Noted: 2025-06-16

## 2025-06-16 PROBLEM — R06.02 SHORTNESS OF BREATH: Status: ACTIVE | Noted: 2023-08-01

## 2025-06-16 LAB
ALBUMIN BODY FLUID SOURCE: NORMAL
ALBUMIN FLD-MCNC: 3.1 G/DL
ALBUMIN SERPL BCG-MCNC: 3.9 G/DL (ref 3.5–5.2)
ALP SERPL-CCNC: 253 U/L (ref 40–150)
ALT SERPL W P-5'-P-CCNC: 27 U/L (ref 0–70)
ANION GAP SERPL CALCULATED.3IONS-SCNC: 11 MMOL/L (ref 7–15)
APPEARANCE FLD: ABNORMAL
AST SERPL W P-5'-P-CCNC: 14 U/L (ref 0–45)
BACTERIA FLD CULT: NO GROWTH
BACTERIA SPEC CULT: NO GROWTH
BACTERIA SPEC CULT: NO GROWTH
BILIRUB SERPL-MCNC: 0.2 MG/DL
BUN SERPL-MCNC: 19.7 MG/DL (ref 6–20)
CALCIUM SERPL-MCNC: 8.9 MG/DL (ref 8.8–10.4)
CHLORIDE SERPL-SCNC: 107 MMOL/L (ref 98–107)
COLOR FLD: YELLOW
CREAT SERPL-MCNC: 1.06 MG/DL (ref 0.67–1.17)
EGFRCR SERPLBLD CKD-EPI 2021: 89 ML/MIN/1.73M2
ERYTHROCYTE [DISTWIDTH] IN BLOOD BY AUTOMATED COUNT: 15.4 % (ref 10–15)
GLUCOSE BLDC GLUCOMTR-MCNC: 151 MG/DL (ref 70–99)
GLUCOSE BLDC GLUCOMTR-MCNC: 162 MG/DL (ref 70–99)
GLUCOSE BLDC GLUCOMTR-MCNC: 167 MG/DL (ref 70–99)
GLUCOSE BLDC GLUCOMTR-MCNC: 167 MG/DL (ref 70–99)
GLUCOSE BLDC GLUCOMTR-MCNC: 171 MG/DL (ref 70–99)
GLUCOSE SERPL-MCNC: 155 MG/DL (ref 70–99)
GRAM STAIN RESULT: NORMAL
GRAM STAIN RESULT: NORMAL
HCO3 SERPL-SCNC: 22 MMOL/L (ref 22–29)
HCT VFR BLD AUTO: 30.7 % (ref 40–53)
HGB BLD-MCNC: 9.9 G/DL (ref 13.3–17.7)
HOLD SPECIMEN: NORMAL
HOLD SPECIMEN: NORMAL
LYMPHOCYTES NFR FLD MANUAL: 32 %
MCH RBC QN AUTO: 27.2 PG (ref 26.5–33)
MCHC RBC AUTO-ENTMCNC: 32.2 G/DL (ref 31.5–36.5)
MCV RBC AUTO: 84 FL (ref 78–100)
MONOS+MACROS NFR FLD MANUAL: 17 %
NEUTROPHILS # FLD: 327.1 /UL (ref ?–250)
NEUTS BAND NFR FLD MANUAL: 22 %
OTHER CELLS FLD MANUAL: 29 %
PATH REV: ABNORMAL
PLATELET # BLD AUTO: 426 10E3/UL (ref 150–450)
POTASSIUM SERPL-SCNC: 4.2 MMOL/L (ref 3.4–5.3)
PROT FLD-MCNC: 4.5 G/DL
PROT SERPL-MCNC: 6.3 G/DL (ref 6.4–8.3)
PROTEIN BODY FLUID SOURCE: NORMAL
RBC # BLD AUTO: 3.64 10E6/UL (ref 4.4–5.9)
SODIUM SERPL-SCNC: 140 MMOL/L (ref 135–145)
SPECIMEN SOURCE FLD: ABNORMAL
WBC # BLD AUTO: 12.8 10E3/UL (ref 4–11)
WBC # FLD AUTO: 1487 /UL

## 2025-06-16 PROCEDURE — 250N000012 HC RX MED GY IP 250 OP 636 PS 637

## 2025-06-16 PROCEDURE — 89050 BODY FLUID CELL COUNT: CPT | Performed by: EMERGENCY MEDICINE

## 2025-06-16 PROCEDURE — 36415 COLL VENOUS BLD VENIPUNCTURE: CPT

## 2025-06-16 PROCEDURE — 99254 IP/OBS CNSLTJ NEW/EST MOD 60: CPT | Performed by: STUDENT IN AN ORGANIZED HEALTH CARE EDUCATION/TRAINING PROGRAM

## 2025-06-16 PROCEDURE — 87015 SPECIMEN INFECT AGNT CONCNTJ: CPT | Performed by: EMERGENCY MEDICINE

## 2025-06-16 PROCEDURE — 0W9G3ZZ DRAINAGE OF PERITONEAL CAVITY, PERCUTANEOUS APPROACH: ICD-10-PCS | Performed by: RADIOLOGY

## 2025-06-16 PROCEDURE — 82042 OTHER SOURCE ALBUMIN QUAN EA: CPT | Performed by: EMERGENCY MEDICINE

## 2025-06-16 PROCEDURE — 49083 ABD PARACENTESIS W/IMAGING: CPT

## 2025-06-16 PROCEDURE — 84157 ASSAY OF PROTEIN OTHER: CPT | Performed by: EMERGENCY MEDICINE

## 2025-06-16 PROCEDURE — 99222 1ST HOSP IP/OBS MODERATE 55: CPT | Mod: AI

## 2025-06-16 PROCEDURE — 999N000157 HC STATISTIC RCP TIME EA 10 MIN

## 2025-06-16 PROCEDURE — 250N000013 HC RX MED GY IP 250 OP 250 PS 637: Performed by: NURSE PRACTITIONER

## 2025-06-16 PROCEDURE — 86481 TB AG RESPONSE T-CELL SUSP: CPT | Performed by: STUDENT IN AN ORGANIZED HEALTH CARE EDUCATION/TRAINING PROGRAM

## 2025-06-16 PROCEDURE — 250N000013 HC RX MED GY IP 250 OP 250 PS 637

## 2025-06-16 PROCEDURE — 85027 COMPLETE CBC AUTOMATED: CPT

## 2025-06-16 PROCEDURE — 82962 GLUCOSE BLOOD TEST: CPT

## 2025-06-16 PROCEDURE — 82947 ASSAY GLUCOSE BLOOD QUANT: CPT

## 2025-06-16 PROCEDURE — 120N000001 HC R&B MED SURG/OB

## 2025-06-16 PROCEDURE — 87205 SMEAR GRAM STAIN: CPT | Performed by: EMERGENCY MEDICINE

## 2025-06-16 PROCEDURE — 250N000013 HC RX MED GY IP 250 OP 250 PS 637: Performed by: EMERGENCY MEDICINE

## 2025-06-16 PROCEDURE — 36415 COLL VENOUS BLD VENIPUNCTURE: CPT | Performed by: STUDENT IN AN ORGANIZED HEALTH CARE EDUCATION/TRAINING PROGRAM

## 2025-06-16 PROCEDURE — 99222 1ST HOSP IP/OBS MODERATE 55: CPT | Performed by: NURSE PRACTITIONER

## 2025-06-16 PROCEDURE — 5A09357 ASSISTANCE WITH RESPIRATORY VENTILATION, LESS THAN 24 CONSECUTIVE HOURS, CONTINUOUS POSITIVE AIRWAY PRESSURE: ICD-10-PCS | Performed by: FAMILY MEDICINE

## 2025-06-16 RX ORDER — CALCIUM CARBONATE 500 MG/1
1000 TABLET, CHEWABLE ORAL 4 TIMES DAILY PRN
Status: DISCONTINUED | OUTPATIENT
Start: 2025-06-16 | End: 2025-06-20 | Stop reason: HOSPADM

## 2025-06-16 RX ORDER — ROSUVASTATIN CALCIUM 10 MG/1
10 TABLET, COATED ORAL AT BEDTIME
Status: DISCONTINUED | OUTPATIENT
Start: 2025-06-16 | End: 2025-06-20 | Stop reason: HOSPADM

## 2025-06-16 RX ORDER — POLYETHYLENE GLYCOL 3350 17 G/17G
17 POWDER, FOR SOLUTION ORAL 2 TIMES DAILY PRN
Status: DISCONTINUED | OUTPATIENT
Start: 2025-06-16 | End: 2025-06-20 | Stop reason: HOSPADM

## 2025-06-16 RX ORDER — MONTELUKAST SODIUM 10 MG/1
10 TABLET ORAL EVERY MORNING
Status: DISCONTINUED | OUTPATIENT
Start: 2025-06-16 | End: 2025-06-20 | Stop reason: HOSPADM

## 2025-06-16 RX ORDER — NICOTINE 21 MG/24HR
1 PATCH, TRANSDERMAL 24 HOURS TRANSDERMAL ONCE
Status: COMPLETED | OUTPATIENT
Start: 2025-06-16 | End: 2025-06-17

## 2025-06-16 RX ORDER — FUROSEMIDE 20 MG/1
20 TABLET ORAL
Status: DISCONTINUED | OUTPATIENT
Start: 2025-06-17 | End: 2025-06-18

## 2025-06-16 RX ORDER — FAMOTIDINE 20 MG/1
20 TABLET, FILM COATED ORAL 2 TIMES DAILY
Status: DISCONTINUED | OUTPATIENT
Start: 2025-06-16 | End: 2025-06-20 | Stop reason: HOSPADM

## 2025-06-16 RX ORDER — TRAZODONE HYDROCHLORIDE 50 MG/1
100 TABLET ORAL AT BEDTIME
Status: DISCONTINUED | OUTPATIENT
Start: 2025-06-16 | End: 2025-06-20 | Stop reason: HOSPADM

## 2025-06-16 RX ORDER — ONDANSETRON 4 MG/1
4 TABLET, ORALLY DISINTEGRATING ORAL EVERY 6 HOURS PRN
Status: DISCONTINUED | OUTPATIENT
Start: 2025-06-16 | End: 2025-06-20 | Stop reason: HOSPADM

## 2025-06-16 RX ORDER — SPIRONOLACTONE 25 MG/1
50 TABLET ORAL DAILY
Status: DISCONTINUED | OUTPATIENT
Start: 2025-06-16 | End: 2025-06-16

## 2025-06-16 RX ORDER — FUROSEMIDE 20 MG/1
20 TABLET ORAL DAILY
Status: DISCONTINUED | OUTPATIENT
Start: 2025-06-16 | End: 2025-06-16

## 2025-06-16 RX ORDER — DEXTROSE MONOHYDRATE 25 G/50ML
25-50 INJECTION, SOLUTION INTRAVENOUS
Status: DISCONTINUED | OUTPATIENT
Start: 2025-06-16 | End: 2025-06-20 | Stop reason: HOSPADM

## 2025-06-16 RX ORDER — PROCHLORPERAZINE MALEATE 10 MG
10 TABLET ORAL EVERY 6 HOURS PRN
Status: DISCONTINUED | OUTPATIENT
Start: 2025-06-16 | End: 2025-06-20 | Stop reason: HOSPADM

## 2025-06-16 RX ORDER — PANTOPRAZOLE SODIUM 40 MG/1
40 TABLET, DELAYED RELEASE ORAL
Status: DISCONTINUED | OUTPATIENT
Start: 2025-06-16 | End: 2025-06-20 | Stop reason: HOSPADM

## 2025-06-16 RX ORDER — ONDANSETRON 2 MG/ML
4 INJECTION INTRAMUSCULAR; INTRAVENOUS EVERY 6 HOURS PRN
Status: DISCONTINUED | OUTPATIENT
Start: 2025-06-16 | End: 2025-06-20 | Stop reason: HOSPADM

## 2025-06-16 RX ORDER — OMEPRAZOLE 20 MG/1
40 CAPSULE, DELAYED RELEASE ORAL EVERY MORNING
Status: DISCONTINUED | OUTPATIENT
Start: 2025-06-16 | End: 2025-06-16 | Stop reason: ALTCHOICE

## 2025-06-16 RX ORDER — LISINOPRIL 5 MG/1
10 TABLET ORAL EVERY MORNING
Status: DISCONTINUED | OUTPATIENT
Start: 2025-06-16 | End: 2025-06-20 | Stop reason: HOSPADM

## 2025-06-16 RX ORDER — ALBUTEROL SULFATE 0.83 MG/ML
2.5 SOLUTION RESPIRATORY (INHALATION) 3 TIMES DAILY PRN
Status: DISCONTINUED | OUTPATIENT
Start: 2025-06-16 | End: 2025-06-20 | Stop reason: HOSPADM

## 2025-06-16 RX ORDER — AMOXICILLIN 250 MG
1 CAPSULE ORAL 2 TIMES DAILY PRN
Status: DISCONTINUED | OUTPATIENT
Start: 2025-06-16 | End: 2025-06-20 | Stop reason: HOSPADM

## 2025-06-16 RX ORDER — AMOXICILLIN 250 MG
2 CAPSULE ORAL 2 TIMES DAILY PRN
Status: DISCONTINUED | OUTPATIENT
Start: 2025-06-16 | End: 2025-06-20 | Stop reason: HOSPADM

## 2025-06-16 RX ORDER — OLANZAPINE 2.5 MG/1
10 TABLET, FILM COATED ORAL AT BEDTIME
Status: DISCONTINUED | OUTPATIENT
Start: 2025-06-16 | End: 2025-06-20 | Stop reason: HOSPADM

## 2025-06-16 RX ORDER — CEFDINIR 300 MG/1
300 CAPSULE ORAL EVERY 12 HOURS SCHEDULED
Status: DISCONTINUED | OUTPATIENT
Start: 2025-06-16 | End: 2025-06-17

## 2025-06-16 RX ORDER — FLUTICASONE FUROATE AND VILANTEROL 100; 25 UG/1; UG/1
1 POWDER RESPIRATORY (INHALATION) DAILY
Status: DISCONTINUED | OUTPATIENT
Start: 2025-06-16 | End: 2025-06-20 | Stop reason: HOSPADM

## 2025-06-16 RX ORDER — SPIRONOLACTONE 25 MG/1
50 TABLET ORAL
Status: DISCONTINUED | OUTPATIENT
Start: 2025-06-16 | End: 2025-06-20 | Stop reason: HOSPADM

## 2025-06-16 RX ORDER — NICOTINE POLACRILEX 4 MG
15-30 LOZENGE BUCCAL
Status: DISCONTINUED | OUTPATIENT
Start: 2025-06-16 | End: 2025-06-20 | Stop reason: HOSPADM

## 2025-06-16 RX ORDER — LIDOCAINE 40 MG/G
CREAM TOPICAL
Status: DISCONTINUED | OUTPATIENT
Start: 2025-06-16 | End: 2025-06-20 | Stop reason: HOSPADM

## 2025-06-16 RX ADMIN — NICOTINE 1 PATCH: 21 PATCH, EXTENDED RELEASE TRANSDERMAL at 01:19

## 2025-06-16 RX ADMIN — CEFDINIR 300 MG: 300 CAPSULE ORAL at 09:37

## 2025-06-16 RX ADMIN — PANTOPRAZOLE SODIUM 40 MG: 20 TABLET, DELAYED RELEASE ORAL at 09:37

## 2025-06-16 RX ADMIN — TRAZODONE HYDROCHLORIDE 100 MG: 50 TABLET ORAL at 21:27

## 2025-06-16 RX ADMIN — INSULIN ASPART 1 UNITS: 100 INJECTION, SOLUTION INTRAVENOUS; SUBCUTANEOUS at 18:21

## 2025-06-16 RX ADMIN — LEVOTHYROXINE SODIUM 12.5 MCG: 0.03 TABLET ORAL at 09:38

## 2025-06-16 RX ADMIN — FAMOTIDINE 20 MG: 20 TABLET, FILM COATED ORAL at 19:35

## 2025-06-16 RX ADMIN — MONTELUKAST 10 MG: 10 TABLET, FILM COATED ORAL at 09:37

## 2025-06-16 RX ADMIN — FAMOTIDINE 20 MG: 20 TABLET, FILM COATED ORAL at 09:37

## 2025-06-16 RX ADMIN — SPIRONOLACTONE 50 MG: 25 TABLET, FILM COATED ORAL at 17:13

## 2025-06-16 RX ADMIN — FLUTICASONE FUROATE AND VILANTEROL TRIFENATATE 1 PUFF: 100; 25 POWDER RESPIRATORY (INHALATION) at 09:38

## 2025-06-16 RX ADMIN — ROSUVASTATIN 10 MG: 10 TABLET, FILM COATED ORAL at 21:27

## 2025-06-16 RX ADMIN — OLANZAPINE 10 MG: 2.5 TABLET, FILM COATED ORAL at 21:27

## 2025-06-16 RX ADMIN — FUROSEMIDE 20 MG: 20 TABLET ORAL at 09:37

## 2025-06-16 RX ADMIN — SPIRONOLACTONE 50 MG: 25 TABLET, FILM COATED ORAL at 09:37

## 2025-06-16 RX ADMIN — INSULIN ASPART 1 UNITS: 100 INJECTION, SOLUTION INTRAVENOUS; SUBCUTANEOUS at 12:44

## 2025-06-16 RX ADMIN — CEFDINIR 300 MG: 300 CAPSULE ORAL at 19:35

## 2025-06-16 RX ADMIN — PANTOPRAZOLE SODIUM 40 MG: 20 TABLET, DELAYED RELEASE ORAL at 17:14

## 2025-06-16 RX ADMIN — UMECLIDINIUM 1 PUFF: 62.5 AEROSOL, POWDER ORAL at 09:39

## 2025-06-16 ASSESSMENT — ACTIVITIES OF DAILY LIVING (ADL)
ADLS_ACUITY_SCORE: 61
ADLS_ACUITY_SCORE: 41
ADLS_ACUITY_SCORE: 61
ADLS_ACUITY_SCORE: 41
ADLS_ACUITY_SCORE: 61
ADLS_ACUITY_SCORE: 41
DEPENDENT_IADLS:: CLEANING;COOKING;LAUNDRY;SHOPPING;MEAL PREPARATION;MEDICATION MANAGEMENT;MONEY MANAGEMENT;TRANSPORTATION
ADLS_ACUITY_SCORE: 61
ADLS_ACUITY_SCORE: 41
ADLS_ACUITY_SCORE: 61

## 2025-06-16 NOTE — MEDICATION SCRIBE - ADMISSION MEDICATION HISTORY
Medication Scribe Admission Medication History    Admission medication history is complete. The information provided in this note is only as accurate as the sources available at the time of the update.    Information Source(s): Patient via in-person    Pertinent Information: Patient reported that medications are set up by group home staff, but he is familiar with all his medications. He stated that he took all medications prior to arriving at the ED. Patient also reported that Bactrim -160 mg is currently on hold until completion of another antibiotic.    Changes made to PTA medication list:  Added: None  Deleted:   apixaban  (ELIQUIS ) 5 MG  per patient   hydrocortisone % external cream per patient     Changed: None    Allergies reviewed with patient and updates made in EHR: yes    Medication History Completed By: Aklilu Gebreyesus 6/16/2025 1:42 AM    PTA Med List   Medication Sig Note Last Dose/Taking    albuterol (PROAIR HFA/PROVENTIL HFA/VENTOLIN HFA) 108 (90 Base) MCG/ACT inhaler Inhale 1-2 puffs into the lungs every 6 hours as needed for shortness of breath, wheezing or cough  Taking As Needed    albuterol (PROVENTIL) (2.5 MG/3ML) 0.083% neb solution Take 2.5 mg by nebulization 3 times daily as needed for shortness of breath, wheezing or cough  Taking As Needed    aloe vera GEL Apply 1 g topically every hour as needed for skin care Per bottle directions  Taking As Needed    bacitracin 500 UNIT/GM OINT Apply topically 3 times daily as needed for wound care  Taking As Needed    Benzocaine (SOLARCAINE ALOE VERA EX) Externally apply topically daily as needed.  Taking As Needed    benzonatate (TESSALON) 200 MG capsule Take 1 capsule (200 mg) by mouth 3 times daily as needed for cough.  Taking As Needed    Calamine external lotion Apply topically as needed for itching  Taking As Needed    cefdinir (OMNICEF) 300 MG capsule Take 1 capsule (300 mg) by mouth 2 times daily. 6/16/2025: Started on 6-13-25 night (  day 3) 6/15/2025 Bedtime    clotrimazole (LOTRIMIN) 1 % external cream Apply topically 2 times daily as needed (skin irritation)  Taking As Needed    dextromethorphan-guaiFENesin (MUCINEX DM)  MG 12 hr tablet Take 1 tablet by mouth 2 times daily as needed.  Taking As Needed    diclofenac (VOLTAREN) 1 % topical gel Apply 2 g topically daily as needed for moderate pain To joints/back  Taking As Needed    EPINEPHrine (ANY BX GENERIC EQUIV) 0.3 MG/0.3ML injection 2-pack Inject 0.3 mg into the muscle as needed for anaphylaxis. May repeat one time in 5-15 minutes if response to initial dose is inadequate.  Taking As Needed    famotidine (PEPCID) 20 MG tablet Take 1 tablet (20 mg) by mouth 2 times daily  6/15/2025 Morning    furosemide (LASIX) 40 MG tablet Take 0.5 tablets (20 mg) by mouth daily.  6/15/2025 Morning    GAVILAX 17 GM/SCOOP powder Take 17 g by mouth daily as needed for constipation.  Taking As Needed    Hydrocortisone (PREPARATION H EX) Externally apply topically daily as needed (hemorrhoids).  Taking As Needed    JARDIANCE 10 MG TABS tablet Take 10 mg by mouth every morning.  6/15/2025 Morning    levothyroxine (SYNTHROID/LEVOTHROID) 25 MCG tablet Take 12.5 mcg by mouth every morning (before breakfast).  6/15/2025 Morning    lisinopril (ZESTRIL) 10 MG tablet Take 10 mg by mouth every morning.  6/15/2025 Morning    loperamide (IMODIUM) 2 MG capsule Take 4 mg by mouth 4 times daily as needed for diarrhea.  Taking As Needed    loratadine (CLARITIN) 10 MG tablet Take 10 mg by mouth daily as needed for allergies.  6/15/2025 Morning    magnesium hydroxide (MILK OF MAGNESIA) 400 MG/5ML suspension Take 30 mLs by mouth daily as needed for heartburn.  Taking As Needed    metFORMIN (GLUCOPHAGE) 1000 MG tablet Take 1,000 mg by mouth 2 times daily (with meals)  6/15/2025 Evening    methocarbamol (ROBAXIN) 500 MG tablet Take 1 tablet (500 mg) by mouth 4 times daily as needed for muscle spasms.  Taking As Needed     montelukast (SINGULAIR) 10 MG tablet Take 10 mg by mouth every morning.  6/15/2025 Morning    nystatin (MYCOSTATIN) 575112 UNIT/GM external powder Apply topically 2 times daily.  6/12/2025 Evening    OLANZapine (ZYPREXA) 10 MG tablet Take 10 mg by mouth At Bedtime  6/15/2025 Evening    omeprazole (PRILOSEC) 40 MG DR capsule Take 40 mg by mouth every morning.  6/15/2025 Morning    ondansetron (ZOFRAN ODT) 4 MG ODT tab Take 1 tablet (4 mg) by mouth every 8 hours as needed for nausea.  Taking As Needed    pramoxine-calamine (AVEENO) 1-8 % LOTN Apply topically daily as needed for itching.  Taking As Needed    rosuvastatin (CRESTOR) 10 MG tablet Take 10 mg by mouth At Bedtime  6/15/2025 Evening    spironolactone (ALDACTONE) 50 MG tablet Take 50 mg by mouth daily.  6/15/2025 Morning    [START ON 6/18/2025] sulfamethoxazole-trimethoprim (BACTRIM DS) 800-160 MG tablet Take 1 tablet by mouth daily. 6/16/2025: On hold  Taking    traZODone (DESYREL) 100 MG tablet Take 100 mg by mouth at bedtime.  6/15/2025 Bedtime    TRELEGY ELLIPTA 100-62.5-25 MCG/ACT oral inhaler Inhale 1 puff into the lungs every morning.  6/15/2025 Morning    Urea 40 % CREA Apply topically daily as needed for dry skin.  Taking As Needed    Vitamins A & D (VITAMIN A & D) OINT Externally apply topically daily as needed (general skin health).  Taking As Needed    carbamide peroxide (DEBROX) 6.5 % otic solution Place 5 drops into both ears daily as needed for other (ear wax).  Taking As Needed

## 2025-06-16 NOTE — PLAN OF CARE
Problem: Adult Inpatient Plan of Care  Goal: Optimal Comfort and Wellbeing  Outcome: Progressing   Goal Outcome Evaluation:       Pt is alert and oriented x4. Pt was tapped for 5.3 L during his paracentesis. Pt denies shortness of breath. Pt's ascites fluid returned with an absolute neutrophil count of 327.1 /uL. Gram stain of ascites fluid had a 4+ white blood cell stain and 3+ PMN. Provider aware. Pt tolerated PO antibiotics well. BG was 151 mg/dL and 167 mg/dL.   Rosendo Simmons RN  6/16/2025  3:03 PM

## 2025-06-16 NOTE — CONSULTS
GASTROENTEROLOGY CONSULTATION     Warren Jaramillo   [address]  [facility] [id number]   44 year old male   Admission Date/Time: 6/15/2025   Encounter Date: 6/16/2025  Primary Care Provider: Mary Kelly     Referring / Attending Physician: Dr. Brenden Polanco   We were asked to see the patient in consultation by Dr. Brenden Polanco for evaluation of ascites.     HPI:  Warren Jaramillo is a 44 year old male who has a history of history of cirrhosis with ascites requiring frequent paracentesis, AVA, COPD, type 2 diabetes, hypertension, GI bleed, and is admitted for abdominal distention with concern for decompensated liver disease.  He follows with Dr. Maria with [facility]. He has had recurrent ascites and SBP. Sober x 9 months. He has a legal guardian.   He has been on aldactone 100 mg and furosemide 40 mg for management of ascites. His creatinine bumped to 7.23 5/18/25 with GFR of 9. The does was lowered to aldactone 50 mg and furosemide 20 mg per day. Since that time he has had weekly paracentesis removing 5 to 6 liters per time.   Admitted May of 2025 for SBP despite being on Bactrim for prophylaxis. RIVERA and low SAAG with negative cytology, treated with ceftriaxone and repeat paracentesis showed persistent high WBC and bloody aspirate, cultures negative. Switched to meropenum and discharged on Levaquin. He developed SOB on 6/10 prompting hospital admission until the 13th of June and now he returns with distention again. He feels well but I noted his creatinine is climbing again. Ascites fluid cultures have been negative.     MICHAEL 6/13 with normal EF.   Doppler 6/11/2025 - cirrhosis with ascites, patent portal vein  CT of abdomen/pelvis- Cirrhosis , mild diffuse body wall edema  US without albumin - 6/10/2025 5.1 liters - fluid analysis shows 1998 nucleated cells and 759 neutrophils with SAAG of 0.7 making portal hypertension less likley.   AFP 1.8, , Blood culture negative.   Liver biopsy  showed venous outflow obstruction.   6/13 paracentesis 0.1 liters removed.   6/16/2025 paracentesis without albumin 5.3 liters removed. Total nucleated cells of 1487, absolute neutrophils of 327.1, percent of neutrophils 22.   SBP has been refractory t Meropenem followed by levaquin and bactrim for prophylaxis in May, seen by ID earlier this month and treated with ceftriaxone, discharged on cefdinir.   Cytology  6/13 showed large numbers of chronic inflammatory cells, atypical mesothelial cells, negative for malignant cells. Fungal studies pending. Cytology negative 5/15/2025.   NA Meld on 6/11 of 13.   EGD 4/2025 showed findings of portal hypertensive gastropathy but no varices.   Past Medical History  Past Medical History:   Diagnosis Date    COPD exacerbation (H) 12/02/2024    DM2 (diabetes mellitus, type 2) (H) 04/28/2020    HTN (hypertension) 07/30/2012    Sleep apnea     Thyroid nodule 07/31/2019    Rojas's disease (H)        Past Surgical History  Past Surgical History:   Procedure Laterality Date    COLONOSCOPY      ESOPHAGOSCOPY, GASTROSCOPY, DUODENOSCOPY (EGD), COMBINED N/A 7/21/2023    Procedure: ESOPHAGOGASTRODUODENOSCOPY WITH GASTRIC AND ESOPHAGEAL BIOPSIES;  Surgeon: Filiberto Aragon MD;  Location: [facility] OR    ESOPHAGOSCOPY, GASTROSCOPY, DUODENOSCOPY (EGD), COMBINED N/A 4/4/2025    Procedure: ESOPHAGOGASTRODUODENOSCOPY;  Surgeon: Ramesh Talbot MD;  Location: [facility] OR    TOOTH EXTRACTION         Family History  Family History   Problem Relation Age of Onset    Unknown/Adopted Father     Unknown/Adopted Maternal Grandmother     C.A.D. Maternal Grandfather     Diabetes Maternal Grandfather     Cerebrovascular Disease Maternal Grandfather     Unknown/Adopted Paternal Grandmother     Unknown/Adopted Paternal Grandfather     Unknown/Adopted Brother     Unknown/Adopted Sister        Social History  Social History     Socioeconomic History    Marital status: Single     Spouse name: Not  on file    Number of children: Not on file    Years of education: Not on file    Highest education level: Not on file   Occupational History    Not on file   Tobacco Use    Smoking status: Every Day     Current packs/day: 1.00     Average packs/day: 1 pack/day for 21.5 years (21.5 ttl pk-yrs)     Types: Cigarettes     Start date: 1/1/2004    Smokeless tobacco: Never   Vaping Use    Vaping status: Never Used   Substance and Sexual Activity    Alcohol use: Not Currently     Comment: Last 8/7/2024    Drug use: Not on file     Comment: last 1 month ago    Sexual activity: Never     Partners: Female   Other Topics Concern     Service No    Blood Transfusions No    Caffeine Concern No    Occupational Exposure No    Hobby Hazards No    Sleep Concern No    Stress Concern Yes     Comment: sometimes    Weight Concern No    Special Diet Yes     Comment: counting carbs    Back Care No    Exercise Yes    Bike Helmet Not Asked     Comment: N/A    Seat Belt Yes    Self-Exams Yes    Parent/sibling w/ CABG, MI or angioplasty before 65F 55M? Not Asked   Social History Narrative    Not on file     Social Drivers of Health     Financial Resource Strain: High Risk (6/11/2025)    Financial Resource Strain     Within the past 12 months, have you or your family members you live with been unable to get utilities (heat, electricity) when it was really needed?: Yes   Food Insecurity: Low Risk  (6/11/2025)    Food Insecurity     Within the past 12 months, did you worry that your food would run out before you got money to buy more?: No     Within the past 12 months, did the food you bought just not last and you didn t have money to get more?: No   Transportation Needs: Low Risk  (6/11/2025)    Transportation Needs     Within the past 12 months, has lack of transportation kept you from medical appointments, getting your medicines, non-medical meetings or appointments, work, or from getting things that you need?: No   Physical Activity:  Not on file   Stress: Not on file   Social Connections: Unknown (5/1/2023)    Received from [facility]    Social Connections     Frequency of Communication with Friends and Family: Not on file   Interpersonal Safety: Low Risk  (6/11/2025)    Interpersonal Safety     Do you feel physically and emotionally safe where you currently live?: Yes     Within the past 12 months, have you been hit, slapped, kicked or otherwise physically hurt by someone?: No     Within the past 12 months, have you been humiliated or emotionally abused in other ways by your partner or ex-partner?: No   Housing Stability: High Risk (6/11/2025)    Housing Stability     Do you have housing? : Yes     Are you worried about losing your housing?: Yes       Medications  Prior to Admission medications    Medication Sig Start Date End Date Taking? Authorizing Provider   albuterol (PROAIR HFA/PROVENTIL HFA/VENTOLIN HFA) 108 (90 Base) MCG/ACT inhaler Inhale 1-2 puffs into the lungs every 6 hours as needed for shortness of breath, wheezing or cough 4/12/24  Yes Alejandrina Kelly MD   albuterol (PROVENTIL) (2.5 MG/3ML) 0.083% neb solution Take 2.5 mg by nebulization 3 times daily as needed for shortness of breath, wheezing or cough   Yes Unknown, Entered By History   aloe vera GEL Apply 1 g topically every hour as needed for skin care Per bottle directions   Yes Unknown, Entered By History   bacitracin 500 UNIT/GM OINT Apply topically 3 times daily as needed for wound care   Yes Unknown, Entered By History   Benzocaine (SOLARCAINE ALOE VERA EX) Externally apply topically daily as needed.   Yes Reported, Patient   benzonatate (TESSALON) 200 MG capsule Take 1 capsule (200 mg) by mouth 3 times daily as needed for cough. 9/22/24  Yes Liliana Alvarez PA-C   Calamine external lotion Apply topically as needed for itching   Yes Unknown, Entered By History   carbamide peroxide (DEBROX) 6.5 % otic solution Place 5 drops into  both ears daily as needed for other (ear wax).   Yes Reported, Patient   cefdinir (OMNICEF) 300 MG capsule Take 1 capsule (300 mg) by mouth 2 times daily. 6/14/25  Yes Bernarda Crespo MD   clotrimazole (LOTRIMIN) 1 % external cream Apply topically 2 times daily as needed (skin irritation)   Yes Unknown, Entered By History   dextromethorphan-guaiFENesin (MUCINEX DM)  MG 12 hr tablet Take 1 tablet by mouth 2 times daily as needed. 9/22/24  Yes Reported, Patient   diclofenac (VOLTAREN) 1 % topical gel Apply 2 g topically daily as needed for moderate pain To joints/back   Yes Unknown, Entered By History   EPINEPHrine (ANY BX GENERIC EQUIV) 0.3 MG/0.3ML injection 2-pack Inject 0.3 mg into the muscle as needed for anaphylaxis. May repeat one time in 5-15 minutes if response to initial dose is inadequate.   Yes Reported, Patient   famotidine (PEPCID) 20 MG tablet Take 1 tablet (20 mg) by mouth 2 times daily 1/18/24  Yes Elinor Pruett MD   furosemide (LASIX) 40 MG tablet Take 0.5 tablets (20 mg) by mouth daily. 6/2/25  Yes Tricia Simmons MD   GAVILAX 17 GM/SCOOP powder Take 17 g by mouth daily as needed for constipation. 5/8/24  Yes Reported, Patient   Hydrocortisone (PREPARATION H EX) Externally apply topically daily as needed (hemorrhoids).   Yes Reported, Patient   JARDIANCE 10 MG TABS tablet Take 10 mg by mouth every morning. 2/17/25  Yes Reported, Patient   levothyroxine (SYNTHROID/LEVOTHROID) 25 MCG tablet Take 12.5 mcg by mouth every morning (before breakfast).   Yes Unknown, Entered By History   lisinopril (ZESTRIL) 10 MG tablet Take 10 mg by mouth every morning. 2/17/25  Yes Reported, Patient   loperamide (IMODIUM) 2 MG capsule Take 4 mg by mouth 4 times daily as needed for diarrhea.   Yes Reported, Patient   loratadine (CLARITIN) 10 MG tablet Take 10 mg by mouth daily as needed for allergies.   Yes Reported, Patient   magnesium hydroxide (MILK OF MAGNESIA) 400 MG/5ML suspension Take 30 mLs by mouth  daily as needed for heartburn.   Yes Reported, Patient   metFORMIN (GLUCOPHAGE) 1000 MG tablet Take 1,000 mg by mouth 2 times daily (with meals)   Yes Unknown, Entered By History   methocarbamol (ROBAXIN) 500 MG tablet Take 1 tablet (500 mg) by mouth 4 times daily as needed for muscle spasms. 2/11/25  Yes Alejandrina Waite APRN CNP   montelukast (SINGULAIR) 10 MG tablet Take 10 mg by mouth every morning.   Yes Unknown, Entered By History   nystatin (MYCOSTATIN) 126438 UNIT/GM external powder Apply topically 2 times daily. 6/13/25  Yes Bernarda Crespo MD   OLANZapine (ZYPREXA) 10 MG tablet Take 10 mg by mouth At Bedtime   Yes Unknown, Entered By History   omeprazole (PRILOSEC) 40 MG DR capsule Take 40 mg by mouth every morning. 8/19/24  Yes Reported, Patient   ondansetron (ZOFRAN ODT) 4 MG ODT tab Take 1 tablet (4 mg) by mouth every 8 hours as needed for nausea. 1/10/25  Yes Reji Santos MD   pramoxine-calamine (AVEENO) 1-8 % LOTN Apply topically daily as needed for itching.   Yes Reported, Patient   rosuvastatin (CRESTOR) 10 MG tablet Take 10 mg by mouth At Bedtime 4/27/22  Yes Reported, Patient   spironolactone (ALDACTONE) 50 MG tablet Take 50 mg by mouth daily. 6/2/25  Yes Reported, Patient   sulfamethoxazole-trimethoprim (BACTRIM DS) 800-160 MG tablet Take 1 tablet by mouth daily. 6/18/25  Yes Bernarda Crespo MD   traZODone (DESYREL) 100 MG tablet Take 100 mg by mouth at bedtime. 11/18/24  Yes Reported, Patient   TRELEGY ELLIPTA 100-62.5-25 MCG/ACT oral inhaler Inhale 1 puff into the lungs every morning. 10/30/24  Yes Reported, Patient   Urea 40 % CREA Apply topically daily as needed for dry skin. 2/5/24  Yes Reported, Patient   Vitamins A & D (VITAMIN A & D) OINT Externally apply topically daily as needed (general skin health).   Yes Reported, Patient   Continuous Glucose Sensor (FREESTYLE MEGHANN 3 PLUS SENSOR) MISC USE TO READ BLOOD SUGAR TWICE DAILY AND AS NEEDED. CHANGE SENSOR EVERY 15 DAYS  "3/6/25   Reported, Patient   witch hazel-glycerin (TUCKS) pad Apply topically as needed for hemorrhoids.    Reported, Patient       Allergies:  Apricot flavoring agent (non-screening), Banana, Bees, Wasp venom protein, Methylphenidate, and Prunus    ROS: A ten point review of systems was obtained and negative other than the symptoms noted above in the HPI.     Physical Exam:   /71 (BP Location: Left arm, Patient Position: Sitting, Cuff Size: Adult Regular)   Pulse 84   Temp 97.6  F (36.4  C) (Oral)   Resp 20   Ht 1.651 m (5' 5\")   Wt 135.6 kg (299 lb)   SpO2 98%   BMI 49.76 kg/m     Constitutional: obese male, alert, oriented x 3,  no acute distress  Cardiovascular: regular, rate and rhythm  Respiratory: clear to auscultation bilaterally, respirations non labored.   Psychiatric: normal pleasant affect  Head: atraumatic, normocephalic  ENT: mucous membranes are moist  Abdomen: large rounded firm.   Neuro: Neurologically intact grossly  Skin: warm, dry, no rashes are noted    ADDITIONAL COMMENTS:   I reviewed the patient's new clinical lab test results.   Recent Labs   Lab Test 06/16/25 0942 06/15/25  2204 06/13/25  0552 06/12/25  0515 06/11/25  0909   WBC 12.8* 14.5* 12.5* 13.7*  --    HGB 9.9* 9.9* 9.5* 9.5*  --    MCV 84 84 87 87  --     431 405 395  --    INR  --   --  1.18* 1.16* 1.15      Recent Labs   Lab Test 06/16/25 0942 06/15/25  2204 06/13/25  0552    138 139   POTASSIUM 4.2 4.4 4.4   CHLORIDE 107 104 105   CO2 22 21* 25   BUN 19.7 18.5 18.6   CR 1.06 1.19* 1.05   ANIONGAP 11 13 9   WES 8.9 9.4 8.7*      Recent Labs   Lab Test 06/16/25  0942 06/15/25  2204 06/13/25  0552 06/11/25  0515 06/10/25  2146 06/10/25  1933 05/17/25  0643 05/16/25  0116 05/15/25  2011 05/15/25  0042 04/20/25  1642 02/24/25  2153   ALBUMIN 3.9 4.1 3.6   < >  --  3.8   < >  --    < > 3.8   < >  --    BILITOTAL 0.2 0.2 0.3   < >  --  0.3   < >  --    < > 0.3   < >  --    ALT 27 32 25   < >  --  32   < >  -- "    < > 21   < >  --    AST 14 25 15   < >  --  18   < >  --    < > 18   < >  --    ALKPHOS 253* 261* 201*   < >  --  280*   < >  --    < > 201*   < >  --    PROTEIN  --   --   --   --  Negative  --   --  20*  --   --   --  10*   LIPASE  --  30  --   --   --  28  --   --   --  22   < >  --     < > = values in this interval not displayed.       I reviewed the patient's new imaging results.     Impression:   Warren Jaramillo is a 44 year old male who has a history of history of cirrhosis with ascites requiring frequent paracentesis, AVA, COPD, type 2 diabetes, hypertension, GI bleed, and is admitted for abdominal distention with concern for decompensated liver disease.  He follows with Dr. Maria with [facility]. He has had recurrent ascites and SBP. Sober x 9 months.     SBP has been followed by ID. Bacterial cultures have been negative 5/16, 5/20, 5/23, 6/10, 6/13, 6/16. Will ask ID to return for antimicrobial guidance. Fungal culture pending.  Cirrhosis attributed to alcohol/fatty liver. Liver biopsy consistent with venous outflow obstruction. MICHAEL and doppler of the portal system unremarkable. Will repeat Liver biopsy transjugular to assess pressures.   Ascites with low SAAG less likely to be due to portal hypertension. Recurrent and difficult to manage due to renal insufficiency with diuretic use. Will consult nephrology for guidance.     Plan:   ID consult   Nephrology consult  IR consult for liver biopsy   Continue current diuretic management  2 gm sodium diet.     I discussed the patient's findings and plan with Dr. Encarnacion  who agrees with the above assessment and plan  60 minutes of total time was spent providing patient care, including patient evaluation, reviewing documentation/test result, and .  ________________________________________________________________________      Yanique Burk [facility]   Office number

## 2025-06-16 NOTE — ED PROVIDER NOTES
EMERGENCY DEPARTMENT ENCOUNTER      NAME: Warren Jaramillo  AGE: 44 year old male  YOB: 1980  MRN: [id number]  EVALUATION DATE & TIME: 6/15/2025  9:50 PM    PCP: Mary Kelly    ED PROVIDER: Burt Liz M.D.      Chief Complaint   Patient presents with    Abdominal Swelling         FINAL IMPRESSION:  1. Ascites due to alcoholic cirrhosis (H)    2. Shortness of breath          ED COURSE & MEDICAL DECISION MAKIN:55 PM I met with the patient, obtained history, performed an initial exam, and discussed options and plan for diagnostics and treatment here in the ED.  10:30 PM Obtained consent for paracentesis.  11:05 PM Paracentesis performed. See procedures section of this note for detailed documentation of this procedure.   12:08 AM I spoke with the [place] Resident, Dr. Frazier. We discussed the patient's case and they agree to admit the patient.    Pertinent Labs & Imaging studies reviewed. (See chart for details)  44 year old male presents to the Emergency Department for evaluation of weight gain, abdominal distention. Patient appears non toxic with stable vitals signs, patient afebrile with no tachycardia or hypoxia, no increased work of breathing.  Lungs are clear, abdomen is nontender however is markedly distended.  Patient denies any new fevers, vomiting, change in bowel or bladder habits, does endorse some mild shortness of breath secondary to his abdominal distention as well as associated weight gain over the past 2 days.  Per review of the medical record, did review discharge summary through [facility] on 2025, patient noted to have history of cirrhosis with ascites requiring frequent paracentesis found to have elevated ANC and ascitic fluid concerning for resistant BNP, it seems as though he was followed by both GI and ID, plan was for trending white blood cell counts on future paracentesis, he received ceftriaxone x 3 days and is  currently on p.o. cefdinir and then will transition to Bactrim for future prophylaxis.  Certainly, considered SBP but with no fever, reassuring exam and recent workup and history certainly nothing to suggest change at this time.  Considered CT imaging but again no indication with no new fevers, vomiting, nontender abdomen.  Feel the patient would benefit from paracentesis, we have no body read or IR in-house available at this time and will attempt bedside paracentesis in the emergency department, patient is agreeable with this care plan as he is amenable to rapid reduction of fluid retention.  Obtain consent.  Will also obtain screening labs.  Patient offered but deferred pain medications.    Reassessment: Labs by my independent interpretation showed no signs of hyperkalemia with potassium of 4.4, no signs of anemia with a hemoglobin of 9.9.  Did note slightly elevated white blood cell count of 14.5.  Bedside ED paracentesis unsuccessful x 3 attempts, unable to obtain fluid.  Therefore, patient will be admitted for observation and ultrasound-guided paracentesis by radiology tomorrow.  He is already on outpatient antibiotics, again no fever here new pain, discussed these findings and need for admission with the patient.  Discussed care plan with admitting service.    Medical Decision Making  I reviewed the EMR: Inpatient Record: discharge summary   Admit.    MIPS (CTPE, Dental pain, Khan, Sinusitis, Asthma/COPD, Head Trauma): Not Applicable    SEPSIS:           At the conclusion of the encounter I discussed the results of all of the tests and the disposition. The questions were answered and return precautions provided. The patient or family acknowledged understanding and was agreeable with the care plan.         MEDICATIONS GIVEN IN THE EMERGENCY:  Medications   nicotine (NICODERM CQ) 21 MG/24HR 24 hr patch 1 patch (has no administration in time range)   lidocaine 1 % 0.1-1 mL (has no administration in time range)    lidocaine (LMX4) cream (has no administration in time range)   sodium chloride (PF) 0.9% PF flush 3 mL (has no administration in time range)   sodium chloride (PF) 0.9% PF flush 3 mL (has no administration in time range)   senna-docusate (SENOKOT-S/PERICOLACE) 8.6-50 MG per tablet 1 tablet (has no administration in time range)     Or   senna-docusate (SENOKOT-S/PERICOLACE) 8.6-50 MG per tablet 2 tablet (has no administration in time range)   calcium carbonate (TUMS) chewable tablet 1,000 mg (has no administration in time range)   polyethylene glycol (MIRALAX) Packet 17 g (has no administration in time range)   ondansetron (ZOFRAN ODT) ODT tab 4 mg (has no administration in time range)     Or   ondansetron (ZOFRAN) injection 4 mg (has no administration in time range)   prochlorperazine (COMPAZINE) injection 10 mg (has no administration in time range)     Or   prochlorperazine (COMPAZINE) tablet 10 mg (has no administration in time range)       NEW PRESCRIPTIONS STARTED AT TODAY'S ER VISIT  New Prescriptions    No medications on file            =================================================================    HPI    Patient information was obtained from: Patient    Use of Intrepreter: N/A      Warren Jaramillo is a 44 year old male who presents with abdominal distension.    Per chart review, the patient was admitted at [facility] from 6/10/25 to 6/13/25 for cirrhosis with ascites requiring paracentesis with elevated ANC and ascitic fluid concerning for resistant SBP.  Patient underwent paracentesis with 5.1 liters removed. Ascitic fluid showed ANC of 759, but negative fluid cultures.  CT abdomen pelvis showed cirrhotic changes of the liver but otherwise unremarkable. Initial SAAG score is 0.7, indicating cause of ascites is not necessarily portal hypertension. GI and ID are involved in the patient's care, and continue to investigate causes for increased ascites. Results of 16s sequencing pending.  "MICHAEL obtained during admission, results overall unremarkable. Liver Doppler US unrevealing for Budd-Chiari syndrome. Patient required another therapeutic paracentesis on day of discharge with 0.1 liters removed. He received ceftriaxone x3 days, and will discharge home with PO cefdinir to complete 7 day course of antibiotics. Then transition back to Bactrim for further prophylaxis. Patient discharged with plan for follow-up with primary hepatologist to scheduled biweekly paracentesis.    The patient is returning to the ED today reporting increasing abdominal distension. He reports that since leaving the hospital his weight is up 24 lbs (was 275 and now 299). With the increasing abdominal distension he endorses shortness of breath, but he denies any chest or abdominal pain. He has not had any fevers, urinary symptoms, or stool changes. He also adds that for the past week he has been experiencing a \"spurting\" sensation in his stomach after drinking liquids. He is still able to eat and drink without difficulty.    VITALS:  Patient Vitals for the past 24 hrs:   BP Temp Temp src Pulse Resp SpO2 Height Weight   06/16/25 0112 -- -- -- 87 -- 97 % -- --   06/16/25 0100 136/69 -- -- 87 -- 97 % -- --   06/15/25 1938 134/63 97.1  F (36.2  C) Temporal 92 22 98 % 1.651 m (5' 5\") 135.6 kg (299 lb)        PHYSICAL EXAM    Constitutional:  Awake, alert, in no apparent distress  HENT:  Normocephalic, Atraumatic. Bilateral external ears normal. Oropharynx moist. Nose normal. Neck- Normal range of motion with no guarding, No midline cervical tenderness, Supple, No stridor.   Eyes:  PERRL, EOMI with no signs of entrapment, Conjunctiva normal, No discharge.   Respiratory:  Normal breath sounds, No respiratory distress, No wheezing.    Cardiovascular:  Normal heart rate, Normal rhythm, No appreciable rubs or gallops.   GI:  Soft, no tenderness, moderate abdominal distention with positive fluid wave  Musculoskeletal:  Intact distal pulses, " No edema. Good range of motion in all major joints. No tenderness to palpation or major deformities noted.  Integument:  Warm, Dry, No erythema, No rash.   Neurologic:  Alert & oriented, Normal motor function, Normal sensory function, No focal deficits noted.   Psychiatric:  Affect normal, Judgment normal, Mood normal.     LAB:  All pertinent labs reviewed and interpreted.  Results for orders placed or performed during the hospital encounter of 06/15/25   Comprehensive metabolic panel   Result Value Ref Range    Sodium 138 135 - 145 mmol/L    Potassium 4.4 3.4 - 5.3 mmol/L    Carbon Dioxide (CO2) 21 (L) 22 - 29 mmol/L    Anion Gap 13 7 - 15 mmol/L    Urea Nitrogen 18.5 6.0 - 20.0 mg/dL    Creatinine 1.19 (H) 0.67 - 1.17 mg/dL    GFR Estimate 77 >60 mL/min/1.73m2    Calcium 9.4 8.8 - 10.4 mg/dL    Chloride 104 98 - 107 mmol/L    Glucose 122 (H) 70 - 99 mg/dL    Alkaline Phosphatase 261 (H) 40 - 150 U/L    AST 25 0 - 45 U/L    ALT 32 0 - 70 U/L    Protein Total 6.8 6.4 - 8.3 g/dL    Albumin 4.1 3.5 - 5.2 g/dL    Bilirubin Total 0.2 <=1.2 mg/dL   CBC with platelets and differential   Result Value Ref Range    WBC Count 14.5 (H) 4.0 - 11.0 10e3/uL    RBC Count 3.69 (L) 4.40 - 5.90 10e6/uL    Hemoglobin 9.9 (L) 13.3 - 17.7 g/dL    Hematocrit 31.1 (L) 40.0 - 53.0 %    MCV 84 78 - 100 fL    MCH 26.8 26.5 - 33.0 pg    MCHC 31.8 31.5 - 36.5 g/dL    RDW 15.1 (H) 10.0 - 15.0 %    Platelet Count 431 150 - 450 10e3/uL    % Neutrophils 69 %    % Lymphocytes 15 %    % Monocytes 10 %    % Eosinophils 4 %    % Basophils 1 %    % Immature Granulocytes 1 %    NRBCs per 100 WBC 0 <1 /100    Absolute Neutrophils 10.0 (H) 1.6 - 8.3 10e3/uL    Absolute Lymphocytes 2.2 0.8 - 5.3 10e3/uL    Absolute Monocytes 1.5 (H) 0.0 - 1.3 10e3/uL    Absolute Eosinophils 0.6 0.0 - 0.7 10e3/uL    Absolute Basophils 0.1 0.0 - 0.2 10e3/uL    Absolute Immature Granulocytes 0.1 <=0.4 10e3/uL    Absolute NRBCs 0.0 10e3/uL   Result Value Ref Range    Lipase 30  13 - 60 U/L       RADIOLOGY:  No orders to display          EKG:      I have independently reviewed and interpreted the EKG(s) documented above.    PROCEDURES:   PROCEDURE: Paracentesis   INDICATIONS: Abdominal Distension   PROCEDURE PROVIDER: Dr Burt Liz   CONSENT: Risks, benefits and alternatives were discussed with and Written consent was obtained from Patient.   MEDICATIONS: 10 mLs of 1% Lidocaine without epinephrine    N/A, no sedation meds were given    DETAILS: Cleaned and prepped, lidocaine infiltrated locally for analgesia.  Skin nick performed.  5 Swazi catheter with needle in place inserted by ultrasound-guidance with aspiration on large syringe, initially got return of straw-colored fluid but was unable to continue to draw back more than 1 or 2 cc after 2 or 3 attempts.   COMPLICATIONS: Patient tolerated procedure well, without complication       [facility] Documentation:   CMS Diagnoses: None      I, Collin Conway, am serving as a scribe to document services personally performed by Burt Liz MD, based on my observation and the provider's statements to me. I, Burt Liz MD attest that Collin Conway is acting in a scribe capacity, has observed my performance of the services and has documented them in accordance with my direction.    Burt Liz M.D.  Emergency Medicine  [facility] EMERGENCY DEPARTMENT  [facility] [facility] [id number] [phone]  Dept: [phone]     Burt Liz MD  06/16/25 0122

## 2025-06-16 NOTE — ED TRIAGE NOTES
Patient presents with c/o increased abdominal swelling, 24 pound weight gain in 2 days.  Pt was dc'd from hospital Friday, attempted paracentesis prior to discontinue, 100mls.  Pt states that he is having increased difficulties breathing due to abd swelling.

## 2025-06-16 NOTE — H&P
[facility]    History and Physical - Hospitalist Service       Date of Admission:  6/15/2025    Assessment & Plan      Warren Jaramillo is a 44 year old male admitted on 6/15/2025. He has a history of cirrhosis with ascites requiring frequent paracentesis, T2DM, HTN, AVA, COPD and is admitted for abdominal distention and SOB.    Cirrhosis with ascites, requiring frequent paracentesis  Abdominal discomfort  Concern for resistant SBP  Possible venous outflow obstruction  Patient has history of cirrhosis, likely secondary to alcohol use and MASLD recently requiring more frequent paracentesis now twice weekly over the past 3-4 months.  Recently hospitalized from 6/11 - 6/13 for decompensated liver disease with concern for persistent SBP with ANC. Therapeutic paracentesis was completed on day of discharge (6/13). Discharged on cefdinir course with plan to return to [facility] for further prophylaxis. He presented with abdominal distention, weight gain of 24 lbs over past 2 days, abdominal discomfort and shortness of breath. Vitally stable on admission. Workup revealed leukocytosis of 14.5 with some absolute neutrophils, similar to prior. ED physician attempted paracentesis though unsuccessful. Will admit overnight and plan for US guided paracentesis in AM.  Low concern for SBP at this point due to absence of fever and abdominal pain but will continue cefdinir course prescribed at discharge.   - US guided paracentesis in AM  - ascitic fluid studies  - Daily CBC   - cefdinir 300 mg BID   - GI consulted, appreciate recs   - PTA Lasix, spironolactone   - Consider consulting ID, pending clinical course     Chronic normocytic Anemia   Hx of GIB  Hemoglobin 9.9, close to baseline.  No symptoms or signs of active bleeding.  - CBC in AM     T2DM  A1c 6.7. PTA metformin and jardiance.   - hold PTA meds  - MDSSI     Chronic conditions:   History of Provoked DVT: completed 3 months of apixaban   HTN:  "Continue PTA spironolactone  Insomnia: Continue PTA trazodone  Mental health: continue PTA Zyprexa  GERD: continue PTA Protonix  HLD: Continue PTA rosuvastatin  COPD: Continue PTA inhalers         Diet: Fluid restriction 1500 ML FLUID  Combination Diet Regular Diet Adult; 2 gm NA Diet  DVT Prophylaxis: Pneumatic Compression Devices  Khan Catheter: Not present  Fluids: PO  Lines: None     Cardiac Monitoring: None  Code Status: Full Code    Clinically Significant Risk Factors Present on Admission                # Drug Induced Coagulation Defect: home medication list includes an anticoagulant medication    # Hypertension: Noted on problem list  # Chronic heart failure with preserved ejection fraction: heart failure noted on problem list and last echo with EF >50%     # Anemia: based on hgb <11      # DMII: A1C = 6.7 % (Ref range: <5.7 %) within past 6 months    # Morbid Obesity: Estimated body mass index is 49.76 kg/m  as calculated from the following:    Height as of this encounter: 1.651 m (5' 5\").    Weight as of this encounter: 135.6 kg (299 lb).         # Financial/Environmental Concerns:           Disposition Plan      Expected Discharge Date: 06/18/2025                The patient's care will be discussed with the Attending Physician, Dr. Polanco.    Denise Frazier MD  Hospitalist Service  [facility]  Securely message with [employer] (more info)  Text page via [employer] Paging/Directory   ______________________________________________________________________    Chief Complaint   Abdominal distension    History is obtained from the patient and chart review    History of Present Illness   Warren Jaramillo is a 44 year old male who has a history of history of cirrhosis with ascites requiring frequent paracentesis, AVA, COPD, type 2 diabetes, hypertension, GI bleed, and is admitted for abdominal distention with concern for decompensated liver disease.     Admitted on 6/10/2025 - 6/13/2025 " for abdominal distention and SOB found to have significantly elevated ANC and ascitic fluid concerning for resistant SBP. Received ceftriaxone x3 days and discharged on PO cefdinir to complete 7 day course.     Reports increased size of belly for past 2 days. Weighed himself daily and noted to be up 24 pounds from the time of discharge 2 days ago. Abdomen feels very full and is pushing up on his diaphragm and lungs causing him to be more short of breath. Due to inability to breathe comfortably presented to ED.     No fevers, chills, or abdominal pain. Says no other changes at home since time of discharge. No nausea, vomiting, dizziness, chest pain.     Past Medical History    Past Medical History:   Diagnosis Date    COPD exacerbation (H) 12/02/2024    DM2 (diabetes mellitus, type 2) (H) 04/28/2020    HTN (hypertension) 07/30/2012    Sleep apnea     Thyroid nodule 07/31/2019    Rojas's disease (H)      Past Surgical History   Past Surgical History:   Procedure Laterality Date    COLONOSCOPY      ESOPHAGOSCOPY, GASTROSCOPY, DUODENOSCOPY (EGD), COMBINED N/A 7/21/2023    Procedure: ESOPHAGOGASTRODUODENOSCOPY WITH GASTRIC AND ESOPHAGEAL BIOPSIES;  Surgeon: Filiberto Aragon MD;  Location: [facility] [place] OR    ESOPHAGOSCOPY, GASTROSCOPY, DUODENOSCOPY (EGD), COMBINED N/A 4/4/2025    Procedure: ESOPHAGOGASTRODUODENOSCOPY;  Surgeon: Ramesh Talbot MD;  Location: [facility] [place] OR    TOOTH EXTRACTION       Prior to Admission Medications   Prior to Admission Medications   Prescriptions Last Dose Informant Patient Reported? Taking?   Benzocaine (SOLARCAINE ALOE VERA EX)  Care Giver, Self Yes No   Sig: Externally apply topically daily as needed.   Calamine external lotion  Care Giver, Self Yes No   Sig: Apply topically as needed for itching   Continuous Glucose Sensor (FREESTYLE MEGHANN 3 PLUS SENSOR) MISC  Care Giver, Self Yes No   Sig: USE TO READ BLOOD SUGAR TWICE DAILY AND AS NEEDED. CHANGE SENSOR EVERY 15 DAYS    EPINEPHrine (ANY BX GENERIC EQUIV) 0.3 MG/0.3ML injection 2-pack  Care Giver, Self Yes No   Sig: Inject 0.3 mg into the muscle as needed for anaphylaxis. May repeat one time in 5-15 minutes if response to initial dose is inadequate.   GAVILAX 17 GM/SCOOP powder  Care Giver, Self Yes No   Sig: Take 17 g by mouth daily as needed for constipation.   Hydrocortisone (PREPARATION H EX)  Care Giver, Self Yes No   Sig: Externally apply topically daily as needed (hemorrhoids).   JARDIANCE 10 MG TABS tablet  Care Giver, Self Yes No   Sig: Take 10 mg by mouth every morning.   OLANZapine (ZYPREXA) 10 MG tablet  Care Giver, Self Yes No   Sig: Take 10 mg by mouth At Bedtime   TRELEGY ELLIPTA 100-62.5-25 MCG/ACT oral inhaler  Care Giver, Self Yes No   Sig: Inhale 1 puff into the lungs every morning.   Urea 40 % CREA  Care Giver, Self Yes No   Sig: Apply topically daily as needed for dry skin.   Vitamins A & D (VITAMIN A & D) OINT  Care Giver, Self Yes No   Sig: Externally apply topically daily as needed (general skin health).   albuterol (PROAIR HFA/PROVENTIL HFA/VENTOLIN HFA) 108 (90 Base) MCG/ACT inhaler  Care Giver, Self No No   Sig: Inhale 1-2 puffs into the lungs every 6 hours as needed for shortness of breath, wheezing or cough   albuterol (PROVENTIL) (2.5 MG/3ML) 0.083% neb solution  Care Giver, Self Yes No   Sig: Take 2.5 mg by nebulization 3 times daily as needed for shortness of breath, wheezing or cough   aloe vera GEL  Care Giver, Self Yes No   Sig: Apply 1 g topically every hour as needed for skin care Per bottle directions   apixaban ANTICOAGULANT (ELIQUIS ANTICOAGULANT) 5 MG tablet   Yes Yes   Sig: Take 5 mg by mouth every 12 hours.   bacitracin 500 UNIT/GM OINT  Care Giver, Self Yes No   Sig: Apply topically 3 times daily as needed for wound care   benzonatate (TESSALON) 200 MG capsule  Care Giver, Self No No   Sig: Take 1 capsule (200 mg) by mouth 3 times daily as needed for cough.   carbamide peroxide (DEBROX)  6.5 % otic solution  Care Giver, Self Yes No   Sig: Place 5 drops into both ears daily as needed for other (ear wax).   cefdinir (OMNICEF) 300 MG capsule   No No   Sig: Take 1 capsule (300 mg) by mouth 2 times daily.   clotrimazole (LOTRIMIN) 1 % external cream  Care Giver, Self Yes No   Sig: Apply topically 2 times daily as needed (skin irritation)   dextromethorphan-guaiFENesin (MUCINEX DM)  MG 12 hr tablet  Care Giver, Self Yes No   Sig: Take 1 tablet by mouth 2 times daily as needed.   diclofenac (VOLTAREN) 1 % topical gel  Care Giver, Self Yes No   Sig: Apply 2 g topically daily as needed for moderate pain To joints/back   famotidine (PEPCID) 20 MG tablet  Care Giver, Self No No   Sig: Take 1 tablet (20 mg) by mouth 2 times daily   furosemide (LASIX) 40 MG tablet  Self No No   Sig: Take 0.5 tablets (20 mg) by mouth daily.   hydrocortisone (CORTAID) 1 % external cream  Care Giver, Self Yes No   Sig: Apply topically daily as needed for itching.   levothyroxine (SYNTHROID/LEVOTHROID) 25 MCG tablet  Self Yes No   Sig: Take 12.5 mcg by mouth every morning (before breakfast).   lisinopril (ZESTRIL) 10 MG tablet  Care Giver, Self Yes No   Sig: Take 10 mg by mouth every morning.   loperamide (IMODIUM) 2 MG capsule  Care Giver, Self Yes No   Sig: Take 4 mg by mouth 4 times daily as needed for diarrhea.   loratadine (CLARITIN) 10 MG tablet  Care Giver, Self Yes No   Sig: Take 10 mg by mouth daily as needed for allergies.   magnesium hydroxide (MILK OF MAGNESIA) 400 MG/5ML suspension  Care Giver, Self Yes No   Sig: Take 30 mLs by mouth daily as needed for heartburn.   metFORMIN (GLUCOPHAGE) 1000 MG tablet  Care Giver, Self Yes No   Sig: Take 1,000 mg by mouth 2 times daily (with meals)   methocarbamol (ROBAXIN) 500 MG tablet  Care Giver, Self No No   Sig: Take 1 tablet (500 mg) by mouth 4 times daily as needed for muscle spasms.   montelukast (SINGULAIR) 10 MG tablet  Care Giver, Self Yes No   Sig: Take 10 mg by  mouth every morning.   nystatin (MYCOSTATIN) 181361 UNIT/GM external powder   No No   Sig: Apply topically 2 times daily.   omeprazole (PRILOSEC) 40 MG DR capsule  Care Giver, Self Yes No   Sig: Take 40 mg by mouth every morning.   ondansetron (ZOFRAN ODT) 4 MG ODT tab  Care Giver, Self No No   Sig: Take 1 tablet (4 mg) by mouth every 8 hours as needed for nausea.   pramoxine-calamine (AVEENO) 1-8 % LOTN  Care Giver, Self Yes No   Sig: Apply topically daily as needed for itching.   rosuvastatin (CRESTOR) 10 MG tablet  Care Giver, Self Yes No   Sig: Take 10 mg by mouth At Bedtime   spironolactone (ALDACTONE) 50 MG tablet  Self Yes No   Sig: Take 50 mg by mouth daily.   sulfamethoxazole-trimethoprim (BACTRIM DS) 800-160 MG tablet   No No   Sig: Take 1 tablet by mouth daily.   traZODone (DESYREL) 100 MG tablet  Care Giver, Self Yes No   Sig: Take 100 mg by mouth at bedtime.   witch hazel-glycerin (TUCKS) pad  Care Giver, Self Yes No   Sig: Apply topically as needed for hemorrhoids.      Facility-Administered Medications: None           Physical Exam   Vital Signs: Temp: 97.1  F (36.2  C) Temp src: Temporal BP: 136/69 Pulse: 87   Resp: 22 SpO2: 97 % O2 Device: None (Room air)    Weight: 299 lbs 0 oz    Constitutional: awake, alert, cooperative, no apparent distress  Eyes: Lids and lashes normal, pupils equal round and reactive to light, sclera clear, conjunctiva normal  ENT: Normocephalic, without obvious abnormality, atraumatic  Respiratory: Mildly increased work of breath, clear to auscultation bilaterally  Cardiovascular: Regular rate and rhythm, normal S1 and S2, no murmur noted  GI: Distended, no tenderness to palpation, normal bowel sounds; bandaids in place from paracentesis attempt; no guarding  Skin: does not appear jaundiced  Musculoskeletal: Full range of motion noted. 1+ pitting edema to knees bilaterally  Neurologic: Awake, alert, oriented to name, place and time.  Cranial nerves II-XII are grossly  intact.        Medical Decision Making       Please see A&P for additional details of medical decision making.      Data   ------------------------- PAST 24 HR DATA REVIEWED -----------------------------------------------    I have personally reviewed the following data over the past 24 hrs:    14.5 (H)  \   9.9 (L)   / 431     138 104 18.5 /  122 (H)   4.4 21 (L) 1.19 (H) \     ALT: 32 AST: 25 AP: 261 (H) TBILI: 0.2   ALB: 4.1 TOT PROTEIN: 6.8 LIPASE: 30       Imaging results reviewed over the past 24 hrs:   No results found for this or any previous visit (from the past 24 hours).

## 2025-06-16 NOTE — PLAN OF CARE
Goal Outcome Evaluation:    Problem: Fluid Volume Excess  Goal: Fluid Balance  Outcome: Progressing     Problem: Adult Inpatient Plan of Care  Goal: Absence of Hospital-Acquired Illness or Injury  Intervention: Prevent Skin Injury  Recent Flowsheet Documentation  Taken 6/16/2025 1700 by Scot Moreira RN  Body Position: position changed independently     Pt was admitted to the unit from ED at about 1600, alert and oriented x 4. On room air and vss.  Had paracentesis done earlier before coming to the unit.  insertion site is intact and  open to air. No bleeding noted.   Up at [facility] and makes his needs known.

## 2025-06-16 NOTE — CONSULTS
Consultation - INFECTIOUS DISEASE CONSULTATION  Warren Jaramillo,  1980, MRN [id number]      Ascites due to alcoholic cirrhosis (H) [K70.31]  Shortness of breath [R06.02]    PCP: Mary Kelly, [phone]   Code status:  Full Code               Chief Complaint: Persistent elevated cell count in ascitic fluid      Assessment:  Warren Jaramillo is a 44 year old male who was admitted on 6/15/25 with weight gain, abdominal distention and mild shortness of breath secondary to his abdominal distention. He had no fever on admission. His PMH is significant for alcohol abuse, T2DM, normocytic anemia, DVT, HTN, HLD, COPD, AVA and cirrhosis with ascites for which he requires frequent paracentesis with GI and ID follow up. He was admitted 5/15 for abdominal pain and GI bleed and developed SBP. He did not respond to ceftriaxone and was switched to meropenam on  which he again did not respond to. ID stated the needle could be reaching into a loculated collection as the patient is feeling fine and could be discharged. Patient was discharged on PO Levaquin and moved to Bactrim SBP  prophylaxis. He had ED visits every 2 days for abdominal distention. He was seen in the ED again on  and was discharged with PO Cefdinir for 7 days.     WBC on  was 12.8 compared to 14.5 on 6/15. Ascitic total nucleated cells on  are 1,487 compared to 1,160 on . Ascitic fluid pathology was negative for malignancy. Today on  the patient denies fever and there is minimal tenderness to abdominal palpation or reported pain. Patient states he has been sober for 9 months. Patient has a liver biopsy scheduled in the coming days; he has had multiple in the past.     Summary: Alcoholic liver cirrhosis complicated by ascites status post multiple paracenteses.  Multiple antibiotic exposures for SBP as above.  Currently on cefdinir with decreasing cell count.  Noted to have high ADA in the past.  Clinical significance  of this is not clear but can be elevated in tuberculous ascites.  Patient is low risk however.  Will check QuantiFERON gold.    Active Problems:    Shortness of breath    Ascites due to alcoholic cirrhosis (H)        Recommendations:   Continue Cefdinir 300 mg BID course; transition to Bactrim prophylaxis   Daily CBC  Monitor for fever or signs of infection  Quantiferon gold  No suspicion for pulmonary tuberculosis.      Thank you for letting us be part of the patient care team. We will     Marcela Todd MD    HPI:    Warren Jaramillo is a 44 year old male who was admitted on 6/15/25 with weight gain, abdominal distention and mild shortness of breath secondary to his abdominal distention. He had no fever on admission. His PMH is significant for alcohol abuse, T2DM, normocytic anemia, DVT, HTN, HLD, COPD, AVA and cirrhosis with ascites for which he requires frequent paracentesis with GI and ID follow up. He was admitted 5/15 for abdominal pain and GI bleed and developed SBP. He did not respond to ceftriaxone and was switched to meropenam on 5/22 which he again did not respond to. ID stated the needle could be reaching into a loculated collection as the patient is feeling fine and could be discharged. Patient was discharged on PO Levaquin and moved to Bactrim SBP  prophylaxis. He had ED visits every 2 days for abdominal distention. He was seen in the ED again on 6/13 and was discharged with PO Cefdinir for 7 days.     ===========================================    Medical History  Past Medical History:   Diagnosis Date    COPD exacerbation (H) 12/02/2024    DM2 (diabetes mellitus, type 2) (H) 04/28/2020    HTN (hypertension) 07/30/2012    Sleep apnea     Thyroid nodule 07/31/2019    Rojas's disease (H)         Surgical History  Past Surgical History:   Procedure Laterality Date    COLONOSCOPY      ESOPHAGOSCOPY, GASTROSCOPY, DUODENOSCOPY (EGD), COMBINED N/A 7/21/2023    Procedure:  ESOPHAGOGASTRODUODENOSCOPY WITH GASTRIC AND ESOPHAGEAL BIOPSIES;  Surgeon: Filiberto Aragon MD;  Location: [facility] [place]    ESOPHAGOSCOPY, GASTROSCOPY, DUODENOSCOPY (EGD), COMBINED N/A 4/4/2025    Procedure: ESOPHAGOGASTRODUODENOSCOPY;  Surgeon: Ramesh Talbot MD;  Location: [facility] OR    TOOTH EXTRACTION              Social History  Reviewed, and he  reports that he has been smoking cigarettes. He started smoking about 21 years ago. He has a 21.5 pack-year smoking history. He has never used smokeless tobacco. He reports that he does not currently use alcohol.  Drug: Marijuana.        Family History  Family History   Problem Relation Age of Onset    Unknown/Adopted Father     Unknown/Adopted Maternal Grandmother     C.A.D. Maternal Grandfather     Diabetes Maternal Grandfather     Cerebrovascular Disease Maternal Grandfather     Unknown/Adopted Paternal Grandmother     Unknown/Adopted Paternal Grandfather     Unknown/Adopted Brother     Unknown/Adopted Sister       Psychosocial Needs  Social History     Social History Narrative    Not on file     Additional psychosocial needs reviewed per nursing assessment.       Allergies   Allergen Reactions    Apricot Flavoring Agent (Non-Screening) Anaphylaxis    Banana Anaphylaxis     Throat swelling      Bees Anaphylaxis     Has an Epi pen    Wasp Venom Protein Shortness Of Breath     Other reaction(s): Respiratory Distress  Has an Epi pen        Methylphenidate Itching     Other reaction(s): Nightmares    Prunus      Other reaction(s): *Unknown        (Not in a hospital admission)       Review of Systems:  Negative except for findings in the HPI Physical Exam:  Temp:  [97.1  F (36.2  C)-97.6  F (36.4  C)] 97.6  F (36.4  C)  Pulse:  [71-92] 84  Resp:  [20-22] 20  BP: (118-136)/(63-71) 122/71  SpO2:  [79 %-99 %] 98 %      Gen: Pleasant in no acute distress.  HEENT: NCAT. EOMI. PERRL.  Neck: No bruit, JVD or thyromegaly.  Lungs: Clear to ascultation bilat with  no crackles or wheezes.  Card: RRR. NSR. No RMG. Peripheral pulses present and symmetric. No edema.  Abd: Soft NT, moderate ascitic distention. No mass. Normal bowel sounds.  Skin: No rash.  Extr: No edema.  Neuro: Alert and oriented to place time and person. Cranial nerves II to XII intact. Motor and sensory intact. Normal gait.           ============================    Pertinent Labs  personally reviewed.   Recent Labs   Lab 06/16/25 0942 06/15/25  2204 06/13/25  0552   WBC 12.8* 14.5* 12.5*   HGB 9.9* 9.9* 9.5*   HCT 30.7* 31.1* 31.5*    431 405       Recent Labs   Lab 06/16/25 0942 06/15/25  2204 06/13/25  0552    138 139   CO2 22 21* 25   BUN 19.7 18.5 18.6   ALBUMIN 3.9 4.1 3.6   ALKPHOS 253* 261* 201*   ALT 27 32 25   AST 14 25 15       MICROBIOLOGY DATA:  Personally reviewed      Pertinent Radiology  personally reviewed.    ABDOMINAL DUPLEX: The hepatic artery, hepatic veins, IVC, portal veins, and splenic vein are patent with flow in the normal direction.                                                                       IMPRESSION:  1.  Morphologic changes of cirrhosis with ascites.  2.  Patent portal vein

## 2025-06-16 NOTE — CONSULTS
APRN student/NP attestation  I was present with the nurse practitioner student, Lori Puga who participated in the service and in the documentation of the note. I have verified the history and personally performed the physical exam, reviewed necessary labs/imaging, vitals and health record.  I personally proved the substantive portion of the medical decision makingI agree with the assessment and plan of care as documented in the note.    Evangelina Allen NP  Date of Service (when I saw the patient): 6/16/2025      RENAL CONSULT NOTE    REQUESTING PHYSICIAN: Dr. Sidney Encarnacion with GI    REASON FOR CONSULT: Renal insufficiency, liver cirrhosis/ascites     PLAN:   Avoid nephrotoxic agents, NSAIDs  Increase Aldactone to 50mg BID, starting today  Increase Lasix to 20mg BID, starting tomorrow   Strict I/O   Daily weights  GI w/up in progress.  Trend daily renal labs while admitted.     ASSESSMENT:  Renal Insufficiency   sCR 1.19 on admission, better today 1.09, eGFR 80's.   Baseline sCR appears to be in the low 1's.   He followed with [facility] in [id number] for proteinuria and HTN. Several episodes of sCR elevations 1.2-1.4 since December 2024. ARF in Jan 2025 with sCR up to 3.3 but recovered to 0.9-1.1. ARF on admission 5/15-5/26 for SBP and UGIB with acute blood loss, with sCR up to 7.23 but recovered to 1.05-1.2. RIVERA assumed to be due to hypovolemia, SBP, HoTN, and contrast exposure.  Not overly concerned about renal insufficiency, continue to monitor renal labs daily.   Increase diuretic therapy to BID due to rapid gains. Consider hold parameters for diuretics at discharge, discussed at length with pt today . Strict I/O & daily weights.     Liver cirrhosis/Ascites/ SBP?   Requires therapeutic paracenteses two times a week for the last 3-4 months.   Current/previously treated with Cefdinir and Bactrim PO   Peritoneum fluid ANC down trending.   Bactrim and Cefdinir from previous hospitalization, concern  for SBP.  ID and GI following discussion of liver biopsy.   Sober ~ 9 months    DM2  Jardiance and Metformin, on hold   Sliding scale insulin, managed by hospitalists.     Anemia   Normocytic normochromic   Baseline hgb 9-10.   History of GI bleeds.     Hypothyroidism  On replacement therapy    HTN  PTA lasix 20mg daily, inc to 20mg BID   Lisinopril, on hold   Spironolactone 50mg daily (increased to BID)    AVA    COPD  On inhalers, managed by hospitalist.     HLD  On statins      HPI: Warren Jaramillo is a 44 year old male with a history of liver cirrhosis, DM2, anemia, GIB, hypothryoidism, HTN, AVA, HLD, and obesity admitted to the hospital for SOB secondary to cirrhosis/ascites.  H/o recent severe RIVERA on Mid May during admission for SBP.  sCr peaked 7's, with brisk recovery with SBP tx and transfusion. . sCR trending in the 1.0-1.1 range, was 1.2 on admission and better today 1.06 (eGFR 70-80s)   Had a therapeutic paracentesis 3 days ago but notes a 24lb weight gain since then.  Para this admission 5L today.  He gets twice weekly sana outpt. Says that after his RIVERA last month, his diuretics were reduced by 1/2.  Aldactone 100mg --->50 dailiy and Lasix 40mg cut to 20.  UO about the same and tries to restrict fluids to some degree.     Met pt with GI  NP Yanique Burk, who notes plans for transjugular liver bx.  Concerns for persistent SBP.  Had been on ceftrixone and PO cefdinir PTA then transitioned to Bactrim.  ID has been consulted.    REVIEW OF SYSTEMS:  ROS negative unless otherwise noted.       Past Medical History:   Diagnosis Date    COPD exacerbation (H) 12/02/2024    DM2 (diabetes mellitus, type 2) (H) 04/28/2020    HTN (hypertension) 07/30/2012    Sleep apnea     Thyroid nodule 07/31/2019    Rojas's disease (H)        I have reviewed this patient's family history and updated it with pertinent information if needed.  Family History   Problem Relation Age of Onset    Unknown/Adopted Father      Unknown/Adopted Maternal Grandmother     C.A.D. Maternal Grandfather     Diabetes Maternal Grandfather     Cerebrovascular Disease Maternal Grandfather     Unknown/Adopted Paternal Grandmother     Unknown/Adopted Paternal Grandfather     Unknown/Adopted Brother     Unknown/Adopted Sister          Social History     Tobacco Use    Smoking status: Every Day     Current packs/day: 1.00     Average packs/day: 1 pack/day for 21.5 years (21.5 ttl pk-yrs)     Types: Cigarettes     Start date: 1/1/2004    Smokeless tobacco: Never   Vaping Use    Vaping status: Never Used   Substance Use Topics    Alcohol use: Not Currently     Comment: Last 8/7/2024          Current Facility-Administered Medications   Medication Dose Route Frequency Provider Last Rate Last Admin    albuterol (PROVENTIL) neb solution 2.5 mg  2.5 mg Nebulization TID PRN Denise Frazier MD        calcium carbonate (TUMS) chewable tablet 1,000 mg  1,000 mg Oral 4x Daily PRN Denise Frazier MD        cefdinir (OMNICEF) capsule 300 mg  300 mg Oral Q12H MORENITA (08/20) Denise Frazier MD   300 mg at 06/16/25 0937    glucose gel 15-30 g  15-30 g Oral Q15 Min PRN Denise Frazier MD        Or    dextrose 50 % injection 25-50 mL  25-50 mL Intravenous Q15 Min PRN Denise Frazier MD        Or    glucagon injection 1 mg  1 mg Subcutaneous Q15 Min PRN Denise Frazier MD        famotidine (PEPCID) tablet 20 mg  20 mg Oral BID Denise Frazier MD   20 mg at 06/16/25 0937    fluticasone-vilanterol (BREO ELLIPTA) 100-25 MCG/ACT inhaler 1 puff  1 puff Inhalation Daily Denise Frazier MD   1 puff at 06/16/25 0938    And    umeclidinium (INCRUSE ELLIPTA) 62.5 MCG/ACT inhaler 1 puff  1 puff Inhalation Daily Denise Frazier MD   1 puff at 06/16/25 0939    furosemide (LASIX) tablet 20 mg  20 mg Oral Daily Denise Frazier MD   20 mg at 06/16/25 0937    insulin aspart (NovoLOG) injection (RAPID ACTING)  1-7 Units Subcutaneous TID AC Denise Frazier MD        insulin  aspart (NovoLOG) injection (RAPID ACTING)  1-5 Units Subcutaneous At Bedtime Denise Frazier MD        levothyroxine (SYNTHROID/LEVOTHROID) half-tab 12.5 mcg  12.5 mcg Oral QAM Denise Thurston MD   12.5 mcg at 06/16/25 0938    lidocaine (LMX4) cream   Topical Q1H PRN Denise Frazier MD        lidocaine 1 % 0.1-1 mL  0.1-1 mL Other Q1H PRN Denise Frazier MD        [Held by provider] lisinopril (ZESTRIL) tablet 10 mg  10 mg Oral Denise Barber MD        montelukast (SINGULAIR) tablet 10 mg  10 mg Oral Denise Barber MD   10 mg at 06/16/25 0937    nicotine (NICODERM CQ) 21 MG/24HR 24 hr patch 1 patch  1 patch Transdermal Once Burt Liz MD   1 patch at 06/16/25 0119    OLANZapine (zyPREXA) tablet 10 mg  10 mg Oral At Bedtime Denise Frazier MD        ondansetron (ZOFRAN ODT) ODT tab 4 mg  4 mg Oral Q6H PRN Denise Frazier MD        Or    ondansetron (ZOFRAN) injection 4 mg  4 mg Intravenous Q6H PRN Denise Frazier MD        pantoprazole (PROTONIX) EC tablet 40 mg  40 mg Oral BID Denise Thurston MD   40 mg at 06/16/25 0937    polyethylene glycol (MIRALAX) Packet 17 g  17 g Oral BID PRN Denise Frazier MD        prochlorperazine (COMPAZINE) injection 10 mg  10 mg Intravenous Q6H PRDenise Leo MD        Or    prochlorperazine (COMPAZINE) tablet 10 mg  10 mg Oral Q6H PRDenise Leo MD        rosuvastatin (CRESTOR) tablet 10 mg  10 mg Oral At Bedtime Denise Frazier MD        senna-docusate (SENOKOT-S/PERICOLACE) 8.6-50 MG per tablet 1 tablet  1 tablet Oral BID PRN Denise Frazier MD        Or    senna-docusate (SENOKOT-S/PERICOLACE) 8.6-50 MG per tablet 2 tablet  2 tablet Oral BID PRDenise Leo MD        sodium chloride (PF) 0.9% PF flush 3 mL  3 mL Intracatheter Q8H [facility] Denise Frazier MD   3 mL at 06/16/25 0506    sodium chloride (PF) 0.9% PF flush 3 mL  3 mL Intracatheter q1 min prDenise Leo MD        spironolactone (ALDACTONE) tablet 50  mg  50 mg Oral Daily Denise Frazier MD   50 mg at 06/16/25 0937    traZODone (DESYREL) tablet 100 mg  100 mg Oral At Bedtime Denise Frazier MD         Current Outpatient Medications   Medication Sig Dispense Refill    albuterol (PROAIR HFA/PROVENTIL HFA/VENTOLIN HFA) 108 (90 Base) MCG/ACT inhaler Inhale 1-2 puffs into the lungs every 6 hours as needed for shortness of breath, wheezing or cough 18 g 0    albuterol (PROVENTIL) (2.5 MG/3ML) 0.083% neb solution Take 2.5 mg by nebulization 3 times daily as needed for shortness of breath, wheezing or cough      aloe vera GEL Apply 1 g topically every hour as needed for skin care Per bottle directions      bacitracin 500 UNIT/GM OINT Apply topically 3 times daily as needed for wound care      Benzocaine (SOLARCAINE ALOE VERA EX) Externally apply topically daily as needed.      benzonatate (TESSALON) 200 MG capsule Take 1 capsule (200 mg) by mouth 3 times daily as needed for cough. 50 capsule 0    Calamine external lotion Apply topically as needed for itching      carbamide peroxide (DEBROX) 6.5 % otic solution Place 5 drops into both ears daily as needed for other (ear wax).      cefdinir (OMNICEF) 300 MG capsule Take 1 capsule (300 mg) by mouth 2 times daily. 8 capsule 0    clotrimazole (LOTRIMIN) 1 % external cream Apply topically 2 times daily as needed (skin irritation)      dextromethorphan-guaiFENesin (MUCINEX DM)  MG 12 hr tablet Take 1 tablet by mouth 2 times daily as needed.      diclofenac (VOLTAREN) 1 % topical gel Apply 2 g topically daily as needed for moderate pain To joints/back      EPINEPHrine (ANY BX GENERIC EQUIV) 0.3 MG/0.3ML injection 2-pack Inject 0.3 mg into the muscle as needed for anaphylaxis. May repeat one time in 5-15 minutes if response to initial dose is inadequate.      famotidine (PEPCID) 20 MG tablet Take 1 tablet (20 mg) by mouth 2 times daily 60 tablet 0    furosemide (LASIX) 40 MG tablet Take 0.5 tablets (20 mg) by mouth  daily. 30 tablet 0    GAVILAX 17 GM/SCOOP powder Take 17 g by mouth daily as needed for constipation.      Hydrocortisone (PREPARATION H EX) Externally apply topically daily as needed (hemorrhoids).      JARDIANCE 10 MG TABS tablet Take 10 mg by mouth every morning.      levothyroxine (SYNTHROID/LEVOTHROID) 25 MCG tablet Take 12.5 mcg by mouth every morning (before breakfast).      lisinopril (ZESTRIL) 10 MG tablet Take 10 mg by mouth every morning.      loperamide (IMODIUM) 2 MG capsule Take 4 mg by mouth 4 times daily as needed for diarrhea.      loratadine (CLARITIN) 10 MG tablet Take 10 mg by mouth daily as needed for allergies.      magnesium hydroxide (MILK OF MAGNESIA) 400 MG/5ML suspension Take 30 mLs by mouth daily as needed for heartburn.      metFORMIN (GLUCOPHAGE) 1000 MG tablet Take 1,000 mg by mouth 2 times daily (with meals)      methocarbamol (ROBAXIN) 500 MG tablet Take 1 tablet (500 mg) by mouth 4 times daily as needed for muscle spasms. 40 tablet 0    montelukast (SINGULAIR) 10 MG tablet Take 10 mg by mouth every morning.      nystatin (MYCOSTATIN) 219421 UNIT/GM external powder Apply topically 2 times daily. 30 g 0    OLANZapine (ZYPREXA) 10 MG tablet Take 10 mg by mouth At Bedtime      omeprazole (PRILOSEC) 40 MG DR capsule Take 40 mg by mouth every morning.      ondansetron (ZOFRAN ODT) 4 MG ODT tab Take 1 tablet (4 mg) by mouth every 8 hours as needed for nausea. 20 tablet 0    pramoxine-calamine (AVEENO) 1-8 % LOTN Apply topically daily as needed for itching.      rosuvastatin (CRESTOR) 10 MG tablet Take 10 mg by mouth At Bedtime      spironolactone (ALDACTONE) 50 MG tablet Take 50 mg by mouth daily.      [START ON 6/18/2025] sulfamethoxazole-trimethoprim (BACTRIM DS) 800-160 MG tablet Take 1 tablet by mouth daily. 90 tablet 3    traZODone (DESYREL) 100 MG tablet Take 100 mg by mouth at bedtime.      TRELEGY ELLIPTA 100-62.5-25 MCG/ACT oral inhaler Inhale 1 puff into the lungs every morning.   "    Urea 40 % CREA Apply topically daily as needed for dry skin.      Vitamins A & D (VITAMIN A & D) OINT Externally apply topically daily as needed (general skin health).      Continuous Glucose Sensor (FREESTYLE MEGHANN 3 PLUS SENSOR) MISC USE TO READ BLOOD SUGAR TWICE DAILY AND AS NEEDED. CHANGE SENSOR EVERY 15 DAYS      witch hazel-glycerin (TUCKS) pad Apply topically as needed for hemorrhoids.           ALLERGIES/SENSITIVITIES:  Allergies   Allergen Reactions    Apricot Flavoring Agent (Non-Screening) Anaphylaxis    Banana Anaphylaxis     Throat swelling      Bees Anaphylaxis     Has an Epi pen    Wasp Venom Protein Shortness Of Breath     Other reaction(s): Respiratory Distress  Has an Epi pen        Methylphenidate Itching     Other reaction(s): Nightmares    Prunus      Other reaction(s): *Unknown          PHYSICAL EXAM:  /71 (BP Location: Left arm, Patient Position: Sitting, Cuff Size: Adult Regular)   Pulse 84   Temp 97.6  F (36.4  C) (Oral)   Resp 20   Ht 1.651 m (5' 5\")   Wt 135.6 kg (299 lb)   SpO2 98%   BMI 49.76 kg/m      Patient Vitals for the past 72 hrs:   Weight   06/15/25 1938 135.6 kg (299 lb)     Body mass index is 49.76 kg/m .  No intake or output data in the 24 hours ending 06/16/25 1140      General - A & O x 3, NAD, sitting at bedside, GI NP in room as well   Respiratory - Lungs CTA bilat without wheeze, rhonchi, rales. Breathing comfortably at rest.   Cardiovascular - AP RRR with murmur, SBP 120s.   Abdomen - soft, rounded, BS present, no bruits. 5.3L fluid drained by US guided paracentesis today.   Extremities - well perfused, no edema  Integumentary - intact, good turgor, no rash/lesions  Neurologic - grossly intact  Psych:  Judgement intact, affect WNL    Laboratory:  Recent Labs   Lab Test 06/16/25  0942 06/15/25  2204 06/13/25  0552 06/12/25  0515   WBC 12.8* 14.5* 12.5* 13.7*   HGB 9.9* 9.9* 9.5* 9.5*   MCV 84 84 87 87    431 405 395   INR  --   --  1.18* 1.16*    "   Recent Labs   Lab Test 06/16/25  0942 06/15/25  2204   POTASSIUM 4.2 4.4   CHLORIDE 107 104   BUN 19.7 18.5      Recent Labs   Lab Test 06/16/25  0942 06/15/25  2204 06/11/25  0515 06/10/25  2146 05/17/25  0643 05/16/25  0116   ALBUMIN 3.9 4.1   < >  --    < >  --    BILITOTAL 0.2 0.2   < >  --    < >  --    ALT 27 32   < >  --    < >  --    AST 14 25   < >  --    < >  --    PROTEIN  --   --   --  Negative  --  20*    < > = values in this interval not displayed.       Personally reviewed today's laboratory studies      Thank you for involving us in the care of this patient. We will continue to follow along with you.      Evangelina Allen, KHARI-BC  [facility]  [phone]

## 2025-06-16 NOTE — PROGRESS NOTES
[facility]    Progress Note - Hospitalist Service       Date of Admission:  6/15/2025    Assessment & Plan   Warren Jaramillo is a 44 year old male admitted on 6/15/2025. He has a history of cirrhosis with ascites requiring frequent paracentesis, T2DM, HTN, AVA, COPD and is admitted for abdominal distention and SOB s/p paracentesis 6/16.     Cirrhosis with ascites, requiring frequent paracentesis  SBP  Possible venous outflow obstruction  Patient has history of cirrhosis, likely secondary to alcohol use and MASLD recently requiring more frequent paracentesis now twice weekly over the past 3-4 months.  Recently hospitalized from 6/11 - 6/13 for decompensated liver disease with concern for persistent SBP with ANC. Therapeutic paracentesis was completed on day of discharge (6/13). Discharged on cefdinir course with plan to return to [facility] for further prophylaxis. He presented with abdominal distention, weight gain of 24 lbs over past 2 days, abdominal discomfort and shortness of breath. Vitally stable on admission. Workup revealed leukocytosis of 14.5 with some absolute neutrophils, similar to prior. ED physician attempted paracentesis though unsuccessful. Will admit overnight and plan for US guided paracentesis in AM.  Low concern for SBP at this point due to absence of fever and abdominal pain but will continue cefdinir course prescribed at discharge.   - s/p US guided paracentesis   - ascitic fluid studies  - ANC elevated but downtrending from previous  - Daily CBC   - continue cefdinir 300 mg BID   - GI consulted, appreciate recs   - PTA Lasix, spironolactone   - patient on low dose diuretic regimen. Could consider increasing, although will need to consider increase in creatinine as below   - ID consult. Appreciate recs.     RIVERA   Cr 1.19 on admission, 1.06 this AM which is around his baseline   - nephrologist consult for concern of hepatorenal syndrome     Chronic normocytic  "Anemia   Hx of GIB  Hemoglobin 9.9, close to baseline.  No symptoms or signs of active bleeding.  - CBC in AM     T2DM  A1c 6.7. PTA metformin and jardiance.   - hold PTA meds  - MDSSI     Chronic conditions:   History of Provoked DVT: completed 3 months of apixaban   HTN: Continue PTA spironolactone  Insomnia: Continue PTA trazodone  Mental health: continue PTA Zyprexa  GERD: continue PTA Protonix  HLD: Continue PTA rosuvastatin  COPD: Continue PTA inhalers         Diet: Fluid restriction 1500 ML FLUID  Combination Diet Regular Diet Adult; 2 gm NA Diet    DVT Prophylaxis: Pneumatic Compression Devices  Khan Catheter: Not present  Fluids: None  Lines: None     Cardiac Monitoring: None  Code Status: Full Code      Clinically Significant Risk Factors Present on Admission                   # Hypertension: Noted on problem list  # Chronic heart failure with preserved ejection fraction: heart failure noted on problem list and last echo with EF >50%     # Anemia: based on hgb <11      # DMII: A1C = 6.7 % (Ref range: <5.7 %) within past 6 months    # Morbid Obesity: Estimated body mass index is 49.76 kg/m  as calculated from the following:    Height as of this encounter: 1.651 m (5' 5\").    Weight as of this encounter: 135.6 kg (299 lb).       # Financial/Environmental Concerns:           Social Drivers of Health   Housing Stability: High Risk (6/11/2025)    Housing Stability     Do you have housing? : Yes     Are you worried about losing your housing?: Yes   Tobacco Use: High Risk (6/6/2025)    Received from [facility]    Patient History     Smoking Tobacco Use: Every Day     Smokeless Tobacco Use: Never   Financial Resource Strain: High Risk (6/11/2025)    Financial Resource Strain     Within the past 12 months, have you or your family members you live with been unable to get utilities (heat, electricity) when it was really needed?: Yes    Received from [facility]    Social Connections         Disposition Plan     Medically Ready for Discharge: Anticipated Tomorrow         The patient's care was discussed with the Attending Physician, Dr. Polanco.    Bernarda Crespo MD  Hospitalist Service  [facility]  Securely message with [employer] (more info)  Text page via [employer] Paging/Directory   ______________________________________________________________________    Interval History   No acute events overnight. S/p paracentesis this AM. About 5L taken off. Patient is feeling much better. Is wondering about discharge today.     Physical Exam   Vital Signs: Temp: 97.6  F (36.4  C) Temp src: Oral BP: 122/71 Pulse: 84   Resp: 20 SpO2: 98 % O2 Device: None (Room air)    Weight: 299 lbs 0 oz    GEN: Pleasant male, in no acute distress.   HEENT: Normocephalic, atraumatic. Extraoccular eye movements intact. Anicteric sclera. Moist mucous membranes.   NECK: Supple. No cervical or supraclavicular adenopathy.   PULM: Non-labored breathing. No use of accessory muscles. Clear to ausculation bilaterally. No wheezes or crackles.   CV: Regular rate and rhythm. Normal S1, S2. No rubs, murmurs, or gallops.    ABDOMEN: Normoactive bowel sounds. Non-tender to palpation. Distended, no fluid wave  EXTREMITES:  No clubbing, cyanosis, or edema.    NEURO:  Awake. Oriented to person, place, time and situation. Cranial nerves 2-12 grossly intact. Moving all extremities.    PSYCH: Calm. Appropriate affect, insight, judgment.       Medical Decision Making           Data     I have personally reviewed the following data over the past 24 hrs:    12.8 (H)  \   9.9 (L)   / 426     140 107 19.7 /  171 (H)   4.2 22 1.06 \     ALT: 27 AST: 14 AP: 253 (H) TBILI: 0.2   ALB: 3.9 TOT PROTEIN: 6.3 (L) LIPASE: 30       Imaging results reviewed over the past 24 hrs:   Recent Results (from the past 24 hours)   US Paracentesis with Albumin    Narrative    EXAM:  1. PARACENTESIS  2. ULTRASOUND  GUIDANCE  LOCATION: [facility]  DATE: 6/16/2025    INDICATION: Ascites. Increasing weight.    PROCEDURE: Informed consent obtained. Time out performed. The abdomen was prepped and draped in a sterile fashion. 5 mL of 1% lidocaine was infused into local soft tissues. A 5 Irish catheter system was introduced into the abdominal ascites under   ultrasound guidance.    5.3 liters of clear fluid were removed and sent to lab if requested. Subcutaneous edema is suspected    Patient tolerated procedure well.    Ultrasound imaging was obtained and placed in the patient's permanent medical record.      Impression    IMPRESSION:  1.  Status post ultrasound-guided paracentesis. Subcutaneous edema is suspected. Consider mechanical compression.    Reference CPT Code: 35904

## 2025-06-16 NOTE — PLAN OF CARE
Problem: Fluid Volume Excess  Goal: Fluid Balance  Outcome: Progressing     Problem: Gas Exchange Impaired  Goal: Optimal Gas Exchange  Outcome: Progressing   Goal Outcome Evaluation:       Pt A&Ox4; able to make needs known appropriately. Awaiting IR paracentesis and GI consult. Slept intermittently between cares. Steady on feet, needs assist with cords/wires/tubing, otherwise up independently.

## 2025-06-16 NOTE — CONSULTS
Care Management Initial Consult    General Information  Assessment completed with: Patient, Parents, Other (legal guardian: Jesika Harden [phone], Matilde at  [phone] and mom Marielena Jaramillo), mom:Marielena, legal guardian:Jesika Harden  Coordinator: Matilde  Type of CM/SW Visit: CM Role Introduction    Primary Care Provider verified and updated as needed: Yes   Readmission within the last 30 days: current reason for admission unrelated to previous admission   Return Category: Exacerbation of disease  Reason for Consult: discharge planning  Advance Care Planning: Advance Care Planning Reviewed: present on chart     General Information Comments: lives at [facility], has legal guardian and will need to coordinate discharge with . mom:Marielena, legal guardian:Jesika Harden  Coordinator: Matilde    Communication Assessment  Patient's communication style: spoken language (English or Bilingual)             Cognitive  Cognitive/Neuro/Behavioral: WDL                      Living Environment:   People in home: facility resident     Current living Arrangements: group home      Able to return to prior arrangements: yes       Family/Social Support:  Care provided by: self, other (see comments)  Provides care for: no one  Marital Status: Single  Support system: Parent(s), Facility resident(s)/Staff          Description of Support System: Supportive, Involved    Support Assessment: Adequate social supports, Adequate family and caregiver support    Current Resources:   Patient receiving home care services: No        Community Resources: Group Home  Equipment currently used at home: glucometer  Supplies currently used at home: None    Employment/Financial:  Employment Status: unemployed        Financial Concerns: none   Referral to Financial Worker: No       Does the patient's insurance plan have a 3 day qualifying hospital stay waiver?  No    Lifestyle & Psychosocial Needs:  Social Drivers of Health     Food Insecurity: Low  Risk  (6/11/2025)    Food Insecurity     Within the past 12 months, did you worry that your food would run out before you got money to buy more?: No     Within the past 12 months, did the food you bought just not last and you didn t have money to get more?: No   Depression: Not at risk (2/11/2025)    PHQ-2     PHQ-2 Score: 0   Housing Stability: High Risk (6/11/2025)    Housing Stability     Do you have housing? : Yes     Are you worried about losing your housing?: Yes   Tobacco Use: High Risk (6/6/2025)    Received from [facility]    Patient History     Smoking Tobacco Use: Every Day     Smokeless Tobacco Use: Never     Passive Exposure: Not on file   Financial Resource Strain: High Risk (6/11/2025)    Financial Resource Strain     Within the past 12 months, have you or your family members you live with been unable to get utilities (heat, electricity) when it was really needed?: Yes   Alcohol Use: Not At Risk (9/22/2022)    [facility]    AUDIT-C     Frequency of Alcohol Consumption: Never     Average Number of Drinks: Not on file     Frequency of Binge Drinking: Not on file   Transportation Needs: Low Risk  (6/11/2025)    Transportation Needs     Within the past 12 months, has lack of transportation kept you from medical appointments, getting your medicines, non-medical meetings or appointments, work, or from getting things that you need?: No   Physical Activity: Not on file   Interpersonal Safety: Low Risk  (6/11/2025)    Interpersonal Safety     Do you feel physically and emotionally safe where you currently live?: Yes     Within the past 12 months, have you been hit, slapped, kicked or otherwise physically hurt by someone?: No     Within the past 12 months, have you been humiliated or emotionally abused in other ways by your partner or ex-partner?: No   Stress: Not on file   Social Connections: Unknown (5/1/2023)    Received from [facility]    Social Connections     Frequency of Communication with Friends and Family: Not on file   Health Literacy: Not on file       Functional Status:  Prior to admission patient needed assistance:   Dependent ADLs:: Independent  Dependent IADLs:: Cleaning, Cooking, Laundry, Shopping, Meal Preparation, Medication Management, Money Management, Transportation  Assesssment of Functional Status: Not at baseline with ADL Functioning    Mental Health Status:  Mental Health Status: No Current Concerns       Chemical Dependency Status:                Values/Beliefs:  Spiritual, Cultural Beliefs, Mosque Practices, Values that affect care:                 Discussed  Partnership in Safe Discharge Planning  document with patient/family: No    Additional Information:  lives at [facility], has legal guardian and will need to coordinate discharge with .   CM spoke to mom:Marielena, and states pt is his own legal guardian and going to court on June 26th to get that finalized.     CM spoke to  Coordinator: states [facility] as of today, pt still has Jesika as his legal guardian and the group home will need 1-2 hours advanced notice to get  transportation set up at discharge. [facility] pt has a [facility]  Sandra Barragan [phone]. CM did not call her.    CM spoke legal guardian:Jesika Harden,  she states that she is still the legal guardian and the  will decide on the petition for pt to be his own legal guardian on 6/26. This was done once this year and it was for only one day that pt was his own legal guardian. Pt's goal is to be his own guardian but has not been successful with compliance to medical and diet cares and personal cares. Pt's parents are usually involved in helping making medical decisions.      Next Steps:   Medical stability  Set up ride w/ and fax orders to [facility] [phone]    Sen Bentley RN

## 2025-06-17 ENCOUNTER — APPOINTMENT (OUTPATIENT)
Dept: INTERVENTIONAL RADIOLOGY/VASCULAR | Facility: HOSPITAL | Age: 45
DRG: 286 | End: 2025-06-17
Payer: COMMERCIAL

## 2025-06-17 LAB
ANION GAP SERPL CALCULATED.3IONS-SCNC: 10 MMOL/L (ref 7–15)
BUN SERPL-MCNC: 19.2 MG/DL (ref 6–20)
CALCIUM SERPL-MCNC: 8.8 MG/DL (ref 8.8–10.4)
CHLORIDE SERPL-SCNC: 104 MMOL/L (ref 98–107)
CREAT SERPL-MCNC: 1.1 MG/DL (ref 0.67–1.17)
EGFRCR SERPLBLD CKD-EPI 2021: 85 ML/MIN/1.73M2
ERYTHROCYTE [DISTWIDTH] IN BLOOD BY AUTOMATED COUNT: 15.4 % (ref 10–15)
GLUCOSE BLDC GLUCOMTR-MCNC: 102 MG/DL (ref 70–99)
GLUCOSE BLDC GLUCOMTR-MCNC: 124 MG/DL (ref 70–99)
GLUCOSE BLDC GLUCOMTR-MCNC: 147 MG/DL (ref 70–99)
GLUCOSE BLDC GLUCOMTR-MCNC: 208 MG/DL (ref 70–99)
GLUCOSE SERPL-MCNC: 141 MG/DL (ref 70–99)
HCO3 SERPL-SCNC: 22 MMOL/L (ref 22–29)
HCT VFR BLD AUTO: 30.9 % (ref 40–53)
HGB BLD-MCNC: 9.7 G/DL (ref 13.3–17.7)
MCH RBC QN AUTO: 26.4 PG (ref 26.5–33)
MCHC RBC AUTO-ENTMCNC: 31.4 G/DL (ref 31.5–36.5)
MCV RBC AUTO: 84 FL (ref 78–100)
PLATELET # BLD AUTO: 370 10E3/UL (ref 150–450)
POTASSIUM SERPL-SCNC: 4.1 MMOL/L (ref 3.4–5.3)
RBC # BLD AUTO: 3.67 10E6/UL (ref 4.4–5.9)
SCANNED LAB RESULT: NORMAL
SCANNED LAB RESULT: NORMAL
SODIUM SERPL-SCNC: 136 MMOL/L (ref 135–145)
WBC # BLD AUTO: 13.7 10E3/UL (ref 4–11)

## 2025-06-17 PROCEDURE — B51T1ZZ FLUOROSCOPY OF PORTAL AND SPLANCHNIC VEINS USING LOW OSMOLAR CONTRAST: ICD-10-PCS | Performed by: RADIOLOGY

## 2025-06-17 PROCEDURE — 255N000002 HC RX 255 OP 636: Performed by: RADIOLOGY

## 2025-06-17 PROCEDURE — 36415 COLL VENOUS BLD VENIPUNCTURE: CPT

## 2025-06-17 PROCEDURE — 250N000009 HC RX 250

## 2025-06-17 PROCEDURE — 99232 SBSQ HOSP IP/OBS MODERATE 35: CPT | Performed by: STUDENT IN AN ORGANIZED HEALTH CARE EDUCATION/TRAINING PROGRAM

## 2025-06-17 PROCEDURE — 120N000001 HC R&B MED SURG/OB

## 2025-06-17 PROCEDURE — 999N000157 HC STATISTIC RCP TIME EA 10 MIN

## 2025-06-17 PROCEDURE — C1769 GUIDE WIRE: HCPCS

## 2025-06-17 PROCEDURE — 250N000011 HC RX IP 250 OP 636

## 2025-06-17 PROCEDURE — C1887 CATHETER, GUIDING: HCPCS

## 2025-06-17 PROCEDURE — 99232 SBSQ HOSP IP/OBS MODERATE 35: CPT | Mod: GC

## 2025-06-17 PROCEDURE — 99232 SBSQ HOSP IP/OBS MODERATE 35: CPT | Performed by: NURSE PRACTITIONER

## 2025-06-17 PROCEDURE — 272N000566 HC SHEATH CR3

## 2025-06-17 PROCEDURE — 250N000013 HC RX MED GY IP 250 OP 250 PS 637: Performed by: STUDENT IN AN ORGANIZED HEALTH CARE EDUCATION/TRAINING PROGRAM

## 2025-06-17 PROCEDURE — 272N000500 HC NEEDLE CR2

## 2025-06-17 PROCEDURE — 75889 VEIN X-RAY LIVER W/HEMODYNAM: CPT

## 2025-06-17 PROCEDURE — 85018 HEMOGLOBIN: CPT

## 2025-06-17 PROCEDURE — 80048 BASIC METABOLIC PNL TOTAL CA: CPT

## 2025-06-17 PROCEDURE — 250N000013 HC RX MED GY IP 250 OP 250 PS 637

## 2025-06-17 PROCEDURE — 36010 PLACE CATHETER IN VEIN: CPT

## 2025-06-17 PROCEDURE — 250N000013 HC RX MED GY IP 250 OP 250 PS 637: Performed by: NURSE PRACTITIONER

## 2025-06-17 RX ORDER — NALOXONE HYDROCHLORIDE 0.4 MG/ML
0.4 INJECTION, SOLUTION INTRAMUSCULAR; INTRAVENOUS; SUBCUTANEOUS
Status: DISCONTINUED | OUTPATIENT
Start: 2025-06-17 | End: 2025-06-20 | Stop reason: HOSPADM

## 2025-06-17 RX ORDER — FENTANYL CITRATE 50 UG/ML
25-50 INJECTION, SOLUTION INTRAMUSCULAR; INTRAVENOUS EVERY 5 MIN PRN
Status: DISCONTINUED | OUTPATIENT
Start: 2025-06-17 | End: 2025-06-17 | Stop reason: HOSPADM

## 2025-06-17 RX ORDER — SULFAMETHOXAZOLE AND TRIMETHOPRIM 800; 160 MG/1; MG/1
1 TABLET ORAL DAILY
Status: DISCONTINUED | OUTPATIENT
Start: 2025-06-18 | End: 2025-06-20 | Stop reason: HOSPADM

## 2025-06-17 RX ORDER — NALOXONE HYDROCHLORIDE 0.4 MG/ML
0.4 INJECTION, SOLUTION INTRAMUSCULAR; INTRAVENOUS; SUBCUTANEOUS
Status: DISCONTINUED | OUTPATIENT
Start: 2025-06-17 | End: 2025-06-17 | Stop reason: HOSPADM

## 2025-06-17 RX ORDER — NALOXONE HYDROCHLORIDE 0.4 MG/ML
0.2 INJECTION, SOLUTION INTRAMUSCULAR; INTRAVENOUS; SUBCUTANEOUS
Status: DISCONTINUED | OUTPATIENT
Start: 2025-06-17 | End: 2025-06-17 | Stop reason: HOSPADM

## 2025-06-17 RX ORDER — NALOXONE HYDROCHLORIDE 0.4 MG/ML
0.2 INJECTION, SOLUTION INTRAMUSCULAR; INTRAVENOUS; SUBCUTANEOUS
Status: DISCONTINUED | OUTPATIENT
Start: 2025-06-17 | End: 2025-06-20 | Stop reason: HOSPADM

## 2025-06-17 RX ORDER — ONDANSETRON 2 MG/ML
4 INJECTION INTRAMUSCULAR; INTRAVENOUS
Status: DISCONTINUED | OUTPATIENT
Start: 2025-06-17 | End: 2025-06-17 | Stop reason: HOSPADM

## 2025-06-17 RX ORDER — FLUMAZENIL 0.1 MG/ML
0.2 INJECTION, SOLUTION INTRAVENOUS
Status: DISCONTINUED | OUTPATIENT
Start: 2025-06-17 | End: 2025-06-17 | Stop reason: HOSPADM

## 2025-06-17 RX ORDER — ACETAMINOPHEN 325 MG/1
650 TABLET ORAL EVERY 8 HOURS PRN
Status: DISCONTINUED | OUTPATIENT
Start: 2025-06-17 | End: 2025-06-20 | Stop reason: HOSPADM

## 2025-06-17 RX ADMIN — LIDOCAINE HYDROCHLORIDE 20 ML: 10 INJECTION, SOLUTION INFILTRATION; PERINEURAL at 14:48

## 2025-06-17 RX ADMIN — FENTANYL CITRATE 25 MCG: 50 INJECTION, SOLUTION INTRAMUSCULAR; INTRAVENOUS at 15:02

## 2025-06-17 RX ADMIN — FAMOTIDINE 20 MG: 20 TABLET, FILM COATED ORAL at 21:40

## 2025-06-17 RX ADMIN — MIDAZOLAM HYDROCHLORIDE 0.5 MG: 1 INJECTION, SOLUTION INTRAMUSCULAR; INTRAVENOUS at 14:42

## 2025-06-17 RX ADMIN — TRAZODONE HYDROCHLORIDE 100 MG: 50 TABLET ORAL at 21:41

## 2025-06-17 RX ADMIN — MIDAZOLAM HYDROCHLORIDE 0.5 MG: 1 INJECTION, SOLUTION INTRAMUSCULAR; INTRAVENOUS at 14:48

## 2025-06-17 RX ADMIN — PANTOPRAZOLE SODIUM 40 MG: 20 TABLET, DELAYED RELEASE ORAL at 08:36

## 2025-06-17 RX ADMIN — CEFDINIR 300 MG: 300 CAPSULE ORAL at 08:36

## 2025-06-17 RX ADMIN — SPIRONOLACTONE 50 MG: 25 TABLET, FILM COATED ORAL at 17:54

## 2025-06-17 RX ADMIN — IOHEXOL 10 ML: 350 INJECTION, SOLUTION INTRAVENOUS at 15:04

## 2025-06-17 RX ADMIN — FUROSEMIDE 20 MG: 20 TABLET ORAL at 08:36

## 2025-06-17 RX ADMIN — ACETAMINOPHEN 650 MG: 325 TABLET ORAL at 21:41

## 2025-06-17 RX ADMIN — FLUTICASONE FUROATE AND VILANTEROL TRIFENATATE 1 PUFF: 100; 25 POWDER RESPIRATORY (INHALATION) at 08:37

## 2025-06-17 RX ADMIN — ROSUVASTATIN 10 MG: 10 TABLET, FILM COATED ORAL at 21:41

## 2025-06-17 RX ADMIN — FENTANYL CITRATE 25 MCG: 50 INJECTION, SOLUTION INTRAMUSCULAR; INTRAVENOUS at 14:50

## 2025-06-17 RX ADMIN — OLANZAPINE 10 MG: 2.5 TABLET, FILM COATED ORAL at 21:41

## 2025-06-17 RX ADMIN — FENTANYL CITRATE 25 MCG: 50 INJECTION, SOLUTION INTRAMUSCULAR; INTRAVENOUS at 14:45

## 2025-06-17 RX ADMIN — INSULIN ASPART 1 UNITS: 100 INJECTION, SOLUTION INTRAVENOUS; SUBCUTANEOUS at 08:35

## 2025-06-17 RX ADMIN — SPIRONOLACTONE 50 MG: 25 TABLET, FILM COATED ORAL at 08:36

## 2025-06-17 RX ADMIN — MIDAZOLAM HYDROCHLORIDE 0.5 MG: 1 INJECTION, SOLUTION INTRAMUSCULAR; INTRAVENOUS at 15:00

## 2025-06-17 RX ADMIN — LEVOTHYROXINE SODIUM 12.5 MCG: 0.03 TABLET ORAL at 08:36

## 2025-06-17 RX ADMIN — FAMOTIDINE 20 MG: 20 TABLET, FILM COATED ORAL at 08:36

## 2025-06-17 RX ADMIN — PANTOPRAZOLE SODIUM 40 MG: 20 TABLET, DELAYED RELEASE ORAL at 17:55

## 2025-06-17 RX ADMIN — UMECLIDINIUM 1 PUFF: 62.5 AEROSOL, POWDER ORAL at 08:37

## 2025-06-17 RX ADMIN — MONTELUKAST 10 MG: 10 TABLET, FILM COATED ORAL at 08:36

## 2025-06-17 RX ADMIN — FUROSEMIDE 20 MG: 20 TABLET ORAL at 17:55

## 2025-06-17 ASSESSMENT — ACTIVITIES OF DAILY LIVING (ADL)
ADLS_ACUITY_SCORE: 41

## 2025-06-17 NOTE — PRE-PROCEDURE
GENERAL PRE-PROCEDURE:   Procedure:  Hepatic venogram  Date/Time:  6/17/2025 10:51 AM    Written consent obtained?: Yes    Risks and benefits: Risks, benefits and alternatives were discussed    Consent given by:  Guardian  Patient states understanding of procedure being performed: Yes    Patient's understanding of procedure matches consent: Yes    Procedure consent matches procedure scheduled: Yes    Expected level of sedation:  Moderate  Appropriately NPO:  Yes  ASA Class:  3  Mallampati  :  Grade 3- soft palate visible, posterior pharyngeal wall not visible  Lungs:  Lungs clear with good breath sounds bilaterally  Heart:  Normal heart sounds and rate  History & Physical reviewed:  History and physical reviewed and no updates needed  Statement of review:  I have reviewed the lab findings, diagnostic data, medications, and the plan for sedation

## 2025-06-17 NOTE — PROGRESS NOTES
[facility] Inpatient follow up       Patient:  Warren Jaramillo  Date of birth 1980, Medical record number [id number]  Date of Visit:  06/17/2025  Attending Physician: Brenden Polanco MD         Assessment and Recommendations:   Assessment:    Warren Jaramillo is a 44 year old male who was admitted on 6/15/25 with weight gain, abdominal distention and mild shortness of breath secondary to his abdominal distention. He had no fever on admission. His PMH is significant for alcohol abuse, T2DM, normocytic anemia, DVT, HTN, HLD, COPD, AVA and cirrhosis with ascites for which he requires frequent paracentesis with GI and ID follow up. He was admitted 5/15 for abdominal pain and GI bleed and developed SBP. He did not respond to ceftriaxone and was switched to meropenam on 5/22 which he again did not respond to. ID stated the needle could be reaching into a loculated collection as the patient is feeling fine and could be discharged. Patient was discharged on PO Levaquin and moved to Bactrim SBP  prophylaxis. He had ED visits every 2 days for abdominal distention. He was seen in the ED again on 6/13 and was discharged with PO Cefdinir for 7 days. Ascitic fluid analysis on 6/15 was negative for malignancy. Patient states he has been sober for 9 months. Patient was noted to have high ADA on previous lab testing which could indicate tuberculous ascites. Quantiferon gold was ordered and is in progress as of today on 6/17. Patient is low risk for TB and previous imaging is reviewed showing low likelihood of pulmonary TB. Today on 6/17, WBC is 13.7, compared to 12.8 on 6/16 and 14.5 on 6/15. Patient remains at a borderline high WBC but is clinically afebrile and doing well overall with no fever, chills, vomiting or concerning symptoms. Ascitic fluid culture obtained on 6/16 shows 4+ WBC, 3+ PMN and no organisms or growth. Patient is NPO and will undergo hepatic pressure gradient testing  today.     Summary: Alcoholic liver cirrhosis complicated by ascites status post multiple paracenteses.  Multiple antibiotic exposures for SBP as above.  Currently on cefdinir with decreasing cell count.  Noted to have high ADA in the past.  Clinical significance of this is not clear but can be elevated in tuberculous ascites.  Patient is low risk however.  QuantiFERON gold in process.    Active Problems:    Shortness of breath    Ascites due to alcoholic cirrhosis (H)    Recommendations:  Continue Cefdinir 300 mg BID 14 day course; transition to Bactrim prophylaxis   Daily CBC  Monitor for fever or signs of infection  Monitor ascitic fluid cultures for growth; ascitic cell count at next paracentesis   Follow-up pending QuantiFERON gold.  No suspicion for pulmonary tuberculosis as stated above.    Thank you for letting us be part of the patient care team. We will continue to care.      Marcela Todd MD        Interval History:     HPI:  The interval history was reviewed.     Warren Jaramillo is a 44 year old male who was admitted on 6/15/25 with weight gain, abdominal distention and mild shortness of breath secondary to his abdominal distention. He had no fever on admission. His PMH is significant for alcohol abuse, T2DM, normocytic anemia, DVT, HTN, HLD, COPD, AVA and cirrhosis with ascites for which he requires frequent paracentesis with GI and ID follow up. He was admitted 5/15 for abdominal pain and GI bleed and developed SBP. He did not respond to ceftriaxone and was switched to meropenam on 5/22 which he again did not respond to. ID stated the needle could be reaching into a loculated collection as the patient is feeling fine and could be discharged. Patient was discharged on PO Levaquin and moved to Bactrim SBP  prophylaxis. He had ED visits every 2 days for abdominal distention. He was seen in the ED again on 6/13 and was discharged with PO Cefdinir for 7 days.     Pertinent cultures  include:  Culture Micro   Date Value Ref Range Status   2012 No Beta Streptococcus isolated  Final   2011   Final    No Salmonella, Shigella, Campylobacter, E. coli O157, Aeromonas, or Plesiomonas   isolated.   2011 No growth after 2 days  Final   2004   Final    No Salmonella, Shigella, Campylobacter or E coli 0:157 isolated.       Recent Inflammatory Biomarkers:   Recent Labs   Lab Test 25  0605 25  0942 06/15/25  2204 25  0552 25  0515 25  0515 25  0644 05/15/25  2011 02/23/25  2001 02/15/25  1851 25  0518 01/10/25  1211 25  1226 24  1847 24  0631 24  2350 23  1135 23  0415   PCAL  --   --   --   --   --   --   --  0.33  --  0.13  --  0.13  --  0.11  --  0.11  --  0.11*   WBC 13.7* 12.8* 14.5* 12.5* 13.7* 12.9*   < > 14.1*   < > 15.5*   < > 14.0*   < > 14.8*   < >  --    < > 16.5*    < > = values in this interval not displayed.            Review of Systems:   CONSTITUTIONAL:    Temp Max: Temp (24hrs), Av.3  F (36.8  C), Min:98  F (36.7  C), Max:98.9  F (37.2  C)   .  Negative except for findings in the HPI.           Current Medications (antimicrobials listed in bold):     Current Facility-Administered Medications   Medication Dose Route Frequency Provider Last Rate Last Admin    cefdinir (OMNICEF) capsule 300 mg  300 mg Oral Q12H [facility] Denise Frazier MD   300 mg at 25 0836    famotidine (PEPCID) tablet 20 mg  20 mg Oral BID Denise Frazier MD   20 mg at 25 0836    fluticasone-vilanterol (BREO ELLIPTA) 100-25 MCG/ACT inhaler 1 puff  1 puff Inhalation Daily Denise Frazier MD   1 puff at 25 0837    And    umeclidinium (INCRUSE ELLIPTA) 62.5 MCG/ACT inhaler 1 puff  1 puff Inhalation Daily Denise Frazier MD   1 puff at 25 0837    furosemide (LASIX) tablet 20 mg  20 mg Oral BID Evangelina Allen NP   20 mg at 25 0836    insulin aspart (NovoLOG) injection (RAPID  ACTING)  1-7 Units Subcutaneous TID Denise Thurston MD   1 Units at 06/17/25 0835    insulin aspart (NovoLOG) injection (RAPID ACTING)  1-5 Units Subcutaneous At Bedtime Denise Frazier MD        levothyroxine (SYNTHROID/LEVOTHROID) half-tab 12.5 mcg  12.5 mcg Oral QAM  Denise Frazier MD   12.5 mcg at 06/17/25 0836    [Held by provider] lisinopril (ZESTRIL) tablet 10 mg  10 mg Oral Denise Barber MD        montelukast (SINGULAIR) tablet 10 mg  10 mg Oral Denise Barber MD   10 mg at 06/17/25 0836    OLANZapine (zyPREXA) tablet 10 mg  10 mg Oral At Bedtime Denise Frazier MD   10 mg at 06/16/25 2127    pantoprazole (PROTONIX) EC tablet 40 mg  40 mg Oral BID  Denise Frazier MD   40 mg at 06/17/25 0836    rosuvastatin (CRESTOR) tablet 10 mg  10 mg Oral At Bedtime Denise Frazier MD   10 mg at 06/16/25 2127    sodium chloride (PF) 0.9% PF flush 3 mL  3 mL Intracatheter Q8H MORENITA Denise Frazier MD   3 mL at 06/16/25 2129    spironolactone (ALDACTONE) tablet 50 mg  50 mg Oral BID Evangelina Allen NP   50 mg at 06/17/25 0836    traZODone (DESYREL) tablet 100 mg  100 mg Oral At Bedtime Denise Frazier MD   100 mg at 06/16/25 2127              Allergies:     Allergies   Allergen Reactions    Apricot Flavoring Agent (Non-Screening) Anaphylaxis    Banana Anaphylaxis     Throat swelling      Bees Anaphylaxis     Has an Epi pen    Wasp Venom Protein Shortness Of Breath     Other reaction(s): Respiratory Distress  Has an Epi pen        Methylphenidate Itching     Other reaction(s): Nightmares    Prunus      Other reaction(s): *Unknown            Physical Exam:   Vitals were reviewed  Patient Vitals for the past 24 hrs:   BP Temp Temp src Pulse Resp SpO2 Height Weight   06/17/25 0721 107/58 98.2  F (36.8  C) Axillary 68 20 96 % -- --   06/17/25 0027 102/52 98.1  F (36.7  C) Oral 75 22 97 % -- --   06/16/25 2314 -- 98.9  F (37.2  C) Oral -- -- -- -- --   06/16/25 1601 126/66 98  F (36.7  C)  "Oral 79 22 98 % 1.651 m (5' 5\") 128.8 kg (284 lb)       Physical Examination:  Gen: Pleasant in no acute distress.  HEENT: NCAT. EOMI. PERRL.  Neck: No bruit, JVD or thyromegaly.  Lungs: Clear to ascultation bilat with no crackles or wheezes.  Card: RRR. NSR. No RMG. Peripheral pulses present and symmetric. No edema.  Abd: Soft NT, moderate distention secondary to ascites. No mass. Normal bowel sounds.  Skin: No rash.  Extr: No edema.  Neuro: Alert and oriented to place time and person. Cranial nerves II to XII intact. Motor and sensory intact. Normal gait.            Laboratory Data:   ID Labs:  Microbiology labs:  No lab results found.  Recent Labs   Lab Test 06/17/25  0605 06/16/25  0942 06/15/25  2204 06/13/25  0552 06/12/25  0515 06/11/25  0515   WBC 13.7* 12.8* 14.5* 12.5* 13.7* 12.9*     Recent Labs   Lab Test 06/17/25  0605 06/16/25  0942 06/15/25  2204 06/13/25  0552   CR 1.10 1.06 1.19* 1.05   GFRESTIMATED 85 89 77 90       Hematology Studies  Recent Labs   Lab Test 06/17/25  0605 06/16/25  0942 06/15/25  2204 06/13/25  0552 06/12/25  0515 06/11/25  0515 06/10/25  1933 06/07/25  1923   WBC 13.7* 12.8* 14.5* 12.5* 13.7* 12.9* 16.4* 15.6*   ANEU  --   --  10.0* 8.4* 9.4* 8.8* 11.8* 11.1*   AEOS  --   --  0.6 0.7 0.7 0.7 0.7 0.7   HGB 9.7* 9.9* 9.9* 9.5* 9.5* 9.4* 9.9* 9.9*   HCT 30.9* 30.7* 31.1* 31.5* 30.5* 30.5* 31.8* 31.5*    426 431 405 395 419 428 402       Metabolic  Recent Labs   Lab Test 06/17/25  0605 06/16/25  0942 06/15/25  2204    140 138   BUN 19.2 19.7 18.5   CO2 22 22 21*   CR 1.10 1.06 1.19*   GFRESTIMATED 85 89 77       Hepatic Studies  Recent Labs   Lab Test 06/16/25  0942 06/15/25  2204 06/13/25  0552   BILITOTAL 0.2 0.2 0.3   ALKPHOS 253* 261* 201*   ALBUMIN 3.9 4.1 3.6   AST 14 25 15   ALT 27 32 25       ImmunologlobulinsNo lab results found.         Imaging Data:   Reviewed   ABDOMINAL DUPLEX: The hepatic artery, hepatic veins, IVC, portal veins, and splenic vein are patent " with flow in the normal direction.                                                                       IMPRESSION:  1.  Morphologic changes of cirrhosis with ascites.  2.  Patent portal vein

## 2025-06-17 NOTE — PROGRESS NOTES
"GASTROENTEROLOGY PROGRESS NOTE     SUBJECTIVE: no complaints. Wanting to go home. Awaiting pressure testing for venous flow. Nephrology following with diuretics.      OBJECTIVE:   /58 (BP Location: Right arm)   Pulse 68   Temp 98.2  F (36.8  C) (Axillary)   Resp 20   Ht 1.651 m (5' 5\")   Wt 128.8 kg (284 lb)   SpO2 96%   BMI 47.26 kg/m     Temp (24hrs), Av.3  F (36.8  C), Min:98  F (36.7  C), Max:98.9  F (37.2  C)     Patient Vitals for the past 72 hrs:   Weight   25 1601 128.8 kg (284 lb)   06/15/25 1938 135.6 kg (299 lb)        Intake/Output Summary (Last 24 hours) at 2025 1353  Last data filed at 2025 1657  Gross per 24 hour   Intake 240 ml   Output --   Net 240 ml      PHYSICAL EXAM   Constitutional: Healthy, in bed, no acute distress  Cardiovascular: Regular rate and rhythm.   Respiratory: Clear to auscultation bilaterally, respirations non labored.   Abdomen: Soft, non-tender, large rounded,  normally active bowel sounds.   I have reviewed the patient's new clinical lab results:   Recent Labs   Lab Test 25  0625  0942 06/15/25  2204 25  0552 25  0515 25  0909   WBC 13.7* 12.8* 14.5* 12.5* 13.7*  --    HGB 9.7* 9.9* 9.9* 9.5* 9.5*  --    MCV 84 84 84 87 87  --     426 431 405 395  --    INR  --   --   --  1.18* 1.16* 1.15      Recent Labs   Lab Test 25  0625  0942 06/15/25  2204    140 138   POTASSIUM 4.1 4.2 4.4   CHLORIDE 104 107 104   CO2 22 22 21*   BUN 19.2 19.7 18.5   CR 1.10 1.06 1.19*   ANIONGAP 10 11 13   WES 8.8 8.9 9.4      Recent Labs   Lab Test 25  0942 06/15/25  2204 25  0552 25  0515 06/10/25  2146 06/10/25  1933 25  0643 25  0116 05/15/25  2011 05/15/25  0042 25  1642 25  2153   ALBUMIN 3.9 4.1 3.6   < >  --  3.8   < >  --    < > 3.8   < >  --    BILITOTAL 0.2 0.2 0.3   < >  --  0.3   < >  --    < > 0.3   < >  --    ALT 27 32 25   < >  --  32   < >  --    < > 21 "   < >  --    AST 14 25 15   < >  --  18   < >  --    < > 18   < >  --    ALKPHOS 253* 261* 201*   < >  --  280*   < >  --    < > 201*   < >  --    PROTEIN  --   --   --   --  Negative  --   --  20*  --   --   --  10*   LIPASE  --  30  --   --   --  28  --   --   --  22   < >  --     < > = values in this interval not displayed.      Assessment:    Warren Jaramillo is a 44 year old male who has a history of history of cirrhosis with ascites requiring frequent paracentesis, AVA, COPD, type 2 diabetes, hypertension, GI bleed, and is admitted for abdominal distention with concern for decompensated liver disease.  He follows with Dr. Maria with [facility]. He has had recurrent ascites and SBP. Sober x 9 months.      SBP has been followed by ID. Bacterial cultures have been negative 5/16, 5/20, 5/23, 6/10, 6/13, 6/16. ID following.   Cirrhosis attributed to alcohol/fatty liver. Liver biopsy consistent with venous outflow obstruction. MICHAEL and doppler of the portal system unremarkable. Will have IR check transjugular to venous pressures.   Ascites with low SAAG less likely to be due to portal hypertension. Recurrent and difficult to manage due to renal insufficiency with diuretic use. Nephrology following.      Plan:   Continue current diuretic management  2 gm sodium diet.  Outpatient follow up as planned   Will review IR report.      30 minutes of total time was spent providing patient care, including patient evaluation, reviewing documentation/test result, and .  Yanique Burk CNP  [facility]   Office

## 2025-06-17 NOTE — PLAN OF CARE
"Pt is alert and oriented x 4 on room air, VSS. Pt had paracentesis yesterday. Pt is NPO at midnight for procedure in the AM. Pt makes needs known.     Pacheco Beard RN      Problem: Adult Inpatient Plan of Care  Goal: Plan of Care Review  Description: The Plan of Care Review/Shift note should be completed every shift.  The Outcome Evaluation is a brief statement about your assessment that the patient is improving, declining, or no change.  This information will be displayed automatically on your shift  note.  Outcome: Progressing  Goal: Patient-Specific Goal (Individualized)  Description: You can add care plan individualizations to a care plan. Examples of Individualization might be:  \"Parent requests to be called daily at 9am for status\", \"I have a hard time hearing out of my right ear\", or \"Do not touch me to wake me up as it startles  me\".  Outcome: Progressing  Goal: Absence of Hospital-Acquired Illness or Injury  Outcome: Progressing  Intervention: Identify and Manage Fall Risk  Recent Flowsheet Documentation  Taken 6/16/2025 2338 by Pacheco Beard RN  Safety Promotion/Fall Prevention: safety round/check completed  Taken 6/16/2025 1952 by Pacheco Beard RN  Safety Promotion/Fall Prevention: safety round/check completed  Intervention: Prevent Skin Injury  Recent Flowsheet Documentation  Taken 6/16/2025 2338 by Pacheco Beard RN  Body Position: position changed independently  Taken 6/16/2025 1952 by Pacheco Beard RN  Body Position: position changed independently  Intervention: Prevent and Manage VTE (Venous Thromboembolism) Risk  Recent Flowsheet Documentation  Taken 6/16/2025 2338 by Pacheco Beard RN  VTE Prevention/Management:   SCDs off (sequential compression devices)   other (see comments)  Taken 6/16/2025 1952 by Pacheco Beard RN  VTE Prevention/Management:   SCDs off (sequential compression devices)   other (see comments)  Intervention: Prevent Infection  Recent Flowsheet Documentation  Taken 6/16/2025 2338 by Chirag" Pacheco, RN  Infection Prevention:   equipment surfaces disinfected   rest/sleep promoted  Taken 6/16/2025 1952 by Pacheco Beard, RN  Infection Prevention:   equipment surfaces disinfected   rest/sleep promoted  Goal: Optimal Comfort and Wellbeing  Outcome: Progressing  Goal: Readiness for Transition of Care  Outcome: Progressing   Goal Outcome Evaluation:

## 2025-06-17 NOTE — PROCEDURES
Interventional Radiology Post-Procedure Note   ?   Brief Procedure Note:   Patient name: Warren Jaramillo  Pt MRN:[id number]   Date of procedure: 6/17/2025     Procedure(s): Hepatic venogram, pressure measurements    Sedation: Moderate sedation was employed. The patient was monitored by an interventional radiology nurse at all times throughout the procedure under my direct guidance.  Pre/post Procedure Diagnosis: Ascites, hepatic venous outflow obstruction  Indications: Same   ?   Specimen(s) removed: None   Drains: None   Estimated Blood Loss: Minimal  Complications: None  Vascular closure method: Manual pressure     Findings/Notes/Comments: Hepatic veins patent. Pressure measurements as below:  Wedged hepatic vein: 24 mmHg  Free hepatic vein: 21 mmHg  IVC: 21 mmHg  RA: 22 mmHg   ?   Please see dictation in PACS or under the Imaging tab in EPIC for detailed procedure note.     Aldair Cervantes M.D.   Vascular and Interventional Radiology   [facility]  Pager: [phone]   After Hours / Scheduling: [phone]     6/17/2025  3:20 PM

## 2025-06-17 NOTE — CONSULTS
NUTRITION EDUCATION    REASON FOR ASSESSMENT:  Provider order - Nutrition education - 2 gm Na diet    NUTRITION HISTORY:  Information obtained from patient    Pt reports following low sodium diet, does not use salt, reads labels, chooses low sodium foods. Pt reports prior diet education with HELEN FISHER.     CURRENT DIET:  1500 mL Fluid Restriction and NPO    NUTRITION DIAGNOSIS:  Food- and nutrition-related knowledge deficit R/t 2 gm Na diet AEB nutrition consult.     INTERVENTIONS:  Implementation:      *  Nutrition Education (Content):   A)  Provided handouts: Low Sodium nutrition therapy, Sodium content of facility menu    B)  Discussed handouts, low sodium diet, nutrition guidelines and recommendations.       *  Nutrition Education (Application):   A)  Discussed current eating habits and recommended alternative food choices      *  Anticipate good compliance      *  Diet Education - refer to Education Flowsheet    Goals:      *  Patient will verbalize understanding of diet      *  All of the above goals met during the education session    Follow Up/Monitoring:      *  Recommended Out-Patient Nutrition Referral, if further diet instructions are needed

## 2025-06-17 NOTE — CONSULTS
"  Interventional Radiology - Pre-Procedure Evaluation:  Inpatient - [facility]  06/17/2025     Procedure Requested: \"IR guided portal, pre- post-sinusoidal and venous pressure measurements\"  Requested by: Sidney Encarnacion MD     HPI: Warren Jaramillo is a 44 year old male with a PMH of COPD, DM II, HTN, sleep apnea, Rojas disease, cirrhosis with ascites requiring frequent paracenteses, and recent admission 06/11-06/13/2025 2/2 decompensated liver disease with concern for persistent SBP with ANC who was admitted to [facility] on 06/16/2025 2/2 abdominal distension and SOB. Admitted, underwent paracentesis 06/16. GI and ID consulted. Previous liver biopsy consistent with venous outflow obstruction. Requesting hepatic venogram with pressure measurement.     IMAGING:  EXAM: CT ABDOMEN PELVIS W CONTRAST  LOCATION: [facility]  DATE: 06/10/2025     INDICATION: Abdominal distension, diffuse pain, chills.  COMPARISON: CTA abdomen and pelvis without/with IV contrast 05/17/2025.  TECHNIQUE: CT scan of the abdomen and pelvis was performed following injection of IV contrast. Multiplanar reformats were obtained. Dose reduction techniques were used.  CONTRAST: 90 mL Isovue 370 IV.     FINDINGS:   LOWER CHEST: Lung bases are clear. No pleural effusion on either side. Normal cardiac size. No pericardial effusion. No significant change.     HEPATOBILIARY: Cirrhotic configuration of the liver with lobulated contour and diffuse fatty infiltration. No discrete lesion. Patent portal veins. The gallbladder is not well-distended. Small-volume abdominopelvic ascites.     PANCREAS: Normal.     SPLEEN: Normal.     ADRENAL GLANDS: Bilateral benign adrenal fatty myelolipomas, stable.     KIDNEYS/BLADDER: No urinary tract calculi. Both kidneys are well-perfused without hydronephrosis or hydroureter. Normal urinary bladder.     BOWEL: Stomach is filled with residual food material. Formed stool " material within normal-caliber colon, without mechanical obstruction or free gas. Distal colonic mild diverticulosis without acute inflammation or secondary complications.     LYMPH NODES: Shotty subcentimeter retroperitoneal nodes, likely reactive, similar to prior.     VASCULATURE: Normal-caliber abdominal aorta and IVC.     PELVIC ORGANS: Small-volume ascites. Slight atherosclerotic changes. Distal colonic diverticulosis.     MUSCULOSKELETAL: Mild diffuse body wall edema. Mild degenerative changes involving the spine and joints of the pelvis. Partial fusion of both SI joints.                                                                      IMPRESSION:   1.  Cirrhotic configuration of the liver with lobulated contour and diffuse fatty infiltration, unchanged. Small-volume abdominopelvic ascites. Mild diffuse body wall edema.     2.  No urinary tract calculi or obstruction. Bilateral benign adrenal fatty myelolipomas.     3.  Distal colonic diverticulosis. No mechanical obstruction or free gas.    NPO: Midnight  ANTICOAGULANTS/ANTIPLATELETS: Lovenox ordered, not given, no hold required  ANTIBIOTICS: Not needed  GLP-1 Agonist: None    ALLERGIES:  Allergies   Allergen Reactions    Apricot Flavoring Agent (Non-Screening) Anaphylaxis    Banana Anaphylaxis     Throat swelling      Bees Anaphylaxis     Has an Epi pen    Wasp Venom Protein Shortness Of Breath     Other reaction(s): Respiratory Distress  Has an Epi pen        Methylphenidate Itching     Other reaction(s): Nightmares    Prunus      Other reaction(s): *Unknown         LABS:  INR   Date Value Ref Range Status   06/13/2025 1.18 (H) 0.85 - 1.15 Final     INR (External)   Date Value Ref Range Status   12/16/2024 1.2 0.9 - 1.2 Final      Hemoglobin   Date Value Ref Range Status   06/17/2025 9.7 (L) 13.3 - 17.7 g/dL Final   06/21/2012 15.9 13.3 - 17.7 g/dL Final     Platelet Count   Date Value Ref Range Status   06/17/2025 370 150 - 450 10e3/uL Final  "  06/21/2012 287 150 - 450 10e9/L Final     Creatinine   Date Value Ref Range Status   06/17/2025 1.10 0.67 - 1.17 mg/dL Final   06/21/2012 0.83 0.66 - 1.25 mg/dL Final     Potassium   Date Value Ref Range Status   06/17/2025 4.1 3.4 - 5.3 mmol/L Final   06/21/2012 4.3 3.4 - 5.3 mmol/L Final         EXAM:  /58 (BP Location: Right arm)   Pulse 68   Temp 98.2  F (36.8  C) (Axillary)   Resp 20   Ht 1.651 m (5' 5\")   Wt 128.8 kg (284 lb)   SpO2 96%   BMI 47.26 kg/m    General: Stable. In no acute distress.    Neuro: Alert. Able to follow commands.  Resp: Normal respirations. Lungs clear to auscultation bilaterally.  Cardio: S1S2, regular rate and rhythm, without murmur, clicks or rubs  Skin: Warm and dry. Without excoriations, ecchymosis, erythema, lesions or open sores.      PRE-SEDATION ASSESSMENT:  Mallampati Airway Classification:  III - Only soft palate is visible. Intubation is predicted to be difficult  Previous reaction to anesthesia/sedation:  Yes: low BP with MAC anesthesia, no prior issues with moderate sedation  Sedation plan based on assessment: Moderate (conscious) sedation  ASA Classification: Class 3 - SEVERE SYSTEMIC DISEASE, DEFINITE FUNCTIONAL LIMITATIONS.   Code Status: FULL CODE      ASSESSMENT/PLAN:   D/w Dr. Cervantes and ABBIE Antunez with GI.    Hepatic venogram with possible intervention with sedation.    Procedural education reviewed with patient's legal guardian, Jesika Harden, via phone, in detail including, but not limited to risks, benefits and alternatives with understanding verbalized by Jesika Harden.    Total time spent on the date of the encounter: 35 minutes.      ABBIE GALLARDO CNP  Interventional Radiology    "

## 2025-06-17 NOTE — PROGRESS NOTES
APRN student/NP attestation  I was present with the nurse practitioner student, Lori Puga RN who participated in the service and in the documentation of the note. I have verified the history and personally performed the physical exam, reviewed necessary labs/imaging, vitals and health record.  I personally proved the substantive portion of the medical decision makingI agree with the assessment and plan of care as documented in the note.    Evangelina Allen NP  Date of Service (when I saw the patient): 6/17/2025     RENAL PROGRESS NOTE    PLAN:   Avoid nephrotoxic agents, NSAIDs  Strict I/O, Please give patient urinal   Daily weights  GI w/up in progress. TJ liver bx today in IR ~ 4pm  Trend daily renal labs while admitted.   BMP in the a.m.        ASSESSMENT:  Renal Insufficiency   sCR 1.19 on admission, remains stable in his baseline today at 1.05 despite para and increased diuretics yesterday 6/16, eGFR 80's.   Baseline sCR appears to be in the low 1's.  No proteinuria or microhematuria  He followed with [facility] in [id number] for proteinuria and HTN. Several episodes of sCR elevations 1.2-1.4 since December 2024. ARF in Jan 2025 with sCR up to 3.3 but recovered to 0.9-1.1. ARF on admission 5/15-5/26 for SBP and UGIB with acute blood loss, with sCR up to 7.23 but recovered to 1.05-1.2. RIVERA assumed to be due to hypovolemia, SBP, HoTN, and contrast exposure.  Not overly concerned about renal insufficiency, continue to monitor renal labs daily.   Increase diuretic therapy to BID due to rapid gains. Consider hold parameters for diuretics at discharge, discussed at length with pt today . Strict I/O & daily weights.      Liver cirrhosis/Ascites/ SBP?   Requires therapeutic paracenteses two times a week for the last 3-4 months.   Current/previously treated with Cefdinir and Bactrim PO   Peritoneum fluid ANC down trending.   Bactrim and Cefdinir from previous hospitalization, concern for SBP.  ID and  GI following discussion of liver biopsy later today  Sober ~ 9 months     DM2  Jardiance and Metformin, on hold   Sliding scale insulin, managed by hospitalists.      Anemia   Normocytic normochromic   Baseline hgb 9-10.   History of GI bleeds.      Hypothyroidism  On replacement therapy     HTN  PTA lasix 20mg daily, inc to 20mg BID   Lisinopril, on hold   Spironolactone 50mg daily (increased to BID)     AVA     COPD  On inhalers, managed by hospitalist.      HLD  On statins        HPI: Warren Jaramillo is a 44 year old male with a history of liver cirrhosis, DM2, anemia, GIB, hypothryoidism, HTN, AVA, HLD, and obesity admitted to the hospital for SOB secondary to cirrhosis/ascites.  H/o recent severe RIVERA on Mid May during admission for SBP.  sCr peaked 7's, with brisk recovery with SBP tx and transfusion. . sCR trending in the 1.0-1.1 range, was 1.2 on admission and better today 1.06 (eGFR 70-80s)   Had a therapeutic paracentesis 3 days ago but notes a 24lb weight gain since then.  Para this admission 5L today.  He gets twice weekly sana outpt. Says that after his RIVERA last month, his diuretics were reduced by 1/2.  Aldactone 100mg --->50 dailiy and Lasix 40mg cut to 20.  UO about the same and tries to restrict fluids to some degree.                Met pt with GI  NP Yanique Burk, who notes plans for transjugular liver bx.  Concerns for persistent SBP.  Had been on ceftrixone and PO cefdinir PTA then transitioned to Bactrim.  ID has been consulted.    SUBJECTIVE:  Malik was sitting at the side of the bed on exam today. States he is feeling much better than yesterday. Had concerns about bubbles in his urine in the morning, he was concerned he may have protein in his urine. Discussed UA from 6/10 showed no protein. He does endorse having a very strong urine stream which could be contributing to the bubbles. Group home staff on speaker during exam note bubbles in his urine has been attributed to his diabetes in  the past. He states he is very hungry and thirsty but NPO for procedure later today. Report good urine output but not documented in I&O, requested RN provide a urinal for accurate output measurements. No shortness of breath, off O2, no chest pain, or other acute complaints.     OBJECTIVE:  Physical Exam   Temp: 98.2  F (36.8  C) Temp src: Axillary BP: 107/58 Pulse: 68   Resp: 20 SpO2: 96 % O2 Device: BiPAP/CPAP    Vitals:    06/15/25 1938 25 1601   Weight: 135.6 kg (299 lb) 128.8 kg (284 lb)     Vital Signs with Ranges  Temp:  [97.6  F (36.4  C)-98.9  F (37.2  C)] 98.2  F (36.8  C)  Pulse:  [68-84] 68  Resp:  [20-22] 20  BP: (102-126)/(52-71) 107/58  SpO2:  [96 %-98 %] 96 %  I/O last 3 completed shifts:  In: 243 [P.O.:240; I.V.:3]  Out: -     Temp (24hrs), Av.2  F (36.8  C), Min:97.6  F (36.4  C), Max:98.9  F (37.2  C)      Patient Vitals for the past 72 hrs:   Weight   25 1601 128.8 kg (284 lb)   06/15/25 193 135.6 kg (299 lb)     Intake/Output Summary (Last 24 hours) at 2025 0753  Last data filed at 2025 1657  Gross per 24 hour   Intake 243 ml   Output --   Net 243 ml       PHYSICAL EXAM:  General - Alert and oriented x3, appears comfortable, NAD  Cardiovascular - Regular rate and rhythm, no rub  Respiratory - Clear to auscultation bilaterally, no crackles or wheezes  Abd: BS present, no guarding or pain with palpation, abdomen rounded but soft. Chronic ascites   Extremities - No lower extremity edema bilaterally  Skin: dry, intact, no rash, good turgor  Neuro:  Grossly intact, no focal deficits  MSK:  Grossly intact  Psych:  Normal affect    LABORATORY STUDIES:     Recent Labs   Lab 25  0605 25  0942 06/15/25  2204 25  0552 25  0515 25  0515   WBC 13.7* 12.8* 14.5* 12.5* 13.7* 12.9*   RBC 3.67* 3.64* 3.69* 3.62* 3.52* 3.50*   HGB 9.7* 9.9* 9.9* 9.5* 9.5* 9.4*   HCT 30.9* 30.7* 31.1* 31.5* 30.5* 30.5*    426 431 405 395 419       Basic Metabolic  Panel:  Recent Labs   Lab 06/17/25  0605 06/16/25  2106 06/16/25  1748 06/16/25  1244 06/16/25  1223 06/16/25  0942 06/16/25  0936 06/15/25  2204 06/13/25  0623 06/13/25  0552 06/12/25  0805 06/12/25  0515 06/11/25  0810 06/11/25  0515     --   --   --   --  140  --  138  --  139  --  142  --  136   POTASSIUM 4.1  --   --   --   --  4.2  --  4.4  --  4.4  --  4.1  --  4.0   CHLORIDE 104  --   --   --   --  107  --  104  --  105  --  106  --  101   CO2 22  --   --   --   --  22  --  21*  --  25  --  24  --  25   BUN 19.2  --   --   --   --  19.7  --  18.5  --  18.6  --  20.0  --  21.4*   CR 1.10  --   --   --   --  1.06  --  1.19*  --  1.05  --  1.15  --  1.18*   * 167* 162* 171* 167* 155*   < > 122*   < > 117*   < > 113*   < > 102*   WES 8.8  --   --   --   --  8.9  --  9.4  --  8.7*  --  9.0  --  8.9    < > = values in this interval not displayed.       INR  Recent Labs   Lab 06/13/25 0552 06/12/25  0515 06/11/25  0909   INR 1.18* 1.16* 1.15        Recent Labs   Lab Test 06/17/25  0605 06/16/25  0942 06/15/25  2204 06/13/25  0552 06/12/25  0515   INR  --   --   --  1.18* 1.16*   WBC 13.7* 12.8*   < > 12.5* 13.7*   HGB 9.7* 9.9*   < > 9.5* 9.5*    426   < > 405 395    < > = values in this interval not displayed.       Personally reviewed current labs      Evangelina Allen, [facility]  [facility]  [phone]

## 2025-06-17 NOTE — PLAN OF CARE
Problem: Adult Inpatient Plan of Care  Goal: Absence of Hospital-Acquired Illness or Injury  Intervention: Identify and Manage Fall Risk  Recent Flowsheet Documentation  Taken 6/17/2025 0345 by Melony Gonzalez RN  Safety Promotion/Fall Prevention: safety round/check completed  Intervention: Prevent Skin Injury  Recent Flowsheet Documentation  Taken 6/17/2025 0345 by Melony Gonzalez RN  Body Position: position changed independently  Intervention: Prevent and Manage VTE (Venous Thromboembolism) Risk  Recent Flowsheet Documentation  Taken 6/17/2025 0345 by Melony Gonzalez RN  VTE Prevention/Management:   SCDs off (sequential compression devices)   other (see comments)  Intervention: Prevent Infection  Recent Flowsheet Documentation  Taken 6/17/2025 0345 by Melony Gonzalez RN  Infection Prevention:   equipment surfaces disinfected   rest/sleep promoted     Problem: Gas Exchange Impaired  Goal: Optimal Gas Exchange  Intervention: Optimize Oxygenation and Ventilation  Recent Flowsheet Documentation  Taken 6/17/2025 0345 by Melony Gonzalez RN  Head of Bed (HOB) Positioning: HOB at 30-45 degrees   Goal Outcome Evaluation:    VSS, Tolerating treatment plan with no adverse effect. Denies pain. Sleeps between rounds. Utilizes call light appropriately and is bale to verbalize needs effectively.

## 2025-06-17 NOTE — PROGRESS NOTES
[facility]    Progress Note - Hospitalist Service       Date of Admission:  6/15/2025    Assessment & Plan   Warren Jaramillo is a 44 year old male admitted on 6/15/2025. He has a history of cirrhosis with ascites requiring frequent paracentesis, T2DM, HTN, AVA, COPD and is admitted for abdominal distention and SOB s/p paracentesis 6/16.     Cirrhosis with ascites, requiring frequent paracentesis  Hx of SBP  Patient has history of cirrhosis, likely secondary to alcohol use and MASLD recently requiring more frequent paracentesis now twice weekly over the past 3-4 months.  Recently hospitalized from 6/11 - 6/13 for decompensated liver disease with concern for persistent SBP with ANC. Therapeutic paracentesis was completed on day of discharge (6/13). Discharged on cefdinir course with plan to return to Bactrim for further prophylaxis. He presented with abdominal distention, weight gain of 24 lbs over past 2 days, abdominal discomfort and shortness of breath. Vitally stable on admission. Workup revealed leukocytosis of 14.5 with some absolute neutrophils, similar to prior. ED physician attempted paracentesis though unsuccessful. Successful paracentesis performed 6/16 with 5L off. Fluid studies show elevated ANC, but downtrending from previous.   - s/p US guided paracentesis 6/16  - ascitic fluid studies  - ANC elevated but downtrending from previous  - completed course of Cefdinir 6/17 for recent SBP, transition to oral Bactrim   - GI consulted, appreciate recs    - planning for repeat transjugular liver biopsy today  - nephrology consult. Appreciate recs  - increase PTA Lasix, spironolactone   - daily BMP to monitor kidney function   - ID consult. Appreciate recs.    -planning for Quantiferon gold to rule out tuberculous ascites  - Daily CBC     RIVERA - resolved  Cr mildly elevated to 1.19 on admission, 1.10 this AM which is around his baseline   - nephrologist consult for concern of  "hepatorenal syndrome and assistance with diuretics     Chronic normocytic Anemia   Hx of GIB  Hemoglobin 9.9, close to baseline.  No symptoms or signs of active bleeding.  - Daily CBC     T2DM  A1c 6.7. PTA metformin and jardiance.   - hold PTA meds  - MDSSI     Chronic conditions:   History of Provoked DVT: completed 3 months of apixaban   HTN: Continue PTA spironolactone  Insomnia: Continue PTA trazodone  Mental health: continue PTA Zyprexa  GERD: continue PTA Protonix  HLD: Continue PTA rosuvastatin  COPD: Continue PTA inhalers         Diet: Fluid restriction 1500 ML FLUID  NPO for Medical/Clinical Reasons Except for: Meds    DVT Prophylaxis: Pneumatic Compression Devices  Khan Catheter: Not present  Fluids: None  Lines: None     Cardiac Monitoring: None  Code Status: Full Code      Clinically Significant Risk Factors                   # Hypertension: Noted on problem list  # Chronic heart failure with preserved ejection fraction: heart failure noted on problem list and last echo with EF >50%          # DMII: A1C = 6.7 % (Ref range: <5.7 %) within past 6 months, PRESENT ON ADMISSION  # Morbid Obesity: Estimated body mass index is 47.26 kg/m  as calculated from the following:    Height as of this encounter: 1.651 m (5' 5\").    Weight as of this encounter: 128.8 kg (284 lb)., PRESENT ON ADMISSION       # Financial/Environmental Concerns: none         Social Drivers of Health   Housing Stability: High Risk (6/16/2025)    Housing Stability     Do you have housing? : Yes     Are you worried about losing your housing?: Yes   Tobacco Use: High Risk (6/6/2025)    Received from [facility]    Patient History     Smoking Tobacco Use: Every Day     Smokeless Tobacco Use: Never   Financial Resource Strain: Low Risk  (6/16/2025)    Financial Resource Strain     Within the past 12 months, have you or your family members you live with been unable to get utilities (heat, electricity) when it was " really needed?: No   Recent Concern: Financial Resource Strain - High Risk (6/11/2025)    Financial Resource Strain     Within the past 12 months, have you or your family members you live with been unable to get utilities (heat, electricity) when it was really needed?: Yes    Received from [facility]    Social Connections         Disposition Plan     Medically Ready for Discharge: Anticipated Tomorrow         The patient's care was discussed with the Attending Physician, Dr. Polanco.    Bernarda Crespo MD  Hospitalist Service  [facility]  Securely message with [employer] (more info)  Text page via [facility] Paging/Directory   ______________________________________________________________________    Interval History   No acute events overnight. Patient is feeling better. Currently NPO for planned transjugular liver biopsy today. Patient     Physical Exam   Vital Signs: Temp: 98.2  F (36.8  C) Temp src: Axillary BP: 107/58 Pulse: 68   Resp: 20 SpO2: 96 % O2 Device: BiPAP/CPAP    Weight: 284 lbs 0 oz    GEN: Pleasant male, in no acute distress.   HEENT: Normocephalic, atraumatic. Extraoccular eye movements intact. Anicteric sclera. Moist mucous membranes.   NECK: Supple. No cervical or supraclavicular adenopathy.   PULM: Non-labored breathing. No use of accessory muscles. Clear to ausculation bilaterally. No wheezes or crackles.   CV: Regular rate and rhythm. Normal S1, S2. No rubs, murmurs, or gallops.    ABDOMEN: Normoactive bowel sounds. Non-tender to palpation. Distended, no fluid wave  EXTREMITES:  No clubbing, cyanosis, or edema.    NEURO:  Awake. Oriented to person, place, time and situation. Cranial nerves 2-12 grossly intact. Moving all extremities.    PSYCH: Calm. Appropriate affect, insight, judgment.       Medical Decision Making           Data     I have personally reviewed the following data over the past 24 hrs:    13.7 (H)  \   9.7 (L)   / 370     136  104 19.2 /  147 (H)   4.1 22 1.10 \       Imaging results reviewed over the past 24 hrs:   No results found for this or any previous visit (from the past 24 hours).

## 2025-06-18 PROBLEM — I51.9 RIGHT VENTRICULAR DYSFUNCTION: Status: ACTIVE | Noted: 2025-06-18

## 2025-06-18 LAB
ANION GAP SERPL CALCULATED.3IONS-SCNC: 10 MMOL/L (ref 7–15)
ATRIAL RATE - MUSE: 87 BPM
BACTERIA FLD CULT: NO GROWTH
BASE EXCESS BLDA CALC-SCNC: 0.3 MMOL/L (ref -3–3)
BASE EXCESS BLDV CALC-SCNC: 1.6 MMOL/L (ref -3–3)
BUN SERPL-MCNC: 18 MG/DL (ref 6–20)
CALCIUM SERPL-MCNC: 8.7 MG/DL (ref 8.8–10.4)
CHLORIDE SERPL-SCNC: 104 MMOL/L (ref 98–107)
CREAT SERPL-MCNC: 1.14 MG/DL (ref 0.67–1.17)
DIASTOLIC BLOOD PRESSURE - MUSE: NORMAL MMHG
EGFRCR SERPLBLD CKD-EPI 2021: 81 ML/MIN/1.73M2
ERYTHROCYTE [DISTWIDTH] IN BLOOD BY AUTOMATED COUNT: 15.6 % (ref 10–15)
GAMMA INTERFERON BACKGROUND BLD IA-ACNC: 0.02 IU/ML
GLUCOSE BLDC GLUCOMTR-MCNC: 135 MG/DL (ref 70–99)
GLUCOSE BLDC GLUCOMTR-MCNC: 154 MG/DL (ref 70–99)
GLUCOSE BLDC GLUCOMTR-MCNC: 92 MG/DL (ref 70–99)
GLUCOSE SERPL-MCNC: 127 MG/DL (ref 70–99)
GRAM STAIN RESULT: NORMAL
GRAM STAIN RESULT: NORMAL
HCO3 BLDA-SCNC: 25 MMOL/L (ref 21–28)
HCO3 BLDV-SCNC: 25 MMOL/L (ref 21–28)
HCO3 SERPL-SCNC: 23 MMOL/L (ref 22–29)
HCT VFR BLD AUTO: 32.3 % (ref 40–53)
HGB BLD-MCNC: 9.9 G/DL (ref 13.3–17.7)
INTERPRETATION ECG - MUSE: NORMAL
M TB IFN-G BLD-IMP: NEGATIVE
M TB IFN-G CD4+ BCKGRND COR BLD-ACNC: 9.98 IU/ML
MCH RBC QN AUTO: 26.3 PG (ref 26.5–33)
MCHC RBC AUTO-ENTMCNC: 30.7 G/DL (ref 31.5–36.5)
MCV RBC AUTO: 86 FL (ref 78–100)
MITOGEN IGNF BCKGRD COR BLD-ACNC: 0 IU/ML
MITOGEN IGNF BCKGRD COR BLD-ACNC: 0 IU/ML
OXYHGB MFR BLDA: 94 % (ref 92–100)
OXYHGB MFR BLDV: 57 % (ref 70–75)
P AXIS - MUSE: 72 DEGREES
PCO2 BLDA: 42 MM HG (ref 35–45)
PCO2 BLDV: 49 MM HG (ref 40–50)
PH BLDA: 7.39 [PH] (ref 7.35–7.45)
PH BLDV: 7.36 [PH] (ref 7.32–7.43)
PLATELET # BLD AUTO: 410 10E3/UL (ref 150–450)
PO2 BLDA: 74 MM HG (ref 80–105)
PO2 BLDV: 33 MM HG (ref 25–47)
POTASSIUM SERPL-SCNC: 4.5 MMOL/L (ref 3.4–5.3)
PR INTERVAL - MUSE: 204 MS
QRS DURATION - MUSE: 104 MS
QT - MUSE: 370 MS
QTC - MUSE: 445 MS
QUANTIFERON MITOGEN: 10 IU/ML
QUANTIFERON NIL TUBE: 0.02 IU/ML
QUANTIFERON TB1 TUBE: 0.02 IU/ML
QUANTIFERON TB2 TUBE: 0.02
R AXIS - MUSE: 85 DEGREES
RBC # BLD AUTO: 3.76 10E6/UL (ref 4.4–5.9)
SAO2 % BLDA: 96 % (ref 96–97)
SAO2 % BLDV: 59 % (ref 70–75)
SODIUM SERPL-SCNC: 137 MMOL/L (ref 135–145)
SYSTOLIC BLOOD PRESSURE - MUSE: NORMAL MMHG
T AXIS - MUSE: 202 DEGREES
VENTRICULAR RATE- MUSE: 87 BPM
WBC # BLD AUTO: 13 10E3/UL (ref 4–11)

## 2025-06-18 PROCEDURE — 272N000001 HC OR GENERAL SUPPLY STERILE: Performed by: INTERNAL MEDICINE

## 2025-06-18 PROCEDURE — 250N000009 HC RX 250: Performed by: INTERNAL MEDICINE

## 2025-06-18 PROCEDURE — 93460 R&L HRT ART/VENTRICLE ANGIO: CPT | Performed by: INTERNAL MEDICINE

## 2025-06-18 PROCEDURE — 255N000002 HC RX 255 OP 636: Performed by: INTERNAL MEDICINE

## 2025-06-18 PROCEDURE — 99254 IP/OBS CNSLTJ NEW/EST MOD 60: CPT | Performed by: INTERNAL MEDICINE

## 2025-06-18 PROCEDURE — 258N000003 HC RX IP 258 OP 636: Performed by: INTERNAL MEDICINE

## 2025-06-18 PROCEDURE — 82805 BLOOD GASES W/O2 SATURATION: CPT

## 2025-06-18 PROCEDURE — C1894 INTRO/SHEATH, NON-LASER: HCPCS | Performed by: INTERNAL MEDICINE

## 2025-06-18 PROCEDURE — 250N000013 HC RX MED GY IP 250 OP 250 PS 637: Performed by: NURSE PRACTITIONER

## 2025-06-18 PROCEDURE — 99152 MOD SED SAME PHYS/QHP 5/>YRS: CPT | Performed by: INTERNAL MEDICINE

## 2025-06-18 PROCEDURE — 250N000011 HC RX IP 250 OP 636: Performed by: INTERNAL MEDICINE

## 2025-06-18 PROCEDURE — B211YZZ FLUOROSCOPY OF MULTIPLE CORONARY ARTERIES USING OTHER CONTRAST: ICD-10-PCS | Performed by: INTERNAL MEDICINE

## 2025-06-18 PROCEDURE — 120N000001 HC R&B MED SURG/OB

## 2025-06-18 PROCEDURE — 80048 BASIC METABOLIC PNL TOTAL CA: CPT

## 2025-06-18 PROCEDURE — 99232 SBSQ HOSP IP/OBS MODERATE 35: CPT | Mod: GC

## 2025-06-18 PROCEDURE — 250N000013 HC RX MED GY IP 250 OP 250 PS 637

## 2025-06-18 PROCEDURE — 93460 R&L HRT ART/VENTRICLE ANGIO: CPT | Mod: 26 | Performed by: INTERNAL MEDICINE

## 2025-06-18 PROCEDURE — 4A023N8 MEASUREMENT OF CARDIAC SAMPLING AND PRESSURE, BILATERAL, PERCUTANEOUS APPROACH: ICD-10-PCS | Performed by: INTERNAL MEDICINE

## 2025-06-18 PROCEDURE — 85018 HEMOGLOBIN: CPT

## 2025-06-18 PROCEDURE — 36415 COLL VENOUS BLD VENIPUNCTURE: CPT

## 2025-06-18 PROCEDURE — C1887 CATHETER, GUIDING: HCPCS | Performed by: INTERNAL MEDICINE

## 2025-06-18 PROCEDURE — 250N000013 HC RX MED GY IP 250 OP 250 PS 637: Performed by: STUDENT IN AN ORGANIZED HEALTH CARE EDUCATION/TRAINING PROGRAM

## 2025-06-18 PROCEDURE — 99232 SBSQ HOSP IP/OBS MODERATE 35: CPT | Performed by: NURSE PRACTITIONER

## 2025-06-18 RX ORDER — NALOXONE HYDROCHLORIDE 0.4 MG/ML
0.4 INJECTION, SOLUTION INTRAMUSCULAR; INTRAVENOUS; SUBCUTANEOUS
Status: DISCONTINUED | OUTPATIENT
Start: 2025-06-18 | End: 2025-06-18

## 2025-06-18 RX ORDER — NALOXONE HYDROCHLORIDE 0.4 MG/ML
0.2 INJECTION, SOLUTION INTRAMUSCULAR; INTRAVENOUS; SUBCUTANEOUS
Status: DISCONTINUED | OUTPATIENT
Start: 2025-06-18 | End: 2025-06-18

## 2025-06-18 RX ORDER — FENTANYL CITRATE 50 UG/ML
INJECTION, SOLUTION INTRAMUSCULAR; INTRAVENOUS
Status: DISCONTINUED | OUTPATIENT
Start: 2025-06-18 | End: 2025-06-18 | Stop reason: HOSPADM

## 2025-06-18 RX ORDER — FENTANYL CITRATE 50 UG/ML
25 INJECTION, SOLUTION INTRAMUSCULAR; INTRAVENOUS
Status: DISCONTINUED | OUTPATIENT
Start: 2025-06-18 | End: 2025-06-18

## 2025-06-18 RX ORDER — FLUMAZENIL 0.1 MG/ML
0.2 INJECTION, SOLUTION INTRAVENOUS
Status: DISCONTINUED | OUTPATIENT
Start: 2025-06-18 | End: 2025-06-18

## 2025-06-18 RX ORDER — IODIXANOL 320 MG/ML
INJECTION, SOLUTION INTRAVASCULAR
Status: DISCONTINUED | OUTPATIENT
Start: 2025-06-18 | End: 2025-06-18 | Stop reason: HOSPADM

## 2025-06-18 RX ORDER — OXYCODONE HYDROCHLORIDE 5 MG/1
10 TABLET ORAL EVERY 4 HOURS PRN
Status: DISCONTINUED | OUTPATIENT
Start: 2025-06-18 | End: 2025-06-18

## 2025-06-18 RX ORDER — FUROSEMIDE 10 MG/ML
20 INJECTION INTRAMUSCULAR; INTRAVENOUS EVERY 8 HOURS
Status: DISCONTINUED | OUTPATIENT
Start: 2025-06-18 | End: 2025-06-19

## 2025-06-18 RX ORDER — HYDRALAZINE HYDROCHLORIDE 20 MG/ML
10 INJECTION INTRAMUSCULAR; INTRAVENOUS
Status: DISCONTINUED | OUTPATIENT
Start: 2025-06-18 | End: 2025-06-20 | Stop reason: HOSPADM

## 2025-06-18 RX ORDER — ACETAMINOPHEN 325 MG/1
650 TABLET ORAL EVERY 4 HOURS PRN
Status: DISCONTINUED | OUTPATIENT
Start: 2025-06-18 | End: 2025-06-18

## 2025-06-18 RX ORDER — SODIUM CHLORIDE 9 MG/ML
75 INJECTION, SOLUTION INTRAVENOUS CONTINUOUS
Status: ACTIVE | OUTPATIENT
Start: 2025-06-18 | End: 2025-06-18

## 2025-06-18 RX ORDER — FUROSEMIDE 10 MG/ML
INJECTION INTRAMUSCULAR; INTRAVENOUS
Status: DISCONTINUED | OUTPATIENT
Start: 2025-06-18 | End: 2025-06-18 | Stop reason: HOSPADM

## 2025-06-18 RX ORDER — OXYCODONE HYDROCHLORIDE 5 MG/1
5 TABLET ORAL EVERY 4 HOURS PRN
Status: DISCONTINUED | OUTPATIENT
Start: 2025-06-18 | End: 2025-06-18

## 2025-06-18 RX ORDER — HEPARIN SODIUM 200 [USP'U]/100ML
INJECTION, SOLUTION INTRAVENOUS
Status: DISCONTINUED | OUTPATIENT
Start: 2025-06-18 | End: 2025-06-18 | Stop reason: HOSPADM

## 2025-06-18 RX ORDER — ATROPINE SULFATE 0.1 MG/ML
0.5 INJECTION INTRAVENOUS
Status: ACTIVE | OUTPATIENT
Start: 2025-06-18 | End: 2025-06-18

## 2025-06-18 RX ADMIN — FLUTICASONE FUROATE AND VILANTEROL TRIFENATATE 1 PUFF: 100; 25 POWDER RESPIRATORY (INHALATION) at 08:03

## 2025-06-18 RX ADMIN — FUROSEMIDE 20 MG: 10 INJECTION, SOLUTION INTRAMUSCULAR; INTRAVENOUS at 17:27

## 2025-06-18 RX ADMIN — OLANZAPINE 10 MG: 2.5 TABLET, FILM COATED ORAL at 21:07

## 2025-06-18 RX ADMIN — ROSUVASTATIN 10 MG: 10 TABLET, FILM COATED ORAL at 21:07

## 2025-06-18 RX ADMIN — FUROSEMIDE 20 MG: 20 TABLET ORAL at 08:02

## 2025-06-18 RX ADMIN — MONTELUKAST 10 MG: 10 TABLET, FILM COATED ORAL at 08:04

## 2025-06-18 RX ADMIN — SODIUM CHLORIDE 75 ML/HR: 0.9 INJECTION, SOLUTION INTRAVENOUS at 17:36

## 2025-06-18 RX ADMIN — PANTOPRAZOLE SODIUM 40 MG: 20 TABLET, DELAYED RELEASE ORAL at 17:18

## 2025-06-18 RX ADMIN — TRAZODONE HYDROCHLORIDE 100 MG: 50 TABLET ORAL at 21:07

## 2025-06-18 RX ADMIN — SPIRONOLACTONE 50 MG: 25 TABLET, FILM COATED ORAL at 08:02

## 2025-06-18 RX ADMIN — FAMOTIDINE 20 MG: 20 TABLET, FILM COATED ORAL at 21:07

## 2025-06-18 RX ADMIN — FUROSEMIDE 20 MG: 10 INJECTION, SOLUTION INTRAMUSCULAR; INTRAVENOUS at 22:44

## 2025-06-18 RX ADMIN — SPIRONOLACTONE 50 MG: 25 TABLET, FILM COATED ORAL at 17:18

## 2025-06-18 RX ADMIN — SULFAMETHOXAZOLE AND TRIMETHOPRIM 1 TABLET: 800; 160 TABLET ORAL at 08:02

## 2025-06-18 RX ADMIN — ACETAMINOPHEN 650 MG: 325 TABLET ORAL at 22:44

## 2025-06-18 RX ADMIN — UMECLIDINIUM 1 PUFF: 62.5 AEROSOL, POWDER ORAL at 08:02

## 2025-06-18 RX ADMIN — FAMOTIDINE 20 MG: 20 TABLET, FILM COATED ORAL at 08:02

## 2025-06-18 RX ADMIN — LEVOTHYROXINE SODIUM 12.5 MCG: 0.03 TABLET ORAL at 08:02

## 2025-06-18 RX ADMIN — PANTOPRAZOLE SODIUM 40 MG: 20 TABLET, DELAYED RELEASE ORAL at 08:04

## 2025-06-18 ASSESSMENT — ACTIVITIES OF DAILY LIVING (ADL)
ADLS_ACUITY_SCORE: 41

## 2025-06-18 ASSESSMENT — EJECTION FRACTION: EF_VALUE: .21

## 2025-06-18 NOTE — CONSULTS
CARDIOLOGY CONSULT NOTE         Assessment:   1.  Right ventricular dysfunction-review of MICHAEL does show to me some right-sided hypokinesis with right atrial enlargement.  This is also consistent with right-sided pressures with elevation of free hepatic pressure 21 mmHg with IVC pressure 21 mmHg.  Right atrium is 22 mmHg.  Suspect there is an element of pulmonary hypertension and will arrange for right heart catheterization and possible vasodilator dilated and reactive challenge.  If significant pulmonary hypertension would refer to pulmonary hypertension clinic.  Suspect this to be WHO class III secondary to obstructive sleep apnea.  2.  Shortness of breath-will determine pulmonary capillary wedge pressure, but suspect this is most likely due to physical deconditioning.  3.  Ascites due to alcoholic cirrhosis (H)-has several admissions for spontaneous bacterial peritonitis as well as ascites drainage.  Right heart catheterization as above.     Plan:   1.  Agree with high-dose Aldactone as well as furosemide as doing.  2.  Work on weight loss.  3.  Agree with sleep apnea CPAP treatment.  4.  Right heart catheterization.  5.  Depending on results of above can follow-up with me or with pulmonary hypertension clinic.     Current History:   [facility] has been requested by Brenden Dupont to evaluate Warren Jaramilol  who is a 44 year old year old white male for ascites and increased venous pressures.  Patient is morbidly obese and has increased shortness of breath and dyspnea on exertion for at least the last year.  In addition to this he has had increased abdominal distention with worsening shortness of breath over maybe the last 6 months.  Over this period of time he has had maybe palpitations briefly once a month.  Given this, his increased abdominal distention and was admitted several times for paracenteses.  On hospitalization today, he was noted to have right atrial pressure at 22 mmHg and  cardiology consultation is requested.  Other than above he denies any chest pains, PND, orthopnea or significant peripheral edema.    Past Medical History:     Past Medical History:   Diagnosis Date    COPD exacerbation (H) 12/02/2024    DM2 (diabetes mellitus, type 2) (H) 04/28/2020    HTN (hypertension) 07/30/2012    Sleep apnea     Thyroid nodule 07/31/2019    Rojas's disease (H)  Alcoholic cirrhosis       Past history is negative for cancer, tuberculosis, myocardial infarction,  rheumatic fever, cerebrovascular accident, chronic kidney disease, peptic ulcer disease, chronic obstructive pulmonary disease, or thyroid disorder  and no lipid disorder.    Past Surgical History:     Past Surgical History:   Procedure Laterality Date    COLONOSCOPY      ESOPHAGOSCOPY, GASTROSCOPY, DUODENOSCOPY (EGD), COMBINED N/A 7/21/2023    Procedure: ESOPHAGOGASTRODUODENOSCOPY WITH GASTRIC AND ESOPHAGEAL BIOPSIES;  Surgeon: Filiberto Aragon MD;  Location: [facility] [place]    ESOPHAGOSCOPY, GASTROSCOPY, DUODENOSCOPY (EGD), COMBINED N/A 4/4/2025    Procedure: ESOPHAGOGASTRODUODENOSCOPY;  Surgeon: Ramesh Talbot MD;  Location: [facility] OR    IR HEPATIC VENOGRAM W HEMODYNAMICS  6/17/2025    TOOTH EXTRACTION       Family History:   Reviewed, and unknown secondary to him being adopted.    Social History:   Reviewed, and he  reports that he has been smoking cigarettes. He started smoking about 21 years ago. He has a 21.5 pack-year smoking history. He has never used smokeless tobacco. He reports that he does not currently use alcohol, but in the past has drank 2-3 cases of beer a day, as well as occasional vodka.  He is not employed, he lives in a group home, is single..  Drug: Marijuana.     Meds:     Current Facility-Administered Medications   Medication Dose Route Frequency Provider Last Rate Last Admin    famotidine (PEPCID) tablet 20 mg  20 mg Oral BID Denise Frazier MD   20 mg at 06/18/25 0802    fluticasone-vilanterol  (BREO ELLIPTA) 100-25 MCG/ACT inhaler 1 puff  1 puff Inhalation Daily Denise Frazier MD   1 puff at 06/18/25 0803    And    umeclidinium (INCRUSE ELLIPTA) 62.5 MCG/ACT inhaler 1 puff  1 puff Inhalation Daily Denise Frazier MD   1 puff at 06/18/25 0802    furosemide (LASIX) tablet 20 mg  20 mg Oral BID Evangelina Allen NP   20 mg at 06/18/25 0802    insulin aspart (NovoLOG) injection (RAPID ACTING)  1-7 Units Subcutaneous TID  eDnise Frazier MD   1 Units at 06/17/25 0835    insulin aspart (NovoLOG) injection (RAPID ACTING)  1-5 Units Subcutaneous At Bedtime Denise Frazier MD   1 Units at 06/17/25 2145    levothyroxine (SYNTHROID/LEVOTHROID) half-tab 12.5 mcg  12.5 mcg Oral QAM  Denise Frazier MD   12.5 mcg at 06/18/25 0802    [Held by provider] lisinopril (ZESTRIL) tablet 10 mg  10 mg Oral QAM Denise Frazier MD        montelukast (SINGULAIR) tablet 10 mg  10 mg Oral ERON Denise Frazier MD   10 mg at 06/18/25 0804    OLANZapine (zyPREXA) tablet 10 mg  10 mg Oral At Bedtime Denise Frazier MD   10 mg at 06/17/25 2141    pantoprazole (PROTONIX) EC tablet 40 mg  40 mg Oral BID  Denise Frazier MD   40 mg at 06/18/25 0804    rosuvastatin (CRESTOR) tablet 10 mg  10 mg Oral At Bedtime Denise Frazier MD   10 mg at 06/17/25 2141    sodium chloride (PF) 0.9% PF flush 3 mL  3 mL Intracatheter Q8H MORENITA Denise Frazier MD   3 mL at 06/17/25 2145    spironolactone (ALDACTONE) tablet 50 mg  50 mg Oral BID Evangelina Allen NP   50 mg at 06/18/25 0802    sulfamethoxazole-trimethoprim (BACTRIM DS) 800-160 MG per tablet 1 tablet  1 tablet Oral Daily Bernarda Crespo MD   1 tablet at 06/18/25 0802    traZODone (DESYREL) tablet 100 mg  100 mg Oral At Bedtime Denise Frazier MD   100 mg at 06/17/25 2141       Allergies:   Apricot flavoring agent (non-screening), Banana, Bees, Wasp venom protein, Methylphenidate, and Prunus    Review of Systems:   A 12 point comprehensive review of systems was  as  "follows:   General: Positive for fatigue, weight gain, negative weight loss  Constitutional: negative for anorexia, chills, fevers, malaise, night sweats   Eyes: negative for cataracts, color blindness, contacts/glasses, glaucoma, icterus, irritation, redness and visual disturbance  Ears, nose, mouth, throat, and face: negative for ear drainage, earaches, epistaxis, facial trauma, hearing loss, hoarseness, nasal congestion, snoring, sore mouth, sore throat, tinnitus and voice change  Respiratory: negative for cough, dyspnea on exertion,  hemoptysis, sputum production, pleurisy, stridor, wheezing, PND, orthopnea  Cardiovascular: Positive rare palpitations, negative for chest pain, chest pressure/discomfort, claudication, dyspnea, exertional chest pressure/discomfort, fatigue, lower extremity edema, near-syncope, syncope   Gastrointestinal: Positive abdominal distention, negative for abdominal pain, change in bowel haibits, constipation, diarrhea, dyspepsia, dysphagia, jaundice, melena, nausea, odynophagia, reflux symptoms and vomiting  Genitourinary: negative for decreased stream  Hematologic/lymphatic: negative for bleeding  Musculoskeletal: negative for arthralgias, back pain, bone pain, muscle weakness, myalgias, neck pain and stiff joints  Neurological: negative for syncope, presyncope, coordination problems, dizziness, gait problems, headaches, memory problems, paresthesia, seizures, speech problems, tremors, vertigo and weakness    Objective:     Physical Exam:  /55 (BP Location: Left arm)   Pulse 65   Temp 97.6  F (36.4  C) (Oral)   Resp 16   Ht 1.651 m (5' 5\")   Wt 130.7 kg (288 lb 2.3 oz)   SpO2 97%   BMI 47.95 kg/m       Head:    Normocephalic, without obvious abnormality, atraumatic   Eyes:    PERRL, conjunctiva/corneas clear, EOM's intact,           Nose:   Nares normal, septum midline, mucosa normal, no drainage  or sinus tenderness   Throat:   Lips, mucosa, and tongue normal; teeth and gums " "normal   Neck:   Supple, symmetrical, trachea midline, no adenopathy;        thyroid:  No enlargement/tenderness/nodules; no carotid    bruit, to jaw 30 degrees JVD   Back:     Symmetric, no curvature, ROM normal, no CVA tenderness   Lungs:     Clear to auscultation bilaterally, respirations unlabored   Chest wall:    No tenderness or deformity   Heart:    Regular rate and rhythm, S1 and S2 normal, no murmur, rub   or gallop   Abdomen:     Soft, non-tender, bowel sounds active all four quadrants,     no masses, no organomegaly   Extremities:   Extremities normal, atraumatic, no cyanosis or edema   Pulses:   2+ and symmetric all extremities   Skin:   Skin color, texture, turgor normal, no rashes or lesions   Neurologic:   CNII-XII intact. Normal strength, sensation and reflexes       throughout     Cardiographics and Imaging  Radiology Results: Chest x-ray shows Heart size at upper limits normal. Mild prominence of the central interstitium. No airspace consolidation. No pneumothorax or pleural effusion.     EKG Results: personally reviewed sinus rhythm, low voltage, possible septal Q wave MI type pattern.    Lab Review   Pertinent Labs  Lab Results: personally reviewed       No results found for: \"CKTOTAL\", \"CKMB\", \"TROPONINI\"    Lab Results   Component Value Date    BUN 18.0 06/18/2025     06/18/2025    CO2 23 06/18/2025       Lab Results   Component Value Date    WBC 13.0 (H) 06/18/2025    HGB 9.9 (L) 06/18/2025    HCT 32.3 (L) 06/18/2025    MCV 86 06/18/2025     06/18/2025       No results found for: \"BNP\"    Clinically Significant Risk Factors                   # Hypertension: Noted on problem list  # Chronic heart failure with preserved ejection fraction: heart failure noted on problem list and last echo with EF >50%          # DMII: A1C = 6.7 % (Ref range: <5.7 %) within past 6 months, PRESENT ON ADMISSION  # Morbid Obesity: Estimated body mass index is 47.95 kg/m  as calculated from the " "following:    Height as of this encounter: 1.651 m (5' 5\").    Weight as of this encounter: 130.7 kg (288 lb 2.3 oz)., PRESENT ON ADMISSION     # Financial/Environmental Concerns: none          Ascites: Other ascites              "

## 2025-06-18 NOTE — PRE-PROCEDURE
GENERAL PRE-PROCEDURE:   Procedure:  Coronary angiogram with possible PCI, right heart catheterization  Date/Time:  6/18/2025 12:20 PM    Written consent obtained?: Yes    Risks and benefits: Risks, benefits and alternatives were discussed (reviewed with patient's legal guardian Jesika Harden by phone)    Consent given by:  Guardian (consent was provided by Jesika Harden by phone)  Patient states understanding of procedure being performed: Yes    Patient's understanding of procedure matches consent: Yes    Procedure consent matches procedure scheduled: Yes    Expected level of sedation:  Moderate  Appropriately NPO:  Yes  ASA Class:  4 (RV dysfunction, alcoholic cirrhosis)  Mallampati  :  Grade 3- soft palate visible, posterior pharyngeal wall not visible  Lungs:  Lungs clear with good breath sounds bilaterally  Heart:  Normal heart sounds and rate  History & Physical reviewed:  History and physical reviewed and updates made (see comment)  H&P Comments:  Clinically Significant Risk Factors Present on Admission    Cardiovascular : dyspnea, RV dysfunction    Fluid & Electrolyte Disorders : Not present on admission    Gastroenterology : ascites 2/2 alcoholic cirrhosis    Hematology/Oncology : Not present on admission    Nephrology : Not present on admission    Neurology : Not present on admission    Pulmonology : Not present on admission    Systemic : Not present on admission    Statement of review:  I have reviewed the lab findings, diagnostic data, medications, and the plan for sedation

## 2025-06-18 NOTE — PLAN OF CARE
Problem: Adult Inpatient Plan of Care  Goal: Optimal Comfort and Wellbeing  Outcome: Progressing     Problem: Gas Exchange Impaired  Goal: Optimal Gas Exchange  Outcome: Progressing  Intervention: Optimize Oxygenation and Ventilation  Recent Flowsheet Documentation  Taken 6/18/2025 0300 by Trini Mcclure RN  Head of Bed (HOB) Positioning: HOB at 20-30 degrees  Taken 6/17/2025 2139 by Trini Mcclure RN  Head of Bed (HOB) Positioning: HOB at 20-30 degrees     Problem: Fluid Volume Excess  Goal: Fluid Balance  Outcome: Progressing     Problem: Pain Acute  Goal: Optimal Pain Control and Function  Outcome: Progressing   Goal Outcome Evaluation:       Pt is A/O x4. C/O neck pain from procedure site. PRN APAP administer and effective. CPAP on at night. VSS continue to monitor. Trini Mcclure RN

## 2025-06-18 NOTE — PROGRESS NOTES
[facility]    Progress Note - Hospitalist Service       Date of Admission:  6/15/2025    Assessment & Plan   Warren Jaramillo is a 44 year old male admitted on 6/15/2025. He has a history of cirrhosis with ascites requiring frequent paracentesis, T2DM, HTN, AVA, COPD and is admitted for abdominal distention and SOB s/p paracentesis 6/16.     Recurrent ascites, requiring frequent paracentesis  Right ventricular dysfunction  Hx of SBP  Patient has history of cirrhosis, likely secondary to alcohol use and MASLD recently requiring more frequent paracentesis now twice weekly over the past 3-4 months.  Recently hospitalized from 6/11 - 6/13 for decompensated liver disease with concern for persistent SBP with ANC. Therapeutic paracentesis was completed on day of discharge (6/13). Discharged on cefdinir course with plan to return to [facility] for further prophylaxis. He presented with abdominal distention, weight gain of 24 lbs over past 2 days, abdominal discomfort and shortness of breath. Vitally stable on admission. Workup revealed leukocytosis of 14.5 with some absolute neutrophils, similar to prior. ED physician attempted paracentesis though unsuccessful. Successful paracentesis performed 6/16 with 5L off. Fluid studies show elevated ANC, but downtrending from previous. Repeat transjugular liver biopsy and hepatic venogram showing pressures consistent with right heart failure vs. Cirrhosis. Recent MICHAEL shows right sided hypokinesis with right atrial enlargement.   - s/p US guided paracentesis 6/16  - ascitic fluid studies showing ANC elevated but downtrending from previous  - GI consulted, appreciate recs   - nephrology consult. Appreciate recs. Signed off 6/18  - increase PTA Lasix, spironolactone   - daily BMP to monitor kidney function   - ID consult. Appreciate recs.    - planning for Quantiferon gold to rule out tuberculous ascites   - completed course of Cefdinir 6/17 for recent  "SBP, transition to oral Bactrim   - cardiology consult. Appreciate recs.   - Daily CBC     RIVERA - resolved  Cr mildly elevated to 1.19 on admission, 1.10 this AM which is around his baseline   - nephrologist consult for assistance with diuretics     Chronic normocytic Anemia   Hx of GIB  Hemoglobin 9.9, close to baseline.  No symptoms or signs of active bleeding.  - Daily CBC     T2DM  A1c 6.7. PTA metformin and jardiance.   - hold PTA meds  - MDSSI     Chronic conditions:   History of Provoked DVT: completed 3 months of apixaban   HTN: Continue PTA spironolactone  Insomnia: Continue PTA trazodone  Mental health: continue PTA Zyprexa  GERD: continue PTA Protonix  HLD: Continue PTA rosuvastatin  COPD: Continue PTA inhalers         Diet: Fluid restriction 1500 ML FLUID  Combination Diet 2 gm NA Diet    DVT Prophylaxis: Pneumatic Compression Devices  Khan Catheter: Not present  Fluids: None  Lines: None     Cardiac Monitoring: None  Code Status: Full Code      Clinically Significant Risk Factors                   # Hypertension: Noted on problem list  # Chronic heart failure with preserved ejection fraction: heart failure noted on problem list and last echo with EF >50%          # DMII: A1C = 6.7 % (Ref range: <5.7 %) within past 6 months, PRESENT ON ADMISSION  # Morbid Obesity: Estimated body mass index is 47.95 kg/m  as calculated from the following:    Height as of this encounter: 1.651 m (5' 5\").    Weight as of this encounter: 130.7 kg (288 lb 2.3 oz)., PRESENT ON ADMISSION       # Financial/Environmental Concerns: none         Social Drivers of Health   Housing Stability: High Risk (6/16/2025)    Housing Stability     Do you have housing? : Yes     Are you worried about losing your housing?: Yes   Tobacco Use: High Risk (6/6/2025)    Received from [facility]    Patient History     Smoking Tobacco Use: Every Day     Smokeless Tobacco Use: Never   Financial Resource Strain: Low " Risk  (6/16/2025)    Financial Resource Strain     Within the past 12 months, have you or your family members you live with been unable to get utilities (heat, electricity) when it was really needed?: No   Recent Concern: Financial Resource Strain - High Risk (6/11/2025)    Financial Resource Strain     Within the past 12 months, have you or your family members you live with been unable to get utilities (heat, electricity) when it was really needed?: Yes    Received from [facility]    Social Connections         Disposition Plan     Medically Ready for Discharge: Anticipated Tomorrow         The patient's care was discussed with the Attending Physician, Dr. Polanco.    Bernarda Crespo MD  Hospitalist Service  [facility]  Securely message with [employer] (more info)  Text page via [employer] Paging/Directory   ______________________________________________________________________    Interval History   No acute events overnight. Patient reports some left sided neck pain due to procedure yesterday. Denies SOB. Would like to discharge home.     Physical Exam   Vital Signs: Temp: 97.6  F (36.4  C) Temp src: Oral BP: 115/55 Pulse: 65   Resp: 16 SpO2: 97 % O2 Device: BiPAP/CPAP    Weight: 288 lbs 2.26 oz    GEN: Pleasant male, in no acute distress.   HEENT: Normocephalic, atraumatic. Extraoccular eye movements intact. Anicteric sclera. Moist mucous membranes.   NECK: Supple. No cervical or supraclavicular adenopathy.   PULM: Non-labored breathing. No use of accessory muscles. Clear to ausculation bilaterally. No wheezes or crackles.   CV: Regular rate and rhythm. Normal S1, S2. No rubs, murmurs, or gallops.    ABDOMEN: Normoactive bowel sounds. Non-tender to palpation. Distended, no fluid wave  EXTREMITES:  No clubbing, cyanosis, or edema.    NEURO:  Awake. Oriented to person, place, time and situation. Cranial nerves 2-12 grossly intact. Moving all extremities.    PSYCH:  Calm. Appropriate affect, insight, judgment.       Medical Decision Making           Data     I have personally reviewed the following data over the past 24 hrs:    13.0 (H)  \   9.9 (L)   / 410     137 104 18.0 /  135 (H)   4.5 23 1.14 \       Imaging results reviewed over the past 24 hrs:   Recent Results (from the past 24 hours)   IR Hepatic Venogram w Hemodynamics    Narrative    [facility]  LOCATION: [facility]  DATE: 6/17/2025    PROCEDURE: HEPATIC VENOGRAM WITH VENOUS PRESSURE GRADIENT MEASURMENT    INTERVENTIONAL RADIOLOGIST: Aldair Cervantes MD.    INDICATION: 44-year-old male with refractory ascites. Liver biopsy shows venous outflow obstruction. Patient presents for pressure measurements.    CONSENT: The risks, benefits and alternatives of transjugular liver biopsy were discussed with the patient in detail. All questions were answered. Informed consent was given to proceed with the procedure.    MODERATE SEDATION: Versed 1.5 mg IV; Fentanyl 75 mcg IV.  During the timeout, immediately prior to the administration of medications, the patient was reassessed for adequacy to receive conscious sedation. Under physician supervision, Versed and fentanyl   were administered for moderate sedation. Pulse oximetry, heart rate and blood pressure were continuously monitored by an independent trained observer. The physician spent 15 minutes of face-to-face sedation time with the patient.    CONTRAST: 10 mL Omnipaque 350  ANTIBIOTICS: None.  ADDITIONAL MEDICATIONS: None.    FLUOROSCOPIC TIME: 2.2 minutes.  RADIATION DOSE: Air Kerma: 98 mGy.    COMPLICATIONS: No immediate complications.    STERILE BARRIER TECHNIQUE: Maximum sterile barrier technique was used. Cutaneous antisepsis was performed at the operative site with application of 2% chlorhexidine and large sterile drape. Prior to the procedure, the  and assistant performed   hand hygiene and wore hat, mask, sterile gown, and sterile  gloves during the entire procedure.    PROCEDURE:  Limited right neck ultrasound demonstrated a patent right internal jugular vein; images saved of the permanent patient medical record. Under ultrasound guidance the right internal jugular vein was accessed using micropuncture needle; images   saved of the permanent patient medical record. Access was secured using a 10 Kinyarwanda 45cm sheath.    A multipurpose catheter and wire were utilized to select the right hepatic vein. Contrast was injected to confirm positioning The. The catheter was exchanged for a wedge balloon and the balloon was inflated. A gentle injection was performed confirming   occlusion of the hepatic vein. Next, wedge hepatic, free hepatic, IVC, and right atrial pressures were obtained; results as below. The catheter was then removed.    The sheath was removed with manual pressure applied until hemostasis.      Impression    IMPRESSION:   1. Hepatic venogram demonstrates patency of the hepatic veins.  2. Pressure measurements:  Wedged hepatic: 24 mmHg  Free hepatic: 21 mmHg  IVC: 21 mmHg  Right atrium: 22 mmHg  Hepatic venous pressure gradient: 3 mmHg    PLAN:  1. Patient to be recovered on the floor.  2. HOB up to 45 degrees x 1 hour then activity as tolerated.

## 2025-06-18 NOTE — PLAN OF CARE
Pain in neck after hepatic venogram today.    - Ordered Tylenol 650mg q8h PRN in setting of cirrhosis with goal <2000mg TDD  - Also added low-dose oxycodone 2.5mg q4h PRN if Tylenol ineffective

## 2025-06-18 NOTE — PROGRESS NOTES
"GASTROENTEROLOGY PROGRESS NOTE     SUBJECTIVE: venous pressures not consistent with cirrhosis or portal hypertension, more consistent with right sided heart failure or pulmonary hypertension likely the etiology of ascites. Followed by cardiology he may have underlying fatty liver      OBJECTIVE:   /55 (BP Location: Left arm)   Pulse 65   Temp 97.6  F (36.4  C) (Oral)   Resp 16   Ht 1.651 m (5' 5\")   Wt 130.7 kg (288 lb 2.3 oz)   SpO2 97%   BMI 47.95 kg/m     Temp (24hrs), Av.9  F (36.6  C), Min:97.6  F (36.4  C), Max:98.2  F (36.8  C)     Patient Vitals for the past 72 hrs:   Weight   25 0710 130.7 kg (288 lb 2.3 oz)   25 1601 128.8 kg (284 lb)   06/15/25 1938 135.6 kg (299 lb)        Intake/Output Summary (Last 24 hours) at 2025 1131  Last data filed at 2025 0845  Gross per 24 hour   Intake 1130 ml   Output 950 ml   Net 180 ml      PHYSICAL EXAM   Constitutional: healthy, in bed, no acute distress  Cardiovascular: Regular rate and rhythm.   Respiratory: respirations non labored.   Abdomen: large rounded with ascites.     I have reviewed the patient's new clinical lab results:   Recent Labs   Lab Test 25  0625  0605 25  0942 06/15/25  2204 25  0552 25  0515 25  0909   WBC 13.0* 13.7* 12.8*   < > 12.5* 13.7*  --    HGB 9.9* 9.7* 9.9*   < > 9.5* 9.5*  --    MCV 86 84 84   < > 87 87  --     370 426   < > 405 395  --    INR  --   --   --   --  1.18* 1.16* 1.15    < > = values in this interval not displayed.      Recent Labs   Lab Test 25  0625  0605 25  0942    136 140   POTASSIUM 4.5 4.1 4.2   CHLORIDE 104 104 107   CO2 23 22 22   BUN 18.0 19.2 19.7   CR 1.14 1.10 1.06   ANIONGAP 10 10 11   WES 8.7* 8.8 8.9      Recent Labs   Lab Test 25  0942 06/15/25  2204 25  0552 25  0515 06/10/25  2146 06/10/25  1933 25  0643 25  0116 05/15/25  2011 05/15/25  0042 25  1642 " 02/24/25  2153   ALBUMIN 3.9 4.1 3.6   < >  --  3.8   < >  --    < > 3.8   < >  --    BILITOTAL 0.2 0.2 0.3   < >  --  0.3   < >  --    < > 0.3   < >  --    ALT 27 32 25   < >  --  32   < >  --    < > 21   < >  --    AST 14 25 15   < >  --  18   < >  --    < > 18   < >  --    ALKPHOS 253* 261* 201*   < >  --  280*   < >  --    < > 201*   < >  --    PROTEIN  --   --   --   --  Negative  --   --  20*  --   --   --  10*   LIPASE  --  30  --   --   --  28  --   --   --  22   < >  --     < > = values in this interval not displayed.      Assessment:   Warren Jaramillo is a 44 year old male who has a history of history of cirrhosis with ascites requiring frequent paracentesis, AVA, COPD, type 2 diabetes, hypertension, GI bleed, and is admitted for abdominal distention with concern for decompensated liver disease.  He follows with Dr. Maria with [facility]. He has had recurrent ascites and SBP. Sober x 9 months.      SBP has been followed by ID. Bacterial cultures have been negative 5/16, 5/20, 5/23, 6/10, 6/13, 6/16. ID following.   Cirrhosis attributed to alcohol/fatty liver. Liver biopsy consistent with venous outflow obstruction and now venous pressures did not show evidence of cirrhosis or portal hypertension. MICHAEL and doppler of the portal system unremarkable. Cardiology noted right sided heart failure but possibly pulmonary hypertension.   Ascites with low SAAG less likely to be due to portal hypertension. Ascites secondary to hepatic congestion. Will defer management to the cardiac team.   Plan:    Continue current diuretics and treatment for SBP  Cardiology following for management.   He can follow up with Dr. Maria outpatient   GI will sign off.    30  minutes of total time was spent providing patient care, including patient evaluation, reviewing documentation/test result, and .  Yanique Burk CNP  [facility]   Office

## 2025-06-18 NOTE — PROGRESS NOTES
"SPIRITUAL HEALTH SERVICES NOTE  [facility]; P2    Reason for Visit: admission screening response    SPIRITUAL ASSESSMENT  In good spirits  Anxious to leave the hospital; wants to deal with this outpatient    Patient/Family Understanding of Illness and Goals of Care - Met with Warren due to admission screening response. He is known to me from his last admission. Warren shares that he is anxious to get out of the hospital. He says that he came in due to difficulty breathing. He says \"We know a lot more this time than we did in my last hospitalization. I thought it was my liver, but they did another biopsy of that and it is fine. They are concerned that it is my heart valves and that something is misfiring. I am having an echocardiogram this afternoon.\" Warren denies any anxiety about the procedure. When I asked how he is feeling about all this new information, he says that he is having a hard time being patient with the process. He says \"I'm ready to get this show on the road.\" He denies any concerns about discharging and hopes to leave later today.     Distress and Loss - None identified    Strengths, Coping, and Resources - Warren has supportive parents and good support from his [employer] (see below).     Meaning, Beliefs, and Spirituality - Warren has notified [facility] of his admission and he is hopeful that someone from his [employer] will stop by to see him today. He welcomed a prayer for God's protection, guidance, and peace during the weeks ahead.     Plan of Care: No plans for follow-up at this time due to patient's anticipated discharge today/tomorrow.  available by patient or staff request.     Amaya Dee M.Div.    Office: [phone] (for non-urgent requests)  Please Vocera or page through [facility] for time-sensitive requests    "

## 2025-06-18 NOTE — PROGRESS NOTES
Right heart catheterization shows normal coronaries with mild to moderate pulm hypertension and volume overload.  Will utilize IV diuresis.  Pulm hypertension most likely WHO class III and no specific therapy just yet.

## 2025-06-18 NOTE — PROGRESS NOTES
RENAL PROGRESS NOTE    PLAN:   Cont Bid lasix and aldactone, tolerating further adjustment per primary and cards team for HF management  Renal will sign off, please reconsult if renal  concerns arise         ASSESSMENT:  Renal Insufficiency   sCR 1.19 on admission, modest increase in sCR to 1.14 following increased diuretics and IR procedure. Still appears to be at baseline. eGFR 80's.   Baseline sCR appears to be in the low 1's.  No proteinuria or microhematuria  He followed with [facility] in [id number] for proteinuria and HTN. Several episodes of sCR elevations 1.2-1.4 since December 2024. ARF in Jan 2025 with sCR up to 3.3 but recovered to 0.9-1.1. ARF on admission 5/15-5/26 for SBP and UGIB with acute blood loss, with sCR up to 7.23 but recovered to 1.05-1.2. RIVERA assumed to be due to hypovolemia, SBP, HoTN, and contrast exposure.  Not overly concerned about renal insufficiency, continue to monitor renal labs daily.   Increase diuretic therapy to BID due to rapid gains. Consider hold parameters for diuretics at discharge, discussed at length with pt tis admit. Strict I/O & daily weights.      Liver cirrhosis/Ascites/ SBP?   Requires therapeutic paracenteses two times a week for the last 3-4 months.   Hepatic venogram/pressures indicate liver is not the cause of his ascites,  Current/previously treated with Cefdinir and Bactrim PO   Peritoneum fluid ANC down trending.   Bactrim and Cefdinir from previous hospitalization, concern for SBP.  ID and GI following discussion of liver biopsy, results pending.   Sober ~ 9 months    Right vent dysfunction:  Cards consult, MICHAEL shows hypokinesis, suspect pulm HTN, and R hearet cath planned, ? 2/2 AVA, ? CPAP     DM2  Jardiance and Metformin, on hold   Sliding scale insulin, managed by hospitalists.      Anemia   Normocytic normochromic   Baseline hgb 9-10.   History of GI bleeds.      Hypothyroidism  On replacement therapy     HTN  BP controlled,   PTA lasix 20mg  daily, inc to 20mg BID   Lisinopril, on hold   Spironolactone 50mg daily (increased to BID)     AVA     COPD  On inhalers, managed by hospitalist.      HLD  On statins        HPI: Warren Jaramillo is a 44 year old male with a history of liver cirrhosis, DM2, anemia, GIB, hypothryoidism, HTN, AVA, HLD, and obesity admitted to the hospital for SOB secondary to cirrhosis/ascites.  H/o recent severe RIVERA on Mid May during admission for SBP.  sCr peaked 7's, with brisk recovery with SBP tx and transfusion. . sCR trending in the 1.0-1.1 range, was 1.2 on admission and better today 1.06 (eGFR 70-80s)   Had a therapeutic paracentesis 3 days ago but notes a 24lb weight gain since then.  Para this admission 5L today.  He gets twice weekly sana outpt. Says that after his RIVERA last month, his diuretics were reduced by 1/2.  Aldactone 100mg --->50 dailiy and Lasix 40mg cut to 20.  UO about the same and tries to restrict fluids to some degree.                Met pt with GI  NP Yanique Burk, who notes plans for transjugular liver bx.  Concerns for persistent SBP.  Had been on ceftrixone and PO cefdinir PTA then transitioned to Bactrim.  ID has been consulted.    SUBJECTIVE:  pt is feeling better today, says higher dose diuretics seem to be helping, less rapid accumulation of ascites.  Discussed bedside with Dr Crespo , hepatic venogram/ pressures patent, ? Ascites d/t HF and cards has been consulted,, awaiting their recommendations.  In light of renal stability, will sign off as diuretics can be managed by cards and primary team.  Pt asking to discharge today but advised to stay until cards POC outlined     OBJECTIVE:  Physical Exam   Temp: 98.2  F (36.8  C) Temp src: Oral BP: 124/66 Pulse: 80   Resp: 16 SpO2: 98 % O2 Device: BiPAP/CPAP    Vitals:    06/15/25 1938 06/16/25 1601 06/18/25 0710   Weight: 135.6 kg (299 lb) 128.8 kg (284 lb) 130.7 kg (288 lb 2.3 oz)     Vital Signs with Ranges  Temp:  [97.9  F (36.6  C)-98.2  F  (36.8  C)] 98.2  F (36.8  C)  Pulse:  [74-82] 80  Resp:  [10-16] 16  BP: (119-139)/(56-74) 124/66  FiO2 (%):  [21 %] 21 %  SpO2:  [94 %-98 %] 98 %  I/O last 3 completed shifts:  In: 710 [P.O.:710]  Out: 400 [Urine:400]    Temp (24hrs), Av.1  F (36.7  C), Min:97.9  F (36.6  C), Max:98.2  F (36.8  C)      Patient Vitals for the past 72 hrs:   Weight   25 0710 130.7 kg (288 lb 2.3 oz)   25 1601 128.8 kg (284 lb)   06/15/25 1938 135.6 kg (299 lb)     Intake/Output Summary (Last 24 hours) at 2025 0817  Last data filed at 2025 0800  Gross per 24 hour   Intake 710 ml   Output 950 ml   Net -240 ml       PHYSICAL EXAM:  General - Alert and oriented x3, appears comfortable, NAD  Cardiovascular - Regular rate and rhythm, no rub  Respiratory - Clear to auscultation bilaterally, no crackles or wheezes  Abd: +++ ascites  Extremities - No sig  lower extremity edema bilaterally  Skin: dry, intact, no rash, good turgor  Neuro:  Grossly intact, no focal deficits  MSK:  Grossly intact  Psych:  Normal affect   good UO   Net neutral wt creeping up likely d/t ascites accumulation. Otherwise looks euvolemic by exam     LABORATORY STUDIES:     Recent Labs   Lab 25  0621 25  0605 25  0942 06/15/25  2204 25  0552 25  0515   WBC 13.0* 13.7* 12.8* 14.5* 12.5* 13.7*   RBC 3.76* 3.67* 3.64* 3.69* 3.62* 3.52*   HGB 9.9* 9.7* 9.9* 9.9* 9.5* 9.5*   HCT 32.3* 30.9* 30.7* 31.1* 31.5* 30.5*    370 426 431 405 395       Basic Metabolic Panel:  Recent Labs   Lab 25  0621 25  2138 25  1631 25  1213 25  0831 25  0605 25  1223 25  0942 25  0936 06/15/25  2204 25  0623 25  0552 25  0805 25  0515     --   --   --   --  136  --  140  --  138  --  139  --  142   POTASSIUM 4.5  --   --   --   --  4.1  --  4.2  --  4.4  --  4.4  --  4.1   CHLORIDE 104  --   --   --   --  104  --  107  --  104  --  105  --  106   CO2  23  --   --   --   --  22  --  22  --  21*  --  25  --  24   BUN 18.0  --   --   --   --  19.2  --  19.7  --  18.5  --  18.6  --  20.0   CR 1.14  --   --   --   --  1.10  --  1.06  --  1.19*  --  1.05  --  1.15   * 208* 102* 124* 147* 141*   < > 155*   < > 122*   < > 117*   < > 113*   WES 8.7*  --   --   --   --  8.8  --  8.9  --  9.4  --  8.7*  --  9.0    < > = values in this interval not displayed.       INR  Recent Labs   Lab 06/13/25  0552 06/12/25  0515 06/11/25  0909   INR 1.18* 1.16* 1.15        Recent Labs   Lab Test 06/18/25  0621 06/17/25  0605 06/15/25  2204 06/13/25  0552 06/12/25  0515   INR  --   --   --  1.18* 1.16*   WBC 13.0* 13.7*   < > 12.5* 13.7*   HGB 9.9* 9.7*   < > 9.5* 9.5*    370   < > 405 395    < > = values in this interval not displayed.       Personally reviewed current labs      Evangelina [facility]  [facility]  [phone]

## 2025-06-18 NOTE — PLAN OF CARE
Goal Outcome Evaluation:    Problem: Fluid Volume Excess  Goal: Fluid Balance  Outcome: Progressing  Intervention: Monitor and Manage Hypervolemia  Recent Flowsheet Documentation  Taken 6/18/2025 2681 by Scot Moreira RN  Fluid/Electrolyte Management: (1500 ml) fluids restricted      Fluid restrictions maintained. Strict I/o. On p.o lasix  Independent with bathroom cares.   Voiding in hat in the toilet refuse urinal  Abdomin distended and round. Denies pain abdominal pain.  But  endorses pain from yesterday's procedure on his neck. Puncture site is EUGENIA. No concerns.

## 2025-06-18 NOTE — PROGRESS NOTES
[facility] Inpatient follow up       Patient:  Warren Jaramillo  Date of birth 1980, Medical record number [id number]  Date of Visit:  06/18/2025  Attending Physician: Brenden Polanco MD         Assessment and Recommendations:   Assessment:    Warren Jaramillo is a 44 year old male who was admitted on 6/15/25 with weight gain, abdominal distention and mild shortness of breath secondary to his abdominal distention. He had no fever on admission. His PMH is significant for alcohol abuse, T2DM, normocytic anemia, DVT, HTN, HLD, COPD, AVA and cirrhosis with ascites for which he requires frequent paracentesis with GI and ID follow up. He was admitted 5/15 for abdominal pain and GI bleed and developed SBP. He did not respond to ceftriaxone and was switched to meropenam on 5/22 which he again did not respond to. ID stated the needle could be reaching into a loculated collection as the patient is feeling fine and could be discharged. Patient was discharged on PO Levaquin and moved to Bactrim SBP  prophylaxis. He had ED visits every 2 days for abdominal distention. He was seen in the ED again on 6/13 and was discharged with PO Cefdinir for 7 days. Ascitic fluid analysis on 6/15 was negative for malignancy. Patient states he has been sober for 9 months. Patient was noted to have high ADA on previous lab testing which could indicate tuberculous ascites. Quantiferon gold was ordered and is in progress as of today on 6/17. Patient is low risk for TB and previous imaging is reviewed showing low likelihood of pulmonary TB. Today on 6/17, WBC is 13.7, compared to 12.8 on 6/16 and 14.5 on 6/15. Patient remains at a borderline high WBC but is clinically afebrile and doing well overall with no fever, chills, vomiting or concerning symptoms. Ascitic fluid culture obtained on 6/16 shows 4+ WBC, 3+ PMN and no organisms or growth. Patient underwent hepatic pressure gradient testing on 6/17. Today as  of 6/18, patient is doing well overall and denies fever, chills, abdominal pain or any concerning symptoms. His WBC count today is 13.0, a decrease from 13.7 on 6/17. TB Quantiferon testing still pending for review at follow-up.      Summary: Alcoholic liver cirrhosis complicated by ascites status post multiple paracenteses.  Multiple antibiotic exposures for SBP as above.  Currently on cefdinir with decreasing cell count.  Noted to have high ADA in the past.  Clinical significance of this is not clear but can be elevated in tuberculous ascites.  Patient is low risk however.  QuantiFERON gold in process.  16S ribosomal study negative recently.  Input from Dr. Bajwa is appreciated.    Active Problems:    Shortness of breath    Ascites due to alcoholic cirrhosis (H)    Recommendations:  Continue Cefdinir 300 mg BID 14 day course; transition to Bactrim prophylaxis   Monitor ascitic fluid cultures for growth; ascitic cell count at next paracentesis   Follow-up pending QuantiFERON gold.  No suspicion for pulmonary tuberculosis as stated above.     Thank you for letting us be part of the patient care team. We will sign off.    Marcela Todd MD        Interval History:     HPI:  The interval history was reviewed.     Warren Jaramillo is a 44 year old male who was admitted on 6/15/25 with weight gain, abdominal distention and mild shortness of breath secondary to his abdominal distention. He had no fever on admission. His PMH is significant for alcohol abuse, T2DM, normocytic anemia, DVT, HTN, HLD, COPD, AVA and cirrhosis with ascites for which he requires frequent paracentesis with GI and ID follow up. He was admitted 5/15 for abdominal pain and GI bleed and developed SBP. He did not respond to ceftriaxone and was switched to meropenam on 5/22 which he again did not respond to. ID stated the needle could be reaching into a loculated collection as the patient is feeling fine and could be discharged. Patient  was discharged on PO Levaquin and moved to Bactrim SBP prophylaxis. He had ED visits every 2 days for abdominal distention. He was seen in the ED again on  and was discharged with PO Cefdinir for 7 days.     Pertinent cultures include:  Culture Micro   Date Value Ref Range Status   2012 No Beta Streptococcus isolated  Final   2011   Final    No Salmonella, Shigella, Campylobacter, E. coli O157, Aeromonas, or Plesiomonas   isolated.   2011 No growth after 2 days  Final   2004   Final    No Salmonella, Shigella, Campylobacter or E coli 0:157 isolated.       Recent Inflammatory Biomarkers:   Recent Labs   Lab Test 25  0621 25  0605 25  0942 06/15/25  2204 25  0552 25  0515 25  0644 05/15/25  2011 02/23/25  2001 02/15/25  1851 25  0518 01/10/25  1211 25  1226 24  1847 24  0631 24  2350 23  1135 23  0415   PCAL  --   --   --   --   --   --   --  0.33  --  0.13  --  0.13  --  0.11  --  0.11  --  0.11*   WBC 13.0* 13.7* 12.8* 14.5* 12.5* 13.7*   < > 14.1*   < > 15.5*   < > 14.0*   < > 14.8*   < >  --    < > 16.5*    < > = values in this interval not displayed.            Review of Systems:   CONSTITUTIONAL:    Temp Max: Temp (24hrs), Av.9  F (36.6  C), Min:97.6  F (36.4  C), Max:98.2  F (36.8  C)   .  Negative except for findings in the HPI.           Current Medications (antimicrobials listed in bold):     Current Facility-Administered Medications   Medication Dose Route Frequency Provider Last Rate Last Admin    famotidine (PEPCID) tablet 20 mg  20 mg Oral BID Denise Frazier MD   20 mg at 25 0802    fluticasone-vilanterol (BREO ELLIPTA) 100-25 MCG/ACT inhaler 1 puff  1 puff Inhalation Daily Denise Frazier MD   1 puff at 25 0803    And    umeclidinium (INCRUSE ELLIPTA) 62.5 MCG/ACT inhaler 1 puff  1 puff Inhalation Daily Denise Frazier MD   1 puff at 25 0802    furosemide (LASIX) tablet  20 mg  20 mg Oral BID Evangelina Allen NP   20 mg at 06/18/25 0802    insulin aspart (NovoLOG) injection (RAPID ACTING)  1-7 Units Subcutaneous TID  Denise Frazier MD   1 Units at 06/17/25 0835    insulin aspart (NovoLOG) injection (RAPID ACTING)  1-5 Units Subcutaneous At Bedtime Denise Frazier MD   1 Units at 06/17/25 2145    levothyroxine (SYNTHROID/LEVOTHROID) half-tab 12.5 mcg  12.5 mcg Oral QAM  Denise Frazier MD   12.5 mcg at 06/18/25 0802    [Held by provider] lisinopril (ZESTRIL) tablet 10 mg  10 mg Oral Denise Barber MD        montelukast (SINGULAIR) tablet 10 mg  10 mg Oral Denise Barber MD   10 mg at 06/18/25 0804    OLANZapine (zyPREXA) tablet 10 mg  10 mg Oral At Bedtime Denise Frazier MD   10 mg at 06/17/25 2141    pantoprazole (PROTONIX) EC tablet 40 mg  40 mg Oral BID  Denise Frazier MD   40 mg at 06/18/25 0804    rosuvastatin (CRESTOR) tablet 10 mg  10 mg Oral At Bedtime Denise Frazier MD   10 mg at 06/17/25 2141    sodium chloride (PF) 0.9% PF flush 3 mL  3 mL Intracatheter Q8H Pending sale to [facility] Denise Frazier MD   3 mL at 06/17/25 2145    spironolactone (ALDACTONE) tablet 50 mg  50 mg Oral BID Evangelina Allen NP   50 mg at 06/18/25 0802    sulfamethoxazole-trimethoprim (BACTRIM DS) 800-160 MG per tablet 1 tablet  1 tablet Oral Daily Bernarda Crespo MD   1 tablet at 06/18/25 0802    traZODone (DESYREL) tablet 100 mg  100 mg Oral At Bedtime Denise Frazier MD   100 mg at 06/17/25 2141              Allergies:     Allergies   Allergen Reactions    Apricot Flavoring Agent (Non-Screening) Anaphylaxis    Banana Anaphylaxis     Throat swelling      Bees Anaphylaxis     Has an Epi pen    Wasp Venom Protein Shortness Of Breath     Other reaction(s): Respiratory Distress  Has an Epi pen        Methylphenidate Itching     Other reaction(s): Nightmares    Prunus      Other reaction(s): *Unknown            Physical Exam:   Vitals were reviewed  Patient Vitals for the past 24  hrs:   BP Temp Temp src Pulse Resp SpO2 Weight   06/18/25 0745 115/55 97.6  F (36.4  C) Oral 65 16 97 % --   06/18/25 0710 -- -- -- -- -- -- 130.7 kg (288 lb 2.3 oz)   06/17/25 2354 -- -- -- 80 16 98 % --   06/17/25 2329 124/66 98.2  F (36.8  C) Oral 74 16 98 % --   06/17/25 1540 119/56 97.9  F (36.6  C) Oral 80 16 97 % --   06/17/25 1500 136/67 -- -- 82 14 95 % --   06/17/25 1455 136/69 -- -- 82 15 94 % --   06/17/25 1450 139/71 -- -- 80 15 94 % --   06/17/25 1445 134/74 -- -- 79 10 95 % --       Physical Examination:  Gen: Pleasant in no acute distress.  HEENT: NCAT. EOMI. PERRL.  Neck: No bruit, JVD or thyromegaly.  Lungs: Clear to ascultation bilat with no crackles or wheezes.  Card: RRR. NSR. No RMG. Peripheral pulses present and symmetric. No edema.  Abd: Soft NT ND. No mass. Normal bowel sounds.  Skin: No rash.  Extr: No edema.  Neuro: Alert and oriented to place time and person. Cranial nerves II to XII intact. Motor and sensory intact. Normal gait.            Laboratory Data:   ID Labs:  Microbiology labs:    No lab results found.  Recent Labs   Lab Test 06/18/25 0621 06/17/25  0605 06/16/25  0942 06/15/25  2204 06/13/25  0552 06/12/25  0515   WBC 13.0* 13.7* 12.8* 14.5* 12.5* 13.7*     Recent Labs   Lab Test 06/18/25  0621 06/17/25  0605 06/16/25  0942 06/15/25  2204   CR 1.14 1.10 1.06 1.19*   GFRESTIMATED 81 85 89 77       Hematology Studies  Recent Labs   Lab Test 06/18/25 0621 06/17/25  0605 06/16/25  0942 06/15/25  2204 06/13/25  0552 06/12/25  0515 06/11/25  0515 06/10/25  1933 06/07/25  1923   WBC 13.0* 13.7* 12.8* 14.5* 12.5* 13.7* 12.9* 16.4* 15.6*   ANEU  --   --   --  10.0* 8.4* 9.4* 8.8* 11.8* 11.1*   AEOS  --   --   --  0.6 0.7 0.7 0.7 0.7 0.7   HGB 9.9* 9.7* 9.9* 9.9* 9.5* 9.5* 9.4* 9.9* 9.9*   HCT 32.3* 30.9* 30.7* 31.1* 31.5* 30.5* 30.5* 31.8* 31.5*    370 426 431 405 395 419 428 402       Metabolic  Recent Labs   Lab Test 06/18/25  0621 06/17/25  0605 06/16/25  0942    136  140   BUN 18.0 19.2 19.7   CO2 23 22 22   CR 1.14 1.10 1.06   GFRESTIMATED 81 85 89       Hepatic Studies  Recent Labs   Lab Test 06/16/25  0942 06/15/25  2204 06/13/25  0552   BILITOTAL 0.2 0.2 0.3   ALKPHOS 253* 261* 201*   ALBUMIN 3.9 4.1 3.6   AST 14 25 15   ALT 27 32 25       ImmunologlobulinsNo lab results found.         Imaging Data:   Reviewed   Hepatic venous gradient     IMPRESSION:   1. Hepatic venogram demonstrates patency of the hepatic veins.  2. Pressure measurements:  Wedged hepatic: 24 mmHg  Free hepatic: 21 mmHg  IVC: 21 mmHg  Right atrium: 22 mmHg  Hepatic venous pressure gradient: 3 mmHg     PLAN:  1. Patient to be recovered on the floor.  2. HOB up to 45 degrees x 1 hour then activity as tolerated.

## 2025-06-18 NOTE — PLAN OF CARE
Goal Outcome Evaluation:      Problem: Fluid Volume Excess  Goal: Fluid Balance  Outcome: Progressing     Problem: Adult Inpatient Plan of Care  Goal: Absence of Hospital-Acquired Illness or Injury  Intervention: Identify and Manage Fall Risk  Recent Flowsheet Documentation  Taken 6/17/2025 1602 by Scot Moreira RN  Safety Promotion/Fall Prevention: safety round/check completed  Taken 6/17/2025 0900 by Scot Moreira RN  Safety Promotion/Fall Prevention: safety round/check completed  Intervention: Prevent Infection  Recent Flowsheet Documentation  Taken 6/17/2025 1602 by Scot Moreira RN  Infection Prevention:   equipment surfaces disinfected   rest/sleep promoted  Taken 6/17/2025 0900 by Scot Moreira RN  Infection Prevention:   equipment surfaces disinfected   rest/sleep promoted   Pt Pt is alert and oriented x 4. On room air. Vss.  Had hepatic vienogram done in IR. Came back from procedure awake and answering questions appropriately. Puncture site is covered with band aid c/intact. No bleeding noted. Denies pain.

## 2025-06-18 NOTE — PROGRESS NOTES
"  Interventional Radiology - Progress Note  Inpatient - [facility]  06/18/2025     Patient is s/p hepatic venogram with pressure measurements 6/17.     Per chart review, patient continues to be alert and oriented. VSS. Afebrile. Procedure neck site is CDI - reporting pain this morning. Tylenol and Oxycodone PRN.     /55 (BP Location: Left arm)   Pulse 65   Temp 97.6  F (36.4  C) (Oral)   Resp 16   Ht 1.651 m (5' 5\")   Wt 130.7 kg (288 lb 2.3 oz)   SpO2 97%   BMI 47.95 kg/m      IMAGING:  [facility]  LOCATION: [facility]  DATE: 6/17/2025     PROCEDURE: HEPATIC VENOGRAM WITH VENOUS PRESSURE GRADIENT MEASURMENT     INTERVENTIONAL RADIOLOGIST: Aldair Cervantes MD.     INDICATION: 44-year-old male with refractory ascites. Liver biopsy shows venous outflow obstruction. Patient presents for pressure measurements.     CONSENT: The risks, benefits and alternatives of transjugular liver biopsy were discussed with the patient in detail. All questions were answered. Informed consent was given to proceed with the procedure.     MODERATE SEDATION: Versed 1.5 mg IV; Fentanyl 75 mcg IV.  During the timeout, immediately prior to the administration of medications, the patient was reassessed for adequacy to receive conscious sedation. Under physician supervision, Versed and fentanyl   were administered for moderate sedation. Pulse oximetry, heart rate and blood pressure were continuously monitored by an independent trained observer. The physician spent 15 minutes of face-to-face sedation time with the patient.     CONTRAST: 10 mL Omnipaque 350  ANTIBIOTICS: None.  ADDITIONAL MEDICATIONS: None.     FLUOROSCOPIC TIME: 2.2 minutes.  RADIATION DOSE: Air Kerma: 98 mGy.     COMPLICATIONS: No immediate complications.     STERILE BARRIER TECHNIQUE: Maximum sterile barrier technique was used. Cutaneous antisepsis was performed at the operative site with application of 2% " chlorhexidine and large sterile drape. Prior to the procedure, the  and assistant performed   hand hygiene and wore hat, mask, sterile gown, and sterile gloves during the entire procedure.     PROCEDURE:  Limited right neck ultrasound demonstrated a patent right internal jugular vein; images saved of the permanent patient medical record. Under ultrasound guidance the right internal jugular vein was accessed using micropuncture needle; images   saved of the permanent patient medical record. Access was secured using a 10 Spanish 45cm sheath.     A multipurpose catheter and wire were utilized to select the right hepatic vein. Contrast was injected to confirm positioning The. The catheter was exchanged for a wedge balloon and the balloon was inflated. A gentle injection was performed confirming   occlusion of the hepatic vein. Next, wedge hepatic, free hepatic, IVC, and right atrial pressures were obtained; results as below. The catheter was then removed.     The sheath was removed with manual pressure applied until hemostasis.                                                                      IMPRESSION:   1. Hepatic venogram demonstrates patency of the hepatic veins.  2. Pressure measurements:  Wedged hepatic: 24 mmHg  Free hepatic: 21 mmHg  IVC: 21 mmHg  Right atrium: 22 mmHg  Hepatic venous pressure gradient: 3 mmHg     PLAN:  1. Patient to be recovered on the floor.  2. HOB up to 45 degrees x 1 hour then activity as tolerated.    LABS:  Recent Labs   Lab 06/18/25  0621 06/17/25  0605 06/16/25  0942 06/15/25  2204 06/13/25  0552 06/12/25  0515 06/12/25  0515 06/11/25  0909   WBC 13.0* 13.7* 12.8* 14.5* 12.5*   < > 13.7*  --    HGB 9.9* 9.7* 9.9* 9.9* 9.5*   < > 9.5*  --     370 426 431 405   < > 395  --    INR  --   --   --   --  1.18*  --  1.16* 1.15   CR 1.14 1.10 1.06 1.19* 1.05   < > 1.15  --    BILITOTAL  --   --  0.2 0.2 0.3  --  0.3  --    ALKPHOS  --   --  253* 261* 201*  --  203*  --    AST   --   --  14 25 15  --  15  --    ALT  --   --  27 32 25  --  23  --     < > = values in this interval not displayed.     A: 44 year old male with a PMH of COPD, DM II, HTN, sleep apnea, Rojas disease, cirrhosis with ascites requiring frequent paracenteses. Recent admission 6/11-6/13 2/2 decompensated liver disease with concern for persistent SBP with ANC. Admitted to [facility] on 6/16 2/2 abdominal distension and SOB. Underwent paracentesis 6/16. GI and ID consulted. Previous liver biopsy consistent with venous outflow obstruction. Now, s/p IR hepatic venogram with pressure measurements.     P:    - Continue to monitor procedure site   - Discharge instructions in AVS   - Okay to shower     Total time spent on the date of the encounter: 35 minutes.    ABBIE Blackmon CNP  Interventional Radiology  [phone]

## 2025-06-19 VITALS
TEMPERATURE: 98.8 F | RESPIRATION RATE: 16 BRPM | WEIGHT: 278.6 LBS | DIASTOLIC BLOOD PRESSURE: 57 MMHG | OXYGEN SATURATION: 98 % | SYSTOLIC BLOOD PRESSURE: 113 MMHG | BODY MASS INDEX: 46.42 KG/M2 | HEART RATE: 81 BPM | HEIGHT: 65 IN

## 2025-06-19 LAB
ANION GAP SERPL CALCULATED.3IONS-SCNC: 12 MMOL/L (ref 7–15)
BACTERIA FLD CULT: ABNORMAL
BACTERIA FLD CULT: NORMAL
BUN SERPL-MCNC: 19 MG/DL (ref 6–20)
CALCIUM SERPL-MCNC: 8.8 MG/DL (ref 8.8–10.4)
CHLORIDE SERPL-SCNC: 102 MMOL/L (ref 98–107)
CREAT SERPL-MCNC: 1.23 MG/DL (ref 0.67–1.17)
EGFRCR SERPLBLD CKD-EPI 2021: 74 ML/MIN/1.73M2
ERYTHROCYTE [DISTWIDTH] IN BLOOD BY AUTOMATED COUNT: 15.4 % (ref 10–15)
GLUCOSE BLDC GLUCOMTR-MCNC: 123 MG/DL (ref 70–99)
GLUCOSE BLDC GLUCOMTR-MCNC: 123 MG/DL (ref 70–99)
GLUCOSE BLDC GLUCOMTR-MCNC: 124 MG/DL (ref 70–99)
GLUCOSE BLDC GLUCOMTR-MCNC: 152 MG/DL (ref 70–99)
GLUCOSE BLDC GLUCOMTR-MCNC: 163 MG/DL (ref 70–99)
GLUCOSE SERPL-MCNC: 121 MG/DL (ref 70–99)
GRAM STAIN RESULT: ABNORMAL
HCO3 SERPL-SCNC: 24 MMOL/L (ref 22–29)
HCT VFR BLD AUTO: 34.5 % (ref 40–53)
HGB BLD-MCNC: 10.3 G/DL (ref 13.3–17.7)
MCH RBC QN AUTO: 25.6 PG (ref 26.5–33)
MCHC RBC AUTO-ENTMCNC: 29.9 G/DL (ref 31.5–36.5)
MCV RBC AUTO: 86 FL (ref 78–100)
PLATELET # BLD AUTO: 449 10E3/UL (ref 150–450)
POTASSIUM SERPL-SCNC: 4.6 MMOL/L (ref 3.4–5.3)
RBC # BLD AUTO: 4.03 10E6/UL (ref 4.4–5.9)
SODIUM SERPL-SCNC: 138 MMOL/L (ref 135–145)
WBC # BLD AUTO: 13.7 10E3/UL (ref 4–11)

## 2025-06-19 PROCEDURE — 250N000013 HC RX MED GY IP 250 OP 250 PS 637: Performed by: NURSE PRACTITIONER

## 2025-06-19 PROCEDURE — 85027 COMPLETE CBC AUTOMATED: CPT

## 2025-06-19 PROCEDURE — 250N000013 HC RX MED GY IP 250 OP 250 PS 637

## 2025-06-19 PROCEDURE — 250N000012 HC RX MED GY IP 250 OP 636 PS 637

## 2025-06-19 PROCEDURE — 250N000013 HC RX MED GY IP 250 OP 250 PS 637: Performed by: STUDENT IN AN ORGANIZED HEALTH CARE EDUCATION/TRAINING PROGRAM

## 2025-06-19 PROCEDURE — 250N000009 HC RX 250: Performed by: INTERNAL MEDICINE

## 2025-06-19 PROCEDURE — 250N000011 HC RX IP 250 OP 636: Performed by: INTERNAL MEDICINE

## 2025-06-19 PROCEDURE — 120N000001 HC R&B MED SURG/OB

## 2025-06-19 PROCEDURE — 80048 BASIC METABOLIC PNL TOTAL CA: CPT

## 2025-06-19 PROCEDURE — 99232 SBSQ HOSP IP/OBS MODERATE 35: CPT | Mod: GC

## 2025-06-19 PROCEDURE — 99233 SBSQ HOSP IP/OBS HIGH 50: CPT | Performed by: INTERNAL MEDICINE

## 2025-06-19 PROCEDURE — 36415 COLL VENOUS BLD VENIPUNCTURE: CPT

## 2025-06-19 RX ADMIN — FUROSEMIDE 20 MG: 10 INJECTION, SOLUTION INTRAMUSCULAR; INTRAVENOUS at 06:39

## 2025-06-19 RX ADMIN — ROSUVASTATIN 10 MG: 10 TABLET, FILM COATED ORAL at 22:05

## 2025-06-19 RX ADMIN — SPIRONOLACTONE 50 MG: 25 TABLET, FILM COATED ORAL at 15:56

## 2025-06-19 RX ADMIN — SPIRONOLACTONE 50 MG: 25 TABLET, FILM COATED ORAL at 08:29

## 2025-06-19 RX ADMIN — ACETAMINOPHEN 650 MG: 325 TABLET ORAL at 19:42

## 2025-06-19 RX ADMIN — FAMOTIDINE 20 MG: 20 TABLET, FILM COATED ORAL at 08:29

## 2025-06-19 RX ADMIN — PANTOPRAZOLE SODIUM 40 MG: 20 TABLET, DELAYED RELEASE ORAL at 15:56

## 2025-06-19 RX ADMIN — UMECLIDINIUM 1 PUFF: 62.5 AEROSOL, POWDER ORAL at 08:27

## 2025-06-19 RX ADMIN — OLANZAPINE 10 MG: 2.5 TABLET, FILM COATED ORAL at 22:04

## 2025-06-19 RX ADMIN — INSULIN ASPART 1 UNITS: 100 INJECTION, SOLUTION INTRAVENOUS; SUBCUTANEOUS at 16:02

## 2025-06-19 RX ADMIN — TRAZODONE HYDROCHLORIDE 100 MG: 50 TABLET ORAL at 22:05

## 2025-06-19 RX ADMIN — SULFAMETHOXAZOLE AND TRIMETHOPRIM 1 TABLET: 800; 160 TABLET ORAL at 08:29

## 2025-06-19 RX ADMIN — FLUTICASONE FUROATE AND VILANTEROL TRIFENATATE 1 PUFF: 100; 25 POWDER RESPIRATORY (INHALATION) at 08:27

## 2025-06-19 RX ADMIN — MONTELUKAST 10 MG: 10 TABLET, FILM COATED ORAL at 08:29

## 2025-06-19 RX ADMIN — LEVOTHYROXINE SODIUM 12.5 MCG: 0.03 TABLET ORAL at 06:39

## 2025-06-19 RX ADMIN — BUMETANIDE 1 MG/HR: 0.25 INJECTION INTRAMUSCULAR; INTRAVENOUS at 10:28

## 2025-06-19 RX ADMIN — PANTOPRAZOLE SODIUM 40 MG: 20 TABLET, DELAYED RELEASE ORAL at 06:39

## 2025-06-19 RX ADMIN — FAMOTIDINE 20 MG: 20 TABLET, FILM COATED ORAL at 19:42

## 2025-06-19 ASSESSMENT — ACTIVITIES OF DAILY LIVING (ADL)
ADLS_ACUITY_SCORE: 41
ADLS_ACUITY_SCORE: 38
ADLS_ACUITY_SCORE: 41
ADLS_ACUITY_SCORE: 38
ADLS_ACUITY_SCORE: 41
ADLS_ACUITY_SCORE: 38
ADLS_ACUITY_SCORE: 41
ADLS_ACUITY_SCORE: 41
ADLS_ACUITY_SCORE: 38
ADLS_ACUITY_SCORE: 38
ADLS_ACUITY_SCORE: 41
ADLS_ACUITY_SCORE: 38
ADLS_ACUITY_SCORE: 38
ADLS_ACUITY_SCORE: 41
ADLS_ACUITY_SCORE: 38
ADLS_ACUITY_SCORE: 41
ADLS_ACUITY_SCORE: 41
ADLS_ACUITY_SCORE: 38

## 2025-06-19 NOTE — PROGRESS NOTES
CARDIOLOGY PROGRESS NOTE      Assessment/Plan:  1.  Right ventricular dysfunction-review of MICHAEL does show to me some right-sided hypokinesis with right atrial enlargement.  This is also consistent with right-sided pressures with elevation of free hepatic pressure 21 mmHg with IVC pressure 21 mmHg.  Right atrium is 22 mmHg.  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  Will treat volume overload.  2.  Shortness of breath-based on right-sided pressures suspect volume overload.  3.  Volume overload-right atrial pressure mean of 25, right ventricular pressure 75/12 with pulmonary artery pressure of 53/34 and pulm and Kepley wedge pressure of 24 but yet left ventricular end-diastolic pressure 33.  Doubt constrictive pericarditis, suspect this is volume overload and IV diuresis with Bumex intravenously for 24 hours.    4.  Ascites due to alcoholic cirrhosis (H)-has several admissions for spontaneous bacterial peritonitis as well as ascites drainage.  Right heart catheterization as above, doubt edgardo-pulmonary hypertension..  5.  Remote alcohol abuse-stable currently.  6.  Acquired hypothyroidism-on Synthroid with normal T4 of 0.94.    Plan:  1.  Try IV Bumex for 24 hours.    Discharge Plannin..  Discharge needs IV diuresis.  2.  Can follow with me his primary cardiologist.     LOS: 3 days     Subjective:  Interval History:    44-year-old white gentleman being seen on fourth day of hospitalization.  Overall feels well, better than admission.  No syncope, presyncope, chest pains, palpitations, shortness of breath, PND, thumping or significant peripheral edema or abdominal bloating.    Medications  Current Facility-Administered Medications   Medication Dose Route Frequency Provider Last Rate Last Admin    famotidine (PEPCID) tablet 20 mg  20 mg Oral BID Denise Frazier MD   20 mg at 25 0829    fluticasone-vilanterol (BREO ELLIPTA) 100-25 MCG/ACT  inhaler 1 puff  1 puff Inhalation Daily Denise Frazier MD   1 puff at 06/19/25 0827    And    umeclidinium (INCRUSE ELLIPTA) 62.5 MCG/ACT inhaler 1 puff  1 puff Inhalation Daily Denise Frazier MD   1 puff at 06/19/25 0827    furosemide (LASIX) injection 20 mg  20 mg Intravenous Q8H Nidhi Farfan MD   20 mg at 06/19/25 0639    insulin aspart (NovoLOG) injection (RAPID ACTING)  1-7 Units Subcutaneous TID  Denise Frazier MD   1 Units at 06/17/25 0835    insulin aspart (NovoLOG) injection (RAPID ACTING)  1-5 Units Subcutaneous At Bedtime Denise Frazier MD   1 Units at 06/17/25 2145    levothyroxine (SYNTHROID/LEVOTHROID) half-tab 12.5 mcg  12.5 mcg Oral QAM  Denise Frazier MD   12.5 mcg at 06/19/25 0639    [Held by provider] lisinopril (ZESTRIL) tablet 10 mg  10 mg Oral ERONM Denise Frazier MD        montelukast (SINGULAIR) tablet 10 mg  10 mg Oral QA Denise Frazier MD   10 mg at 06/19/25 0829    OLANZapine (zyPREXA) tablet 10 mg  10 mg Oral At Bedtime Denise Frazier MD   10 mg at 06/18/25 2107    pantoprazole (PROTONIX) EC tablet 40 mg  40 mg Oral BID  Denise Frazier MD   40 mg at 06/19/25 0639    rosuvastatin (CRESTOR) tablet 10 mg  10 mg Oral At Bedtime Denise Frazier MD   10 mg at 06/18/25 2107    sodium chloride (PF) 0.9% PF flush 3 mL  3 mL Intracatheter Q8H MORENITA Denise Frazier MD   3 mL at 06/18/25 2244    spironolactone (ALDACTONE) tablet 50 mg  50 mg Oral BID Evangelina Allen NP   50 mg at 06/19/25 0829    sulfamethoxazole-trimethoprim (BACTRIM DS) 800-160 MG per tablet 1 tablet  1 tablet Oral Daily Bernrada Crespo MD   1 tablet at 06/19/25 0829    traZODone (DESYREL) tablet 100 mg  100 mg Oral At Bedtime Denise Frazier MD   100 mg at 06/18/25 9889     Objective:   Vital signs in last 24 hours:  Temp:  [96.8  F (36  C)-98.9  F (37.2  C)] 98.9  F (37.2  C)  Pulse:  [66-82] 73  Resp:  [16] 16  BP: (101-148)/(52-74) 122/58  SpO2:  [95 %-99 %] 98 %      Physical  "Exam:  /58 (BP Location: Left arm)   Pulse 73   Temp 98.9  F (37.2  C) (Oral)   Resp 16   Ht 1.651 m (5' 5\")   Wt 126.4 kg (278 lb 9.6 oz)   SpO2 98%   BMI 46.36 kg/m      General Appearance:    Alert, cooperative, no distress, appears stated age   Head:    Normocephalic, without obvious abnormality, atraumatic   Throat:   Lips, mucosa, and tongue normal; teeth and gums normal   Neck:   Supple, symmetrical, trachea midline, no adenopathy;        thyroid:  No enlargement/tenderness/nodules; no carotid    bruit, 2 jaw 30 degrees JVD   Back:     Symmetric, no curvature, ROM normal, no CVA tenderness   Lungs:     Clear to auscultation bilaterally, respirations unlabored   Chest wall:    No tenderness or deformity   Heart:    Regular rate and rhythm, S1 and S2 normal, no murmur, rub   or gallop   Abdomen:     Soft, non-tender, bowel sounds active all four quadrants,     no masses, distended   Extremities:   Normal, atraumatic, no cyanosis or edema   Pulses:   2+ and symmetric all extremities   Skin:   Skin color, texture, turgor normal, no rashes or lesions     Cardiographics:      ECG: Personally reviewed by myself shows sinus rhythm, low voltage, possible Q wave MI septal pattern.    Echocardiogram:   Limited imaging done due to poor patient tolerance and hypotension limiting  further sedation  Trivial pericardial effusion  The left ventricle is normal in size.  The visual ejection fraction is 60-65%.  Regional wall motion is grossly normal but endocardial border definition is limited  Normal right ventricle size and systolic function.  No hemodynamically significant valve lesions      Lab Results:   Recent Labs   Lab 06/19/25  0649   WBC 13.7*   HGB 10.3*   HCT 34.5*        Recent Labs   Lab 06/19/25  0649      CO2 24   BUN 19.0   .       No results found for: \"CKTOTAL\", \"CKMB\", \"TROPONINI\"        "

## 2025-06-19 NOTE — PLAN OF CARE
Problem: Adult Inpatient Plan of Care  Goal: Absence of Hospital-Acquired Illness or Injury  Outcome: Progressing  Intervention: Identify and Manage Fall Risk  Recent Flowsheet Documentation  Taken 6/19/2025 1530 by Colette Miranda RN  Safety Promotion/Fall Prevention: nonskid shoes/slippers when out of bed  Intervention: Prevent Skin Injury  Recent Flowsheet Documentation  Taken 6/19/2025 1530 by Colette Miranda RN  Body Position: position changed independently     Problem: Fluid Volume Excess  Goal: Fluid Balance  Outcome: Progressing     Problem: Pain Acute  Goal: Optimal Pain Control and Function  Outcome: Progressing  Intervention: Prevent or Manage Pain  Recent Flowsheet Documentation  Taken 6/19/2025 1530 by Colette Miranda RN  Medication Review/Management: medications reviewed       Goal Outcome Evaluation:    A&Ox4. Independent. VSS. Room air. Denies shortness of breath. Reports 8/10 headache, right hand tingling, and right neck swelling. Pain managed with prn tylenol. Notified provider of patients pain, hand tingling, and right neck swelling. Provider aware and continue to monitor for evolving symptoms.  Abdomen distended. Voiding. No bowel movement this shift.

## 2025-06-19 NOTE — PLAN OF CARE
Goal Outcome Evaluation:  Problem: Fluid Volume Excess  Goal: Fluid Balance  6/18/2025 1911 by Scot Moreira RN  Outcome: Progressing  6/18/2025 1052 by Scot Moreira RN  Outcome: Progressing  Intervention: Monitor and Manage Hypervolemia  Recent Flowsheet Documentation  Taken 6/18/2025 1700 by Scot Moreira RN  Fluid/Electrolyte Management: (1500 ml) fluids restricted  Taken 6/18/2025 0815 by Scot Moreira RN  Fluid/Electrolyte Management: (1500 ml) fluids restricted   Pt came back from cath Lab after special procedure angiogram.   Vss. CMS intact, denies pain. Puncture site covered and in intact.

## 2025-06-19 NOTE — PLAN OF CARE
Problem: Adult Inpatient Plan of Care  Goal: Optimal Comfort and Wellbeing  Outcome: Progressing  Goal: Readiness for Transition of Care  Outcome: Progressing   Goal Outcome Evaluation:         Pt is A/O x4. VSS. Complaints of neck pain, tylenol given. Slept between cares. Able to make needs known appropriately.

## 2025-06-19 NOTE — PROGRESS NOTES
[facility]    Progress Note - Hospitalist Service       Date of Admission:  6/15/2025    Assessment & Plan   Warren Jaarmillo is a 44 year old male admitted on 6/15/2025. He has a history of cirrhosis with ascites requiring frequent paracentesis, T2DM, HTN, AVA, COPD and is admitted for abdominal distention and SOB s/p paracentesis 6/16.     Recurrent ascites, requiring frequent paracentesis  Right ventricular dysfunction  Hx of SBP  Patient has history of cirrhosis, likely secondary to alcohol use and MASLD recently requiring more frequent paracentesis now twice weekly over the past 3-4 months.  Recently hospitalized from 6/11 - 6/13 for decompensated liver disease with concern for persistent SBP with ANC.  Discharged on cefdinir course with plan to return to Bactrim for further prophylaxis. Successful paracentesis performed 6/16 with 5L off. Fluid studies show elevated ANC, but downtrending from previous. Had had extensive workup with GI to determine etiology of ascites. Repeat transjugular liver biopsy and hepatic venogram showing pressures consistent with right heart failure vs. Cirrhosis. Recent MICHAEL shows right sided hypokinesis with right atrial enlargement. Right heart cath on 6/19 shows normal coronaries with mild to moderate pulmonary hypertension and volume overload.   - s/p US guided paracentesis 6/16  - ascitic fluid studies showing ANC elevated but downtrending from previous  - cardiology consult. Appreciate recs.    - start Bumex infusion 0.25mg/mL   - strict I&Os   - GI consulted, appreciate recs. Signed off 6/18  - nephrology consult. Appreciate recs. Signed off 6/18  - increase PTA Lasix, spironolactone   - daily BMP to monitor kidney function   - ID consult. Appreciate recs.    - planning for Quantiferon gold to rule out tuberculous ascites   - completed course of Cefdinir 6/17 for recent SBP, transition to oral Bactrim     Pulmonary Hypertension  Most likely WHO  "class III due to hx of AVA.   - CPAP when sleeping   - would benefit from pulmonary hypertension clinic referral on discharge    RIVERA   Cr mildly elevated to 1.19 on admission, initially returned to baseline. Now increased this AM likely due to initiation of aggressive diuretics   - nephrologist consult. Signed off 6/18  - trend BMP     Chronic normocytic Anemia   Hx of GIB  Hemoglobin 9.9, close to baseline.  No symptoms or signs of active bleeding.  - Daily CBC     T2DM  A1c 6.7. PTA metformin and jardiance.   - hold PTA meds  - MDSSI     Chronic conditions:   History of Provoked DVT: completed 3 months of apixaban   HTN: Continue PTA spironolactone  Insomnia: Continue PTA trazodone  Mental health: continue PTA Zyprexa  GERD: continue PTA Protonix  HLD: Continue PTA rosuvastatin  COPD: Continue PTA inhalers         Diet: Fluid restriction 1500 ML FLUID  Combination Diet 2 gm NA Diet    DVT Prophylaxis: Pneumatic Compression Devices  Khan Catheter: Not present  Fluids: None  Lines: PRESENT           Cardiac Monitoring: None  Code Status: Full Code      Clinically Significant Risk Factors                   # Hypertension: Noted on problem list  # Acute heart failure with preserved ejection fraction: heart failure noted on problem list, last echo with EF >50%, and receiving IV diuretics          # DMII: A1C = 6.7 % (Ref range: <5.7 %) within past 6 months, PRESENT ON ADMISSION  # Morbid Obesity: Estimated body mass index is 46.36 kg/m  as calculated from the following:    Height as of this encounter: 1.651 m (5' 5\").    Weight as of this encounter: 126.4 kg (278 lb 9.6 oz)., PRESENT ON ADMISSION       # Financial/Environmental Concerns: none         Social Drivers of Health   Housing Stability: High Risk (6/16/2025)    Housing Stability     Do you have housing? : Yes     Are you worried about losing your housing?: Yes   Tobacco Use: High Risk (6/6/2025)    Received from [facility]    " Patient History     Smoking Tobacco Use: Every Day     Smokeless Tobacco Use: Never   Financial Resource Strain: Low Risk  (6/16/2025)    Financial Resource Strain     Within the past 12 months, have you or your family members you live with been unable to get utilities (heat, electricity) when it was really needed?: No   Recent Concern: Financial Resource Strain - High Risk (6/11/2025)    Financial Resource Strain     Within the past 12 months, have you or your family members you live with been unable to get utilities (heat, electricity) when it was really needed?: Yes    Received from [facility]    Social Connections         Disposition Plan     Medically Ready for Discharge: Anticipated Tomorrow         The patient's care was discussed with the Attending Physician, Dr. Polanco.    Bernarda Crespo MD  Hospitalist Service  [facility]  Securely message with [employer] (more info)  Text page via [facility] Paging/Directory   ______________________________________________________________________    Interval History   No acute events overnight. Patient reports some left sided neck pain due to procedure yesterday. Denies SOB. Reports wearing while sleeping, even when napping.     Physical Exam   Vital Signs: Temp: 98.9  F (37.2  C) Temp src: Oral BP: 122/58 Pulse: 73   Resp: 16 SpO2: 98 % O2 Device: BiPAP/CPAP Oxygen Delivery: 2 LPM  Weight: 278 lbs 9.6 oz    GEN: Pleasant male, in no acute distress.   HEENT: Normocephalic, atraumatic. Extraoccular eye movements intact. Anicteric sclera. Moist mucous membranes.   NECK: Supple. No cervical or supraclavicular adenopathy.   PULM: Non-labored breathing. No use of accessory muscles. Clear to ausculation bilaterally. No wheezes or crackles.   CV: Regular rate and rhythm. Normal S1, S2. No rubs, murmurs, or gallops.    ABDOMEN: Normoactive bowel sounds. Non-tender to palpation. Distended, no fluid wave  EXTREMITES:  No  clubbing, cyanosis, or edema.    NEURO:  Awake. Oriented to person, place, time and situation. Cranial nerves 2-12 grossly intact. Moving all extremities.    PSYCH: Calm. Appropriate affect, insight, judgment.       Medical Decision Making           Data     I have personally reviewed the following data over the past 24 hrs:    13.7 (H)  \   10.3 (L)   / 449     138 102 19.0 /  121 (H)   4.6 24 1.23 (H) \       Imaging results reviewed over the past 24 hrs:   Recent Results (from the past 24 hours)   Cardiac Catheterization    Narrative    Images from the original result were not included.  Warren Jaramillo is a 44 year old old male with RV dysfunction,   alcoholic cirrhosis who is here for angio/LHC/RHC.    - non-obstructive CAD  - elevated R- and L-sided filling pressures, mod-severe pHTN c/w pulmonary   venous (due to L-sided HF) pattern; preserved CO/CI by Haritha  - furosemide 40mg Ivx1 in the cath lab, further diuresis per primary team  - continue aggressive risk factor modification          Findings:  LM:no obstruction  LAD:tapers distally, no obstruction  Lcx:mildly irregular  RCA:dominant, no obstruction    LVEDP:33    RHC:  RA:25   RV:75/12  PA:53/34(42)  PCWP:24    SvO2:58.6  Ao Sat: 95    CO/CI:  Haritha:5.88/2.55    Access:  R Radial artery  R Brachial vein    Closure:   Vasc Band  Manual

## 2025-06-19 NOTE — PLAN OF CARE
Problem: Adult Inpatient Plan of Care  Goal: Plan of Care Review  Description: The Plan of Care Review/Shift note should be completed every shift.  The Outcome Evaluation is a brief statement about your assessment that the patient is improving, declining, or no change.  This information will be displayed automatically on your shift  note.  Outcome: Progressing  Flowsheets (Taken 6/19/2025 3060)  Plan of Care Reviewed With: patient   Goal Outcome Evaluation:      Plan of Care Reviewed With: patient        Patient started on IV bumex.  VS stable continue to monitor patient.  Cardiology monitoring.    Patient independent in room.  Steady gait when up.  Patient denies SOB and weight stable.  Continue to monitor patient.

## 2025-06-19 NOTE — UTILIZATION REVIEW
Admission Status; Secondary Review Determination   Under the authority of the Utilization Management Committee, the utilization review process indicated a secondary review on Warren Jaramillo. The review outcome is based on review of the medical records, discussions with staff, and applying clinical experience noted on the date of the review.   (x) Inpatient Status Appropriate - This patient's medical care is consistent with medical management for inpatient care and reasonable inpatient medical practice.     RATIONALE FOR DETERMINATION   Based on the patient s presentation, inpatient admission (IP) is appropriate. Warren Jaramillo, a 44-year-old male with significant medical comorbidities, including cirrhosis with recurrent ascites, pulmonary hypertension, and right ventricular dysfunction, demonstrates a need for high-level, multidisciplinary care that will likely span 2 or more midnights. His hospitalization involves management of multiple complex conditions, including recurrent ascites requiring paracentesis, aggressive diuresis for volume overload, and monitoring for complications such as SBP and acute kidney injury. Additionally, the patient requires daily labs (BMP, CBC), specialized consultations (GI, cardiology, nephrology, and ID), and close monitoring for diuretic-induced RIVERA and pulmonary hypertension, all of which are consistent with inpatient-level care. Furthermore, the recent history of decompensated liver disease and SBP, along with the elevated ANC in fluid studies, underscores the need for continued inpatient observation and treatment. He has undergone paracentesis, hepatic venogram, right heart catheterization all to help direct appropriate treatment plan.  The anticipated length of stay aligns with CMS guidelines for inpatient admission due to the complexity and acuity of the patient s condition    At the time of admission with the information available to the attending physician more  than 2 nights Hospital complex care was anticipated, based on patient risk of adverse outcome if treated as outpatient and complex care required. Inpatient admission is appropriate based on the Medicare guidelines.   The information on this document is developed by the utilization review team in order for the business office to ensure compliance. This only denotes the appropriateness of proper admission status and does not reflect the quality of care rendered.   The definitions of Inpatient Status and Observation Status used in making the determination above are those provided in the CMS Coverage Manual, Chapter 1 and Chapter 6, section 70.4.   Sincerely,   Brunilda Flower MD  Utilization Review  Physician Advisor  [facility]

## 2025-06-20 VITALS
HEIGHT: 65 IN | SYSTOLIC BLOOD PRESSURE: 130 MMHG | WEIGHT: 266.7 LBS | TEMPERATURE: 97.5 F | HEART RATE: 68 BPM | DIASTOLIC BLOOD PRESSURE: 72 MMHG | OXYGEN SATURATION: 98 % | BODY MASS INDEX: 44.43 KG/M2 | RESPIRATION RATE: 18 BRPM

## 2025-06-20 LAB
ANION GAP SERPL CALCULATED.3IONS-SCNC: 15 MMOL/L (ref 7–15)
BUN SERPL-MCNC: 17.6 MG/DL (ref 6–20)
CALCIUM SERPL-MCNC: 9.3 MG/DL (ref 8.8–10.4)
CHLORIDE SERPL-SCNC: 97 MMOL/L (ref 98–107)
CREAT SERPL-MCNC: 1.36 MG/DL (ref 0.67–1.17)
EGFRCR SERPLBLD CKD-EPI 2021: 66 ML/MIN/1.73M2
GLUCOSE BLDC GLUCOMTR-MCNC: 121 MG/DL (ref 70–99)
GLUCOSE BLDC GLUCOMTR-MCNC: 124 MG/DL (ref 70–99)
GLUCOSE BLDC GLUCOMTR-MCNC: 138 MG/DL (ref 70–99)
GLUCOSE SERPL-MCNC: 131 MG/DL (ref 70–99)
HCO3 SERPL-SCNC: 29 MMOL/L (ref 22–29)
HOLD SPECIMEN: NORMAL
POTASSIUM SERPL-SCNC: 3.7 MMOL/L (ref 3.4–5.3)
SODIUM SERPL-SCNC: 141 MMOL/L (ref 135–145)

## 2025-06-20 PROCEDURE — 250N000013 HC RX MED GY IP 250 OP 250 PS 637: Performed by: NURSE PRACTITIONER

## 2025-06-20 PROCEDURE — 250N000013 HC RX MED GY IP 250 OP 250 PS 637: Performed by: INTERNAL MEDICINE

## 2025-06-20 PROCEDURE — 250N000009 HC RX 250: Performed by: INTERNAL MEDICINE

## 2025-06-20 PROCEDURE — 250N000013 HC RX MED GY IP 250 OP 250 PS 637

## 2025-06-20 PROCEDURE — 80048 BASIC METABOLIC PNL TOTAL CA: CPT | Performed by: FAMILY MEDICINE

## 2025-06-20 PROCEDURE — 99232 SBSQ HOSP IP/OBS MODERATE 35: CPT | Performed by: INTERNAL MEDICINE

## 2025-06-20 PROCEDURE — 36415 COLL VENOUS BLD VENIPUNCTURE: CPT | Performed by: FAMILY MEDICINE

## 2025-06-20 PROCEDURE — 99238 HOSP IP/OBS DSCHRG MGMT 30/<: CPT | Mod: GC

## 2025-06-20 PROCEDURE — 999N000157 HC STATISTIC RCP TIME EA 10 MIN

## 2025-06-20 RX ORDER — FUROSEMIDE 40 MG/1
20 TABLET ORAL
Qty: 90 TABLET | Refills: 1 | Status: SHIPPED | OUTPATIENT
Start: 2025-06-20

## 2025-06-20 RX ORDER — LABETALOL HYDROCHLORIDE 5 MG/ML
20 INJECTION, SOLUTION INTRAVENOUS EVERY 6 HOURS PRN
Status: DISCONTINUED | OUTPATIENT
Start: 2025-06-20 | End: 2025-06-20

## 2025-06-20 RX ORDER — SPIRONOLACTONE 50 MG/1
50 TABLET, FILM COATED ORAL
Qty: 90 TABLET | Refills: 1 | Status: SHIPPED | OUTPATIENT
Start: 2025-06-20

## 2025-06-20 RX ORDER — FUROSEMIDE 20 MG/1
20 TABLET ORAL DAILY
Status: DISCONTINUED | OUTPATIENT
Start: 2025-06-20 | End: 2025-06-20 | Stop reason: HOSPADM

## 2025-06-20 RX ADMIN — FLUTICASONE FUROATE AND VILANTEROL TRIFENATATE 1 PUFF: 100; 25 POWDER RESPIRATORY (INHALATION) at 08:14

## 2025-06-20 RX ADMIN — SULFAMETHOXAZOLE AND TRIMETHOPRIM 1 TABLET: 800; 160 TABLET ORAL at 08:22

## 2025-06-20 RX ADMIN — UMECLIDINIUM 1 PUFF: 62.5 AEROSOL, POWDER ORAL at 08:14

## 2025-06-20 RX ADMIN — FAMOTIDINE 20 MG: 20 TABLET, FILM COATED ORAL at 08:22

## 2025-06-20 RX ADMIN — LEVOTHYROXINE SODIUM 12.5 MCG: 0.03 TABLET ORAL at 06:57

## 2025-06-20 RX ADMIN — FUROSEMIDE 20 MG: 20 TABLET ORAL at 11:05

## 2025-06-20 RX ADMIN — SPIRONOLACTONE 50 MG: 25 TABLET, FILM COATED ORAL at 08:22

## 2025-06-20 RX ADMIN — PANTOPRAZOLE SODIUM 40 MG: 20 TABLET, DELAYED RELEASE ORAL at 06:57

## 2025-06-20 RX ADMIN — BUMETANIDE 1 MG/HR: 0.25 INJECTION INTRAMUSCULAR; INTRAVENOUS at 08:27

## 2025-06-20 RX ADMIN — MONTELUKAST 10 MG: 10 TABLET, FILM COATED ORAL at 08:22

## 2025-06-20 ASSESSMENT — ACTIVITIES OF DAILY LIVING (ADL)
ADLS_ACUITY_SCORE: 38

## 2025-06-20 NOTE — PROGRESS NOTES
CARDIOLOGY PROGRESS NOTE      Assessment/Plan:  1.  Right ventricular dysfunction-review of MICHAEL does show to me some right-sided hypokinesis with right atrial enlargement.  This is also consistent with right-sided pressures with elevation of free hepatic pressure 21 mmHg with IVC pressure 21 mmHg.  Right atrium is 22 mmHg.  Suspect there is an element of pulmonary hypertension most likely WHO class III possibly due to sleep apnea.    2.  Shortness of breath-based on right-sided pressures suspect volume overload.  3.  Volume overload-right atrial pressure mean of 25, right ventricular pressure 75/12 with pulmonary artery pressure of 53/34 and pulmonary capillary wedge pressure of 24 but yet left ventricular end-diastolic pressure 33.  Doubt constrictive pericarditis, suspect this is volume overload and IV diuresis with Bumex intravenously because of 14 kg weight loss, oxygen saturation 98% on room air and symptomatically improved.  Creatinine increased, will discontinue IV Bumex and switch over to oral.  4.  Ascites due to alcoholic cirrhosis (H)-has several admissions for spontaneous bacterial peritonitis as well as ascites drainage.  Right heart catheterization as above, doubt edgardo-pulmonary hypertension..  5.  Remote alcohol abuse-stable currently.  6.  Acquired hypothyroidism-on Synthroid with normal T4 of 0.94.    Plan:  1.  Switch to oral diuretic.    2.  Consider discharging patient home today..    Discharge Plannin.  No significant cardiac barrier to discharge.  2.  Can follow with me his primary cardiologist.     LOS: 4 days     Subjective:  Interval History:    44-year-old white gentleman being seen on 5th day of hospitalization.  Overall feels well, better than admission.  No syncope, presyncope, chest pains, palpitations, shortness of breath, PND, thumping or significant peripheral edema or abdominal bloating.    Medications  Current Facility-Administered Medications   Medication Dose Route  Frequency Provider Last Rate Last Admin    famotidine (PEPCID) tablet 20 mg  20 mg Oral BID Denise Frazier MD   20 mg at 06/20/25 0822    fluticasone-vilanterol (BREO ELLIPTA) 100-25 MCG/ACT inhaler 1 puff  1 puff Inhalation Daily Denise Frazier MD   1 puff at 06/20/25 0814    And    umeclidinium (INCRUSE ELLIPTA) 62.5 MCG/ACT inhaler 1 puff  1 puff Inhalation Daily Denise Frazier MD   1 puff at 06/20/25 0814    insulin aspart (NovoLOG) injection (RAPID ACTING)  1-7 Units Subcutaneous TID  Denise Frazier MD   1 Units at 06/19/25 1602    insulin aspart (NovoLOG) injection (RAPID ACTING)  1-5 Units Subcutaneous At Bedtime Denise Frazier MD   1 Units at 06/17/25 2145    levothyroxine (SYNTHROID/LEVOTHROID) half-tab 12.5 mcg  12.5 mcg Oral QAM  Denise Frazier MD   12.5 mcg at 06/20/25 0657    [Held by provider] lisinopril (ZESTRIL) tablet 10 mg  10 mg Oral ERON Denise Frazier MD        montelukast (SINGULAIR) tablet 10 mg  10 mg Oral [facility] Denise Frazier MD   10 mg at 06/20/25 0822    OLANZapine (zyPREXA) tablet 10 mg  10 mg Oral At Bedtime Denise Frazier MD   10 mg at 06/19/25 2204    pantoprazole (PROTONIX) EC tablet 40 mg  40 mg Oral BID  Denise Frazier MD   40 mg at 06/20/25 0657    rosuvastatin (CRESTOR) tablet 10 mg  10 mg Oral At Bedtime Denise Frazier MD   10 mg at 06/19/25 2205    sodium chloride (PF) 0.9% PF flush 3 mL  3 mL Intracatheter Q8H [facility] Denise Frazier MD   3 mL at 06/20/25 0657    spironolactone (ALDACTONE) tablet 50 mg  50 mg Oral BID Evangelina Allen NP   50 mg at 06/20/25 0822    sulfamethoxazole-trimethoprim (BACTRIM DS) 800-160 MG per tablet 1 tablet  1 tablet Oral Daily Bernarda Crespo MD   1 tablet at 06/20/25 0822    traZODone (DESYREL) tablet 100 mg  100 mg Oral At Bedtime Denise Frazier MD   100 mg at 06/19/25 5928     Objective:   Vital signs in last 24 hours:  Temp:  [97  F (36.1  C)-98.9  F (37.2  C)] 97.5  F (36.4  C)  Pulse:  [68-81]  "68  Resp:  [16-20] 18  BP: (113-132)/(57-72) 130/72  SpO2:  [98 %] 98 %      Physical Exam:  /72 (BP Location: Right arm)   Pulse 68   Temp 97.5  F (36.4  C) (Oral)   Resp 18   Ht 1.651 m (5' 5\")   Wt 121 kg (266 lb 11.2 oz)   SpO2 98%   BMI 44.38 kg/m      General Appearance:    Alert, cooperative, no distress, appears stated age   Head:    Normocephalic, without obvious abnormality, atraumatic   Throat:   Lips, mucosa, and tongue normal; teeth and gums normal   Neck:   Supple, symmetrical, trachea midline, no adenopathy;        thyroid:  No enlargement/tenderness/nodules; no carotid    bruit, 2 jaw 30 degrees JVD   Back:     Symmetric, no curvature, ROM normal, no CVA tenderness   Lungs:     Clear to auscultation bilaterally, respirations unlabored   Chest wall:    No tenderness or deformity   Heart:    Regular rate and rhythm, S1 and S2 normal, no murmur, rub   or gallop   Abdomen:     Soft, non-tender, bowel sounds active all four quadrants,     no masses, distended   Extremities:   Normal, atraumatic, no cyanosis or edema   Pulses:   2+ and symmetric all extremities   Skin:   Skin color, texture, turgor normal, no rashes or lesions     Cardiographics:      ECG: Personally reviewed by myself shows sinus rhythm, low voltage, possible Q wave MI septal pattern.    Echocardiogram:   Limited imaging done due to poor patient tolerance and hypotension limiting  further sedation  Trivial pericardial effusion  The left ventricle is normal in size.  The visual ejection fraction is 60-65%.  Regional wall motion is grossly normal but endocardial border definition is limited  Normal right ventricle size and systolic function.  No hemodynamically significant valve lesions      Lab Results:   Recent Labs   Lab 06/19/25  0649   WBC 13.7*   HGB 10.3*   HCT 34.5*        Recent Labs   Lab 06/20/25  0757      CO2 29   BUN 17.6   .       No results found for: \"CKTOTAL\", \"CKMB\", \"TROPONINI\"        "

## 2025-06-20 NOTE — PLAN OF CARE
Assumed care 2300 to 0730. A&O x 4. Assist x 4. Denies pain. CPAP in place while sleeping. Patient slept for the majority of the shift. Bumex gtt @ 4 mL/hr. Up to toilet. Abdomen is large and distended. Urinal at bedside. Up to toilet. Call light within reach, able to make needs known. Bed alarm on for safety.    Problem: Pain Acute  Goal: Optimal Pain Control and Function  Intervention: Prevent or Manage Pain  Recent Flowsheet Documentation  Taken 6/20/2025 0015 by Milly Christianson RN  Sensory Stimulation Regulation:   care clustered   quiet environment promoted  Medication Review/Management: medications reviewed     Problem: Fluid Volume Excess  Goal: Fluid Balance  Outcome: Progressing     Problem: Adult Inpatient Plan of Care  Goal: Readiness for Transition of Care  Outcome: Progressing

## 2025-06-20 NOTE — PLAN OF CARE
RN discussed discharge instructions with patient.  Patient verbalized understanding and voiced no concerns at this time.  AVS provided, new prescriptions e-scripted to home pharmacy.  No significant changes in presentation.  Patient discharged home per cab voucher.

## 2025-06-20 NOTE — PROVIDER NOTIFICATION
Date: 6/19/2025  Time: 2016  Provider: Alfred Perez    Notified provider that patient reports right hand tingling and throbbing headache on the right and middle side of his head. Reports 8/10 pain and prn tylenol given. Patient reported 3/10 pain after taking tylenol. Patient denied pain and tingling at beginning of this shift. Patient now reports patient had right hand tingling and headache since after all of the procedures on his right side. Patient also reports feeling like his right neck is swollen. I don't visually see his right neck swollen and right neck looks equal to left side of neck. Patients vital signs stable.    Provider aware. Continue to monitor for evolving symptoms. Let provider know if headache worsens or new neurological changes.

## 2025-06-20 NOTE — PROGRESS NOTES
Care Management Discharge Note    Discharge Date: 06/20/2025       Discharge Disposition:  Returning to group home    Discharge Services:  None    Discharge DME:  None    Discharge Transportation:  Cab    Private pay costs discussed: Not applicable    Does the patient's insurance plan have a 3 day qualifying hospital stay waiver?  No    Education Provided on the Discharge Plan:  yes  Persons Notified of Discharge Plans: patient,, group home staff, legal Guardian (left VM message), patient's parents  Patient/Family in Agreement with the Plan:  yes    Handoff Referral Completed: No, handoff not indicated or clinically appropriate    Additional Information:  Patient is ready for discharge and doesn't want to wait for group home staff to pick him up. GABBY called home coordinator Ginna to verify that is okay for patient to return to group home by cab. Ginna states patient frequently does that and he already called group home staff saying that he doesn't want to wait and he will come by cab. Ginna states he will be fine returning by cab.   GABBY called patients' guardian Jesika and left VM message with an update that patient is discharging today back to his group home. Also called patient's parents and spoke with mom Tawana updating on patient's discharge.  GABBY also faxed discharge orders to Ginna at [phone]. Per provider medication list was sent to [facility].    YOEL Barba

## 2025-06-20 NOTE — DISCHARGE SUMMARY
"[facility]  Discharge Summary - Medicine & Pediatrics       Date of Admission:  6/15/2025  Date of Discharge:  6/20/2025  Discharging Provider: Dr. Venegas  Discharge Service: Hospitalist Service    Discharge Diagnoses   Right ventricular dysfunction   Ascites due to alcoholic cirrhosis     Clinically Significant Risk Factors     # DMII: A1C = 6.7 % (Ref range: <5.7 %) within past 6 months  # Morbid Obesity: Estimated body mass index is 44.38 kg/m  as calculated from the following:    Height as of this encounter: 1.651 m (5' 5\").    Weight as of this encounter: 121 kg (266 lb 11.2 oz).       Follow-ups Needed After Discharge   Follow-up Appointments       Follow Up      Follow up with Cardiology, within 4 weeks. to evaluate medication change, to evaluate treatment change, for hospital follow- up, and regarding new diagnosis. No follow up labs or test are needed.        Hospital Follow-up with Existing Primary Care Provider (PCP)          Schedule Primary Care visit within: 7 Days           Additional important follow-up instructions/to-do's for PCP:   - check BMP to monitor kidney function    - weight check (patient 266 lbs on day of discharge)     Unresulted Labs Ordered in the Past 30 Days of this Admission       Date and Time Order Name Status Description    6/15/2025  9:55 PM Ascites Fluid Aerobic Bacterial Culture Routine With Gram Stain Preliminary         These results will be followed up by PCP    Discharge Disposition   Discharged to home  Condition at discharge: Stable    Hospital Course   Warren Jaramillo is a 44 year old male admitted on 6/15/2025. He has a history of cirrhosis with ascites requiring frequent paracentesis, T2DM, HTN, AVA, COPD and is admitted for abdominal distention and SOB s/p paracentesis 6/16. Extensive workup including repeat transjugular liver biopsy, hepatic venogram, and right heart cath. Ascites etiology more consistent with right heart " failure/pulmonary hypertension vs. Cirrhosis.     Recurrent ascites, requiring frequent paracentesis  Right ventricular dysfunction  Hx of SBP  Patient has history of cirrhosis, likely secondary to alcohol use and MASLD recently requiring more frequent paracentesis now twice weekly over the past 3-4 months.  Recently hospitalized from 6/11 - 6/13 for decompensated liver disease with concern for persistent SBP with ANC.  Discharged on cefdinir course with plan [facility] further prophylaxis. Successful paracentesis performed 6/16 with 5L off. Fluid studies show elevated ANC, but downtrending from previous. GI and nephrology consulted during hospital stay for diuretic guidance and further workup with ascites. Had extensive workup with GI to determine etiology of ascites. Repeat transjugular liver biopsy and hepatic venogram showing pressures consistent with right heart failure vs. Cirrhosis. Recent MICHAEL shows right sided hypokinesis with right atrial enlargement. Right heart cath on 6/19 shows normal coronaries with mild to moderate pulmonary hypertension and volume overload. Cardiology was consulted during admission. Patient received 24 hours of bumex infusion. Had a total of 13.25L urine out during admission. Given agressive diuresis, patient had mild elevation in Cr. Plan to follow up with PCP in one week to recheck labs. Plan to discharge with furosemide 20 mg BID & spironolactone 50 mg BID. Patient voiced understanding of plan.     Pulmonary Hypertension  Most likely WHO class III due to hx of AVA. Encouraged patient to continue CPAP when sleeping. Plan to follow up with cardiology in 4 weeks. Would benefit from pulmonary  hypertension clinic referral.     RIVERA   Cr mildly elevated to 1.19 on admission, initially returned to baseline. Now increased this AM likely due to initiation of aggressive diuretics. Cr 1.36 day of discharge.   - nephrologist consult. Signed off 6/18    Chronic normocytic Anemia    Hx of GIB  Hemoglobin 9.9, close to baseline. No symptoms or signs of active bleeding. Remained stable throughout hospital stay.     T2DM  A1c 6.7. PTA metformin and jardiance.   - held PTA meds  - MDSSI     Chronic conditions:   History of Provoked DVT: completed 3 months of apixaban   HTN: Continue PTA spironolactone  Insomnia: Continue PTA trazodone  Mental health: continue PTA Zyprexa  GERD: continue PTA Protonix  HLD: Continue PTA rosuvastatin  COPD: Continue PTA inhalers     Consultations This Hospital Stay   CARE MANAGEMENT / SOCIAL WORK IP CONSULT  GASTROENTEROLOGY IP CONSULT  NEPHROLOGY IP CONSULT  INFECTIOUS DISEASES IP CONSULT  INTERVENTIONAL RADIOLOGY ADULT/PEDS IP CONSULT  NUTRITION SERVICES ADULT IP CONSULT  CARDIOLOGY IP CONSULT  PHARMACY IP CONSULT  CARE MANAGEMENT / SOCIAL WORK IP CONSULT  SMOKING CESSATION PROGRAM IP CONSULT    Code Status   Full Code       The patient was discussed with Dr. Rubi Crespo MD PGY-1  [facility]  [address] [place] [id number]  Phone: [phone]  Fax: [phone]  ______________________________________________________________________    Physical Exam   Vital Signs: Temp: 97.5  F (36.4  C) Temp src: Oral BP: 130/72 Pulse: 68   Resp: 18 SpO2: 98 % O2 Device: BiPAP/CPAP    Weight: 266 lbs 11.2 oz    GEN: Pleasant male, in no acute distress.   HEENT: Normocephalic, atraumatic. Extraoccular eye movements intact. Anicteric sclera. Moist mucous membranes.   NECK: Supple. No cervical or supraclavicular adenopathy.   PULM: Non-labored breathing. No use of accessory muscles. Clear to ausculation bilaterally. No wheezes or crackles.   CV: Regular rate and rhythm. Normal S1, S2. No rubs, murmurs, or gallops.    ABDOMEN: Normoactive bowel sounds. Non-tender to palpation. Distended, no fluid wave  EXTREMITES:  No clubbing, cyanosis, or edema.    NEURO:  Awake. Oriented to person, place, time and situation. Cranial nerves 2-12 grossly  intact. Moving all extremities.    PSYCH: Calm. Appropriate affect, insight, judgment.         Primary Care Physician   Mary Kelly    Discharge Orders      Brief Discharge Instructions    Venogram Procedure Discharge Instructions:   A venogram is an x-ray study that involves injecting contrast material into a vein to shows how blood flows through your veins. This helps assess the condition of your veins.     Care Instructions:  - If you received sedation for your procedure, do not drive or operate heavy machinery for the rest of the day.  - Do not lift greater than 10 pounds for 2 days following procedure.  - Avoid tub baths, Jacuzzis, hot tubs and pools for 3 days or until procedure site is well healed.  - May shower beginning tomorrow. Do not scrub procedure site(s) until well healed; pat dry.    Call [facility] RN Line at [phone] for any of the following: [Monday to Friday (7:30AM-4:00PM). Any voicemails left will be returned within 24 hours.]    - Develop redness, swelling, warmth to touch, and or purulent drainage from procedure site.  - Fevers (greater than 101 F (38.3C)).  - Increasing pain at procedure site.  - If you experience any bleeding from procedure site, apply pressure and seek medical assistance.     Reason for your hospital stay    You were admitted for fluid overload due to your heart function.     Activity    Your activity upon discharge: activity as tolerated     Tubes and Drains    Current Tubes and Drains:     CVC Line  Duration           Venous Sheath 06/18/25 6 Fr Right 1 day              Follow Up    Follow up with Cardiology, within 4 weeks. to evaluate medication change, to evaluate treatment change, for hospital follow- up, and regarding new diagnosis. No follow up labs or test are needed.     Diet    Follow this diet upon discharge: Current Diet:Orders Placed This Encounter      Fluid restriction 1500 ML FLUID      Combination Diet 2 gm NA Diet     Hospital Follow-up with  Existing Primary Care Provider (PCP)            Significant Results and Procedures   Most Recent 3 CBC's:  Recent Labs   Lab Test 06/19/25  0649 06/18/25  0621 06/17/25  0605   WBC 13.7* 13.0* 13.7*   HGB 10.3* 9.9* 9.7*   MCV 86 86 84    410 370     Most Recent 3 BMP's:  Recent Labs   Lab Test 06/20/25  0826 06/20/25  0757 06/20/25  0215 06/19/25  1214 06/19/25  0649 06/18/25  0811 06/18/25  0621   NA  --  141  --   --  138  --  137   POTASSIUM  --  3.7  --   --  4.6  --  4.5   CHLORIDE  --  97*  --   --  102  --  104   CO2  --  29  --   --  24  --  23   BUN  --  17.6  --   --  19.0  --  18.0   CR  --  1.36*  --   --  1.23*  --  1.14   ANIONGAP  --  15  --   --  12  --  10   WES  --  9.3  --   --  8.8  --  8.7*   * 131* 124*   < > 121*   < > 127*    < > = values in this interval not displayed.     Most Recent 2 LFT's:  Recent Labs   Lab Test 06/16/25  0942 06/15/25  2204   AST 14 25   ALT 27 32   ALKPHOS 253* 261*   BILITOTAL 0.2 0.2     Most Recent 3 INR's:  Recent Labs   Lab Test 06/13/25  0552 06/12/25  0515 06/11/25  0909   INR 1.18* 1.16* 1.15   ,   Results for orders placed or performed during the hospital encounter of 06/15/25   US Paracentesis with Albumin    Narrative    EXAM:  1. PARACENTESIS  2. ULTRASOUND GUIDANCE  LOCATION: [facility]  DATE: 6/16/2025    INDICATION: Ascites. Increasing weight.    PROCEDURE: Informed consent obtained. Time out performed. The abdomen was prepped and draped in a sterile fashion. 5 mL of 1% lidocaine was infused into local soft tissues. A 5 [place] catheter system was introduced into the abdominal ascites under   ultrasound guidance.    5.3 liters of clear fluid were removed and sent to lab if requested. Subcutaneous edema is suspected    Patient tolerated procedure well.    Ultrasound imaging was obtained and placed in the patient's permanent medical record.      Impression    IMPRESSION:  1.  Status post ultrasound-guided  paracentesis. Subcutaneous edema is suspected. Consider mechanical compression.    Reference CPT Code: 96105   IR Hepatic Venogram w Hemodynamics    Narrative    [facility]  LOCATION: [facility]  DATE: 6/17/2025    PROCEDURE: HEPATIC VENOGRAM WITH VENOUS PRESSURE GRADIENT MEASURMENT    INTERVENTIONAL RADIOLOGIST: Aldair Cervantes MD.    INDICATION: 44-year-old male with refractory ascites. Liver biopsy shows venous outflow obstruction. Patient presents for pressure measurements.    CONSENT: The risks, benefits and alternatives of transjugular liver biopsy were discussed with the patient in detail. All questions were answered. Informed consent was given to proceed with the procedure.    MODERATE SEDATION: Versed 1.5 mg IV; Fentanyl 75 mcg IV.  During the timeout, immediately prior to the administration of medications, the patient was reassessed for adequacy to receive conscious sedation. Under physician supervision, Versed and fentanyl   were administered for moderate sedation. Pulse oximetry, heart rate and blood pressure were continuously monitored by an independent trained observer. The physician spent 15 minutes of face-to-face sedation time with the patient.    CONTRAST: 10 mL Omnipaque 350  ANTIBIOTICS: None.  ADDITIONAL MEDICATIONS: None.    FLUOROSCOPIC TIME: 2.2 minutes.  RADIATION DOSE: Air Kerma: 98 mGy.    COMPLICATIONS: No immediate complications.    STERILE BARRIER TECHNIQUE: Maximum sterile barrier technique was used. Cutaneous antisepsis was performed at the operative site with application of 2% chlorhexidine and large sterile drape. Prior to the procedure, the  and assistant performed   hand hygiene and wore hat, mask, sterile gown, and sterile gloves during the entire procedure.    PROCEDURE:  Limited right neck ultrasound demonstrated a patent right internal jugular vein; images saved of the permanent patient medical record. Under ultrasound guidance the right  internal jugular vein was accessed using micropuncture needle; images   saved of the permanent patient medical record. Access was secured using a 10 Maltese 45cm sheath.    A multipurpose catheter and wire were utilized to select the right hepatic vein. Contrast was injected to confirm positioning The. The catheter was exchanged for a wedge balloon and the balloon was inflated. A gentle injection was performed confirming   occlusion of the hepatic vein. Next, wedge hepatic, free hepatic, IVC, and right atrial pressures were obtained; results as below. The catheter was then removed.    The sheath was removed with manual pressure applied until hemostasis.      Impression    IMPRESSION:   1. Hepatic venogram demonstrates patency of the hepatic veins.  2. Pressure measurements:  Wedged hepatic: 24 mmHg  Free hepatic: 21 mmHg  IVC: 21 mmHg  Right atrium: 22 mmHg  Hepatic venous pressure gradient: 3 mmHg    PLAN:  1. Patient to be recovered on the floor.  2. HOB up to 45 degrees x 1 hour then activity as tolerated.     Cardiac Catheterization    Narrative    Images from the original result were not included.  Warren Jaramillo is a 44 year old old male with RV dysfunction,   alcoholic cirrhosis who is here for angio/LHC/RHC.    - non-obstructive CAD  - elevated R- and L-sided filling pressures, mod-severe pHTN c/w pulmonary   venous (due to L-sided HF) pattern; preserved CO/CI by Haritha  - furosemide 40mg Ivx1 in the cath lab, further diuresis per primary team  - continue aggressive risk factor modification          Findings:  LM:no obstruction  LAD:tapers distally, no obstruction  Lcx:mildly irregular  RCA:dominant, no obstruction    LVEDP:33    RHC:  RA:25   RV:75/12  PA:53/34(42)  PCWP:24    SvO2:58.6  Ao Sat: 95    CO/CI:  Haritha:5.88/2.55    Access:  R Radial artery  R Brachial vein    Closure:   Vasc Band  Manual             Discharge Medications      Review of your medicines        PAUSE taking these medications         Dose / Directions   lisinopril 10 MG tablet  Wait to take this until your doctor or other care provider tells you to start again.  Commonly known as: ZESTRIL      Dose: 10 mg  Take 10 mg by mouth every morning.  Refills: 0            CHANGE how you take these medications        Dose / Directions   furosemide 40 MG tablet  Commonly known as: LASIX  This may have changed: when to take this  Used for: Cirrhosis of liver with ascites, unspecified hepatic cirrhosis type (H), Chronic right-sided congestive heart failure (H)      Dose: 20 mg  Take 0.5 tablets (20 mg) by mouth 2 times daily.  Quantity: 90 tablet  Refills: 1     spironolactone 50 MG tablet  Commonly known as: ALDACTONE  This may have changed: when to take this  Used for: Cirrhosis of liver with ascites, unspecified hepatic cirrhosis type (H), Chronic right-sided congestive heart failure (H), Heart failure with preserved ejection fraction, NYHA class I (H)      Dose: 50 mg  Take 1 tablet (50 mg) by mouth 2 times daily.  Quantity: 90 tablet  Refills: 1            CONTINUE these medicines which have NOT CHANGED        Dose / Directions   * albuterol 108 (90 Base) MCG/ACT inhaler  Commonly known as: PROAIR HFA/PROVENTIL HFA/VENTOLIN HFA      Dose: 1-2 puff  Inhale 1-2 puffs into the lungs every 6 hours as needed for shortness of breath, wheezing or cough  Quantity: 18 g  Refills: 0     * albuterol (2.5 MG/3ML) 0.083% neb solution  Commonly known as: PROVENTIL      Dose: 2.5 mg  Take 2.5 mg by nebulization 3 times daily as needed for shortness of breath, wheezing or cough  Refills: 0     aloe vera Gel      Dose: 1 g  Apply 1 g topically every hour as needed for skin care Per bottle directions  Refills: 0     bacitracin 500 UNIT/GM Oint      Apply topically 3 times daily as needed for wound care  Refills: 0     benzonatate 200 MG capsule  Commonly known as: TESSALON      Dose: 200 mg  Take 1 capsule (200 mg) by mouth 3 times daily as needed for  cough.  Quantity: 50 capsule  Refills: 0     Calamine external lotion      Apply topically as needed for itching  Refills: 0     carbamide peroxide 6.5 % otic solution  Commonly known as: DEBROX      Dose: 5 drop  Place 5 drops into both ears daily as needed for other (ear wax).  Refills: 0     clotrimazole 1 % external cream  Commonly known as: LOTRIMIN      Apply topically 2 times daily as needed (skin irritation)  Refills: 0     dextromethorphan-guaiFENesin  MG 12 hr tablet      Dose: 1 tablet  Take 1 tablet by mouth 2 times daily as needed.  Refills: 0     diclofenac 1 % topical gel  Commonly known as: VOLTAREN      Dose: 2 g  Apply 2 g topically daily as needed for moderate pain To joints/back  Refills: 0     EPINEPHrine 0.3 MG/0.3ML injection 2-pack  Commonly known as: ANY BX GENERIC EQUIV      Dose: 0.3 mg  Inject 0.3 mg into the muscle as needed for anaphylaxis. May repeat one time in 5-15 minutes if response to initial dose is inadequate.  Refills: 0     famotidine 20 MG tablet  Commonly known as: PEPCID      Dose: 20 mg  Take 1 tablet (20 mg) by mouth 2 times daily  Quantity: 60 tablet  Refills: 0     FreeStyle Iván 3 Plus Sensor Misc      USE TO READ BLOOD SUGAR TWICE DAILY AND AS NEEDED. CHANGE SENSOR EVERY 15 DAYS  Refills: 0     GaviLAX 17 GM/SCOOP powder  Generic drug: polyethylene glycol      Dose: 17 g  Take 17 g by mouth daily as needed for constipation.  Refills: 0     Jardiance 10 MG Tabs tablet  Generic drug: empagliflozin      Dose: 10 mg  Take 10 mg by mouth every morning.  Refills: 0     levothyroxine 25 MCG tablet  Commonly known as: SYNTHROID/LEVOTHROID      Dose: 12.5 mcg  Take 12.5 mcg by mouth every morning (before breakfast).  Refills: 0     loperamide 2 MG capsule  Commonly known as: IMODIUM      Dose: 4 mg  Take 4 mg by mouth 4 times daily as needed for diarrhea.  Refills: 0     loratadine 10 MG tablet  Commonly known as: CLARITIN      Dose: 10 mg  Take 10 mg by mouth daily as  needed for allergies.  Refills: 0     magnesium hydroxide 400 MG/5ML suspension  Commonly known as: MILK OF MAGNESIA      Dose: 30 mL  Take 30 mLs by mouth daily as needed for heartburn.  Refills: 0     metFORMIN 1000 MG tablet  Commonly known as: GLUCOPHAGE      Dose: 1,000 mg  Take 1,000 mg by mouth 2 times daily (with meals)  Refills: 0     methocarbamol 500 MG tablet  Commonly known as: ROBAXIN  Used for: Acute right-sided low back pain with right-sided sciatica      Dose: 500 mg  Take 1 tablet (500 mg) by mouth 4 times daily as needed for muscle spasms.  Quantity: 40 tablet  Refills: 0     montelukast 10 MG tablet  Commonly known as: SINGULAIR      Dose: 10 mg  Take 10 mg by mouth every morning.  Refills: 0     nystatin 517472 UNIT/GM external powder  Commonly known as: MYCOSTATIN  Used for: Candida infection      Apply topically 2 times daily.  Quantity: 30 g  Refills: 0     OLANZapine 10 MG tablet  Commonly known as: zyPREXA      Dose: 10 mg  Take 10 mg by mouth At Bedtime  Refills: 0     omeprazole 40 MG DR capsule  Commonly known as: PriLOSEC      Dose: 40 mg  Take 40 mg by mouth every morning.  Refills: 0     ondansetron 4 MG ODT tab  Commonly known as: ZOFRAN ODT      Dose: 4 mg  Take 1 tablet (4 mg) by mouth every 8 hours as needed for nausea.  Quantity: 20 tablet  Refills: 0     pramoxine-calamine 1-8 % Lotn  Commonly known as: AVEENO      Apply topically daily as needed for itching.  Refills: 0     PREPARATION H EX      Externally apply topically daily as needed (hemorrhoids).  Refills: 0     rosuvastatin 10 MG tablet  Commonly known as: CRESTOR      Dose: 10 mg  Take 10 mg by mouth At Bedtime  Refills: 0     SOLARCAINE ALOE VERA EX      Externally apply topically daily as needed.  Refills: 0     sulfamethoxazole-trimethoprim 800-160 MG tablet  Commonly known as: BACTRIM DS  Used for: SBP (spontaneous bacterial peritonitis) (H)      Dose: 1 tablet  Take 1 tablet by mouth daily.  Quantity: 90  tablet  Refills: 3     traZODone 100 MG tablet  Commonly known as: DESYREL      Dose: 100 mg  Take 100 mg by mouth at bedtime.  Refills: 0     Trelegy Ellipta 100-62.5-25 MCG/ACT oral inhaler  Generic drug: Fluticasone-Umeclidin-Vilant      Dose: 1 puff  Inhale 1 puff into the lungs every morning.  Refills: 0     Urea 40 % Crea      Apply topically daily as needed for dry skin.  Refills: 0     Vitamin A & D Oint      Externally apply topically daily as needed (general skin health).  Refills: 0     witch hazel-glycerin pad  Commonly known as: TUCKS      Apply topically as needed for hemorrhoids.  Refills: 0           * This list has 2 medication(s) that are the same as other medications prescribed for you. Read the directions carefully, and ask your doctor or other care provider to review them with you.                STOP taking      cefdinir 300 MG capsule  Commonly known as: OMNICEF                  Where to get your medicines        These medications were sent to [facility] - [facility] [address]  [address] [address]      Phone: [phone]   furosemide 40 MG tablet  spironolactone 50 MG tablet       Allergies   Allergies   Allergen Reactions    Apricot Flavoring Agent (Non-Screening) Anaphylaxis    Banana Anaphylaxis     Throat swelling      Bees Anaphylaxis     Has an Epi pen    Wasp Venom Protein Shortness Of Breath     Other reaction(s): Respiratory Distress  Has an Epi pen        Methylphenidate Itching     Other reaction(s): Nightmares    Prunus      Other reaction(s): *Unknown

## 2025-06-21 LAB
BACTERIA FLD CULT: NO GROWTH
GRAM STAIN RESULT: ABNORMAL

## 2025-06-25 ENCOUNTER — TRANSFERRED RECORDS (OUTPATIENT)
Dept: HEALTH INFORMATION MANAGEMENT | Facility: CLINIC | Age: 45
End: 2025-06-25
Payer: COMMERCIAL

## 2025-06-27 ENCOUNTER — HOSPITAL ENCOUNTER (EMERGENCY)
Facility: HOSPITAL | Age: 45
Discharge: HOME | End: 2025-06-28
Attending: EMERGENCY MEDICINE | Admitting: EMERGENCY MEDICINE
Payer: COMMERCIAL

## 2025-06-27 ENCOUNTER — APPOINTMENT (OUTPATIENT)
Dept: CT IMAGING | Facility: HOSPITAL | Age: 45
End: 2025-06-27
Attending: EMERGENCY MEDICINE
Payer: COMMERCIAL

## 2025-06-27 DIAGNOSIS — R10.9 LEFT FLANK PAIN: ICD-10-CM

## 2025-06-27 DIAGNOSIS — R16.1 SPLENOMEGALY: ICD-10-CM

## 2025-06-27 DIAGNOSIS — D72.829 LEUKOCYTOSIS, UNSPECIFIED TYPE: ICD-10-CM

## 2025-06-27 LAB
ALBUMIN SERPL BCG-MCNC: 4.3 G/DL (ref 3.5–5.2)
ALBUMIN UR-MCNC: NEGATIVE MG/DL
ALP SERPL-CCNC: 244 U/L (ref 40–150)
ALT SERPL W P-5'-P-CCNC: 19 U/L (ref 0–70)
ANION GAP SERPL CALCULATED.3IONS-SCNC: 13 MMOL/L (ref 7–15)
APPEARANCE UR: CLEAR
AST SERPL W P-5'-P-CCNC: 17 U/L (ref 0–45)
BASOPHILS # BLD AUTO: 0.1 10E3/UL (ref 0–0.2)
BASOPHILS NFR BLD AUTO: 1 %
BILIRUB DIRECT SERPL-MCNC: 0.17 MG/DL (ref 0–0.3)
BILIRUB SERPL-MCNC: 0.3 MG/DL
BILIRUB UR QL STRIP: NEGATIVE
BUN SERPL-MCNC: 34.8 MG/DL (ref 6–20)
CALCIUM SERPL-MCNC: 9.5 MG/DL (ref 8.8–10.4)
CHLORIDE SERPL-SCNC: 98 MMOL/L (ref 98–107)
COLOR UR AUTO: ABNORMAL
CREAT SERPL-MCNC: 1.31 MG/DL (ref 0.67–1.17)
CRP SERPL-MCNC: 10.8 MG/L
EGFRCR SERPLBLD CKD-EPI 2021: 69 ML/MIN/1.73M2
ELLIPTOCYTES BLD QL SMEAR: SLIGHT
EOSINOPHIL # BLD AUTO: 0.6 10E3/UL (ref 0–0.7)
EOSINOPHIL NFR BLD AUTO: 3 %
ERYTHROCYTE [DISTWIDTH] IN BLOOD BY AUTOMATED COUNT: 15.5 % (ref 10–15)
GLUCOSE SERPL-MCNC: 125 MG/DL (ref 70–99)
GLUCOSE UR STRIP-MCNC: >1000 MG/DL
HCO3 SERPL-SCNC: 26 MMOL/L (ref 22–29)
HCT VFR BLD AUTO: 35.3 % (ref 40–53)
HGB BLD-MCNC: 10.9 G/DL (ref 13.3–17.7)
HGB UR QL STRIP: NEGATIVE
HOLD SPECIMEN: NORMAL
HOLD SPECIMEN: NORMAL
IMM GRANULOCYTES # BLD: 0.2 10E3/UL
IMM GRANULOCYTES NFR BLD: 1 %
KETONES UR STRIP-MCNC: NEGATIVE MG/DL
LEUKOCYTE ESTERASE UR QL STRIP: NEGATIVE
LIPASE SERPL-CCNC: 36 U/L (ref 13–60)
LYMPHOCYTES # BLD AUTO: 2.6 10E3/UL (ref 0.8–5.3)
LYMPHOCYTES NFR BLD AUTO: 14 %
MCH RBC QN AUTO: 25.6 PG (ref 26.5–33)
MCHC RBC AUTO-ENTMCNC: 30.9 G/DL (ref 31.5–36.5)
MCV RBC AUTO: 83 FL (ref 78–100)
MONOCYTES # BLD AUTO: 1.6 10E3/UL (ref 0–1.3)
MONOCYTES NFR BLD AUTO: 9 %
NEUTROPHILS # BLD AUTO: 13.6 10E3/UL (ref 1.6–8.3)
NEUTROPHILS NFR BLD AUTO: 73 %
NITRATE UR QL: NEGATIVE
NRBC # BLD AUTO: 0 10E3/UL
NRBC BLD AUTO-RTO: 0 /100
PH UR STRIP: 5.5 [PH] (ref 5–7)
PLAT MORPH BLD: ABNORMAL
PLATELET # BLD AUTO: 571 10E3/UL (ref 150–450)
POLYCHROMASIA BLD QL SMEAR: SLIGHT
POTASSIUM SERPL-SCNC: 5 MMOL/L (ref 3.4–5.3)
PROCALCITONIN SERPL IA-MCNC: 0.12 NG/ML
PROT SERPL-MCNC: 7.4 G/DL (ref 6.4–8.3)
RBC # BLD AUTO: 4.25 10E6/UL (ref 4.4–5.9)
RBC MORPH BLD: ABNORMAL
RBC URINE: <1 /HPF
SODIUM SERPL-SCNC: 137 MMOL/L (ref 135–145)
SP GR UR STRIP: 1.02 (ref 1–1.03)
SQUAMOUS EPITHELIAL: <1 /HPF
TROPONIN T SERPL HS-MCNC: 22 NG/L
TROPONIN T SERPL HS-MCNC: 33 NG/L
UROBILINOGEN UR STRIP-MCNC: NORMAL MG/DL
WBC # BLD AUTO: 18.6 10E3/UL (ref 4–11)
WBC URINE: <1 /HPF

## 2025-06-27 PROCEDURE — 96375 TX/PRO/DX INJ NEW DRUG ADDON: CPT

## 2025-06-27 PROCEDURE — 250N000011 HC RX IP 250 OP 636: Performed by: EMERGENCY MEDICINE

## 2025-06-27 PROCEDURE — 81003 URINALYSIS AUTO W/O SCOPE: CPT | Performed by: EMERGENCY MEDICINE

## 2025-06-27 PROCEDURE — 36415 COLL VENOUS BLD VENIPUNCTURE: CPT | Performed by: EMERGENCY MEDICINE

## 2025-06-27 PROCEDURE — 71275 CT ANGIOGRAPHY CHEST: CPT

## 2025-06-27 PROCEDURE — 99285 EMERGENCY DEPT VISIT HI MDM: CPT | Mod: 25

## 2025-06-27 PROCEDURE — 93005 ELECTROCARDIOGRAM TRACING: CPT | Performed by: EMERGENCY MEDICINE

## 2025-06-27 PROCEDURE — 96374 THER/PROPH/DIAG INJ IV PUSH: CPT | Mod: 59

## 2025-06-27 PROCEDURE — 85025 COMPLETE CBC W/AUTO DIFF WBC: CPT | Performed by: EMERGENCY MEDICINE

## 2025-06-27 PROCEDURE — 74177 CT ABD & PELVIS W/CONTRAST: CPT

## 2025-06-27 PROCEDURE — 84145 PROCALCITONIN (PCT): CPT | Performed by: EMERGENCY MEDICINE

## 2025-06-27 PROCEDURE — 83690 ASSAY OF LIPASE: CPT | Performed by: EMERGENCY MEDICINE

## 2025-06-27 PROCEDURE — 82248 BILIRUBIN DIRECT: CPT | Performed by: EMERGENCY MEDICINE

## 2025-06-27 PROCEDURE — 82435 ASSAY OF BLOOD CHLORIDE: CPT | Performed by: EMERGENCY MEDICINE

## 2025-06-27 PROCEDURE — 86140 C-REACTIVE PROTEIN: CPT | Performed by: EMERGENCY MEDICINE

## 2025-06-27 PROCEDURE — 84484 ASSAY OF TROPONIN QUANT: CPT | Performed by: EMERGENCY MEDICINE

## 2025-06-27 RX ORDER — IOPAMIDOL 755 MG/ML
90 INJECTION, SOLUTION INTRAVASCULAR ONCE
Status: COMPLETED | OUTPATIENT
Start: 2025-06-27 | End: 2025-06-27

## 2025-06-27 RX ORDER — ONDANSETRON 2 MG/ML
4 INJECTION INTRAMUSCULAR; INTRAVENOUS ONCE
Status: COMPLETED | OUTPATIENT
Start: 2025-06-27 | End: 2025-06-27

## 2025-06-27 RX ORDER — MORPHINE SULFATE 4 MG/ML
4 INJECTION, SOLUTION INTRAMUSCULAR; INTRAVENOUS ONCE
Refills: 0 | Status: COMPLETED | OUTPATIENT
Start: 2025-06-27 | End: 2025-06-27

## 2025-06-27 RX ADMIN — IOPAMIDOL 90 ML: 755 INJECTION, SOLUTION INTRAVENOUS at 22:12

## 2025-06-27 RX ADMIN — ONDANSETRON 4 MG: 2 INJECTION, SOLUTION INTRAMUSCULAR; INTRAVENOUS at 22:13

## 2025-06-27 RX ADMIN — MORPHINE SULFATE 4 MG: 4 INJECTION, SOLUTION INTRAMUSCULAR; INTRAVENOUS at 22:13

## 2025-06-27 ASSESSMENT — ACTIVITIES OF DAILY LIVING (ADL)
ADLS_ACUITY_SCORE: 58

## 2025-06-27 ASSESSMENT — ENCOUNTER SYMPTOMS
NAUSEA: 0
SHORTNESS OF BREATH: 0
COUGH: 1
FREQUENCY: 1
DIARRHEA: 0
ABDOMINAL PAIN: 0
VOMITING: 0
FLANK PAIN: 1
FEVER: 0

## 2025-06-27 NOTE — ED TRIAGE NOTES
Pt lives in a group home setting and was dropped off by staff after pt reported 6 hours of left sided flank pain.      Triage Assessment (Adult)       Row Name 06/27/25 3237          Triage Assessment    Airway WDL WDL        Respiratory WDL    Respiratory WDL WDL        Skin Circulation/Temperature WDL    Skin Circulation/Temperature WDL WDL        Cardiac WDL    Cardiac WDL WDL        Peripheral/Neurovascular WDL    Peripheral Neurovascular WDL WDL        Cognitive/Neuro/Behavioral WDL    Cognitive/Neuro/Behavioral WDL WDL

## 2025-06-27 NOTE — ED PROVIDER NOTES
EMERGENCY DEPARTMENT ENCOUNTER      NAME: Warren Jaramillo  AGE: 44 year old male  YOB: 1980  MRN: [id number]  EVALUATION DATE & TIME: 6/27/2025  6:23 PM    PCP: Aakash Bernardo Physician    ED PROVIDER: Evangelina Medina M.D.        Chief Complaint   Patient presents with    Flank Pain         FINAL IMPRESSION:    1. Left flank pain    2. Leukocytosis, unspecified type    3. Splenomegaly            MEDICAL DECISION MAKING:    Warren Jaramillo is a 44 year old male with history of ADHD, elevated WBC, fetal alcohol syndrome, cardiomegaly, CHF, active autistic disorder, diabetes, AVA, PVCs and SVT, cirrhosis of the liver with ascites, DVT, who presents to the ER with complaints of left flank pain.    He states the pain started a few hours ago.  Mainly located in the left upper flank and sometimes radiates to his left shoulder.  Reports associated urinary frequency with it.  He denies any known history of kidney stones.  Pain is worse with movement and twisting.    Imaging looks good.  He denies any actual abdominal pain.  Discussed with him about consideration for SBP and paracentesis but he does not think it is necessary.  He states that overall he feels really good and denies abdominal pain, fevers, etc.  He agrees to return to the ER if anything changes or worsens.  This time we will have him follow-up with primary care as an outpatient early next week if symptoms persist or return to ER vomiting worsens.      ED COURSE:  6:55 PM  I met with the patient to gather history and perform my exam. ED course and treatment discussed.  Pt shows me paperwork that suggest that he is now his own legal decision maker.    8:09 PM   Latest Reference Range & Units 02/15/25 18:51 05/15/25 20:11 06/27/25 18:52   Procalcitonin <0.50 ng/mL 0.13 0.33 0.12      Latest Reference Range & Units 06/18/25 06:21 06/19/25 06:49 06/27/25 18:52   Hemoglobin 13.3 - 17.7 g/dL 9.9 (L) 10.3 (L) 10.9 (L)      Latest  Reference Range & Units 02/15/25 18:51 05/15/25 20:11 06/27/25 18:52   CRP Inflammation <5.00 mg/L 18.30 (H) 85.80 (H) 10.80 (H)      Latest Reference Range & Units 06/02/25 02:54 06/02/25 04:48 06/27/25 18:52   Troponin T, High Sensitivity <=22 ng/L 27 (H) 30 (H) 33 (H)     10:55 PM  Awaiting CT results.  Patient has a history of DVT and had recent hospitalization therefore CT PE was pursued.    12:19 AM  Discussed CT findings and splenomegaly with GI.  They did not have any concerns in that standpoint.  They think it is related to his cirrhosis.  They did wonder about doing paracentesis given his chronic ascites and his pain.  I spoke with patient about this and he does not want this.  He states that his abdomen feels fantastic and denies any abdominal pain.  He does not think that he needs to have a paracentesis at this time.  He feels comfortable with just watching his symptoms at home and return to the ER if pain worsens, develops a fever, or any other concerns.  I think that is reasonable.    Had a long discussion with the patient.  It is possible his symptoms could actually represent more of a musculoskeletal etiology as it is reproducible with movement, twisting, bending.  Imaging overall looks good.  Certainly cannot rule out things like SBP without paracentesis, but he has no abdominal pain and he does not want to have a paracentesis which I think is reasonable.  He is in very frequently with abdominal pain and I do feel that he will return if anything changes or worsens.  Patient has chronic elevated WBC though this is slightly higher than usual.  Nothing to suggest sepsis otherwise.  Urinalysis unremarkable.  He agrees to return to ER for any changes or worsens and all of his questions have been answered.      I considered ACS, PE, ruptured AAA, aortic dissection, bowel obstruction, bowel ischemia, cholecystitis, pancreatitis, appendicitis, diverticulitis, kidney stone, pyelonephritis, incarcerated or  strangulated hernia, testicular torsion, SBP, viscus perforation, perforated GI ulcer, and other such etiologies at this time.        At the conclusion of the encounter I discussed the results of all of the tests and the disposition. Their questions were answered. The patient (and any family present) acknowledged understanding and were agreeable with the care plan.      CONSULTANTS:  LUDA PHILLIPS - Dr. Blackburn      MEDICATIONS GIVEN IN THE EMERGENCY:  Medications   ondansetron (ZOFRAN) injection 4 mg (4 mg Intravenous $Given 6/27/25 2213)   morphine (PF) injection 4 mg (4 mg Intravenous $Given 6/27/25 2213)   iopamidol (ISOVUE-370) solution 90 mL (90 mLs Intravenous $Given 6/27/25 2212)           NEW PRESCRIPTIONS STARTED AT TODAY'S ER VISIT     Medication List      There are no discharge medications for this visit.             CONDITION:  stable        DISPOSITION:  D. C home         =================================================================  =================================================================  TRIAGE ASSESSMENT:  Pt lives in a group home setting and was dropped off by staff after pt reported 6 hours of left sided flank pain.       ED Triage Vitals [06/27/25 1709]   Encounter Vitals Group      /73      Systolic BP Percentile       Diastolic BP Percentile       Pulse 78      Resp 20      Temp 98.1  F (36.7  C)      Temp src Oral      SpO2 98 %      Weight 127.5 kg (281 lb)       ================================================================  ================================================================    HPI    Patient information was obtained from: patient    Use of Intrepreter: N/A     Tarunanjel Jaramillo is a 44 year old male with history of ADHD, elevated WBC, fetal alcohol syndrome, cardiomegaly, CHF, active autistic disorder, diabetes, AVA, PVCs and SVT, cirrhosis of the liver with ascites, DVT, who presents to the ER with complaints of left flank pain.    He states the pain  started a few hours ago.  Mainly located in the left upper flank and sometimes radiates to his left shoulder.  Reports associated urinary frequency with it.  He denies any known history of kidney stones.    Denies fevers, cough, chest pain, shortness of breath, abdominal pain, vomiting or diarrhea.    Patient has a history of ascites but states that it has been very well-managed now that he is on diuretics.  He states he has been taking those appropriately.  He denies any actual increasing abdominal distention or abdominal pain at this time.  Only the left upper flank pain.      CHART REVIEW:  Taken from discharge summary from 6/20/25:  Recurrent ascites, requiring frequent paracentesis  Right ventricular dysfunction  Hx of SBP  Patient has history of cirrhosis, likely secondary to alcohol use and MASLD recently requiring more frequent paracentesis now twice weekly over the past 3-4 months.  Recently hospitalized from 6/11 - 6/13 for decompensated liver disease with concern for persistent SBP with ANC.  Discharged on cefdinir course with plan to return to [facility] for further prophylaxis. Successful paracentesis performed 6/16 with 5L off. Fluid studies show elevated ANC, but downtrending from previous. GI and nephrology consulted during hospital stay for diuretic guidance and further workup with ascites. Had extensive workup with GI to determine etiology of ascites. Repeat transjugular liver biopsy and hepatic venogram showing pressures consistent with right heart failure vs. Cirrhosis. Recent MICHAEL shows right sided hypokinesis with right atrial enlargement. Right heart cath on 6/19 shows normal coronaries with mild to moderate pulmonary hypertension and volume overload. Cardiology was consulted during admission. Patient received 24 hours of bumex infusion. Had a total of 13.25L urine out during admission. Given agressive diuresis, patient had mild elevation in Cr. Plan to follow up with PCP in one week to recheck  labs. Plan to discharge with furosemide 20 mg BID & spironolactone 50 mg BID. Patient voiced understanding of plan.       REVIEW OF SYSTEMS  Review of Systems   Constitutional:  Negative for fever.   Respiratory:  Positive for cough (cough started a few hours ago). Negative for shortness of breath.    Cardiovascular:  Negative for chest pain.   Gastrointestinal:  Negative for abdominal pain, diarrhea, nausea and vomiting.   Genitourinary:  Positive for flank pain and frequency.   All other systems reviewed and are negative.      PAST MEDICAL HISTORY:  Past Medical History:   Diagnosis Date    COPD exacerbation (H) 12/02/2024    DM2 (diabetes mellitus, type 2) (H) 04/28/2020    HTN (hypertension) 07/30/2012    Sleep apnea     Thyroid nodule 07/31/2019    Rojas's disease (H)          PAST SURGICAL HISTORY:  Past Surgical History:   Procedure Laterality Date    COLONOSCOPY      CV CORONARY ANGIOGRAM N/A 6/18/2025    Procedure: Coronary Angiogram;  Surgeon: Pasquale Martinez MD;  Location: [facility] CV    CV LEFT HEART CATH N/A 6/18/2025    Procedure: Left Heart Catheterization;  Surgeon: Pasquale Martinez MD;  Location: [facility] CV    CV RIGHT HEART CATH MEASUREMENTS RECORDED N/A 6/18/2025    Procedure: Right Heart Catheterization;  Surgeon: Pasquale Martinez MD;  Location: [facility] CV    ESOPHAGOSCOPY, GASTROSCOPY, DUODENOSCOPY (EGD), COMBINED N/A 7/21/2023    Procedure: ESOPHAGOGASTRODUODENOSCOPY WITH GASTRIC AND ESOPHAGEAL BIOPSIES;  Surgeon: Filiberto Aragon MD;  Location: [facility] OR    ESOPHAGOSCOPY, GASTROSCOPY, DUODENOSCOPY (EGD), COMBINED N/A 4/4/2025    Procedure: ESOPHAGOGASTRODUODENOSCOPY;  Surgeon: Ramesh Talbot MD;  Location: [facility] OR    IR HEPATIC VENOGRAM W HEMODYNAMICS  6/17/2025    TOOTH EXTRACTION           CURRENT MEDICATIONS:    Prior to Admission medications    Medication Sig Start Date End Date Taking? Authorizing Provider   albuterol (PROAIR  HFA/PROVENTIL HFA/VENTOLIN HFA) 108 (90 Base) MCG/ACT inhaler Inhale 1-2 puffs into the lungs every 6 hours as needed for shortness of breath, wheezing or cough 4/12/24   Alejandrina Kelly MD   albuterol (PROVENTIL) (2.5 MG/3ML) 0.083% neb solution Take 2.5 mg by nebulization 3 times daily as needed for shortness of breath, wheezing or cough    Unknown, Entered By History   aloe vera GEL Apply 1 g topically every hour as needed for skin care Per bottle directions    Unknown, Entered By History   bacitracin 500 UNIT/GM OINT Apply topically 3 times daily as needed for wound care    Unknown, Entered By History   Benzocaine (SOLARCAINE ALOE VERA EX) Externally apply topically daily as needed.    Reported, Patient   benzonatate (TESSALON) 200 MG capsule Take 1 capsule (200 mg) by mouth 3 times daily as needed for cough. 9/22/24   Liliana Alvarez PA-C   Calamine external lotion Apply topically as needed for itching    Unknown, Entered By History   carbamide peroxide (DEBROX) 6.5 % otic solution Place 5 drops into both ears daily as needed for other (ear wax).    Reported, Patient   clotrimazole (LOTRIMIN) 1 % external cream Apply topically 2 times daily as needed (skin irritation)    Unknown, Entered By History   Continuous Glucose Sensor (FREESTYLE MEGHANN 3 PLUS SENSOR) MISC USE TO READ BLOOD SUGAR TWICE DAILY AND AS NEEDED. CHANGE SENSOR EVERY 15 DAYS 3/6/25   Reported, Patient   dextromethorphan-guaiFENesin (MUCINEX DM)  MG 12 hr tablet Take 1 tablet by mouth 2 times daily as needed. 9/22/24   Reported, Patient   diclofenac (VOLTAREN) 1 % topical gel Apply 2 g topically daily as needed for moderate pain To joints/back    Unknown, Entered By History   EPINEPHrine (ANY BX GENERIC EQUIV) 0.3 MG/0.3ML injection 2-pack Inject 0.3 mg into the muscle as needed for anaphylaxis. May repeat one time in 5-15 minutes if response to initial dose is inadequate.    Reported, Patient   famotidine (PEPCID) 20 MG tablet  Take 1 tablet (20 mg) by mouth 2 times daily 1/18/24   Elinor Pruett MD   furosemide (LASIX) 40 MG tablet Take 0.5 tablets (20 mg) by mouth 2 times daily. 6/20/25   Bernarda Crespo MD   GAVILAX 17 GM/SCOOP powder Take 17 g by mouth daily as needed for constipation. 5/8/24   Reported, Patient   Hydrocortisone (PREPARATION H EX) Externally apply topically daily as needed (hemorrhoids).    Reported, Patient   JARDIANCE 10 MG TABS tablet Take 10 mg by mouth every morning. 2/17/25   Reported, Patient   levothyroxine (SYNTHROID/LEVOTHROID) 25 MCG tablet Take 12.5 mcg by mouth every morning (before breakfast).    Unknown, Entered By History   lisinopril (ZESTRIL) 10 MG tablet Take 10 mg by mouth every morning. 2/17/25   Reported, Patient   loperamide (IMODIUM) 2 MG capsule Take 4 mg by mouth 4 times daily as needed for diarrhea.    Reported, Patient   loratadine (CLARITIN) 10 MG tablet Take 10 mg by mouth daily as needed for allergies.    Reported, Patient   magnesium hydroxide (MILK OF MAGNESIA) 400 MG/5ML suspension Take 30 mLs by mouth daily as needed for heartburn.    Reported, Patient   metFORMIN (GLUCOPHAGE) 1000 MG tablet Take 1,000 mg by mouth 2 times daily (with meals)    Unknown, Entered By History   methocarbamol (ROBAXIN) 500 MG tablet Take 1 tablet (500 mg) by mouth 4 times daily as needed for muscle spasms. 2/11/25   Alejandrina Waite APRN CNP   montelukast (SINGULAIR) 10 MG tablet Take 10 mg by mouth every morning.    Unknown, Entered By History   nystatin (MYCOSTATIN) 350489 UNIT/GM external powder Apply topically 2 times daily. 6/13/25   Bernarda Crespo MD   OLANZapine (ZYPREXA) 10 MG tablet Take 10 mg by mouth At Bedtime    Unknown, Entered By History   omeprazole (PRILOSEC) 40 MG DR capsule Take 40 mg by mouth every morning. 8/19/24   Reported, Patient   ondansetron (ZOFRAN ODT) 4 MG ODT tab Take 1 tablet (4 mg) by mouth every 8 hours as needed for nausea. 1/10/25   Reji Santos MD    pramoxine-calamine (AVEENO) 1-8 % LOTN Apply topically daily as needed for itching.    Reported, Patient   rosuvastatin (CRESTOR) 10 MG tablet Take 10 mg by mouth At Bedtime 4/27/22   Reported, Patient   spironolactone (ALDACTONE) 50 MG tablet Take 1 tablet (50 mg) by mouth 2 times daily. 6/20/25   Bernarda Crespo MD   sulfamethoxazole-trimethoprim (BACTRIM DS) 800-160 MG tablet Take 1 tablet by mouth daily. 6/18/25   Bernarda Crespo MD   traZODone (DESYREL) 100 MG tablet Take 100 mg by mouth at bedtime. 11/18/24   Reported, Patient   TRELEGY ELLIPTA 100-62.5-25 MCG/ACT oral inhaler Inhale 1 puff into the lungs every morning. 10/30/24   Reported, Patient   Urea 40 % CREA Apply topically daily as needed for dry skin. 2/5/24   Reported, Patient   Vitamins A & D (VITAMIN A & D) OINT Externally apply topically daily as needed (general skin health).    Reported, Patient   witch hazel-glycerin (TUCKS) pad Apply topically as needed for hemorrhoids.    Reported, Patient         ALLERGIES:  Allergies   Allergen Reactions    Apricot Flavoring Agent (Non-Screening) Anaphylaxis    Banana Anaphylaxis     Throat swelling      Bees Anaphylaxis     Has an Epi pen    Wasp Venom Protein Shortness Of Breath     Other reaction(s): Respiratory Distress  Has an Epi pen        Methylphenidate Itching     Other reaction(s): Nightmares    Prunus      Other reaction(s): *Unknown         FAMILY HISTORY:  Family History   Problem Relation Age of Onset    Unknown/Adopted Father     Unknown/Adopted Maternal Grandmother     C.A.D. Maternal Grandfather     Diabetes Maternal Grandfather     Cerebrovascular Disease Maternal Grandfather     Unknown/Adopted Paternal Grandmother     Unknown/Adopted Paternal Grandfather     Unknown/Adopted Brother     Unknown/Adopted Sister          SOCIAL HISTORY:  Social History     Socioeconomic History    Marital status: Single   Tobacco Use    Smoking status: Every Day     Current packs/day: 1.00     Average  packs/day: 1 pack/day for 21.5 years (21.5 ttl pk-yrs)     Types: Cigarettes     Start date: 1/1/2004    Smokeless tobacco: Never   Vaping Use    Vaping status: Never Used   Substance and Sexual Activity    Alcohol use: Not Currently     Comment: Last 8/7/2024    Sexual activity: Never     Partners: Female   Other Topics Concern     Service No    Blood Transfusions No    Caffeine Concern No    Occupational Exposure No    Hobby Hazards No    Sleep Concern No    Stress Concern Yes     Comment: sometimes    Weight Concern No    Special Diet Yes     Comment: counting carbs    Back Care No    Exercise Yes    Seat Belt Yes    Self-Exams Yes     Social Drivers of Health     Financial Resource Strain: Low Risk  (6/16/2025)    Financial Resource Strain     Within the past 12 months, have you or your family members you live with been unable to get utilities (heat, electricity) when it was really needed?: No   Recent Concern: Financial Resource Strain - High Risk (6/11/2025)    Financial Resource Strain     Within the past 12 months, have you or your family members you live with been unable to get utilities (heat, electricity) when it was really needed?: Yes   Food Insecurity: Low Risk  (6/16/2025)    Food Insecurity     Within the past 12 months, did you worry that your food would run out before you got money to buy more?: No     Within the past 12 months, did the food you bought just not last and you didn t have money to get more?: No   Transportation Needs: Low Risk  (6/16/2025)    Transportation Needs     Within the past 12 months, has lack of transportation kept you from medical appointments, getting your medicines, non-medical meetings or appointments, work, or from getting things that you need?: No    [facility]    Social Connections   Interpersonal Safety: Low Risk  (6/16/2025)    Interpersonal Safety     Do you feel physically and emotionally safe where you  currently live?: Yes     Within the past 12 months, have you been hit, slapped, kicked or otherwise physically hurt by someone?: No     Within the past 12 months, have you been humiliated or emotionally abused in other ways by your partner or ex-partner?: No   Housing Stability: High Risk (6/16/2025)    Housing Stability     Do you have housing? : Yes     Are you worried about losing your housing?: Yes         VITALS:  Patient Vitals for the past 24 hrs:   BP Temp Temp src Pulse Resp SpO2 Weight   06/28/25 0004 -- -- -- 80 -- 96 % --   06/28/25 0000 130/60 -- -- -- -- -- --   06/27/25 2257 128/58 -- -- -- -- 92 % --   06/27/25 2247 -- -- -- 84 -- -- --   06/27/25 2230 -- -- -- 84 -- 96 % --   06/27/25 2217 -- -- -- 82 -- 97 % --   06/27/25 2144 -- -- -- 76 -- 99 % --   06/27/25 2104 124/55 -- -- 80 -- 96 % --   06/27/25 2050 120/57 -- -- 74 -- 97 % --   06/27/25 1709 134/73 98.1  F (36.7  C) Oral 78 20 98 % 127.5 kg (281 lb)       Wt Readings from Last 3 Encounters:   06/27/25 127.5 kg (281 lb)   06/20/25 121 kg (266 lb 11.2 oz)   06/10/25 133.7 kg (294 lb 12.8 oz)       Estimated Creatinine Clearance: 89.5 mL/min (A) (based on SCr of 1.31 mg/dL (H)).    PHYSICAL EXAM    Constitutional:  Well developed, Well nourished, NAD  HENT:  Normocephalic, Atraumatic, Bilateral external ears normal, Nose normal. Neck- Supple, No stridor.   Eyes:  PERRL, EOMI, Conjunctiva normal, No discharge.  Respiratory:  Normal breath sounds, No respiratory distress, No wheezing, Speaks full sentences easily. No cough.   Cardiovascular:  Normal heart rate, Regular rhythm, No murmurs , No rubs, No gallops. Chest wall nontender.   GI:  +obesity.  Bowel sounds normal, Soft, No tenderness, No masses, No flank tenderness. No rebound or guarding.   : deferred  Musculoskeletal: No cyanosis, No clubbing. Good range of motion in all major joints. No tenderness to palpation or major deformities noted. No tenderness of the CTLS spine.   Integument:   Warm, Dry, No erythema, No rash.  No petechiae.   Neurologic:  Alert & oriented x 3,  Normal gait.   Psychiatric:  Affect normal, Cooperative      LAB:  All pertinent labs reviewed and interpreted.  Recent Results (from the past 24 hours)   Basic metabolic panel    Collection Time: 06/27/25  6:52 PM   Result Value Ref Range    Sodium 137 135 - 145 mmol/L    Potassium 5.0 3.4 - 5.3 mmol/L    Chloride 98 98 - 107 mmol/L    Carbon Dioxide (CO2) 26 22 - 29 mmol/L    Anion Gap 13 7 - 15 mmol/L    Urea Nitrogen 34.8 (H) 6.0 - 20.0 mg/dL    Creatinine 1.31 (H) 0.67 - 1.17 mg/dL    GFR Estimate 69 >60 mL/min/1.73m2    Calcium 9.5 8.8 - 10.4 mg/dL    Glucose 125 (H) 70 - 99 mg/dL   Hepatic function panel    Collection Time: 06/27/25  6:52 PM   Result Value Ref Range    Protein Total 7.4 6.4 - 8.3 g/dL    Albumin 4.3 3.5 - 5.2 g/dL    Bilirubin Total 0.3 <=1.2 mg/dL    Alkaline Phosphatase 244 (H) 40 - 150 U/L    AST 17 0 - 45 U/L    ALT 19 0 - 70 U/L    Bilirubin Direct 0.17 0.00 - 0.30 mg/dL   Lipase    Collection Time: 06/27/25  6:52 PM   Result Value Ref Range    Lipase 36 13 - 60 U/L   CBC with platelets and differential    Collection Time: 06/27/25  6:52 PM   Result Value Ref Range    WBC Count 18.6 (H) 4.0 - 11.0 10e3/uL    RBC Count 4.25 (L) 4.40 - 5.90 10e6/uL    Hemoglobin 10.9 (L) 13.3 - 17.7 g/dL    Hematocrit 35.3 (L) 40.0 - 53.0 %    MCV 83 78 - 100 fL    MCH 25.6 (L) 26.5 - 33.0 pg    MCHC 30.9 (L) 31.5 - 36.5 g/dL    RDW 15.5 (H) 10.0 - 15.0 %    Platelet Count 571 (H) 150 - 450 10e3/uL    % Neutrophils 73 %    % Lymphocytes 14 %    % Monocytes 9 %    % Eosinophils 3 %    % Basophils 1 %    % Immature Granulocytes 1 %    NRBCs per 100 WBC 0 <1 /100    Absolute Neutrophils 13.6 (H) 1.6 - 8.3 10e3/uL    Absolute Lymphocytes 2.6 0.8 - 5.3 10e3/uL    Absolute Monocytes 1.6 (H) 0.0 - 1.3 10e3/uL    Absolute Eosinophils 0.6 0.0 - 0.7 10e3/uL    Absolute Basophils 0.1 0.0 - 0.2 10e3/uL    Absolute Immature  Granulocytes 0.2 <=0.4 10e3/uL    Absolute NRBCs 0.0 10e3/uL   Extra Blue Top Tube    Collection Time: 06/27/25  6:52 PM   Result Value Ref Range    Hold Specimen JIC    Extra Red Top Tube    Collection Time: 06/27/25  6:52 PM   Result Value Ref Range    Hold Specimen JIC    RBC and Platelet Morphology    Collection Time: 06/27/25  6:52 PM   Result Value Ref Range    RBC Morphology Confirmed RBC Indices     Platelet Assessment  Automated Count Confirmed. Platelet morphology is normal.     Automated Count Confirmed. Platelet morphology is normal.    Elliptocytes Slight (A) None Seen    Polychromasia Slight (A) None Seen   Troponin T, High Sensitivity    Collection Time: 06/27/25  6:52 PM   Result Value Ref Range    Troponin T, High Sensitivity 33 (H) <=22 ng/L   Procalcitonin    Collection Time: 06/27/25  6:52 PM   Result Value Ref Range    Procalcitonin 0.12 <0.50 ng/mL   CRP inflammation    Collection Time: 06/27/25  6:52 PM   Result Value Ref Range    CRP Inflammation 10.80 (H) <5.00 mg/L   UA with Microscopic reflex to Culture    Collection Time: 06/27/25  7:07 PM    Specimen: Urine, NOS   Result Value Ref Range    Color Urine Light Yellow Colorless, Straw, Light Yellow, Yellow    Appearance Urine Clear Clear    Glucose Urine >1000 (A) Negative mg/dL    Bilirubin Urine Negative Negative    Ketones Urine Negative Negative mg/dL    Specific Gravity Urine 1.018 1.001 - 1.030    Blood Urine Negative Negative    pH Urine 5.5 5.0 - 7.0    Protein Albumin Urine Negative Negative mg/dL    Urobilinogen Urine Normal Normal mg/dL    Nitrite Urine Negative Negative    Leukocyte Esterase Urine Negative Negative    RBC Urine <1 <=2 /HPF    WBC Urine <1 <=5 /HPF    Squamous Epithelials Urine <1 <=1 /HPF   Troponin T, High Sensitivity    Collection Time: 06/27/25  8:50 PM   Result Value Ref Range    Troponin T, High Sensitivity 22 <=22 ng/L       Lab Results   Component Value Date    ABORH O POS 05/17/2025            RADIOLOGY:  Reviewed all pertinent imaging. Please see official radiology report.    CT Abdomen Pelvis w Contrast   Final Result   IMPRESSION:    1.  Hepatomegaly with cirrhotic changes in the liver.      2.  Evidence of portal venous hypertension with splenic enlargement and a moderate amount of ascites. The amount of ascites is mildly increased relative to the prior study.      3.  Stable benign bilateral adrenal myelolipomas.      4.  No urinary calculi or hydronephrosis.      5.  Moderate-to-large amount of stool in the colon. No acute bowel findings.      CT Chest Pulmonary Embolism w Contrast   Final Result   IMPRESSION:   1.  Negative for pulmonary embolism.      2.  Nonspecific mild mosaic attenuation pattern in the lungs can be seen with air trapping in the setting of small airways disease.      3.  No significant findings elsewhere in the chest.            EKG:    Indication: flank/back pain    Performed at: 19:12p  Impression: Sinus rhythm at 77 bpm.  There is some motion artifact.  No ST elevation appreciated.  Flipped T waves noted in lead aVR.   ms,  ms, QTc 434 ms.  Nonspecific ST changes compared to T[place] 15, 2025 EKG      I have independently reviewed and interpreted the EKG(s) documented above.        PROCEDURES:  None, pt declined diagnostic paracentesis    Medical Decision Making  I reviewed the EMR: Inpatient Record: see HPI  I discussed the care with another health care provider: see consults  Discharge. No recommendations on prescription strength medication(s). I considered admission, but ultimately discharged patient after reassuring imaging and laboratories overall.  Patient declines any further workup and feels comfortable with discharge home with conservative management..    MIPS (CTPE, Dental pain, Khan, Sinusitis, Asthma/COPD, Head Trauma): CT Pulmonary Angiogram:Patient is moderate to high risk for PE.    SEPSIS: None    Evangelina Medina M.D. [facility]  Emergency  Medicine and Medical Toxicology  Formerly [facility] EMERGENCY DEPARTMENT  [facility] [facility] [id number] [phone]  Dept: [phone]           Evangelina Medina MD  06/28/25 0058

## 2025-06-28 VITALS
BODY MASS INDEX: 46.76 KG/M2 | WEIGHT: 281 LBS | SYSTOLIC BLOOD PRESSURE: 130 MMHG | DIASTOLIC BLOOD PRESSURE: 60 MMHG | TEMPERATURE: 98.1 F | HEART RATE: 80 BPM | OXYGEN SATURATION: 96 % | RESPIRATION RATE: 20 BRPM

## 2025-06-28 NOTE — DISCHARGE INSTRUCTIONS
Overall your blood work and imaging looks very good.  WBC is mildly elevated which sometimes can suggest infection. Your urine looks good.    I am glad that you are not having any abdominal pain. I understand your desire to not have a paracentesis at this time.  That is reasonable.    I do ask that you return to emergency department if you develop fever, worsening pain, abdominal pain, or any other concerns.    Please follow-up with primary care doctor on Monday if your symptoms are not improving.    It is possible this could be muscular pain since it is worse when you move and push on it.  This should improve over the next couple of days.  You can do heat to this area to see if that helps.    Thank you for choosing [facility] Emergency Department.  It has been my pleasure caring for you today.     ~Dr. Carol MD

## 2025-06-28 NOTE — ED NOTES
"Pt reports flank pain. Pt states \"I don't have chest pain, just sometimes when I cough it shoots from my flank into my chest\" Provider aware.   "

## 2025-06-30 ENCOUNTER — HOSPITAL ENCOUNTER (EMERGENCY)
Facility: HOSPITAL | Age: 45
Discharge: HOME | End: 2025-06-30
Attending: EMERGENCY MEDICINE | Admitting: EMERGENCY MEDICINE
Payer: COMMERCIAL

## 2025-06-30 VITALS
OXYGEN SATURATION: 98 % | WEIGHT: 281 LBS | DIASTOLIC BLOOD PRESSURE: 58 MMHG | HEIGHT: 65 IN | TEMPERATURE: 98.3 F | SYSTOLIC BLOOD PRESSURE: 128 MMHG | BODY MASS INDEX: 46.82 KG/M2 | RESPIRATION RATE: 27 BRPM | HEART RATE: 84 BPM

## 2025-06-30 DIAGNOSIS — L29.9 ITCHING: ICD-10-CM

## 2025-06-30 LAB
ALBUMIN SERPL BCG-MCNC: 4.3 G/DL (ref 3.5–5.2)
ALP SERPL-CCNC: 244 U/L (ref 40–150)
ALT SERPL W P-5'-P-CCNC: 19 U/L (ref 0–70)
ANION GAP SERPL CALCULATED.3IONS-SCNC: 16 MMOL/L (ref 7–15)
AST SERPL W P-5'-P-CCNC: 17 U/L (ref 0–45)
BASOPHILS # BLD AUTO: 0.2 10E3/UL (ref 0–0.2)
BASOPHILS NFR BLD AUTO: 1 %
BILIRUB SERPL-MCNC: 0.2 MG/DL
BUN SERPL-MCNC: 40.1 MG/DL (ref 6–20)
CALCIUM SERPL-MCNC: 10.1 MG/DL (ref 8.8–10.4)
CHLORIDE SERPL-SCNC: 97 MMOL/L (ref 98–107)
CREAT SERPL-MCNC: 1.42 MG/DL (ref 0.67–1.17)
EGFRCR SERPLBLD CKD-EPI 2021: 62 ML/MIN/1.73M2
EOSINOPHIL # BLD AUTO: 0.5 10E3/UL (ref 0–0.7)
EOSINOPHIL NFR BLD AUTO: 3 %
ERYTHROCYTE [DISTWIDTH] IN BLOOD BY AUTOMATED COUNT: 15.9 % (ref 10–15)
GLUCOSE SERPL-MCNC: 113 MG/DL (ref 70–99)
HCO3 SERPL-SCNC: 24 MMOL/L (ref 22–29)
HCT VFR BLD AUTO: 35.2 % (ref 40–53)
HGB BLD-MCNC: 11.2 G/DL (ref 13.3–17.7)
IMM GRANULOCYTES # BLD: 0.3 10E3/UL
IMM GRANULOCYTES NFR BLD: 1 %
LYMPHOCYTES # BLD AUTO: 2.8 10E3/UL (ref 0.8–5.3)
LYMPHOCYTES NFR BLD AUTO: 15 %
MCH RBC QN AUTO: 26.2 PG (ref 26.5–33)
MCHC RBC AUTO-ENTMCNC: 31.8 G/DL (ref 31.5–36.5)
MCV RBC AUTO: 82 FL (ref 78–100)
MONOCYTES # BLD AUTO: 1.7 10E3/UL (ref 0–1.3)
MONOCYTES NFR BLD AUTO: 10 %
NEUTROPHILS # BLD AUTO: 12.8 10E3/UL (ref 1.6–8.3)
NEUTROPHILS NFR BLD AUTO: 70 %
NRBC # BLD AUTO: 0 10E3/UL
NRBC BLD AUTO-RTO: 0 /100
PLAT MORPH BLD: ABNORMAL
PLATELET # BLD AUTO: 534 10E3/UL (ref 150–450)
POLYCHROMASIA BLD QL SMEAR: SLIGHT
POTASSIUM SERPL-SCNC: 5.2 MMOL/L (ref 3.4–5.3)
PROT SERPL-MCNC: 7.5 G/DL (ref 6.4–8.3)
RBC # BLD AUTO: 4.28 10E6/UL (ref 4.4–5.9)
RBC MORPH BLD: ABNORMAL
SODIUM SERPL-SCNC: 137 MMOL/L (ref 135–145)
WBC # BLD AUTO: 18.2 10E3/UL (ref 4–11)

## 2025-06-30 PROCEDURE — 80053 COMPREHEN METABOLIC PANEL: CPT | Performed by: EMERGENCY MEDICINE

## 2025-06-30 PROCEDURE — 250N000013 HC RX MED GY IP 250 OP 250 PS 637: Performed by: EMERGENCY MEDICINE

## 2025-06-30 PROCEDURE — 36415 COLL VENOUS BLD VENIPUNCTURE: CPT | Performed by: EMERGENCY MEDICINE

## 2025-06-30 PROCEDURE — 99283 EMERGENCY DEPT VISIT LOW MDM: CPT

## 2025-06-30 PROCEDURE — 85025 COMPLETE CBC W/AUTO DIFF WBC: CPT | Performed by: EMERGENCY MEDICINE

## 2025-06-30 RX ORDER — HYDROXYZINE PAMOATE 25 MG/1
25 CAPSULE ORAL 3 TIMES DAILY PRN
Qty: 12 CAPSULE | Refills: 0 | Status: SHIPPED | OUTPATIENT
Start: 2025-06-30

## 2025-06-30 RX ORDER — HYDROXYZINE HYDROCHLORIDE 25 MG/1
25 TABLET, FILM COATED ORAL ONCE
Status: COMPLETED | OUTPATIENT
Start: 2025-06-30 | End: 2025-06-30

## 2025-06-30 RX ADMIN — HYDROXYZINE HYDROCHLORIDE 25 MG: 25 TABLET, FILM COATED ORAL at 22:51

## 2025-06-30 ASSESSMENT — ACTIVITIES OF DAILY LIVING (ADL): ADLS_ACUITY_SCORE: 58

## 2025-07-01 NOTE — DISCHARGE INSTRUCTIONS
Labs, bilirubin normal.  No signs of rash today.  Use Vistaril as needed.  Follow-up with your primary care doctor.

## 2025-07-01 NOTE — ED NOTES
Bed: JNED-04  Expected date: 6/30/25  Expected time: 10:07 PM  Means of arrival: Ambulance  Comments:  Hakan  44M  Allergic reaction  Group home pt

## 2025-07-01 NOTE — ED TRIAGE NOTES
Patient is here from group home where he has complaints of itching.       Triage Assessment (Adult)       Row Name 06/30/25 3412          Triage Assessment    Airway WDL WDL        Respiratory WDL    Respiratory WDL WDL        Skin Circulation/Temperature WDL    Skin Circulation/Temperature WDL X  itching        Peripheral/Neurovascular WDL    Peripheral Neurovascular WDL WDL        Cognitive/Neuro/Behavioral WDL    Cognitive/Neuro/Behavioral WDL WDL

## 2025-07-01 NOTE — ED PROVIDER NOTES
EMERGENCY DEPARTMENT ENCOUNTER      NAME: Warren Jaramillo  AGE: 44 year old male  YOB: 1980  MRN: [id number]  EVALUATION DATE & TIME: 6/30/2025 10:13 PM    PCP: Aakash Bernardo Physician    ED PROVIDER: Judith Oliver MD    Chief Complaint   Patient presents with    Pruritis         FINAL IMPRESSION:  1. Itching          ED COURSE & MEDICAL DECISION MAKING:    Pertinent Labs & Imaging studies reviewed. (See chart for details)  44 year old male with history of ADHD, fetal alcohol syndrome, CHF, cirrhosis with ascites who presents to the Emergency Department for evaluation of new onset itching without rash over the course the last 3 days.  It does not seem to be obviously jaundiced or icteric, however with his history of cirrhosis and pruritus without a rash will obtain bilirubin to evaluate for hyperbilirubinemia.  He recently started diclofenac gel for his arthritic pain, but believes that he has taken this in the past.  His itching does not seem to be particularly localized to the joints of application making this is an allergic reaction less likely.  Again no signs of rash visualized.  Patient describes this is new, less likely be psychogenic.    Patient given dose of Vistaril orally, which seem to help his symptomatology.  CBC, CMP notable for WBC of 18.2 which appears to be similar to 3 days ago.  LFTs particularly bilirubin are unremarkable.  Home with prescription for ongoing Vistaril, encouraged PCP follow-up.      ED Course as of 07/01/25 0014   Mon Jun 30, 2025 2237 Introduced myself to the patient, obtained history of present illness, and performed initial physical exam at this time.     2333 WBC(!): 18.2  Similar to 3d ago   2340 I discussed plan for discharge including reasons to return to the ED such as new or worsening symptoms. Patient is agreeable with this plan at this time. All questions answered.       Medical Decision Making  I obtained history from EMS  I reviewed the  "EMR: Inpatient Record: 6/20/2025 discharge summary  Discharge. I prescribed additional prescription strength medication(s) as charted. See documentation for any additional details.    MIPS (CTPE, Dental pain, Khan, Sinusitis, Asthma/COPD, Head Trauma): Not Applicable    SEPSIS: None          At the conclusion of the encounter I discussed the results of all of the tests and the disposition. The questions were answered. The patient or family acknowledged understanding and was agreeable with the care plan.      MEDICATIONS GIVEN IN THE EMERGENCY:  Medications   hydrOXYzine HCl (ATARAX) tablet 25 mg (25 mg Oral $Given 6/30/25 0275)       NEW PRESCRIPTIONS STARTED AT TODAY'S ER VISIT  Discharge Medication List as of 6/30/2025 11:39 PM        START taking these medications    Details   hydrOXYzine maycol (VISTARIL) 25 MG capsule Take 1 capsule (25 mg) by mouth 3 times daily as needed for itching., Disp-12 capsule, R-0, E-Prescribe                =================================================================    HPI    Patient information was obtained from: Patient.    Use of Intrepreter: N/A      Warren Jaramillo is a 44 year old male with pertinent medical history of tobacco use, CHF, type II diabetes, hypertension, AVA treated with BiPAP, cirrhosis of liver with ascites, COPD, DVT, and NSTEMI who presents via walk-in for evaluation of pruritis.     Patient reports a sudden onset of diffuse body itchiness at 7:30 PM this evening that started after taking his evening medications (spirolactone and lasix). He states that it is itchy \"inside my skin like in my blood vessels\" and that it feels like \"sand is flowing through my veins.\" Patient used diclofenac gel on his elbows and knees at 7:00 PM today. This is the first time he has used this gel. Otherwise denies new lotions, soaps, or detergents. Patient states that recently the timing of his medication changed, but no new medication change otherwise.    Patient has a " history of ascites with his most recent bilirubin check occurring ~6/16 (2 weeks ago). He states that his abdominal distention has improved. No abdominal pain. No other symptoms, complaints, or concerns at this time.     Per chart review:  6/27/2025 Patient was seen at [facility] ED for evaluation of left flank pain. Patient has chronic elevated WBC though this is slightly higher than usual. Nothing to suggest sepsis otherwise. Urinalysis unremarkable. CT Abdomen and Pelvis showed Hepatomegaly with cirrhotic changes in the liver. Evidence of portal venous hypertension with splenic enlargement and a moderate amount of ascites. The amount of ascites is mildly increased relative to the prior study. Stable benign bilateral adrenal myelolipomas. No urinary calculi or hydronephrosis. GI consulted and recommended paracentesis for cirrhosis with ascites. Patient declined at that time as his symptoms have improved and feels comfortable monitoring at home. Patient discharged home in stable condition with no new medications.     PAST MEDICAL HISTORY:  Past Medical History:   Diagnosis Date    COPD exacerbation (H) 12/02/2024    DM2 (diabetes mellitus, type 2) (H) 04/28/2020    HTN (hypertension) 07/30/2012    Sleep apnea     Thyroid nodule 07/31/2019    Rojas's disease (H)        PAST SURGICAL HISTORY:  Past Surgical History:   Procedure Laterality Date    COLONOSCOPY      CV CORONARY ANGIOGRAM N/A 6/18/2025    Procedure: Coronary Angiogram;  Surgeon: Pasquale Martinez MD;  Location: [facility]    CV LEFT HEART CATH N/A 6/18/2025    Procedure: Left Heart Catheterization;  Surgeon: Pasquale Martinez MD;  Location: [facility]    CV RIGHT HEART CATH MEASUREMENTS RECORDED N/A 6/18/2025    Procedure: Right Heart Catheterization;  Surgeon: Pasquale Martinez MD;  Location: [facility]    ESOPHAGOSCOPY, GASTROSCOPY, DUODENOSCOPY (EGD), COMBINED N/A 7/21/2023    Procedure:  ESOPHAGOGASTRODUODENOSCOPY WITH GASTRIC AND ESOPHAGEAL BIOPSIES;  Surgeon: Filiberto Aragon MD;  Location: [facility] OR    ESOPHAGOSCOPY, GASTROSCOPY, DUODENOSCOPY (EGD), COMBINED N/A 4/4/2025    Procedure: ESOPHAGOGASTRODUODENOSCOPY;  Surgeon: Ramesh Talbot MD;  Location: [facility] OR    IR HEPATIC VENOGRAM W HEMODYNAMICS  6/17/2025    TOOTH EXTRACTION         CURRENT MEDICATIONS:    Prior to Admission Medications   Prescriptions Last Dose Informant Patient Reported? Taking?   Benzocaine (SOLARCAINE ALOE VERA EX)  Care Giver, Self Yes No   Sig: Externally apply topically daily as needed.   Calamine external lotion  Care Giver, Self Yes No   Sig: Apply topically as needed for itching   Continuous Glucose Sensor (FREESTYLE MEGHANN 3 PLUS SENSOR) MISC  Care Giver, Self Yes No   Sig: USE TO READ BLOOD SUGAR TWICE DAILY AND AS NEEDED. CHANGE SENSOR EVERY 15 DAYS   EPINEPHrine (ANY BX GENERIC EQUIV) 0.3 MG/0.3ML injection 2-pack  Care Giver, Self Yes No   Sig: Inject 0.3 mg into the muscle as needed for anaphylaxis. May repeat one time in 5-15 minutes if response to initial dose is inadequate.   GAVILAX 17 GM/SCOOP powder  Care Giver, Self Yes No   Sig: Take 17 g by mouth daily as needed for constipation.   Hydrocortisone (PREPARATION H EX)  Care Giver, Self Yes No   Sig: Externally apply topically daily as needed (hemorrhoids).   JARDIANCE 10 MG TABS tablet  Care Giver, Self Yes No   Sig: Take 10 mg by mouth every morning.   OLANZapine (ZYPREXA) 10 MG tablet  Care Giver, Self Yes No   Sig: Take 10 mg by mouth At Bedtime   TRELEGY ELLIPTA 100-62.5-25 MCG/ACT oral inhaler  Care Giver, Self Yes No   Sig: Inhale 1 puff into the lungs every morning.   Urea 40 % CREA  Care Giver, Self Yes No   Sig: Apply topically daily as needed for dry skin.   Vitamins A & D (VITAMIN A & D) OINT  Care Giver, Self Yes No   Sig: Externally apply topically daily as needed (general skin health).   albuterol (PROAIR HFA/PROVENTIL  HFA/VENTOLIN HFA) 108 (90 Base) MCG/ACT inhaler  Care Giver, Self No No   Sig: Inhale 1-2 puffs into the lungs every 6 hours as needed for shortness of breath, wheezing or cough   albuterol (PROVENTIL) (2.5 MG/3ML) 0.083% neb solution  Care Giver, Self Yes No   Sig: Take 2.5 mg by nebulization 3 times daily as needed for shortness of breath, wheezing or cough   aloe vera GEL  Care Giver, Self Yes No   Sig: Apply 1 g topically every hour as needed for skin care Per bottle directions   bacitracin 500 UNIT/GM OINT  Care Giver, Self Yes No   Sig: Apply topically 3 times daily as needed for wound care   benzonatate (TESSALON) 200 MG capsule  Care Giver, Self No No   Sig: Take 1 capsule (200 mg) by mouth 3 times daily as needed for cough.   carbamide peroxide (DEBROX) 6.5 % otic solution  Care Giver, Self Yes No   Sig: Place 5 drops into both ears daily as needed for other (ear wax).   clotrimazole (LOTRIMIN) 1 % external cream  Care Giver, Self Yes No   Sig: Apply topically 2 times daily as needed (skin irritation)   dextromethorphan-guaiFENesin (MUCINEX DM)  MG 12 hr tablet  Care Giver, Self Yes No   Sig: Take 1 tablet by mouth 2 times daily as needed.   diclofenac (VOLTAREN) 1 % topical gel  Care Giver, Self Yes No   Sig: Apply 2 g topically daily as needed for moderate pain To joints/back   famotidine (PEPCID) 20 MG tablet  Care Giver, Self No No   Sig: Take 1 tablet (20 mg) by mouth 2 times daily   furosemide (LASIX) 40 MG tablet   No No   Sig: Take 0.5 tablets (20 mg) by mouth 2 times daily.   levothyroxine (SYNTHROID/LEVOTHROID) 25 MCG tablet  Self Yes No   Sig: Take 12.5 mcg by mouth every morning (before breakfast).   lisinopril (ZESTRIL) 10 MG tablet  Care Giver, Self Yes No   Sig: Take 10 mg by mouth every morning.   loperamide (IMODIUM) 2 MG capsule  Care Giver, Self Yes No   Sig: Take 4 mg by mouth 4 times daily as needed for diarrhea.   loratadine (CLARITIN) 10 MG tablet  Care Giver, Self Yes No   Sig:  Take 10 mg by mouth daily as needed for allergies.   magnesium hydroxide (MILK OF MAGNESIA) 400 MG/5ML suspension  Care Giver, Self Yes No   Sig: Take 30 mLs by mouth daily as needed for heartburn.   metFORMIN (GLUCOPHAGE) 1000 MG tablet  Care Giver, Self Yes No   Sig: Take 1,000 mg by mouth 2 times daily (with meals)   methocarbamol (ROBAXIN) 500 MG tablet  Care Giver, Self No No   Sig: Take 1 tablet (500 mg) by mouth 4 times daily as needed for muscle spasms.   montelukast (SINGULAIR) 10 MG tablet  Care Giver, Self Yes No   Sig: Take 10 mg by mouth every morning.   nystatin (MYCOSTATIN) 885269 UNIT/GM external powder   No No   Sig: Apply topically 2 times daily.   omeprazole (PRILOSEC) 40 MG DR capsule  Care Giver, Self Yes No   Sig: Take 40 mg by mouth every morning.   ondansetron (ZOFRAN ODT) 4 MG ODT tab  Care Giver, Self No No   Sig: Take 1 tablet (4 mg) by mouth every 8 hours as needed for nausea.   pramoxine-calamine (AVEENO) 1-8 % LOTN  Care Giver, Self Yes No   Sig: Apply topically daily as needed for itching.   rosuvastatin (CRESTOR) 10 MG tablet  Care Giver, Self Yes No   Sig: Take 10 mg by mouth At Bedtime   spironolactone (ALDACTONE) 50 MG tablet   No No   Sig: Take 1 tablet (50 mg) by mouth 2 times daily.   sulfamethoxazole-trimethoprim (BACTRIM DS) 800-160 MG tablet   No No   Sig: Take 1 tablet by mouth daily.   traZODone (DESYREL) 100 MG tablet  Care Giver, Self Yes No   Sig: Take 100 mg by mouth at bedtime.   witch hazel-glycerin (TUCKS) pad  Care Giver, Self Yes No   Sig: Apply topically as needed for hemorrhoids.      Facility-Administered Medications: None       ALLERGIES:  Allergies   Allergen Reactions    Apricot Flavoring Agent (Non-Screening) Anaphylaxis    Banana Anaphylaxis     Throat swelling      Bees Anaphylaxis     Has an Epi pen    Wasp Venom Protein Shortness Of Breath     Other reaction(s): Respiratory Distress  Has an Epi pen        Methylphenidate Itching     Other reaction(s):  "Nightmares    Prunus      Other reaction(s): *Unknown       FAMILY HISTORY:  Family History   Problem Relation Age of Onset    Unknown/Adopted Father     Unknown/Adopted Maternal Grandmother     C.A.D. Maternal Grandfather     Diabetes Maternal Grandfather     Cerebrovascular Disease Maternal Grandfather     Unknown/Adopted Paternal Grandmother     Unknown/Adopted Paternal Grandfather     Unknown/Adopted Brother     Unknown/Adopted Sister        SOCIAL HISTORY:  Social History     Tobacco Use    Smoking status: Every Day     Current packs/day: 1.00     Average packs/day: 1 pack/day for 21.5 years (21.5 ttl pk-yrs)     Types: Cigarettes     Start date: 1/1/2004    Smokeless tobacco: Never   Vaping Use    Vaping status: Never Used   Substance Use Topics    Alcohol use: Not Currently     Comment: Last 8/7/2024        VITALS:  Patient Vitals for the past 24 hrs:   BP Temp Temp src Pulse Resp SpO2 Height Weight   06/30/25 2345 128/58 -- -- 84 -- 98 % -- --   06/30/25 2230 115/59 -- -- 83 27 97 % -- --   06/30/25 2215 120/55 -- -- 82 20 97 % -- --   06/30/25 2214 -- 98.3  F (36.8  C) Oral -- 16 -- 1.651 m (5' 5\") 127.5 kg (281 lb)       PHYSICAL EXAM    General Appearance: Well-appearing, well-nourished, no acute distress. Morbidly obese.   Head:  Normocephalic  Cardio:  Regular rate and rhythm  Pulm:  No respiratory distress  Abdomen:  Soft, non-tender, non distended,no rebound or guarding.  Extremities: Normal gait  Skin:  Skin warm, dry, despite complaint of whole body pruritus, no visualized rash.  Not obviously jaundiced, no obvious scleral icterus  Neuro:  Alert and oriented ×3, moving all extremities, no gross sensory defects     RADIOLOGY/LABS:  Reviewed all pertinent imaging. Please see official radiology report. All pertinent labs reviewed and interpreted.    Results for orders placed or performed during the hospital encounter of 06/30/25   Comprehensive metabolic panel   Result Value Ref Range    Sodium 137 135 " - 145 mmol/L    Potassium 5.2 3.4 - 5.3 mmol/L    Carbon Dioxide (CO2) 24 22 - 29 mmol/L    Anion Gap 16 (H) 7 - 15 mmol/L    Urea Nitrogen 40.1 (H) 6.0 - 20.0 mg/dL    Creatinine 1.42 (H) 0.67 - 1.17 mg/dL    GFR Estimate 62 >60 mL/min/1.73m2    Calcium 10.1 8.8 - 10.4 mg/dL    Chloride 97 (L) 98 - 107 mmol/L    Glucose 113 (H) 70 - 99 mg/dL    Alkaline Phosphatase 244 (H) 40 - 150 U/L    AST 17 0 - 45 U/L    ALT 19 0 - 70 U/L    Protein Total 7.5 6.4 - 8.3 g/dL    Albumin 4.3 3.5 - 5.2 g/dL    Bilirubin Total 0.2 <=1.2 mg/dL   CBC with platelets and differential   Result Value Ref Range    WBC Count 18.2 (H) 4.0 - 11.0 10e3/uL    RBC Count 4.28 (L) 4.40 - 5.90 10e6/uL    Hemoglobin 11.2 (L) 13.3 - 17.7 g/dL    Hematocrit 35.2 (L) 40.0 - 53.0 %    MCV 82 78 - 100 fL    MCH 26.2 (L) 26.5 - 33.0 pg    MCHC 31.8 31.5 - 36.5 g/dL    RDW 15.9 (H) 10.0 - 15.0 %    Platelet Count 534 (H) 150 - 450 10e3/uL    % Neutrophils 70 %    % Lymphocytes 15 %    % Monocytes 10 %    % Eosinophils 3 %    % Basophils 1 %    % Immature Granulocytes 1 %    NRBCs per 100 WBC 0 <1 /100    Absolute Neutrophils 12.8 (H) 1.6 - 8.3 10e3/uL    Absolute Lymphocytes 2.8 0.8 - 5.3 10e3/uL    Absolute Monocytes 1.7 (H) 0.0 - 1.3 10e3/uL    Absolute Eosinophils 0.5 0.0 - 0.7 10e3/uL    Absolute Basophils 0.2 0.0 - 0.2 10e3/uL    Absolute Immature Granulocytes 0.3 <=0.4 10e3/uL    Absolute NRBCs 0.0 10e3/uL   RBC and Platelet Morphology   Result Value Ref Range    RBC Morphology Confirmed RBC Indices     Platelet Assessment  Automated Count Confirmed. Platelet morphology is normal.     Automated Count Confirmed. Platelet morphology is normal.    Polychromasia Slight (A) None Seen       The creation of this record is based on the scribe s observations of the work being performed by Judith Oliver MD and the provider s statements to them. It was created on her behalf by Neelima Vallejo, a trained medical scribe. This document has been checked and  approved by the attending provider.    Judith Oliver MD  Emergency Medicine  [facility] EMERGENCY DEPARTMENT  [facility] [facility] [id number] [phone]  Dept: [phone]      Judith Oliver MD  07/01/25 0016

## 2025-07-02 ENCOUNTER — NURSE TRIAGE (OUTPATIENT)
Dept: NURSING | Facility: CLINIC | Age: 45
End: 2025-07-02
Payer: COMMERCIAL

## 2025-07-03 ENCOUNTER — HOSPITAL ENCOUNTER (EMERGENCY)
Facility: HOSPITAL | Age: 45
Discharge: HOME | End: 2025-07-03
Attending: EMERGENCY MEDICINE
Payer: COMMERCIAL

## 2025-07-03 VITALS
OXYGEN SATURATION: 98 % | TEMPERATURE: 98 F | DIASTOLIC BLOOD PRESSURE: 61 MMHG | HEART RATE: 77 BPM | BODY MASS INDEX: 45.82 KG/M2 | RESPIRATION RATE: 20 BRPM | SYSTOLIC BLOOD PRESSURE: 127 MMHG | HEIGHT: 65 IN | WEIGHT: 275 LBS

## 2025-07-03 DIAGNOSIS — R73.09 ABNORMAL BLOOD SUGAR: ICD-10-CM

## 2025-07-03 LAB
GLUCOSE BLDC GLUCOMTR-MCNC: 126 MG/DL (ref 70–99)
GLUCOSE BLDC GLUCOMTR-MCNC: 143 MG/DL (ref 70–99)
GLUCOSE BLDC GLUCOMTR-MCNC: 167 MG/DL (ref 70–99)

## 2025-07-03 PROCEDURE — 82962 GLUCOSE BLOOD TEST: CPT

## 2025-07-03 PROCEDURE — 99283 EMERGENCY DEPT VISIT LOW MDM: CPT

## 2025-07-03 ASSESSMENT — ACTIVITIES OF DAILY LIVING (ADL)
ADLS_ACUITY_SCORE: 58

## 2025-07-03 NOTE — ED NOTES
Bed: [place] [address]  Expected date: 7/3/25  Expected time: 1:00 AM  Means of arrival: Ambulance  Comments:  [facility] 641. 44 M with blood sugar concerns. Implanted monitor is inconsistent reading between 58 and 150.

## 2025-07-03 NOTE — ED TRIAGE NOTES
Pt arrives via EMS from group home. Pt states his personal blood sugar monitor has been reading 300 or 50. EMS blood sugar was 150, pt poct blood sugar 143 on arrival. Pt denies all pain.

## 2025-07-03 NOTE — DISCHARGE INSTRUCTIONS
You were seen in the Emergency Department today for a check of your blood sugars.    We talked about, how continues to take all of your medications as prescribed, make sure that you are staying well-hydrated and eating even when it is hot.       Please return to the ER if you experience inability to keep fluids down, and/or for any other new or concerning symptoms, otherwise please follow up with your primary doctor for recheck.     Thank you for choosing [facility]. It was a pleasure taking care of you today!  - Dr. Tricia Dhaliwal

## 2025-07-03 NOTE — TELEPHONE ENCOUNTER
Nurse Triage SBAR    Is this a 2nd Level Triage? NO    Situation: BGM reads 308, just now 151    Background: Patient not on insulin, just oral medication.    Assessment: Patient reporting he is feeling like he is on a roller coaster though he is laying in bed, and feels a little confused. His BGM now reads 131, and feels is dropping fast.    Protocol Recommended Disposition:   Call  Now    Recommendation: Patient verbalizes understanding and agrees with plan to call 911.     Padmaja Cabello RN  [facility]         Reason for Disposition   Acting confused (e.g., disoriented, slurred speech)    Additional Information   Negative: Unconscious or difficult to awaken    Protocols used: Diabetes - High Blood Sugar-A-AH

## 2025-07-03 NOTE — ED PROVIDER NOTES
EMERGENCY DEPARTMENT ENCOUNTER      NAME: Warren Jaramillo  AGE: 44 year old male  YOB: 1980  EVALUATION DATE & TIME: 7/3/2025  1:00 AM    ED PROVIDER: Tricia Dhaliwal MD    Chief Complaint   Patient presents with    Blood Sugar Problem       FINAL IMPRESSION  1. Abnormal blood sugar        MEDICAL DECISION MAKING   Warren Jaramillo is a 44 year old male who presents via EMS for evaluation of irregular blood sugar readings.  Patient reports that he has been having blood sugar readings on his monitor varying from the mid 70s to as high as 300.  He reports that for the last few days, his blood sugars have been running in the 70s and he notes that he has not been eating much because it has been so hot outside.  Tonight, he went to bed then was awakened from sleep due to his blood sugar being in the low 300s.  He became concerned so called 911 seeking evaluation.  He is currently completely asymptomatic and reports that he has not had any recent change in his medications, though again, has not been eating much due to the heat.  Vitals on arrival stable and reassuring.    Records reviewed.  Patient has an emergency care plan which I did review.  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  He was most recently seen here in the ED on 6/30/2025 with complaints of itchiness and I did review that note.  Most recently admitted 6/15/2025 and I reviewed discharge summary.    Considered a broad differential including but not limited to spurious reading, decreased p.o. intake, electrolyte derangement, medication side effect/noncompliance, anxiety.  Patient is nontoxic-appearing, well-hydrated and well-nourished I have lower suspicion for acute metabolic or hematologic process that would necessarily require a full laboratory workup.  She is primarily concerned about the fact that his blood sugars have had some  variable highs and lows.  Blood sugar on arrival here was 146 plan with this, I have lower suspicion for DKA, HHS, or other acute process.  I did review patient's blood sugar readings on his monitor and I do not see any significant lows or highs that are overtly concerning.  Discussed options for workup with the patient.  Ultimately, we agreed on plan to give him something to eat, as he has not had anything by mouth for quite some time and is feeling hungry and then recheck blood sugar.    Patient was given a sandwich and repeat blood sugar was 167 and on his monitor, was about 150 which I believe is within a reasonable range and correlates fairly well.  Patient has been feeling well here and tolerating p.o. without any difficulty.  He expressed some surprise that his blood pressure would have risen even a few points after eating a sandwich, as he states that he thought it would take longer.  He wonders if he might not have had a good understanding of diabetes as he thought he did and I believe this is certainly possible.  In any case, blood sugars have been very stable with medics and here after period of almost 2 and half hours of observation.  Patient felt reassured and was comfortable plan for discharge.  I advised that he try to stay well-hydrated, eat even though it is hot out, and take all of his medications as prescribed.  Also recommended that he follow-up with his primary care doctor for a recheck of symptoms and to ensure that the meter is functioning as expected.     At the end of the encounter, we reviewed the results, potential diagnoses, as well as return precautions and recommendations for follow up. I instructed Mr. Jaramillo to return to the emergency department immediately if he develops any new or worsening symptoms and provided additional verbal discharge instructions. Mr. Jaramillo expressed understanding and agreement with this plan of care, his questions were answered, and he was discharged in  stable condition.      Additional Considerations in MDM  History:  Supplemental history from: [facility]  External Record(s) reviewed: Care Plan, discharge summary from hospital stay 6/15/2025, ED visit 6/30/2025    Work Up:  Chart documentation includes differential diagnoses considered and any EKGs or imaging independently interpreted as specified above.   In additional to work up documented, I considered additional advanced imaging and laboratory workup but deferred after shared decision making conversation with patient/family    External Consultation(s):  Discussion of management with another provider as documented above and in ED course     Chronic Illness(es):  Care impacted by chronic illness(es): Diabetes, Heart Disease, Mental Health, and Smoking / Nicotine Use    Disposition considerations: Discharge. I recommended the patient continue their current prescription strength medication(s): All chronic medications, particularly those for diabetes. I considered admission, but discharged patient after significant clinical improvement.    MIPS: Not Applicable       Sepsis/STEMI/Stroke: None        ED COURSE  1:44 AM I met with the patient, obtained history, performed an initial exam, and discussed options and plan for diagnostics and treatment here in the ED.   2:49 AM I rechecked and updated the patient on results.        MEDICATIONS GIVEN IN THE ED  Medications - No data to display    NEW PRESCRIPTIONS STARTED AT TODAY'S VISIT  Discharge Medication List as of 7/3/2025  3:12 AM             =================================================================    HPI:    Use of : N/A      Warren Jaramillo is a 44 year old male who presents with blood sugar problem.     All of yesterday the patient had a blood sugar level in the 60s-70s. This is lower than his baseline in the 130s but he says he was not eating because it was hot out which is normal for him. Around 7:00 PM he ate 15 crackers and half a glass  "of milk. He went to bed at 10:00 PM. At 12:15am this morning the patient was woken from sleep because his blood sugar monitor read 308. However, now, his blood sugar is 101 and he feels \"foggy\". He is concerned that his blood sugar monitor is faulty. He takes Jardiance and Metformin.     He denies nausea, pain, or any other complaints at this time.     Per chart review:  6/27/2025 Patient was [facility] at [facility] ED for evaluation of left flank pain. Patient has chronic elevated WBC though this is slightly higher than usual. Nothing to suggest sepsis otherwise. Urinalysis unremarkable. CT Abdomen and Pelvis showed Hepatomegaly with cirrhotic changes in the liver. Evidence of portal venous hypertension with splenic enlargement and a moderate amount of ascites. The amount of ascites is mildly increased relative to the prior study. Stable benign bilateral adrenal myelolipomas. No urinary calculi or hydronephrosis. GI consulted and recommended paracentesis for cirrhosis with ascites. Patient declined at that time as his symptoms have improved and feels comfortable monitoring at home. Patient discharged home in stable condition with no new medications.   6/30/2025: The patient was seen a few days ago in this ED for full body itchiness that began after taking his medication. It was also his first time ever using diclofenac gel. No other medication changes. Oral vistaril improved his symptoms. He was discharged with prescription for Vistaril.         RELEVANT HISTORY, MEDICATIONS, & ALLERGIES   Past medical history, surgical history, family history, medications, and allergies reviewed and pertinent noted in HPI.    REVIEW OF SYSTEMS:  A complete review of systems was performed with pertinent positives and negatives noted in the HPI.     PHYSICAL EXAM:    Vitals: /61   Pulse 77   Temp 98  F (36.7  C) (Oral)   Resp 20   Ht 1.651 m (5' 5\")   Wt 124.7 kg (275 lb)   SpO2 98%   BMI 45.76 kg/m   "   General: Alert and interactive, comfortable appearing.  HENT: Atraumatic. Full AROM of neck. MMM.  Cardiovascular: Regular rate and rhythm.   Chest/Pulmonary: Normal work of breathing. Speaking in complete sentences. Lungs CTAB. No chest wall tenderness or deformities.  Abdomen: Soft, nondistended. Nontender without guarding or rebound.  Extremities: Normal AROM of all major joints.  Skin: Warm and dry. Normal skin color.   Neuro: Speech clear. CNs grossly intact. Moves all extremities spontaneously.   Psych: Normal affect/mood, cooperative, memory appropriate.      LAB  Labs Ordered and Resulted from Time of ED Arrival to Time of ED Departure   GLUCOSE BY METER - Abnormal       Result Value    GLUCOSE BY METER POCT 143 (*)    GLUCOSE BY METER - Abnormal    GLUCOSE BY METER POCT 126 (*)    GLUCOSE BY METER - Abnormal    GLUCOSE BY METER POCT 167 (*)    GLUCOSE MONITOR NURSING POCT       RADIOLOGY  No orders to display       EKG  None    PROCEDURES  None      I, Mark Sheppard, am serving as a scribe to document services personally performed by Dr. Tricia Dhaliwal based on my observation and the provider's statements to me. I, Tricia Dhaliwal MD attest that Mark Sheppard is acting in a scribe capacity, has observed my performance of the services and has documented them in accordance with my direction.    Tricia Dhaliwal M.D.  Emergency Medicine  [facility] EMERGENCY DEPARTMENT  [address] [place] [id number]  [phone]  Dept: [phone]     Tricia Dhaliwal MD  07/03/25 0577

## 2025-07-05 ENCOUNTER — NURSE TRIAGE (OUTPATIENT)
Dept: NURSING | Facility: CLINIC | Age: 45
End: 2025-07-05
Payer: COMMERCIAL

## 2025-07-05 VITALS
OXYGEN SATURATION: 97 % | SYSTOLIC BLOOD PRESSURE: 139 MMHG | HEART RATE: 85 BPM | BODY MASS INDEX: 45.72 KG/M2 | HEIGHT: 65 IN | WEIGHT: 274.4 LBS | DIASTOLIC BLOOD PRESSURE: 71 MMHG | RESPIRATION RATE: 16 BRPM | TEMPERATURE: 97 F

## 2025-07-05 LAB — GLUCOSE BLDC GLUCOMTR-MCNC: 192 MG/DL (ref 70–99)

## 2025-07-05 PROCEDURE — 82962 GLUCOSE BLOOD TEST: CPT

## 2025-07-05 PROCEDURE — 99283 EMERGENCY DEPT VISIT LOW MDM: CPT

## 2025-07-05 ASSESSMENT — COLUMBIA-SUICIDE SEVERITY RATING SCALE - C-SSRS
2. HAVE YOU ACTUALLY HAD ANY THOUGHTS OF KILLING YOURSELF IN THE PAST MONTH?: NO
1. IN THE PAST MONTH, HAVE YOU WISHED YOU WERE DEAD OR WISHED YOU COULD GO TO SLEEP AND NOT WAKE UP?: NO

## 2025-07-06 ENCOUNTER — HOSPITAL ENCOUNTER (EMERGENCY)
Facility: HOSPITAL | Age: 45
Discharge: HOME | End: 2025-07-06
Attending: STUDENT IN AN ORGANIZED HEALTH CARE EDUCATION/TRAINING PROGRAM | Admitting: STUDENT IN AN ORGANIZED HEALTH CARE EDUCATION/TRAINING PROGRAM
Payer: COMMERCIAL

## 2025-07-06 DIAGNOSIS — E16.2 HYPOGLYCEMIA: ICD-10-CM

## 2025-07-06 DIAGNOSIS — R44.0 AUDITORY HALLUCINATIONS: ICD-10-CM

## 2025-07-06 NOTE — DISCHARGE INSTRUCTIONS
Please be sure to stick to a regular diet, and discuss your blood sugar issues with your primary care provider.  If your blood sugar becomes low again at your group home, you may try to eat a sandwich and drink some juice, and then recheck the blood sugar to see if it is improving.    Discuss your medications and symptoms with your primary care provider regarding the number sequences you are hearing.    Please return to the ER if symptoms worsen.

## 2025-07-06 NOTE — CONFIDENTIAL NOTE
Nurse Triage SBAR    Is this a 2nd Level Triage? NO    Situation:     7pm 151  Ate 3 cheese burger with bun    Hearing numbers repeating in his number  BS 89  Patient says he has been told to go to the ED for BS lower than 90    Patient says he feels like he is going to pass out and he has no one there with him      Background:     Assessment: low blood sugar    Protocol Recommended Disposition:   Caller was informed of care advice. Patient should be evaluated per RN protocol: Call 911. Caller verbalized understanding.      Connor Bentley RN, BSN  Triage Nurse Advisor

## 2025-07-06 NOTE — TELEPHONE ENCOUNTER
Reason for Disposition    Sounds like a life-threatening emergency to the triager    Additional Information    Negative: Unconscious or difficult to awaken    Negative: Seizure occurs    Negative: Acting confused (e.g., disoriented, slurred speech)    Negative: Very weak (e.g., can't stand)    Protocols used: Diabetes - Low Blood Sugar-A-AH

## 2025-07-06 NOTE — ED TRIAGE NOTES
"Patient comes in with [facility] from his Group Home. BS was 89 and he has a protocol if it is below 90 to call EMS. Group Home gave OJ and BS is now 129  Patient reports that he has been \"low' for sometime, in the 70s and wants to ensure that he gets that taken care of.     Patient reports continued auditory hallucinations despite taken Zyprexa      Triage Assessment (Adult)       Row Name 07/05/25 9917          Triage Assessment    Airway WDL WDL        Respiratory WDL    Respiratory WDL WDL        Skin Circulation/Temperature WDL    Skin Circulation/Temperature WDL WDL        Cardiac WDL    Cardiac WDL WDL        Peripheral/Neurovascular WDL    Peripheral Neurovascular WDL WDL        Cognitive/Neuro/Behavioral WDL    Cognitive/Neuro/Behavioral WDL WDL                     "

## 2025-07-06 NOTE — ED PROVIDER NOTES
EMERGENCY DEPARTMENT ENCOUNTER      NAME: Warren Jaramillo  AGE: 44 year old male  YOB: 1980  MRN: [id number]  EVALUATION DATE & TIME: No admission date for patient encounter.    PCP: Aakash Bernardo Physician    ED PROVIDER: Milton Ernandez MD      Chief Complaint   Patient presents with    Hypoglycemia    Hallucinations         FINAL IMPRESSION:  1. Hypoglycemia    2. Auditory hallucinations          ED COURSE & MEDICAL DECISION MAKING:    Pertinent Labs & Imaging studies reviewed. 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  His blood sugar was at 89 this evening, and has risen to now 192 after observation in the emergency department for 1.5 hours without intervention other than him eating cheeseburgers and drinking juice while at his group home prior to EMS arrival.  His auditory hallucinations do not appear to be bothersome to him he has organized thought processes, in no acute distress, and is currently on olanzapine.  I feel that there is no further workup in the emergency department that needs to be done for this chronic issue, and encouraged him to follow-up with his primary care provider for this and possible medication adjustments.  As for the lightheadedness and brain fog that he is experiencing, he does appear dry on exam, but he is fluid restricted due to his ascites, and I suspect that this is the underlying reason rather than blood sugar abnormalities as 89 is not particularly worrisome, and especially if his blood sugar improves  with diet, now at 192, I do not feel that further lab work or imaging is indicated regarding this complaint either.  Patient discharged with return precautions and encouraged close outpatient follow-up for these concerns.       Medical Decision Making  I reviewed the EMR: Outpatient Record: 7/3 ED visit  Care impacted by Diabetes  I considered additional work up, including labs and imaging, but deferred d/t re-assuring history, exam, vitals  Discharge. No recommendations on prescription strength medication(s). See documentation for any additional details.    MIPS (CTPE, Dental pain, Khan, Sinusitis, Asthma/COPD, Head Trauma): Not Applicable    SEPSIS: None            At the conclusion of the encounter I discussed the results of all of the tests and the disposition. The questions were answered. The patient or family acknowledged understanding and was agreeable with the care plan.     0 minutes of critical care time     MEDICATIONS GIVEN IN THE EMERGENCY:  Medications - No data to display    NEW PRESCRIPTIONS STARTED AT TODAY'S ER VISIT  New Prescriptions    No medications on file          =================================================================    HPI    Patient information was obtained from: The patient    Use of : N/A        Warren Jaramillo is a 44 year old male with a pertinent history of ADHD, PTSD, autistic disorder, Rojas's disease type II diabetes mellitus, HTN, AAV, cirrhosis of liver, COPD, and TBI, who presents to this ED via ambulance for evaluation of hyperglycemia and auditory hallucinations.    Per chart review, the patient presented to [facility] ED for irregular blood sugar readings on 7/3/2025. The patient reported that he's been having blood sugar readings on his monitor varying from the mid 70s to as high as 300. Noted that for the last few days, his blood sugars have been running in the 70s and he mentioned that he has not been eating much  "because it has been so hot outside. On 07/03/2025 night, he went to bed then was awakened from sleep due to his blood sugar being in the low 300s. He became concerned so he called 911 seeking evaluation. It was noted that he is currently completely asymptomatic and reported that he has not had any recent change in his medications, though again, has not been eating much due to the heat. The patient was given a sandwich in the ED and his repeat blood sugar was 167, and his blood sugar was about 150 on his monitor, which the provider believed was within a reasonable range and correlated it fairly well. It was noted that the patient was feeling well in the ED and was tolerating p.o. without any difficulty. The patient felt reassured and was comfortable with plan for discharge. He was advised following-up with his PCP.    The patient reports that at 7:30 PM tonight (07/05/2025), he ate three cheeseburgers for dinner and indicates that his blood sugar was 151 after he ate this meal for dinner. He went to sleep and was awoken from his sleep when he heard the sound of his blood sugar monitor beeping and his monitor notified him that his blood sugar was low at 89 at that time. Subsequently, he drank orange juice and waited a while, but notes that his blood sugar continued to gradually drop down further from 89. He called the paramedics and his blood sugar was checked by EMS, but he does not recall the blood sugar reading that EMS got. Over the course of the last three hours or so before he presented here to this ED, he has been \"hearing a sequence a numbers\" that sounds like it is coming from a \"computerized microphone speaking a sequence of numbers and the numbers are repeating\".    He mentions that he is on 10 mg of Olanzapine and explains that taking this medication is supposed to \"slow this down\". Reports that he has been on Olanzapine since 2015 and states that he has not had any changes to this medication.      His " blood sugar monitor indicates that his blood sugar is 166 currently. The patient states that he still feels confused and feels lightheaded despite his blood sugar improved. No additional complaints or concerns reported at this time.    PAST MEDICAL HISTORY:  Past Medical History:   Diagnosis Date    COPD exacerbation (H) 12/02/2024    DM2 (diabetes mellitus, type 2) (H) 04/28/2020    HTN (hypertension) 07/30/2012    Sleep apnea     Thyroid nodule 07/31/2019    Rojas's disease (H)        PAST SURGICAL HISTORY:  Past Surgical History:   Procedure Laterality Date    COLONOSCOPY      CV CORONARY ANGIOGRAM N/A 6/18/2025    Procedure: Coronary Angiogram;  Surgeon: Pasquale Martinez MD;  Location: [facility] CATH [facility] CV    CV LEFT HEART CATH N/A 6/18/2025    Procedure: Left Heart Catheterization;  Surgeon: Pasquale Martinez MD;  Location: [facility] CV    CV RIGHT HEART CATH MEASUREMENTS RECORDED N/A 6/18/2025    Procedure: Right Heart Catheterization;  Surgeon: Pasquale Martinez MD;  Location: [facility] CV    ESOPHAGOSCOPY, GASTROSCOPY, DUODENOSCOPY (EGD), COMBINED N/A 7/21/2023    Procedure: ESOPHAGOGASTRODUODENOSCOPY WITH GASTRIC AND ESOPHAGEAL BIOPSIES;  Surgeon: Filiberto Aragon MD;  Location: [facility] OR    ESOPHAGOSCOPY, GASTROSCOPY, DUODENOSCOPY (EGD), COMBINED N/A 4/4/2025    Procedure: ESOPHAGOGASTRODUODENOSCOPY;  Surgeon: Ramesh Talbot MD;  Location: [facility] OR    IR HEPATIC VENOGRAM W HEMODYNAMICS  6/17/2025    TOOTH EXTRACTION             CURRENT MEDICATIONS:    albuterol (PROAIR HFA/PROVENTIL HFA/VENTOLIN HFA) 108 (90 Base) MCG/ACT inhaler  albuterol (PROVENTIL) (2.5 MG/3ML) 0.083% neb solution  aloe vera GEL  bacitracin 500 UNIT/GM OINT  Benzocaine (SOLARCAINE ALOE VERA EX)  benzonatate (TESSALON) 200 MG capsule  Calamine external lotion  carbamide peroxide (DEBROX) 6.5 % otic solution  clotrimazole (LOTRIMIN) 1 % external cream  Continuous Glucose Sensor (FREESTYLE MEGHANN  3 PLUS SENSOR) MISC  dextromethorphan-guaiFENesin (MUCINEX DM)  MG 12 hr tablet  diclofenac (VOLTAREN) 1 % topical gel  EPINEPHrine (ANY BX GENERIC EQUIV) 0.3 MG/0.3ML injection 2-pack  famotidine (PEPCID) 20 MG tablet  furosemide (LASIX) 40 MG tablet  GAVILAX 17 GM/SCOOP powder  Hydrocortisone (PREPARATION H EX)  hydrOXYzine maycol (VISTARIL) 25 MG capsule  JARDIANCE 10 MG TABS tablet  levothyroxine (SYNTHROID/LEVOTHROID) 25 MCG tablet  [Paused] lisinopril (ZESTRIL) 10 MG tablet  loperamide (IMODIUM) 2 MG capsule  loratadine (CLARITIN) 10 MG tablet  magnesium hydroxide (MILK OF MAGNESIA) 400 MG/5ML suspension  metFORMIN (GLUCOPHAGE) 1000 MG tablet  methocarbamol (ROBAXIN) 500 MG tablet  montelukast (SINGULAIR) 10 MG tablet  nystatin (MYCOSTATIN) 802572 UNIT/GM external powder  OLANZapine (ZYPREXA) 10 MG tablet  omeprazole (PRILOSEC) 40 MG DR capsule  ondansetron (ZOFRAN ODT) 4 MG ODT tab  pramoxine-calamine (AVEENO) 1-8 % LOTN  rosuvastatin (CRESTOR) 10 MG tablet  spironolactone (ALDACTONE) 50 MG tablet  sulfamethoxazole-trimethoprim (BACTRIM DS) 800-160 MG tablet  traZODone (DESYREL) 100 MG tablet  TRELEGY ELLIPTA 100-62.5-25 MCG/ACT oral inhaler  Urea 40 % CREA  Vitamins A & D (VITAMIN A & D) OINT  witch hazel-glycerin (TUCKS) pad        ALLERGIES:  Allergies   Allergen Reactions    Apricot Flavoring Agent (Non-Screening) Anaphylaxis    Banana Anaphylaxis     Throat swelling      Bees Anaphylaxis     Has an Epi pen    Wasp Venom Protein Shortness Of Breath     Other reaction(s): Respiratory Distress  Has an Epi pen        Methylphenidate Itching     Other reaction(s): Nightmares    Prunus      Other reaction(s): *Unknown       FAMILY HISTORY:  Family History   Problem Relation Age of Onset    Unknown/Adopted Father     Unknown/Adopted Maternal Grandmother     C.A.D. Maternal Grandfather     Diabetes Maternal Grandfather     Cerebrovascular Disease Maternal Grandfather     Unknown/Adopted Paternal Grandmother      Unknown/Adopted Paternal Grandfather     Unknown/Adopted Brother     Unknown/Adopted Sister        SOCIAL HISTORY:   Social History     Socioeconomic History    Marital status: Single   Tobacco Use    Smoking status: Every Day     Current packs/day: 1.00     Average packs/day: 1 pack/day for 21.5 years (21.5 ttl pk-yrs)     Types: Cigarettes     Start date: 1/1/2004    Smokeless tobacco: Never   Vaping Use    Vaping status: Never Used   Substance and Sexual Activity    Alcohol use: Not Currently     Comment: Last 8/7/2024    Sexual activity: Never     Partners: Female   Other Topics Concern     Service No    Blood Transfusions No    Caffeine Concern No    Occupational Exposure No    Hobby Hazards No    Sleep Concern No    Stress Concern Yes     Comment: sometimes    Weight Concern No    Special Diet Yes     Comment: counting carbs    Back Care No    Exercise Yes    Seat Belt Yes    Self-Exams Yes     Social Drivers of Health     Financial Resource Strain: Low Risk  (6/16/2025)    Financial Resource Strain     Within the past 12 months, have you or your family members you live with been unable to get utilities (heat, electricity) when it was really needed?: No   Recent Concern: Financial Resource Strain - High Risk (6/11/2025)    Financial Resource Strain     Within the past 12 months, have you or your family members you live with been unable to get utilities (heat, electricity) when it was really needed?: Yes   Food Insecurity: Low Risk  (6/16/2025)    Food Insecurity     Within the past 12 months, did you worry that your food would run out before you got money to buy more?: No     Within the past 12 months, did the food you bought just not last and you didn t have money to get more?: No   Transportation Needs: Low Risk  (6/16/2025)    Transportation Needs     Within the past 12 months, has lack of transportation kept you from medical appointments, getting your medicines, non-medical meetings or  "appointments, work, or from getting things that you need?: No    Received from [facility]    Social Connections   Interpersonal Safety: Low Risk  (6/16/2025)    Interpersonal Safety     Do you feel physically and emotionally safe where you currently live?: Yes     Within the past 12 months, have you been hit, slapped, kicked or otherwise physically hurt by someone?: No     Within the past 12 months, have you been humiliated or emotionally abused in other ways by your partner or ex-partner?: No   Housing Stability: High Risk (6/16/2025)    Housing Stability     Do you have housing? : Yes     Are you worried about losing your housing?: Yes       VITALS:  /71   Pulse 85   Temp 97  F (36.1  C)   Resp 16   Ht 1.651 m (5' 5\")   Wt 124.5 kg (274 lb 6.4 oz)   SpO2 97%   BMI 45.66 kg/m      PHYSICAL EXAM    Physical Exam  Vitals and nursing note reviewed.   Constitutional:       General: He is not in acute distress.     Appearance: Normal appearance. He is normal weight. He is not ill-appearing.   HENT:      Head: Normocephalic and atraumatic.      Nose: Nose normal.      Mouth/Throat:      Mouth: Mucous membranes are dry.      Pharynx: Oropharynx is clear.   Eyes:      Extraocular Movements: Extraocular movements intact.      Conjunctiva/sclera: Conjunctivae normal.      Pupils: Pupils are equal, round, and reactive to light.   Cardiovascular:      Rate and Rhythm: Normal rate and regular rhythm.      Pulses: Normal pulses.      Heart sounds: Normal heart sounds. No murmur heard.  Pulmonary:      Effort: Pulmonary effort is normal. No respiratory distress.      Breath sounds: Normal breath sounds.   Abdominal:      General: Abdomen is flat. There is no distension.      Palpations: Abdomen is soft.      Tenderness: There is no abdominal tenderness.   Musculoskeletal:         General: Normal range of motion.      Cervical back: Normal range of motion.      Right lower leg: No " edema.      Left lower leg: No edema.   Skin:     General: Skin is warm and dry.      Capillary Refill: Capillary refill takes less than 2 seconds.   Neurological:      General: No focal deficit present.      Mental Status: He is alert and oriented to person, place, and time. Mental status is at baseline.   Psychiatric:         Mood and Affect: Mood normal.         Behavior: Behavior normal.         Thought Content: Thought content normal.         Judgment: Judgment normal.            LAB:  All pertinent labs reviewed and interpreted.  Results for orders placed or performed during the hospital encounter of 07/05/25   Glucose by meter   Result Value Ref Range    GLUCOSE BY METER POCT 192 (H) 70 - 99 mg/dL       RADIOLOGY:  Reviewed all pertinent imaging. Please see official radiology report.  No orders to display       PROCEDURES:   None      "[employer]" System Documentation:   CMS Diagnoses: None             I, Tressa Urbano, am serving as a scribe to document services personally performed by Milton Ernandez MD based on my observation and the provider's statements to me. I, Milton Ernandez MD, attest that Tressa Urbano is acting in a scribe capacity, has observed my performance of the services and has documented them in accordance with my direction.    Milton Ernandez MD  [facility] EMERGENCY DEPARTMENT  [facility] [facility] [address] [phone]       Milton Ernandez MD  07/06/25 0004

## 2025-07-07 LAB
ATRIAL RATE - MUSE: 77 BPM
DIASTOLIC BLOOD PRESSURE - MUSE: NORMAL MMHG
INTERPRETATION ECG - MUSE: NORMAL
P AXIS - MUSE: 83 DEGREES
PR INTERVAL - MUSE: 204 MS
QRS DURATION - MUSE: 100 MS
QT - MUSE: 384 MS
QTC - MUSE: 434 MS
R AXIS - MUSE: 58 DEGREES
SYSTOLIC BLOOD PRESSURE - MUSE: NORMAL MMHG
T AXIS - MUSE: 154 DEGREES
VENTRICULAR RATE- MUSE: 77 BPM

## 2025-07-12 ENCOUNTER — HEALTH MAINTENANCE LETTER (OUTPATIENT)
Age: 45
End: 2025-07-12

## 2025-07-13 ENCOUNTER — APPOINTMENT (OUTPATIENT)
Dept: RADIOLOGY | Facility: HOSPITAL | Age: 45
End: 2025-07-13
Attending: STUDENT IN AN ORGANIZED HEALTH CARE EDUCATION/TRAINING PROGRAM
Payer: COMMERCIAL

## 2025-07-13 ENCOUNTER — HOSPITAL ENCOUNTER (EMERGENCY)
Facility: HOSPITAL | Age: 45
Discharge: HOME | End: 2025-07-13
Payer: COMMERCIAL

## 2025-07-13 VITALS
TEMPERATURE: 98.1 F | HEIGHT: 65 IN | OXYGEN SATURATION: 98 % | HEART RATE: 87 BPM | DIASTOLIC BLOOD PRESSURE: 67 MMHG | RESPIRATION RATE: 18 BRPM | SYSTOLIC BLOOD PRESSURE: 146 MMHG | BODY MASS INDEX: 45.35 KG/M2 | WEIGHT: 272.2 LBS

## 2025-07-13 DIAGNOSIS — S93.401A SPRAIN OF RIGHT ANKLE, UNSPECIFIED LIGAMENT, INITIAL ENCOUNTER: ICD-10-CM

## 2025-07-13 PROCEDURE — 73610 X-RAY EXAM OF ANKLE: CPT | Mod: RT

## 2025-07-13 PROCEDURE — 99283 EMERGENCY DEPT VISIT LOW MDM: CPT

## 2025-07-13 ASSESSMENT — ACTIVITIES OF DAILY LIVING (ADL): ADLS_ACUITY_SCORE: 58

## 2025-07-14 ENCOUNTER — APPOINTMENT (OUTPATIENT)
Dept: RADIOLOGY | Facility: HOSPITAL | Age: 45
End: 2025-07-14
Attending: STUDENT IN AN ORGANIZED HEALTH CARE EDUCATION/TRAINING PROGRAM
Payer: COMMERCIAL

## 2025-07-14 PROCEDURE — 73502 X-RAY EXAM HIP UNI 2-3 VIEWS: CPT

## 2025-07-14 PROCEDURE — 99284 EMERGENCY DEPT VISIT MOD MDM: CPT | Performed by: EMERGENCY MEDICINE

## 2025-07-14 NOTE — DISCHARGE INSTRUCTIONS
As we discussed, your pain is consistent with an ankle sprain.  Please elevate your leg above heart level, keep the ankle compressed with an Ace wrap, ice for 20 minutes every 2-3 hours for pain.  If your pain is not improving within the next 1 to 2 weeks, please follow-up in your primary care clinic.  If at any point you develop uncontrolled pain, numbness, coolness of the foot or any new or concerning symptoms please return to the ER for further evaluation.

## 2025-07-14 NOTE — ED PROVIDER NOTES
Emergency Department Encounter   NAME: Warren Jaramillo ; AGE: 44 year old male ; YOB: 1980 ; MRN: [id number] ; PCP: Aakash Bernardo Physician   ED PROVIDER: Danuta Bajwa PA-C    Evaluation Date & Time:   7/13/2025  6:39 PM    CHIEF COMPLAINT:  Ankle Pain      Impression and Plan   MDM: Warren Jaramillo is a 44 year old male who presents to the ED for evaluation of ankle pain.  Patient presented to the emergency department for evaluation of right ankle pain after rolling his ankle when walking down the steps of his group home this morning.  He did not fall down, and denies any head or neck injury.  He has been ambulatory on the ankle throughout the day that has been progressively worsening.  Here in the ER, he is generally well-appearing and in no acute distress.  Vitally stable.  Mild swelling about the ankle with pain with dorsiflexion.  Distal CMS is intact.  No proximal tibia or fibula tenderness concerning for associated fracture.  Compartments are soft.  No tenderness along the Achilles, and dorsiflexion and plantarflexion are intact -no concern for Achilles tendon rupture.  X-ray was obtained and personally reviewed and interpreted and showed no evidence of fracture or subluxation.  Clinically, patient's symptoms are consistent with a sprain.  We discussed icing, elevation, and compression.  As he is able to ambulate on the foot, no indication for crutches.  Ace wrap provided here in the ED.  Reviewed concerning signs and symptoms return to the ER.      Medical Decision Making      Discharge. No recommendations on prescription strength medication(s). See documentation for any additional details.    MIPS (CTPE, Dental pain, Khan, Sinusitis, Asthma/COPD, Head Trauma): Not Applicable    SEPSIS: None        ED COURSE:  7:18 PM I met and introduced myself to the patient. I gathered initial history and performed my physical exam. We discussed results, discharge, follow up, and  reasons to return to the ED.     At the conclusion of the encounter I discussed the results of all the tests and the disposition. The questions were answered. The patient or family acknowledged understanding and was agreeable with the care plan.    FINAL IMPRESSION:    ICD-10-CM    1. Sprain of right ankle, unspecified ligament, initial encounter  S93.401A             MEDICATIONS GIVEN IN THE EMERGENCY DEPARTMENT:  Medications - No data to display      NEW PRESCRIPTIONS STARTED AT TODAY'S ED VISIT:  New Prescriptions    No medications on file         HPI   Use of Intrepreter: N/A    Warren Jaramillo is a 44 year old male with a pertinent history of COPD, DM II, HTN, sleep apnea, Rojas's disease, who presents to the ED by EMS for evaluation of ankle pain.    Patient presents to the emergency department for evaluation after rolling his ankle this morning.  He states that he stepped down on the step from his group home, the wood uplifted and he rolled his right ankle.  Luckily, he was holding onto the railings and did not fall.  There was no head or neck injury.  He has been able to walk on the foot throughout the day though it has progressively become more painful and swollen.  He has injured this ankle in the past.  No previous surgery on the ankle.      REVIEW OF SYSTEMS:  Pertinent positive and negative symptoms per HPI.       Medical History     Past Medical History:   Diagnosis Date    COPD exacerbation (H) 12/02/2024    DM2 (diabetes mellitus, type 2) (H) 04/28/2020    HTN (hypertension) 07/30/2012    Sleep apnea     Thyroid nodule 07/31/2019    Rojas's disease (H)        Past Surgical History:   Procedure Laterality Date    COLONOSCOPY      CV CORONARY ANGIOGRAM N/A 6/18/2025    Procedure: Coronary Angiogram;  Surgeon: Pasquale Martinez MD;  Location: [facility] CATH LAB CV    CV LEFT HEART CATH N/A 6/18/2025    Procedure: Left Heart Catheterization;  Surgeon: Pasquale Martinez MD;  Location: [facility]  CATH LAB CV    CV RIGHT HEART CATH MEASUREMENTS RECORDED N/A 6/18/2025    Procedure: Right Heart Catheterization;  Surgeon: Pasquale Martinez MD;  Location: [facility] CATH LAB CV    ESOPHAGOSCOPY, GASTROSCOPY, DUODENOSCOPY (EGD), COMBINED N/A 7/21/2023    Procedure: ESOPHAGOGASTRODUODENOSCOPY WITH GASTRIC AND ESOPHAGEAL BIOPSIES;  Surgeon: Filiberto Aragon MD;  Location: [facility] OR    ESOPHAGOSCOPY, GASTROSCOPY, DUODENOSCOPY (EGD), COMBINED N/A 4/4/2025    Procedure: ESOPHAGOGASTRODUODENOSCOPY;  Surgeon: Ramesh Talbot MD;  Location: [facility] OR    IR HEPATIC VENOGRAM W HEMODYNAMICS  6/17/2025    TOOTH EXTRACTION         Family History   Problem Relation Age of Onset    Unknown/Adopted Father     Unknown/Adopted Maternal Grandmother     C.A.D. Maternal Grandfather     Diabetes Maternal Grandfather     Cerebrovascular Disease Maternal Grandfather     Unknown/Adopted Paternal Grandmother     Unknown/Adopted Paternal Grandfather     Unknown/Adopted Brother     Unknown/Adopted Sister        Social History     Tobacco Use    Smoking status: Every Day     Current packs/day: 1.00     Average packs/day: 1 pack/day for 21.5 years (21.5 ttl pk-yrs)     Types: Cigarettes     Start date: 1/1/2004    Smokeless tobacco: Never   Vaping Use    Vaping status: Never Used   Substance Use Topics    Alcohol use: Not Currently     Comment: Last 8/7/2024       albuterol (PROAIR HFA/PROVENTIL HFA/VENTOLIN HFA) 108 (90 Base) MCG/ACT inhaler  albuterol (PROVENTIL) (2.5 MG/3ML) 0.083% neb solution  aloe vera GEL  bacitracin 500 UNIT/GM OINT  Benzocaine (SOLARCAINE ALOE VERA EX)  benzonatate (TESSALON) 200 MG capsule  Calamine external lotion  carbamide peroxide (DEBROX) 6.5 % otic solution  clotrimazole (LOTRIMIN) 1 % external cream  Continuous Glucose Sensor (FREESTYLE MEGHANN 3 PLUS SENSOR) MISC  dextromethorphan-guaiFENesin (MUCINEX DM)  MG 12 hr tablet  diclofenac (VOLTAREN) 1 % topical gel  EPINEPHrine (ANY BX  "GENERIC EQUIV) 0.3 MG/0.3ML injection 2-pack  famotidine (PEPCID) 20 MG tablet  furosemide (LASIX) 40 MG tablet  GAVILAX 17 GM/SCOOP powder  Hydrocortisone (PREPARATION H EX)  hydrOXYzine maycol (VISTARIL) 25 MG capsule  JARDIANCE 10 MG TABS tablet  levothyroxine (SYNTHROID/LEVOTHROID) 25 MCG tablet  [Paused] lisinopril (ZESTRIL) 10 MG tablet  loperamide (IMODIUM) 2 MG capsule  loratadine (CLARITIN) 10 MG tablet  magnesium hydroxide (MILK OF MAGNESIA) 400 MG/5ML suspension  metFORMIN (GLUCOPHAGE) 1000 MG tablet  methocarbamol (ROBAXIN) 500 MG tablet  montelukast (SINGULAIR) 10 MG tablet  nystatin (MYCOSTATIN) 275118 UNIT/GM external powder  OLANZapine (ZYPREXA) 10 MG tablet  omeprazole (PRILOSEC) 40 MG DR capsule  ondansetron (ZOFRAN ODT) 4 MG ODT tab  pramoxine-calamine (AVEENO) 1-8 % LOTN  rosuvastatin (CRESTOR) 10 MG tablet  spironolactone (ALDACTONE) 50 MG tablet  sulfamethoxazole-trimethoprim (BACTRIM DS) 800-160 MG tablet  traZODone (DESYREL) 100 MG tablet  TRELEGY ELLIPTA 100-62.5-25 MCG/ACT oral inhaler  Urea 40 % CREA  Vitamins A & D (VITAMIN A & D) OINT  witch hazel-glycerin (TUCKS) pad          Physical Exam     First Vitals:  Patient Vitals for the past 24 hrs:   BP Temp Temp src Pulse Resp SpO2 Height Weight   07/13/25 1829 (!) 146/67 98.1  F (36.7  C) Temporal 87 18 98 % 1.651 m (5' 5\") 123.5 kg (272 lb 3.2 oz)         PHYSICAL EXAM:   Physical Exam  Vitals reviewed.   Constitutional:       General: He is not in acute distress.     Appearance: He is not toxic-appearing.   Musculoskeletal:      Comments: Mild swelling of the right ankle primarily over the lateral ankle with associated tenderness.  No deformity.  Dorsiflexion exacerbates pain though dorsiflexion and plantarflexion are intact.  2+ DP and PT pulses and extremities warm and well-perfused with distal cap refill less than 2 seconds.  Sensation to light touch intact distally.  No deficit or tenderness along Achilles tendon.  No tenderness to " proximal tibia or fibula or knee.  Knee range of motion intact. Compartments are soft.   Neurological:      Mental Status: He is alert.             Results     LAB:  All pertinent labs reviewed and interpreted  Labs Ordered and Resulted from Time of ED Arrival to Time of ED Departure - No data to display    RADIOLOGY:  Ankle XR, G/E 3 views, right   Final Result   IMPRESSION: No fracture. Intact ankle mortise and distal syndesmosis. Distal Achilles enthesophytes. Joint spaces are maintained. Small plantar calcaneal spur.            Danuta Bajwa PA-C   Emergency Medicine   [facility] EMERGENCY DEPARTMENT       Danuta Bajwa PA-C  07/13/25 1932

## 2025-07-15 ENCOUNTER — HOSPITAL ENCOUNTER (EMERGENCY)
Facility: HOSPITAL | Age: 45
Discharge: HOME | End: 2025-07-15
Attending: EMERGENCY MEDICINE
Payer: COMMERCIAL

## 2025-07-15 VITALS
SYSTOLIC BLOOD PRESSURE: 131 MMHG | DIASTOLIC BLOOD PRESSURE: 79 MMHG | TEMPERATURE: 98.2 F | HEART RATE: 89 BPM | OXYGEN SATURATION: 98 % | RESPIRATION RATE: 20 BRPM | HEIGHT: 65 IN | WEIGHT: 278.1 LBS | BODY MASS INDEX: 46.33 KG/M2

## 2025-07-15 DIAGNOSIS — M25.552 HIP PAIN, LEFT: ICD-10-CM

## 2025-07-15 LAB
ANION GAP SERPL CALCULATED.3IONS-SCNC: 14 MMOL/L (ref 7–15)
BUN SERPL-MCNC: 26.8 MG/DL (ref 6–20)
CALCIUM SERPL-MCNC: 10 MG/DL (ref 8.8–10.4)
CHLORIDE SERPL-SCNC: 99 MMOL/L (ref 98–107)
CREAT SERPL-MCNC: 1.35 MG/DL (ref 0.67–1.17)
CRP SERPL-MCNC: 15.4 MG/L
EGFRCR SERPLBLD CKD-EPI 2021: 66 ML/MIN/1.73M2
ERYTHROCYTE [DISTWIDTH] IN BLOOD BY AUTOMATED COUNT: 15.9 % (ref 10–15)
ERYTHROCYTE [SEDIMENTATION RATE] IN BLOOD BY WESTERGREN METHOD: 19 MM/HR (ref 0–15)
GLUCOSE SERPL-MCNC: 146 MG/DL (ref 70–99)
HCO3 SERPL-SCNC: 23 MMOL/L (ref 22–29)
HCT VFR BLD AUTO: 36.4 % (ref 40–53)
HGB BLD-MCNC: 10.8 G/DL (ref 13.3–17.7)
HOLD SPECIMEN: NORMAL
HOLD SPECIMEN: NORMAL
MCH RBC QN AUTO: 23.9 PG (ref 26.5–33)
MCHC RBC AUTO-ENTMCNC: 29.7 G/DL (ref 31.5–36.5)
MCV RBC AUTO: 81 FL (ref 78–100)
PLATELET # BLD AUTO: 411 10E3/UL (ref 150–450)
POTASSIUM SERPL-SCNC: 4 MMOL/L (ref 3.4–5.3)
RBC # BLD AUTO: 4.51 10E6/UL (ref 4.4–5.9)
SODIUM SERPL-SCNC: 136 MMOL/L (ref 135–145)
WBC # BLD AUTO: 15.9 10E3/UL (ref 4–11)

## 2025-07-15 PROCEDURE — 36415 COLL VENOUS BLD VENIPUNCTURE: CPT | Performed by: EMERGENCY MEDICINE

## 2025-07-15 PROCEDURE — 85027 COMPLETE CBC AUTOMATED: CPT | Performed by: EMERGENCY MEDICINE

## 2025-07-15 PROCEDURE — 86140 C-REACTIVE PROTEIN: CPT | Performed by: EMERGENCY MEDICINE

## 2025-07-15 PROCEDURE — 85652 RBC SED RATE AUTOMATED: CPT | Performed by: EMERGENCY MEDICINE

## 2025-07-15 PROCEDURE — 80048 BASIC METABOLIC PNL TOTAL CA: CPT | Performed by: EMERGENCY MEDICINE

## 2025-07-15 PROCEDURE — 250N000013 HC RX MED GY IP 250 OP 250 PS 637: Performed by: STUDENT IN AN ORGANIZED HEALTH CARE EDUCATION/TRAINING PROGRAM

## 2025-07-15 PROCEDURE — 250N000012 HC RX MED GY IP 250 OP 636 PS 637: Performed by: EMERGENCY MEDICINE

## 2025-07-15 RX ORDER — OXYCODONE HYDROCHLORIDE 5 MG/1
5 TABLET ORAL EVERY 6 HOURS PRN
Qty: 10 TABLET | Refills: 0 | Status: SHIPPED | OUTPATIENT
Start: 2025-07-15 | End: 2025-07-18

## 2025-07-15 RX ORDER — PREDNISONE 20 MG/1
40 TABLET ORAL DAILY
Qty: 8 TABLET | Refills: 0 | Status: SHIPPED | OUTPATIENT
Start: 2025-07-15 | End: 2025-07-19

## 2025-07-15 RX ORDER — PREDNISONE 20 MG/1
40 TABLET ORAL ONCE
Status: COMPLETED | OUTPATIENT
Start: 2025-07-15 | End: 2025-07-15

## 2025-07-15 RX ORDER — OXYCODONE HYDROCHLORIDE 5 MG/1
5 TABLET ORAL ONCE
Refills: 0 | Status: COMPLETED | OUTPATIENT
Start: 2025-07-15 | End: 2025-07-15

## 2025-07-15 RX ADMIN — PREDNISONE 40 MG: 20 TABLET ORAL at 02:35

## 2025-07-15 RX ADMIN — OXYCODONE HYDROCHLORIDE 5 MG: 5 TABLET ORAL at 02:44

## 2025-07-15 NOTE — DISCHARGE INSTRUCTIONS
The x-rays were normal, and your lab work did not show any signs of severe infection of the hip, so at this time we can treat with a short course of steroids (which are anti-inflammatory).     Please follow-up with [facility] if the pain is not improving as expected over the next few days.

## 2025-07-15 NOTE — ED PROVIDER NOTES
EMERGENCY DEPARTMENT SIGN OUT NOTE        ED COURSE AND MEDICAL DECISION MAKING  Patient was signed out to me by Dr Sukumar Moreno at 1:10 AM   2:29 AM I reevaluated and updated the patient. I discussed plan for discharge including reasons to return to the ED such as new or worsening symptoms. Patient is agreeable with this plan at this time.  All questions answered.     In brief, Warren Jaramillo is a 44 year old male who initially presented with left hip pain and groin pain.      At time of sign out, disposition was pending lab work.     Reviewed inter myself.  Slightly elevated CRP and sed rate .  Leukocytosis at 15.9 which is actualy down compared to his most recent study 2 weeks ago.    On repeat evaluation patient's ambulating around the ED waiting room.  At this point in time of the lower suspicion for underlying septic process.  Will give him a few oxycodone for breakthrough pain as is not able to take Tylenol ibuprofen.  Otherwise we will give him a short course of steroids and recommend close follow-up with orthopedics.  Patient comfortable with this plan and was discharged in stable condition.        FINAL IMPRESSION    1. Hip pain, left        ED MEDS  Medications   predniSONE (DELTASONE) tablet 40 mg (40 mg Oral $Given 7/15/25 7510)       LAB  Labs Ordered and Resulted from Time of ED Arrival to Time of ED Departure   CBC WITH PLATELETS - Abnormal       Result Value    WBC Count 15.9 (*)     RBC Count 4.51      Hemoglobin 10.8 (*)     Hematocrit 36.4 (*)     MCV 81      MCH 23.9 (*)     MCHC 29.7 (*)     RDW 15.9 (*)     Platelet Count 411     BASIC METABOLIC PANEL - Abnormal    Sodium 136      Potassium 4.0      Chloride 99      Carbon Dioxide (CO2) 23      Anion Gap 14      Urea Nitrogen 26.8 (*)     Creatinine 1.35 (*)     GFR Estimate 66      Calcium 10.0      Glucose 146 (*)    CRP INFLAMMATION - Abnormal    CRP Inflammation 15.40 (*)    ERYTHROCYTE SEDIMENTATION RATE AUTO - Abnormal     Erythrocyte Sedimentation Rate 19 (*)        EKG      RADIOLOGY    XR Pelvis and Hip Left 2 Views   Final Result   IMPRESSION:       No evidence of acute fracture or dislocation.       Minimal degenerative changes of the bilateral hips.      Soft tissues grossly unremarkable.          DISCHARGE MEDS  New Prescriptions    PREDNISONE (DELTASONE) 20 MG TABLET    Take 2 tablets (40 mg) by mouth daily for 4 days. Please take your first dose tomorrow (7/16)     I, Neelima Vallejo, am serving as a scribe to document services personally performed by Jerzy Ruiz MD, based on my observations and the provider's statements to me.  I, Jerzy Ruiz MD, attest that Neelima Vallejo is acting in a scribe capacity, has observed my performance of the services and has documented them in accordance with my direction.     Jerzy Ruiz MD  Emergency Medicine  [facility] EMERGENCY DEPARTMENT  [address] [place] [id number]  [phone]      Jerzy Ruiz MD  07/15/25 7048

## 2025-07-15 NOTE — ED TRIAGE NOTES
Patient ambulates with limp to triage. Reports near fall yesterday. Today he woke up with left hip and left groin pain. States it was manageable earlier but throughout day pain has worsened and he can hardly put weight on left leg.      Triage Assessment (Adult)       Row Name 07/14/25 2766          Triage Assessment    Airway WDL WDL        Respiratory WDL    Respiratory WDL WDL        Cardiac WDL    Cardiac WDL WDL

## 2025-07-15 NOTE — ED PROVIDER NOTES
EMERGENCY DEPARTMENT ENCOUNTER      NAME: Warren Jaramillo  AGE: 44 year old male  YOB: 1980  MRN: [id number]  EVALUATION DATE & TIME: No admission date for patient encounter.    PCP: Aakash Bernardo Physician    ED PROVIDER: Galileo Moreno M.D.      Chief Complaint   Patient presents with    Hip Pain    Groin Pain         FINAL IMPRESSION:  1. Hip pain, left          ED COURSE & MEDICAL DECISION MAKING:    Pertinent Labs & Imaging studies reviewed below.  All EKGs below represent my independent interpretation.   ED Course as of 07/15/25 0104   Mon Jul 14, 2025   2345 Patient is a 44-year-old gentleman with history of Rojas's disease, hepatic failure, CKD, here with left hip pain.  No real trauma, he twisted his right ankle yesterday but no significant fall.  Over the course of today his left hip feels like it is catching him he has pain that radiates to his groin.  No fever or chills.  No abdominal pain.  Normal vital signs on arrival.  Sits in a wheelchair, has tenderness to the lateral, posterior hip, pain with any range of motion of the thigh.  Differential includes joint effusion, inflammatory arthritis, less likely fracture or dislocation   e Jul 15, 2025   0014 XR Pelvis and Hip Left 2 Views  No evidence of acute fracture or dislocation.      Minimal degenerative changes of the bilateral hips.     Soft tissues grossly unremarkable.     0039 Reevaluated patient.  He is asking for Toradol.  He was able to ambulate though with an antalgic gait.   Care will be signed out to the oncoming ED physician Dr. Jerzy Ruiz.  Patient is in the waiting room, and unfortunately has been for quite a while due to our significant boarding of patients in the emergency department.  At this time, screening lab work is pending.  If patient has a significant leukocytosis, or markedly high inflammatory markers, it may be helpful to reach out to [facility] about risk of septic arthritis, however  without a fever, and still being able to ambulate, I am less suspicious.  I also ordered a cane to help with his ambulation until he starts to improve.    Additional ED Course timestamps entered by medical scribe:  none    Medical Decision Making  Care impacted by chronic liver disease and Chronic Kidney Disease  Admission considered. Patient was signed out to the oncoming physician, disposition pending.    MIPS (CTPE, Dental pain, Khan, Sinusitis, Asthma/COPD, Head Trauma): Not Applicable    SEPSIS: None        MEDICATIONS GIVEN IN THE EMERGENCY:  Medications   predniSONE (DELTASONE) tablet 40 mg (has no administration in time range)         NEW PRESCRIPTIONS STARTED AT TODAY'S ER VISIT  New Prescriptions    PREDNISONE (DELTASONE) 20 MG TABLET    Take 2 tablets (40 mg) by mouth daily for 4 days. Please take your first dose tomorrow (7/16)          =================================================================    HPI    Warren Jaramillo is a 44 year old male who presents to this ED for evaluation of hip pain and groin pain.    The patient reports he was walking downstairs yesterday morning when he rolled his right ankle on a loose board. The patient was seen yesterday for this and notes the pain is better today. He states when he woke up today, he noticed some left hip pain that increases when he walks. He endorses an abnormal gait due to the pain. The patient also notes some muscle stiffness in his left calf. He has a history of osteoarthritis in both hips. The patient has not tried any medications for the pain as he has kidney failure and liver cirrhosis. He denies any recent falls.    Per chart review, the patient was seen at [facility] Emergency Department yesterday for right ankle pain. X-ray of the ankle showed no evidence of fracture or subluxation. Patient was instructed on icing, elevation, and compression. Ace wrap was provided for the patient. He was discharged in stable condition the same  "day.    VITALS:  /73   Pulse 93   Temp 98.5  F (36.9  C) (Oral)   Resp 18   Ht 1.651 m (5' 5\")   Wt 126.1 kg (278 lb 1.6 oz)   SpO2 97%   BMI 46.28 kg/m      PHYSICAL EXAM    Constitutional: Comfortable appearing, sitting in wheelchair  HENT: Atraumatic, mucous membranes moist, nose normal. Neck- Supple, gross ROM intact.   Eyes: Pupils mid-range, conjunctiva without injection, no discharge.   Respiratory: Clear to auscultation bilaterally, no respiratory distress, no wheezing, speaks full sentences easily. No cough.  Cardiovascular: Normal heart rate, regular rhythm, no murmurs.   GI: Distened  Musculoskeletal: Tenderness to left hip, worse with any movement of the thigh, but able to bear weight with an antalgic gait.  No warmth over the hip. (Pain feels better when you squeeze his calf?)  Skin: Warm, dry, no rash.  Neurologic: Alert & oriented x 3, cranial nerves grossly intact.  Psychiatric: Affect normal, cooperative.      I, Brice GETACHEW Gama am serving as a scribe to document services personally performed by Dr. Galileo Moreno based on my observation and the provider's statements to me. I, Galileo Moreno MD attest that Brice Gama is acting in a scribe capacity, has observed my performance of the services and has documented them in accordance with my direction.    Galileo Moreno M.D.  Emergency Medicine  [facility] EMERGENCY DEPARTMENT  [facility] [facility] [id number]  [phone]  Dept: [phone]       Galileo Moreno MD  07/15/25 0108    " Need for prophylactic measure HTN (hypertension)

## 2025-07-20 ENCOUNTER — APPOINTMENT (OUTPATIENT)
Dept: RADIOLOGY | Facility: HOSPITAL | Age: 45
End: 2025-07-20
Attending: EMERGENCY MEDICINE
Payer: COMMERCIAL

## 2025-07-20 ENCOUNTER — HOSPITAL ENCOUNTER (EMERGENCY)
Facility: HOSPITAL | Age: 45
Discharge: HOME | End: 2025-07-20
Attending: EMERGENCY MEDICINE | Admitting: EMERGENCY MEDICINE
Payer: COMMERCIAL

## 2025-07-20 VITALS
TEMPERATURE: 97 F | RESPIRATION RATE: 22 BRPM | SYSTOLIC BLOOD PRESSURE: 122 MMHG | BODY MASS INDEX: 46.26 KG/M2 | DIASTOLIC BLOOD PRESSURE: 58 MMHG | WEIGHT: 278 LBS | OXYGEN SATURATION: 96 % | HEART RATE: 86 BPM

## 2025-07-20 DIAGNOSIS — R07.9 CHEST PAIN, UNSPECIFIED TYPE: ICD-10-CM

## 2025-07-20 LAB
ANION GAP SERPL CALCULATED.3IONS-SCNC: 17 MMOL/L (ref 7–15)
BASOPHILS # BLD AUTO: 0.1 10E3/UL (ref 0–0.2)
BASOPHILS NFR BLD AUTO: 1 %
BUN SERPL-MCNC: 27.7 MG/DL (ref 6–20)
CALCIUM SERPL-MCNC: 9.8 MG/DL (ref 8.8–10.4)
CHLORIDE SERPL-SCNC: 100 MMOL/L (ref 98–107)
CREAT SERPL-MCNC: 1.49 MG/DL (ref 0.67–1.17)
EGFRCR SERPLBLD CKD-EPI 2021: 59 ML/MIN/1.73M2
EOSINOPHIL # BLD AUTO: 0.7 10E3/UL (ref 0–0.7)
EOSINOPHIL NFR BLD AUTO: 4 %
ERYTHROCYTE [DISTWIDTH] IN BLOOD BY AUTOMATED COUNT: 16.2 % (ref 10–15)
GLUCOSE SERPL-MCNC: 169 MG/DL (ref 70–99)
HCO3 SERPL-SCNC: 24 MMOL/L (ref 22–29)
HCT VFR BLD AUTO: 38.6 % (ref 40–53)
HGB BLD-MCNC: 11.6 G/DL (ref 13.3–17.7)
HOLD SPECIMEN: NORMAL
HOLD SPECIMEN: NORMAL
IMM GRANULOCYTES # BLD: 0.2 10E3/UL
IMM GRANULOCYTES NFR BLD: 1 %
LYMPHOCYTES # BLD AUTO: 3.2 10E3/UL (ref 0.8–5.3)
LYMPHOCYTES NFR BLD AUTO: 17 %
MCH RBC QN AUTO: 23.9 PG (ref 26.5–33)
MCHC RBC AUTO-ENTMCNC: 30.1 G/DL (ref 31.5–36.5)
MCV RBC AUTO: 80 FL (ref 78–100)
MONOCYTES # BLD AUTO: 1.5 10E3/UL (ref 0–1.3)
MONOCYTES NFR BLD AUTO: 8 %
NEUTROPHILS # BLD AUTO: 13.2 10E3/UL (ref 1.6–8.3)
NEUTROPHILS NFR BLD AUTO: 69 %
NRBC # BLD AUTO: 0 10E3/UL
NRBC BLD AUTO-RTO: 0 /100
PLATELET # BLD AUTO: 471 10E3/UL (ref 150–450)
POTASSIUM SERPL-SCNC: 3.8 MMOL/L (ref 3.4–5.3)
RBC # BLD AUTO: 4.85 10E6/UL (ref 4.4–5.9)
SODIUM SERPL-SCNC: 141 MMOL/L (ref 135–145)
TROPONIN T SERPL HS-MCNC: 31 NG/L
WBC # BLD AUTO: 19 10E3/UL (ref 4–11)

## 2025-07-20 PROCEDURE — 93005 ELECTROCARDIOGRAM TRACING: CPT | Performed by: STUDENT IN AN ORGANIZED HEALTH CARE EDUCATION/TRAINING PROGRAM

## 2025-07-20 PROCEDURE — 71046 X-RAY EXAM CHEST 2 VIEWS: CPT

## 2025-07-20 PROCEDURE — 36415 COLL VENOUS BLD VENIPUNCTURE: CPT | Performed by: EMERGENCY MEDICINE

## 2025-07-20 PROCEDURE — 80048 BASIC METABOLIC PNL TOTAL CA: CPT | Performed by: EMERGENCY MEDICINE

## 2025-07-20 PROCEDURE — 93005 ELECTROCARDIOGRAM TRACING: CPT | Performed by: EMERGENCY MEDICINE

## 2025-07-20 PROCEDURE — 99285 EMERGENCY DEPT VISIT HI MDM: CPT | Mod: 25 | Performed by: EMERGENCY MEDICINE

## 2025-07-20 PROCEDURE — 250N000013 HC RX MED GY IP 250 OP 250 PS 637: Performed by: EMERGENCY MEDICINE

## 2025-07-20 PROCEDURE — 84484 ASSAY OF TROPONIN QUANT: CPT | Performed by: EMERGENCY MEDICINE

## 2025-07-20 PROCEDURE — 250N000011 HC RX IP 250 OP 636: Performed by: EMERGENCY MEDICINE

## 2025-07-20 PROCEDURE — 85004 AUTOMATED DIFF WBC COUNT: CPT | Performed by: EMERGENCY MEDICINE

## 2025-07-20 RX ORDER — MAGNESIUM HYDROXIDE/ALUMINUM HYDROXICE/SIMETHICONE 120; 1200; 1200 MG/30ML; MG/30ML; MG/30ML
30 SUSPENSION ORAL ONCE
Status: COMPLETED | OUTPATIENT
Start: 2025-07-20 | End: 2025-07-20

## 2025-07-20 RX ORDER — ASPIRIN 81 MG/1
324 TABLET, CHEWABLE ORAL ONCE
Status: DISCONTINUED | OUTPATIENT
Start: 2025-07-20 | End: 2025-07-20

## 2025-07-20 RX ORDER — ONDANSETRON 4 MG/1
4 TABLET, ORALLY DISINTEGRATING ORAL ONCE
Status: COMPLETED | OUTPATIENT
Start: 2025-07-20 | End: 2025-07-20

## 2025-07-20 RX ADMIN — ONDANSETRON 4 MG: 4 TABLET, ORALLY DISINTEGRATING ORAL at 20:39

## 2025-07-20 RX ADMIN — ALUMINUM HYDROXIDE, MAGNESIUM HYDROXIDE, AND SIMETHICONE 30 ML: 200; 200; 20 SUSPENSION ORAL at 20:39

## 2025-07-20 ASSESSMENT — ACTIVITIES OF DAILY LIVING (ADL)
ADLS_ACUITY_SCORE: 58
ADLS_ACUITY_SCORE: 58

## 2025-07-21 LAB
ATRIAL RATE - MUSE: 85 BPM
DIASTOLIC BLOOD PRESSURE - MUSE: NORMAL MMHG
INTERPRETATION ECG - MUSE: NORMAL
P AXIS - MUSE: 63 DEGREES
PR INTERVAL - MUSE: 172 MS
QRS DURATION - MUSE: 118 MS
QT - MUSE: 396 MS
QTC - MUSE: 471 MS
R AXIS - MUSE: 78 DEGREES
SYSTOLIC BLOOD PRESSURE - MUSE: NORMAL MMHG
T AXIS - MUSE: 218 DEGREES
VENTRICULAR RATE- MUSE: 85 BPM

## 2025-07-21 NOTE — ED PROVIDER NOTES
EMERGENCY DEPARTMENT ENCOUNTER      NAME: Warren Jaramillo  AGE: 44 year old male  YOB: 1980  MRN: [id number]  EVALUATION DATE & TIME: 2025  8:06 PM    PCP: Aakash Bernardo Physician    ED PROVIDER: Filiberto Gutierrez M.D.      Chief Complaint   Patient presents with    Chest Pain    Shortness of Breath    Nausea & Vomiting         FINAL IMPRESSION:  1. Chest pain, unspecified type          ED COURSE & MEDICAL DECISION MAKIN year old male presents to the Emergency Department for evaluation of chest pain, shortness of breath, and nausea/vomiting.  Patient arrives to the emergency department vitally stable.  Cardiopulmonary exam is unremarkable.  He has a history of hypertension, type 2 diabetes, cirrhosis requiring frequent ascites, fetal alcohol syndrome.  He presents with left-sided chest pain.  This is in the background of numerous ED visits for various low acuity pain complaints.  EKG shows sinus rhythm without any acute ischemic changes, looks similar to previous.  High-sensitivity troponin is minimally elevated at 31 but within the patient's typical baseline.  Patient within the last month had a reassuringly negative CT pulmonary angiogram and his examination and history do not seem suspicious for this today.  Chest x-ray is negative for infiltrate pneumothorax or widened mediastinum.  This is independently reviewed and interpreted.  Patient was stable throughout a few hours of monitoring.  He received some Maalox actually felt much better after this.  We had discussed potentially  keeping him in the emergency department a bit longer to obtain a delta troponin however patient was eager to discharge once he was feeling better.  Since his troponin is at baseline I think this is not unreasonable.  He was encouraged to continue following up with primary care and cardiology.  Patient was discharged in stable condition.    8:05 PM I met with the patient to obtain patient history  "and performed a physical exam. Discussed plan for ED work up including potential diagnostic studies and interventions.   10:34 PM At the conclusion of the encounter I discussed the results of all of the tests and the disposition. The questions were answered. The patient or family acknowledged understanding and was agreeable with the care plan.       Medical Decision Making  I reviewed the EMR: Outpatient Record: Echocardiogram from 6/10 and right heart cath from 6/15  Care impacted by Liver disease, Heart Disease, and Mental Health  Discharge. No recommendations on prescription strength medication(s). N/A.    MIPS (CTPE, Dental pain, Khan, Sinusitis, Asthma/COPD, Head Trauma): Not Applicable    SEPSIS: None            MEDICATIONS GIVEN IN THE EMERGENCY:  Medications   alum & mag hydroxide-simethicone (MAALOX) suspension 30 mL (30 mLs Oral $Given 7/20/25 2039)   ondansetron (ZOFRAN ODT) ODT tab 4 mg (4 mg Oral $Given 7/20/25 2039)       NEW PRESCRIPTIONS STARTED AT TODAY'S ER VISIT  Discharge Medication List as of 7/20/2025 10:34 PM             =================================================================    HPI    Patient information was obtained from: patient     Use of : N/A      Warren Jaramillo is a 44 year old male with a pertinent history of angiomyolipoma, cardiomegaly, chronic right-sided CHF, Cirrhosis of liver, COPD, DMII, NSTEMI, and SVT,  who presents to this ED for evaluation of chest pain, shortness of breath, and nausea/vomiting.     Today, patient reports aching chest pain that \"feels like it's coming from the heart\". He also notes tingling, stinging, and burning of his right fingers and right hand. Patient reports taking a medication when he vomited. He also notes shortness of breath. Of note, patient reports 85% right sided CHF. No other symptoms noted. He notes taking omeprazole and other medications for heart burn. There are no other complaints/concerns at this " time.      REVIEW OF SYSTEMS   All systems reviewed and negative except as noted in HPI.    PAST MEDICAL HISTORY:  Past Medical History:   Diagnosis Date    COPD exacerbation (H) 12/02/2024    DM2 (diabetes mellitus, type 2) (H) 04/28/2020    HTN (hypertension) 07/30/2012    Sleep apnea     Thyroid nodule 07/31/2019    Rojas's disease (H)        PAST SURGICAL HISTORY:  Past Surgical History:   Procedure Laterality Date    COLONOSCOPY      CV CORONARY ANGIOGRAM N/A 6/18/2025    Procedure: Coronary Angiogram;  Surgeon: Pasquale Martinez MD;  Location: [facility]    CV LEFT HEART CATH N/A 6/18/2025    Procedure: Left Heart Catheterization;  Surgeon: Pasquale Martinez MD;  Location: [facility]    CV RIGHT HEART CATH MEASUREMENTS RECORDED N/A 6/18/2025    Procedure: Right Heart Catheterization;  Surgeon: Pasquale Martinez MD;  Location: [facility] CV    ESOPHAGOSCOPY, GASTROSCOPY, DUODENOSCOPY (EGD), COMBINED N/A 7/21/2023    Procedure: ESOPHAGOGASTRODUODENOSCOPY WITH GASTRIC AND ESOPHAGEAL BIOPSIES;  Surgeon: Filiberto Aragon MD;  Location: [facility] OR    ESOPHAGOSCOPY, GASTROSCOPY, DUODENOSCOPY (EGD), COMBINED N/A 4/4/2025    Procedure: ESOPHAGOGASTRODUODENOSCOPY;  Surgeon: Ramesh Talbot MD;  Location: [facility] OR    IR HEPATIC VENOGRAM W HEMODYNAMICS  6/17/2025    TOOTH EXTRACTION             CURRENT MEDICATIONS:    No current facility-administered medications for this encounter.     Current Outpatient Medications   Medication Sig Dispense Refill    albuterol (PROAIR HFA/PROVENTIL HFA/VENTOLIN HFA) 108 (90 Base) MCG/ACT inhaler Inhale 1-2 puffs into the lungs every 6 hours as needed for shortness of breath, wheezing or cough 18 g 0    albuterol (PROVENTIL) (2.5 MG/3ML) 0.083% neb solution Take 2.5 mg by nebulization 3 times daily as needed for shortness of breath, wheezing or cough      aloe vera GEL Apply 1 g topically every hour as needed for skin care Per bottle  directions      bacitracin 500 UNIT/GM OINT Apply topically 3 times daily as needed for wound care      Benzocaine (SOLARCAINE ALOE VERA EX) Externally apply topically daily as needed.      benzonatate (TESSALON) 200 MG capsule Take 1 capsule (200 mg) by mouth 3 times daily as needed for cough. 50 capsule 0    Calamine external lotion Apply topically as needed for itching      carbamide peroxide (DEBROX) 6.5 % otic solution Place 5 drops into both ears daily as needed for other (ear wax).      clotrimazole (LOTRIMIN) 1 % external cream Apply topically 2 times daily as needed (skin irritation)      Continuous Glucose Sensor (FREESTYLE MEGHANN 3 PLUS SENSOR) MISC USE TO READ BLOOD SUGAR TWICE DAILY AND AS NEEDED. CHANGE SENSOR EVERY 15 DAYS      dextromethorphan-guaiFENesin (MUCINEX DM)  MG 12 hr tablet Take 1 tablet by mouth 2 times daily as needed.      diclofenac (VOLTAREN) 1 % topical gel Apply 2 g topically daily as needed for moderate pain To joints/back      EPINEPHrine (ANY BX GENERIC EQUIV) 0.3 MG/0.3ML injection 2-pack Inject 0.3 mg into the muscle as needed for anaphylaxis. May repeat one time in 5-15 minutes if response to initial dose is inadequate.      famotidine (PEPCID) 20 MG tablet Take 1 tablet (20 mg) by mouth 2 times daily 60 tablet 0    furosemide (LASIX) 40 MG tablet Take 0.5 tablets (20 mg) by mouth 2 times daily. 90 tablet 1    GAVILAX 17 GM/SCOOP powder Take 17 g by mouth daily as needed for constipation.      Hydrocortisone (PREPARATION H EX) Externally apply topically daily as needed (hemorrhoids).      hydrOXYzine maycol (VISTARIL) 25 MG capsule Take 1 capsule (25 mg) by mouth 3 times daily as needed for itching. 12 capsule 0    JARDIANCE 10 MG TABS tablet Take 10 mg by mouth every morning.      levothyroxine (SYNTHROID/LEVOTHROID) 25 MCG tablet Take 12.5 mcg by mouth every morning (before breakfast).      [Paused] lisinopril (ZESTRIL) 10 MG tablet Take 10 mg by mouth every morning.       loperamide (IMODIUM) 2 MG capsule Take 4 mg by mouth 4 times daily as needed for diarrhea.      loratadine (CLARITIN) 10 MG tablet Take 10 mg by mouth daily as needed for allergies.      magnesium hydroxide (MILK OF MAGNESIA) 400 MG/5ML suspension Take 30 mLs by mouth daily as needed for heartburn.      metFORMIN (GLUCOPHAGE) 1000 MG tablet Take 1,000 mg by mouth 2 times daily (with meals)      methocarbamol (ROBAXIN) 500 MG tablet Take 1 tablet (500 mg) by mouth 4 times daily as needed for muscle spasms. 40 tablet 0    montelukast (SINGULAIR) 10 MG tablet Take 10 mg by mouth every morning.      nystatin (MYCOSTATIN) 508989 UNIT/GM external powder Apply topically 2 times daily. 30 g 0    OLANZapine (ZYPREXA) 10 MG tablet Take 10 mg by mouth At Bedtime      omeprazole (PRILOSEC) 40 MG DR capsule Take 40 mg by mouth every morning.      ondansetron (ZOFRAN ODT) 4 MG ODT tab Take 1 tablet (4 mg) by mouth every 8 hours as needed for nausea. 20 tablet 0    pramoxine-calamine (AVEENO) 1-8 % LOTN Apply topically daily as needed for itching.      rosuvastatin (CRESTOR) 10 MG tablet Take 10 mg by mouth At Bedtime      spironolactone (ALDACTONE) 50 MG tablet Take 1 tablet (50 mg) by mouth 2 times daily. 90 tablet 1    sulfamethoxazole-trimethoprim (BACTRIM DS) 800-160 MG tablet Take 1 tablet by mouth daily. 90 tablet 3    traZODone (DESYREL) 100 MG tablet Take 100 mg by mouth at bedtime.      TRELEGY ELLIPTA 100-62.5-25 MCG/ACT oral inhaler Inhale 1 puff into the lungs every morning.      Urea 40 % CREA Apply topically daily as needed for dry skin.      Vitamins A & D (VITAMIN A & D) OINT Externally apply topically daily as needed (general skin health).      witch hazel-glycerin (TUCKS) pad Apply topically as needed for hemorrhoids.           ALLERGIES:  Allergies   Allergen Reactions    Apricot Flavoring Agent (Non-Screening) Anaphylaxis    Banana Anaphylaxis     Throat swelling      Bees Anaphylaxis     Has an Epi pen     Wasp Venom Protein Shortness Of Breath     Other reaction(s): Respiratory Distress  Has an Epi pen        Methylphenidate Itching     Other reaction(s): Nightmares    Prunus      Other reaction(s): *Unknown       FAMILY HISTORY:  Family History   Problem Relation Age of Onset    Unknown/Adopted Father     Unknown/Adopted Maternal Grandmother     C.A.D. Maternal Grandfather     Diabetes Maternal Grandfather     Cerebrovascular Disease Maternal Grandfather     Unknown/Adopted Paternal Grandmother     Unknown/Adopted Paternal Grandfather     Unknown/Adopted Brother     Unknown/Adopted Sister        SOCIAL HISTORY:   Social History     Socioeconomic History    Marital status: Single   Tobacco Use    Smoking status: Every Day     Current packs/day: 1.00     Average packs/day: 1 pack/day for 21.5 years (21.5 ttl pk-yrs)     Types: Cigarettes     Start date: 1/1/2004    Smokeless tobacco: Never   Vaping Use    Vaping status: Never Used   Substance and Sexual Activity    Alcohol use: Not Currently     Comment: Last 8/7/2024    Sexual activity: Never     Partners: Female   Other Topics Concern     Service No    Blood Transfusions No    Caffeine Concern No    Occupational Exposure No    Hobby Hazards No    Sleep Concern No    Stress Concern Yes     Comment: sometimes    Weight Concern No    Special Diet Yes     Comment: counting carbs    Back Care No    Exercise Yes    Seat Belt Yes    Self-Exams Yes     Social Drivers of Health     Financial Resource Strain: Low Risk  (6/16/2025)    Financial Resource Strain     Within the past 12 months, have you or your family members you live with been unable to get utilities (heat, electricity) when it was really needed?: No   Recent Concern: Financial Resource Strain - High Risk (6/11/2025)    Financial Resource Strain     Within the past 12 months, have you or your family members you live with been unable to get utilities (heat, electricity) when it was really needed?: Yes    Food Insecurity: Low Risk  (6/16/2025)    Food Insecurity     Within the past 12 months, did you worry that your food would run out before you got money to buy more?: No     Within the past 12 months, did the food you bought just not last and you didn t have money to get more?: No   Transportation Needs: Low Risk  (6/16/2025)    Transportation Needs     Within the past 12 months, has lack of transportation kept you from medical appointments, getting your medicines, non-medical meetings or appointments, work, or from getting things that you need?: No    Received from [facility]    Social Connections   Interpersonal Safety: Low Risk  (6/16/2025)    Interpersonal Safety     Do you feel physically and emotionally safe where you currently live?: Yes     Within the past 12 months, have you been hit, slapped, kicked or otherwise physically hurt by someone?: No     Within the past 12 months, have you been humiliated or emotionally abused in other ways by your partner or ex-partner?: No   Housing Stability: High Risk (6/16/2025)    Housing Stability     Do you have housing? : Yes     Are you worried about losing your housing?: Yes       VITALS:  /58   Pulse 86   Temp 97  F (36.1  C)   Resp 22   Wt 126.1 kg (278 lb)   SpO2 96%   BMI 46.26 kg/m      PHYSICAL EXAM    Constitutional: Obese middle-age male patient, laying in bed, no acute distress  HENT: Normocephalic, Atraumatic. Neck Supple.  Eyes: EOMI, Conjunctiva normal.  Respiratory: Breathing comfortably on room air. Speaks full sentences easily. Lungs clear to ascultation.  Cardiovascular: Normal heart rate, Regular rhythm. No peripheral edema.  Abdomen: Soft, mildly distended with fluid wave.  Nontender  Musculoskeletal: Good range of motion in all major joints. No major deformities noted.  Integument: Warm, Dry.  Neurologic: Alert & awake, Normal motor function, Normal sensory function, No focal deficits noted.    Psychiatric: Cooperative. Affect appropriate.     LAB:  All pertinent labs reviewed and interpreted.  Labs Ordered and Resulted from Time of ED Arrival to Time of ED Departure   BASIC METABOLIC PANEL - Abnormal       Result Value    Sodium 141      Potassium 3.8      Chloride 100      Carbon Dioxide (CO2) 24      Anion Gap 17 (*)     Urea Nitrogen 27.7 (*)     Creatinine 1.49 (*)     GFR Estimate 59 (*)     Calcium 9.8      Glucose 169 (*)    TROPONIN T, HIGH SENSITIVITY - Abnormal    Troponin T, High Sensitivity 31 (*)    CBC WITH PLATELETS AND DIFFERENTIAL - Abnormal    WBC Count 19.0 (*)     RBC Count 4.85      Hemoglobin 11.6 (*)     Hematocrit 38.6 (*)     MCV 80      MCH 23.9 (*)     MCHC 30.1 (*)     RDW 16.2 (*)     Platelet Count 471 (*)     % Neutrophils 69      % Lymphocytes 17      % Monocytes 8      % Eosinophils 4      % Basophils 1      % Immature Granulocytes 1      NRBCs per 100 WBC 0      Absolute Neutrophils 13.2 (*)     Absolute Lymphocytes 3.2      Absolute Monocytes 1.5 (*)     Absolute Eosinophils 0.7      Absolute Basophils 0.1      Absolute Immature Granulocytes 0.2      Absolute NRBCs 0.0         RADIOLOGY:  Reviewed all pertinent imaging. Please see official radiology report.  Chest XR,  PA & LAT   Final Result   IMPRESSION: Heart size upper range of normal, unchanged. Mild central and upper lobe vascular congestion, unchanged. The lungs are clear.          EKG:    Performed at: 1941    Impression: Sinus rhythm, low voltage QRS, previously cited anterior septal infarct pattern, no acute ischemic change    Rate: 85  Rhythm: Sinus  Axis: Normal  IL Interval: 172  QRS Interval: 118  QTc Interval: 471  ST Changes: Nonspecific ST abnormality  Comparison: Compared to June 27, 2025, no significant change    I have independently reviewed and interpreted the EKG(s) documented above.    PROCEDURES:   None.      Kim MATOS, am serving as a scribe to document services personally performed by  Dr. Filiberto Gutierrez, based on my observation and the provider's statements to me. I, Filiberto Gutierrez MD attest that Kim Jamison is acting in a scribe capacity, has observed my performance of the services and has documented them in accordance with my direction.    Filiberto Gutierrez M.D.  Emergency Medicine  [facility] EMERGENCY DEPARTMENT  [address] [place] [id number] [phone]  Dept: [phone]       Filiberto Gutierrez MD  07/20/25 7228

## 2025-07-21 NOTE — ED TRIAGE NOTES
Pt states left sided non-radiating chest pain starting 4hrs. Ago.  Pt also states some shortness of breath with difficulties getting deep breaths in.  Pt endorses some N/V X 2  today with episodes occurring after ingesting food/water.

## 2025-07-21 NOTE — DISCHARGE INSTRUCTIONS
You are seen in the emergency department for chest pain.  Your evaluation included a reassuring EKG and high-sensitivity troponin level.  Your chest x-ray looked clear.  We are glad that you are feeling better after Maalox and we do suspect that some of this could have been related to indigestion.  Please stick to a bland diet avoiding spicy foods fatty foods and alcohol.  You can continue on omeprazole every day.  You can use over-the-counter Maalox for breakthrough acid reflux symptoms.  Follow-up in her clinic to review any ongoing concerns after this ED visit.

## 2025-07-22 PROCEDURE — 99283 EMERGENCY DEPT VISIT LOW MDM: CPT | Mod: 25 | Performed by: STUDENT IN AN ORGANIZED HEALTH CARE EDUCATION/TRAINING PROGRAM

## 2025-07-23 ENCOUNTER — APPOINTMENT (OUTPATIENT)
Dept: RADIOLOGY | Facility: HOSPITAL | Age: 45
End: 2025-07-23
Attending: STUDENT IN AN ORGANIZED HEALTH CARE EDUCATION/TRAINING PROGRAM
Payer: COMMERCIAL

## 2025-07-23 ENCOUNTER — HOSPITAL ENCOUNTER (EMERGENCY)
Facility: HOSPITAL | Age: 45
Discharge: HOME | End: 2025-07-23
Attending: STUDENT IN AN ORGANIZED HEALTH CARE EDUCATION/TRAINING PROGRAM
Payer: COMMERCIAL

## 2025-07-23 VITALS
WEIGHT: 278.3 LBS | SYSTOLIC BLOOD PRESSURE: 124 MMHG | HEART RATE: 86 BPM | RESPIRATION RATE: 18 BRPM | DIASTOLIC BLOOD PRESSURE: 59 MMHG | TEMPERATURE: 98.6 F | OXYGEN SATURATION: 97 % | BODY MASS INDEX: 46.37 KG/M2 | HEIGHT: 65 IN

## 2025-07-23 DIAGNOSIS — R05.1 ACUTE COUGH: ICD-10-CM

## 2025-07-23 PROCEDURE — 71046 X-RAY EXAM CHEST 2 VIEWS: CPT

## 2025-07-23 NOTE — ED PROVIDER NOTES
"  Emergency Department Encounter         FINAL IMPRESSION:  Cough          ED COURSE AND MEDICAL DECISION MAKING       ED Course as of 07/23/25 0120   Wed Jul 23, 2025   0037 Patient is a morbidly obese 44-year-old who is well-known to this emergency department, multiple medical problems including cirrhosis, COPD hypertension diabetes hyperlipidemia, here with cough.  Reports that \"I feel that there is bubbling on the right side of my chest.\"  Denies any chest pain, syncope, fevers, abdominal discomfort, dysuria or bowel changes.  On arrival he looks well.  She tried to find him, he was outside having a cigarette.  On examination he looks well.  Ambulatory.  Lungs have some mild wheezing bilaterally.  Because of his multiple medical problems, will obtain chest x-ray     X-ray negative.  Examination unremarkable.  Plan for discharge home                     Medical Decision Making      Discharge. I prescribed additional prescription strength medication(s) as charted. See documentation for any additional details.    MIPS (CTPE, Dental pain, Khan, Sinusitis, Asthma/COPD, Head Trauma): Not Applicable    SEPSIS: None                    At the conclusion of the encounter I discussed the results of all the tests and the disposition. The questions were answered. The patient or family acknowledged understanding and was agreeable with the care plan.        MEDICATIONS GIVEN IN THE EMERGENCY DEPARTMENT:  Medications - No data to display    NEW PRESCRIPTIONS STARTED AT TODAY'S ED VISIT:  New Prescriptions    No medications on file       HPI     44-year-old male extensive past medical history here with cough.  No hemoptysis.  No shortness of breath.  No abdominal pain.  No fevers.        MEDICAL HISTORY     Past Medical History:   Diagnosis Date    COPD exacerbation (H) 12/02/2024    DM2 (diabetes mellitus, type 2) (H) 04/28/2020    HTN (hypertension) 07/30/2012    Sleep apnea     Thyroid nodule 07/31/2019    Rojas's disease " (H)        Past Surgical History:   Procedure Laterality Date    COLONOSCOPY      CV CORONARY ANGIOGRAM N/A 6/18/2025    Procedure: Coronary Angiogram;  Surgeon: Pasquale Martinez MD;  Location: [facility] LAB CV    CV LEFT HEART CATH N/A 6/18/2025    Procedure: Left Heart Catheterization;  Surgeon: Pasquale Martinez MD;  Location: [facility] CV    CV RIGHT HEART CATH MEASUREMENTS RECORDED N/A 6/18/2025    Procedure: Right Heart Catheterization;  Surgeon: Pasquale Martinez MD;  Location: [facility] CV    ESOPHAGOSCOPY, GASTROSCOPY, DUODENOSCOPY (EGD), COMBINED N/A 7/21/2023    Procedure: ESOPHAGOGASTRODUODENOSCOPY WITH GASTRIC AND ESOPHAGEAL BIOPSIES;  Surgeon: Filiberto Aragon MD;  Location: [facility]    ESOPHAGOSCOPY, GASTROSCOPY, DUODENOSCOPY (EGD), COMBINED N/A 4/4/2025    Procedure: ESOPHAGOGASTRODUODENOSCOPY;  Surgeon: Ramesh Talbot MD;  Location: [facility] OR    IR HEPATIC VENOGRAM W HEMODYNAMICS  6/17/2025    TOOTH EXTRACTION         Social History     Tobacco Use    Smoking status: Every Day     Current packs/day: 1.00     Average packs/day: 1 pack/day for 21.6 years (21.6 ttl pk-yrs)     Types: Cigarettes     Start date: 1/1/2004    Smokeless tobacco: Never   Vaping Use    Vaping status: Never Used   Substance Use Topics    Alcohol use: Not Currently     Comment: Last 8/7/2024       albuterol (PROAIR HFA/PROVENTIL HFA/VENTOLIN HFA) 108 (90 Base) MCG/ACT inhaler  albuterol (PROVENTIL) (2.5 MG/3ML) 0.083% neb solution  aloe vera GEL  bacitracin 500 UNIT/GM OINT  Benzocaine (SOLARCAINE ALOE VERA EX)  benzonatate (TESSALON) 200 MG capsule  Calamine external lotion  carbamide peroxide (DEBROX) 6.5 % otic solution  clotrimazole (LOTRIMIN) 1 % external cream  Continuous Glucose Sensor (FREESTYLE MEGHANN 3 PLUS SENSOR) MISC  dextromethorphan-guaiFENesin (MUCINEX DM)  MG 12 hr tablet  diclofenac (VOLTAREN) 1 % topical gel  EPINEPHrine (ANY BX GENERIC EQUIV) 0.3 MG/0.3ML  "injection 2-pack  famotidine (PEPCID) 20 MG tablet  furosemide (LASIX) 40 MG tablet  GAVILAX 17 GM/SCOOP powder  Hydrocortisone (PREPARATION H EX)  hydrOXYzine maycol (VISTARIL) 25 MG capsule  JARDIANCE 10 MG TABS tablet  levothyroxine (SYNTHROID/LEVOTHROID) 25 MCG tablet  [Paused] lisinopril (ZESTRIL) 10 MG tablet  loperamide (IMODIUM) 2 MG capsule  loratadine (CLARITIN) 10 MG tablet  magnesium hydroxide (MILK OF MAGNESIA) 400 MG/5ML suspension  metFORMIN (GLUCOPHAGE) 1000 MG tablet  methocarbamol (ROBAXIN) 500 MG tablet  montelukast (SINGULAIR) 10 MG tablet  nystatin (MYCOSTATIN) 639744 UNIT/GM external powder  OLANZapine (ZYPREXA) 10 MG tablet  omeprazole (PRILOSEC) 40 MG DR capsule  ondansetron (ZOFRAN ODT) 4 MG ODT tab  pramoxine-calamine (AVEENO) 1-8 % LOTN  rosuvastatin (CRESTOR) 10 MG tablet  spironolactone (ALDACTONE) 50 MG tablet  sulfamethoxazole-trimethoprim (BACTRIM DS) 800-160 MG tablet  traZODone (DESYREL) 100 MG tablet  TRELEGY ELLIPTA 100-62.5-25 MCG/ACT oral inhaler  Urea 40 % CREA  Vitamins A & D (VITAMIN A & D) OINT  witch hazel-glycerin (TUCKS) pad            PHYSICAL EXAM     /66   Pulse 85   Temp 98.6  F (37  C) (Oral)   Resp 20   Ht 1.651 m (5' 5\")   Wt 126.2 kg (278 lb 4.8 oz)   SpO2 95%   BMI 46.31 kg/m        PHYSICAL EXAM:     General: Patient appears well, nontoxic, comfortable  HEENT: Moist mucous membranes,  No head trauma.    Cardiovascular: Normal rate, normal rhythm, no extremity edema.  No appreciable murmur.  Respiratory: No signs of respiratory distress, lungs are clear to auscultation bilaterally with no wheezes rhonchi or rales.  Abdominal: Soft, nontender, nondistended, no palpable masses, no guarding, no rebound  Musculoskeletal: Full range of motion of joints, no deformities appreciated.  Neurological: Alert and oriented, grossly neurologically intact.  Psychological: Normal affect and mood.  Integument: No rashes appreciated          RESULTS       Labs Ordered and " Resulted from Time of ED Arrival to Time of ED Departure - No data to display    Chest XR,  PA & LAT   Final Result   IMPRESSION: Negative chest.            PROCEDURES:  Procedures:  Procedures       Anurag Richardson DO  Emergency Medicine  [facility] EMERGENCY DEPARTMENT       Anurag Richardson DO  07/23/25 0120

## 2025-07-23 NOTE — ED TRIAGE NOTES
[facility] brought pt to ED from own home. Pt was seen here 2 days ago. Pt states sob and coughing up phlegm and has to shake his head to get the phlegm to go down. Is diabetic and states blood sugars have been high. States ran out of asthma inhaler. Denies pain. Does smoke 1ppd     Triage Assessment (Adult)       Row Name 07/22/25 7813          Triage Assessment    Airway WDL WDL

## 2025-07-27 ENCOUNTER — HOSPITAL ENCOUNTER (EMERGENCY)
Facility: HOSPITAL | Age: 45
Discharge: HOME | End: 2025-07-27
Attending: EMERGENCY MEDICINE | Admitting: EMERGENCY MEDICINE
Payer: COMMERCIAL

## 2025-07-27 VITALS
BODY MASS INDEX: 45.26 KG/M2 | SYSTOLIC BLOOD PRESSURE: 147 MMHG | DIASTOLIC BLOOD PRESSURE: 68 MMHG | OXYGEN SATURATION: 96 % | RESPIRATION RATE: 16 BRPM | HEART RATE: 83 BPM | TEMPERATURE: 97.8 F | WEIGHT: 272 LBS

## 2025-07-27 DIAGNOSIS — B34.9 VIRAL SYNDROME: ICD-10-CM

## 2025-07-27 DIAGNOSIS — M54.2 NECK PAIN: Primary | ICD-10-CM

## 2025-07-27 LAB
FLUAV RNA SPEC QL NAA+PROBE: NEGATIVE
FLUBV RNA RESP QL NAA+PROBE: NEGATIVE
RSV RNA SPEC NAA+PROBE: NEGATIVE
S PYO DNA THROAT QL NAA+PROBE: NOT DETECTED
SARS-COV-2 RNA RESP QL NAA+PROBE: NEGATIVE

## 2025-07-27 PROCEDURE — 250N000011 HC RX IP 250 OP 636: Performed by: EMERGENCY MEDICINE

## 2025-07-27 PROCEDURE — 87637 SARSCOV2&INF A&B&RSV AMP PRB: CPT | Performed by: EMERGENCY MEDICINE

## 2025-07-27 PROCEDURE — 87651 STREP A DNA AMP PROBE: CPT | Performed by: EMERGENCY MEDICINE

## 2025-07-27 PROCEDURE — 99284 EMERGENCY DEPT VISIT MOD MDM: CPT | Performed by: EMERGENCY MEDICINE

## 2025-07-27 PROCEDURE — 96372 THER/PROPH/DIAG INJ SC/IM: CPT | Performed by: EMERGENCY MEDICINE

## 2025-07-27 RX ORDER — BACLOFEN 10 MG/1
10 TABLET ORAL 3 TIMES DAILY
Qty: 15 TABLET | Refills: 0 | Status: SHIPPED | OUTPATIENT
Start: 2025-07-27

## 2025-07-27 RX ORDER — KETOROLAC TROMETHAMINE 30 MG/ML
30 INJECTION, SOLUTION INTRAMUSCULAR; INTRAVENOUS ONCE
Status: COMPLETED | OUTPATIENT
Start: 2025-07-27 | End: 2025-07-27

## 2025-07-27 RX ORDER — NAPROXEN 500 MG/1
500 TABLET ORAL 2 TIMES DAILY WITH MEALS
Qty: 16 TABLET | Refills: 0 | Status: SHIPPED | OUTPATIENT
Start: 2025-07-27

## 2025-07-27 RX ORDER — BACLOFEN 10 MG/1
10 TABLET ORAL 3 TIMES DAILY
Qty: 15 TABLET | Refills: 0 | Status: SHIPPED | OUTPATIENT
Start: 2025-07-27 | End: 2025-07-27

## 2025-07-27 RX ORDER — NAPROXEN 500 MG/1
500 TABLET ORAL 2 TIMES DAILY WITH MEALS
Qty: 16 TABLET | Refills: 0 | Status: SHIPPED | OUTPATIENT
Start: 2025-07-27 | End: 2025-07-27

## 2025-07-27 RX ADMIN — KETOROLAC TROMETHAMINE 30 MG: 30 INJECTION, SOLUTION INTRAMUSCULAR at 06:32

## 2025-07-27 ASSESSMENT — ACTIVITIES OF DAILY LIVING (ADL)
ADLS_ACUITY_SCORE: 58

## 2025-07-27 ASSESSMENT — ENCOUNTER SYMPTOMS
FEVER: 0
ABDOMINAL PAIN: 0
HEADACHES: 1
NECK PAIN: 1
COUGH: 1
SHORTNESS OF BREATH: 0

## 2025-07-27 NOTE — ED TRIAGE NOTES
He comes from a group home where he woke about an hour ago with pain from his center posterior neck radiating up to his right side above the ear to his forehead. He started to feel it last night about 6 hours ago. He relieves the pain by pushing up on his lower right jaw. He denies any trauma. He denies neurological issues. He does endorse a chronic cough and that he smoke cigarettes.

## 2025-07-27 NOTE — ED PROVIDER NOTES
EMERGENCY DEPARTMENT ENCOUNTER       ED Course & Medical Decision Making     6:21 AM I met with the patient, obtained history, performed an initial exam, and discussed options and plan for diagnostics and treatment here in the ED.    I saw and examined the patient.  He denies any trauma leading to this.  It is unilateral neck pain.  I do not see any external signs of infection.  There is no lymphadenopathy and no soft tissue swelling.  No trismus.  Airway is intact.  I will check for strep, COVID, RSV and influenza.  These test returned negative.  He was given some Toradol here and felt better.  I suspect that his symptoms are secondary to a viral process, possibly simple musculoskeletal pain and they should respond to conservative management.  Prescriptions provided for naproxen and baclofen.  He can follow-up with his regular physician    Sepsis: No sign of sepsis    Medical Decision Making  Obtained supplemental history: N/A  Reviewed external records: 07/23/2025 [facility] ED visit  Care impacted by chronic illness: Tobacco use, CHF, DM2, hypertension, COPD  Care significantly affected by social determinants of health:See MDM  Did you consider but not order tests?:See MDM  Did you interpret images independently?:  Any ordered images or EKGs were reviewed and independently interpreted  Consultation discussion with other provider:See MDM  Admit v. Discharge: See MDM      MIPS: Not Applicable       Prior to making a final disposition on this patient the results of patient's tests and other diagnostic studies were discussed with the patient. All questions were answered. Patient expressed understanding of the plan and was amenable to it.    Medications   ketorolac (TORADOL) injection 30 mg (30 mg Intramuscular $Given 7/27/25 0193)       Final Impression     1. Neck pain    2. Viral syndrome            Chief Complaint     Chief Complaint   Patient presents with    Neck Pain    Headache       He comes from a group home  "where he woke about an hour ago with pain from his center posterior neck radiating up to his right side above the ear to his forehead. He started to feel it last night about 6 hours ago. He relieves the pain by pushing up on his lower right jaw. He denies any trauma. He denies neurological issues. He does endorse a chronic cough and that he smoke cigarettes.             CIRO Jaramillo is a 44 year old male who presents to the ED for evaluation of neck pain and headache.     Patient presents with a 7/10 right-sided headache which radiates into his neck and began when he woke up at 4:30 this morning. He notes that pushing on his neck makes the headache better, whereas coughing makes the headache worse. Patient vomited this morning prior to arrival in the ED. He also adds that when he woke up this morning, his hands felt \"weak\" and \"cupped\". No pain with neck movements. No medications taken prior to arrival, however, he states that Toradol has been helpful with similar complaints in the past.    Patient denies vision changes, sore throat, fever, dental problems, chest pain, abdominal pain, or any other complaints at this time.      Chart Review:  07/23/2025: Patient was seen at [facility] for evaluation of cough. Lungs had some mild wheezing bilaterally. Chest-xray negative and exam otherwise unremarkable. Patient discharged home.       IZia am serving as a scribe to document services personally performed by Enrique Joiner M.D. based on my observation and the provider's statements to me. IEnrique M.D attest that Zia Waite is acting in a scribe capacity, has observed my performance of the services and has documented them in accordance with my direction.    Past Medical History     Past Medical History:   Diagnosis Date    COPD exacerbation (H) 12/02/2024    DM2 (diabetes mellitus, type 2) (H) 04/28/2020    HTN (hypertension) 07/30/2012    " Sleep apnea     Thyroid nodule 07/31/2019    Rojas's disease (H)      Past Surgical History:   Procedure Laterality Date    COLONOSCOPY      CV CORONARY ANGIOGRAM N/A 6/18/2025    Procedure: Coronary Angiogram;  Surgeon: Pasquale Martinez MD;  Location: [facility] CV    CV LEFT HEART CATH N/A 6/18/2025    Procedure: Left Heart Catheterization;  Surgeon: Pasquale Martinez MD;  Location: [facility] CATH LAB CV    CV RIGHT HEART CATH MEASUREMENTS RECORDED N/A 6/18/2025    Procedure: Right Heart Catheterization;  Surgeon: Pasquale Martinez MD;  Location: [facility] LAB CV    ESOPHAGOSCOPY, GASTROSCOPY, DUODENOSCOPY (EGD), COMBINED N/A 7/21/2023    Procedure: ESOPHAGOGASTRODUODENOSCOPY WITH GASTRIC AND ESOPHAGEAL BIOPSIES;  Surgeon: Filiberto Aragon MD;  Location: [facility]    ESOPHAGOSCOPY, GASTROSCOPY, DUODENOSCOPY (EGD), COMBINED N/A 4/4/2025    Procedure: ESOPHAGOGASTRODUODENOSCOPY;  Surgeon: Ramesh Talbot MD;  Location: [facility] OR    IR HEPATIC VENOGRAM W HEMODYNAMICS  6/17/2025    TOOTH EXTRACTION       Family History   Problem Relation Age of Onset    Unknown/Adopted Father     Unknown/Adopted Maternal Grandmother     C.A.D. Maternal Grandfather     Diabetes Maternal Grandfather     Cerebrovascular Disease Maternal Grandfather     Unknown/Adopted Paternal Grandmother     Unknown/Adopted Paternal Grandfather     Unknown/Adopted Brother     Unknown/Adopted Sister       Social History     Tobacco Use    Smoking status: Every Day     Current packs/day: 1.00     Average packs/day: 1 pack/day for 21.6 years (21.6 ttl pk-yrs)     Types: Cigarettes     Start date: 1/1/2004    Smokeless tobacco: Never   Vaping Use    Vaping status: Never Used   Substance Use Topics    Alcohol use: Not Currently     Comment: Last 8/7/2024       Relevant past medical, surgical, family and social history as documented above, has been reviewed and discussed with patient. No changes or additions, unless  otherwise noted in the HPI.    Current Medications     baclofen (LIORESAL) 10 MG tablet  naproxen (NAPROSYN) 500 MG tablet  albuterol (PROAIR HFA/PROVENTIL HFA/VENTOLIN HFA) 108 (90 Base) MCG/ACT inhaler  albuterol (PROVENTIL) (2.5 MG/3ML) 0.083% neb solution  aloe vera GEL  bacitracin 500 UNIT/GM OINT  Benzocaine (SOLARCAINE ALOE VERA EX)  benzonatate (TESSALON) 200 MG capsule  Calamine external lotion  carbamide peroxide (DEBROX) 6.5 % otic solution  clotrimazole (LOTRIMIN) 1 % external cream  Continuous Glucose Sensor (FREESTYLE MEGHANN 3 PLUS SENSOR) MISC  dextromethorphan-guaiFENesin (MUCINEX DM)  MG 12 hr tablet  diclofenac (VOLTAREN) 1 % topical gel  EPINEPHrine (ANY BX GENERIC EQUIV) 0.3 MG/0.3ML injection 2-pack  famotidine (PEPCID) 20 MG tablet  furosemide (LASIX) 40 MG tablet  GAVILAX 17 GM/SCOOP powder  Hydrocortisone (PREPARATION H EX)  hydrOXYzine maycol (VISTARIL) 25 MG capsule  JARDIANCE 10 MG TABS tablet  levothyroxine (SYNTHROID/LEVOTHROID) 25 MCG tablet  [Paused] lisinopril (ZESTRIL) 10 MG tablet  loperamide (IMODIUM) 2 MG capsule  loratadine (CLARITIN) 10 MG tablet  magnesium hydroxide (MILK OF MAGNESIA) 400 MG/5ML suspension  metFORMIN (GLUCOPHAGE) 1000 MG tablet  methocarbamol (ROBAXIN) 500 MG tablet  montelukast (SINGULAIR) 10 MG tablet  nystatin (MYCOSTATIN) 931463 UNIT/GM external powder  OLANZapine (ZYPREXA) 10 MG tablet  omeprazole (PRILOSEC) 40 MG DR capsule  ondansetron (ZOFRAN ODT) 4 MG ODT tab  pramoxine-calamine (AVEENO) 1-8 % LOTN  rosuvastatin (CRESTOR) 10 MG tablet  spironolactone (ALDACTONE) 50 MG tablet  sulfamethoxazole-trimethoprim (BACTRIM DS) 800-160 MG tablet  traZODone (DESYREL) 100 MG tablet  TRELEGY ELLIPTA 100-62.5-25 MCG/ACT oral inhaler  Urea 40 % CREA  Vitamins A & D (VITAMIN A & D) OINT  witch hazel-glycerin (TUCKS) pad        Allergies     Allergies   Allergen Reactions    Apricot Flavoring Agent (Non-Screening) Anaphylaxis    Banana Anaphylaxis     Throat  swelling      Bees Anaphylaxis     Has an Epi pen    Wasp Venom Protein Shortness Of Breath     Other reaction(s): Respiratory Distress  Has an Epi pen        Methylphenidate Itching     Other reaction(s): Nightmares    Prunus      Other reaction(s): *Unknown       Review of Systems     Review of Systems   Constitutional:  Negative for fever.   Eyes:  Negative for visual disturbance.   Respiratory:  Positive for cough. Negative for shortness of breath.    Cardiovascular:  Negative for chest pain.   Gastrointestinal:  Negative for abdominal pain.   Musculoskeletal:  Positive for neck pain.   Skin:  Negative for rash.   Neurological:  Positive for headaches.   All other systems reviewed and are negative.       Remainder of systems reviewed, unless noted in HPI all others negative.    Physical Exam     BP (!) 147/68   Pulse 83   Temp 97.8  F (36.6  C) (Oral)   Resp 16   Wt 123.4 kg (272 lb)   SpO2 96%   BMI 45.26 kg/m      Physical Exam  Vitals and nursing note reviewed.   Constitutional:       General: He is not in acute distress.  HENT:      Head: Normocephalic.      Right Ear: Tympanic membrane normal.      Left Ear: Tympanic membrane normal.      Nose: Nose normal.      Mouth/Throat:      Mouth: Mucous membranes are moist.      Pharynx: No oropharyngeal exudate or posterior oropharyngeal erythema.   Eyes:      General: No scleral icterus.     Extraocular Movements: Extraocular movements intact.      Pupils: Pupils are equal, round, and reactive to light.   Cardiovascular:      Rate and Rhythm: Normal rate.   Pulmonary:      Effort: Pulmonary effort is normal.      Breath sounds: No stridor.   Musculoskeletal:      Cervical back: Neck supple. No rigidity.   Lymphadenopathy:      Cervical: No cervical adenopathy.   Skin:     Findings: No rash.   Neurological:      Mental Status: He is alert. Mental status is at baseline.   Psychiatric:         Mood and Affect: Mood normal.             Labs & Imaging          Labs Ordered and Resulted from Time of ED Arrival to Time of ED Departure   INFLUENZA A/B, RSV AND SARS-COV2 PCR - Normal       Result Value    Influenza A PCR Negative      Influenza B PCR Negative      RSV PCR Negative      SARS CoV2 PCR Negative     GROUP A STREPTOCOCCUS PCR THROAT SWAB - Normal    Group A strep by PCR Not Detected           Results for orders placed or performed during the hospital encounter of 07/27/25   Influenza A/B, RSV and SARS-CoV2 PCR (COVID-19) Nose    Specimen: Nose; Swab   Result Value Ref Range    Influenza A PCR Negative Negative    Influenza B PCR Negative Negative    RSV PCR Negative Negative    SARS CoV2 PCR Negative Negative   Group A Streptococcus PCR Throat Swab    Specimen: Throat; Swab   Result Value Ref Range    Group A strep by PCR Not Detected Not Detected       Enrique Joiner MD  Emergency Medicine  [facility] EMERGENCY DEPARTMENT  [facility] [facility] [id number] [phone]  7/27/2025         Enrique Joiner MD  07/27/25 0195

## 2025-07-27 NOTE — ED NOTES
Bed: [facility]-05  Expected date:   Expected time:   Means of arrival:   Comments:  Allmuriel, 44M Sudden Headache, VSS, a/o x4

## 2025-08-01 VITALS
WEIGHT: 287 LBS | BODY MASS INDEX: 47.76 KG/M2 | RESPIRATION RATE: 16 BRPM | OXYGEN SATURATION: 98 % | TEMPERATURE: 98 F | DIASTOLIC BLOOD PRESSURE: 60 MMHG | SYSTOLIC BLOOD PRESSURE: 130 MMHG | HEART RATE: 82 BPM

## 2025-08-01 PROCEDURE — 99284 EMERGENCY DEPT VISIT MOD MDM: CPT | Performed by: EMERGENCY MEDICINE

## 2025-08-02 ENCOUNTER — HOSPITAL ENCOUNTER (EMERGENCY)
Facility: HOSPITAL | Age: 45
Discharge: HOME | End: 2025-08-02
Attending: EMERGENCY MEDICINE | Admitting: EMERGENCY MEDICINE
Payer: MEDICARE

## 2025-08-02 DIAGNOSIS — R73.9 HYPERGLYCEMIA: Primary | ICD-10-CM

## 2025-08-02 LAB
ANION GAP SERPL CALCULATED.3IONS-SCNC: 9 MMOL/L (ref 7–15)
BUN SERPL-MCNC: 25.6 MG/DL (ref 6–20)
CALCIUM SERPL-MCNC: 9.8 MG/DL (ref 8.8–10.4)
CHLORIDE SERPL-SCNC: 99 MMOL/L (ref 98–107)
CREAT SERPL-MCNC: 1.06 MG/DL (ref 0.67–1.17)
EGFRCR SERPLBLD CKD-EPI 2021: 89 ML/MIN/1.73M2
EST. AVERAGE GLUCOSE BLD GHB EST-MCNC: 163 MG/DL
GLUCOSE BLDC GLUCOMTR-MCNC: 272 MG/DL (ref 70–99)
GLUCOSE BLDC GLUCOMTR-MCNC: 301 MG/DL (ref 70–99)
GLUCOSE SERPL-MCNC: 279 MG/DL (ref 70–99)
HBA1C MFR BLD: 7.3 %
HCO3 SERPL-SCNC: 27 MMOL/L (ref 22–29)
POTASSIUM SERPL-SCNC: 4.5 MMOL/L (ref 3.4–5.3)
SODIUM SERPL-SCNC: 135 MMOL/L (ref 135–145)

## 2025-08-02 PROCEDURE — 83036 HEMOGLOBIN GLYCOSYLATED A1C: CPT | Performed by: EMERGENCY MEDICINE

## 2025-08-02 PROCEDURE — 82962 GLUCOSE BLOOD TEST: CPT

## 2025-08-02 PROCEDURE — 36415 COLL VENOUS BLD VENIPUNCTURE: CPT | Performed by: EMERGENCY MEDICINE

## 2025-08-02 PROCEDURE — 250N000012 HC RX MED GY IP 250 OP 636 PS 637: Performed by: EMERGENCY MEDICINE

## 2025-08-02 PROCEDURE — 80048 BASIC METABOLIC PNL TOTAL CA: CPT | Performed by: EMERGENCY MEDICINE

## 2025-08-02 RX ADMIN — INSULIN ASPART 6 UNITS: 100 INJECTION, SOLUTION INTRAVENOUS; SUBCUTANEOUS at 01:20

## 2025-08-02 ASSESSMENT — ACTIVITIES OF DAILY LIVING (ADL)
ADLS_ACUITY_SCORE: 58

## 2025-08-03 ENCOUNTER — HOSPITAL ENCOUNTER (EMERGENCY)
Facility: HOSPITAL | Age: 45
Discharge: HOME | End: 2025-08-03
Attending: EMERGENCY MEDICINE | Admitting: EMERGENCY MEDICINE
Payer: MEDICARE

## 2025-08-03 VITALS
WEIGHT: 278.8 LBS | HEIGHT: 65 IN | TEMPERATURE: 97.1 F | DIASTOLIC BLOOD PRESSURE: 67 MMHG | RESPIRATION RATE: 18 BRPM | BODY MASS INDEX: 46.45 KG/M2 | SYSTOLIC BLOOD PRESSURE: 140 MMHG | OXYGEN SATURATION: 98 % | HEART RATE: 88 BPM

## 2025-08-03 DIAGNOSIS — R73.9 HYPERGLYCEMIA: Primary | ICD-10-CM

## 2025-08-03 LAB
ANION GAP SERPL CALCULATED.3IONS-SCNC: 14 MMOL/L (ref 7–15)
B-OH-BUTYR SERPL-SCNC: <0.18 MMOL/L
BASE EXCESS BLDV CALC-SCNC: 0.2 MMOL/L (ref -3–3)
BASOPHILS # BLD AUTO: 0.1 10E3/UL (ref 0–0.2)
BASOPHILS NFR BLD AUTO: 1 %
BUN SERPL-MCNC: 32 MG/DL (ref 6–20)
CALCIUM SERPL-MCNC: 9.7 MG/DL (ref 8.8–10.4)
CHLORIDE SERPL-SCNC: 98 MMOL/L (ref 98–107)
CREAT SERPL-MCNC: 1.22 MG/DL (ref 0.67–1.17)
EGFRCR SERPLBLD CKD-EPI 2021: 75 ML/MIN/1.73M2
EOSINOPHIL # BLD AUTO: 0.5 10E3/UL (ref 0–0.7)
EOSINOPHIL NFR BLD AUTO: 3 %
ERYTHROCYTE [DISTWIDTH] IN BLOOD BY AUTOMATED COUNT: 17.5 % (ref 10–15)
GLUCOSE BLDC GLUCOMTR-MCNC: 224 MG/DL (ref 70–99)
GLUCOSE BLDC GLUCOMTR-MCNC: 314 MG/DL (ref 70–99)
GLUCOSE SERPL-MCNC: 238 MG/DL (ref 70–99)
HCO3 BLDV-SCNC: 26 MMOL/L (ref 21–28)
HCO3 SERPL-SCNC: 23 MMOL/L (ref 22–29)
HCT VFR BLD AUTO: 35.3 % (ref 40–53)
HGB BLD-MCNC: 10.7 G/DL (ref 13.3–17.7)
IMM GRANULOCYTES # BLD: 0.2 10E3/UL
IMM GRANULOCYTES NFR BLD: 2 %
LYMPHOCYTES # BLD AUTO: 2.1 10E3/UL (ref 0.8–5.3)
LYMPHOCYTES NFR BLD AUTO: 15 %
MCH RBC QN AUTO: 23.4 PG (ref 26.5–33)
MCHC RBC AUTO-ENTMCNC: 30.3 G/DL (ref 31.5–36.5)
MCV RBC AUTO: 77 FL (ref 78–100)
MONOCYTES # BLD AUTO: 1.4 10E3/UL (ref 0–1.3)
MONOCYTES NFR BLD AUTO: 10 %
NEUTROPHILS # BLD AUTO: 10 10E3/UL (ref 1.6–8.3)
NEUTROPHILS NFR BLD AUTO: 70 %
NRBC # BLD AUTO: 0 10E3/UL
NRBC BLD AUTO-RTO: 0 /100
O2/TOTAL GAS SETTING VFR VENT: 21 %
OXYHGB MFR BLDV: 86 % (ref 70–75)
PCO2 BLDV: 45 MM HG (ref 40–50)
PH BLDV: 7.37 [PH] (ref 7.32–7.43)
PLATELET # BLD AUTO: 515 10E3/UL (ref 150–450)
PO2 BLDV: 65 MM HG (ref 25–47)
POTASSIUM SERPL-SCNC: 4.5 MMOL/L (ref 3.4–5.3)
RBC # BLD AUTO: 4.58 10E6/UL (ref 4.4–5.9)
SAO2 % BLDV: 92.8 % (ref 70–75)
SODIUM SERPL-SCNC: 135 MMOL/L (ref 135–145)
WBC # BLD AUTO: 14.2 10E3/UL (ref 4–11)

## 2025-08-03 PROCEDURE — 82962 GLUCOSE BLOOD TEST: CPT

## 2025-08-03 PROCEDURE — 80048 BASIC METABOLIC PNL TOTAL CA: CPT | Performed by: EMERGENCY MEDICINE

## 2025-08-03 PROCEDURE — 82805 BLOOD GASES W/O2 SATURATION: CPT | Performed by: EMERGENCY MEDICINE

## 2025-08-03 PROCEDURE — 82010 KETONE BODYS QUAN: CPT | Performed by: EMERGENCY MEDICINE

## 2025-08-03 PROCEDURE — 250N000012 HC RX MED GY IP 250 OP 636 PS 637: Performed by: EMERGENCY MEDICINE

## 2025-08-03 PROCEDURE — 36415 COLL VENOUS BLD VENIPUNCTURE: CPT | Performed by: EMERGENCY MEDICINE

## 2025-08-03 PROCEDURE — 85004 AUTOMATED DIFF WBC COUNT: CPT | Performed by: EMERGENCY MEDICINE

## 2025-08-03 PROCEDURE — 99284 EMERGENCY DEPT VISIT MOD MDM: CPT | Performed by: EMERGENCY MEDICINE

## 2025-08-03 RX ADMIN — INSULIN ASPART 4 UNITS: 100 INJECTION, SOLUTION INTRAVENOUS; SUBCUTANEOUS at 20:57

## 2025-08-03 ASSESSMENT — ACTIVITIES OF DAILY LIVING (ADL)
ADLS_ACUITY_SCORE: 58
ADLS_ACUITY_SCORE: 58

## 2025-08-07 ENCOUNTER — APPOINTMENT (OUTPATIENT)
Dept: RADIOLOGY | Facility: HOSPITAL | Age: 45
End: 2025-08-07
Attending: PHYSICIAN ASSISTANT
Payer: MEDICARE

## 2025-08-07 ENCOUNTER — HOSPITAL ENCOUNTER (EMERGENCY)
Facility: HOSPITAL | Age: 45
End: 2025-08-07
Attending: STUDENT IN AN ORGANIZED HEALTH CARE EDUCATION/TRAINING PROGRAM
Payer: MEDICARE

## 2025-08-07 ENCOUNTER — APPOINTMENT (OUTPATIENT)
Dept: CT IMAGING | Facility: HOSPITAL | Age: 45
End: 2025-08-07
Payer: MEDICARE

## 2025-08-07 VITALS
HEART RATE: 87 BPM | SYSTOLIC BLOOD PRESSURE: 126 MMHG | OXYGEN SATURATION: 95 % | WEIGHT: 285 LBS | TEMPERATURE: 98.1 F | HEIGHT: 65 IN | BODY MASS INDEX: 47.48 KG/M2 | RESPIRATION RATE: 30 BRPM | DIASTOLIC BLOOD PRESSURE: 61 MMHG

## 2025-08-07 DIAGNOSIS — I26.99 ACUTE PULMONARY EMBOLISM, UNSPECIFIED PULMONARY EMBOLISM TYPE, UNSPECIFIED WHETHER ACUTE COR PULMONALE PRESENT (H): Primary | ICD-10-CM

## 2025-08-07 DIAGNOSIS — R07.9 RIGHT-SIDED CHEST PAIN: ICD-10-CM

## 2025-08-07 DIAGNOSIS — R05.1 ACUTE COUGH: ICD-10-CM

## 2025-08-07 LAB
ANION GAP SERPL CALCULATED.3IONS-SCNC: 13 MMOL/L (ref 7–15)
BASOPHILS # BLD AUTO: 0.1 10E3/UL (ref 0–0.2)
BASOPHILS NFR BLD AUTO: 1 %
BUN SERPL-MCNC: 24.5 MG/DL (ref 6–20)
CALCIUM SERPL-MCNC: 9.6 MG/DL (ref 8.8–10.4)
CHLORIDE SERPL-SCNC: 102 MMOL/L (ref 98–107)
CREAT SERPL-MCNC: 1.71 MG/DL (ref 0.67–1.17)
D DIMER PPP FEU-MCNC: 11.95 UG/ML FEU (ref 0–0.5)
EGFRCR SERPLBLD CKD-EPI 2021: 50 ML/MIN/1.73M2
EOSINOPHIL # BLD AUTO: 0.6 10E3/UL (ref 0–0.7)
EOSINOPHIL NFR BLD AUTO: 4 %
ERYTHROCYTE [DISTWIDTH] IN BLOOD BY AUTOMATED COUNT: 17.3 % (ref 10–15)
FLUAV RNA SPEC QL NAA+PROBE: NEGATIVE
FLUBV RNA RESP QL NAA+PROBE: NEGATIVE
GLUCOSE SERPL-MCNC: 175 MG/DL (ref 70–99)
HCO3 SERPL-SCNC: 24 MMOL/L (ref 22–29)
HCT VFR BLD AUTO: 37.8 % (ref 40–53)
HGB BLD-MCNC: 11 G/DL (ref 13.3–17.7)
IMM GRANULOCYTES # BLD: 0.1 10E3/UL
IMM GRANULOCYTES NFR BLD: 1 %
LYMPHOCYTES # BLD AUTO: 2.4 10E3/UL (ref 0.8–5.3)
LYMPHOCYTES NFR BLD AUTO: 15 %
MCH RBC QN AUTO: 22.5 PG (ref 26.5–33)
MCHC RBC AUTO-ENTMCNC: 29.1 G/DL (ref 31.5–36.5)
MCV RBC AUTO: 78 FL (ref 78–100)
MONOCYTES # BLD AUTO: 1.4 10E3/UL (ref 0–1.3)
MONOCYTES NFR BLD AUTO: 9 %
NEUTROPHILS # BLD AUTO: 10.9 10E3/UL (ref 1.6–8.3)
NEUTROPHILS NFR BLD AUTO: 70 %
NRBC # BLD AUTO: 0 10E3/UL
NRBC BLD AUTO-RTO: 0 /100
PLATELET # BLD AUTO: 531 10E3/UL (ref 150–450)
POTASSIUM SERPL-SCNC: 4.2 MMOL/L (ref 3.4–5.3)
RBC # BLD AUTO: 4.88 10E6/UL (ref 4.4–5.9)
RSV RNA SPEC NAA+PROBE: NEGATIVE
S PYO DNA THROAT QL NAA+PROBE: NOT DETECTED
SARS-COV-2 RNA RESP QL NAA+PROBE: NEGATIVE
SODIUM SERPL-SCNC: 139 MMOL/L (ref 135–145)
TROPONIN T SERPL HS-MCNC: 33 NG/L
WBC # BLD AUTO: 15.5 10E3/UL (ref 4–11)

## 2025-08-07 PROCEDURE — 84484 ASSAY OF TROPONIN QUANT: CPT | Performed by: PHYSICIAN ASSISTANT

## 2025-08-07 PROCEDURE — 71046 X-RAY EXAM CHEST 2 VIEWS: CPT

## 2025-08-07 PROCEDURE — 82435 ASSAY OF BLOOD CHLORIDE: CPT | Performed by: PHYSICIAN ASSISTANT

## 2025-08-07 PROCEDURE — 94640 AIRWAY INHALATION TREATMENT: CPT

## 2025-08-07 PROCEDURE — 83880 ASSAY OF NATRIURETIC PEPTIDE: CPT | Mod: GZ | Performed by: STUDENT IN AN ORGANIZED HEALTH CARE EDUCATION/TRAINING PROGRAM

## 2025-08-07 PROCEDURE — 87651 STREP A DNA AMP PROBE: CPT | Performed by: PHYSICIAN ASSISTANT

## 2025-08-07 PROCEDURE — 250N000009 HC RX 250: Performed by: PHYSICIAN ASSISTANT

## 2025-08-07 PROCEDURE — 87637 SARSCOV2&INF A&B&RSV AMP PRB: CPT | Performed by: PHYSICIAN ASSISTANT

## 2025-08-07 PROCEDURE — 250N000013 HC RX MED GY IP 250 OP 250 PS 637: Performed by: PHYSICIAN ASSISTANT

## 2025-08-07 PROCEDURE — 93005 ELECTROCARDIOGRAM TRACING: CPT | Performed by: PHYSICIAN ASSISTANT

## 2025-08-07 PROCEDURE — 85379 FIBRIN DEGRADATION QUANT: CPT | Performed by: PHYSICIAN ASSISTANT

## 2025-08-07 PROCEDURE — 36415 COLL VENOUS BLD VENIPUNCTURE: CPT | Performed by: PHYSICIAN ASSISTANT

## 2025-08-07 PROCEDURE — 99285 EMERGENCY DEPT VISIT HI MDM: CPT | Mod: 25 | Performed by: STUDENT IN AN ORGANIZED HEALTH CARE EDUCATION/TRAINING PROGRAM

## 2025-08-07 PROCEDURE — 999N000157 HC STATISTIC RCP TIME EA 10 MIN

## 2025-08-07 PROCEDURE — 250N000011 HC RX IP 250 OP 636

## 2025-08-07 PROCEDURE — 85025 COMPLETE CBC W/AUTO DIFF WBC: CPT | Performed by: PHYSICIAN ASSISTANT

## 2025-08-07 PROCEDURE — 71275 CT ANGIOGRAPHY CHEST: CPT

## 2025-08-07 RX ORDER — IOPAMIDOL 755 MG/ML
75 INJECTION, SOLUTION INTRAVASCULAR ONCE
Status: COMPLETED | OUTPATIENT
Start: 2025-08-07 | End: 2025-08-07

## 2025-08-07 RX ORDER — IPRATROPIUM BROMIDE AND ALBUTEROL SULFATE 2.5; .5 MG/3ML; MG/3ML
3 SOLUTION RESPIRATORY (INHALATION) ONCE
Status: COMPLETED | OUTPATIENT
Start: 2025-08-07 | End: 2025-08-07

## 2025-08-07 RX ORDER — BENZONATATE 100 MG/1
100 CAPSULE ORAL ONCE
Status: COMPLETED | OUTPATIENT
Start: 2025-08-07 | End: 2025-08-07

## 2025-08-07 RX ADMIN — IOPAMIDOL 75 ML: 755 INJECTION, SOLUTION INTRAVENOUS at 22:30

## 2025-08-07 RX ADMIN — BENZONATATE 100 MG: 100 CAPSULE ORAL at 20:32

## 2025-08-07 RX ADMIN — IPRATROPIUM BROMIDE AND ALBUTEROL SULFATE 3 ML: .5; 3 SOLUTION RESPIRATORY (INHALATION) at 21:09

## 2025-08-08 VITALS
OXYGEN SATURATION: 97 % | HEIGHT: 65 IN | DIASTOLIC BLOOD PRESSURE: 68 MMHG | BODY MASS INDEX: 47.48 KG/M2 | SYSTOLIC BLOOD PRESSURE: 136 MMHG | TEMPERATURE: 98.1 F | HEART RATE: 84 BPM | RESPIRATION RATE: 29 BRPM | WEIGHT: 285 LBS

## 2025-08-08 LAB
ATRIAL RATE - MUSE: 94 BPM
DIASTOLIC BLOOD PRESSURE - MUSE: NORMAL MMHG
INTERPRETATION ECG - MUSE: NORMAL
NT-PROBNP SERPL-MCNC: 434 PG/ML (ref 0–93)
P AXIS - MUSE: 69 DEGREES
PR INTERVAL - MUSE: 192 MS
QRS DURATION - MUSE: 116 MS
QT - MUSE: 380 MS
QTC - MUSE: 475 MS
R AXIS - MUSE: 74 DEGREES
SYSTOLIC BLOOD PRESSURE - MUSE: NORMAL MMHG
T AXIS - MUSE: 205 DEGREES
TROPONIN T SERPL HS-MCNC: 28 NG/L
VENTRICULAR RATE- MUSE: 94 BPM

## 2025-08-08 PROCEDURE — 250N000013 HC RX MED GY IP 250 OP 250 PS 637: Performed by: STUDENT IN AN ORGANIZED HEALTH CARE EDUCATION/TRAINING PROGRAM

## 2025-08-08 RX ORDER — APIXABAN 5 MG (74)
KIT ORAL
Qty: 1 EACH | Refills: 1 | Status: SHIPPED | OUTPATIENT
Start: 2025-08-08 | End: 2025-08-08

## 2025-08-08 RX ORDER — APIXABAN 5 MG (74)
KIT ORAL
Qty: 1 EACH | Refills: 1 | Status: SHIPPED | OUTPATIENT
Start: 2025-08-08 | End: 2025-09-07

## 2025-08-08 RX ADMIN — APIXABAN 10 MG: 5 TABLET, FILM COATED ORAL at 01:39

## 2025-08-08 ASSESSMENT — ACTIVITIES OF DAILY LIVING (ADL)
ADLS_ACUITY_SCORE: 58
ADLS_ACUITY_SCORE: 58

## 2025-08-12 ENCOUNTER — OFFICE VISIT (OUTPATIENT)
Dept: OTOLARYNGOLOGY | Facility: CLINIC | Age: 45
End: 2025-08-12
Attending: NURSE PRACTITIONER
Payer: MEDICARE

## 2025-08-12 ENCOUNTER — OFFICE VISIT (OUTPATIENT)
Dept: AUDIOLOGY | Facility: CLINIC | Age: 45
End: 2025-08-12
Attending: NURSE PRACTITIONER
Payer: MEDICARE

## 2025-08-12 DIAGNOSIS — H93.8X3 EAR FULLNESS, BILATERAL: ICD-10-CM

## 2025-08-12 DIAGNOSIS — H69.93 DYSFUNCTION OF BOTH EUSTACHIAN TUBES: ICD-10-CM

## 2025-08-12 DIAGNOSIS — J34.1 MUCOUS RETENTION CYST OF MAXILLARY SINUS: Primary | ICD-10-CM

## 2025-08-12 DIAGNOSIS — Z79.01 CHRONIC ANTICOAGULATION: ICD-10-CM

## 2025-08-12 DIAGNOSIS — Z71.6 ENCOUNTER FOR TOBACCO USE CESSATION COUNSELING: ICD-10-CM

## 2025-08-12 DIAGNOSIS — H61.22 IMPACTED CERUMEN OF LEFT EAR: ICD-10-CM

## 2025-08-12 DIAGNOSIS — H90.3 BILATERAL SENSORINEURAL HEARING LOSS: ICD-10-CM

## 2025-08-12 DIAGNOSIS — J34.1 MUCOUS RETENTION CYST OF MAXILLARY SINUS: ICD-10-CM

## 2025-08-12 DIAGNOSIS — H90.3 ASYMMETRICAL SENSORINEURAL HEARING LOSS: Primary | ICD-10-CM

## 2025-08-12 DIAGNOSIS — H90.3 ASYMMETRICAL SENSORINEURAL HEARING LOSS: ICD-10-CM

## 2025-08-12 DIAGNOSIS — F17.200 TOBACCO DEPENDENCE SYNDROME: ICD-10-CM

## 2025-08-12 DIAGNOSIS — K70.31 ASCITES DUE TO ALCOHOLIC CIRRHOSIS (H): ICD-10-CM

## 2025-08-12 PROCEDURE — 92557 COMPREHENSIVE HEARING TEST: CPT | Performed by: AUDIOLOGIST

## 2025-08-12 PROCEDURE — 92550 TYMPANOMETRY & REFLEX THRESH: CPT | Mod: 52 | Performed by: AUDIOLOGIST

## 2025-08-12 RX ORDER — MIRTAZAPINE 7.5 MG/1
TABLET, FILM COATED ORAL
COMMUNITY
Start: 2025-08-11

## 2025-08-12 RX ORDER — REPAGLINIDE 0.5 MG/1
TABLET ORAL
COMMUNITY
Start: 2025-08-11

## 2025-08-12 RX ORDER — DOCUSATE SODIUM 50MG AND SENNOSIDES 8.6MG 8.6; 5 MG/1; MG/1
TABLET, FILM COATED ORAL
COMMUNITY
Start: 2024-08-24

## 2025-08-12 RX ORDER — OLANZAPINE 15 MG/1
TABLET, FILM COATED ORAL
COMMUNITY
Start: 2025-08-11

## 2025-08-12 RX ORDER — FLUTICASONE PROPIONATE 50 MCG
2 SPRAY, SUSPENSION (ML) NASAL DAILY PRN
Qty: 47.4 ML | Refills: 3 | Status: SHIPPED | OUTPATIENT
Start: 2025-08-12

## 2025-08-14 ENCOUNTER — HOSPITAL ENCOUNTER (EMERGENCY)
Facility: HOSPITAL | Age: 45
Discharge: HOME | End: 2025-08-14
Attending: FAMILY MEDICINE | Admitting: FAMILY MEDICINE
Payer: MEDICARE

## 2025-08-14 VITALS
RESPIRATION RATE: 16 BRPM | OXYGEN SATURATION: 97 % | SYSTOLIC BLOOD PRESSURE: 131 MMHG | HEIGHT: 65 IN | TEMPERATURE: 99 F | BODY MASS INDEX: 47.58 KG/M2 | DIASTOLIC BLOOD PRESSURE: 60 MMHG | WEIGHT: 285.6 LBS | HEART RATE: 91 BPM

## 2025-08-14 DIAGNOSIS — K08.89 PAIN, DENTAL: Primary | ICD-10-CM

## 2025-08-14 DIAGNOSIS — K64.4 BLEEDING EXTERNAL HEMORRHOIDS: ICD-10-CM

## 2025-08-14 PROCEDURE — 99284 EMERGENCY DEPT VISIT MOD MDM: CPT | Performed by: FAMILY MEDICINE

## 2025-08-14 PROCEDURE — 250N000013 HC RX MED GY IP 250 OP 250 PS 637: Performed by: FAMILY MEDICINE

## 2025-08-14 RX ORDER — NAPROXEN 250 MG/1
250 TABLET ORAL ONCE
Status: COMPLETED | OUTPATIENT
Start: 2025-08-14 | End: 2025-08-14

## 2025-08-14 RX ORDER — NAPROXEN 250 MG/1
250 TABLET ORAL 2 TIMES DAILY WITH MEALS
Qty: 14 TABLET | Refills: 0 | Status: SHIPPED | OUTPATIENT
Start: 2025-08-14

## 2025-08-14 RX ADMIN — NAPROXEN 250 MG: 250 TABLET ORAL at 20:47

## 2025-08-16 ENCOUNTER — HOSPITAL ENCOUNTER (EMERGENCY)
Facility: HOSPITAL | Age: 45
Discharge: HOME | End: 2025-08-16
Payer: MEDICARE

## 2025-08-16 VITALS
TEMPERATURE: 98.6 F | RESPIRATION RATE: 18 BRPM | DIASTOLIC BLOOD PRESSURE: 69 MMHG | HEIGHT: 65 IN | OXYGEN SATURATION: 95 % | WEIGHT: 286.7 LBS | BODY MASS INDEX: 47.76 KG/M2 | SYSTOLIC BLOOD PRESSURE: 124 MMHG | HEART RATE: 89 BPM

## 2025-08-16 DIAGNOSIS — T50.905A ADVERSE EFFECT OF DRUG, INITIAL ENCOUNTER: Primary | ICD-10-CM

## 2025-08-16 LAB — GLUCOSE BLDC GLUCOMTR-MCNC: 188 MG/DL (ref 70–99)

## 2025-08-16 PROCEDURE — 82962 GLUCOSE BLOOD TEST: CPT

## 2025-08-16 PROCEDURE — 250N000011 HC RX IP 250 OP 636

## 2025-08-16 PROCEDURE — 99284 EMERGENCY DEPT VISIT MOD MDM: CPT

## 2025-08-16 PROCEDURE — 250N000013 HC RX MED GY IP 250 OP 250 PS 637

## 2025-08-16 RX ORDER — ONDANSETRON 4 MG/1
4 TABLET, ORALLY DISINTEGRATING ORAL ONCE
Status: COMPLETED | OUTPATIENT
Start: 2025-08-16 | End: 2025-08-16

## 2025-08-16 RX ORDER — MECLIZINE HCL 12.5 MG 12.5 MG/1
25 TABLET ORAL 4 TIMES DAILY PRN
Qty: 30 TABLET | Refills: 0 | Status: SHIPPED | OUTPATIENT
Start: 2025-08-16

## 2025-08-16 RX ORDER — MECLIZINE HCL 12.5 MG 12.5 MG/1
25 TABLET ORAL ONCE
Status: COMPLETED | OUTPATIENT
Start: 2025-08-16 | End: 2025-08-16

## 2025-08-16 RX ORDER — ONDANSETRON 4 MG/1
4 TABLET, ORALLY DISINTEGRATING ORAL EVERY 8 HOURS PRN
Qty: 20 TABLET | Refills: 0 | Status: SHIPPED | OUTPATIENT
Start: 2025-08-16

## 2025-08-16 RX ADMIN — MECLIZINE HYDROCHLORIDE 25 MG: 12.5 TABLET ORAL at 21:30

## 2025-08-16 RX ADMIN — ONDANSETRON 4 MG: 4 TABLET, ORALLY DISINTEGRATING ORAL at 21:30

## 2025-08-16 ASSESSMENT — ACTIVITIES OF DAILY LIVING (ADL)
ADLS_ACUITY_SCORE: 58
ADLS_ACUITY_SCORE: 58

## 2025-08-26 ENCOUNTER — HOSPITAL ENCOUNTER (EMERGENCY)
Facility: HOSPITAL | Age: 45
Discharge: HOME | End: 2025-08-27
Attending: STUDENT IN AN ORGANIZED HEALTH CARE EDUCATION/TRAINING PROGRAM | Admitting: STUDENT IN AN ORGANIZED HEALTH CARE EDUCATION/TRAINING PROGRAM
Payer: MEDICARE

## 2025-08-26 ENCOUNTER — APPOINTMENT (OUTPATIENT)
Dept: CT IMAGING | Facility: HOSPITAL | Age: 45
End: 2025-08-26
Attending: STUDENT IN AN ORGANIZED HEALTH CARE EDUCATION/TRAINING PROGRAM
Payer: MEDICARE

## 2025-08-26 ENCOUNTER — APPOINTMENT (OUTPATIENT)
Dept: RADIOLOGY | Facility: HOSPITAL | Age: 45
End: 2025-08-26
Attending: STUDENT IN AN ORGANIZED HEALTH CARE EDUCATION/TRAINING PROGRAM
Payer: MEDICARE

## 2025-08-31 ENCOUNTER — APPOINTMENT (OUTPATIENT)
Dept: RADIOLOGY | Facility: HOSPITAL | Age: 45
End: 2025-08-31
Attending: FAMILY MEDICINE
Payer: MEDICARE

## 2025-08-31 ENCOUNTER — HOSPITAL ENCOUNTER (EMERGENCY)
Facility: HOSPITAL | Age: 45
Discharge: HOME | End: 2025-08-31
Attending: FAMILY MEDICINE | Admitting: FAMILY MEDICINE
Payer: MEDICARE

## 2025-08-31 ENCOUNTER — APPOINTMENT (OUTPATIENT)
Dept: CT IMAGING | Facility: HOSPITAL | Age: 45
End: 2025-08-31
Attending: FAMILY MEDICINE
Payer: MEDICARE

## 2025-08-31 VITALS
RESPIRATION RATE: 24 BRPM | BODY MASS INDEX: 47.79 KG/M2 | DIASTOLIC BLOOD PRESSURE: 67 MMHG | TEMPERATURE: 98.5 F | WEIGHT: 287.2 LBS | SYSTOLIC BLOOD PRESSURE: 128 MMHG | OXYGEN SATURATION: 95 % | HEART RATE: 86 BPM

## 2025-08-31 DIAGNOSIS — J44.1 COPD WITH ACUTE EXACERBATION (H): ICD-10-CM

## 2025-08-31 DIAGNOSIS — S09.90XA MINOR HEAD INJURY, INITIAL ENCOUNTER: ICD-10-CM

## 2025-08-31 DIAGNOSIS — Z79.01 CHRONIC ANTICOAGULATION: ICD-10-CM

## 2025-08-31 DIAGNOSIS — S50.12XA CONTUSION OF LEFT FOREARM, INITIAL ENCOUNTER: Primary | ICD-10-CM

## 2025-08-31 PROCEDURE — 99284 EMERGENCY DEPT VISIT MOD MDM: CPT | Mod: 25 | Performed by: FAMILY MEDICINE

## 2025-08-31 PROCEDURE — 73090 X-RAY EXAM OF FOREARM: CPT | Mod: LT

## 2025-08-31 PROCEDURE — 250N000009 HC RX 250: Performed by: FAMILY MEDICINE

## 2025-08-31 PROCEDURE — 250N000012 HC RX MED GY IP 250 OP 636 PS 637: Performed by: FAMILY MEDICINE

## 2025-08-31 PROCEDURE — 70450 CT HEAD/BRAIN W/O DYE: CPT

## 2025-08-31 RX ORDER — PREDNISONE 20 MG/1
TABLET ORAL
Qty: 10 TABLET | Refills: 0 | Status: SHIPPED | OUTPATIENT
Start: 2025-08-31

## 2025-08-31 RX ORDER — PREDNISONE 20 MG/1
40 TABLET ORAL ONCE
Status: COMPLETED | OUTPATIENT
Start: 2025-08-31 | End: 2025-08-31

## 2025-08-31 RX ORDER — IPRATROPIUM BROMIDE AND ALBUTEROL SULFATE 2.5; .5 MG/3ML; MG/3ML
3 SOLUTION RESPIRATORY (INHALATION) ONCE
Status: COMPLETED | OUTPATIENT
Start: 2025-08-31 | End: 2025-08-31

## 2025-08-31 RX ADMIN — IPRATROPIUM BROMIDE AND ALBUTEROL SULFATE 3 ML: .5; 3 SOLUTION RESPIRATORY (INHALATION) at 21:55

## 2025-08-31 RX ADMIN — PREDNISONE 40 MG: 20 TABLET ORAL at 22:28

## 2025-08-31 ASSESSMENT — ENCOUNTER SYMPTOMS
SHORTNESS OF BREATH: 1
COUGH: 1

## 2025-08-31 ASSESSMENT — ACTIVITIES OF DAILY LIVING (ADL): ADLS_ACUITY_SCORE: 58

## 2025-09-01 ENCOUNTER — HOSPITAL ENCOUNTER (EMERGENCY)
Facility: HOSPITAL | Age: 45
Discharge: HOME | End: 2025-09-01
Payer: COMMERCIAL

## 2025-09-01 ENCOUNTER — NURSE TRIAGE (OUTPATIENT)
Dept: NURSING | Facility: CLINIC | Age: 45
End: 2025-09-01
Payer: COMMERCIAL

## 2025-09-01 ENCOUNTER — APPOINTMENT (OUTPATIENT)
Dept: RADIOLOGY | Facility: HOSPITAL | Age: 45
End: 2025-09-01
Payer: COMMERCIAL

## 2025-09-01 VITALS
DIASTOLIC BLOOD PRESSURE: 81 MMHG | RESPIRATION RATE: 18 BRPM | HEART RATE: 92 BPM | SYSTOLIC BLOOD PRESSURE: 139 MMHG | BODY MASS INDEX: 47.76 KG/M2 | OXYGEN SATURATION: 97 % | TEMPERATURE: 97.2 F | WEIGHT: 287 LBS

## 2025-09-01 DIAGNOSIS — J44.1 COPD WITH ACUTE EXACERBATION (H): ICD-10-CM

## 2025-09-01 DIAGNOSIS — R05.9 COUGH, UNSPECIFIED TYPE: Primary | ICD-10-CM

## 2025-09-01 PROCEDURE — 99285 EMERGENCY DEPT VISIT HI MDM: CPT | Mod: 25

## 2025-09-01 PROCEDURE — 94640 AIRWAY INHALATION TREATMENT: CPT | Mod: 76

## 2025-09-01 PROCEDURE — 250N000012 HC RX MED GY IP 250 OP 636 PS 637

## 2025-09-01 PROCEDURE — 250N000009 HC RX 250

## 2025-09-01 PROCEDURE — 71046 X-RAY EXAM CHEST 2 VIEWS: CPT

## 2025-09-01 RX ORDER — IPRATROPIUM BROMIDE AND ALBUTEROL SULFATE 2.5; .5 MG/3ML; MG/3ML
3 SOLUTION RESPIRATORY (INHALATION) ONCE
Status: COMPLETED | OUTPATIENT
Start: 2025-09-01 | End: 2025-09-01

## 2025-09-01 RX ORDER — PREDNISONE 20 MG/1
40 TABLET ORAL ONCE
Status: COMPLETED | OUTPATIENT
Start: 2025-09-01 | End: 2025-09-01

## 2025-09-01 RX ORDER — GUAIFENESIN 200 MG/10ML
200 LIQUID ORAL EVERY 4 HOURS PRN
Qty: 473 ML | Refills: 0 | Status: SHIPPED | OUTPATIENT
Start: 2025-09-01

## 2025-09-01 RX ORDER — AZITHROMYCIN 250 MG/1
TABLET, FILM COATED ORAL
Qty: 6 TABLET | Refills: 0 | Status: SHIPPED | OUTPATIENT
Start: 2025-09-01 | End: 2025-09-06

## 2025-09-01 RX ADMIN — IPRATROPIUM BROMIDE AND ALBUTEROL SULFATE 3 ML: .5; 3 SOLUTION RESPIRATORY (INHALATION) at 20:22

## 2025-09-01 RX ADMIN — PREDNISONE 40 MG: 20 TABLET ORAL at 20:34

## 2025-09-01 ASSESSMENT — ACTIVITIES OF DAILY LIVING (ADL)
ADLS_ACUITY_SCORE: 58

## (undated) DEVICE — KIT HAND CONTROL ACIST 014644 AR-P54

## (undated) DEVICE — SYSTEM PANNUS RETENTION 4 PAD 2 STRAP CZ-PRS-04

## (undated) DEVICE — SUCTION MANIFOLD NEPTUNE 2 SYS 1 PORT 702-025-000

## (undated) DEVICE — SOL WATER IRRIG 1000ML BOTTLE 2F7114

## (undated) DEVICE — CUSTOM PACK CORONARY SAN5BCRHEA

## (undated) DEVICE — INTRO MICRO MINI STICK 4FR STIFF NITINOL 45-753

## (undated) DEVICE — TUBING SUCTION MEDI-VAC 1/4"X20' N620A - HE

## (undated) DEVICE — FORCEP BIOPSY 2.3MM DISP COATED 000388

## (undated) DEVICE — SHTH INTRO 0.021IN ID 6FR DIA

## (undated) DEVICE — TUBING SUCTION MEDI-VAC 1/4"X20' N620A

## (undated) DEVICE — SYR ANGIOGRAPHY MULTIUSE KIT ACIST 014612

## (undated) DEVICE — ELECTRODE DEFIB CADENCE 22550R

## (undated) DEVICE — SLEEVE TR BAND RADIAL COMPRESSION DEVICE 24CM TRB24-REG

## (undated) DEVICE — MANIFOLD KIT ANGIO AUTOMATED 014613

## (undated) DEVICE — CATH ANGIO WEDGE PRESSURE 6FRX110CM DL AI-07126

## (undated) DEVICE — CATH DIAGNOSTIC RADIAL 5FR TIG 4.0

## (undated) RX ORDER — LIDOCAINE HYDROCHLORIDE 10 MG/ML
INJECTION, SOLUTION INFILTRATION; PERINEURAL
Status: DISPENSED
Start: 2025-06-17

## (undated) RX ORDER — EPHEDRINE SULFATE 50 MG/ML
INJECTION, SOLUTION INTRAMUSCULAR; INTRAVENOUS; SUBCUTANEOUS
Status: DISPENSED
Start: 2025-04-04

## (undated) RX ORDER — FENTANYL CITRATE 50 UG/ML
INJECTION, SOLUTION INTRAMUSCULAR; INTRAVENOUS
Status: DISPENSED
Start: 2025-06-17

## (undated) RX ORDER — ONDANSETRON 2 MG/ML
INJECTION INTRAMUSCULAR; INTRAVENOUS
Status: DISPENSED
Start: 2025-04-04

## (undated) RX ORDER — FENTANYL CITRATE 50 UG/ML
INJECTION, SOLUTION INTRAMUSCULAR; INTRAVENOUS
Status: DISPENSED
Start: 2025-06-18

## (undated) RX ORDER — FUROSEMIDE 10 MG/ML
INJECTION INTRAMUSCULAR; INTRAVENOUS
Status: DISPENSED
Start: 2025-06-18